# Patient Record
Sex: FEMALE | Race: BLACK OR AFRICAN AMERICAN | URBAN - METROPOLITAN AREA
[De-identification: names, ages, dates, MRNs, and addresses within clinical notes are randomized per-mention and may not be internally consistent; named-entity substitution may affect disease eponyms.]

---

## 2017-01-01 ENCOUNTER — APPOINTMENT (OUTPATIENT)
Dept: GENERAL RADIOLOGY | Facility: CLINIC | Age: 57
DRG: 919 | End: 2017-01-01
Attending: NURSE PRACTITIONER
Payer: COMMERCIAL

## 2017-01-01 ENCOUNTER — INFUSION THERAPY VISIT (OUTPATIENT)
Dept: ONCOLOGY | Facility: CLINIC | Age: 57
End: 2017-01-01
Attending: INTERNAL MEDICINE
Payer: COMMERCIAL

## 2017-01-01 ENCOUNTER — APPOINTMENT (OUTPATIENT)
Dept: LAB | Facility: CLINIC | Age: 57
End: 2017-01-01
Attending: INTERNAL MEDICINE
Payer: COMMERCIAL

## 2017-01-01 ENCOUNTER — TELEPHONE (OUTPATIENT)
Dept: ONCOLOGY | Facility: CLINIC | Age: 57
End: 2017-01-01

## 2017-01-01 ENCOUNTER — ANESTHESIA (OUTPATIENT)
Dept: SURGERY | Facility: CLINIC | Age: 57
DRG: 919 | End: 2017-01-01
Payer: COMMERCIAL

## 2017-01-01 ENCOUNTER — ONCOLOGY VISIT (OUTPATIENT)
Dept: ONCOLOGY | Facility: CLINIC | Age: 57
End: 2017-01-01
Attending: NURSE PRACTITIONER
Payer: COMMERCIAL

## 2017-01-01 ENCOUNTER — OFFICE VISIT (OUTPATIENT)
Dept: ENDOCRINOLOGY | Facility: CLINIC | Age: 57
End: 2017-01-01

## 2017-01-01 ENCOUNTER — HOME INFUSION (PRE-WILLOW HOME INFUSION) (OUTPATIENT)
Dept: PHARMACY | Facility: CLINIC | Age: 57
End: 2017-01-01

## 2017-01-01 ENCOUNTER — APPOINTMENT (OUTPATIENT)
Dept: GENERAL RADIOLOGY | Facility: CLINIC | Age: 57
DRG: 919 | End: 2017-01-01
Attending: INTERNAL MEDICINE
Payer: COMMERCIAL

## 2017-01-01 ENCOUNTER — SURGERY (OUTPATIENT)
Age: 57
End: 2017-01-01

## 2017-01-01 ENCOUNTER — INFUSION THERAPY VISIT (OUTPATIENT)
Dept: ONCOLOGY | Facility: CLINIC | Age: 57
End: 2017-01-01
Attending: PHYSICIAN ASSISTANT
Payer: COMMERCIAL

## 2017-01-01 ENCOUNTER — APPOINTMENT (OUTPATIENT)
Dept: LAB | Facility: CLINIC | Age: 57
End: 2017-01-01
Attending: PHYSICIAN ASSISTANT
Payer: COMMERCIAL

## 2017-01-01 ENCOUNTER — THERAPY VISIT (OUTPATIENT)
Dept: SPEECH THERAPY | Facility: CLINIC | Age: 57
End: 2017-01-01
Attending: PHYSICIAN ASSISTANT

## 2017-01-01 ENCOUNTER — HEALTH MAINTENANCE LETTER (OUTPATIENT)
Age: 57
End: 2017-01-01

## 2017-01-01 ENCOUNTER — ANESTHESIA EVENT (OUTPATIENT)
Dept: SURGERY | Facility: CLINIC | Age: 57
DRG: 919 | End: 2017-01-01
Payer: COMMERCIAL

## 2017-01-01 ENCOUNTER — CARE COORDINATION (OUTPATIENT)
Dept: ONCOLOGY | Facility: CLINIC | Age: 57
End: 2017-01-01

## 2017-01-01 ENCOUNTER — CARE COORDINATION (OUTPATIENT)
Dept: CARE COORDINATION | Facility: CLINIC | Age: 57
End: 2017-01-01

## 2017-01-01 ENCOUNTER — TELEPHONE (OUTPATIENT)
Dept: ENDOCRINOLOGY | Facility: CLINIC | Age: 57
End: 2017-01-01

## 2017-01-01 ENCOUNTER — DOCUMENTATION ONLY (OUTPATIENT)
Dept: ONCOLOGY | Facility: CLINIC | Age: 57
End: 2017-01-01

## 2017-01-01 ENCOUNTER — HOSPITAL ENCOUNTER (INPATIENT)
Facility: CLINIC | Age: 57
LOS: 2 days | Discharge: HOME | DRG: 919 | End: 2017-08-05
Attending: INTERNAL MEDICINE | Admitting: INTERNAL MEDICINE
Payer: COMMERCIAL

## 2017-01-01 ENCOUNTER — INFUSION THERAPY VISIT (OUTPATIENT)
Dept: ONCOLOGY | Facility: CLINIC | Age: 57
DRG: 919 | End: 2017-01-01
Attending: INTERNAL MEDICINE
Payer: COMMERCIAL

## 2017-01-01 ENCOUNTER — OFFICE VISIT (OUTPATIENT)
Dept: EDUCATION SERVICES | Facility: CLINIC | Age: 57
End: 2017-01-01

## 2017-01-01 VITALS
SYSTOLIC BLOOD PRESSURE: 112 MMHG | DIASTOLIC BLOOD PRESSURE: 70 MMHG | OXYGEN SATURATION: 100 % | HEART RATE: 105 BPM | WEIGHT: 170.8 LBS | RESPIRATION RATE: 18 BRPM | BODY MASS INDEX: 23.83 KG/M2 | TEMPERATURE: 98.5 F

## 2017-01-01 VITALS
DIASTOLIC BLOOD PRESSURE: 60 MMHG | SYSTOLIC BLOOD PRESSURE: 97 MMHG | HEART RATE: 98 BPM | WEIGHT: 171.6 LBS | RESPIRATION RATE: 16 BRPM | HEIGHT: 70 IN | OXYGEN SATURATION: 98 % | BODY MASS INDEX: 24.57 KG/M2 | TEMPERATURE: 98.8 F

## 2017-01-01 VITALS
TEMPERATURE: 98.8 F | BODY MASS INDEX: 23.21 KG/M2 | HEART RATE: 86 BPM | SYSTOLIC BLOOD PRESSURE: 108 MMHG | HEIGHT: 71 IN | RESPIRATION RATE: 16 BRPM | OXYGEN SATURATION: 99 % | DIASTOLIC BLOOD PRESSURE: 73 MMHG | WEIGHT: 165.8 LBS

## 2017-01-01 VITALS
HEART RATE: 94 BPM | DIASTOLIC BLOOD PRESSURE: 75 MMHG | OXYGEN SATURATION: 99 % | WEIGHT: 168.4 LBS | SYSTOLIC BLOOD PRESSURE: 118 MMHG | RESPIRATION RATE: 18 BRPM | TEMPERATURE: 98.8 F | BODY MASS INDEX: 23.5 KG/M2

## 2017-01-01 VITALS
BODY MASS INDEX: 22.64 KG/M2 | OXYGEN SATURATION: 98 % | WEIGHT: 161.7 LBS | HEIGHT: 71 IN | DIASTOLIC BLOOD PRESSURE: 80 MMHG | HEART RATE: 68 BPM | SYSTOLIC BLOOD PRESSURE: 132 MMHG | TEMPERATURE: 96.9 F | RESPIRATION RATE: 14 BRPM

## 2017-01-01 VITALS
SYSTOLIC BLOOD PRESSURE: 112 MMHG | BODY MASS INDEX: 25.05 KG/M2 | DIASTOLIC BLOOD PRESSURE: 73 MMHG | OXYGEN SATURATION: 99 % | HEIGHT: 70 IN | HEART RATE: 100 BPM | TEMPERATURE: 97 F | WEIGHT: 175 LBS | RESPIRATION RATE: 16 BRPM

## 2017-01-01 VITALS
DIASTOLIC BLOOD PRESSURE: 65 MMHG | SYSTOLIC BLOOD PRESSURE: 106 MMHG | RESPIRATION RATE: 18 BRPM | TEMPERATURE: 98.8 F | WEIGHT: 171.6 LBS | HEART RATE: 96 BPM | BODY MASS INDEX: 23.94 KG/M2 | OXYGEN SATURATION: 99 %

## 2017-01-01 VITALS
DIASTOLIC BLOOD PRESSURE: 73 MMHG | WEIGHT: 170.2 LBS | TEMPERATURE: 98.3 F | BODY MASS INDEX: 23.75 KG/M2 | HEART RATE: 95 BPM | SYSTOLIC BLOOD PRESSURE: 109 MMHG | OXYGEN SATURATION: 100 % | RESPIRATION RATE: 18 BRPM

## 2017-01-01 VITALS
TEMPERATURE: 99.7 F | DIASTOLIC BLOOD PRESSURE: 73 MMHG | BODY MASS INDEX: 22.3 KG/M2 | OXYGEN SATURATION: 100 % | RESPIRATION RATE: 18 BRPM | HEIGHT: 71 IN | SYSTOLIC BLOOD PRESSURE: 118 MMHG | WEIGHT: 159.3 LBS | HEART RATE: 92 BPM

## 2017-01-01 VITALS
HEART RATE: 109 BPM | WEIGHT: 159.5 LBS | RESPIRATION RATE: 18 BRPM | SYSTOLIC BLOOD PRESSURE: 102 MMHG | TEMPERATURE: 99.2 F | BODY MASS INDEX: 22.26 KG/M2 | DIASTOLIC BLOOD PRESSURE: 68 MMHG | OXYGEN SATURATION: 97 %

## 2017-01-01 VITALS
HEIGHT: 70 IN | BODY MASS INDEX: 24.77 KG/M2 | HEART RATE: 88 BPM | DIASTOLIC BLOOD PRESSURE: 70 MMHG | WEIGHT: 173 LBS | SYSTOLIC BLOOD PRESSURE: 102 MMHG

## 2017-01-01 VITALS
RESPIRATION RATE: 16 BRPM | SYSTOLIC BLOOD PRESSURE: 121 MMHG | DIASTOLIC BLOOD PRESSURE: 74 MMHG | TEMPERATURE: 99 F | OXYGEN SATURATION: 99 % | HEART RATE: 101 BPM | BODY MASS INDEX: 22.44 KG/M2 | WEIGHT: 160.9 LBS

## 2017-01-01 VITALS
HEART RATE: 130 BPM | DIASTOLIC BLOOD PRESSURE: 58 MMHG | RESPIRATION RATE: 18 BRPM | SYSTOLIC BLOOD PRESSURE: 94 MMHG | OXYGEN SATURATION: 96 % | TEMPERATURE: 100.8 F

## 2017-01-01 VITALS
OXYGEN SATURATION: 99 % | SYSTOLIC BLOOD PRESSURE: 116 MMHG | BODY MASS INDEX: 22.15 KG/M2 | TEMPERATURE: 98.2 F | WEIGHT: 158.8 LBS | DIASTOLIC BLOOD PRESSURE: 64 MMHG | RESPIRATION RATE: 18 BRPM | HEART RATE: 81 BPM

## 2017-01-01 VITALS
BODY MASS INDEX: 23.27 KG/M2 | OXYGEN SATURATION: 100 % | WEIGHT: 166.8 LBS | TEMPERATURE: 98.1 F | SYSTOLIC BLOOD PRESSURE: 102 MMHG | DIASTOLIC BLOOD PRESSURE: 64 MMHG | HEART RATE: 93 BPM

## 2017-01-01 VITALS
OXYGEN SATURATION: 100 % | TEMPERATURE: 98.4 F | RESPIRATION RATE: 16 BRPM | HEART RATE: 94 BPM | BODY MASS INDEX: 24.39 KG/M2 | DIASTOLIC BLOOD PRESSURE: 65 MMHG | WEIGHT: 170 LBS | SYSTOLIC BLOOD PRESSURE: 104 MMHG

## 2017-01-01 VITALS
HEART RATE: 88 BPM | DIASTOLIC BLOOD PRESSURE: 66 MMHG | TEMPERATURE: 98.4 F | RESPIRATION RATE: 16 BRPM | BODY MASS INDEX: 23.96 KG/M2 | SYSTOLIC BLOOD PRESSURE: 110 MMHG | WEIGHT: 171.7 LBS | OXYGEN SATURATION: 100 %

## 2017-01-01 VITALS
WEIGHT: 152.2 LBS | BODY MASS INDEX: 21.23 KG/M2 | RESPIRATION RATE: 16 BRPM | OXYGEN SATURATION: 100 % | DIASTOLIC BLOOD PRESSURE: 69 MMHG | TEMPERATURE: 98.1 F | SYSTOLIC BLOOD PRESSURE: 109 MMHG | HEART RATE: 69 BPM

## 2017-01-01 VITALS
SYSTOLIC BLOOD PRESSURE: 104 MMHG | BODY MASS INDEX: 22.29 KG/M2 | OXYGEN SATURATION: 98 % | DIASTOLIC BLOOD PRESSURE: 67 MMHG | RESPIRATION RATE: 15 BRPM | HEART RATE: 104 BPM | WEIGHT: 159.8 LBS | TEMPERATURE: 98.9 F

## 2017-01-01 VITALS
BODY MASS INDEX: 23.99 KG/M2 | WEIGHT: 171.9 LBS | HEART RATE: 94 BPM | BODY MASS INDEX: 25.38 KG/M2 | TEMPERATURE: 98.4 F | DIASTOLIC BLOOD PRESSURE: 63 MMHG | SYSTOLIC BLOOD PRESSURE: 116 MMHG | TEMPERATURE: 98.1 F | RESPIRATION RATE: 16 BRPM | DIASTOLIC BLOOD PRESSURE: 64 MMHG | OXYGEN SATURATION: 100 % | SYSTOLIC BLOOD PRESSURE: 101 MMHG | OXYGEN SATURATION: 100 % | HEART RATE: 94 BPM | WEIGHT: 176.9 LBS

## 2017-01-01 VITALS
DIASTOLIC BLOOD PRESSURE: 63 MMHG | BODY MASS INDEX: 24.85 KG/M2 | RESPIRATION RATE: 18 BRPM | HEIGHT: 70 IN | HEART RATE: 78 BPM | WEIGHT: 173.6 LBS | SYSTOLIC BLOOD PRESSURE: 109 MMHG | OXYGEN SATURATION: 99 % | TEMPERATURE: 98.3 F

## 2017-01-01 VITALS
HEART RATE: 95 BPM | WEIGHT: 171.4 LBS | OXYGEN SATURATION: 100 % | BODY MASS INDEX: 24.59 KG/M2 | TEMPERATURE: 98.6 F | DIASTOLIC BLOOD PRESSURE: 68 MMHG | RESPIRATION RATE: 16 BRPM | SYSTOLIC BLOOD PRESSURE: 100 MMHG

## 2017-01-01 VITALS
TEMPERATURE: 98.5 F | BODY MASS INDEX: 22.09 KG/M2 | WEIGHT: 158.3 LBS | HEART RATE: 107 BPM | SYSTOLIC BLOOD PRESSURE: 103 MMHG | OXYGEN SATURATION: 99 % | RESPIRATION RATE: 16 BRPM | DIASTOLIC BLOOD PRESSURE: 68 MMHG

## 2017-01-01 VITALS
HEART RATE: 87 BPM | DIASTOLIC BLOOD PRESSURE: 70 MMHG | SYSTOLIC BLOOD PRESSURE: 104 MMHG | OXYGEN SATURATION: 100 % | TEMPERATURE: 97.6 F | RESPIRATION RATE: 16 BRPM

## 2017-01-01 VITALS
RESPIRATION RATE: 16 BRPM | OXYGEN SATURATION: 99 % | SYSTOLIC BLOOD PRESSURE: 109 MMHG | HEART RATE: 86 BPM | TEMPERATURE: 98.2 F | DIASTOLIC BLOOD PRESSURE: 72 MMHG

## 2017-01-01 VITALS — BODY MASS INDEX: 22.42 KG/M2 | WEIGHT: 160.72 LBS

## 2017-01-01 VITALS
BODY MASS INDEX: 22.36 KG/M2 | DIASTOLIC BLOOD PRESSURE: 66 MMHG | TEMPERATURE: 98.8 F | OXYGEN SATURATION: 99 % | RESPIRATION RATE: 16 BRPM | HEART RATE: 88 BPM | WEIGHT: 160.3 LBS | SYSTOLIC BLOOD PRESSURE: 99 MMHG

## 2017-01-01 VITALS
OXYGEN SATURATION: 100 % | HEART RATE: 85 BPM | BODY MASS INDEX: 22.53 KG/M2 | TEMPERATURE: 98.2 F | WEIGHT: 161.5 LBS | RESPIRATION RATE: 18 BRPM | DIASTOLIC BLOOD PRESSURE: 79 MMHG | SYSTOLIC BLOOD PRESSURE: 127 MMHG

## 2017-01-01 VITALS
OXYGEN SATURATION: 100 % | RESPIRATION RATE: 18 BRPM | HEART RATE: 83 BPM | SYSTOLIC BLOOD PRESSURE: 109 MMHG | TEMPERATURE: 98.3 F | DIASTOLIC BLOOD PRESSURE: 70 MMHG

## 2017-01-01 VITALS
BODY MASS INDEX: 22.07 KG/M2 | TEMPERATURE: 101.6 F | SYSTOLIC BLOOD PRESSURE: 126 MMHG | RESPIRATION RATE: 18 BRPM | HEART RATE: 137 BPM | DIASTOLIC BLOOD PRESSURE: 80 MMHG | OXYGEN SATURATION: 98 % | WEIGHT: 158.2 LBS

## 2017-01-01 VITALS
HEART RATE: 88 BPM | WEIGHT: 170.7 LBS | BODY MASS INDEX: 23.9 KG/M2 | OXYGEN SATURATION: 100 % | TEMPERATURE: 98.2 F | DIASTOLIC BLOOD PRESSURE: 64 MMHG | HEIGHT: 71 IN | SYSTOLIC BLOOD PRESSURE: 103 MMHG | RESPIRATION RATE: 16 BRPM

## 2017-01-01 VITALS
RESPIRATION RATE: 16 BRPM | SYSTOLIC BLOOD PRESSURE: 114 MMHG | WEIGHT: 161.4 LBS | DIASTOLIC BLOOD PRESSURE: 75 MMHG | HEART RATE: 89 BPM | BODY MASS INDEX: 22.51 KG/M2 | OXYGEN SATURATION: 99 % | TEMPERATURE: 98.9 F

## 2017-01-01 VITALS
SYSTOLIC BLOOD PRESSURE: 114 MMHG | DIASTOLIC BLOOD PRESSURE: 75 MMHG | OXYGEN SATURATION: 99 % | HEART RATE: 89 BPM | RESPIRATION RATE: 16 BRPM

## 2017-01-01 DIAGNOSIS — C25.0 MALIGNANT NEOPLASM OF HEAD OF PANCREAS (H): ICD-10-CM

## 2017-01-01 DIAGNOSIS — D70.1 CHEMOTHERAPY-INDUCED NEUTROPENIA (H): ICD-10-CM

## 2017-01-01 DIAGNOSIS — R63.0 ANOREXIA: ICD-10-CM

## 2017-01-01 DIAGNOSIS — D63.8 ANEMIA, CHRONIC DISEASE: ICD-10-CM

## 2017-01-01 DIAGNOSIS — E08.8 DIABETES MELLITUS DUE TO UNDERLYING CONDITION WITH COMPLICATION, WITHOUT LONG-TERM CURRENT USE OF INSULIN (H): ICD-10-CM

## 2017-01-01 DIAGNOSIS — C80.1 BILIARY OBSTRUCTION DUE TO MALIGNANT NEOPLASM (H): ICD-10-CM

## 2017-01-01 DIAGNOSIS — E87.6 HYPOKALEMIA: Primary | ICD-10-CM

## 2017-01-01 DIAGNOSIS — K83.09 CHOLANGITIS (H): ICD-10-CM

## 2017-01-01 DIAGNOSIS — C25.0 MALIGNANT NEOPLASM OF HEAD OF PANCREAS (H): Primary | ICD-10-CM

## 2017-01-01 DIAGNOSIS — E11.9 DIABETES MELLITUS, TYPE 2 (H): Primary | ICD-10-CM

## 2017-01-01 DIAGNOSIS — T45.1X5A CHEMOTHERAPY-INDUCED NEUTROPENIA (H): Primary | ICD-10-CM

## 2017-01-01 DIAGNOSIS — K64.4 EXTERNAL HEMORRHOIDS: ICD-10-CM

## 2017-01-01 DIAGNOSIS — E11.9 DIABETES (H): Primary | ICD-10-CM

## 2017-01-01 DIAGNOSIS — D70.1 CHEMOTHERAPY-INDUCED NEUTROPENIA (H): Primary | ICD-10-CM

## 2017-01-01 DIAGNOSIS — C25.9 PANCREATIC CANCER (H): ICD-10-CM

## 2017-01-01 DIAGNOSIS — Z29.9 NEED FOR PROPHYLACTIC MEASURE: Primary | ICD-10-CM

## 2017-01-01 DIAGNOSIS — R50.9 FEVER, UNSPECIFIED: ICD-10-CM

## 2017-01-01 DIAGNOSIS — C78.7 SECONDARY MALIGNANT NEOPLASM OF LIVER (H): ICD-10-CM

## 2017-01-01 DIAGNOSIS — C78.7 SECONDARY MALIGNANT NEOPLASM OF LIVER (H): Primary | ICD-10-CM

## 2017-01-01 DIAGNOSIS — K83.1 BILIARY OBSTRUCTION DUE TO MALIGNANT NEOPLASM (H): ICD-10-CM

## 2017-01-01 DIAGNOSIS — Z29.9 NEED FOR PROPHYLACTIC MEASURE: ICD-10-CM

## 2017-01-01 DIAGNOSIS — E13.9 SECONDARY DIABETES MELLITUS (H): Primary | ICD-10-CM

## 2017-01-01 DIAGNOSIS — T45.1X5A CHEMOTHERAPY-INDUCED NEUTROPENIA (H): ICD-10-CM

## 2017-01-01 DIAGNOSIS — E04.1 THYROID NODULE: ICD-10-CM

## 2017-01-01 DIAGNOSIS — R13.10 DYSPHAGIA, UNSPECIFIED TYPE: ICD-10-CM

## 2017-01-01 DIAGNOSIS — J30.2 CHRONIC SEASONAL ALLERGIC RHINITIS, UNSPECIFIED TRIGGER: Primary | ICD-10-CM

## 2017-01-01 DIAGNOSIS — K83.09 CHOLANGITIS (H): Primary | ICD-10-CM

## 2017-01-01 DIAGNOSIS — J30.2 CHRONIC SEASONAL ALLERGIC RHINITIS, UNSPECIFIED TRIGGER: ICD-10-CM

## 2017-01-01 DIAGNOSIS — E87.6 HYPOKALEMIA: ICD-10-CM

## 2017-01-01 LAB
ABO + RH BLD: NORMAL
ABO + RH BLD: NORMAL
ALBUMIN SERPL-MCNC: 1.6 G/DL (ref 3.4–5)
ALBUMIN SERPL-MCNC: 1.6 G/DL (ref 3.4–5)
ALBUMIN SERPL-MCNC: 1.8 G/DL (ref 3.4–5)
ALBUMIN SERPL-MCNC: 2.3 G/DL (ref 3.4–5)
ALBUMIN SERPL-MCNC: 2.4 G/DL (ref 3.4–5)
ALBUMIN SERPL-MCNC: 2.4 G/DL (ref 3.4–5)
ALBUMIN SERPL-MCNC: 2.5 G/DL (ref 3.4–5)
ALBUMIN SERPL-MCNC: 2.5 G/DL (ref 3.4–5)
ALBUMIN SERPL-MCNC: 2.6 G/DL (ref 3.4–5)
ALBUMIN SERPL-MCNC: 2.7 G/DL (ref 3.4–5)
ALBUMIN SERPL-MCNC: 2.8 G/DL (ref 3.4–5)
ALBUMIN SERPL-MCNC: 2.9 G/DL (ref 3.4–5)
ALBUMIN SERPL-MCNC: 2.9 G/DL (ref 3.4–5)
ALBUMIN SERPL-MCNC: 3 G/DL (ref 3.4–5)
ALBUMIN SERPL-MCNC: 3 G/DL (ref 3.4–5)
ALBUMIN SERPL-MCNC: 3.1 G/DL (ref 3.4–5)
ALBUMIN SERPL-MCNC: 3.1 G/DL (ref 3.4–5)
ALBUMIN SERPL-MCNC: 3.2 G/DL (ref 3.4–5)
ALBUMIN SERPL-MCNC: 3.2 G/DL (ref 3.4–5)
ALBUMIN SERPL-MCNC: 3.4 G/DL (ref 3.4–5)
ALBUMIN SERPL-MCNC: 3.6 G/DL (ref 3.4–5)
ALP SERPL-CCNC: 196 U/L (ref 40–150)
ALP SERPL-CCNC: 204 U/L (ref 40–150)
ALP SERPL-CCNC: 210 U/L (ref 40–150)
ALP SERPL-CCNC: 217 U/L (ref 40–150)
ALP SERPL-CCNC: 221 U/L (ref 40–150)
ALP SERPL-CCNC: 225 U/L (ref 40–150)
ALP SERPL-CCNC: 230 U/L (ref 40–150)
ALP SERPL-CCNC: 249 U/L (ref 40–150)
ALP SERPL-CCNC: 255 U/L (ref 40–150)
ALP SERPL-CCNC: 256 U/L (ref 40–150)
ALP SERPL-CCNC: 288 U/L (ref 40–150)
ALP SERPL-CCNC: 298 U/L (ref 40–150)
ALP SERPL-CCNC: 347 U/L (ref 40–150)
ALP SERPL-CCNC: 358 U/L (ref 40–150)
ALP SERPL-CCNC: 499 U/L (ref 40–150)
ALP SERPL-CCNC: 522 U/L (ref 40–150)
ALP SERPL-CCNC: 550 U/L (ref 40–150)
ALP SERPL-CCNC: 559 U/L (ref 40–150)
ALP SERPL-CCNC: 580 U/L (ref 40–150)
ALP SERPL-CCNC: 615 U/L (ref 40–150)
ALP SERPL-CCNC: 673 U/L (ref 40–150)
ALP SERPL-CCNC: 679 U/L (ref 40–150)
ALP SERPL-CCNC: 792 U/L (ref 40–150)
ALP SERPL-CCNC: 834 U/L (ref 40–150)
ALP SERPL-CCNC: 886 U/L (ref 40–150)
ALP SERPL-CCNC: 973 U/L (ref 40–150)
ALT SERPL W P-5'-P-CCNC: 132 U/L (ref 0–50)
ALT SERPL W P-5'-P-CCNC: 19 U/L (ref 0–50)
ALT SERPL W P-5'-P-CCNC: 21 U/L (ref 0–50)
ALT SERPL W P-5'-P-CCNC: 21 U/L (ref 0–50)
ALT SERPL W P-5'-P-CCNC: 214 U/L (ref 0–50)
ALT SERPL W P-5'-P-CCNC: 22 U/L (ref 0–50)
ALT SERPL W P-5'-P-CCNC: 23 U/L (ref 0–50)
ALT SERPL W P-5'-P-CCNC: 23 U/L (ref 0–50)
ALT SERPL W P-5'-P-CCNC: 29 U/L (ref 0–50)
ALT SERPL W P-5'-P-CCNC: 30 U/L (ref 0–50)
ALT SERPL W P-5'-P-CCNC: 38 U/L (ref 0–50)
ALT SERPL W P-5'-P-CCNC: 39 U/L (ref 0–50)
ALT SERPL W P-5'-P-CCNC: 40 U/L (ref 0–50)
ALT SERPL W P-5'-P-CCNC: 41 U/L (ref 0–50)
ALT SERPL W P-5'-P-CCNC: 46 U/L (ref 0–50)
ALT SERPL W P-5'-P-CCNC: 46 U/L (ref 0–50)
ALT SERPL W P-5'-P-CCNC: 50 U/L (ref 0–50)
ALT SERPL W P-5'-P-CCNC: 52 U/L (ref 0–50)
ALT SERPL W P-5'-P-CCNC: 57 U/L (ref 0–50)
ALT SERPL W P-5'-P-CCNC: 67 U/L (ref 0–50)
ALT SERPL W P-5'-P-CCNC: 73 U/L (ref 0–50)
ALT SERPL W P-5'-P-CCNC: 75 U/L (ref 0–50)
ALT SERPL W P-5'-P-CCNC: 81 U/L (ref 0–50)
AMYLASE SERPL-CCNC: 24 U/L (ref 30–110)
AMYLASE SERPL-CCNC: 38 U/L (ref 30–110)
ANION GAP SERPL CALCULATED.3IONS-SCNC: 10 MMOL/L (ref 3–14)
ANION GAP SERPL CALCULATED.3IONS-SCNC: 11 MMOL/L (ref 3–14)
ANION GAP SERPL CALCULATED.3IONS-SCNC: 14 MMOL/L (ref 3–14)
ANION GAP SERPL CALCULATED.3IONS-SCNC: 5 MMOL/L (ref 3–14)
ANION GAP SERPL CALCULATED.3IONS-SCNC: 6 MMOL/L (ref 3–14)
ANION GAP SERPL CALCULATED.3IONS-SCNC: 7 MMOL/L (ref 3–14)
ANION GAP SERPL CALCULATED.3IONS-SCNC: 8 MMOL/L (ref 3–14)
ANION GAP SERPL CALCULATED.3IONS-SCNC: 9 MMOL/L (ref 3–14)
ANISOCYTOSIS BLD QL SMEAR: ABNORMAL
ANISOCYTOSIS BLD QL SMEAR: SLIGHT
AST SERPL W P-5'-P-CCNC: 101 U/L (ref 0–45)
AST SERPL W P-5'-P-CCNC: 104 U/L (ref 0–45)
AST SERPL W P-5'-P-CCNC: 106 U/L (ref 0–45)
AST SERPL W P-5'-P-CCNC: 111 U/L (ref 0–45)
AST SERPL W P-5'-P-CCNC: 15 U/L (ref 0–45)
AST SERPL W P-5'-P-CCNC: 16 U/L (ref 0–45)
AST SERPL W P-5'-P-CCNC: 16 U/L (ref 0–45)
AST SERPL W P-5'-P-CCNC: 20 U/L (ref 0–45)
AST SERPL W P-5'-P-CCNC: 23 U/L (ref 0–45)
AST SERPL W P-5'-P-CCNC: 23 U/L (ref 0–45)
AST SERPL W P-5'-P-CCNC: 24 U/L (ref 0–45)
AST SERPL W P-5'-P-CCNC: 25 U/L (ref 0–45)
AST SERPL W P-5'-P-CCNC: 253 U/L (ref 0–45)
AST SERPL W P-5'-P-CCNC: 27 U/L (ref 0–45)
AST SERPL W P-5'-P-CCNC: 29 U/L (ref 0–45)
AST SERPL W P-5'-P-CCNC: 31 U/L (ref 0–45)
AST SERPL W P-5'-P-CCNC: 37 U/L (ref 0–45)
AST SERPL W P-5'-P-CCNC: 42 U/L (ref 0–45)
AST SERPL W P-5'-P-CCNC: 43 U/L (ref 0–45)
AST SERPL W P-5'-P-CCNC: 43 U/L (ref 0–45)
AST SERPL W P-5'-P-CCNC: 50 U/L (ref 0–45)
AST SERPL W P-5'-P-CCNC: 63 U/L (ref 0–45)
AST SERPL W P-5'-P-CCNC: 63 U/L (ref 0–45)
AST SERPL W P-5'-P-CCNC: 72 U/L (ref 0–45)
AST SERPL W P-5'-P-CCNC: 84 U/L (ref 0–45)
AST SERPL W P-5'-P-CCNC: 86 U/L (ref 0–45)
BACTERIA SPEC CULT: ABNORMAL
BACTERIA SPEC CULT: ABNORMAL
BACTERIA SPEC CULT: NO GROWTH
BACTERIA SPEC CULT: NORMAL
BASOPHILS # BLD AUTO: 0 10E9/L (ref 0–0.2)
BASOPHILS NFR BLD AUTO: 0 %
BASOPHILS NFR BLD AUTO: 0.2 %
BASOPHILS NFR BLD AUTO: 0.3 %
BASOPHILS NFR BLD AUTO: 0.4 %
BASOPHILS NFR BLD AUTO: 0.4 %
BASOPHILS NFR BLD AUTO: 0.5 %
BASOPHILS NFR BLD AUTO: 0.5 %
BASOPHILS NFR BLD AUTO: 0.6 %
BASOPHILS NFR BLD AUTO: 0.9 %
BILIRUB DIRECT SERPL-MCNC: 0.1 MG/DL (ref 0–0.2)
BILIRUB DIRECT SERPL-MCNC: 2.8 MG/DL (ref 0–0.2)
BILIRUB SERPL-MCNC: 0.2 MG/DL (ref 0.2–1.3)
BILIRUB SERPL-MCNC: 0.3 MG/DL (ref 0.2–1.3)
BILIRUB SERPL-MCNC: 0.4 MG/DL (ref 0.2–1.3)
BILIRUB SERPL-MCNC: 0.5 MG/DL (ref 0.2–1.3)
BILIRUB SERPL-MCNC: 0.6 MG/DL (ref 0.2–1.3)
BILIRUB SERPL-MCNC: 0.7 MG/DL (ref 0.2–1.3)
BILIRUB SERPL-MCNC: 0.9 MG/DL (ref 0.2–1.3)
BILIRUB SERPL-MCNC: 1.1 MG/DL (ref 0.2–1.3)
BILIRUB SERPL-MCNC: 1.2 MG/DL (ref 0.2–1.3)
BILIRUB SERPL-MCNC: 2.3 MG/DL (ref 0.2–1.3)
BILIRUB SERPL-MCNC: 2.4 MG/DL (ref 0.2–1.3)
BILIRUB SERPL-MCNC: 3.3 MG/DL (ref 0.2–1.3)
BILIRUB SERPL-MCNC: 4.8 MG/DL (ref 0.2–1.3)
BLD GP AB SCN SERPL QL: NORMAL
BLD PROD TYP BPU: NORMAL
BLD PROD TYP BPU: NORMAL
BLD UNIT ID BPU: 0
BLOOD BANK CMNT PATIENT-IMP: NORMAL
BLOOD PRODUCT CODE: NORMAL
BPU ID: NORMAL
BUN SERPL-MCNC: 10 MG/DL (ref 7–30)
BUN SERPL-MCNC: 11 MG/DL (ref 7–30)
BUN SERPL-MCNC: 12 MG/DL (ref 7–30)
BUN SERPL-MCNC: 12 MG/DL (ref 7–30)
BUN SERPL-MCNC: 13 MG/DL (ref 7–30)
BUN SERPL-MCNC: 13 MG/DL (ref 7–30)
BUN SERPL-MCNC: 16 MG/DL (ref 7–30)
BUN SERPL-MCNC: 16 MG/DL (ref 7–30)
BUN SERPL-MCNC: 4 MG/DL (ref 7–30)
BUN SERPL-MCNC: 5 MG/DL (ref 7–30)
BUN SERPL-MCNC: 6 MG/DL (ref 7–30)
BUN SERPL-MCNC: 6 MG/DL (ref 7–30)
BUN SERPL-MCNC: 7 MG/DL (ref 7–30)
BUN SERPL-MCNC: 7 MG/DL (ref 7–30)
BUN SERPL-MCNC: 8 MG/DL (ref 7–30)
BUN SERPL-MCNC: 9 MG/DL (ref 7–30)
BUN SERPL-MCNC: 9 MG/DL (ref 7–30)
BURR CELLS BLD QL SMEAR: SLIGHT
CALCIUM SERPL-MCNC: 7.7 MG/DL (ref 8.5–10.1)
CALCIUM SERPL-MCNC: 8.1 MG/DL (ref 8.5–10.1)
CALCIUM SERPL-MCNC: 8.2 MG/DL (ref 8.5–10.1)
CALCIUM SERPL-MCNC: 8.4 MG/DL (ref 8.5–10.1)
CALCIUM SERPL-MCNC: 8.5 MG/DL (ref 8.5–10.1)
CALCIUM SERPL-MCNC: 8.5 MG/DL (ref 8.5–10.1)
CALCIUM SERPL-MCNC: 8.6 MG/DL (ref 8.5–10.1)
CALCIUM SERPL-MCNC: 8.7 MG/DL (ref 8.5–10.1)
CALCIUM SERPL-MCNC: 8.7 MG/DL (ref 8.5–10.1)
CALCIUM SERPL-MCNC: 8.8 MG/DL (ref 8.5–10.1)
CALCIUM SERPL-MCNC: 8.9 MG/DL (ref 8.5–10.1)
CALCIUM SERPL-MCNC: 9 MG/DL (ref 8.5–10.1)
CALCIUM SERPL-MCNC: 9.2 MG/DL (ref 8.5–10.1)
CALCIUM SERPL-MCNC: 9.3 MG/DL (ref 8.5–10.1)
CALCIUM SERPL-MCNC: 9.5 MG/DL (ref 8.5–10.1)
CANCER AG19-9 SERPL-ACNC: 1083
CANCER AG19-9 SERPL-ACNC: 1178 U/ML (ref 0–37)
CANCER AG19-9 SERPL-ACNC: 3410 U/ML (ref 0–37)
CANCER AG19-9 SERPL-ACNC: 3757
CANCER AG19-9 SERPL-ACNC: 393
CANCER AG19-9 SERPL-ACNC: 687
CANCER AG19-9 SERPL-ACNC: 715
CANCER AG19-9 SERPL-ACNC: 843
CANCER AG19-9 SERPL-ACNC: 883 U/ML (ref 0–37)
CANCER AG19-9 SERPL-ACNC: 989 U/ML (ref 0–37)
CANCER AG19-9 SERPL-ACNC: 991
CANCER AG19-9 SERPL-ACNC: ABNORMAL
CHLORIDE SERPL-SCNC: 101 MMOL/L (ref 94–109)
CHLORIDE SERPL-SCNC: 102 MMOL/L (ref 94–109)
CHLORIDE SERPL-SCNC: 103 MMOL/L (ref 94–109)
CHLORIDE SERPL-SCNC: 104 MMOL/L (ref 94–109)
CHLORIDE SERPL-SCNC: 105 MMOL/L (ref 94–109)
CHLORIDE SERPL-SCNC: 106 MMOL/L (ref 94–109)
CHLORIDE SERPL-SCNC: 109 MMOL/L (ref 94–109)
CHLORIDE SERPL-SCNC: 109 MMOL/L (ref 94–109)
CHLORIDE SERPL-SCNC: 110 MMOL/L (ref 94–109)
CHLORIDE SERPL-SCNC: 111 MMOL/L (ref 94–109)
CO2 SERPL-SCNC: 21 MMOL/L (ref 20–32)
CO2 SERPL-SCNC: 22 MMOL/L (ref 20–32)
CO2 SERPL-SCNC: 23 MMOL/L (ref 20–32)
CO2 SERPL-SCNC: 23 MMOL/L (ref 20–32)
CO2 SERPL-SCNC: 24 MMOL/L (ref 20–32)
CO2 SERPL-SCNC: 25 MMOL/L (ref 20–32)
CO2 SERPL-SCNC: 25 MMOL/L (ref 20–32)
CO2 SERPL-SCNC: 26 MMOL/L (ref 20–32)
CO2 SERPL-SCNC: 27 MMOL/L (ref 20–32)
CO2 SERPL-SCNC: 28 MMOL/L (ref 20–32)
CO2 SERPL-SCNC: 29 MMOL/L (ref 20–32)
CO2 SERPL-SCNC: 30 MMOL/L (ref 20–32)
CO2 SERPL-SCNC: 30 MMOL/L (ref 20–32)
CO2 SERPL-SCNC: 31 MMOL/L (ref 20–32)
CREAT SERPL-MCNC: 0.49 MG/DL (ref 0.52–1.04)
CREAT SERPL-MCNC: 0.51 MG/DL (ref 0.52–1.04)
CREAT SERPL-MCNC: 0.51 MG/DL (ref 0.52–1.04)
CREAT SERPL-MCNC: 0.52 MG/DL (ref 0.52–1.04)
CREAT SERPL-MCNC: 0.52 MG/DL (ref 0.52–1.04)
CREAT SERPL-MCNC: 0.54 MG/DL (ref 0.52–1.04)
CREAT SERPL-MCNC: 0.55 MG/DL (ref 0.52–1.04)
CREAT SERPL-MCNC: 0.56 MG/DL (ref 0.52–1.04)
CREAT SERPL-MCNC: 0.58 MG/DL (ref 0.52–1.04)
CREAT SERPL-MCNC: 0.58 MG/DL (ref 0.52–1.04)
CREAT SERPL-MCNC: 0.59 MG/DL (ref 0.52–1.04)
CREAT SERPL-MCNC: 0.6 MG/DL (ref 0.52–1.04)
CREAT SERPL-MCNC: 0.6 MG/DL (ref 0.52–1.04)
CREAT SERPL-MCNC: 0.61 MG/DL (ref 0.52–1.04)
CREAT SERPL-MCNC: 0.64 MG/DL (ref 0.52–1.04)
CREAT SERPL-MCNC: 0.64 MG/DL (ref 0.52–1.04)
CREAT SERPL-MCNC: 0.65 MG/DL (ref 0.52–1.04)
CREAT SERPL-MCNC: 0.65 MG/DL (ref 0.52–1.04)
CREAT SERPL-MCNC: 0.71 MG/DL (ref 0.52–1.04)
DACRYOCYTES BLD QL SMEAR: SLIGHT
DIFFERENTIAL METHOD BLD: ABNORMAL
ELLIPTOCYTES BLD QL SMEAR: SLIGHT
EOSINOPHIL # BLD AUTO: 0 10E9/L (ref 0–0.7)
EOSINOPHIL # BLD AUTO: 0.1 10E9/L (ref 0–0.7)
EOSINOPHIL NFR BLD AUTO: 0.1 %
EOSINOPHIL NFR BLD AUTO: 0.2 %
EOSINOPHIL NFR BLD AUTO: 0.3 %
EOSINOPHIL NFR BLD AUTO: 0.4 %
EOSINOPHIL NFR BLD AUTO: 0.4 %
EOSINOPHIL NFR BLD AUTO: 0.5 %
EOSINOPHIL NFR BLD AUTO: 0.5 %
EOSINOPHIL NFR BLD AUTO: 0.6 %
EOSINOPHIL NFR BLD AUTO: 0.7 %
EOSINOPHIL NFR BLD AUTO: 0.9 %
EOSINOPHIL NFR BLD AUTO: 0.9 %
EOSINOPHIL NFR BLD AUTO: 1 %
EOSINOPHIL NFR BLD AUTO: 1.1 %
EOSINOPHIL NFR BLD AUTO: 1.2 %
EOSINOPHIL NFR BLD AUTO: 1.3 %
EOSINOPHIL NFR BLD AUTO: 1.4 %
EOSINOPHIL NFR BLD AUTO: 1.4 %
ERCP: NORMAL
ERYTHROCYTE [DISTWIDTH] IN BLOOD BY AUTOMATED COUNT: 15.1 % (ref 10–15)
ERYTHROCYTE [DISTWIDTH] IN BLOOD BY AUTOMATED COUNT: 16.2 % (ref 10–15)
ERYTHROCYTE [DISTWIDTH] IN BLOOD BY AUTOMATED COUNT: 16.8 % (ref 10–15)
ERYTHROCYTE [DISTWIDTH] IN BLOOD BY AUTOMATED COUNT: 16.9 % (ref 10–15)
ERYTHROCYTE [DISTWIDTH] IN BLOOD BY AUTOMATED COUNT: 17.1 % (ref 10–15)
ERYTHROCYTE [DISTWIDTH] IN BLOOD BY AUTOMATED COUNT: 17.2 % (ref 10–15)
ERYTHROCYTE [DISTWIDTH] IN BLOOD BY AUTOMATED COUNT: 17.2 % (ref 10–15)
ERYTHROCYTE [DISTWIDTH] IN BLOOD BY AUTOMATED COUNT: 17.6 % (ref 10–15)
ERYTHROCYTE [DISTWIDTH] IN BLOOD BY AUTOMATED COUNT: 18.5 % (ref 10–15)
ERYTHROCYTE [DISTWIDTH] IN BLOOD BY AUTOMATED COUNT: 19.1 % (ref 10–15)
ERYTHROCYTE [DISTWIDTH] IN BLOOD BY AUTOMATED COUNT: 19.1 % (ref 10–15)
ERYTHROCYTE [DISTWIDTH] IN BLOOD BY AUTOMATED COUNT: 19.8 % (ref 10–15)
ERYTHROCYTE [DISTWIDTH] IN BLOOD BY AUTOMATED COUNT: 19.9 % (ref 10–15)
ERYTHROCYTE [DISTWIDTH] IN BLOOD BY AUTOMATED COUNT: 20 % (ref 10–15)
ERYTHROCYTE [DISTWIDTH] IN BLOOD BY AUTOMATED COUNT: 20.1 % (ref 10–15)
ERYTHROCYTE [DISTWIDTH] IN BLOOD BY AUTOMATED COUNT: 20.1 % (ref 10–15)
ERYTHROCYTE [DISTWIDTH] IN BLOOD BY AUTOMATED COUNT: 20.4 % (ref 10–15)
ERYTHROCYTE [DISTWIDTH] IN BLOOD BY AUTOMATED COUNT: 21.9 % (ref 10–15)
ERYTHROCYTE [DISTWIDTH] IN BLOOD BY AUTOMATED COUNT: 22.8 % (ref 10–15)
ERYTHROCYTE [DISTWIDTH] IN BLOOD BY AUTOMATED COUNT: 22.9 % (ref 10–15)
ERYTHROCYTE [DISTWIDTH] IN BLOOD BY AUTOMATED COUNT: 24.9 % (ref 10–15)
ERYTHROCYTE [DISTWIDTH] IN BLOOD BY AUTOMATED COUNT: 25.2 % (ref 10–15)
FOLATE SERPL-MCNC: 51.7 NG/ML
GFR SERPL CREATININE-BSD FRML MDRD: 84 ML/MIN/1.7M2
GFR SERPL CREATININE-BSD FRML MDRD: >90 ML/MIN/1.7M2
GFR SERPL CREATININE-BSD FRML MDRD: ABNORMAL ML/MIN/1.7M2
GLUCOSE BLDC GLUCOMTR-MCNC: 167 MG/DL (ref 70–99)
GLUCOSE SERPL-MCNC: 105 MG/DL (ref 70–99)
GLUCOSE SERPL-MCNC: 108 MG/DL (ref 70–99)
GLUCOSE SERPL-MCNC: 122 MG/DL (ref 70–99)
GLUCOSE SERPL-MCNC: 128 MG/DL (ref 70–99)
GLUCOSE SERPL-MCNC: 134 MG/DL (ref 70–99)
GLUCOSE SERPL-MCNC: 138 MG/DL (ref 70–99)
GLUCOSE SERPL-MCNC: 140 MG/DL (ref 70–99)
GLUCOSE SERPL-MCNC: 140 MG/DL (ref 70–99)
GLUCOSE SERPL-MCNC: 142 MG/DL (ref 70–99)
GLUCOSE SERPL-MCNC: 150 MG/DL (ref 70–99)
GLUCOSE SERPL-MCNC: 161 MG/DL (ref 70–99)
GLUCOSE SERPL-MCNC: 169 MG/DL (ref 70–99)
GLUCOSE SERPL-MCNC: 171 MG/DL (ref 70–99)
GLUCOSE SERPL-MCNC: 200 MG/DL (ref 70–99)
GLUCOSE SERPL-MCNC: 223 MG/DL (ref 70–99)
GLUCOSE SERPL-MCNC: 231 MG/DL (ref 70–99)
GLUCOSE SERPL-MCNC: 235 MG/DL (ref 70–99)
GLUCOSE SERPL-MCNC: 249 MG/DL (ref 70–99)
GLUCOSE SERPL-MCNC: 250 MG/DL (ref 70–99)
GLUCOSE SERPL-MCNC: 267 MG/DL (ref 70–99)
GLUCOSE SERPL-MCNC: 274 MG/DL (ref 70–99)
GLUCOSE SERPL-MCNC: 296 MG/DL (ref 70–99)
GLUCOSE SERPL-MCNC: 314 MG/DL (ref 70–99)
GLUCOSE SERPL-MCNC: 326 MG/DL (ref 70–99)
GLUCOSE SERPL-MCNC: 361 MG/DL (ref 70–99)
GLUCOSE SERPL-MCNC: 82 MG/DL (ref 70–99)
GLUCOSE SERPL-MCNC: 87 MG/DL (ref 70–99)
GLUCOSE SERPL-MCNC: 99 MG/DL (ref 70–99)
HBA1C MFR BLD: 9.2 % (ref 4.3–6)
HCT VFR BLD AUTO: 25 % (ref 35–47)
HCT VFR BLD AUTO: 25.8 % (ref 35–47)
HCT VFR BLD AUTO: 27.1 % (ref 35–47)
HCT VFR BLD AUTO: 27.5 % (ref 35–47)
HCT VFR BLD AUTO: 28.9 % (ref 35–47)
HCT VFR BLD AUTO: 29.1 % (ref 35–47)
HCT VFR BLD AUTO: 29.8 % (ref 35–47)
HCT VFR BLD AUTO: 30.6 % (ref 35–47)
HCT VFR BLD AUTO: 31 % (ref 35–47)
HCT VFR BLD AUTO: 31.5 % (ref 35–47)
HCT VFR BLD AUTO: 31.5 % (ref 35–47)
HCT VFR BLD AUTO: 31.6 % (ref 35–47)
HCT VFR BLD AUTO: 31.7 % (ref 35–47)
HCT VFR BLD AUTO: 31.7 % (ref 35–47)
HCT VFR BLD AUTO: 31.9 % (ref 35–47)
HCT VFR BLD AUTO: 32.1 % (ref 35–47)
HCT VFR BLD AUTO: 32.6 % (ref 35–47)
HCT VFR BLD AUTO: 32.7 % (ref 35–47)
HCT VFR BLD AUTO: 33.1 % (ref 35–47)
HCT VFR BLD AUTO: 33.5 % (ref 35–47)
HCT VFR BLD AUTO: 33.6 % (ref 35–47)
HCT VFR BLD AUTO: 33.7 % (ref 35–47)
HCT VFR BLD AUTO: 34 % (ref 35–47)
HCT VFR BLD AUTO: 34.5 % (ref 35–47)
HGB BLD-MCNC: 10 G/DL (ref 11.7–15.7)
HGB BLD-MCNC: 10 G/DL (ref 11.7–15.7)
HGB BLD-MCNC: 10.1 G/DL (ref 11.7–15.7)
HGB BLD-MCNC: 10.2 G/DL (ref 11.7–15.7)
HGB BLD-MCNC: 10.2 G/DL (ref 11.7–15.7)
HGB BLD-MCNC: 7.5 G/DL (ref 11.7–15.7)
HGB BLD-MCNC: 7.7 G/DL (ref 11.7–15.7)
HGB BLD-MCNC: 8.5 G/DL (ref 11.7–15.7)
HGB BLD-MCNC: 8.7 G/DL (ref 11.7–15.7)
HGB BLD-MCNC: 8.8 G/DL (ref 11.7–15.7)
HGB BLD-MCNC: 8.9 G/DL (ref 11.7–15.7)
HGB BLD-MCNC: 9 G/DL (ref 11.7–15.7)
HGB BLD-MCNC: 9.3 G/DL (ref 11.7–15.7)
HGB BLD-MCNC: 9.4 G/DL (ref 11.7–15.7)
HGB BLD-MCNC: 9.4 G/DL (ref 11.7–15.7)
HGB BLD-MCNC: 9.5 G/DL (ref 11.7–15.7)
HGB BLD-MCNC: 9.5 G/DL (ref 11.7–15.7)
HGB BLD-MCNC: 9.6 G/DL (ref 11.7–15.7)
HGB BLD-MCNC: 9.6 G/DL (ref 11.7–15.7)
HGB BLD-MCNC: 9.8 G/DL (ref 11.7–15.7)
HGB BLD-MCNC: 9.8 G/DL (ref 11.7–15.7)
HGB BLD-MCNC: 9.9 G/DL (ref 11.7–15.7)
IMM GRANULOCYTES # BLD: 0 10E9/L (ref 0–0.4)
IMM GRANULOCYTES # BLD: 0.1 10E9/L (ref 0–0.4)
IMM GRANULOCYTES # BLD: 0.2 10E9/L (ref 0–0.4)
IMM GRANULOCYTES # BLD: 0.2 10E9/L (ref 0–0.4)
IMM GRANULOCYTES # BLD: 0.5 10E9/L (ref 0–0.4)
IMM GRANULOCYTES # BLD: 0.5 10E9/L (ref 0–0.4)
IMM GRANULOCYTES NFR BLD: 0.3 %
IMM GRANULOCYTES NFR BLD: 0.4 %
IMM GRANULOCYTES NFR BLD: 0.4 %
IMM GRANULOCYTES NFR BLD: 0.5 %
IMM GRANULOCYTES NFR BLD: 0.5 %
IMM GRANULOCYTES NFR BLD: 0.6 %
IMM GRANULOCYTES NFR BLD: 0.6 %
IMM GRANULOCYTES NFR BLD: 0.7 %
IMM GRANULOCYTES NFR BLD: 0.9 %
IMM GRANULOCYTES NFR BLD: 0.9 %
IMM GRANULOCYTES NFR BLD: 1.4 %
IMM GRANULOCYTES NFR BLD: 1.4 %
IMM GRANULOCYTES NFR BLD: 1.6 %
IMM GRANULOCYTES NFR BLD: 2 %
IMM GRANULOCYTES NFR BLD: 2.3 %
IMM GRANULOCYTES NFR BLD: 2.3 %
IMM GRANULOCYTES NFR BLD: 4.7 %
IMM GRANULOCYTES NFR BLD: 4.7 %
INR PPP: 1.21 (ref 0.86–1.14)
LACTATE BLD-SCNC: 1.9 MMOL/L (ref 0.7–2.1)
LACTATE BLD-SCNC: 4.2 MMOL/L (ref 0.7–2.1)
LIPASE SERPL-CCNC: 76 U/L (ref 73–393)
LYMPHOCYTES # BLD AUTO: 0.2 10E9/L (ref 0.8–5.3)
LYMPHOCYTES # BLD AUTO: 0.4 10E9/L (ref 0.8–5.3)
LYMPHOCYTES # BLD AUTO: 0.5 10E9/L (ref 0.8–5.3)
LYMPHOCYTES # BLD AUTO: 0.6 10E9/L (ref 0.8–5.3)
LYMPHOCYTES # BLD AUTO: 0.7 10E9/L (ref 0.8–5.3)
LYMPHOCYTES # BLD AUTO: 0.8 10E9/L (ref 0.8–5.3)
LYMPHOCYTES # BLD AUTO: 0.8 10E9/L (ref 0.8–5.3)
LYMPHOCYTES # BLD AUTO: 0.9 10E9/L (ref 0.8–5.3)
LYMPHOCYTES # BLD AUTO: 1.1 10E9/L (ref 0.8–5.3)
LYMPHOCYTES # BLD AUTO: 1.1 10E9/L (ref 0.8–5.3)
LYMPHOCYTES NFR BLD AUTO: 1.7 %
LYMPHOCYTES NFR BLD AUTO: 11.8 %
LYMPHOCYTES NFR BLD AUTO: 12.5 %
LYMPHOCYTES NFR BLD AUTO: 13.7 %
LYMPHOCYTES NFR BLD AUTO: 14.5 %
LYMPHOCYTES NFR BLD AUTO: 15.8 %
LYMPHOCYTES NFR BLD AUTO: 15.9 %
LYMPHOCYTES NFR BLD AUTO: 16.7 %
LYMPHOCYTES NFR BLD AUTO: 19.2 %
LYMPHOCYTES NFR BLD AUTO: 2.9 %
LYMPHOCYTES NFR BLD AUTO: 20.6 %
LYMPHOCYTES NFR BLD AUTO: 21.2 %
LYMPHOCYTES NFR BLD AUTO: 22.1 %
LYMPHOCYTES NFR BLD AUTO: 23.1 %
LYMPHOCYTES NFR BLD AUTO: 26.2 %
LYMPHOCYTES NFR BLD AUTO: 27.9 %
LYMPHOCYTES NFR BLD AUTO: 28.4 %
LYMPHOCYTES NFR BLD AUTO: 7 %
LYMPHOCYTES NFR BLD AUTO: 7 %
LYMPHOCYTES NFR BLD AUTO: 8.5 %
LYMPHOCYTES NFR BLD AUTO: 9 %
LYMPHOCYTES NFR BLD AUTO: 9.2 %
MAGNESIUM SERPL-MCNC: 1.5 MG/DL (ref 1.6–2.3)
MCH RBC QN AUTO: 22.4 PG (ref 26.5–33)
MCH RBC QN AUTO: 22.5 PG (ref 26.5–33)
MCH RBC QN AUTO: 22.5 PG (ref 26.5–33)
MCH RBC QN AUTO: 22.9 PG (ref 26.5–33)
MCH RBC QN AUTO: 23.3 PG (ref 26.5–33)
MCH RBC QN AUTO: 23.5 PG (ref 26.5–33)
MCH RBC QN AUTO: 23.8 PG (ref 26.5–33)
MCH RBC QN AUTO: 23.8 PG (ref 26.5–33)
MCH RBC QN AUTO: 24.5 PG (ref 26.5–33)
MCH RBC QN AUTO: 24.9 PG (ref 26.5–33)
MCH RBC QN AUTO: 25 PG (ref 26.5–33)
MCH RBC QN AUTO: 25.6 PG (ref 26.5–33)
MCH RBC QN AUTO: 25.7 PG (ref 26.5–33)
MCH RBC QN AUTO: 25.9 PG (ref 26.5–33)
MCH RBC QN AUTO: 26.7 PG (ref 26.5–33)
MCH RBC QN AUTO: 27.8 PG (ref 26.5–33)
MCH RBC QN AUTO: 27.9 PG (ref 26.5–33)
MCH RBC QN AUTO: 27.9 PG (ref 26.5–33)
MCH RBC QN AUTO: 28.1 PG (ref 26.5–33)
MCH RBC QN AUTO: 28.2 PG (ref 26.5–33)
MCH RBC QN AUTO: 28.3 PG (ref 26.5–33)
MCH RBC QN AUTO: 28.4 PG (ref 26.5–33)
MCH RBC QN AUTO: 28.5 PG (ref 26.5–33)
MCH RBC QN AUTO: 28.6 PG (ref 26.5–33)
MCHC RBC AUTO-ENTMCNC: 29.2 G/DL (ref 31.5–36.5)
MCHC RBC AUTO-ENTMCNC: 29.4 G/DL (ref 31.5–36.5)
MCHC RBC AUTO-ENTMCNC: 29.4 G/DL (ref 31.5–36.5)
MCHC RBC AUTO-ENTMCNC: 29.5 G/DL (ref 31.5–36.5)
MCHC RBC AUTO-ENTMCNC: 29.5 G/DL (ref 31.5–36.5)
MCHC RBC AUTO-ENTMCNC: 29.6 G/DL (ref 31.5–36.5)
MCHC RBC AUTO-ENTMCNC: 29.7 G/DL (ref 31.5–36.5)
MCHC RBC AUTO-ENTMCNC: 29.8 G/DL (ref 31.5–36.5)
MCHC RBC AUTO-ENTMCNC: 29.9 G/DL (ref 31.5–36.5)
MCHC RBC AUTO-ENTMCNC: 29.9 G/DL (ref 31.5–36.5)
MCHC RBC AUTO-ENTMCNC: 30 G/DL (ref 31.5–36.5)
MCHC RBC AUTO-ENTMCNC: 30.2 G/DL (ref 31.5–36.5)
MCHC RBC AUTO-ENTMCNC: 30.3 G/DL (ref 31.5–36.5)
MCHC RBC AUTO-ENTMCNC: 30.4 G/DL (ref 31.5–36.5)
MCHC RBC AUTO-ENTMCNC: 30.6 G/DL (ref 31.5–36.5)
MCHC RBC AUTO-ENTMCNC: 30.6 G/DL (ref 31.5–36.5)
MCHC RBC AUTO-ENTMCNC: 30.9 G/DL (ref 31.5–36.5)
MCHC RBC AUTO-ENTMCNC: 31 G/DL (ref 31.5–36.5)
MCHC RBC AUTO-ENTMCNC: 31.1 G/DL (ref 31.5–36.5)
MCHC RBC AUTO-ENTMCNC: 31.4 G/DL (ref 31.5–36.5)
MCHC RBC AUTO-ENTMCNC: 31.6 G/DL (ref 31.5–36.5)
MCHC RBC AUTO-ENTMCNC: 31.6 G/DL (ref 31.5–36.5)
MCV RBC AUTO: 75 FL (ref 78–100)
MCV RBC AUTO: 75 FL (ref 78–100)
MCV RBC AUTO: 77 FL (ref 78–100)
MCV RBC AUTO: 77 FL (ref 78–100)
MCV RBC AUTO: 78 FL (ref 78–100)
MCV RBC AUTO: 79 FL (ref 78–100)
MCV RBC AUTO: 81 FL (ref 78–100)
MCV RBC AUTO: 81 FL (ref 78–100)
MCV RBC AUTO: 83 FL (ref 78–100)
MCV RBC AUTO: 83 FL (ref 78–100)
MCV RBC AUTO: 84 FL (ref 78–100)
MCV RBC AUTO: 85 FL (ref 78–100)
MCV RBC AUTO: 86 FL (ref 78–100)
MCV RBC AUTO: 88 FL (ref 78–100)
MCV RBC AUTO: 89 FL (ref 78–100)
MCV RBC AUTO: 90 FL (ref 78–100)
MCV RBC AUTO: 91 FL (ref 78–100)
MCV RBC AUTO: 91 FL (ref 78–100)
MCV RBC AUTO: 92 FL (ref 78–100)
MCV RBC AUTO: 93 FL (ref 78–100)
MCV RBC AUTO: 93 FL (ref 78–100)
MCV RBC AUTO: 94 FL (ref 78–100)
MICRO REPORT STATUS: ABNORMAL
MICRO REPORT STATUS: ABNORMAL
MICRO REPORT STATUS: NORMAL
MICROCYTES BLD QL SMEAR: PRESENT
MICROORGANISM SPEC CULT: ABNORMAL
MONOCYTES # BLD AUTO: 0.2 10E9/L (ref 0–1.3)
MONOCYTES # BLD AUTO: 0.2 10E9/L (ref 0–1.3)
MONOCYTES # BLD AUTO: 0.3 10E9/L (ref 0–1.3)
MONOCYTES # BLD AUTO: 0.4 10E9/L (ref 0–1.3)
MONOCYTES # BLD AUTO: 0.6 10E9/L (ref 0–1.3)
MONOCYTES # BLD AUTO: 0.7 10E9/L (ref 0–1.3)
MONOCYTES # BLD AUTO: 0.8 10E9/L (ref 0–1.3)
MONOCYTES # BLD AUTO: 0.8 10E9/L (ref 0–1.3)
MONOCYTES # BLD AUTO: 1 10E9/L (ref 0–1.3)
MONOCYTES NFR BLD AUTO: 10.2 %
MONOCYTES NFR BLD AUTO: 10.9 %
MONOCYTES NFR BLD AUTO: 10.9 %
MONOCYTES NFR BLD AUTO: 11 %
MONOCYTES NFR BLD AUTO: 11.2 %
MONOCYTES NFR BLD AUTO: 11.4 %
MONOCYTES NFR BLD AUTO: 12.2 %
MONOCYTES NFR BLD AUTO: 14.6 %
MONOCYTES NFR BLD AUTO: 19.2 %
MONOCYTES NFR BLD AUTO: 2 %
MONOCYTES NFR BLD AUTO: 6.1 %
MONOCYTES NFR BLD AUTO: 6.3 %
MONOCYTES NFR BLD AUTO: 6.5 %
MONOCYTES NFR BLD AUTO: 6.7 %
MONOCYTES NFR BLD AUTO: 6.9 %
MONOCYTES NFR BLD AUTO: 7.1 %
MONOCYTES NFR BLD AUTO: 7.4 %
MONOCYTES NFR BLD AUTO: 7.4 %
MONOCYTES NFR BLD AUTO: 7.9 %
MONOCYTES NFR BLD AUTO: 8.3 %
MONOCYTES NFR BLD AUTO: 8.4 %
MONOCYTES NFR BLD AUTO: 9.4 %
NEUTROPHILS # BLD AUTO: 1.3 10E9/L (ref 1.6–8.3)
NEUTROPHILS # BLD AUTO: 1.3 10E9/L (ref 1.6–8.3)
NEUTROPHILS # BLD AUTO: 1.5 10E9/L (ref 1.6–8.3)
NEUTROPHILS # BLD AUTO: 1.5 10E9/L (ref 1.6–8.3)
NEUTROPHILS # BLD AUTO: 1.8 10E9/L (ref 1.6–8.3)
NEUTROPHILS # BLD AUTO: 14.4 10E9/L (ref 1.6–8.3)
NEUTROPHILS # BLD AUTO: 2.2 10E9/L (ref 1.6–8.3)
NEUTROPHILS # BLD AUTO: 2.3 10E9/L (ref 1.6–8.3)
NEUTROPHILS # BLD AUTO: 2.4 10E9/L (ref 1.6–8.3)
NEUTROPHILS # BLD AUTO: 2.4 10E9/L (ref 1.6–8.3)
NEUTROPHILS # BLD AUTO: 3.2 10E9/L (ref 1.6–8.3)
NEUTROPHILS # BLD AUTO: 3.2 10E9/L (ref 1.6–8.3)
NEUTROPHILS # BLD AUTO: 4.4 10E9/L (ref 1.6–8.3)
NEUTROPHILS # BLD AUTO: 4.7 10E9/L (ref 1.6–8.3)
NEUTROPHILS # BLD AUTO: 4.7 10E9/L (ref 1.6–8.3)
NEUTROPHILS # BLD AUTO: 5 10E9/L (ref 1.6–8.3)
NEUTROPHILS # BLD AUTO: 5.8 10E9/L (ref 1.6–8.3)
NEUTROPHILS # BLD AUTO: 7.8 10E9/L (ref 1.6–8.3)
NEUTROPHILS # BLD AUTO: 8.2 10E9/L (ref 1.6–8.3)
NEUTROPHILS # BLD AUTO: 8.3 10E9/L (ref 1.6–8.3)
NEUTROPHILS # BLD AUTO: 8.7 10E9/L (ref 1.6–8.3)
NEUTROPHILS # BLD AUTO: 9 10E9/L (ref 1.6–8.3)
NEUTROPHILS NFR BLD AUTO: 56.4 %
NEUTROPHILS NFR BLD AUTO: 56.7 %
NEUTROPHILS NFR BLD AUTO: 60.5 %
NEUTROPHILS NFR BLD AUTO: 64.2 %
NEUTROPHILS NFR BLD AUTO: 64.3 %
NEUTROPHILS NFR BLD AUTO: 64.6 %
NEUTROPHILS NFR BLD AUTO: 65.2 %
NEUTROPHILS NFR BLD AUTO: 65.6 %
NEUTROPHILS NFR BLD AUTO: 69.6 %
NEUTROPHILS NFR BLD AUTO: 70.8 %
NEUTROPHILS NFR BLD AUTO: 71.9 %
NEUTROPHILS NFR BLD AUTO: 74.3 %
NEUTROPHILS NFR BLD AUTO: 76.8 %
NEUTROPHILS NFR BLD AUTO: 78.3 %
NEUTROPHILS NFR BLD AUTO: 79.5 %
NEUTROPHILS NFR BLD AUTO: 80.1 %
NEUTROPHILS NFR BLD AUTO: 80.8 %
NEUTROPHILS NFR BLD AUTO: 81.2 %
NEUTROPHILS NFR BLD AUTO: 82.5 %
NEUTROPHILS NFR BLD AUTO: 83.5 %
NEUTROPHILS NFR BLD AUTO: 89.5 %
NEUTROPHILS NFR BLD AUTO: 93.8 %
NRBC # BLD AUTO: 0 10*3/UL
NRBC BLD AUTO-RTO: 0 /100
NUM BPU REQUESTED: 1
OVALOCYTES BLD QL SMEAR: SLIGHT
PLATELET # BLD AUTO: 110 10E9/L (ref 150–450)
PLATELET # BLD AUTO: 110 10E9/L (ref 150–450)
PLATELET # BLD AUTO: 113 10E9/L (ref 150–450)
PLATELET # BLD AUTO: 118 10E9/L (ref 150–450)
PLATELET # BLD AUTO: 125 10E9/L (ref 150–450)
PLATELET # BLD AUTO: 129 10E9/L (ref 150–450)
PLATELET # BLD AUTO: 130 10E9/L (ref 150–450)
PLATELET # BLD AUTO: 139 10E9/L (ref 150–450)
PLATELET # BLD AUTO: 144 10E9/L (ref 150–450)
PLATELET # BLD AUTO: 145 10E9/L (ref 150–450)
PLATELET # BLD AUTO: 148 10E9/L (ref 150–450)
PLATELET # BLD AUTO: 159 10E9/L (ref 150–450)
PLATELET # BLD AUTO: 159 10E9/L (ref 150–450)
PLATELET # BLD AUTO: 174 10E9/L (ref 150–450)
PLATELET # BLD AUTO: 177 10E9/L (ref 150–450)
PLATELET # BLD AUTO: 200 10E9/L (ref 150–450)
PLATELET # BLD AUTO: 208 10E9/L (ref 150–450)
PLATELET # BLD AUTO: 94 10E9/L (ref 150–450)
PLATELET # BLD AUTO: 94 10E9/L (ref 150–450)
PLATELET # BLD AUTO: 96 10E9/L (ref 150–450)
PLATELET # BLD AUTO: 96 10E9/L (ref 150–450)
PLATELET # BLD AUTO: 98 10E9/L (ref 150–450)
PLATELET # BLD AUTO: 98 10E9/L (ref 150–450)
PLATELET # BLD AUTO: 99 10E9/L (ref 150–450)
PLATELET # BLD EST: ABNORMAL 10*3/UL
PLATELET # BLD EST: ABNORMAL 10*3/UL
POIKILOCYTOSIS BLD QL SMEAR: ABNORMAL
POIKILOCYTOSIS BLD QL SMEAR: SLIGHT
POTASSIUM SERPL-SCNC: 2.7 MMOL/L (ref 3.4–5.3)
POTASSIUM SERPL-SCNC: 2.8 MMOL/L (ref 3.4–5.3)
POTASSIUM SERPL-SCNC: 2.8 MMOL/L (ref 3.4–5.3)
POTASSIUM SERPL-SCNC: 3 MMOL/L (ref 3.4–5.3)
POTASSIUM SERPL-SCNC: 3.1 MMOL/L (ref 3.4–5.3)
POTASSIUM SERPL-SCNC: 3.1 MMOL/L (ref 3.4–5.3)
POTASSIUM SERPL-SCNC: 3.2 MMOL/L (ref 3.4–5.3)
POTASSIUM SERPL-SCNC: 3.3 MMOL/L (ref 3.4–5.3)
POTASSIUM SERPL-SCNC: 3.4 MMOL/L (ref 3.4–5.3)
POTASSIUM SERPL-SCNC: 3.5 MMOL/L (ref 3.4–5.3)
POTASSIUM SERPL-SCNC: 3.5 MMOL/L (ref 3.4–5.3)
POTASSIUM SERPL-SCNC: 3.7 MMOL/L (ref 3.4–5.3)
POTASSIUM SERPL-SCNC: 3.7 MMOL/L (ref 3.4–5.3)
POTASSIUM SERPL-SCNC: 3.8 MMOL/L (ref 3.4–5.3)
POTASSIUM SERPL-SCNC: 3.8 MMOL/L (ref 3.4–5.3)
POTASSIUM SERPL-SCNC: 3.9 MMOL/L (ref 3.4–5.3)
POTASSIUM SERPL-SCNC: 3.9 MMOL/L (ref 3.4–5.3)
POTASSIUM SERPL-SCNC: 4 MMOL/L (ref 3.4–5.3)
POTASSIUM SERPL-SCNC: 4.1 MMOL/L (ref 3.4–5.3)
POTASSIUM SERPL-SCNC: 4.1 MMOL/L (ref 3.4–5.3)
POTASSIUM SERPL-SCNC: 4.2 MMOL/L (ref 3.4–5.3)
PROT SERPL-MCNC: 6.1 G/DL (ref 6.8–8.8)
PROT SERPL-MCNC: 6.2 G/DL (ref 6.8–8.8)
PROT SERPL-MCNC: 6.5 G/DL (ref 6.8–8.8)
PROT SERPL-MCNC: 6.5 G/DL (ref 6.8–8.8)
PROT SERPL-MCNC: 6.7 G/DL (ref 6.8–8.8)
PROT SERPL-MCNC: 6.9 G/DL (ref 6.8–8.8)
PROT SERPL-MCNC: 7 G/DL (ref 6.8–8.8)
PROT SERPL-MCNC: 7.1 G/DL (ref 6.8–8.8)
PROT SERPL-MCNC: 7.1 G/DL (ref 6.8–8.8)
PROT SERPL-MCNC: 7.2 G/DL (ref 6.8–8.8)
PROT SERPL-MCNC: 7.4 G/DL (ref 6.8–8.8)
PROT SERPL-MCNC: 7.4 G/DL (ref 6.8–8.8)
PROT SERPL-MCNC: 7.5 G/DL (ref 6.8–8.8)
PROT SERPL-MCNC: 7.6 G/DL (ref 6.8–8.8)
PROT SERPL-MCNC: 7.9 G/DL (ref 6.8–8.8)
PROT SERPL-MCNC: 7.9 G/DL (ref 6.8–8.8)
PROT SERPL-MCNC: 8 G/DL (ref 6.8–8.8)
PROT SERPL-MCNC: 8.1 G/DL (ref 6.8–8.8)
PROT SERPL-MCNC: 8.4 G/DL (ref 6.8–8.8)
RBC # BLD AUTO: 3 10E12/L (ref 3.8–5.2)
RBC # BLD AUTO: 3.08 10E12/L (ref 3.8–5.2)
RBC # BLD AUTO: 3.13 10E12/L (ref 3.8–5.2)
RBC # BLD AUTO: 3.19 10E12/L (ref 3.8–5.2)
RBC # BLD AUTO: 3.33 10E12/L (ref 3.8–5.2)
RBC # BLD AUTO: 3.41 10E12/L (ref 3.8–5.2)
RBC # BLD AUTO: 3.43 10E12/L (ref 3.8–5.2)
RBC # BLD AUTO: 3.47 10E12/L (ref 3.8–5.2)
RBC # BLD AUTO: 3.51 10E12/L (ref 3.8–5.2)
RBC # BLD AUTO: 3.53 10E12/L (ref 3.8–5.2)
RBC # BLD AUTO: 3.56 10E12/L (ref 3.8–5.2)
RBC # BLD AUTO: 3.59 10E12/L (ref 3.8–5.2)
RBC # BLD AUTO: 3.6 10E12/L (ref 3.8–5.2)
RBC # BLD AUTO: 3.71 10E12/L (ref 3.8–5.2)
RBC # BLD AUTO: 3.76 10E12/L (ref 3.8–5.2)
RBC # BLD AUTO: 3.78 10E12/L (ref 3.8–5.2)
RBC # BLD AUTO: 3.81 10E12/L (ref 3.8–5.2)
RBC # BLD AUTO: 4.04 10E12/L (ref 3.8–5.2)
RBC # BLD AUTO: 4.07 10E12/L (ref 3.8–5.2)
RBC # BLD AUTO: 4.08 10E12/L (ref 3.8–5.2)
RBC # BLD AUTO: 4.2 10E12/L (ref 3.8–5.2)
RBC # BLD AUTO: 4.24 10E12/L (ref 3.8–5.2)
RBC # BLD AUTO: 4.28 10E12/L (ref 3.8–5.2)
RBC # BLD AUTO: 4.36 10E12/L (ref 3.8–5.2)
SODIUM SERPL-SCNC: 135 MMOL/L (ref 133–144)
SODIUM SERPL-SCNC: 136 MMOL/L (ref 133–144)
SODIUM SERPL-SCNC: 137 MMOL/L (ref 133–144)
SODIUM SERPL-SCNC: 138 MMOL/L (ref 133–144)
SODIUM SERPL-SCNC: 139 MMOL/L (ref 133–144)
SODIUM SERPL-SCNC: 140 MMOL/L (ref 133–144)
SODIUM SERPL-SCNC: 141 MMOL/L (ref 133–144)
SODIUM SERPL-SCNC: 142 MMOL/L (ref 133–144)
SODIUM SERPL-SCNC: 142 MMOL/L (ref 133–144)
SODIUM SERPL-SCNC: 143 MMOL/L (ref 133–144)
SODIUM SERPL-SCNC: 143 MMOL/L (ref 133–144)
SODIUM SERPL-SCNC: 144 MMOL/L (ref 133–144)
SPECIMEN EXP DATE BLD: NORMAL
SPECIMEN SOURCE: ABNORMAL
SPECIMEN SOURCE: ABNORMAL
SPECIMEN SOURCE: NORMAL
TOXIC GRANULES BLD QL SMEAR: PRESENT
TRANSFUSION STATUS PATIENT QL: NORMAL
TRANSFUSION STATUS PATIENT QL: NORMAL
TSH SERPL DL<=0.005 MIU/L-ACNC: 2.56 MU/L (ref 0.4–4)
TSH SERPL DL<=0.005 MIU/L-ACNC: 2.76 MU/L (ref 0.4–4)
VIT B12 SERPL-MCNC: >6000 PG/ML (ref 193–986)
WBC # BLD AUTO: 10 10E9/L (ref 4–11)
WBC # BLD AUTO: 10.1 10E9/L (ref 4–11)
WBC # BLD AUTO: 11 10E9/L (ref 4–11)
WBC # BLD AUTO: 11.3 10E9/L (ref 4–11)
WBC # BLD AUTO: 15.4 10E9/L (ref 4–11)
WBC # BLD AUTO: 2 10E9/L (ref 4–11)
WBC # BLD AUTO: 2.2 10E9/L (ref 4–11)
WBC # BLD AUTO: 2.3 10E9/L (ref 4–11)
WBC # BLD AUTO: 2.3 10E9/L (ref 4–11)
WBC # BLD AUTO: 3 10E9/L (ref 4–11)
WBC # BLD AUTO: 3.2 10E9/L (ref 4–11)
WBC # BLD AUTO: 3.4 10E9/L (ref 4–11)
WBC # BLD AUTO: 3.6 10E9/L (ref 4–11)
WBC # BLD AUTO: 3.6 10E9/L (ref 4–11)
WBC # BLD AUTO: 4.3 10E9/L (ref 4–11)
WBC # BLD AUTO: 5 10E9/L (ref 4–11)
WBC # BLD AUTO: 5.7 10E9/L (ref 4–11)
WBC # BLD AUTO: 6.2 10E9/L (ref 4–11)
WBC # BLD AUTO: 6.5 10E9/L (ref 4–11)
WBC # BLD AUTO: 6.6 10E9/L (ref 4–11)
WBC # BLD AUTO: 7 10E9/L (ref 4–11)
WBC # BLD AUTO: 9.9 10E9/L (ref 4–11)

## 2017-01-01 PROCEDURE — 80053 COMPREHEN METABOLIC PANEL: CPT | Performed by: INTERNAL MEDICINE

## 2017-01-01 PROCEDURE — 25000128 H RX IP 250 OP 636: Mod: ZF | Performed by: NURSE PRACTITIONER

## 2017-01-01 PROCEDURE — 99223 1ST HOSP IP/OBS HIGH 75: CPT | Mod: AI | Performed by: INTERNAL MEDICINE

## 2017-01-01 PROCEDURE — 25000128 H RX IP 250 OP 636: Mod: ZF | Performed by: INTERNAL MEDICINE

## 2017-01-01 PROCEDURE — 25000128 H RX IP 250 OP 636: Performed by: NURSE PRACTITIONER

## 2017-01-01 PROCEDURE — 99212 OFFICE O/P EST SF 10 MIN: CPT | Mod: ZF

## 2017-01-01 PROCEDURE — 86301 IMMUNOASSAY TUMOR CA 19-9: CPT | Performed by: INTERNAL MEDICINE

## 2017-01-01 PROCEDURE — 99215 OFFICE O/P EST HI 40 MIN: CPT | Mod: ZP | Performed by: NURSE PRACTITIONER

## 2017-01-01 PROCEDURE — 36415 COLL VENOUS BLD VENIPUNCTURE: CPT | Performed by: INTERNAL MEDICINE

## 2017-01-01 PROCEDURE — 25000128 H RX IP 250 OP 636: Mod: ZF | Performed by: PHYSICIAN ASSISTANT

## 2017-01-01 PROCEDURE — 85025 COMPLETE CBC W/AUTO DIFF WBC: CPT | Performed by: NURSE PRACTITIONER

## 2017-01-01 PROCEDURE — 96368 THER/DIAG CONCURRENT INF: CPT

## 2017-01-01 PROCEDURE — 96416 CHEMO PROLONG INFUSE W/PUMP: CPT

## 2017-01-01 PROCEDURE — 87077 CULTURE AEROBIC IDENTIFY: CPT | Performed by: NURSE PRACTITIONER

## 2017-01-01 PROCEDURE — 96415 CHEMO IV INFUSION ADDL HR: CPT

## 2017-01-01 PROCEDURE — 83605 ASSAY OF LACTIC ACID: CPT | Performed by: INTERNAL MEDICINE

## 2017-01-01 PROCEDURE — 96367 TX/PROPH/DG ADDL SEQ IV INF: CPT

## 2017-01-01 PROCEDURE — 96375 TX/PRO/DX INJ NEW DRUG ADDON: CPT

## 2017-01-01 PROCEDURE — 40000279 XR SURGERY CARM FLUORO GREATER THAN 5 MIN W STILLS: Mod: TC

## 2017-01-01 PROCEDURE — 83605 ASSAY OF LACTIC ACID: CPT | Performed by: NURSE PRACTITIONER

## 2017-01-01 PROCEDURE — 96365 THER/PROPH/DIAG IV INF INIT: CPT

## 2017-01-01 PROCEDURE — 84295 ASSAY OF SERUM SODIUM: CPT | Performed by: INTERNAL MEDICINE

## 2017-01-01 PROCEDURE — 96411 CHEMO IV PUSH ADDL DRUG: CPT

## 2017-01-01 PROCEDURE — 87040 BLOOD CULTURE FOR BACTERIA: CPT | Performed by: NURSE PRACTITIONER

## 2017-01-01 PROCEDURE — 96366 THER/PROPH/DIAG IV INF ADDON: CPT

## 2017-01-01 PROCEDURE — 96413 CHEMO IV INFUSION 1 HR: CPT

## 2017-01-01 PROCEDURE — 86301 IMMUNOASSAY TUMOR CA 19-9: CPT | Performed by: PHYSICIAN ASSISTANT

## 2017-01-01 PROCEDURE — 85025 COMPLETE CBC W/AUTO DIFF WBC: CPT | Performed by: INTERNAL MEDICINE

## 2017-01-01 PROCEDURE — 80048 BASIC METABOLIC PNL TOTAL CA: CPT | Performed by: INTERNAL MEDICINE

## 2017-01-01 PROCEDURE — 82746 ASSAY OF FOLIC ACID SERUM: CPT | Performed by: NURSE PRACTITIONER

## 2017-01-01 PROCEDURE — 83036 HEMOGLOBIN GLYCOSYLATED A1C: CPT | Performed by: NURSE PRACTITIONER

## 2017-01-01 PROCEDURE — 71000014 ZZH RECOVERY PHASE 1 LEVEL 2 FIRST HR: Performed by: INTERNAL MEDICINE

## 2017-01-01 PROCEDURE — 0FC98ZZ EXTIRPATION OF MATTER FROM COMMON BILE DUCT, VIA NATURAL OR ARTIFICIAL OPENING ENDOSCOPIC: ICD-10-PCS | Performed by: INTERNAL MEDICINE

## 2017-01-01 PROCEDURE — 36000059 ZZH SURGERY LEVEL 3 EA 15 ADDTL MIN UMMC: Performed by: INTERNAL MEDICINE

## 2017-01-01 PROCEDURE — 25000132 ZZH RX MED GY IP 250 OP 250 PS 637: Performed by: NURSE PRACTITIONER

## 2017-01-01 PROCEDURE — 80053 COMPREHEN METABOLIC PANEL: CPT | Performed by: NURSE PRACTITIONER

## 2017-01-01 PROCEDURE — 85025 COMPLETE CBC W/AUTO DIFF WBC: CPT | Performed by: PHYSICIAN ASSISTANT

## 2017-01-01 PROCEDURE — 96372 THER/PROPH/DIAG INJ SC/IM: CPT | Mod: 59

## 2017-01-01 PROCEDURE — 96417 CHEMO IV INFUS EACH ADDL SEQ: CPT

## 2017-01-01 PROCEDURE — 80053 COMPREHEN METABOLIC PANEL: CPT | Performed by: PHYSICIAN ASSISTANT

## 2017-01-01 PROCEDURE — 96372 THER/PROPH/DIAG INJ SC/IM: CPT

## 2017-01-01 PROCEDURE — 82310 ASSAY OF CALCIUM: CPT | Performed by: INTERNAL MEDICINE

## 2017-01-01 PROCEDURE — 00000146 ZZHCL STATISTIC GLUCOSE BY METER IP

## 2017-01-01 PROCEDURE — 96376 TX/PRO/DX INJ SAME DRUG ADON: CPT

## 2017-01-01 PROCEDURE — 86901 BLOOD TYPING SEROLOGIC RH(D): CPT | Performed by: INTERNAL MEDICINE

## 2017-01-01 PROCEDURE — 25000125 ZZHC RX 250: Mod: ZF | Performed by: INTERNAL MEDICINE

## 2017-01-01 PROCEDURE — 25000125 ZZHC RX 250: Performed by: INTERNAL MEDICINE

## 2017-01-01 PROCEDURE — 99213 OFFICE O/P EST LOW 20 MIN: CPT | Mod: 25

## 2017-01-01 PROCEDURE — 25000125 ZZHC RX 250: Mod: ZF | Performed by: NURSE PRACTITIONER

## 2017-01-01 PROCEDURE — 40000171 ZZH STATISTIC PRE-PROCEDURE ASSESSMENT III: Performed by: INTERNAL MEDICINE

## 2017-01-01 PROCEDURE — 37000009 ZZH ANESTHESIA TECHNICAL FEE, EACH ADDTL 15 MIN: Performed by: INTERNAL MEDICINE

## 2017-01-01 PROCEDURE — 25000125 ZZHC RX 250: Performed by: NURSE ANESTHETIST, CERTIFIED REGISTERED

## 2017-01-01 PROCEDURE — 80048 BASIC METABOLIC PNL TOTAL CA: CPT | Performed by: NURSE PRACTITIONER

## 2017-01-01 PROCEDURE — 96360 HYDRATION IV INFUSION INIT: CPT

## 2017-01-01 PROCEDURE — 25000128 H RX IP 250 OP 636

## 2017-01-01 PROCEDURE — 84443 ASSAY THYROID STIM HORMONE: CPT | Performed by: PHYSICIAN ASSISTANT

## 2017-01-01 PROCEDURE — 40000275 ZZH STATISTIC RCP TIME EA 10 MIN

## 2017-01-01 PROCEDURE — 25000128 H RX IP 250 OP 636: Performed by: NURSE ANESTHETIST, CERTIFIED REGISTERED

## 2017-01-01 PROCEDURE — 25500064 ZZH RX 255 OP 636: Performed by: INTERNAL MEDICINE

## 2017-01-01 PROCEDURE — 27210794 ZZH OR GENERAL SUPPLY STERILE: Performed by: INTERNAL MEDICINE

## 2017-01-01 PROCEDURE — 85027 COMPLETE CBC AUTOMATED: CPT | Performed by: NURSE PRACTITIONER

## 2017-01-01 PROCEDURE — 90686 IIV4 VACC NO PRSV 0.5 ML IM: CPT | Mod: ZF | Performed by: INTERNAL MEDICINE

## 2017-01-01 PROCEDURE — 84132 ASSAY OF SERUM POTASSIUM: CPT | Performed by: INTERNAL MEDICINE

## 2017-01-01 PROCEDURE — 86923 COMPATIBILITY TEST ELECTRIC: CPT | Performed by: INTERNAL MEDICINE

## 2017-01-01 PROCEDURE — 84520 ASSAY OF UREA NITROGEN: CPT | Performed by: INTERNAL MEDICINE

## 2017-01-01 PROCEDURE — 99215 OFFICE O/P EST HI 40 MIN: CPT | Mod: GC | Performed by: INTERNAL MEDICINE

## 2017-01-01 PROCEDURE — 0F798DZ DILATION OF COMMON BILE DUCT WITH INTRALUMINAL DEVICE, VIA NATURAL OR ARTIFICIAL OPENING ENDOSCOPIC: ICD-10-PCS | Performed by: INTERNAL MEDICINE

## 2017-01-01 PROCEDURE — 82607 VITAMIN B-12: CPT | Performed by: INTERNAL MEDICINE

## 2017-01-01 PROCEDURE — 25000131 ZZH RX MED GY IP 250 OP 636 PS 637: Performed by: NURSE PRACTITIONER

## 2017-01-01 PROCEDURE — 86301 IMMUNOASSAY TUMOR CA 19-9: CPT | Performed by: NURSE PRACTITIONER

## 2017-01-01 PROCEDURE — C9399 UNCLASSIFIED DRUGS OR BIOLOG: HCPCS | Performed by: NURSE ANESTHETIST, CERTIFIED REGISTERED

## 2017-01-01 PROCEDURE — 12000008 ZZH R&B INTERMEDIATE UMMC

## 2017-01-01 PROCEDURE — 25000128 H RX IP 250 OP 636: Performed by: INTERNAL MEDICINE

## 2017-01-01 PROCEDURE — 99212 OFFICE O/P EST SF 10 MIN: CPT | Mod: 25

## 2017-01-01 PROCEDURE — 36591 DRAW BLOOD OFF VENOUS DEVICE: CPT

## 2017-01-01 PROCEDURE — 25000565 ZZH ISOFLURANE, EA 15 MIN: Performed by: INTERNAL MEDICINE

## 2017-01-01 PROCEDURE — 25000125 ZZHC RX 250: Mod: ZF | Performed by: PHYSICIAN ASSISTANT

## 2017-01-01 PROCEDURE — 82247 BILIRUBIN TOTAL: CPT | Performed by: NURSE PRACTITIONER

## 2017-01-01 PROCEDURE — 87040 BLOOD CULTURE FOR BACTERIA: CPT | Performed by: INTERNAL MEDICINE

## 2017-01-01 PROCEDURE — 37000008 ZZH ANESTHESIA TECHNICAL FEE, 1ST 30 MIN: Performed by: INTERNAL MEDICINE

## 2017-01-01 PROCEDURE — 99215 OFFICE O/P EST HI 40 MIN: CPT | Mod: ZP | Performed by: INTERNAL MEDICINE

## 2017-01-01 PROCEDURE — 99214 OFFICE O/P EST MOD 30 MIN: CPT | Mod: 25

## 2017-01-01 PROCEDURE — C1726 CATH, BAL DIL, NON-VASCULAR: HCPCS | Performed by: INTERNAL MEDICINE

## 2017-01-01 PROCEDURE — 25000125 ZZHC RX 250: Performed by: NURSE PRACTITIONER

## 2017-01-01 PROCEDURE — 82565 ASSAY OF CREATININE: CPT | Performed by: INTERNAL MEDICINE

## 2017-01-01 PROCEDURE — 83690 ASSAY OF LIPASE: CPT | Performed by: INTERNAL MEDICINE

## 2017-01-01 PROCEDURE — 83735 ASSAY OF MAGNESIUM: CPT | Performed by: PHYSICIAN ASSISTANT

## 2017-01-01 PROCEDURE — C1874 STENT, COATED/COV W/DEL SYS: HCPCS | Performed by: INTERNAL MEDICINE

## 2017-01-01 PROCEDURE — 99239 HOSP IP/OBS DSCHRG MGMT >30: CPT | Mod: GC | Performed by: INTERNAL MEDICINE

## 2017-01-01 PROCEDURE — 99214 OFFICE O/P EST MOD 30 MIN: CPT | Mod: ZP | Performed by: NURSE PRACTITIONER

## 2017-01-01 PROCEDURE — 86850 RBC ANTIBODY SCREEN: CPT | Performed by: INTERNAL MEDICINE

## 2017-01-01 PROCEDURE — 99207 ZZC CHGS TRANSFERRED TO HOSPITAL: CPT | Mod: ZP | Performed by: NURSE PRACTITIONER

## 2017-01-01 PROCEDURE — 96361 HYDRATE IV INFUSION ADD-ON: CPT

## 2017-01-01 PROCEDURE — 82374 ASSAY BLOOD CARBON DIOXIDE: CPT | Performed by: INTERNAL MEDICINE

## 2017-01-01 PROCEDURE — C1769 GUIDE WIRE: HCPCS | Performed by: INTERNAL MEDICINE

## 2017-01-01 PROCEDURE — 80076 HEPATIC FUNCTION PANEL: CPT | Performed by: NURSE PRACTITIONER

## 2017-01-01 PROCEDURE — 85610 PROTHROMBIN TIME: CPT | Performed by: PHYSICIAN ASSISTANT

## 2017-01-01 PROCEDURE — P9016 RBC LEUKOCYTES REDUCED: HCPCS | Performed by: INTERNAL MEDICINE

## 2017-01-01 PROCEDURE — 82947 ASSAY GLUCOSE BLOOD QUANT: CPT | Performed by: INTERNAL MEDICINE

## 2017-01-01 PROCEDURE — 36415 COLL VENOUS BLD VENIPUNCTURE: CPT | Performed by: NURSE PRACTITIONER

## 2017-01-01 PROCEDURE — 84443 ASSAY THYROID STIM HORMONE: CPT | Performed by: INTERNAL MEDICINE

## 2017-01-01 PROCEDURE — 80076 HEPATIC FUNCTION PANEL: CPT | Performed by: INTERNAL MEDICINE

## 2017-01-01 PROCEDURE — 36592 COLLECT BLOOD FROM PICC: CPT | Performed by: NURSE PRACTITIONER

## 2017-01-01 PROCEDURE — G0008 ADMIN INFLUENZA VIRUS VAC: HCPCS

## 2017-01-01 PROCEDURE — 99233 SBSQ HOSP IP/OBS HIGH 50: CPT | Performed by: INTERNAL MEDICINE

## 2017-01-01 PROCEDURE — 87800 DETECT AGNT MULT DNA DIREC: CPT | Performed by: NURSE PRACTITIONER

## 2017-01-01 PROCEDURE — 82150 ASSAY OF AMYLASE: CPT | Performed by: NURSE PRACTITIONER

## 2017-01-01 PROCEDURE — 36000061 ZZH SURGERY LEVEL 3 W FLUORO 1ST 30 MIN - UMMC: Performed by: INTERNAL MEDICINE

## 2017-01-01 PROCEDURE — 71010 XR CHEST PORT 1 VW: CPT

## 2017-01-01 PROCEDURE — 40000556 ZZH STATISTIC PERIPHERAL IV START W US GUIDANCE

## 2017-01-01 PROCEDURE — 82435 ASSAY OF BLOOD CHLORIDE: CPT | Performed by: INTERNAL MEDICINE

## 2017-01-01 PROCEDURE — 99214 OFFICE O/P EST MOD 30 MIN: CPT | Mod: ZP | Performed by: PHYSICIAN ASSISTANT

## 2017-01-01 PROCEDURE — 86900 BLOOD TYPING SEROLOGIC ABO: CPT | Performed by: INTERNAL MEDICINE

## 2017-01-01 PROCEDURE — 87186 SC STD MICRODIL/AGAR DIL: CPT | Performed by: NURSE PRACTITIONER

## 2017-01-01 PROCEDURE — 99213 OFFICE O/P EST LOW 20 MIN: CPT | Mod: ZF

## 2017-01-01 PROCEDURE — 84132 ASSAY OF SERUM POTASSIUM: CPT | Performed by: NURSE PRACTITIONER

## 2017-01-01 PROCEDURE — 82150 ASSAY OF AMYLASE: CPT | Performed by: INTERNAL MEDICINE

## 2017-01-01 DEVICE — IMPLANTABLE DEVICE: Type: IMPLANTABLE DEVICE | Site: BILE DUCT | Status: FUNCTIONAL

## 2017-01-01 RX ORDER — HEPARIN SODIUM (PORCINE) LOCK FLUSH IV SOLN 100 UNIT/ML 100 UNIT/ML
5 SOLUTION INTRAVENOUS ONCE
Status: DISCONTINUED | OUTPATIENT
Start: 2017-01-01 | End: 2017-01-01 | Stop reason: HOSPADM

## 2017-01-01 RX ORDER — HEPARIN SODIUM (PORCINE) LOCK FLUSH IV SOLN 100 UNIT/ML 100 UNIT/ML
5 SOLUTION INTRAVENOUS EVERY 8 HOURS
Status: DISCONTINUED | OUTPATIENT
Start: 2017-01-01 | End: 2017-01-01 | Stop reason: HOSPADM

## 2017-01-01 RX ORDER — SODIUM CHLORIDE, SODIUM LACTATE, POTASSIUM CHLORIDE, CALCIUM CHLORIDE 600; 310; 30; 20 MG/100ML; MG/100ML; MG/100ML; MG/100ML
INJECTION, SOLUTION INTRAVENOUS CONTINUOUS
Status: DISCONTINUED | OUTPATIENT
Start: 2017-01-01 | End: 2017-01-01 | Stop reason: HOSPADM

## 2017-01-01 RX ORDER — MEPERIDINE HYDROCHLORIDE 25 MG/ML
25 INJECTION INTRAMUSCULAR; INTRAVENOUS; SUBCUTANEOUS EVERY 30 MIN PRN
Status: CANCELLED | OUTPATIENT
Start: 2017-01-01

## 2017-01-01 RX ORDER — EPINEPHRINE 0.3 MG/.3ML
0.3 INJECTION SUBCUTANEOUS EVERY 5 MIN PRN
Status: CANCELLED | OUTPATIENT
Start: 2017-01-01

## 2017-01-01 RX ORDER — SODIUM CHLORIDE 9 MG/ML
1000 INJECTION, SOLUTION INTRAVENOUS CONTINUOUS PRN
Status: CANCELLED
Start: 2017-01-01

## 2017-01-01 RX ORDER — LORAZEPAM 0.5 MG/1
0.5 TABLET ORAL EVERY 4 HOURS PRN
Qty: 30 TABLET | Refills: 0 | Status: SHIPPED | OUTPATIENT
Start: 2017-01-01 | End: 2017-01-01

## 2017-01-01 RX ORDER — LIDOCAINE 40 MG/G
CREAM TOPICAL
Status: DISCONTINUED | OUTPATIENT
Start: 2017-01-01 | End: 2017-01-01 | Stop reason: HOSPADM

## 2017-01-01 RX ORDER — DIPHENHYDRAMINE HYDROCHLORIDE 50 MG/ML
50 INJECTION INTRAMUSCULAR; INTRAVENOUS
Status: CANCELLED
Start: 2017-01-01

## 2017-01-01 RX ORDER — DOCUSATE SODIUM 100 MG/1
100 CAPSULE, LIQUID FILLED ORAL DAILY
Qty: 100 CAPSULE | Refills: 1 | Status: SHIPPED | OUTPATIENT
Start: 2017-01-01 | End: 2018-01-01

## 2017-01-01 RX ORDER — LORAZEPAM 2 MG/ML
INJECTION INTRAMUSCULAR
Status: COMPLETED
Start: 2017-01-01 | End: 2017-01-01

## 2017-01-01 RX ORDER — EPINEPHRINE 1 MG/ML
0.3 INJECTION, SOLUTION, CONCENTRATE INTRAVENOUS EVERY 5 MIN PRN
Status: CANCELLED | OUTPATIENT
Start: 2017-01-01

## 2017-01-01 RX ORDER — POTASSIUM CHLORIDE 750 MG/1
20-40 TABLET, EXTENDED RELEASE ORAL
Status: DISCONTINUED | OUTPATIENT
Start: 2017-01-01 | End: 2017-01-01 | Stop reason: HOSPADM

## 2017-01-01 RX ORDER — MORPHINE SULFATE 15 MG/1
15 TABLET, FILM COATED, EXTENDED RELEASE ORAL EVERY 12 HOURS
Qty: 60 TABLET | Refills: 0 | Status: SHIPPED | OUTPATIENT
Start: 2017-01-01 | End: 2017-01-01

## 2017-01-01 RX ORDER — HEPARIN SODIUM (PORCINE) LOCK FLUSH IV SOLN 100 UNIT/ML 100 UNIT/ML
5 SOLUTION INTRAVENOUS EVERY 8 HOURS
Status: CANCELLED
Start: 2017-01-01

## 2017-01-01 RX ORDER — FLUMAZENIL 0.1 MG/ML
0.2 INJECTION, SOLUTION INTRAVENOUS
Status: ACTIVE | OUTPATIENT
Start: 2017-01-01 | End: 2017-01-01

## 2017-01-01 RX ORDER — LORAZEPAM 2 MG/ML
0.5 INJECTION INTRAMUSCULAR EVERY 4 HOURS PRN
Status: CANCELLED
Start: 2017-01-01

## 2017-01-01 RX ORDER — FLUOROURACIL 50 MG/ML
400 INJECTION, SOLUTION INTRAVENOUS ONCE
Status: COMPLETED | OUTPATIENT
Start: 2017-01-01 | End: 2017-01-01

## 2017-01-01 RX ORDER — NALOXONE HYDROCHLORIDE 0.4 MG/ML
.1-.4 INJECTION, SOLUTION INTRAMUSCULAR; INTRAVENOUS; SUBCUTANEOUS
Status: DISCONTINUED | OUTPATIENT
Start: 2017-01-01 | End: 2017-01-01 | Stop reason: HOSPADM

## 2017-01-01 RX ORDER — OXYCODONE HYDROCHLORIDE 5 MG/1
5 TABLET ORAL EVERY 4 HOURS PRN
Qty: 100 TABLET | Refills: 0 | Status: SHIPPED | OUTPATIENT
Start: 2017-01-01 | End: 2017-01-01

## 2017-01-01 RX ORDER — POLYETHYLENE GLYCOL 3350 17 G/17G
17 POWDER, FOR SOLUTION ORAL DAILY PRN
Status: DISCONTINUED | OUTPATIENT
Start: 2017-01-01 | End: 2017-01-01 | Stop reason: HOSPADM

## 2017-01-01 RX ORDER — EPINEPHRINE 1 MG/ML
0.3 INJECTION INTRAMUSCULAR; INTRAVENOUS; SUBCUTANEOUS EVERY 5 MIN PRN
Status: CANCELLED | OUTPATIENT
Start: 2017-01-01

## 2017-01-01 RX ORDER — METHYLPREDNISOLONE SODIUM SUCCINATE 125 MG/2ML
125 INJECTION, POWDER, LYOPHILIZED, FOR SOLUTION INTRAMUSCULAR; INTRAVENOUS
Status: CANCELLED
Start: 2017-01-01

## 2017-01-01 RX ORDER — ALBUTEROL SULFATE 90 UG/1
1-2 AEROSOL, METERED RESPIRATORY (INHALATION)
Status: CANCELLED
Start: 2017-01-01

## 2017-01-01 RX ORDER — PALONOSETRON 0.05 MG/ML
0.25 INJECTION, SOLUTION INTRAVENOUS ONCE
Status: COMPLETED | OUTPATIENT
Start: 2017-01-01 | End: 2017-01-01

## 2017-01-01 RX ORDER — OXYCODONE HYDROCHLORIDE 5 MG/1
5 TABLET ORAL EVERY 4 HOURS PRN
Status: DISCONTINUED | OUTPATIENT
Start: 2017-01-01 | End: 2017-01-01 | Stop reason: HOSPADM

## 2017-01-01 RX ORDER — HEPARIN SODIUM (PORCINE) LOCK FLUSH IV SOLN 100 UNIT/ML 100 UNIT/ML
500 SOLUTION INTRAVENOUS EVERY 8 HOURS
Status: DISCONTINUED | OUTPATIENT
Start: 2017-01-01 | End: 2017-01-01 | Stop reason: HOSPADM

## 2017-01-01 RX ORDER — PALONOSETRON 0.05 MG/ML
0.25 INJECTION, SOLUTION INTRAVENOUS ONCE
Status: CANCELLED
Start: 2017-01-01 | End: 2017-01-01

## 2017-01-01 RX ORDER — ALBUTEROL SULFATE 0.83 MG/ML
2.5 SOLUTION RESPIRATORY (INHALATION)
Status: CANCELLED | OUTPATIENT
Start: 2017-01-01

## 2017-01-01 RX ORDER — LORAZEPAM 0.5 MG/1
0.5 TABLET ORAL EVERY 4 HOURS PRN
Status: DISCONTINUED | OUTPATIENT
Start: 2017-01-01 | End: 2017-01-01 | Stop reason: HOSPADM

## 2017-01-01 RX ORDER — FLUOROURACIL 50 MG/ML
400 INJECTION, SOLUTION INTRAVENOUS ONCE
Status: CANCELLED | OUTPATIENT
Start: 2017-01-01

## 2017-01-01 RX ORDER — DRONABINOL 5 MG/1
5 CAPSULE ORAL
Qty: 90 CAPSULE | Refills: 0 | COMMUNITY
Start: 2017-01-01 | End: 2018-01-01

## 2017-01-01 RX ORDER — PROCHLORPERAZINE MALEATE 10 MG
TABLET ORAL
Qty: 30 TABLET | Refills: 2 | Status: SHIPPED | OUTPATIENT
Start: 2017-01-01 | End: 2017-01-01

## 2017-01-01 RX ORDER — ONDANSETRON 8 MG/1
8 TABLET, ORALLY DISINTEGRATING ORAL EVERY 8 HOURS PRN
Status: DISCONTINUED | OUTPATIENT
Start: 2017-01-01 | End: 2017-01-01 | Stop reason: HOSPADM

## 2017-01-01 RX ORDER — PANTOPRAZOLE SODIUM 20 MG/1
20 TABLET, DELAYED RELEASE ORAL
Qty: 60 TABLET | Refills: 3 | COMMUNITY
Start: 2017-01-01 | End: 2017-01-01

## 2017-01-01 RX ORDER — OXYCODONE AND ACETAMINOPHEN 5; 325 MG/1; MG/1
TABLET ORAL
COMMUNITY
Start: 2015-11-06 | End: 2017-01-01

## 2017-01-01 RX ORDER — MORPHINE SULFATE 15 MG/1
15 TABLET, FILM COATED, EXTENDED RELEASE ORAL 2 TIMES DAILY
Status: DISCONTINUED | OUTPATIENT
Start: 2017-01-01 | End: 2017-01-01 | Stop reason: HOSPADM

## 2017-01-01 RX ORDER — ONDANSETRON 2 MG/ML
8 INJECTION INTRAMUSCULAR; INTRAVENOUS ONCE
Status: COMPLETED | OUTPATIENT
Start: 2017-01-01 | End: 2017-01-01

## 2017-01-01 RX ORDER — LORAZEPAM 2 MG/ML
0.5 INJECTION INTRAMUSCULAR EVERY 4 HOURS PRN
Status: DISCONTINUED | OUTPATIENT
Start: 2017-01-01 | End: 2017-01-01 | Stop reason: HOSPADM

## 2017-01-01 RX ORDER — HEPARIN SODIUM (PORCINE) LOCK FLUSH IV SOLN 100 UNIT/ML 100 UNIT/ML
500 SOLUTION INTRAVENOUS ONCE
Status: COMPLETED | OUTPATIENT
Start: 2017-01-01 | End: 2017-01-01

## 2017-01-01 RX ORDER — DEXAMETHASONE 4 MG/1
4 TABLET ORAL
Status: DISCONTINUED | OUTPATIENT
Start: 2017-01-01 | End: 2017-01-01 | Stop reason: HOSPADM

## 2017-01-01 RX ORDER — HEPARIN SODIUM (PORCINE) LOCK FLUSH IV SOLN 100 UNIT/ML 100 UNIT/ML
5 SOLUTION INTRAVENOUS ONCE
Status: COMPLETED | OUTPATIENT
Start: 2017-01-01 | End: 2017-01-01

## 2017-01-01 RX ORDER — EPINEPHRINE 0.3 MG/.3ML
INJECTION SUBCUTANEOUS
Status: DISCONTINUED
Start: 2017-01-01 | End: 2017-01-01 | Stop reason: WASHOUT

## 2017-01-01 RX ORDER — LEVOFLOXACIN 5 MG/ML
INJECTION, SOLUTION INTRAVENOUS PRN
Status: DISCONTINUED | OUTPATIENT
Start: 2017-01-01 | End: 2017-01-01

## 2017-01-01 RX ORDER — PALONOSETRON 0.05 MG/ML
0.25 INJECTION, SOLUTION INTRAVENOUS ONCE
Status: CANCELLED
Start: 2017-01-01

## 2017-01-01 RX ORDER — HEPARIN SODIUM,PORCINE 10 UNIT/ML
5-10 VIAL (ML) INTRAVENOUS
Status: DISCONTINUED | OUTPATIENT
Start: 2017-01-01 | End: 2017-01-01 | Stop reason: HOSPADM

## 2017-01-01 RX ORDER — PROPOFOL 10 MG/ML
INJECTION, EMULSION INTRAVENOUS PRN
Status: DISCONTINUED | OUTPATIENT
Start: 2017-01-01 | End: 2017-01-01

## 2017-01-01 RX ORDER — LIDOCAINE HYDROCHLORIDE 20 MG/ML
INJECTION, SOLUTION INFILTRATION; PERINEURAL PRN
Status: DISCONTINUED | OUTPATIENT
Start: 2017-01-01 | End: 2017-01-01

## 2017-01-01 RX ORDER — POTASSIUM CL/LIDO/0.9 % NACL 10MEQ/0.1L
10 INTRAVENOUS SOLUTION, PIGGYBACK (ML) INTRAVENOUS
Status: DISCONTINUED | OUTPATIENT
Start: 2017-01-01 | End: 2017-01-01 | Stop reason: HOSPADM

## 2017-01-01 RX ORDER — ONDANSETRON 8 MG/1
8 TABLET, ORALLY DISINTEGRATING ORAL EVERY 8 HOURS PRN
Qty: 60 TABLET | Refills: 6 | Status: SHIPPED | OUTPATIENT
Start: 2017-01-01 | End: 2017-01-01

## 2017-01-01 RX ORDER — DEXAMETHASONE 4 MG/1
4 TABLET ORAL
Qty: 3 TABLET | Refills: 0 | Status: SHIPPED | OUTPATIENT
Start: 2017-01-01 | End: 2017-01-01

## 2017-01-01 RX ORDER — LORATADINE 10 MG/1
10 TABLET ORAL DAILY
Qty: 30 TABLET | Refills: 6 | Status: SHIPPED | OUTPATIENT
Start: 2017-01-01 | End: 2018-01-01

## 2017-01-01 RX ORDER — MAGNESIUM SULFATE HEPTAHYDRATE 40 MG/ML
4 INJECTION, SOLUTION INTRAVENOUS EVERY 4 HOURS PRN
Status: DISCONTINUED | OUTPATIENT
Start: 2017-01-01 | End: 2017-01-01 | Stop reason: HOSPADM

## 2017-01-01 RX ORDER — POLYETHYLENE GLYCOL 3350 17 G/17G
POWDER, FOR SOLUTION ORAL
Qty: 255 G | Refills: 5 | OUTPATIENT
Start: 2017-01-01

## 2017-01-01 RX ORDER — PROCHLORPERAZINE MALEATE 5 MG
5-10 TABLET ORAL EVERY 6 HOURS PRN
Status: DISCONTINUED | OUTPATIENT
Start: 2017-01-01 | End: 2017-01-01 | Stop reason: HOSPADM

## 2017-01-01 RX ORDER — PANTOPRAZOLE SODIUM 20 MG/1
20 TABLET, DELAYED RELEASE ORAL
Status: DISCONTINUED | OUTPATIENT
Start: 2017-01-01 | End: 2017-01-01 | Stop reason: HOSPADM

## 2017-01-01 RX ORDER — POTASSIUM CHLORIDE 1.5 G/1.58G
20-40 POWDER, FOR SOLUTION ORAL
Status: DISCONTINUED | OUTPATIENT
Start: 2017-01-01 | End: 2017-01-01 | Stop reason: HOSPADM

## 2017-01-01 RX ORDER — HEPARIN SODIUM,PORCINE 10 UNIT/ML
5-10 VIAL (ML) INTRAVENOUS EVERY 24 HOURS
Status: DISCONTINUED | OUTPATIENT
Start: 2017-01-01 | End: 2017-01-01 | Stop reason: HOSPADM

## 2017-01-01 RX ORDER — HEPARIN SODIUM (PORCINE) LOCK FLUSH IV SOLN 100 UNIT/ML 100 UNIT/ML
5 SOLUTION INTRAVENOUS EVERY 8 HOURS PRN
Status: DISCONTINUED | OUTPATIENT
Start: 2017-01-01 | End: 2017-01-01 | Stop reason: HOSPADM

## 2017-01-01 RX ORDER — PANTOPRAZOLE SODIUM 20 MG/1
20 TABLET, DELAYED RELEASE ORAL
Qty: 60 TABLET | Refills: 3 | Status: SHIPPED | OUTPATIENT
Start: 2017-01-01

## 2017-01-01 RX ORDER — PROCHLORPERAZINE MALEATE 10 MG
10 TABLET ORAL EVERY 6 HOURS PRN
Qty: 30 TABLET | Refills: 2 | Status: SHIPPED | OUTPATIENT
Start: 2017-01-01 | End: 2017-01-01

## 2017-01-01 RX ORDER — MEPERIDINE HYDROCHLORIDE 25 MG/ML
12.5 INJECTION INTRAMUSCULAR; INTRAVENOUS; SUBCUTANEOUS
Status: DISCONTINUED | OUTPATIENT
Start: 2017-01-01 | End: 2017-01-01 | Stop reason: HOSPADM

## 2017-01-01 RX ORDER — SODIUM CHLORIDE 9 MG/ML
INJECTION, SOLUTION INTRAVENOUS ONCE
Status: COMPLETED | OUTPATIENT
Start: 2017-01-01 | End: 2017-01-01

## 2017-01-01 RX ORDER — POLYETHYLENE GLYCOL 3350 17 G/17G
1 POWDER, FOR SOLUTION ORAL DAILY
Qty: 119 G | Refills: 11 | Status: SHIPPED | OUTPATIENT
Start: 2017-01-01 | End: 2018-01-01

## 2017-01-01 RX ORDER — LEVOFLOXACIN 500 MG/1
500 TABLET, FILM COATED ORAL DAILY
Qty: 14 TABLET | Refills: 0 | Status: SHIPPED | OUTPATIENT
Start: 2017-01-01 | End: 2017-01-01

## 2017-01-01 RX ORDER — FENTANYL CITRATE 50 UG/ML
25-50 INJECTION, SOLUTION INTRAMUSCULAR; INTRAVENOUS
Status: DISCONTINUED | OUTPATIENT
Start: 2017-01-01 | End: 2017-01-01 | Stop reason: HOSPADM

## 2017-01-01 RX ORDER — DOCUSATE SODIUM 100 MG/1
CAPSULE, LIQUID FILLED ORAL
Qty: 100 CAPSULE | Refills: 1 | Status: SHIPPED | OUTPATIENT
Start: 2017-01-01 | End: 2017-01-01

## 2017-01-01 RX ORDER — LORATADINE 10 MG/1
10 TABLET ORAL DAILY
Status: DISCONTINUED | OUTPATIENT
Start: 2017-01-01 | End: 2017-01-01 | Stop reason: HOSPADM

## 2017-01-01 RX ORDER — PIPERACILLIN SODIUM, TAZOBACTAM SODIUM 3; .375 G/15ML; G/15ML
3.38 INJECTION, POWDER, LYOPHILIZED, FOR SOLUTION INTRAVENOUS EVERY 6 HOURS
Status: DISCONTINUED | OUTPATIENT
Start: 2017-01-01 | End: 2017-01-01 | Stop reason: HOSPADM

## 2017-01-01 RX ORDER — ESMOLOL HYDROCHLORIDE 10 MG/ML
INJECTION INTRAVENOUS PRN
Status: DISCONTINUED | OUTPATIENT
Start: 2017-01-01 | End: 2017-01-01

## 2017-01-01 RX ORDER — LORATADINE 10 MG/1
10 TABLET ORAL DAILY
Qty: 30 TABLET | Refills: 6 | Status: SHIPPED | OUTPATIENT
Start: 2017-01-01 | End: 2017-01-01

## 2017-01-01 RX ORDER — PANTOPRAZOLE SODIUM 20 MG/1
20 TABLET, DELAYED RELEASE ORAL
Qty: 60 TABLET | Refills: 3 | Status: SHIPPED | OUTPATIENT
Start: 2017-01-01 | End: 2017-01-01

## 2017-01-01 RX ORDER — DOCUSATE SODIUM 100 MG/1
100 CAPSULE, LIQUID FILLED ORAL DAILY
Qty: 100 CAPSULE | Refills: 0 | Status: SHIPPED | OUTPATIENT
Start: 2017-01-01 | End: 2017-01-01

## 2017-01-01 RX ORDER — SODIUM CHLORIDE 9 MG/ML
INJECTION, SOLUTION INTRAVENOUS CONTINUOUS PRN
Status: DISCONTINUED | OUTPATIENT
Start: 2017-01-01 | End: 2017-01-01

## 2017-01-01 RX ORDER — INDOMETHACIN 50 MG/1
100 SUPPOSITORY RECTAL
Status: COMPLETED | OUTPATIENT
Start: 2017-01-01 | End: 2017-01-01

## 2017-01-01 RX ORDER — HEPARIN SODIUM (PORCINE) LOCK FLUSH IV SOLN 100 UNIT/ML 100 UNIT/ML
5 SOLUTION INTRAVENOUS
Status: DISCONTINUED | OUTPATIENT
Start: 2017-01-01 | End: 2017-01-01 | Stop reason: HOSPADM

## 2017-01-01 RX ORDER — POTASSIUM CHLORIDE 29.8 MG/ML
20 INJECTION INTRAVENOUS
Status: DISCONTINUED | OUTPATIENT
Start: 2017-01-01 | End: 2017-01-01 | Stop reason: HOSPADM

## 2017-01-01 RX ORDER — POLYETHYLENE GLYCOL 3350 17 G/17G
1 POWDER, FOR SOLUTION ORAL DAILY
Qty: 119 G | Refills: 11 | Status: SHIPPED | OUTPATIENT
Start: 2017-01-01 | End: 2017-01-01

## 2017-01-01 RX ORDER — DRONABINOL 2.5 MG/1
2.5 CAPSULE ORAL
Qty: 60 CAPSULE | Refills: 0 | Status: SHIPPED | OUTPATIENT
Start: 2017-01-01 | End: 2017-01-01

## 2017-01-01 RX ORDER — LORAZEPAM 0.5 MG/1
0.5 TABLET ORAL EVERY 4 HOURS PRN
Qty: 30 TABLET | Refills: 3 | Status: SHIPPED | OUTPATIENT
Start: 2017-01-01 | End: 2017-01-01

## 2017-01-01 RX ORDER — LORAZEPAM 0.5 MG/1
0.5 TABLET ORAL EVERY 4 HOURS PRN
Qty: 30 TABLET | Refills: 3 | Status: SHIPPED | OUTPATIENT
Start: 2017-01-01 | End: 2018-01-01

## 2017-01-01 RX ORDER — POTASSIUM CHLORIDE 1500 MG/1
20 TABLET, EXTENDED RELEASE ORAL DAILY
Qty: 60 TABLET | Refills: 1 | Status: SHIPPED | OUTPATIENT
Start: 2017-01-01 | End: 2017-01-01

## 2017-01-01 RX ORDER — DRONABINOL 2.5 MG/1
5 CAPSULE ORAL
Status: DISCONTINUED | OUTPATIENT
Start: 2017-01-01 | End: 2017-01-01 | Stop reason: HOSPADM

## 2017-01-01 RX ORDER — DRONABINOL 5 MG/1
5 CAPSULE ORAL
Qty: 60 CAPSULE | Refills: 0 | Status: SHIPPED | OUTPATIENT
Start: 2017-01-01 | End: 2017-01-01

## 2017-01-01 RX ORDER — DRONABINOL 5 MG/1
5 CAPSULE ORAL
Qty: 90 CAPSULE | Refills: 0 | Status: SHIPPED | OUTPATIENT
Start: 2017-01-01 | End: 2017-01-01

## 2017-01-01 RX ORDER — IOPAMIDOL 510 MG/ML
INJECTION, SOLUTION INTRAVASCULAR PRN
Status: DISCONTINUED | OUTPATIENT
Start: 2017-01-01 | End: 2017-01-01 | Stop reason: HOSPADM

## 2017-01-01 RX ORDER — MAGNESIUM SULFATE HEPTAHYDRATE 40 MG/ML
2 INJECTION, SOLUTION INTRAVENOUS ONCE
Status: COMPLETED | OUTPATIENT
Start: 2017-01-01 | End: 2017-01-01

## 2017-01-01 RX ORDER — ONDANSETRON 2 MG/ML
INJECTION INTRAMUSCULAR; INTRAVENOUS PRN
Status: DISCONTINUED | OUTPATIENT
Start: 2017-01-01 | End: 2017-01-01

## 2017-01-01 RX ORDER — FENTANYL CITRATE 50 UG/ML
INJECTION, SOLUTION INTRAMUSCULAR; INTRAVENOUS PRN
Status: DISCONTINUED | OUTPATIENT
Start: 2017-01-01 | End: 2017-01-01

## 2017-01-01 RX ORDER — OXYCODONE HYDROCHLORIDE 5 MG/1
5 TABLET ORAL EVERY 4 HOURS PRN
Qty: 100 TABLET | Refills: 0 | Status: SHIPPED | OUTPATIENT
Start: 2017-01-01 | End: 2018-01-01

## 2017-01-01 RX ORDER — NALOXONE HYDROCHLORIDE 0.4 MG/ML
.1-.4 INJECTION, SOLUTION INTRAMUSCULAR; INTRAVENOUS; SUBCUTANEOUS
Status: ACTIVE | OUTPATIENT
Start: 2017-01-01 | End: 2017-01-01

## 2017-01-01 RX ORDER — PROCHLORPERAZINE MALEATE 10 MG
TABLET ORAL
Qty: 30 TABLET | Refills: 2 | Status: SHIPPED | OUTPATIENT
Start: 2017-01-01 | End: 2018-01-01

## 2017-01-01 RX ORDER — SODIUM CHLORIDE 9 MG/ML
INJECTION, SOLUTION INTRAVENOUS CONTINUOUS
Status: DISCONTINUED | OUTPATIENT
Start: 2017-01-01 | End: 2017-01-01 | Stop reason: HOSPADM

## 2017-01-01 RX ORDER — POTASSIUM CHLORIDE 7.45 MG/ML
10 INJECTION INTRAVENOUS
Status: DISCONTINUED | OUTPATIENT
Start: 2017-01-01 | End: 2017-01-01 | Stop reason: HOSPADM

## 2017-01-01 RX ORDER — LANCETS
EACH MISCELLANEOUS
Qty: 102 EACH | Refills: 3 | Status: SHIPPED | OUTPATIENT
Start: 2017-01-01

## 2017-01-01 RX ORDER — MORPHINE SULFATE 15 MG/1
15 TABLET, FILM COATED, EXTENDED RELEASE ORAL EVERY 12 HOURS
Qty: 60 TABLET | Refills: 0 | Status: SHIPPED | OUTPATIENT
Start: 2017-01-01 | End: 2018-01-01

## 2017-01-01 RX ORDER — DOCUSATE SODIUM 100 MG/1
100 CAPSULE, LIQUID FILLED ORAL DAILY
Status: DISCONTINUED | OUTPATIENT
Start: 2017-01-01 | End: 2017-01-01 | Stop reason: HOSPADM

## 2017-01-01 RX ORDER — ONDANSETRON 8 MG/1
8 TABLET, ORALLY DISINTEGRATING ORAL EVERY 8 HOURS PRN
Qty: 60 TABLET | Refills: 6 | Status: SHIPPED | OUTPATIENT
Start: 2017-01-01 | End: 2018-01-01

## 2017-01-01 RX ORDER — ONDANSETRON 2 MG/ML
8 INJECTION INTRAMUSCULAR; INTRAVENOUS EVERY 8 HOURS PRN
Status: DISCONTINUED | OUTPATIENT
Start: 2017-01-01 | End: 2017-01-01 | Stop reason: HOSPADM

## 2017-01-01 RX ORDER — PALONOSETRON 0.05 MG/ML
0.25 INJECTION, SOLUTION INTRAVENOUS ONCE
Status: DISCONTINUED | OUTPATIENT
Start: 2017-01-01 | End: 2017-01-01

## 2017-01-01 RX ORDER — POTASSIUM CHLORIDE 1500 MG/1
40 TABLET, EXTENDED RELEASE ORAL DAILY
Qty: 60 TABLET | Refills: 3 | Status: SHIPPED | OUTPATIENT
Start: 2017-01-01 | End: 2018-01-01

## 2017-01-01 RX ORDER — LACTOSE-REDUCED FOOD
1 LIQUID (ML) ORAL 3 TIMES DAILY
Qty: 90 BOTTLE | Refills: 6 | Status: SHIPPED | OUTPATIENT
Start: 2017-01-01

## 2017-01-01 RX ORDER — ONDANSETRON 4 MG/1
4 TABLET, ORALLY DISINTEGRATING ORAL EVERY 30 MIN PRN
Status: DISCONTINUED | OUTPATIENT
Start: 2017-01-01 | End: 2017-01-01 | Stop reason: HOSPADM

## 2017-01-01 RX ORDER — ONDANSETRON 2 MG/ML
4 INJECTION INTRAMUSCULAR; INTRAVENOUS EVERY 30 MIN PRN
Status: DISCONTINUED | OUTPATIENT
Start: 2017-01-01 | End: 2017-01-01 | Stop reason: HOSPADM

## 2017-01-01 RX ADMIN — IRINOTECAN HYDROCHLORIDE 138 MG: 4.3 INJECTION INTRAVENOUS at 11:30

## 2017-01-01 RX ADMIN — SODIUM CHLORIDE 1000 ML: 9 INJECTION, SOLUTION INTRAVENOUS at 13:48

## 2017-01-01 RX ADMIN — INDOMETHACIN 100 MG: 50 SUPPOSITORY RECTAL at 16:54

## 2017-01-01 RX ADMIN — SODIUM CHLORIDE 1000 ML: 9 INJECTION, SOLUTION INTRAVENOUS at 12:56

## 2017-01-01 RX ADMIN — POTASSIUM CHLORIDE: 149 INJECTION, SOLUTION, CONCENTRATE INTRAVENOUS at 13:52

## 2017-01-01 RX ADMIN — SODIUM CHLORIDE, PRESERVATIVE FREE 5 ML: 5 INJECTION INTRAVENOUS at 17:03

## 2017-01-01 RX ADMIN — SODIUM CHLORIDE, PRESERVATIVE FREE 5 ML: 5 INJECTION INTRAVENOUS at 10:22

## 2017-01-01 RX ADMIN — SODIUM CHLORIDE 150 MG: 9 INJECTION, SOLUTION INTRAVENOUS at 08:20

## 2017-01-01 RX ADMIN — POTASSIUM CHLORIDE 10 MEQ: 14.9 INJECTION, SOLUTION, CONCENTRATE PARENTERAL at 14:46

## 2017-01-01 RX ADMIN — PANTOPRAZOLE SODIUM 20 MG: 20 TABLET, DELAYED RELEASE ORAL at 16:08

## 2017-01-01 RX ADMIN — POTASSIUM CHLORIDE: 149 INJECTION, SOLUTION, CONCENTRATE INTRAVENOUS at 13:30

## 2017-01-01 RX ADMIN — SODIUM CHLORIDE, PRESERVATIVE FREE 5 ML: 5 INJECTION INTRAVENOUS at 15:38

## 2017-01-01 RX ADMIN — ATROPINE SULFATE 0.4 MG: 0.4 INJECTION, SOLUTION INTRAMUSCULAR; INTRAVENOUS; SUBCUTANEOUS at 11:36

## 2017-01-01 RX ADMIN — DOCUSATE SODIUM 100 MG: 100 CAPSULE, LIQUID FILLED ORAL at 08:35

## 2017-01-01 RX ADMIN — SODIUM CHLORIDE, PRESERVATIVE FREE 5 ML: 5 INJECTION INTRAVENOUS at 11:06

## 2017-01-01 RX ADMIN — Medication 100 MG: at 15:26

## 2017-01-01 RX ADMIN — DRONABINOL 5 MG: 2.5 CAPSULE ORAL at 07:50

## 2017-01-01 RX ADMIN — SODIUM CHLORIDE 500 ML: 9 INJECTION, SOLUTION INTRAVENOUS at 10:54

## 2017-01-01 RX ADMIN — SODIUM CHLORIDE 150 MG: 9 INJECTION, SOLUTION INTRAVENOUS at 09:39

## 2017-01-01 RX ADMIN — PALONOSETRON HYDROCHLORIDE 0.25 MG: 0.25 INJECTION INTRAVENOUS at 07:56

## 2017-01-01 RX ADMIN — ONDANSETRON 8 MG: 8 TABLET, ORALLY DISINTEGRATING ORAL at 20:39

## 2017-01-01 RX ADMIN — SODIUM CHLORIDE 1000 ML: 9 INJECTION, SOLUTION INTRAVENOUS at 12:31

## 2017-01-01 RX ADMIN — FLUOROURACIL 790 MG: 50 INJECTION, SOLUTION INTRAVENOUS at 13:26

## 2017-01-01 RX ADMIN — SODIUM CHLORIDE, PRESERVATIVE FREE 5 ML: 5 INJECTION INTRAVENOUS at 08:47

## 2017-01-01 RX ADMIN — SODIUM CHLORIDE, PRESERVATIVE FREE 500 UNITS: 5 INJECTION INTRAVENOUS at 16:04

## 2017-01-01 RX ADMIN — DEXAMETHASONE SODIUM PHOSPHATE 12 MG: 10 INJECTION, SOLUTION INTRAMUSCULAR; INTRAVENOUS at 09:03

## 2017-01-01 RX ADMIN — ROCURONIUM BROMIDE 20 MG: 10 INJECTION INTRAVENOUS at 16:07

## 2017-01-01 RX ADMIN — DEXTROSE MONOHYDRATE 250 ML: 50 INJECTION, SOLUTION INTRAVENOUS at 08:00

## 2017-01-01 RX ADMIN — SODIUM CHLORIDE, PRESERVATIVE FREE 5 ML: 5 INJECTION INTRAVENOUS at 15:22

## 2017-01-01 RX ADMIN — FLUOROURACIL 790 MG: 50 INJECTION, SOLUTION INTRAVENOUS at 12:39

## 2017-01-01 RX ADMIN — POTASSIUM CHLORIDE: 149 INJECTION, SOLUTION, CONCENTRATE INTRAVENOUS at 12:26

## 2017-01-01 RX ADMIN — ESMOLOL HYDROCHLORIDE 10 MG: 10 INJECTION, SOLUTION INTRAVENOUS at 16:23

## 2017-01-01 RX ADMIN — OXYCODONE HYDROCHLORIDE 5 MG: 5 TABLET ORAL at 11:47

## 2017-01-01 RX ADMIN — SODIUM CHLORIDE, PRESERVATIVE FREE 5 ML: 5 INJECTION INTRAVENOUS at 14:40

## 2017-01-01 RX ADMIN — DEXTROSE MONOHYDRATE 250 ML: 50 INJECTION, SOLUTION INTRAVENOUS at 07:56

## 2017-01-01 RX ADMIN — SODIUM CHLORIDE: 900 INJECTION, SOLUTION INTRAVENOUS at 12:41

## 2017-01-01 RX ADMIN — SODIUM CHLORIDE, PRESERVATIVE FREE: 5 INJECTION INTRAVENOUS at 13:36

## 2017-01-01 RX ADMIN — IRINOTECAN HYDROCHLORIDE 340 MG: 20 INJECTION, SOLUTION INTRAVENOUS at 13:49

## 2017-01-01 RX ADMIN — DEXAMETHASONE SODIUM PHOSPHATE: 10 INJECTION, SOLUTION INTRAMUSCULAR; INTRAVENOUS at 11:50

## 2017-01-01 RX ADMIN — IRINOTECAN HYDROCHLORIDE 138 MG: 4.3 INJECTION INTRAVENOUS at 16:11

## 2017-01-01 RX ADMIN — OXALIPLATIN 167 MG: 5 INJECTION, SOLUTION, CONCENTRATE INTRAVENOUS at 11:39

## 2017-01-01 RX ADMIN — IRINOTECAN HYDROCHLORIDE 138 MG: 4.3 INJECTION INTRAVENOUS at 12:25

## 2017-01-01 RX ADMIN — SODIUM CHLORIDE, PRESERVATIVE FREE 5 ML: 5 INJECTION INTRAVENOUS at 09:39

## 2017-01-01 RX ADMIN — PIPERACILLIN AND TAZOBACTAM 3.38 G: 3; .375 INJECTION, POWDER, LYOPHILIZED, FOR SOLUTION INTRAVENOUS; PARENTERAL at 04:59

## 2017-01-01 RX ADMIN — ATROPINE SULFATE 0.4 MG: 0.4 INJECTION INTRAMUSCULAR; INTRAVENOUS; SUBCUTANEOUS at 14:40

## 2017-01-01 RX ADMIN — SODIUM CHLORIDE, PRESERVATIVE FREE 5 ML: 5 INJECTION INTRAVENOUS at 09:34

## 2017-01-01 RX ADMIN — LEUCOVORIN CALCIUM 750 MG: 200 INJECTION, POWDER, LYOPHILIZED, FOR SOLUTION INTRAMUSCULAR; INTRAVENOUS at 13:50

## 2017-01-01 RX ADMIN — OXALIPLATIN 167 MG: 5 INJECTION, SOLUTION, CONCENTRATE INTRAVENOUS at 10:16

## 2017-01-01 RX ADMIN — SODIUM CHLORIDE, PRESERVATIVE FREE: 5 INJECTION INTRAVENOUS at 19:00

## 2017-01-01 RX ADMIN — PEGFILGRASTIM 6 MG: 6 INJECTION SUBCUTANEOUS at 15:05

## 2017-01-01 RX ADMIN — SODIUM CHLORIDE, PRESERVATIVE FREE 5 ML: 5 INJECTION INTRAVENOUS at 07:49

## 2017-01-01 RX ADMIN — DARBEPOETIN ALFA 300 MCG: 300 INJECTION, SOLUTION INTRAVENOUS; SUBCUTANEOUS at 10:53

## 2017-01-01 RX ADMIN — POTASSIUM CHLORIDE 10 MEQ: 14.9 INJECTION, SOLUTION, CONCENTRATE PARENTERAL at 19:17

## 2017-01-01 RX ADMIN — SODIUM CHLORIDE, PRESERVATIVE FREE 5 ML: 5 INJECTION INTRAVENOUS at 10:31

## 2017-01-01 RX ADMIN — IRINOTECAN HYDROCHLORIDE 138 MG: 4.3 INJECTION, POWDER, FOR SOLUTION INTRAVENOUS at 11:25

## 2017-01-01 RX ADMIN — ONDANSETRON 4 MG: 2 INJECTION INTRAMUSCULAR; INTRAVENOUS at 16:36

## 2017-01-01 RX ADMIN — DARBEPOETIN ALFA 300 MCG: 300 INJECTION, SOLUTION INTRAVENOUS; SUBCUTANEOUS at 12:07

## 2017-01-01 RX ADMIN — SODIUM CHLORIDE, PRESERVATIVE FREE 5 ML: 5 INJECTION INTRAVENOUS at 10:10

## 2017-01-01 RX ADMIN — POTASSIUM CHLORIDE: 149 INJECTION, SOLUTION, CONCENTRATE INTRAVENOUS at 13:26

## 2017-01-01 RX ADMIN — OXALIPLATIN 167 MG: 5 INJECTION, SOLUTION INTRAVENOUS at 08:57

## 2017-01-01 RX ADMIN — LEUCOVORIN CALCIUM 750 MG: 500 INJECTION, POWDER, LYOPHILIZED, FOR SOLUTION INTRAMUSCULAR; INTRAVENOUS at 12:56

## 2017-01-01 RX ADMIN — SODIUM CHLORIDE 1000 ML: 9 INJECTION, SOLUTION INTRAVENOUS at 10:59

## 2017-01-01 RX ADMIN — DEXAMETHASONE SODIUM PHOSPHATE 12 MG: 10 INJECTION, SOLUTION INTRAMUSCULAR; INTRAVENOUS at 08:38

## 2017-01-01 RX ADMIN — ONDANSETRON 8 MG: 2 INJECTION INTRAMUSCULAR; INTRAVENOUS at 11:02

## 2017-01-01 RX ADMIN — DEXTROSE MONOHYDRATE 250 ML: 50 INJECTION, SOLUTION INTRAVENOUS at 11:38

## 2017-01-01 RX ADMIN — DEXTROSE MONOHYDRATE 340 MG: 50 INJECTION, SOLUTION INTRAVENOUS at 11:05

## 2017-01-01 RX ADMIN — PROPOFOL 120 MG: 10 INJECTION, EMULSION INTRAVENOUS at 15:26

## 2017-01-01 RX ADMIN — ATROPINE SULFATE 0.4 MG: 0.4 INJECTION, SOLUTION INTRAMUSCULAR; INTRAVENOUS; SUBCUTANEOUS at 11:45

## 2017-01-01 RX ADMIN — SODIUM CHLORIDE 150 MG: 9 INJECTION, SOLUTION INTRAVENOUS at 12:07

## 2017-01-01 RX ADMIN — ATROPINE SULFATE 0.4 MG: 0.4 INJECTION, SOLUTION INTRAMUSCULAR; INTRAVENOUS; SUBCUTANEOUS at 13:42

## 2017-01-01 RX ADMIN — PHENYLEPHRINE HYDROCHLORIDE 200 MCG: 10 INJECTION, SOLUTION INTRAMUSCULAR; INTRAVENOUS; SUBCUTANEOUS at 15:42

## 2017-01-01 RX ADMIN — LEUCOVORIN CALCIUM 750 MG: 50 INJECTION, POWDER, LYOPHILIZED, FOR SOLUTION INTRAMUSCULAR; INTRAVENOUS at 11:28

## 2017-01-01 RX ADMIN — PALONOSETRON HYDROCHLORIDE 0.25 MG: 0.25 INJECTION INTRAVENOUS at 08:03

## 2017-01-01 RX ADMIN — DEXAMETHASONE SODIUM PHOSPHATE 12 MG: 10 INJECTION, SOLUTION INTRAMUSCULAR; INTRAVENOUS at 10:50

## 2017-01-01 RX ADMIN — SODIUM CHLORIDE, PRESERVATIVE FREE: 5 INJECTION INTRAVENOUS at 22:06

## 2017-01-01 RX ADMIN — SODIUM CHLORIDE, PRESERVATIVE FREE: 5 INJECTION INTRAVENOUS at 22:35

## 2017-01-01 RX ADMIN — PANTOPRAZOLE SODIUM 20 MG: 20 TABLET, DELAYED RELEASE ORAL at 15:57

## 2017-01-01 RX ADMIN — MORPHINE SULFATE 15 MG: 15 TABLET, EXTENDED RELEASE ORAL at 20:23

## 2017-01-01 RX ADMIN — PALONOSETRON HYDROCHLORIDE 0.25 MG: 0.25 INJECTION INTRAVENOUS at 09:07

## 2017-01-01 RX ADMIN — SODIUM CHLORIDE 1000 ML: 9 INJECTION, SOLUTION INTRAVENOUS at 13:15

## 2017-01-01 RX ADMIN — PIPERACILLIN AND TAZOBACTAM 3.38 G: 3; .375 INJECTION, POWDER, LYOPHILIZED, FOR SOLUTION INTRAVENOUS; PARENTERAL at 10:32

## 2017-01-01 RX ADMIN — POTASSIUM CHLORIDE: 149 INJECTION, SOLUTION, CONCENTRATE INTRAVENOUS at 10:16

## 2017-01-01 RX ADMIN — SODIUM CHLORIDE: 9 INJECTION, SOLUTION INTRAVENOUS at 16:36

## 2017-01-01 RX ADMIN — LEUCOVORIN CALCIUM 750 MG: 350 INJECTION, POWDER, LYOPHILIZED, FOR SOLUTION INTRAMUSCULAR; INTRAVENOUS at 13:24

## 2017-01-01 RX ADMIN — SIMETHICONE 2 ML: 63.3; 3.7 SOLUTION/ DROPS ORAL at 16:56

## 2017-01-01 RX ADMIN — MAGNESIUM SULFATE IN WATER 2 G: 40 INJECTION, SOLUTION INTRAVENOUS at 14:29

## 2017-01-01 RX ADMIN — PIPERACILLIN AND TAZOBACTAM 3.38 G: 3; .375 INJECTION, POWDER, LYOPHILIZED, FOR SOLUTION INTRAVENOUS; PARENTERAL at 10:47

## 2017-01-01 RX ADMIN — OXYCODONE HYDROCHLORIDE 5 MG: 5 TABLET ORAL at 17:13

## 2017-01-01 RX ADMIN — DEXAMETHASONE SODIUM PHOSPHATE: 10 INJECTION, SOLUTION INTRAMUSCULAR; INTRAVENOUS at 13:49

## 2017-01-01 RX ADMIN — ATROPINE SULFATE 0.4 MG: 0.4 INJECTION, SOLUTION INTRAMUSCULAR; INTRAVENOUS; SUBCUTANEOUS at 13:01

## 2017-01-01 RX ADMIN — ESMOLOL HYDROCHLORIDE 10 MG: 10 INJECTION, SOLUTION INTRAVENOUS at 16:14

## 2017-01-01 RX ADMIN — PEGFILGRASTIM 6 MG: 6 INJECTION SUBCUTANEOUS at 13:10

## 2017-01-01 RX ADMIN — PEGFILGRASTIM 6 MG: 6 INJECTION SUBCUTANEOUS at 13:56

## 2017-01-01 RX ADMIN — PEGFILGRASTIM 6 MG: 6 INJECTION SUBCUTANEOUS at 15:02

## 2017-01-01 RX ADMIN — SODIUM CHLORIDE, PRESERVATIVE FREE 500 UNITS: 5 INJECTION INTRAVENOUS at 12:03

## 2017-01-01 RX ADMIN — SODIUM CHLORIDE 150 MG: 9 INJECTION, SOLUTION INTRAVENOUS at 07:38

## 2017-01-01 RX ADMIN — PANCRELIPASE 72000 UNITS: 36000; 180000; 114000 CAPSULE, DELAYED RELEASE PELLETS ORAL at 10:47

## 2017-01-01 RX ADMIN — DOCUSATE SODIUM 100 MG: 100 CAPSULE, LIQUID FILLED ORAL at 07:50

## 2017-01-01 RX ADMIN — FLUOROURACIL 790 MG: 50 INJECTION, SOLUTION INTRAVENOUS at 12:08

## 2017-01-01 RX ADMIN — DEXAMETHASONE SODIUM PHOSPHATE: 10 INJECTION, SOLUTION INTRAMUSCULAR; INTRAVENOUS at 10:54

## 2017-01-01 RX ADMIN — SODIUM CHLORIDE, PRESERVATIVE FREE 5 ML: 5 INJECTION INTRAVENOUS at 06:45

## 2017-01-01 RX ADMIN — SODIUM CHLORIDE: 9 INJECTION, SOLUTION INTRAVENOUS at 10:31

## 2017-01-01 RX ADMIN — IRINOTECAN HYDROCHLORIDE 138 MG: 4.3 INJECTION INTRAVENOUS at 14:19

## 2017-01-01 RX ADMIN — SODIUM CHLORIDE, PRESERVATIVE FREE 5 ML: 5 INJECTION INTRAVENOUS at 08:48

## 2017-01-01 RX ADMIN — LEUCOVORIN CALCIUM 750 MG: 350 INJECTION, POWDER, LYOPHILIZED, FOR SOLUTION INTRAMUSCULAR; INTRAVENOUS at 12:19

## 2017-01-01 RX ADMIN — OXALIPLATIN 167 MG: 5 INJECTION, SOLUTION, CONCENTRATE INTRAVENOUS at 09:12

## 2017-01-01 RX ADMIN — SODIUM CHLORIDE 250 ML: 9 INJECTION, SOLUTION INTRAVENOUS at 11:47

## 2017-01-01 RX ADMIN — SODIUM CHLORIDE 1000 ML: 9 INJECTION, SOLUTION INTRAVENOUS at 10:01

## 2017-01-01 RX ADMIN — PANTOPRAZOLE SODIUM 20 MG: 20 TABLET, DELAYED RELEASE ORAL at 07:50

## 2017-01-01 RX ADMIN — MORPHINE SULFATE 15 MG: 15 TABLET, EXTENDED RELEASE ORAL at 07:50

## 2017-01-01 RX ADMIN — PHENYLEPHRINE HYDROCHLORIDE 100 MCG: 10 INJECTION, SOLUTION INTRAMUSCULAR; INTRAVENOUS; SUBCUTANEOUS at 15:40

## 2017-01-01 RX ADMIN — SODIUM CHLORIDE, PRESERVATIVE FREE 5 ML: 5 INJECTION INTRAVENOUS at 12:50

## 2017-01-01 RX ADMIN — SODIUM CHLORIDE, PRESERVATIVE FREE 5 ML: 5 INJECTION INTRAVENOUS at 15:29

## 2017-01-01 RX ADMIN — DEXAMETHASONE 4 MG: 4 TABLET ORAL at 08:35

## 2017-01-01 RX ADMIN — DEXAMETHASONE SODIUM PHOSPHATE 12 MG: 10 INJECTION, SOLUTION INTRAMUSCULAR; INTRAVENOUS at 09:55

## 2017-01-01 RX ADMIN — PEGFILGRASTIM 6 MG: 6 INJECTION SUBCUTANEOUS at 13:31

## 2017-01-01 RX ADMIN — MORPHINE SULFATE 15 MG: 15 TABLET, EXTENDED RELEASE ORAL at 20:47

## 2017-01-01 RX ADMIN — ONDANSETRON 8 MG: 2 INJECTION INTRAMUSCULAR; INTRAVENOUS at 14:55

## 2017-01-01 RX ADMIN — DEXAMETHASONE SODIUM PHOSPHATE: 10 INJECTION, SOLUTION INTRAMUSCULAR; INTRAVENOUS at 15:55

## 2017-01-01 RX ADMIN — ATROPINE SULFATE 0.4 MG: 0.4 INJECTION, SOLUTION INTRAMUSCULAR; INTRAVENOUS; SUBCUTANEOUS at 13:56

## 2017-01-01 RX ADMIN — ATROPINE SULFATE 0.4 MG: 0.4 INJECTION, SOLUTION INTRAMUSCULAR; INTRAVENOUS; SUBCUTANEOUS at 15:19

## 2017-01-01 RX ADMIN — PANCRELIPASE 72000 UNITS: 36000; 180000; 114000 CAPSULE, DELAYED RELEASE PELLETS ORAL at 17:37

## 2017-01-01 RX ADMIN — DRONABINOL 5 MG: 2.5 CAPSULE ORAL at 11:48

## 2017-01-01 RX ADMIN — POLYETHYLENE GLYCOL 3350 17 G: 17 POWDER, FOR SOLUTION ORAL at 11:07

## 2017-01-01 RX ADMIN — FLUOROURACIL 790 MG: 50 INJECTION, SOLUTION INTRAVENOUS at 14:57

## 2017-01-01 RX ADMIN — DEXTROSE MONOHYDRATE 250 ML: 50 INJECTION, SOLUTION INTRAVENOUS at 09:03

## 2017-01-01 RX ADMIN — SODIUM CHLORIDE, PRESERVATIVE FREE 5 ML: 5 INJECTION INTRAVENOUS at 12:12

## 2017-01-01 RX ADMIN — SODIUM CHLORIDE: 900 INJECTION, SOLUTION INTRAVENOUS at 12:34

## 2017-01-01 RX ADMIN — PIPERACILLIN AND TAZOBACTAM 3.38 G: 3; .375 INJECTION, POWDER, LYOPHILIZED, FOR SOLUTION INTRAVENOUS; PARENTERAL at 03:26

## 2017-01-01 RX ADMIN — LORATADINE 10 MG: 10 TABLET ORAL at 07:50

## 2017-01-01 RX ADMIN — PANCRELIPASE 72000 UNITS: 36000; 180000; 114000 CAPSULE, DELAYED RELEASE PELLETS ORAL at 13:17

## 2017-01-01 RX ADMIN — DEXTROSE MONOHYDRATE 500 ML: 50 INJECTION, SOLUTION INTRAVENOUS at 10:46

## 2017-01-01 RX ADMIN — SODIUM CHLORIDE, PRESERVATIVE FREE 500 UNITS: 5 INJECTION INTRAVENOUS at 06:58

## 2017-01-01 RX ADMIN — DRONABINOL 5 MG: 2.5 CAPSULE ORAL at 16:11

## 2017-01-01 RX ADMIN — LORAZEPAM 0.5 MG: 2 INJECTION INTRAMUSCULAR; INTRAVENOUS at 13:00

## 2017-01-01 RX ADMIN — PEGFILGRASTIM 6 MG: 6 INJECTION SUBCUTANEOUS at 15:31

## 2017-01-01 RX ADMIN — INFLUENZA A VIRUS A/MICHIGAN/45/2015 X-275 (H1N1) ANTIGEN (FORMALDEHYDE INACTIVATED), INFLUENZA A VIRUS A/HONG KONG/4801/2014 X-263B (H3N2) ANTIGEN (FORMALDEHYDE INACTIVATED), INFLUENZA B VIRUS B/PHUKET/3073/2013 ANTIGEN (FORMALDEHYDE INACTIVATED), AND INFLUENZA B VIRUS B/BRISBANE/60/2008 ANTIGEN (FORMALDEHYDE INACTIVATED) 0.5 ML: 15; 15; 15; 15 INJECTION, SUSPENSION INTRAMUSCULAR at 11:31

## 2017-01-01 RX ADMIN — DRONABINOL 5 MG: 2.5 CAPSULE ORAL at 11:47

## 2017-01-01 RX ADMIN — SODIUM CHLORIDE, PRESERVATIVE FREE 5 ML: 5 INJECTION INTRAVENOUS at 15:44

## 2017-01-01 RX ADMIN — ATROPINE SULFATE 0.4 MG: 0.4 INJECTION, SOLUTION INTRAMUSCULAR; INTRAVENOUS; SUBCUTANEOUS at 11:48

## 2017-01-01 RX ADMIN — DARBEPOETIN ALFA 300 MCG: 300 INJECTION, SOLUTION INTRAVENOUS; SUBCUTANEOUS at 08:17

## 2017-01-01 RX ADMIN — DEXAMETHASONE 4 MG: 4 TABLET ORAL at 07:50

## 2017-01-01 RX ADMIN — SODIUM CHLORIDE 1000 ML: 9 INJECTION, SOLUTION INTRAVENOUS at 14:00

## 2017-01-01 RX ADMIN — DRONABINOL 5 MG: 2.5 CAPSULE ORAL at 08:35

## 2017-01-01 RX ADMIN — ROCURONIUM BROMIDE 30 MG: 10 INJECTION INTRAVENOUS at 15:42

## 2017-01-01 RX ADMIN — OXALIPLATIN 167 MG: 5 INJECTION, SOLUTION INTRAVENOUS at 08:21

## 2017-01-01 RX ADMIN — SODIUM CHLORIDE 150 MG: 9 INJECTION, SOLUTION INTRAVENOUS at 08:47

## 2017-01-01 RX ADMIN — SODIUM CHLORIDE, PRESERVATIVE FREE 5 ML: 5 INJECTION INTRAVENOUS at 15:08

## 2017-01-01 RX ADMIN — SODIUM CHLORIDE 150 MG: 9 INJECTION, SOLUTION INTRAVENOUS at 10:18

## 2017-01-01 RX ADMIN — LORATADINE 10 MG: 10 TABLET ORAL at 08:35

## 2017-01-01 RX ADMIN — POTASSIUM CHLORIDE 10 MEQ: 7.46 INJECTION, SOLUTION INTRAVENOUS at 16:58

## 2017-01-01 RX ADMIN — OXALIPLATIN 167 MG: 5 INJECTION, SOLUTION INTRAVENOUS at 11:18

## 2017-01-01 RX ADMIN — LEVOFLOXACIN 500 MG: 5 INJECTION, SOLUTION INTRAVENOUS at 15:15

## 2017-01-01 RX ADMIN — DEXAMETHASONE SODIUM PHOSPHATE: 10 INJECTION, SOLUTION INTRAMUSCULAR; INTRAVENOUS at 10:59

## 2017-01-01 RX ADMIN — ATROPINE SULFATE 0.4 MG: 0.4 INJECTION, SOLUTION INTRAMUSCULAR; INTRAVENOUS; SUBCUTANEOUS at 14:18

## 2017-01-01 RX ADMIN — SODIUM CHLORIDE: 9 INJECTION, SOLUTION INTRAVENOUS at 15:13

## 2017-01-01 RX ADMIN — SODIUM CHLORIDE 250 ML: 9 INJECTION, SOLUTION INTRAVENOUS at 14:55

## 2017-01-01 RX ADMIN — ALTEPLASE 2 MG: 2.2 INJECTION, POWDER, LYOPHILIZED, FOR SOLUTION INTRAVENOUS at 18:39

## 2017-01-01 RX ADMIN — DEXTROSE MONOHYDRATE 250 ML: 50 INJECTION, SOLUTION INTRAVENOUS at 08:38

## 2017-01-01 RX ADMIN — SODIUM CHLORIDE 1000 ML: 9 INJECTION, SOLUTION INTRAVENOUS at 14:42

## 2017-01-01 RX ADMIN — PANTOPRAZOLE SODIUM 20 MG: 20 TABLET, DELAYED RELEASE ORAL at 08:35

## 2017-01-01 RX ADMIN — SODIUM CHLORIDE 1000 ML: 9 INJECTION, SOLUTION INTRAVENOUS at 11:37

## 2017-01-01 RX ADMIN — POTASSIUM CHLORIDE: 149 INJECTION, SOLUTION, CONCENTRATE INTRAVENOUS at 09:02

## 2017-01-01 RX ADMIN — ATROPINE SULFATE 0.4 MG: 0.4 INJECTION, SOLUTION INTRAMUSCULAR; INTRAVENOUS; SUBCUTANEOUS at 12:20

## 2017-01-01 RX ADMIN — FENTANYL CITRATE 25 MCG: 50 INJECTION, SOLUTION INTRAMUSCULAR; INTRAVENOUS at 16:03

## 2017-01-01 RX ADMIN — PHENYLEPHRINE HYDROCHLORIDE 100 MCG: 10 INJECTION, SOLUTION INTRAMUSCULAR; INTRAVENOUS; SUBCUTANEOUS at 15:47

## 2017-01-01 RX ADMIN — MORPHINE SULFATE 15 MG: 15 TABLET, EXTENDED RELEASE ORAL at 08:35

## 2017-01-01 RX ADMIN — FLUOROURACIL 790 MG: 50 INJECTION, SOLUTION INTRAVENOUS at 15:27

## 2017-01-01 RX ADMIN — ATROPINE SULFATE 0.4 MG: 0.4 INJECTION, SOLUTION INTRAMUSCULAR; INTRAVENOUS; SUBCUTANEOUS at 13:27

## 2017-01-01 RX ADMIN — DRONABINOL 5 MG: 2.5 CAPSULE ORAL at 17:37

## 2017-01-01 RX ADMIN — SODIUM CHLORIDE, PRESERVATIVE FREE 500 UNITS: 5 INJECTION INTRAVENOUS at 14:41

## 2017-01-01 RX ADMIN — SODIUM CHLORIDE 1000 ML: 9 INJECTION, SOLUTION INTRAVENOUS at 12:20

## 2017-01-01 RX ADMIN — PALONOSETRON HYDROCHLORIDE 0.25 MG: 0.25 INJECTION INTRAVENOUS at 12:33

## 2017-01-01 RX ADMIN — SODIUM CHLORIDE 150 MG: 9 INJECTION, SOLUTION INTRAVENOUS at 10:23

## 2017-01-01 RX ADMIN — IRINOTECAN HYDROCHLORIDE 340 MG: 20 INJECTION, SOLUTION INTRAVENOUS at 12:23

## 2017-01-01 RX ADMIN — POTASSIUM CHLORIDE 10 MEQ: 7.46 INJECTION, SOLUTION INTRAVENOUS at 15:55

## 2017-01-01 RX ADMIN — SODIUM CHLORIDE 250 ML: 9 INJECTION, SOLUTION INTRAVENOUS at 10:59

## 2017-01-01 RX ADMIN — PROPOFOL 20 MG: 10 INJECTION, EMULSION INTRAVENOUS at 15:41

## 2017-01-01 RX ADMIN — DEXAMETHASONE SODIUM PHOSPHATE 12 MG: 10 INJECTION, SOLUTION INTRAMUSCULAR; INTRAVENOUS at 08:01

## 2017-01-01 RX ADMIN — IRINOTECAN HYDROCHLORIDE 340 MG: 20 INJECTION, SOLUTION INTRAVENOUS at 13:09

## 2017-01-01 RX ADMIN — SODIUM CHLORIDE 1000 ML: 9 INJECTION, SOLUTION INTRAVENOUS at 14:35

## 2017-01-01 RX ADMIN — SODIUM CHLORIDE 250 ML: 9 INJECTION, SOLUTION INTRAVENOUS at 15:55

## 2017-01-01 RX ADMIN — SODIUM CHLORIDE, PRESERVATIVE FREE 5 ML: 5 INJECTION INTRAVENOUS at 12:18

## 2017-01-01 RX ADMIN — ATROPINE SULFATE 0.4 MG: 0.4 INJECTION, SOLUTION INTRAMUSCULAR; INTRAVENOUS; SUBCUTANEOUS at 10:23

## 2017-01-01 RX ADMIN — DEXAMETHASONE SODIUM PHOSPHATE 12 MG: 10 INJECTION, SOLUTION INTRAMUSCULAR; INTRAVENOUS at 10:52

## 2017-01-01 RX ADMIN — ATROPINE SULFATE 0.4 MG: 0.4 INJECTION, SOLUTION INTRAMUSCULAR; INTRAVENOUS; SUBCUTANEOUS at 11:00

## 2017-01-01 RX ADMIN — FLUOROURACIL 790 MG: 50 INJECTION, SOLUTION INTRAVENOUS at 15:01

## 2017-01-01 RX ADMIN — IOPAMIDOL 40 ML: 510 INJECTION, SOLUTION INTRAVASCULAR at 16:45

## 2017-01-01 RX ADMIN — PIPERACILLIN AND TAZOBACTAM 3.38 G: 3; .375 INJECTION, POWDER, LYOPHILIZED, FOR SOLUTION INTRAVENOUS; PARENTERAL at 22:03

## 2017-01-01 RX ADMIN — OXALIPLATIN 167 MG: 5 INJECTION, SOLUTION, CONCENTRATE INTRAVENOUS at 10:49

## 2017-01-01 RX ADMIN — PANCRELIPASE 72000 UNITS: 36000; 180000; 114000 CAPSULE, DELAYED RELEASE PELLETS ORAL at 09:23

## 2017-01-01 RX ADMIN — SODIUM CHLORIDE, PRESERVATIVE FREE 5 ML: 5 INJECTION INTRAVENOUS at 09:31

## 2017-01-01 RX ADMIN — SODIUM CHLORIDE 150 MG: 9 INJECTION, SOLUTION INTRAVENOUS at 11:04

## 2017-01-01 RX ADMIN — PALONOSETRON HYDROCHLORIDE 0.25 MG: 0.25 INJECTION INTRAVENOUS at 10:54

## 2017-01-01 RX ADMIN — IRINOTECAN HYDROCHLORIDE 340 MG: 20 INJECTION, SOLUTION INTRAVENOUS at 11:28

## 2017-01-01 RX ADMIN — DEXAMETHASONE SODIUM PHOSPHATE 12 MG: 10 INJECTION, SOLUTION INTRAMUSCULAR; INTRAVENOUS at 14:59

## 2017-01-01 RX ADMIN — SODIUM CHLORIDE, PRESERVATIVE FREE 5 ML: 5 INJECTION INTRAVENOUS at 07:39

## 2017-01-01 RX ADMIN — SODIUM CHLORIDE, PRESERVATIVE FREE 5 ML: 5 INJECTION INTRAVENOUS at 13:27

## 2017-01-01 RX ADMIN — LEUCOVORIN CALCIUM 750 MG: 350 INJECTION, POWDER, LYOPHILIZED, FOR SOLUTION INTRAMUSCULAR; INTRAVENOUS at 10:27

## 2017-01-01 RX ADMIN — PIPERACILLIN AND TAZOBACTAM 3.38 G: 3; .375 INJECTION, POWDER, LYOPHILIZED, FOR SOLUTION INTRAVENOUS; PARENTERAL at 15:59

## 2017-01-01 RX ADMIN — DEXAMETHASONE SODIUM PHOSPHATE 12 MG: 10 INJECTION, SOLUTION INTRAMUSCULAR; INTRAVENOUS at 08:00

## 2017-01-01 RX ADMIN — LEUCOVORIN CALCIUM 750 MG: 500 INJECTION, POWDER, LYOPHILIZED, FOR SOLUTION INTRAMUSCULAR; INTRAVENOUS at 11:02

## 2017-01-01 RX ADMIN — VANCOMYCIN HYDROCHLORIDE 1250 MG: 10 INJECTION, POWDER, LYOPHILIZED, FOR SOLUTION INTRAVENOUS at 10:54

## 2017-01-01 RX ADMIN — SODIUM CHLORIDE, PRESERVATIVE FREE 5 ML: 5 INJECTION INTRAVENOUS at 15:26

## 2017-01-01 RX ADMIN — LIDOCAINE HYDROCHLORIDE 100 MG: 20 INJECTION, SOLUTION INFILTRATION; PERINEURAL at 15:26

## 2017-01-01 RX ADMIN — FLUOROURACIL 790 MG: 50 INJECTION, SOLUTION INTRAVENOUS at 13:58

## 2017-01-01 RX ADMIN — POTASSIUM CHLORIDE: 149 INJECTION, SOLUTION, CONCENTRATE INTRAVENOUS at 11:38

## 2017-01-01 RX ADMIN — DEXTROSE MONOHYDRATE 250 ML: 50 INJECTION, SOLUTION INTRAVENOUS at 10:16

## 2017-01-01 RX ADMIN — ATROPINE SULFATE 0.4 MG: 0.4 INJECTION, SOLUTION INTRAMUSCULAR; INTRAVENOUS; SUBCUTANEOUS at 14:16

## 2017-01-01 RX ADMIN — IRINOTECAN HYDROCHLORIDE 129 MG: 4.3 INJECTION, POWDER, FOR SOLUTION INTRAVENOUS at 15:15

## 2017-01-01 RX ADMIN — IRINOTECAN HYDROCHLORIDE 340 MG: 40 INJECTION, SOLUTION INTRAVENOUS at 10:27

## 2017-01-01 RX ADMIN — Medication 5 ML: at 17:21

## 2017-01-01 RX ADMIN — PALONOSETRON HYDROCHLORIDE 0.25 MG: 0.25 INJECTION INTRAVENOUS at 10:49

## 2017-01-01 RX ADMIN — SODIUM CHLORIDE, PRESERVATIVE FREE 5 ML: 5 INJECTION INTRAVENOUS at 10:21

## 2017-01-01 RX ADMIN — PALONOSETRON HYDROCHLORIDE 0.25 MG: 0.25 INJECTION INTRAVENOUS at 08:38

## 2017-01-01 RX ADMIN — ALTEPLASE 2 MG: 2.2 INJECTION, POWDER, LYOPHILIZED, FOR SOLUTION INTRAVENOUS at 16:08

## 2017-01-01 RX ADMIN — PIPERACILLIN AND TAZOBACTAM 3.38 G: 3; .375 INJECTION, POWDER, LYOPHILIZED, FOR SOLUTION INTRAVENOUS; PARENTERAL at 09:34

## 2017-01-01 RX ADMIN — ATROPINE SULFATE 0.4 MG: 0.4 INJECTION, SOLUTION INTRAMUSCULAR; INTRAVENOUS; SUBCUTANEOUS at 11:26

## 2017-01-01 RX ADMIN — SUGAMMADEX 150 MG: 100 INJECTION, SOLUTION INTRAVENOUS at 16:43

## 2017-01-01 RX ADMIN — ATROPINE SULFATE 0.4 MG: 0.4 INJECTION, SOLUTION INTRAMUSCULAR; INTRAVENOUS; SUBCUTANEOUS at 12:17

## 2017-01-01 RX ADMIN — ATROPINE SULFATE 0.4 MG: 0.4 INJECTION, SOLUTION INTRAMUSCULAR; INTRAVENOUS; SUBCUTANEOUS at 12:56

## 2017-01-01 RX ADMIN — SODIUM CHLORIDE, PRESERVATIVE FREE 5 ML: 5 INJECTION INTRAVENOUS at 13:59

## 2017-01-01 RX ADMIN — PHENYLEPHRINE HYDROCHLORIDE 200 MCG: 10 INJECTION, SOLUTION INTRAMUSCULAR; INTRAVENOUS; SUBCUTANEOUS at 16:33

## 2017-01-01 RX ADMIN — PALONOSETRON HYDROCHLORIDE 0.25 MG: 0.25 INJECTION INTRAVENOUS at 09:58

## 2017-01-01 RX ADMIN — DEXTROSE MONOHYDRATE 340 MG: 50 INJECTION, SOLUTION INTRAVENOUS at 13:27

## 2017-01-01 RX ADMIN — LORAZEPAM 0.5 MG: 0.5 TABLET ORAL at 20:46

## 2017-01-01 RX ADMIN — PIPERACILLIN AND TAZOBACTAM 3.38 G: 3; .375 INJECTION, POWDER, LYOPHILIZED, FOR SOLUTION INTRAVENOUS; PARENTERAL at 22:35

## 2017-01-01 RX ADMIN — SODIUM CHLORIDE, PRESERVATIVE FREE 500 UNITS: 5 INJECTION INTRAVENOUS at 06:59

## 2017-01-01 ASSESSMENT — PAIN SCALES - GENERAL
PAINLEVEL: NO PAIN (0)

## 2017-01-01 ASSESSMENT — PAIN DESCRIPTION - DESCRIPTORS
DESCRIPTORS: ACHING
DESCRIPTORS: ACHING

## 2017-01-01 ASSESSMENT — ACTIVITIES OF DAILY LIVING (ADL)
RETIRED_COMMUNICATION: 0-->UNDERSTANDS/COMMUNICATES WITHOUT DIFFICULTY
AMBULATION: 0-->INDEPENDENT
TOILETING: 0-->INDEPENDENT
SWALLOWING: 0-->SWALLOWS FOODS/LIQUIDS WITHOUT DIFFICULTY
TRANSFERRING: 0-->INDEPENDENT
COGNITION: 0 - NO COGNITION ISSUES REPORTED
BATHING: 2-->ASSISTIVE PERSON
RETIRED_EATING: 0-->INDEPENDENT
DRESS: 2-->ASSISTIVE PERSON
FALL_HISTORY_WITHIN_LAST_SIX_MONTHS: NO

## 2017-01-01 ASSESSMENT — PATIENT HEALTH QUESTIONNAIRE - PHQ9: SUM OF ALL RESPONSES TO PHQ QUESTIONS 1-9: 0

## 2017-01-02 ENCOUNTER — INFUSION THERAPY VISIT (OUTPATIENT)
Dept: ONCOLOGY | Facility: CLINIC | Age: 57
End: 2017-01-02
Attending: INTERNAL MEDICINE
Payer: COMMERCIAL

## 2017-01-02 ENCOUNTER — ONCOLOGY VISIT (OUTPATIENT)
Dept: ONCOLOGY | Facility: CLINIC | Age: 57
End: 2017-01-02
Attending: NURSE PRACTITIONER
Payer: COMMERCIAL

## 2017-01-02 ENCOUNTER — APPOINTMENT (OUTPATIENT)
Dept: LAB | Facility: CLINIC | Age: 57
End: 2017-01-02
Attending: INTERNAL MEDICINE
Payer: COMMERCIAL

## 2017-01-02 VITALS
BODY MASS INDEX: 22.46 KG/M2 | DIASTOLIC BLOOD PRESSURE: 58 MMHG | WEIGHT: 161 LBS | TEMPERATURE: 98.1 F | OXYGEN SATURATION: 99 % | HEART RATE: 98 BPM | SYSTOLIC BLOOD PRESSURE: 106 MMHG | RESPIRATION RATE: 14 BRPM

## 2017-01-02 VITALS
SYSTOLIC BLOOD PRESSURE: 134 MMHG | OXYGEN SATURATION: 100 % | RESPIRATION RATE: 16 BRPM | HEART RATE: 87 BPM | DIASTOLIC BLOOD PRESSURE: 73 MMHG

## 2017-01-02 DIAGNOSIS — R63.0 ANOREXIA: ICD-10-CM

## 2017-01-02 DIAGNOSIS — E87.6 HYPOKALEMIA: ICD-10-CM

## 2017-01-02 DIAGNOSIS — C25.0 MALIGNANT NEOPLASM OF HEAD OF PANCREAS (H): Primary | ICD-10-CM

## 2017-01-02 LAB
ALBUMIN SERPL-MCNC: 2.9 G/DL (ref 3.4–5)
ALP SERPL-CCNC: 590 U/L (ref 40–150)
ALT SERPL W P-5'-P-CCNC: 36 U/L (ref 0–50)
ANION GAP SERPL CALCULATED.3IONS-SCNC: 6 MMOL/L (ref 3–14)
AST SERPL W P-5'-P-CCNC: 38 U/L (ref 0–45)
BASOPHILS # BLD AUTO: 0 10E9/L (ref 0–0.2)
BASOPHILS NFR BLD AUTO: 0.2 %
BILIRUB SERPL-MCNC: 0.4 MG/DL (ref 0.2–1.3)
BUN SERPL-MCNC: 8 MG/DL (ref 7–30)
CALCIUM SERPL-MCNC: 9 MG/DL (ref 8.5–10.1)
CHLORIDE SERPL-SCNC: 105 MMOL/L (ref 94–109)
CO2 SERPL-SCNC: 28 MMOL/L (ref 20–32)
CREAT SERPL-MCNC: 0.67 MG/DL (ref 0.52–1.04)
DIFFERENTIAL METHOD BLD: ABNORMAL
EOSINOPHIL # BLD AUTO: 0 10E9/L (ref 0–0.7)
EOSINOPHIL NFR BLD AUTO: 0.7 %
ERYTHROCYTE [DISTWIDTH] IN BLOOD BY AUTOMATED COUNT: 18.6 % (ref 10–15)
GFR SERPL CREATININE-BSD FRML MDRD: ABNORMAL ML/MIN/1.7M2
GLUCOSE SERPL-MCNC: 98 MG/DL (ref 70–99)
HCT VFR BLD AUTO: 27.2 % (ref 35–47)
HGB BLD-MCNC: 8.3 G/DL (ref 11.7–15.7)
IMM GRANULOCYTES # BLD: 0.1 10E9/L (ref 0–0.4)
IMM GRANULOCYTES NFR BLD: 1 %
LYMPHOCYTES # BLD AUTO: 0.9 10E9/L (ref 0.8–5.3)
LYMPHOCYTES NFR BLD AUTO: 14.4 %
MCH RBC QN AUTO: 24.5 PG (ref 26.5–33)
MCHC RBC AUTO-ENTMCNC: 30.5 G/DL (ref 31.5–36.5)
MCV RBC AUTO: 80 FL (ref 78–100)
MONOCYTES # BLD AUTO: 0.5 10E9/L (ref 0–1.3)
MONOCYTES NFR BLD AUTO: 8.8 %
NEUTROPHILS # BLD AUTO: 4.4 10E9/L (ref 1.6–8.3)
NEUTROPHILS NFR BLD AUTO: 74.9 %
NRBC # BLD AUTO: 0 10*3/UL
NRBC BLD AUTO-RTO: 0 /100
PLATELET # BLD AUTO: 167 10E9/L (ref 150–450)
POTASSIUM SERPL-SCNC: 3 MMOL/L (ref 3.4–5.3)
PROT SERPL-MCNC: 8 G/DL (ref 6.8–8.8)
RBC # BLD AUTO: 3.39 10E12/L (ref 3.8–5.2)
SODIUM SERPL-SCNC: 139 MMOL/L (ref 133–144)
WBC # BLD AUTO: 5.9 10E9/L (ref 4–11)

## 2017-01-02 PROCEDURE — 85025 COMPLETE CBC W/AUTO DIFF WBC: CPT | Performed by: INTERNAL MEDICINE

## 2017-01-02 PROCEDURE — 96367 TX/PROPH/DG ADDL SEQ IV INF: CPT

## 2017-01-02 PROCEDURE — 96415 CHEMO IV INFUSION ADDL HR: CPT

## 2017-01-02 PROCEDURE — 86301 IMMUNOASSAY TUMOR CA 19-9: CPT | Performed by: INTERNAL MEDICINE

## 2017-01-02 PROCEDURE — 96361 HYDRATE IV INFUSION ADD-ON: CPT

## 2017-01-02 PROCEDURE — 25000128 H RX IP 250 OP 636: Mod: JW,ZF | Performed by: NURSE PRACTITIONER

## 2017-01-02 PROCEDURE — 96368 THER/DIAG CONCURRENT INF: CPT

## 2017-01-02 PROCEDURE — 96413 CHEMO IV INFUSION 1 HR: CPT

## 2017-01-02 PROCEDURE — 99213 OFFICE O/P EST LOW 20 MIN: CPT | Mod: 25

## 2017-01-02 PROCEDURE — 25000132 ZZH RX MED GY IP 250 OP 250 PS 637: Mod: ZF | Performed by: NURSE PRACTITIONER

## 2017-01-02 PROCEDURE — 96417 CHEMO IV INFUS EACH ADDL SEQ: CPT

## 2017-01-02 PROCEDURE — 25000125 ZZHC RX 250: Mod: ZF | Performed by: NURSE PRACTITIONER

## 2017-01-02 PROCEDURE — 96375 TX/PRO/DX INJ NEW DRUG ADDON: CPT

## 2017-01-02 PROCEDURE — 96416 CHEMO PROLONG INFUSE W/PUMP: CPT

## 2017-01-02 PROCEDURE — 80053 COMPREHEN METABOLIC PANEL: CPT | Performed by: NURSE PRACTITIONER

## 2017-01-02 PROCEDURE — 96411 CHEMO IV PUSH ADDL DRUG: CPT

## 2017-01-02 PROCEDURE — 99215 OFFICE O/P EST HI 40 MIN: CPT | Mod: ZP | Performed by: NURSE PRACTITIONER

## 2017-01-02 PROCEDURE — 96376 TX/PRO/DX INJ SAME DRUG ADON: CPT

## 2017-01-02 RX ORDER — DRONABINOL 5 MG/1
5 CAPSULE ORAL
Qty: 60 CAPSULE | Refills: 0 | Status: SHIPPED | OUTPATIENT
Start: 2017-01-02 | End: 2017-02-16

## 2017-01-02 RX ORDER — PALONOSETRON 0.05 MG/ML
0.25 INJECTION, SOLUTION INTRAVENOUS ONCE
Status: COMPLETED | OUTPATIENT
Start: 2017-01-02 | End: 2017-01-02

## 2017-01-02 RX ORDER — DEXAMETHASONE 4 MG/1
4 TABLET ORAL
Qty: 3 TABLET | Refills: 0 | Status: SHIPPED | OUTPATIENT
Start: 2017-01-02 | End: 2017-01-05

## 2017-01-02 RX ORDER — FLUOROURACIL 50 MG/ML
400 INJECTION, SOLUTION INTRAVENOUS ONCE
Status: COMPLETED | OUTPATIENT
Start: 2017-01-02 | End: 2017-01-02

## 2017-01-02 RX ORDER — SODIUM CHLORIDE 9 MG/ML
1000 INJECTION, SOLUTION INTRAVENOUS CONTINUOUS PRN
Status: DISCONTINUED | OUTPATIENT
Start: 2017-01-02 | End: 2017-01-02 | Stop reason: HOSPADM

## 2017-01-02 RX ORDER — POTASSIUM CHLORIDE 1.5 G/1.58G
20-40 POWDER, FOR SOLUTION ORAL
Status: DISCONTINUED | OUTPATIENT
Start: 2017-01-02 | End: 2017-01-02 | Stop reason: HOSPADM

## 2017-01-02 RX ORDER — HEPARIN SODIUM (PORCINE) LOCK FLUSH IV SOLN 100 UNIT/ML 100 UNIT/ML
500 SOLUTION INTRAVENOUS ONCE
Status: COMPLETED | OUTPATIENT
Start: 2017-01-02 | End: 2017-01-02

## 2017-01-02 RX ADMIN — SODIUM CHLORIDE, PRESERVATIVE FREE 500 UNITS: 5 INJECTION INTRAVENOUS at 08:58

## 2017-01-02 RX ADMIN — SODIUM CHLORIDE 1000 ML: 9 INJECTION, SOLUTION INTRAVENOUS at 14:23

## 2017-01-02 RX ADMIN — ATROPINE SULFATE 0.4 MG: 0.4 INJECTION, SOLUTION INTRAMUSCULAR; INTRAVENOUS; SUBCUTANEOUS at 14:58

## 2017-01-02 RX ADMIN — SODIUM CHLORIDE 150 MG: 9 INJECTION, SOLUTION INTRAVENOUS at 10:35

## 2017-01-02 RX ADMIN — DEXTROSE MONOHYDRATE 250 ML: 50 INJECTION, SOLUTION INTRAVENOUS at 10:23

## 2017-01-02 RX ADMIN — ATROPINE SULFATE 0.4 MG: 0.4 INJECTION, SOLUTION INTRAMUSCULAR; INTRAVENOUS; SUBCUTANEOUS at 14:22

## 2017-01-02 RX ADMIN — PALONOSETRON HYDROCHLORIDE 0.25 MG: 0.25 INJECTION INTRAVENOUS at 10:30

## 2017-01-02 RX ADMIN — FLUOROURACIL 790 MG: 50 INJECTION, SOLUTION INTRAVENOUS at 15:34

## 2017-01-02 RX ADMIN — POTASSIUM CHLORIDE 20 MEQ: 1.5 POWDER, FOR SOLUTION ORAL at 15:48

## 2017-01-02 RX ADMIN — POTASSIUM CHLORIDE 40 MEQ: 1.5 POWDER, FOR SOLUTION ORAL at 11:11

## 2017-01-02 RX ADMIN — DEXAMETHASONE SODIUM PHOSPHATE 12 MG: 10 INJECTION, SOLUTION INTRAMUSCULAR; INTRAVENOUS at 10:23

## 2017-01-02 RX ADMIN — IRINOTECAN HYDROCHLORIDE 340 MG: 20 INJECTION, SOLUTION INTRAVENOUS at 13:13

## 2017-01-02 RX ADMIN — OXALIPLATIN 167 MG: 5 INJECTION, SOLUTION, CONCENTRATE INTRAVENOUS at 11:07

## 2017-01-02 RX ADMIN — LEUCOVORIN CALCIUM 750 MG: 50 INJECTION, POWDER, LYOPHILIZED, FOR SOLUTION INTRAMUSCULAR; INTRAVENOUS at 13:13

## 2017-01-02 NOTE — NURSING NOTE
"Shirin Muller is a 56 year old female who presents for:  Chief Complaint   Patient presents with     Port Draw     Labs Drawn      Oncology Clinic Visit     pancreatic cancer        Initial Vitals:  /58 mmHg  Pulse 98  Temp(Src) 98.1  F (36.7  C) (Oral)  Resp 14  Wt 73.029 kg (161 lb)  SpO2 99% Estimated body mass index is 22.46 kg/(m^2) as calculated from the following:    Height as of 7/18/16: 1.803 m (5' 11\").    Weight as of this encounter: 73.029 kg (161 lb).. There is no height on file to calculate BSA. BP completed using cuff size: NA (Not Taken)  Data Unavailable No LMP recorded. Patient is postmenopausal. Allergies and medications reviewed.     Medications: Medication refills not needed today.  Pharmacy name entered into EPIC: Data Unavailable    Comments:     7 minutes for nursing intake (face to face time)   Alisno Kinney CMA          "

## 2017-01-02 NOTE — MR AVS SNAPSHOT
After Visit Summary   1/2/2017    Shirin Muller    MRN: [id number]           Patient Information     Date Of Birth          1960        Visit Information        Provider Department      1/2/2017 10:30 AM MARISABEL 13 ATC;  ONCOLOGY INFUSION [facility]        Today's Diagnoses     Malignant neoplasm of head of pancreas (H)    -  1       Care Instructions    Contact Numbers  [facility]: [phone]  (Choose Option 3 for triage RN)  After Hours: [phone]    Call triage with chills and/or temperature greater than or equal to 100.5, uncontrolled nausea/vomiting, diarrhea, constipation, dizziness, shortness of breath, chest pain, bleeding, unexplained bruising, or any new/concerning symptoms, questions/concerns.     If after hours, weekends, or holidays, call the main clinic number. Calls will be forwarded to the hospital , please ask for the adult oncology doctor on call.     If you are having any concerning symptoms or wish to speak to a provider before your next infusion visit, please call your care coordinator or triage to notify them so we can adequately serve you.     If you need a refill on a narcotic prescription, please call triage or your care coordinator before your infusion appointment.             January 2017 Sunday Monday Tuesday Wednesday Thursday Friday Saturday   1     2     [facility] MAS[facility] LAB DRAW    8:45 AM   (15 min.)    M[facility] LAB DRAW   [facility] Lab Draw     [facility] RETURN    9:20 AM   (50 min.)   Ester Echavarria APRN CNP   M [facility] ONC INFUSION 360   10:30 AM   (360 min.)   UC ONCOLOGY INFUSION   M [facility] 3     4     5     [facility] ONC INFUSION 240    3:30 PM   (240 min.)    ONCOLOGY INFUSION   [facility] 6     7       8     9     10     11     12     13     14       15     16     [facility] MASON[facility] LAB DRAW   11:30 AM   (15 min.)   UC [facility] LAB DRAW   M  [facility] Masonic Lab Draw     P ONC INFUSION 360   12:00 PM   (360 min.)    ONCOLOGY INFUSION   [facility] 17     18     19     20     21       22     23     24     25     26     27     28       29     30     [facility] DRAW   11:00 AM   (15 min.)    [facility] LAB DRAW   [facility] Lab Draw     P ONC INFUSION 360   11:30 AM   (360 min.)    ONCOLOGY INFUSION   [facility] 31 February 2017 Sunday Monday Tuesday Wednesday Thursday Friday Saturday                  1     2     3     4       5     6     7     8     9     10     MR ABDOMEN WWO   10:45 AM   (45 min.)   UCMR1   [facility] MRI     CT CHEST WO   11:40 AM   (20 min.)   UCCT1   [facility] CT 11       12     13     [facility] LAB DRAW    9:45 AM   (15 min.)    [facility] LAB DRAW   [facility] Lab Draw     [facility] RETURN   10:15 AM   (30 min.)   [facility]ales [facility]   11:30 AM   (360 min.)    ONCOLOGY INFUSION   [facility] 14     15     16     17     18       19     20     21     22     23     24     25       26     27     28                                      Lab Results:  Recent Results (from the past 12 hour(s))   CBC with platelets differential    Collection Time: 01/02/17  8:57 AM   Result Value Ref Range    WBC 5.9 4.0 - 11.0 10e9/L    RBC Count 3.39 (L) 3.8 - 5.2 10e12/L    Hemoglobin 8.3 (L) 11.7 - 15.7 g/dL    Hematocrit 27.2 (L) 35.0 - 47.0 %    MCV 80 78 - 100 fl    MCH 24.5 (L) 26.5 - 33.0 pg    MCHC 30.5 (L) 31.5 - 36.5 g/dL    RDW 18.6 (H) 10.0 - 15.0 %    Platelet Count 167 150 - 450 10e9/L    Diff Method Automated Method     % Neutrophils 74.9 %    % Lymphocytes 14.4 %    % Monocytes 8.8 %    % Eosinophils 0.7 %    % Basophils 0.2 %    % Immature Granulocytes 1.0 %    Nucleated RBCs 0 0 /100    Absolute Neutrophil 4.4 1.6 - 8.3 10e9/L     Absolute Lymphocytes 0.9 0.8 - 5.3 10e9/L    Absolute Monocytes 0.5 0.0 - 1.3 10e9/L    Absolute Eosinophils 0.0 0.0 - 0.7 10e9/L    Absolute Basophils 0.0 0.0 - 0.2 10e9/L    Abs Immature Granulocytes 0.1 0 - 0.4 10e9/L    Absolute Nucleated RBC 0.0    Comprehensive metabolic panel    Collection Time: 01/02/17  8:57 AM   Result Value Ref Range    Sodium 139 133 - 144 mmol/L    Potassium 3.0 (L) 3.4 - 5.3 mmol/L    Chloride 105 94 - 109 mmol/L    Carbon Dioxide 28 20 - 32 mmol/L    Anion Gap 6 3 - 14 mmol/L    Glucose 98 70 - 99 mg/dL    Urea Nitrogen 8 7 - 30 mg/dL    Creatinine 0.67 0.52 - 1.04 mg/dL    GFR Estimate >90  Non  GFR Calc   >60 mL/min/1.7m2    GFR Estimate If Black >90   GFR Calc   >60 mL/min/1.7m2    Calcium 9.0 8.5 - 10.1 mg/dL    Bilirubin Total 0.4 0.2 - 1.3 mg/dL    Albumin 2.9 (L) 3.4 - 5.0 g/dL    Protein Total 8.0 6.8 - 8.8 g/dL    Alkaline Phosphatase 590 (H) 40 - 150 U/L    ALT 36 0 - 50 U/L    AST 38 0 - 45 U/L               Follow-ups after your visit        Your next 10 appointments already scheduled     Jan 05, 2017  3:30 PM   Infusion 240 with UC ONCOLOGY INFUSION, UC 16 ATC   [facility])    [address] [place] [id number] [phone]            Jan 16, 2017 11:30 AM   [facility] Lab Draw with  MASONIC LAB DRAW   [facility] Lab Draw ([facility])    [address] [place] [id number] [phone]            Jan 16, 2017 12:00 PM   Infusion 360 with UC ONCOLOGY INFUSION, UC 24 ATC   [facility])    [address] [id number] [phone]            Jan 30, 2017 11:00 AM   M[facility]ic Lab Draw with  MASONIC LAB DRAW   [facility] Masonic Lab Draw ([facility])    [address]  [address]   [phone]            Jan 30, 2017 11:30 AM   Infusion 360 with  ONCOLOGY INFUSION, UC 13 ATC   [facility] ([facility])    [address]  [address] [facility] [address]   [phone]            Feb 10, 2017 10:45 AM   (Arrive by 10:30 AM)   MR ABDOMEN W/O & W CONTRAST with UCMR1   [facility] ([facility])    [address]  [address] [facility] [address]   [phone]           Take your medicines as usual, unless your doctor tells you not to. Bring a list of your current medicines to your exam (including vitamins, minerals and over-the-counter drugs). Also bring the results of similar scans you may have had.    The day before your exam, drink extra fluids at least six 8-ounce glasses (unless your doctor tells you to restrict your fluids).   Have a blood test (creatinine test) within 30 days of your exam. Go to your clinic or Diagnostic Imaging Department for this test.   Do not eat or drink for 6 hours prior to exam.  The MRI machine uses a strong magnet. Please wear clothes without metal (snaps, zippers). A sweatsuit works well, or we may give you a hospital gown.  Please remove any body piercings and hair extensions before you arrive. You will also remove watches, jewelry, hairpins, wallets, dentures, partial dental plates and hearing aids. You may wear contact lenses, and you may be able to wear your rings. We have a safe place to keep your personal items, but it is safer to leave them at home.   **IMPORTANT** THE INSTRUCTIONS BELOW ARE ONLY FOR THOSE PATIENTS WHO HAVE BEEN TOLD THEY WILL RECEIVE SEDATION OR GENERAL ANESTHESIA DURING THEIR MRI PROCEDURE:  IF YOU WILL RECEIVE SEDATION (take medicine to help you relax during your exam):   You must get the medicine from your doctor before you arrive. Bring the medicine to the exam. Do not take it at home.   Arrive one hour early. Bring  someone who can take you home after the test. Your medicine will make you sleepy. After the exam, you may not drive, take a bus or take a taxi by yourself.   No eating 8 hours before your exam. You may have clear liquids up until 4 hours before your exam. (Clear liquids include water, clear tea, black coffee and fruit juice without pulp.)  IF YOU WILL RECEIVE ANESTHESIA (be asleep for your exam):   Arrive 1 1/2 hours early. Bring someone who can take you home after the test. You may not drive, take a bus or take a taxi by yourself.   No eating 8 hours before your exam. You may have clear liquids up until 4 hours before your exam. (Clear liquids include water, clear tea, black coffee and fruit juice without pulp.)  If you have any questions, please contact your Imaging Department exam site.            Feb 10, 2017 11:40 AM   (Arrive by 11:25 AM)   CT CHEST W/O CONTRAST with UCCT1   [facility] [place] CT ([facility])    [address] [address] [phone]           Please bring any scans or X-rays taken at other hospitals, if similar tests were done. Also bring a list of your medicines, including vitamins, minerals and over-the-counter drugs. It is safest to leave personal items at home.  Be sure to tell your doctor:   If you have any allergies.   If there s any chance you are pregnant.   If you are breastfeeding.   If you have any special needs.  You do not need to do anything special to prepare.  Please wear loose clothing, such as a sweat suit or jogging clothes. Avoid snaps, zippers and other metal. We may ask you to undress and put on a hospital gown.              Who to contact     If you have questions or need follow up information about today's clinic visit or your schedule please contact [facility] directly at [phone].  Normal or non-critical lab and imaging results will be communicated to you by MyChart, letter or  phone within 4 business days after the clinic has received the results. If you do not hear from us within 7 days, please contact the clinic through [employer] or phone. If you have a critical or abnormal lab result, we will notify you by phone as soon as possible.  Submit refill requests through [employer] or call your pharmacy and they will forward the refill request to us. Please allow 3 business days for your refill to be completed.          Additional Information About Your Visit        [employer] Information     [employer] gives you secure access to your electronic health record. If you see a primary care provider, you can also send messages to your care team and make appointments. If you have questions, please call your primary care clinic.  If you do not have a primary care provider, please call [phone] and they will assist you.         Blood Pressure from Last 3 Encounters:   01/02/17 106/58   12/27/16 127/77   12/23/16 104/60    Weight from Last 3 Encounters:   01/02/17 73.029 kg (161 lb)   12/20/16 75.841 kg (167 lb 3.2 oz)   12/12/16 77.701 kg (171 lb 4.8 oz)              We Performed the Following     Cancer antigen 19-9     CBC with platelets differential     Comprehensive metabolic panel     Treatment Conditions          Today's Medication Changes          These changes are accurate as of: 1/2/17 11:48 AM.  If you have any questions, ask your nurse or doctor.               Start taking these medicines.        Dose/Directions    dexamethasone 4 MG tablet   Commonly known as:  DECADRON   Used for:  Malignant neoplasm of head of pancreas (H)        Dose:  4 mg   Take 1 tablet (4 mg) by mouth daily (with breakfast) for 3 days   Quantity:  3 tablet   Refills:  0       dronabinol 5 MG capsule   Commonly known as:  MARINOL   Used for:  Anorexia   Started by:  Ester Echavarria, TEE CNP        Dose:  5 mg   Take 1 capsule (5 mg) by mouth 2 times daily (before meals)   Quantity:  60 capsule   Refills:  0             Where to get your medicines      These medications were sent to [facility] [place], [place] - [address]  [address], [facility] [address]    Hours:  TRANSPLANT PHONE NUMBER [phone] Phone:  [phone]    - dexamethasone 4 MG tablet      Some of these will need a paper prescription and others can be bought over the counter.  Ask your nurse if you have questions.     Bring a paper prescription for each of these medications    - dronabinol 5 MG capsule             Primary Care Provider Office Phone # Fax #    [facility] [phone]       [address] [place] [address] [address] [place] [id number]        Thank you!     Thank you for choosing [facility]  for your care. Our goal is always to provide you with excellent care. Hearing back from our patients is one way we can continue to improve our services. Please take a few minutes to complete the written survey that you may receive in the mail after your visit with us. Thank you!             Your Updated Medication List - Protect others around you: Learn how to safely use, store and throw away your medicines at www.disposemymeds.org.          This list is accurate as of: 1/2/17 11:48 AM.  Always use your most recent med list.                   Brand Name Dispense Instructions for use    amylase-lipase-protease 58906 UNITS Cpep    CREON    180 capsule    Take 2 capsules (72,000 Units) by mouth 3 times daily (with meals)       dexamethasone 4 MG tablet    DECADRON    3 tablet    Take 1 tablet (4 mg) by mouth daily (with breakfast) for 3 days       docusate sodium 100 MG capsule    COLACE     Take 100 mg by mouth       dronabinol 5 MG capsule    MARINOL    60 capsule    Take 1 capsule (5 mg) by mouth 2 times daily (before meals)       lidocaine-prilocaine cream    EMLA    30 g    Apply topically as needed for other (Use 30-60 minutes prior to port access)       LORazepam 0.5  MG tablet    ATIVAN    30 tablet    Take 1 tablet (0.5 mg) by mouth every 4 hours as needed (Anxiety, Nausea/Vomiting or Sleep)       morphine 15 MG 12 hr tablet    MS CONTIN    60 tablet    Take 1 tablet (15 mg) by mouth every 12 hours       ondansetron 8 MG ODT tab    ZOFRAN-ODT    60 tablet    Take 1 tablet (8 mg) by mouth every 8 hours as needed for nausea       oxyCODONE 5 MG IR tablet    ROXICODONE    100 tablet    Take 1 tablet (5 mg) by mouth every 4 hours as needed for moderate to severe pain       polyethylene glycol powder    MIRALAX/GLYCOLAX    119 g    Take 17 g (1 capful) by mouth daily       potassium chloride 20 MEQ Packet    KLOR-CON    60 packet    Take 20 mEq by mouth daily       prochlorperazine 10 MG tablet    COMPAZINE    30 tablet    Take 1 tablet (10 mg) by mouth every 6 hours as needed (Nausea/Vomiting)

## 2017-01-02 NOTE — PATIENT INSTRUCTIONS
Contact Numbers  [facility]: [phone]  (Choose Option 3 for triage RN)  After Hours: [phone]    Call triage with chills and/or temperature greater than or equal to 100.5, uncontrolled nausea/vomiting, diarrhea, constipation, dizziness, shortness of breath, chest pain, bleeding, unexplained bruising, or any new/concerning symptoms, questions/concerns.     If after hours, weekends, or holidays, call the main clinic number. Calls will be forwarded to the hospital , please ask for the adult oncology doctor on call.     If you are having any concerning symptoms or wish to speak to a provider before your next infusion visit, please call your care coordinator or triage to notify them so we can adequately serve you.     If you need a refill on a narcotic prescription, please call triage or your care coordinator before your infusion appointment.             January 2017 Sunday Monday Tuesday Wednesday Thursday Friday Saturday   1     2     [facility] DRAW    8:45 AM   (15 min.)    M[facility]IC LAB DRAW   [facility] Lab Draw     [facility] RETURN    9:20 AM   (50 min.)   Ester Echavarria APRN CNP   formerly [facility]     [facility] ONC INFUSION 360   10:30 AM   (360 min.)    ONCOLOGY INFUSION   [facility] 3     4     5     [facility] ONC INFUSION 240    3:30 PM   (240 min.)    ONCOLOGY INFUSION   [facility] 6     7       8     9     10     11     12     13     14       15     16     [facility] LAB DRAW   11:30 AM   (15 min.)    [facility] LAB DRAW   [facility] Lab Draw     [facility] ONC INFUSION 360   12:00 PM   (360 min.)    ONCOLOGY INFUSION   [facility] 17     18     19     20     21       22     23     24     25     26     27     28       29     30     [facility] LAB DRAW   11:00 AM   (15 min.)    [facility] LAB DRAW   [facility] Lab Draw     [facility] ONC INFUSION 360   11:30 AM   (360 min.)    ONCOLOGY INFUSION   [facility]  [facility] 31 February 2017 Sunday Monday Tuesday Wednesday Thursday Friday Saturday                  1     2     3     4       5     6     7     8     9     10     MR ABDOMEN WWO   10:45 AM   (45 min.)   [address] [place] [address] MRI     CT CHEST WO   11:40 AM   (20 min.)   CT1   [facility] CT 11       12     13     [facility] MASONIC LAB DRAW    9:45 AM   (15 min.)   [facility] LAB DRAW   [facility] Lab Draw     [facility] RETURN   10:15 AM   (30 min.)   Demond Gonsales MD   [facility] ONC INFUSION 360   11:30 AM   (360 min.)    ONCOLOGY INFUSION   [facility] 14     15     16     17     18       19     20     21     22     23     24     25       26     27     28                                      Lab Results:  Recent Results (from the past 12 hour(s))   CBC with platelets differential    Collection Time: 01/02/17  8:57 AM   Result Value Ref Range    WBC 5.9 4.0 - 11.0 10e9/L    RBC Count 3.39 (L) 3.8 - 5.2 10e12/L    Hemoglobin 8.3 (L) 11.7 - 15.7 g/dL    Hematocrit 27.2 (L) 35.0 - 47.0 %    MCV 80 78 - 100 fl    MCH 24.5 (L) 26.5 - 33.0 pg    MCHC 30.5 (L) 31.5 - 36.5 g/dL    RDW 18.6 (H) 10.0 - 15.0 %    Platelet Count 167 150 - 450 10e9/L    Diff Method Automated Method     % Neutrophils 74.9 %    % Lymphocytes 14.4 %    % Monocytes 8.8 %    % Eosinophils 0.7 %    % Basophils 0.2 %    % Immature Granulocytes 1.0 %    Nucleated RBCs 0 0 /100    Absolute Neutrophil 4.4 1.6 - 8.3 10e9/L    Absolute Lymphocytes 0.9 0.8 - 5.3 10e9/L    Absolute Monocytes 0.5 0.0 - 1.3 10e9/L    Absolute Eosinophils 0.0 0.0 - 0.7 10e9/L    Absolute Basophils 0.0 0.0 - 0.2 10e9/L    Abs Immature Granulocytes 0.1 0 - 0.4 10e9/L    Absolute Nucleated RBC 0.0    Comprehensive metabolic panel    Collection Time: 01/02/17  8:57 AM   Result Value Ref Range    Sodium 139 133 - 144 mmol/L    Potassium 3.0 (L) 3.4 - 5.3  mmol/L    Chloride 105 94 - 109 mmol/L    Carbon Dioxide 28 20 - 32 mmol/L    Anion Gap 6 3 - 14 mmol/L    Glucose 98 70 - 99 mg/dL    Urea Nitrogen 8 7 - 30 mg/dL    Creatinine 0.67 0.52 - 1.04 mg/dL    GFR Estimate >90  Non  GFR Calc   >60 mL/min/1.7m2    GFR Estimate If Black >90   GFR Calc   >60 mL/min/1.7m2    Calcium 9.0 8.5 - 10.1 mg/dL    Bilirubin Total 0.4 0.2 - 1.3 mg/dL    Albumin 2.9 (L) 3.4 - 5.0 g/dL    Protein Total 8.0 6.8 - 8.8 g/dL    Alkaline Phosphatase 590 (H) 40 - 150 U/L    ALT 36 0 - 50 U/L    AST 38 0 - 45 U/L

## 2017-01-02 NOTE — PROGRESS NOTES
Ms. Shirin Meyer is a 56-year-old woman with metastatic pancreatic cancer. I am seeing her prior to treatment with FOLFIRINOX.    She was diagnosed in the spring of 2016 after presenting with a 2 to 3-month history of abdominal pain and weight loss.  She underwent evaluation showing a 5 cm mass at the head of the pancreas.  Biopsy was consistent with adenocarcinoma.  She underwent staging imaging including an MRI of the abdomen, which then showed an up to 10 cm poorly defined hypoenhancing mass arising from the pancreatic head which encased celiac artery, superior mesenteric artery and severely attenuated the main portal vein.  She initiated on treatment with gemcitabine and Abraxane on 05/02/2016 and continued on that until December 2016 when she was found to have metastatic disease involving the liver.  She was then initiated on FOLFIRINOX on 12/20/16.    Interval history:  Shirin had difficulty with nausea and lack of interest in food with this cycle. She felt very weak and tired. She was using zofran in the AM, compazine later in the day and ativan at night to control the nausea. Denies vomiting. Bowels are constipated at times. Uses miralax to alleviate constipation. No mouth sores. Had some cold associated neuropathy, but isn't finding that too bothersome. No fevers/chills. No dizziness today, but did have that last week. Came in for IVF with some improvement.  No headaches. No bone aches/pains. Abdominal/back pain is controlled with MS Contin bid. Also is taking oxycodone 5 mg a couple times a day with good relief of the pain.    Current Outpatient Prescriptions   Medication Sig Dispense Refill     morphine (MS CONTIN) 15 MG 12 hr tablet Take 1 tablet (15 mg) by mouth every 12 hours 60 tablet 0     prochlorperazine (COMPAZINE) 10 MG tablet Take 1 tablet (10 mg) by mouth every 6 hours as needed (Nausea/Vomiting) 30 tablet 2     amylase-lipase-protease (CREON) 53984 UNITS CPEP Take 2 capsules (72,000  Units) by mouth 3 times daily (with meals) 180 capsule 1     LORazepam (ATIVAN) 0.5 MG tablet Take 1 tablet (0.5 mg) by mouth every 4 hours as needed (Anxiety, Nausea/Vomiting or Sleep) 30 tablet 2     potassium chloride (KLOR-CON) 20 MEQ packet Take 20 mEq by mouth daily 60 packet 1     polyethylene glycol (MIRALAX/GLYCOLAX) powder Take 17 g (1 capful) by mouth daily 119 g 11     lidocaine-prilocaine (EMLA) cream Apply topically as needed for other (Use 30-60 minutes prior to port access) 30 g 0     docusate sodium (COLACE) 100 MG capsule Take 100 mg by mouth       oxyCODONE (ROXICODONE) 5 MG immediate release tablet Take 1 tablet (5 mg) by mouth every 4 hours as needed for moderate to severe pain 100 tablet 0     ondansetron (ZOFRAN-ODT) 8 MG disintegrating tablet Take 1 tablet (8 mg) by mouth every 8 hours as needed for nausea 60 tablet 4     Exam: alert ,appears slim, in NAD. Blood pressure 106/58, pulse 98, temperature 98.1  F (36.7  C), temperature source Oral, resp. rate 14, weight 73.029 kg (161 lb), SpO2 99 %.  Wt Readings from Last 4 Encounters:   01/02/17 73.029 kg (161 lb)   12/20/16 75.841 kg (167 lb 3.2 oz)   12/12/16 77.701 kg (171 lb 4.8 oz)   12/05/16 77.2 kg (170 lb 3.1 oz)     Oropharynx is moist, no focal lesion. No icterus. Neck supple and without adenopathy. Lungs:CTA. Heart:RRR, no murmur or rub. Abdomen: soft, nontender, BS active. No masses or organomegaly. Extremities: warm, no edema. Speech clear. CN wnl. Gait/station wnl    Results for NATALIIA IBARRA (MRN [id number]) as of 1/2/2017 10:21   Ref. Range 1/2/2017 08:57   Sodium Latest Ref Range: 133-144 mmol/L 139   Potassium Latest Ref Range: 3.4-5.3 mmol/L 3.0 (L)   Chloride Latest Ref Range:  mmol/L 105   Carbon Dioxide Latest Ref Range: 20-32 mmol/L 28   Urea Nitrogen Latest Ref Range: 7-30 mg/dL 8   Creatinine Latest Ref Range: 0.52-1.04 mg/dL 0.67   GFR Estimate Latest Ref Range: >60 mL/min/1.7m2 >90...   GFR  Estimate If Black Latest Ref Range: >60 mL/min/1.7m2 >90...   Calcium Latest Ref Range: 8.5-10.1 mg/dL 9.0   Anion Gap Latest Ref Range: 3-14 mmol/L 6   Albumin Latest Ref Range: 3.4-5.0 g/dL 2.9 (L)   Protein Total Latest Ref Range: 6.8-8.8 g/dL 8.0   Bilirubin Total Latest Ref Range: 0.2-1.3 mg/dL 0.4   Alkaline Phosphatase Latest Ref Range:  U/L 590 (H)   ALT Latest Ref Range: 0-50 U/L 36   AST Latest Ref Range: 0-45 U/L 38   Glucose Latest Ref Range: 70-99 mg/dL 98   WBC Latest Ref Range: 4.0-11.0 10e9/L 5.9   Hemoglobin Latest Ref Range: 11.7-15.7 g/dL 8.3 (L)   Hematocrit Latest Ref Range: 35.0-47.0 % 27.2 (L)   Platelet Count Latest Ref Range: 150-450 10e9/L 167   RBC Count Latest Ref Range: 3.8-5.2 10e12/L 3.39 (L)   MCV Latest Ref Range:  fl 80   MCH Latest Ref Range: 26.5-33.0 pg 24.5 (L)   MCHC Latest Ref Range: 31.5-36.5 g/dL 30.5 (L)   RDW Latest Ref Range: 10.0-15.0 % 18.6 (H)   Diff Method Unknown Automated Method   % Neutrophils Latest Units: % 74.9   % Lymphocytes Latest Units: % 14.4   % Monocytes Latest Units: % 8.8   % Eosinophils Latest Units: % 0.7   % Basophils Latest Units: % 0.2   % Immature Granulocytes Latest Units: % 1.0   Nucleated RBCs Latest Ref Range: 0 /100 0   Absolute Neutrophil Latest Ref Range: 1.6-8.3 10e9/L 4.4   Absolute Lymphocytes Latest Ref Range: 0.8-5.3 10e9/L 0.9   Absolute Monocytes Latest Ref Range: 0.0-1.3 10e9/L 0.5   Absolute Eosinophils Latest Ref Range: 0.0-0.7 10e9/L 0.0   Absolute Basophils Latest Ref Range: 0.0-0.2 10e9/L 0.0   Abs Immature Granulocytes Latest Ref Range: 0-0.4 10e9/L 0.1   Absolute Nucleated RBC Unknown 0.0         Impression/plan:  1. Metastatic pancreatic cancer involving the liver  -Had difficulty with anorexia and nausea with FOLFIRINOX cycle 1, but otherwise tolerated reasonably well. Discussed adjustment of antiemetics and supportive IVF with the next cycle (see below).  -Will proceed with cycle 2 today, I will see her in 2  weeks prior to cycle 3 to reassess symptoms and make adjustments in her dosing as needed.    2. Pain: Secondary to malignancy. Fairly well controlled at present with MS Contin 15 mg bid. Taking oxycodone 5 mg prn with good releif    3. Nausea: Increased nausea. Discussed adjustment of antiemetics to include:  -Add Emend IV to premedications  -Dexamethasone 4 mg po daily x 3 after chemotherapy  -Zofran 8 mg every 8 hours on schedule x 1 week (starting day 3)  -Continue compazine/lorazepam prn  -Add Marinol 5 mg bid    4. FEN: Ongoing weight loss due to nausea and anorexia. Made adjustments to antiemetics, adding marinol to stimulate appetite. Encouraged her to take small frequent meals, push hydration. Given difficulty with cycle 1, will also schedule for IVF later this week.  -Hypokalemia: Had stopped taking potassium supplelemts.Give Kdur 60 meq x 1 today, then resume 20 meq po daily. Recheck lytes with IVF apt later this week    5. Peripheral neuropathy: present, but tolerable at the current level. Will monitor.

## 2017-01-02 NOTE — Clinical Note
1/2/2017       RE: Shirin Muller  [address] [facility] [address]     Dear Colleague,    Thank you for referring your patient, Shirin Muller, to the [facility]. Please see a copy of my visit note below.    Ms. Shirin Muller is a 56-year-old woman with metastatic pancreatic cancer. I am seeing her prior to treatment with FOLFIRINOX.    She was diagnosed in the spring of 2016 after presenting with a 2 to 3-month history of abdominal pain and weight loss.  She underwent evaluation showing a 5 cm mass at the head of the pancreas.  Biopsy was consistent with adenocarcinoma.  She underwent staging imaging including an MRI of the abdomen, which then showed an up to 10 cm poorly defined hypoenhancing mass arising from the pancreatic head which encased celiac artery, superior mesenteric artery and severely attenuated the main portal vein.  She initiated on treatment with gemcitabine and Abraxane on 05/02/2016 and continued on that until December 2016 when she was found to have metastatic disease involving the liver.  She was then initiated on FOLFIRINOX on 12/20/16.    Interval history:  Shirin had difficulty with nausea and lack of interest in food with this cycle. She felt very weak and tired. She was using zofran in the AM, compazine later in the day and ativan at night to control the nausea. Denies vomiting. Bowels are constipated at times. Uses miralax to alleviate constipation. No mouth sores. Had some cold associated neuropathy, but isn't finding that too bothersome. No fevers/chills. No dizziness today, but did have that last week. Came in for IVF with some improvement.  No headaches. No bone aches/pains. Abdominal/back pain is controlled with MS Contin bid. Also is taking oxycodone 5 mg a couple times a day with good relief of the pain.    Current Outpatient Prescriptions   Medication Sig Dispense Refill     morphine (MS CONTIN) 15 MG 12 hr tablet  Take 1 tablet (15 mg) by mouth every 12 hours 60 tablet 0     prochlorperazine (COMPAZINE) 10 MG tablet Take 1 tablet (10 mg) by mouth every 6 hours as needed (Nausea/Vomiting) 30 tablet 2     amylase-lipase-protease (CREON) 62018 UNITS CPEP Take 2 capsules (72,000 Units) by mouth 3 times daily (with meals) 180 capsule 1     LORazepam (ATIVAN) 0.5 MG tablet Take 1 tablet (0.5 mg) by mouth every 4 hours as needed (Anxiety, Nausea/Vomiting or Sleep) 30 tablet 2     potassium chloride (KLOR-CON) 20 MEQ packet Take 20 mEq by mouth daily 60 packet 1     polyethylene glycol (MIRALAX/GLYCOLAX) powder Take 17 g (1 capful) by mouth daily 119 g 11     lidocaine-prilocaine (EMLA) cream Apply topically as needed for other (Use 30-60 minutes prior to port access) 30 g 0     docusate sodium (COLACE) 100 MG capsule Take 100 mg by mouth       oxyCODONE (ROXICODONE) 5 MG immediate release tablet Take 1 tablet (5 mg) by mouth every 4 hours as needed for moderate to severe pain 100 tablet 0     ondansetron (ZOFRAN-ODT) 8 MG disintegrating tablet Take 1 tablet (8 mg) by mouth every 8 hours as needed for nausea 60 tablet 4     Exam: alert ,appears slim, in NAD. Blood pressure 106/58, pulse 98, temperature 98.1  F (36.7  C), temperature source Oral, resp. rate 14, weight 73.029 kg (161 lb), SpO2 99 %.  Wt Readings from Last 4 Encounters:   01/02/17 73.029 kg (161 lb)   12/20/16 75.841 kg (167 lb 3.2 oz)   12/12/16 77.701 kg (171 lb 4.8 oz)   12/05/16 77.2 kg (170 lb 3.1 oz)     Oropharynx is moist, no focal lesion. No icterus. Neck supple and without adenopathy. Lungs:CTA. Heart:RRR, no murmur or rub. Abdomen: soft, nontender, BS active. No masses or organomegaly. Extremities: warm, no edema. Speech clear. CN wnl. Gait/station wnl    Results for NATALIIA IBARRA (MRN [id number]) as of 1/2/2017 10:21   Ref. Range 1/2/2017 08:57   Sodium Latest Ref Range: 133-144 mmol/L 139   Potassium Latest Ref Range: 3.4-5.3 mmol/L 3.0 (L)    Chloride Latest Ref Range:  mmol/L 105   Carbon Dioxide Latest Ref Range: 20-32 mmol/L 28   Urea Nitrogen Latest Ref Range: 7-30 mg/dL 8   Creatinine Latest Ref Range: 0.52-1.04 mg/dL 0.67   GFR Estimate Latest Ref Range: >60 mL/min/1.7m2 >90...   GFR Estimate If Black Latest Ref Range: >60 mL/min/1.7m2 >90...   Calcium Latest Ref Range: 8.5-10.1 mg/dL 9.0   Anion Gap Latest Ref Range: 3-14 mmol/L 6   Albumin Latest Ref Range: 3.4-5.0 g/dL 2.9 (L)   Protein Total Latest Ref Range: 6.8-8.8 g/dL 8.0   Bilirubin Total Latest Ref Range: 0.2-1.3 mg/dL 0.4   Alkaline Phosphatase Latest Ref Range:  U/L 590 (H)   ALT Latest Ref Range: 0-50 U/L 36   AST Latest Ref Range: 0-45 U/L 38   Glucose Latest Ref Range: 70-99 mg/dL 98   WBC Latest Ref Range: 4.0-11.0 10e9/L 5.9   Hemoglobin Latest Ref Range: 11.7-15.7 g/dL 8.3 (L)   Hematocrit Latest Ref Range: 35.0-47.0 % 27.2 (L)   Platelet Count Latest Ref Range: 150-450 10e9/L 167   RBC Count Latest Ref Range: 3.8-5.2 10e12/L 3.39 (L)   MCV Latest Ref Range:  fl 80   MCH Latest Ref Range: 26.5-33.0 pg 24.5 (L)   MCHC Latest Ref Range: 31.5-36.5 g/dL 30.5 (L)   RDW Latest Ref Range: 10.0-15.0 % 18.6 (H)   Diff Method Unknown Automated Method   % Neutrophils Latest Units: % 74.9   % Lymphocytes Latest Units: % 14.4   % Monocytes Latest Units: % 8.8   % Eosinophils Latest Units: % 0.7   % Basophils Latest Units: % 0.2   % Immature Granulocytes Latest Units: % 1.0   Nucleated RBCs Latest Ref Range: 0 /100 0   Absolute Neutrophil Latest Ref Range: 1.6-8.3 10e9/L 4.4   Absolute Lymphocytes Latest Ref Range: 0.8-5.3 10e9/L 0.9   Absolute Monocytes Latest Ref Range: 0.0-1.3 10e9/L 0.5   Absolute Eosinophils Latest Ref Range: 0.0-0.7 10e9/L 0.0   Absolute Basophils Latest Ref Range: 0.0-0.2 10e9/L 0.0   Abs Immature Granulocytes Latest Ref Range: 0-0.4 10e9/L 0.1   Absolute Nucleated RBC Unknown 0.0         Impression/plan:  1. Metastatic pancreatic cancer involving the  liver  -Had difficulty with anorexia and nausea with FOLFIRINOX cycle 1, but otherwise tolerated reasonably well. Discussed adjustment of antiemetics and supportive IVF with the next cycle (see below).  -Will proceed with cycle 2 today, I will see her in 2 weeks prior to cycle 3 to reassess symptoms and make adjustments in her dosing as needed.    2. Pain: Secondary to malignancy. Fairly well controlled at present with MS Contin 15 mg bid. Taking oxycodone 5 mg prn with good releif    3. Nausea: Increased nausea. Discussed adjustment of antiemetics to include:  -Add Emend IV to premedications  -Dexamethasone 4 mg po daily x 3 after chemotherapy  -Zofran 8 mg every 8 hours on schedule x 1 week (starting day 3)  -Continue compazine/lorazepam prn  -Add Marinol 5 mg bid    4. FEN: Ongoing weight loss due to nausea and anorexia. Made adjustments to antiemetics, adding marinol to stimulate appetite. Encouraged her to take small frequent meals, push hydration. Given difficulty with cycle 1, will also schedule for IVF later this week.  -Hypokalemia: Had stopped taking potassium supplelemts.Give Kdur 60 meq x 1 today, then resume 20 meq po daily. Recheck lytes with IVF apt later this week    5. Peripheral neuropathy: present, but tolerable at the current level. Will monitor.        Again, thank you for allowing me to participate in the care of your patient.      Sincerely,    TEE Villanueva CNP

## 2017-01-02 NOTE — PROGRESS NOTES
Infusion Nursing Note:  Shirin Muller presents today for Day 1 Cycle 2 Oxaliplatin, Irinotecan, Leucovorin, Fluorouracil bolus and pump.    Patient seen by provider today: Yes: Ester Echavarria NP     Note: Shirin's potassium is 3.0 today. She states that she has not been taking her oral potassium at home consistently.  TORB @ 1025 Ester Echavarria NP/ Leilani Orr, RN: Replace potassium per protocol orally (60 mEq). Instruct patient to resume home potassium tomorrow. Plan to recheck potassium on Thursday.     Intravenous Access:  Implanted Port.    Treatment Conditions:  HGB      8.3   1/2/2017  WBC      5.9   1/2/2017   ANEU      4.4   1/2/2017  PLT      167   1/2/2017     NA      139   1/2/2017                POTASSIUM      3.0   1/2/2017        No results found for this basename: mag         CR     0.67   1/2/2017                FANNY      9.0   1/2/2017             BILITOTAL      0.4   1/2/2017        ALBUMIN      2.9   1/2/2017                 ALT       36   1/2/2017        AST       38   1/2/2017  Results reviewed, labs MET treatment parameters, ok to proceed with treatment.    Post Infusion Assessment:  Patient called out at 1415. She appeared diaphoretic and was having abdominal cramping.   Irinotecan stopped and NS bolus started. BP elevated to 160's/80's. Atropine given IV.   Ester Echavarria NP notified of reaction.   TORB 01/02 @ 1425 Ester Echavarria NP/Leilani Orr, RN: OK to restart once symptoms have completely resolved and getting a second dose of atropine.   Patient tolerated remainder of infusion without incident.  Blood return noted pre and post infusion and prior to pump connect.  Blood return noted during administration of fluorouracil every 2 cc. Stop time: 1535  Fluorouracil dosi-infuser pump connected at 1545 and set to run at 5.2ml/hr. Patient set up for at-home pump disconnect with [facility] on 01/04 at 1330.    Discharge Plan:   Prescription refills given for oxycodone and  Marinol.  Copy of AVS reviewed with patient and family.  Patient will return 01/05 for next appointment.  Patient discharged in stable condition accompanied by: sister.  Departure Mode: Ambulatory.  Face to Face Time: 10    Leilani Orr RN

## 2017-01-02 NOTE — NURSING NOTE
Chief Complaint   Patient presents with     Port Draw     Labs Drawn      Port accessed with flat needle. Labs drawn. Flushed with heparin and NS.    Lauren Schoen, RN

## 2017-01-03 PROBLEM — Z29.9 NEED FOR PROPHYLACTIC MEASURE: Status: ACTIVE | Noted: 2017-01-03

## 2017-01-03 LAB — CANCER AG19-9 SERPL-ACNC: ABNORMAL

## 2017-01-04 ENCOUNTER — TELEPHONE (OUTPATIENT)
Dept: ONCOLOGY | Facility: CLINIC | Age: 57
End: 2017-01-04

## 2017-01-04 NOTE — TELEPHONE ENCOUNTER
Louie from [facility] called to say pt was not at home when they went to DC her pump.  [facility] said they tried to call pt's home and daughter's cell phone and no one answered.  [facility] will continue to try and follow up with pt.  IB sent to [facility] to follow up with pt.

## 2017-01-05 ENCOUNTER — INFUSION THERAPY VISIT (OUTPATIENT)
Dept: ONCOLOGY | Facility: CLINIC | Age: 57
End: 2017-01-05
Attending: NURSE PRACTITIONER
Payer: COMMERCIAL

## 2017-01-05 VITALS
SYSTOLIC BLOOD PRESSURE: 108 MMHG | RESPIRATION RATE: 20 BRPM | BODY MASS INDEX: 21.89 KG/M2 | WEIGHT: 156.9 LBS | DIASTOLIC BLOOD PRESSURE: 68 MMHG | HEART RATE: 85 BPM | TEMPERATURE: 98.7 F | OXYGEN SATURATION: 100 %

## 2017-01-05 DIAGNOSIS — C25.0 MALIGNANT NEOPLASM OF HEAD OF PANCREAS (H): Primary | ICD-10-CM

## 2017-01-05 LAB
ANION GAP SERPL CALCULATED.3IONS-SCNC: 7 MMOL/L (ref 3–14)
BUN SERPL-MCNC: 12 MG/DL (ref 7–30)
CALCIUM SERPL-MCNC: 9 MG/DL (ref 8.5–10.1)
CHLORIDE SERPL-SCNC: 100 MMOL/L (ref 94–109)
CO2 SERPL-SCNC: 28 MMOL/L (ref 20–32)
CREAT SERPL-MCNC: 0.76 MG/DL (ref 0.52–1.04)
GFR SERPL CREATININE-BSD FRML MDRD: 78 ML/MIN/1.7M2
GLUCOSE SERPL-MCNC: 145 MG/DL (ref 70–99)
MAGNESIUM SERPL-MCNC: 2.2 MG/DL (ref 1.6–2.3)
POTASSIUM SERPL-SCNC: 3.7 MMOL/L (ref 3.4–5.3)
SODIUM SERPL-SCNC: 135 MMOL/L (ref 133–144)

## 2017-01-05 PROCEDURE — 80048 BASIC METABOLIC PNL TOTAL CA: CPT | Performed by: NURSE PRACTITIONER

## 2017-01-05 PROCEDURE — 25000125 ZZHC RX 250: Mod: ZF | Performed by: NURSE PRACTITIONER

## 2017-01-05 PROCEDURE — 25000128 H RX IP 250 OP 636: Mod: ZF | Performed by: NURSE PRACTITIONER

## 2017-01-05 PROCEDURE — 83735 ASSAY OF MAGNESIUM: CPT | Performed by: NURSE PRACTITIONER

## 2017-01-05 PROCEDURE — 99212 OFFICE O/P EST SF 10 MIN: CPT | Mod: 25

## 2017-01-05 PROCEDURE — 96360 HYDRATION IV INFUSION INIT: CPT

## 2017-01-05 PROCEDURE — 96372 THER/PROPH/DIAG INJ SC/IM: CPT | Mod: 59

## 2017-01-05 RX ORDER — HEPARIN SODIUM (PORCINE) LOCK FLUSH IV SOLN 100 UNIT/ML 100 UNIT/ML
5 SOLUTION INTRAVENOUS EVERY 8 HOURS
Status: DISCONTINUED | OUTPATIENT
Start: 2017-01-05 | End: 2017-01-05 | Stop reason: HOSPADM

## 2017-01-05 RX ORDER — HEPARIN SODIUM (PORCINE) LOCK FLUSH IV SOLN 100 UNIT/ML 100 UNIT/ML
500 SOLUTION INTRAVENOUS EVERY 8 HOURS
Status: DISCONTINUED | OUTPATIENT
Start: 2017-01-05 | End: 2017-01-05 | Stop reason: HOSPADM

## 2017-01-05 RX ADMIN — SODIUM CHLORIDE 1000 ML: 9 INJECTION, SOLUTION INTRAVENOUS at 15:26

## 2017-01-05 RX ADMIN — SODIUM CHLORIDE, PRESERVATIVE FREE 500 UNITS: 5 INJECTION INTRAVENOUS at 16:49

## 2017-01-05 RX ADMIN — SODIUM CHLORIDE, PRESERVATIVE FREE 5 ML: 5 INJECTION INTRAVENOUS at 15:15

## 2017-01-05 RX ADMIN — PEGFILGRASTIM 6 MG: 6 INJECTION SUBCUTANEOUS at 16:33

## 2017-01-05 NOTE — PROGRESS NOTES
Infusion Nursing Note:  Shirin Muller presents for IVF, neulasta    Note: pt arrives today feeling 'okay', pt difficult to assess and answers most questions with one word. She's been having nausea and 'a couple times' of emesis the past 2 days, she hasn't been able to eat or drink much. Pt also had diarrhea the past 2 days, she didn't take anything for it because she doesn't like taking medications. Pt was under the impression she couldn't take any of her oral nausea medications until today (rather than just holding the zofran due to getting aloxi with chemo), so she wasn't taking anything for nausea. She started taking zofran and compazine today and has felt better, states she normally takes ativan at night. Offered to page and get order for antiemetics but pt declined.     Treatment Conditions:  NA      135   1/5/2017                POTASSIUM      3.7   1/5/2017        MAG      2.2   1/5/2017         CR     0.76   1/5/2017                FANNY      9.0   1/5/2017             BILITOTAL      0.4   1/2/2017        ALBUMIN      2.9   1/2/2017                 ALT       36   1/2/2017        AST       38   1/2/2017    Intravenous Access:  Implanted Port.    Post Infusion Assessment:  Patient tolerated infusion without incident.  Pt tolerated injection without incident to LLQ.  Blood return noted pre and post infusion.  No evidence of extravasations.  Access discontinued per protocol.    Discharge Plan:   Prescription refills given for compazine.  Discharge instructions reviewed with: Patient and Family.  Patient and/or family verbalized understanding of discharge instructions and all questions answered.  Copy of AVS reviewed with patient and/or family.  Patient will return 1/17 for next appointment. Instructed pt to call triage if she continues to be nauseous and her oral antiemetics aren't helping or if she isn't able to drink/eat adequate amounts, pt verbalized understanding.  Patient discharged in stable  condition accompanied by: self and sister.  Face to Face time: 5 minutes.    Shanika Junior RN

## 2017-01-05 NOTE — MR AVS SNAPSHOT
After Visit Summary   1/5/2017    Shirin Muller    MRN: [id number]           Patient Information     Date Of Birth          1960        Visit Information        Provider Department      1/5/2017 3:30 PM  16 ATC;  ONCOLOGY INFUSION [facility]        Today's Diagnoses     Malignant neoplasm of head of pancreas (H)    -  1       Care Instructions    Contact numbers:  Triage Main Line: [phone]  After hours: [phone]    Call with chills and/or temperature greater than or equal to 100.5 and questions or concerns.    If after hours, weekends, or holidays, call main hospital  at  [phone] and ask for Oncology doctor on call.           January 2017 Sunday Monday Tuesday Wednesday Thursday Friday Saturday   1     2     [facility] DRAW    8:45 AM   (15 min.)    [facility]     [facility] RETURN    9:20 AM   (50 min.)   Ester Echavarria APRN CNP   [facility]     UMP ONC INFUSION 360   10:30 AM   (360 min.)    ONCOLOGY INFUSION   [facility] 3     4     5     [facility] LAB DRAW    3:00 PM   (15 min.)    [facility]   [facility]     [facility] ONC INFUSION 240    3:30 PM   (240 min.)    ONCOLOGY INFUSION   [facility] 6     7       8     9     10     11     12     13     14       15     16     17     [facility] DRAW    9:00 AM   (15 min.)    MASONIC LAB DRAW   [facility] Masonic Lab Draw     UMP ONC INFUSION 360    9:30 AM   (360 min.)    ONCOLOGY INFUSION   [facility]     UMP RETURN   10:30 AM   (50 min.)   Ester Echavarria APRN CNP   [facility] 18     19     20     21       22     23     24     25     26     27     28       29     30     [facility] LAB DRAW   11:00 AM   (15 min.)    MASONIC LAB DRAW   [facility] Masonic Lab Draw     UMP ONC INFUSION 360   11:30 AM   (360 min.)    ONCOLOGY  INFUSION   [facility] 31 February 2017 Sunday Monday Tuesday Wednesday Thursday Friday Saturday                  1     2     3     4       5     6     7     8     9     10     MR ABDOMEN WWO   10:45 AM   (45 min.)   UCMR1   [facility] MRI     CT CHEST WO   11:40 AM   (20 min.)   UCCT1   [facility] CT 11       12     13     [facility] MASON[facility] LAB DRAW    9:45 AM   (15 min.)    MASONIC LAB DRAW   [facility] Lab Draw     UMP RETURN   10:15 AM   (30 min.)   Demond Gonsales MD   [facility] ONC INFUSION 360   11:30 AM   (360 min.)    ONCOLOGY INFUSION   [facility] 14     15     16     17     18       19     20     21     22     23     24     25       26     27     28                                      Lab Results:  Recent Results (from the past 12 hour(s))   Basic metabolic panel    Collection Time: 01/05/17  3:16 PM   Result Value Ref Range    Sodium 135 133 - 144 mmol/L    Potassium 3.7 3.4 - 5.3 mmol/L    Chloride 100 94 - 109 mmol/L    Carbon Dioxide 28 20 - 32 mmol/L    Anion Gap 7 3 - 14 mmol/L    Glucose 145 (H) 70 - 99 mg/dL    Urea Nitrogen 12 7 - 30 mg/dL    Creatinine 0.76 0.52 - 1.04 mg/dL    GFR Estimate 78 >60 mL/min/1.7m2    GFR Estimate If Black >90   GFR Calc   >60 mL/min/1.7m2    Calcium 9.0 8.5 - 10.1 mg/dL   Magnesium    Collection Time: 01/05/17  3:16 PM   Result Value Ref Range    Magnesium 2.2 1.6 - 2.3 mg/dL               Follow-ups after your visit        Your next 10 appointments already scheduled     Jan 17, 2017  9:00 AM   [facility] Lab Draw with  MASONIC LAB DRAW   [facility] Lab Draw ([facility])    [address] [address] [phone]            Jan 17, 2017  9:30 AM   Infusion 360 with UC ONCOLOGY INFUSION, UC 16 ATC   [facility] ([facility]  [facility])    [address] [place] [address]  [address] [facility] [address]   [phone]            Jan 17, 2017 10:30 AM   (Arrive by 10:15 AM)   Return Visit with TEE Olivas CNP   [facility] ([facility])    [address] [place] [address]  [address] [facility] [address]   [phone]            Jan 30, 2017 11:00 AM   Masonic Lab Draw with UC MASONIC LAB DRAW   [facility] Lab Draw ([facility])    [address] [place] [address]   [phone]            Jan 30, 2017 11:30 AM   Infusion 360 with  ONCOLOGY INFUSION,  13 ATC   [facility] ([facility])    [address] [place] [address]   [phone]            Feb 10, 2017 10:45 AM   (Arrive by 10:30 AM)   MR ABDOMEN W/O & W CONTRAST with [address] MRI ([facility])    [address] [place] [address]   [phone]           Take your medicines as usual, unless your doctor tells you not to. Bring a list of your current medicines to your exam (including vitamins, minerals and over-the-counter drugs). Also bring the results of similar scans you may have had.    The day before your exam, drink extra fluids at least six 8-ounce glasses (unless your doctor tells you to restrict your fluids).   Have a blood test (creatinine test) within 30 days of your exam. Go to your clinic or Diagnostic Imaging Department for this test.   Do not eat or drink for 6 hours prior to exam.  The MRI machine uses a strong magnet. Please wear clothes without metal (snaps, zippers). A sweatsuit works well, or we may give you a hospital gown.  Please remove any body piercings and hair extensions before you arrive. You will also remove watches, jewelry, hairpins, wallets, dentures, partial dental plates and hearing aids. You may wear  contact lenses, and you may be able to wear your rings. We have a safe place to keep your personal items, but it is safer to leave them at home.   **IMPORTANT** THE INSTRUCTIONS BELOW ARE ONLY FOR THOSE PATIENTS WHO HAVE BEEN TOLD THEY WILL RECEIVE SEDATION OR GENERAL ANESTHESIA DURING THEIR MRI PROCEDURE:  IF YOU WILL RECEIVE SEDATION (take medicine to help you relax during your exam):   You must get the medicine from your doctor before you arrive. Bring the medicine to the exam. Do not take it at home.   Arrive one hour early. Bring someone who can take you home after the test. Your medicine will make you sleepy. After the exam, you may not drive, take a bus or take a taxi by yourself.   No eating 8 hours before your exam. You may have clear liquids up until 4 hours before your exam. (Clear liquids include water, clear tea, black coffee and fruit juice without pulp.)  IF YOU WILL RECEIVE ANESTHESIA (be asleep for your exam):   Arrive 1 1/2 hours early. Bring someone who can take you home after the test. You may not drive, take a bus or take a taxi by yourself.   No eating 8 hours before your exam. You may have clear liquids up until 4 hours before your exam. (Clear liquids include water, clear tea, black coffee and fruit juice without pulp.)  If you have any questions, please contact your Imaging Department exam site.            Feb 10, 2017 11:40 AM   (Arrive by 11:25 AM)   CT CHEST W/O CONTRAST with UCCT1   [facility] ([facility])    [address] [address] [phone]           Please bring any scans or X-rays taken at other hospitals, if similar tests were done. Also bring a list of your medicines, including vitamins, minerals and over-the-counter drugs. It is safest to leave personal items at home.  Be sure to tell your doctor:   If you have any allergies.   If there s any chance you are pregnant.   If you are breastfeeding.   If  you have any special needs.  You do not need to do anything special to prepare.  Please wear loose clothing, such as a sweat suit or jogging clothes. Avoid snaps, zippers and other metal. We may ask you to undress and put on a hospital gown.            Feb 13, 2017  9:45 AM   [employer] Lab Draw with  MASONIC LAB DRAW   [facility] Masonic Lab Draw ([facility] [place])    [address] [facility]  2nd Floor  [facility] [address] [phone]              Who to contact     If you have questions or need follow up information about today's clinic visit or your schedule please contact [facility] directly at [phone].  Normal or non-critical lab and imaging results will be communicated to you by Nuvosunhart, letter or phone within 4 business days after the clinic has received the results. If you do not hear from us within 7 days, please contact the clinic through [employer] or phone. If you have a critical or abnormal lab result, we will notify you by phone as soon as possible.  Submit refill requests through TextHub or call your pharmacy and they will forward the refill request to us. Please allow 3 business days for your refill to be completed.          Additional Information About Your Visit        TextHub Information     TextHub gives you secure access to your electronic health record. If you see a primary care provider, you can also send messages to your care team and make appointments. If you have questions, please call your primary care clinic.  If you do not have a primary care provider, please call [phone] and they will assist you.        Care EveryWhere ID     This is your Care EveryWhere ID. This could be used by other organizations to access your [facility] medical records  [id number]        Your Vitals Were     Pulse Temperature Respirations Pulse Oximetry          85 98.7  F (37.1  C) (Oral) 20 100%         Blood Pressure from Last 3 Encounters:   01/05/17 108/68    01/02/17 134/73   01/02/17 106/58    Weight from Last 3 Encounters:   01/05/17 71.169 kg (156 lb 14.4 oz)   01/02/17 73.029 kg (161 lb)   12/20/16 75.841 kg (167 lb 3.2 oz)              We Performed the Following     Basic metabolic panel     Magnesium        Primary Care Provider Office Phone # Fax #    [facility] [phone]       [facility] [address] [address]        Thank you!     Thank you for choosing [facility]ER [facility]  for your care. Our goal is always to provide you with excellent care. Hearing back from our patients is one way we can continue to improve our services. Please take a few minutes to complete the written survey that you may receive in the mail after your visit with us. Thank you!             Your Updated Medication List - Protect others around you: Learn how to safely use, store and throw away your medicines at www.disposemymeds.org.          This list is accurate as of: 1/5/17  4:31 PM.  Always use your most recent med list.                   Brand Name Dispense Instructions for use    amylase-lipase-protease 33349 UNITS Cpep    CREON    180 capsule    Take 2 capsules (72,000 Units) by mouth 3 times daily (with meals)       dexamethasone 4 MG tablet    DECADRON    3 tablet    Take 1 tablet (4 mg) by mouth daily (with breakfast) for 3 days       docusate sodium 100 MG capsule    COLACE     Take 100 mg by mouth       dronabinol 5 MG capsule    MARINOL    60 capsule    Take 1 capsule (5 mg) by mouth 2 times daily (before meals)       lidocaine-prilocaine cream    EMLA    30 g    Apply topically as needed for other (Use 30-60 minutes prior to port access)       LORazepam 0.5 MG tablet    ATIVAN    30 tablet    Take 1 tablet (0.5 mg) by mouth every 4 hours as needed (Anxiety, Nausea/Vomiting or Sleep)       morphine 15 MG 12 hr tablet    MS CONTIN    60 tablet    Take 1 tablet (15 mg) by mouth every 12 hours       ondansetron 8 MG ODT tab     ZOFRAN-ODT    60 tablet    Take 1 tablet (8 mg) by mouth every 8 hours as needed for nausea       oxyCODONE 5 MG IR tablet    ROXICODONE    100 tablet    Take 1 tablet (5 mg) by mouth every 4 hours as needed for moderate to severe pain       polyethylene glycol powder    MIRALAX/GLYCOLAX    119 g    Take 17 g (1 capful) by mouth daily       potassium chloride 20 MEQ Packet    KLOR-CON    60 packet    Take 20 mEq by mouth daily       prochlorperazine 10 MG tablet    COMPAZINE    30 tablet    Take 1 tablet (10 mg) by mouth every 6 hours as needed (Nausea/Vomiting)

## 2017-01-05 NOTE — PATIENT INSTRUCTIONS
Contact numbers:  Triage Main Line: [phone]  After hours: [phone]    Call with chills and/or temperature greater than or equal to 100.5 and questions or concerns.    If after hours, weekends, or holidays, call main hospital  at  [phone] and ask for Oncology doctor on call.           January 2017 Sunday Monday Tuesday Wednesday Thursday Friday Saturday   1     2     [facility] DRAW    8:45 AM   (15 min.)    [facility] LAB DRAW   [facility]     [facility] RETURN    9:20 AM   (50 min.)   Ester Echavarria APRN CNP   [facility]     UMP ONC INFUSION 360   10:30 AM   (360 min.)    ONCOLOGY INFUSION   [facility] 3     4     5     [facility] LAB DRAW    3:00 PM   (15 min.)    MASONIC LAB DRAW   [facility] Lab Draw     UMP ONC INFUSION 240    3:30 PM   (240 min.)    ONCOLOGY INFUSION   [facility] 6     7       8     9     10     11     12     13     14       15     16     17     [facility] LAB DRAW    9:00 AM   (15 min.)    [facility] LAB DRAW   [facility]     [facility] ONC INFUSION 360    9:30 AM   (360 min.)    ONCOLOGY INFUSION   [facility]     UMP RETURN   10:30 AM   (50 min.)   Ester Echavarria APRN CNP   [facility] 18     19     20     21       22     23     24     25     26     27     28       29     30     [facility] LAB DRAW   11:00 AM   (15 min.)    MASONIC LAB DRAW   [facility] Lab Draw     UMP ONC INFUSION 360   11:30 AM   (360 min.)    ONCOLOGY INFUSION   [facility] 31 February 2017 Sunday Monday Tuesday Wednesday Thursday Friday Saturday                  1     2     3     4       5     6     7     8     9     10     MR ABDOMEN WWO   10:45 AM   (45 min.)   [address] [place] [address] MRI     CT CHEST WO   11:40 AM   (20 min.)   [address] [place] [address] CT 11       12     13      [facility] MASONIC LAB DRAW    9:45 AM   (15 min.)    MASONIC LAB DRAW   [facility] Lab Draw     [facility] RETURN   10:15 AM   (30 min.)   Demond Gonsales MD   [facility] ONC INFUSION 360   11:30 AM   (360 min.)    ONCOLOGY INFUSION   [facility] 14     15     16     17     18       19     20     21     22     23     24     25       26     27     28                                      Lab Results:  Recent Results (from the past 12 hour(s))   Basic metabolic panel    Collection Time: 01/05/17  3:16 PM   Result Value Ref Range    Sodium 135 133 - 144 mmol/L    Potassium 3.7 3.4 - 5.3 mmol/L    Chloride 100 94 - 109 mmol/L    Carbon Dioxide 28 20 - 32 mmol/L    Anion Gap 7 3 - 14 mmol/L    Glucose 145 (H) 70 - 99 mg/dL    Urea Nitrogen 12 7 - 30 mg/dL    Creatinine 0.76 0.52 - 1.04 mg/dL    GFR Estimate 78 >60 mL/min/1.7m2    GFR Estimate If Black >90   GFR Calc   >60 mL/min/1.7m2    Calcium 9.0 8.5 - 10.1 mg/dL   Magnesium    Collection Time: 01/05/17  3:16 PM   Result Value Ref Range    Magnesium 2.2 1.6 - 2.3 mg/dL

## 2017-01-05 NOTE — NURSING NOTE
Chief Complaint   Patient presents with     Port Draw     Pt with hx of panc ca labs collected via portacath.

## 2017-01-17 ENCOUNTER — APPOINTMENT (OUTPATIENT)
Dept: LAB | Facility: CLINIC | Age: 57
End: 2017-01-17
Attending: INTERNAL MEDICINE
Payer: COMMERCIAL

## 2017-01-17 ENCOUNTER — ONCOLOGY VISIT (OUTPATIENT)
Dept: ONCOLOGY | Facility: CLINIC | Age: 57
End: 2017-01-17
Attending: NURSE PRACTITIONER
Payer: COMMERCIAL

## 2017-01-17 VITALS
BODY MASS INDEX: 22.55 KG/M2 | HEART RATE: 89 BPM | DIASTOLIC BLOOD PRESSURE: 63 MMHG | SYSTOLIC BLOOD PRESSURE: 102 MMHG | TEMPERATURE: 98.8 F | WEIGHT: 161.6 LBS | RESPIRATION RATE: 16 BRPM | OXYGEN SATURATION: 100 %

## 2017-01-17 DIAGNOSIS — C25.0 MALIGNANT NEOPLASM OF HEAD OF PANCREAS (H): ICD-10-CM

## 2017-01-17 DIAGNOSIS — K86.89 PANCREATIC MASS: ICD-10-CM

## 2017-01-17 DIAGNOSIS — C25.0 MALIGNANT NEOPLASM OF HEAD OF PANCREAS (H): Primary | ICD-10-CM

## 2017-01-17 DIAGNOSIS — D63.8 ANEMIA, CHRONIC DISEASE: ICD-10-CM

## 2017-01-17 DIAGNOSIS — Z29.9 NEED FOR PROPHYLACTIC MEASURE: Primary | ICD-10-CM

## 2017-01-17 LAB
ALBUMIN SERPL-MCNC: 2.9 G/DL (ref 3.4–5)
ALP SERPL-CCNC: 300 U/L (ref 40–150)
ALT SERPL W P-5'-P-CCNC: 22 U/L (ref 0–50)
ANION GAP SERPL CALCULATED.3IONS-SCNC: 7 MMOL/L (ref 3–14)
ANISOCYTOSIS BLD QL SMEAR: ABNORMAL
AST SERPL W P-5'-P-CCNC: 28 U/L (ref 0–45)
BASOPHILS # BLD AUTO: 0 10E9/L (ref 0–0.2)
BASOPHILS NFR BLD AUTO: 0 %
BILIRUB SERPL-MCNC: 0.3 MG/DL (ref 0.2–1.3)
BUN SERPL-MCNC: 11 MG/DL (ref 7–30)
BURR CELLS BLD QL SMEAR: SLIGHT
CALCIUM SERPL-MCNC: 8.5 MG/DL (ref 8.5–10.1)
CHLORIDE SERPL-SCNC: 104 MMOL/L (ref 94–109)
CO2 SERPL-SCNC: 29 MMOL/L (ref 20–32)
CREAT SERPL-MCNC: 0.64 MG/DL (ref 0.52–1.04)
DIFFERENTIAL METHOD BLD: ABNORMAL
ELLIPTOCYTES BLD QL SMEAR: SLIGHT
EOSINOPHIL # BLD AUTO: 0 10E9/L (ref 0–0.7)
EOSINOPHIL NFR BLD AUTO: 0 %
ERYTHROCYTE [DISTWIDTH] IN BLOOD BY AUTOMATED COUNT: 20.2 % (ref 10–15)
GFR SERPL CREATININE-BSD FRML MDRD: ABNORMAL ML/MIN/1.7M2
GLUCOSE SERPL-MCNC: 91 MG/DL (ref 70–99)
HCT VFR BLD AUTO: 27.6 % (ref 35–47)
HGB BLD-MCNC: 8.5 G/DL (ref 11.7–15.7)
LYMPHOCYTES # BLD AUTO: 0.6 10E9/L (ref 0.8–5.3)
LYMPHOCYTES NFR BLD AUTO: 7.1 %
MCH RBC QN AUTO: 24.6 PG (ref 26.5–33)
MCHC RBC AUTO-ENTMCNC: 30.8 G/DL (ref 31.5–36.5)
MCV RBC AUTO: 80 FL (ref 78–100)
MONOCYTES # BLD AUTO: 0.3 10E9/L (ref 0–1.3)
MONOCYTES NFR BLD AUTO: 3.5 %
NEUTROPHILS # BLD AUTO: 8.1 10E9/L (ref 1.6–8.3)
NEUTROPHILS NFR BLD AUTO: 89.4 %
OVALOCYTES BLD QL SMEAR: ABNORMAL
PLATELET # BLD AUTO: 123 10E9/L (ref 150–450)
POIKILOCYTOSIS BLD QL SMEAR: ABNORMAL
POLYCHROMASIA BLD QL SMEAR: SLIGHT
POTASSIUM SERPL-SCNC: 3 MMOL/L (ref 3.4–5.3)
PROT SERPL-MCNC: 7.1 G/DL (ref 6.8–8.8)
RBC # BLD AUTO: 3.46 10E12/L (ref 3.8–5.2)
SODIUM SERPL-SCNC: 140 MMOL/L (ref 133–144)
WBC # BLD AUTO: 9.1 10E9/L (ref 4–11)

## 2017-01-17 PROCEDURE — 96413 CHEMO IV INFUSION 1 HR: CPT

## 2017-01-17 PROCEDURE — 25000125 ZZHC RX 250: Mod: ZF | Performed by: NURSE PRACTITIONER

## 2017-01-17 PROCEDURE — 96375 TX/PRO/DX INJ NEW DRUG ADDON: CPT

## 2017-01-17 PROCEDURE — 96415 CHEMO IV INFUSION ADDL HR: CPT

## 2017-01-17 PROCEDURE — 96417 CHEMO IV INFUS EACH ADDL SEQ: CPT

## 2017-01-17 PROCEDURE — 96416 CHEMO PROLONG INFUSE W/PUMP: CPT

## 2017-01-17 PROCEDURE — 86301 IMMUNOASSAY TUMOR CA 19-9: CPT | Performed by: NURSE PRACTITIONER

## 2017-01-17 PROCEDURE — 96411 CHEMO IV PUSH ADDL DRUG: CPT

## 2017-01-17 PROCEDURE — 25000128 H RX IP 250 OP 636: Mod: ZF | Performed by: NURSE PRACTITIONER

## 2017-01-17 PROCEDURE — 85025 COMPLETE CBC W/AUTO DIFF WBC: CPT | Performed by: NURSE PRACTITIONER

## 2017-01-17 PROCEDURE — 99214 OFFICE O/P EST MOD 30 MIN: CPT | Mod: ZP | Performed by: NURSE PRACTITIONER

## 2017-01-17 PROCEDURE — 96367 TX/PROPH/DG ADDL SEQ IV INF: CPT

## 2017-01-17 PROCEDURE — 80053 COMPREHEN METABOLIC PANEL: CPT | Performed by: NURSE PRACTITIONER

## 2017-01-17 PROCEDURE — 96368 THER/DIAG CONCURRENT INF: CPT

## 2017-01-17 RX ORDER — PROCHLORPERAZINE MALEATE 10 MG
10 TABLET ORAL EVERY 6 HOURS PRN
Qty: 30 TABLET | Refills: 2 | Status: SHIPPED | OUTPATIENT
Start: 2017-01-17 | End: 2017-04-17

## 2017-01-17 RX ORDER — DEXAMETHASONE 4 MG/1
4 TABLET ORAL
Qty: 3 TABLET | Refills: 0 | Status: SHIPPED | OUTPATIENT
Start: 2017-01-17 | End: 2017-01-20

## 2017-01-17 RX ORDER — PALONOSETRON 0.05 MG/ML
0.25 INJECTION, SOLUTION INTRAVENOUS ONCE
Status: COMPLETED | OUTPATIENT
Start: 2017-01-17 | End: 2017-01-17

## 2017-01-17 RX ORDER — OXYCODONE HYDROCHLORIDE 5 MG/1
5 TABLET ORAL EVERY 4 HOURS PRN
Qty: 100 TABLET | Refills: 0 | Status: SHIPPED | OUTPATIENT
Start: 2017-01-17 | End: 2017-03-20

## 2017-01-17 RX ORDER — FLUOROURACIL 50 MG/ML
400 INJECTION, SOLUTION INTRAVENOUS ONCE
Status: COMPLETED | OUTPATIENT
Start: 2017-01-17 | End: 2017-01-17

## 2017-01-17 RX ORDER — HEPARIN SODIUM (PORCINE) LOCK FLUSH IV SOLN 100 UNIT/ML 100 UNIT/ML
5 SOLUTION INTRAVENOUS
Status: DISCONTINUED | OUTPATIENT
Start: 2017-01-17 | End: 2017-01-17 | Stop reason: HOSPADM

## 2017-01-17 RX ORDER — MORPHINE SULFATE 15 MG/1
15 TABLET, FILM COATED, EXTENDED RELEASE ORAL EVERY 12 HOURS
Qty: 60 TABLET | Refills: 0 | Status: SHIPPED | OUTPATIENT
Start: 2017-01-17 | End: 2017-02-16

## 2017-01-17 RX ADMIN — FLUOROURACIL 790 MG: 50 INJECTION, SOLUTION INTRAVENOUS at 15:51

## 2017-01-17 RX ADMIN — DEXAMETHASONE SODIUM PHOSPHATE 12 MG: 10 INJECTION, SOLUTION INTRAMUSCULAR; INTRAVENOUS at 11:28

## 2017-01-17 RX ADMIN — DEXTROSE MONOHYDRATE 340 MG: 50 INJECTION, SOLUTION INTRAVENOUS at 13:58

## 2017-01-17 RX ADMIN — SODIUM CHLORIDE 150 MG: 9 INJECTION, SOLUTION INTRAVENOUS at 11:06

## 2017-01-17 RX ADMIN — OXALIPLATIN 167 MG: 5 INJECTION, SOLUTION, CONCENTRATE INTRAVENOUS at 11:46

## 2017-01-17 RX ADMIN — PALONOSETRON HYDROCHLORIDE 0.25 MG: 0.25 INJECTION INTRAVENOUS at 10:57

## 2017-01-17 RX ADMIN — SODIUM CHLORIDE, PRESERVATIVE FREE 5 ML: 5 INJECTION INTRAVENOUS at 09:40

## 2017-01-17 RX ADMIN — ATROPINE SULFATE 0.4 MG: 0.4 INJECTION, SOLUTION INTRAMUSCULAR; INTRAVENOUS; SUBCUTANEOUS at 13:56

## 2017-01-17 RX ADMIN — POTASSIUM CHLORIDE: 149 INJECTION, SOLUTION, CONCENTRATE INTRAVENOUS at 11:45

## 2017-01-17 RX ADMIN — LEUCOVORIN CALCIUM 750 MG: 500 INJECTION, POWDER, LYOPHILIZED, FOR SOLUTION INTRAMUSCULAR; INTRAVENOUS at 13:56

## 2017-01-17 ASSESSMENT — PAIN SCALES - GENERAL: PAINLEVEL: NO PAIN (0)

## 2017-01-17 NOTE — PROGRESS NOTES
Infusion Nursing Note:  Patient presents today for Cycle 3 Day 1 Oxaliplatin / Irinotecan / Leucovorin / Fluorouracil push and pump connect.    Patient seen by provider today: Yes: Ester Echavarria NP.    Note: Patient did not receive Aranesp today. Pharmacy sent in-basket to Dr. Gonsales regarding NILESH apprise process.    Intravenous Access:  Implanted Port.    Treatment Conditions:  HGB      8.5   1/17/2017  WBC      9.1   1/17/2017   ANEU      8.1   1/17/2017  PLT      123   1/17/2017     NA      140   1/17/2017                POTASSIUM      3.0   1/17/2017  TORB 1/17/17 1000 Ester Echavarria / Monster Lord pls replace K with 60mEq IV KCL.        MAG      2.2   1/5/2017         CR     0.64   1/17/2017                FANNY      8.5   1/17/2017             BILITOTAL      0.3   1/17/2017        ALBUMIN      2.9   1/17/2017                 ALT       22   1/17/2017        AST       28   1/17/2017  Results reviewed, labs MET treatment parameters, ok to proceed with treatment.    Post Infusion Assessment:  Patient tolerated infusion without incident.  Blood return noted pre and post infusion.  Blood return noted during administration every few cc during fluorouracil push.  Site patent and intact, free from redness, edema or discomfort.  No evidence of extravasations.    Discharge Plan:   Prior to discharge: Port is secured in place with tegaderm and flushed with 10cc NS with positive blood return noted.  Continuous home infusion pump connected.    All connectors secured in place and clamps taped open.    Patient instructed to call our clinic or [facility] with any questions or concerns at home.  Patient verbalized understanding.    Patient set up for pump disconnect at home with [facility] on Thursday 1/19 at 1350.      Prescription refills given for Dexamethasone, Compazine, Morphone, Oxycodone.  Discharge instructions reviewed with: Patient.  Patient and/or family verbalized understanding of discharge  instructions and all questions answered.  Copy of AVS reviewed with patient and/or family.  Patient will return 1/20 - Friday for next appointment IVF then Neulasta injection.  Patient discharged in stable condition accompanied by: self and friend.  Departure Mode: Ambulatory.  Face to Face time: 3 minutes coordinating care and teaching.    Monster Lord RN.

## 2017-01-17 NOTE — MR AVS SNAPSHOT
After Visit Summary   1/17/2017    Shirin Muller    MRN: [id number]           Patient Information     Date Of Birth          1960        Visit Information        Provider Department      1/17/2017 9:30 AM  16 ATC;  ONCOLOGY INFUSION [facility]        Today's Diagnoses     Need for prophylactic measure    -  1     Malignant neoplasm of head of pancreas (H)         Anemia, chronic disease           Care Instructions    Contact Numbers    [facility] Main Line: [phone]  [facility] Triage:  [phone]    Call triage with chills and/or temperature greater than or equal to 100.5, uncontrolled nausea/vomiting, diarrhea, constipation, dizziness, shortness of breath, chest pain, bleeding, unexplained bruising, or any new/concerning symptoms, questions/concerns.     If you are having any concerning symptoms or wish to speak to a provider before your next infusion visit, please call your care coordinator or triage to notify them so we can adequately serve you.       After Hours: [phone]    If after hours, weekends, or holidays, call main hospital  and ask for Oncology doctor on call.         January 2017 Sunday Monday Tuesday Wednesday Thursday Friday Saturday   1     2     [facility]RAW    8:45 AM   (15 min.)    [facility]   [facility]     [facility] RETURN    9:20 AM   (50 min.)   Ester Echavarria, TEE CNP   M [facility]     [facility] ONC INFUSION 360   10:30 AM   (360 min.)    ONCOLOGY INFUSION   [facility] 3     4     5     [facility] DRAW    3:00 PM   (15 min.)    [facility]     P ONC INFUSION 240    3:30 PM   (240 min.)    ONCOLOGY INFUSION   [facility] 6     7       8     9     10     11     12     13     14       15     16     17     [facility] DRAW    9:00 AM   (15 min.)    [facility] LAB DRAW   [facility] Lab Draw     P  ONC INFUSION 360    9:30 AM   (360 min.)   [facility] ONCOLOGY INFUSION   [facility]     [facility] RETURN   10:30 AM   (50 min.)   Ester Echavarria APRN CNP   [facility] 18     19     20     21     [facility] DRAW   11:30 AM   (15 min.)   [facility] DRAW   [facility] Lab Draw     [facility] ONC INFUSION 120   12:00 PM   (120 min.)    ONCOLOGY INFUSION   [facility]   22     23     24     25     26     27     28       29     30     [facility] DRAW   11:00 AM   (15 min.)    [facility]   [facility]     [facility] RETURN   11:30 AM   (50 min.)   Ester Echavarria APRN CNP   [facility] ONC INFUSION 360   11:30 AM   (360 min.)    ONCOLOGY INFUSION   [facility] 31 February 2017 Sunday Monday Tuesday Wednesday Thursday Friday Saturday                  1     2     3     4       5     6     7     8     9     10     MR ABDOMEN WWO   10:45 AM   (45 min.)   MR1   [facility] MRI     CT CHEST WO   11:40 AM   (20 min.)   CT1   [facility] CT 11       12     13     [facility] DRAW    9:45 AM   (15 min.)    [facility] LAB DRAW   [facility] Lab Draw     [facility] RETURN   10:15 AM   (30 min.)   Demond Gonsales MD   [facility]     [facility] ONC INFUSION 360   11:30 AM   (360 min.)    ONCOLOGY INFUSION   [facility] 14     15     16     17     18       19     20     21     22     23     24     25       26     27     28                                      Lab Results:  Recent Results (from the past 12 hour(s))   CBC with platelets differential    Collection Time: 01/17/17  9:52 AM   Result Value Ref Range    WBC 9.1 4.0 - 11.0 10e9/L    RBC Count 3.46 (L) 3.8 - 5.2 10e12/L    Hemoglobin 8.5 (L) 11.7 - 15.7 g/dL    Hematocrit 27.6 (L) 35.0 - 47.0 %    MCV 80 78 - 100 fl    MCH 24.6 (L) 26.5 - 33.0 pg     MCHC 30.8 (L) 31.5 - 36.5 g/dL    RDW 20.2 (H) 10.0 - 15.0 %    Platelet Count 123 (L) 150 - 450 10e9/L    Diff Method Manual Differential     % Neutrophils 89.4 %    % Lymphocytes 7.1 %    % Monocytes 3.5 %    % Eosinophils 0.0 %    % Basophils 0.0 %    Absolute Neutrophil 8.1 1.6 - 8.3 10e9/L    Absolute Lymphocytes 0.6 (L) 0.8 - 5.3 10e9/L    Absolute Monocytes 0.3 0.0 - 1.3 10e9/L    Absolute Eosinophils 0.0 0.0 - 0.7 10e9/L    Absolute Basophils 0.0 0.0 - 0.2 10e9/L    Anisocytosis Moderate     Poikilocytosis Moderate     Polychromasia Slight     Ovalocytes Moderate     Elliptocytes Slight     Darshana Cells Slight    Comprehensive metabolic panel    Collection Time: 01/17/17  9:52 AM   Result Value Ref Range    Sodium 140 133 - 144 mmol/L    Potassium 3.0 (L) 3.4 - 5.3 mmol/L    Chloride 104 94 - 109 mmol/L    Carbon Dioxide 29 20 - 32 mmol/L    Anion Gap 7 3 - 14 mmol/L    Glucose 91 70 - 99 mg/dL    Urea Nitrogen 11 7 - 30 mg/dL    Creatinine 0.64 0.52 - 1.04 mg/dL    GFR Estimate >90  Non  GFR Calc   >60 mL/min/1.7m2    GFR Estimate If Black >90   GFR Calc   >60 mL/min/1.7m2    Calcium 8.5 8.5 - 10.1 mg/dL    Bilirubin Total 0.3 0.2 - 1.3 mg/dL    Albumin 2.9 (L) 3.4 - 5.0 g/dL    Protein Total 7.1 6.8 - 8.8 g/dL    Alkaline Phosphatase 300 (H) 40 - 150 U/L    ALT 22 0 - 50 U/L    AST 28 0 - 45 U/L               Follow-ups after your visit        Your next 10 appointments already scheduled     Jan 21, 2017 11:30 AM   [facility] Lab Draw with  [facility] LAB DRAW   [facility] Lab Draw ([facility])    [address] [address] [phone]            Jan 21, 2017 12:00 PM   Infusion 120 with UC ONCOLOGY INFUSION, UC 16 [facility] ([facility])    [address] [facility]  2nd Floor  [facility] [address] [phone]            Jan 30, 2017 11:00 AM   [facility] Lab Draw  with [facility] LAB DRAW   [facility] Lab Draw ([facility])    [address]  2nd Floor  [facility] [address]   [phone]            Jan 30, 2017 11:30 AM   Infusion 360 with  ONCOLOGY INFUSION,  13 ATC   [facility] ([facility])    [address]  2nd [facility] [address]   [phone]            Jan 30, 2017 11:30 AM   (Arrive by 11:15 AM)   Return Visit with TEE Olivas CNP   [facility] ([facility])    [address]  2nd [facility] [address]   [phone]            Feb 10, 2017 10:45 AM   (Arrive by 10:30 AM)   MR ABDOMEN W/O & W CONTRAST with [address] MRI ([facility])    [address]  1st Floor  [facility] [address]   [phone]           Take your medicines as usual, unless your doctor tells you not to. Bring a list of your current medicines to your exam (including vitamins, minerals and over-the-counter drugs). Also bring the results of similar scans you may have had.    The day before your exam, drink extra fluids at least six 8-ounce glasses (unless your doctor tells you to restrict your fluids).   Have a blood test (creatinine test) within 30 days of your exam. Go to your clinic or Diagnostic Imaging Department for this test.   Do not eat or drink for 6 hours prior to exam.  The MRI machine uses a strong magnet. Please wear clothes without metal (snaps, zippers). A sweatsuit works well, or we may give you a hospital gown.  Please remove any body piercings and hair extensions before you arrive. You will also remove watches, jewelry, hairpins, wallets, dentures, partial dental plates and hearing aids. You may wear contact lenses, and you may be able to wear your rings. We have a safe place to keep your personal items, but it is safer to leave them at home.    **IMPORTANT** THE INSTRUCTIONS BELOW ARE ONLY FOR THOSE PATIENTS WHO HAVE BEEN TOLD THEY WILL RECEIVE SEDATION OR GENERAL ANESTHESIA DURING THEIR MRI PROCEDURE:  IF YOU WILL RECEIVE SEDATION (take medicine to help you relax during your exam):   You must get the medicine from your doctor before you arrive. Bring the medicine to the exam. Do not take it at home.   Arrive one hour early. Bring someone who can take you home after the test. Your medicine will make you sleepy. After the exam, you may not drive, take a bus or take a taxi by yourself.   No eating 8 hours before your exam. You may have clear liquids up until 4 hours before your exam. (Clear liquids include water, clear tea, black coffee and fruit juice without pulp.)  IF YOU WILL RECEIVE ANESTHESIA (be asleep for your exam):   Arrive 1 1/2 hours early. Bring someone who can take you home after the test. You may not drive, take a bus or take a taxi by yourself.   No eating 8 hours before your exam. You may have clear liquids up until 4 hours before your exam. (Clear liquids include water, clear tea, black coffee and fruit juice without pulp.)  If you have any questions, please contact your Imaging Department exam site.            Feb 10, 2017 11:40 AM   (Arrive by 11:25 AM)   CT CHEST W/O CONTRAST with UCCT1   [facility] CT ([facility])    [address] [address] [phone]           Please bring any scans or X-rays taken at other hospitals, if similar tests were done. Also bring a list of your medicines, including vitamins, minerals and over-the-counter drugs. It is safest to leave personal items at home.  Be sure to tell your doctor:   If you have any allergies.   If there s any chance you are pregnant.   If you are breastfeeding.   If you have any special needs.  You do not need to do anything special to prepare.  Please wear loose clothing, such as a sweat suit or jogging  clothes. Avoid snaps, zippers and other metal. We may ask you to undress and put on a hospital gown.            Feb 13, 2017  9:45 AM   50 [employer] Lab Draw with  [employer] LAB DRAW   [facility] Lab Draw ([facility] [place])    [address] [facility]  2nd Floor  [facility] [address] [phone]              Who to contact     If you have questions or need follow up information about today's clinic visit or your schedule please contact [facility] directly at [phone].  Normal or non-critical lab and imaging results will be communicated to you by CaseTrekhart, letter or phone within 4 business days after the clinic has received the results. If you do not hear from us within 7 days, please contact the clinic through CelePostt or phone. If you have a critical or abnormal lab result, we will notify you by phone as soon as possible.  Submit refill requests through [employer]Data or call your pharmacy and they will forward the refill request to us. Please allow 3 business days for your refill to be completed.          Additional Information About Your Visit        MyChart Information     [employer] gives you secure access to your electronic health record. If you see a primary care provider, you can also send messages to your care team and make appointments. If you have questions, please call your primary care clinic.  If you do not have a primary care provider, please call [phone] and they will assist you.        Care EveryWhere ID     This is your Care EveryWhere ID. This could be used by other organizations to access your [facility] medical records  [id number]         Blood Pressure from Last 3 Encounters:   01/17/17 102/63   01/05/17 108/68   01/02/17 134/73    Weight from Last 3 Encounters:   01/17/17 73.3 kg (161 lb 9.6 oz)   01/05/17 71.169 kg (156 lb 14.4 oz)   01/02/17 73.029 kg (161 lb)              We Performed the Following     Cancer antigen 19-9     CBC with platelets  differential     Comprehensive metabolic panel     MD Instruction for Protocol     MD Instruction for Protocol     Road Map Alert     Treatment Conditions     Treatment Conditions          Today's Medication Changes          These changes are accurate as of: 1/17/17  3:05 PM.  If you have any questions, ask your nurse or doctor.               Start taking these medicines.        Dose/Directions    dexamethasone 4 MG tablet   Commonly known as:  DECADRON   Used for:  Need for prophylactic measure, Malignant neoplasm of head of pancreas (H)        Dose:  4 mg   Take 1 tablet (4 mg) by mouth daily (with breakfast) for 3 days   Quantity:  3 tablet   Refills:  0            Where to get your medicines      These medications were sent to [place], [place] - [address]  [address], [facility] [address]    Hours:  TRANSPLANT PHONE NUMBER [phone] Phone:  [phone]    - dexamethasone 4 MG tablet  - prochlorperazine 10 MG tablet      Some of these will need a paper prescription and others can be bought over the counter.  Ask your nurse if you have questions.     Bring a paper prescription for each of these medications    - morphine 15 MG 12 hr tablet  - oxyCODONE 5 MG IR tablet             Primary Care Provider Office Phone # Fax #    [facility] [phone]       [address] [place], [place] [id number]        Thank you!     Thank you for choosing [facility]  for your care. Our goal is always to provide you with excellent care. Hearing back from our patients is one way we can continue to improve our services. Please take a few minutes to complete the written survey that you may receive in the mail after your visit with us. Thank you!             Your Updated Medication List - Protect others around you: Learn how to safely use, store and throw away your medicines at www.disposemymeds.org.          This  list is accurate as of: 1/17/17  3:05 PM.  Always use your most recent med list.                   Brand Name Dispense Instructions for use    amylase-lipase-protease 59640 UNITS Cpep    CREON    180 capsule    Take 2 capsules (72,000 Units) by mouth 3 times daily (with meals)       dexamethasone 4 MG tablet    DECADRON    3 tablet    Take 1 tablet (4 mg) by mouth daily (with breakfast) for 3 days       docusate sodium 100 MG capsule    COLACE     Take 100 mg by mouth       dronabinol 5 MG capsule    MARINOL    60 capsule    Take 1 capsule (5 mg) by mouth 2 times daily (before meals)       lidocaine-prilocaine cream    EMLA    30 g    Apply topically as needed for other (Use 30-60 minutes prior to port access)       LORazepam 0.5 MG tablet    ATIVAN    30 tablet    Take 1 tablet (0.5 mg) by mouth every 4 hours as needed (Anxiety, Nausea/Vomiting or Sleep)       morphine 15 MG 12 hr tablet    MS CONTIN    60 tablet    Take 1 tablet (15 mg) by mouth every 12 hours       ondansetron 8 MG ODT tab    ZOFRAN-ODT    60 tablet    Take 1 tablet (8 mg) by mouth every 8 hours as needed for nausea       oxyCODONE 5 MG IR tablet    ROXICODONE    100 tablet    Take 1 tablet (5 mg) by mouth every 4 hours as needed for moderate to severe pain       polyethylene glycol powder    MIRALAX/GLYCOLAX    119 g    Take 17 g (1 capful) by mouth daily       potassium chloride 20 MEQ Packet    KLOR-CON    60 packet    Take 20 mEq by mouth daily       prochlorperazine 10 MG tablet    COMPAZINE    30 tablet    Take 1 tablet (10 mg) by mouth every 6 hours as needed (Nausea/Vomiting)

## 2017-01-17 NOTE — PROGRESS NOTES
Ms. Shirin Meyer is a 56-year-old woman with metastatic pancreatic cancer. I am seeing her prior to cycle 3 FOLFIRINOX.    She was diagnosed in the spring of 2016 after presenting with a 2 to 3-month history of abdominal pain and weight loss.  She underwent evaluation showing a 5 cm mass at the head of the pancreas.  Biopsy was consistent with adenocarcinoma.  She underwent staging imaging including an MRI of the abdomen, which then showed an up to 10 cm poorly defined hypoenhancing mass arising from the pancreatic head which encased celiac artery, superior mesenteric artery and severely attenuated the main portal vein.  She initiated on treatment with gemcitabine and Abraxane on 05/02/2016 and continued on that until December 2016 when she was found to have metastatic disease involving the liver.  She was then initiated on FOLFIRINOX on 12/20/16. Her first cycle was complicated by fatigue, nausea/vomiting. Antiemetics were adjusted with cycl 2.    Interval history:  Shirin felt this cycle was better tolerated. She still had nausea, but no vomiting. She had 2 days of diarrhea, then back to normal stools. Energy level is still low, but better than before. She is able to eat a little more. She is drinking fluids Ok. Sometimes feels lightheaded. She gets IVF after chemotherapy and finds that helpful. No mouth sores. Has some mouth tingling and finger numbness that continue after chemotherapy, but lessen. These symptoms aren't too much of an issue when she avoids eating or touching cold things. No shortness of breath, chest pain or cough. No fevers/chills. No bleeding or bruising. Abdominal pain is well controlled with MS contin. Is using oxycodone a couple times a day. Abdomen feels tight at times.    Current Outpatient Prescriptions   Medication Sig Dispense Refill     morphine (MS CONTIN) 15 MG 12 hr tablet Take 1 tablet (15 mg) by mouth every 12 hours 60 tablet 0     prochlorperazine (COMPAZINE) 10 MG  tablet Take 1 tablet (10 mg) by mouth every 6 hours as needed (Nausea/Vomiting) 30 tablet 2     oxyCODONE (ROXICODONE) 5 MG IR tablet Take 1 tablet (5 mg) by mouth every 4 hours as needed for moderate to severe pain 100 tablet 0     dexamethasone (DECADRON) 4 MG tablet Take 1 tablet (4 mg) by mouth daily (with breakfast) for 3 days 3 tablet 0     dronabinol (MARINOL) 5 MG capsule Take 1 capsule (5 mg) by mouth 2 times daily (before meals) 60 capsule 0     amylase-lipase-protease (CREON) 20413 UNITS CPEP Take 2 capsules (72,000 Units) by mouth 3 times daily (with meals) 180 capsule 1     LORazepam (ATIVAN) 0.5 MG tablet Take 1 tablet (0.5 mg) by mouth every 4 hours as needed (Anxiety, Nausea/Vomiting or Sleep) 30 tablet 2     potassium chloride (KLOR-CON) 20 MEQ packet Take 20 mEq by mouth daily 60 packet 1     polyethylene glycol (MIRALAX/GLYCOLAX) powder Take 17 g (1 capful) by mouth daily 119 g 11     lidocaine-prilocaine (EMLA) cream Apply topically as needed for other (Use 30-60 minutes prior to port access) 30 g 0     docusate sodium (COLACE) 100 MG capsule Take 100 mg by mouth       ondansetron (ZOFRAN-ODT) 8 MG disintegrating tablet Take 1 tablet (8 mg) by mouth every 8 hours as needed for nausea 60 tablet 4     Exam:  Alert, appears in NAD. She is quiet and affect less flat than usual.  Blood pressure 102/63, pulse 89, temperature 98.8  F (37.1  C), temperature source Oral, resp. rate 16, weight 73.3 kg (161 lb 9.6 oz), SpO2 100 %.   Wt Readings from Last 4 Encounters:   01/17/17 73.3 kg (161 lb 9.6 oz)   01/05/17 71.169 kg (156 lb 14.4 oz)   01/02/17 73.029 kg (161 lb)   12/20/16 75.841 kg (167 lb 3.2 oz)     Oropharynx is moist, no focal lesion. No icterus. Neck supple and without adenopathy. Lungs: CTA. Heart:RRR, no murmur or rub. Abdomen: firm, slightly distended, BS active. Nontender. No masses palpable. Extremities: warm, no edema. Speech clear. CN wnl. Port in the right chest  intact.    Labs:  Results for NATALIIA IBARRA (MRN [id number]) as of 1/17/2017 10:46   Ref. Range 1/17/2017 09:52   Sodium Latest Ref Range: 133-144 mmol/L 140   Potassium Latest Ref Range: 3.4-5.3 mmol/L 3.0 (L)   Chloride Latest Ref Range:  mmol/L 104   Carbon Dioxide Latest Ref Range: 20-32 mmol/L 29   Urea Nitrogen Latest Ref Range: 7-30 mg/dL 11   Creatinine Latest Ref Range: 0.52-1.04 mg/dL 0.64   GFR Estimate Latest Ref Range: >60 mL/min/1.7m2 >90...   GFR Estimate If Black Latest Ref Range: >60 mL/min/1.7m2 >90...   Calcium Latest Ref Range: 8.5-10.1 mg/dL 8.5   Anion Gap Latest Ref Range: 3-14 mmol/L 7   Albumin Latest Ref Range: 3.4-5.0 g/dL 2.9 (L)   Protein Total Latest Ref Range: 6.8-8.8 g/dL 7.1   Bilirubin Total Latest Ref Range: 0.2-1.3 mg/dL 0.3   Alkaline Phosphatase Latest Ref Range:  U/L 300 (H)   ALT Latest Ref Range: 0-50 U/L 22   AST Latest Ref Range: 0-45 U/L 28   Glucose Latest Ref Range: 70-99 mg/dL 91   WBC Latest Ref Range: 4.0-11.0 10e9/L 9.1   Hemoglobin Latest Ref Range: 11.7-15.7 g/dL 8.5 (L)   Hematocrit Latest Ref Range: 35.0-47.0 % 27.6 (L)   Platelet Count Latest Ref Range: 150-450 10e9/L 123 (L)   RBC Count Latest Ref Range: 3.8-5.2 10e12/L 3.46 (L)   MCV Latest Ref Range:  fl 80   MCH Latest Ref Range: 26.5-33.0 pg 24.6 (L)   MCHC Latest Ref Range: 31.5-36.5 g/dL 30.8 (L)   RDW Latest Ref Range: 10.0-15.0 % 20.2 (H)   Diff Method Unknown Manual Differential   % Neutrophils Latest Units: % 89.4   % Lymphocytes Latest Units: % 7.1   % Monocytes Latest Units: % 3.5   % Eosinophils Latest Units: % 0.0   % Basophils Latest Units: % 0.0   Absolute Neutrophil Latest Ref Range: 1.6-8.3 10e9/L 8.1   Absolute Lymphocytes Latest Ref Range: 0.8-5.3 10e9/L 0.6 (L)   Absolute Monocytes Latest Ref Range: 0.0-1.3 10e9/L 0.3   Absolute Eosinophils Latest Ref Range: 0.0-0.7 10e9/L 0.0   Absolute Basophils Latest Ref Range: 0.0-0.2 10e9/L 0.0   Anisocytosis Unknown  Moderate   Poikilocytosis Unknown Moderate   Polychromasia Unknown Slight   Ovalocytes Unknown Moderate   Elliptocytes Unknown Slight   [place] Cells Unknown Slight     Impression/plan:    1. Metastatic pancreatic cancer  -still has moderate side effects, but is managing them better now with adjustment of antiemetics.  -Will proceed with cycle 3 today at the same dose and schedule.  -continue Neulasta and post-infusion IVF/lyte support on day 4  -Has stable anemia (8.5) will get aranesp today    2. Nausea  -Still present, but better than the prior cycle. Will continue the plan:  - Emend/Aloxi/Dex IV  premedications  -Dexamethasone 4 mg po daily x 3 after chemotherapy  -Zofran 8 mg every 8 hours on schedule x 1 week (starting day 3)  -Continue compazine/lorazepam prn  -Continue Marinol 5 mg bid    3. FEN  -appetite is better since starting marinol. Is gaining a little weight.  -hypokalemia: not consistently taking po K replacement. Will replace with 60 MEQ K IV today, recheck lytes and plan for IV hydration in 4 days with Neulasta. Encouraged her to take high K foods and try to get in oral replacement as much as possible.    4. 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 Stable at this time. Will monitor.

## 2017-01-17 NOTE — Clinical Note
1/17/2017       RE: Shirin Muller  [address] [facility] [address]     Dear Colleague,    Thank you for referring your patient, Shirin Muller, to the [facility]. Please see a copy of my visit note below.    Ms. Shirin Muller is a 56-year-old woman with metastatic pancreatic cancer. I am seeing her prior to cycle 3 FOLFIRINOX.    She was diagnosed in the spring of 2016 after presenting with a 2 to 3-month history of abdominal pain and weight loss.  She underwent evaluation showing a 5 cm mass at the head of the pancreas.  Biopsy was consistent with adenocarcinoma.  She underwent staging imaging including an MRI of the abdomen, which then showed an up to 10 cm poorly defined hypoenhancing mass arising from the pancreatic head which encased celiac artery, superior mesenteric artery and severely attenuated the main portal vein.  She initiated on treatment with gemcitabine and Abraxane on 05/02/2016 and continued on that until December 2016 when she was found to have metastatic disease involving the liver.  She was then initiated on FOLFIRINOX on 12/20/16. Her first cycle was complicated by fatigue, nausea/vomiting. Antiemetics were adjusted with cycl 2.    Interval history:  Shirin felt this cycle was better tolerated. She still had nausea, but no vomiting. She had 2 days of diarrhea, then back to normal stools. Energy level is still low, but better than before. She is able to eat a little more. She is drinking fluids Ok. Sometimes feels lightheaded. She gets IVF after chemotherapy and finds that helpful. No mouth sores. Has some mouth tingling and finger numbness that continue after chemotherapy, but lessen. These symptoms aren't too much of an issue when she avoids eating or touching cold things. No shortness of breath, chest pain or cough. No fevers/chills. No bleeding or bruising. Abdominal pain is well controlled with MS contin. Is using oxycodone a  couple times a day. Abdomen feels tight at times.    Current Outpatient Prescriptions   Medication Sig Dispense Refill     morphine (MS CONTIN) 15 MG 12 hr tablet Take 1 tablet (15 mg) by mouth every 12 hours 60 tablet 0     prochlorperazine (COMPAZINE) 10 MG tablet Take 1 tablet (10 mg) by mouth every 6 hours as needed (Nausea/Vomiting) 30 tablet 2     oxyCODONE (ROXICODONE) 5 MG IR tablet Take 1 tablet (5 mg) by mouth every 4 hours as needed for moderate to severe pain 100 tablet 0     dexamethasone (DECADRON) 4 MG tablet Take 1 tablet (4 mg) by mouth daily (with breakfast) for 3 days 3 tablet 0     dronabinol (MARINOL) 5 MG capsule Take 1 capsule (5 mg) by mouth 2 times daily (before meals) 60 capsule 0     amylase-lipase-protease (CREON) 90252 UNITS CPEP Take 2 capsules (72,000 Units) by mouth 3 times daily (with meals) 180 capsule 1     LORazepam (ATIVAN) 0.5 MG tablet Take 1 tablet (0.5 mg) by mouth every 4 hours as needed (Anxiety, Nausea/Vomiting or Sleep) 30 tablet 2     potassium chloride (KLOR-CON) 20 MEQ packet Take 20 mEq by mouth daily 60 packet 1     polyethylene glycol (MIRALAX/GLYCOLAX) powder Take 17 g (1 capful) by mouth daily 119 g 11     lidocaine-prilocaine (EMLA) cream Apply topically as needed for other (Use 30-60 minutes prior to port access) 30 g 0     docusate sodium (COLACE) 100 MG capsule Take 100 mg by mouth       ondansetron (ZOFRAN-ODT) 8 MG disintegrating tablet Take 1 tablet (8 mg) by mouth every 8 hours as needed for nausea 60 tablet 4     Exam:  Alert, appears in NAD. She is quiet and affect less flat than usual.  Blood pressure 102/63, pulse 89, temperature 98.8  F (37.1  C), temperature source Oral, resp. rate 16, weight 73.3 kg (161 lb 9.6 oz), SpO2 100 %.   Wt Readings from Last 4 Encounters:   01/17/17 73.3 kg (161 lb 9.6 oz)   01/05/17 71.169 kg (156 lb 14.4 oz)   01/02/17 73.029 kg (161 lb)   12/20/16 75.841 kg (167 lb 3.2 oz)     Oropharynx is moist, no focal lesion. No  icterus. Neck supple and without adenopathy. Lungs: CTA. Heart:RRR, no murmur or rub. Abdomen: firm, slightly distended, BS active. Nontender. No masses palpable. Extremities: warm, no edema. Speech clear. CN wnl. Port in the right chest intact.    Labs:  Results for NATALIIA IBARRA (MRN [id number]) as of 1/17/2017 10:46   Ref. Range 1/17/2017 09:52   Sodium Latest Ref Range: 133-144 mmol/L 140   Potassium Latest Ref Range: 3.4-5.3 mmol/L 3.0 (L)   Chloride Latest Ref Range:  mmol/L 104   Carbon Dioxide Latest Ref Range: 20-32 mmol/L 29   Urea Nitrogen Latest Ref Range: 7-30 mg/dL 11   Creatinine Latest Ref Range: 0.52-1.04 mg/dL 0.64   GFR Estimate Latest Ref Range: >60 mL/min/1.7m2 >90...   GFR Estimate If Black Latest Ref Range: >60 mL/min/1.7m2 >90...   Calcium Latest Ref Range: 8.5-10.1 mg/dL 8.5   Anion Gap Latest Ref Range: 3-14 mmol/L 7   Albumin Latest Ref Range: 3.4-5.0 g/dL 2.9 (L)   Protein Total Latest Ref Range: 6.8-8.8 g/dL 7.1   Bilirubin Total Latest Ref Range: 0.2-1.3 mg/dL 0.3   Alkaline Phosphatase Latest Ref Range:  U/L 300 (H)   ALT Latest Ref Range: 0-50 U/L 22   AST Latest Ref Range: 0-45 U/L 28   Glucose Latest Ref Range: 70-99 mg/dL 91   WBC Latest Ref Range: 4.0-11.0 10e9/L 9.1   Hemoglobin Latest Ref Range: 11.7-15.7 g/dL 8.5 (L)   Hematocrit Latest Ref Range: 35.0-47.0 % 27.6 (L)   Platelet Count Latest Ref Range: 150-450 10e9/L 123 (L)   RBC Count Latest Ref Range: 3.8-5.2 10e12/L 3.46 (L)   MCV Latest Ref Range:  fl 80   MCH Latest Ref Range: 26.5-33.0 pg 24.6 (L)   MCHC Latest Ref Range: 31.5-36.5 g/dL 30.8 (L)   RDW Latest Ref Range: 10.0-15.0 % 20.2 (H)   Diff Method Unknown Manual Differential   % Neutrophils Latest Units: % 89.4   % Lymphocytes Latest Units: % 7.1   % Monocytes Latest Units: % 3.5   % Eosinophils Latest Units: % 0.0   % Basophils Latest Units: % 0.0   Absolute Neutrophil Latest Ref Range: 1.6-8.3 10e9/L 8.1   Absolute Lymphocytes  Latest Ref Range: 0.8-5.3 10e9/L 0.6 (L)   Absolute Monocytes Latest Ref Range: 0.0-1.3 10e9/L 0.3   Absolute Eosinophils Latest Ref Range: 0.0-0.7 10e9/L 0.0   Absolute Basophils Latest Ref Range: 0.0-0.2 10e9/L 0.0   Anisocytosis Unknown Moderate   Poikilocytosis Unknown Moderate   Polychromasia Unknown Slight   Ovalocytes Unknown Moderate   Elliptocytes Unknown Slight   [place] Cells Unknown Slight     Impression/plan:    1. Metastatic pancreatic cancer  -still has moderate side effects, but is managing them better now with adjustment of antiemetics.  -Will proceed with cycle 3 today at the same dose and schedule.  -continue Neulasta and post-infusion IVF/lyte support on day 4  -Has stable anemia (8.5) will get aranesp today    2. Nausea  -Still present, but better than the prior cycle. Will continue the plan:  - Emend/Aloxi/Dex IV  premedications  -Dexamethasone 4 mg po daily x 3 after chemotherapy  -Zofran 8 mg every 8 hours on schedule x 1 week (starting day 3)  -Continue compazine/lorazepam prn  -Continue Marinol 5 mg bid    3. FEN  -appetite is better since starting marinol. Is gaining a little weight.  -hypokalemia: not consistently taking po K replacement. Will replace with 60 MEQ K IV today, recheck lytes and plan for IV hydration in 4 days with Neulasta. Encouraged her to take high K foods and try to get in oral replacement as much as possible.    4. 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 Stable at this time. Will monitor.    Again, thank you for allowing me to participate in the care of your patient.      Sincerely,    TEE Villanueva CNP

## 2017-01-18 LAB — CANCER AG19-9 SERPL-ACNC: ABNORMAL

## 2017-01-20 ENCOUNTER — INFUSION THERAPY VISIT (OUTPATIENT)
Dept: ONCOLOGY | Facility: CLINIC | Age: 57
End: 2017-01-20
Attending: INTERNAL MEDICINE
Payer: COMMERCIAL

## 2017-01-20 VITALS
WEIGHT: 161.8 LBS | BODY MASS INDEX: 22.58 KG/M2 | HEART RATE: 71 BPM | TEMPERATURE: 98.6 F | SYSTOLIC BLOOD PRESSURE: 110 MMHG | RESPIRATION RATE: 18 BRPM | DIASTOLIC BLOOD PRESSURE: 64 MMHG | OXYGEN SATURATION: 100 %

## 2017-01-20 DIAGNOSIS — C25.0 MALIGNANT NEOPLASM OF HEAD OF PANCREAS (H): Primary | ICD-10-CM

## 2017-01-20 DIAGNOSIS — Z29.9 NEED FOR PROPHYLACTIC MEASURE: ICD-10-CM

## 2017-01-20 LAB
ANION GAP SERPL CALCULATED.3IONS-SCNC: 8 MMOL/L (ref 3–14)
BUN SERPL-MCNC: 12 MG/DL (ref 7–30)
CALCIUM SERPL-MCNC: 8.2 MG/DL (ref 8.5–10.1)
CHLORIDE SERPL-SCNC: 101 MMOL/L (ref 94–109)
CO2 SERPL-SCNC: 30 MMOL/L (ref 20–32)
CREAT SERPL-MCNC: 0.71 MG/DL (ref 0.52–1.04)
GFR SERPL CREATININE-BSD FRML MDRD: 84 ML/MIN/1.7M2
GLUCOSE SERPL-MCNC: 87 MG/DL (ref 70–99)
POTASSIUM SERPL-SCNC: 3.5 MMOL/L (ref 3.4–5.3)
SODIUM SERPL-SCNC: 139 MMOL/L (ref 133–144)

## 2017-01-20 PROCEDURE — 80048 BASIC METABOLIC PNL TOTAL CA: CPT | Performed by: NURSE PRACTITIONER

## 2017-01-20 PROCEDURE — 96360 HYDRATION IV INFUSION INIT: CPT

## 2017-01-20 PROCEDURE — 25000125 ZZHC RX 250: Mod: ZF | Performed by: INTERNAL MEDICINE

## 2017-01-20 PROCEDURE — 96372 THER/PROPH/DIAG INJ SC/IM: CPT | Mod: 59

## 2017-01-20 PROCEDURE — 25000128 H RX IP 250 OP 636: Mod: ZF | Performed by: NURSE PRACTITIONER

## 2017-01-20 RX ORDER — HEPARIN SODIUM (PORCINE) LOCK FLUSH IV SOLN 100 UNIT/ML 100 UNIT/ML
500 SOLUTION INTRAVENOUS EVERY 8 HOURS
Status: DISCONTINUED | OUTPATIENT
Start: 2017-01-20 | End: 2017-01-20 | Stop reason: HOSPADM

## 2017-01-20 RX ADMIN — SODIUM CHLORIDE, PRESERVATIVE FREE 500 UNITS: 5 INJECTION INTRAVENOUS at 15:06

## 2017-01-20 RX ADMIN — PEGFILGRASTIM 6 MG: 6 INJECTION SUBCUTANEOUS at 14:32

## 2017-01-20 RX ADMIN — SODIUM CHLORIDE 1000 ML: 9 INJECTION, SOLUTION INTRAVENOUS at 13:45

## 2017-01-20 ASSESSMENT — PAIN SCALES - GENERAL: PAINLEVEL: NO PAIN (0)

## 2017-01-20 NOTE — PROGRESS NOTES
Infusion Nursing Note:  Shirin Muller presents for IVF/neulasta    Note: pt feeling well today, no new issues or concerns to report.    Treatment Conditions:  NA      139   1/20/2017                POTASSIUM      3.5   1/20/2017        MAG      2.2   1/5/2017         CR     0.71   1/20/2017                FANNY      8.2   1/20/2017             BILITOTAL      0.3   1/17/2017        ALBUMIN      2.9   1/17/2017                 ALT       22   1/17/2017        AST       28   1/17/2017    Intravenous Access:  Implanted Port.    Post Infusion Assessment:  Patient tolerated infusion without incident.  Patient tolerated neulasta injection without incident to LLQ  Blood return noted pre and post infusion.  No evidence of extravasations.  Access discontinued per protocol.    Discharge Plan:   Pt denied need for prescription refills  Discharge instructions reviewed with: Patient.  Patient and/or family verbalized understanding of discharge instructions and all questions answered.  AVS to patient via [employer].  Patient will return 1/30 r next appointment.   Patient discharged in stable condition accompanied by: self.    Shanika Junior RN

## 2017-01-20 NOTE — PATIENT INSTRUCTIONS
Contact numbers:  Triage Main Line: [phone]  After hours: [phone]    Call with chills and/or temperature greater than or equal to 100.5 and questions or concerns.    If after hours, weekends, or holidays, call main hospital  at  [phone] and ask for Oncology doctor on call.           January 2017 Sunday Monday Tuesday Wednesday Thursday Friday Saturday   1     2     [facility]    8:45 AM   (15 min.)   [facility]   [facility]     [facility] RETURN    9:20 AM   (50 min.)   Ester Echavarria APRN CNP   [facility]     [facility] ONC INFUSION 360   10:30 AM   (360 min.)   [facility]ION   [facility] 3     4     5     [facility]    3:00 PM   (15 min.)   [facility]   [facility]     [facility] ONC INFUSION 240    3:30 PM   (240 min.)   [facility]ION   [facility] 6     7       8     9     10     11     12     13     14       15     16     17     [facility]    9:00 AM   (15 min.)   [facility]   [facility]     [facility] ON[facility] INFUSION 360    9:30 AM   (360 min.)   [facility] INF[facility]ION   [facility]     [facility] RETURN   10:30 AM   (50 min.)   Ester Echavarria APRN CNP   [facility] 18     19     20     [facility] DRAW   12:30 PM   (15 min.)   [facility]   [facility]     [facility] INFUSION 120    1:00 PM   (120 min.)   [facility] INFUSION   [facility] 21       22     23     24     25     26     27     28       29     30     [facility]   11:00 AM   (15 min.)   [facility]   [facility] Draw     [facility] RETURN   11:30 AM   (50 min.)   Ester Echavarria APRN CNP   [facility]     [facility] ONC INFUSION 360   11:30 AM   (360 min.)    ONCOLOGY INFUSION   [facility] 31 February 2017 Sunday Monday  Tuesday Wednesday Thursday Friday Saturday                  1     2     3     4       5     6     7     8     9     10     MR ABDOMEN WWO   10:45 AM   (45 min.)   MR1   [facility] MRI     CT CHEST WO   11:40 AM   (20 min.)   UCCT1   [facility] CT 11       12     13     [facility] DRAW    9:45 AM   (15 min.)    [facility] DRAW   [facility] Lab Draw     [facility] RETURN   10:15 AM   (30 min.)   Demond Gonsales MD   [facility] ONC INFUSION 360   11:30 AM   (360 min.)    ONCOLOGY INFUSION   [facility] 14     15     16     17     18       19     20     21     22     23     24     25       26     27     28                                      Lab Results:  Recent Results (from the past 12 hour(s))   Basic metabolic panel    Collection Time: 01/20/17  1:45 PM   Result Value Ref Range    Sodium 139 133 - 144 mmol/L    Potassium 3.5 3.4 - 5.3 mmol/L    Chloride 101 94 - 109 mmol/L    Carbon Dioxide 30 20 - 32 mmol/L    Anion Gap 8 3 - 14 mmol/L    Glucose 87 70 - 99 mg/dL    Urea Nitrogen 12 7 - 30 mg/dL    Creatinine 0.71 0.52 - 1.04 mg/dL    GFR Estimate 84 >60 mL/min/1.7m2    GFR Estimate If Black >90   GFR Calc   >60 mL/min/1.7m2    Calcium 8.2 (L) 8.5 - 10.1 mg/dL

## 2017-01-20 NOTE — MR AVS SNAPSHOT
After Visit Summary   1/20/2017    Shirin Muller    MRN: [id number]           Patient Information     Date Of Birth          1960        Visit Information        Provider Department      1/20/2017 1:00 PM UC 16 ATC;  ONCOLOGY INFUSION [facility]        Today's Diagnoses     Malignant neoplasm of head of pancreas (H)    -  1     Need for prophylactic measure           Care Instructions    Contact numbers:  Triage Main Line: [phone]  After hours: [phone]    Call with chills and/or temperature greater than or equal to 100.5 and questions or concerns.    If after hours, weekends, or holidays, call main hospital  at  [phone] and ask for Oncology doctor on call.           January 2017 Sunday Monday Tuesday Wednesday Thursday Friday Saturday   1     2     [facility]    8:45 AM   (15 min.)    [facility] DRAW   [facility] Lab Draw     [facility] RETURN    9:20 AM   (50 min.)   Ester Echavarria APRN CNP   [facility]     [facility] ONC INFUSION 360   10:30 AM   (360 min.)    ONCOLOGY INFUSION   [facility] 3     4     5     [facility] D[facility]    3:00 PM   (15 min.)    [facility] DRAW   [facility] Lab Draw     [facility] ONC INFUSION 240    3:30 PM   (240 min.)    ONCOLOGY INFUSION   [facility] 6     7       8     9     10     11     12     13     14       15     16     17     [facility]    9:00 AM   (15 min.)    [facility] DRAW   [facility] Lab Draw     [facility] ONC INFUSION 360    9:30 AM   (360 min.)    ONCOLOGY INFUSION   [facility]     [facility] RETURN   10:30 AM   (50 min.)   Ester Echavarria APRN CNP   [facility] 18     19     20     [facility] D[facility]   12:30 PM   (15 min.)    [facility] DRAW   [facility] Lab Draw     [facility] ONC INFUSION 120    1:00 PM   (120 min.)    ONCOLOGY INFUSION   [facility]  Cancer [facility] 21       22     23     24     25     26     27     28       29     30     [facility] DRAW   11:00 AM   (15 min.)    [facility] LAB DRAW   [facility] Draw     [facility] RETURN   11:30 AM   (50 min.)   Ester Echavarria APRN CNP   [facility] ONC INFUSION 360   11:30 AM   (360 min.)    ONCOLOGY INFUSION   [facility] 31 February 2017 Sunday Monday Tuesday Wednesday Thursday Friday Saturday                  1     2     3     4       5     6     7     8     9     10     MR ABDOMEN WWO   10:45 AM   (45 min.)   MR1   [facility] MRI     CT CHEST WO   11:40 AM   (20 min.)   CT1   [facility] CT 11       12     13     [facility] DRAW    9:45 AM   (15 min.)    [facility] DRAW   [facility] Draw     [facility] RETURN   10:15 AM   (30 min.)   Demond Gonsales MD   [facility] ONC INFUSION 360   11:30 AM   (360 min.)    ONCOLOGY INFUSION   [facility] 14     15     16     17     18       19     20     21     22     23     24     25       26     27     28                                      Lab Results:  Recent Results (from the past 12 hour(s))   Basic metabolic panel    Collection Time: 01/20/17  1:45 PM   Result Value Ref Range    Sodium 139 133 - 144 mmol/L    Potassium 3.5 3.4 - 5.3 mmol/L    Chloride 101 94 - 109 mmol/L    Carbon Dioxide 30 20 - 32 mmol/L    Anion Gap 8 3 - 14 mmol/L    Glucose 87 70 - 99 mg/dL    Urea Nitrogen 12 7 - 30 mg/dL    Creatinine 0.71 0.52 - 1.04 mg/dL    GFR Estimate 84 >60 mL/min/1.7m2    GFR Estimate If Black >90   GFR Calc   >60 mL/min/1.7m2    Calcium 8.2 (L) 8.5 - 10.1 mg/dL               Follow-ups after your visit        Your next 10 appointments already scheduled     Jan 30, 2017 11:00 AM   [facility] Draw with  [facility] DRAW   [facility] Draw ([facility]  [facility])    [address] [place] [address]  2nd [facility] [address]   [phone]            Jan 30, 2017 11:30 AM   Infusion 360 with  ONCOLOGY INFUSION, UC 13 ATC   [facility] ([facility])    [address] [place] [address]   [phone]            Jan 30, 2017 11:30 AM   (Arrive by 11:15 AM)   Return Visit with TEE Olivas CNP   [facility] ([facility])    [address] [place] [address]   [phone]            Feb 10, 2017 10:45 AM   (Arrive by 10:30 AM)   MR ABDOMEN W/O & W CONTRAST with MR1   [facility] MRI ([facility])    [address] [place] [address]  1st [facility] [address]   [phone]           Take your medicines as usual, unless your doctor tells you not to. Bring a list of your current medicines to your exam (including vitamins, minerals and over-the-counter drugs). Also bring the results of similar scans you may have had.    The day before your exam, drink extra fluids at least six 8-ounce glasses (unless your doctor tells you to restrict your fluids).   Have a blood test (creatinine test) within 30 days of your exam. Go to your clinic or Diagnostic Imaging Department for this test.   Do not eat or drink for 6 hours prior to exam.  The MRI machine uses a strong magnet. Please wear clothes without metal (snaps, zippers). A sweatsuit works well, or we may give you a hospital gown.  Please remove any body piercings and hair extensions before you arrive. You will also remove watches, jewelry, hairpins, wallets, dentures, partial dental plates and hearing aids. You may wear contact lenses, and you may be able to wear your rings. We have a safe place to keep your personal items, but it is safer to leave them at home.   **IMPORTANT** THE INSTRUCTIONS BELOW ARE ONLY FOR THOSE PATIENTS  WHO HAVE BEEN TOLD THEY WILL RECEIVE SEDATION OR GENERAL ANESTHESIA DURING THEIR MRI PROCEDURE:  IF YOU WILL RECEIVE SEDATION (take medicine to help you relax during your exam):   You must get the medicine from your doctor before you arrive. Bring the medicine to the exam. Do not take it at home.   Arrive one hour early. Bring someone who can take you home after the test. Your medicine will make you sleepy. After the exam, you may not drive, take a bus or take a taxi by yourself.   No eating 8 hours before your exam. You may have clear liquids up until 4 hours before your exam. (Clear liquids include water, clear tea, black coffee and fruit juice without pulp.)  IF YOU WILL RECEIVE ANESTHESIA (be asleep for your exam):   Arrive 1 1/2 hours early. Bring someone who can take you home after the test. You may not drive, take a bus or take a taxi by yourself.   No eating 8 hours before your exam. You may have clear liquids up until 4 hours before your exam. (Clear liquids include water, clear tea, black coffee and fruit juice without pulp.)  If you have any questions, please contact your Imaging Department exam site.            Feb 10, 2017 11:40 AM   (Arrive by 11:25 AM)   CT CHEST W/O CONTRAST with UCCT1   [facility] CT ([facility])    [address] [address] [phone]           Please bring any scans or X-rays taken at other hospitals, if similar tests were done. Also bring a list of your medicines, including vitamins, minerals and over-the-counter drugs. It is safest to leave personal items at home.  Be sure to tell your doctor:   If you have any allergies.   If there s any chance you are pregnant.   If you are breastfeeding.   If you have any special needs.  You do not need to do anything special to prepare.  Please wear loose clothing, such as a sweat suit or jogging clothes. Avoid snaps, zippers and other metal. We may ask you to undress  and put on a hospital gown.            Feb 13, 2017  9:45 AM   [facility] Lab Draw with  [facility] LAB DRAW   [facility] Lab Draw ([facility])    [address] [id number] [phone]            Feb 13, 2017 10:15 AM   (Arrive by 10:00 AM)   Return Visit with Demond Gonsales MD   [facility] ([facility])    [address] [place] [id number] [phone]            Feb 13, 2017 11:30 AM   Infusion 360 with  ONCOLOGY INFUSION, UC 13 ATC   [facility] ([facility])    [address] [place] [id number] [phone]              Who to contact     If you have questions or need follow up information about today's clinic visit or your schedule please contact [facility] directly at [phone].  Normal or non-critical lab and imaging results will be communicated to you by MyChart, letter or phone within 4 business days after the clinic has received the results. If you do not hear from us within 7 days, please contact the clinic through [employer] or phone. If you have a critical or abnormal lab result, we will notify you by phone as soon as possible.  Submit refill requests through [employer] or call your pharmacy and they will forward the refill request to us. Please allow 3 business days for your refill to be completed.          Additional Information About Your Visit        [employer] Information     [employer] gives you secure access to your electronic health record. If you see a primary care provider, you can also send messages to your care team and make appointments. If you have questions, please call your primary care clinic.  If you do not have a primary care provider, please call [phone] and they will assist you.        Care EveryWhere ID     This is your Care EveryWhere ID. This could be  used by other organizations to access your [place] medical records  [id number]        Your Vitals Were     Pulse Temperature Respirations Pulse Oximetry          71 98.6  F (37  C) (Oral) 18 100%         Blood Pressure from Last 3 Encounters:   01/20/17 110/64   01/17/17 102/63   01/05/17 108/68    Weight from Last 3 Encounters:   01/20/17 73.392 kg (161 lb 12.8 oz)   01/17/17 73.3 kg (161 lb 9.6 oz)   01/05/17 71.169 kg (156 lb 14.4 oz)              We Performed the Following     Basic metabolic panel        Primary Care Provider Office Phone # Fax #    [facility] [phone]       [address] [place] [id number]        Thank you!     Thank you for choosing [facility]  for your care. Our goal is always to provide you with excellent care. Hearing back from our patients is one way we can continue to improve our services. Please take a few minutes to complete the written survey that you may receive in the mail after your visit with us. Thank you!             Your Updated Medication List - Protect others around you: Learn how to safely use, store and throw away your medicines at www.disposemymeds.org.          This list is accurate as of: 1/20/17  2:58 PM.  Always use your most recent med list.                   Brand Name Dispense Instructions for use    amylase-lipase-protease 60271 UNITS Cpep    CREON    180 capsule    Take 2 capsules (72,000 Units) by mouth 3 times daily (with meals)       dexamethasone 4 MG tablet    DECADRON    3 tablet    Take 1 tablet (4 mg) by mouth daily (with breakfast) for 3 days       docusate sodium 100 MG capsule    COLACE     Take 100 mg by mouth       dronabinol 5 MG capsule    MARINOL    60 capsule    Take 1 capsule (5 mg) by mouth 2 times daily (before meals)       lidocaine-prilocaine cream    EMLA    30 g    Apply topically as needed for other (Use 30-60 minutes prior to port access)       LORazepam 0.5 MG tablet     ATIVAN    30 tablet    Take 1 tablet (0.5 mg) by mouth every 4 hours as needed (Anxiety, Nausea/Vomiting or Sleep)       morphine 15 MG 12 hr tablet    MS CONTIN    60 tablet    Take 1 tablet (15 mg) by mouth every 12 hours       ondansetron 8 MG ODT tab    ZOFRAN-ODT    60 tablet    Take 1 tablet (8 mg) by mouth every 8 hours as needed for nausea       oxyCODONE 5 MG IR tablet    ROXICODONE    100 tablet    Take 1 tablet (5 mg) by mouth every 4 hours as needed for moderate to severe pain       polyethylene glycol powder    MIRALAX/GLYCOLAX    119 g    Take 17 g (1 capful) by mouth daily       potassium chloride 20 MEQ Packet    KLOR-CON    60 packet    Take 20 mEq by mouth daily       prochlorperazine 10 MG tablet    COMPAZINE    30 tablet    Take 1 tablet (10 mg) by mouth every 6 hours as needed (Nausea/Vomiting)

## 2017-01-24 ENCOUNTER — APPOINTMENT (OUTPATIENT)
Dept: GENERAL RADIOLOGY | Facility: CLINIC | Age: 57
DRG: 377 | End: 2017-01-24
Attending: FAMILY MEDICINE
Payer: COMMERCIAL

## 2017-01-24 ENCOUNTER — APPOINTMENT (OUTPATIENT)
Dept: CARDIOLOGY | Facility: CLINIC | Age: 57
DRG: 377 | End: 2017-01-24
Payer: COMMERCIAL

## 2017-01-24 ENCOUNTER — TELEPHONE (OUTPATIENT)
Dept: ONCOLOGY | Facility: CLINIC | Age: 57
End: 2017-01-24

## 2017-01-24 ENCOUNTER — TELEPHONE (OUTPATIENT)
Dept: NURSING | Facility: CLINIC | Age: 57
End: 2017-01-24

## 2017-01-24 ENCOUNTER — HOSPITAL ENCOUNTER (INPATIENT)
Facility: CLINIC | Age: 57
LOS: 5 days | Discharge: HOME | DRG: 377 | End: 2017-01-29
Attending: FAMILY MEDICINE | Admitting: INTERNAL MEDICINE
Payer: COMMERCIAL

## 2017-01-24 DIAGNOSIS — R11.2 NAUSEA VOMITING AND DIARRHEA: ICD-10-CM

## 2017-01-24 DIAGNOSIS — B96.81 DUODENAL ULCER DUE TO HELICOBACTER PYLORI: ICD-10-CM

## 2017-01-24 DIAGNOSIS — K26.9 DUODENAL ULCER DUE TO HELICOBACTER PYLORI: ICD-10-CM

## 2017-01-24 DIAGNOSIS — R19.7 NAUSEA VOMITING AND DIARRHEA: ICD-10-CM

## 2017-01-24 DIAGNOSIS — K26.4 GASTROINTESTINAL HEMORRHAGE ASSOCIATED WITH DUODENAL ULCER: Primary | ICD-10-CM

## 2017-01-24 DIAGNOSIS — K64.4 EXTERNAL HEMORRHOIDS: ICD-10-CM

## 2017-01-24 DIAGNOSIS — R55 SYNCOPE AND COLLAPSE: ICD-10-CM

## 2017-01-24 DIAGNOSIS — D64.89 ANEMIA DUE TO OTHER CAUSE: ICD-10-CM

## 2017-01-24 DIAGNOSIS — E86.0 DEHYDRATION: ICD-10-CM

## 2017-01-24 DIAGNOSIS — E83.39 HYPOPHOSPHATEMIA: ICD-10-CM

## 2017-01-24 DIAGNOSIS — C25.0 MALIGNANT NEOPLASM OF HEAD OF PANCREAS (H): ICD-10-CM

## 2017-01-24 PROBLEM — K92.2 GI BLEED: Status: ACTIVE | Noted: 2017-01-24

## 2017-01-24 LAB
ALBUMIN SERPL-MCNC: 2.6 G/DL (ref 3.4–5)
ALBUMIN UR-MCNC: NEGATIVE MG/DL
ALP SERPL-CCNC: 215 U/L (ref 40–150)
ALT SERPL W P-5'-P-CCNC: 20 U/L (ref 0–50)
ANION GAP SERPL CALCULATED.3IONS-SCNC: 8 MMOL/L (ref 3–14)
APPEARANCE UR: CLEAR
APTT PPP: 30 SEC (ref 22–37)
APTT PPP: 53 SEC (ref 22–37)
AST SERPL W P-5'-P-CCNC: 22 U/L (ref 0–45)
BASOPHILS # BLD AUTO: 0 10E9/L (ref 0–0.2)
BASOPHILS NFR BLD AUTO: 0.1 %
BILIRUB SERPL-MCNC: 0.2 MG/DL (ref 0.2–1.3)
BILIRUB UR QL STRIP: NEGATIVE
BLD PROD TYP BPU: NORMAL
BLD UNIT ID BPU: 0
BLOOD PRODUCT CODE: NORMAL
BPU ID: NORMAL
BUN SERPL-MCNC: 12 MG/DL (ref 7–30)
C DIFF TOX B STL QL: NORMAL
CALCIUM SERPL-MCNC: 7.9 MG/DL (ref 8.5–10.1)
CAMPYLOBACTER GROUP BY NAT: NOT DETECTED
CHLORIDE SERPL-SCNC: 105 MMOL/L (ref 94–109)
CO2 SERPL-SCNC: 25 MMOL/L (ref 20–32)
COLOR UR AUTO: NORMAL
CREAT SERPL-MCNC: 0.64 MG/DL (ref 0.52–1.04)
DIFFERENTIAL METHOD BLD: ABNORMAL
ENTERIC PATHOGEN COMMENT: NORMAL
EOSINOPHIL # BLD AUTO: 0 10E9/L (ref 0–0.7)
EOSINOPHIL NFR BLD AUTO: 0.1 %
ERYTHROCYTE [DISTWIDTH] IN BLOOD BY AUTOMATED COUNT: 19.8 % (ref 10–15)
FIBRINOGEN PPP-MCNC: 254 MG/DL (ref 200–420)
GFR SERPL CREATININE-BSD FRML MDRD: ABNORMAL ML/MIN/1.7M2
GLUCOSE SERPL-MCNC: 119 MG/DL (ref 70–99)
GLUCOSE UR STRIP-MCNC: NEGATIVE MG/DL
HCT VFR BLD AUTO: 19.7 % (ref 35–47)
HGB BLD-MCNC: 5.9 G/DL (ref 11.7–15.7)
HGB BLD-MCNC: 6.7 G/DL (ref 11.7–15.7)
HGB UR QL STRIP: NEGATIVE
IMM GRANULOCYTES # BLD: 0 10E9/L (ref 0–0.4)
IMM GRANULOCYTES NFR BLD: 0.4 %
INR PPP: 1.33 (ref 0.86–1.14)
INR PPP: 1.37 (ref 0.86–1.14)
KETONES UR STRIP-MCNC: NEGATIVE MG/DL
LACTATE BLD-SCNC: 1.2 MMOL/L (ref 0.7–2.1)
LEUKOCYTE ESTERASE UR QL STRIP: NEGATIVE
LIPASE SERPL-CCNC: 30 U/L (ref 73–393)
LYMPHOCYTES # BLD AUTO: 0.6 10E9/L (ref 0.8–5.3)
LYMPHOCYTES NFR BLD AUTO: 7.8 %
MCH RBC QN AUTO: 23.8 PG (ref 26.5–33)
MCHC RBC AUTO-ENTMCNC: 29.9 G/DL (ref 31.5–36.5)
MCV RBC AUTO: 79 FL (ref 78–100)
MONOCYTES # BLD AUTO: 0.3 10E9/L (ref 0–1.3)
MONOCYTES NFR BLD AUTO: 3.5 %
NEUTROPHILS # BLD AUTO: 7 10E9/L (ref 1.6–8.3)
NEUTROPHILS NFR BLD AUTO: 88.1 %
NITRATE UR QL: NEGATIVE
NOROVIRUS I AND II BY NAT: NOT DETECTED
NRBC # BLD AUTO: 0 10*3/UL
NRBC BLD AUTO-RTO: 1 /100
PH UR STRIP: 6.5 PH (ref 5–7)
PLATELET # BLD AUTO: 97 10E9/L (ref 150–450)
POTASSIUM SERPL-SCNC: 3.5 MMOL/L (ref 3.4–5.3)
PROT SERPL-MCNC: 6 G/DL (ref 6.8–8.8)
RBC # BLD AUTO: 2.48 10E12/L (ref 3.8–5.2)
ROTAVIRUS A BY NAT: NOT DETECTED
SALMONELLA SPECIES BY NAT: NOT DETECTED
SHIGA TOXIN 1 GENE BY NAT: NOT DETECTED
SHIGA TOXIN 2 GENE BY NAT: NOT DETECTED
SHIGELLA SP+EIEC IPAH STL QL NAA+PROBE: NOT DETECTED
SODIUM SERPL-SCNC: 138 MMOL/L (ref 133–144)
SP GR UR STRIP: 1.01 (ref 1–1.03)
SPECIMEN SOURCE: NORMAL
TRANSFUSION STATUS PATIENT QL: NORMAL
TROPONIN I SERPL-MCNC: NORMAL UG/L (ref 0–0.04)
URN SPEC COLLECT METH UR: NORMAL
UROBILINOGEN UR STRIP-MCNC: NORMAL MG/DL (ref 0–2)
VIBRIO GROUP BY NAT: NOT DETECTED
WBC # BLD AUTO: 8 10E9/L (ref 4–11)
YERSINIA ENTEROCOLITICA BY NAT: NOT DETECTED

## 2017-01-24 PROCEDURE — P9016 RBC LEUKOCYTES REDUCED: HCPCS | Performed by: FAMILY MEDICINE

## 2017-01-24 PROCEDURE — 80053 COMPREHEN METABOLIC PANEL: CPT | Performed by: EMERGENCY MEDICINE

## 2017-01-24 PROCEDURE — 86901 BLOOD TYPING SEROLOGIC RH(D): CPT | Performed by: FAMILY MEDICINE

## 2017-01-24 PROCEDURE — 99285 EMERGENCY DEPT VISIT HI MDM: CPT | Performed by: FAMILY MEDICINE

## 2017-01-24 PROCEDURE — 87506 IADNA-DNA/RNA PROBE TQ 6-11: CPT | Performed by: NURSE PRACTITIONER

## 2017-01-24 PROCEDURE — 84484 ASSAY OF TROPONIN QUANT: CPT | Performed by: EMERGENCY MEDICINE

## 2017-01-24 PROCEDURE — 25000128 H RX IP 250 OP 636: Performed by: FAMILY MEDICINE

## 2017-01-24 PROCEDURE — 93306 TTE W/DOPPLER COMPLETE: CPT

## 2017-01-24 PROCEDURE — 25000128 H RX IP 250 OP 636: Performed by: NURSE PRACTITIONER

## 2017-01-24 PROCEDURE — 20000002 ZZH R&B BMT INTERMEDIATE

## 2017-01-24 PROCEDURE — 87493 C DIFF AMPLIFIED PROBE: CPT | Performed by: NURSE PRACTITIONER

## 2017-01-24 PROCEDURE — 86900 BLOOD TYPING SEROLOGIC ABO: CPT | Performed by: FAMILY MEDICINE

## 2017-01-24 PROCEDURE — 93010 ELECTROCARDIOGRAM REPORT: CPT | Mod: Z6 | Performed by: FAMILY MEDICINE

## 2017-01-24 PROCEDURE — 86923 COMPATIBILITY TEST ELECTRIC: CPT | Performed by: FAMILY MEDICINE

## 2017-01-24 PROCEDURE — 25000125 ZZHC RX 250: Performed by: FAMILY MEDICINE

## 2017-01-24 PROCEDURE — 99223 1ST HOSP IP/OBS HIGH 75: CPT | Mod: AI | Performed by: INTERNAL MEDICINE

## 2017-01-24 PROCEDURE — 96374 THER/PROPH/DIAG INJ IV PUSH: CPT | Performed by: FAMILY MEDICINE

## 2017-01-24 PROCEDURE — 87177 OVA AND PARASITES SMEARS: CPT | Performed by: NURSE PRACTITIONER

## 2017-01-24 PROCEDURE — 87329 GIARDIA AG IA: CPT | Performed by: NURSE PRACTITIONER

## 2017-01-24 PROCEDURE — 85610 PROTHROMBIN TIME: CPT | Performed by: NURSE PRACTITIONER

## 2017-01-24 PROCEDURE — 85730 THROMBOPLASTIN TIME PARTIAL: CPT | Performed by: NURSE PRACTITIONER

## 2017-01-24 PROCEDURE — 81003 URINALYSIS AUTO W/O SCOPE: CPT | Performed by: NURSE PRACTITIONER

## 2017-01-24 PROCEDURE — 93306 TTE W/DOPPLER COMPLETE: CPT | Mod: 26 | Performed by: INTERNAL MEDICINE

## 2017-01-24 PROCEDURE — 25000125 ZZHC RX 250: Performed by: INTERNAL MEDICINE

## 2017-01-24 PROCEDURE — 86850 RBC ANTIBODY SCREEN: CPT | Performed by: FAMILY MEDICINE

## 2017-01-24 PROCEDURE — 85610 PROTHROMBIN TIME: CPT | Performed by: EMERGENCY MEDICINE

## 2017-01-24 PROCEDURE — 87209 SMEAR COMPLEX STAIN: CPT | Performed by: NURSE PRACTITIONER

## 2017-01-24 PROCEDURE — 83690 ASSAY OF LIPASE: CPT | Performed by: EMERGENCY MEDICINE

## 2017-01-24 PROCEDURE — 71010 XR CHEST PORT 1 VW: CPT

## 2017-01-24 PROCEDURE — 85384 FIBRINOGEN ACTIVITY: CPT | Performed by: NURSE PRACTITIONER

## 2017-01-24 PROCEDURE — 93005 ELECTROCARDIOGRAM TRACING: CPT | Performed by: FAMILY MEDICINE

## 2017-01-24 PROCEDURE — 85730 THROMBOPLASTIN TIME PARTIAL: CPT | Performed by: EMERGENCY MEDICINE

## 2017-01-24 PROCEDURE — 85018 HEMOGLOBIN: CPT | Performed by: NURSE PRACTITIONER

## 2017-01-24 PROCEDURE — 25000128 H RX IP 250 OP 636: Performed by: INTERNAL MEDICINE

## 2017-01-24 PROCEDURE — G0378 HOSPITAL OBSERVATION PER HR: HCPCS

## 2017-01-24 PROCEDURE — 25000132 ZZH RX MED GY IP 250 OP 250 PS 637: Performed by: NURSE PRACTITIONER

## 2017-01-24 PROCEDURE — 85025 COMPLETE CBC W/AUTO DIFF WBC: CPT | Performed by: EMERGENCY MEDICINE

## 2017-01-24 PROCEDURE — 83605 ASSAY OF LACTIC ACID: CPT | Performed by: FAMILY MEDICINE

## 2017-01-24 PROCEDURE — 40000141 ZZH STATISTIC PERIPHERAL IV START W/O US GUIDANCE

## 2017-01-24 PROCEDURE — 87338 HPYLORI STOOL AG IA: CPT | Performed by: NURSE PRACTITIONER

## 2017-01-24 PROCEDURE — 96361 HYDRATE IV INFUSION ADD-ON: CPT | Performed by: FAMILY MEDICINE

## 2017-01-24 PROCEDURE — 99285 EMERGENCY DEPT VISIT HI MDM: CPT | Mod: 25 | Performed by: FAMILY MEDICINE

## 2017-01-24 RX ORDER — NALOXONE HYDROCHLORIDE 0.4 MG/ML
.1-.4 INJECTION, SOLUTION INTRAMUSCULAR; INTRAVENOUS; SUBCUTANEOUS
Status: DISCONTINUED | OUTPATIENT
Start: 2017-01-24 | End: 2017-01-29 | Stop reason: HOSPADM

## 2017-01-24 RX ORDER — DRONABINOL 5 MG/1
5 CAPSULE ORAL
Status: DISCONTINUED | OUTPATIENT
Start: 2017-01-24 | End: 2017-01-26

## 2017-01-24 RX ORDER — POLYETHYLENE GLYCOL 3350 17 G/17G
17 POWDER, FOR SOLUTION ORAL DAILY
Status: DISCONTINUED | OUTPATIENT
Start: 2017-01-24 | End: 2017-01-25

## 2017-01-24 RX ORDER — POTASSIUM CHLORIDE 1.5 G/1.58G
20 POWDER, FOR SOLUTION ORAL DAILY
Status: DISCONTINUED | OUTPATIENT
Start: 2017-01-24 | End: 2017-01-25

## 2017-01-24 RX ORDER — LORAZEPAM 0.5 MG/1
0.5 TABLET ORAL EVERY 4 HOURS PRN
Status: DISCONTINUED | OUTPATIENT
Start: 2017-01-24 | End: 2017-01-29 | Stop reason: HOSPADM

## 2017-01-24 RX ORDER — SODIUM CHLORIDE 9 MG/ML
INJECTION, SOLUTION INTRAVENOUS CONTINUOUS
Status: DISCONTINUED | OUTPATIENT
Start: 2017-01-24 | End: 2017-01-24

## 2017-01-24 RX ORDER — ONDANSETRON 2 MG/ML
4 INJECTION INTRAMUSCULAR; INTRAVENOUS ONCE
Status: COMPLETED | OUTPATIENT
Start: 2017-01-24 | End: 2017-01-24

## 2017-01-24 RX ORDER — OXYCODONE HYDROCHLORIDE 5 MG/1
5 TABLET ORAL EVERY 4 HOURS PRN
Status: DISCONTINUED | OUTPATIENT
Start: 2017-01-24 | End: 2017-01-29 | Stop reason: HOSPADM

## 2017-01-24 RX ORDER — DRONABINOL 2.5 MG/1
5 CAPSULE ORAL
Status: DISCONTINUED | OUTPATIENT
Start: 2017-01-24 | End: 2017-01-24

## 2017-01-24 RX ORDER — ONDANSETRON 8 MG/1
8 TABLET, ORALLY DISINTEGRATING ORAL EVERY 8 HOURS PRN
Status: DISCONTINUED | OUTPATIENT
Start: 2017-01-24 | End: 2017-01-27

## 2017-01-24 RX ORDER — PROCHLORPERAZINE MALEATE 5 MG
10 TABLET ORAL EVERY 6 HOURS PRN
Status: DISCONTINUED | OUTPATIENT
Start: 2017-01-24 | End: 2017-01-29 | Stop reason: HOSPADM

## 2017-01-24 RX ORDER — SODIUM CHLORIDE 9 MG/ML
INJECTION, SOLUTION INTRAVENOUS CONTINUOUS
Status: DISCONTINUED | OUTPATIENT
Start: 2017-01-24 | End: 2017-01-28

## 2017-01-24 RX ORDER — MORPHINE SULFATE 15 MG/1
15 TABLET, FILM COATED, EXTENDED RELEASE ORAL EVERY 12 HOURS
Status: DISCONTINUED | OUTPATIENT
Start: 2017-01-24 | End: 2017-01-29 | Stop reason: HOSPADM

## 2017-01-24 RX ADMIN — MORPHINE SULFATE 15 MG: 15 TABLET, EXTENDED RELEASE ORAL at 19:13

## 2017-01-24 RX ADMIN — ONDANSETRON 4 MG: 2 INJECTION INTRAMUSCULAR; INTRAVENOUS at 11:19

## 2017-01-24 RX ADMIN — SODIUM CHLORIDE 1000 ML: 9 INJECTION, SOLUTION INTRAVENOUS at 10:55

## 2017-01-24 RX ADMIN — SODIUM CHLORIDE: 9 INJECTION, SOLUTION INTRAVENOUS at 15:23

## 2017-01-24 RX ADMIN — SODIUM CHLORIDE 8 MG/HR: 9 INJECTION, SOLUTION INTRAVENOUS at 18:09

## 2017-01-24 RX ADMIN — OCTREOTIDE ACETATE 50 MCG/HR: 200 INJECTION, SOLUTION INTRAVENOUS; SUBCUTANEOUS at 17:00

## 2017-01-24 RX ADMIN — OXYCODONE HYDROCHLORIDE 5 MG: 5 TABLET ORAL at 19:08

## 2017-01-24 RX ADMIN — PANTOPRAZOLE SODIUM 40 MG: 40 INJECTION, POWDER, FOR SOLUTION INTRAVENOUS at 17:16

## 2017-01-24 ASSESSMENT — ENCOUNTER SYMPTOMS
ANAL BLEEDING: 0
APPETITE CHANGE: 1
VOMITING: 1
FACIAL SWELLING: 0
TROUBLE SWALLOWING: 0
CHOKING: 0
CHEST TIGHTNESS: 0
COUGH: 0
DYSURIA: 0
NECK PAIN: 0
NECK STIFFNESS: 0
SLEEP DISTURBANCE: 0
PALPITATIONS: 0
SHORTNESS OF BREATH: 0
COLOR CHANGE: 0
DYSPHORIC MOOD: 1
BLOOD IN STOOL: 0
EYE REDNESS: 0
ACTIVITY CHANGE: 1
DIFFICULTY URINATING: 0
VOICE CHANGE: 0
FATIGUE: 1
ABDOMINAL PAIN: 1
DIARRHEA: 1
SEIZURES: 0
FEVER: 0
LIGHT-HEADEDNESS: 1
NAUSEA: 1
ARTHRALGIAS: 0
BACK PAIN: 0
WOUND: 0
BRUISES/BLEEDS EASILY: 0
CONFUSION: 0
HEMATURIA: 0
WEAKNESS: 1
HEADACHES: 0
DECREASED CONCENTRATION: 1

## 2017-01-24 ASSESSMENT — PAIN DESCRIPTION - DESCRIPTORS: DESCRIPTORS: CRAMPING

## 2017-01-24 NOTE — TELEPHONE ENCOUNTER
"Call Type: Triage Call    Presenting Problem: Shirin \" just fell and feels weak\".  [facility]  Triage/falls/disposition is to call 911 Shirin is not wanting to go  to ED is wanting to schedule at clinic.  Triage Note:  Guideline Title: Falls ; Falls  Recommended Disposition: Activate   Original Inclination: Wanted to speak with a nurse  Override Disposition:  Intended Action: Call PCP/HCP  Physician Contacted: No  History of recurring falls and no prior evaluation by provider ?  YES  Signs of dehydration ? NO  New or worsening signs and symptoms that may indicate shock ? NO  Sudden onset of orthostatic symptoms AND taking blood thinners ? NO  Injury resulting in severe pain at rest, immobility, OR inability to bear weight ?  NO  Resulted in head injury ? NO  New numbness/tingling; weakness/paralysis; or loss of coordination following fall  ? NO  New onset falling AND suspected alcohol or drug use (prescription or illegal) or  recently stopped same ? NO  Severe pain with movement that limits normal activities ? NO  Immobile since injury ? NO  New onset of orthostatic symptoms ? NO  New onset falling AND any temperature in an immunocompromised or frail elderly  person OR sudden change in functional status ? NO  New onset or unexplained change in bowel or bladder control (unable to urinate and  full feeling or loss of control of bowel or bladder) ? NO  Symptoms began after a change or starting a new prescription or nonprescription  medicine or alternative medicine / therapy within last 4 weeks ? NO  Neck injury with pain and mobile since injury ? NO  Any abrasions, lacerations after fall ? NO  Any puncture wounds after fall ? NO  Obvious deformity (odd angle or obvious misalignment) OR pain involving two or  more long bones. ? NO  Injury immediately followed new (within last 8 hours) unexplained  weakness/paralysis, change in sensation, or inability to purposely move,  especially when only one side of body is " involved. ? NO  Physician Instructions:  Care Advice: Call  if patient loses consciousness, develops chest  pain, or has mental status changes such as confusion or lethargy.  Request evaluation for appropriate assistive device, such as a walker, to  widen the base of support and be sure they are used as instructed.  Do not give the patient anything to eat or drink.  Call provider if symptoms worsen or new symptoms develop.  Place telephone and frequently used items within easy reach. Consider a  bedside commode for use at night or as needed.  All older persons at risk for falls should have a fall evaluation performed  at least once a year.  When sitting, use a chair with arms to help protect from falling.  Do not drive until evaluated by provider  consider the safety of others as well as yourself.  IMMEDIATE ACTION  CAUTIONS  HEALTH PROMOTION / MAINTENANCE  SYMPTOM / CONDITION MANAGEMENT  Write down provider's name. List or place the following in a bag for  transport with the patient: current prescription and/or nonprescription  medications  alternative treatments, therapies and medications  and street drugs.  Take all prescribed, nonprescription, or alternative medication(s) to your  provider visit so they can be evaluated for increasing risk for falls.  Spinal Immobilization:    - If head, neck, or spinal injury or disease is  known, or suspected, tell the patient not to move.   - Do not move the  person unless there is an immediate threat to their life.  - If moving  becomes absolutely necessary, immobilize head, neck and spine. - Then move  the patient's head, neck and body as a single unit to prevent or not worsen  spinal cord injury.  - If vomiting, immobilize, then roll patient to side  with head, neck and body as a single unit. Do not turn or move head or  neck.  Reducing Risks for Falls:   - Wear low-heeled supportive shoes with  non-slip soles.  Avoid walking in only socks or floppy, backless shoes  or  slippers.   - Wear clean eyeglasses.  Take off reading glasses or  multi-focal lenses before walking.  Have your vision tested at least  annually or whenever vision changes occur.   - Use a cane or walker when  not feeling well or when situation increases fall risk (thick carpeting,  slick or uneven caron, bad weather, etc.)   - Limit the amount of  alcohol you drink.  Balance and reflexes can be affected by even a small  amount of alcohol.  Environmental Safety Measures to Prevent Falls: - Remove tripping hazards:  remove throw rugs, electrical cords, clutter and furniture from walkways. -  Install bright and evenly distributed home lighting.    - Install safety  devices such as grab bars, handrails and raised toilet seat.   - Keep a  phone in rooms you are in most often.  Allow an answering machine to pick  up calls to avoid rushing to answer a call.   - If use of stairs is  unavoidable, vita the edges of the step with brightly colored adhesive tape  and use non-slip treads on stairs.  An adult should stay with the patient, preferably one trained in CPR. If  the person is not trained in CPR, then he or she should provide hands-only  (compression-only) CPR as recommended by the American [employer] Association.

## 2017-01-24 NOTE — ED NOTES
Bed: IN01  Expected date: 17  Expected time:   Means of arrival:   Comments:  MRN: [id number]  1960  Metastatic pancreatic cx  Fall at home, unconscious for 1 minute

## 2017-01-24 NOTE — PROGRESS NOTES
Patient admitted to:  room 5-413  Admitted from: ED  Arrived by: litter  Reason for admission: anemia and syncope episode  Patient accompanied by: son, Leilani  Belongings: in room with patient  Teaching: admission teaching completed per unit routine

## 2017-01-24 NOTE — IP AVS SNAPSHOT
Unit 5C BMT [address] [address]    Phone:  [phone]    Fax:  [phone]                                       After Visit Summary   1/24/2017    Shirin Muller    MRN: [id number]           After Visit Summary Signature Page     I have received my discharge instructions, and my questions have been answered. I have discussed any challenges I see with this plan with the nurse or doctor.    ..........................................................................................................................................  Patient/Patient Representative Signature      ..........................................................................................................................................  Patient Representative Print Name and Relationship to Patient    ..................................................               ................................................  Date                                            Time    ..........................................................................................................................................  Reviewed by Signature/Title    ...................................................              ..............................................  Date                                                            Time

## 2017-01-24 NOTE — ED NOTES
Pt comes into ED with family. Patient reports falling this AM at 730am. Pt states she feels weak, dizzy, and lightheaded. Pt denies head, neck or back pain. She is unsure if she had a loss of consciousness with her fall. No evidence of injury.    Pt is currently being treated for pancreatic cancer. Her last treatment was last Monday 1/16/17.

## 2017-01-24 NOTE — IP AVS SNAPSHOT
MRN:[id number]                      After Visit Summary   1/24/2017    Shirin Muller    MRN: [id number]           Thank you!     Thank you for choosing [facility] for your care. Our goal is always to provide you with excellent care. Hearing back from our patients is one way we can continue to improve our services. Please take a few minutes to complete the written survey that you may receive in the mail after you visit with us. Thank you!        Patient Information     Date Of Birth          1960        About your hospital stay     You were admitted on:  January 24, 2017 You last received care in the:  [facility] [facility]    You were discharged on:  January 29, 2017        Reason for your hospital stay       Fall at home, anemia, GI bleeding from duodenal ulcer.                  Who to Call     For medical emergencies, please call 911.  For non-urgent questions about your medical care, please call your primary care provider or clinic, None  For questions related to your surgery, please call your surgery clinic        Attending Provider     Provider    Phillip Barragan MD Fujioka, Naomi, MD       Primary Care Provider    [facility]       No address on file         When to contact your care team       Call the [facility] 24-hour triage line at [phone] or [phone] for temp >100.4, uncontrolled nausea/vomiting/diarrhea/constipation, unrelieved pain, bleeding not relieved with pressure, dizziness, chest pain, shortness of breath, loss of consciousness, and any new or concerning symptoms.     Call [phone] and ask for the care coordinator that works with your oncologist if you have questions about scans, appointments, hospital follow-up, or other concerns.                  After Care Instructions     Activity       Your activity upon discharge: Activity as tolerated. No driving or strenuous activity while taking narcotics, if having  headaches/dizziness/vision changes, or if feeling generally weak or unwell.            Diet       Follow this diet upon discharge: Regular diet as tolerated. Be sure to drink plenty of non-caffeinated beverages. Supplement your diet with high-calorie snacks or nutritional supplements (e.g. Boost, Ensure, Resource Breeze) if needed to ensure adequate calorie intake. Notify your primary provider if you have a poor appetite, are not eating well, and/or have been losing weight unintentionally.            Monitor and record       Monitor and record your bowel movements for the next few days after discharge from the hospital. Call the triage number or clinic if your stools are dark/tarry, maroon, or bright red.                  Follow-up Appointments     Follow Up and recommended labs and tests       We would like you to follow up in clinic with labs next week. We will call to notify you of the date/time.                  Your next 10 appointments already scheduled     Feb 10, 2017 10:45 AM   (Arrive by 10:30 AM)   MR ABDOMEN W/O & W CONTRAST with [address] MRI ([facility])    [address] [place] [address] [phone]           Take your medicines as usual, unless your doctor tells you not to. Bring a list of your current medicines to your exam (including vitamins, minerals and over-the-counter drugs). Also bring the results of similar scans you may have had.    The day before your exam, drink extra fluids at least six 8-ounce glasses (unless your doctor tells you to restrict your fluids).   Have a blood test (creatinine test) within 30 days of your exam. Go to your clinic or Diagnostic Imaging Department for this test.   Do not eat or drink for 6 hours prior to exam.  The MRI machine uses a strong magnet. Please wear clothes without metal (snaps, zippers). A sweatsuit works well, or we may give you a hospital gown.  Please remove any body  piercings and hair extensions before you arrive. You will also remove watches, jewelry, hairpins, wallets, dentures, partial dental plates and hearing aids. You may wear contact lenses, and you may be able to wear your rings. We have a safe place to keep your personal items, but it is safer to leave them at home.   **IMPORTANT** THE INSTRUCTIONS BELOW ARE ONLY FOR THOSE PATIENTS WHO HAVE BEEN TOLD THEY WILL RECEIVE SEDATION OR GENERAL ANESTHESIA DURING THEIR MRI PROCEDURE:  IF YOU WILL RECEIVE SEDATION (take medicine to help you relax during your exam):   You must get the medicine from your doctor before you arrive. Bring the medicine to the exam. Do not take it at home.   Arrive one hour early. Bring someone who can take you home after the test. Your medicine will make you sleepy. After the exam, you may not drive, take a bus or take a taxi by yourself.   No eating 8 hours before your exam. You may have clear liquids up until 4 hours before your exam. (Clear liquids include water, clear tea, black coffee and fruit juice without pulp.)  IF YOU WILL RECEIVE ANESTHESIA (be asleep for your exam):   Arrive 1 1/2 hours early. Bring someone who can take you home after the test. You may not drive, take a bus or take a taxi by yourself.   No eating 8 hours before your exam. You may have clear liquids up until 4 hours before your exam. (Clear liquids include water, clear tea, black coffee and fruit juice without pulp.)  If you have any questions, please contact your Imaging Department exam site.            Feb 10, 2017 11:40 AM   (Arrive by 11:25 AM)   CT CHEST W/O CONTRAST with [facility]   [facility] CT ([facility])    [address] [address] [phone]           Please bring any scans or X-rays taken at other hospitals, if similar tests were done. Also bring a list of your medicines, including vitamins, minerals and over-the-counter drugs. It is  safest to leave personal items at home.  Be sure to tell your doctor:   If you have any allergies.   If there s any chance you are pregnant.   If you are breastfeeding.   If you have any special needs.  You do not need to do anything special to prepare.  Please wear loose clothing, such as a sweat suit or jogging clothes. Avoid snaps, zippers and other metal. We may ask you to undress and put on a hospital gown.            Feb 13, 2017  9:45 AM   [facility]   [facility] Lab Draw ([facility])    [address] [place] [id number] [phone]            Feb 13, 2017 10:15 AM   (Arrive by 10:00 AM)   Return Visit with Demond Gonsales MD   [facility] ([facility])    [address] [place] [id number] [phone]            Feb 13, 2017 11:30 AM   Infusion 360 with  ONCOLOGY INFUSION,  13 ATC   [facility] ([facility])    [address] [place] [id number] [phone]              Additional Services     MED THERAPY MANAGE REFERRAL       Your provider has referred you to: **[facility] Scheduling (numerous locations) [phone]   [email or web address]  UMP: [facility] [phone]   [email or web address]    Reason for Referral: recent hospitalization    The [facility] department will contact you to schedule an appointment.  You may also schedule the appointment by calling [phone].  For [facility] [place] patients, please call [phone] to confirm/schedule your appointment on the next business day.    This service is designed to help you get the most from your medications.  A specially trained Pharmacist  will work closely with you and your providers to solve any questions, concerns, issues or problems related to your medications.    Please bring all of your prescription and non-prescription medications (such as vitamins, over-the-counter medications, and herbals) or a detailed medication list to your appointment.    If you have a glucose meter or other home monitoring information, please also bring this to your appointment (i.e. blood glucose log, blood pressure log, pain log, etc.).            Physical Therapy Referral       *This therapy referral will be filtered to a centralized scheduling office at [facility] and the patient will receive a call to schedule an appointment at a [place] location most convenient for them. *     [facility] provides Physical Therapy evaluation and treatment and many specialty services across the [place] system.  If requesting a specialty program, please choose from the list below.    If you have not heard from the scheduling office within 2 business days, please call [phone] for all locations, with the exception of [place], please call [phone].  Treatment: Evaluation & Treatment  Special Instructions/Modalities: none  Special Programs: Cancer Rehabilitation (PT and OT Evaluate & Treat)    Please be aware that coverage of these services is subject to the terms and limitations of your health insurance plan.  Call member services at your health plan with any benefit or coverage questions.      **Note to Provider:  If you are referring outside of [facility] for the therapy appointment, please list the name of the location in the  special instructions  above, print the referral and give to the patient to schedule the appointment.            Physical Therapy Referral       *This therapy referral will be filtered to a centralized scheduling office at [facility] and the patient will receive a call to schedule an  appointment at a [place] location most convenient for them. *     [facility] provides Physical Therapy evaluation and treatment and many specialty services across the [place] system.  If requesting a specialty program, please choose from the list below.    If you have not heard from the scheduling office within 2 business days, please call [phone] for all locations, with the exception of [place], please call [phone].  Treatment: Evaluation & Treatment  Special Instructions/Modalities: Cancer Rehab  Special Programs: Cancer Rehabilitation (PT and OT Evaluate & Treat)    Please be aware that coverage of these services is subject to the terms and limitations of your health insurance plan.  Call member services at your health plan with any benefit or coverage questions.      **Note to Provider:  If you are referring outside of [place] for the therapy appointment, please list the name of the location in the  special instructions  above, print the referral and give to the patient to schedule the appointment.                  Future tests that were ordered for you     CBC with platelets and differential       Last Lab Result: HEMOGLOBIN (g/dL)       Date                     Value                 01/29/2017               9.3*             ----------            Comprehensive metabolic panel (BMP + Alb, Alk Phos, ALT, AST, Total. Bili, TP)                 Pending Results     Date and Time Order Name Status Description    1/27/2017 2200 Zinc In process     1/27/2017 2200 Prealbumin In process     1/26/2017 1727 Blood culture Preliminary     1/26/2017 1637 Blood culture Preliminary             Statement of Approval     Ordered          01/29/17 1132  I have reviewed and agree with all the recommendations and orders detailed in this document.   EFFECTIVE NOW     Approved and electronically signed by:  Zena Sharif APRN CNP             Admission Information        Provider Department Dept  "Phone    1/24/2017 Barb Tatum MD [facility] [phone]      Your Vitals Were     Blood Pressure Pulse Temperature    102/60 mmHg 89 98.5  F (36.9  C) (Oral)    Respirations Height Weight    20 1.778 m (5' 10\") 74.753 kg (164 lb 12.8 oz)    BMI (Body Mass Index) Pulse Oximetry       23.65 kg/m2 99%       [facility] Information     [employer] gives you secure access to your electronic health record. If you see a primary care provider, you can also send messages to your care team and make appointments. If you have questions, please call your primary care clinic.  If you do not have a primary care provider, please call [phone] and they will assist you.        Care EveryWhere ID     This is your Care EveryWhere ID. This could be used by other organizations to access your [facility] medical records  [id number]           Review of your medicines      START taking        Dose / Directions    amoxicillin 500 MG capsule   Commonly known as:  AMOXIL   Indication:  H. Pylori   Used for:  Duodenal ulcer due to Helicobacter pylori        Dose:  1000 mg   Take 2 capsules (1,000 mg) by mouth every 12 hours for 10 days   Quantity:  40 capsule   Refills:  0       clarithromycin 500 MG tablet   Commonly known as:  BIAXIN   Indication:  H. pylori   Used for:  Duodenal ulcer due to Helicobacter pylori        Dose:  500 mg   Take 1 tablet (500 mg) by mouth every 12 hours for 10 days   Quantity:  20 tablet   Refills:  0       diltiazem 2% in PLO cream (FV COMPOUNDED) 2% Gel   Used for:  External hemorrhoids        Apply topically daily and as needed to external hemorrhoids   Quantity:  30 g   Refills:  0       pantoprazole 40 MG EC tablet   Commonly known as:  PROTONIX   Used for:  Gastrointestinal hemorrhage associated with duodenal ulcer        Dose:  40 mg   Take 1 tablet (40 mg) by mouth 2 times daily . After taking this twice daily for 8 weeks, you can decrease dose to once daily.   Quantity:  60 tablet   Refills:  1       " ranitidine 150 MG tablet   Commonly known as:  ZANTAC   Used for:  Gastrointestinal hemorrhage associated with duodenal ulcer        Dose:  150 mg   Take 1 tablet (150 mg) by mouth At Bedtime   Quantity:  30 tablet   Refills:  0       sucralfate 1 GM tablet   Commonly known as:  CARAFATE   Used for:  Gastrointestinal hemorrhage associated with duodenal ulcer        Dose:  1 g   Take 1 tablet (1 g) by mouth 3 times daily   Quantity:  90 tablet   Refills:  0         CONTINUE these medicines which may have CHANGED, or have new prescriptions. If we are uncertain of the size of tablets/capsules you have at home, strength may be listed as something that might have changed.        Dose / Directions    docusate sodium 100 MG capsule   Commonly known as:  COLACE   This may have changed:    - when to take this  - reasons to take this   Used for:  External hemorrhoids        Dose:  100 mg   Take 1 capsule (100 mg) by mouth daily   Quantity:  100 capsule   Refills:  0         CONTINUE these medicines which have NOT CHANGED        Dose / Directions    amylase-lipase-protease 15965 UNITS Cpep   Commonly known as:  CREON   Indication:  Pancreatic Insufficiency   Used for:  Malignant neoplasm of head of pancreas (H)        Dose:  2 capsule   Take 2 capsules (72,000 Units) by mouth 3 times daily (with meals)   Quantity:  180 capsule   Refills:  1       dronabinol 5 MG capsule   Commonly known as:  MARINOL   Used for:  Anorexia        Dose:  5 mg   Take 1 capsule (5 mg) by mouth 2 times daily (before meals)   Quantity:  60 capsule   Refills:  0       lidocaine-prilocaine cream   Commonly known as:  EMLA   Used for:  Malignant neoplasm of head of pancreas (H)        Apply topically as needed for other (Use 30-60 minutes prior to port access)   Quantity:  30 g   Refills:  0       loratadine 10 MG tablet   Commonly known as:  CLARITIN        Dose:  10 mg   Take 10 mg by mouth daily   Refills:  0       LORazepam 0.5 MG tablet   Commonly  known as:  ATIVAN   Used for:  Malignant neoplasm of head of pancreas (H)        Dose:  0.5 mg   Take 1 tablet (0.5 mg) by mouth every 4 hours as needed (Anxiety, Nausea/Vomiting or Sleep)   Quantity:  30 tablet   Refills:  2       morphine 15 MG 12 hr tablet   Commonly known as:  MS CONTIN   Used for:  Malignant neoplasm of head of pancreas (H)        Dose:  15 mg   Take 1 tablet (15 mg) by mouth every 12 hours   Quantity:  60 tablet   Refills:  0       ondansetron 8 MG ODT tab   Commonly known as:  ZOFRAN-ODT   Used for:  Malignant neoplasm of head of pancreas (H)        Dose:  8 mg   Take 1 tablet (8 mg) by mouth every 8 hours as needed for nausea   Quantity:  60 tablet   Refills:  4       oxyCODONE 5 MG IR tablet   Commonly known as:  ROXICODONE   Used for:  Malignant neoplasm of head of pancreas (H)        Dose:  5 mg   Take 1 tablet (5 mg) by mouth every 4 hours as needed for moderate to severe pain   Quantity:  100 tablet   Refills:  0       polyethylene glycol powder   Commonly known as:  MIRALAX/GLYCOLAX   Used for:  Malignant neoplasm of head of pancreas (H)        Dose:  1 capful   Take 17 g (1 capful) by mouth daily   Quantity:  119 g   Refills:  11       prochlorperazine 10 MG tablet   Commonly known as:  COMPAZINE   Used for:  Malignant neoplasm of head of pancreas (H)        Dose:  10 mg   Take 1 tablet (10 mg) by mouth every 6 hours as needed (Nausea/Vomiting)   Quantity:  30 tablet   Refills:  2         STOP taking     loperamide 2 MG capsule   Commonly known as:  IMODIUM           potassium chloride 20 MEQ Packet   Commonly known as:  KLOR-CON                Where to get your medicines      These medications were sent to [facility] - [place], [place] - [address] [facility]  [address] [facility] [address]     Phone:  [phone]    - amoxicillin 500 MG capsule  - clarithromycin 500 MG tablet  - diltiazem 2% in PLO cream (FV COMPOUNDED) 2% Gel  - docusate sodium 100 MG  capsule  - pantoprazole 40 MG EC tablet  - ranitidine 150 MG tablet  - sucralfate 1 GM tablet             Protect others around you: Learn how to safely use, store and throw away your medicines at www.disposemymeds.org.             Medication List: This is a list of all your medications and when to take them. Check marks below indicate your daily home schedule. Keep this list as a reference.      Medications           Morning Afternoon Evening Bedtime As Needed    amoxicillin 500 MG capsule   Commonly known as:  AMOXIL   Take 2 capsules (1,000 mg) by mouth every 12 hours for 10 days   Last time this was given:  1,000 mg on 1/29/2017  8:07 AM                                amylase-lipase-protease 92474 UNITS Cpep   Commonly known as:  CREON   Take 2 capsules (72,000 Units) by mouth 3 times daily (with meals)                                clarithromycin 500 MG tablet   Commonly known as:  BIAXIN   Take 1 tablet (500 mg) by mouth every 12 hours for 10 days   Last time this was given:  500 mg on 1/29/2017  8:07 AM                                diltiazem 2% in PLO cream (FV COMPOUNDED) 2% Gel   Apply topically daily and as needed to external hemorrhoids   Last time this was given:  1/29/2017  8:08 AM                                docusate sodium 100 MG capsule   Commonly known as:  COLACE   Take 1 capsule (100 mg) by mouth daily                                dronabinol 5 MG capsule   Commonly known as:  MARINOL   Take 1 capsule (5 mg) by mouth 2 times daily (before meals)   Last time this was given:  5 mg on 1/29/2017  8:07 AM                                lidocaine-prilocaine cream   Commonly known as:  EMLA   Apply topically as needed for other (Use 30-60 minutes prior to port access)                                loratadine 10 MG tablet   Commonly known as:  CLARITIN   Take 10 mg by mouth daily                                LORazepam 0.5 MG tablet   Commonly known as:  ATIVAN   Take 1 tablet (0.5 mg) by mouth  every 4 hours as needed (Anxiety, Nausea/Vomiting or Sleep)   Last time this was given:  0.5 mg on 1/25/2017  7:17 PM                                morphine 15 MG 12 hr tablet   Commonly known as:  MS CONTIN   Take 1 tablet (15 mg) by mouth every 12 hours   Last time this was given:  15 mg on 1/29/2017  8:07 AM                                ondansetron 8 MG ODT tab   Commonly known as:  ZOFRAN-ODT   Take 1 tablet (8 mg) by mouth every 8 hours as needed for nausea   Last time this was given:  8 mg on 1/29/2017  8:19 AM                                oxyCODONE 5 MG IR tablet   Commonly known as:  ROXICODONE   Take 1 tablet (5 mg) by mouth every 4 hours as needed for moderate to severe pain   Last time this was given:  5 mg on 1/25/2017  7:17 PM                                pantoprazole 40 MG EC tablet   Commonly known as:  PROTONIX   Take 1 tablet (40 mg) by mouth 2 times daily . After taking this twice daily for 8 weeks, you can decrease dose to once daily.                                polyethylene glycol powder   Commonly known as:  MIRALAX/GLYCOLAX   Take 17 g (1 capful) by mouth daily                                prochlorperazine 10 MG tablet   Commonly known as:  COMPAZINE   Take 1 tablet (10 mg) by mouth every 6 hours as needed (Nausea/Vomiting)   Last time this was given:  10 mg on 1/26/2017  3:59 PM                                ranitidine 150 MG tablet   Commonly known as:  ZANTAC   Take 1 tablet (150 mg) by mouth At Bedtime   Last time this was given:  150 mg on 1/28/2017  9:31 PM                                sucralfate 1 GM tablet   Commonly known as:  CARAFATE   Take 1 tablet (1 g) by mouth 3 times daily

## 2017-01-24 NOTE — ED PROVIDER NOTES
History     Chief Complaint   Patient presents with     Generalized Weakness     Fall     HPI  Shirin Muller is a 56 year old female with a history of Grave's disease and metastatic pancreatic cancer (currently on chemo) who presents to the Emergency Department after a fall. The patient reports that she receives chemo every 2 weeks. She states that she has been feeling unwell since she last underwent chemo 8 days ago with symptoms of nausea, vomiting, and generalized weakness. She reports that today she fell onto her knees and then fell forward. She thinks she may have briefly lost consciousness for 1 minute. Patient denies any pain. No cough or shortness of breath. Patient stated that she had gone to the bathroom prior to this event as she gets diarrhea from chemo but denies any blood in the stool.  No headache no seizure activity.  No chest pain or palpitations. Patient now feels weak.    I have reviewed the Medications, Allergies, Past Medical and Surgical History, and Social History in the [employer] system.    PAST MEDICAL HISTORY  Past Medical History   Diagnosis Date     Graves disease      Biliary stricture      Pancreatic mass      Lesion of liver      Malignant neoplasm of head of pancreas (H) 2016     PAST SURGICAL HISTORY  Past Surgical History   Procedure Laterality Date      section       Esophagoscopy, gastroscopy, duodenoscopy (egd), combined N/A 2016     Procedure: COMBINED ENDOSCOPIC ULTRASOUND, ESOPHAGOSCOPY, GASTROSCOPY, DUODENOSCOPY (EGD), FINE NEEDLE ASPIRATE/BIOPSY;  Surgeon: Arias Taveras MD;  Location: [facility] OR     Endoscopic retrograde cholangiopancreatogram N/A 2016     Procedure: COMBINED ENDOSCOPIC RETROGRADE CHOLANGIOPANCREATOGRAPHY, PLACE TUBE/STENT;  Surgeon: Arias Taveras MD;  Location: [facility] OR     FAMILY HISTORY  Family History   Problem Relation Age of Onset     DIABETES Mother      SOCIAL HISTORY  Social History   Substance Use Topics      Smoking status: Never Smoker      Smokeless tobacco: Never Used     Alcohol Use: No     MEDICATIONS  Current Facility-Administered Medications   Medication     amylase-lipase-protease (CREON 36) 87681 UNITS per capsule 72,000 Units     LORazepam (ATIVAN) tablet 0.5 mg     morphine (MS CONTIN) 12 hr tablet 15 mg     ondansetron (ZOFRAN-ODT) ODT tab 8 mg     oxyCODONE (ROXICODONE) IR tablet 5 mg     polyethylene glycol (MIRALAX/GLYCOLAX) Packet 17 g     potassium chloride (KLOR-CON) Packet 20 mEq     prochlorperazine (COMPAZINE) tablet 10 mg     naloxone (NARCAN) injection 0.1-0.4 mg     0.9% sodium chloride infusion     [START ON 1/25/2017] influenza quadrivalent (PF) vacc age 3 yrs and older (FLUZONE or Flulaval) injection 0.5 mL     octreotide (sandoSTATIN) 1,250 mcg in NaCl 0.9 % 250 mL     pantoprazole (PROTONIX) 80 mg in NaCl 0.9 % 100 mL infusion     HOLD MEDICATION     dronabinol (MARINOL) capsule 5 mg     ALLERGIES  Allergies   Allergen Reactions     Contrast Dye Nausea and Vomiting     Pt vomiting post IV contrast, Please try Visipaque for next CT scan. 6/24/16 sv        Review of Systems   Constitutional: Positive for activity change, appetite change and fatigue. Negative for fever.   HENT: Negative for congestion, facial swelling, mouth sores, nosebleeds, trouble swallowing and voice change.    Eyes: Negative for redness and visual disturbance.   Respiratory: Negative for cough, choking, chest tightness and shortness of breath.    Cardiovascular: Negative for chest pain, palpitations and leg swelling.   Gastrointestinal: Positive for nausea, vomiting, abdominal pain (patient's as her normal abdominal pain but nothing different she did take her own pain medication and is feeling better) and diarrhea. Negative for blood in stool and anal bleeding.   Endocrine: Negative for polyuria.   Genitourinary: Negative for dysuria, hematuria, vaginal bleeding and difficulty urinating.   Musculoskeletal: Negative  for back pain, arthralgias, gait problem, neck pain and neck stiffness.   Skin: Negative for color change, rash and wound.   Allergic/Immunologic: Positive for immunocompromised state.   Neurological: Positive for syncope, weakness (generalized) and light-headedness. Negative for seizures and headaches.   Hematological: Does not bruise/bleed easily.   Psychiatric/Behavioral: Positive for dysphoric mood and decreased concentration. Negative for confusion and sleep disturbance.   All other systems reviewed and are negative.      Physical Exam   BP: (!) 86/54 mmHg  Heart Rate: 110  Temp: 97.8  F (36.6  C)  Resp: 16  SpO2: 98 %  Physical Exam   Constitutional: She is oriented to person, place, and time. She appears well-developed and well-nourished. She appears distressed.   Patient here with family alert and oriented soft-spoken and appears markedly dry as far as mucous membranes.  No confusion   HENT:   Head: Normocephalic and atraumatic.   DRy mucous migraines    No obvious head trauma seen   Eyes: Conjunctivae and EOM are normal. Pupils are equal, round, and reactive to light. No scleral icterus.   Neck: Normal range of motion. Neck supple. No JVD present.   Cardiovascular: Normal rate and regular rhythm.    Pulmonary/Chest: No stridor. No respiratory distress. She has no wheezes. She has no rales. She exhibits no tenderness.   Abdominal: She exhibits no distension. There is no tenderness. There is no rebound and no guarding.   Genitourinary:   Patient denies any rectal bleeding   Musculoskeletal: She exhibits no edema or tenderness.   Neurological: She is alert and oriented to person, place, and time. She has normal reflexes. No cranial nerve deficit.   Skin: Skin is warm and dry. No rash noted. She is not diaphoretic. No erythema. No pallor.   Psychiatric:   Flat affect noted here but otherwise appropriate.   Nursing note and vitals reviewed.      ED Course     Procedures           In ER patient was quickly  evaluated.  Patient's port was accessed.  IV fluid boluses given patient's blood pressure stabilized.  eKG shows mild sinus tachycardia without ischemic changes.  Portal chest x-ray shows no acute cardio pulmonary abnormalities.  Laboratory testing troponin was negative.  INR 1.33.  Calcium 7.9glucose 119 sodium 138 passenger 0.5 creatinine 0.64lipase 30 lactic acid 1.2 white count 8 hemoglobin 5.9 which is down.  platelets are 97.  Zofran IV for nausea.  Patient feeling somewhat better.  Patient's abdomen is benign otherwise.  Discussed case with oncology will type and cross for 2 units packed cells and transfused.  These were ordered.  Patient be admitted to  oncology under the care of Dr. Tatum  patient does agree with plan is stable here.       EKG Interpretation:      Interpreted by Phillip Barragan  Time reviewed: 940  Symptoms at time of EKG: syncope   Rhythm: sinus tach  Rate: 104  Axis: normal  Ectopy: none  Conduction: normal  ST Segments/ T Waves: No ST-T wave changes  Q Waves: none  Comparison to prior: No old EKG available    Clinical Impression:s inus tachycardia without ischemic changes          Critical Care time:  none               Labs Ordered and Resulted from Time of ED Arrival Up to the Time of Departure from the ED   CBC WITH PLATELETS DIFFERENTIAL - Abnormal; Notable for the following:     RBC Count 2.48 (*)     Hemoglobin 5.9 (*)     Hematocrit 19.7 (*)     MCH 23.8 (*)     MCHC 29.9 (*)     RDW 19.8 (*)     Platelet Count 97 (*)     Nucleated RBCs 1 (*)     Absolute Lymphocytes 0.6 (*)     All other components within normal limits   LIPASE - Abnormal; Notable for the following:     Lipase 30 (*)     All other components within normal limits   COMPREHENSIVE METABOLIC PANEL - Abnormal; Notable for the following:     Glucose 119 (*)     Calcium 7.9 (*)     Albumin 2.6 (*)     Protein Total 6.0 (*)     Alkaline Phosphatase 215 (*)     All other components within normal limits   INR -  Abnormal; Notable for the following:     INR 1.33 (*)     All other components within normal limits   PARTIAL THROMBOPLASTIN TIME - Abnormal; Notable for the following:     PTT 53 (*)     All other components within normal limits   LACTIC ACID WHOLE BLOOD   TROPONIN I   UA MACROSCOPIC WITH REFLEX TO MICRO AND CULTURE   RED BLOOD CELL PREPARE ORDER UNIT   ABO/RH TYPE AND SCREEN   ABO/RH TYPE AND SCREEN     Results for orders placed or performed during the hospital encounter of 01/24/17   XR Chest Port 1 View    Narrative    Exam:  XR CHEST PORT 1 VW, 1/24/2017 10:43 AM    History: chest pain/sob    Comparison:  CT chest from 12/12/2016.    Findings:  Single AP view of the chest is obtained. Right chest  portacatheter tip terminates in the low SVC. Cardiomediastinal  silhouette is within normal limits. Pulmonary vasculature is  unremarkable. There is no pleural effusion or pneumothorax. No focal  airspace opacity. Visualized upper abdomen is unremarkable.      Impression    Impression:  No acute cardiopulmonary abnormality.    I have personally reviewed the examination and initial interpretation  and I agree with the findings.    PHILIP BERMAN MD   CBC with platelets differential   Result Value Ref Range    WBC 8.0 4.0 - 11.0 10e9/L    RBC Count 2.48 (L) 3.8 - 5.2 10e12/L    Hemoglobin 5.9 (LL) 11.7 - 15.7 g/dL    Hematocrit 19.7 (L) 35.0 - 47.0 %    MCV 79 78 - 100 fl    MCH 23.8 (L) 26.5 - 33.0 pg    MCHC 29.9 (L) 31.5 - 36.5 g/dL    RDW 19.8 (H) 10.0 - 15.0 %    Platelet Count 97 (L) 150 - 450 10e9/L    Diff Method Automated Method     % Neutrophils 88.1 %    % Lymphocytes 7.8 %    % Monocytes 3.5 %    % Eosinophils 0.1 %    % Basophils 0.1 %    % Immature Granulocytes 0.4 %    Nucleated RBCs 1 (H) 0 /100    Absolute Neutrophil 7.0 1.6 - 8.3 10e9/L    Absolute Lymphocytes 0.6 (L) 0.8 - 5.3 10e9/L    Absolute Monocytes 0.3 0.0 - 1.3 10e9/L    Absolute Eosinophils 0.0 0.0 - 0.7 10e9/L    Absolute Basophils 0.0 0.0 -  0.2 10e9/L    Abs Immature Granulocytes 0.0 0 - 0.4 10e9/L    Absolute Nucleated RBC 0.0    Lipase   Result Value Ref Range    Lipase 30 (L) 73 - 393 U/L   Lactic acid   Result Value Ref Range    Lactic Acid 1.2 0.7 - 2.1 mmol/L   Comprehensive metabolic panel   Result Value Ref Range    Sodium 138 133 - 144 mmol/L    Potassium 3.5 3.4 - 5.3 mmol/L    Chloride 105 94 - 109 mmol/L    Carbon Dioxide 25 20 - 32 mmol/L    Anion Gap 8 3 - 14 mmol/L    Glucose 119 (H) 70 - 99 mg/dL    Urea Nitrogen 12 7 - 30 mg/dL    Creatinine 0.64 0.52 - 1.04 mg/dL    GFR Estimate >90  Non  GFR Calc   >60 mL/min/1.7m2    GFR Estimate If Black >90   GFR Calc   >60 mL/min/1.7m2    Calcium 7.9 (L) 8.5 - 10.1 mg/dL    Bilirubin Total 0.2 0.2 - 1.3 mg/dL    Albumin 2.6 (L) 3.4 - 5.0 g/dL    Protein Total 6.0 (L) 6.8 - 8.8 g/dL    Alkaline Phosphatase 215 (H) 40 - 150 U/L    ALT 20 0 - 50 U/L    AST 22 0 - 45 U/L   INR   Result Value Ref Range    INR 1.33 (H) 0.86 - 1.14   Partial thromboplastin time   Result Value Ref Range    PTT 53 (H) 22 - 37 sec   Troponin I   Result Value Ref Range    Troponin I ES  0.000 - 0.045 ug/L     <0.015  The 99th percentile for upper reference range is 0.045 ug/L.  Troponin values in   the range of 0.045 - 0.120 ug/L may be associated with risks of adverse   clinical events.     EKG 12-lead, tracing only   Result Value Ref Range    Interpretation ECG Click View Image link to view waveform and result    GI LUMINAL ADULT IP CONSULT: Patient to be seen: Routine within 24 hrs; Call back #: [phone]; concern for GI bleeding with anemia and a maroon stool this afternoon; Consultant may enter orders: Yes    Narrative    Hunter Navarrete APRN CNP     1/24/2017  5:04 PM      GASTROENTEROLOGY CONSULTATION      Date of Admission:  1/24/2017          Chief Complaint:   We were asked by Zena Sharif CNP from oncology to evaluate   this patient with maroon stools and hgb of  5.9.         ASSESSMENT AND RECOMMENDATIONS:   Assessment:  Shirin Muller is a 56 year old female with a   history of metastatic pancreatic cancer undergoing   chemotherapy/FOLFIRINOX regimen, received Cycle 3 on 1/17/17,   metastatic liver lesion, biliary stricture, graves disease and   now presenting with chills, weakness, dizziness, fall at home,   and hgb drop from 8.5 to 5.9. Patient has ongoing diarrhea that   started on 1/18/17, but no abdominal pain or fever.     As patient has a metastatic disease, our main concern is a   variceal bleed, and plan is to investigate with EGD tomorrow. In   the mean time, primary team will monitor patient over night and   resuscitate her.     Recommendations:  -- IV PPI drip and Octreotide drip  -- Trend hgb and transfuse if it is less than 7.0  -- Patient is tachycardic and needs to be resuscitated   -- Keep records of patient's output  -- Clear liquids, no reds today but NPO after MN  -- Stool studies for diarrhea (C. Diff, enteric pathogen panel,   giardia, H. Pylori, ova and parasite)    Gastroenterology follow up recommendations: To be determined    Patient care plan discussed with Dr. Dalton, GI staff physician.   Thank you for involving us in this patient's care. Please do not   hesitate to contact the GI service with any questions or   concerns.     Hunter Navarrete CNP  ------------------------------------------------------------------  -------------------------------------------------   History is obtained from the patient and the medical record.          History of Present Illness:   Shirin Muller is a 56 year old female with a   history of metastatic pancreatic cancer undergoing   chemotherapy/FOLFIRINOX regimen, received Cycle 3 on 1/17/17,   metastatic liver lesion, biliary stricture, graves disease and   now presenting with chills, weakness, dizziness, fall at home,   and hgb drop from 8.5 to 5.9. Patient has ongoing  diarrhea that   started on 17, but no abdominal pain or fever. Patient   describes having 1-3 stools/day that were brown and smelled bad,   which she has been managing with Imodium. Patient reports not   having much of a problem while she was receiving the   chemotherapy. However, yesterday she felt cold and weak. And   today she felt dizzy and she fell without hitting her head. No   nausea or vomiting. No hematemesis or hematochezia, except the   one maroon episode that she had here in the hospital. No NSAID   use.    Shirin was diagnosed with pancreatic cancer in Spring 2016 after   presenting with a 2-3 month history of abdominal pain and weight   loss. She underwent evaluation showing a 5cm mass at the head of   the pancreas. Biopsy was c/w adenocarcinoma. She underwent   staging imaging that included a MRI abdomen which showed an up to   10cm poorly defined hypoenhancing mass arising from the   pancreatic that encased the celiac artery, superior mesenteric   artery, and severely attenuated the main portal vein. She started   treatment with gemcitabine and abraxane on 16 and continued   on that until 2016 when she was found to have metastatic   disease involving the liver. She was then started on FOLFIRINOX   on 16. Her first cycle was complicated by fatigue, nausea   and vomiting. Antiemetics were adjusted with the second cycle,   which she tolerated better.         Past Medical History:   Reviewed and edited as appropriate  Past Medical History   Diagnosis Date     Graves disease      Biliary stricture      Pancreatic mass      Lesion of liver      Malignant neoplasm of head of pancreas (H) 2016            Past Surgical History:   Reviewed and edited as appropriate   Past Surgical History   Procedure Laterality Date      section       Esophagoscopy, gastroscopy, duodenoscopy (egd), combined N/A   2016     Procedure: COMBINED ENDOSCOPIC ULTRASOUND, ESOPHAGOSCOPY,    GASTROSCOPY, DUODENOSCOPY (EGD), FINE NEEDLE ASPIRATE/BIOPSY;    Surgeon: Arias Taveras MD;  Location: [facility] OR     Endoscopic retrograde cholangiopancreatogram N/A 4/13/2016     Procedure: COMBINED ENDOSCOPIC RETROGRADE   CHOLANGIOPANCREATOGRAPHY, PLACE TUBE/STENT;  Surgeon: Arias Taveras MD;  Location: [facility] OR            Previous Endoscopy:   No results found for this or any previous visit.         Social History:   Reviewed and edited as appropriate  Social History     Social History     Marital Status:      Spouse Name: N/A     Number of Children: N/A     Years of Education: N/A     Occupational History     Not on file.     Social History Main Topics     Smoking status: Never Smoker      Smokeless tobacco: Never Used     Alcohol Use: No     Drug Use: No     Sexual Activity: Not on file     Other Topics Concern     Not on file     Social History Narrative            Family History:   Reviewed and edited as appropriate  Family History   Problem Relation Age of Onset     DIABETES Mother            Allergies:   Reviewed and edited as appropriate     Allergies   Allergen Reactions     Contrast Dye Nausea and Vomiting     Pt vomiting post IV contrast, Please try Visipaque for next CT   scan. 6/24/16 sv            Medications:     Prescriptions prior to admission   Medication Sig Dispense Refill Last Dose     morphine (MS CONTIN) 15 MG 12 hr tablet Take 1 tablet (15 mg)   by mouth every 12 hours 60 tablet 0 1/24/2017 at 0700     prochlorperazine (COMPAZINE) 10 MG tablet Take 1 tablet (10 mg)   by mouth every 6 hours as needed (Nausea/Vomiting) 30 tablet 2   Past Week at Unknown time     oxyCODONE (ROXICODONE) 5 MG IR tablet Take 1 tablet (5 mg) by   mouth every 4 hours as needed for moderate to severe pain 100   tablet 0 1/24/2017 at 0700     dronabinol (MARINOL) 5 MG capsule Take 1 capsule (5 mg) by   mouth 2 times daily (before meals) 60 capsule 0 1/23/2017 at   Unknown time      "amylase-lipase-protease (CREON) 71742 UNITS CPEP Take 2   capsules (72,000 Units) by mouth 3 times daily (with meals) 180   capsule 1 Past Month at Unknown time     potassium chloride (KLOR-CON) 20 MEQ packet Take 20 mEq by   mouth daily 60 packet 1 1/23/2017 at Unknown time     LORazepam (ATIVAN) 0.5 MG tablet Take 1 tablet (0.5 mg) by   mouth every 4 hours as needed (Anxiety, Nausea/Vomiting or Sleep)   30 tablet 2 Unknown at Unknown time     polyethylene glycol (MIRALAX/GLYCOLAX) powder Take 17 g (1   capful) by mouth daily 119 g 11 Unknown at Unknown time     lidocaine-prilocaine (EMLA) cream Apply topically as needed for   other (Use 30-60 minutes prior to port access) 30 g 0 Unknown at   Unknown time     docusate sodium (COLACE) 100 MG capsule Take 100 mg by mouth     Unknown at Unknown time     ondansetron (ZOFRAN-ODT) 8 MG disintegrating tablet Take 1   tablet (8 mg) by mouth every 8 hours as needed for nausea 60   tablet 4 Unknown at Unknown time             Review of Systems:   A complete review of systems was performed and is negative except   as noted in the HPI           Physical Exam:   /58 mmHg  Temp(Src) 98.7  F (37.1  C) (Oral)  Resp 16    Ht 1.778 m (5' 10\")  Wt 70.398 kg (155 lb 3.2 oz)  BMI 22.27   kg/m2  SpO2 100%  Wt:   Wt Readings from Last 2 Encounters:   01/24/17 70.398 kg (155 lb 3.2 oz)   01/20/17 73.392 kg (161 lb 12.8 oz)      Constitutional: cooperative, pleasant, not dyspneic/diaphoretic,   no acute distress  Eyes: Sclera anicteric/injected  Ears/nose/mouth/throat: Normal oropharynx without ulcers or   exudate, mucus membranes moist, hearing intact  Neck: supple, thyroid normal size  CV: Tachycardic but regular rhythm. No edema in LE.  Respiratory: Unlabored breathing. Slightly diminished lower   lobes.   Lymph: No axillary, submandibular, supraclavicular or inguinal   lymphadenopathy  Abd: Nondistended, +bs, no hepatosplenomegaly, nontender, no   peritoneal signs. Patient " reports pressure in RUQ during   palpation.   Skin: warm, perfused, no jaundice  Neuro: AAO x 3, No asterixis  Psych: Normal affect  MSK: Normal gait         Data:   Labs and imaging below were independently reviewed and   interpreted    BMP  Recent Labs  Lab 17  1002 17  1345    139   POTASSIUM 3.5 3.5   CHLORIDE 105 101   FANNY 7.9* 8.2*   CO2 25 30   BUN 12 12   CR 0.64 0.71   * 87     CBC  Recent Labs  Lab 17  1002   WBC 8.0   RBC 2.48*   HGB 5.9*   HCT 19.7*   MCV 79   MCH 23.8*   MCHC 29.9*   RDW 19.8*   PLT 97*     INR  Recent Labs  Lab 17  1002   INR 1.33*     LFTs  Recent Labs  Lab 17  1002   ALKPHOS 215*   AST 22   ALT 20   BILITOTAL 0.2   PROTTOTAL 6.0*   ALBUMIN 2.6*      PANC  Recent Labs  Lab 17  1002   LIPASE 30*        Echocardiogram Complete    Narrative    [id number]  ECH19  [id number]  [id number]^RADHA^MAGALIE^           [facility],[place]  Echocardiography Laboratory  [address] [place] [id number]     Name: NATALIIA IBARRA  MRN: [id number]  : 1960  Study Date: 2017 01:33 PM  Age: 56 yrs  Gender: Female  Patient Location: [facility]  Reason For Study: Syncope  History: Syncope  Ordering Physician: MAGALIE GARCIA  Performed By: Shirley Wang RDCS     BSA: 1.9 m2  Height: 71 in  Weight: 161 lb  BP: 98/61 mmHg  _____________________________________________________________________________  __        Procedure  Complete Portable Echo Adult. Echocardiogram with two-dimensional, color and  spectral Doppler performed.  _____________________________________________________________________________  __        Interpretation Summary  Global and regional left ventricular function is normal with an EF of 60-65%.  Global right ventricular function is normal.  Pulmonary artery systolic pressure is normal.  The inferior vena cava is normal.  No pericardial effusion is present.  Previous study not available  for comparison.  _____________________________________________________________________________  __        Left Ventricle  Global and regional left ventricular function is normal with an EF of 60-65%.  Left ventricular size is normal. Left ventricular wall thickness is normal.  Diastolic function not assessed due to tachycardia. No regional wall motion  abnormalities are seen.     Right Ventricle  The right ventricle is normal size. Global right ventricular function is  normal.     Atria  Both atria appear normal. There was no shunt at the atrial septal level as  assessed by color Doppler at rest and with Valsalva maneuver.        Mitral Valve  The mitral valve is normal.     Aortic Valve  Trace aortic insufficiency is present.     Tricuspid Valve  The tricuspid valve is normal. Trace to mild tricuspid insufficiency is  present. Pulmonary artery systolic pressure is normal. The right ventricular  systolic pressure is approximated at 21.7 mmHg plus the right atrial pressure.     Pulmonic Valve  The pulmonic valve is normal.     Vessels  The aorta root is normal. The inferior vena cava is normal. Estimated mean  right atrial pressure is <3 mmHg.     Pericardium  No pericardial effusion is present.        Compared to Previous Study  Previous study not available for comparison.  _____________________________________________________________________________  __     MMode/2D Measurements & Calculations  IVSd: 0.86 cm  LVIDd: 3.2 cm  LVIDs: 2.0 cm  LVPWd: 0.84 cm  FS: 38.7 %  EDV(Teich): 40.5 ml  ESV(Teich): 12.0 ml  LV mass(C)d: 70.7 grams  Ao root diam: 2.7 cm  LA dimension: 3.8 cm  asc Aorta Diam: 2.8 cm  LA/Ao: 1.4  LVOT diam: 2.0 cm  LVOT area: 3.0 cm2  LA Volume (BP): 40.0 ml  LA Volume Index (BP): 20.8 ml/m2           Doppler Measurements & Calculations  MV E max sharmaine: 53.8 cm/sec  MV A max sharmaine: 68.9 cm/sec  MV E/A: 0.78  MV dec time: 0.21 sec  Ao V2 max: 163.5 cm/sec  Ao max PG: 10.7 mmHg  Ao V2 mean: 112.7 cm/sec  Ao  mean P.8 mmHg  Ao V2 VTI: 25.0 cm  CEILA(I,D): 2.4 cm2  CELIA(V,D): 2.4 cm2  LV V1 max P.5 mmHg  LV V1 max: 127.2 cm/sec  LV V1 VTI: 19.8 cm  SV(LVOT): 59.9 ml  PA acc time: 0.06 sec  TR max sharmaine: 232.8 cm/sec  TR max P.7 mmHg  CELIA Index (cm2/m2): 1.2  Lateral E/e': 4.5  Medial E/e': 8.6              _____________________________________________________________________________  __        Report approved by: Yoel Jaffe 2017 02:37 PM      ABO/Rh type and screen   Result Value Ref Range    Units Ordered 2     ABO O     RH(D)  Pos     Antibody Screen Neg     Test Valid Only At       [facility]    Specimen Expires 2017     Crossmatch Red Blood Cells    Blood component   Result Value Ref Range    Unit Number [id number]     Blood Component Type Red Blood Cells Leukocyte Reduced     Division Number [id number]     Status of Unit Released to care unit 2017 1705     Blood Product Code [id number]     Unit Status ISS    Blood component   Result Value Ref Range    Unit Number [id number]     Blood Component Type Red Blood Cells Leukocyte Reduced     Division Number 00     Status of Unit Released to care unit 2017 1449     Blood Product Code [id number]     Unit Status ISS        Assessments & Plan (with Medical Decision Making)  48-year-old female with a chronic cancer currently undergoing chemo has persistent nausea vomiting appears dehydrated patient also now has marked anemia.  Patient went to the bathroom without signs of bleeding but after diarrheal stool and gotten up and then had a collapse consistent with for vasovagalorthostatic hypotension.  Patient has no active signs of bleeding otherwise received IV fluids in the ER type and cross for 2 units of packed cells will be admitted to oncology.most likely examination of dehydration along with his vasovagal plus underlying anemia.         I have reviewed the nursing notes.    I have reviewed the  findings, diagnosis, plan and need for follow up with the patient.    Current Discharge Medication List          Final diagnoses:   Syncope and collapse   Dehydration   Nausea vomiting and diarrhea   Anemia due to other cause   Malignant neoplasm of head of pancreas (H)     I, Wilmer Arnold, am serving as a trained medical scribe to document services personally performed by Phillip Barragan MD, based on the provider's statements to me.      IPhillip MD, was physically present and have reviewed and verified the accuracy of this note documented by Wilmer Arnold.    1/24/2017   [facility] EMERGENCY DEPARTMENT      This note was created at least in part by the use of dragon voice dictation system. Inadvertent typographical errors may still exist.  Phillip Barragan MD.        Phillip Barragan MD  01/24/17 [id number]

## 2017-01-24 NOTE — CONSULTS
GASTROENTEROLOGY CONSULTATION      Date of Admission:  1/24/2017          Chief Complaint:   We were asked by Zena Sharif CNP from oncology to evaluate this patient with maroon stools and hgb of 5.9.         ASSESSMENT AND RECOMMENDATIONS:   Assessment:  Shirin Muller is a 56 year old female with a history of metastatic pancreatic cancer undergoing chemotherapy/FOLFIRINOX regimen, received Cycle 3 on 1/17/17, metastatic liver lesion, biliary stricture, graves disease and now presenting with chills, weakness, dizziness, fall at home, and hgb drop from 8.5 to 5.9. Patient has ongoing diarrhea that started on 1/18/17, but no abdominal pain or fever.     As patient has a metastatic disease, our main concern is a variceal bleed, and plan is to investigate with EGD tomorrow. In the mean time, primary team will monitor patient over night and resuscitate her.     Recommendations:  -- IV PPI drip and Octreotide drip  -- Trend hgb and transfuse if it is less than 7.0  -- Patient is tachycardic and needs to be resuscitated   -- Keep records of patient's output  -- Clear liquids, no reds today but NPO after MN  -- Stool studies for diarrhea (C. Diff, enteric pathogen panel, giardia, H. Pylori, ova and parasite)    Gastroenterology follow up recommendations: To be determined    Patient care plan discussed with Dr. Dalton, GI staff physician. Thank you for involving us in this patient's care. Please do not hesitate to contact the GI service with any questions or concerns.     Hunter Navarrete CNP  -------------------------------------------------------------------------------------------------------------------   History is obtained from the patient and the medical record.          History of Present Illness:   Shirin Muller is a 56 year old female with a history of metastatic pancreatic cancer undergoing chemotherapy/FOLFIRINOX regimen, received Cycle 3 on 1/17/17, metastatic liver  lesion, biliary stricture, graves disease and now presenting with chills, weakness, dizziness, fall at home, and hgb drop from 8.5 to 5.9. Patient has ongoing diarrhea that started on 17, but no abdominal pain or fever. Patient describes having 1-3 stools/day that were brown and smelled bad, which she has been managing with Imodium. Patient reports not having much of a problem while she was receiving the chemotherapy. However, yesterday she felt cold and weak. And today she felt dizzy and she fell without hitting her head. No nausea or vomiting. No hematemesis or hematochezia, except the one maroon episode that she had here in the hospital. No NSAID use.    Shirin was diagnosed with pancreatic cancer in Spring 2016 after presenting with a 2-3 month history of abdominal pain and weight loss. She underwent evaluation showing a 5cm mass at the head of the pancreas. Biopsy was c/w adenocarcinoma. She underwent staging imaging that included a MRI abdomen which showed an up to 10cm poorly defined hypoenhancing mass arising from the pancreatic that encased the celiac artery, superior mesenteric artery, and severely attenuated the main portal vein. She started treatment with gemcitabine and abraxane on 16 and continued on that until 2016 when she was found to have metastatic disease involving the liver. She was then started on FOLFIRINOX on 16. Her first cycle was complicated by fatigue, nausea and vomiting. Antiemetics were adjusted with the second cycle, which she tolerated better.         Past Medical History:   Reviewed and edited as appropriate  Past Medical History   Diagnosis Date     Graves disease      Biliary stricture      Pancreatic mass      Lesion of liver      Malignant neoplasm of head of pancreas (H) 2016            Past Surgical History:   Reviewed and edited as appropriate   Past Surgical History   Procedure Laterality Date      section       Esophagoscopy,  gastroscopy, duodenoscopy (egd), combined N/A 4/13/2016     Procedure: COMBINED ENDOSCOPIC ULTRASOUND, ESOPHAGOSCOPY, GASTROSCOPY, DUODENOSCOPY (EGD), FINE NEEDLE ASPIRATE/BIOPSY;  Surgeon: Arias Taveras MD;  Location: [facility] OR     Endoscopic retrograde cholangiopancreatogram N/A 4/13/2016     Procedure: COMBINED ENDOSCOPIC RETROGRADE CHOLANGIOPANCREATOGRAPHY, PLACE TUBE/STENT;  Surgeon: Arias Taveras MD;  Location: [facility] OR            Previous Endoscopy:   No results found for this or any previous visit.         Social History:   Reviewed and edited as appropriate  Social History     Social History     Marital Status:      Spouse Name: N/A     Number of Children: N/A     Years of Education: N/A     Occupational History     Not on file.     Social History Main Topics     Smoking status: Never Smoker      Smokeless tobacco: Never Used     Alcohol Use: No     Drug Use: No     Sexual Activity: Not on file     Other Topics Concern     Not on file     Social History Narrative            Family History:   Reviewed and edited as appropriate  Family History   Problem Relation Age of Onset     DIABETES Mother            Allergies:   Reviewed and edited as appropriate     Allergies   Allergen Reactions     Contrast Dye Nausea and Vomiting     Pt vomiting post IV contrast, Please try Visipaque for next CT scan. 6/24/16 sv            Medications:     Prescriptions prior to admission   Medication Sig Dispense Refill Last Dose     morphine (MS CONTIN) 15 MG 12 hr tablet Take 1 tablet (15 mg) by mouth every 12 hours 60 tablet 0 1/24/2017 at 0700     prochlorperazine (COMPAZINE) 10 MG tablet Take 1 tablet (10 mg) by mouth every 6 hours as needed (Nausea/Vomiting) 30 tablet 2 Past Week at Unknown time     oxyCODONE (ROXICODONE) 5 MG IR tablet Take 1 tablet (5 mg) by mouth every 4 hours as needed for moderate to severe pain 100 tablet 0 1/24/2017 at 0700     dronabinol (MARINOL) 5 MG capsule Take 1 capsule (5 mg) by  "mouth 2 times daily (before meals) 60 capsule 0 1/23/2017 at Unknown time     amylase-lipase-protease (CREON) 68847 UNITS CPEP Take 2 capsules (72,000 Units) by mouth 3 times daily (with meals) 180 capsule 1 Past Month at Unknown time     potassium chloride (KLOR-CON) 20 MEQ packet Take 20 mEq by mouth daily 60 packet 1 1/23/2017 at Unknown time     LORazepam (ATIVAN) 0.5 MG tablet Take 1 tablet (0.5 mg) by mouth every 4 hours as needed (Anxiety, Nausea/Vomiting or Sleep) 30 tablet 2 Unknown at Unknown time     polyethylene glycol (MIRALAX/GLYCOLAX) powder Take 17 g (1 capful) by mouth daily 119 g 11 Unknown at Unknown time     lidocaine-prilocaine (EMLA) cream Apply topically as needed for other (Use 30-60 minutes prior to port access) 30 g 0 Unknown at Unknown time     docusate sodium (COLACE) 100 MG capsule Take 100 mg by mouth   Unknown at Unknown time     ondansetron (ZOFRAN-ODT) 8 MG disintegrating tablet Take 1 tablet (8 mg) by mouth every 8 hours as needed for nausea 60 tablet 4 Unknown at Unknown time             Review of Systems:   A complete review of systems was performed and is negative except as noted in the HPI           Physical Exam:   /58 mmHg  Temp(Src) 98.7  F (37.1  C) (Oral)  Resp 16  Ht 1.778 m (5' 10\")  Wt 70.398 kg (155 lb 3.2 oz)  BMI 22.27 kg/m2  SpO2 100%  Wt:   Wt Readings from Last 2 Encounters:   01/24/17 70.398 kg (155 lb 3.2 oz)   01/20/17 73.392 kg (161 lb 12.8 oz)      Constitutional: cooperative, pleasant, not dyspneic/diaphoretic, no acute distress  Eyes: Sclera anicteric/injected  Ears/nose/mouth/throat: Normal oropharynx without ulcers or exudate, mucus membranes moist, hearing intact  Neck: supple, thyroid normal size  CV: Tachycardic but regular rhythm. No edema in LE.  Respiratory: Unlabored breathing. Slightly diminished lower lobes.   Lymph: No axillary, submandibular, supraclavicular or inguinal lymphadenopathy  Abd: Nondistended, +bs, no hepatosplenomegaly, " nontender, no peritoneal signs. Patient reports pressure in RUQ during palpation.   Skin: warm, perfused, no jaundice  Neuro: AAO x 3, No asterixis  Psych: Normal affect  MSK: Normal gait         Data:   Labs and imaging below were independently reviewed and interpreted    BMP  Recent Labs  Lab 01/24/17  1002 01/20/17  1345    139   POTASSIUM 3.5 3.5   CHLORIDE 105 101   FANNY 7.9* 8.2*   CO2 25 30   BUN 12 12   CR 0.64 0.71   * 87     CBC  Recent Labs  Lab 01/24/17  1002   WBC 8.0   RBC 2.48*   HGB 5.9*   HCT 19.7*   MCV 79   MCH 23.8*   MCHC 29.9*   RDW 19.8*   PLT 97*     INR  Recent Labs  Lab 01/24/17  1002   INR 1.33*     LFTs  Recent Labs  Lab 01/24/17  1002   ALKPHOS 215*   AST 22   ALT 20   BILITOTAL 0.2   PROTTOTAL 6.0*   ALBUMIN 2.6*      PANC  Recent Labs  Lab 01/24/17  1002   LIPASE 30*

## 2017-01-24 NOTE — H&P
"[facility], [place]    History and Physical  Hematology / Oncology     Date of Admission:  1/24/2017  Date of Service (when I saw the patient): 01/24/2017    Assessment and Plan  Shirin Muller is a 56 year old woman with metastatic pancreatic cancer currently on treatment with FOLFIRINOX s/p Cycle 3 on 1/17/17. She is admitted after a fall at home this morning with weakness, dizziness, and lightheadedness felt d/t anemia and dehydration. On arrival to unit 5C, she had a BM that RN noted was dark red/maroon, so now concerned that GI bleed could be cause of acute anemia.      #Acute on chronic anemia. Chronic anemia 2/2 cancer and chemotherapy. Last hgb was 8.5 on 1/17. Pt had a fall this AM and was found to have hgb 5.9 on labs in ED. She is unaware of any active bleeding, including hemoptysis, hematemesis, hematuria, hematochezia or melena. She reports that stool yesterday was \"dark brown.\" However, she had BM on arrival to unit 5C this afternoon and RN noted that it was dark red/maroon, no bright red blood. Thus suspect acute anemia could be d/t GI bleed.  -IVF, PPI bid, NPO.  -Transfuse 2U RBCs and then recheck hgb. Monitor hgb q8h.   -Check coags.  -GI consulted and will assess.     #Syncope, fall. Likely 2/2 anemia and dehydration in setting of recent chemo for pancreatic cancer and now possible GI bleed.  -Cardiac w/u: troponin nml, EKG sinus tachycardia, echo nml structure, function, and EF.   -CXR unremarkable.   -GI w/u as above.     #Metastatic pancreatic cancer. Liver mets. Currently on treatment with FOLFIRINOX, s/p Cycle 3 on Tues 1/17.   -Continue home antiemetics prn, marinol 5mg bid, ms contin 15mg bid, and oxycodone prn.      FEN:   -NS at 100cc/hr  -PRN lyte replacement  -NPO    Prophy/Misc:   -VTE: mechanical only    Zena Shraif DNP, APRN, CNP  Hematology/Oncology  Pager: [phone]    Code Status  Full Code    Primary Care Physician  Primary " "oncologist: Dr. Gonsales    Chief Complaint  Weakness, fall at home      History of Present Illness  History obtained from chart review and discussed with patient.    Shirin Muller is a 56 year old woman with metastatic pancreatic cancer currently on treatment with FOLFIRINOX s/p Cycle 3 on 1/17/17. She is admitted after a fall at home this morning with weakness, dizziness, and lightheadedness felt d/t anemia.     Shirin was diagnosed with pancreatic cancer in Spring 2016 after presenting with a 2-3 month history of abdominal pain and weight loss. She underwent evaluation showing a 5cm mass at the head of the pancreas. Biopsy was c/w adenocarcinoma. She underwent staging imaging that included a MRI abdomen which showed an up to 10cm poorly defined hypoenhancing mass arising from the pancreatic that encased the celiac artery, superior mesenteric artery, and severely attenuated the main portal vein. She started treatment with gemcitabine and abraxane on 5/2/16 and continued on that until December 2016 when she was found to have metastatic disease involving the liver. She was then started on FOLFIRINOX on 12/20/16. Her first cycle was complicated by fatigue, nausea and vomiting. Antiemetics were adjusted with the second cycle, which she tolerated better.     Shirin received Cycle 3 FOLFIRINOX on 1/17. She reports she tolerated this cycle better than the previous ones. She still had nausea and minimal vomiting, though less than before and was doing ok other than fatigue. She denies hematemesis. She also had loose stools ~1-2x per day. She denies any dark/tarry or maroon stools or bright red blood in stool or on toilet tissue. She started feeling worse (weaker and more fatigued) in the past few days. She got up this AM around 7 or 7:30 to take her medications and go to the bathroom. She started feeling \"funny\" and was heading to room to get back in bed. She felt lightheaded and \"passed out\" and fell to " "her knees then fell forward. She says legs did not give out on her and she didn't have dizziness or vertigo. She is unsure whether or not she lost consciousness. She did not hit her head. Her daughter heard her fall and brought her to the ED. Labs were drawn in ED and she was found to have hgb 5.9. On arrival to unit 5C, she had a BM and her RN noted that the stool was \"bloody\" - dark red/maroon. She denies HA, dizziness, SOB, chest pain/pressure, abd pain, leg swelling. She has occasional acid reflux sx. Denies belching, bloating. She has been unaware of any active bleeding.     Past Medical History   I have reviewed this patient's medical history and updated it with pertinent information if needed.   Past Medical History   Diagnosis Date     Graves disease      Biliary stricture      Pancreatic mass      Lesion of liver      Malignant neoplasm of head of pancreas (H) 2016       Past Surgical History  I have reviewed this patient's surgical history and updated it with pertinent information if needed.  Past Surgical History   Procedure Laterality Date      section       Esophagoscopy, gastroscopy, duodenoscopy (egd), combined N/A 2016     Procedure: COMBINED ENDOSCOPIC ULTRASOUND, ESOPHAGOSCOPY, GASTROSCOPY, DUODENOSCOPY (EGD), FINE NEEDLE ASPIRATE/BIOPSY;  Surgeon: Arias Taveras MD;  Location: [facility] OR     Endoscopic retrograde cholangiopancreatogram N/A 2016     Procedure: COMBINED ENDOSCOPIC RETROGRADE CHOLANGIOPANCREATOGRAPHY, PLACE TUBE/STENT;  Surgeon: Arias Taveras MD;  Location: [facility] OR       Prior to Admission Medications  Prior to Admission Medications   Prescriptions Last Dose Informant Patient Reported? Taking?   LORazepam (ATIVAN) 0.5 MG tablet Unknown at Unknown time  No No   Sig: Take 1 tablet (0.5 mg) by mouth every 4 hours as needed (Anxiety, Nausea/Vomiting or Sleep)   amylase-lipase-protease (CREON) 02372 UNITS CPEP Past Month at Unknown time  No Yes   Sig: Take 2 " capsules (72,000 Units) by mouth 3 times daily (with meals)   docusate sodium (COLACE) 100 MG capsule Unknown at Unknown time  Yes No   Sig: Take 100 mg by mouth   dronabinol (MARINOL) 5 MG capsule 1/23/2017 at Unknown time  No Yes   Sig: Take 1 capsule (5 mg) by mouth 2 times daily (before meals)   lidocaine-prilocaine (EMLA) cream Unknown at Unknown time  No No   Sig: Apply topically as needed for other (Use 30-60 minutes prior to port access)   morphine (MS CONTIN) 15 MG 12 hr tablet 1/24/2017 at 0700  No Yes   Sig: Take 1 tablet (15 mg) by mouth every 12 hours   ondansetron (ZOFRAN-ODT) 8 MG disintegrating tablet Unknown at Unknown time  No No   Sig: Take 1 tablet (8 mg) by mouth every 8 hours as needed for nausea   oxyCODONE (ROXICODONE) 5 MG IR tablet 1/24/2017 at 0700  No Yes   Sig: Take 1 tablet (5 mg) by mouth every 4 hours as needed for moderate to severe pain   polyethylene glycol (MIRALAX/GLYCOLAX) powder Unknown at Unknown time  No No   Sig: Take 17 g (1 capful) by mouth daily   potassium chloride (KLOR-CON) 20 MEQ packet 1/23/2017 at Unknown time  No Yes   Sig: Take 20 mEq by mouth daily   prochlorperazine (COMPAZINE) 10 MG tablet Past Week at Unknown time  No Yes   Sig: Take 1 tablet (10 mg) by mouth every 6 hours as needed (Nausea/Vomiting)      Facility-Administered Medications: None       (Not in a hospital admission)  Allergies  Allergies   Allergen Reactions     Contrast Dye Nausea and Vomiting     Pt vomiting post IV contrast, Please try Visipaque for next CT scan. 6/24/16 sv       Social History  I have reviewed this patient's social history and updated it with pertinent information if needed. Shirin Muller  reports that she has never smoked. She has never used smokeless tobacco. She reports that she does not drink alcohol or use illicit drugs.    Family History  I have reviewed this patient's family history and updated it with pertinent information if needed.   Family History    Problem Relation Age of Onset     DIABETES Mother        Review of Systems  Negative other than as stated above in HPI.    Physical Exam  Temp: 97.8  F (36.6  C) Temp src: Oral BP: 98/61 mmHg   Heart Rate: 90 Resp: 16 SpO2: 100 % O2 Device: None (Room air)    Vital Signs with Ranges  Temp:  [97.8  F (36.6  C)] 97.8  F (36.6  C)  Heart Rate:  [] 90  Resp:  [16-25] 16  BP: (86-98)/(54-68) 98/61 mmHg  SpO2:  [90 %-100 %] 100 %  0 lbs 0 oz    BP 98/61 mmHg  Temp(Src) 97.8  F (36.6  C) (Oral)  Resp 16  SpO2 100%  There were no vitals filed for this visit.    Constitutional: Pleasant woman seen sitting at EOB, tired, in no acute distress. Alert and interactive.   HEENT: NCAT. PERRL, anicteric sclera. Dry, cracking lips. Slightly dry MM.  Hematologic / Lymphatic: +Dark red/maroon stool per RN report (I did not see personally).   Respiratory: Non-labored breathing on RA, good air exchange, lungs clear to auscultation bilaterally. No cough or wheeze noted.   Cardiovascular: Regular mild tachycardia. No murmur or rub.   GI: Normoactive bowel sounds. Abdomen soft, non-distended, and non-tender.   Skin: Warm and dry. No concerning lesions or rash on exposed surfaces.  Musculoskeletal: Extremities grossly normal, non-tender, no edema.   Neurologic: A&O, speech normal, gait normal and steady with standby assist, no focal deficits.   Neuropsychiatric: Mentation and affect appear normal/appropriate.  Vascular Access: Chest PAC is CDI.     Data  CBC  Recent Labs  Lab 01/24/17  1002   WBC 8.0   RBC 2.48*   HGB 5.9*   HCT 19.7*   MCV 79   MCH 23.8*   MCHC 29.9*   RDW 19.8*   PLT 97*     CMP  Recent Labs  Lab 01/24/17  1002 01/20/17  1345    139   POTASSIUM 3.5 3.5   CHLORIDE 105 101   CO2 25 30   ANIONGAP 8 8   * 87   BUN 12 12   CR 0.64 0.71   GFRESTIMATED >90Non  GFR Calc 84   GFRESTBLACK >90African American GFR Calc >90African American GFR Calc   FANNY 7.9* 8.2*   PROTTOTAL 6.0*  --    ALBUMIN  2.6*  --    BILITOTAL 0.2  --    ALKPHOS 215*  --    AST 22  --    ALT 20  --      INR  Recent Labs  Lab 01/24/17  1002   INR 1.33*       Results for orders placed or performed during the hospital encounter of 01/24/17   XR Chest Port 1 View    Narrative    Exam:  XR CHEST PORT 1 VW, 1/24/2017 10:43 AM    History: chest pain/sob    Comparison:  CT chest from 12/12/2016.    Findings:  Single AP view of the chest is obtained. Right chest  portacatheter tip terminates in the low SVC. Cardiomediastinal  silhouette is within normal limits. Pulmonary vasculature is  unremarkable. There is no pleural effusion or pneumothorax. No focal  airspace opacity. Visualized upper abdomen is unremarkable.      Impression    Impression:  No acute cardiopulmonary abnormality.    I have personally reviewed the examination and initial interpretation  and I agree with the findings.    PHILIP BERMAN MD

## 2017-01-25 LAB
ABO + RH BLD: NORMAL
ABO + RH BLD: NORMAL
ALBUMIN SERPL-MCNC: 2.5 G/DL (ref 3.4–5)
ALP SERPL-CCNC: 206 U/L (ref 40–150)
ALT SERPL W P-5'-P-CCNC: 21 U/L (ref 0–50)
ANION GAP SERPL CALCULATED.3IONS-SCNC: 7 MMOL/L (ref 3–14)
AST SERPL W P-5'-P-CCNC: 25 U/L (ref 0–45)
BASOPHILS # BLD AUTO: 0 10E9/L (ref 0–0.2)
BASOPHILS NFR BLD AUTO: 0.3 %
BILIRUB SERPL-MCNC: 0.5 MG/DL (ref 0.2–1.3)
BLD GP AB SCN SERPL QL: NORMAL
BLD PROD TYP BPU: NORMAL
BLD PROD TYP BPU: NORMAL
BLD UNIT ID BPU: 0
BLOOD BANK CMNT PATIENT-IMP: NORMAL
BLOOD PRODUCT CODE: NORMAL
BPU ID: NORMAL
BUN SERPL-MCNC: 8 MG/DL (ref 7–30)
CALCIUM SERPL-MCNC: 7.4 MG/DL (ref 8.5–10.1)
CHLORIDE SERPL-SCNC: 110 MMOL/L (ref 94–109)
CO2 SERPL-SCNC: 24 MMOL/L (ref 20–32)
CREAT SERPL-MCNC: 0.77 MG/DL (ref 0.52–1.04)
DIFFERENTIAL METHOD BLD: ABNORMAL
EOSINOPHIL # BLD AUTO: 0 10E9/L (ref 0–0.7)
EOSINOPHIL NFR BLD AUTO: 0.2 %
ERYTHROCYTE [DISTWIDTH] IN BLOOD BY AUTOMATED COUNT: 17.1 % (ref 10–15)
G LAMBLIA AG STL QL IA: NORMAL
GFR SERPL CREATININE-BSD FRML MDRD: 76 ML/MIN/1.7M2
GLUCOSE SERPL-MCNC: 82 MG/DL (ref 70–99)
H PYLORI AG STL QL IA: ABNORMAL
HCT VFR BLD AUTO: 28.8 % (ref 35–47)
HGB BLD-MCNC: 9 G/DL (ref 11.7–15.7)
HGB BLD-MCNC: 9.2 G/DL (ref 11.7–15.7)
HGB BLD-MCNC: 9.3 G/DL (ref 11.7–15.7)
IMM GRANULOCYTES # BLD: 0 10E9/L (ref 0–0.4)
IMM GRANULOCYTES NFR BLD: 0.5 %
INTERPRETATION ECG - MUSE: NORMAL
LYMPHOCYTES # BLD AUTO: 0.8 10E9/L (ref 0.8–5.3)
LYMPHOCYTES NFR BLD AUTO: 12.2 %
MAGNESIUM SERPL-MCNC: 1.8 MG/DL (ref 1.6–2.3)
MCH RBC QN AUTO: 25.9 PG (ref 26.5–33)
MCHC RBC AUTO-ENTMCNC: 32.3 G/DL (ref 31.5–36.5)
MCV RBC AUTO: 80 FL (ref 78–100)
MICRO REPORT STATUS: ABNORMAL
MICRO REPORT STATUS: NORMAL
MICRO REPORT STATUS: NORMAL
MONOCYTES # BLD AUTO: 0.5 10E9/L (ref 0–1.3)
MONOCYTES NFR BLD AUTO: 8.2 %
NEUTROPHILS # BLD AUTO: 5.2 10E9/L (ref 1.6–8.3)
NEUTROPHILS NFR BLD AUTO: 78.6 %
NRBC # BLD AUTO: 0.1 10*3/UL
NRBC BLD AUTO-RTO: 1 /100
NUM BPU REQUESTED: 4
O+P STL MICRO: NORMAL
PHOSPHATE SERPL-MCNC: 2.2 MG/DL (ref 2.5–4.5)
PLATELET # BLD AUTO: 61 10E9/L (ref 150–450)
POTASSIUM SERPL-SCNC: 3.6 MMOL/L (ref 3.4–5.3)
PROT SERPL-MCNC: 5.8 G/DL (ref 6.8–8.8)
RBC # BLD AUTO: 3.59 10E12/L (ref 3.8–5.2)
SODIUM SERPL-SCNC: 141 MMOL/L (ref 133–144)
SPECIMEN EXP DATE BLD: NORMAL
SPECIMEN SOURCE: ABNORMAL
SPECIMEN SOURCE: NORMAL
SPECIMEN SOURCE: NORMAL
TRANSFUSION STATUS PATIENT QL: NORMAL
TRANSFUSION STATUS PATIENT QL: NORMAL
UPPER GI ENDOSCOPY: NORMAL
WBC # BLD AUTO: 6.6 10E9/L (ref 4–11)

## 2017-01-25 PROCEDURE — 0W3P8ZZ CONTROL BLEEDING IN GASTROINTESTINAL TRACT, VIA NATURAL OR ARTIFICIAL OPENING ENDOSCOPIC: ICD-10-PCS | Performed by: INTERNAL MEDICINE

## 2017-01-25 PROCEDURE — 99153 MOD SED SAME PHYS/QHP EA: CPT | Performed by: INTERNAL MEDICINE

## 2017-01-25 PROCEDURE — P9016 RBC LEUKOCYTES REDUCED: HCPCS | Performed by: FAMILY MEDICINE

## 2017-01-25 PROCEDURE — 25000128 H RX IP 250 OP 636: Performed by: INTERNAL MEDICINE

## 2017-01-25 PROCEDURE — 85025 COMPLETE CBC W/AUTO DIFF WBC: CPT | Performed by: NURSE PRACTITIONER

## 2017-01-25 PROCEDURE — 83735 ASSAY OF MAGNESIUM: CPT | Performed by: NURSE PRACTITIONER

## 2017-01-25 PROCEDURE — 25000125 ZZHC RX 250: Performed by: INTERNAL MEDICINE

## 2017-01-25 PROCEDURE — 20000002 ZZH R&B BMT INTERMEDIATE

## 2017-01-25 PROCEDURE — 85018 HEMOGLOBIN: CPT | Performed by: NURSE PRACTITIONER

## 2017-01-25 PROCEDURE — 25000132 ZZH RX MED GY IP 250 OP 250 PS 637: Performed by: NURSE PRACTITIONER

## 2017-01-25 PROCEDURE — 85018 HEMOGLOBIN: CPT | Performed by: INTERNAL MEDICINE

## 2017-01-25 PROCEDURE — 25000125 ZZHC RX 250: Performed by: NURSE PRACTITIONER

## 2017-01-25 PROCEDURE — G0500 MOD SEDAT ENDO SERVICE >5YRS: HCPCS | Performed by: INTERNAL MEDICINE

## 2017-01-25 PROCEDURE — 43255 EGD CONTROL BLEEDING ANY: CPT | Performed by: INTERNAL MEDICINE

## 2017-01-25 PROCEDURE — 99233 SBSQ HOSP IP/OBS HIGH 50: CPT | Performed by: INTERNAL MEDICINE

## 2017-01-25 PROCEDURE — 25000132 ZZH RX MED GY IP 250 OP 250 PS 637: Performed by: INTERNAL MEDICINE

## 2017-01-25 PROCEDURE — 25000128 H RX IP 250 OP 636: Performed by: NURSE PRACTITIONER

## 2017-01-25 PROCEDURE — 80053 COMPREHEN METABOLIC PANEL: CPT | Performed by: NURSE PRACTITIONER

## 2017-01-25 PROCEDURE — 84100 ASSAY OF PHOSPHORUS: CPT | Performed by: NURSE PRACTITIONER

## 2017-01-25 RX ORDER — POTASSIUM CHLORIDE 750 MG/1
20-40 TABLET, EXTENDED RELEASE ORAL
Status: DISCONTINUED | OUTPATIENT
Start: 2017-01-25 | End: 2017-01-29 | Stop reason: HOSPADM

## 2017-01-25 RX ORDER — LOPERAMIDE HCL 2 MG
2 CAPSULE ORAL DAILY PRN
Status: ON HOLD | COMMUNITY
End: 2017-01-29

## 2017-01-25 RX ORDER — AMOXICILLIN 500 MG/1
1000 CAPSULE ORAL EVERY 12 HOURS SCHEDULED
Status: DISCONTINUED | OUTPATIENT
Start: 2017-01-25 | End: 2017-01-29 | Stop reason: HOSPADM

## 2017-01-25 RX ORDER — SIMETHICONE
LIQUID (ML) MISCELLANEOUS PRN
Status: DISCONTINUED | OUTPATIENT
Start: 2017-01-25 | End: 2017-01-29 | Stop reason: HOSPADM

## 2017-01-25 RX ORDER — MAGNESIUM SULFATE HEPTAHYDRATE 40 MG/ML
4 INJECTION, SOLUTION INTRAVENOUS EVERY 4 HOURS PRN
Status: DISCONTINUED | OUTPATIENT
Start: 2017-01-25 | End: 2017-01-29 | Stop reason: HOSPADM

## 2017-01-25 RX ORDER — CLARITHROMYCIN 500 MG
500 TABLET ORAL EVERY 12 HOURS SCHEDULED
Status: DISCONTINUED | OUTPATIENT
Start: 2017-01-25 | End: 2017-01-29 | Stop reason: HOSPADM

## 2017-01-25 RX ORDER — FENTANYL CITRATE 50 UG/ML
INJECTION, SOLUTION INTRAMUSCULAR; INTRAVENOUS PRN
Status: DISCONTINUED | OUTPATIENT
Start: 2017-01-25 | End: 2017-01-25

## 2017-01-25 RX ORDER — LORATADINE 10 MG/1
10 TABLET ORAL DAILY
COMMUNITY
End: 2017-05-05

## 2017-01-25 RX ORDER — POTASSIUM CHLORIDE 7.45 MG/ML
10 INJECTION INTRAVENOUS
Status: DISCONTINUED | OUTPATIENT
Start: 2017-01-25 | End: 2017-01-29 | Stop reason: HOSPADM

## 2017-01-25 RX ORDER — POTASSIUM CHLORIDE 29.8 MG/ML
20 INJECTION INTRAVENOUS
Status: DISCONTINUED | OUTPATIENT
Start: 2017-01-25 | End: 2017-01-29 | Stop reason: HOSPADM

## 2017-01-25 RX ORDER — POTASSIUM CHLORIDE 1.5 G/1.58G
20-40 POWDER, FOR SOLUTION ORAL
Status: DISCONTINUED | OUTPATIENT
Start: 2017-01-25 | End: 2017-01-29 | Stop reason: HOSPADM

## 2017-01-25 RX ADMIN — MORPHINE SULFATE 15 MG: 15 TABLET, EXTENDED RELEASE ORAL at 19:09

## 2017-01-25 RX ADMIN — SODIUM PHOSPHATE, MONOBASIC, MONOHYDRATE AND SODIUM PHOSPHATE, DIBASIC, ANHYDROUS 15 MMOL: 276; 142 INJECTION, SOLUTION INTRAVENOUS at 12:32

## 2017-01-25 RX ADMIN — SODIUM CHLORIDE: 9 INJECTION, SOLUTION INTRAVENOUS at 10:07

## 2017-01-25 RX ADMIN — LORAZEPAM 0.5 MG: 0.5 TABLET ORAL at 19:17

## 2017-01-25 RX ADMIN — PROCHLORPERAZINE MALEATE 5 MG: 5 TABLET, FILM COATED ORAL at 15:06

## 2017-01-25 RX ADMIN — OXYCODONE HYDROCHLORIDE 5 MG: 5 TABLET ORAL at 19:17

## 2017-01-25 RX ADMIN — AMOXICILLIN 1000 MG: 500 CAPSULE ORAL at 19:09

## 2017-01-25 RX ADMIN — SODIUM CHLORIDE 8 MG/HR: 9 INJECTION, SOLUTION INTRAVENOUS at 03:53

## 2017-01-25 RX ADMIN — SODIUM CHLORIDE 8 MG/HR: 9 INJECTION, SOLUTION INTRAVENOUS at 15:32

## 2017-01-25 RX ADMIN — OXYCODONE HYDROCHLORIDE 5 MG: 5 TABLET ORAL at 15:06

## 2017-01-25 RX ADMIN — CLARITHROMYCIN 500 MG: 500 TABLET, FILM COATED ORAL at 19:09

## 2017-01-25 RX ADMIN — ONDANSETRON 8 MG: 8 TABLET, ORALLY DISINTEGRATING ORAL at 12:39

## 2017-01-25 RX ADMIN — MORPHINE SULFATE 15 MG: 15 TABLET, EXTENDED RELEASE ORAL at 08:10

## 2017-01-25 RX ADMIN — PANTOPRAZOLE SODIUM 40 MG: 40 INJECTION, POWDER, FOR SOLUTION INTRAVENOUS at 19:09

## 2017-01-25 ASSESSMENT — PAIN DESCRIPTION - DESCRIPTORS: DESCRIPTORS: CRAMPING

## 2017-01-25 NOTE — PLAN OF CARE
Problem: Goal Outcome Summary  Goal: Goal Outcome Summary  Outcome: No Change  Afebrile, vss. A&O x4. Soft spoken, not very talkative, fatigued. No c/o pain, N/V. No stool out this shift. 2200 Hgb check was 6.7, transfused 2 units RBCs per protocol. Second unit ended at 0400, recheck was 9.2. Octreotide and protonix continue in patient's PIV's. NPO since midnight for EGD today. Continue to monitor.

## 2017-01-25 NOTE — OR NURSING
EGD with clips x2 and ERBE Gold Probe (3effect, 30 watt) to control bleed. VSS on 2L NC throughout procedure. Handoff report called to floor RN.

## 2017-01-25 NOTE — PLAN OF CARE
Problem: Goal Outcome Summary  Goal: Goal Outcome Summary  Outcome: Therapy, progress toward functional goals is gradual  VSS. Denies pain. Pt had EGD today (source of bleeding clipped per report), will be starting antibiotics. Still on protonix drip. Phos replacement infusing. C/o's nausea resolved with ODT zofran. No BM yet this shift. Up with sba. Family at bedside, very supportive. On clear liqs now.   P: continue to monitor for bleeding, pain, VS and gen status and continue with POC.    Addendum: 3:11 PM  Showered independently, compazine tab given for nausea, 1tab oxycodone given for c/o's lower abd pain.

## 2017-01-25 NOTE — PLAN OF CARE
"Problem: Goal Outcome Summary  Goal: Goal Outcome Summary  Outcome: No Change  /61 mmHg  Temp(Src) 99.7  F (37.6  C) (Oral)  Resp 16  Ht 1.778 m (5' 10\")  Wt 70.398 kg (155 lb 3.2 oz)  BMI 22.27 kg/m2  SpO2 100%  VSS, afebrile, AOx4.  Transfusing second unit of RBCs, using Port for blood and MIVF, Protonix in PIV in hand, Octreotide in PIV in forearm.  Pt has a multitude of stool cultures to collect upon next BM.  Continue POC.        "

## 2017-01-25 NOTE — PROGRESS NOTES
"[facility], [place]    Hematology / Oncology Progress Note    Date of Admission: 1/24/2017  Hospital Day #: 1   Date of Service (when I saw the patient): 01/25/2017     Assessment and Plan  Shirin \"Jose Antonio\" Morena Muller is a 56 year old woman with metastatic pancreatic cancer currently on treatment with FOLFIRINOX s/p Cycle 3 on 1/17/17. She was admitted after a fall at home on 1/24 AM with weakness, dizziness, and lightheadedness felt d/t anemia and dehydration. On arrival to unit 5C, she had a BM that RN noted was dark red/maroon, so now concerned that GI bleed could be cause of acute anemia.       #Acute on chronic anemia. Chronic anemia 2/2 cancer and chemotherapy. Last hgb was 8.5 on 1/17. Pt had a fall on morning prior to admission and was found to have hgb 5.9 on labs in ED. She was previously unaware of any active bleeding, including hemoptysis, hematemesis, hematuria, hematochezia or melena. However, she had BM on arrival to unit 5C and RN noted that it was dark red/maroon, no bright red blood. Thus suspect acute anemia could be d/t GI bleed. She had 1 more maroon BM last night. Still no vomiting or other signs of active bleeding. Hgb up to 9.3 this AM after transfusions.   -Appreciate GI consult. NPO for EGD today.   -IVF, PPI drip, octreotide drip.   -Monitor hgb q6h until more stable then decrease frequency of checks.  -Transfuse 1U RBCs for hgb <8.      #Syncope, fall. Likely 2/2 anemia and dehydration in setting of recent chemo for pancreatic cancer and now possible GI bleed.  -Cardiac w/u: troponin nml, EKG sinus tachycardia, echo nml structure, function, and EF.    -CXR unremarkable.    -GI w/u as above.     #Diarrhea. Pt reports ~1-2 loose stools per day since last chemo. Most likely 2/2 irinotecan and possibly GI bleed.   -C.diff, enteric FILOMENA negative. O&P and giardia pending.     #Metastatic pancreatic cancer. Liver mets. Currently on treatment with FOLFIRINOX, " s/p Cycle 3 on Tues 1/17.    -Continue home antiemetics prn, marinol 5mg bid (HOLD while NPO), ms contin 15mg bid, and oxycodone prn.      FEN:    -NS at 100cc/hr  -PRN lyte replacement  -NPO    Prophy/Misc:    -VTE: mechanical only    Code status: FULL CODE  Disposition: Discharge home once hgb stable and pending EGD results.     Zena Sharif DNP, APRN, CNP  Hematology/Oncology  Pager: [phone]    Interval History  Jose Antonio is doing ok this AM. She reports 1 maroon BM last night, no further BMs since then. Mild nausea, no vomiting. No other signs of active bleeding. Denies fever, SOB, chest pain/pressure, abd pain. Still fatigued but somewhat improved with transfusions. No new concerns.     Physical Exam  Temp: 98.4  F (36.9  C) Temp src: Oral BP: 126/79 mmHg Pulse: 77 Heart Rate: 75 Resp: 14 SpO2: 97 % O2 Device: None (Room air)    Filed Vitals:    01/24/17 1436 01/25/17 0842   Weight: 70.398 kg (155 lb 3.2 oz) 70.58 kg (155 lb 9.6 oz)     Vital Signs with Ranges  Temp:  [98.2  F (36.8  C)-99.7  F (37.6  C)] 98.4  F (36.9  C)  Pulse:  [77] 77  Heart Rate:  [] 75  Resp:  [14-25] 14  BP: ()/(55-88) 126/79 mmHg  SpO2:  [90 %-100 %] 97 %  I/O last 3 completed shifts:  In: 1860 [I.V.:960]  Out: 800 [Stool:800]    Constitutional: Pleasant woman seen resting in bed in no acute distress. Alert and interactive, soft-spoken.    HEENT: NCAT. PERRL, anicteric sclera. Lips less dry, MMM.    Respiratory: Non-labored breathing on RA, good air exchange, lungs clear to auscultation bilaterally. No cough or wheeze noted.    Cardiovascular: RRR. No murmur or rub.    GI: Normoactive bowel sounds. Abdomen soft, mildly distended, non-tender.   Skin: Warm and dry. No concerning lesions or rash on exposed surfaces.  Musculoskeletal: Extremities grossly normal, non-tender, no edema.    Neurologic: A&O, speech normal, no focal deficits.    Neuropsychiatric: Mentation and affect appear normal/appropriate.  Vascular Access:  Chest PAC is CDI.     Medications  Current Facility-Administered Medications   Medication     potassium chloride SA (K-DUR/KLOR-CON M) CR tablet 20-40 mEq     potassium chloride (KLOR-CON) Packet 20-40 mEq     potassium chloride 10 mEq in 100 mL intermittent infusion     potassium chloride 10 mEq in 100 mL intermittent infusion with 10 mg lidocaine     potassium chloride 20 mEq in 50 mL intermittent infusion     magnesium sulfate 4 g in 100 mL sterile water (premade)     sodium phosphate 15 mmol in D5W intermittent infusion     sodium phosphate 20 mmol in D5W intermittent infusion     sodium phosphate 25 mmol in D5W intermittent infusion     amylase-lipase-protease (CREON 36) 64764 UNITS per capsule 72,000 Units     LORazepam (ATIVAN) tablet 0.5 mg     morphine (MS CONTIN) 12 hr tablet 15 mg     ondansetron (ZOFRAN-ODT) ODT tab 8 mg     oxyCODONE (ROXICODONE) IR tablet 5 mg     prochlorperazine (COMPAZINE) tablet 10 mg     naloxone (NARCAN) injection 0.1-0.4 mg     0.9% sodium chloride infusion     influenza quadrivalent (PF) vacc age 3 yrs and older (FLUZONE or Flulaval) injection 0.5 mL     octreotide (sandoSTATIN) 1,250 mcg in NaCl 0.9 % 250 mL     pantoprazole (PROTONIX) 80 mg in NaCl 0.9 % 100 mL infusion     HOLD MEDICATION     dronabinol (MARINOL) capsule 5 mg       Data  CBC  Recent Labs  Lab 01/25/17  0758 01/25/17  0513 01/24/17  2134 01/24/17  1002   WBC 6.6  --   --  8.0   RBC 3.59*  --   --  2.48*   HGB 9.3* 9.2* 6.7* 5.9*   HCT 28.8*  --   --  19.7*   MCV 80  --   --  79   MCH 25.9*  --   --  23.8*   MCHC 32.3  --   --  29.9*   RDW 17.1*  --   --  19.8*   PLT 61*  --   --  97*     CMP  Recent Labs  Lab 01/25/17  0758 01/24/17  1002 01/20/17  1345    138 139   POTASSIUM 3.6 3.5 3.5   CHLORIDE 110* 105 101   CO2 24 25 30   ANIONGAP 7 8 8   GLC 82 119* 87   BUN 8 12 12   CR 0.77 0.64 0.71   GFRESTIMATED 76 >90Non  GFR Calc 84   GFRESTBLACK >90African American GFR Calc >90African  American GFR Calc >90African American GFR Calc   FANNY 7.4* 7.9* 8.2*   MAG 1.8  --   --    PHOS 2.2*  --   --    PROTTOTAL 5.8* 6.0*  --    ALBUMIN 2.5* 2.6*  --    BILITOTAL 0.5 0.2  --    ALKPHOS 206* 215*  --    AST 25 22  --    ALT 21 20  --      INR  Recent Labs  Lab 01/24/17  1829 01/24/17  1002   INR 1.37* 1.33*       Results for orders placed or performed during the hospital encounter of 01/24/17   XR Chest Port 1 View    Narrative    Exam:  XR CHEST PORT 1 VW, 1/24/2017 10:43 AM    History: chest pain/sob    Comparison:  CT chest from 12/12/2016.    Findings:  Single AP view of the chest is obtained. Right chest  portacatheter tip terminates in the low SVC. Cardiomediastinal  silhouette is within normal limits. Pulmonary vasculature is  unremarkable. There is no pleural effusion or pneumothorax. No focal  airspace opacity. Visualized upper abdomen is unremarkable.      Impression    Impression:  No acute cardiopulmonary abnormality.    I have personally reviewed the examination and initial interpretation  and I agree with the findings.    PHILIP BERMAN MD

## 2017-01-25 NOTE — PROGRESS NOTES
CLINICAL NUTRITION SERVICES - ASSESSMENT NOTE     Nutrition Prescription    RECOMMENDATIONS FOR MDs/PROVIDERS TO ORDER:  1. Advanced diet as medically appropriate to a regular diet.   2. If suspect poor po intake, rec calorie counts once diet > clear liquid and order oral nutrition supplements to encourage oral intake.   3. If pt to remain NPO and/or clear liquids >2-3 days, recommend considering nutrition support.     Malnutrition Status:    Unable to assess as pt gone for EGD.     Recommendations already ordered by Registered Dietitian (RD):  None at this time.     Future/Additional Recommendations:  -If nutrition support initiated and enteral nutrition indicated, would recommend begin Peptamen 1.5 @ 15 mL/hr and adv by 10 mL q8h to goal 55 mL/hr (1320 mL/day) to provide 1980 kcals (28 kcal/kg), 90 g PRO (1.3 g/kg), 1016 ml free H2O, 248 g CHO and no Fiber daily per dosing wt of 70kg.                  -30 mL q4h free water flushes for patency                 -Certavite to ensure adequate micronutrients  -If nutrition support initatied and parenteral nutrition indicated (although would prefer EN if able d/t liver status), would recommend goal regimen of 45 mL/hr (1080 mL) of 260 g dextrose (beginning @ 150 g), 105 g AA, and 250 mL 20% lipids daily to provide 1804 kcals (26 kcal/kg), 1.5 g AA, 27% kcal from fat, and GIR of 2.6.      REASON FOR ASSESSMENT  Shirin Muller is a/an 56 year old female assessed by the dietitian for Nutrition Yariel < 3    NUTRITION HISTORY  Unable to obtain nutrition hx as pt gone for test/procedure.   Pt with hx of metastatic pancreatic cancer undergoing chemo, metastatic liver lesion, biliary stricture, and Grave's disease now presenting with chills, weakness, and dizzines, and a fall at home. Per ED note, pt experiencing nausea, vomiting, and diarrhea x 8 days likely due to side effects of chemo (last received 1/16).       CURRENT NUTRITION ORDERS  Diet: NPO (for EGD  "today)    Intake/Tolerance: Unable to discuss as pt gone for test/procedure. Pt has been NPO since midnight and on clear liquids yesterday, likely nutritional intake poor since admit yesterday. Per chart review, EGD today to investigate main concern of a variceal bleed given metastatic disease, dark brown/maroon stools upon admit, and potential for cause of acute anemia. Given hx of potential wt loss (see below) and nausea/vomiting/diarrhea x 8 days, suspect inadequate po intake.       LABS  Labs reviewed  BUN 8, trending lower since yesterday and Phos 2.2 (L, replacement protocol in place) - suspect trending low potentially due to inadequate oral/protein intake.     MEDICATIONS  Medications reviewed    ANTHROPOMETRICS  Height: 177.8 cm (5' 10\")  Most Recent Weight: 70.398 kg (155 lb 3.2 oz)    IBW: 68.2 kg (150#)  BMI: Normal BMI  Weight History: Data below suggests pt lost 4% (6#) x 1 week. Note, difficult to assess true wt loss vs. fluid as pt admitted with sx of dehydration per notes.   Wt Readings from Last 10 Encounters:   01/24/17 70.398 kg (155 lb 3.2 oz)   01/20/17 73.392 kg (161 lb 12.8 oz)   01/17/17 73.3 kg (161 lb 9.6 oz)   01/05/17 71.169 kg (156 lb 14.4 oz)   01/02/17 73.029 kg (161 lb)   12/20/16 75.841 kg (167 lb 3.2 oz)   12/12/16 77.701 kg (171 lb 4.8 oz)   12/05/16 77.2 kg (170 lb 3.1 oz)   11/28/16 78.109 kg (172 lb 3.2 oz)   11/21/16 78.2 kg (172 lb 6.4 oz)     Dosing Weight: 70 kg - current/admit wt on 1/24    ASSESSED NUTRITION NEEDS  Estimated Energy Needs: 9306-8365 kcals/day (25 - 30 kcals/kg)  Justification: Maintenance  Estimated Protein Needs:  grams protein/day (1.2 - 1.5 grams of pro/kg)  Justification: Increased needs with chemo, Repletion   Estimated Fluid Needs:  (1 mL/kcal)   Justification: Maintenance    PHYSICAL FINDINGS  See malnutrition section below.  No abnormal nutrition-related physical findings observed.     MALNUTRITION  % Intake: Unable to assess  % Weight Loss: > " 2% in 1 week (severe)  Subcutaneous Fat Loss: Unable to assess  Muscle Loss: Unable to assess  Fluid Accumulation/Edema: None noted  Malnutrition Diagnosis: Unable to determine due to not being able to meet pt as pt gone for EGD.     NUTRITION DIAGNOSIS  Predicted inadequate nutrient intake (protein-energy) related to currently NPO with unclear plan for nutrition with potential for poor nutritional status (4% wt loss PTA in 1 week, unclear nutritional intakes PTA)    INTERVENTIONS  Implementation  Nutrition Education: Unable to complete due to pt gone for test/procedure.    No interventions at this time.     Goals  1. Diet adv v nutrition support within 2-3 days.  2. If diet advanced to > clear liquid, patient to consume % of nutritionally adequate meal trays TID, or the equivalent with supplements/snacks.  3. If nutrition support initiated, recommend total avg nutritional intake to meet a minimum of 25 kcal/kg and 1.2 g PRO/kg daily (per dosing wt 70 kg).    Monitoring/Evaluation  Progress toward goals will be monitored and evaluated per protocol.    Charisma Allen, Dietetic Intern     I have read and agree with the above.    Alison Orellana RD, LD  5C/BMT Dietitian  Pager: [phone]

## 2017-01-25 NOTE — PROGRESS NOTES
GASTROENTEROLOGY PROGRESS NOTE    ASSESSMENT:  Shirin Muller is a 56 year old female with a history of metastatic pancreatic cancer undergoing chemotherapy/FOLFIRINOX regimen, received Cycle 3 on 1/17/17, metastatic liver lesion, biliary stricture, graves disease and now presenting with chills, weakness, dizziness, fall at home, and hgb drop from 8.5 to 5.9. Today hgb is stable at 9.2. Patient has ongoing diarrhea that started on 1/18/17, but no abdominal pain or fever. Enteric pathogens, C. Diff, and giardia are negative.     Patient had an EGD on 1/25/16, which showed LA Grade A reflux esophagitis, erythematous mucosa in the stomach, one duodenal ulcer oozing blood (injected/thermal therapy/clipped), and infiltrating mass in duodenum. Duodenal ulcer was the likely source of her bleed with the combination of chronic anemia in the setting of chemotherapy and chronic disease.     RECOMMENDATIONS:  -- Treat H. Pylori with triple therapy (40mg Protonix BID on day 3; Amoxicillin 1 gram BID and Clarithromycin 500mg BID x14 days)   -- Continue with PPI drip x72 hours   -- Please discontinue Octreotide drip  -- Review ova and parasite  -- Clear liquids today but NPO after MN (in case patient has recurrent bleed)  -- Daily CBC, please transfuse hgb less than 7 and if possible keep platelets greater than 50  -- Monitor output closely for bleeding    Gastroenterology follow up recommendations: 8 weeks of 40mg oral daily Protonix, if patient is doing well dose can be decreased to once daily at that time. No repeat EGD needed at less patient is symptomatic.     Patient care plan discussed with Dr. Dalton, GI staff physician. Thank you for involving us in this patient's care. Please do not hesitate to contact the GI service with any questions or concerns.     Hunter Navarrete  Gastroenterology Nurse Practitioner  _______________________________________________________________  S: Patient did not have  "bloody stool over night and weakness/dizziness has slightly improved this morning.    O:  Blood pressure 102/87, pulse 77, temperature 97.4  F (36.3  C), temperature source Oral, resp. rate 16, height 1.778 m (5' 10\"), weight 70.58 kg (155 lb 9.6 oz), SpO2 100 %.    Constitutional: cooperative, pleasant, not dyspneic/diaphoretic, no acute distress  Eyes: Sclera anicteric/injected  Ears/nose/mouth/throat: Normal oropharynx without ulcers or exudate, mucus membranes moist, hearing intact  Neck: supple, thyroid normal size  CV: Tachycardic but regular rhythm. No edema in LE.  Respiratory: Unlabored breathing. Slightly diminished lower lobes.    Lymph: No axillary, submandibular, supraclavicular or inguinal lymphadenopathy  Abd: Nondistended, +bs, no hepatosplenomegaly, nontender, no peritoneal signs. Patient reports pressure in RUQ during palpation.    Skin: warm, perfused, no jaundice  Neuro: AAO x 3, No asterixis  Psych: Normal affect  MSK: Normal gait      LABS:  BMP  Recent Labs  Lab 01/25/17  0758 01/24/17  1002 01/20/17  1345    138 139   POTASSIUM 3.6 3.5 3.5   CHLORIDE 110* 105 101   FANNY 7.4* 7.9* 8.2*   CO2 24 25 30   BUN 8 12 12   CR 0.77 0.64 0.71   GLC 82 119* 87     CBC  Recent Labs  Lab 01/25/17  0758  01/24/17  1002   WBC 6.6  --  8.0   RBC 3.59*  --  2.48*   HGB 9.3*  < > 5.9*   HCT 28.8*  --  19.7*   MCV 80  --  79   MCH 25.9*  --  23.8*   MCHC 32.3  --  29.9*   RDW 17.1*  --  19.8*   PLT 61*  --  97*   < > = values in this interval not displayed.  INR  Recent Labs  Lab 01/24/17  1829 01/24/17  1002   INR 1.37* 1.33*     LFTs  Recent Labs  Lab 01/25/17  0758 01/24/17  1002   ALKPHOS 206* 215*   AST 25 22   ALT 21 20   BILITOTAL 0.5 0.2   PROTTOTAL 5.8* 6.0*   ALBUMIN 2.5* 2.6*      PANC  Recent Labs  Lab 01/24/17  1002   LIPASE 30*       HPI:  Shirin Chrismaggie Renzo is a 56 year old female with a history of metastatic pancreatic cancer undergoing chemotherapy/FOLFIRINOX regimen, received " Cycle 3 on 1/17/17, metastatic liver lesion, biliary stricture, graves disease and now presenting with chills, weakness, dizziness, fall at home, and hgb drop from 8.5 to 5.9. Patient has ongoing diarrhea that started on 1/18/17, but no abdominal pain or fever. Patient describes having 1-3 stools/day that were brown and smelled bad, which she has been managing with Imodium. Patient reports not having much of a problem while she was receiving the chemotherapy. However, yesterday she felt cold and weak. And today she felt dizzy and she fell without hitting her head. No nausea or vomiting. No hematemesis or hematochezia, except the one maroon episode that she had here in the hospital. No NSAID use.    Shirin was diagnosed with pancreatic cancer in Spring 2016 after presenting with a 2-3 month history of abdominal pain and weight loss. She underwent evaluation showing a 5cm mass at the head of the pancreas. Biopsy was c/w adenocarcinoma. She underwent staging imaging that included a MRI abdomen which showed an up to 10cm poorly defined hypoenhancing mass arising from the pancreatic that encased the celiac artery, superior mesenteric artery, and severely attenuated the main portal vein. She started treatment with gemcitabine and abraxane on 5/2/16 and continued on that until December 2016 when she was found to have metastatic disease involving the liver. She was then started on FOLFIRINOX on 12/20/16. Her first cycle was complicated by fatigue, nausea and vomiting. Antiemetics were adjusted with the second cycle, which she tolerated better.      ROS: A comprehensive Review of Systems was asked and answered in the negative unless specifically commented upon in the HPI    PMHx:  Past Medical History   Diagnosis Date     Graves disease      Biliary stricture      Pancreatic mass      Lesion of liver      Malignant neoplasm of head of pancreas (H) 4/12/2016       PSurgHx:   Past Surgical History   Procedure Laterality Date       section       Esophagoscopy, gastroscopy, duodenoscopy (egd), combined N/A 2016     Procedure: COMBINED ENDOSCOPIC ULTRASOUND, ESOPHAGOSCOPY, GASTROSCOPY, DUODENOSCOPY (EGD), FINE NEEDLE ASPIRATE/BIOPSY;  Surgeon: Arias Taveras MD;  Location: [facility] OR     Endoscopic retrograde cholangiopancreatogram N/A 2016     Procedure: COMBINED ENDOSCOPIC RETROGRADE CHOLANGIOPANCREATOGRAPHY, PLACE TUBE/STENT;  Surgeon: Arias Taveras MD;  Location: [facility] OR       MEDS:    No current facility-administered medications on file prior to encounter.  Current Outpatient Prescriptions on File Prior to Encounter:  morphine (MS CONTIN) 15 MG 12 hr tablet Take 1 tablet (15 mg) by mouth every 12 hours   prochlorperazine (COMPAZINE) 10 MG tablet Take 1 tablet (10 mg) by mouth every 6 hours as needed (Nausea/Vomiting)   oxyCODONE (ROXICODONE) 5 MG IR tablet Take 1 tablet (5 mg) by mouth every 4 hours as needed for moderate to severe pain   dronabinol (MARINOL) 5 MG capsule Take 1 capsule (5 mg) by mouth 2 times daily (before meals)   amylase-lipase-protease (CREON) 47752 UNITS CPEP Take 2 capsules (72,000 Units) by mouth 3 times daily (with meals)   potassium chloride (KLOR-CON) 20 MEQ packet Take 20 mEq by mouth daily   LORazepam (ATIVAN) 0.5 MG tablet Take 1 tablet (0.5 mg) by mouth every 4 hours as needed (Anxiety, Nausea/Vomiting or Sleep)   polyethylene glycol (MIRALAX/GLYCOLAX) powder Take 17 g (1 capful) by mouth daily   lidocaine-prilocaine (EMLA) cream Apply topically as needed for other (Use 30-60 minutes prior to port access)   docusate sodium (COLACE) 100 MG capsule Take 100 mg by mouth   ondansetron (ZOFRAN-ODT) 8 MG disintegrating tablet Take 1 tablet (8 mg) by mouth every 8 hours as needed for nausea       ALLERGIES:    Allergies   Allergen Reactions     Contrast Dye Nausea and Vomiting     Pt vomiting post IV contrast, Please try Visipaque for next CT scan. 16 sv       FHx:  Family History    Problem Relation Age of Onset     DIABETES Mother        SOCIAL Hx:  Social History     Social History     Marital Status:      Spouse Name: N/A     Number of Children: N/A     Years of Education: N/A     Occupational History     Not on file.     Social History Main Topics     Smoking status: Never Smoker      Smokeless tobacco: Never Used     Alcohol Use: No     Drug Use: No     Sexual Activity: Not on file     Other Topics Concern     Not on file     Social History Narrative

## 2017-01-26 ENCOUNTER — APPOINTMENT (OUTPATIENT)
Dept: GENERAL RADIOLOGY | Facility: CLINIC | Age: 57
DRG: 377 | End: 2017-01-26
Attending: INTERNAL MEDICINE
Payer: COMMERCIAL

## 2017-01-26 LAB
ANION GAP SERPL CALCULATED.3IONS-SCNC: 9 MMOL/L (ref 3–14)
BASOPHILS # BLD AUTO: 0 10E9/L (ref 0–0.2)
BASOPHILS NFR BLD AUTO: 0.4 %
BUN SERPL-MCNC: 7 MG/DL (ref 7–30)
CALCIUM SERPL-MCNC: 7.3 MG/DL (ref 8.5–10.1)
CHLORIDE SERPL-SCNC: 107 MMOL/L (ref 94–109)
CO2 SERPL-SCNC: 24 MMOL/L (ref 20–32)
CREAT SERPL-MCNC: 0.66 MG/DL (ref 0.52–1.04)
DIFFERENTIAL METHOD BLD: ABNORMAL
EOSINOPHIL # BLD AUTO: 0 10E9/L (ref 0–0.7)
EOSINOPHIL NFR BLD AUTO: 0.2 %
ERYTHROCYTE [DISTWIDTH] IN BLOOD BY AUTOMATED COUNT: 17.7 % (ref 10–15)
GFR SERPL CREATININE-BSD FRML MDRD: ABNORMAL ML/MIN/1.7M2
GLUCOSE SERPL-MCNC: 118 MG/DL (ref 70–99)
HCT VFR BLD AUTO: 29.4 % (ref 35–47)
HGB BLD-MCNC: 9.1 G/DL (ref 11.7–15.7)
HGB BLD-MCNC: 9.4 G/DL (ref 11.7–15.7)
IMM GRANULOCYTES # BLD: 0 10E9/L (ref 0–0.4)
IMM GRANULOCYTES NFR BLD: 0.6 %
LYMPHOCYTES # BLD AUTO: 0.4 10E9/L (ref 0.8–5.3)
LYMPHOCYTES NFR BLD AUTO: 7.1 %
MAGNESIUM SERPL-MCNC: 1.7 MG/DL (ref 1.6–2.3)
MCH RBC QN AUTO: 25.8 PG (ref 26.5–33)
MCHC RBC AUTO-ENTMCNC: 32 G/DL (ref 31.5–36.5)
MCV RBC AUTO: 81 FL (ref 78–100)
MONOCYTES # BLD AUTO: 0.2 10E9/L (ref 0–1.3)
MONOCYTES NFR BLD AUTO: 3.5 %
NEUTROPHILS # BLD AUTO: 4.5 10E9/L (ref 1.6–8.3)
NEUTROPHILS NFR BLD AUTO: 88.2 %
NRBC # BLD AUTO: 0 10*3/UL
NRBC BLD AUTO-RTO: 1 /100
PHOSPHATE SERPL-MCNC: 2.3 MG/DL (ref 2.5–4.5)
PLATELET # BLD AUTO: 69 10E9/L (ref 150–450)
POTASSIUM SERPL-SCNC: 3.2 MMOL/L (ref 3.4–5.3)
POTASSIUM SERPL-SCNC: 3.5 MMOL/L (ref 3.4–5.3)
RBC # BLD AUTO: 3.64 10E12/L (ref 3.8–5.2)
SODIUM SERPL-SCNC: 140 MMOL/L (ref 133–144)
WBC # BLD AUTO: 5.1 10E9/L (ref 4–11)

## 2017-01-26 PROCEDURE — 71020 XR CHEST 2 VW: CPT

## 2017-01-26 PROCEDURE — 20000002 ZZH R&B BMT INTERMEDIATE

## 2017-01-26 PROCEDURE — 99233 SBSQ HOSP IP/OBS HIGH 50: CPT | Performed by: INTERNAL MEDICINE

## 2017-01-26 PROCEDURE — 84100 ASSAY OF PHOSPHORUS: CPT | Performed by: NURSE PRACTITIONER

## 2017-01-26 PROCEDURE — 36415 COLL VENOUS BLD VENIPUNCTURE: CPT | Performed by: INTERNAL MEDICINE

## 2017-01-26 PROCEDURE — 85025 COMPLETE CBC W/AUTO DIFF WBC: CPT | Performed by: INTERNAL MEDICINE

## 2017-01-26 PROCEDURE — 80048 BASIC METABOLIC PNL TOTAL CA: CPT | Performed by: NURSE PRACTITIONER

## 2017-01-26 PROCEDURE — 25000125 ZZHC RX 250: Performed by: INTERNAL MEDICINE

## 2017-01-26 PROCEDURE — 25000128 H RX IP 250 OP 636: Performed by: NURSE PRACTITIONER

## 2017-01-26 PROCEDURE — 25000125 ZZHC RX 250: Performed by: NURSE PRACTITIONER

## 2017-01-26 PROCEDURE — 25000132 ZZH RX MED GY IP 250 OP 250 PS 637: Performed by: NURSE PRACTITIONER

## 2017-01-26 PROCEDURE — 85018 HEMOGLOBIN: CPT | Performed by: INTERNAL MEDICINE

## 2017-01-26 PROCEDURE — 87040 BLOOD CULTURE FOR BACTERIA: CPT | Performed by: INTERNAL MEDICINE

## 2017-01-26 PROCEDURE — 84132 ASSAY OF SERUM POTASSIUM: CPT | Performed by: INTERNAL MEDICINE

## 2017-01-26 PROCEDURE — 83735 ASSAY OF MAGNESIUM: CPT | Performed by: NURSE PRACTITIONER

## 2017-01-26 RX ORDER — DRONABINOL 2.5 MG/1
5 CAPSULE ORAL
Status: DISCONTINUED | OUTPATIENT
Start: 2017-01-26 | End: 2017-01-26

## 2017-01-26 RX ORDER — ACETAMINOPHEN 325 MG/1
650 TABLET ORAL EVERY 4 HOURS PRN
Status: DISCONTINUED | OUTPATIENT
Start: 2017-01-26 | End: 2017-01-29 | Stop reason: HOSPADM

## 2017-01-26 RX ORDER — DRONABINOL 5 MG/1
5 CAPSULE ORAL
Status: DISCONTINUED | OUTPATIENT
Start: 2017-01-26 | End: 2017-01-27

## 2017-01-26 RX ADMIN — POTASSIUM CHLORIDE 20 MEQ: 29.8 INJECTION, SOLUTION INTRAVENOUS at 05:22

## 2017-01-26 RX ADMIN — CLARITHROMYCIN 500 MG: 500 TABLET, FILM COATED ORAL at 08:17

## 2017-01-26 RX ADMIN — PROCHLORPERAZINE MALEATE 10 MG: 5 TABLET, FILM COATED ORAL at 15:59

## 2017-01-26 RX ADMIN — AMOXICILLIN 1000 MG: 500 CAPSULE ORAL at 20:05

## 2017-01-26 RX ADMIN — SODIUM CHLORIDE 8 MG/HR: 9 INJECTION, SOLUTION INTRAVENOUS at 10:18

## 2017-01-26 RX ADMIN — CLARITHROMYCIN 500 MG: 500 TABLET, FILM COATED ORAL at 20:05

## 2017-01-26 RX ADMIN — PANTOPRAZOLE SODIUM 40 MG: 40 INJECTION, POWDER, FOR SOLUTION INTRAVENOUS at 06:32

## 2017-01-26 RX ADMIN — ONDANSETRON 8 MG: 8 TABLET, ORALLY DISINTEGRATING ORAL at 12:52

## 2017-01-26 RX ADMIN — AMOXICILLIN 1000 MG: 500 CAPSULE ORAL at 08:17

## 2017-01-26 RX ADMIN — SODIUM CHLORIDE 8 MG/HR: 9 INJECTION, SOLUTION INTRAVENOUS at 18:36

## 2017-01-26 RX ADMIN — MORPHINE SULFATE 15 MG: 15 TABLET, EXTENDED RELEASE ORAL at 20:05

## 2017-01-26 RX ADMIN — SODIUM PHOSPHATE, MONOBASIC, MONOHYDRATE AND SODIUM PHOSPHATE, DIBASIC, ANHYDROUS 15 MMOL: 276; 142 INJECTION, SOLUTION INTRAVENOUS at 06:33

## 2017-01-26 RX ADMIN — MORPHINE SULFATE 15 MG: 15 TABLET, EXTENDED RELEASE ORAL at 08:17

## 2017-01-26 RX ADMIN — DRONABINOL 5 MG: 2.5 CAPSULE ORAL at 15:59

## 2017-01-26 RX ADMIN — POTASSIUM CHLORIDE 20 MEQ: 29.8 INJECTION, SOLUTION INTRAVENOUS at 04:19

## 2017-01-26 RX ADMIN — SODIUM CHLORIDE: 9 INJECTION, SOLUTION INTRAVENOUS at 16:03

## 2017-01-26 NOTE — PLAN OF CARE
"Problem: Goal Outcome Summary  Goal: Goal Outcome Summary  Outcome: No Change  /90 mmHg  Pulse 80  Temp(Src) 98.8  F (37.1  C) (Oral)  Resp 16  Ht 1.778 m (5' 10\")  Wt 70.58 kg (155 lb 9.6 oz)  BMI 22.33 kg/m2  SpO2 100%    VSS. A&Ox4. Denies pain and nausea. EGD with clipping of ulcer yesterday. NPO in case of re-bleeding. R Port infusing NS @100, PIVx2 SL'd. Up SBA. Continue POC.    K 3.2- Replaced  Phos 2.3- awaiting bag from pharm    0500  - Pt had bloody stool. MD notified. HGB 9.4 (stable)  "

## 2017-01-26 NOTE — PLAN OF CARE
Problem: Goal Outcome Summary  Goal: Goal Outcome Summary  Outcome: No Change  Tmax 100 today, other vitals stable.  Continues on protonix gtt.  Ambulated once in the halls.  Zofran given once for nausea with relief.  Denies pain at this time.    Problem: Chemotherapy Effects (Adult)  Goal: Signs and Symptoms of Listed Potential Problems Will be Absent or Manageable (Chemotherapy Effects)  Signs and symptoms of listed potential problems will be absent or manageable by discharge/transition of care (reference Chemotherapy Effects (Adult) CPG).   Outcome: No Change    01/26/17 1354   Chemotherapy Effects   Problems Assessed (Chemotherapy Effects) all   Problems Present (Chemotherapy Effects) altered nutrition;anemia;diarrhea;fatigue;nausea and vomiting;thrombocytopenia

## 2017-01-26 NOTE — PROGRESS NOTES
0800-11:30  Protonix drip restarted 10ml/hr  Clear liquid diet added  Low grade temp - Max temp 100  Continue to monitor.

## 2017-01-26 NOTE — PROGRESS NOTES
"[facility], [place]    Hematology / Oncology Progress Note    Date of Admission: 1/24/2017  Hospital Day #: 2   Date of Service (when I saw the patient): 01/26/2017     Assessment and Plan  Shirin \"Jose Antonio\" Morena Muller is a 56 year old woman with metastatic pancreatic cancer currently on treatment with FOLFIRINOX s/p Cycle 3 on 1/17/17. She was admitted after a fall at home on 1/24 AM with weakness, dizziness, and lightheadedness felt d/t anemia and dehydration. On arrival to unit 5C, she had a BM that RN noted was dark red/maroon, so now concerned that GI bleed could be cause of acute anemia.       Plan today:  -continue PPI drip for 24 more hours then can switch to bid  -monitor stools  -advance to clear liquid diet  -replace potassium  -PT consult    #Acute on chronic anemia. Chronic anemia 2/2 cancer and chemotherapy. Last hgb was 8.5 on 1/17. Pt had a fall on morning prior to admission and was found to have hgb 5.9 on labs in ED. She was previously unaware of any active bleeding, including hemoptysis, hematemesis, hematuria, hematochezia or melena. However, she had BM on arrival to unit 5C and RN noted that it was dark red/maroon, no bright red blood. Thus suspected acute anemia could be d/t GI bleed.     GI consulted and did EGD on 1/25; was found to have reflux esophagitis, erythematous mucosa in stomach, one bleeding duodenal ulcer (injected and clipped), and infiltrating mass in duodenum.   -Continue IVF, PPI drip (x24 more hours per GI, then can switch to bid). D/c'ed octreotide drip.   -Hgb has been stable >9 after transfusions. Monitor daily CBC, transfuse for hgb <8.     #Syncope, fall. Likely 2/2 anemia and dehydration in setting of recent chemo for pancreatic cancer and GI bleed.  -Cardiac w/u: troponin nml, EKG sinus tachycardia, echo nml structure, function, and EF.    -CXR unremarkable.    -GI w/u and management as above.     #Diarrhea. Pt reports ~1-2 loose " stools per day since last chemo. Most likely 2/2 irinotecan and possibly GI bleed. Now improving.   -C.diff, enteric FILOMENA, O&P, giardia all negative.     #Metastatic pancreatic cancer. Liver mets. Currently on treatment with FOLFIRINOX, s/p Cycle 3 on Tues 1/17.    -Continue home antiemetics prn, marinol 5mg bid, ms contin 15mg bid, and oxycodone prn.      FEN:    -NS at 100cc/hr  -PRN lyte replacement  -Clear liquid diet (no reds or purples)    Prophy/Misc:    -VTE: mechanical only    Code status: FULL CODE  Disposition: Anticipate d/c home tomorrow if remains stable and once has completed PPI drip for 72h.     Zena Sharif DNP, APRN, CNP  Hematology/Oncology  Pager: [phone]    Interval History  Jose Antonio is doing ok this AM. She continues to feel tired but overall feels that fatigue, dizziness, and LHness have improved or resolved. She had another maroon stool this AM, looks like older blood. No nausea or vomiting. No other signs of active bleeding. Denies fever, SOB, chest pain/pressure, abd pain. Minimal OOB activity other than getting to/from bathroom so will try walking more today.     Physical Exam  Temp: 99.5  F (37.5  C) Temp src: Oral BP: 120/72 mmHg Pulse: 100 Heart Rate: 80 Resp: 16 SpO2: 100 % O2 Device: None (Room air)    Filed Vitals:    01/24/17 1436 01/25/17 0842 01/26/17 0756   Weight: 70.398 kg (155 lb 3.2 oz) 70.58 kg (155 lb 9.6 oz) 71.3 kg (157 lb 3 oz)     Vital Signs with Ranges  Temp:  [97.4  F (36.3  C)-100  F (37.8  C)] 99.5  F (37.5  C)  Pulse:  [] 100  Heart Rate:  [0-88] 80  Resp:  [10-18] 16  BP: (102-157)/() 120/72 mmHg  SpO2:  [69 %-100 %] 100 %  I/O last 3 completed shifts:  In: 3563.33 [P.O.:1200; I.V.:2363.33]  Out: 1200 [Urine:1200]    Constitutional: Pleasant woman seen resting in bed in no acute distress. Alert, minimally interactive, soft-spoken.    HEENT: NCAT. PERRL, anicteric sclera. MMM.  Respiratory: Non-labored breathing on RA, good air exchange, lungs clear  to auscultation bilaterally. No cough or wheeze noted.    Cardiovascular: RRR. No murmur or rub.    GI: Normoactive bowel sounds. Abdomen soft, mildly distended, non-tender.   Skin: Warm and dry. No concerning lesions or rash on exposed surfaces.  Musculoskeletal: Extremities grossly normal, non-tender, no edema.    Neurologic: A&O, speech normal, no focal deficits.    Neuropsychiatric: Mentation and affect appear normal/appropriate, flat.  Vascular Access: Chest PAC is CDI.     Medications  Current Facility-Administered Medications   Medication     pantoprazole (PROTONIX) 80 mg in NaCl 0.9 % 100 mL infusion     RESUME A HELD MEDICATION     potassium chloride SA (K-DUR/KLOR-CON M) CR tablet 20-40 mEq     potassium chloride (KLOR-CON) Packet 20-40 mEq     potassium chloride 10 mEq in 100 mL intermittent infusion     potassium chloride 10 mEq in 100 mL intermittent infusion with 10 mg lidocaine     potassium chloride 20 mEq in 50 mL intermittent infusion     magnesium sulfate 4 g in 100 mL sterile water (premade)     sodium phosphate 15 mmol in D5W intermittent infusion     sodium phosphate 20 mmol in D5W intermittent infusion     sodium phosphate 25 mmol in D5W intermittent infusion     simethicone (MYLICON) suspension     amoxicillin (AMOXIL) capsule 1,000 mg     clarithromycin (BIAXIN) tablet 500 mg     amylase-lipase-protease (CREON 36) 57818 UNITS per capsule 72,000 Units     LORazepam (ATIVAN) tablet 0.5 mg     morphine (MS CONTIN) 12 hr tablet 15 mg     ondansetron (ZOFRAN-ODT) ODT tab 8 mg     oxyCODONE (ROXICODONE) IR tablet 5 mg     prochlorperazine (COMPAZINE) tablet 10 mg     naloxone (NARCAN) injection 0.1-0.4 mg     0.9% sodium chloride infusion     influenza quadrivalent (PF) vacc age 3 yrs and older (FLUZONE or Flulaval) injection 0.5 mL     dronabinol (MARINOL) capsule 5 mg       Data  CBC  Recent Labs  Lab 01/26/17  0414 01/25/17  2300 01/25/17  0758 01/25/17  0513  01/24/17  1002   WBC 5.1  --  6.6   --   --  8.0   RBC 3.64*  --  3.59*  --   --  2.48*   HGB 9.4* 9.0* 9.3* 9.2*  < > 5.9*   HCT 29.4*  --  28.8*  --   --  19.7*   MCV 81  --  80  --   --  79   MCH 25.8*  --  25.9*  --   --  23.8*   MCHC 32.0  --  32.3  --   --  29.9*   RDW 17.7*  --  17.1*  --   --  19.8*   PLT 69*  --  61*  --   --  97*   < > = values in this interval not displayed.  CMP    Recent Labs  Lab 01/26/17  0825 01/26/17  0314 01/25/17  0758 01/24/17  1002 01/20/17  1345   NA  --  140 141 138 139   POTASSIUM 3.5 3.2* 3.6 3.5 3.5   CHLORIDE  --  107 110* 105 101   CO2  --  24 24 25 30   ANIONGAP  --  9 7 8 8   GLC  --  118* 82 119* 87   BUN  --  7 8 12 12   CR  --  0.66 0.77 0.64 0.71   GFRESTIMATED  --  >90Non  GFR Calc 76 >90Non  GFR Calc 84   GFRESTBLACK  --  >90African American GFR Calc >90African American GFR Calc >90African American GFR Calc >90African American GFR Calc   FANNY  --  7.3* 7.4* 7.9* 8.2*   MAG  --  1.7 1.8  --   --    PHOS  --  2.3* 2.2*  --   --    PROTTOTAL  --   --  5.8* 6.0*  --    ALBUMIN  --   --  2.5* 2.6*  --    BILITOTAL  --   --  0.5 0.2  --    ALKPHOS  --   --  206* 215*  --    AST  --   --  25 22  --    ALT  --   --  21 20  --      INR    Recent Labs  Lab 01/24/17  1829 01/24/17  1002   INR 1.37* 1.33*       Results for orders placed or performed during the hospital encounter of 01/24/17   XR Chest Port 1 View    Narrative    Exam:  XR CHEST PORT 1 VW, 1/24/2017 10:43 AM    History: chest pain/sob    Comparison:  CT chest from 12/12/2016.    Findings:  Single AP view of the chest is obtained. Right chest  portacatheter tip terminates in the low SVC. Cardiomediastinal  silhouette is within normal limits. Pulmonary vasculature is  unremarkable. There is no pleural effusion or pneumothorax. No focal  airspace opacity. Visualized upper abdomen is unremarkable.      Impression    Impression:  No acute cardiopulmonary abnormality.    I have personally reviewed the examination and  initial interpretation  and I agree with the findings.    PHILIP BERMAN MD

## 2017-01-26 NOTE — PHARMACY-ADMISSION MEDICATION HISTORY
Admission medication history interview status for the 1/24/2017 admission is complete. See Epic admission navigator for allergy information, pharmacy, prior to admission medications and immunization status.     Medication history interview sources:  patient    Changes made to PTA medication list (reason)  Added: imodium 2 mg, loratadine 10 mg  Deleted: none  Changed: Colace 100 mg (added frequency: once daily)    Additional medication history information (including reliability of information, actions taken by pharmacist):    Patient reported her last flu vaccine of 2015 but LAKESHA noted of 4/14/2016    She fills her prescriptions at the [facility] pharmacy    She reported that she only used Creon 1 capsule three times a day because she doesn't need that much (2 capsules TID)        Prior to Admission medications    Medication Sig Last Dose Taking? Auth Provider   loperamide (IMODIUM) 2 MG capsule Take 2 mg by mouth daily as needed for diarrhea 1/23/2017 at am Yes Unknown, Entered By History   loratadine (CLARITIN) 10 MG tablet Take 10 mg by mouth daily 1/22/2017 at am Yes Unknown, Entered By History   morphine (MS CONTIN) 15 MG 12 hr tablet Take 1 tablet (15 mg) by mouth every 12 hours 1/25/2017 at pm Yes Ester Echavarria APRN CNP   prochlorperazine (COMPAZINE) 10 MG tablet Take 1 tablet (10 mg) by mouth every 6 hours as needed (Nausea/Vomiting) Past Week at pm Yes Ester Echavarria APRN CNP   oxyCODONE (ROXICODONE) 5 MG IR tablet Take 1 tablet (5 mg) by mouth every 4 hours as needed for moderate to severe pain 1/25/2017 at pm Yes Ester Echavarria APRN CNP   amylase-lipase-protease (CREON) 50307 UNITS CPEP Take 2 capsules (72,000 Units) by mouth 3 times daily (with meals) Past Month at Unknown time Yes Demond Gonsales MD   LORazepam (ATIVAN) 0.5 MG tablet Take 1 tablet (0.5 mg) by mouth every 4 hours as needed (Anxiety, Nausea/Vomiting or Sleep) 1/25/2017 at pm Yes Demond Gonsales MD    potassium chloride (KLOR-CON) 20 MEQ packet Take 20 mEq by mouth daily 1/23/2017 at am Yes Demond Gonsales MD   ondansetron (ZOFRAN-ODT) 8 MG disintegrating tablet Take 1 tablet (8 mg) by mouth every 8 hours as needed for nausea 1/25/2017 at pm Yes Ester Echavarria APRN CNP   dronabinol (MARINOL) 5 MG capsule Take 1 capsule (5 mg) by mouth 2 times daily (before meals) 1/23/2017 at Unknown time  Ester Echavarria APRN CNP   polyethylene glycol (MIRALAX/GLYCOLAX) powder Take 17 g (1 capful) by mouth daily More than a month at Unknown time  Demond Gonsales MD   lidocaine-prilocaine (EMLA) cream Apply topically as needed for other (Use 30-60 minutes prior to port access) 1/11/2017 at Unknown time  Ester Echavarria APRN CNP   docusate sodium (COLACE) 100 MG capsule Take 100 mg by mouth daily as needed  More than a month at Unknown time  Reported, Patient         Medication history completed by: Sarah Beth Paz, PharmD student

## 2017-01-26 NOTE — PLAN OF CARE
Problem: Goal Outcome Summary  Goal: Goal Outcome Summary  Outcome: No Change  VSS.  AF.  Oxy IR x 1 for pain.  Ativan 0.5 mg x 1 at HS.  Tolerating a clear liquid diet.  Will be NPO after MN in case of bleeding.  Protonix gtt stopped.  Up with SBA to bathroom. Urine Icteric.

## 2017-01-27 ENCOUNTER — APPOINTMENT (OUTPATIENT)
Dept: PHYSICAL THERAPY | Facility: CLINIC | Age: 57
DRG: 377 | End: 2017-01-27
Attending: NURSE PRACTITIONER
Payer: COMMERCIAL

## 2017-01-27 LAB
ALBUMIN UR-MCNC: NEGATIVE MG/DL
ANION GAP SERPL CALCULATED.3IONS-SCNC: 8 MMOL/L (ref 3–14)
APPEARANCE UR: CLEAR
APTT PPP: 33 SEC (ref 22–37)
BACTERIA #/AREA URNS HPF: ABNORMAL /HPF
BASOPHILS # BLD AUTO: 0 10E9/L (ref 0–0.2)
BASOPHILS NFR BLD AUTO: 0.3 %
BILIRUB UR QL STRIP: NEGATIVE
BUN SERPL-MCNC: 4 MG/DL (ref 7–30)
CALCIUM SERPL-MCNC: 7.3 MG/DL (ref 8.5–10.1)
CHLORIDE SERPL-SCNC: 108 MMOL/L (ref 94–109)
CO2 SERPL-SCNC: 26 MMOL/L (ref 20–32)
COLOR UR AUTO: ABNORMAL
CREAT SERPL-MCNC: 0.78 MG/DL (ref 0.52–1.04)
DIFFERENTIAL METHOD BLD: ABNORMAL
EOSINOPHIL # BLD AUTO: 0 10E9/L (ref 0–0.7)
EOSINOPHIL NFR BLD AUTO: 0.3 %
ERYTHROCYTE [DISTWIDTH] IN BLOOD BY AUTOMATED COUNT: 18.6 % (ref 10–15)
FIBRINOGEN PPP-MCNC: 274 MG/DL (ref 200–420)
GFR SERPL CREATININE-BSD FRML MDRD: 76 ML/MIN/1.7M2
GLUCOSE SERPL-MCNC: 85 MG/DL (ref 70–99)
GLUCOSE UR STRIP-MCNC: NEGATIVE MG/DL
HCT VFR BLD AUTO: 26.4 % (ref 35–47)
HGB BLD-MCNC: 8.6 G/DL (ref 11.7–15.7)
HGB BLD-MCNC: 8.9 G/DL (ref 11.7–15.7)
HGB BLD-MCNC: 9.7 G/DL (ref 11.7–15.7)
HGB UR QL STRIP: NEGATIVE
IMM GRANULOCYTES # BLD: 0.1 10E9/L (ref 0–0.4)
IMM GRANULOCYTES NFR BLD: 4.2 %
INR PPP: 1.38 (ref 0.86–1.14)
KETONES UR STRIP-MCNC: NEGATIVE MG/DL
LEUKOCYTE ESTERASE UR QL STRIP: NEGATIVE
LYMPHOCYTES # BLD AUTO: 0.7 10E9/L (ref 0.8–5.3)
LYMPHOCYTES NFR BLD AUTO: 21.6 %
MAGNESIUM SERPL-MCNC: 1.7 MG/DL (ref 1.6–2.3)
MCH RBC QN AUTO: 26.5 PG (ref 26.5–33)
MCHC RBC AUTO-ENTMCNC: 32.6 G/DL (ref 31.5–36.5)
MCV RBC AUTO: 82 FL (ref 78–100)
MONOCYTES # BLD AUTO: 0.5 10E9/L (ref 0–1.3)
MONOCYTES NFR BLD AUTO: 15 %
MUCOUS THREADS #/AREA URNS LPF: PRESENT /LPF
NEUTROPHILS # BLD AUTO: 1.8 10E9/L (ref 1.6–8.3)
NEUTROPHILS NFR BLD AUTO: 58.6 %
NITRATE UR QL: NEGATIVE
NRBC # BLD AUTO: 0 10*3/UL
NRBC BLD AUTO-RTO: 0 /100
PH UR STRIP: 5.5 PH (ref 5–7)
PHOSPHATE SERPL-MCNC: 2.5 MG/DL (ref 2.5–4.5)
PLATELET # BLD AUTO: 58 10E9/L (ref 150–450)
POTASSIUM SERPL-SCNC: 2.7 MMOL/L (ref 3.4–5.3)
POTASSIUM SERPL-SCNC: 3.1 MMOL/L (ref 3.4–5.3)
POTASSIUM SERPL-SCNC: 3.4 MMOL/L (ref 3.4–5.3)
RBC # BLD AUTO: 3.24 10E12/L (ref 3.8–5.2)
RBC #/AREA URNS AUTO: <1 /HPF (ref 0–2)
SODIUM SERPL-SCNC: 142 MMOL/L (ref 133–144)
SP GR UR STRIP: 1.01 (ref 1–1.03)
SQUAMOUS #/AREA URNS AUTO: 1 /HPF (ref 0–1)
URN SPEC COLLECT METH UR: ABNORMAL
UROBILINOGEN UR STRIP-MCNC: NORMAL MG/DL (ref 0–2)
WBC # BLD AUTO: 3.1 10E9/L (ref 4–11)
WBC #/AREA URNS AUTO: 2 /HPF (ref 0–2)

## 2017-01-27 PROCEDURE — 83735 ASSAY OF MAGNESIUM: CPT | Performed by: NURSE PRACTITIONER

## 2017-01-27 PROCEDURE — 25000132 ZZH RX MED GY IP 250 OP 250 PS 637: Performed by: INTERNAL MEDICINE

## 2017-01-27 PROCEDURE — 20000002 ZZH R&B BMT INTERMEDIATE

## 2017-01-27 PROCEDURE — 97110 THERAPEUTIC EXERCISES: CPT | Mod: GP

## 2017-01-27 PROCEDURE — 86923 COMPATIBILITY TEST ELECTRIC: CPT | Performed by: INTERNAL MEDICINE

## 2017-01-27 PROCEDURE — 40000193 ZZH STATISTIC PT WARD VISIT

## 2017-01-27 PROCEDURE — 84100 ASSAY OF PHOSPHORUS: CPT | Performed by: NURSE PRACTITIONER

## 2017-01-27 PROCEDURE — 85025 COMPLETE CBC W/AUTO DIFF WBC: CPT | Performed by: NURSE PRACTITIONER

## 2017-01-27 PROCEDURE — 97162 PT EVAL MOD COMPLEX 30 MIN: CPT | Mod: GP

## 2017-01-27 PROCEDURE — 86900 BLOOD TYPING SEROLOGIC ABO: CPT | Performed by: INTERNAL MEDICINE

## 2017-01-27 PROCEDURE — 99233 SBSQ HOSP IP/OBS HIGH 50: CPT | Performed by: INTERNAL MEDICINE

## 2017-01-27 PROCEDURE — 85730 THROMBOPLASTIN TIME PARTIAL: CPT | Performed by: INTERNAL MEDICINE

## 2017-01-27 PROCEDURE — 25000125 ZZHC RX 250: Performed by: NURSE PRACTITIONER

## 2017-01-27 PROCEDURE — 85610 PROTHROMBIN TIME: CPT | Performed by: INTERNAL MEDICINE

## 2017-01-27 PROCEDURE — 25000132 ZZH RX MED GY IP 250 OP 250 PS 637: Performed by: NURSE PRACTITIONER

## 2017-01-27 PROCEDURE — 81001 URINALYSIS AUTO W/SCOPE: CPT | Performed by: INTERNAL MEDICINE

## 2017-01-27 PROCEDURE — 86850 RBC ANTIBODY SCREEN: CPT | Performed by: INTERNAL MEDICINE

## 2017-01-27 PROCEDURE — 80048 BASIC METABOLIC PNL TOTAL CA: CPT | Performed by: NURSE PRACTITIONER

## 2017-01-27 PROCEDURE — 84132 ASSAY OF SERUM POTASSIUM: CPT | Performed by: NURSE PRACTITIONER

## 2017-01-27 PROCEDURE — 85384 FIBRINOGEN ACTIVITY: CPT | Performed by: INTERNAL MEDICINE

## 2017-01-27 PROCEDURE — 97530 THERAPEUTIC ACTIVITIES: CPT | Mod: GP

## 2017-01-27 PROCEDURE — 97116 GAIT TRAINING THERAPY: CPT | Mod: GP

## 2017-01-27 PROCEDURE — 25000128 H RX IP 250 OP 636: Performed by: NURSE PRACTITIONER

## 2017-01-27 PROCEDURE — 86901 BLOOD TYPING SEROLOGIC RH(D): CPT | Performed by: INTERNAL MEDICINE

## 2017-01-27 PROCEDURE — 25000125 ZZHC RX 250: Performed by: INTERNAL MEDICINE

## 2017-01-27 PROCEDURE — 85018 HEMOGLOBIN: CPT | Performed by: NURSE PRACTITIONER

## 2017-01-27 RX ORDER — ONDANSETRON 2 MG/ML
8 INJECTION INTRAMUSCULAR; INTRAVENOUS EVERY 8 HOURS
Status: DISCONTINUED | OUTPATIENT
Start: 2017-01-27 | End: 2017-01-27

## 2017-01-27 RX ORDER — ONDANSETRON 8 MG/1
8 TABLET, ORALLY DISINTEGRATING ORAL EVERY 8 HOURS PRN
Status: DISCONTINUED | OUTPATIENT
Start: 2017-01-27 | End: 2017-01-29 | Stop reason: HOSPADM

## 2017-01-27 RX ORDER — SUCRALFATE ORAL 1 G/10ML
1 SUSPENSION ORAL
Status: DISCONTINUED | OUTPATIENT
Start: 2017-01-27 | End: 2017-01-29 | Stop reason: HOSPADM

## 2017-01-27 RX ORDER — ONDANSETRON 8 MG/1
8 TABLET, ORALLY DISINTEGRATING ORAL EVERY 8 HOURS
Status: DISCONTINUED | OUTPATIENT
Start: 2017-01-27 | End: 2017-01-27

## 2017-01-27 RX ORDER — HYDROCORTISONE ACETATE 25 MG/1
25 SUPPOSITORY RECTAL 2 TIMES DAILY
Status: DISCONTINUED | OUTPATIENT
Start: 2017-01-27 | End: 2017-01-27

## 2017-01-27 RX ORDER — POTASSIUM CHLORIDE 750 MG/1
40 TABLET, EXTENDED RELEASE ORAL 2 TIMES DAILY
Status: DISCONTINUED | OUTPATIENT
Start: 2017-01-27 | End: 2017-01-29 | Stop reason: HOSPADM

## 2017-01-27 RX ORDER — POLYETHYLENE GLYCOL 3350 17 G/17G
17 POWDER, FOR SOLUTION ORAL 2 TIMES DAILY
Status: DISCONTINUED | OUTPATIENT
Start: 2017-01-27 | End: 2017-01-29 | Stop reason: HOSPADM

## 2017-01-27 RX ORDER — DRONABINOL 2.5 MG/1
5 CAPSULE ORAL
Status: DISCONTINUED | OUTPATIENT
Start: 2017-01-27 | End: 2017-01-29 | Stop reason: HOSPADM

## 2017-01-27 RX ADMIN — DRONABINOL 5 MG: 2.5 CAPSULE ORAL at 16:01

## 2017-01-27 RX ADMIN — SUCRALFATE 1 G: 1 SUSPENSION ORAL at 16:01

## 2017-01-27 RX ADMIN — PANTOPRAZOLE SODIUM 40 MG: 40 INJECTION, POWDER, FOR SOLUTION INTRAVENOUS at 16:03

## 2017-01-27 RX ADMIN — ONDANSETRON 8 MG: 2 INJECTION INTRAMUSCULAR; INTRAVENOUS at 08:38

## 2017-01-27 RX ADMIN — I.V. FAT EMULSION 250 ML: 20 EMULSION INTRAVENOUS at 20:09

## 2017-01-27 RX ADMIN — POTASSIUM CHLORIDE 20 MEQ: 29.8 INJECTION, SOLUTION INTRAVENOUS at 05:59

## 2017-01-27 RX ADMIN — AMINOCAPROIC ACID 1.5 G: 0.25 SOLUTION ORAL at 11:37

## 2017-01-27 RX ADMIN — ONDANSETRON 8 MG: 8 TABLET, ORALLY DISINTEGRATING ORAL at 16:01

## 2017-01-27 RX ADMIN — SUCRALFATE 1 G: 1 SUSPENSION ORAL at 21:59

## 2017-01-27 RX ADMIN — RANITIDINE HYDROCHLORIDE 150 MG: 150 TABLET, FILM COATED ORAL at 21:59

## 2017-01-27 RX ADMIN — SODIUM CHLORIDE: 9 INJECTION, SOLUTION INTRAVENOUS at 18:15

## 2017-01-27 RX ADMIN — POTASSIUM CHLORIDE 20 MEQ: 29.8 INJECTION, SOLUTION INTRAVENOUS at 07:01

## 2017-01-27 RX ADMIN — SODIUM CHLORIDE 8 MG/HR: 9 INJECTION, SOLUTION INTRAVENOUS at 05:20

## 2017-01-27 RX ADMIN — AMINOCAPROIC ACID 1.5 G: 0.25 SOLUTION ORAL at 20:09

## 2017-01-27 RX ADMIN — POTASSIUM CHLORIDE: 2 INJECTION, SOLUTION, CONCENTRATE INTRAVENOUS at 20:06

## 2017-01-27 RX ADMIN — MORPHINE SULFATE 15 MG: 15 TABLET, EXTENDED RELEASE ORAL at 08:02

## 2017-01-27 RX ADMIN — CLARITHROMYCIN 500 MG: 500 TABLET, FILM COATED ORAL at 20:10

## 2017-01-27 RX ADMIN — AMOXICILLIN 1000 MG: 500 CAPSULE ORAL at 20:09

## 2017-01-27 RX ADMIN — CLARITHROMYCIN 500 MG: 500 TABLET, FILM COATED ORAL at 08:02

## 2017-01-27 RX ADMIN — POLYETHYLENE GLYCOL 3350 17 G: 17 POWDER, FOR SOLUTION ORAL at 20:11

## 2017-01-27 RX ADMIN — SUCRALFATE 1 G: 1 SUSPENSION ORAL at 11:37

## 2017-01-27 RX ADMIN — MORPHINE SULFATE 15 MG: 15 TABLET, EXTENDED RELEASE ORAL at 20:09

## 2017-01-27 RX ADMIN — AMOXICILLIN 1000 MG: 500 CAPSULE ORAL at 08:02

## 2017-01-27 RX ADMIN — POTASSIUM CHLORIDE 20 MEQ: 29.8 INJECTION, SOLUTION INTRAVENOUS at 04:45

## 2017-01-27 RX ADMIN — POTASSIUM CHLORIDE 20 MEQ: 29.8 INJECTION, SOLUTION INTRAVENOUS at 13:30

## 2017-01-27 RX ADMIN — Medication: at 10:21

## 2017-01-27 RX ADMIN — SODIUM CHLORIDE: 9 INJECTION, SOLUTION INTRAVENOUS at 01:31

## 2017-01-27 RX ADMIN — POLYETHYLENE GLYCOL 3350, SODIUM SULFATE ANHYDROUS, SODIUM BICARBONATE, SODIUM CHLORIDE, POTASSIUM CHLORIDE 4000 ML: 236; 22.74; 6.74; 5.86; 2.97 POWDER, FOR SOLUTION ORAL at 09:55

## 2017-01-27 RX ADMIN — POTASSIUM CHLORIDE 20 MEQ: 29.8 INJECTION, SOLUTION INTRAVENOUS at 14:41

## 2017-01-27 RX ADMIN — AMINOCAPROIC ACID 1.5 G: 0.25 SOLUTION ORAL at 16:03

## 2017-01-27 NOTE — PROGRESS NOTES
" 01/27/17 1100   Quick Adds   Type of Visit Initial PT Evaluation       Present no   Living Environment   Lives With child(renny), adult   Living Arrangements house   Home Accessibility no concerns   Number of Stairs to Enter Home 2   Number of Stairs Within Home 6   Stair Railings at Home none   Transportation Available family or friend will provide   Living Environment Comment Pt reported that she has to negotiate 1/2 flight of stairs when inside home    Self-Care   Usual Activity Tolerance fair   Current Activity Tolerance fair   Regular Exercise no   Equipment Currently Used at Home none   Activity/Exercise/Self-Care Comment Pt reported she began feeling inc. fatigue ~ 2 weeks ago. Pt reported fatigue ambulating w/i home. Prior pt was able to get groceries and go into community. Recently pt has stayed home due to inc. weakness and fatigue   Functional Level Prior   Ambulation 0-->independent   Transferring 0-->independent   Toileting 0-->independent   Fall history within last six months yes   Number of times patient has fallen within last six months 1   Which of the above functional risks had a recent onset or change? fall history   Prior Functional Level Comment Pt reports she receives assist with showering at home due to fatigue and weakness. Pt reported she has walk in shower. Does not have shower chair however would like to obtain one before DC from hospital. Pt does not have grab bars in br or use any equiptment. Pt reported fall that occurred when standing up in living room to ambulate into bedroom to lay down. Pt became dizzy and weak. No injuries sustained.    General Information   Onset of Illness/Injury or Date of Surgery - Date 01/24/17   Referring Physician Zena Sharif, APRN CNP   Patient/Family Goals Statement To improve strenght and walking.    Pertinent History of Current Problem (include personal factors and/or comorbidities that impact the POC) Shirin \"Jose Antonio\" Kikelomo " Renzo is a 56 year old woman with metastatic pancreatic cancer currently on treatment with FOLFIRINOX s/p Cycle 3 on 1/17/17. She was admitted after a fall at home on 1/24 AM with weakness, dizziness, and lightheadedness felt d/t anemia and dehydration. On arrival to unit 5C, she had a BM that RN noted was dark red/maroon, so now concerned that GI bleed could be cause of acute anemia.      Precautions/Limitations immunosuppressed;fall precautions   General Observations Activity: Amb w/ assist    Cognitive Status Examination   Orientation orientation to person, place and time   Level of Consciousness alert   Follows Commands and Answers Questions 100% of the time   Personal Safety and Judgment intact   Pain Assessment   Patient Currently in Pain No   Posture    Posture Comments Rigid posture during ambulation    Range of Motion (ROM)   ROM Comment WFL   Strength   Strength Comments Not formely assessed however pt w/ decreased tolerance to OOB activity and demosntrates balance/endurance impairments during ambulation. Pt w/ decreased functional mobilty from baseline levels    Bed Mobility   Bed Mobility Comments Modified independent   Transfer Skills   Transfer Comments SBA STS w/o use of AD   Gait   Gait Comments SBA-CGA w/o AD, dec. nahomi, step length, heel toe pattern w/ rigid posture    Balance   Balance Comments Good static and dynamic sitting balance. Fair dynamic standing balance. Minor lateral path sway during ambulation noted.    Sensory Examination   Sensory Perception Comments Intact   General Therapy Interventions   Planned Therapy Interventions balance training;bed mobility training;gait training;neuromuscular re-education;strengthening;transfer training;home program guidelines   Clinical Impression   Criteria for Skilled Therapeutic Intervention yes, treatment indicated   PT Diagnosis Decreased functional mobility    Influenced by the following impairments balance, strength, endurance,  "ummunosuppressed   Functional limitations due to impairments impaired gait, tolerance to OOB, functional mobilitiy   Clinical Presentation Evolving/Changing   Clinical Presentation Rationale Significant PMH, current medical condition changing, immunosuppressed.    Clinical Decision Making (Complexity) Moderate complexity   Therapy Frequency` 5 times/week   Predicted Duration of Therapy Intervention (days/wks) 2 weeks   Anticipated Equipment Needs at Discharge shower chair   Anticipated Discharge Disposition Home with Assist;Home with Outpatient Therapy   Risk & Benefits of therapy have been explained Yes   Patient, Family & other staff in agreement with plan of care Yes   [facility] AM-PAC  \"6 Clicks\" V.2 Basic Mobility Inpatient Short Form   1. Turning from your back to your side while in a flat bed without using bedrails? 3 - A Little   2. Moving from lying on your back to sitting on the side of a flat bed without using bedrails? 3 - A Little   3. Moving to and from a bed to a chair (including a wheelchair)? 3 - A Little   4. Standing up from a chair using your arms (e.g., wheelchair, or bedside chair)? 3 - A Little   5. To walk in hospital room? 3 - A Little   6. Climbing 3-5 steps with a railing? 3 - A Little   Basic Mobility Raw Score (Score out of 24.Lower scores equate to lower levels of function) 18   Total Evaluation Time   Total Evaluation Time (Minutes) 10     "

## 2017-01-27 NOTE — PROGRESS NOTES
Clinical Nutrition Services- Brief Note/Pharmacy/Nutrition to start & manage TPN     Team ordered to start TPN. For full nutrition assessment please refer to RD note from 1/25.    Labs:  Mg++/phos-WNL, K+ 2.7-replacing    Medications:  KCl  K+/phos lyte replacement  IVF @ 75mL/hr.    RECOMMENDATIONS  -Monitor electrolytes and replace as needed  -Consider decreasing IVF with start of TPN.    INTERVENTIONS  -TPN regimen as follows (per 70kg dosing weight): 1080mL, 150g dextrose, 105g amino acids + 250mL 20% lipids daily, pending labs (Mg++/K+ WNL and phos >1.9) advance by 50g dextrose/day to goal of 250g. This regimen provides: 1770kcals, 105g protein(1.5g/kg), 250g carbohydrate (2.5mg/kg/min) and 28% fat daily.    Sharon Mckeon RD, LD  Unit pager: [phone]

## 2017-01-27 NOTE — PLAN OF CARE
Problem: Goal Outcome Summary  Goal: Goal Outcome Summary  Outcome: No Change  Patient Vitals for the past 8 hrs:    Temp Temp src BP Resp   01/26/17 1951 99.8  F (37.7  C) Oral 123/68 mmHg 16   01/26/17 1549 100.7  F (38.2  C) Oral 128/87 mmHg 16         Patient had 1000 ml bloody/marroon stool @ 1600, MD notified and hgb recheck was 9.1 which is stable.  Tmax 100.7orally, MD notified and ordered chest xray, blood cultures and UA/UC.  Urine still needs to be collected.  On protonix drip @ 10 ml/hr.  New bag was just ordered.  NS @ 100 ml/hr.  On Clear diet but no appetite.  C/O nausea;given Compazine with relief.  Denies pain. On scheduled MS Contin.    Continue with POC.

## 2017-01-27 NOTE — PROGRESS NOTES
GASTROENTEROLOGY PROGRESS NOTE    ASSESSMENT:  Shirin Muller is a 56 year old female with a history of metastatic pancreatic cancer undergoing chemotherapy/FOLFIRINOX regimen, received Cycle 3 on 1/17/17, metastatic liver lesion, biliary stricture, graves disease and now presenting with chills, weakness, dizziness, fall at home, and hgb drop from 8.5 to 5.9. Today hgb is stable at 9.2. Patient has ongoing diarrhea that started on 1/18/17, but no abdominal pain or fever. Enteric pathogens, C. Diff, giardia, ova and parasite are negative.     Patient had an EGD on 1/25/16, which showed LA Grade A reflux esophagitis, erythematous mucosa in the stomach, one duodenal ulcer oozing blood (injected/thermal therapy/clipped), and infiltrating mass in duodenum. Duodenal ulcer was the likely source of her bleed with the combination of chronic anemia in the setting of chemotherapy and chronic disease.  Patient's maroon stools are likely the remnant of her previous bleed, hgb trended u to 9.7 today. Plan is to continue monitoring patient for now.     RECOMMENDATIONS:  -- Treat H. Pylori with triple therapy (40mg Protonix BID on day 3; Amoxicillin 1 gram BID and Clarithromycin 500mg BID x14 days)   -- Add 150mg of Ranitidine at HS   -- Advance diet as tolerated  -- Daily CBC, please transfuse hgb less than 7 and if possible keep platelets greater than 50  -- Monitor output closely for bleeding    Gastroenterology follow up recommendations: 8 weeks of 40mg oral daily Protonix, if patient is doing well dose can be decreased to once daily at that time. No repeat EGD needed at less patient is symptomatic.     Patient care plan discussed with Dr. Worthy, GI staff physician. Thank you for involving us in this patient's care. Please do not hesitate to contact the GI service with any questions or concerns.     Hunter Navarrete  Gastroenterology Nurse  "Practitioner  _______________________________________________________________  S: Patient continuous to have dark-maroon stool.    O:  Blood pressure 103/61, pulse 90, temperature 98.6  F (37  C), temperature source Oral, resp. rate 16, height 1.778 m (5' 10\"), weight 71.3 kg (157 lb 3 oz), SpO2 99 %.    Constitutional: cooperative, pleasant, not dyspneic/diaphoretic, no acute distress  Eyes: Sclera anicteric/injected  Ears/nose/mouth/throat: Normal oropharynx without ulcers or exudate, mucus membranes moist, hearing intact  Neck: supple, thyroid normal size  CV: Tachycardic but regular rhythm. No edema in LE.  Respiratory: Unlabored breathing. Slightly diminished lower lobes.    Lymph: No axillary, submandibular, supraclavicular or inguinal lymphadenopathy  Abd: Nondistended, +bs, no hepatosplenomegaly, nontender, no peritoneal signs. Patient reports pressure in RUQ during palpation.    Skin: warm, perfused, no jaundice  Neuro: AAO x 3, No asterixis  Psych: Normal affect  MSK: Normal gait      LABS:  BMP    Recent Labs  Lab 01/27/17  1151 01/27/17  0339 01/26/17  0825 01/26/17  0314 01/25/17  0758 01/24/17  1002   NA  --  142  --  140 141 138   POTASSIUM 3.1* 2.7* 3.5 3.2* 3.6 3.5   CHLORIDE  --  108  --  107 110* 105   FANNY  --  7.3*  --  7.3* 7.4* 7.9*   CO2  --  26  --  24 24 25   BUN  --  4*  --  7 8 12   CR  --  0.78  --  0.66 0.77 0.64   GLC  --  85  --  118* 82 119*     CBC  Recent Labs  Lab 01/27/17  1151 01/27/17  0339  01/26/17  0414  01/25/17  0758  01/24/17  1002   WBC  --  3.1*  --  5.1  --  6.6  --  8.0   RBC  --  3.24*  --  3.64*  --  3.59*  --  2.48*   HGB 9.7* 8.6*  < > 9.4*  < > 9.3*  < > 5.9*   HCT  --  26.4*  --  29.4*  --  28.8*  --  19.7*   MCV  --  82  --  81  --  80  --  79   MCH  --  26.5  --  25.8*  --  25.9*  --  23.8*   MCHC  --  32.6  --  32.0  --  32.3  --  29.9*   RDW  --  18.6*  --  17.7*  --  17.1*  --  19.8*   PLT  --  58*  --  69*  --  61*  --  97*   < > = values in this interval " not displayed.  INR    Recent Labs  Lab 01/27/17  0800 01/24/17  1829 01/24/17  1002   INR 1.38* 1.37* 1.33*     LFTs    Recent Labs  Lab 01/25/17  0758 01/24/17  1002   ALKPHOS 206* 215*   AST 25 22   ALT 21 20   BILITOTAL 0.5 0.2   PROTTOTAL 5.8* 6.0*   ALBUMIN 2.5* 2.6*      PANC    Recent Labs  Lab 01/24/17  1002   LIPASE 30*       HPI:  Shirin Muller is a 56 year old female with a history of metastatic pancreatic cancer undergoing chemotherapy/FOLFIRINOX regimen, received Cycle 3 on 1/17/17, metastatic liver lesion, biliary stricture, graves disease and now presenting with chills, weakness, dizziness, fall at home, and hgb drop from 8.5 to 5.9. Patient has ongoing diarrhea that started on 1/18/17, but no abdominal pain or fever. Patient describes having 1-3 stools/day that were brown and smelled bad, which she has been managing with Imodium. Patient reports not having much of a problem while she was receiving the chemotherapy. However, yesterday she felt cold and weak. And today she felt dizzy and she fell without hitting her head. No nausea or vomiting. No hematemesis or hematochezia, except the one maroon episode that she had here in the hospital. No NSAID use.    Shirin was diagnosed with pancreatic cancer in Spring 2016 after presenting with a 2-3 month history of abdominal pain and weight loss. She underwent evaluation showing a 5cm mass at the head of the pancreas. Biopsy was c/w adenocarcinoma. She underwent staging imaging that included a MRI abdomen which showed an up to 10cm poorly defined hypoenhancing mass arising from the pancreatic that encased the celiac artery, superior mesenteric artery, and severely attenuated the main portal vein. She started treatment with gemcitabine and abraxane on 5/2/16 and continued on that until December 2016 when she was found to have metastatic disease involving the liver. She was then started on FOLFIRINOX on 12/20/16. Her first cycle was  complicated by fatigue, nausea and vomiting. Antiemetics were adjusted with the second cycle, which she tolerated better.      ROS: A comprehensive Review of Systems was asked and answered in the negative unless specifically commented upon in the HPI    PMHx:  Past Medical History   Diagnosis Date     Graves disease      Biliary stricture      Pancreatic mass      Lesion of liver      Malignant neoplasm of head of pancreas (H) 2016       PSurgHx:   Past Surgical History   Procedure Laterality Date      section       Esophagoscopy, gastroscopy, duodenoscopy (egd), combined N/A 2016     Procedure: COMBINED ENDOSCOPIC ULTRASOUND, ESOPHAGOSCOPY, GASTROSCOPY, DUODENOSCOPY (EGD), FINE NEEDLE ASPIRATE/BIOPSY;  Surgeon: Arias Taveras MD;  Location: [facility] OR     Endoscopic retrograde cholangiopancreatogram N/A 2016     Procedure: COMBINED ENDOSCOPIC RETROGRADE CHOLANGIOPANCREATOGRAPHY, PLACE TUBE/STENT;  Surgeon: Arias Taveras MD;  Location: [facility] OR       MEDS:    No current facility-administered medications on file prior to encounter.  Current Outpatient Prescriptions on File Prior to Encounter:  morphine (MS CONTIN) 15 MG 12 hr tablet Take 1 tablet (15 mg) by mouth every 12 hours   prochlorperazine (COMPAZINE) 10 MG tablet Take 1 tablet (10 mg) by mouth every 6 hours as needed (Nausea/Vomiting)   oxyCODONE (ROXICODONE) 5 MG IR tablet Take 1 tablet (5 mg) by mouth every 4 hours as needed for moderate to severe pain   amylase-lipase-protease (CREON) 82517 UNITS CPEP Take 2 capsules (72,000 Units) by mouth 3 times daily (with meals)   LORazepam (ATIVAN) 0.5 MG tablet Take 1 tablet (0.5 mg) by mouth every 4 hours as needed (Anxiety, Nausea/Vomiting or Sleep)   potassium chloride (KLOR-CON) 20 MEQ packet Take 20 mEq by mouth daily   ondansetron (ZOFRAN-ODT) 8 MG disintegrating tablet Take 1 tablet (8 mg) by mouth every 8 hours as needed for nausea   dronabinol (MARINOL) 5 MG capsule Take 1  capsule (5 mg) by mouth 2 times daily (before meals)   polyethylene glycol (MIRALAX/GLYCOLAX) powder Take 17 g (1 capful) by mouth daily   lidocaine-prilocaine (EMLA) cream Apply topically as needed for other (Use 30-60 minutes prior to port access)   docusate sodium (COLACE) 100 MG capsule Take 100 mg by mouth daily as needed        ALLERGIES:    Allergies   Allergen Reactions     Contrast Dye Nausea and Vomiting     Pt vomiting post IV contrast, Please try Visipaque for next CT scan. 6/24/16 sv       FHx:  Family History   Problem Relation Age of Onset     DIABETES Mother        SOCIAL Hx:  Social History     Social History     Marital Status:      Spouse Name: N/A     Number of Children: N/A     Years of Education: N/A     Occupational History     Not on file.     Social History Main Topics     Smoking status: Never Smoker      Smokeless tobacco: Never Used     Alcohol Use: No     Drug Use: No     Sexual Activity: Not on file     Other Topics Concern     Not on file     Social History Narrative

## 2017-01-27 NOTE — PLAN OF CARE
Problem: Goal Outcome Summary  Goal: Goal Outcome Summary  Outcome: Improving  AVSS. No complaints of pain or nausea this shift. No bloody or loose stools this shift. Hgb recheck was 9.7. K recheck 3.1. Second run of K infusing at this time, will need recheck at 1745. Protonix gtt stopped. Up with SBA. Continue to monitor, following plan of care,     Problem: Chemotherapy Effects (Adult)  Goal: Signs and Symptoms of Listed Potential Problems Will be Absent or Manageable (Chemotherapy Effects)  Signs and symptoms of listed potential problems will be absent or manageable by discharge/transition of care (reference Chemotherapy Effects (Adult) CPG).   Outcome: No Change    01/27/17 1434   Chemotherapy Effects   Problems Assessed (Chemotherapy Effects) all   Problems Present (Chemotherapy Effects) altered nutrition;anemia;diarrhea;fatigue;nausea and vomiting;thrombocytopenia

## 2017-01-27 NOTE — PLAN OF CARE
"Problem: Goal Outcome Summary  Goal: Goal Outcome Summary  PT/5C: Initial evaluation complete, treatment initiated. Pt mod I w/ all bed mobility this day. Pt SBA for STS w/o use of AD, minor instability noted upon standing however improvement w/ consecutive STS this day. Pt ambulated in hallway 100' + 300', CGA-SBA, no use of AD. Decreased nahomi, step length, heel toe pattern, rigid posture noted. VC to improve, limited improvement noted. Pt w/ minor lateral path deviaiton however no overt LOB. Pt performed 10 step up exercises 6\" step w/ RUE support, good ft placement noted. Pt instructed in and performed standing BLE strengthening exercise. VSS throughout. Pt left seated up in chair all needs w/i alee, RN present    Recommend DC to home w/ assist from family and OP cancer rehab to improve BLE strength, balance, and gait             "

## 2017-01-27 NOTE — PROGRESS NOTES
Problem: Goal Outcome Summary  Goal: Goal Outcome Summary  Outcome: No Change  Afebrile, vitals stable, on room air.  Continues on protonix gtt. Up to bathroom x 1 with SBA to independent, voided good volume, UA/UC sent to lab.  LBM 1/26 .  Denies pain, denies nausea.  AM Potassium 2.7 replacement currently being given, recheck potassium level at 10am.      Problem: Chemotherapy Effects (Adult)  Goal: Signs and Symptoms of Listed Potential Problems Will be Absent or Manageable (Chemotherapy Effects)  Signs and symptoms of listed potential problems will be absent or manageable by discharge/transition of care (reference Chemotherapy Effects (Adult) CPG).    Outcome: No Change     01/27/17 0504   Chemotherapy Effects    Problems Assessed (Chemotherapy Effects)  all    Problems Present (Chemotherapy Effects)  altered nutrition;anemia;diarrhea;fatigue;nausea and vomiting;thrombocytopenia

## 2017-01-27 NOTE — PROGRESS NOTES
"[facility], [place]    Hematology / Oncology Progress Note    Date of Admission: 1/24/2017  Hospital Day #: 3   Date of Service (when I saw the patient): 01/27/2017     Assessment and Plan  Shirin \"Jose Antonio\" Morena Muller is a 56 year old woman with metastatic pancreatic cancer currently on treatment with FOLFIRINOX s/p Cycle 3 on 1/17/17. She was admitted after a fall at home on 1/24 AM with weakness, dizziness, and lightheadedness felt d/t anemia and dehydration. She was found to have upper GI bleeding.        Plan today:  -switch to PPI bid  -add zantac qHS per GI, carafate, amicar soln  -advance to full liquid diet  -start TPN as had been NPO/minimal intake for multiple days  -replace lytes  -recheck hgb at 1800    #Acute on chronic anemia. Chronic anemia 2/2 cancer and chemotherapy. Last hgb was 8.5 on 1/17. Pt had a fall on morning prior to admission and was found to have hgb 5.9 on labs in ED. She was previously unaware of any active bleeding, including hemoptysis, hematemesis, hematuria, hematochezia or melena. However, she had BM on arrival to unit 5C and RN noted that it was dark red/maroon, no bright red blood. Thus suspected acute anemia could be d/t GI bleed. GI consulted and did EGD on 1/25; pt was found to have reflux esophagitis, erythematous mucosa in stomach, one bleeding duodenal ulcer (injected and clipped), and infiltrating mass in duodenum.   -Continue IVF, decrease rate.  -D/c PPI drip, switch to PPI IV bid.   -Add carafate qid, zantac qHS, amicar soln.   -Monitor daily CBC, transfuse for hgb <8. Check hgb at 1800.   -Advance to full liquid diet.     #H.Pylori +. Treating with triple therapy - PPI and 2 abx.     #Syncope, fall. Likely 2/2 anemia and dehydration in setting of recent chemo for pancreatic cancer and GI bleed.  -Cardiac w/u: troponin nml, EKG sinus tachycardia, echo nml structure, function, and EF.    -CXR unremarkable.    -GI w/u and management " as above.     #Diarrhea. Resolved. Pt reported ~1-2 loose stools per day since last chemo. Most likely 2/2 irinotecan and possibly GI bleed. Now improving.   -C.diff, enteric FILOMENA, O&P, giardia all negative.     #Metastatic pancreatic cancer. Liver mets. Currently on treatment with FOLFIRINOX, s/p Cycle 3 on Tues 1/17.    -Continue home antiemetics prn, marinol 5mg bid, ms contin 15mg bid, and oxycodone prn.      #Fever. Temp 100.7 x1. Afebrile since then with no new infectious sx. Normal WBC count. CXR grossly unremarkable, radiologist read as possible viral infection. On abx for H.pylori.     FEN:    -NS at 75cc/hr  -PRN lyte replacement  -Full liquid diet    Prophy/Misc:    -VTE: mechanical only    Code status: FULL CODE  Disposition: Anticipate d/c home tomorrow if remains stable with stable hgb and no further GI bleeding.     Zena Sharif DNP, APRN, CNP  Hematology/Oncology  Pager: [phone]    Interval History  Jose Antonio is doing fine this AM. She continues to have occasional nausea but says not too bad. No vomiting, reflux, belching, bloating, heartburn. Generally tired. No LHness or dizziness. No BM since ~1600 yesterday, which was maroon. Afebrile this AM. No chest pain, SOB, swelling.      Physical Exam  Temp: 98.6  F (37  C) Temp src: Oral BP: 103/61 mmHg Pulse: 90   Resp: 16 SpO2: 99 % O2 Device: None (Room air)    Filed Vitals:    01/24/17 1436 01/25/17 0842 01/26/17 0756   Weight: 70.398 kg (155 lb 3.2 oz) 70.58 kg (155 lb 9.6 oz) 71.3 kg (157 lb 3 oz)     Vital Signs with Ranges  Temp:  [98.6  F (37  C)-100.7  F (38.2  C)] 98.6  F (37  C)  Pulse:  [] 90  Resp:  [16] 16  BP: (103-128)/(60-87) 103/61 mmHg  SpO2:  [96 %-100 %] 99 %  I/O last 3 completed shifts:  In: 2540 [P.O.:665; I.V.:1875]  Out: 1950 [Urine:950; Stool:1000]    Constitutional: Pleasant woman seen resting in bed in no acute distress. Alert, minimally interactive, soft-spoken.    HEENT: NCAT. PERRL, anicteric sclera.  MMM.  Respiratory: Non-labored breathing on RA, good air exchange, lungs clear to auscultation bilaterally.   Cardiovascular: RRR. No murmur or rub.    GI: Normoactive bowel sounds. Abdomen soft, mildly distended, non-tender.   Skin: Warm and dry. No concerning lesions or rash on exposed surfaces.  Musculoskeletal: Extremities grossly normal, non-tender, no edema.    Neurologic: A&O, speech normal, no focal deficits.    Neuropsychiatric: Mentation and affect appear normal/appropriate, flat.  Vascular Access: Chest PAC is CDI.     Medications  Current Facility-Administered Medications   Medication     aminocaproic acid (AMICAR) syrup 1.5 g     potassium chloride SA (K-DUR/KLOR-CON M) CR tablet 40 mEq     ondansetron (ZOFRAN) injection 8 mg     polyethylene glycol (MIRALAX/GLYCOLAX) Packet 17 g     pantoprazole (PROTONIX) 40 mg IV push injection     sucralfate (CARAFATE) suspension 1 g     ranitidine (ZANTAC) tablet 150 mg     lipids (INTRALIPID) 20 % infusion 250 mL     dextrose 10 % 1,000 mL infusion     parenteral nutrition - ADULT compounded formula     HOLD MEDICATION     dronabinol (MARINOL) capsule 5 mg     acetaminophen (TYLENOL) tablet 650 mg     potassium chloride SA (K-DUR/KLOR-CON M) CR tablet 20-40 mEq     potassium chloride (KLOR-CON) Packet 20-40 mEq     potassium chloride 10 mEq in 100 mL intermittent infusion     potassium chloride 10 mEq in 100 mL intermittent infusion with 10 mg lidocaine     potassium chloride 20 mEq in 50 mL intermittent infusion     magnesium sulfate 4 g in 100 mL sterile water (premade)     sodium phosphate 15 mmol in D5W intermittent infusion     sodium phosphate 20 mmol in D5W intermittent infusion     sodium phosphate 25 mmol in D5W intermittent infusion     simethicone (MYLICON) suspension     amoxicillin (AMOXIL) capsule 1,000 mg     clarithromycin (BIAXIN) tablet 500 mg     amylase-lipase-protease (CREON 36) 60371 UNITS per capsule 72,000 Units     LORazepam (ATIVAN)  tablet 0.5 mg     morphine (MS CONTIN) 12 hr tablet 15 mg     oxyCODONE (ROXICODONE) IR tablet 5 mg     prochlorperazine (COMPAZINE) tablet 10 mg     naloxone (NARCAN) injection 0.1-0.4 mg     0.9% sodium chloride infusion     influenza quadrivalent (PF) vacc age 3 yrs and older (FLUZONE or Flulaval) injection 0.5 mL       Data  CBC  Recent Labs  Lab 01/27/17  1151 01/27/17  0339 01/26/17 2000 01/26/17  0414 01/25/17  0758  01/24/17  1002   WBC  --  3.1*  --  5.1  --  6.6  --  8.0   RBC  --  3.24*  --  3.64*  --  3.59*  --  2.48*   HGB 9.7* 8.6* 9.1* 9.4*  < > 9.3*  < > 5.9*   HCT  --  26.4*  --  29.4*  --  28.8*  --  19.7*   MCV  --  82  --  81  --  80  --  79   MCH  --  26.5  --  25.8*  --  25.9*  --  23.8*   MCHC  --  32.6  --  32.0  --  32.3  --  29.9*   RDW  --  18.6*  --  17.7*  --  17.1*  --  19.8*   PLT  --  58*  --  69*  --  61*  --  97*   < > = values in this interval not displayed.  CMP    Recent Labs  Lab 01/27/17  1151 01/27/17  0339 01/26/17  0825 01/26/17 0314 01/25/17  0758 01/24/17  1002   NA  --  142  --  140 141 138   POTASSIUM 3.1* 2.7* 3.5 3.2* 3.6 3.5   CHLORIDE  --  108  --  107 110* 105   CO2  --  26  --  24 24 25   ANIONGAP  --  8  --  9 7 8   GLC  --  85  --  118* 82 119*   BUN  --  4*  --  7 8 12   CR  --  0.78  --  0.66 0.77 0.64   GFRESTIMATED  --  76  --  >90Non  GFR Calc 76 >90Non  GFR Calc   GFRESTBLACK  --  >90African American GFR Calc  --  >90African American GFR Calc >90African American GFR Calc >90African American GFR Calc   FANNY  --  7.3*  --  7.3* 7.4* 7.9*   MAG  --  1.7  --  1.7 1.8  --    PHOS  --  2.5  --  2.3* 2.2*  --    PROTTOTAL  --   --   --   --  5.8* 6.0*   ALBUMIN  --   --   --   --  2.5* 2.6*   BILITOTAL  --   --   --   --  0.5 0.2   ALKPHOS  --   --   --   --  206* 215*   AST  --   --   --   --  25 22   ALT  --   --   --   --  21 20     INR    Recent Labs  Lab 01/27/17  0800 01/24/17  1829 01/24/17  1002   INR 1.38* 1.37* 1.33*        Results for orders placed or performed during the hospital encounter of 01/24/17   XR Chest Port 1 View    Narrative    Exam:  XR CHEST PORT 1 VW, 1/24/2017 10:43 AM    History: chest pain/sob    Comparison:  CT chest from 12/12/2016.    Findings:  Single AP view of the chest is obtained. Right chest  portacatheter tip terminates in the low SVC. Cardiomediastinal  silhouette is within normal limits. Pulmonary vasculature is  unremarkable. There is no pleural effusion or pneumothorax. No focal  airspace opacity. Visualized upper abdomen is unremarkable.      Impression    Impression:  No acute cardiopulmonary abnormality.    I have personally reviewed the examination and initial interpretation  and I agree with the findings.    PHILIP BERMAN MD   XR Chest 2 Views    Narrative    Exam:  XR CHEST 2 VW, 1/26/2017 5:37 PM    History: fever    Comparison:  Chest radiograph 1/24/2017    Findings:  PA and lateral view of the chest were obtained. Right-sided  Port-A-Cath with tip projecting over the low SVC. The  cardiomediastinal silhouette is unremarkable. Trachea is midline.  Pulmonary vasculature is indistinct. New small bilateral pleural  effusions. No appreciable pneumothorax. Perihilar prominence with  peribronchial cuffing. No acute consolidation.    Biliary stent with pneumobilia. No acute osseous abnormality.       Impression    Impression:    1. New small bilateral pleural effusions.   2. Findings suggestive of possible viral infectious process or edema.     I have personally reviewed the examination and initial interpretation  and I agree with the findings.    JANE CARROLL MD

## 2017-01-28 ENCOUNTER — APPOINTMENT (OUTPATIENT)
Dept: PHYSICAL THERAPY | Facility: CLINIC | Age: 57
DRG: 377 | End: 2017-01-28
Payer: COMMERCIAL

## 2017-01-28 LAB
ABO + RH BLD: NORMAL
ABO + RH BLD: NORMAL
ALBUMIN SERPL-MCNC: 2.1 G/DL (ref 3.4–5)
ALP SERPL-CCNC: 165 U/L (ref 40–150)
ALT SERPL W P-5'-P-CCNC: 13 U/L (ref 0–50)
ANION GAP SERPL CALCULATED.3IONS-SCNC: 11 MMOL/L (ref 3–14)
AST SERPL W P-5'-P-CCNC: 15 U/L (ref 0–45)
BASOPHILS # BLD AUTO: 0 10E9/L (ref 0–0.2)
BASOPHILS NFR BLD AUTO: 0.4 %
BILIRUB SERPL-MCNC: 0.2 MG/DL (ref 0.2–1.3)
BLD GP AB SCN SERPL QL: NORMAL
BLD PROD TYP BPU: NORMAL
BLD PROD TYP BPU: NORMAL
BLD UNIT ID BPU: 0
BLOOD BANK CMNT PATIENT-IMP: NORMAL
BLOOD PRODUCT CODE: NORMAL
BPU ID: NORMAL
BUN SERPL-MCNC: 4 MG/DL (ref 7–30)
CALCIUM SERPL-MCNC: 7.2 MG/DL (ref 8.5–10.1)
CHLORIDE SERPL-SCNC: 109 MMOL/L (ref 94–109)
CO2 SERPL-SCNC: 25 MMOL/L (ref 20–32)
CREAT SERPL-MCNC: 0.61 MG/DL (ref 0.52–1.04)
DIFFERENTIAL METHOD BLD: ABNORMAL
EOSINOPHIL # BLD AUTO: 0 10E9/L (ref 0–0.7)
EOSINOPHIL NFR BLD AUTO: 0.4 %
ERYTHROCYTE [DISTWIDTH] IN BLOOD BY AUTOMATED COUNT: 19.2 % (ref 10–15)
GFR SERPL CREATININE-BSD FRML MDRD: ABNORMAL ML/MIN/1.7M2
GLUCOSE BLDC GLUCOMTR-MCNC: 128 MG/DL (ref 70–99)
GLUCOSE SERPL-MCNC: 124 MG/DL (ref 70–99)
HCT VFR BLD AUTO: 25.6 % (ref 35–47)
HGB BLD-MCNC: 8 G/DL (ref 11.7–15.7)
IMM GRANULOCYTES # BLD: 0 10E9/L (ref 0–0.4)
IMM GRANULOCYTES NFR BLD: 1.1 %
INR PPP: 1.35 (ref 0.86–1.14)
LYMPHOCYTES # BLD AUTO: 0.8 10E9/L (ref 0.8–5.3)
LYMPHOCYTES NFR BLD AUTO: 27.1 %
MAGNESIUM SERPL-MCNC: 1.9 MG/DL (ref 1.6–2.3)
MCH RBC QN AUTO: 26 PG (ref 26.5–33)
MCHC RBC AUTO-ENTMCNC: 31.3 G/DL (ref 31.5–36.5)
MCV RBC AUTO: 83 FL (ref 78–100)
MONOCYTES # BLD AUTO: 0.4 10E9/L (ref 0–1.3)
MONOCYTES NFR BLD AUTO: 15.7 %
NEUTROPHILS # BLD AUTO: 1.6 10E9/L (ref 1.6–8.3)
NEUTROPHILS NFR BLD AUTO: 55.3 %
NRBC # BLD AUTO: 0 10*3/UL
NRBC BLD AUTO-RTO: 0 /100
NUM BPU REQUESTED: 1
PHOSPHATE SERPL-MCNC: 1.8 MG/DL (ref 2.5–4.5)
PHOSPHATE SERPL-MCNC: 2.1 MG/DL (ref 2.5–4.5)
PLATELET # BLD AUTO: 69 10E9/L (ref 150–450)
POTASSIUM SERPL-SCNC: 3.2 MMOL/L (ref 3.4–5.3)
POTASSIUM SERPL-SCNC: 4 MMOL/L (ref 3.4–5.3)
PROT SERPL-MCNC: 5.1 G/DL (ref 6.8–8.8)
RBC # BLD AUTO: 3.08 10E12/L (ref 3.8–5.2)
SODIUM SERPL-SCNC: 144 MMOL/L (ref 133–144)
SPECIMEN EXP DATE BLD: NORMAL
TRANSFUSION STATUS PATIENT QL: NORMAL
TRANSFUSION STATUS PATIENT QL: NORMAL
TRIGL SERPL-MCNC: 112 MG/DL
WBC # BLD AUTO: 2.8 10E9/L (ref 4–11)

## 2017-01-28 PROCEDURE — 83735 ASSAY OF MAGNESIUM: CPT | Performed by: INTERNAL MEDICINE

## 2017-01-28 PROCEDURE — 84630 ASSAY OF ZINC: CPT | Performed by: INTERNAL MEDICINE

## 2017-01-28 PROCEDURE — 40000556 ZZH STATISTIC PERIPHERAL IV START W US GUIDANCE

## 2017-01-28 PROCEDURE — 84100 ASSAY OF PHOSPHORUS: CPT | Performed by: INTERNAL MEDICINE

## 2017-01-28 PROCEDURE — 40000193 ZZH STATISTIC PT WARD VISIT

## 2017-01-28 PROCEDURE — 97116 GAIT TRAINING THERAPY: CPT | Mod: GP

## 2017-01-28 PROCEDURE — 97530 THERAPEUTIC ACTIVITIES: CPT | Mod: GP

## 2017-01-28 PROCEDURE — 99232 SBSQ HOSP IP/OBS MODERATE 35: CPT | Performed by: INTERNAL MEDICINE

## 2017-01-28 PROCEDURE — 84132 ASSAY OF SERUM POTASSIUM: CPT | Performed by: INTERNAL MEDICINE

## 2017-01-28 PROCEDURE — 85025 COMPLETE CBC W/AUTO DIFF WBC: CPT | Performed by: INTERNAL MEDICINE

## 2017-01-28 PROCEDURE — 25000125 ZZHC RX 250: Performed by: INTERNAL MEDICINE

## 2017-01-28 PROCEDURE — 25000132 ZZH RX MED GY IP 250 OP 250 PS 637: Performed by: NURSE PRACTITIONER

## 2017-01-28 PROCEDURE — 20000002 ZZH R&B BMT INTERMEDIATE

## 2017-01-28 PROCEDURE — 25000132 ZZH RX MED GY IP 250 OP 250 PS 637: Performed by: INTERNAL MEDICINE

## 2017-01-28 PROCEDURE — 00000146 ZZHCL STATISTIC GLUCOSE BY METER IP

## 2017-01-28 PROCEDURE — 27210995 ZZH RX 272

## 2017-01-28 PROCEDURE — 84134 ASSAY OF PREALBUMIN: CPT | Performed by: INTERNAL MEDICINE

## 2017-01-28 PROCEDURE — 85610 PROTHROMBIN TIME: CPT | Performed by: INTERNAL MEDICINE

## 2017-01-28 PROCEDURE — 80053 COMPREHEN METABOLIC PANEL: CPT | Performed by: INTERNAL MEDICINE

## 2017-01-28 PROCEDURE — P9016 RBC LEUKOCYTES REDUCED: HCPCS | Performed by: INTERNAL MEDICINE

## 2017-01-28 PROCEDURE — 25000125 ZZHC RX 250: Performed by: NURSE PRACTITIONER

## 2017-01-28 PROCEDURE — 84478 ASSAY OF TRIGLYCERIDES: CPT | Performed by: INTERNAL MEDICINE

## 2017-01-28 RX ADMIN — AMOXICILLIN 1000 MG: 500 CAPSULE ORAL at 19:34

## 2017-01-28 RX ADMIN — CALCIUM GLUCONATE: 94 INJECTION, SOLUTION INTRAVENOUS at 21:32

## 2017-01-28 RX ADMIN — I.V. FAT EMULSION 250 ML: 20 EMULSION INTRAVENOUS at 19:35

## 2017-01-28 RX ADMIN — AMOXICILLIN 1000 MG: 500 CAPSULE ORAL at 08:46

## 2017-01-28 RX ADMIN — Medication: at 11:51

## 2017-01-28 RX ADMIN — PANTOPRAZOLE SODIUM 40 MG: 40 INJECTION, POWDER, FOR SOLUTION INTRAVENOUS at 08:47

## 2017-01-28 RX ADMIN — RANITIDINE HYDROCHLORIDE 150 MG: 150 TABLET, FILM COATED ORAL at 21:31

## 2017-01-28 RX ADMIN — AMINOCAPROIC ACID 1.5 G: 0.25 SOLUTION ORAL at 11:51

## 2017-01-28 RX ADMIN — MORPHINE SULFATE 15 MG: 15 TABLET, EXTENDED RELEASE ORAL at 08:47

## 2017-01-28 RX ADMIN — AMINOCAPROIC ACID 1.5 G: 0.25 SOLUTION ORAL at 15:55

## 2017-01-28 RX ADMIN — POTASSIUM CHLORIDE 40 MEQ: 750 TABLET, EXTENDED RELEASE ORAL at 19:34

## 2017-01-28 RX ADMIN — POTASSIUM CHLORIDE 40 MEQ: 750 TABLET, EXTENDED RELEASE ORAL at 08:47

## 2017-01-28 RX ADMIN — SODIUM PHOSPHATE, MONOBASIC, MONOHYDRATE AND SODIUM PHOSPHATE, DIBASIC, ANHYDROUS 20 MMOL: 276; 142 INJECTION, SOLUTION INTRAVENOUS at 08:48

## 2017-01-28 RX ADMIN — AMINOCAPROIC ACID 1.5 G: 0.25 SOLUTION ORAL at 19:34

## 2017-01-28 RX ADMIN — CLARITHROMYCIN 500 MG: 500 TABLET, FILM COATED ORAL at 19:35

## 2017-01-28 RX ADMIN — MORPHINE SULFATE 15 MG: 15 TABLET, EXTENDED RELEASE ORAL at 19:34

## 2017-01-28 RX ADMIN — Medication: at 19:34

## 2017-01-28 RX ADMIN — DRONABINOL 5 MG: 2.5 CAPSULE ORAL at 15:55

## 2017-01-28 RX ADMIN — POTASSIUM CHLORIDE 20 MEQ: 29.8 INJECTION, SOLUTION INTRAVENOUS at 05:16

## 2017-01-28 RX ADMIN — AMINOCAPROIC ACID 1.5 G: 0.25 SOLUTION ORAL at 08:46

## 2017-01-28 RX ADMIN — POLYETHYLENE GLYCOL 3350 17 G: 17 POWDER, FOR SOLUTION ORAL at 08:46

## 2017-01-28 RX ADMIN — PANTOPRAZOLE SODIUM 40 MG: 40 INJECTION, POWDER, FOR SOLUTION INTRAVENOUS at 15:55

## 2017-01-28 RX ADMIN — DRONABINOL 5 MG: 2.5 CAPSULE ORAL at 08:46

## 2017-01-28 RX ADMIN — SUCRALFATE 1 G: 1 SUSPENSION ORAL at 08:47

## 2017-01-28 RX ADMIN — POTASSIUM CHLORIDE 20 MEQ: 29.8 INJECTION, SOLUTION INTRAVENOUS at 07:43

## 2017-01-28 RX ADMIN — SUCRALFATE 1 G: 1 SUSPENSION ORAL at 15:55

## 2017-01-28 RX ADMIN — CLARITHROMYCIN 500 MG: 500 TABLET, FILM COATED ORAL at 08:46

## 2017-01-28 RX ADMIN — SUCRALFATE 1 G: 1 SUSPENSION ORAL at 11:51

## 2017-01-28 RX ADMIN — POLYETHYLENE GLYCOL 3350 17 G: 17 POWDER, FOR SOLUTION ORAL at 19:34

## 2017-01-28 RX ADMIN — SUCRALFATE 1 G: 1 SUSPENSION ORAL at 21:31

## 2017-01-28 NOTE — PLAN OF CARE
Problem: Goal Outcome Summary  Goal: Goal Outcome Summary  Outcome: No Change  Pt afeb, VSS. Offers no complaints this shift. Slept throughout night. Abdomen distended, no pain reported. TPN/lipids started last evening. Receiving first 20mEq KCl of 40mEq total, will need recheck. Receiving 1 unit pRBC for hgb of 8.0. Will need Na Phos and recheck. PIV inserted for additional replacements.

## 2017-01-28 NOTE — PROGRESS NOTES
Diastolic 99. No pain. No nausea. Abdomin is distended and complained of bloating. Ate well. Hemorrhoid discomfort and using dilt cream. Showered. Replaced K+ and phos. K+ level is 4.0 and will need a phosphorus recheck at 16:30. Stand by assist.

## 2017-01-28 NOTE — PROGRESS NOTES
"[facility], [place]    Hematology / Oncology Progress Note    Date of Admission: 1/24/2017  Hospital Day #: 4   Date of Service (when I saw the patient): 01/28/2017     Assessment and Plan  Shirin \"Jose Antonio\" Morena Muller is a 56 year old woman with metastatic pancreatic cancer currently on treatment with FOLFIRINOX s/p Cycle 3 on 1/17/17. She was admitted after a fall at home on 1/24 AM with weakness, dizziness, and lightheadedness felt d/t anemia and dehydration. She was found to have upper GI bleeding.        Plan today:  -schedule dilt cream for bleeding hemorrhoids  -tranfuse RBC x1U this AM and ontinue to monitor stools  -advance to regular diet  -continue TPN, replace lytes  -d/c IVF    #Acute on chronic anemia. Chronic anemia 2/2 cancer and chemotherapy. PTA last hgb was 8.5 on 1/17. Pt had a fall on morning of admission and was found to have hgb 5.9 on labs in ED. She was previously unaware of any active bleeding, including hemoptysis, hematemesis, hematuria, hematochezia or melena. However, she had BM on arrival to unit 5C and RN noted that it was dark red/maroon, no bright red blood. Thus suspected acute anemia could be d/t GI bleed. GI consulted and did EGD on 1/25; pt was found to have reflux esophagitis, erythematous mucosa in stomach, one bleeding duodenal ulcer (injected and clipped), and infiltrating mass in duodenum. Pt had normal brown stool yesterday with BRB on toilet tissue presumed 2/2 hemorrhoids. No BM yet today.   -D/c IVF.   -Continue PPI IV bid, miralax, zantac qHS, amicar soln, carafate qid.   -Monitor daily CBC, transfuse for hgb <7.  -Advance to regular diet, choose soft/ow residue foods.     #Hemorrhoids. Pt notes BRB on toilet tissue with BM yesterday. She has h/o hemorrhoids and large external hemorrhoids on exam.  -Dilt cream qid. Monitor stools.     #H.Pylori +. Treating with triple therapy - PPI and 2 abx. Abx duration is 14 days.     #Syncope, " "fall. Likely 2/2 anemia and dehydration in setting of recent chemo for pancreatic cancer and GI bleed.  -Cardiac w/u: troponin nml, EKG sinus tachycardia, echo nml structure, function, and EF.    -CXR unremarkable.    -GI w/u and management as above.     #Diarrhea. Resolved. Pt reported ~1-2 loose stools per day since last chemo. Most likely 2/2 irinotecan and possibly GI bleed. Now resolved.   -C.diff, enteric FILOMENA, O&P, giardia all negative.     #Metastatic pancreatic cancer. Liver mets. Currently on treatment with FOLFIRINOX, s/p Cycle 3 on Tues 1/17.    -Continue home antiemetics prn, marinol 5mg bid, ms contin 15mg bid, and oxycodone prn.      #Fever. Temp 100.7 x1 on 1/26 PM. Afebrile since then with no new infectious sx. Normal WBC count. CXR grossly unremarkable, radiologist read as possible viral infection(?). On abx for H.pylori. Monitor.      FEN:    -PRN lyte replacement  -RDAT     Prophy/Misc:    -VTE: mechanical only    Code status: FULL CODE  Disposition: Anticipate d/c home tomorrow if remains stable with stable hgb and no further GI bleeding.     Zena Sharif DNP, APRN, CNP  Hematology/Oncology  Pager: [phone]    Interval History  Jose Antonio is doing fine this AM. She reports mild abd bloating but actually doesn't think it's any worse than when she came in. Denies nausea, vomiting, and abd pain. Denies reflux, belching, bloating, heartburn. Generally fatigued. No LH or dizziness. Last BM was yesterday evening and reported by pt and NST to be normal \"brown\" color. However, pt notes a little bit of bright red blood on tissue. No BM since then. Afebrile, no chills. No chest pain, SOB, swelling.      Physical Exam  Temp: 98.3  F (36.8  C) Temp src: Oral BP: 113/69 mmHg Pulse: 89 Heart Rate: 74 Resp: 16 SpO2: 100 % O2 Device: None (Room air)    Filed Vitals:    01/25/17 0842 01/26/17 0756 01/28/17 0834   Weight: 70.58 kg (155 lb 9.6 oz) 71.3 kg (157 lb 3 oz) 73.846 kg (162 lb 12.8 oz)     Vital Signs " with Ranges  Temp:  [98.3  F (36.8  C)-99.1  F (37.3  C)] 98.3  F (36.8  C)  Pulse:  [] 89  Heart Rate:  [74-84] 74  Resp:  [16] 16  BP: ()/(60-76) 113/69 mmHg  SpO2:  [97 %-100 %] 100 %  I/O last 3 completed shifts:  In: 4068.8 [P.O.:1520; I.V.:1675]  Out: 1800 [Urine:1500; Stool:300]    Constitutional: Pleasant woman seen resting in bed in no acute distress. Alert, interactive, soft-spoken.    HEENT: NCAT. PERRL, anicteric sclera. MMM.  Respiratory: Non-labored breathing on RA, good air exchange, lungs clear to auscultation bilaterally.   Cardiovascular: RRR. No murmur or rub.    GI: Normoactive bowel sounds. Abdomen soft, moderately distended, non-tender.   Skin: Warm and dry. No concerning lesions or rash on exposed surfaces.  Musculoskeletal: Extremities grossly normal, non-tender, no edema.    Neurologic: A&O, speech normal, no focal deficits.    Neuropsychiatric: Mentation and affect appear normal/appropriate, flat.  Vascular Access: Chest PAC is CDI.     Medications  Current Facility-Administered Medications   Medication     diltiazem 2% in PLO cream (FV COMPOUNDED)     aminocaproic acid (AMICAR) syrup 1.5 g     potassium chloride SA (K-DUR/KLOR-CON M) CR tablet 40 mEq     polyethylene glycol (MIRALAX/GLYCOLAX) Packet 17 g     pantoprazole (PROTONIX) 40 mg IV push injection     sucralfate (CARAFATE) suspension 1 g     ranitidine (ZANTAC) tablet 150 mg     lipids (INTRALIPID) 20 % infusion 250 mL     dextrose 10 % 1,000 mL infusion     parenteral nutrition - ADULT compounded formula     ondansetron (ZOFRAN-ODT) ODT tab 8 mg     dronabinol (MARINOL) capsule 5 mg     acetaminophen (TYLENOL) tablet 650 mg     potassium chloride SA (K-DUR/KLOR-CON M) CR tablet 20-40 mEq     potassium chloride (KLOR-CON) Packet 20-40 mEq     potassium chloride 10 mEq in 100 mL intermittent infusion     potassium chloride 10 mEq in 100 mL intermittent infusion with 10 mg lidocaine     potassium chloride 20 mEq in 50 mL  intermittent infusion     magnesium sulfate 4 g in 100 mL sterile water (premade)     sodium phosphate 15 mmol in D5W intermittent infusion     sodium phosphate 20 mmol in D5W intermittent infusion     sodium phosphate 25 mmol in D5W intermittent infusion     simethicone (MYLICON) suspension     amoxicillin (AMOXIL) capsule 1,000 mg     clarithromycin (BIAXIN) tablet 500 mg     amylase-lipase-protease (CREON 36) 62011 UNITS per capsule 72,000 Units     LORazepam (ATIVAN) tablet 0.5 mg     morphine (MS CONTIN) 12 hr tablet 15 mg     oxyCODONE (ROXICODONE) IR tablet 5 mg     prochlorperazine (COMPAZINE) tablet 10 mg     naloxone (NARCAN) injection 0.1-0.4 mg     influenza quadrivalent (PF) vacc age 3 yrs and older (FLUZONE or Flulaval) injection 0.5 mL       Data  CBC  Recent Labs  Lab 01/28/17 0324 01/27/17  1808 01/27/17  1151 01/27/17  0339  01/26/17  0414  01/25/17  0758   WBC 2.8*  --   --  3.1*  --  5.1  --  6.6   RBC 3.08*  --   --  3.24*  --  3.64*  --  3.59*   HGB 8.0* 8.9* 9.7* 8.6*  < > 9.4*  < > 9.3*   HCT 25.6*  --   --  26.4*  --  29.4*  --  28.8*   MCV 83  --   --  82  --  81  --  80   MCH 26.0*  --   --  26.5  --  25.8*  --  25.9*   MCHC 31.3*  --   --  32.6  --  32.0  --  32.3   RDW 19.2*  --   --  18.6*  --  17.7*  --  17.1*   PLT 69*  --   --  58*  --  69*  --  61*   < > = values in this interval not displayed.  CMP    Recent Labs  Lab 01/28/17  0324 01/27/17  1808 01/27/17  1151 01/27/17  0339  01/26/17  0314 01/25/17  0758 01/24/17  1002     --   --  142  --  140 141 138   POTASSIUM 3.2* 3.4 3.1* 2.7*  < > 3.2* 3.6 3.5   CHLORIDE 109  --   --  108  --  107 110* 105   CO2 25  --   --  26  --  24 24 25   ANIONGAP 11  --   --  8  --  9 7 8   *  --   --  85  --  118* 82 119*   BUN 4*  --   --  4*  --  7 8 12   CR 0.61  --   --  0.78  --  0.66 0.77 0.64   GFRESTIMATED >90Non  GFR Calc  --   --  76  --  >90Non  GFR Calc 76 >90Non  GFR Calc    GFRESTBLACK >90African American GFR Calc  --   --  >90African American GFR Calc  --  >90African American GFR Calc >90African American GFR Calc >90African American GFR Calc   FANNY 7.2*  --   --  7.3*  --  7.3* 7.4* 7.9*   MAG 1.9  --   --  1.7  --  1.7 1.8  --    PHOS 1.8*  --   --  2.5  --  2.3* 2.2*  --    PROTTOTAL 5.1*  --   --   --   --   --  5.8* 6.0*   ALBUMIN 2.1*  --   --   --   --   --  2.5* 2.6*   BILITOTAL 0.2  --   --   --   --   --  0.5 0.2   ALKPHOS 165*  --   --   --   --   --  206* 215*   AST 15  --   --   --   --   --  25 22   ALT 13  --   --   --   --   --  21 20   < > = values in this interval not displayed.  INR    Recent Labs  Lab 01/28/17  0324 01/27/17  0800 01/24/17  1829 01/24/17  1002   INR 1.35* 1.38* 1.37* 1.33*       Results for orders placed or performed during the hospital encounter of 01/24/17   XR Chest Port 1 View    Narrative    Exam:  XR CHEST PORT 1 VW, 1/24/2017 10:43 AM    History: chest pain/sob    Comparison:  CT chest from 12/12/2016.    Findings:  Single AP view of the chest is obtained. Right chest  portacatheter tip terminates in the low SVC. Cardiomediastinal  silhouette is within normal limits. Pulmonary vasculature is  unremarkable. There is no pleural effusion or pneumothorax. No focal  airspace opacity. Visualized upper abdomen is unremarkable.      Impression    Impression:  No acute cardiopulmonary abnormality.    I have personally reviewed the examination and initial interpretation  and I agree with the findings.    PHILIP BERMAN MD   XR Chest 2 Views    Narrative    Exam:  XR CHEST 2 VW, 1/26/2017 5:37 PM    History: fever    Comparison:  Chest radiograph 1/24/2017    Findings:  PA and lateral view of the chest were obtained. Right-sided  Port-A-Cath with tip projecting over the low SVC. The  cardiomediastinal silhouette is unremarkable. Trachea is midline.  Pulmonary vasculature is indistinct. New small bilateral pleural  effusions. No appreciable  pneumothorax. Perihilar prominence with  peribronchial cuffing. No acute consolidation.    Biliary stent with pneumobilia. No acute osseous abnormality.       Impression    Impression:    1. New small bilateral pleural effusions.   2. Findings suggestive of possible viral infectious process or edema.     I have personally reviewed the examination and initial interpretation  and I agree with the findings.    JANE CARROLL MD

## 2017-01-28 NOTE — PLAN OF CARE
Problem: Goal Outcome Summary  Goal: Goal Outcome Summary  Outcome: No Change  Pt afeb, VSS. Denies pain. Had one stool this shift at 1500. Pt reported it was maroon, but NST charted brown and verbalized to RN she did not think it was maroon. Will need to continue to monitor. Hgb at 1800 was 8.9. Started TPN/lipids, education provided. Will start q6hr BG checks. Daughter her to visit much of shift.

## 2017-01-28 NOTE — PLAN OF CARE
Problem: Goal Outcome Summary  Goal: Goal Outcome Summary  PT 5C: patient amb indep with 1 UE support ~400 feet, demonstrating safety with ambulation, fatigue at end of session. Encourage ambulation in reyes 4x/day with nursing, as able.     Recommend dc home with assist, as needed, and OP cancer rehab.

## 2017-01-29 ENCOUNTER — APPOINTMENT (OUTPATIENT)
Dept: PHYSICAL THERAPY | Facility: CLINIC | Age: 57
DRG: 377 | End: 2017-01-29
Payer: COMMERCIAL

## 2017-01-29 VITALS
WEIGHT: 164.8 LBS | RESPIRATION RATE: 20 BRPM | DIASTOLIC BLOOD PRESSURE: 60 MMHG | OXYGEN SATURATION: 99 % | SYSTOLIC BLOOD PRESSURE: 102 MMHG | BODY MASS INDEX: 23.59 KG/M2 | TEMPERATURE: 98.5 F | HEIGHT: 70 IN | HEART RATE: 89 BPM

## 2017-01-29 LAB
ANION GAP SERPL CALCULATED.3IONS-SCNC: 9 MMOL/L (ref 3–14)
ANISOCYTOSIS BLD QL SMEAR: SLIGHT
BASOPHILS # BLD AUTO: 0.1 10E9/L (ref 0–0.2)
BASOPHILS NFR BLD AUTO: 1.8 %
BUN SERPL-MCNC: 4 MG/DL (ref 7–30)
BURR CELLS BLD QL SMEAR: SLIGHT
CALCIUM SERPL-MCNC: 7.4 MG/DL (ref 8.5–10.1)
CHLORIDE SERPL-SCNC: 108 MMOL/L (ref 94–109)
CO2 SERPL-SCNC: 26 MMOL/L (ref 20–32)
CREAT SERPL-MCNC: 0.61 MG/DL (ref 0.52–1.04)
DIFFERENTIAL METHOD BLD: ABNORMAL
EOSINOPHIL # BLD AUTO: 0 10E9/L (ref 0–0.7)
EOSINOPHIL NFR BLD AUTO: 0 %
ERYTHROCYTE [DISTWIDTH] IN BLOOD BY AUTOMATED COUNT: 18.8 % (ref 10–15)
GFR SERPL CREATININE-BSD FRML MDRD: ABNORMAL ML/MIN/1.7M2
GLUCOSE SERPL-MCNC: 180 MG/DL (ref 70–99)
HCT VFR BLD AUTO: 29.6 % (ref 35–47)
HGB BLD-MCNC: 9.3 G/DL (ref 11.7–15.7)
LYMPHOCYTES # BLD AUTO: 0.8 10E9/L (ref 0.8–5.3)
LYMPHOCYTES NFR BLD AUTO: 16.2 %
MAGNESIUM SERPL-MCNC: 2 MG/DL (ref 1.6–2.3)
MCH RBC QN AUTO: 26.4 PG (ref 26.5–33)
MCHC RBC AUTO-ENTMCNC: 31.4 G/DL (ref 31.5–36.5)
MCV RBC AUTO: 84 FL (ref 78–100)
MONOCYTES # BLD AUTO: 0.5 10E9/L (ref 0–1.3)
MONOCYTES NFR BLD AUTO: 9.9 %
MYELOCYTES # BLD: 0.1 10E9/L
MYELOCYTES NFR BLD MANUAL: 1.8 %
NEUTROPHILS # BLD AUTO: 3.4 10E9/L (ref 1.6–8.3)
NEUTROPHILS NFR BLD AUTO: 70.3 %
NRBC # BLD AUTO: 0 10*3/UL
NRBC BLD AUTO-RTO: 1 /100
OVALOCYTES BLD QL SMEAR: SLIGHT
PHOSPHATE SERPL-MCNC: 1.8 MG/DL (ref 2.5–4.5)
PHOSPHATE SERPL-MCNC: 2.4 MG/DL (ref 2.5–4.5)
PLATELET # BLD AUTO: 86 10E9/L (ref 150–450)
PLATELET # BLD EST: ABNORMAL 10*3/UL
POIKILOCYTOSIS BLD QL SMEAR: ABNORMAL
POTASSIUM SERPL-SCNC: 4.1 MMOL/L (ref 3.4–5.3)
RBC # BLD AUTO: 3.52 10E12/L (ref 3.8–5.2)
RBC INCLUSIONS BLD: SLIGHT
SODIUM SERPL-SCNC: 143 MMOL/L (ref 133–144)
WBC # BLD AUTO: 4.8 10E9/L (ref 4–11)

## 2017-01-29 PROCEDURE — 84100 ASSAY OF PHOSPHORUS: CPT | Performed by: INTERNAL MEDICINE

## 2017-01-29 PROCEDURE — 40000193 ZZH STATISTIC PT WARD VISIT

## 2017-01-29 PROCEDURE — 84100 ASSAY OF PHOSPHORUS: CPT | Performed by: NURSE PRACTITIONER

## 2017-01-29 PROCEDURE — 25000132 ZZH RX MED GY IP 250 OP 250 PS 637: Performed by: NURSE PRACTITIONER

## 2017-01-29 PROCEDURE — 83735 ASSAY OF MAGNESIUM: CPT | Performed by: NURSE PRACTITIONER

## 2017-01-29 PROCEDURE — 25000125 ZZHC RX 250: Performed by: INTERNAL MEDICINE

## 2017-01-29 PROCEDURE — 80048 BASIC METABOLIC PNL TOTAL CA: CPT | Performed by: NURSE PRACTITIONER

## 2017-01-29 PROCEDURE — 85025 COMPLETE CBC W/AUTO DIFF WBC: CPT | Performed by: NURSE PRACTITIONER

## 2017-01-29 PROCEDURE — 97530 THERAPEUTIC ACTIVITIES: CPT | Mod: GP

## 2017-01-29 PROCEDURE — 25000128 H RX IP 250 OP 636: Performed by: INTERNAL MEDICINE

## 2017-01-29 PROCEDURE — 99238 HOSP IP/OBS DSCHRG MGMT 30/<: CPT | Performed by: INTERNAL MEDICINE

## 2017-01-29 PROCEDURE — 25000125 ZZHC RX 250: Performed by: NURSE PRACTITIONER

## 2017-01-29 PROCEDURE — 90686 IIV4 VACC NO PRSV 0.5 ML IM: CPT | Performed by: INTERNAL MEDICINE

## 2017-01-29 PROCEDURE — 25000132 ZZH RX MED GY IP 250 OP 250 PS 637: Performed by: INTERNAL MEDICINE

## 2017-01-29 RX ORDER — POTASSIUM CHLORIDE 1.5 G/1.58G
20 POWDER, FOR SOLUTION ORAL DAILY
Qty: 7 PACKET | Refills: 0 | Status: SHIPPED | OUTPATIENT
Start: 2017-01-29 | End: 2017-02-05

## 2017-01-29 RX ORDER — DOCUSATE SODIUM 100 MG/1
100 CAPSULE, LIQUID FILLED ORAL DAILY
Qty: 100 CAPSULE | Refills: 0 | Status: SHIPPED | OUTPATIENT
Start: 2017-01-29 | End: 2017-01-01

## 2017-01-29 RX ORDER — SUCRALFATE 1 G/1
1 TABLET ORAL 3 TIMES DAILY
Qty: 90 TABLET | Refills: 0 | Status: ON HOLD | OUTPATIENT
Start: 2017-01-29 | End: 2017-04-19

## 2017-01-29 RX ORDER — PANTOPRAZOLE SODIUM 40 MG/1
40 TABLET, DELAYED RELEASE ORAL 2 TIMES DAILY
Qty: 60 TABLET | Refills: 1 | Status: SHIPPED | OUTPATIENT
Start: 2017-01-29 | End: 2017-04-11

## 2017-01-29 RX ORDER — AMOXICILLIN 500 MG/1
1000 CAPSULE ORAL EVERY 12 HOURS
Qty: 40 CAPSULE | Refills: 0 | Status: SHIPPED | OUTPATIENT
Start: 2017-01-29 | End: 2017-02-08

## 2017-01-29 RX ORDER — CLARITHROMYCIN 500 MG
500 TABLET ORAL EVERY 12 HOURS
Qty: 20 TABLET | Refills: 0 | Status: SHIPPED | OUTPATIENT
Start: 2017-01-29 | End: 2017-02-08

## 2017-01-29 RX ADMIN — SUCRALFATE 1 G: 1 SUSPENSION ORAL at 11:54

## 2017-01-29 RX ADMIN — POLYETHYLENE GLYCOL 3350 17 G: 17 POWDER, FOR SOLUTION ORAL at 08:07

## 2017-01-29 RX ADMIN — SODIUM PHOSPHATE, MONOBASIC, MONOHYDRATE AND SODIUM PHOSPHATE, DIBASIC, ANHYDROUS 20 MMOL: 276; 142 INJECTION, SOLUTION INTRAVENOUS at 05:42

## 2017-01-29 RX ADMIN — DRONABINOL 5 MG: 2.5 CAPSULE ORAL at 08:07

## 2017-01-29 RX ADMIN — Medication: at 13:56

## 2017-01-29 RX ADMIN — AMINOCAPROIC ACID 1.5 G: 0.25 SOLUTION ORAL at 08:07

## 2017-01-29 RX ADMIN — INFLUENZA A VIRUS A/CALIFORNIA/7/2009 X-179A (H1N1) ANTIGEN (FORMALDEHYDE INACTIVATED), INFLUENZA A VIRUS A/HONG KONG/4801/2014 X-263B (H3N2) ANTIGEN (FORMALDEHYDE INACTIVATED), INFLUENZA B VIRUS B/PHUKET/3073/2013 ANTIGEN (FORMALDEHYDE INACTIVATED), AND INFLUENZA B VIRUS B/BRISBANE/60/2008 ANTIGEN (FORMALDEHYDE INACTIVATED) 0.5 ML: 15; 15; 15; 15 INJECTION, SUSPENSION INTRAMUSCULAR at 12:06

## 2017-01-29 RX ADMIN — Medication: at 08:08

## 2017-01-29 RX ADMIN — ONDANSETRON 8 MG: 8 TABLET, ORALLY DISINTEGRATING ORAL at 08:19

## 2017-01-29 RX ADMIN — CLARITHROMYCIN 500 MG: 500 TABLET, FILM COATED ORAL at 08:07

## 2017-01-29 RX ADMIN — MORPHINE SULFATE 15 MG: 15 TABLET, EXTENDED RELEASE ORAL at 08:07

## 2017-01-29 RX ADMIN — SUCRALFATE 1 G: 1 SUSPENSION ORAL at 08:07

## 2017-01-29 RX ADMIN — AMOXICILLIN 1000 MG: 500 CAPSULE ORAL at 08:07

## 2017-01-29 RX ADMIN — PANTOPRAZOLE SODIUM 40 MG: 40 INJECTION, POWDER, FOR SOLUTION INTRAVENOUS at 08:07

## 2017-01-29 RX ADMIN — POTASSIUM & SODIUM PHOSPHATES POWDER PACK 280-160-250 MG 1 PACKET: 280-160-250 PACK at 13:46

## 2017-01-29 RX ADMIN — POTASSIUM CHLORIDE 40 MEQ: 750 TABLET, EXTENDED RELEASE ORAL at 08:08

## 2017-01-29 NOTE — PLAN OF CARE
Problem: Goal Outcome Summary  Goal: Goal Outcome Summary  PT 5C: patient amb ~800 feet independently without AD. Demos indep with 9 stairs 1 HR. Provided education and handout for OP cancer rehab in order to continue progressing endurance, strength, balance. Recommend discharge home with OP cancer rehab.       Physical Therapy Discharge Summary    Reason for therapy discharge:    Discharged to home with outpatient therapy.    Progress towards therapy goal(s). See goals on Care Plan in [facility] electronic health record for goal details.  Goals met    Therapy recommendation(s):    Continued therapy is recommended.  Rationale/Recommendations:  OP cancer rehab to progress functional mobility .

## 2017-01-29 NOTE — PLAN OF CARE
Problem: Goal Outcome Summary  Goal: Goal Outcome Summary  Outcome: No Change  Pt afebrile, vital signs stable, denies pain and nausea through shift. No stool through shift, pt able to sleep soundly between cares. Sodium phos currently infusing over 4 hours, will need recheck at 1200. No further complaints, patient currently sleeping soundly. Will continue to monitor and continue to follow POC.

## 2017-01-29 NOTE — PLAN OF CARE
Problem: Individualization  Goal: Patient Preferences  Outcome: No Change  Pt lethargic, only getting out of bed to go to the bathroom.  Did work with PT.  Denied nausea, denied pain.  Ate fair.  Took pills and meds well. Last BM had no obvious blood in it - which was encouraging to pt.  Continues to have a distended firm abdomen.  BPs are soft;  Checked orthostatics - slightly orthostatic (BP stayed the same from lying to standing).  May need to encourage fluids.

## 2017-01-29 NOTE — DISCHARGE SUMMARY
"[facility], [place]    Discharge Summary  Hematology / Oncology    Date of Admission:  1/24/2017  Date of Discharge:  1/29/2017  Discharging Provider: Zena Sharif DNP, APRN, CNP  Primary Heme/Oncologist: Dr. Gonsales  Date of Service (when I saw the patient): 01/29/2017    Discharge Diagnoses  Metastatic pancreatic cancer   Acute anemia 2/2 bleeding duodenal ulcer and duodenal tumor infiltration   Hemorrhoids  H.Pylori  Nausea  Electrolyte abnormalities    History of Present Illness  Shirin \"Jose Antonio\" Morena Muller is a 56 year old woman with metastatic pancreatic cancer currently on treatment with FOLFIRINOX s/p Cycle 3 on 1/17/17. She was admitted after a fall at home on 1/24 AM with weakness, dizziness, and lightheadedness felt d/t anemia and dehydration. She was found to have upper GI bleeding.        Hospital Course  Shirin Muller was admitted on 1/24/2017.  The following problems were addressed during her hospitalization:    #Acute on chronic anemia.  #Upper GI bleed.   Chronic anemia 2/2 cancer and chemotherapy. PTA last hgb was 8.5 on 1/17. Pt had a fall on morning of admission and was found to have hgb 5.9 on labs in ED. She was previously unaware of any active bleeding, including hemoptysis, hematemesis, hematuria, hematochezia or melena. However, she had BM on arrival to unit 5C and RN noted that it was dark red/maroon, no bright red blood. Thus suspected acute anemia could be d/t GI bleed. GI consulted and did EGD on 1/25; pt was found to have reflux esophagitis, erythematous mucosa in stomach, one bleeding duodenal ulcer (injected and clipped), and infiltrating mass in duodenum. Bleeding seems to have resolved for now with current interventions; last few BMs have been normal/brown.   -Continue PPI bid x8 weeks, then can decrease to once daily.   -Continue zantac qHS.   -Will discontinue amicar soln on discharge.  -Continue carafate qid.  -Repeat labs " in clinic later this week. Pt instructed to call or come to ED if lightheaded, dizzy, or any recurrent bleeding.     #Hemorrhoids. Pt notes BRB on toilet tissue with BM day before yesterday. She has h/o hemorrhoids and large external hemorrhoids on exam. Continue diltiazem cream prn.     #H.Pylori +. Treating with triple therapy - PPI and 2 abx (amoxicillin, clarithromycin). Abx duration is 14 days.     #Syncope, fall. Likely 2/2 anemia and dehydration in setting of recent chemo for pancreatic cancer and GI bleed.  -Cardiac w/u: troponin nml, EKG sinus tachycardia, echo nml structure, function, and EF.    -CXR unremarkable.    -GI w/u and management as above.     #Diarrhea. Resolved. Pt reported ~1-2 loose stools per day since last chemo. Most likely 2/2 irinotecan and possibly GI bleed. Now resolved.    -C.diff, enteric FILOMENA, O&P, giardia all negative.     #Metastatic pancreatic cancer. Liver mets. Also found to have tumor infiltration into duodenum. Currently on treatment with FOLFIRINOX, s/p Cycle 3 on Tues 1/17.    -Continue home antiemetics prn, marinol 5mg bid, ms contin 15mg bid, and oxycodone prn.    -F/u in clinic later this week.     #Fever. Temp 100.7 x1 on 1/26 PM. Afebrile since then with no new infectious sx. Normal WBC count. CXR grossly unremarkable, radiologist read as possible viral infection(?). Cx negative. On abx for H.pylori. No fevers since then.     #Electrolyte abnormalities. Hypophos, hypokalemia. Likely mild refeeding 2/2 TPN and increased oral intake after several days of minimal oral intake then NPO. Clinically stable. Replace lytes today prior to discharge. D/c home with 7 days of oral phos and potassium replacement. Recheck lytes in clinic.     Zena Sharif DNP, APRN, CNP  Hematology/Oncology  Pager: [phone]    Significant Results and Procedures  Results for orders placed or performed during the hospital encounter of 01/24/17   XR Chest Port 1 View    Narrative    Exam:  XR CHEST  PORT 1 VW, 1/24/2017 10:43 AM    History: chest pain/sob    Comparison:  CT chest from 12/12/2016.    Findings:  Single AP view of the chest is obtained. Right chest  portacatheter tip terminates in the low SVC. Cardiomediastinal  silhouette is within normal limits. Pulmonary vasculature is  unremarkable. There is no pleural effusion or pneumothorax. No focal  airspace opacity. Visualized upper abdomen is unremarkable.      Impression    Impression:  No acute cardiopulmonary abnormality.    I have personally reviewed the examination and initial interpretation  and I agree with the findings.    PHILIP BERMAN MD   XR Chest 2 Views    Narrative    Exam:  XR CHEST 2 VW, 1/26/2017 5:37 PM    History: fever    Comparison:  Chest radiograph 1/24/2017    Findings:  PA and lateral view of the chest were obtained. Right-sided  Port-A-Cath with tip projecting over the low SVC. The  cardiomediastinal silhouette is unremarkable. Trachea is midline.  Pulmonary vasculature is indistinct. New small bilateral pleural  effusions. No appreciable pneumothorax. Perihilar prominence with  peribronchial cuffing. No acute consolidation.    Biliary stent with pneumobilia. No acute osseous abnormality.       Impression    Impression:    1. New small bilateral pleural effusions.   2. Findings suggestive of possible viral infectious process or edema.     I have personally reviewed the examination and initial interpretation  and I agree with the findings.    JANE CARROLL MD       Code Status  Full Code    Physical Exam  Temp: 98.8  F (37.1  C) Temp src: Oral BP: 112/68 mmHg   Heart Rate: 93 Resp: 16 SpO2: 100 % O2 Device: None (Room air)    Filed Vitals:    01/26/17 0756 01/28/17 0834 01/29/17 0803   Weight: 71.3 kg (157 lb 3 oz) 73.846 kg (162 lb 12.8 oz) 74.753 kg (164 lb 12.8 oz)     Vital Signs with Ranges  Temp:  [98.5  F (36.9  C)-98.8  F (37.1  C)] 98.8  F (37.1  C)  Heart Rate:  [86-94] 93  Resp:  [16] 16  BP: ()/(55-68)  "112/68 mmHg  SpO2:  [97 %-100 %] 100 %  I/O last 3 completed shifts:  In: 2942.4 [P.O.:720; I.V.:712]  Out: 650 [Urine:250; Stool:400]    Constitutional: Pleasant woman seen resting in bed in no acute distress. Alert, interactive, soft-spoken. Reports feeling good today, ready to go home.   HEENT: NCAT. PERRL, anicteric sclera. MMM.  Respiratory: Non-labored breathing on RA, good air exchange, lungs clear to auscultation bilaterally.    Cardiovascular: RRR. No murmur or rub.    GI: Normoactive bowel sounds. Abdomen soft, moderately distended (\"improved\" after BM this AM), non-tender.    Skin: Warm and dry. No concerning lesions or rash on exposed surfaces.  Musculoskeletal: Extremities grossly normal, non-tender, no edema.    Neurologic: A&O, speech normal, no focal deficits.    Neuropsychiatric: Mentation and affect appear normal/appropriate.   Vascular Access: Chest PAC is CDI.     Discharge Disposition  Discharged to home  Condition at discharge: Stable    Follow-up Labs/Appointments: Request f/u appt with labs later this week (Wed-Fri sometime).    Consultations This Hospital Stay  GI LUMINAL ADULT IP CONSULT  NUTRITION SERVICES ADULT IP CONSULT  VASCULAR ACCESS CARE ADULT IP CONSULT  MEDICATION HISTORY IP PHARMACY CONSULT  PHYSICAL THERAPY ADULT IP CONSULT  PHARMACY/NUTRITION TO START AND MANAGE TPN  PHARMACY IP CONSULT  VASCULAR ACCESS CARE ADULT IP CONSULT    Discharge Orders    CBC with platelets and differential   Last Lab Result: HEMOGLOBIN (g/dL)      Date                     Value                01/29/2017               9.3*             ----------     Comprehensive metabolic panel (BMP + Alb, Alk Phos, ALT, AST, Total. Bili, TP)     MED THERAPY MANAGE REFERRAL     Physical Therapy Referral     Physical Therapy Referral     Reason for your hospital stay   Fall at home, anemia, GI bleeding from duodenal ulcer.     Follow Up and recommended labs and tests   We would like you to follow up in clinic with labs " next week. We will call to notify you of the date/time.     Activity   Your activity upon discharge: Activity as tolerated. No driving or strenuous activity while taking narcotics, if having headaches/dizziness/vision changes, or if feeling generally weak or unwell.     When to contact your care team   Call the [facility] 24-hour triage line at [phone] or [phone] for temp >100.4, uncontrolled nausea/vomiting/diarrhea/constipation, unrelieved pain, bleeding not relieved with pressure, dizziness, chest pain, shortness of breath, loss of consciousness, and any new or concerning symptoms.     Call [phone] and ask for the care coordinator that works with your oncologist if you have questions about scans, appointments, hospital follow-up, or other concerns.     Monitor and record   Monitor and record your bowel movements for the next few days after discharge from the hospital. Call the triage number or clinic if your stools are dark/tarry, maroon, or bright red.     Full Code     Diet   Follow this diet upon discharge: Regular diet as tolerated. Be sure to drink plenty of non-caffeinated beverages. Supplement your diet with high-calorie snacks or nutritional supplements (e.g. Boost, Ensure, Resource Breeze) if needed to ensure adequate calorie intake. Notify your primary provider if you have a poor appetite, are not eating well, and/or have been losing weight unintentionally.       Discharge Medications  Current Discharge Medication List      START taking these medications    Details   diltiazem 2% in PLO cream, FV COMPOUNDED, 2% GEL Apply topically daily and as needed to external hemorrhoids  Qty: 30 g, Refills: 0    Associated Diagnoses: External hemorrhoids      clarithromycin (BIAXIN) 500 MG tablet Take 1 tablet (500 mg) by mouth every 12 hours for 10 days  Qty: 20 tablet, Refills: 0    Associated Diagnoses: Duodenal ulcer due to Helicobacter pylori      amoxicillin (AMOXIL) 500 MG capsule  Take 2 capsules (1,000 mg) by mouth every 12 hours for 10 days  Qty: 40 capsule, Refills: 0    Associated Diagnoses: Duodenal ulcer due to Helicobacter pylori      ranitidine (ZANTAC) 150 MG tablet Take 1 tablet (150 mg) by mouth At Bedtime  Qty: 30 tablet, Refills: 0    Associated Diagnoses: Gastrointestinal hemorrhage associated with duodenal ulcer      sucralfate (CARAFATE) 1 GM tablet Take 1 tablet (1 g) by mouth 3 times daily  Qty: 90 tablet, Refills: 0    Associated Diagnoses: Gastrointestinal hemorrhage associated with duodenal ulcer      pantoprazole (PROTONIX) 40 MG EC tablet Take 1 tablet (40 mg) by mouth 2 times daily . After taking this twice daily for 8 weeks, you can decrease dose to once daily.  Qty: 60 tablet, Refills: 1    Associated Diagnoses: Gastrointestinal hemorrhage associated with duodenal ulcer      potassium & sodium phosphates (NEUTRA-PHOS) 280-160-250 MG Packet Take 1 packet by mouth 4 times daily for 7 days  Qty: 28 packet, Refills: 0    Associated Diagnoses: Hypophosphatemia         CONTINUE these medications which have CHANGED    Details   docusate sodium (COLACE) 100 MG capsule Take 1 capsule (100 mg) by mouth daily  Qty: 100 capsule, Refills: 0    Associated Diagnoses: External hemorrhoids      potassium chloride (KLOR-CON) 20 MEQ Packet Take 20 mEq by mouth daily for 7 days  Qty: 7 packet, Refills: 0    Associated Diagnoses: Malignant neoplasm of head of pancreas (H)         CONTINUE these medications which have NOT CHANGED    Details   loratadine (CLARITIN) 10 MG tablet Take 10 mg by mouth daily      morphine (MS CONTIN) 15 MG 12 hr tablet Take 1 tablet (15 mg) by mouth every 12 hours  Qty: 60 tablet, Refills: 0    Associated Diagnoses: Malignant neoplasm of head of pancreas (H)      prochlorperazine (COMPAZINE) 10 MG tablet Take 1 tablet (10 mg) by mouth every 6 hours as needed (Nausea/Vomiting)  Qty: 30 tablet, Refills: 2    Associated Diagnoses: Malignant neoplasm of head of  pancreas (H)      oxyCODONE (ROXICODONE) 5 MG IR tablet Take 1 tablet (5 mg) by mouth every 4 hours as needed for moderate to severe pain  Qty: 100 tablet, Refills: 0    Associated Diagnoses: Malignant neoplasm of head of pancreas (H)      amylase-lipase-protease (CREON) 03981 UNITS CPEP Take 2 capsules (72,000 Units) by mouth 3 times daily (with meals)  Qty: 180 capsule, Refills: 1    Associated Diagnoses: Malignant neoplasm of head of pancreas (H)      LORazepam (ATIVAN) 0.5 MG tablet Take 1 tablet (0.5 mg) by mouth every 4 hours as needed (Anxiety, Nausea/Vomiting or Sleep)  Qty: 30 tablet, Refills: 2    Associated Diagnoses: Malignant neoplasm of head of pancreas (H)      ondansetron (ZOFRAN-ODT) 8 MG disintegrating tablet Take 1 tablet (8 mg) by mouth every 8 hours as needed for nausea  Qty: 60 tablet, Refills: 4    Associated Diagnoses: Malignant neoplasm of head of pancreas (H)      dronabinol (MARINOL) 5 MG capsule Take 1 capsule (5 mg) by mouth 2 times daily (before meals)  Qty: 60 capsule, Refills: 0    Associated Diagnoses: Anorexia      polyethylene glycol (MIRALAX/GLYCOLAX) powder Take 17 g (1 capful) by mouth daily  Qty: 119 g, Refills: 11    Associated Diagnoses: Malignant neoplasm of head of pancreas (H)      lidocaine-prilocaine (EMLA) cream Apply topically as needed for other (Use 30-60 minutes prior to port access)  Qty: 30 g, Refills: 0    Associated Diagnoses: Malignant neoplasm of head of pancreas (H)         STOP taking these medications       loperamide (IMODIUM) 2 MG capsule Comments:   Reason for Stopping:             Allergies  Allergies   Allergen Reactions     Contrast Dye Nausea and Vomiting     Pt vomiting post IV contrast, Please try Visipaque for next CT scan. 6/24/16 sv     Data  CBC  Recent Labs  Lab 01/29/17  0256 01/28/17  0324 01/27/17  1808 01/27/17  1151 01/27/17  0339  01/26/17  0414   WBC 4.8 2.8*  --   --  3.1*  --  5.1   RBC 3.52* 3.08*  --   --  3.24*  --  3.64*   HGB  9.3* 8.0* 8.9* 9.7* 8.6*  < > 9.4*   HCT 29.6* 25.6*  --   --  26.4*  --  29.4*   MCV 84 83  --   --  82  --  81   MCH 26.4* 26.0*  --   --  26.5  --  25.8*   MCHC 31.4* 31.3*  --   --  32.6  --  32.0   RDW 18.8* 19.2*  --   --  18.6*  --  17.7*   PLT 86* 69*  --   --  58*  --  69*   < > = values in this interval not displayed.  CMP  Recent Labs  Lab 01/29/17  0256 01/28/17  1559 01/28/17  1150 01/28/17  0324 01/27/17  1808  01/27/17  0339  01/26/17  0314 01/25/17  0758  01/24/17  1002     --   --  144  --   --  142  --  140 141  --  138   POTASSIUM 4.1  --  4.0 3.2* 3.4  < > 2.7*  < > 3.2* 3.6  --  3.5   CHLORIDE 108  --   --  109  --   --  108  --  107 110*  --  105   CO2 26  --   --  25  --   --  26  --  24 24  --  25   ANIONGAP 9  --   --  11  --   --  8  --  9 7  --  8   *  --   --  124*  --   --  85  --  118* 82  --  119*   BUN 4*  --   --  4*  --   --  4*  --  7 8  --  12   CR 0.61  --   --  0.61  --   --  0.78  --  0.66 0.77  --  0.64   GFRESTIMATED >90Non  GFR Calc  --   --  >90Non  GFR Calc  --   --  76  --  >90Non  GFR Calc 76  --  >90Non  GFR Calc   GFRESTBLACK >90African American GFR Calc  --   --  >90African American GFR Calc  --   --  >90African American GFR Calc  --  >90African American GFR Calc >90African American GFR Calc  --  >90African American GFR Calc   FANNY 7.4*  --   --  7.2*  --   --  7.3*  --  7.3* 7.4*  --  7.9*   MAG 2.0  --   --  1.9  --   --  1.7  --  1.7 1.8  < >  --    PHOS 1.8* 2.1*  --  1.8*  --   --  2.5  --  2.3* 2.2*  < >  --    PROTTOTAL  --   --   --  5.1*  --   --   --   --   --  5.8*  --  6.0*   ALBUMIN  --   --   --  2.1*  --   --   --   --   --  2.5*  --  2.6*   BILITOTAL  --   --   --  0.2  --   --   --   --   --  0.5  --  0.2   ALKPHOS  --   --   --  165*  --   --   --   --   --  206*  --  215*   AST  --   --   --  15  --   --   --   --   --  25  --  22   ALT  --   --   --  13  --   --   --   --    --  21  --  20   < > = values in this interval not displayed.  INR  Recent Labs  Lab 01/28/17  0324 01/27/17  0800 01/24/17  1829 01/24/17  1002   INR 1.35* 1.38* 1.37* 1.33*

## 2017-01-29 NOTE — PROGRESS NOTES
Temp max 98.8, diastolic's 60's. zofran for nausea. Ate well. Stand by assist to the bathroom. 250 ml of stool. Using dilt cream for hemorrhoids. Phos recheck 2.4 and neutra phos packet. Discharge teaching was done. Heparin locked portacath. Flu shot given.

## 2017-01-30 LAB
PREALB SERPL IA-MCNC: 9 MG/DL (ref 15–45)
ZINC SERPL-MCNC: 76 UG/ML

## 2017-01-31 ENCOUNTER — TELEPHONE (OUTPATIENT)
Dept: PHARMACY | Facility: OTHER | Age: 57
End: 2017-01-31

## 2017-01-31 NOTE — TELEPHONE ENCOUNTER
MTM referral from: Transitions of Care (recent hospital discharge or ED visit)    MTM referral outreach attempt #1 on January 31, 2017 at 11:07 AM      Outcome: Patient scheduled for MTM appointment on 2/3/2017.    Beronica Saldana, MTM Coordinator Intern

## 2017-02-01 LAB
BACTERIA SPEC CULT: NO GROWTH
BACTERIA SPEC CULT: NO GROWTH
MICRO REPORT STATUS: NORMAL
MICRO REPORT STATUS: NORMAL
SPECIMEN SOURCE: NORMAL
SPECIMEN SOURCE: NORMAL

## 2017-02-03 ENCOUNTER — ALLIED HEALTH/NURSE VISIT (OUTPATIENT)
Dept: PHARMACY | Facility: CLINIC | Age: 57
End: 2017-02-03
Payer: COMMERCIAL

## 2017-02-03 DIAGNOSIS — E83.39 HYPOPHOSPHATEMIA: ICD-10-CM

## 2017-02-03 DIAGNOSIS — B96.81 DUODENAL ULCER DUE TO HELICOBACTER PYLORI: ICD-10-CM

## 2017-02-03 DIAGNOSIS — R11.2 CHEMOTHERAPY INDUCED NAUSEA AND VOMITING: ICD-10-CM

## 2017-02-03 DIAGNOSIS — K26.9 DUODENAL ULCER DUE TO HELICOBACTER PYLORI: ICD-10-CM

## 2017-02-03 DIAGNOSIS — T45.1X5A CHEMOTHERAPY INDUCED NAUSEA AND VOMITING: ICD-10-CM

## 2017-02-03 DIAGNOSIS — C25.0 MALIGNANT NEOPLASM OF HEAD OF PANCREAS (H): Primary | ICD-10-CM

## 2017-02-03 DIAGNOSIS — J30.1 ALLERGIC RHINITIS DUE TO POLLEN, UNSPECIFIED RHINITIS SEASONALITY: ICD-10-CM

## 2017-02-03 DIAGNOSIS — R63.0 ANOREXIA: ICD-10-CM

## 2017-02-03 DIAGNOSIS — E87.6 HYPOKALEMIA: ICD-10-CM

## 2017-02-03 DIAGNOSIS — K64.4 EXTERNAL HEMORRHOIDS: ICD-10-CM

## 2017-02-03 DIAGNOSIS — R19.8 ALTERED BOWEL FUNCTION: ICD-10-CM

## 2017-02-03 DIAGNOSIS — G89.3 CANCER ASSOCIATED PAIN: ICD-10-CM

## 2017-02-03 PROCEDURE — 99605 MTMS BY PHARM NP 15 MIN: CPT | Performed by: PHARMACIST

## 2017-02-03 PROCEDURE — 99607 MTMS BY PHARM ADDL 15 MIN: CPT | Performed by: PHARMACIST

## 2017-02-03 NOTE — PROGRESS NOTES
SUBJECTIVE/OBJECTIVE:                                                    Shirin Muller is a 56 year old female called for a transitions of care visit.  She was discharged from [facility] on 1/29/17 for GI bleed, severe anemia, duodenal ulcer.     Chief Complaint: medication review.    The primary oncologist for this patient is Dr Demond Gonsales.  The PCP for this patient is not noted.    Cancer diagnosis: Pancreatic cancer    Allergies/ADRs: Reviewed in Epic  Tobacco: No tobacco use   Alcohol: not currently using  Caffeine: no caffeine  Activity: some walking and PT exercises  PMH: Reviewed in Epic    Medication Adherence: no issues reported    Pancreatic cancer:  Current medications include: chemotherapy with FOLFIRINOX (fluorouracil, leucovorin, irinotecan and oxaliplatin.)  Last chemo given 1/7/17. Patient reports the following chemotherapy side effects: diarrhea, anorexia, weakness and some neuropathy symptoms.  She also was prescribed Creon for pancreatic insufficiency.  She stopped this when she developed diarrhea.    Hemorrhoids:  Current medications include: diltiazem cream/gel ~TID. Patient reports this is helping.  She says she's not having an rectal bleeding.    Bowel regiman:  Current medications include: docusate 100mg daily and PRN Miralax (not needing right now.)  Patient states this is working for her.    H.Pylori/duodenal ulcer:  Current medications include: clarithromycin 500mg BID + amoxicillin 1000mg BID x 10 days after discharge, ranitidine 150mg daily, sucralfate 1gm TID and pantoprazole 40mg BID.  Patient states her stomach/abdomen is feeling better and she denies diarrhea/rash or other side effects.      Hypophosphatemia:  Current medications include: none.  Patient was prescribed Neutraphos 1pkt QID x 7 days at discharge 1/29/17.  Patient states she is not taking this.  Prescription was not picked up from the pharmacy at the time of discharge.      Hypokalemia:  Current medications  "include: potassium chloride 20mEq daily x 7 days after discharge.  Patient states she is taking this, but it wasn't picked up from the pharmacy at the time of discharge, although the patient may have had a supply from a previous prescription.  She denies stomach discomfort as she says she takes it with food.    Allergic rhinitis: Current medications include loratadine 10mg once daily. Pt feels that current therapy is effective.     Cancer related pain:  Current medications include: MS Contin 15mg Q12h plus PRN oxycodone 5mg (~2/day.)  Patient seems satisfied with this level of pain control.  She denies constipation or CNS side effects.    Chemotherapy nausea:  Current medications include: PRN prochlorperazine (~2/day,) PRN ondansetron (~2/day,) and PRN lorazepam (~1/day, also for sleep.)  Patient states these are controlling her nausea well, and denies side effects.    Loss of appetite:  Current medications include: dronabinol 5mg BID.  Patient says that this is helping her appetite and she denies side effects.      Current labs include:  BP Readings from Last 3 Encounters:   01/29/17 102/60   01/20/17 110/64   01/17/17 102/63       Latest Ht and Wt:  Wt Readings from Last 2 Encounters:   01/29/17 164 lb 12.8 oz (74.753 kg)   01/20/17 161 lb 12.8 oz (73.392 kg)     Ht Readings from Last 2 Encounters:   01/24/17 5' 10\" (1.778 m)   07/18/16 5' 11\" (1.803 m)        Latest vitals:  There were no vitals taken for this visit. (phone visit)    Current labs include:  BUN        4   1/29/2017  CR     0.61   1/29/2017  POTASSIUM      4.1   1/29/2017  ALT       13   1/28/2017  AST       15   1/28/2017  BILITOTAL      0.2   1/28/2017  ALBUMIN      2.1   1/28/2017  WBC      4.8   1/29/2017  HGB      9.3   1/29/2017  PLT       86   1/29/2017  ABSOLUTE NEUTROPHIL   Date Value Ref Range Status   01/29/2017 3.4 1.6 - 8.3 10e9/L Final       Most Recent Immunizations   Administered Date(s) Administered     Influenza Vaccine IM 3yrs+ 4 " Atul IIV4 01/29/2017     ASSESSMENT:                                                       Current medications were reviewed today.      Medication Adherence: no issues identified    Pancreatic cancer: Needs Improvement. I explained that the diarrhea probably was not caused by the Creon.  Patient would benefit from restarting the Creon.  Patient agreed.    Hemorrhoids: Improved. No change needed.    Bowel regimen: Stable. Current treatment effective.    H.Pylori/duodenal ulcer: Improving.  Patient will finish antibiotic regimen.        Hypophosphatemia: Needs Improvement. Patient would benefit from starting the Neutrophos.  Patient agreed.    Hypokalemia: Stable. No change needed.    Allergic rhinitis: Stable.  No change needed.    Cancer related pain: Stable. Current treatment seems effective.    Chemotherapy nausea: Stable. Current treatment seems effective.    Loss of appetite: Improved. No changes needed.      PLAN:                                                      Post Discharge Medication Reconciliation Status: discharge medications reconciled, continue medications without change.    1.  Restart Creon  2. Start Neutraphos    I spent 30 minutes with this patient today.  A copy of the visit note was provided to the patient's primary care provider.    Will follow up in 1-2 months.    The patient was sent via [employer] a summary of these recommendations as an after visit summary.    Nevin Archibald, PharmD, BCOP, BCPS  Clinical Pharmacy Specialist/  Oncology Medication Therapy Management Pharmacist  [facility]  Pager [phone]  Phone [phone]

## 2017-02-03 NOTE — PATIENT INSTRUCTIONS
Recommendations from today's MTM visit:                                                    MTM (medication therapy management) is a service provided by a clinical pharmacist designed to help you get the most of out of your medicines.   Today we reviewed what your medicines are for, how to know if they are working, that your medicines are safe and how to make your medicine regimen as easy as possible.     1. Restart the Creon; it will help your bowel function    2. Start the Neutraphos.  This will help bring up your phosphorus level    Next MTM visit: 1-2 months    To schedule another MTM appointment, please call the clinic directly or you may call the MTM scheduling line at [phone] or toll-free at [phone].     My Clinical Pharmacist's contact information:                                                      It was a pleasure seeing you today!  Please feel free to contact me with any questions or concerns you have.      Nevin Archibald, PharmD, BCOP, BCPS  Clinical Pharmacy Specialist/  Oncology Medication Therapy Management Pharmacist  [facility]  Pager [phone]  Phone [phone]          You may receive a survey about the MTM services you received.  I would appreciate your feedback to help me serve you better in the future. Please fill it out and return it when you can. Your comments will be anonymous.

## 2017-02-06 ENCOUNTER — ONCOLOGY VISIT (OUTPATIENT)
Dept: ONCOLOGY | Facility: CLINIC | Age: 57
End: 2017-02-06
Attending: NURSE PRACTITIONER
Payer: COMMERCIAL

## 2017-02-06 ENCOUNTER — APPOINTMENT (OUTPATIENT)
Dept: LAB | Facility: CLINIC | Age: 57
End: 2017-02-06
Attending: INTERNAL MEDICINE
Payer: COMMERCIAL

## 2017-02-06 VITALS
WEIGHT: 164.9 LBS | BODY MASS INDEX: 23.66 KG/M2 | TEMPERATURE: 98.6 F | RESPIRATION RATE: 18 BRPM | HEART RATE: 95 BPM | SYSTOLIC BLOOD PRESSURE: 114 MMHG | DIASTOLIC BLOOD PRESSURE: 78 MMHG | OXYGEN SATURATION: 99 %

## 2017-02-06 DIAGNOSIS — C25.0 MALIGNANT NEOPLASM OF HEAD OF PANCREAS (H): ICD-10-CM

## 2017-02-06 LAB
ALBUMIN SERPL-MCNC: 2.6 G/DL (ref 3.4–5)
ALP SERPL-CCNC: 224 U/L (ref 40–150)
ALT SERPL W P-5'-P-CCNC: 22 U/L (ref 0–50)
ANION GAP SERPL CALCULATED.3IONS-SCNC: 8 MMOL/L (ref 3–14)
AST SERPL W P-5'-P-CCNC: 27 U/L (ref 0–45)
BASOPHILS # BLD AUTO: 0.1 10E9/L (ref 0–0.2)
BASOPHILS NFR BLD AUTO: 0.4 %
BILIRUB SERPL-MCNC: 0.4 MG/DL (ref 0.2–1.3)
BUN SERPL-MCNC: 12 MG/DL (ref 7–30)
CALCIUM SERPL-MCNC: 8.2 MG/DL (ref 8.5–10.1)
CHLORIDE SERPL-SCNC: 105 MMOL/L (ref 94–109)
CO2 SERPL-SCNC: 28 MMOL/L (ref 20–32)
CREAT SERPL-MCNC: 0.79 MG/DL (ref 0.52–1.04)
DIFFERENTIAL METHOD BLD: ABNORMAL
EOSINOPHIL # BLD AUTO: 0 10E9/L (ref 0–0.7)
EOSINOPHIL NFR BLD AUTO: 0.1 %
ERYTHROCYTE [DISTWIDTH] IN BLOOD BY AUTOMATED COUNT: 19.5 % (ref 10–15)
GFR SERPL CREATININE-BSD FRML MDRD: 75 ML/MIN/1.7M2
GLUCOSE SERPL-MCNC: 81 MG/DL (ref 70–99)
HCT VFR BLD AUTO: 32.5 % (ref 35–47)
HGB BLD-MCNC: 10.2 G/DL (ref 11.7–15.7)
IMM GRANULOCYTES # BLD: 0.1 10E9/L (ref 0–0.4)
IMM GRANULOCYTES NFR BLD: 0.9 %
LYMPHOCYTES # BLD AUTO: 1.2 10E9/L (ref 0.8–5.3)
LYMPHOCYTES NFR BLD AUTO: 7.4 %
MAGNESIUM SERPL-MCNC: 2 MG/DL (ref 1.6–2.3)
MCH RBC QN AUTO: 26.6 PG (ref 26.5–33)
MCHC RBC AUTO-ENTMCNC: 31.4 G/DL (ref 31.5–36.5)
MCV RBC AUTO: 85 FL (ref 78–100)
MONOCYTES # BLD AUTO: 0.8 10E9/L (ref 0–1.3)
MONOCYTES NFR BLD AUTO: 4.9 %
NEUTROPHILS # BLD AUTO: 14.1 10E9/L (ref 1.6–8.3)
NEUTROPHILS NFR BLD AUTO: 86.3 %
NRBC # BLD AUTO: 0 10*3/UL
NRBC BLD AUTO-RTO: 0 /100
PHOSPHATE SERPL-MCNC: 3.3 MG/DL (ref 2.5–4.5)
PLATELET # BLD AUTO: 183 10E9/L (ref 150–450)
POTASSIUM SERPL-SCNC: 3.5 MMOL/L (ref 3.4–5.3)
PROT SERPL-MCNC: 6.8 G/DL (ref 6.8–8.8)
RBC # BLD AUTO: 3.84 10E12/L (ref 3.8–5.2)
SODIUM SERPL-SCNC: 141 MMOL/L (ref 133–144)
WBC # BLD AUTO: 16.3 10E9/L (ref 4–11)

## 2017-02-06 PROCEDURE — 99214 OFFICE O/P EST MOD 30 MIN: CPT | Mod: ZP | Performed by: NURSE PRACTITIONER

## 2017-02-06 PROCEDURE — 25000128 H RX IP 250 OP 636: Mod: ZF | Performed by: NURSE PRACTITIONER

## 2017-02-06 PROCEDURE — 83735 ASSAY OF MAGNESIUM: CPT | Performed by: NURSE PRACTITIONER

## 2017-02-06 PROCEDURE — 36591 DRAW BLOOD OFF VENOUS DEVICE: CPT

## 2017-02-06 PROCEDURE — 84100 ASSAY OF PHOSPHORUS: CPT | Performed by: NURSE PRACTITIONER

## 2017-02-06 PROCEDURE — 85025 COMPLETE CBC W/AUTO DIFF WBC: CPT | Performed by: NURSE PRACTITIONER

## 2017-02-06 PROCEDURE — 80053 COMPREHEN METABOLIC PANEL: CPT | Performed by: NURSE PRACTITIONER

## 2017-02-06 PROCEDURE — 99212 OFFICE O/P EST SF 10 MIN: CPT

## 2017-02-06 RX ORDER — HEPARIN SODIUM (PORCINE) LOCK FLUSH IV SOLN 100 UNIT/ML 100 UNIT/ML
5 SOLUTION INTRAVENOUS DAILY PRN
Status: DISCONTINUED | OUTPATIENT
Start: 2017-02-06 | End: 2017-02-06 | Stop reason: HOSPADM

## 2017-02-06 RX ORDER — POTASSIUM CHLORIDE 1500 MG/1
20 TABLET, EXTENDED RELEASE ORAL DAILY
Qty: 60 TABLET | Refills: 1 | Status: ON HOLD | OUTPATIENT
Start: 2017-02-06 | End: 2017-01-01

## 2017-02-06 RX ADMIN — SODIUM CHLORIDE, PRESERVATIVE FREE 5 ML: 5 INJECTION INTRAVENOUS at 09:32

## 2017-02-06 ASSESSMENT — PAIN SCALES - GENERAL: PAINLEVEL: NO PAIN (0)

## 2017-02-06 NOTE — PROGRESS NOTES
Ms. Shirin Meyer is a 56-year-old woman with metastatic pancreatic cancer.  I am seeing her in hospital follow-up.    Oncology HPI:  She was diagnosed in the spring of 2016 after presenting with a 2 to 3-month history of abdominal pain and weight loss.  She underwent evaluation showing a 5 cm mass at the head of the pancreas.  Biopsy was consistent with adenocarcinoma.  She underwent staging imaging including an MRI of the abdomen, which then showed an up to 10 cm poorly defined hypoenhancing mass arising from the pancreatic head which encased celiac artery, superior mesenteric artery and severely attenuated the main portal vein.  She initiated on treatment with gemcitabine and Abraxane on 05/02/2016 and continued on that until December 2016 when she was found to have metastatic disease involving the liver.  She was then initiated on FOLFIRINOX on 12/20/16. Her first cycle was complicated by fatigue, nausea/vomiting. Antiemetics were adjusted with cycle 2. After cycle 3, she was admitted with weakness and dizziness. She was found to have a GI bleed secondary to a bleeding duodenal ulcer and infiltrating mass in the duodenum. The ulcer was injected and clipped. She was found to be H. Pylori positive and was initiated on  therapy with PPI, carafate, amoxicillin and clarithromycin.    Interval history:  Shirin is feeling better. Her dizziness and fatigue are improved. No heartburn or upper abdominal pain. No melena. Has some bright red blood per rectum after a BM. Has hemorrhoids and is using a topical cream and wipes. No mouth sores. No hand/foot sores. Has moderate cold neuropathy. Bowels are regular. Bladder function wnl. No cough or shortness of breath. No headaches/vision changes. Lower abdominal pain has been stable. She does not feel the need for additional pain medications.    Current Outpatient Prescriptions   Medication Sig Dispense Refill     potassium chloride SA (POTASSIUM CHLORIDE) 20 MEQ CR  tablet Take 1 tablet (20 mEq) by mouth daily 60 tablet 1     diltiazem 2% in PLO cream, FV COMPOUNDED, 2% GEL Apply topically daily and as needed to external hemorrhoids 30 g 0     docusate sodium (COLACE) 100 MG capsule Take 1 capsule (100 mg) by mouth daily 100 capsule 0     clarithromycin (BIAXIN) 500 MG tablet Take 1 tablet (500 mg) by mouth every 12 hours for 10 days 20 tablet 0     amoxicillin (AMOXIL) 500 MG capsule Take 2 capsules (1,000 mg) by mouth every 12 hours for 10 days 40 capsule 0     ranitidine (ZANTAC) 150 MG tablet Take 1 tablet (150 mg) by mouth At Bedtime 30 tablet 0     sucralfate (CARAFATE) 1 GM tablet Take 1 tablet (1 g) by mouth 3 times daily 90 tablet 0     pantoprazole (PROTONIX) 40 MG EC tablet Take 1 tablet (40 mg) by mouth 2 times daily . After taking this twice daily for 8 weeks, you can decrease dose to once daily. 60 tablet 1     loratadine (CLARITIN) 10 MG tablet Take 10 mg by mouth daily       morphine (MS CONTIN) 15 MG 12 hr tablet Take 1 tablet (15 mg) by mouth every 12 hours 60 tablet 0     prochlorperazine (COMPAZINE) 10 MG tablet Take 1 tablet (10 mg) by mouth every 6 hours as needed (Nausea/Vomiting) 30 tablet 2     oxyCODONE (ROXICODONE) 5 MG IR tablet Take 1 tablet (5 mg) by mouth every 4 hours as needed for moderate to severe pain 100 tablet 0     dronabinol (MARINOL) 5 MG capsule Take 1 capsule (5 mg) by mouth 2 times daily (before meals) 60 capsule 0     amylase-lipase-protease (CREON) 74662 UNITS CPEP Take 2 capsules (72,000 Units) by mouth 3 times daily (with meals) 180 capsule 1     LORazepam (ATIVAN) 0.5 MG tablet Take 1 tablet (0.5 mg) by mouth every 4 hours as needed (Anxiety, Nausea/Vomiting or Sleep) 30 tablet 2     polyethylene glycol (MIRALAX/GLYCOLAX) powder Take 17 g (1 capful) by mouth daily 119 g 11     lidocaine-prilocaine (EMLA) cream Apply topically as needed for other (Use 30-60 minutes prior to port access) 30 g 0     ondansetron (ZOFRAN-ODT) 8 MG  disintegrating tablet Take 1 tablet (8 mg) by mouth every 8 hours as needed for nausea 60 tablet 4     Exam:  Alert, appears slim, but in NAD. Blood pressure 114/78, pulse 95, temperature 98.6  F (37  C), temperature source Oral, resp. rate 18, weight 74.8 kg (164 lb 14.5 oz), SpO2 99 %.  Wt Readings from Last 10 Encounters:   02/06/17 74.8 kg (164 lb 14.5 oz)   01/29/17 74.753 kg (164 lb 12.8 oz)   01/20/17 73.392 kg (161 lb 12.8 oz)   01/17/17 73.3 kg (161 lb 9.6 oz)   01/05/17 71.169 kg (156 lb 14.4 oz)   01/02/17 73.029 kg (161 lb)   12/20/16 75.841 kg (167 lb 3.2 oz)   12/12/16 77.701 kg (171 lb 4.8 oz)   12/05/16 77.2 kg (170 lb 3.1 oz)   11/28/16 78.109 kg (172 lb 3.2 oz)     Oropharynx is moist, no focal lesion. No icterus. Neck supple and without adenopathy. Lungs:CTA. Heart:RRR, no murmur or rub. Abdomen: soft, nontender, BS active. No masses or organomegaly. Extremities: warm, no edema. Speech clear. CN wnl. Port site wnl. Has numerous large external hemorrhoids.    Results for NATALIIA IBARRA (MRN [id number]) as of 2/6/2017 10:16   Ref. Range 2/6/2017 09:40   Sodium Latest Ref Range: 133-144 mmol/L 141   Potassium Latest Ref Range: 3.4-5.3 mmol/L 3.5   Chloride Latest Ref Range:  mmol/L 105   Carbon Dioxide Latest Ref Range: 20-32 mmol/L 28   Urea Nitrogen Latest Ref Range: 7-30 mg/dL 12   Creatinine Latest Ref Range: 0.52-1.04 mg/dL 0.79   GFR Estimate Latest Ref Range: >60 mL/min/1.7m2 75   GFR Estimate If Black Latest Ref Range: >60 mL/min/1.7m2 >90...   Calcium Latest Ref Range: 8.5-10.1 mg/dL 8.2 (L)   Anion Gap Latest Ref Range: 3-14 mmol/L 8   Magnesium Latest Ref Range: 1.6-2.3 mg/dL 2.0   Phosphorus Latest Ref Range: 2.5-4.5 mg/dL 3.3   Albumin Latest Ref Range: 3.4-5.0 g/dL 2.6 (L)   Protein Total Latest Ref Range: 6.8-8.8 g/dL 6.8   Bilirubin Total Latest Ref Range: 0.2-1.3 mg/dL 0.4   Alkaline Phosphatase Latest Ref Range:  U/L 224 (H)   ALT Latest Ref Range: 0-50  U/L 22   AST Latest Ref Range: 0-45 U/L 27   Glucose Latest Ref Range: 70-99 mg/dL 81   WBC Latest Ref Range: 4.0-11.0 10e9/L 16.3 (H)   Hemoglobin Latest Ref Range: 11.7-15.7 g/dL 10.2 (L)   Hematocrit Latest Ref Range: 35.0-47.0 % 32.5 (L)   Platelet Count Latest Ref Range: 150-450 10e9/L 183   RBC Count Latest Ref Range: 3.8-5.2 10e12/L 3.84   MCV Latest Ref Range:  fl 85   MCH Latest Ref Range: 26.5-33.0 pg 26.6   MCHC Latest Ref Range: 31.5-36.5 g/dL 31.4 (L)   RDW Latest Ref Range: 10.0-15.0 % 19.5 (H)   Diff Method Unknown Automated Method   % Neutrophils Latest Units: % 86.3   % Lymphocytes Latest Units: % 7.4   % Monocytes Latest Units: % 4.9   % Eosinophils Latest Units: % 0.1   % Basophils Latest Units: % 0.4   % Immature Granulocytes Latest Units: % 0.9   Nucleated RBCs Latest Ref Range: 0 /100 0   Absolute Neutrophil Latest Ref Range: 1.6-8.3 10e9/L 14.1 (H)   Absolute Lymphocytes Latest Ref Range: 0.8-5.3 10e9/L 1.2   Absolute Monocytes Latest Ref Range: 0.0-1.3 10e9/L 0.8   Absolute Eosinophils Latest Ref Range: 0.0-0.7 10e9/L 0.0   Absolute Basophils Latest Ref Range: 0.0-0.2 10e9/L 0.1   Abs Immature Granulocytes Latest Ref Range: 0-0.4 10e9/L 0.1   Absolute Nucleated RBC Unknown 0.0     Impression/plan:  1. Metastatic pancreatic cancer, s/p 3 cycles of FOLFIRINOX after progression on Gemcitabine/Abraxane  -Ca 19-9 has been decreasing since initiation of FOLFIRINOX. Has had moderate difficulty with toxicities.  -will be following with Dr. Gonsales next week with restaging MRI abd and CT Chest to evaluate response and assess readiness for additional chemotherapy.     2. Duodenal ulcer, reflux esophagitis, H. Pylori positive  -continue Protonix 40 mg bid x 8 weekjs, then decrease to once daily  -Carafate 1 gm tid, ranitidine 150 mg at HS  -Clarithromycin 500 mg q 12 hours, amoxicillin 1 gm q 12 hours through 2/8    2. Abdominal pain: secondary to malignancy. Stable  -MS Contin 15 mg q 12 hours,  oxycodone 5 mg every 4 hours as needed for moderate/severe pain    3. Anorexia: initiated on marinol 5 mg bid on 1/2/17. Appetite is improving    4. Electrolytes: hypophophatemia, hypomagnesemia resolved. Tolerating Kdur 20 meq once daily.    5. Hemorrhoids: discussed management with wipes, irrigation with warm soapy water after BMs, continue diltiazem cream.

## 2017-02-06 NOTE — NURSING NOTE
Chief Complaint   Patient presents with     Port Draw     port accessed by RN, labs collected and sent-line de-accessed immediately. pt tolerated well. Vitals taken and pt checked in for next appt.     Camila Nguyen

## 2017-02-06 NOTE — Clinical Note
2/6/2017       RE: Shirin Muller  [address] [address]     Dear Colleague,    Thank you for referring your patient, Shirin Muller, to the [facility]. Please see a copy of my visit note below.    Ms. Shirin Muller is a 56-year-old woman with metastatic pancreatic cancer.  I am seeing her in hospital follow-up.    Oncology HPI:  She was diagnosed in the spring of 2016 after presenting with a 2 to 3-month history of abdominal pain and weight loss.  She underwent evaluation showing a 5 cm mass at the head of the pancreas.  Biopsy was consistent with adenocarcinoma.  She underwent staging imaging including an MRI of the abdomen, which then showed an up to 10 cm poorly defined hypoenhancing mass arising from the pancreatic head which encased celiac artery, superior mesenteric artery and severely attenuated the main portal vein.  She initiated on treatment with gemcitabine and Abraxane on 05/02/2016 and continued on that until December 2016 when she was found to have metastatic disease involving the liver.  She was then initiated on FOLFIRINOX on 12/20/16. Her first cycle was complicated by fatigue, nausea/vomiting. Antiemetics were adjusted with cycle 2. After cycle 3, she was admitted with weakness and dizziness. She was found to have a GI bleed secondary to a bleeding duodenal ulcer and infiltrating mass in the duodenum. The ulcer was injected and clipped. She was found to be H. Pylori positive and was initiated on  therapy with PPI, carafate, amoxicillin and clarithromycin.    Interval history:  Shirin is feeling better. Her dizziness and fatigue are improved. No heartburn or upper abdominal pain. No melena. Has some bright red blood per rectum after a BM. Has hemorrhoids and is using a topical cream and wipes. No mouth sores. No hand/foot sores. Has moderate cold neuropathy. Bowels are regular. Bladder function wnl. No cough or shortness of  breath. No headaches/vision changes. Lower abdominal pain has been stable. She does not feel the need for additional pain medications.    Current Outpatient Prescriptions   Medication Sig Dispense Refill     potassium chloride SA (POTASSIUM CHLORIDE) 20 MEQ CR tablet Take 1 tablet (20 mEq) by mouth daily 60 tablet 1     diltiazem 2% in PLO cream, FV COMPOUNDED, 2% GEL Apply topically daily and as needed to external hemorrhoids 30 g 0     docusate sodium (COLACE) 100 MG capsule Take 1 capsule (100 mg) by mouth daily 100 capsule 0     clarithromycin (BIAXIN) 500 MG tablet Take 1 tablet (500 mg) by mouth every 12 hours for 10 days 20 tablet 0     amoxicillin (AMOXIL) 500 MG capsule Take 2 capsules (1,000 mg) by mouth every 12 hours for 10 days 40 capsule 0     ranitidine (ZANTAC) 150 MG tablet Take 1 tablet (150 mg) by mouth At Bedtime 30 tablet 0     sucralfate (CARAFATE) 1 GM tablet Take 1 tablet (1 g) by mouth 3 times daily 90 tablet 0     pantoprazole (PROTONIX) 40 MG EC tablet Take 1 tablet (40 mg) by mouth 2 times daily . After taking this twice daily for 8 weeks, you can decrease dose to once daily. 60 tablet 1     loratadine (CLARITIN) 10 MG tablet Take 10 mg by mouth daily       morphine (MS CONTIN) 15 MG 12 hr tablet Take 1 tablet (15 mg) by mouth every 12 hours 60 tablet 0     prochlorperazine (COMPAZINE) 10 MG tablet Take 1 tablet (10 mg) by mouth every 6 hours as needed (Nausea/Vomiting) 30 tablet 2     oxyCODONE (ROXICODONE) 5 MG IR tablet Take 1 tablet (5 mg) by mouth every 4 hours as needed for moderate to severe pain 100 tablet 0     dronabinol (MARINOL) 5 MG capsule Take 1 capsule (5 mg) by mouth 2 times daily (before meals) 60 capsule 0     amylase-lipase-protease (CREON) 21014 UNITS CPEP Take 2 capsules (72,000 Units) by mouth 3 times daily (with meals) 180 capsule 1     LORazepam (ATIVAN) 0.5 MG tablet Take 1 tablet (0.5 mg) by mouth every 4 hours as needed (Anxiety, Nausea/Vomiting or Sleep) 30  tablet 2     polyethylene glycol (MIRALAX/GLYCOLAX) powder Take 17 g (1 capful) by mouth daily 119 g 11     lidocaine-prilocaine (EMLA) cream Apply topically as needed for other (Use 30-60 minutes prior to port access) 30 g 0     ondansetron (ZOFRAN-ODT) 8 MG disintegrating tablet Take 1 tablet (8 mg) by mouth every 8 hours as needed for nausea 60 tablet 4     Exam:  Alert, appears slim, but in NAD. Blood pressure 114/78, pulse 95, temperature 98.6  F (37  C), temperature source Oral, resp. rate 18, weight 74.8 kg (164 lb 14.5 oz), SpO2 99 %.  Wt Readings from Last 10 Encounters:   02/06/17 74.8 kg (164 lb 14.5 oz)   01/29/17 74.753 kg (164 lb 12.8 oz)   01/20/17 73.392 kg (161 lb 12.8 oz)   01/17/17 73.3 kg (161 lb 9.6 oz)   01/05/17 71.169 kg (156 lb 14.4 oz)   01/02/17 73.029 kg (161 lb)   12/20/16 75.841 kg (167 lb 3.2 oz)   12/12/16 77.701 kg (171 lb 4.8 oz)   12/05/16 77.2 kg (170 lb 3.1 oz)   11/28/16 78.109 kg (172 lb 3.2 oz)     Oropharynx is moist, no focal lesion. No icterus. Neck supple and without adenopathy. Lungs:CTA. Heart:RRR, no murmur or rub. Abdomen: soft, nontender, BS active. No masses or organomegaly. Extremities: warm, no edema. Speech clear. CN wnl. Port site wnl. Has numerous large external hemorrhoids.    Results for NATALIIA IBARRA (MRN [id number]) as of 2/6/2017 10:16   Ref. Range 2/6/2017 09:40   Sodium Latest Ref Range: 133-144 mmol/L 141   Potassium Latest Ref Range: 3.4-5.3 mmol/L 3.5   Chloride Latest Ref Range:  mmol/L 105   Carbon Dioxide Latest Ref Range: 20-32 mmol/L 28   Urea Nitrogen Latest Ref Range: 7-30 mg/dL 12   Creatinine Latest Ref Range: 0.52-1.04 mg/dL 0.79   GFR Estimate Latest Ref Range: >60 mL/min/1.7m2 75   GFR Estimate If Black Latest Ref Range: >60 mL/min/1.7m2 >90...   Calcium Latest Ref Range: 8.5-10.1 mg/dL 8.2 (L)   Anion Gap Latest Ref Range: 3-14 mmol/L 8   Magnesium Latest Ref Range: 1.6-2.3 mg/dL 2.0   Phosphorus Latest Ref Range:  2.5-4.5 mg/dL 3.3   Albumin Latest Ref Range: 3.4-5.0 g/dL 2.6 (L)   Protein Total Latest Ref Range: 6.8-8.8 g/dL 6.8   Bilirubin Total Latest Ref Range: 0.2-1.3 mg/dL 0.4   Alkaline Phosphatase Latest Ref Range:  U/L 224 (H)   ALT Latest Ref Range: 0-50 U/L 22   AST Latest Ref Range: 0-45 U/L 27   Glucose Latest Ref Range: 70-99 mg/dL 81   WBC Latest Ref Range: 4.0-11.0 10e9/L 16.3 (H)   Hemoglobin Latest Ref Range: 11.7-15.7 g/dL 10.2 (L)   Hematocrit Latest Ref Range: 35.0-47.0 % 32.5 (L)   Platelet Count Latest Ref Range: 150-450 10e9/L 183   RBC Count Latest Ref Range: 3.8-5.2 10e12/L 3.84   MCV Latest Ref Range:  fl 85   MCH Latest Ref Range: 26.5-33.0 pg 26.6   MCHC Latest Ref Range: 31.5-36.5 g/dL 31.4 (L)   RDW Latest Ref Range: 10.0-15.0 % 19.5 (H)   Diff Method Unknown Automated Method   % Neutrophils Latest Units: % 86.3   % Lymphocytes Latest Units: % 7.4   % Monocytes Latest Units: % 4.9   % Eosinophils Latest Units: % 0.1   % Basophils Latest Units: % 0.4   % Immature Granulocytes Latest Units: % 0.9   Nucleated RBCs Latest Ref Range: 0 /100 0   Absolute Neutrophil Latest Ref Range: 1.6-8.3 10e9/L 14.1 (H)   Absolute Lymphocytes Latest Ref Range: 0.8-5.3 10e9/L 1.2   Absolute Monocytes Latest Ref Range: 0.0-1.3 10e9/L 0.8   Absolute Eosinophils Latest Ref Range: 0.0-0.7 10e9/L 0.0   Absolute Basophils Latest Ref Range: 0.0-0.2 10e9/L 0.1   Abs Immature Granulocytes Latest Ref Range: 0-0.4 10e9/L 0.1   Absolute Nucleated RBC Unknown 0.0     Impression/plan:  1. Metastatic pancreatic cancer, s/p 3 cycles of FOLFIRINOX after progression on Gemcitabine/Abraxane  -Ca 19-9 has been decreasing since initiation of FOLFIRINOX. Has had moderate difficulty with toxicities.  -will be following with Dr. Gonsales next week with restaging MRI abd and CT Chest to evaluate response and assess readiness for additional chemotherapy.     2. Duodenal ulcer, reflux esophagitis, H. Pylori positive  -continue  Protonix 40 mg bid x 8 weekjs, then decrease to once daily  -Carafate 1 gm tid, ranitidine 150 mg at HS  -Clarithromycin 500 mg q 12 hours, amoxicillin 1 gm q 12 hours through 2/8    2. Abdominal pain: secondary to malignancy. Stable  -MS Contin 15 mg q 12 hours, oxycodone 5 mg every 4 hours as needed for moderate/severe pain    3. Anorexia: initiated on marinol 5 mg bid on 1/2/17. Appetite is improving    4. Electrolytes: hypophophatemia, hypomagnesemia resolved. Tolerating Kdur 20 meq once daily.    5. Hemorrhoids: discussed management with wipes, irrigation with warm soapy water after BMs, continue diltiazem cream.    Again, thank you for allowing me to participate in the care of your patient.      Sincerely,    TEE Villanueva CNP

## 2017-02-06 NOTE — NURSING NOTE
"Shirin Muller is a 56 year old female who presents for:  Chief Complaint   Patient presents with     Port Draw     port accessed by RN, labs collected and sent-line de-accessed immediately. pt tolerated well. Vitals taken and pt checked in for next appt.     Oncology Clinic Visit     Fu on Ca        Initial Vitals:  /78 mmHg  Pulse 95  Temp(Src) 98.6  F (37  C) (Oral)  Resp 18  Wt 74.8 kg (164 lb 14.5 oz)  SpO2 99% Estimated body mass index is 23.66 kg/(m^2) as calculated from the following:    Height as of 1/24/17: 1.778 m (5' 10\").    Weight as of this encounter: 74.8 kg (164 lb 14.5 oz).. There is no height on file to calculate BSA. BP completed using cuff size: NA (Not Taken)  No Pain (0) No LMP recorded. Patient is postmenopausal. Allergies and medications reviewed.     Medications: Medication refills not needed today.  Pharmacy name entered into EPIC: Data Unavailable    Comments: Vitals taken in lab.    6 minutes for nursing intake (face to face time)   Belinda Powell CMA          "

## 2017-02-13 ENCOUNTER — ONCOLOGY VISIT (OUTPATIENT)
Dept: ONCOLOGY | Facility: CLINIC | Age: 57
End: 2017-02-13
Attending: INTERNAL MEDICINE
Payer: COMMERCIAL

## 2017-02-13 VITALS
BODY MASS INDEX: 24.23 KG/M2 | OXYGEN SATURATION: 99 % | TEMPERATURE: 98.5 F | WEIGHT: 168.9 LBS | SYSTOLIC BLOOD PRESSURE: 113 MMHG | RESPIRATION RATE: 20 BRPM | HEART RATE: 80 BPM | DIASTOLIC BLOOD PRESSURE: 71 MMHG

## 2017-02-13 DIAGNOSIS — Z29.9 NEED FOR PROPHYLACTIC MEASURE: Primary | ICD-10-CM

## 2017-02-13 DIAGNOSIS — C25.0 MALIGNANT NEOPLASM OF HEAD OF PANCREAS (H): Primary | ICD-10-CM

## 2017-02-13 DIAGNOSIS — Z29.9 NEED FOR PROPHYLACTIC MEASURE: ICD-10-CM

## 2017-02-13 DIAGNOSIS — K26.4 GASTROINTESTINAL HEMORRHAGE ASSOCIATED WITH DUODENAL ULCER: ICD-10-CM

## 2017-02-13 DIAGNOSIS — C25.0 MALIGNANT NEOPLASM OF HEAD OF PANCREAS (H): ICD-10-CM

## 2017-02-13 LAB
ALBUMIN SERPL-MCNC: 2.6 G/DL (ref 3.4–5)
ALP SERPL-CCNC: 215 U/L (ref 40–150)
ALT SERPL W P-5'-P-CCNC: 23 U/L (ref 0–50)
ANION GAP SERPL CALCULATED.3IONS-SCNC: 8 MMOL/L (ref 3–14)
AST SERPL W P-5'-P-CCNC: 30 U/L (ref 0–45)
BASOPHILS # BLD AUTO: 0.1 10E9/L (ref 0–0.2)
BASOPHILS NFR BLD AUTO: 0.6 %
BILIRUB SERPL-MCNC: 0.4 MG/DL (ref 0.2–1.3)
BUN SERPL-MCNC: 9 MG/DL (ref 7–30)
CALCIUM SERPL-MCNC: 8.1 MG/DL (ref 8.5–10.1)
CHLORIDE SERPL-SCNC: 107 MMOL/L (ref 94–109)
CO2 SERPL-SCNC: 30 MMOL/L (ref 20–32)
CREAT SERPL-MCNC: 0.72 MG/DL (ref 0.52–1.04)
DIFFERENTIAL METHOD BLD: ABNORMAL
EOSINOPHIL # BLD AUTO: 0 10E9/L (ref 0–0.7)
EOSINOPHIL NFR BLD AUTO: 0.1 %
ERYTHROCYTE [DISTWIDTH] IN BLOOD BY AUTOMATED COUNT: 19.7 % (ref 10–15)
GFR SERPL CREATININE-BSD FRML MDRD: 84 ML/MIN/1.7M2
GLUCOSE SERPL-MCNC: 87 MG/DL (ref 70–99)
HCT VFR BLD AUTO: 33.8 % (ref 35–47)
HGB BLD-MCNC: 10.5 G/DL (ref 11.7–15.7)
IMM GRANULOCYTES # BLD: 0.1 10E9/L (ref 0–0.4)
IMM GRANULOCYTES NFR BLD: 0.8 %
LYMPHOCYTES # BLD AUTO: 1.2 10E9/L (ref 0.8–5.3)
LYMPHOCYTES NFR BLD AUTO: 8.5 %
MCH RBC QN AUTO: 26.4 PG (ref 26.5–33)
MCHC RBC AUTO-ENTMCNC: 31.1 G/DL (ref 31.5–36.5)
MCV RBC AUTO: 85 FL (ref 78–100)
MONOCYTES # BLD AUTO: 1.1 10E9/L (ref 0–1.3)
MONOCYTES NFR BLD AUTO: 7.9 %
NEUTROPHILS # BLD AUTO: 12 10E9/L (ref 1.6–8.3)
NEUTROPHILS NFR BLD AUTO: 82.1 %
NRBC # BLD AUTO: 0 10*3/UL
NRBC BLD AUTO-RTO: 0 /100
PLATELET # BLD AUTO: 206 10E9/L (ref 150–450)
POTASSIUM SERPL-SCNC: 3.9 MMOL/L (ref 3.4–5.3)
PROT SERPL-MCNC: 7.1 G/DL (ref 6.8–8.8)
RBC # BLD AUTO: 3.98 10E12/L (ref 3.8–5.2)
SODIUM SERPL-SCNC: 144 MMOL/L (ref 133–144)
WBC # BLD AUTO: 14.5 10E9/L (ref 4–11)

## 2017-02-13 PROCEDURE — 25000128 H RX IP 250 OP 636: Mod: JW,ZF | Performed by: INTERNAL MEDICINE

## 2017-02-13 PROCEDURE — 85025 COMPLETE CBC W/AUTO DIFF WBC: CPT | Performed by: INTERNAL MEDICINE

## 2017-02-13 PROCEDURE — 96416 CHEMO PROLONG INFUSE W/PUMP: CPT

## 2017-02-13 PROCEDURE — 25000125 ZZHC RX 250: Mod: ZF | Performed by: INTERNAL MEDICINE

## 2017-02-13 PROCEDURE — 80053 COMPREHEN METABOLIC PANEL: CPT | Performed by: INTERNAL MEDICINE

## 2017-02-13 PROCEDURE — 25000128 H RX IP 250 OP 636: Mod: ZF | Performed by: INTERNAL MEDICINE

## 2017-02-13 PROCEDURE — 99214 OFFICE O/P EST MOD 30 MIN: CPT | Mod: ZP | Performed by: INTERNAL MEDICINE

## 2017-02-13 PROCEDURE — 96367 TX/PROPH/DG ADDL SEQ IV INF: CPT

## 2017-02-13 PROCEDURE — 96375 TX/PRO/DX INJ NEW DRUG ADDON: CPT

## 2017-02-13 PROCEDURE — 96417 CHEMO IV INFUS EACH ADDL SEQ: CPT

## 2017-02-13 PROCEDURE — 86301 IMMUNOASSAY TUMOR CA 19-9: CPT | Performed by: INTERNAL MEDICINE

## 2017-02-13 PROCEDURE — 96376 TX/PRO/DX INJ SAME DRUG ADON: CPT

## 2017-02-13 PROCEDURE — 96415 CHEMO IV INFUSION ADDL HR: CPT

## 2017-02-13 PROCEDURE — 96413 CHEMO IV INFUSION 1 HR: CPT

## 2017-02-13 PROCEDURE — 96368 THER/DIAG CONCURRENT INF: CPT

## 2017-02-13 PROCEDURE — 96411 CHEMO IV PUSH ADDL DRUG: CPT

## 2017-02-13 RX ORDER — LORAZEPAM 0.5 MG/1
0.5 TABLET ORAL EVERY 4 HOURS PRN
Qty: 30 TABLET | Refills: 2 | Status: SHIPPED | OUTPATIENT
Start: 2017-02-13 | End: 2017-04-03

## 2017-02-13 RX ORDER — FLUOROURACIL 50 MG/ML
400 INJECTION, SOLUTION INTRAVENOUS ONCE
Status: CANCELLED | OUTPATIENT
Start: 2017-02-13

## 2017-02-13 RX ORDER — ALBUTEROL SULFATE 0.83 MG/ML
2.5 SOLUTION RESPIRATORY (INHALATION)
Status: CANCELLED | OUTPATIENT
Start: 2017-02-13

## 2017-02-13 RX ORDER — PALONOSETRON 0.05 MG/ML
0.25 INJECTION, SOLUTION INTRAVENOUS ONCE
Status: COMPLETED | OUTPATIENT
Start: 2017-02-13 | End: 2017-02-13

## 2017-02-13 RX ORDER — POLYETHYLENE GLYCOL 3350 17 G/17G
1 POWDER, FOR SOLUTION ORAL DAILY
Qty: 119 G | Refills: 11 | Status: SHIPPED | OUTPATIENT
Start: 2017-02-13 | End: 2017-04-17

## 2017-02-13 RX ORDER — SODIUM CHLORIDE 9 MG/ML
1000 INJECTION, SOLUTION INTRAVENOUS CONTINUOUS PRN
Status: CANCELLED
Start: 2017-02-13

## 2017-02-13 RX ORDER — ALBUTEROL SULFATE 90 UG/1
1-2 AEROSOL, METERED RESPIRATORY (INHALATION)
Status: CANCELLED
Start: 2017-02-13

## 2017-02-13 RX ORDER — EPINEPHRINE 0.3 MG/.3ML
0.3 INJECTION SUBCUTANEOUS EVERY 5 MIN PRN
Status: CANCELLED | OUTPATIENT
Start: 2017-02-13

## 2017-02-13 RX ORDER — METHYLPREDNISOLONE SODIUM SUCCINATE 125 MG/2ML
125 INJECTION, POWDER, LYOPHILIZED, FOR SOLUTION INTRAMUSCULAR; INTRAVENOUS
Status: CANCELLED
Start: 2017-02-13

## 2017-02-13 RX ORDER — LORAZEPAM 2 MG/ML
0.5 INJECTION INTRAMUSCULAR EVERY 4 HOURS PRN
Status: CANCELLED
Start: 2017-02-13

## 2017-02-13 RX ORDER — FLUOROURACIL 50 MG/ML
400 INJECTION, SOLUTION INTRAVENOUS ONCE
Status: COMPLETED | OUTPATIENT
Start: 2017-02-13 | End: 2017-02-13

## 2017-02-13 RX ORDER — LORAZEPAM 0.5 MG/1
0.5 TABLET ORAL EVERY 4 HOURS PRN
Qty: 30 TABLET | Refills: 2 | Status: SHIPPED | OUTPATIENT
Start: 2017-02-13 | End: 2017-02-13

## 2017-02-13 RX ORDER — MEPERIDINE HYDROCHLORIDE 25 MG/ML
25 INJECTION INTRAMUSCULAR; INTRAVENOUS; SUBCUTANEOUS EVERY 30 MIN PRN
Status: CANCELLED | OUTPATIENT
Start: 2017-02-13

## 2017-02-13 RX ORDER — PALONOSETRON 0.05 MG/ML
0.25 INJECTION, SOLUTION INTRAVENOUS ONCE
Status: CANCELLED
Start: 2017-02-13

## 2017-02-13 RX ORDER — DEXAMETHASONE 4 MG/1
4 TABLET ORAL
Qty: 3 TABLET | Refills: 0 | Status: SHIPPED | OUTPATIENT
Start: 2017-02-13 | End: 2017-02-16

## 2017-02-13 RX ORDER — DIPHENHYDRAMINE HYDROCHLORIDE 50 MG/ML
50 INJECTION INTRAMUSCULAR; INTRAVENOUS
Status: CANCELLED
Start: 2017-02-13

## 2017-02-13 RX ORDER — HEPARIN SODIUM (PORCINE) LOCK FLUSH IV SOLN 100 UNIT/ML 100 UNIT/ML
5 SOLUTION INTRAVENOUS EVERY 8 HOURS
Status: DISCONTINUED | OUTPATIENT
Start: 2017-02-13 | End: 2017-02-22 | Stop reason: HOSPADM

## 2017-02-13 RX ORDER — LORAZEPAM 2 MG/ML
0.5 INJECTION INTRAMUSCULAR EVERY 4 HOURS PRN
Status: DISCONTINUED | OUTPATIENT
Start: 2017-02-13 | End: 2017-02-21 | Stop reason: HOSPADM

## 2017-02-13 RX ADMIN — DEXAMETHASONE SODIUM PHOSPHATE 12 MG: 10 INJECTION, SOLUTION INTRAMUSCULAR; INTRAVENOUS at 12:00

## 2017-02-13 RX ADMIN — ATROPINE SULFATE 0.4 MG: 0.4 INJECTION, SOLUTION INTRAMUSCULAR; INTRAVENOUS; SUBCUTANEOUS at 16:23

## 2017-02-13 RX ADMIN — LORAZEPAM 0.5 MG: 2 INJECTION INTRAMUSCULAR; INTRAVENOUS at 16:38

## 2017-02-13 RX ADMIN — SODIUM CHLORIDE 150 MG: 9 INJECTION, SOLUTION INTRAVENOUS at 12:19

## 2017-02-13 RX ADMIN — PALONOSETRON HYDROCHLORIDE 0.25 MG: 0.25 INJECTION INTRAVENOUS at 12:19

## 2017-02-13 RX ADMIN — DEXTROSE MONOHYDRATE 250 ML: 50 INJECTION, SOLUTION INTRAVENOUS at 12:00

## 2017-02-13 RX ADMIN — FAMOTIDINE 20 MG: 20 INJECTION, SOLUTION INTRAVENOUS at 16:31

## 2017-02-13 RX ADMIN — LEUCOVORIN CALCIUM 750 MG: 50 INJECTION, POWDER, LYOPHILIZED, FOR SOLUTION INTRAMUSCULAR; INTRAVENOUS at 14:48

## 2017-02-13 RX ADMIN — ATROPINE SULFATE 0.4 MG: 0.4 INJECTION, SOLUTION INTRAMUSCULAR; INTRAVENOUS; SUBCUTANEOUS at 14:48

## 2017-02-13 RX ADMIN — FLUOROURACIL 790 MG: 50 INJECTION, SOLUTION INTRAVENOUS at 16:43

## 2017-02-13 RX ADMIN — SODIUM CHLORIDE, PRESERVATIVE FREE 5 ML: 5 INJECTION INTRAVENOUS at 10:22

## 2017-02-13 RX ADMIN — DEXTROSE MONOHYDRATE 340 MG: 50 INJECTION, SOLUTION INTRAVENOUS at 14:49

## 2017-02-13 RX ADMIN — OXALIPLATIN 167 MG: 5 INJECTION, SOLUTION, CONCENTRATE INTRAVENOUS at 12:44

## 2017-02-13 ASSESSMENT — PAIN SCALES - GENERAL: PAINLEVEL: NO PAIN (0)

## 2017-02-13 NOTE — MR AVS SNAPSHOT
After Visit Summary   2/13/2017    Shirin Muller    MRN: [id number]           Patient Information     Date Of Birth          1960        Visit Information        Provider Department      2/13/2017 10:15 AM Demond Gonsales MD [facility]        Today's Diagnoses     Malignant neoplasm of head of pancreas (H)    -  1    Gastrointestinal hemorrhage associated with duodenal ulcer        Need for prophylactic measure           Follow-ups after your visit        Follow-up notes from your care team     Return in about 2 months (around 4/13/2017) for MD visit with CT and labs.      Your next 10 appointments already scheduled     Feb 28, 2017  9:00 AM CST   Masonic Lab Draw with [facility] MASONIC LAB DRAW   [facility])    [address] [address]   [phone]            Feb 28, 2017  9:30 AM CST   Infusion 360 with UC ONCOLOGY INFUSION, UC 16 ATC   [facility] ([facility])    [address] [address]   [phone]            Mar 13, 2017  8:00 AM CDT   Masonic Lab Draw with UC MASONIC LAB DRAW   [facility])    [address] [address]   [phone]            Mar 13, 2017  8:30 AM CDT   Infusion 360 with UC ONCOLOGY INFUSION, UC 19 ATC   [facility] ([facility])    [address] [address]   [phone]            Mar 27, 2017  8:00 AM CDT   Masonic Lab Draw with UC MASONIC LAB DRAW   [facility] Masonic Lab Draw ([facility])    [address] [address]   [phone]            Mar 27, 2017  8:30 AM CDT   Infusion 360 with UC ONCOLOGY INFUSION, UC 20 ATC   [facility]  ([facility])    [address] [facility]  [address] [facility] [id number] [phone]            Apr 10, 2017  8:00 AM T   Masonic Lab Draw with  MASONIC LAB DRAW   [facility] ([facility])    [address] [id number] [phone]              Who to contact     If you have questions or need follow up information about today's clinic visit or your schedule please contact [facility] directly at [phone].  Normal or non-critical lab and imaging results will be communicated to you by Droplethart, letter or phone within 4 business days after the clinic has received the results. If you do not hear from us within 7 days, please contact the clinic through KIDOZt or phone. If you have a critical or abnormal lab result, we will notify you by phone as soon as possible.  Submit refill requests through Alaris or call your pharmacy and they will forward the refill request to us. Please allow 3 business days for your refill to be completed.          Additional Information About Your Visit        Droplethart Information     Alaris gives you secure access to your electronic health record. If you see a primary care provider, you can also send messages to your care team and make appointments. If you have questions, please call your primary care clinic.  If you do not have a primary care provider, please call [phone] and they will assist you.        Care EveryWhere ID     This is your Care EveryWhere ID. This could be used by other organizations to access your [place] medical records  [id number]        Your Vitals Were     Pulse Temperature Respirations Pulse Oximetry BMI (Body Mass Index)       80 98.5  F (36.9  C) (Oral) 20 99% 24.23 kg/m2        Blood Pressure from Last 3 Encounters:   02/16/17 100/64   02/13/17 113/71   02/06/17 114/78    Weight from Last 3 Encounters:   02/13/17 76.6 kg (168 lb  14.4 oz)   02/06/17 74.8 kg (164 lb 14.5 oz)   01/29/17 74.8 kg (164 lb 12.8 oz)                 Today's Medication Changes          These changes are accurate as of: 2/13/17 11:59 PM.  If you have any questions, ask your nurse or doctor.               Start taking these medicines.        Dose/Directions    dexamethasone 4 MG tablet   Commonly known as:  DECADRON   Used for:  Need for prophylactic measure, Malignant neoplasm of head of pancreas (H)        Dose:  4 mg   Take 1 tablet (4 mg) by mouth daily (with breakfast) for 3 days   Quantity:  3 tablet   Refills:  0       LORazepam 0.5 MG tablet   Commonly known as:  ATIVAN   Used for:  Malignant neoplasm of head of pancreas (H)   Started by:  Demond Gonsales MD        Dose:  0.5 mg   Take 1 tablet (0.5 mg) by mouth every 4 hours as needed (Anxiety, Nausea/Vomiting or Sleep)   Quantity:  30 tablet   Refills:  2         These medicines have changed or have updated prescriptions.        Dose/Directions    * polyethylene glycol powder   Commonly known as:  MIRALAX/GLYCOLAX   This may have changed:  Another medication with the same name was added. Make sure you understand how and when to take each.   Used for:  Malignant neoplasm of head of pancreas (H)   Changed by:  Demond Gonsales MD        Dose:  1 capful   Take 17 g (1 capful) by mouth daily   Quantity:  119 g   Refills:  11       * polyethylene glycol powder   Commonly known as:  MIRALAX/GLYCOLAX   This may have changed:  You were already taking a medication with the same name, and this prescription was added. Make sure you understand how and when to take each.   Used for:  Malignant neoplasm of head of pancreas (H)   Changed by:  Demond Gonsales MD        Dose:  1 capful   Take 17 g (1 capful) by mouth daily   Quantity:  119 g   Refills:  11       * Notice:  This list has 2 medication(s) that are the same as other medications prescribed for you. Read the directions carefully, and ask your  doctor or other care provider to review them with you.         Where to get your medicines      These medications were sent to [place], [place] - [address]  [address], [facility] [address]    Hours:  TRANSPLANT PHONE NUMBER [phone] Phone:  [phone]     dexamethasone 4 MG tablet    polyethylene glycol powder         Some of these will need a paper prescription and others can be bought over the counter.  Ask your nurse if you have questions.     Bring a paper prescription for each of these medications     LORazepam 0.5 MG tablet                Primary Care Provider    [facility]       No address on file        Thank you!     Thank you for choosing [facility]  for your care. Our goal is always to provide you with excellent care. Hearing back from our patients is one way we can continue to improve our services. Please take a few minutes to complete the written survey that you may receive in the mail after your visit with us. Thank you!             Your Updated Medication List - Protect others around you: Learn how to safely use, store and throw away your medicines at www.disposemymeds.org.          This list is accurate as of: 2/13/17 11:59 PM.  Always use your most recent med list.                   Brand Name Dispense Instructions for use    amylase-lipase-protease 06410 UNITS Cpep    CREON    180 capsule    Take 2 capsules (72,000 Units) by mouth 3 times daily (with meals)       dexamethasone 4 MG tablet    DECADRON    3 tablet    Take 1 tablet (4 mg) by mouth daily (with breakfast) for 3 days       diltiazem 2% in PLO cream (FV COMPOUNDED) 2% Gel     30 g    Apply topically daily and as needed to external hemorrhoids       docusate sodium 100 MG capsule    COLACE    100 capsule    Take 1 capsule (100 mg) by mouth daily       lidocaine-prilocaine cream    EMLA    30 g    Apply topically as needed for  other (Use 30-60 minutes prior to port access)       loratadine 10 MG tablet    CLARITIN     Take 10 mg by mouth daily       LORazepam 0.5 MG tablet    ATIVAN    30 tablet    Take 1 tablet (0.5 mg) by mouth every 4 hours as needed (Anxiety, Nausea/Vomiting or Sleep)       ondansetron 8 MG ODT tab    ZOFRAN-ODT    60 tablet    Take 1 tablet (8 mg) by mouth every 8 hours as needed for nausea       oxyCODONE 5 MG IR tablet    ROXICODONE    100 tablet    Take 1 tablet (5 mg) by mouth every 4 hours as needed for moderate to severe pain       pantoprazole 40 MG EC tablet    PROTONIX    60 tablet    Take 1 tablet (40 mg) by mouth 2 times daily . After taking this twice daily for 8 weeks, you can decrease dose to once daily.       * polyethylene glycol powder    MIRALAX/GLYCOLAX    119 g    Take 17 g (1 capful) by mouth daily       * polyethylene glycol powder    MIRALAX/GLYCOLAX    119 g    Take 17 g (1 capful) by mouth daily       potassium chloride SA 20 MEQ CR tablet    potassium chloride    60 tablet    Take 1 tablet (20 mEq) by mouth daily       prochlorperazine 10 MG tablet    COMPAZINE    30 tablet    Take 1 tablet (10 mg) by mouth every 6 hours as needed (Nausea/Vomiting)       ranitidine 150 MG tablet    ZANTAC    30 tablet    Take 1 tablet (150 mg) by mouth At Bedtime       sucralfate 1 GM tablet    CARAFATE    90 tablet    Take 1 tablet (1 g) by mouth 3 times daily       * Notice:  This list has 2 medication(s) that are the same as other medications prescribed for you. Read the directions carefully, and ask your doctor or other care provider to review them with you.

## 2017-02-13 NOTE — NURSING NOTE
"Shirin Muller is a 56 year old female who presents for:  Chief Complaint   Patient presents with     Port Draw     Pt with hx of panc ca labs collected via portacath.      Oncology Clinic Visit     return for Pancreatic Ca , Labs         Initial Vitals:  /71 (BP Location: Right arm, Patient Position: Per self, Cuff Size: Adult Regular)  Pulse 80  Temp 98.5  F (36.9  C) (Oral)  Resp 20  Wt 76.6 kg (168 lb 14.4 oz)  SpO2 99%  BMI 24.23 kg/m2 Estimated body mass index is 24.23 kg/(m^2) as calculated from the following:    Height as of 1/24/17: 1.778 m (5' 10\").    Weight as of this encounter: 76.6 kg (168 lb 14.4 oz).. Body surface area is 1.95 meters squared. BP completed using cuff size: NA (Not Taken)  No Pain (0) No LMP recorded. Patient is postmenopausal. Allergies and medications reviewed.     Medications: MEDICATION REFILLS NEEDED TODAY.  Pharmacy name entered into EPIC: Data Unavailable    Comments: Ativan , miralax    7 minutes for nursing intake (face to face time)   Stephany Dukes MA        "

## 2017-02-13 NOTE — PROGRESS NOTES
Infusion Nursing Note:  Shirin Muller presents for D1C4 Oxaliplatin, Irinotecan, Leucovorin, Fluorouracil push and pump hook up  Met with Dr. Gonsales before infusion.    Note: towards the end of Irinotecan infusion, pt noted to be very diaphoretic, denies any abdominal cramping or pain, offered another dose of atropine which pt denied. After Irinotecan was done, pt still sweating a lot and felt nauseous- atropine, ativan and pepcid given with relief. Educated pt that we can give atropine skilled nursing through Irinotecan infusion to help control the side effects, she would like to try this next time.    Treatment Conditions:  Lab Results   Component Value Date    HGB 10.5 02/13/2017     Lab Results   Component Value Date    WBC 14.5 02/13/2017      Lab Results   Component Value Date    ANEU 12.0 02/13/2017     Lab Results   Component Value Date     02/13/2017      Lab Results   Component Value Date     02/13/2017                   Lab Results   Component Value Date    POTASSIUM 3.9 02/13/2017           Lab Results   Component Value Date    MAG 2.0 02/06/2017            Lab Results   Component Value Date    CR 0.72 02/13/2017                   Lab Results   Component Value Date    FANNY 8.1 02/13/2017                Lab Results   Component Value Date    BILITOTAL 0.4 02/13/2017           Lab Results   Component Value Date    ALBUMIN 2.6 02/13/2017                    Lab Results   Component Value Date    ALT 23 02/13/2017           Lab Results   Component Value Date    AST 30 02/13/2017     Results reviewed, labs MET treatment parameters, ok to proceed with treatment.    Intravenous Access:  Implanted Port.    Post Infusion Assessment:  Patient tolerated infusion without incident.  Blood return noted pre and post infusion.  No evidence of extravasations.    + BR checked every 2ccs while administering Fluorouracil in a free flowing D5 line.  Capillary element taped to pt's skin with the clear side  facing out and air filter hole not blocked per protocol.  Cseries pump running @ 5.2ml/hour for 46 hours.   Pump attached per protocol.  Yohana @ [facility] aware to disconnect pt on Wednesday, 2/15 @2:30m. Pt scheduled for neulasta at 3pm on Thursday, 2/16.    Discharge Plan:   Prescription refills given for ativan, potassium, dexamethasone and miralax.  Discharge instructions reviewed with: Patient and Family.  Patient and/or family verbalized understanding of discharge instructions and all questions answered.  Copy of AVS reviewed with patient and/or family.  Patient will return Thursday, 2/16 for next appointment.  Patient discharged in stable condition accompanied by: self and sister.    Shanika Junior RN

## 2017-02-13 NOTE — LETTER
2/13/2017      RE: Shirin Muller  [address] [facility] [address]       HISTORY OF PRESENT ILLNESS:  Ms. Muller is here today in followup of metastatic pancreatic cancer.  She is currently on active treatment with FOLFIRINOX and is here for a planned response assessment.  She missed her last cycle due to hospitalization for an acute GI bleed related to a duodenal ulcer.  She has been back home now for a couple of weeks.  She has had no further bleeding.  She is actually feeling pretty good with reasonably good appetite.  Her energy is only fair, but stable.  She is not having much trouble with pain these days with good control on her current    MS Contin twice a day and occasional breakthrough therapy with some oxycodone.  The remainder of her 10-point review of systems is otherwise unremarkable.       PHYSICAL EXAMINATION:   GENERAL:  On exam today, Ms. Muller appears well.   VITAL SIGNS:  Her vital signs are noted in the chart and are unremarkable.  Her weight is actually starting to go back up some now.    HEENT:  She has no icterus.  She has no visible lesions in the oropharynx.   NECK:  She has no palpable adenopathy in the neck or supraclavicular spaces.   LUNGS:  Her lungs are clear to auscultation without dullness to percussion.   HEART:  Her heart rate and rhythm are regular without audible murmur or gallop.   ABDOMEN:  Her abdomen is soft with minimal epigastric tenderness to deep palpation.   EXTREMITIES:  She has no peripheral edema.  There is no tenderness in her calves or thighs.    NEUROLOGIC:  Her speech is fluent.  Her cranial nerves are grossly intact.       RESULTS:  I reviewed with her and her family her imaging studies and lab work.  Her electrolytes, renal function and liver enzymes are all unremarkable except for very mildly elevated alkaline phosphatase.  Her albumin is 2.6.  Her white count is elevated with neutrophilia consistent with her recent growth  factor.  Her hemoglobin is 10.5, higher than it has been in a while, after she received multiple units of blood after her GI bleed.  CT of her chest shows no evidence of progression in the lungs.  MR scan of her abdomen shows her liver lesions to be improved.  Her CA 19-9 level is still pending from today, but as of a few weeks ago it had fallen significantly down to 25,000.       ASSESSMENT:  Metastatic pancreatic cancer.  The patient is responding well to the FOLFIRINOX and is not having too much in the way of toxicity, so we will plan on continuing on with that with the same dose and schedule.  We had planned on starting her on Aranesp because of her chronic anemia.  Her hemoglobin after all the transfusions with her acute bleed is now high enough that we do not need to start that yet, but we probably will over the next few weeks as her hemoglobin starts to drift back down.  We will plan on seeing her back for another response assessment in about 2 months.         Demond Gonsales MD

## 2017-02-13 NOTE — PATIENT INSTRUCTIONS
February 2017 Sunday Monday Tuesday Wednesday Thursday Friday Saturday                  1     2     3     TELEMEDICINE    1:00 PM   (60 min.)   Nevin Archibald RPH   [facility] Medication Therapy Management 4       5     6     [facility] LAB DRAW    9:00 AM   (15 min.)    [facility]     [facility] RETURN    9:15 AM   (50 min.)   Ester Echavarria, TEE CNP   [facility] ED 7     8     9     10     MR ABDOMEN WWO   10:30 AM   (45 min.)   MR1   [facility] MRI     CT CHEST WO   11:25 AM   (20 min.)   CT1   [facility] CT 11       12     13     [facility] MASONIC LAB DRAW    9:45 AM   (15 min.)    MASONIC LAB DRAW   [facility] Lab Draw     [facility] RETURN   10:00 AM   (30 min.)   Demond Gonsales MD   [facility] ED     [facility] ONC INFUSION 360   11:30 AM   (360 min.)    ONCOLOGY INFUSION   [facility] ED 14     15     16     17     18       19     20     21     22     23     24     25       26     27     28     [facility] MAS[facility]IC LAB DRAW    9:00 AM   (15 min.)    MASONIC LAB DRAW   [facility] Masonic Lab Draw     [facility] ONC INFUSION 360    9:30 AM   (360 min.)   [facility] ONCOLOGY INFUSION   [facility] ED                                March 2017 Sunday Monday Tuesday Wednesday Thursday Friday Saturday                  1     2     3     4       5     6     7     8     9     10     11       12     13     [facility] LAB DRAW    8:00 AM   (15 min.)    MASONIC LAB DRAW   [facility] Masonic Lab Draw     [facility] ONC INFUSION 360    8:30 AM   (360 min.)   UC ONCOLOGY INFUSION   [facility] ED 14     15     16     17     18       19     20     21     22     23     24     25       26     27     [facility] MAS[facility]IC LAB DRAW    8:00 AM   (15 min.)    MASONIC LAB DRAW   [facility] Masonic Lab Draw     [facility] ONC INFUSION 360    8:30 AM   (360 min.)    ONCOLOGY INFUSION   [facility] ED 28      29     30     31                       Lab Results:  Recent Results (from the past 12 hour(s))   CBC with platelets differential    Collection Time: 02/13/17 10:24 AM   Result Value Ref Range    WBC 14.5 (H) 4.0 - 11.0 10e9/L    RBC Count 3.98 3.8 - 5.2 10e12/L    Hemoglobin 10.5 (L) 11.7 - 15.7 g/dL    Hematocrit 33.8 (L) 35.0 - 47.0 %    MCV 85 78 - 100 fl    MCH 26.4 (L) 26.5 - 33.0 pg    MCHC 31.1 (L) 31.5 - 36.5 g/dL    RDW 19.7 (H) 10.0 - 15.0 %    Platelet Count 206 150 - 450 10e9/L    Diff Method Automated Method     % Neutrophils 82.1 %    % Lymphocytes 8.5 %    % Monocytes 7.9 %    % Eosinophils 0.1 %    % Basophils 0.6 %    % Immature Granulocytes 0.8 %    Nucleated RBCs 0 0 /100    Absolute Neutrophil 12.0 (H) 1.6 - 8.3 10e9/L    Absolute Lymphocytes 1.2 0.8 - 5.3 10e9/L    Absolute Monocytes 1.1 0.0 - 1.3 10e9/L    Absolute Eosinophils 0.0 0.0 - 0.7 10e9/L    Absolute Basophils 0.1 0.0 - 0.2 10e9/L    Abs Immature Granulocytes 0.1 0 - 0.4 10e9/L    Absolute Nucleated RBC 0.0    Comprehensive metabolic panel    Collection Time: 02/13/17 10:24 AM   Result Value Ref Range    Sodium 144 133 - 144 mmol/L    Potassium 3.9 3.4 - 5.3 mmol/L    Chloride 107 94 - 109 mmol/L    Carbon Dioxide 30 20 - 32 mmol/L    Anion Gap 8 3 - 14 mmol/L    Glucose 87 70 - 99 mg/dL    Urea Nitrogen 9 7 - 30 mg/dL    Creatinine 0.72 0.52 - 1.04 mg/dL    GFR Estimate 84 >60 mL/min/1.7m2    GFR Estimate If Black >90   GFR Calc   >60 mL/min/1.7m2    Calcium 8.1 (L) 8.5 - 10.1 mg/dL    Bilirubin Total 0.4 0.2 - 1.3 mg/dL    Albumin 2.6 (L) 3.4 - 5.0 g/dL    Protein Total 7.1 6.8 - 8.8 g/dL    Alkaline Phosphatase 215 (H) 40 - 150 U/L    ALT 23 0 - 50 U/L    AST 30 0 - 45 U/L

## 2017-02-13 NOTE — LETTER
2/13/2017       RE: Shirin Muller  [address] [facility] [address]     Dear Colleague,    Thank you for referring your patient, Shirin Muller, to the [facility]. Please see a copy of my visit note below.    HISTORY OF PRESENT ILLNESS:  Ms. Muller is here today in followup of metastatic pancreatic cancer.  She is currently on active treatment with FOLFIRINOX and is here for a planned response assessment.  She missed her last cycle due to hospitalization for an acute GI bleed related to a duodenal ulcer.  She has been back home now for a couple of weeks.  She has had no further bleeding.  She is actually feeling pretty good with reasonably good appetite.  Her energy is only fair, but stable.  She is not having much trouble with pain these days with good control on her current    MS Contin twice a day and occasional breakthrough therapy with some oxycodone.  The remainder of her 10-point review of systems is otherwise unremarkable.       PHYSICAL EXAMINATION:   GENERAL:  On exam today, Ms. Muller appears well.   VITAL SIGNS:  Her vital signs are noted in the chart and are unremarkable.  Her weight is actually starting to go back up some now.    HEENT:  She has no icterus.  She has no visible lesions in the oropharynx.   NECK:  She has no palpable adenopathy in the neck or supraclavicular spaces.   LUNGS:  Her lungs are clear to auscultation without dullness to percussion.   HEART:  Her heart rate and rhythm are regular without audible murmur or gallop.   ABDOMEN:  Her abdomen is soft with minimal epigastric tenderness to deep palpation.   EXTREMITIES:  She has no peripheral edema.  There is no tenderness in her calves or thighs.    NEUROLOGIC:  Her speech is fluent.  Her cranial nerves are grossly intact.       RESULTS:  I reviewed with her and her family her imaging studies and lab work.  Her electrolytes, renal function and liver enzymes are all  unremarkable except for very mildly elevated alkaline phosphatase.  Her albumin is 2.6.  Her white count is elevated with neutrophilia consistent with her recent growth factor.  Her hemoglobin is 10.5, higher than it has been in a while, after she received multiple units of blood after her GI bleed.  CT of her chest shows no evidence of progression in the lungs.  MR scan of her abdomen shows her liver lesions to be improved.  Her CA 19-9 level is still pending from today, but as of a few weeks ago it had fallen significantly down to 25,000.       ASSESSMENT:  Metastatic pancreatic cancer.  The patient is responding well to the FOLFIRINOX and is not having too much in the way of toxicity, so we will plan on continuing on with that with the same dose and schedule.  We had planned on starting her on Aranesp because of her chronic anemia.  Her hemoglobin after all the transfusions with her acute bleed is now high enough that we do not need to start that yet, but we probably will over the next few weeks as her hemoglobin starts to drift back down.  We will plan on seeing her back for another response assessment in about 2 months.         Again, thank you for allowing me to participate in the care of your patient.      Sincerely,    Demond Gonsales MD

## 2017-02-13 NOTE — MR AVS SNAPSHOT
After Visit Summary   2/13/2017    Shirin Muller    MRN: [id number]           Patient Information     Date Of Birth          1960        Visit Information        Provider Department      2/13/2017 11:30 AM UC 13 ATC; UC ONCOLOGY INFUSION [facility]        Today's Diagnoses     Need for prophylactic measure    -  1    Malignant neoplasm of head of pancreas (H)          Care Instructions          February 2017 Sunday Monday Tuesday Wednesday Thursday Friday Saturday                  1     2     3     TELEMEDICINE    1:00 PM   (60 min.)   Nevin Archibald [facility] Medication Therapy Management 4       5     6     [facility] LAB DRAW    9:00 AM   (15 min.)    [facility] LAB DRAW   [facility]     [facility] RETURN    9:15 AM   (50 min.)   Ester Echavarria APRN CNP   [facility] 7     8     9     10     MR ABDOMEN WWO   10:30 AM   (45 min.)   [address] [place] [address] MRI     CT CHEST WO   11:25 AM   (20 min.)   CT1   [facility] CT 11       12     13     [facility] DRAW    9:45 AM   (15 min.)    [facility] LAB DRAW   [facility] Draw     [facility] RETURN   10:00 AM   (30 min.)   Demond Gonsales MD   [facility]     UMP ONC INFUSION 360   11:30 AM   (360 min.)   UC ONCOLOGY INFUSION   [facility] 14     15     16     17     18       19     20     21     22     23     24     25       26     27     28     [facility] LAB DRAW    9:00 AM   (15 min.)   [facility] LAB DRAW   [facility] Draw     UMP ONC INFUSION 360    9:30 AM   (360 min.)   UC ONCOLOGY INFUSION   [facility]                                March 2017 Sunday Monday Tuesday Wednesday Thursday Friday Saturday                  1     2     3     4       5     6     7     8     9     10     11       12     13     [facility] LAB DRAW    8:00 AM   (15 min.)    [facility] LAB  DRAW   [facility] Lab Draw     [facility] ONC INFUSION 360    8:30 AM   (360 min.)    ONCOLOGY INFUSION   [facility] 14     15     16     17     18       19     20     21     22     23     24     25       26     27     [facility] LAB DRAW    8:00 AM   (15 min.)   [facility] LAB DRAW   [facility] Lab Draw     [facility] ONC INFUSION 360    8:30 AM   (360 min.)    ONCOLOGY INFUSION   [facility] 28     29     30     31                       Lab Results:  Recent Results (from the past 12 hour(s))   CBC with platelets differential    Collection Time: 02/13/17 10:24 AM   Result Value Ref Range    WBC 14.5 (H) 4.0 - 11.0 10e9/L    RBC Count 3.98 3.8 - 5.2 10e12/L    Hemoglobin 10.5 (L) 11.7 - 15.7 g/dL    Hematocrit 33.8 (L) 35.0 - 47.0 %    MCV 85 78 - 100 fl    MCH 26.4 (L) 26.5 - 33.0 pg    MCHC 31.1 (L) 31.5 - 36.5 g/dL    RDW 19.7 (H) 10.0 - 15.0 %    Platelet Count 206 150 - 450 10e9/L    Diff Method Automated Method     % Neutrophils 82.1 %    % Lymphocytes 8.5 %    % Monocytes 7.9 %    % Eosinophils 0.1 %    % Basophils 0.6 %    % Immature Granulocytes 0.8 %    Nucleated RBCs 0 0 /100    Absolute Neutrophil 12.0 (H) 1.6 - 8.3 10e9/L    Absolute Lymphocytes 1.2 0.8 - 5.3 10e9/L    Absolute Monocytes 1.1 0.0 - 1.3 10e9/L    Absolute Eosinophils 0.0 0.0 - 0.7 10e9/L    Absolute Basophils 0.1 0.0 - 0.2 10e9/L    Abs Immature Granulocytes 0.1 0 - 0.4 10e9/L    Absolute Nucleated RBC 0.0    Comprehensive metabolic panel    Collection Time: 02/13/17 10:24 AM   Result Value Ref Range    Sodium 144 133 - 144 mmol/L    Potassium 3.9 3.4 - 5.3 mmol/L    Chloride 107 94 - 109 mmol/L    Carbon Dioxide 30 20 - 32 mmol/L    Anion Gap 8 3 - 14 mmol/L    Glucose 87 70 - 99 mg/dL    Urea Nitrogen 9 7 - 30 mg/dL    Creatinine 0.72 0.52 - 1.04 mg/dL    GFR Estimate 84 >60 mL/min/1.7m2    GFR Estimate If Black >90   GFR Calc   >60 mL/min/1.7m2    Calcium 8.1 (L) 8.5 - 10.1  mg/dL    Bilirubin Total 0.4 0.2 - 1.3 mg/dL    Albumin 2.6 (L) 3.4 - 5.0 g/dL    Protein Total 7.1 6.8 - 8.8 g/dL    Alkaline Phosphatase 215 (H) 40 - 150 U/L    ALT 23 0 - 50 U/L    AST 30 0 - 45 U/L             Follow-ups after your visit        Your next 10 appointments already scheduled     Feb 28, 2017  9:00 AM CST   [facility] Lab Draw with [facility] LAB DRAW   [facility] Lab Draw ([facility])    [address] [place] [id number]   [phone]            Feb 28, 2017  9:30 AM CST   Infusion 360 with UC ONCOLOGY INFUSION, UC 16 ATC   [facility] ([facility])    [address] [place] [id number]   [phone]            Mar 13, 2017  8:00 AM CDT   [facility] Lab Draw with UC [facility] LAB DRAW   [facility] Draw ([facility])    [address] [place] [id number]   [phone]            Mar 13, 2017  8:30 AM CDT   Infusion 360 with UC ONCOLOGY INFUSION, UC 19 ATC   [facility] ([facility])    [address] [place] [id number]   [phone]            Mar 27, 2017  8:00 AM CDT   M[facility] Lab Draw with UC [facility]ON[facility] LAB DRAW   [facility] Lab Draw ([facility])    [address] [place] [address]   [phone]            Mar 27, 2017  8:30 AM CDT   Infusion 360 with UC ONCOLOGY INFUSION, UC 20 ATC   [facility] ([facility])    [address] [place] [address]   [phone]            Apr 10, 2017  8:00 AM CDT   [facility] Lab Draw with UC [facility]ON[facility] LAB DRAW   [facility] Lab Draw ([facility])    [address] [place] [address]   [phone]              Future tests that were ordered for you  today     Open Future Orders        Priority Expected Expires Ordered    MR Abdomen w/o & w Contrast Routine 4/17/2017 5/13/2017 2/13/2017    CT Chest w/o Contrast Routine 4/17/2017 5/13/2017 2/13/2017    Comprehensive metabolic panel Routine 4/17/2017 5/13/2017 2/13/2017    CBC with platelets differential Routine 4/17/2017 5/13/2017 2/13/2017    Cancer antigen 19-9 Routine 4/17/2017 5/13/2017 2/13/2017            Who to contact     If you have questions or need follow up information about today's clinic visit or your schedule please contact [facility] directly at [phone].  Normal or non-critical lab and imaging results will be communicated to you by [employer], letter or phone within 4 business days after the clinic has received the results. If you do not hear from us within 7 days, please contact the clinic through [employer] or phone. If you have a critical or abnormal lab result, we will notify you by phone as soon as possible.  Submit refill requests through [employer] or call your pharmacy and they will forward the refill request to us. Please allow 3 business days for your refill to be completed.          Additional Information About Your Visit        [employer] Information     [employer] gives you secure access to your electronic health record. If you see a primary care provider, you can also send messages to your care team and make appointments. If you have questions, please call your primary care clinic.  If you do not have a primary care provider, please call [phone] and they will assist you.        Care EveryWhere ID     This is your Care EveryWhere ID. This could be used by other organizations to access your [facility] medical records  [id number]         Blood Pressure from Last 3 Encounters:   02/13/17 113/71   02/06/17 114/78   01/29/17 102/60    Weight from Last 3 Encounters:   02/13/17 76.6 kg (168 lb 14.4 oz)   02/06/17 74.8 kg (164 lb 14.5 oz)   01/29/17 74.8 kg (164 lb 12.8 oz)               We Performed the Following     Cancer antigen 19-9     CBC with platelets differential     Comprehensive metabolic panel     Treatment Conditions          Today's Medication Changes          These changes are accurate as of: 2/13/17  1:40 PM.  If you have any questions, ask your nurse or doctor.               Start taking these medicines.        Dose/Directions    dexamethasone 4 MG tablet   Commonly known as:  DECADRON   Used for:  Need for prophylactic measure, Malignant neoplasm of head of pancreas (H)        Dose:  4 mg   Take 1 tablet (4 mg) by mouth daily (with breakfast) for 3 days   Quantity:  3 tablet   Refills:  0       LORazepam 0.5 MG tablet   Commonly known as:  ATIVAN   Used for:  Malignant neoplasm of head of pancreas (H)   Started by:  Demond Gonsales MD        Dose:  0.5 mg   Take 1 tablet (0.5 mg) by mouth every 4 hours as needed (Anxiety, Nausea/Vomiting or Sleep)   Quantity:  30 tablet   Refills:  2         These medicines have changed or have updated prescriptions.        Dose/Directions    * polyethylene glycol powder   Commonly known as:  MIRALAX/GLYCOLAX   This may have changed:  Another medication with the same name was added. Make sure you understand how and when to take each.   Used for:  Malignant neoplasm of head of pancreas (H)   Changed by:  Demond Gonsales MD        Dose:  1 capful   Take 17 g (1 capful) by mouth daily   Quantity:  119 g   Refills:  11       * polyethylene glycol powder   Commonly known as:  MIRALAX/GLYCOLAX   This may have changed:  You were already taking a medication with the same name, and this prescription was added. Make sure you understand how and when to take each.   Used for:  Malignant neoplasm of head of pancreas (H)   Changed by:  Demond Gonsales MD        Dose:  1 capful   Take 17 g (1 capful) by mouth daily   Quantity:  119 g   Refills:  11       * Notice:  This list has 2 medication(s) that are the same as other  medications prescribed for you. Read the directions carefully, and ask your doctor or other care provider to review them with you.         Where to get your medicines      These medications were sent to [place], [place] - [address]  [address], [place] [address]    Hours:  TRANSPLANT PHONE NUMBER [phone] Phone:  [phone]     dexamethasone 4 MG tablet    polyethylene glycol powder         Some of these will need a paper prescription and others can be bought over the counter.  Ask your nurse if you have questions.     Bring a paper prescription for each of these medications     LORazepam 0.5 MG tablet                Primary Care Provider    [facility]       No address on file        Thank you!     Thank you for choosing [facility]  for your care. Our goal is always to provide you with excellent care. Hearing back from our patients is one way we can continue to improve our services. Please take a few minutes to complete the written survey that you may receive in the mail after your visit with us. Thank you!             Your Updated Medication List - Protect others around you: Learn how to safely use, store and throw away your medicines at www.disposemymeds.org.          This list is accurate as of: 2/13/17  1:40 PM.  Always use your most recent med list.                   Brand Name Dispense Instructions for use    amylase-lipase-protease 85861 UNITS Cpep    CREON    180 capsule    Take 2 capsules (72,000 Units) by mouth 3 times daily (with meals)       dexamethasone 4 MG tablet    DECADRON    3 tablet    Take 1 tablet (4 mg) by mouth daily (with breakfast) for 3 days       diltiazem 2% in PLO cream (FV COMPOUNDED) 2% Gel     30 g    Apply topically daily and as needed to external hemorrhoids       docusate sodium 100 MG capsule    COLACE    100 capsule    Take 1 capsule (100 mg) by mouth daily        dronabinol 5 MG capsule    MARINOL    60 capsule    Take 1 capsule (5 mg) by mouth 2 times daily (before meals)       lidocaine-prilocaine cream    EMLA    30 g    Apply topically as needed for other (Use 30-60 minutes prior to port access)       loratadine 10 MG tablet    CLARITIN     Take 10 mg by mouth daily       LORazepam 0.5 MG tablet    ATIVAN    30 tablet    Take 1 tablet (0.5 mg) by mouth every 4 hours as needed (Anxiety, Nausea/Vomiting or Sleep)       morphine 15 MG 12 hr tablet    MS CONTIN    60 tablet    Take 1 tablet (15 mg) by mouth every 12 hours       ondansetron 8 MG ODT tab    ZOFRAN-ODT    60 tablet    Take 1 tablet (8 mg) by mouth every 8 hours as needed for nausea       oxyCODONE 5 MG IR tablet    ROXICODONE    100 tablet    Take 1 tablet (5 mg) by mouth every 4 hours as needed for moderate to severe pain       pantoprazole 40 MG EC tablet    PROTONIX    60 tablet    Take 1 tablet (40 mg) by mouth 2 times daily . After taking this twice daily for 8 weeks, you can decrease dose to once daily.       * polyethylene glycol powder    MIRALAX/GLYCOLAX    119 g    Take 17 g (1 capful) by mouth daily       * polyethylene glycol powder    MIRALAX/GLYCOLAX    119 g    Take 17 g (1 capful) by mouth daily       potassium chloride SA 20 MEQ CR tablet    potassium chloride    60 tablet    Take 1 tablet (20 mEq) by mouth daily       prochlorperazine 10 MG tablet    COMPAZINE    30 tablet    Take 1 tablet (10 mg) by mouth every 6 hours as needed (Nausea/Vomiting)       ranitidine 150 MG tablet    ZANTAC    30 tablet    Take 1 tablet (150 mg) by mouth At Bedtime       sucralfate 1 GM tablet    CARAFATE    90 tablet    Take 1 tablet (1 g) by mouth 3 times daily       * Notice:  This list has 2 medication(s) that are the same as other medications prescribed for you. Read the directions carefully, and ask your doctor or other care provider to review them with you.

## 2017-02-13 NOTE — PROGRESS NOTES
HISTORY OF PRESENT ILLNESS:  Ms. Meyer is here today in followup of metastatic pancreatic cancer.  She is currently on active treatment with FOLFIRINOX and is here for a planned response assessment.  She missed her last cycle due to hospitalization for an acute GI bleed related to a duodenal ulcer.  She has been back home now for a couple of weeks.  She has had no further bleeding.  She is actually feeling pretty good with reasonably good appetite.  Her energy is only fair, but stable.  She is not having much trouble with pain these days with good control on her current    MS Contin twice a day and occasional breakthrough therapy with some oxycodone.  The remainder of her 10-point review of systems is otherwise unremarkable.       PHYSICAL EXAMINATION:   GENERAL:  On exam today, Ms. Meyer appears well.   VITAL SIGNS:  Her vital signs are noted in the chart and are unremarkable.  Her weight is actually starting to go back up some now.    HEENT:  She has no icterus.  She has no visible lesions in the oropharynx.   NECK:  She has no palpable adenopathy in the neck or supraclavicular spaces.   LUNGS:  Her lungs are clear to auscultation without dullness to percussion.   HEART:  Her heart rate and rhythm are regular without audible murmur or gallop.   ABDOMEN:  Her abdomen is soft with minimal epigastric tenderness to deep palpation.   EXTREMITIES:  She has no peripheral edema.  There is no tenderness in her calves or thighs.    NEUROLOGIC:  Her speech is fluent.  Her cranial nerves are grossly intact.       RESULTS:  I reviewed with her and her family her imaging studies and lab work.  Her electrolytes, renal function and liver enzymes are all unremarkable except for very mildly elevated alkaline phosphatase.  Her albumin is 2.6.  Her white count is elevated with neutrophilia consistent with her recent growth factor.  Her hemoglobin is 10.5, higher than it has been in a while, after she received multiple  units of blood after her GI bleed.  CT of her chest shows no evidence of progression in the lungs.  MR scan of her abdomen shows her liver lesions to be improved.  Her CA 19-9 level is still pending from today, but as of a few weeks ago it had fallen significantly down to 25,000.       ASSESSMENT:  Metastatic pancreatic cancer.  The patient is responding well to the FOLFIRINOX and is not having too much in the way of toxicity, so we will plan on continuing on with that with the same dose and schedule.  We had planned on starting her on Aranesp because of her chronic anemia.  Her hemoglobin after all the transfusions with her acute bleed is now high enough that we do not need to start that yet, but we probably will over the next few weeks as her hemoglobin starts to drift back down.  We will plan on seeing her back for another response assessment in about 2 months.

## 2017-02-16 ENCOUNTER — INFUSION THERAPY VISIT (OUTPATIENT)
Dept: ONCOLOGY | Facility: CLINIC | Age: 57
End: 2017-02-16
Attending: INTERNAL MEDICINE
Payer: COMMERCIAL

## 2017-02-16 VITALS
HEART RATE: 81 BPM | SYSTOLIC BLOOD PRESSURE: 100 MMHG | DIASTOLIC BLOOD PRESSURE: 64 MMHG | TEMPERATURE: 99.4 F | OXYGEN SATURATION: 98 %

## 2017-02-16 DIAGNOSIS — R63.0 ANOREXIA: ICD-10-CM

## 2017-02-16 DIAGNOSIS — C25.0 MALIGNANT NEOPLASM OF HEAD OF PANCREAS (H): ICD-10-CM

## 2017-02-16 DIAGNOSIS — Z29.9 NEED FOR PROPHYLACTIC MEASURE: Primary | ICD-10-CM

## 2017-02-16 LAB — CANCER AG19-9 SERPL-ACNC: 8940

## 2017-02-16 PROCEDURE — 25000128 H RX IP 250 OP 636: Mod: ZF | Performed by: NURSE PRACTITIONER

## 2017-02-16 PROCEDURE — 96372 THER/PROPH/DIAG INJ SC/IM: CPT | Mod: 59

## 2017-02-16 PROCEDURE — 96360 HYDRATION IV INFUSION INIT: CPT

## 2017-02-16 PROCEDURE — 25000128 H RX IP 250 OP 636: Mod: ZF | Performed by: INTERNAL MEDICINE

## 2017-02-16 RX ORDER — MORPHINE SULFATE 15 MG/1
15 TABLET, FILM COATED, EXTENDED RELEASE ORAL EVERY 12 HOURS
Qty: 60 TABLET | Refills: 0 | Status: SHIPPED | OUTPATIENT
Start: 2017-02-16 | End: 2017-03-20

## 2017-02-16 RX ORDER — DRONABINOL 5 MG/1
5 CAPSULE ORAL
Qty: 60 CAPSULE | Refills: 0 | Status: SHIPPED | OUTPATIENT
Start: 2017-02-16 | End: 2017-03-20

## 2017-02-16 RX ADMIN — SODIUM CHLORIDE 1000 ML: 9 INJECTION, SOLUTION INTRAVENOUS at 15:20

## 2017-02-16 RX ADMIN — PEGFILGRASTIM 6 MG: 6 INJECTION SUBCUTANEOUS at 16:24

## 2017-02-16 NOTE — PATIENT INSTRUCTIONS
February 2017 Sunday Monday Tuesday Wednesday Thursday Friday Saturday                  1     2     3     TELEMEDICINE    1:00 PM   (60 min.)   Nevin Archibald RPH   [facility] Medication Therapy Management 4       5     6     [facility] DRAW    9:00 AM   (15 min.)    [facility]     [facility] RETURN    9:15 AM   (50 min.)   Ester Echavarria, TEE CNP   [facility] 7     8     9     10     MR ABDOMEN WWO   10:30 AM   (45 min.)   MR1   [facility] MRI     CT CHEST WO   11:25 AM   (20 min.)   CT1   [facility] CT 11       12     13     [facility] LAB DRAW    9:45 AM   (15 min.)    [facility]ONIC LAB DRAW   [facility] Lab Draw     UMP RETURN   10:00 AM   (30 min.)   Demond Gonsales MD   [facility]     UMP ONC INFUSION 360   11:30 AM   (360 min.)   UC ONCOLOGY INFUSION   [facility] 14     15     16     UMP ONC INFUSION 120    3:00 PM   (120 min.)    ONCOLOGY INFUSION   [facility] 17     18       19     20     21     22     23     24     25       26     27     28     [facility] LAB DRAW    9:00 AM   (15 min.)    MASONIC LAB DRAW   [facility] Masonic Lab Draw     UMP ONC INFUSION 360    9:30 AM   (360 min.)   UC ONCOLOGY INFUSION   [facility]                                March 2017 Sunday Monday Tuesday Wednesday Thursday Friday Saturday                  1     2     3     4       5     6     7     8     9     10     11       12     13     [facility] LAB DRAW    8:00 AM   (15 min.)    MASONIC LAB DRAW   [facility] Masonic Lab Draw     UMP ONC INFUSION 360    8:30 AM   (360 min.)    ONCOLOGY INFUSION   [facility] 14     15     16     17     18       19     20     21     22     23     24     25       26     27     [facility] LAB DRAW    8:00 AM   (15 min.)    MASONIC LAB DRAW   [facility] Masonic Lab Draw     UMP ONC  INFUSION 360    8:30 AM   (360 min.)    ONCOLOGY [facility] 28     29     30     31                       Lab Results:  No results found for this or any previous visit (from the past 12 hour(s)).

## 2017-02-16 NOTE — MR AVS SNAPSHOT
After Visit Summary   2/16/2017    Shirin Muller    MRN: [id number]           Patient Information     Date Of Birth          1960        Visit Information        Provider Department      2/16/2017 3:00 PM UC 26 ATC; UC ONCOLOGY INFUSION [facility]        Today's Diagnoses     Need for prophylactic measure    -  1    Malignant neoplasm of head of pancreas (H)        Anorexia          Care Instructions          February 2017 Sunday Monday Tuesday Wednesday Thursday Friday Saturday                  1     2     3     TELEMEDICINE    1:00 PM   (60 min.)   [facility]       5     6     [facility] LAB DRAW    9:00 AM   (15 min.)    MASONIC LAB DRAW   [facility] Lab Draw     UMP RETURN    9:15 AM   (50 min.)   Ester Echavarria APRN CNP   [facility] 7     8     9     10     MR ABDOMEN WWO   10:30 AM   (45 min.)   [address] [place] [address] MRI     CT CHEST WO   11:25 AM   (20 min.)   CT1   [facility] CT 11       12     13     [facility] LAB DRAW    9:45 AM   (15 min.)    [facility] LAB DRAW   [facility]     [facility] RETURN   10:00 AM   (30 min.)   Demond Gonsales MD   [facility]     UMP ONC INFUSION 360   11:30 AM   (360 min.)   UC ONCOLOGY INFUSION   [facility] 14     15     16     UMP ONC INFUSION 120    3:00 PM   (120 min.)   UC ONCOLOGY INFUSION   [facility] 17     18       19     20     21     22     23     24     25       26     27     28     [facility] LAB DRAW    9:00 AM   (15 min.)    [facility]     [facility] ONC INFUSION 360    9:30 AM   (360 min.)   UC ONCOLOGY INFUSION   [facility]                                March 2017 Sunday Monday Tuesday Wednesday Thursday Friday Saturday                  1     2     3     4       5      6     7     8     9     10     11       12     13     [facility] MASON[facility] LAB DRAW    8:00 AM   (15 min.)   [facility] LAB DRAW   [facility] Masonic Lab Draw     UMP ONC INFUSION 360    8:30 AM   (360 min.)   UC ONCOLOGY INFUSION   [facility] 14     15     16     17     18       19     20     21     22     23     24     25       26     27     UMP MASONIC LAB DRAW    8:00 AM   (15 min.)   [facility] LAB DRAW   [facility] Mason[facility] Lab Draw     UMP ONC INFUSION 360    8:30 AM   (360 min.)   UC ONCOLOGY INFUSION   [facility] 28     29     30     31                       Lab Results:  No results found for this or any previous visit (from the past 12 hour(s)).        Follow-ups after your visit        Your next 10 appointments already scheduled     Feb 28, 2017  9:00 AM CST   Masonic Lab Draw with UC MASONIC LAB DRAW   [facility] Lab Draw ([facility])    [address] [place] [address]   [phone]            Feb 28, 2017  9:30 AM CST   Infusion 360 with UC ONCOLOGY INFUSION, UC 16 ATC   [facility] ([facility])    [address] [place] [address]   [phone]            Mar 13, 2017  8:00 AM CDT   Masonic Lab Draw with [facility] LAB DRAW   [facility]    [address] [place] [address]   [phone]            Mar 13, 2017  8:30 AM CDT   Infusion 360 with UC ONCOLOGY INFUSION, UC 19 ATC   [facility] ([facility])    [address] [place] [address]   [phone]            Mar 27, 2017  8:00 AM CDT   Masonic Lab Draw with UC MASONIC LAB DRAW   [facility] Mason[facility] Lab Draw ([facility])    [address] [place] [address]   [phone]            Mar 27, 2017  8:30 AM  CDT   Infusion 360 with  ONCOLOGY INFUSION, UC 20 ATC   [facility] ([facility])    [address] [facility]  2nd [facility] [address] [phone]            Apr 10, 2017  8:00 AM T   Masonic Lab Draw with  MASONIC LAB DRAW   [facility] Lab Draw ([facility])    [address] [address] [phone]              Who to contact     If you have questions or need follow up information about today's clinic visit or your schedule please contact [facility] directly at [phone].  Normal or non-critical lab and imaging results will be communicated to you by View[employer] Technologyhart, letter or phone within 4 business days after the clinic has received the results. If you do not hear from us within 7 days, please contact the clinic through Avimotot or phone. If you have a critical or abnormal lab result, we will notify you by phone as soon as possible.  Submit refill requests through [employer] or call your pharmacy and they will forward the refill request to us. Please allow 3 business days for your refill to be completed.          Additional Information About Your Visit        [employer] Medical Information     [employer] gives you secure access to your electronic health record. If you see a primary care provider, you can also send messages to your care team and make appointments. If you have questions, please call your primary care clinic.  If you do not have a primary care provider, please call [phone] and they will assist you.        Care EveryWhere ID     This is your Care EveryWhere ID. This could be used by other organizations to access your [place] medical records  [id number]        Your Vitals Were     Pulse Temperature Pulse Oximetry             81 99.4  F (37.4  C) (Oral) 98%          Blood Pressure from Last 3 Encounters:   02/16/17 100/64   02/13/17 113/71   02/06/17 114/78    Weight from  Last 3 Encounters:   02/13/17 76.6 kg (168 lb 14.4 oz)   02/06/17 74.8 kg (164 lb 14.5 oz)   01/29/17 74.8 kg (164 lb 12.8 oz)              We Performed the Following     Treatment Conditions     Treatment Conditions          Where to get your medicines      Some of these will need a paper prescription and others can be bought over the counter.  Ask your nurse if you have questions.     Bring a paper prescription for each of these medications     dronabinol 5 MG capsule    morphine 15 MG 12 hr tablet          Primary Care Provider    [facility]       No address on file        Thank you!     Thank you for choosing [facility]  for your care. Our goal is always to provide you with excellent care. Hearing back from our patients is one way we can continue to improve our services. Please take a few minutes to complete the written survey that you may receive in the mail after your visit with us. Thank you!             Your Updated Medication List - Protect others around you: Learn how to safely use, store and throw away your medicines at www.disposemymeds.org.          This list is accurate as of: 2/16/17  4:21 PM.  Always use your most recent med list.                   Brand Name Dispense Instructions for use    amylase-lipase-protease 34293 UNITS Cpep    CREON    180 capsule    Take 2 capsules (72,000 Units) by mouth 3 times daily (with meals)       dexamethasone 4 MG tablet    DECADRON    3 tablet    Take 1 tablet (4 mg) by mouth daily (with breakfast) for 3 days       diltiazem 2% in PLO cream (FV COMPOUNDED) 2% Gel     30 g    Apply topically daily and as needed to external hemorrhoids       docusate sodium 100 MG capsule    COLACE    100 capsule    Take 1 capsule (100 mg) by mouth daily       dronabinol 5 MG capsule    MARINOL    60 capsule    Take 1 capsule (5 mg) by mouth 2 times daily (before meals)       lidocaine-prilocaine cream    EMLA    30 g    Apply topically as needed  for other (Use 30-60 minutes prior to port access)       loratadine 10 MG tablet    CLARITIN     Take 10 mg by mouth daily       LORazepam 0.5 MG tablet    ATIVAN    30 tablet    Take 1 tablet (0.5 mg) by mouth every 4 hours as needed (Anxiety, Nausea/Vomiting or Sleep)       morphine 15 MG 12 hr tablet    MS CONTIN    60 tablet    Take 1 tablet (15 mg) by mouth every 12 hours       ondansetron 8 MG ODT tab    ZOFRAN-ODT    60 tablet    Take 1 tablet (8 mg) by mouth every 8 hours as needed for nausea       oxyCODONE 5 MG IR tablet    ROXICODONE    100 tablet    Take 1 tablet (5 mg) by mouth every 4 hours as needed for moderate to severe pain       pantoprazole 40 MG EC tablet    PROTONIX    60 tablet    Take 1 tablet (40 mg) by mouth 2 times daily . After taking this twice daily for 8 weeks, you can decrease dose to once daily.       * polyethylene glycol powder    MIRALAX/GLYCOLAX    119 g    Take 17 g (1 capful) by mouth daily       * polyethylene glycol powder    MIRALAX/GLYCOLAX    119 g    Take 17 g (1 capful) by mouth daily       potassium chloride SA 20 MEQ CR tablet    potassium chloride    60 tablet    Take 1 tablet (20 mEq) by mouth daily       prochlorperazine 10 MG tablet    COMPAZINE    30 tablet    Take 1 tablet (10 mg) by mouth every 6 hours as needed (Nausea/Vomiting)       ranitidine 150 MG tablet    ZANTAC    30 tablet    Take 1 tablet (150 mg) by mouth At Bedtime       sucralfate 1 GM tablet    CARAFATE    90 tablet    Take 1 tablet (1 g) by mouth 3 times daily       * Notice:  This list has 2 medication(s) that are the same as other medications prescribed for you. Read the directions carefully, and ask your doctor or other care provider to review them with you.

## 2017-02-16 NOTE — PROGRESS NOTES
Infusion Nursing Note:  Shirin Muller presents today for Neulasta and IVF    Patient seen by provider today: No   present during visit today: Not Applicable.    Note: N/A.    Intravenous Access:  Implanted Port.    Treatment Conditions:  Not Applicable.      Post Infusion Assessment:  Patient tolerated infusion without incident.  Patient tolerated injection without incident.    Discharge Plan:   Prescription refills given for MS Contin and Marinol.  Discharge instructions reviewed with: Patient and Family.  Patient and/or family verbalized understanding of discharge instructions and all questions answered.  Copy of AVS reviewed with patient and/or family.  Patient will return 2/28 for next appointment.  AVS to patient via Tacit [employer]T.  Patient will return 2/28 for next appointment.   Patient discharged in stable condition accompanied by: self and sister.  Departure Mode: Ambulatory.    Camila Newman RN

## 2017-02-26 RX ORDER — ALBUTEROL SULFATE 90 UG/1
1-2 AEROSOL, METERED RESPIRATORY (INHALATION)
Status: CANCELLED
Start: 2017-02-28

## 2017-02-26 RX ORDER — ALBUTEROL SULFATE 0.83 MG/ML
2.5 SOLUTION RESPIRATORY (INHALATION)
Status: CANCELLED | OUTPATIENT
Start: 2017-02-28

## 2017-02-26 RX ORDER — SODIUM CHLORIDE 9 MG/ML
1000 INJECTION, SOLUTION INTRAVENOUS CONTINUOUS PRN
Status: CANCELLED
Start: 2017-02-28

## 2017-02-26 RX ORDER — FLUOROURACIL 50 MG/ML
400 INJECTION, SOLUTION INTRAVENOUS ONCE
Status: CANCELLED | OUTPATIENT
Start: 2017-02-28

## 2017-02-26 RX ORDER — EPINEPHRINE 0.3 MG/.3ML
0.3 INJECTION SUBCUTANEOUS EVERY 5 MIN PRN
Status: CANCELLED | OUTPATIENT
Start: 2017-02-28

## 2017-02-26 RX ORDER — LORAZEPAM 2 MG/ML
0.5 INJECTION INTRAMUSCULAR EVERY 4 HOURS PRN
Status: CANCELLED
Start: 2017-02-28

## 2017-02-26 RX ORDER — METHYLPREDNISOLONE SODIUM SUCCINATE 125 MG/2ML
125 INJECTION, POWDER, LYOPHILIZED, FOR SOLUTION INTRAMUSCULAR; INTRAVENOUS
Status: CANCELLED
Start: 2017-02-28

## 2017-02-26 RX ORDER — PALONOSETRON 0.05 MG/ML
0.25 INJECTION, SOLUTION INTRAVENOUS ONCE
Status: CANCELLED
Start: 2017-02-28

## 2017-02-26 RX ORDER — MEPERIDINE HYDROCHLORIDE 25 MG/ML
25 INJECTION INTRAMUSCULAR; INTRAVENOUS; SUBCUTANEOUS EVERY 30 MIN PRN
Status: CANCELLED | OUTPATIENT
Start: 2017-02-28

## 2017-02-26 RX ORDER — DIPHENHYDRAMINE HYDROCHLORIDE 50 MG/ML
50 INJECTION INTRAMUSCULAR; INTRAVENOUS
Status: CANCELLED
Start: 2017-02-28

## 2017-02-28 ENCOUNTER — INFUSION THERAPY VISIT (OUTPATIENT)
Dept: ONCOLOGY | Facility: CLINIC | Age: 57
End: 2017-02-28
Attending: INTERNAL MEDICINE
Payer: COMMERCIAL

## 2017-02-28 ENCOUNTER — APPOINTMENT (OUTPATIENT)
Dept: LAB | Facility: CLINIC | Age: 57
End: 2017-02-28
Attending: INTERNAL MEDICINE
Payer: COMMERCIAL

## 2017-02-28 VITALS
WEIGHT: 154.9 LBS | TEMPERATURE: 98.4 F | BODY MASS INDEX: 22.23 KG/M2 | OXYGEN SATURATION: 99 % | HEART RATE: 112 BPM | DIASTOLIC BLOOD PRESSURE: 68 MMHG | RESPIRATION RATE: 16 BRPM | SYSTOLIC BLOOD PRESSURE: 103 MMHG

## 2017-02-28 DIAGNOSIS — E87.6 HYPOKALEMIA: ICD-10-CM

## 2017-02-28 DIAGNOSIS — K26.9 DUODENAL ULCER DUE TO HELICOBACTER PYLORI: ICD-10-CM

## 2017-02-28 DIAGNOSIS — C25.0 MALIGNANT NEOPLASM OF HEAD OF PANCREAS (H): ICD-10-CM

## 2017-02-28 DIAGNOSIS — Z29.9 NEED FOR PROPHYLACTIC MEASURE: Primary | ICD-10-CM

## 2017-02-28 DIAGNOSIS — B96.81 DUODENAL ULCER DUE TO HELICOBACTER PYLORI: ICD-10-CM

## 2017-02-28 DIAGNOSIS — C25.0 MALIGNANT NEOPLASM OF HEAD OF PANCREAS (H): Primary | ICD-10-CM

## 2017-02-28 DIAGNOSIS — E86.0 DEHYDRATION: ICD-10-CM

## 2017-02-28 LAB
ALBUMIN SERPL-MCNC: 2.6 G/DL (ref 3.4–5)
ALP SERPL-CCNC: 217 U/L (ref 40–150)
ALT SERPL W P-5'-P-CCNC: 32 U/L (ref 0–50)
ANION GAP SERPL CALCULATED.3IONS-SCNC: 10 MMOL/L (ref 3–14)
AST SERPL W P-5'-P-CCNC: 37 U/L (ref 0–45)
BASOPHILS # BLD AUTO: 0.1 10E9/L (ref 0–0.2)
BASOPHILS NFR BLD AUTO: 0.3 %
BILIRUB SERPL-MCNC: 0.4 MG/DL (ref 0.2–1.3)
BUN SERPL-MCNC: 9 MG/DL (ref 7–30)
CALCIUM SERPL-MCNC: 8.5 MG/DL (ref 8.5–10.1)
CHLORIDE SERPL-SCNC: 106 MMOL/L (ref 94–109)
CO2 SERPL-SCNC: 25 MMOL/L (ref 20–32)
CREAT SERPL-MCNC: 0.7 MG/DL (ref 0.52–1.04)
DIFFERENTIAL METHOD BLD: ABNORMAL
EOSINOPHIL # BLD AUTO: 0 10E9/L (ref 0–0.7)
EOSINOPHIL NFR BLD AUTO: 0.2 %
ERYTHROCYTE [DISTWIDTH] IN BLOOD BY AUTOMATED COUNT: 19 % (ref 10–15)
GFR SERPL CREATININE-BSD FRML MDRD: 86 ML/MIN/1.7M2
GLUCOSE SERPL-MCNC: 89 MG/DL (ref 70–99)
HCT VFR BLD AUTO: 33.3 % (ref 35–47)
HGB BLD-MCNC: 10.2 G/DL (ref 11.7–15.7)
IMM GRANULOCYTES # BLD: 0.3 10E9/L (ref 0–0.4)
IMM GRANULOCYTES NFR BLD: 1.9 %
LYMPHOCYTES # BLD AUTO: 1.2 10E9/L (ref 0.8–5.3)
LYMPHOCYTES NFR BLD AUTO: 7.4 %
MCH RBC QN AUTO: 26.1 PG (ref 26.5–33)
MCHC RBC AUTO-ENTMCNC: 30.6 G/DL (ref 31.5–36.5)
MCV RBC AUTO: 85 FL (ref 78–100)
MONOCYTES # BLD AUTO: 0.8 10E9/L (ref 0–1.3)
MONOCYTES NFR BLD AUTO: 4.8 %
NEUTROPHILS # BLD AUTO: 13.6 10E9/L (ref 1.6–8.3)
NEUTROPHILS NFR BLD AUTO: 85.4 %
NRBC # BLD AUTO: 0 10*3/UL
NRBC BLD AUTO-RTO: 0 /100
PLATELET # BLD AUTO: 124 10E9/L (ref 150–450)
POTASSIUM SERPL-SCNC: 3.1 MMOL/L (ref 3.4–5.3)
PROT SERPL-MCNC: 7.1 G/DL (ref 6.8–8.8)
RBC # BLD AUTO: 3.91 10E12/L (ref 3.8–5.2)
SODIUM SERPL-SCNC: 142 MMOL/L (ref 133–144)
WBC # BLD AUTO: 16 10E9/L (ref 4–11)

## 2017-02-28 PROCEDURE — 96365 THER/PROPH/DIAG IV INF INIT: CPT

## 2017-02-28 PROCEDURE — 25000128 H RX IP 250 OP 636: Mod: ZF | Performed by: INTERNAL MEDICINE

## 2017-02-28 PROCEDURE — 96366 THER/PROPH/DIAG IV INF ADDON: CPT

## 2017-02-28 PROCEDURE — 99214 OFFICE O/P EST MOD 30 MIN: CPT | Mod: ZP | Performed by: NURSE PRACTITIONER

## 2017-02-28 PROCEDURE — 86301 IMMUNOASSAY TUMOR CA 19-9: CPT | Performed by: INTERNAL MEDICINE

## 2017-02-28 PROCEDURE — 25000128 H RX IP 250 OP 636: Mod: ZF | Performed by: NURSE PRACTITIONER

## 2017-02-28 PROCEDURE — 25000125 ZZHC RX 250: Mod: ZF | Performed by: NURSE PRACTITIONER

## 2017-02-28 PROCEDURE — 85025 COMPLETE CBC W/AUTO DIFF WBC: CPT | Performed by: INTERNAL MEDICINE

## 2017-02-28 PROCEDURE — 80053 COMPREHEN METABOLIC PANEL: CPT | Performed by: INTERNAL MEDICINE

## 2017-02-28 RX ORDER — LACTOSE-REDUCED FOOD
1 LIQUID (ML) ORAL 3 TIMES DAILY
Qty: 90 BOTTLE | Refills: 6 | Status: SHIPPED | OUTPATIENT
Start: 2017-02-28 | End: 2017-01-01

## 2017-02-28 RX ORDER — HEPARIN SODIUM (PORCINE) LOCK FLUSH IV SOLN 100 UNIT/ML 100 UNIT/ML
5 SOLUTION INTRAVENOUS ONCE
Status: COMPLETED | OUTPATIENT
Start: 2017-02-28 | End: 2017-02-28

## 2017-02-28 RX ORDER — DEXAMETHASONE 4 MG/1
4 TABLET ORAL
Qty: 3 TABLET | Refills: 0 | Status: SHIPPED | OUTPATIENT
Start: 2017-02-28 | End: 2017-03-03

## 2017-02-28 RX ORDER — DEXAMETHASONE 4 MG/1
4 TABLET ORAL
Qty: 3 TABLET | Refills: 0 | Status: CANCELLED | OUTPATIENT
Start: 2017-02-28 | End: 2017-03-03

## 2017-02-28 RX ORDER — PALONOSETRON 0.05 MG/ML
0.25 INJECTION, SOLUTION INTRAVENOUS ONCE
Status: DISCONTINUED | OUTPATIENT
Start: 2017-02-28 | End: 2017-02-28 | Stop reason: HOSPADM

## 2017-02-28 RX ADMIN — POTASSIUM CHLORIDE: 149 INJECTION, SOLUTION, CONCENTRATE INTRAVENOUS at 10:42

## 2017-02-28 RX ADMIN — SODIUM CHLORIDE, PRESERVATIVE FREE 5 ML: 5 INJECTION INTRAVENOUS at 12:48

## 2017-02-28 RX ADMIN — SODIUM CHLORIDE, PRESERVATIVE FREE 5 ML: 5 INJECTION INTRAVENOUS at 09:12

## 2017-02-28 ASSESSMENT — PAIN SCALES - GENERAL: PAINLEVEL: NO PAIN (0)

## 2017-02-28 NOTE — MR AVS SNAPSHOT
After Visit Summary   2/28/2017    Shirin Muller    MRN: [id number]           Patient Information     Date Of Birth          1960        Visit Information        Provider Department      2/28/2017 11:00 AM Ester Echavarria APRN CNP [facility]        Today's Diagnoses     Malignant neoplasm of head of pancreas (H)    -  1    Dehydration        Hypokalemia        Duodenal ulcer due to Helicobacter pylori           Follow-ups after your visit        Your next 10 appointments already scheduled     Mar 07, 2017  7:30 AM CST   Masonic Lab Draw with UC [facility] LAB DRAW   [facility])    [address] [address]   [phone]            Mar 07, 2017  8:00 AM CST   (Arrive by 7:45 AM)   Return Visit with TEE Olivas CNP   [facility] ([facility])    [address] [place] [address]   [phone]            Mar 07, 2017  8:30 AM CST   Infusion 360 with UC ONCOLOGY INFUSION, UC 20 ATC   [facility] ([facility])    [address] [address]   [phone]            Mar 20, 2017  8:00 AM CDT   Masonic Lab Draw with UC MASONIC LAB DRAW   [facility] Masonic Lab Draw ([facility])    [address] [address]   [phone]            Mar 20, 2017  8:30 AM CDT   Infusion 360 with UC ONCOLOGY INFUSION, UC 20 ATC   [facility] ([facility])    [address] [place] [address]   [phone]            Apr 03, 2017  8:00 AM CDT   Masonic Lab Draw with UC MASONIC LAB DRAW   [facility])    [address] [address]   [phone]             Apr 03, 2017  8:30 AM CDT   Infusion 360 with UC ONCOLOGY INFUSION, UC 19 ATC   [facility] ([facility])    [address] [facility]  2nd Floor  [facility] [address] [phone]              Who to contact     If you have questions or need follow up information about today's clinic visit or your schedule please contact [facility] directly at [phone].  Normal or non-critical lab and imaging results will be communicated to you by InStitchuhart, letter or phone within 4 business days after the clinic has received the results. If you do not hear from us within 7 days, please contact the clinic through CompareAwayt or phone. If you have a critical or abnormal lab result, we will notify you by phone as soon as possible.  Submit refill requests through Snagsta or call your pharmacy and they will forward the refill request to us. Please allow 3 business days for your refill to be completed.          Additional Information About Your Visit        Snagsta Information     [employer] gives you secure access to your electronic health record. If you see a primary care provider, you can also send messages to your care team and make appointments. If you have questions, please call your primary care clinic.  If you do not have a primary care provider, please call [phone] and they will assist you.        Care EveryWhere ID     This is your Care EveryWhere ID. This could be used by other organizations to access your [place] medical records  [id number]         Blood Pressure from Last 3 Encounters:   02/28/17 103/68   02/16/17 100/64   02/13/17 113/71    Weight from Last 3 Encounters:   02/28/17 70.3 kg (154 lb 14.4 oz)   02/13/17 76.6 kg (168 lb 14.4 oz)   02/06/17 74.8 kg (164 lb 14.5 oz)              Today, you had the following     No orders found for display         Today's Medication Changes          These changes are accurate as of: 2/28/17  3:54 PM.  If you  have any questions, ask your nurse or doctor.               Start taking these medicines.        Dose/Directions    ENSURE CLEAR Liqd   Used for:  Malignant neoplasm of head of pancreas (H)        Dose:  1 Bottle   Take 1 Bottle by mouth 3 times daily   Quantity:  90 Bottle   Refills:  6         These medicines have changed or have updated prescriptions.        Dose/Directions    * dexamethasone 4 MG tablet   Commonly known as:  DECADRON   This may have changed:  Another medication with the same name was added. Make sure you understand how and when to take each.   Used for:  Need for prophylactic measure, Malignant neoplasm of head of pancreas (H)        Dose:  4 mg   Take 1 tablet (4 mg) by mouth daily (with breakfast) for 3 days   Quantity:  3 tablet   Refills:  0       * dexamethasone 4 MG tablet   Commonly known as:  DECADRON   This may have changed:  You were already taking a medication with the same name, and this prescription was added. Make sure you understand how and when to take each.   Used for:  Need for prophylactic measure, Malignant neoplasm of head of pancreas (H)        Dose:  4 mg   Take 1 tablet (4 mg) by mouth daily (with breakfast) for 3 days   Quantity:  3 tablet   Refills:  0       * Notice:  This list has 2 medication(s) that are the same as other medications prescribed for you. Read the directions carefully, and ask your doctor or other care provider to review them with you.         Where to get your medicines      These medications were sent to [address] [address]    Hours:  TRANSPLANT PHONE NUMBER [phone] Phone:  [phone]     dexamethasone 4 MG tablet         Some of these will need a paper prescription and others can be bought over the counter.  Ask your nurse if you have questions.     Bring a paper prescription for each of these medications     ENSURE CLEAR Liqd                 Primary Care Provider    [facility]       No address on file        Thank you!     Thank you for choosing [facility]  for your care. Our goal is always to provide you with excellent care. Hearing back from our patients is one way we can continue to improve our services. Please take a few minutes to complete the written survey that you may receive in the mail after your visit with us. Thank you!             Your Updated Medication List - Protect others around you: Learn how to safely use, store and throw away your medicines at www.disposemymeds.org.          This list is accurate as of: 2/28/17  3:54 PM.  Always use your most recent med list.                   Brand Name Dispense Instructions for use    amylase-lipase-protease 14442 UNITS Cpep    CREON    180 capsule    Take 2 capsules (72,000 Units) by mouth 3 times daily (with meals)       * dexamethasone 4 MG tablet    DECADRON    3 tablet    Take 1 tablet (4 mg) by mouth daily (with breakfast) for 3 days       * dexamethasone 4 MG tablet    DECADRON    3 tablet    Take 1 tablet (4 mg) by mouth daily (with breakfast) for 3 days       diltiazem 2% in PLO cream (FV COMPOUNDED) 2% Gel     30 g    Apply topically daily and as needed to external hemorrhoids       docusate sodium 100 MG capsule    COLACE    100 capsule    Take 1 capsule (100 mg) by mouth daily       dronabinol 5 MG capsule    MARINOL    60 capsule    Take 1 capsule (5 mg) by mouth 2 times daily (before meals)       ENSURE CLEAR Liqd     90 Bottle    Take 1 Bottle by mouth 3 times daily       lidocaine-prilocaine cream    EMLA    30 g    Apply topically as needed for other (Use 30-60 minutes prior to port access)       loratadine 10 MG tablet    CLARITIN     Take 10 mg by mouth daily       LORazepam 0.5 MG tablet    ATIVAN    30 tablet    Take 1 tablet (0.5 mg) by mouth every 4 hours as needed (Anxiety, Nausea/Vomiting or Sleep)       morphine 15 MG 12 hr  tablet    MS CONTIN    60 tablet    Take 1 tablet (15 mg) by mouth every 12 hours       ondansetron 8 MG ODT tab    ZOFRAN-ODT    60 tablet    Take 1 tablet (8 mg) by mouth every 8 hours as needed for nausea       oxyCODONE 5 MG IR tablet    ROXICODONE    100 tablet    Take 1 tablet (5 mg) by mouth every 4 hours as needed for moderate to severe pain       pantoprazole 40 MG EC tablet    PROTONIX    60 tablet    Take 1 tablet (40 mg) by mouth 2 times daily . After taking this twice daily for 8 weeks, you can decrease dose to once daily.       * polyethylene glycol powder    MIRALAX/GLYCOLAX    119 g    Take 17 g (1 capful) by mouth daily       * polyethylene glycol powder    MIRALAX/GLYCOLAX    119 g    Take 17 g (1 capful) by mouth daily       potassium chloride SA 20 MEQ CR tablet    potassium chloride    60 tablet    Take 1 tablet (20 mEq) by mouth daily       prochlorperazine 10 MG tablet    COMPAZINE    30 tablet    Take 1 tablet (10 mg) by mouth every 6 hours as needed (Nausea/Vomiting)       ranitidine 150 MG tablet    ZANTAC    30 tablet    Take 1 tablet (150 mg) by mouth At Bedtime       sucralfate 1 GM tablet    CARAFATE    90 tablet    Take 1 tablet (1 g) by mouth 3 times daily       * Notice:  This list has 4 medication(s) that are the same as other medications prescribed for you. Read the directions carefully, and ask your doctor or other care provider to review them with you.

## 2017-02-28 NOTE — LETTER
2/28/2017       RE: Shirin Muller  [address] [facility] [address]     Dear Colleague,    Thank you for referring your patient, Shirin Muller, to the [facility]. Please see a copy of my visit note below.    Ms. Shirin Muller is a 56-year-old woman with metastatic pancreatic cancer.  I am seeing her on an add-on basis. She presented to [facility] for FOLFIRINOX but was noted to feel weak after having diarrhea.    Oncology HPI:  She was diagnosed in the spring of 2016 after presenting with a 2 to 3-month history of abdominal pain and weight loss.  She underwent evaluation showing a 5 cm mass at the head of the pancreas.  Biopsy was consistent with adenocarcinoma.  She underwent staging imaging including an MRI of the abdomen, which then showed an up to 10 cm poorly defined hypoenhancing mass arising from the pancreatic head which encased celiac artery, superior mesenteric artery and severely attenuated the main portal vein.  She initiated on treatment with gemcitabine and Abraxane on 05/02/2016 and continued on that until December 2016 when she was found to have metastatic disease involving the liver.  She was then initiated on FOLFIRINOX on 12/20/16. Her first cycle was complicated by fatigue, nausea/vomiting. Antiemetics were adjusted with cycle 2. After cycle 3, she was admitted with weakness and dizziness. She was found to have a GI bleed secondary to a bleeding duodenal ulcer and infiltrating mass in the duodenum. The ulcer was injected and clipped. She was found to be H. Pylori positive and was initiated on  therapy with PPI, carafate, amoxicillin and clarithromycin.    Interval history:   Shirin is feeling weak and dizzy today. She was feeling OK until a few days ago when she began having problems with constipation. She used laxatives and increased dietary fiber and started having multiple loose/watery stools overnight. She had some  indigestion/burping as well. No nausea/vomiting or abdominal pain. No fevers/chills. Felt well prior to that episode. Generally, has been eating OK, so was surprised by the weight loss noted today. Is drinking ensure about 3 x day. Urinating well. Neuropathy is extending up the legs to the knees now. No change to gait.      Current Outpatient Prescriptions:      dexamethasone (DECADRON) 4 MG tablet, Take 1 tablet (4 mg) by mouth daily (with breakfast) for 3 days, Disp: 3 tablet, Rfl: 0     Nutritional Supplements (ENSURE CLEAR) LIQD, Take 1 Bottle by mouth 3 times daily, Disp: 90 Bottle, Rfl: 6     morphine (MS CONTIN) 15 MG 12 hr tablet, Take 1 tablet (15 mg) by mouth every 12 hours, Disp: 60 tablet, Rfl: 0     dronabinol (MARINOL) 5 MG capsule, Take 1 capsule (5 mg) by mouth 2 times daily (before meals), Disp: 60 capsule, Rfl: 0     polyethylene glycol (MIRALAX/GLYCOLAX) powder, Take 17 g (1 capful) by mouth daily, Disp: 119 g, Rfl: 11     LORazepam (ATIVAN) 0.5 MG tablet, Take 1 tablet (0.5 mg) by mouth every 4 hours as needed (Anxiety, Nausea/Vomiting or Sleep), Disp: 30 tablet, Rfl: 2     potassium chloride SA (POTASSIUM CHLORIDE) 20 MEQ CR tablet, Take 1 tablet (20 mEq) by mouth daily, Disp: 60 tablet, Rfl: 1     diltiazem 2% in PLO cream, FV COMPOUNDED, 2% GEL, Apply topically daily and as needed to external hemorrhoids, Disp: 30 g, Rfl: 0     docusate sodium (COLACE) 100 MG capsule, Take 1 capsule (100 mg) by mouth daily, Disp: 100 capsule, Rfl: 0     ranitidine (ZANTAC) 150 MG tablet, Take 1 tablet (150 mg) by mouth At Bedtime, Disp: 30 tablet, Rfl: 0     sucralfate (CARAFATE) 1 GM tablet, Take 1 tablet (1 g) by mouth 3 times daily, Disp: 90 tablet, Rfl: 0     pantoprazole (PROTONIX) 40 MG EC tablet, Take 1 tablet (40 mg) by mouth 2 times daily . After taking this twice daily for 8 weeks, you can decrease dose to once daily., Disp: 60 tablet, Rfl: 1     loratadine (CLARITIN) 10 MG tablet, Take 10 mg by  mouth daily, Disp: , Rfl:      prochlorperazine (COMPAZINE) 10 MG tablet, Take 1 tablet (10 mg) by mouth every 6 hours as needed (Nausea/Vomiting), Disp: 30 tablet, Rfl: 2     oxyCODONE (ROXICODONE) 5 MG IR tablet, Take 1 tablet (5 mg) by mouth every 4 hours as needed for moderate to severe pain, Disp: 100 tablet, Rfl: 0     amylase-lipase-protease (CREON) 79973 UNITS CPEP, Take 2 capsules (72,000 Units) by mouth 3 times daily (with meals), Disp: 180 capsule, Rfl: 1     polyethylene glycol (MIRALAX/GLYCOLAX) powder, Take 17 g (1 capful) by mouth daily, Disp: 119 g, Rfl: 11     lidocaine-prilocaine (EMLA) cream, Apply topically as needed for other (Use 30-60 minutes prior to port access) (Patient not taking: Reported on 2/28/2017), Disp: 30 g, Rfl: 0     ondansetron (ZOFRAN-ODT) 8 MG disintegrating tablet, Take 1 tablet (8 mg) by mouth every 8 hours as needed for nausea, Disp: 60 tablet, Rfl: 4  No current facility-administered medications for this visit.     Facility-Administered Medications Ordered in Other Visits:      dextrose 5% BOLUS, 250 mL, Intravenous, Once, Demond Gonsales MD, 250 mL at 02/28/17 1143     fosaprepitant (EMEND) 150 mg in NaCl 0.9 % intermittent infusion, 150 mg, Intravenous, Once, Demond Gonsales MD, 150 mg at 02/28/17 1143     palonosetron (ALOXI) injection 0.25 mg, 0.25 mg, Intravenous, Once, Demond Gonsales MD, 0.25 mg at 02/28/17 1143     dexamethasone (DECADRON) 12 mg in NaCl 0.9 % intermittent infusion, 12 mg, Intravenous, Once, Demond Gonsales MD, 12 mg at 02/28/17 1144    Exam: alert, appears fatigued.   Vital Signs 2/28/2017 2/28/2017   Systolic 96 103   Diastolic 71 68   Pulse 107 112   Temperature 98.4    Respirations 16    Weight (LB) 154 lb 14.4 oz    Height     BMI     Pain     O2 99      Wt Readings from Last 10 Encounters:   02/28/17 70.3 kg (154 lb 14.4 oz)   02/13/17 76.6 kg (168 lb 14.4 oz)   02/06/17 74.8 kg (164 lb 14.5 oz)   01/29/17 74.8  kg (164 lb 12.8 oz)   01/20/17 73.4 kg (161 lb 12.8 oz)   01/17/17 73.3 kg (161 lb 9.6 oz)   01/05/17 71.2 kg (156 lb 14.4 oz)   01/02/17 73 kg (161 lb)   12/20/16 75.8 kg (167 lb 3.2 oz)   12/12/16 77.7 kg (171 lb 4.8 oz)     Oropharynx is moist, no focal lesion. No icterus. Neck supple and without adenopathy. Lungs:CTA. Heart:RRR, no murmur or rub. Abdomen: soft, nontender, BS active. Extremities: warm, no edema.     Labs:  Results for NATALIIA IBARRA (MRN [id number]) as of 2/28/2017 13:58   Ref. Range 2/28/2017 09:21   Sodium Latest Ref Range: 133 - 144 mmol/L 142   Potassium Latest Ref Range: 3.4 - 5.3 mmol/L 3.1 (L)   Chloride Latest Ref Range: 94 - 109 mmol/L 106   Carbon Dioxide Latest Ref Range: 20 - 32 mmol/L 25   Urea Nitrogen Latest Ref Range: 7 - 30 mg/dL 9   Creatinine Latest Ref Range: 0.52 - 1.04 mg/dL 0.70   GFR Estimate Latest Ref Range: >60 mL/min/1.7m2 86   GFR Estimate If Black Latest Ref Range: >60 mL/min/1.7m2 >90...   Calcium Latest Ref Range: 8.5 - 10.1 mg/dL 8.5   Anion Gap Latest Ref Range: 3 - 14 mmol/L 10   Albumin Latest Ref Range: 3.4 - 5.0 g/dL 2.6 (L)   Protein Total Latest Ref Range: 6.8 - 8.8 g/dL 7.1   Bilirubin Total Latest Ref Range: 0.2 - 1.3 mg/dL 0.4   Alkaline Phosphatase Latest Ref Range: 40 - 150 U/L 217 (H)   ALT Latest Ref Range: 0 - 50 U/L 32   AST Latest Ref Range: 0 - 45 U/L 37   Glucose Latest Ref Range: 70 - 99 mg/dL 89   WBC Latest Ref Range: 4.0 - 11.0 10e9/L 16.0 (H)   Hemoglobin Latest Ref Range: 11.7 - 15.7 g/dL 10.2 (L)   Hematocrit Latest Ref Range: 35.0 - 47.0 % 33.3 (L)   Platelet Count Latest Ref Range: 150 - 450 10e9/L 124 (L)   RBC Count Latest Ref Range: 3.8 - 5.2 10e12/L 3.91   MCV Latest Ref Range: 78 - 100 fl 85   MCH Latest Ref Range: 26.5 - 33.0 pg 26.1 (L)   MCHC Latest Ref Range: 31.5 - 36.5 g/dL 30.6 (L)   RDW Latest Ref Range: 10.0 - 15.0 % 19.0 (H)   Diff Method Unknown Automated Method   % Neutrophils Latest Units: % 85.4   %  Lymphocytes Latest Units: % 7.4   % Monocytes Latest Units: % 4.8   % Eosinophils Latest Units: % 0.2   % Basophils Latest Units: % 0.3   % Immature Granulocytes Latest Units: % 1.9   Nucleated RBCs Latest Ref Range: 0 /100 0   Absolute Neutrophil Latest Ref Range: 1.6 - 8.3 10e9/L 13.6 (H)   Absolute Lymphocytes Latest Ref Range: 0.8 - 5.3 10e9/L 1.2   Absolute Monocytes Latest Ref Range: 0.0 - 1.3 10e9/L 0.8   Absolute Eosinophils Latest Ref Range: 0.0 - 0.7 10e9/L 0.0   Absolute Basophils Latest Ref Range: 0.0 - 0.2 10e9/L 0.1   Abs Immature Granulocytes Latest Ref Range: 0 - 0.4 10e9/L 0.3   Absolute Nucleated RBC Unknown 0.0       Impression/plan:  1. Metastatic pancreatic cancer-due for FOLFIRINOX today. Overall, feels she is tolerating treatment OK, but feels the weakness relates to the diarrhea in the last 24 hours.   -Has been responding to treatment. Will plan to hold treatment today and resume in 1 week.  I will plan to see her with the following cycle to assess her toxicities. We need to be careful about progressive neuropathy as she has some neuropathy extending up the legs.    2. GI: diarrhea/constipation: Diarrhea--likely related to laxative use-- resolved now. Will monitor for recurrent constipation and add stool softeners back  -Duodenal ulcer/h. Pylori + completed abx therapy,  -now on carafate tid, ranitidine 150 mg at HS, protonix bid through the end of March, then decrease to daily dosing.    3. Hypokalemia: secondary to diarrhea. Replaced IV today. Continue oral KDUR 20 meq daily. Also given 1 liter NS today.    4. Abdominal pain: secondary to malignancy. Stable  -MS Contin 15 mg q 12 hours, oxycodone 5 mg every 4 hours as needed for moderate/severe pain    5. Anorexia: improved on marinol 5 mg bid. Also taking ensure clear tid. Refill given today.         Again, thank you for allowing me to participate in the care of your patient.      Sincerely,    Ester Echavarria, TEE CNP

## 2017-02-28 NOTE — PROGRESS NOTES
Infusion Nursing Note:  Shirin Muller presents today for Cycle 5, day 1 Oxaliplatin, irinotecan, leucovorin, fluorouracil bolus and fluorouracil pump connection--DEFERRED.  IVF and K replacement given.  Patient seen by provider today: Yes: ASTRID Jerome    Note: Patient presented to clinic with minimal complaints.  Reports diarrhea last night after taking Miralax for constipation.  Denies BRBPR or tarry stool.  Denies abd pain/cramping, n/v, fevers.  Weight loss of 6.3 kg (13.9 lbs) in two weeks.  Denies change in appetite/intake, reports drinking about three ensures a day; two in morning and one in evening.  Tachycardic today 100-110s, mild hypotension; denies lightheadedness/dizziness, reports feeling fast heart rate intermittently; no CP or SOB.     TORB 2/28/2017 10:30 ASTRID Jerome/RAMON Taylor RN:  -Replace K per protocol IV with 1L IVF  -Will come to bedside for assessment    10:53  -Defer treatment for one week    Intravenous Access:  Implanted Port.    Treatment Conditions:  Lab Results   Component Value Date    HGB 10.2 02/28/2017     Lab Results   Component Value Date    WBC 16.0 02/28/2017      Lab Results   Component Value Date    ANEU 13.6 02/28/2017     Lab Results   Component Value Date     02/28/2017      Lab Results   Component Value Date     02/28/2017                   Lab Results   Component Value Date    POTASSIUM 3.1 02/28/2017           Lab Results   Component Value Date    MAG 2.0 02/06/2017            Lab Results   Component Value Date    CR 0.70 02/28/2017                   Lab Results   Component Value Date    FANNY 8.5 02/28/2017                Lab Results   Component Value Date    BILITOTAL 0.4 02/28/2017           Lab Results   Component Value Date    ALBUMIN 2.6 02/28/2017                    Lab Results   Component Value Date    ALT 32 02/28/2017           Lab Results   Component Value Date    AST 37 02/28/2017     Results reviewed, labs MET  treatment parameters, defer treatment per TORB.    Post Infusion Assessment:  Patient tolerated infusion without incident.  Blood return noted pre and post infusion.  Site patent and intact, free from redness, edema or discomfort.  No evidence of extravasations.  Access discontinued per protocol.    Discharge Plan:   Prescription refills given for Ativan and script given for Ensure.  Discharge instructions reviewed with: Patient.  Patient and/or family verbalized understanding of discharge instructions and all questions answered.  Copy of AVS reviewed with patient and/or family.  Patient will return 3/7/2017 for next appointment.  Departure Mode: Ambulatory.    Asmita Taylor RN

## 2017-02-28 NOTE — LETTER
2/28/2017      RE: Shirin Muller  [address] [facility] [address]       Ms. Shirin Muller is a 56-year-old woman with metastatic pancreatic cancer.  I am seeing her on an add-on basis. She presented to [facility] for FOLFIRINOX but was noted to feel weak after having diarrhea.    Oncology HPI:  She was diagnosed in the spring of 2016 after presenting with a 2 to 3-month history of abdominal pain and weight loss.  She underwent evaluation showing a 5 cm mass at the head of the pancreas.  Biopsy was consistent with adenocarcinoma.  She underwent staging imaging including an MRI of the abdomen, which then showed an up to 10 cm poorly defined hypoenhancing mass arising from the pancreatic head which encased celiac artery, superior mesenteric artery and severely attenuated the main portal vein.  She initiated on treatment with gemcitabine and Abraxane on 05/02/2016 and continued on that until December 2016 when she was found to have metastatic disease involving the liver.  She was then initiated on FOLFIRINOX on 12/20/16. Her first cycle was complicated by fatigue, nausea/vomiting. Antiemetics were adjusted with cycle 2. After cycle 3, she was admitted with weakness and dizziness. She was found to have a GI bleed secondary to a bleeding duodenal ulcer and infiltrating mass in the duodenum. The ulcer was injected and clipped. She was found to be H. Pylori positive and was initiated on  therapy with PPI, carafate, amoxicillin and clarithromycin.    Interval history:   Shirin is feeling weak and dizzy today. She was feeling OK until a few days ago when she began having problems with constipation. She used laxatives and increased dietary fiber and started having multiple loose/watery stools overnight. She had some indigestion/burping as well. No nausea/vomiting or abdominal pain. No fevers/chills. Felt well prior to that episode. Generally, has been eating OK, so was surprised by the weight loss  noted today. Is drinking ensure about 3 x day. Urinating well. Neuropathy is extending up the legs to the knees now. No change to gait.      Current Outpatient Prescriptions:      dexamethasone (DECADRON) 4 MG tablet, Take 1 tablet (4 mg) by mouth daily (with breakfast) for 3 days, Disp: 3 tablet, Rfl: 0     Nutritional Supplements (ENSURE CLEAR) LIQD, Take 1 Bottle by mouth 3 times daily, Disp: 90 Bottle, Rfl: 6     morphine (MS CONTIN) 15 MG 12 hr tablet, Take 1 tablet (15 mg) by mouth every 12 hours, Disp: 60 tablet, Rfl: 0     dronabinol (MARINOL) 5 MG capsule, Take 1 capsule (5 mg) by mouth 2 times daily (before meals), Disp: 60 capsule, Rfl: 0     polyethylene glycol (MIRALAX/GLYCOLAX) powder, Take 17 g (1 capful) by mouth daily, Disp: 119 g, Rfl: 11     LORazepam (ATIVAN) 0.5 MG tablet, Take 1 tablet (0.5 mg) by mouth every 4 hours as needed (Anxiety, Nausea/Vomiting or Sleep), Disp: 30 tablet, Rfl: 2     potassium chloride SA (POTASSIUM CHLORIDE) 20 MEQ CR tablet, Take 1 tablet (20 mEq) by mouth daily, Disp: 60 tablet, Rfl: 1     diltiazem 2% in PLO cream, FV COMPOUNDED, 2% GEL, Apply topically daily and as needed to external hemorrhoids, Disp: 30 g, Rfl: 0     docusate sodium (COLACE) 100 MG capsule, Take 1 capsule (100 mg) by mouth daily, Disp: 100 capsule, Rfl: 0     ranitidine (ZANTAC) 150 MG tablet, Take 1 tablet (150 mg) by mouth At Bedtime, Disp: 30 tablet, Rfl: 0     sucralfate (CARAFATE) 1 GM tablet, Take 1 tablet (1 g) by mouth 3 times daily, Disp: 90 tablet, Rfl: 0     pantoprazole (PROTONIX) 40 MG EC tablet, Take 1 tablet (40 mg) by mouth 2 times daily . After taking this twice daily for 8 weeks, you can decrease dose to once daily., Disp: 60 tablet, Rfl: 1     loratadine (CLARITIN) 10 MG tablet, Take 10 mg by mouth daily, Disp: , Rfl:      prochlorperazine (COMPAZINE) 10 MG tablet, Take 1 tablet (10 mg) by mouth every 6 hours as needed (Nausea/Vomiting), Disp: 30 tablet, Rfl: 2     oxyCODONE  (ROXICODONE) 5 MG IR tablet, Take 1 tablet (5 mg) by mouth every 4 hours as needed for moderate to severe pain, Disp: 100 tablet, Rfl: 0     amylase-lipase-protease (CREON) 94426 UNITS CPEP, Take 2 capsules (72,000 Units) by mouth 3 times daily (with meals), Disp: 180 capsule, Rfl: 1     polyethylene glycol (MIRALAX/GLYCOLAX) powder, Take 17 g (1 capful) by mouth daily, Disp: 119 g, Rfl: 11     lidocaine-prilocaine (EMLA) cream, Apply topically as needed for other (Use 30-60 minutes prior to port access) (Patient not taking: Reported on 2/28/2017), Disp: 30 g, Rfl: 0     ondansetron (ZOFRAN-ODT) 8 MG disintegrating tablet, Take 1 tablet (8 mg) by mouth every 8 hours as needed for nausea, Disp: 60 tablet, Rfl: 4  No current facility-administered medications for this visit.     Facility-Administered Medications Ordered in Other Visits:      dextrose 5% BOLUS, 250 mL, Intravenous, Once, Demond Gonsales MD, 250 mL at 02/28/17 1143     fosaprepitant (EMEND) 150 mg in NaCl 0.9 % intermittent infusion, 150 mg, Intravenous, Once, Demond Gonsales MD, 150 mg at 02/28/17 1143     palonosetron (ALOXI) injection 0.25 mg, 0.25 mg, Intravenous, Once, Demond Gonsales MD, 0.25 mg at 02/28/17 1143     dexamethasone (DECADRON) 12 mg in NaCl 0.9 % intermittent infusion, 12 mg, Intravenous, Once, Demond Gonsales MD, 12 mg at 02/28/17 1144    Exam: alert, appears fatigued.   Vital Signs 2/28/2017 2/28/2017   Systolic 96 103   Diastolic 71 68   Pulse 107 112   Temperature 98.4    Respirations 16    Weight (LB) 154 lb 14.4 oz    Height     BMI     Pain     O2 99      Wt Readings from Last 10 Encounters:   02/28/17 70.3 kg (154 lb 14.4 oz)   02/13/17 76.6 kg (168 lb 14.4 oz)   02/06/17 74.8 kg (164 lb 14.5 oz)   01/29/17 74.8 kg (164 lb 12.8 oz)   01/20/17 73.4 kg (161 lb 12.8 oz)   01/17/17 73.3 kg (161 lb 9.6 oz)   01/05/17 71.2 kg (156 lb 14.4 oz)   01/02/17 73 kg (161 lb)   12/20/16 75.8 kg (167 lb 3.2  oz)   12/12/16 77.7 kg (171 lb 4.8 oz)     Oropharynx is moist, no focal lesion. No icterus. Neck supple and without adenopathy. Lungs:CTA. Heart:RRR, no murmur or rub. Abdomen: soft, nontender, BS active. Extremities: warm, no edema.     Labs:  Results for NATALIIA IBARRA (MRN [id number]) as of 2/28/2017 13:58   Ref. Range 2/28/2017 09:21   Sodium Latest Ref Range: 133 - 144 mmol/L 142   Potassium Latest Ref Range: 3.4 - 5.3 mmol/L 3.1 (L)   Chloride Latest Ref Range: 94 - 109 mmol/L 106   Carbon Dioxide Latest Ref Range: 20 - 32 mmol/L 25   Urea Nitrogen Latest Ref Range: 7 - 30 mg/dL 9   Creatinine Latest Ref Range: 0.52 - 1.04 mg/dL 0.70   GFR Estimate Latest Ref Range: >60 mL/min/1.7m2 86   GFR Estimate If Black Latest Ref Range: >60 mL/min/1.7m2 >90...   Calcium Latest Ref Range: 8.5 - 10.1 mg/dL 8.5   Anion Gap Latest Ref Range: 3 - 14 mmol/L 10   Albumin Latest Ref Range: 3.4 - 5.0 g/dL 2.6 (L)   Protein Total Latest Ref Range: 6.8 - 8.8 g/dL 7.1   Bilirubin Total Latest Ref Range: 0.2 - 1.3 mg/dL 0.4   Alkaline Phosphatase Latest Ref Range: 40 - 150 U/L 217 (H)   ALT Latest Ref Range: 0 - 50 U/L 32   AST Latest Ref Range: 0 - 45 U/L 37   Glucose Latest Ref Range: 70 - 99 mg/dL 89   WBC Latest Ref Range: 4.0 - 11.0 10e9/L 16.0 (H)   Hemoglobin Latest Ref Range: 11.7 - 15.7 g/dL 10.2 (L)   Hematocrit Latest Ref Range: 35.0 - 47.0 % 33.3 (L)   Platelet Count Latest Ref Range: 150 - 450 10e9/L 124 (L)   RBC Count Latest Ref Range: 3.8 - 5.2 10e12/L 3.91   MCV Latest Ref Range: 78 - 100 fl 85   MCH Latest Ref Range: 26.5 - 33.0 pg 26.1 (L)   MCHC Latest Ref Range: 31.5 - 36.5 g/dL 30.6 (L)   RDW Latest Ref Range: 10.0 - 15.0 % 19.0 (H)   Diff Method Unknown Automated Method   % Neutrophils Latest Units: % 85.4   % Lymphocytes Latest Units: % 7.4   % Monocytes Latest Units: % 4.8   % Eosinophils Latest Units: % 0.2   % Basophils Latest Units: % 0.3   % Immature Granulocytes Latest Units: % 1.9    Nucleated RBCs Latest Ref Range: 0 /100 0   Absolute Neutrophil Latest Ref Range: 1.6 - 8.3 10e9/L 13.6 (H)   Absolute Lymphocytes Latest Ref Range: 0.8 - 5.3 10e9/L 1.2   Absolute Monocytes Latest Ref Range: 0.0 - 1.3 10e9/L 0.8   Absolute Eosinophils Latest Ref Range: 0.0 - 0.7 10e9/L 0.0   Absolute Basophils Latest Ref Range: 0.0 - 0.2 10e9/L 0.1   Abs Immature Granulocytes Latest Ref Range: 0 - 0.4 10e9/L 0.3   Absolute Nucleated RBC Unknown 0.0       Impression/plan:  1. Metastatic pancreatic cancer-due for FOLFIRINOX today. Overall, feels she is tolerating treatment OK, but feels the weakness relates to the diarrhea in the last 24 hours.   -Has been responding to treatment. Will plan to hold treatment today and resume in 1 week.  I will plan to see her with the following cycle to assess her toxicities. We need to be careful about progressive neuropathy as she has some neuropathy extending up the legs.    2. GI: diarrhea/constipation: Diarrhea--likely related to laxative use-- resolved now. Will monitor for recurrent constipation and add stool softeners back  -Duodenal ulcer/h. Pylori + completed abx therapy,  -now on carafate tid, ranitidine 150 mg at HS, protonix bid through the end of March, then decrease to daily dosing.    3. Hypokalemia: secondary to diarrhea. Replaced IV today. Continue oral KDUR 20 meq daily. Also given 1 liter NS today.    4. Abdominal pain: secondary to malignancy. Stable  -MS Contin 15 mg q 12 hours, oxycodone 5 mg every 4 hours as needed for moderate/severe pain    5. Anorexia: improved on marinol 5 mg bid. Also taking ensure clear tid. Refill given today.         TEE Villnaueva CNP

## 2017-02-28 NOTE — PROGRESS NOTES
Ms. Shirin Meyer is a 56-year-old woman with metastatic pancreatic cancer.  I am seeing her on an add-on basis. She presented to [facility] for FOLFIRINOX but was noted to feel weak after having diarrhea.    Oncology HPI:  She was diagnosed in the spring of 2016 after presenting with a 2 to 3-month history of abdominal pain and weight loss.  She underwent evaluation showing a 5 cm mass at the head of the pancreas.  Biopsy was consistent with adenocarcinoma.  She underwent staging imaging including an MRI of the abdomen, which then showed an up to 10 cm poorly defined hypoenhancing mass arising from the pancreatic head which encased celiac artery, superior mesenteric artery and severely attenuated the main portal vein.  She initiated on treatment with gemcitabine and Abraxane on 05/02/2016 and continued on that until December 2016 when she was found to have metastatic disease involving the liver.  She was then initiated on FOLFIRINOX on 12/20/16. Her first cycle was complicated by fatigue, nausea/vomiting. Antiemetics were adjusted with cycle 2. After cycle 3, she was admitted with weakness and dizziness. She was found to have a GI bleed secondary to a bleeding duodenal ulcer and infiltrating mass in the duodenum. The ulcer was injected and clipped. She was found to be H. Pylori positive and was initiated on  therapy with PPI, carafate, amoxicillin and clarithromycin.    Interval history:   Shirin is feeling weak and dizzy today. She was feeling OK until a few days ago when she began having problems with constipation. She used laxatives and increased dietary fiber and started having multiple loose/watery stools overnight. She had some indigestion/burping as well. No nausea/vomiting or abdominal pain. No fevers/chills. Felt well prior to that episode. Generally, has been eating OK, so was surprised by the weight loss noted today. Is drinking ensure about 3 x day. Urinating well. Neuropathy is extending up the  legs to the knees now. No change to gait.      Current Outpatient Prescriptions:      dexamethasone (DECADRON) 4 MG tablet, Take 1 tablet (4 mg) by mouth daily (with breakfast) for 3 days, Disp: 3 tablet, Rfl: 0     Nutritional Supplements (ENSURE CLEAR) LIQD, Take 1 Bottle by mouth 3 times daily, Disp: 90 Bottle, Rfl: 6     morphine (MS CONTIN) 15 MG 12 hr tablet, Take 1 tablet (15 mg) by mouth every 12 hours, Disp: 60 tablet, Rfl: 0     dronabinol (MARINOL) 5 MG capsule, Take 1 capsule (5 mg) by mouth 2 times daily (before meals), Disp: 60 capsule, Rfl: 0     polyethylene glycol (MIRALAX/GLYCOLAX) powder, Take 17 g (1 capful) by mouth daily, Disp: 119 g, Rfl: 11     LORazepam (ATIVAN) 0.5 MG tablet, Take 1 tablet (0.5 mg) by mouth every 4 hours as needed (Anxiety, Nausea/Vomiting or Sleep), Disp: 30 tablet, Rfl: 2     potassium chloride SA (POTASSIUM CHLORIDE) 20 MEQ CR tablet, Take 1 tablet (20 mEq) by mouth daily, Disp: 60 tablet, Rfl: 1     diltiazem 2% in PLO cream, FV COMPOUNDED, 2% GEL, Apply topically daily and as needed to external hemorrhoids, Disp: 30 g, Rfl: 0     docusate sodium (COLACE) 100 MG capsule, Take 1 capsule (100 mg) by mouth daily, Disp: 100 capsule, Rfl: 0     ranitidine (ZANTAC) 150 MG tablet, Take 1 tablet (150 mg) by mouth At Bedtime, Disp: 30 tablet, Rfl: 0     sucralfate (CARAFATE) 1 GM tablet, Take 1 tablet (1 g) by mouth 3 times daily, Disp: 90 tablet, Rfl: 0     pantoprazole (PROTONIX) 40 MG EC tablet, Take 1 tablet (40 mg) by mouth 2 times daily . After taking this twice daily for 8 weeks, you can decrease dose to once daily., Disp: 60 tablet, Rfl: 1     loratadine (CLARITIN) 10 MG tablet, Take 10 mg by mouth daily, Disp: , Rfl:      prochlorperazine (COMPAZINE) 10 MG tablet, Take 1 tablet (10 mg) by mouth every 6 hours as needed (Nausea/Vomiting), Disp: 30 tablet, Rfl: 2     oxyCODONE (ROXICODONE) 5 MG IR tablet, Take 1 tablet (5 mg) by mouth every 4 hours as needed for moderate  to severe pain, Disp: 100 tablet, Rfl: 0     amylase-lipase-protease (CREON) 34748 UNITS CPEP, Take 2 capsules (72,000 Units) by mouth 3 times daily (with meals), Disp: 180 capsule, Rfl: 1     polyethylene glycol (MIRALAX/GLYCOLAX) powder, Take 17 g (1 capful) by mouth daily, Disp: 119 g, Rfl: 11     lidocaine-prilocaine (EMLA) cream, Apply topically as needed for other (Use 30-60 minutes prior to port access) (Patient not taking: Reported on 2/28/2017), Disp: 30 g, Rfl: 0     ondansetron (ZOFRAN-ODT) 8 MG disintegrating tablet, Take 1 tablet (8 mg) by mouth every 8 hours as needed for nausea, Disp: 60 tablet, Rfl: 4  No current facility-administered medications for this visit.     Facility-Administered Medications Ordered in Other Visits:      dextrose 5% BOLUS, 250 mL, Intravenous, Once, Demond Gonsales MD, 250 mL at 02/28/17 1143     fosaprepitant (EMEND) 150 mg in NaCl 0.9 % intermittent infusion, 150 mg, Intravenous, Once, Demond Gonsales MD, 150 mg at 02/28/17 1143     palonosetron (ALOXI) injection 0.25 mg, 0.25 mg, Intravenous, Once, Demond Gonsales MD, 0.25 mg at 02/28/17 1143     dexamethasone (DECADRON) 12 mg in NaCl 0.9 % intermittent infusion, 12 mg, Intravenous, Once, Demond Gonsales MD, 12 mg at 02/28/17 1144    Exam: alert, appears fatigued.   Vital Signs 2/28/2017 2/28/2017   Systolic 96 103   Diastolic 71 68   Pulse 107 112   Temperature 98.4    Respirations 16    Weight (LB) 154 lb 14.4 oz    Height     BMI     Pain     O2 99      Wt Readings from Last 10 Encounters:   02/28/17 70.3 kg (154 lb 14.4 oz)   02/13/17 76.6 kg (168 lb 14.4 oz)   02/06/17 74.8 kg (164 lb 14.5 oz)   01/29/17 74.8 kg (164 lb 12.8 oz)   01/20/17 73.4 kg (161 lb 12.8 oz)   01/17/17 73.3 kg (161 lb 9.6 oz)   01/05/17 71.2 kg (156 lb 14.4 oz)   01/02/17 73 kg (161 lb)   12/20/16 75.8 kg (167 lb 3.2 oz)   12/12/16 77.7 kg (171 lb 4.8 oz)     Oropharynx is moist, no focal lesion. No icterus. Neck  supple and without adenopathy. Lungs:CTA. Heart:RRR, no murmur or rub. Abdomen: soft, nontender, BS active. Extremities: warm, no edema.     Labs:  Results for NATALIIA IBARRA (MRN [id number]) as of 2/28/2017 13:58   Ref. Range 2/28/2017 09:21   Sodium Latest Ref Range: 133 - 144 mmol/L 142   Potassium Latest Ref Range: 3.4 - 5.3 mmol/L 3.1 (L)   Chloride Latest Ref Range: 94 - 109 mmol/L 106   Carbon Dioxide Latest Ref Range: 20 - 32 mmol/L 25   Urea Nitrogen Latest Ref Range: 7 - 30 mg/dL 9   Creatinine Latest Ref Range: 0.52 - 1.04 mg/dL 0.70   GFR Estimate Latest Ref Range: >60 mL/min/1.7m2 86   GFR Estimate If Black Latest Ref Range: >60 mL/min/1.7m2 >90...   Calcium Latest Ref Range: 8.5 - 10.1 mg/dL 8.5   Anion Gap Latest Ref Range: 3 - 14 mmol/L 10   Albumin Latest Ref Range: 3.4 - 5.0 g/dL 2.6 (L)   Protein Total Latest Ref Range: 6.8 - 8.8 g/dL 7.1   Bilirubin Total Latest Ref Range: 0.2 - 1.3 mg/dL 0.4   Alkaline Phosphatase Latest Ref Range: 40 - 150 U/L 217 (H)   ALT Latest Ref Range: 0 - 50 U/L 32   AST Latest Ref Range: 0 - 45 U/L 37   Glucose Latest Ref Range: 70 - 99 mg/dL 89   WBC Latest Ref Range: 4.0 - 11.0 10e9/L 16.0 (H)   Hemoglobin Latest Ref Range: 11.7 - 15.7 g/dL 10.2 (L)   Hematocrit Latest Ref Range: 35.0 - 47.0 % 33.3 (L)   Platelet Count Latest Ref Range: 150 - 450 10e9/L 124 (L)   RBC Count Latest Ref Range: 3.8 - 5.2 10e12/L 3.91   MCV Latest Ref Range: 78 - 100 fl 85   MCH Latest Ref Range: 26.5 - 33.0 pg 26.1 (L)   MCHC Latest Ref Range: 31.5 - 36.5 g/dL 30.6 (L)   RDW Latest Ref Range: 10.0 - 15.0 % 19.0 (H)   Diff Method Unknown Automated Method   % Neutrophils Latest Units: % 85.4   % Lymphocytes Latest Units: % 7.4   % Monocytes Latest Units: % 4.8   % Eosinophils Latest Units: % 0.2   % Basophils Latest Units: % 0.3   % Immature Granulocytes Latest Units: % 1.9   Nucleated RBCs Latest Ref Range: 0 /100 0   Absolute Neutrophil Latest Ref Range: 1.6 - 8.3 10e9/L  13.6 (H)   Absolute Lymphocytes Latest Ref Range: 0.8 - 5.3 10e9/L 1.2   Absolute Monocytes Latest Ref Range: 0.0 - 1.3 10e9/L 0.8   Absolute Eosinophils Latest Ref Range: 0.0 - 0.7 10e9/L 0.0   Absolute Basophils Latest Ref Range: 0.0 - 0.2 10e9/L 0.1   Abs Immature Granulocytes Latest Ref Range: 0 - 0.4 10e9/L 0.3   Absolute Nucleated RBC Unknown 0.0       Impression/plan:  1. Metastatic pancreatic cancer-due for FOLFIRINOX today. Overall, feels she is tolerating treatment OK, but feels the weakness relates to the diarrhea in the last 24 hours.   -Has been responding to treatment. Will plan to hold treatment today and resume in 1 week.  I will plan to see her with the following cycle to assess her toxicities. We need to be careful about progressive neuropathy as she has some neuropathy extending up the legs.    2. GI: diarrhea/constipation: Diarrhea--likely related to laxative use-- resolved now. Will monitor for recurrent constipation and add stool softeners back  -Duodenal ulcer/h. Pylori + completed abx therapy,  -now on carafate tid, ranitidine 150 mg at HS, protonix bid through the end of March, then decrease to daily dosing.    3. Hypokalemia: secondary to diarrhea. Replaced IV today. Continue oral KDUR 20 meq daily. Also given 1 liter NS today.    4. Abdominal pain: secondary to malignancy. Stable  -MS Contin 15 mg q 12 hours, oxycodone 5 mg every 4 hours as needed for moderate/severe pain    5. Anorexia: improved on marinol 5 mg bid. Also taking ensure clear tid. Refill given today.

## 2017-02-28 NOTE — PATIENT INSTRUCTIONS
Contact Numbers    [facility] Main Line: [phone]  [facility] Triage:  [phone]    Call triage with chills and/or temperature greater than or equal to 100.5, uncontrolled nausea/vomiting, diarrhea, constipation, dizziness, shortness of breath, chest pain, bleeding, unexplained bruising, or any new/concerning symptoms, questions/concerns.     If you are having any concerning symptoms or wish to speak to a provider before your next infusion visit, please call your care coordinator or triage to notify them so we can adequately serve you.       After Hours: [phone]    If after hours, weekends, or holidays, call main hospital  and ask for Oncology doctor on call.         February 2017 Sunday Monday Tuesday Wednesday Thursday Friday Saturday                  1     2     3     TELEMEDICINE    1:00 PM   (60 min.)   Nevin Archibald [facility] Medication Therapy Management 4       5     6     [facility] DRAW    9:00 AM   (15 min.)    [facility]   [facility] Lab Draw     [facility] RETURN    9:15 AM   (50 min.)   Ester Echavarria, APRN CNP   [facility] 7     8     9     10     MR ABDOMEN WWO   10:30 AM   (45 min.)   [address] [place] [address] MRI     CT CHEST WO   11:25 AM   (20 min.)   [address] [place] [address] CT 11       12     13     [facility] DRAW    9:45 AM   (15 min.)   [facility]   [facility] Lab Draw     [facility] RETURN   10:00 AM   (30 min.)   Demond Gonsales MD   [facility]     [facility] ONC INFUSION 360   11:30 AM   (360 min.)   [facility] ONCOL[facility]Y INFUSION   [facility] 14     15     16     [facility] ONC INFUSION 120    3:00 PM   (120 min.)   [facility] INFUSION   [facility] 17     18       19     20     21     22     23     24     25       26     27     28     [facility] DRAW    9:00 AM   (15 min.)   [facility] DRAW   [facility] Lab Draw     [facility] ONC INFUSION 360    9:30 AM   (360  min.)   [facility] ONCOL[facility] INFUSION   [facility]     [facility] RETURN   10:45 AM   (50 min.)   Ester Echavarria APRN CNP   [facility]                                March 2017 Sunday Monday Tuesday Wednesday Thursday Friday Saturday                  1     2     3     4       5     6     7     [facility] DRAW    7:30 AM   (15 min.)    [facility] DRAW   [facility] Lab Draw     [facility] RETURN    7:45 AM   (50 min.)   Ester Echavarria APRN CNP   [facility]     [facility] ONC INFUSION 360    8:30 AM   (360 min.)   [facility] ONCOLOGY INFUSION   [facility] 8     9     10     11       12     13     14     15     16     17     18       19     20     [facility] DRAW    8:00 AM   (15 min.)    [facility] DRAW   [facility] Lab Draw     P ONC INFUSION 360    8:30 AM   (360 min.)    ONCOLOGY INFUSION   [facility] 21     22     23     24     25       26     27     28     29     30     31                       Lab Results:  Recent Results (from the past 12 hour(s))   Comprehensive metabolic panel    Collection Time: 02/28/17  9:21 AM   Result Value Ref Range    Sodium 142 133 - 144 mmol/L    Potassium 3.1 (L) 3.4 - 5.3 mmol/L    Chloride 106 94 - 109 mmol/L    Carbon Dioxide 25 20 - 32 mmol/L    Anion Gap 10 3 - 14 mmol/L    Glucose 89 70 - 99 mg/dL    Urea Nitrogen 9 7 - 30 mg/dL    Creatinine 0.70 0.52 - 1.04 mg/dL    GFR Estimate 86 >60 mL/min/1.7m2    GFR Estimate If Black >90   GFR Calc   >60 mL/min/1.7m2    Calcium 8.5 8.5 - 10.1 mg/dL    Bilirubin Total 0.4 0.2 - 1.3 mg/dL    Albumin 2.6 (L) 3.4 - 5.0 g/dL    Protein Total 7.1 6.8 - 8.8 g/dL    Alkaline Phosphatase 217 (H) 40 - 150 U/L    ALT 32 0 - 50 U/L    AST 37 0 - 45 U/L   CBC with platelets differential    Collection Time: 02/28/17  9:21 AM   Result Value Ref Range    WBC 16.0 (H) 4.0 - 11.0 10e9/L    RBC Count 3.91 3.8 - 5.2 10e12/L    Hemoglobin 10.2  (L) 11.7 - 15.7 g/dL    Hematocrit 33.3 (L) 35.0 - 47.0 %    MCV 85 78 - 100 fl    MCH 26.1 (L) 26.5 - 33.0 pg    MCHC 30.6 (L) 31.5 - 36.5 g/dL    RDW 19.0 (H) 10.0 - 15.0 %    Platelet Count 124 (L) 150 - 450 10e9/L    Diff Method Automated Method     % Neutrophils 85.4 %    % Lymphocytes 7.4 %    % Monocytes 4.8 %    % Eosinophils 0.2 %    % Basophils 0.3 %    % Immature Granulocytes 1.9 %    Nucleated RBCs 0 0 /100    Absolute Neutrophil 13.6 (H) 1.6 - 8.3 10e9/L    Absolute Lymphocytes 1.2 0.8 - 5.3 10e9/L    Absolute Monocytes 0.8 0.0 - 1.3 10e9/L    Absolute Eosinophils 0.0 0.0 - 0.7 10e9/L    Absolute Basophils 0.1 0.0 - 0.2 10e9/L    Abs Immature Granulocytes 0.3 0 - 0.4 10e9/L    Absolute Nucleated RBC 0.0

## 2017-02-28 NOTE — NURSING NOTE
Chief Complaint   Patient presents with     Port Draw     labs drawn from port by RN     Port accessed, labs drawn, flushed with NS & heparin.  Patient checked in for infusion visit.  Nevin Allen RN

## 2017-03-02 LAB — CANCER AG19-9 SERPL-ACNC: 7251

## 2017-03-07 ENCOUNTER — ONCOLOGY VISIT (OUTPATIENT)
Dept: ONCOLOGY | Facility: CLINIC | Age: 57
End: 2017-03-07
Attending: INTERNAL MEDICINE
Payer: COMMERCIAL

## 2017-03-07 VITALS
DIASTOLIC BLOOD PRESSURE: 74 MMHG | HEART RATE: 104 BPM | TEMPERATURE: 98.8 F | BODY MASS INDEX: 23.52 KG/M2 | RESPIRATION RATE: 14 BRPM | SYSTOLIC BLOOD PRESSURE: 111 MMHG | WEIGHT: 163.9 LBS | OXYGEN SATURATION: 100 %

## 2017-03-07 DIAGNOSIS — C25.0 MALIGNANT NEOPLASM OF HEAD OF PANCREAS (H): ICD-10-CM

## 2017-03-07 DIAGNOSIS — B96.81 DUODENAL ULCER DUE TO HELICOBACTER PYLORI: ICD-10-CM

## 2017-03-07 DIAGNOSIS — C78.7 SECONDARY MALIGNANT NEOPLASM OF LIVER (H): ICD-10-CM

## 2017-03-07 DIAGNOSIS — R18.8 OTHER ASCITES: ICD-10-CM

## 2017-03-07 DIAGNOSIS — K26.9 DUODENAL ULCER DUE TO HELICOBACTER PYLORI: ICD-10-CM

## 2017-03-07 DIAGNOSIS — E87.6 HYPOKALEMIA: Primary | ICD-10-CM

## 2017-03-07 DIAGNOSIS — Z29.9 NEED FOR PROPHYLACTIC MEASURE: Primary | ICD-10-CM

## 2017-03-07 LAB
ALBUMIN SERPL-MCNC: 2.4 G/DL (ref 3.4–5)
ALP SERPL-CCNC: 920 U/L (ref 40–150)
ALT SERPL W P-5'-P-CCNC: 123 U/L (ref 0–50)
ANION GAP SERPL CALCULATED.3IONS-SCNC: 7 MMOL/L (ref 3–14)
AST SERPL W P-5'-P-CCNC: 294 U/L (ref 0–45)
BASOPHILS # BLD AUTO: 0.1 10E9/L (ref 0–0.2)
BASOPHILS NFR BLD AUTO: 0.3 %
BILIRUB SERPL-MCNC: 0.7 MG/DL (ref 0.2–1.3)
BUN SERPL-MCNC: 9 MG/DL (ref 7–30)
CALCIUM SERPL-MCNC: 8.2 MG/DL (ref 8.5–10.1)
CHLORIDE SERPL-SCNC: 108 MMOL/L (ref 94–109)
CO2 SERPL-SCNC: 28 MMOL/L (ref 20–32)
CREAT SERPL-MCNC: 0.63 MG/DL (ref 0.52–1.04)
DIFFERENTIAL METHOD BLD: ABNORMAL
EOSINOPHIL # BLD AUTO: 0 10E9/L (ref 0–0.7)
EOSINOPHIL NFR BLD AUTO: 0 %
ERYTHROCYTE [DISTWIDTH] IN BLOOD BY AUTOMATED COUNT: 19.3 % (ref 10–15)
GFR SERPL CREATININE-BSD FRML MDRD: ABNORMAL ML/MIN/1.7M2
GLUCOSE SERPL-MCNC: 83 MG/DL (ref 70–99)
HCT VFR BLD AUTO: 32 % (ref 35–47)
HGB BLD-MCNC: 9.9 G/DL (ref 11.7–15.7)
IMM GRANULOCYTES # BLD: 0.2 10E9/L (ref 0–0.4)
IMM GRANULOCYTES NFR BLD: 1.6 %
LYMPHOCYTES # BLD AUTO: 1.4 10E9/L (ref 0.8–5.3)
LYMPHOCYTES NFR BLD AUTO: 9.4 %
MCH RBC QN AUTO: 26.2 PG (ref 26.5–33)
MCHC RBC AUTO-ENTMCNC: 30.9 G/DL (ref 31.5–36.5)
MCV RBC AUTO: 85 FL (ref 78–100)
MONOCYTES # BLD AUTO: 1 10E9/L (ref 0–1.3)
MONOCYTES NFR BLD AUTO: 7.2 %
NEUTROPHILS # BLD AUTO: 11.8 10E9/L (ref 1.6–8.3)
NEUTROPHILS NFR BLD AUTO: 81.5 %
NRBC # BLD AUTO: 0 10*3/UL
NRBC BLD AUTO-RTO: 0 /100
PLATELET # BLD AUTO: 189 10E9/L (ref 150–450)
POTASSIUM SERPL-SCNC: 3.3 MMOL/L (ref 3.4–5.3)
PROT SERPL-MCNC: 6.8 G/DL (ref 6.8–8.8)
RBC # BLD AUTO: 3.78 10E12/L (ref 3.8–5.2)
SODIUM SERPL-SCNC: 143 MMOL/L (ref 133–144)
WBC # BLD AUTO: 14.5 10E9/L (ref 4–11)

## 2017-03-07 PROCEDURE — 86301 IMMUNOASSAY TUMOR CA 19-9: CPT | Performed by: NURSE PRACTITIONER

## 2017-03-07 PROCEDURE — 96416 CHEMO PROLONG INFUSE W/PUMP: CPT

## 2017-03-07 PROCEDURE — 96368 THER/DIAG CONCURRENT INF: CPT

## 2017-03-07 PROCEDURE — 96376 TX/PRO/DX INJ SAME DRUG ADON: CPT

## 2017-03-07 PROCEDURE — 99214 OFFICE O/P EST MOD 30 MIN: CPT | Mod: ZP | Performed by: NURSE PRACTITIONER

## 2017-03-07 PROCEDURE — 25000125 ZZHC RX 250: Mod: ZF | Performed by: NURSE PRACTITIONER

## 2017-03-07 PROCEDURE — 96417 CHEMO IV INFUS EACH ADDL SEQ: CPT

## 2017-03-07 PROCEDURE — 96413 CHEMO IV INFUSION 1 HR: CPT

## 2017-03-07 PROCEDURE — 96375 TX/PRO/DX INJ NEW DRUG ADDON: CPT

## 2017-03-07 PROCEDURE — 96367 TX/PROPH/DG ADDL SEQ IV INF: CPT

## 2017-03-07 PROCEDURE — 85025 COMPLETE CBC W/AUTO DIFF WBC: CPT | Performed by: NURSE PRACTITIONER

## 2017-03-07 PROCEDURE — 96415 CHEMO IV INFUSION ADDL HR: CPT

## 2017-03-07 PROCEDURE — 25000128 H RX IP 250 OP 636: Mod: ZF | Performed by: NURSE PRACTITIONER

## 2017-03-07 PROCEDURE — 80053 COMPREHEN METABOLIC PANEL: CPT | Performed by: NURSE PRACTITIONER

## 2017-03-07 PROCEDURE — 99212 OFFICE O/P EST SF 10 MIN: CPT | Mod: ZF

## 2017-03-07 PROCEDURE — 96411 CHEMO IV PUSH ADDL DRUG: CPT

## 2017-03-07 RX ORDER — PALONOSETRON 0.05 MG/ML
0.25 INJECTION, SOLUTION INTRAVENOUS ONCE
Status: COMPLETED | OUTPATIENT
Start: 2017-03-07 | End: 2017-03-07

## 2017-03-07 RX ORDER — METHYLPREDNISOLONE SODIUM SUCCINATE 125 MG/2ML
125 INJECTION, POWDER, LYOPHILIZED, FOR SOLUTION INTRAMUSCULAR; INTRAVENOUS
Status: CANCELLED
Start: 2017-03-07

## 2017-03-07 RX ORDER — SODIUM CHLORIDE 9 MG/ML
1000 INJECTION, SOLUTION INTRAVENOUS CONTINUOUS PRN
Status: CANCELLED
Start: 2017-03-07

## 2017-03-07 RX ORDER — MEPERIDINE HYDROCHLORIDE 25 MG/ML
25 INJECTION INTRAMUSCULAR; INTRAVENOUS; SUBCUTANEOUS EVERY 30 MIN PRN
Status: CANCELLED | OUTPATIENT
Start: 2017-03-07

## 2017-03-07 RX ORDER — FLUOROURACIL 50 MG/ML
400 INJECTION, SOLUTION INTRAVENOUS ONCE
Status: CANCELLED | OUTPATIENT
Start: 2017-03-07

## 2017-03-07 RX ORDER — LORAZEPAM 2 MG/ML
0.5 INJECTION INTRAMUSCULAR EVERY 4 HOURS PRN
Status: CANCELLED
Start: 2017-03-07

## 2017-03-07 RX ORDER — EPINEPHRINE 0.3 MG/.3ML
0.3 INJECTION SUBCUTANEOUS EVERY 5 MIN PRN
Status: CANCELLED | OUTPATIENT
Start: 2017-03-07

## 2017-03-07 RX ORDER — FLUOROURACIL 50 MG/ML
400 INJECTION, SOLUTION INTRAVENOUS ONCE
Status: COMPLETED | OUTPATIENT
Start: 2017-03-07 | End: 2017-03-07

## 2017-03-07 RX ORDER — DIPHENHYDRAMINE HYDROCHLORIDE 50 MG/ML
50 INJECTION INTRAMUSCULAR; INTRAVENOUS
Status: CANCELLED
Start: 2017-03-07

## 2017-03-07 RX ORDER — ALBUTEROL SULFATE 90 UG/1
1-2 AEROSOL, METERED RESPIRATORY (INHALATION)
Status: CANCELLED
Start: 2017-03-07

## 2017-03-07 RX ORDER — HEPARIN SODIUM (PORCINE) LOCK FLUSH IV SOLN 100 UNIT/ML 100 UNIT/ML
500 SOLUTION INTRAVENOUS ONCE
Status: COMPLETED | OUTPATIENT
Start: 2017-03-07 | End: 2017-03-07

## 2017-03-07 RX ORDER — ALBUTEROL SULFATE 0.83 MG/ML
2.5 SOLUTION RESPIRATORY (INHALATION)
Status: CANCELLED | OUTPATIENT
Start: 2017-03-07

## 2017-03-07 RX ORDER — PALONOSETRON 0.05 MG/ML
0.25 INJECTION, SOLUTION INTRAVENOUS ONCE
Status: CANCELLED | OUTPATIENT
Start: 2017-03-07

## 2017-03-07 RX ADMIN — DEXAMETHASONE SODIUM PHOSPHATE 12 MG: 10 INJECTION, SOLUTION INTRAMUSCULAR; INTRAVENOUS at 09:06

## 2017-03-07 RX ADMIN — LEUCOVORIN CALCIUM 750 MG: 350 INJECTION, POWDER, LYOPHILIZED, FOR SOLUTION INTRAMUSCULAR; INTRAVENOUS at 11:57

## 2017-03-07 RX ADMIN — POTASSIUM CHLORIDE: 149 INJECTION, SOLUTION, CONCENTRATE INTRAVENOUS at 09:55

## 2017-03-07 RX ADMIN — OXALIPLATIN 167 MG: 5 INJECTION, SOLUTION, CONCENTRATE INTRAVENOUS at 09:55

## 2017-03-07 RX ADMIN — ATROPINE SULFATE 0.4 MG: 0.4 INJECTION, SOLUTION INTRAMUSCULAR; INTRAVENOUS; SUBCUTANEOUS at 13:03

## 2017-03-07 RX ADMIN — DEXTROSE MONOHYDRATE 340 MG: 50 INJECTION, SOLUTION INTRAVENOUS at 11:57

## 2017-03-07 RX ADMIN — SODIUM CHLORIDE 150 MG: 9 INJECTION, SOLUTION INTRAVENOUS at 09:20

## 2017-03-07 RX ADMIN — ATROPINE SULFATE 0.4 MG: 0.4 INJECTION, SOLUTION INTRAMUSCULAR; INTRAVENOUS; SUBCUTANEOUS at 11:57

## 2017-03-07 RX ADMIN — PALONOSETRON HYDROCHLORIDE 0.25 MG: 0.25 INJECTION INTRAVENOUS at 09:06

## 2017-03-07 RX ADMIN — SODIUM CHLORIDE, PRESERVATIVE FREE 500 UNITS: 5 INJECTION INTRAVENOUS at 07:58

## 2017-03-07 RX ADMIN — FLUOROURACIL 790 MG: 50 INJECTION, SOLUTION INTRAVENOUS at 13:47

## 2017-03-07 NOTE — MR AVS SNAPSHOT
After Visit Summary   3/7/2017    Shirin Muller    MRN: [id number]           Patient Information     Date Of Birth          1960        Visit Information        Provider Department      3/7/2017 8:30 AM UC 20 ATC;  ONCOLOGY INFUSION [facility]ly [facility]        Today's Diagnoses     Need for prophylactic measure    -  1    Malignant neoplasm of head of pancreas (H)          Care Instructions    Contact Numbers    [facility] Main Line: [phone]  [facility] Triage:  [phone]    Call triage with chills and/or temperature greater than or equal to 100.5, uncontrolled nausea/vomiting, diarrhea, constipation, dizziness, shortness of breath, chest pain, bleeding, unexplained bruising, or any new/concerning symptoms, questions/concerns.     If you are having any concerning symptoms or wish to speak to a provider before your next infusion visit, please call your care coordinator or triage to notify them so we can adequately serve you.       After Hours: [phone]    If after hours, weekends, or holidays, call main hospital  and ask for Oncology doctor on call.         March 2017 Sunday Monday Tuesday Wednesday Thursday Friday Saturday                  1     2     3     4       5     6     7     [facility] D[facility]    7:30 AM   (15 min.)    [facility] DRAW   [facility] Lab Draw     [facility] RETURN    7:45 AM   (50 min.)   Ester Echavarria APRN CNP   [facility]     [facility] ONC INFUSION 360    8:30 AM   (360 min.)    ONCOLOGY INFUSION   [facility] 8     9     10     11       12     13     14     15     16     17     18       19     20     [facility] DRAW    8:00 AM   (15 min.)    [facility] DRAW   [facility] Lab Draw     [facility] ONC INFUSION 360    8:30 AM   (360 min.)    ONCOLOGY INFUSION   [facility]     [facility] RETURN    1:35 PM   (50 min.)   Ester Echavarria APRN CNP   [facility]  21     22     23     24     25       26     27     28     29     30     31 April 2017 Sunday Monday Tuesday Wednesday Thursday Friday Saturday                                 1       2     3     [facility] DRAW    8:00 AM   (15 min.)    [facility] DRAW   [facility] Draw     [facility] ONC INFUSION 360    8:30 AM   (360 min.)    ONCOLOGY INFUSION   [facility] 4     5     6     7     8       9     10  Happy Birthday!     11     12     13     14     [facility] DRAW    8:45 AM   (15 min.)    [facility] DRAW   [facility] Draw     MR ABDOMEN WWO    9:00 AM   (45 min.)   UCMR1   [facility] MRI     CT CHEST WO   10:05 AM   (20 min.)   UCCT2   [facility] CT 15       16     17     [facility] DRAW    8:00 AM   (15 min.)    [facility] LAB DRAW   [facility] Draw     [facility] ONC INFUSION 360    8:30 AM   (360 min.)    ONCOLOGY INFUSION   [facility]     [facility] RETURN    4:45 PM   (30 min.)   Demond Gonsales MD   [facility] 18     19     20     21     22       23     24     25     26     27     28     29       30                                                Lab Results:  Recent Results (from the past 12 hour(s))   Comprehensive metabolic panel    Collection Time: 03/07/17  8:07 AM   Result Value Ref Range    Sodium 143 133 - 144 mmol/L    Potassium 3.3 (L) 3.4 - 5.3 mmol/L    Chloride 108 94 - 109 mmol/L    Carbon Dioxide 28 20 - 32 mmol/L    Anion Gap 7 3 - 14 mmol/L    Glucose 83 70 - 99 mg/dL    Urea Nitrogen 9 7 - 30 mg/dL    Creatinine 0.63 0.52 - 1.04 mg/dL    GFR Estimate >90  Non  GFR Calc   >60 mL/min/1.7m2    GFR Estimate If Black >90   GFR Calc   >60 mL/min/1.7m2    Calcium 8.2 (L) 8.5 - 10.1 mg/dL    Bilirubin Total 0.7 0.2 - 1.3 mg/dL    Albumin 2.4 (L) 3.4 - 5.0 g/dL    Protein Total 6.8 6.8 - 8.8 g/dL    Alkaline Phosphatase 920  (H) 40 - 150 U/L     (H) 0 - 50 U/L     (H) 0 - 45 U/L   CBC with platelets differential    Collection Time: 03/07/17  8:07 AM   Result Value Ref Range    WBC 14.5 (H) 4.0 - 11.0 10e9/L    RBC Count 3.78 (L) 3.8 - 5.2 10e12/L    Hemoglobin 9.9 (L) 11.7 - 15.7 g/dL    Hematocrit 32.0 (L) 35.0 - 47.0 %    MCV 85 78 - 100 fl    MCH 26.2 (L) 26.5 - 33.0 pg    MCHC 30.9 (L) 31.5 - 36.5 g/dL    RDW 19.3 (H) 10.0 - 15.0 %    Platelet Count 189 150 - 450 10e9/L    Diff Method Automated Method     % Neutrophils 81.5 %    % Lymphocytes 9.4 %    % Monocytes 7.2 %    % Eosinophils 0.0 %    % Basophils 0.3 %    % Immature Granulocytes 1.6 %    Nucleated RBCs 0 0 /100    Absolute Neutrophil 11.8 (H) 1.6 - 8.3 10e9/L    Absolute Lymphocytes 1.4 0.8 - 5.3 10e9/L    Absolute Monocytes 1.0 0.0 - 1.3 10e9/L    Absolute Eosinophils 0.0 0.0 - 0.7 10e9/L    Absolute Basophils 0.1 0.0 - 0.2 10e9/L    Abs Immature Granulocytes 0.2 0 - 0.4 10e9/L    Absolute Nucleated RBC 0.0              Follow-ups after your visit        Your next 10 appointments already scheduled     Mar 20, 2017  8:00 AM T   [facility]   [facility] Lab Draw ([facility])    [address]  [address] [employer] [address] [phone]            Mar 20, 2017  8:30 AM CDT   Infusion 360 with  ONCOLOGY INFUSION, UC 20 ATC   [facility] ([facility])    [address]  [address] [employer] [id number] [phone]            Mar 20, 2017  1:50 PM CDT   (Arrive by 1:35 PM)   Return Visit with TEE Olivas CNP   [facility] ([facility])    [address] [place] [address]  2nd Floor  [facility] [address]   [phone]            Apr 03, 2017  8:00 AM CDT   [facility] Lab Draw with  [facility] DRAW   [facility] Lab Draw ([facility])    [address] [place] [address]  [address]  [address]nd [facility] [address]   [phone]            Apr 03, 2017  8:30 AM CDT   Infusion 360 with  ONCOLOGY INFUSION, UC 19 ATC   [facility] ([facility])    [address] [place] [address]   [phone]            Apr 14, 2017  8:45 AM CDT   [facility] Lab Draw with  [facility] LAB DRAW   [facility] Lab Draw ([facility])    [address] [place] [address]   [phone]            Apr 14, 2017  9:15 AM CDT   (Arrive by 9:00 AM)   MR ABDOMEN W/O & W CONTRAST with MR1   [facility] MRI ([facility])    [address] [place] [address]   [phone]           Take your medicines as usual, unless your doctor tells you not to. Bring a list of your current medicines to your exam (including vitamins, minerals and over-the-counter drugs). Also bring the results of similar scans you may have had.    The day before your exam, drink extra fluids at least six 8-ounce glasses (unless your doctor tells you to restrict your fluids).   Have a blood test (creatinine test) within 30 days of your exam. Go to your clinic or Diagnostic Imaging Department for this test.   Do not eat or drink for 6 hours prior to exam.  The MRI machine uses a strong magnet. Please wear clothes without metal (snaps, zippers). A sweatsuit works well, or we may give you a hospital gown.  Please remove any body piercings and hair extensions before you arrive. You will also remove watches, jewelry, hairpins, wallets, dentures, partial dental plates and hearing aids. You may wear contact lenses, and you may be able to wear your rings. We have a safe place to keep your personal items, but it is safer to leave them at home.   **IMPORTANT** THE INSTRUCTIONS BELOW ARE ONLY FOR THOSE PATIENTS WHO HAVE BEEN TOLD THEY WILL RECEIVE SEDATION OR GENERAL ANESTHESIA  DURING THEIR MRI PROCEDURE:  IF YOU WILL RECEIVE SEDATION (take medicine to help you relax during your exam):   You must get the medicine from your doctor before you arrive. Bring the medicine to the exam. Do not take it at home.   Arrive one hour early. Bring someone who can take you home after the test. Your medicine will make you sleepy. After the exam, you may not drive, take a bus or take a taxi by yourself.   No eating 8 hours before your exam. You may have clear liquids up until 4 hours before your exam. (Clear liquids include water, clear tea, black coffee and fruit juice without pulp.)  IF YOU WILL RECEIVE ANESTHESIA (be asleep for your exam):   Arrive 1 1/2 hours early. Bring someone who can take you home after the test. You may not drive, take a bus or take a taxi by yourself.   No eating 8 hours before your exam. You may have clear liquids up until 4 hours before your exam. (Clear liquids include water, clear tea, black coffee and fruit juice without pulp.)  If you have any questions, please contact your Imaging Department exam site.            Apr 14, 2017 10:20 AM CDT   (Arrive by 10:05 AM)   CT CHEST W/O CONTRAST with UCCT2   [facility] [place] [facility] ([facility])    [address] [address] [phone]           Please bring any scans or X-rays taken at other hospitals, if similar tests were done. Also bring a list of your medicines, including vitamins, minerals and over-the-counter drugs. It is safest to leave personal items at home.  Be sure to tell your doctor:   If you have any allergies.   If there s any chance you are pregnant.   If you are breastfeeding.   If you have any special needs.  You do not need to do anything special to prepare.  Please wear loose clothing, such as a sweat suit or jogging clothes. Avoid snaps, zippers and other metal. We may ask you to undress and put on a hospital gown.              Who to contact     If  you have questions or need follow up information about today's clinic visit or your schedule please contact [facility] directly at [phone].  Normal or non-critical lab and imaging results will be communicated to you by MyChart, letter or phone within 4 business days after the clinic has received the results. If you do not hear from us within 7 days, please contact the clinic through [employer] or phone. If you have a critical or abnormal lab result, we will notify you by phone as soon as possible.  Submit refill requests through [employer] or call your pharmacy and they will forward the refill request to us. Please allow 3 business days for your refill to be completed.          Additional Information About Your Visit        [employer] Information     [employer] gives you secure access to your electronic health record. If you see a primary care provider, you can also send messages to your care team and make appointments. If you have questions, please call your primary care clinic.  If you do not have a primary care provider, please call [phone] and they will assist you.        Care EveryWhere ID     This is your Care EveryWhere ID. This could be used by other organizations to access your [facility] medical records  [id number]         Blood Pressure from Last 3 Encounters:   03/07/17 111/74   02/28/17 103/68   02/16/17 100/64    Weight from Last 3 Encounters:   03/07/17 74.3 kg (163 lb 14.4 oz)   02/28/17 70.3 kg (154 lb 14.4 oz)   02/13/17 76.6 kg (168 lb 14.4 oz)              We Performed the Following     Cancer antigen 19-9     CBC with platelets differential     Comprehensive metabolic panel     MD Instruction for Protocol     Road Map Alert     Treatment Conditions        Primary Care Provider    [facility]       No address on file        Thank you!     Thank you for choosing [facility]  for your care. Our goal is always to provide you with excellent care.  Hearing back from our patients is one way we can continue to improve our services. Please take a few minutes to complete the written survey that you may receive in the mail after your visit with us. Thank you!             Your Updated Medication List - Protect others around you: Learn how to safely use, store and throw away your medicines at www.disposemymeds.org.          This list is accurate as of: 3/7/17  1:16 PM.  Always use your most recent med list.                   Brand Name Dispense Instructions for use    amylase-lipase-protease 24350 UNITS Cpep    CREON    180 capsule    Take 2 capsules (72,000 Units) by mouth 3 times daily (with meals)       dexamethasone 4 MG tablet    DECADRON    3 tablet    Take 1 tablet (4 mg) by mouth daily (with breakfast) for 3 days       diltiazem 2% in PLO cream (FV COMPOUNDED) 2% Gel     30 g    Apply topically daily and as needed to external hemorrhoids       docusate sodium 100 MG capsule    COLACE    100 capsule    Take 1 capsule (100 mg) by mouth daily       dronabinol 5 MG capsule    MARINOL    60 capsule    Take 1 capsule (5 mg) by mouth 2 times daily (before meals)       ENSURE CLEAR Liqd     90 Bottle    Take 1 Bottle by mouth 3 times daily       lidocaine-prilocaine cream    EMLA    30 g    Apply topically as needed for other (Use 30-60 minutes prior to port access)       loratadine 10 MG tablet    CLARITIN     Take 10 mg by mouth daily       LORazepam 0.5 MG tablet    ATIVAN    30 tablet    Take 1 tablet (0.5 mg) by mouth every 4 hours as needed (Anxiety, Nausea/Vomiting or Sleep)       morphine 15 MG 12 hr tablet    MS CONTIN    60 tablet    Take 1 tablet (15 mg) by mouth every 12 hours       ondansetron 8 MG ODT tab    ZOFRAN-ODT    60 tablet    Take 1 tablet (8 mg) by mouth every 8 hours as needed for nausea       oxyCODONE 5 MG IR tablet    ROXICODONE    100 tablet    Take 1 tablet (5 mg) by mouth every 4 hours as needed for moderate to severe pain        pantoprazole 40 MG EC tablet    PROTONIX    60 tablet    Take 1 tablet (40 mg) by mouth 2 times daily . After taking this twice daily for 8 weeks, you can decrease dose to once daily.       * polyethylene glycol powder    MIRALAX/GLYCOLAX    119 g    Take 17 g (1 capful) by mouth daily       * polyethylene glycol powder    MIRALAX/GLYCOLAX    119 g    Take 17 g (1 capful) by mouth daily       potassium chloride SA 20 MEQ CR tablet    potassium chloride    60 tablet    Take 1 tablet (20 mEq) by mouth daily       prochlorperazine 10 MG tablet    COMPAZINE    30 tablet    Take 1 tablet (10 mg) by mouth every 6 hours as needed (Nausea/Vomiting)       sucralfate 1 GM tablet    CARAFATE    90 tablet    Take 1 tablet (1 g) by mouth 3 times daily       * Notice:  This list has 2 medication(s) that are the same as other medications prescribed for you. Read the directions carefully, and ask your doctor or other care provider to review them with you.

## 2017-03-07 NOTE — MR AVS SNAPSHOT
After Visit Summary   3/7/2017    Shirin Muller    MRN: [id number]           Patient Information     Date Of Birth          1960        Visit Information        Provider Department      3/7/2017 8:00 AM Ester Echavarria APRN CNP [facility]        Today's Diagnoses     Hypokalemia    -  1    Malignant neoplasm of head of pancreas (H)        Duodenal ulcer due to Helicobacter pylori        Secondary malignant neoplasm of liver (H)        Other ascites           Follow-ups after your visit        Your next 10 appointments already scheduled     Mar 20, 2017  8:00 AM CDT   [facility] Draw with UC [facility]IC LAB DRAW   [facility] ([facility])    [address] [facility]  2nd [facility] [address]   [phone]            Mar 20, 2017  8:30 AM CDT   Infusion 360 with UC ONCOLOGY INFUSION, UC 20 ATC   [facility] ([facility])    [address] [place] [address]  2nd [facility] [address]   [phone]            Mar 20, 2017  1:50 PM CDT   (Arrive by 1:35 PM)   Return Visit with TEE Olivas CNP   [facility] ([facility])    [address] [facility]  2nd [facility] [address]   [phone]            Apr 03, 2017  8:00 AM CDT   [facility] Lab Draw with UC [facility]ONIC LAB DRAW   [facility] ([facility])    [address] [facility]  2nd [facility] [address]   [phone]            Apr 03, 2017  8:30 AM CDT   Infusion 360 with UC ONCOLOGY INFUSION, UC 19 ATC   [facility] ([facility])    [address] [facility]  2nd Floor  [facility] [address]   [phone]            Apr 14, 2017  8:45 AM CDT   [facility] Lab Draw with UC MASONIC LAB DRAW   [facility] ([facility])    [address] [place] [address]  [facility] [id number] [phone]            Apr 14, 2017  9:15 AM CDT   (Arrive by 9:00 AM)   MR ABDOMEN W/O & W CONTRAST with [address] [facility] ([facility])    [address] [facility]  1st [facility] [address] [phone]           Take your medicines as usual, unless your doctor tells you not to. Bring a list of your current medicines to your exam (including vitamins, minerals and over-the-counter drugs). Also bring the results of similar scans you may have had.    The day before your exam, drink extra fluids at least six 8-ounce glasses (unless your doctor tells you to restrict your fluids).   Have a blood test (creatinine test) within 30 days of your exam. Go to your clinic or Diagnostic Imaging Department for this test.   Do not eat or drink for 6 hours prior to exam.  The MRI machine uses a strong magnet. Please wear clothes without metal (snaps, zippers). A sweatsuit works well, or we may give you a hospital gown.  Please remove any body piercings and hair extensions before you arrive. You will also remove watches, jewelry, hairpins, wallets, dentures, partial dental plates and hearing aids. You may wear contact lenses, and you may be able to wear your rings. We have a safe place to keep your personal items, but it is safer to leave them at home.   **IMPORTANT** THE INSTRUCTIONS BELOW ARE ONLY FOR THOSE PATIENTS WHO HAVE BEEN TOLD THEY WILL RECEIVE SEDATION OR GENERAL ANESTHESIA DURING THEIR MRI PROCEDURE:  IF YOU WILL RECEIVE SEDATION (take medicine to help you relax during your exam):   You must get the medicine from your doctor before you arrive. Bring the medicine to the exam. Do not take it at home.   Arrive one hour early. Bring someone who can take you home after the test. Your medicine will make you sleepy. After the exam, you may not drive, take a bus or take a taxi by yourself.   No eating 8 hours before your exam. You may have  clear liquids up until 4 hours before your exam. (Clear liquids include water, clear tea, black coffee and fruit juice without pulp.)  IF YOU WILL RECEIVE ANESTHESIA (be asleep for your exam):   Arrive 1 1/2 hours early. Bring someone who can take you home after the test. You may not drive, take a bus or take a taxi by yourself.   No eating 8 hours before your exam. You may have clear liquids up until 4 hours before your exam. (Clear liquids include water, clear tea, black coffee and fruit juice without pulp.)  If you have any questions, please contact your Imaging Department exam site.            Apr 14, 2017 10:20 AM CDT   (Arrive by 10:05 AM)   CT CHEST W/O CONTRAST with UCCT2   [facility] [place] [facility] ([facility])    [address] [address] [phone]           Please bring any scans or X-rays taken at other hospitals, if similar tests were done. Also bring a list of your medicines, including vitamins, minerals and over-the-counter drugs. It is safest to leave personal items at home.  Be sure to tell your doctor:   If you have any allergies.   If there s any chance you are pregnant.   If you are breastfeeding.   If you have any special needs.  You do not need to do anything special to prepare.  Please wear loose clothing, such as a sweat suit or jogging clothes. Avoid snaps, zippers and other metal. We may ask you to undress and put on a hospital gown.              Who to contact     If you have questions or need follow up information about today's clinic visit or your schedule please contact [facility] directly at [phone].  Normal or non-critical lab and imaging results will be communicated to you by MyChart, letter or phone within 4 business days after the clinic has received the results. If you do not hear from us within 7 days, please contact the clinic through MyChart or phone. If you have a critical or abnormal lab  result, we will notify you by phone as soon as possible.  Submit refill requests through [employer] or call your pharmacy and they will forward the refill request to us. Please allow 3 business days for your refill to be completed.          Additional Information About Your Visit        [employer] Information     [employer] gives you secure access to your electronic health record. If you see a primary care provider, you can also send messages to your care team and make appointments. If you have questions, please call your primary care clinic.  If you do not have a primary care provider, please call [phone] and they will assist you.        Care EveryWhere ID     This is your Care EveryWhere ID. This could be used by other organizations to access your [place] medical records  [id number]        Your Vitals Were     Pulse Temperature Respirations Pulse Oximetry BMI (Body Mass Index)       104 98.8  F (37.1  C) (Oral) 14 100% 23.52 kg/m2        Blood Pressure from Last 3 Encounters:   03/07/17 111/74   02/28/17 103/68   02/16/17 100/64    Weight from Last 3 Encounters:   03/07/17 74.3 kg (163 lb 14.4 oz)   02/28/17 70.3 kg (154 lb 14.4 oz)   02/13/17 76.6 kg (168 lb 14.4 oz)              Today, you had the following     No orders found for display       Primary Care Provider    [facility]       No address on file        Thank you!     Thank you for choosing [facility]  for your care. Our goal is always to provide you with excellent care. Hearing back from our patients is one way we can continue to improve our services. Please take a few minutes to complete the written survey that you may receive in the mail after your visit with us. Thank you!             Your Updated Medication List - Protect others around you: Learn how to safely use, store and throw away your medicines at www.disposemymeds.org.          This list is accurate as of: 3/7/17  9:30 AM.  Always use your most recent med  list.                   Brand Name Dispense Instructions for use    amylase-lipase-protease 82250 UNITS Cpep    CREON    180 capsule    Take 2 capsules (72,000 Units) by mouth 3 times daily (with meals)       dexamethasone 4 MG tablet    DECADRON    3 tablet    Take 1 tablet (4 mg) by mouth daily (with breakfast) for 3 days       diltiazem 2% in PLO cream (FV COMPOUNDED) 2% Gel     30 g    Apply topically daily and as needed to external hemorrhoids       docusate sodium 100 MG capsule    COLACE    100 capsule    Take 1 capsule (100 mg) by mouth daily       dronabinol 5 MG capsule    MARINOL    60 capsule    Take 1 capsule (5 mg) by mouth 2 times daily (before meals)       ENSURE CLEAR Liqd     90 Bottle    Take 1 Bottle by mouth 3 times daily       lidocaine-prilocaine cream    EMLA    30 g    Apply topically as needed for other (Use 30-60 minutes prior to port access)       loratadine 10 MG tablet    CLARITIN     Take 10 mg by mouth daily       LORazepam 0.5 MG tablet    ATIVAN    30 tablet    Take 1 tablet (0.5 mg) by mouth every 4 hours as needed (Anxiety, Nausea/Vomiting or Sleep)       morphine 15 MG 12 hr tablet    MS CONTIN    60 tablet    Take 1 tablet (15 mg) by mouth every 12 hours       ondansetron 8 MG ODT tab    ZOFRAN-ODT    60 tablet    Take 1 tablet (8 mg) by mouth every 8 hours as needed for nausea       oxyCODONE 5 MG IR tablet    ROXICODONE    100 tablet    Take 1 tablet (5 mg) by mouth every 4 hours as needed for moderate to severe pain       pantoprazole 40 MG EC tablet    PROTONIX    60 tablet    Take 1 tablet (40 mg) by mouth 2 times daily . After taking this twice daily for 8 weeks, you can decrease dose to once daily.       * polyethylene glycol powder    MIRALAX/GLYCOLAX    119 g    Take 17 g (1 capful) by mouth daily       * polyethylene glycol powder    MIRALAX/GLYCOLAX    119 g    Take 17 g (1 capful) by mouth daily       potassium chloride SA 20 MEQ CR tablet    potassium chloride    60  tablet    Take 1 tablet (20 mEq) by mouth daily       prochlorperazine 10 MG tablet    COMPAZINE    30 tablet    Take 1 tablet (10 mg) by mouth every 6 hours as needed (Nausea/Vomiting)       sucralfate 1 GM tablet    CARAFATE    90 tablet    Take 1 tablet (1 g) by mouth 3 times daily       * Notice:  This list has 2 medication(s) that are the same as other medications prescribed for you. Read the directions carefully, and ask your doctor or other care provider to review them with you.

## 2017-03-07 NOTE — PROGRESS NOTES
Ms. Shirin Meyer is a 56-year-old woman with metastatic pancreatic cancer.  I am seeing her in follow-up of last week's add on visit for weakness. She was deferred from FOLFIRINOX and returns today for consideration of treatment.      Oncology HPI:  She was diagnosed in the spring of 2016 after presenting with a 2 to 3-month history of abdominal pain and weight loss.  She underwent evaluation showing a 5 cm mass at the head of the pancreas.  Biopsy was consistent with adenocarcinoma.  She underwent staging imaging including an MRI of the abdomen, which then showed an up to 10 cm poorly defined hypoenhancing mass arising from the pancreatic head which encased celiac artery, superior mesenteric artery and severely attenuated the main portal vein.  She initiated on treatment with gemcitabine and Abraxane on 05/02/2016 and continued on that until December 2016 when she was found to have metastatic disease involving the liver.  She was then initiated on FOLFIRINOX on 12/20/16. Her first cycle was complicated by fatigue, nausea/vomiting. Antiemetics were adjusted with cycle 2. After cycle 3, she was admitted with weakness and dizziness. She was found to have a GI bleed secondary to a bleeding duodenal ulcer and infiltrating mass in the duodenum. The ulcer was injected and clipped. She was found to be H. Pylori positive and was initiated on  therapy with PPI, carafate, amoxicillin and clarithromycin.    Interval history:  Shirin is feeling better than last week. Appetite improved and she is drinking fluids well. No further diarrhea. She has more of a tendency towards constipation, so resumed the colace. Neuropathy in the feet is the same. No change to the right anterio-lateral thigh numbness. No fevers/chills. No cough. Feels at times, she has to catch her breath. No chest pain. Has noted increased abdominal bloating. Urination normal. No leg edema. No skin rash or bruising.    Current Outpatient Prescriptions:       Nutritional Supplements (ENSURE CLEAR) LIQD, Take 1 Bottle by mouth 3 times daily, Disp: 90 Bottle, Rfl: 6     morphine (MS CONTIN) 15 MG 12 hr tablet, Take 1 tablet (15 mg) by mouth every 12 hours, Disp: 60 tablet, Rfl: 0     dronabinol (MARINOL) 5 MG capsule, Take 1 capsule (5 mg) by mouth 2 times daily (before meals), Disp: 60 capsule, Rfl: 0     polyethylene glycol (MIRALAX/GLYCOLAX) powder, Take 17 g (1 capful) by mouth daily, Disp: 119 g, Rfl: 11     LORazepam (ATIVAN) 0.5 MG tablet, Take 1 tablet (0.5 mg) by mouth every 4 hours as needed (Anxiety, Nausea/Vomiting or Sleep), Disp: 30 tablet, Rfl: 2     potassium chloride SA (POTASSIUM CHLORIDE) 20 MEQ CR tablet, Take 1 tablet (20 mEq) by mouth daily, Disp: 60 tablet, Rfl: 1     diltiazem 2% in PLO cream, FV COMPOUNDED, 2% GEL, Apply topically daily and as needed to external hemorrhoids, Disp: 30 g, Rfl: 0     docusate sodium (COLACE) 100 MG capsule, Take 1 capsule (100 mg) by mouth daily, Disp: 100 capsule, Rfl: 0     sucralfate (CARAFATE) 1 GM tablet, Take 1 tablet (1 g) by mouth 3 times daily, Disp: 90 tablet, Rfl: 0     pantoprazole (PROTONIX) 40 MG EC tablet, Take 1 tablet (40 mg) by mouth 2 times daily . After taking this twice daily for 8 weeks, you can decrease dose to once daily., Disp: 60 tablet, Rfl: 1     loratadine (CLARITIN) 10 MG tablet, Take 10 mg by mouth daily, Disp: , Rfl:      prochlorperazine (COMPAZINE) 10 MG tablet, Take 1 tablet (10 mg) by mouth every 6 hours as needed (Nausea/Vomiting), Disp: 30 tablet, Rfl: 2     oxyCODONE (ROXICODONE) 5 MG IR tablet, Take 1 tablet (5 mg) by mouth every 4 hours as needed for moderate to severe pain, Disp: 100 tablet, Rfl: 0     amylase-lipase-protease (CREON) 14945 UNITS CPEP, Take 2 capsules (72,000 Units) by mouth 3 times daily (with meals), Disp: 180 capsule, Rfl: 1     polyethylene glycol (MIRALAX/GLYCOLAX) powder, Take 17 g (1 capful) by mouth daily, Disp: 119 g, Rfl: 11      lidocaine-prilocaine (EMLA) cream, Apply topically as needed for other (Use 30-60 minutes prior to port access), Disp: 30 g, Rfl: 0     ondansetron (ZOFRAN-ODT) 8 MG disintegrating tablet, Take 1 tablet (8 mg) by mouth every 8 hours as needed for nausea, Disp: 60 tablet, Rfl: 4     dexamethasone (DECADRON) 4 MG tablet, Take 1 tablet (4 mg) by mouth daily (with breakfast) for 3 days, Disp: 3 tablet, Rfl: 0  No current facility-administered medications for this visit.     Exam:  Alert, appears in NAD. Blood pressure 111/74, pulse 104, temperature 98.8  F (37.1  C), temperature source Oral, resp. rate 14, weight 74.3 kg (163 lb 14.4 oz), SpO2 100 %.    Oropharynx is moist, no focal lesion. No icterus. Neck supple and without adenopathy. Lungs:CTA. Heart:RRR, no murmur or rub. Abdomen: soft, distended,nontender, BS active. Moderate ascites noted. No masses or organomegaly. Extremities: warm, no edema. Speech clear. CN wnl.    Labs:  Results for NATALIIA IBARRA (MRN [id number]) as of 3/7/2017 09:24   Ref. Range 3/7/2017 08:07   Sodium Latest Ref Range: 133 - 144 mmol/L 143   Potassium Latest Ref Range: 3.4 - 5.3 mmol/L 3.3 (L)   Chloride Latest Ref Range: 94 - 109 mmol/L 108   Carbon Dioxide Latest Ref Range: 20 - 32 mmol/L 28   Urea Nitrogen Latest Ref Range: 7 - 30 mg/dL 9   Creatinine Latest Ref Range: 0.52 - 1.04 mg/dL 0.63   GFR Estimate Latest Ref Range: >60 mL/min/1.7m2 >90...   GFR Estimate If Black Latest Ref Range: >60 mL/min/1.7m2 >90...   Calcium Latest Ref Range: 8.5 - 10.1 mg/dL 8.2 (L)   Anion Gap Latest Ref Range: 3 - 14 mmol/L 7   Albumin Latest Ref Range: 3.4 - 5.0 g/dL 2.4 (L)   Protein Total Latest Ref Range: 6.8 - 8.8 g/dL 6.8   Bilirubin Total Latest Ref Range: 0.2 - 1.3 mg/dL 0.7   Alkaline Phosphatase Latest Ref Range: 40 - 150 U/L 920 (H)   ALT Latest Ref Range: 0 - 50 U/L 123 (H)   AST Latest Ref Range: 0 - 45 U/L 294 (H)   Glucose Latest Ref Range: 70 - 99 mg/dL 83   WBC Latest  Ref Range: 4.0 - 11.0 10e9/L 14.5 (H)   Hemoglobin Latest Ref Range: 11.7 - 15.7 g/dL 9.9 (L)   Hematocrit Latest Ref Range: 35.0 - 47.0 % 32.0 (L)   Platelet Count Latest Ref Range: 150 - 450 10e9/L 189   RBC Count Latest Ref Range: 3.8 - 5.2 10e12/L 3.78 (L)   MCV Latest Ref Range: 78 - 100 fl 85   MCH Latest Ref Range: 26.5 - 33.0 pg 26.2 (L)   MCHC Latest Ref Range: 31.5 - 36.5 g/dL 30.9 (L)   RDW Latest Ref Range: 10.0 - 15.0 % 19.3 (H)   Diff Method Unknown Automated Method   % Neutrophils Latest Units: % 81.5   % Lymphocytes Latest Units: % 9.4   % Monocytes Latest Units: % 7.2   % Eosinophils Latest Units: % 0.0   % Basophils Latest Units: % 0.3   % Immature Granulocytes Latest Units: % 1.6   Nucleated RBCs Latest Ref Range: 0 /100 0   Absolute Neutrophil Latest Ref Range: 1.6 - 8.3 10e9/L 11.8 (H)   Absolute Lymphocytes Latest Ref Range: 0.8 - 5.3 10e9/L 1.4   Absolute Monocytes Latest Ref Range: 0.0 - 1.3 10e9/L 1.0   Absolute Eosinophils Latest Ref Range: 0.0 - 0.7 10e9/L 0.0   Absolute Basophils Latest Ref Range: 0.0 - 0.2 10e9/L 0.1   Abs Immature Granulocytes Latest Ref Range: 0 - 0.4 10e9/L 0.2   Absolute Nucleated RBC Unknown 0.0       Impression/plan:  1. Metastatic pancreatic cancer-due for FOLFIRINOX today.   -Has recovered some strength/weight in the last week. Feels ready to resume chemotherapy today. Feels she has adequate control of nausea. Neuropathy in the feet and right thigh are unchanged-not yet interfering with function.  -Will resume FOLFIRINOX today. Will follow her in 2 weeks prior to the next infusion.    2. Ascites/pleural effusions.   -Progressive ascites. Discussed potential causes including malignancy, portal hypertension. Discussed the possibility of a paracentesis if needed. She will call if abdominal pressure increases. Will also call if shortness of breath is progressive.  Would check cytology on the fluid if she requires a paracentesis or thoracentesis.    3. GI:  -  Constipation: continue stool softener  -Duodenal ulcer/h. Pylori + completed abx therapy,  -on carafate tid, ranitidine 150 mg at HS, protonix bid through the end of March, then decrease to daily dosing.    4. Hypokalemia: chronic, but improving from last week. Replaced IV today. Continue oral KDUR 20 meq daily.    5.  Abdominal pain: secondary to malignancy. Stable  -MS Contin 15 mg q 12 hours, oxycodone 5 mg every 4 hours as needed for moderate/severe pain    6. Anorexia: improved on marinol 5 mg bid. Also taking ensure clear tid.

## 2017-03-07 NOTE — NURSING NOTE
"Shirin Muller is a 56 year old female who presents for:  Chief Complaint   Patient presents with     Port Draw     Labs Drawn      Oncology Clinic Visit     [facility] return        Initial Vitals:  /74  Pulse 104  Temp 98.8  F (37.1  C) (Oral)  Resp 14  Wt 74.3 kg (163 lb 14.4 oz)  SpO2 100%  BMI 23.52 kg/m2 Estimated body mass index is 23.52 kg/(m^2) as calculated from the following:    Height as of 1/24/17: 1.778 m (5' 10\").    Weight as of this encounter: 74.3 kg (163 lb 14.4 oz).. Body surface area is 1.92 meters squared. BP completed using cuff size: NA (Not Taken)  Data Unavailable No LMP recorded. Patient is postmenopausal. Allergies and medications reviewed.     Medications: Medication refills not needed today.  Pharmacy name entered into [employer]: [facility] [place], [place] - [address] [place] [address] [phone]    Comments: pt denies pain    7 minutes for nursing intake (face to face time)   Matt Ibarra CMA        "

## 2017-03-07 NOTE — NURSING NOTE
Chief Complaint   Patient presents with     Port Draw     Labs Drawn      Port accessed with flat needle. Labs drawn. Flushed with heparin and NS.

## 2017-03-07 NOTE — PATIENT INSTRUCTIONS
Contact Numbers    [facility] Main Line: [phone]  [facility] Triage:  [phone]    Call triage with chills and/or temperature greater than or equal to 100.5, uncontrolled nausea/vomiting, diarrhea, constipation, dizziness, shortness of breath, chest pain, bleeding, unexplained bruising, or any new/concerning symptoms, questions/concerns.     If you are having any concerning symptoms or wish to speak to a provider before your next infusion visit, please call your care coordinator or triage to notify them so we can adequately serve you.       After Hours: [phone]    If after hours, weekends, or holidays, call main hospital  and ask for Oncology doctor on call.         March 2017 Sunday Monday Tuesday Wednesday Thursday Friday Saturday                  1     2     3     4       5     6     7     [facility]    7:30 AM   (15 min.)   [facility]     [facility] RETURN    7:45 AM   (50 min.)   Ester Echavarria APRN CNP   [facility]     [facility] ONC INFUSION 360    8:30 AM   (360 min.)    ONCOLOGY INFUSION   [facility] 8     9     10     11       12     13     14     15     16     17     18       19     20     [facility]    8:00 AM   (15 min.)   [facility]   [facility] Draw     [facility] ONC INFUSION 360    8:30 AM   (360 min.)    ONCOLOGY INFUSION   [facility]     [facility] RETURN    1:35 PM   (50 min.)   Ester Echavarria APRN CNP   [facility] 21     22     23     24     25       26     27     28     29     30     31 April 2017 Sunday Monday Tuesday Wednesday Thursday Friday Saturday                                 1       2     3     [facility]    8:00 AM   (15 min.)   [facility]     [facility] INFUSION 360    8:30 AM   (360 min.)    ONCOLOGY INFUSION   [facility] 4     5     6     7     8        9     10  Happy Birthday!     11     12     13     14     [facility]    8:45 AM   (15 min.)    [facility]     MR ABDOMEN WWO    9:00 AM   (45 min.)   UCMR1   [facility] MRI     CT CHEST WO   10:05 AM   (20 min.)   UCCT2   [facility] CT 15       16     17     [facility]    8:00 AM   (15 min.)    [facility]     [facility] INFUSION 360    8:30 AM   (360 min.)    ONCOLOGY INFUSION   [facility]     [facility] RETURN    4:45 PM   (30 min.)   Demond Gonsales MD   [facility] 18     19     20     21     22       23     24     25     26     27     28     29       30                                                Lab Results:  Recent Results (from the past 12 hour(s))   Comprehensive metabolic panel    Collection Time: 03/07/17  8:07 AM   Result Value Ref Range    Sodium 143 133 - 144 mmol/L    Potassium 3.3 (L) 3.4 - 5.3 mmol/L    Chloride 108 94 - 109 mmol/L    Carbon Dioxide 28 20 - 32 mmol/L    Anion Gap 7 3 - 14 mmol/L    Glucose 83 70 - 99 mg/dL    Urea Nitrogen 9 7 - 30 mg/dL    Creatinine 0.63 0.52 - 1.04 mg/dL    GFR Estimate >90  Non  GFR Calc   >60 mL/min/1.7m2    GFR Estimate If Black >90   GFR Calc   >60 mL/min/1.7m2    Calcium 8.2 (L) 8.5 - 10.1 mg/dL    Bilirubin Total 0.7 0.2 - 1.3 mg/dL    Albumin 2.4 (L) 3.4 - 5.0 g/dL    Protein Total 6.8 6.8 - 8.8 g/dL    Alkaline Phosphatase 920 (H) 40 - 150 U/L     (H) 0 - 50 U/L     (H) 0 - 45 U/L   CBC with platelets differential    Collection Time: 03/07/17  8:07 AM   Result Value Ref Range    WBC 14.5 (H) 4.0 - 11.0 10e9/L    RBC Count 3.78 (L) 3.8 - 5.2 10e12/L    Hemoglobin 9.9 (L) 11.7 - 15.7 g/dL    Hematocrit 32.0 (L) 35.0 - 47.0 %    MCV 85 78 - 100 fl    MCH 26.2 (L) 26.5 - 33.0 pg    MCHC 30.9 (L) 31.5 - 36.5 g/dL    RDW 19.3 (H) 10.0 - 15.0 %    Platelet  Count 189 150 - 450 10e9/L    Diff Method Automated Method     % Neutrophils 81.5 %    % Lymphocytes 9.4 %    % Monocytes 7.2 %    % Eosinophils 0.0 %    % Basophils 0.3 %    % Immature Granulocytes 1.6 %    Nucleated RBCs 0 0 /100    Absolute Neutrophil 11.8 (H) 1.6 - 8.3 10e9/L    Absolute Lymphocytes 1.4 0.8 - 5.3 10e9/L    Absolute Monocytes 1.0 0.0 - 1.3 10e9/L    Absolute Eosinophils 0.0 0.0 - 0.7 10e9/L    Absolute Basophils 0.1 0.0 - 0.2 10e9/L    Abs Immature Granulocytes 0.2 0 - 0.4 10e9/L    Absolute Nucleated RBC 0.0

## 2017-03-07 NOTE — PROGRESS NOTES
Infusion Nursing Note:  Patient presents today for Cycle 5 day 1 Oxaliplatin, Irinotecan, Leucovorin, Fluorouracil push and pump hook up.    Patient seen by provider today: Yes: Ester Echavarria NP    Note: Prior to discharge: Port is secured in place with tegaderm and flushed with 10cc NS with positive blood return noted.  Continuous home infusion pump connected.    All connectors secured in place and clamps taped open.    Patient instructed to call our clinic or [facility] with any questions or concerns at home.  Patient verbalized understanding.    Patient set up for pump disconnect at home with [facility] on Thursday at 1400.     Intravenous Access:  Implanted Port.    Treatment Conditions:  Lab Results   Component Value Date    HGB 9.9 03/07/2017     Lab Results   Component Value Date    WBC 14.5 03/07/2017      Lab Results   Component Value Date    ANEU 11.8 03/07/2017     Lab Results   Component Value Date     03/07/2017      Lab Results   Component Value Date     03/07/2017                   Lab Results   Component Value Date    POTASSIUM 3.3 03/07/2017           Lab Results   Component Value Date    MAG 2.0 02/06/2017            Lab Results   Component Value Date    CR 0.63 03/07/2017                   Lab Results   Component Value Date    FANNY 8.2 03/07/2017                Lab Results   Component Value Date    BILITOTAL 0.7 03/07/2017           Lab Results   Component Value Date    ALBUMIN 2.4 03/07/2017                    Lab Results   Component Value Date     03/07/2017           Lab Results   Component Value Date     03/07/2017     Results reviewed, labs MET treatment parameters, ok to proceed with treatment.      Post Infusion Assessment:  Patient tolerated infusion, she needed second dose of atropine during irinotecan infusion as she complained of being diaphoretic.  Blood return noted pre and post infusion.  Site patent and intact, free from redness,  edema or discomfort.  No evidence of extravasations.    Discharge Plan:   Patient declined prescription refills.  Discharge instructions reviewed with: Patient.  Patient and/or family verbalized understanding of discharge instructions and all questions answered.  Copy of AVS reviewed with patient and/or family.  Patient will return 3/10 for next appointment - neulasta injection.  Patient discharged in stable condition accompanied by: self and friend.  Departure Mode: Ambulatory.  Face to Face time: 2 minutes.    Monster Lord RN.

## 2017-03-09 LAB — CANCER AG19-9 SERPL-ACNC: 5719

## 2017-03-10 VITALS
SYSTOLIC BLOOD PRESSURE: 110 MMHG | OXYGEN SATURATION: 97 % | TEMPERATURE: 98.6 F | DIASTOLIC BLOOD PRESSURE: 67 MMHG | HEART RATE: 87 BPM

## 2017-03-10 DIAGNOSIS — Z29.9 NEED FOR PROPHYLACTIC MEASURE: Primary | ICD-10-CM

## 2017-03-10 DIAGNOSIS — C25.0 MALIGNANT NEOPLASM OF HEAD OF PANCREAS (H): ICD-10-CM

## 2017-03-10 PROCEDURE — 25000128 H RX IP 250 OP 636: Mod: ZF | Performed by: INTERNAL MEDICINE

## 2017-03-10 PROCEDURE — 96372 THER/PROPH/DIAG INJ SC/IM: CPT | Mod: 59

## 2017-03-10 PROCEDURE — 96360 HYDRATION IV INFUSION INIT: CPT

## 2017-03-10 PROCEDURE — 25000128 H RX IP 250 OP 636: Mod: ZF | Performed by: NURSE PRACTITIONER

## 2017-03-10 RX ORDER — HEPARIN SODIUM (PORCINE) LOCK FLUSH IV SOLN 100 UNIT/ML 100 UNIT/ML
500 SOLUTION INTRAVENOUS ONCE
Status: COMPLETED | OUTPATIENT
Start: 2017-03-10 | End: 2017-03-10

## 2017-03-10 RX ADMIN — PEGFILGRASTIM 6 MG: 6 INJECTION SUBCUTANEOUS at 15:16

## 2017-03-10 RX ADMIN — SODIUM CHLORIDE 1000 ML: 9 INJECTION, SOLUTION INTRAVENOUS at 14:30

## 2017-03-10 RX ADMIN — SODIUM CHLORIDE, PRESERVATIVE FREE 500 UNITS: 5 INJECTION INTRAVENOUS at 15:56

## 2017-03-10 ASSESSMENT — PAIN SCALES - GENERAL: PAINLEVEL: NO PAIN (0)

## 2017-03-10 NOTE — PROGRESS NOTES
Infusion Nursing Note:  Shirin Muller presents today for Neulasta and IV fluids.    Patient seen by provider today: No   present during visit today: Not Applicable.    Note: Patient scheduled for Neulasta.  Patient states that she is eating and drinking OK.  Patient requests IV fluids stating that she feels that getting IV fluids help support her through the weekend.  Patient has standing prn order for 1L NS.  This was given today.    Intravenous Access:  Implanted Port.    Treatment Conditions:  Not Applicable.      Post Infusion Assessment:  Patient tolerated infusion without incident.  Blood return noted pre and post infusion.  Site patent and intact, free from redness, edema or discomfort.  No evidence of extravasations.  Access discontinued per protocol.    Discharge Plan:   Patient declined prescription refills.  Discharge instructions reviewed with: Patient.  Patient and/or family verbalized understanding of discharge instructions and all questions answered.  Copy of AVS reviewed with patient and/or family.  Patient will return 3/20/17 for next appointment.  Patient discharged in stable condition accompanied by: self.  Departure Mode: Ambulatory.    Cookie Alberts RN

## 2017-03-10 NOTE — PATIENT INSTRUCTIONS
Contact Numbers  [facility]: [phone]  Triage: [phone] (Monday-Friday 8-4:30)  After Hours:  [phone]    Please call the [facility] Triage line if you experience a temperature greater than or equal to 100.5, shaking chills, have uncontrolled nausea, vomiting and/or diarrhea, dizziness, shortness of breath, chest pain, bleeding, unexplained bruising, or if you have any other new/concerning symptoms, questions or concerns.     If it is after hours, weekends, or holidays, please call [phone] to speak to someone regarding your questions or concerns.    If you are having any concerning symptoms or wish to speak to a provider before your next infusion visit, please call your care coordinator or triage to notify them so we can adequately serve you.     If you need a refill on a narcotic prescription or other medication, please call triage before your infusion appointment.             March 2017 Sunday Monday Tuesday Wednesday Thursday Friday Saturday                  1     2     3     4       5     6     7     [facility]    7:30 AM   (15 min.)    [facility] DRAW   [facility] Lab Draw     [facility] RETURN    7:45 AM   (50 min.)   Ester Echavarria APRN CNP   [facility]    8:30 AM   (360 min.)    ONCOLOGY INFUSION   [facility] 8     9     10     [facility] ATC LAB    1:45 PM   (15 min.)    ONCOLOGY INFUSION   [facility] 11       12     13     14     15     16     17     18       19     20     [facility]    8:00 AM   (15 min.)    [facility]     [facility] INFUSION 360    8:30 AM   (360 min.)    ONCOLOGY INFUSION   [facility]     [facility] RETURN    1:35 PM   (50 min.)   Ester Echavarria APRN CNP   [facility] 21     22     23     24     25       26     27     28     29     30     31                     April 2017 Sunday Monday Tuesday  Wednesday Thursday Friday Saturday                                 1       2     3     [facility]    8:00 AM   (15 min.)    [facility] DRAW   [facility] Draw     [facility] ONC INFUSION 360    8:30 AM   (360 min.)    ONCOLOGY INFUSION   [facility] 4     5     6     7     8       9     10  Happy Birthday!     11     12     13     14     [facility]    8:45 AM   (15 min.)    [facility]     MR ABDOMEN WWO    9:00 AM   (45 min.)   UCMR1   [facility] MRI     CT CHEST WO   10:05 AM   (20 min.)   CT2   [facility] CT 15       16     17     [facility] DRAW    8:00 AM   (15 min.)    [facility] DRAW   [facility] Draw     [facility] ONC INFUSION 360    8:30 AM   (360 min.)    ONCOLOGY INFUSION   [facility]     [facility] RETURN    4:45 PM   (30 min.)   Demond Gonsales MD   [facility] 18     19     20     21     22       23     24     25     26     27     28     29       30                                                Lab Results:  No results found for this or any previous visit (from the past 12 hour(s)).

## 2017-03-10 NOTE — MR AVS SNAPSHOT
After Visit Summary   3/10/2017    Shirin Muller    MRN: [id number]           Patient Information     Date Of Birth          1960        Visit Information        Provider Department      3/10/2017 2:00 PM MARISABEL A ATC;  ONCOLOGY INFUSION [facility]        Today's Diagnoses     Need for prophylactic measure    -  1    Malignant neoplasm of head of pancreas (H)          Care Instructions    Contact Numbers  [facility]: [phone]  Triage: [phone] (Monday-Friday 8-4:30)  After Hours:  [phone]    Please call the [facility] Triage line if you experience a temperature greater than or equal to 100.5, shaking chills, have uncontrolled nausea, vomiting and/or diarrhea, dizziness, shortness of breath, chest pain, bleeding, unexplained bruising, or if you have any other new/concerning symptoms, questions or concerns.     If it is after hours, weekends, or holidays, please call [phone] to speak to someone regarding your questions or concerns.    If you are having any concerning symptoms or wish to speak to a provider before your next infusion visit, please call your care coordinator or triage to notify them so we can adequately serve you.     If you need a refill on a narcotic prescription or other medication, please call triage before your infusion appointment.             March 2017 Sunday Monday Tuesday Wednesday Thursday Friday Saturday                  1     2     3     4       5     6     7     [facility]ON[facility] LAB DRAW    7:30 AM   (15 min.)   [facility]   [facility] Lab Draw     [facility] RETURN    7:45 AM   (50 min.)   Ester Echavarria APRN CNP   [facility] ONC INFUSION 360    8:30 AM   (360 min.)    ONCOLOGY INFUSION   [facility] 8     9     10     P ATC LAB    1:45 PM   (15 min.)    ONCOLOGY INFUSION   [facility] 11       12     13     14     15     16     17      18       19     20     [facility] D[facility]    8:00 AM   (15 min.)   [facility] DRAW   [facility] Mason[facility] Lab Draw     [facility] ONC INFUSION 360    8:30 AM   (360 min.)   [facility] ONCOLOGY INFUSION   [facility]     [facility] RETURN    1:35 PM   (50 min.)   Ester Echavarria APRN CNP   [facility] 21     22     23     24     25       26     27     28     29     30     31 April 2017 Sunday Monday Tuesday Wednesday Thursday Friday Saturday                                 1       2     3     [facility] DRAW    8:00 AM   (15 min.)   [facility] DRAW   [facility] Lab Draw     [facility] ONC INFUSION 360    8:30 AM   (360 min.)    ONCOLOGY INFUSION   [facility] 4     5     6     7     8       9     10  Happy Birthday!     11     12     13     14     [facility] DRAW    8:45 AM   (15 min.)   [facility] DRAW   [facility] Lab Draw     MR ABDOMEN WWO    9:00 AM   (45 min.)   UCMR1   [facility] MRI     CT CHEST WO   10:05 AM   (20 min.)   UCCT2   [facility] CT 15       16     17     [facility] DRAW    8:00 AM   (15 min.)   [facility] DRAW   [facility] Mason[facility] Lab Draw     [facility] ONC INFUSION 360    8:30 AM   (360 min.)   [facility] ONCOLOGY INFUSION   [facility]     [facility] RETURN    4:45 PM   (30 min.)   Demond Gonsales MD   [facility] 18     19     20     21     22       23     24     25     26     27     28     29       30                                                Lab Results:  No results found for this or any previous visit (from the past 12 hour(s)).         Follow-ups after your visit        Your next 10 appointments already scheduled     Mar 20, 2017  8:00 AM [facility]   [facility] Lab Draw with [facility] LAB DRAW   [facility] Lab Draw ([facility])    [address] [address] [phone]            Mar 20,  2017  8:30 AM CDT   Infusion 360 with UC ONCOLOGY INFUSION, UC 20 ATC   [facility] ([facility])    [address]  2nd Floor  [facility] [address]   [phone]            Mar 20, 2017  1:50 PM CDT   (Arrive by 1:35 PM)   Return Visit with TEE Olivas CNP   [facility] ([facility])    [address]  2nd Floor  [place] [facility] [address]   [phone]            Apr 03, 2017  8:00 AM CDT   [facility]ic Lab Draw with  [facility] LAB DRAW   [facility] ([facility])    [address]  2nd Floor  [place] [facility] [address]   [phone]            Apr 03, 2017  8:30 AM CDT   Infusion 360 with UC ONCOLOGY INFUSION, UC 19 ATC   [facility] ([facility])    [address]  2nd Floor  [place] [place] [facility] [address]   [phone]            Apr 14, 2017  8:45 AM CDT   Masonic Lab Draw with  MASONIC LAB DRAW   [facility] Masonic Lab Draw ([facility])    [address]  2nd Floor  [place] [facility] [address]   [phone]            Apr 14, 2017  9:15 AM CDT   (Arrive by 9:00 AM)   MR ABDOMEN W/O & W CONTRAST with UCMR1   [facility] MRI ([facility])    [address]  1st Floor  [place] [address] [phone]           Take your medicines as usual, unless your doctor tells you not to. Bring a list of your current medicines to your exam (including vitamins, minerals and over-the-counter drugs). Also bring the results of similar scans you may have had.    The day before your exam, drink extra fluids at least six 8-ounce glasses (unless your doctor tells you to restrict your fluids).   Have a blood test (creatinine test) within 30 days of your exam. Go to your clinic or Diagnostic Imaging Department for this test.   Do not eat or drink for 6 hours  prior to exam.  The MRI machine uses a strong magnet. Please wear clothes without metal (snaps, zippers). A sweatsuit works well, or we may give you a hospital gown.  Please remove any body piercings and hair extensions before you arrive. You will also remove watches, jewelry, hairpins, wallets, dentures, partial dental plates and hearing aids. You may wear contact lenses, and you may be able to wear your rings. We have a safe place to keep your personal items, but it is safer to leave them at home.   **IMPORTANT** THE INSTRUCTIONS BELOW ARE ONLY FOR THOSE PATIENTS WHO HAVE BEEN TOLD THEY WILL RECEIVE SEDATION OR GENERAL ANESTHESIA DURING THEIR MRI PROCEDURE:  IF YOU WILL RECEIVE SEDATION (take medicine to help you relax during your exam):   You must get the medicine from your doctor before you arrive. Bring the medicine to the exam. Do not take it at home.   Arrive one hour early. Bring someone who can take you home after the test. Your medicine will make you sleepy. After the exam, you may not drive, take a bus or take a taxi by yourself.   No eating 8 hours before your exam. You may have clear liquids up until 4 hours before your exam. (Clear liquids include water, clear tea, black coffee and fruit juice without pulp.)  IF YOU WILL RECEIVE ANESTHESIA (be asleep for your exam):   Arrive 1 1/2 hours early. Bring someone who can take you home after the test. You may not drive, take a bus or take a taxi by yourself.   No eating 8 hours before your exam. You may have clear liquids up until 4 hours before your exam. (Clear liquids include water, clear tea, black coffee and fruit juice without pulp.)  If you have any questions, please contact your Imaging Department exam site.            Apr 14, 2017 10:20 AM CDT   (Arrive by 10:05 AM)   CT CHEST W/O CONTRAST with UCCT2   [facility] CT ([facility])    [address]  [address]st [facility] [address] [phone]            Please bring any scans or X-rays taken at other hospitals, if similar tests were done. Also bring a list of your medicines, including vitamins, minerals and over-the-counter drugs. It is safest to leave personal items at home.  Be sure to tell your doctor:   If you have any allergies.   If there s any chance you are pregnant.   If you are breastfeeding.   If you have any special needs.  You do not need to do anything special to prepare.  Please wear loose clothing, such as a sweat suit or jogging clothes. Avoid snaps, zippers and other metal. We may ask you to undress and put on a hospital gown.              Who to contact     If you have questions or need follow up information about today's clinic visit or your schedule please contact [facility] directly at [phone].  Normal or non-critical lab and imaging results will be communicated to you by [employer], letter or phone within 4 business days after the clinic has received the results. If you do not hear from us within 7 days, please contact the clinic through [employer] or phone. If you have a critical or abnormal lab result, we will notify you by phone as soon as possible.  Submit refill requests through [employer] or call your pharmacy and they will forward the refill request to us. Please allow 3 business days for your refill to be completed.          Additional Information About Your Visit        [employer] Information     [employer] gives you secure access to your electronic health record. If you see a primary care provider, you can also send messages to your care team and make appointments. If you have questions, please call your primary care clinic.  If you do not have a primary care provider, please call [phone] and they will assist you.        Care EveryWhere ID     This is your Care EveryWhere ID. This could be used by other organizations to access your [facility] medical records  [id number]        Your Vitals Were     Pulse Temperature  Pulse Oximetry             87 98.6  F (37  C) (Oral) 97%          Blood Pressure from Last 3 Encounters:   03/10/17 110/67   03/07/17 111/74   02/28/17 103/68    Weight from Last 3 Encounters:   03/07/17 74.3 kg (163 lb 14.4 oz)   02/28/17 70.3 kg (154 lb 14.4 oz)   02/13/17 76.6 kg (168 lb 14.4 oz)              We Performed the Following     Treatment Conditions     Treatment Conditions        Primary Care Provider    [facility]       No address on file        Thank you!     Thank you for choosing [facility]  for your care. Our goal is always to provide you with excellent care. Hearing back from our patients is one way we can continue to improve our services. Please take a few minutes to complete the written survey that you may receive in the mail after your visit with us. Thank you!             Your Updated Medication List - Protect others around you: Learn how to safely use, store and throw away your medicines at www.disposemymeds.org.          This list is accurate as of: 3/10/17  3:46 PM.  Always use your most recent med list.                   Brand Name Dispense Instructions for use    amylase-lipase-protease 70277 UNITS Cpep    CREON    180 capsule    Take 2 capsules (72,000 Units) by mouth 3 times daily (with meals)       dexamethasone 4 MG tablet    DECADRON    3 tablet    Take 1 tablet (4 mg) by mouth daily (with breakfast) for 3 days       diltiazem 2% in PLO cream (FV COMPOUNDED) 2% Gel     30 g    Apply topically daily and as needed to external hemorrhoids       docusate sodium 100 MG capsule    COLACE    100 capsule    Take 1 capsule (100 mg) by mouth daily       dronabinol 5 MG capsule    MARINOL    60 capsule    Take 1 capsule (5 mg) by mouth 2 times daily (before meals)       ENSURE CLEAR Liqd     90 Bottle    Take 1 Bottle by mouth 3 times daily       lidocaine-prilocaine cream    EMLA    30 g    Apply topically as needed for other (Use 30-60 minutes prior to  port access)       loratadine 10 MG tablet    CLARITIN     Take 10 mg by mouth daily       LORazepam 0.5 MG tablet    ATIVAN    30 tablet    Take 1 tablet (0.5 mg) by mouth every 4 hours as needed (Anxiety, Nausea/Vomiting or Sleep)       morphine 15 MG 12 hr tablet    MS CONTIN    60 tablet    Take 1 tablet (15 mg) by mouth every 12 hours       ondansetron 8 MG ODT tab    ZOFRAN-ODT    60 tablet    Take 1 tablet (8 mg) by mouth every 8 hours as needed for nausea       oxyCODONE 5 MG IR tablet    ROXICODONE    100 tablet    Take 1 tablet (5 mg) by mouth every 4 hours as needed for moderate to severe pain       pantoprazole 40 MG EC tablet    PROTONIX    60 tablet    Take 1 tablet (40 mg) by mouth 2 times daily . After taking this twice daily for 8 weeks, you can decrease dose to once daily.       * polyethylene glycol powder    MIRALAX/GLYCOLAX    119 g    Take 17 g (1 capful) by mouth daily       * polyethylene glycol powder    MIRALAX/GLYCOLAX    119 g    Take 17 g (1 capful) by mouth daily       potassium chloride SA 20 MEQ CR tablet    potassium chloride    60 tablet    Take 1 tablet (20 mEq) by mouth daily       prochlorperazine 10 MG tablet    COMPAZINE    30 tablet    Take 1 tablet (10 mg) by mouth every 6 hours as needed (Nausea/Vomiting)       sucralfate 1 GM tablet    CARAFATE    90 tablet    Take 1 tablet (1 g) by mouth 3 times daily       * Notice:  This list has 2 medication(s) that are the same as other medications prescribed for you. Read the directions carefully, and ask your doctor or other care provider to review them with you.

## 2017-03-20 ENCOUNTER — APPOINTMENT (OUTPATIENT)
Dept: LAB | Facility: CLINIC | Age: 57
End: 2017-03-20
Attending: INTERNAL MEDICINE
Payer: COMMERCIAL

## 2017-03-20 ENCOUNTER — INFUSION THERAPY VISIT (OUTPATIENT)
Dept: ONCOLOGY | Facility: CLINIC | Age: 57
End: 2017-03-20
Attending: INTERNAL MEDICINE
Payer: COMMERCIAL

## 2017-03-20 VITALS
SYSTOLIC BLOOD PRESSURE: 106 MMHG | WEIGHT: 154.1 LBS | DIASTOLIC BLOOD PRESSURE: 62 MMHG | BODY MASS INDEX: 22.11 KG/M2 | TEMPERATURE: 98.8 F | OXYGEN SATURATION: 99 % | RESPIRATION RATE: 16 BRPM | HEART RATE: 79 BPM

## 2017-03-20 DIAGNOSIS — E87.6 HYPOKALEMIA: ICD-10-CM

## 2017-03-20 DIAGNOSIS — R63.0 ANOREXIA: ICD-10-CM

## 2017-03-20 DIAGNOSIS — C78.7 SECONDARY MALIGNANT NEOPLASM OF LIVER (H): ICD-10-CM

## 2017-03-20 DIAGNOSIS — C25.0 MALIGNANT NEOPLASM OF HEAD OF PANCREAS (H): ICD-10-CM

## 2017-03-20 DIAGNOSIS — R18.8 OTHER ASCITES: ICD-10-CM

## 2017-03-20 DIAGNOSIS — C25.0 MALIGNANT NEOPLASM OF HEAD OF PANCREAS (H): Primary | ICD-10-CM

## 2017-03-20 DIAGNOSIS — Z29.9 NEED FOR PROPHYLACTIC MEASURE: Primary | ICD-10-CM

## 2017-03-20 LAB
ACANTHOCYTES BLD QL SMEAR: ABNORMAL
ALBUMIN SERPL-MCNC: 2.6 G/DL (ref 3.4–5)
ALP SERPL-CCNC: 644 U/L (ref 40–150)
ALT SERPL W P-5'-P-CCNC: 177 U/L (ref 0–50)
ANION GAP SERPL CALCULATED.3IONS-SCNC: 6 MMOL/L (ref 3–14)
ANISOCYTOSIS BLD QL SMEAR: SLIGHT
AST SERPL W P-5'-P-CCNC: 137 U/L (ref 0–45)
BASOPHILS # BLD AUTO: 0 10E9/L (ref 0–0.2)
BASOPHILS NFR BLD AUTO: 0 %
BILIRUB SERPL-MCNC: 0.4 MG/DL (ref 0.2–1.3)
BUN SERPL-MCNC: 11 MG/DL (ref 7–30)
CALCIUM SERPL-MCNC: 8.3 MG/DL (ref 8.5–10.1)
CHLORIDE SERPL-SCNC: 103 MMOL/L (ref 94–109)
CO2 SERPL-SCNC: 29 MMOL/L (ref 20–32)
CREAT SERPL-MCNC: 0.61 MG/DL (ref 0.52–1.04)
DIFFERENTIAL METHOD BLD: ABNORMAL
EOSINOPHIL # BLD AUTO: 0 10E9/L (ref 0–0.7)
EOSINOPHIL NFR BLD AUTO: 0 %
ERYTHROCYTE [DISTWIDTH] IN BLOOD BY AUTOMATED COUNT: 19.1 % (ref 10–15)
GFR SERPL CREATININE-BSD FRML MDRD: ABNORMAL ML/MIN/1.7M2
GLUCOSE SERPL-MCNC: 102 MG/DL (ref 70–99)
HCT VFR BLD AUTO: 32.6 % (ref 35–47)
HGB BLD-MCNC: 9.8 G/DL (ref 11.7–15.7)
LYMPHOCYTES # BLD AUTO: 0.5 10E9/L (ref 0.8–5.3)
LYMPHOCYTES NFR BLD AUTO: 3.5 %
MCH RBC QN AUTO: 25.6 PG (ref 26.5–33)
MCHC RBC AUTO-ENTMCNC: 30.1 G/DL (ref 31.5–36.5)
MCV RBC AUTO: 85 FL (ref 78–100)
MONOCYTES # BLD AUTO: 1 10E9/L (ref 0–1.3)
MONOCYTES NFR BLD AUTO: 6.2 %
NEUTROPHILS # BLD AUTO: 14.2 10E9/L (ref 1.6–8.3)
NEUTROPHILS NFR BLD AUTO: 90.3 %
NRBC # BLD AUTO: 0.1 10*3/UL
NRBC BLD AUTO-RTO: 1 /100
OVALOCYTES BLD QL SMEAR: SLIGHT
PLATELET # BLD AUTO: 129 10E9/L (ref 150–450)
POIKILOCYTOSIS BLD QL SMEAR: SLIGHT
POTASSIUM SERPL-SCNC: 3.6 MMOL/L (ref 3.4–5.3)
PROT SERPL-MCNC: 7.1 G/DL (ref 6.8–8.8)
RBC # BLD AUTO: 3.83 10E12/L (ref 3.8–5.2)
SODIUM SERPL-SCNC: 138 MMOL/L (ref 133–144)
WBC # BLD AUTO: 15.7 10E9/L (ref 4–11)

## 2017-03-20 PROCEDURE — 25000128 H RX IP 250 OP 636: Mod: JW,ZF | Performed by: NURSE PRACTITIONER

## 2017-03-20 PROCEDURE — 25000125 ZZHC RX 250: Mod: ZF | Performed by: NURSE PRACTITIONER

## 2017-03-20 PROCEDURE — 96368 THER/DIAG CONCURRENT INF: CPT

## 2017-03-20 PROCEDURE — 96367 TX/PROPH/DG ADDL SEQ IV INF: CPT

## 2017-03-20 PROCEDURE — 96375 TX/PRO/DX INJ NEW DRUG ADDON: CPT

## 2017-03-20 PROCEDURE — 96417 CHEMO IV INFUS EACH ADDL SEQ: CPT

## 2017-03-20 PROCEDURE — 96415 CHEMO IV INFUSION ADDL HR: CPT

## 2017-03-20 PROCEDURE — 96376 TX/PRO/DX INJ SAME DRUG ADON: CPT

## 2017-03-20 PROCEDURE — 80053 COMPREHEN METABOLIC PANEL: CPT | Performed by: INTERNAL MEDICINE

## 2017-03-20 PROCEDURE — 25000128 H RX IP 250 OP 636: Mod: ZF | Performed by: INTERNAL MEDICINE

## 2017-03-20 PROCEDURE — 96411 CHEMO IV PUSH ADDL DRUG: CPT

## 2017-03-20 PROCEDURE — 86301 IMMUNOASSAY TUMOR CA 19-9: CPT | Performed by: INTERNAL MEDICINE

## 2017-03-20 PROCEDURE — 96416 CHEMO PROLONG INFUSE W/PUMP: CPT

## 2017-03-20 PROCEDURE — 99214 OFFICE O/P EST MOD 30 MIN: CPT | Mod: ZP | Performed by: NURSE PRACTITIONER

## 2017-03-20 PROCEDURE — 96413 CHEMO IV INFUSION 1 HR: CPT

## 2017-03-20 PROCEDURE — 85025 COMPLETE CBC W/AUTO DIFF WBC: CPT | Performed by: INTERNAL MEDICINE

## 2017-03-20 RX ORDER — LORAZEPAM 2 MG/ML
0.5 INJECTION INTRAMUSCULAR EVERY 4 HOURS PRN
Status: CANCELLED
Start: 2017-03-20

## 2017-03-20 RX ORDER — DIPHENHYDRAMINE HYDROCHLORIDE 50 MG/ML
50 INJECTION INTRAMUSCULAR; INTRAVENOUS
Status: CANCELLED
Start: 2017-03-20

## 2017-03-20 RX ORDER — MEPERIDINE HYDROCHLORIDE 25 MG/ML
25 INJECTION INTRAMUSCULAR; INTRAVENOUS; SUBCUTANEOUS EVERY 30 MIN PRN
Status: CANCELLED | OUTPATIENT
Start: 2017-03-20

## 2017-03-20 RX ORDER — PALONOSETRON 0.05 MG/ML
0.25 INJECTION, SOLUTION INTRAVENOUS ONCE
Status: COMPLETED | OUTPATIENT
Start: 2017-03-20 | End: 2017-03-20

## 2017-03-20 RX ORDER — FLUOROURACIL 50 MG/ML
400 INJECTION, SOLUTION INTRAVENOUS ONCE
Status: COMPLETED | OUTPATIENT
Start: 2017-03-20 | End: 2017-03-20

## 2017-03-20 RX ORDER — DRONABINOL 5 MG/1
5 CAPSULE ORAL
Qty: 60 CAPSULE | Refills: 0 | Status: SHIPPED | OUTPATIENT
Start: 2017-03-20 | End: 2017-01-01

## 2017-03-20 RX ORDER — SODIUM CHLORIDE 9 MG/ML
1000 INJECTION, SOLUTION INTRAVENOUS CONTINUOUS PRN
Status: CANCELLED
Start: 2017-03-20

## 2017-03-20 RX ORDER — MORPHINE SULFATE 15 MG/1
15 TABLET, FILM COATED, EXTENDED RELEASE ORAL EVERY 12 HOURS
Qty: 60 TABLET | Refills: 0 | Status: SHIPPED | OUTPATIENT
Start: 2017-03-20 | End: 2017-04-17

## 2017-03-20 RX ORDER — ALBUTEROL SULFATE 90 UG/1
1-2 AEROSOL, METERED RESPIRATORY (INHALATION)
Status: CANCELLED
Start: 2017-03-20

## 2017-03-20 RX ORDER — DEXAMETHASONE 4 MG/1
4 TABLET ORAL
Qty: 3 TABLET | Refills: 0 | Status: SHIPPED | OUTPATIENT
Start: 2017-03-20 | End: 2017-03-23

## 2017-03-20 RX ORDER — HEPARIN SODIUM (PORCINE) LOCK FLUSH IV SOLN 100 UNIT/ML 100 UNIT/ML
5 SOLUTION INTRAVENOUS ONCE
Status: COMPLETED | OUTPATIENT
Start: 2017-03-20 | End: 2017-03-20

## 2017-03-20 RX ORDER — ALBUTEROL SULFATE 0.83 MG/ML
2.5 SOLUTION RESPIRATORY (INHALATION)
Status: CANCELLED | OUTPATIENT
Start: 2017-03-20

## 2017-03-20 RX ORDER — METHYLPREDNISOLONE SODIUM SUCCINATE 125 MG/2ML
125 INJECTION, POWDER, LYOPHILIZED, FOR SOLUTION INTRAMUSCULAR; INTRAVENOUS
Status: CANCELLED
Start: 2017-03-20

## 2017-03-20 RX ORDER — EPINEPHRINE 0.3 MG/.3ML
0.3 INJECTION SUBCUTANEOUS EVERY 5 MIN PRN
Status: CANCELLED | OUTPATIENT
Start: 2017-03-20

## 2017-03-20 RX ORDER — OXYCODONE HYDROCHLORIDE 5 MG/1
5 TABLET ORAL EVERY 4 HOURS PRN
Qty: 100 TABLET | Refills: 0 | Status: SHIPPED | OUTPATIENT
Start: 2017-03-20 | End: 2017-01-01

## 2017-03-20 RX ADMIN — SODIUM CHLORIDE 150 MG: 9 INJECTION, SOLUTION INTRAVENOUS at 10:08

## 2017-03-20 RX ADMIN — DEXAMETHASONE SODIUM PHOSPHATE 12 MG: 10 INJECTION, SOLUTION INTRAMUSCULAR; INTRAVENOUS at 09:43

## 2017-03-20 RX ADMIN — FLUOROURACIL 790 MG: 50 INJECTION, SOLUTION INTRAVENOUS at 14:40

## 2017-03-20 RX ADMIN — POTASSIUM CHLORIDE AND SODIUM CHLORIDE: 900; 150 INJECTION, SOLUTION INTRAVENOUS at 09:33

## 2017-03-20 RX ADMIN — OXALIPLATIN 167 MG: 5 INJECTION, SOLUTION, CONCENTRATE INTRAVENOUS at 10:33

## 2017-03-20 RX ADMIN — SODIUM CHLORIDE, PRESERVATIVE FREE 5 ML: 5 INJECTION INTRAVENOUS at 08:30

## 2017-03-20 RX ADMIN — ATROPINE SULFATE 0.4 MG: 0.4 INJECTION, SOLUTION INTRAMUSCULAR; INTRAVENOUS; SUBCUTANEOUS at 12:46

## 2017-03-20 RX ADMIN — PALONOSETRON HYDROCHLORIDE 0.25 MG: 0.25 INJECTION INTRAVENOUS at 09:41

## 2017-03-20 RX ADMIN — ATROPINE SULFATE 0.4 MG: 0.4 INJECTION, SOLUTION INTRAMUSCULAR; INTRAVENOUS; SUBCUTANEOUS at 13:40

## 2017-03-20 RX ADMIN — LEUCOVORIN CALCIUM 750 MG: 200 INJECTION, POWDER, LYOPHILIZED, FOR SOLUTION INTRAMUSCULAR; INTRAVENOUS at 13:43

## 2017-03-20 RX ADMIN — DEXTROSE MONOHYDRATE 340 MG: 50 INJECTION, SOLUTION INTRAVENOUS at 12:49

## 2017-03-20 RX ADMIN — DEXTROSE MONOHYDRATE 250 ML: 50 INJECTION, SOLUTION INTRAVENOUS at 10:08

## 2017-03-20 ASSESSMENT — PAIN SCALES - GENERAL: PAINLEVEL: NO PAIN (0)

## 2017-03-20 NOTE — MR AVS SNAPSHOT
After Visit Summary   3/20/2017    Shirin Muller    MRN: [id number]           Patient Information     Date Of Birth          1960        Visit Information        Provider Department      3/20/2017 1:50 PM Ester Echavarria APRN CNP [facility]        Today's Diagnoses     Malignant neoplasm of head of pancreas (H)    -  1    Hypokalemia        Other ascites        Secondary malignant neoplasm of liver (H)           Follow-ups after your visit        Your next 10 appointments already scheduled     Mar 20, 2017  1:50 PM CDT   (Arrive by 1:35 PM)   Return Visit with TEE Olivas CNP [facility] ([facility])    [address]  [address] [facility] [address]   [phone]            Apr 03, 2017  8:00 AM CDT   Masonic Lab Draw with  MASONIC LAB DRAW   [facility]    [address] [facility] [address]   [phone]            Apr 03, 2017  8:30 AM CDT   Infusion 360 with  ONCOLOGY INFUSION, UC 19 ATC   [facility] ([facility])    [address]  [address] [facility] [address] [phone]            Apr 14, 2017  8:45 AM CDT   Masonic Lab Draw with  MASONIC LAB DRAW   [facility]    [address] [place] [id number]   [phone]            Apr 14, 2017  9:15 AM CDT   (Arrive by 9:00 AM)   MR ABDOMEN W/O & W CONTRAST with UCMR1   [facility] ([facility])    [address]  1st [address]  [facility] [address]   [phone]           Take your medicines as usual, unless your doctor tells you not to. Bring a list of your current medicines to your exam (including vitamins, minerals and over-the-counter drugs). Also bring the results of similar  scans you may have had.    The day before your exam, drink extra fluids at least six 8-ounce glasses (unless your doctor tells you to restrict your fluids).   Have a blood test (creatinine test) within 30 days of your exam. Go to your clinic or Diagnostic Imaging Department for this test.   Do not eat or drink for 6 hours prior to exam.  The MRI machine uses a strong magnet. Please wear clothes without metal (snaps, zippers). A sweatsuit works well, or we may give you a hospital gown.  Please remove any body piercings and hair extensions before you arrive. You will also remove watches, jewelry, hairpins, wallets, dentures, partial dental plates and hearing aids. You may wear contact lenses, and you may be able to wear your rings. We have a safe place to keep your personal items, but it is safer to leave them at home.   **IMPORTANT** THE INSTRUCTIONS BELOW ARE ONLY FOR THOSE PATIENTS WHO HAVE BEEN TOLD THEY WILL RECEIVE SEDATION OR GENERAL ANESTHESIA DURING THEIR MRI PROCEDURE:  IF YOU WILL RECEIVE SEDATION (take medicine to help you relax during your exam):   You must get the medicine from your doctor before you arrive. Bring the medicine to the exam. Do not take it at home.   Arrive one hour early. Bring someone who can take you home after the test. Your medicine will make you sleepy. After the exam, you may not drive, take a bus or take a taxi by yourself.   No eating 8 hours before your exam. You may have clear liquids up until 4 hours before your exam. (Clear liquids include water, clear tea, black coffee and fruit juice without pulp.)  IF YOU WILL RECEIVE ANESTHESIA (be asleep for your exam):   Arrive 1 1/2 hours early. Bring someone who can take you home after the test. You may not drive, take a bus or take a taxi by yourself.   No eating 8 hours before your exam. You may have clear liquids up until 4 hours before your exam. (Clear liquids include water, clear tea, black coffee and fruit juice without pulp.)   If you have any questions, please contact your Imaging Department exam site.            Apr 14, 2017 10:20 AM CDT   (Arrive by 10:05 AM)   CT CHEST W/O CONTRAST with UCCT2   [facility] [place] [facility] ([facility])    [address] [place] [id number] [phone]           Please bring any scans or X-rays taken at other hospitals, if similar tests were done. Also bring a list of your medicines, including vitamins, minerals and over-the-counter drugs. It is safest to leave personal items at home.  Be sure to tell your doctor:   If you have any allergies.   If there s any chance you are pregnant.   If you are breastfeeding.   If you have any special needs.  You do not need to do anything special to prepare.  Please wear loose clothing, such as a sweat suit or jogging clothes. Avoid snaps, zippers and other metal. We may ask you to undress and put on a hospital gown.            Apr 17, 2017  8:00 AM CDT   Masonic Lab Draw with UC MASONIC LAB DRAW   [facility] Draw ([facility])    [address] [place] [id number] [phone]            Apr 17, 2017  8:30 AM CDT   Infusion 360 with  ONCOLOGY INFUSION, UC 19 ATC   [facility] ([facility])    [address] [place] [id number] [phone]              Who to contact     If you have questions or need follow up information about today's clinic visit or your schedule please contact [facility] directly at [phone].  Normal or non-critical lab and imaging results will be communicated to you by MyChart, letter or phone within 4 business days after the clinic has received the results. If you do not hear from us within 7 days, please contact the clinic through MyChart or phone. If you have a critical or abnormal lab result, we will notify you by phone as soon as  possible.  Submit refill requests through [employer] or call your pharmacy and they will forward the refill request to us. Please allow 3 business days for your refill to be completed.          Additional Information About Your Visit        [employer] Information     [employer] gives you secure access to your electronic health record. If you see a primary care provider, you can also send messages to your care team and make appointments. If you have questions, please call your primary care clinic.  If you do not have a primary care provider, please call [phone] and they will assist you.        Care EveryWhere ID     This is your Care EveryWhere ID. This could be used by other organizations to access your [place] medical records  [id number]         Blood Pressure from Last 3 Encounters:   03/20/17 98/61   03/10/17 110/67   03/07/17 111/74    Weight from Last 3 Encounters:   03/20/17 69.9 kg (154 lb 1.6 oz)   03/07/17 74.3 kg (163 lb 14.4 oz)   02/28/17 70.3 kg (154 lb 14.4 oz)              Today, you had the following     No orders found for display         Today's Medication Changes          These changes are accurate as of: 3/20/17 10:53 AM.  If you have any questions, ask your nurse or doctor.               These medicines have changed or have updated prescriptions.        Dose/Directions    * dexamethasone 4 MG tablet   Commonly known as:  DECADRON   This may have changed:  Another medication with the same name was added. Make sure you understand how and when to take each.   Used for:  Need for prophylactic measure, Malignant neoplasm of head of pancreas (H)        Dose:  4 mg   Take 1 tablet (4 mg) by mouth daily (with breakfast) for 3 days   Quantity:  3 tablet   Refills:  0       * dexamethasone 4 MG tablet   Commonly known as:  DECADRON   This may have changed:  You were already taking a medication with the same name, and this prescription was added. Make sure you understand how and when to take each.   Used  for:  Need for prophylactic measure, Malignant neoplasm of head of pancreas (H)        Dose:  4 mg   Take 1 tablet (4 mg) by mouth daily (with breakfast) for 3 days   Quantity:  3 tablet   Refills:  0       * Notice:  This list has 2 medication(s) that are the same as other medications prescribed for you. Read the directions carefully, and ask your doctor or other care provider to review them with you.         Where to get your medicines      These medications were sent to [place], [place] - [phone]  [address] [address]    Hours:  TRANSPLANT PHONE NUMBER [phone] Phone:  [phone]     dexamethasone 4 MG tablet         Some of these will need a paper prescription and others can be bought over the counter.  Ask your nurse if you have questions.     Bring a paper prescription for each of these medications     dronabinol 5 MG capsule    morphine 15 MG 12 hr tablet    oxyCODONE 5 MG IR tablet                Primary Care Provider    [facility]       No address on file        Thank you!     Thank you for choosing [facility]  for your care. Our goal is always to provide you with excellent care. Hearing back from our patients is one way we can continue to improve our services. Please take a few minutes to complete the written survey that you may receive in the mail after your visit with us. Thank you!             Your Updated Medication List - Protect others around you: Learn how to safely use, store and throw away your medicines at www.disposemymeds.org.          This list is accurate as of: 3/20/17 10:53 AM.  Always use your most recent med list.                   Brand Name Dispense Instructions for use    amylase-lipase-protease 29235 UNITS Cpep    CREON    180 capsule    Take 2 capsules (72,000 Units) by mouth 3 times daily (with meals)       * dexamethasone 4 MG tablet    DECADRON    3 tablet     Take 1 tablet (4 mg) by mouth daily (with breakfast) for 3 days       * dexamethasone 4 MG tablet    DECADRON    3 tablet    Take 1 tablet (4 mg) by mouth daily (with breakfast) for 3 days       diltiazem 2% in PLO cream (FV COMPOUNDED) 2% Gel     30 g    Apply topically daily and as needed to external hemorrhoids       docusate sodium 100 MG capsule    COLACE    100 capsule    Take 1 capsule (100 mg) by mouth daily       dronabinol 5 MG capsule    MARINOL    60 capsule    Take 1 capsule (5 mg) by mouth 2 times daily (before meals)       ENSURE CLEAR Liqd     90 Bottle    Take 1 Bottle by mouth 3 times daily       lidocaine-prilocaine cream    EMLA    30 g    Apply topically as needed for other (Use 30-60 minutes prior to port access)       loratadine 10 MG tablet    CLARITIN     Take 10 mg by mouth daily       LORazepam 0.5 MG tablet    ATIVAN    30 tablet    Take 1 tablet (0.5 mg) by mouth every 4 hours as needed (Anxiety, Nausea/Vomiting or Sleep)       morphine 15 MG 12 hr tablet    MS CONTIN    60 tablet    Take 1 tablet (15 mg) by mouth every 12 hours       ondansetron 8 MG ODT tab    ZOFRAN-ODT    60 tablet    Take 1 tablet (8 mg) by mouth every 8 hours as needed for nausea       oxyCODONE 5 MG IR tablet    ROXICODONE    100 tablet    Take 1 tablet (5 mg) by mouth every 4 hours as needed for moderate to severe pain       pantoprazole 40 MG EC tablet    PROTONIX    60 tablet    Take 1 tablet (40 mg) by mouth 2 times daily . After taking this twice daily for 8 weeks, you can decrease dose to once daily.       * polyethylene glycol powder    MIRALAX/GLYCOLAX    119 g    Take 17 g (1 capful) by mouth daily       * polyethylene glycol powder    MIRALAX/GLYCOLAX    119 g    Take 17 g (1 capful) by mouth daily       potassium chloride SA 20 MEQ CR tablet    potassium chloride    60 tablet    Take 1 tablet (20 mEq) by mouth daily       prochlorperazine 10 MG tablet    COMPAZINE    30 tablet    Take 1 tablet (10  mg) by mouth every 6 hours as needed (Nausea/Vomiting)       sucralfate 1 GM tablet    CARAFATE    90 tablet    Take 1 tablet (1 g) by mouth 3 times daily       * Notice:  This list has 4 medication(s) that are the same as other medications prescribed for you. Read the directions carefully, and ask your doctor or other care provider to review them with you.

## 2017-03-20 NOTE — NURSING NOTE
Chief Complaint   Patient presents with     Blood Draw     Labs drawn by RN from power port. VS taken.      YSABEL SHAH RN

## 2017-03-20 NOTE — MR AVS SNAPSHOT
After Visit Summary   3/20/2017    Shirin Muller    MRN: [id number]           Patient Information     Date Of Birth          1960        Visit Information        Provider Department      3/20/2017 8:30 AM UC 20 ATC;  ONCOLOGY INFUSION Formerly [facility]        Today's Diagnoses     Need for prophylactic measure    -  1    Malignant neoplasm of head of pancreas (H)        Anorexia          Care Instructions    Contact Numbers    [facility] Main Line: [phone]  [facility] Triage:  [phone]    Call triage with chills and/or temperature greater than or equal to 100.5, uncontrolled nausea/vomiting, diarrhea, constipation, dizziness, shortness of breath, chest pain, bleeding, unexplained bruising, or any new/concerning symptoms, questions/concerns.     If you are having any concerning symptoms or wish to speak to a provider before your next infusion visit, please call your care coordinator or triage to notify them so we can adequately serve you.       After Hours: [phone]    If after hours, weekends, or holidays, call main hospital  and ask for Oncology doctor on call.         March 2017 Sunday Monday Tuesday Wednesday Thursday Friday Saturday                  1     2     3     4       5     6     7     [facility] D[facility]    7:30 AM   (15 min.)    [facility] DRAW   [facility] Lab Draw     [facility] RETURN    7:45 AM   (50 min.)   Ester Echavarria, TEE CNP   [facility]    8:30 AM   (360 min.)    ONCOLOGY INFUSION   [facility] 8     9     10     [facility] AT[facility] LAB    1:45 PM   (15 min.)    ONCOLOGY INFUSION   [facility] 11       12     13     14     15     16     17     18       19     20     [facility] DRAW    8:00 AM   (15 min.)    [facility] DRAW   [facility] Lab Draw     [facility] ONC INFUSION 360    8:30 AM   (360 min.)    ONCOLOGY INFUSION   [facility]  Clinic     [facility] RETURN    1:35 PM   (50 min.)   Ester Echavarria, TEE CNP   [facility] 21     22     23     24     25       26     27     28     29     30     31 April 2017 Sunday Monday Tuesday Wednesday Thursday Friday Saturday                                 1       2     3     [facility] D[facility]    8:00 AM   (15 min.)    [facility] DRAW   [facility] Draw     [facility] ONC INFUSION 360    8:30 AM   (360 min.)   [facility] ONCOLOGY INFUSION   [facility] 4     5     6     7     8       9     10  Happy Birthday!     11     12     13     14     [facility] DRAW    8:45 AM   (15 min.)    [facility] DRAW   [facility] Draw     MR ABDOMEN WWO    9:00 AM   (45 min.)   UCMR1   [facility] MRI     CT CHEST WO   10:05 AM   (20 min.)   UCCT2   [facility] CT 15       16     17     [facility] DRAW    8:00 AM   (15 min.)    [facility] DRAW   [facility] Draw     [facility] ONC INFUSION 360    8:30 AM   (360 min.)   UC ONCOLOGY INFUSION   [facility]     [facility] RETURN    4:45 PM   (30 min.)   Demond Gonsales MD   [facility] 18     19     20     21     22       23     24     25     26     27     28     29       30                                                Lab Results:  Recent Results (from the past 12 hour(s))   Comprehensive metabolic panel    Collection Time: 03/20/17  8:37 AM   Result Value Ref Range    Sodium 138 133 - 144 mmol/L    Potassium 3.6 3.4 - 5.3 mmol/L    Chloride 103 94 - 109 mmol/L    Carbon Dioxide 29 20 - 32 mmol/L    Anion Gap 6 3 - 14 mmol/L    Glucose 102 (H) 70 - 99 mg/dL    Urea Nitrogen 11 7 - 30 mg/dL    Creatinine 0.61 0.52 - 1.04 mg/dL    GFR Estimate >90  Non  GFR Calc   >60 mL/min/1.7m2    GFR Estimate If Black >90   GFR Calc   >60 mL/min/1.7m2    Calcium 8.3 (L) 8.5 - 10.1 mg/dL    Bilirubin Total 0.4 0.2 -  1.3 mg/dL    Albumin 2.6 (L) 3.4 - 5.0 g/dL    Protein Total 7.1 6.8 - 8.8 g/dL    Alkaline Phosphatase 644 (H) 40 - 150 U/L     (H) 0 - 50 U/L     (H) 0 - 45 U/L   CBC with platelets differential    Collection Time: 03/20/17  8:37 AM   Result Value Ref Range    WBC 15.7 (H) 4.0 - 11.0 10e9/L    RBC Count 3.83 3.8 - 5.2 10e12/L    Hemoglobin 9.8 (L) 11.7 - 15.7 g/dL    Hematocrit 32.6 (L) 35.0 - 47.0 %    MCV 85 78 - 100 fl    MCH 25.6 (L) 26.5 - 33.0 pg    MCHC 30.1 (L) 31.5 - 36.5 g/dL    RDW 19.1 (H) 10.0 - 15.0 %    Platelet Count 129 (L) 150 - 450 10e9/L    Diff Method Manual Differential     % Neutrophils 90.3 %    % Lymphocytes 3.5 %    % Monocytes 6.2 %    % Eosinophils 0.0 %    % Basophils 0.0 %    Nucleated RBCs 1 (H) 0 /100    Absolute Neutrophil 14.2 (H) 1.6 - 8.3 10e9/L    Absolute Lymphocytes 0.5 (L) 0.8 - 5.3 10e9/L    Absolute Monocytes 1.0 0.0 - 1.3 10e9/L    Absolute Eosinophils 0.0 0.0 - 0.7 10e9/L    Absolute Basophils 0.0 0.0 - 0.2 10e9/L    Absolute Nucleated RBC 0.1     Anisocytosis Slight     Poikilocytosis Slight     Acanthocytes Moderate     Ovalocytes Slight              Follow-ups after your visit        Your next 10 appointments already scheduled     Mar 20, 2017  1:50 PM CDT   (Arrive by 1:35 PM)   Return Visit with TEE Olivas CNP   [facility] ([facility])    [address] [place] [id number] [phone]            Apr 03, 2017  8:00 AM CDT   [facility] Draw with  [employer] LAB DRAW   [facility] Lab Draw ([facility])    [address] [place] [id number]   [phone]            Apr 03, 2017  8:30 AM CDT   Infusion 360 with UC ONCOLOGY INFUSION, UC 19 ATC   [facility] ([facility])    [address] [address]   [phone]            Apr 14, 2017  8:45 AM  CDT   [facility] Lab Draw with  [facility] DRAW   [facility] Draw ([facility])    [address]  2nd Floor  [facility] [id number] [phone]            Apr 14, 2017  9:15 AM CDT   (Arrive by 9:00 AM)   MR ABDOMEN W/O & W CONTRAST with MR1   [facility] MRI ([facility])    [address]  1st Floor  [facility] [id number] [phone]           Take your medicines as usual, unless your doctor tells you not to. Bring a list of your current medicines to your exam (including vitamins, minerals and over-the-counter drugs). Also bring the results of similar scans you may have had.    The day before your exam, drink extra fluids at least six 8-ounce glasses (unless your doctor tells you to restrict your fluids).   Have a blood test (creatinine test) within 30 days of your exam. Go to your clinic or Diagnostic Imaging Department for this test.   Do not eat or drink for 6 hours prior to exam.  The MRI machine uses a strong magnet. Please wear clothes without metal (snaps, zippers). A sweatsuit works well, or we may give you a hospital gown.  Please remove any body piercings and hair extensions before you arrive. You will also remove watches, jewelry, hairpins, wallets, dentures, partial dental plates and hearing aids. You may wear contact lenses, and you may be able to wear your rings. We have a safe place to keep your personal items, but it is safer to leave them at home.   **IMPORTANT** THE INSTRUCTIONS BELOW ARE ONLY FOR THOSE PATIENTS WHO HAVE BEEN TOLD THEY WILL RECEIVE SEDATION OR GENERAL ANESTHESIA DURING THEIR MRI PROCEDURE:  IF YOU WILL RECEIVE SEDATION (take medicine to help you relax during your exam):   You must get the medicine from your doctor before you arrive. Bring the medicine to the exam. Do not take it at home.   Arrive one hour early. Bring someone who can take you home after the test. Your medicine will  make you sleepy. After the exam, you may not drive, take a bus or take a taxi by yourself.   No eating 8 hours before your exam. You may have clear liquids up until 4 hours before your exam. (Clear liquids include water, clear tea, black coffee and fruit juice without pulp.)  IF YOU WILL RECEIVE ANESTHESIA (be asleep for your exam):   Arrive 1 1/2 hours early. Bring someone who can take you home after the test. You may not drive, take a bus or take a taxi by yourself.   No eating 8 hours before your exam. You may have clear liquids up until 4 hours before your exam. (Clear liquids include water, clear tea, black coffee and fruit juice without pulp.)  If you have any questions, please contact your Imaging Department exam site.            Apr 14, 2017 10:20 AM CDT   (Arrive by 10:05 AM)   CT CHEST W/O CONTRAST with UCCT2   [facility] CT ([facility])    [address]  [address] [facility] [address] [phone]           Please bring any scans or X-rays taken at other hospitals, if similar tests were done. Also bring a list of your medicines, including vitamins, minerals and over-the-counter drugs. It is safest to leave personal items at home.  Be sure to tell your doctor:   If you have any allergies.   If there s any chance you are pregnant.   If you are breastfeeding.   If you have any special needs.  You do not need to do anything special to prepare.  Please wear loose clothing, such as a sweat suit or jogging clothes. Avoid snaps, zippers and other metal. We may ask you to undress and put on a hospital gown.            Apr 17, 2017  8:00 AM CDT   [facility] Lab Draw with  [facility] LAB DRAW   [facility] Lab Draw ([facility])    [address]  [address] [facility] [address] [phone]            Apr 17, 2017  8:30 AM CDT   Infusion 360 with  ONCOLOGY INFUSION, UC 19 ATC   [facility] ([facility])    [address]  2nd Floor  [facility] [address] [phone]              Who to contact     If you have questions or need follow up information about today's clinic visit or your schedule please contact [facility] directly at [phone].  Normal or non-critical lab and imaging results will be communicated to you by MyChart, letter or phone within 4 business days after the clinic has received the results. If you do not hear from us within 7 days, please contact the clinic through [employer] or phone. If you have a critical or abnormal lab result, we will notify you by phone as soon as possible.  Submit refill requests through [employer] or call your pharmacy and they will forward the refill request to us. Please allow 3 business days for your refill to be completed.          Additional Information About Your Visit        [employer] Information     [employer] gives you secure access to your electronic health record. If you see a primary care provider, you can also send messages to your care team and make appointments. If you have questions, please call your primary care clinic.  If you do not have a primary care provider, please call [phone] and they will assist you.        Care EveryWhere ID     This is your Care EveryWhere ID. This could be used by other organizations to access your [place] medical records  [id number]        Your Vitals Were     Pulse Temperature Respirations Pulse Oximetry BMI (Body Mass Index)       98 98.8  F (37.1  C) 16 99% 22.11 kg/m2        Blood Pressure from Last 3 Encounters:   03/20/17 98/61   03/10/17 110/67   03/07/17 111/74    Weight from Last 3 Encounters:   03/20/17 69.9 kg (154 lb 1.6 oz)   03/07/17 74.3 kg (163 lb 14.4 oz)   02/28/17 70.3 kg (154 lb 14.4 oz)              We Performed the Following     Cancer antigen 19-9     CBC with platelets differential     Comprehensive metabolic panel     MD Instruction for  Protocol     Road Map Alert     Treatment Conditions          Today's Medication Changes          These changes are accurate as of: 3/20/17 10:48 AM.  If you have any questions, ask your nurse or doctor.               These medicines have changed or have updated prescriptions.        Dose/Directions    * dexamethasone 4 MG tablet   Commonly known as:  DECADRON   This may have changed:  Another medication with the same name was added. Make sure you understand how and when to take each.   Used for:  Need for prophylactic measure, Malignant neoplasm of head of pancreas (H)        Dose:  4 mg   Take 1 tablet (4 mg) by mouth daily (with breakfast) for 3 days   Quantity:  3 tablet   Refills:  0       * dexamethasone 4 MG tablet   Commonly known as:  DECADRON   This may have changed:  You were already taking a medication with the same name, and this prescription was added. Make sure you understand how and when to take each.   Used for:  Need for prophylactic measure, Malignant neoplasm of head of pancreas (H)        Dose:  4 mg   Take 1 tablet (4 mg) by mouth daily (with breakfast) for 3 days   Quantity:  3 tablet   Refills:  0       * Notice:  This list has 2 medication(s) that are the same as other medications prescribed for you. Read the directions carefully, and ask your doctor or other care provider to review them with you.         Where to get your medicines      These medications were sent to [address] [address]    Hours:  TRANSPLANT PHONE NUMBER [phone] Phone:  [phone]     dexamethasone 4 MG tablet         Some of these will need a paper prescription and others can be bought over the counter.  Ask your nurse if you have questions.     Bring a paper prescription for each of these medications     dronabinol 5 MG capsule    morphine 15 MG 12 hr tablet    oxyCODONE 5 MG IR tablet                 Primary Care Provider    [facility]       No address on file        Thank you!     Thank you for choosing [facility]  for your care. Our goal is always to provide you with excellent care. Hearing back from our patients is one way we can continue to improve our services. Please take a few minutes to complete the written survey that you may receive in the mail after your visit with us. Thank you!             Your Updated Medication List - Protect others around you: Learn how to safely use, store and throw away your medicines at www.disposemymeds.org.          This list is accurate as of: 3/20/17 10:48 AM.  Always use your most recent med list.                   Brand Name Dispense Instructions for use    amylase-lipase-protease 11798 UNITS Cpep    CREON    180 capsule    Take 2 capsules (72,000 Units) by mouth 3 times daily (with meals)       * dexamethasone 4 MG tablet    DECADRON    3 tablet    Take 1 tablet (4 mg) by mouth daily (with breakfast) for 3 days       * dexamethasone 4 MG tablet    DECADRON    3 tablet    Take 1 tablet (4 mg) by mouth daily (with breakfast) for 3 days       diltiazem 2% in PLO cream (FV COMPOUNDED) 2% Gel     30 g    Apply topically daily and as needed to external hemorrhoids       docusate sodium 100 MG capsule    COLACE    100 capsule    Take 1 capsule (100 mg) by mouth daily       dronabinol 5 MG capsule    MARINOL    60 capsule    Take 1 capsule (5 mg) by mouth 2 times daily (before meals)       ENSURE CLEAR Liqd     90 Bottle    Take 1 Bottle by mouth 3 times daily       lidocaine-prilocaine cream    EMLA    30 g    Apply topically as needed for other (Use 30-60 minutes prior to port access)       loratadine 10 MG tablet    CLARITIN     Take 10 mg by mouth daily       LORazepam 0.5 MG tablet    ATIVAN    30 tablet    Take 1 tablet (0.5 mg) by mouth every 4 hours as needed (Anxiety, Nausea/Vomiting or Sleep)       morphine 15 MG 12 hr tablet    MS  CONTIN    60 tablet    Take 1 tablet (15 mg) by mouth every 12 hours       ondansetron 8 MG ODT tab    ZOFRAN-ODT    60 tablet    Take 1 tablet (8 mg) by mouth every 8 hours as needed for nausea       oxyCODONE 5 MG IR tablet    ROXICODONE    100 tablet    Take 1 tablet (5 mg) by mouth every 4 hours as needed for moderate to severe pain       pantoprazole 40 MG EC tablet    PROTONIX    60 tablet    Take 1 tablet (40 mg) by mouth 2 times daily . After taking this twice daily for 8 weeks, you can decrease dose to once daily.       * polyethylene glycol powder    MIRALAX/GLYCOLAX    119 g    Take 17 g (1 capful) by mouth daily       * polyethylene glycol powder    MIRALAX/GLYCOLAX    119 g    Take 17 g (1 capful) by mouth daily       potassium chloride SA 20 MEQ CR tablet    potassium chloride    60 tablet    Take 1 tablet (20 mEq) by mouth daily       prochlorperazine 10 MG tablet    COMPAZINE    30 tablet    Take 1 tablet (10 mg) by mouth every 6 hours as needed (Nausea/Vomiting)       sucralfate 1 GM tablet    CARAFATE    90 tablet    Take 1 tablet (1 g) by mouth 3 times daily       * Notice:  This list has 4 medication(s) that are the same as other medications prescribed for you. Read the directions carefully, and ask your doctor or other care provider to review them with you.

## 2017-03-20 NOTE — PROGRESS NOTES
Ms. Muller is seen in follow-up of metastatic pancreatic cancer.    Oncology HPI:  She was diagnosed in the spring of 2016 after presenting with a 2 to 3-month history of abdominal pain and weight loss.  She underwent evaluation showing a 5 cm mass at the head of the pancreas.  Biopsy was consistent with adenocarcinoma.  She underwent staging imaging including an MRI of the abdomen, which then showed an up to 10 cm poorly defined hypoenhancing mass arising from the pancreatic head which encased celiac artery, superior mesenteric artery and severely attenuated the main portal vein.  She initiated on treatment with gemcitabine and Abraxane on 05/02/2016 and continued on that until December 2016 when she was found to have metastatic disease involving the liver.  She was then initiated on FOLFIRINOX on 12/20/16. Her first cycle was complicated by fatigue, nausea/vomiting. Antiemetics were adjusted with cycle 2. After cycle 3, she was admitted with weakness and dizziness. She was found to have a GI bleed secondary to a bleeding duodenal ulcer and infiltrating mass in the duodenum. The ulcer was injected and clipped. She was found to be H. Pylori positive and was initiated on  therapy with PPI, carafate, amoxicillin and clarithromycin.    Interval history:  Shirin felt this last cycle went OK. Still has fatigue, but thinks it is less. Abdominal tension/pressure is less. No nausea. Pain is well controlled with MS Contin. Has fatigue with exercise. No significant changes to shortness of breath. Minimal cough. No chest pain. Bowels are regular. Urination is normal. No change to peripheral neuropathy. No mouth sores. No headaches or vision changes. Continues to lose weight. Has some hunger with the use of marinol, but then doesn't find foods that taste good. Was unable to get Ensure due to the cost.    Current Outpatient Prescriptions   Medication Sig Dispense Refill     dexamethasone (DECADRON) 4 MG tablet Take 1  tablet (4 mg) by mouth daily (with breakfast) for 3 days 3 tablet 0     Nutritional Supplements (ENSURE CLEAR) LIQD Take 1 Bottle by mouth 3 times daily 90 Bottle 6     morphine (MS CONTIN) 15 MG 12 hr tablet Take 1 tablet (15 mg) by mouth every 12 hours 60 tablet 0     dronabinol (MARINOL) 5 MG capsule Take 1 capsule (5 mg) by mouth 2 times daily (before meals) 60 capsule 0     polyethylene glycol (MIRALAX/GLYCOLAX) powder Take 17 g (1 capful) by mouth daily 119 g 11     LORazepam (ATIVAN) 0.5 MG tablet Take 1 tablet (0.5 mg) by mouth every 4 hours as needed (Anxiety, Nausea/Vomiting or Sleep) 30 tablet 2     potassium chloride SA (POTASSIUM CHLORIDE) 20 MEQ CR tablet Take 1 tablet (20 mEq) by mouth daily 60 tablet 1     diltiazem 2% in PLO cream, FV COMPOUNDED, 2% GEL Apply topically daily and as needed to external hemorrhoids 30 g 0     docusate sodium (COLACE) 100 MG capsule Take 1 capsule (100 mg) by mouth daily 100 capsule 0     sucralfate (CARAFATE) 1 GM tablet Take 1 tablet (1 g) by mouth 3 times daily 90 tablet 0     pantoprazole (PROTONIX) 40 MG EC tablet Take 1 tablet (40 mg) by mouth 2 times daily . After taking this twice daily for 8 weeks, you can decrease dose to once daily. 60 tablet 1     loratadine (CLARITIN) 10 MG tablet Take 10 mg by mouth daily       prochlorperazine (COMPAZINE) 10 MG tablet Take 1 tablet (10 mg) by mouth every 6 hours as needed (Nausea/Vomiting) 30 tablet 2     oxyCODONE (ROXICODONE) 5 MG IR tablet Take 1 tablet (5 mg) by mouth every 4 hours as needed for moderate to severe pain 100 tablet 0     amylase-lipase-protease (CREON) 10064 UNITS CPEP Take 2 capsules (72,000 Units) by mouth 3 times daily (with meals) 180 capsule 1     polyethylene glycol (MIRALAX/GLYCOLAX) powder Take 17 g (1 capful) by mouth daily 119 g 11     lidocaine-prilocaine (EMLA) cream Apply topically as needed for other (Use 30-60 minutes prior to port access) 30 g 0     ondansetron (ZOFRAN-ODT) 8 MG  disintegrating tablet Take 1 tablet (8 mg) by mouth every 8 hours as needed for nausea 60 tablet 4     Exam: Alert, appears in NAD. There were no vitals taken for this visit.    Vital Signs 3/20/2017   Systolic 98   Diastolic 61   Pulse 98   Temperature 98.8   Respirations 16   Weight (LB) 154 lb 1.6 oz   Height    BMI    Pain    O2 99   Oropharynx is moist, no focal lesion. No icterus. Neck supple and wthout adenopathy. Lungs:CTA. Heart:RRR, no murmur or rub. Abdomen: soft, distended with moderate ascites, nontender, BS active. No masses or organomegaly. Extremities: warm, no edema.    Labs:  Results for NATALIIA IBARRA (MRN [id number]) as of 3/20/2017 09:17   Ref. Range 3/20/2017 08:37   Sodium Latest Ref Range: 133 - 144 mmol/L 138   Potassium Latest Ref Range: 3.4 - 5.3 mmol/L 3.6   Chloride Latest Ref Range: 94 - 109 mmol/L 103   Carbon Dioxide Latest Ref Range: 20 - 32 mmol/L 29   Urea Nitrogen Latest Ref Range: 7 - 30 mg/dL 11   Creatinine Latest Ref Range: 0.52 - 1.04 mg/dL 0.61   GFR Estimate Latest Ref Range: >60 mL/min/1.7m2 >90...   GFR Estimate If Black Latest Ref Range: >60 mL/min/1.7m2 >90...   Calcium Latest Ref Range: 8.5 - 10.1 mg/dL 8.3 (L)   Anion Gap Latest Ref Range: 3 - 14 mmol/L 6   Albumin Latest Ref Range: 3.4 - 5.0 g/dL 2.6 (L)   Protein Total Latest Ref Range: 6.8 - 8.8 g/dL 7.1   Bilirubin Total Latest Ref Range: 0.2 - 1.3 mg/dL 0.4   Alkaline Phosphatase Latest Ref Range: 40 - 150 U/L 644 (H)   ALT Latest Ref Range: 0 - 50 U/L 177 (H)   AST Latest Ref Range: 0 - 45 U/L 137 (H)   Glucose Latest Ref Range: 70 - 99 mg/dL 102 (H)   WBC Latest Ref Range: 4.0 - 11.0 10e9/L 15.7 (H)   Hemoglobin Latest Ref Range: 11.7 - 15.7 g/dL 9.8 (L)   Hematocrit Latest Ref Range: 35.0 - 47.0 % 32.6 (L)   Platelet Count Latest Ref Range: 150 - 450 10e9/L 129 (L)   RBC Count Latest Ref Range: 3.8 - 5.2 10e12/L 3.83   MCV Latest Ref Range: 78 - 100 fl 85   MCH Latest Ref Range: 26.5 - 33.0 pg  25.6 (L)   MCHC Latest Ref Range: 31.5 - 36.5 g/dL 30.1 (L)   RDW Latest Ref Range: 10.0 - 15.0 % 19.1 (H)   Diff Method Unknown Pending       Impression/plan:  1. Metastatic pancreatic cancer- on FOLFIRINOX , due for cycle 6  -Overall, tolerating the FOLFIRINOX fairly well. Has noted a continued decline in the CA 19-9.  -Pain managed well. Refills of MS contin and oxycodone given today.  -Managing nausea well with compazine and ativan prn  -Will continue treatment today. Continue every 2 week infusions. Next restaging in 4 weeks.    2. Ascites/pleural effusions.   -Stable on exam and clinical symptoms. Will continue to monitor.   -She will call if abdominal pressure or SOB increases.   -Would check cytology on the fluid if she requires a paracentesis or thoracentesis.    3. GI:  - Constipation: stable. Continue stool softener  -Duodenal ulcer/h. Pylori + completed abx therapy,  -on carafate tid, ranitidine 150 mg at HS, protonix bid through the end of March, then decrease to daily dosing.    4. FEN: stable.   -Was given 1 L NS with 20 MEQ KCL while we waited for labs to return today. Continue oral KDUR 20 meq daily.    5.  Abdominal pain: secondary to malignancy. Stable  -MS Contin 15 mg q 12 hours, oxycodone 5 mg every 4 hours as needed for moderate/severe pain    6. Anorexia: improved on marinol 5 mg bid. However, still losing weight. Not getting in the nutritional shakes because of cost. Will review options for support with  and the ACS navigator.

## 2017-03-20 NOTE — PROGRESS NOTES
Infusion Nursing Note:  Shirin Mullre presents today for Cycle 6, day 1 Oxaliplatin, irinotecan, leucovorin, fluorouracil bolus and fluorouracil pump connection.    Patient seen by provider today: Yes: ASTRID Jerome    Note: Patient had no questions prior to infusion.  TORB 3/20/17 0905 ASTRID Franco/RAMON Taylor RN: Administer 1L NS bolus with 20 mEq KCl.    Intravenous Access:  Implanted Port.    Treatment Conditions:  Lab Results   Component Value Date    HGB 9.8 03/20/2017     Lab Results   Component Value Date    WBC 15.7 03/20/2017      Lab Results   Component Value Date    ANEU 14.2 03/20/2017     Lab Results   Component Value Date     03/20/2017      Lab Results   Component Value Date     03/20/2017                   Lab Results   Component Value Date    POTASSIUM 3.6 03/20/2017           Lab Results   Component Value Date    MAG 2.0 02/06/2017            Lab Results   Component Value Date    CR 0.61 03/20/2017                   Lab Results   Component Value Date    FANNY 8.3 03/20/2017                Lab Results   Component Value Date    BILITOTAL 0.4 03/20/2017           Lab Results   Component Value Date    ALBUMIN 2.6 03/20/2017                    Lab Results   Component Value Date     03/20/2017           Lab Results   Component Value Date     03/20/2017     Results reviewed, labs MET treatment parameters, ok to proceed with treatment.    Post Infusion Assessment:  Patient tolerated infusion without incident.  Blood return noted pre and post infusion.  Site patent and intact, free from redness, edema or discomfort.  No evidence of extravasations.    Fluorouracil pump connected per protocol.  Connections taped, temperature sensor taped to skin.  Pump to infuse at 5 mL for 46 hours.  Disconnect time of 1260 on 3/22/2017 called to [facility], left message.  Pharmacy contacted as well, as patient forgot disconnection supplies bag.     Discharge Plan:    Prescription refills given for Morphine, marinol, oxycodone, ativan, compazine and KCl.  Discharge instructions reviewed with: Patient.  Patient and/or family verbalized understanding of discharge instructions and all questions answered.  Copy of AVS reviewed with patient and/or family.  Patient will return 3/23/2017 for next appointment.  Departure Mode: Ambulatory.    Asmita Taylor RN

## 2017-03-20 NOTE — PROGRESS NOTES
"SPIRITUAL HEALTH SERVICES  SPIRITUAL ASSESSMENT Progress Note  [facility]     PRIMARY FOCUS:     Goals of care    Symptom/pain management    Emotional/spiritual/Gnosticism distress    Support for coping    ILLNESS CIRCUMSTANCES:   Reviewed documentation. Reflective conversation shared with Malorie and her sisterJeni which integrated elements of illness and family narratives.     Context of Serious Illness/Symptom(s) - Malorie has pancreatic cancer. She was exhausted emotionally and physically and barely had the energy to speak, open her eyes or lift her hand - some of the medication makes her very sleepy and also her illness is making her very tired.     Resources for Support - She was with her sister, and mentioned she has family in [place] and in [facility].    DISTRESS:     Emotional/Spiritual/Existential Distress - Exhaustion, fear.    Caodaism Distress - \"God is good.\" She asked for a prayer.    Social/Cultural/Economic Distress - some of her family are here, some are in [facility] [place], where she is from. 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     SPIRITUAL/Pentecostalism COPING:     Episcopalian/Agatha - Religious    Spiritual Practice(s) - prayer - we held hands with her sister while I prayed to God to heal his daughter, thanked Him for this day, asked him to bring peace, comfort and remove her anxiety and to bless her family with energy, cameron and health. She said, \"Amen.\" - and kept holding my hand, which, since she had such low energy, seemed significant, so we sat in our little [place] and were silent for a minute or two.    Emotional/Relational/Existential Connections - family    GOALS OF CARE:    Goals of Care - \"I will be doing this every two weeks for the rest of my life.\"     Meaning/Sense-Making - She asked for a prayer - we held hands with her " sister. She thanked me several times for coming.    PLAN: Either I or another member of the chaplaincy team will follow-up with Malorie as she receives ongoing care at the [facility].    Kamini Weaver   Intern  Pager [phone]

## 2017-03-20 NOTE — PATIENT INSTRUCTIONS
Contact Numbers    [facility] [place]: [phone]  [facility] Triage:  [phone]    Call triage with chills and/or temperature greater than or equal to 100.5, uncontrolled nausea/vomiting, diarrhea, constipation, dizziness, shortness of breath, chest pain, bleeding, unexplained bruising, or any new/concerning symptoms, questions/concerns.     If you are having any concerning symptoms or wish to speak to a provider before your next infusion visit, please call your care coordinator or triage to notify them so we can adequately serve you.       After Hours: [phone]    If after hours, weekends, or holidays, call main hospital  and ask for Oncology doctor on call.         March 2017 Sunday Monday Tuesday Wednesday Thursday Friday Saturday                  1     2     3     4       5     6     7     [facility]    7:30 AM   (15 min.)    [facility]     [facility] RETURN    7:45 AM   (50 min.)   Ester Echavarria APRN CNP   [facility]     [facility] ONC INFUSION 360    8:30 AM   (360 min.)   [facility]ION   [facility] 8     9     10     [facility] ATC LAB    1:45 PM   (15 min.)    ONCOLOGY INFUSION   [facility] 11       12     13     14     15     16     17     18       19     20     [facility] DRAW    8:00 AM   (15 min.)   [facility]   [facility] Draw     [facility] ONC INFUSION 360    8:30 AM   (360 min.)   [facility] ONCOL[facility] INFUSION   [facility]     [facility] RETURN    1:35 PM   (50 min.)   Ester Echavarria APRN CNP   [facility] 21     22     23     24     25       26     27     28     29     30     31 April 2017 Sunday Monday Tuesday Wednesday Thursday Friday Saturday                                 1       2     3     [facility] D[facility]    8:00 AM   (15 min.)   [facility]   [facility] Draw     [facility] ONC INFUSION 360    8:30 AM   (360  min.)    ONCOLOGY INFUSION   [facility] 4     5     6     7     8       9     10  Happy Birthday!     11     12     13     14     [facility] DRAW    8:45 AM   (15 min.)    [facility] DRAW   [facility] Lab Draw     MR ABDOMEN WWO    9:00 AM   (45 min.)   UCMR1   [facility] MRI     CT CHEST WO   10:05 AM   (20 min.)   UCCT2   [facility] CT 15       16     17     [facility] DRAW    8:00 AM   (15 min.)    [facility] DRAW   [facility] Lab Draw     P ONC INFUSION 360    8:30 AM   (360 min.)    ONCOLOGY INFUSION   [facility]     [facility] RETURN    4:45 PM   (30 min.)   Demond Gonsales MD   [facility] 18     19     20     21     22       23     24     25     26     27     28     29       30                                                Lab Results:  Recent Results (from the past 12 hour(s))   Comprehensive metabolic panel    Collection Time: 03/20/17  8:37 AM   Result Value Ref Range    Sodium 138 133 - 144 mmol/L    Potassium 3.6 3.4 - 5.3 mmol/L    Chloride 103 94 - 109 mmol/L    Carbon Dioxide 29 20 - 32 mmol/L    Anion Gap 6 3 - 14 mmol/L    Glucose 102 (H) 70 - 99 mg/dL    Urea Nitrogen 11 7 - 30 mg/dL    Creatinine 0.61 0.52 - 1.04 mg/dL    GFR Estimate >90  Non  GFR Calc   >60 mL/min/1.7m2    GFR Estimate If Black >90   GFR Calc   >60 mL/min/1.7m2    Calcium 8.3 (L) 8.5 - 10.1 mg/dL    Bilirubin Total 0.4 0.2 - 1.3 mg/dL    Albumin 2.6 (L) 3.4 - 5.0 g/dL    Protein Total 7.1 6.8 - 8.8 g/dL    Alkaline Phosphatase 644 (H) 40 - 150 U/L     (H) 0 - 50 U/L     (H) 0 - 45 U/L   CBC with platelets differential    Collection Time: 03/20/17  8:37 AM   Result Value Ref Range    WBC 15.7 (H) 4.0 - 11.0 10e9/L    RBC Count 3.83 3.8 - 5.2 10e12/L    Hemoglobin 9.8 (L) 11.7 - 15.7 g/dL    Hematocrit 32.6 (L) 35.0 - 47.0 %    MCV 85 78 - 100 fl    MCH 25.6 (L)  26.5 - 33.0 pg    MCHC 30.1 (L) 31.5 - 36.5 g/dL    RDW 19.1 (H) 10.0 - 15.0 %    Platelet Count 129 (L) 150 - 450 10e9/L    Diff Method Manual Differential     % Neutrophils 90.3 %    % Lymphocytes 3.5 %    % Monocytes 6.2 %    % Eosinophils 0.0 %    % Basophils 0.0 %    Nucleated RBCs 1 (H) 0 /100    Absolute Neutrophil 14.2 (H) 1.6 - 8.3 10e9/L    Absolute Lymphocytes 0.5 (L) 0.8 - 5.3 10e9/L    Absolute Monocytes 1.0 0.0 - 1.3 10e9/L    Absolute Eosinophils 0.0 0.0 - 0.7 10e9/L    Absolute Basophils 0.0 0.0 - 0.2 10e9/L    Absolute Nucleated RBC 0.1     Anisocytosis Slight     Poikilocytosis Slight     Acanthocytes Moderate     Ovalocytes Slight

## 2017-03-20 NOTE — Clinical Note
3/20/2017       RE: Shirin Muller  [address] [facility] [address]     Dear Colleague,    Thank you for referring your patient, Shirin Muller, to the [facility]. Please see a copy of my visit note below.    Ms. Muller is seen in follow-up of metastatic pancreatic cancer.    Oncology HPI:  She was diagnosed in the spring of 2016 after presenting with a 2 to 3-month history of abdominal pain and weight loss.  She underwent evaluation showing a 5 cm mass at the head of the pancreas.  Biopsy was consistent with adenocarcinoma.  She underwent staging imaging including an MRI of the abdomen, which then showed an up to 10 cm poorly defined hypoenhancing mass arising from the pancreatic head which encased celiac artery, superior mesenteric artery and severely attenuated the main portal vein.  She initiated on treatment with gemcitabine and Abraxane on 05/02/2016 and continued on that until December 2016 when she was found to have metastatic disease involving the liver.  She was then initiated on FOLFIRINOX on 12/20/16. Her first cycle was complicated by fatigue, nausea/vomiting. Antiemetics were adjusted with cycle 2. After cycle 3, she was admitted with weakness and dizziness. She was found to have a GI bleed secondary to a bleeding duodenal ulcer and infiltrating mass in the duodenum. The ulcer was injected and clipped. She was found to be H. Pylori positive and was initiated on  therapy with PPI, carafate, amoxicillin and clarithromycin.    Interval history:  Shirin felt this last cycle went OK. Still has fatigue, but thinks it is less. Abdominal tension/pressure is less. No nausea. Pain is well controlled with MS Contin. Has fatigue with exercise. No significant changes to shortness of breath. Minimal cough. No chest pain. Bowels are regular. Urination is normal. No change to peripheral neuropathy. No mouth sores. No headaches or vision changes. Continues  to lose weight. Has some hunger with the use of marinol, but then doesn't find foods that taste good. Was unable to get Ensure due to the cost.    Current Outpatient Prescriptions   Medication Sig Dispense Refill     dexamethasone (DECADRON) 4 MG tablet Take 1 tablet (4 mg) by mouth daily (with breakfast) for 3 days 3 tablet 0     Nutritional Supplements (ENSURE CLEAR) LIQD Take 1 Bottle by mouth 3 times daily 90 Bottle 6     morphine (MS CONTIN) 15 MG 12 hr tablet Take 1 tablet (15 mg) by mouth every 12 hours 60 tablet 0     dronabinol (MARINOL) 5 MG capsule Take 1 capsule (5 mg) by mouth 2 times daily (before meals) 60 capsule 0     polyethylene glycol (MIRALAX/GLYCOLAX) powder Take 17 g (1 capful) by mouth daily 119 g 11     LORazepam (ATIVAN) 0.5 MG tablet Take 1 tablet (0.5 mg) by mouth every 4 hours as needed (Anxiety, Nausea/Vomiting or Sleep) 30 tablet 2     potassium chloride SA (POTASSIUM CHLORIDE) 20 MEQ CR tablet Take 1 tablet (20 mEq) by mouth daily 60 tablet 1     diltiazem 2% in PLO cream, FV COMPOUNDED, 2% GEL Apply topically daily and as needed to external hemorrhoids 30 g 0     docusate sodium (COLACE) 100 MG capsule Take 1 capsule (100 mg) by mouth daily 100 capsule 0     sucralfate (CARAFATE) 1 GM tablet Take 1 tablet (1 g) by mouth 3 times daily 90 tablet 0     pantoprazole (PROTONIX) 40 MG EC tablet Take 1 tablet (40 mg) by mouth 2 times daily . After taking this twice daily for 8 weeks, you can decrease dose to once daily. 60 tablet 1     loratadine (CLARITIN) 10 MG tablet Take 10 mg by mouth daily       prochlorperazine (COMPAZINE) 10 MG tablet Take 1 tablet (10 mg) by mouth every 6 hours as needed (Nausea/Vomiting) 30 tablet 2     oxyCODONE (ROXICODONE) 5 MG IR tablet Take 1 tablet (5 mg) by mouth every 4 hours as needed for moderate to severe pain 100 tablet 0     amylase-lipase-protease (CREON) 42815 UNITS CPEP Take 2 capsules (72,000 Units) by mouth 3 times daily (with meals) 180 capsule  1     polyethylene glycol (MIRALAX/GLYCOLAX) powder Take 17 g (1 capful) by mouth daily 119 g 11     lidocaine-prilocaine (EMLA) cream Apply topically as needed for other (Use 30-60 minutes prior to port access) 30 g 0     ondansetron (ZOFRAN-ODT) 8 MG disintegrating tablet Take 1 tablet (8 mg) by mouth every 8 hours as needed for nausea 60 tablet 4     Exam: Alert, appears in NAD. There were no vitals taken for this visit.    Vital Signs 3/20/2017   Systolic 98   Diastolic 61   Pulse 98   Temperature 98.8   Respirations 16   Weight (LB) 154 lb 1.6 oz   Height    BMI    Pain    O2 99   Oropharynx is moist, no focal lesion. No icterus. Neck supple and wthout adenopathy. Lungs:CTA. Heart:RRR, no murmur or rub. Abdomen: soft, distended with moderate ascites, nontender, BS active. No masses or organomegaly. Extremities: warm, no edema.    Labs:  Results for NATALIIA IBARRA (MRN [id number]) as of 3/20/2017 09:17   Ref. Range 3/20/2017 08:37   Sodium Latest Ref Range: 133 - 144 mmol/L 138   Potassium Latest Ref Range: 3.4 - 5.3 mmol/L 3.6   Chloride Latest Ref Range: 94 - 109 mmol/L 103   Carbon Dioxide Latest Ref Range: 20 - 32 mmol/L 29   Urea Nitrogen Latest Ref Range: 7 - 30 mg/dL 11   Creatinine Latest Ref Range: 0.52 - 1.04 mg/dL 0.61   GFR Estimate Latest Ref Range: >60 mL/min/1.7m2 >90...   GFR Estimate If Black Latest Ref Range: >60 mL/min/1.7m2 >90...   Calcium Latest Ref Range: 8.5 - 10.1 mg/dL 8.3 (L)   Anion Gap Latest Ref Range: 3 - 14 mmol/L 6   Albumin Latest Ref Range: 3.4 - 5.0 g/dL 2.6 (L)   Protein Total Latest Ref Range: 6.8 - 8.8 g/dL 7.1   Bilirubin Total Latest Ref Range: 0.2 - 1.3 mg/dL 0.4   Alkaline Phosphatase Latest Ref Range: 40 - 150 U/L 644 (H)   ALT Latest Ref Range: 0 - 50 U/L 177 (H)   AST Latest Ref Range: 0 - 45 U/L 137 (H)   Glucose Latest Ref Range: 70 - 99 mg/dL 102 (H)   WBC Latest Ref Range: 4.0 - 11.0 10e9/L 15.7 (H)   Hemoglobin Latest Ref Range: 11.7 - 15.7 g/dL  9.8 (L)   Hematocrit Latest Ref Range: 35.0 - 47.0 % 32.6 (L)   Platelet Count Latest Ref Range: 150 - 450 10e9/L 129 (L)   RBC Count Latest Ref Range: 3.8 - 5.2 10e12/L 3.83   MCV Latest Ref Range: 78 - 100 fl 85   MCH Latest Ref Range: 26.5 - 33.0 pg 25.6 (L)   MCHC Latest Ref Range: 31.5 - 36.5 g/dL 30.1 (L)   RDW Latest Ref Range: 10.0 - 15.0 % 19.1 (H)   Diff Method Unknown Pending       Impression/plan:  1. Metastatic pancreatic cancer- on FOLFIRINOX , due for cycle 6  -Overall, tolerating the FOLFIRINOX fairly well. Has noted a continued decline in the CA 19-9.  -Pain managed well. Refills of MS contin and oxycodone given today.  -Managing nausea well with compazine and ativan prn  -Will continue treatment today. Continue every 2 week infusions. Next restaging in 4 weeks.    2. Ascites/pleural effusions.   -Stable on exam and clinical symptoms. Will continue to monitor.   -She will call if abdominal pressure or SOB increases.   -Would check cytology on the fluid if she requires a paracentesis or thoracentesis.    3. GI:  - Constipation: stable. Continue stool softener  -Duodenal ulcer/h. Pylori + completed abx therapy,  -on carafate tid, ranitidine 150 mg at HS, protonix bid through the end of March, then decrease to daily dosing.    4. FEN: stable.   -Was given 1 L NS with 20 MEQ KCL while we waited for labs to return today. Continue oral KDUR 20 meq daily.    5.  Abdominal pain: secondary to malignancy. Stable  -MS Contin 15 mg q 12 hours, oxycodone 5 mg every 4 hours as needed for moderate/severe pain    6. Anorexia: improved on marinol 5 mg bid. However, still losing weight. Not getting in the nutritional shakes because of cost. Will review options for support with  and the ACS navigator.     Again, thank you for allowing me to participate in the care of your patient.      Sincerely,    TEE Villanueva CNP

## 2017-03-21 ENCOUNTER — TELEPHONE (OUTPATIENT)
Dept: ONCOLOGY | Facility: CLINIC | Age: 57
End: 2017-03-21

## 2017-03-21 NOTE — TELEPHONE ENCOUNTER
----- Message from Bobby Ibarra RN sent at 3/21/2017  4:17 PM CDT -----  I just called Jose Antonio's cell #, but no answer so left a detailed message clarifying the plan: Per Ester, patient should continue to eat normal, solid, regular foods/meals in addition to drinking supplemental shakes (Ensure) with a goal of three cans per day. We do NOT want her to avoid solid, regular food in lieu of supplemental drinks.     Left my name & [facility] Triage # for call back as needed.      ----- Message -----     From: Sully Foster     Sent: 3/21/2017   4:05 PM       To: TEE Olivas CNP, Brian Arias, ELICEO    Thanks for the update Ester. I did find an application for discounted/free Ensure so I will see her on Thursday to complete the application process. Also [employer] could be an option if she is open to eating food - even soft foods can be delivered through [employer]. I will bring that with me on Thursday too to discuss.     Thanks!   Sully    ----- Message -----     From: Ester Echavarria APRN CNP     Sent: 3/21/2017   3:55 PM       To: Brian Arias, ELICEO, Sully Foster    I wonder why she thinks that? She is allowed regular food. I would plan for 3 cans of nutritional shakes per day. I will ask Brian to follow-up with her regarding the diet and that she can eat regular food.    Ester  ----- Message -----     From: Sully Foster     Sent: 3/21/2017   3:23 PM       To: TEE Olivas CNP, #    Ester,  I just spoke with Jose Antonio and she said she cannot eat any traditional food and can only consume liquid diet so I am looking into some options that ACS might have to help her. I tried to understand how many nutritional shakes she would ideally want per day but she was very unsure of how much she would actually consume - do you have a goal for her?     I will update you both if I find anything from my end.     Sully   ----- Message -----     From: Ester Echavarria APRN CNP     Sent: 3/20/2017  10:38 AM       To: Radha MCKEON  [facility], Sully Foster    Are there any services that Shirin might qualify for? She is losing weight and needing Ensure, but can't afford that.  Insurance won't cover nutritional shakes for her.    I'm not sure if she has received any of the supportive services we offer.    Thanks!    Ester

## 2017-03-22 LAB — CANCER AG19-9 SERPL-ACNC: 5008

## 2017-03-23 VITALS
TEMPERATURE: 97.2 F | HEART RATE: 68 BPM | BODY MASS INDEX: 21.95 KG/M2 | DIASTOLIC BLOOD PRESSURE: 68 MMHG | RESPIRATION RATE: 16 BRPM | WEIGHT: 153 LBS | SYSTOLIC BLOOD PRESSURE: 112 MMHG | OXYGEN SATURATION: 100 %

## 2017-03-23 DIAGNOSIS — Z29.9 NEED FOR PROPHYLACTIC MEASURE: Primary | ICD-10-CM

## 2017-03-23 DIAGNOSIS — C25.0 MALIGNANT NEOPLASM OF HEAD OF PANCREAS (H): ICD-10-CM

## 2017-03-23 PROCEDURE — 25000128 H RX IP 250 OP 636: Mod: ZF | Performed by: INTERNAL MEDICINE

## 2017-03-23 PROCEDURE — 25000128 H RX IP 250 OP 636: Mod: ZF | Performed by: NURSE PRACTITIONER

## 2017-03-23 PROCEDURE — 99212 OFFICE O/P EST SF 10 MIN: CPT | Mod: 25

## 2017-03-23 PROCEDURE — 96372 THER/PROPH/DIAG INJ SC/IM: CPT | Mod: ZF

## 2017-03-23 PROCEDURE — 96360 HYDRATION IV INFUSION INIT: CPT

## 2017-03-23 RX ORDER — HEPARIN SODIUM (PORCINE) LOCK FLUSH IV SOLN 100 UNIT/ML 100 UNIT/ML
500 SOLUTION INTRAVENOUS EVERY 8 HOURS
Status: DISCONTINUED | OUTPATIENT
Start: 2017-03-23 | End: 2017-03-23 | Stop reason: HOSPADM

## 2017-03-23 RX ADMIN — HEPARIN SODIUM (PORCINE) LOCK FLUSH IV SOLN 100 UNIT/ML 500 UNITS: 100 SOLUTION at 15:36

## 2017-03-23 RX ADMIN — SODIUM CHLORIDE 1000 ML: 9 INJECTION, SOLUTION INTRAVENOUS at 14:15

## 2017-03-23 RX ADMIN — PEGFILGRASTIM 6 MG: 6 INJECTION SUBCUTANEOUS at 13:56

## 2017-03-23 NOTE — PATIENT INSTRUCTIONS
Contact Numbers    [facility] Main Line: [phone]  [facility] Triage:  [phone]    Call triage with chills and/or temperature greater than or equal to 100.5, uncontrolled nausea/vomiting, diarrhea, constipation, dizziness, shortness of breath, chest pain, bleeding, unexplained bruising, or any new/concerning symptoms, questions/concerns.     If you are having any concerning symptoms or wish to speak to a provider before your next infusion visit, please call your care coordinator or triage to notify them so we can adequately serve you.       After Hours: [phone]    If after hours, weekends, or holidays, call main hospital  and ask for Oncology doctor on call.           March 2017 Sunday Monday Tuesday Wednesday Thursday Friday Saturday                  1     2     3     4       5     6     7     [facility] DRAW    7:30 AM   (15 min.)    [facility] DRAW   [facility] Lab Draw     [facility] RETURN    7:45 AM   (50 min.)   Ester Echavarria APRN CNP   [facility]     [facility] ONC INFUSION 360    8:30 AM   (360 min.)   [facility] ONCOLOGY INFUSION   [facility] 8     9     10     [facility] ATC LAB    1:45 PM   (15 min.)    ONCOLOGY INFUSION   [facility] 11       12     13     14     15     16     17     18       19     20     [facility] DRAW    8:00 AM   (15 min.)    [facility]     [facility] INFUSION 360    8:30 AM   (360 min.)    ONCOLOGY INFUSION   [facility]     [facility] RETURN    1:35 PM   (50 min.)   Ester Echavarria APRN CNP   [facility] 21     22     23     [facility] ATC LAB    1:15 PM   (15 min.)    ONCOLOGY INFUSION   [facility] 24     25       26     27     28     29     30     31                     April 2017 Sunday Monday Tuesday Wednesday Thursday Friday Saturday                                 1       2     3     [facility] DRAW    8:00 AM   (15 min.)     [facility]   [facility]     [facility] ONC INFUSION 360    8:30 AM   (360 min.)    ONCOLOGY INFUSION   [facility] 4     5     6     7     8       9     10  Happy Birthday!     11     12     13     14     [facility] LAB DRAW    8:45 AM   (15 min.)    [facility] DRAW   [facility]     MR ABDOMEN WWO    9:00 AM   (45 min.)   MR1   [facility] MRI     CT CHEST WO   10:05 AM   (20 min.)   CT2   [facility] CT 15       16     17     [facility] LAB DRAW    8:00 AM   (15 min.)    [facility] LAB DRAW   [facility] Lab Draw     [facility] ONC INFUSION 360    8:30 AM   (360 min.)    ONCOLOGY INFUSION   [facility]     [facility] RETURN    4:45 PM   (30 min.)   Demond Gonsales MD   [facility] 18     19     20     21     22       23     24     25     26     27     28     29       30                                             No results found for this or any previous visit (from the past 24 hour(s)).

## 2017-03-23 NOTE — PROGRESS NOTES
Infusion Nursing Note:    Patient presents today for neulasta, requesting IV fluids.  Arrived with family.    Note: Pt arrived for her neulasta injection today requesting her prn IV fluids. Pt denies vomiting, diarrhea, nausea, or fevers. States she is just feeling weak today and feels like she could use the extra IVF. She states she has had some nausea that has been relieved by her oral antiemetics. States she is keeping her food and liquids down at home.     Intravenous Access:  Implanted Port.    Post Infusion Assessment:  Patient tolerated infusion without incident.  Access discontinued per protocol.    Discharge Plan:   [facility]  Patient will return 4/3 for next appointment.   Patient discharged in stable condition accompanied by: family.  Departure Mode: Ambulatory.  Face to Face time: 5 minutes.

## 2017-03-23 NOTE — PROGRESS NOTES
Patient presents to [facility] for Neulasta.  Order written by Ester Echavarria CNP was completed today. Name and  verified with patient. See MAR for medication details. Medication was given in the abdomen. Patient tolerated injection well and was discharged to home.    Kamini Ríos LPN

## 2017-03-24 ENCOUNTER — TELEPHONE (OUTPATIENT)
Dept: ONCOLOGY | Facility: CLINIC | Age: 57
End: 2017-03-24

## 2017-03-24 NOTE — TELEPHONE ENCOUNTER
This worker, covering for GOLDY Hernandez, was asked by [facility] navigator to assist in completing application for [employer] meal delivery program.  ACS navigator had talked with patient over the phone about the program and patient had agreed to having referral sent at this time. GOLDY completed application and faxed to [employer].    Soo Yeon Han, LGSW  [phone]

## 2017-04-02 RX ORDER — ALBUTEROL SULFATE 0.83 MG/ML
2.5 SOLUTION RESPIRATORY (INHALATION)
Status: CANCELLED | OUTPATIENT
Start: 2017-04-03

## 2017-04-02 RX ORDER — DIPHENHYDRAMINE HYDROCHLORIDE 50 MG/ML
50 INJECTION INTRAMUSCULAR; INTRAVENOUS
Status: CANCELLED
Start: 2017-04-03

## 2017-04-02 RX ORDER — LORAZEPAM 2 MG/ML
0.5 INJECTION INTRAMUSCULAR EVERY 4 HOURS PRN
Status: CANCELLED
Start: 2017-04-03

## 2017-04-02 RX ORDER — SODIUM CHLORIDE 9 MG/ML
1000 INJECTION, SOLUTION INTRAVENOUS CONTINUOUS PRN
Status: CANCELLED
Start: 2017-04-03

## 2017-04-02 RX ORDER — METHYLPREDNISOLONE SODIUM SUCCINATE 125 MG/2ML
125 INJECTION, POWDER, LYOPHILIZED, FOR SOLUTION INTRAMUSCULAR; INTRAVENOUS
Status: CANCELLED
Start: 2017-04-03

## 2017-04-02 RX ORDER — PALONOSETRON 0.05 MG/ML
0.25 INJECTION, SOLUTION INTRAVENOUS ONCE
Status: CANCELLED
Start: 2017-04-03

## 2017-04-02 RX ORDER — EPINEPHRINE 0.3 MG/.3ML
0.3 INJECTION SUBCUTANEOUS EVERY 5 MIN PRN
Status: CANCELLED | OUTPATIENT
Start: 2017-04-03

## 2017-04-02 RX ORDER — FLUOROURACIL 50 MG/ML
400 INJECTION, SOLUTION INTRAVENOUS ONCE
Status: CANCELLED | OUTPATIENT
Start: 2017-04-03

## 2017-04-02 RX ORDER — ALBUTEROL SULFATE 90 UG/1
1-2 AEROSOL, METERED RESPIRATORY (INHALATION)
Status: CANCELLED
Start: 2017-04-03

## 2017-04-02 RX ORDER — MEPERIDINE HYDROCHLORIDE 25 MG/ML
25 INJECTION INTRAMUSCULAR; INTRAVENOUS; SUBCUTANEOUS EVERY 30 MIN PRN
Status: CANCELLED | OUTPATIENT
Start: 2017-04-03

## 2017-04-03 ENCOUNTER — INFUSION THERAPY VISIT (OUTPATIENT)
Dept: ONCOLOGY | Facility: CLINIC | Age: 57
End: 2017-04-03
Attending: INTERNAL MEDICINE
Payer: COMMERCIAL

## 2017-04-03 ENCOUNTER — APPOINTMENT (OUTPATIENT)
Dept: LAB | Facility: CLINIC | Age: 57
End: 2017-04-03
Attending: INTERNAL MEDICINE
Payer: COMMERCIAL

## 2017-04-03 VITALS
BODY MASS INDEX: 20.88 KG/M2 | WEIGHT: 145.5 LBS | SYSTOLIC BLOOD PRESSURE: 108 MMHG | RESPIRATION RATE: 16 BRPM | TEMPERATURE: 98.3 F | OXYGEN SATURATION: 100 % | DIASTOLIC BLOOD PRESSURE: 73 MMHG | HEART RATE: 91 BPM

## 2017-04-03 DIAGNOSIS — Z29.9 NEED FOR PROPHYLACTIC MEASURE: Primary | ICD-10-CM

## 2017-04-03 DIAGNOSIS — C25.0 MALIGNANT NEOPLASM OF HEAD OF PANCREAS (H): ICD-10-CM

## 2017-04-03 LAB
ACANTHOCYTES BLD QL SMEAR: SLIGHT
ALBUMIN SERPL-MCNC: 2.9 G/DL (ref 3.4–5)
ALP SERPL-CCNC: 336 U/L (ref 40–150)
ALT SERPL W P-5'-P-CCNC: 46 U/L (ref 0–50)
ANION GAP SERPL CALCULATED.3IONS-SCNC: 8 MMOL/L (ref 3–14)
ANISOCYTOSIS BLD QL SMEAR: ABNORMAL
AST SERPL W P-5'-P-CCNC: 46 U/L (ref 0–45)
BASOPHILS # BLD AUTO: 0 10E9/L (ref 0–0.2)
BASOPHILS NFR BLD AUTO: 0 %
BILIRUB SERPL-MCNC: 0.3 MG/DL (ref 0.2–1.3)
BUN SERPL-MCNC: 7 MG/DL (ref 7–30)
CALCIUM SERPL-MCNC: 8.6 MG/DL (ref 8.5–10.1)
CHLORIDE SERPL-SCNC: 106 MMOL/L (ref 94–109)
CO2 SERPL-SCNC: 27 MMOL/L (ref 20–32)
CREAT SERPL-MCNC: 0.66 MG/DL (ref 0.52–1.04)
DACRYOCYTES BLD QL SMEAR: SLIGHT
DIFFERENTIAL METHOD BLD: ABNORMAL
EOSINOPHIL # BLD AUTO: 0 10E9/L (ref 0–0.7)
EOSINOPHIL NFR BLD AUTO: 0 %
ERYTHROCYTE [DISTWIDTH] IN BLOOD BY AUTOMATED COUNT: 18.2 % (ref 10–15)
GFR SERPL CREATININE-BSD FRML MDRD: ABNORMAL ML/MIN/1.7M2
GLUCOSE SERPL-MCNC: 88 MG/DL (ref 70–99)
HCT VFR BLD AUTO: 30.7 % (ref 35–47)
HGB BLD-MCNC: 9.7 G/DL (ref 11.7–15.7)
LYMPHOCYTES # BLD AUTO: 1.2 10E9/L (ref 0.8–5.3)
LYMPHOCYTES NFR BLD AUTO: 12.2 %
MAGNESIUM SERPL-MCNC: 2 MG/DL (ref 1.6–2.3)
MCH RBC QN AUTO: 25.6 PG (ref 26.5–33)
MCHC RBC AUTO-ENTMCNC: 31.6 G/DL (ref 31.5–36.5)
MCV RBC AUTO: 81 FL (ref 78–100)
METAMYELOCYTES # BLD: 0.1 10E9/L
METAMYELOCYTES NFR BLD MANUAL: 0.9 %
MONOCYTES # BLD AUTO: 0.5 10E9/L (ref 0–1.3)
MONOCYTES NFR BLD AUTO: 5.2 %
NEUTROPHILS # BLD AUTO: 8 10E9/L (ref 1.6–8.3)
NEUTROPHILS NFR BLD AUTO: 81.7 %
NRBC # BLD AUTO: 0.2 10*3/UL
NRBC BLD AUTO-RTO: 2 /100
OVALOCYTES BLD QL SMEAR: SLIGHT
PLATELET # BLD AUTO: 252 10E9/L (ref 150–450)
POIKILOCYTOSIS BLD QL SMEAR: SLIGHT
POTASSIUM SERPL-SCNC: 2.9 MMOL/L (ref 3.4–5.3)
PROT SERPL-MCNC: 7.6 G/DL (ref 6.8–8.8)
RBC # BLD AUTO: 3.79 10E12/L (ref 3.8–5.2)
SODIUM SERPL-SCNC: 142 MMOL/L (ref 133–144)
WBC # BLD AUTO: 9.8 10E9/L (ref 4–11)

## 2017-04-03 PROCEDURE — 25000125 ZZHC RX 250: Mod: ZF | Performed by: NURSE PRACTITIONER

## 2017-04-03 PROCEDURE — 83735 ASSAY OF MAGNESIUM: CPT | Performed by: INTERNAL MEDICINE

## 2017-04-03 PROCEDURE — 25000128 H RX IP 250 OP 636: Mod: ZF | Performed by: NURSE PRACTITIONER

## 2017-04-03 PROCEDURE — 80053 COMPREHEN METABOLIC PANEL: CPT | Performed by: INTERNAL MEDICINE

## 2017-04-03 PROCEDURE — 96366 THER/PROPH/DIAG IV INF ADDON: CPT

## 2017-04-03 PROCEDURE — 99212 OFFICE O/P EST SF 10 MIN: CPT | Mod: 25

## 2017-04-03 PROCEDURE — 85025 COMPLETE CBC W/AUTO DIFF WBC: CPT | Performed by: INTERNAL MEDICINE

## 2017-04-03 PROCEDURE — 25000128 H RX IP 250 OP 636: Mod: ZF | Performed by: INTERNAL MEDICINE

## 2017-04-03 PROCEDURE — 96375 TX/PRO/DX INJ NEW DRUG ADDON: CPT

## 2017-04-03 PROCEDURE — 86301 IMMUNOASSAY TUMOR CA 19-9: CPT | Performed by: INTERNAL MEDICINE

## 2017-04-03 PROCEDURE — 96365 THER/PROPH/DIAG IV INF INIT: CPT

## 2017-04-03 RX ORDER — HEPARIN SODIUM (PORCINE) LOCK FLUSH IV SOLN 100 UNIT/ML 100 UNIT/ML
5 SOLUTION INTRAVENOUS ONCE
Status: COMPLETED | OUTPATIENT
Start: 2017-04-03 | End: 2017-04-03

## 2017-04-03 RX ORDER — LORAZEPAM 0.5 MG/1
0.5 TABLET ORAL EVERY 4 HOURS PRN
Qty: 30 TABLET | Refills: 0 | Status: SHIPPED | OUTPATIENT
Start: 2017-04-03 | End: 2017-01-01

## 2017-04-03 RX ORDER — HEPARIN SODIUM (PORCINE) LOCK FLUSH IV SOLN 100 UNIT/ML 100 UNIT/ML
5 SOLUTION INTRAVENOUS
Status: COMPLETED | OUTPATIENT
Start: 2017-04-03 | End: 2017-04-03

## 2017-04-03 RX ADMIN — POTASSIUM CHLORIDE: 149 INJECTION, SOLUTION, CONCENTRATE INTRAVENOUS at 10:02

## 2017-04-03 RX ADMIN — SODIUM CHLORIDE, PRESERVATIVE FREE 5 ML: 5 INJECTION INTRAVENOUS at 13:21

## 2017-04-03 RX ADMIN — SODIUM CHLORIDE, PRESERVATIVE FREE 5 ML: 5 INJECTION INTRAVENOUS at 08:29

## 2017-04-03 RX ADMIN — DEXAMETHASONE SODIUM PHOSPHATE: 10 INJECTION, SOLUTION INTRAMUSCULAR; INTRAVENOUS at 10:43

## 2017-04-03 ASSESSMENT — PAIN SCALES - GENERAL: PAINLEVEL: NO PAIN (0)

## 2017-04-03 NOTE — PROGRESS NOTES
"Infusion Nursing Note:  Shirin Muller presents today for Cycle 7, day 1 FOLFIRINOX; DEFERRED.   Patient received IVF and K replacement.  Patient seen by provider today: No    Note: Patient presented to clinic with multiple complaints: States has \"lost a lot of weight\".  Chart review; patient has lost about 8 lbs in past two weeks.  Patient states that she had lost even more, but regained about 5 lbs over past weekend.  States started having n/v/d two days after last chemo.  Diarrhea resolved after two days, but n/v continued and is still present.  Reports emesis following eating about 1-2 daily.  +BM.  Patient is attempting to eat solid foods and supplements with ensure as is able.  Reports cough with blood tinged sputum and runny nose last week, is getting better.  Patient reports feeling better than last week, but still intermittently lightheaded and feels weak.  Denies fevers at anytime since last infusion.      TOR 4/3/2017 0930 ASTRID Jreome/RAMON Taylor RN:  -Defer FOLFIRINOX one week.  Return for labs with provider appt prior.    -Add magnesium to labs and replace per protocol as needed  -Replace K per protocol (2.9)    TOR 4/3/2017 1315 ASTRID Franco/RAMON Taylor RN: Refill Ativan    Strongly encouraged patient and patient's sister to call triage line with any uncontrolled symptoms at home.  Verbalized understanding.      Intravenous Access:  Implanted Port.    Treatment Conditions:  Lab Results   Component Value Date    HGB 9.7 04/03/2017     Lab Results   Component Value Date    WBC 9.8 04/03/2017      Lab Results   Component Value Date    ANEU 8.0 04/03/2017     Lab Results   Component Value Date     04/03/2017      Lab Results   Component Value Date     04/03/2017                   Lab Results   Component Value Date    POTASSIUM 2.9 04/03/2017           Lab Results   Component Value Date    MAG 2.0 04/03/2017            Lab Results   Component Value Date    CR 0.66 " 04/03/2017                   Lab Results   Component Value Date    FANNY 8.6 04/03/2017                Lab Results   Component Value Date    BILITOTAL 0.3 04/03/2017           Lab Results   Component Value Date    ALBUMIN 2.9 04/03/2017                    Lab Results   Component Value Date    ALT 46 04/03/2017           Lab Results   Component Value Date    AST 46 04/03/2017     Results reviewed, labs MET treatment parameters, do not proceed with treatment; see TORB.    Post Infusion Assessment:  Patient tolerated infusion without incident.  Blood return noted pre and post infusion.  Site patent and intact, free from redness, edema or discomfort.  No evidence of extravasations.  Access discontinued per protocol.    Discharge Plan:   Prescription refill (hard script) given for Ativan.  Discharge instructions reviewed with: Patient.  Patient and/or family verbalized understanding of discharge instructions and all questions answered.  Copy of AVS reviewed with patient and/or family.  Patient will return 4/11/2017 for next appointment.  Departure Mode: Ambulatory.  Face to Face time: 5 minutes    Asmita Taylor RN

## 2017-04-03 NOTE — NURSING NOTE
Chief Complaint   Patient presents with     Port Draw     pt with history of pancreatic cancer.  port accessed and labs drawn by rn.  vitals taken.     Pt with history of pancreatic cancer. Port accessed, labs drawn, port flushed with saline and heparin, vitals checked, pt checked in for next appt  Madison Gale RN

## 2017-04-03 NOTE — MR AVS SNAPSHOT
After Visit Summary   4/3/2017    Shirin Muller    MRN: [id number]           Patient Information     Date Of Birth          1960        Visit Information        Provider Department      4/3/2017 8:30 AM UC 19 ATC;  ONCOLOGY INFUSION [facility]        Today's Diagnoses     Need for prophylactic measure    -  1    Malignant neoplasm of head of pancreas (H)          Care Instructions    Contact Numbers    [facility] Main Line: [phone]  [facility] Triage:  [phone]    Call triage with chills and/or temperature greater than or equal to 100.5, uncontrolled nausea/vomiting, diarrhea, constipation, dizziness, shortness of breath, chest pain, bleeding, unexplained bruising, or any new/concerning symptoms, questions/concerns.     If you are having any concerning symptoms or wish to speak to a provider before your next infusion visit, please call your care coordinator or triage to notify them so we can adequately serve you.       After Hours: [phone]    If after hours, weekends, or holidays, call main hospital  and ask for Oncology doctor on call.         April 2017 Sunday Monday Tuesday Wednesday Thursday Friday Saturday                                 1       2     3     [facility]    8:00 AM   (15 min.)    [facility] LAB DRAW   [facility] Lab Draw     [facility] ONC INFUSION 360    8:30 AM   (360 min.)    ONCOLOGY INFUSION   [facility] 4     5     6     7     8       9     10  Happy Birthday!     11     [facility]    7:30 AM   (15 min.)    [facility] LAB DRAW   [facility] Lab Draw     [facility] RETURN    7:45 AM   (50 min.)   Ester Echavarria APRN CNP   [facility]    8:30 AM   (360 min.)    ONCOLOGY INFUSION   [facility] 12     13     14     [facility] D[facility]    8:45 AM   (15 min.)    [facility] LAB DRAW   [facility] Lab Draw     MR [place] WWO     9:00 AM   (45 min.)   UCMR1   [facility] MRI     CT CHEST WO   10:05 AM   (20 min.)   UCCT2   [facility] CT 15       16     17     [facility] DRAW    8:00 AM   (15 min.)    M[facility]ONIC LAB DRAW   [facility] Lab Draw     [facility] ONC INFUSION 360    8:30 AM   (360 min.)    ONCOLOGY INFUSION   [facility]     [facility] RETURN    4:45 PM   (30 min.)   Demond Gonsales MD   [facility] 18     19     20     21     22       23     24     25     26     27     28     29       30                                              May 2017   [place] Monday Tuesday Wednesday Thursday Friday Saturday        1     2     3     4     5     6       7     8     9     10     11     12     13       14     15     16     17     18     19     20       21     22     23     24     25     26     27       28     29     30     31                                 Lab Results:  Recent Results (from the past 12 hour(s))   Comprehensive metabolic panel    Collection Time: 04/03/17  8:29 AM   Result Value Ref Range    Sodium 142 133 - 144 mmol/L    Potassium 2.9 (L) 3.4 - 5.3 mmol/L    Chloride 106 94 - 109 mmol/L    Carbon Dioxide 27 20 - 32 mmol/L    Anion Gap 8 3 - 14 mmol/L    Glucose 88 70 - 99 mg/dL    Urea Nitrogen 7 7 - 30 mg/dL    Creatinine 0.66 0.52 - 1.04 mg/dL    GFR Estimate >90  Non  GFR Calc   >60 mL/min/1.7m2    GFR Estimate If Black >90   GFR Calc   >60 mL/min/1.7m2    Calcium 8.6 8.5 - 10.1 mg/dL    Bilirubin Total 0.3 0.2 - 1.3 mg/dL    Albumin 2.9 (L) 3.4 - 5.0 g/dL    Protein Total 7.6 6.8 - 8.8 g/dL    Alkaline Phosphatase 336 (H) 40 - 150 U/L    ALT 46 0 - 50 U/L    AST 46 (H) 0 - 45 U/L   CBC with platelets differential    Collection Time: 04/03/17  8:29 AM   Result Value Ref Range    WBC 9.8 4.0 - 11.0 10e9/L    RBC Count 3.79 (L) 3.8 - 5.2 10e12/L    Hemoglobin 9.7 (L) 11.7 - 15.7 g/dL    Hematocrit 30.7 (L) 35.0 - 47.0 %     MCV 81 78 - 100 fl    MCH 25.6 (L) 26.5 - 33.0 pg    MCHC 31.6 31.5 - 36.5 g/dL    RDW 18.2 (H) 10.0 - 15.0 %    Platelet Count 252 150 - 450 10e9/L    Diff Method Manual Differential     % Neutrophils 81.7 %    % Lymphocytes 12.2 %    % Monocytes 5.2 %    % Eosinophils 0.0 %    % Basophils 0.0 %    % Metamyelocytes 0.9 %    Nucleated RBCs 2 (H) 0 /100    Absolute Neutrophil 8.0 1.6 - 8.3 10e9/L    Absolute Lymphocytes 1.2 0.8 - 5.3 10e9/L    Absolute Monocytes 0.5 0.0 - 1.3 10e9/L    Absolute Eosinophils 0.0 0.0 - 0.7 10e9/L    Absolute Basophils 0.0 0.0 - 0.2 10e9/L    Absolute Metamyelocytes 0.1 (H) 0 10e9/L    Absolute Nucleated RBC 0.2     Anisocytosis Moderate     Poikilocytosis Slight     Teardrop Cells Slight     Acanthocytes Slight     Ovalocytes Slight    Magnesium    Collection Time: 04/03/17  8:29 AM   Result Value Ref Range    Magnesium 2.0 1.6 - 2.3 mg/dL             Follow-ups after your visit        Your next 10 appointments already scheduled     Apr 11, 2017  7:30 AM CDT   [facility] Lab Draw with  [facility]RAW   [facility])    [address] [place] [id number] [phone]            Apr 11, 2017  8:00 AM CDT   (Arrive by 7:45 AM)   Return Visit with TEE Olivas CNP   [facility])    [address] [place] [id number] [phone]            Apr 11, 2017  8:30 AM CDT   Infusion 360 with MARISABEL ONCOLOGY INFUSION, UC 19 ATC   [facility])    [address] [place] [id number]   [phone]            Apr 14, 2017  8:45 AM CDT   Masonic Lab Draw with  MASONIC LAB DRAW   [facility] Masonic Lab Draw ([facility])    [address] [place] [address] [place] [address]   [phone]            Apr 14, 2017  9:15 AM CDT   (Arrive  by 9:00 AM)   MR ABDOMEN W/O & W CONTRAST with UCMR1   [facility] MRI ([facility])    [address]  1st Floor  [facility] [address] [phone]           Take your medicines as usual, unless your doctor tells you not to. Bring a list of your current medicines to your exam (including vitamins, minerals and over-the-counter drugs). Also bring the results of similar scans you may have had.    The day before your exam, drink extra fluids at least six 8-ounce glasses (unless your doctor tells you to restrict your fluids).   Have a blood test (creatinine test) within 30 days of your exam. Go to your clinic or Diagnostic Imaging Department for this test.   Do not eat or drink for 6 hours prior to exam.  The MRI machine uses a strong magnet. Please wear clothes without metal (snaps, zippers). A sweatsuit works well, or we may give you a hospital gown.  Please remove any body piercings and hair extensions before you arrive. You will also remove watches, jewelry, hairpins, wallets, dentures, partial dental plates and hearing aids. You may wear contact lenses, and you may be able to wear your rings. We have a safe place to keep your personal items, but it is safer to leave them at home.   **IMPORTANT** THE INSTRUCTIONS BELOW ARE ONLY FOR THOSE PATIENTS WHO HAVE BEEN TOLD THEY WILL RECEIVE SEDATION OR GENERAL ANESTHESIA DURING THEIR MRI PROCEDURE:  IF YOU WILL RECEIVE SEDATION (take medicine to help you relax during your exam):   You must get the medicine from your doctor before you arrive. Bring the medicine to the exam. Do not take it at home.   Arrive one hour early. Bring someone who can take you home after the test. Your medicine will make you sleepy. After the exam, you may not drive, take a bus or take a taxi by yourself.   No eating 8 hours before your exam. You may have clear liquids up until 4 hours before your exam. (Clear liquids include water, clear tea, black  coffee and fruit juice without pulp.)  IF YOU WILL RECEIVE ANESTHESIA (be asleep for your exam):   Arrive 1 1/2 hours early. Bring someone who can take you home after the test. You may not drive, take a bus or take a taxi by yourself.   No eating 8 hours before your exam. You may have clear liquids up until 4 hours before your exam. (Clear liquids include water, clear tea, black coffee and fruit juice without pulp.)  If you have any questions, please contact your Imaging Department exam site.            Apr 14, 2017 10:20 AM CDT   (Arrive by 10:05 AM)   CT CHEST W/O CONTRAST with UCCT2   [facility] CT ([facility])    [address] [address] [phone]           Please bring any scans or X-rays taken at other hospitals, if similar tests were done. Also bring a list of your medicines, including vitamins, minerals and over-the-counter drugs. It is safest to leave personal items at home.  Be sure to tell your doctor:   If you have any allergies.   If there s any chance you are pregnant.   If you are breastfeeding.   If you have any special needs.  You do not need to do anything special to prepare.  Please wear loose clothing, such as a sweat suit or jogging clothes. Avoid snaps, zippers and other metal. We may ask you to undress and put on a hospital gown.            Apr 17, 2017  8:00 AM CDT   Masonic Lab Draw with  MASONIC LAB DRAW   [facility] Lab Draw ([facility])    [address] [address] [phone]            Apr 17, 2017  8:30 AM CDT   Infusion 360 with  ONCOLOGY INFUSION, UC 19 ATC   [facility] ([facility])    [address] [address] [phone]              Who to contact     If you have questions or need follow up information about today's clinic visit or your schedule please  contact [employer] [facility] directly at [phone].  Normal or non-critical lab and imaging results will be communicated to you by MyChart, letter or phone within 4 business days after the clinic has received the results. If you do not hear from us within 7 days, please contact the clinic through [employer] or phone. If you have a critical or abnormal lab result, we will notify you by phone as soon as possible.  Submit refill requests through [employer] or call your pharmacy and they will forward the refill request to us. Please allow 3 business days for your refill to be completed.          Additional Information About Your Visit        [employer] Information     [employer] gives you secure access to your electronic health record. If you see a primary care provider, you can also send messages to your care team and make appointments. If you have questions, please call your primary care clinic.  If you do not have a primary care provider, please call [phone] and they will assist you.        Care EveryWhere ID     This is your Care EveryWhere ID. This could be used by other organizations to access your [place] medical records  [id number]        Your Vitals Were     Pulse Temperature Respirations Pulse Oximetry BMI (Body Mass Index)       91 98.3  F (36.8  C) (Oral) 16 100% 20.88 kg/m2        Blood Pressure from Last 3 Encounters:   04/03/17 108/73   03/23/17 112/68   03/20/17 106/62    Weight from Last 3 Encounters:   04/03/17 66 kg (145 lb 8 oz)   03/23/17 69.4 kg (153 lb)   03/20/17 69.9 kg (154 lb 1.6 oz)              We Performed the Following     Cancer antigen 19-9     CBC with platelets differential     Comprehensive metabolic panel     Magnesium     Magnesium        Primary Care Provider    [facility]       No address on file        Thank you!     Thank you for choosing [employer] [facility]  for your care. Our goal is always to provide you with excellent care. Hearing back  from our patients is one way we can continue to improve our services. Please take a few minutes to complete the written survey that you may receive in the mail after your visit with us. Thank you!             Your Updated Medication List - Protect others around you: Learn how to safely use, store and throw away your medicines at www.disposemymeds.org.          This list is accurate as of: 4/3/17 11:33 AM.  Always use your most recent med list.                   Brand Name Dispense Instructions for use    amylase-lipase-protease 38639 UNITS Cpep    CREON    180 capsule    Take 2 capsules (72,000 Units) by mouth 3 times daily (with meals)       dexamethasone 4 MG tablet    DECADRON    3 tablet    Take 1 tablet (4 mg) by mouth daily (with breakfast) for 3 days       diltiazem 2% in PLO cream (FV COMPOUNDED) 2% Gel     30 g    Apply topically daily and as needed to external hemorrhoids       docusate sodium 100 MG capsule    COLACE    100 capsule    Take 1 capsule (100 mg) by mouth daily       dronabinol 5 MG capsule    MARINOL    60 capsule    Take 1 capsule (5 mg) by mouth 2 times daily (before meals)       ENSURE CLEAR Liqd     90 Bottle    Take 1 Bottle by mouth 3 times daily       lidocaine-prilocaine cream    EMLA    30 g    Apply topically as needed for other (Use 30-60 minutes prior to port access)       loratadine 10 MG tablet    CLARITIN     Take 10 mg by mouth daily       LORazepam 0.5 MG tablet    ATIVAN    30 tablet    Take 1 tablet (0.5 mg) by mouth every 4 hours as needed (Anxiety, Nausea/Vomiting or Sleep)       morphine 15 MG 12 hr tablet    MS CONTIN    60 tablet    Take 1 tablet (15 mg) by mouth every 12 hours       ondansetron 8 MG ODT tab    ZOFRAN-ODT    60 tablet    Take 1 tablet (8 mg) by mouth every 8 hours as needed for nausea       oxyCODONE 5 MG IR tablet    ROXICODONE    100 tablet    Take 1 tablet (5 mg) by mouth every 4 hours as needed for moderate to severe pain       pantoprazole 40 MG  EC tablet    PROTONIX    60 tablet    Take 1 tablet (40 mg) by mouth 2 times daily . After taking this twice daily for 8 weeks, you can decrease dose to once daily.       * polyethylene glycol powder    MIRALAX/GLYCOLAX    119 g    Take 17 g (1 capful) by mouth daily       * polyethylene glycol powder    MIRALAX/GLYCOLAX    119 g    Take 17 g (1 capful) by mouth daily       potassium chloride SA 20 MEQ CR tablet    potassium chloride    60 tablet    Take 1 tablet (20 mEq) by mouth daily       prochlorperazine 10 MG tablet    COMPAZINE    30 tablet    Take 1 tablet (10 mg) by mouth every 6 hours as needed (Nausea/Vomiting)       sucralfate 1 GM tablet    CARAFATE    90 tablet    Take 1 tablet (1 g) by mouth 3 times daily       * Notice:  This list has 2 medication(s) that are the same as other medications prescribed for you. Read the directions carefully, and ask your doctor or other care provider to review them with you.

## 2017-04-03 NOTE — PATIENT INSTRUCTIONS
Contact Numbers    [facility] Main Line: [phone]  [facility] Triage:  [phone]    Call triage with chills and/or temperature greater than or equal to 100.5, uncontrolled nausea/vomiting, diarrhea, constipation, dizziness, shortness of breath, chest pain, bleeding, unexplained bruising, or any new/concerning symptoms, questions/concerns.     If you are having any concerning symptoms or wish to speak to a provider before your next infusion visit, please call your care coordinator or triage to notify them so we can adequately serve you.       After Hours: [phone]    If after hours, weekends, or holidays, call main hospital  and ask for Oncology doctor on call.         April 2017 Sunday Monday Tuesday Wednesday Thursday Friday Saturday                                 1       2     3     [facility] DRAW    8:00 AM   (15 min.)    MASONIC LAB DRAW   [facility] Lab Draw     P ONC INFUSION 360    8:30 AM   (360 min.)    ONCOLOGY INFUSION   [facility] 4     5     6     7     8       9     10  Happy Birthday!     11     [facility] DRAW    7:30 AM   (15 min.)    [facility]IC LAB DRAW   [facility] Lab Draw     [facility] RETURN    7:45 AM   (50 min.)   Ester Echavarria, TEE CNP   [facility]     [facility] ONC INFUSION 360    8:30 AM   (360 min.)    ONCOLOGY INFUSION   [facility] 12     13     14     [facility] DRAW    8:45 AM   (15 min.)    [facility]ONIC LAB DRAW   [facility] Lab Draw     MR ABDOMEN WWO    9:00 AM   (45 min.)   UCMR1   [facility] MRI     CT CHEST WO   10:05 AM   (20 min.)   UCCT2   [facility] CT 15       16     17     [facility] DRAW    8:00 AM   (15 min.)    M[facility]ONIC LAB DRAW   [facility] Lab Draw     [facility] ONC INFUSION 360    8:30 AM   (360 min.)    ONCOLOGY INFUSION   [facility]     [facility] RETURN    4:45 PM   (30 min.)   Demond Gonsales MD   [facility]  Cancer Clinic 18     19 20 21     22       23     24     25     26     27     28     29       30                                              May 2017   [place] Monday Tuesday Wednesday Thursday Friday Saturday        1     2     3     4     5     6       7     8     9     10     11     12     13       14     15     16     17     18     19     20       21     22     23     24     25     26     27       28 29     30     31                                 Lab Results:  Recent Results (from the past 12 hour(s))   Comprehensive metabolic panel    Collection Time: 04/03/17  8:29 AM   Result Value Ref Range    Sodium 142 133 - 144 mmol/L    Potassium 2.9 (L) 3.4 - 5.3 mmol/L    Chloride 106 94 - 109 mmol/L    Carbon Dioxide 27 20 - 32 mmol/L    Anion Gap 8 3 - 14 mmol/L    Glucose 88 70 - 99 mg/dL    Urea Nitrogen 7 7 - 30 mg/dL    Creatinine 0.66 0.52 - 1.04 mg/dL    GFR Estimate >90  Non  GFR Calc   >60 mL/min/1.7m2    GFR Estimate If Black >90   GFR Calc   >60 mL/min/1.7m2    Calcium 8.6 8.5 - 10.1 mg/dL    Bilirubin Total 0.3 0.2 - 1.3 mg/dL    Albumin 2.9 (L) 3.4 - 5.0 g/dL    Protein Total 7.6 6.8 - 8.8 g/dL    Alkaline Phosphatase 336 (H) 40 - 150 U/L    ALT 46 0 - 50 U/L    AST 46 (H) 0 - 45 U/L   CBC with platelets differential    Collection Time: 04/03/17  8:29 AM   Result Value Ref Range    WBC 9.8 4.0 - 11.0 10e9/L    RBC Count 3.79 (L) 3.8 - 5.2 10e12/L    Hemoglobin 9.7 (L) 11.7 - 15.7 g/dL    Hematocrit 30.7 (L) 35.0 - 47.0 %    MCV 81 78 - 100 fl    MCH 25.6 (L) 26.5 - 33.0 pg    MCHC 31.6 31.5 - 36.5 g/dL    RDW 18.2 (H) 10.0 - 15.0 %    Platelet Count 252 150 - 450 10e9/L    Diff Method Manual Differential     % Neutrophils 81.7 %    % Lymphocytes 12.2 %    % Monocytes 5.2 %    % Eosinophils 0.0 %    % Basophils 0.0 %    % Metamyelocytes 0.9 %    Nucleated RBCs 2 (H) 0 /100    Absolute Neutrophil 8.0 1.6 - 8.3 10e9/L    Absolute Lymphocytes 1.2 0.8 - 5.3 10e9/L     Absolute Monocytes 0.5 0.0 - 1.3 10e9/L    Absolute Eosinophils 0.0 0.0 - 0.7 10e9/L    Absolute Basophils 0.0 0.0 - 0.2 10e9/L    Absolute Metamyelocytes 0.1 (H) 0 10e9/L    Absolute Nucleated RBC 0.2     Anisocytosis Moderate     Poikilocytosis Slight     Teardrop Cells Slight     Acanthocytes Slight     Ovalocytes Slight    Magnesium    Collection Time: 04/03/17  8:29 AM   Result Value Ref Range    Magnesium 2.0 1.6 - 2.3 mg/dL

## 2017-04-05 LAB — CANCER AG19-9 SERPL-ACNC: 3695

## 2017-04-11 ENCOUNTER — INFUSION THERAPY VISIT (OUTPATIENT)
Dept: ONCOLOGY | Facility: CLINIC | Age: 57
End: 2017-04-11
Attending: INTERNAL MEDICINE
Payer: COMMERCIAL

## 2017-04-11 ENCOUNTER — APPOINTMENT (OUTPATIENT)
Dept: LAB | Facility: CLINIC | Age: 57
End: 2017-04-11
Attending: INTERNAL MEDICINE
Payer: COMMERCIAL

## 2017-04-11 VITALS
WEIGHT: 153.2 LBS | RESPIRATION RATE: 18 BRPM | BODY MASS INDEX: 21.93 KG/M2 | HEART RATE: 88 BPM | DIASTOLIC BLOOD PRESSURE: 70 MMHG | TEMPERATURE: 98.1 F | HEIGHT: 70 IN | SYSTOLIC BLOOD PRESSURE: 105 MMHG | OXYGEN SATURATION: 100 %

## 2017-04-11 DIAGNOSIS — Z29.9 NEED FOR PROPHYLACTIC MEASURE: Primary | ICD-10-CM

## 2017-04-11 DIAGNOSIS — C25.0 MALIGNANT NEOPLASM OF HEAD OF PANCREAS (H): ICD-10-CM

## 2017-04-11 LAB
ALBUMIN SERPL-MCNC: 2.3 G/DL (ref 3.4–5)
ALP SERPL-CCNC: 788 U/L (ref 40–150)
ALT SERPL W P-5'-P-CCNC: 148 U/L (ref 0–50)
ANION GAP SERPL CALCULATED.3IONS-SCNC: 5 MMOL/L (ref 3–14)
AST SERPL W P-5'-P-CCNC: 161 U/L (ref 0–45)
BASOPHILS # BLD AUTO: 0.1 10E9/L (ref 0–0.2)
BASOPHILS NFR BLD AUTO: 0.4 %
BILIRUB SERPL-MCNC: 1.2 MG/DL (ref 0.2–1.3)
BUN SERPL-MCNC: 4 MG/DL (ref 7–30)
CALCIUM SERPL-MCNC: 8.4 MG/DL (ref 8.5–10.1)
CHLORIDE SERPL-SCNC: 106 MMOL/L (ref 94–109)
CO2 SERPL-SCNC: 31 MMOL/L (ref 20–32)
CREAT SERPL-MCNC: 0.56 MG/DL (ref 0.52–1.04)
DIFFERENTIAL METHOD BLD: ABNORMAL
EOSINOPHIL # BLD AUTO: 0 10E9/L (ref 0–0.7)
EOSINOPHIL NFR BLD AUTO: 0.3 %
ERYTHROCYTE [DISTWIDTH] IN BLOOD BY AUTOMATED COUNT: 20.4 % (ref 10–15)
GFR SERPL CREATININE-BSD FRML MDRD: ABNORMAL ML/MIN/1.7M2
GLUCOSE SERPL-MCNC: 95 MG/DL (ref 70–99)
HCT VFR BLD AUTO: 27 % (ref 35–47)
HGB BLD-MCNC: 8.2 G/DL (ref 11.7–15.7)
IMM GRANULOCYTES # BLD: 0.1 10E9/L (ref 0–0.4)
IMM GRANULOCYTES NFR BLD: 0.8 %
LYMPHOCYTES # BLD AUTO: 0.9 10E9/L (ref 0.8–5.3)
LYMPHOCYTES NFR BLD AUTO: 7.4 %
MCH RBC QN AUTO: 26.4 PG (ref 26.5–33)
MCHC RBC AUTO-ENTMCNC: 30.4 G/DL (ref 31.5–36.5)
MCV RBC AUTO: 87 FL (ref 78–100)
MONOCYTES # BLD AUTO: 1.1 10E9/L (ref 0–1.3)
MONOCYTES NFR BLD AUTO: 8.4 %
NEUTROPHILS # BLD AUTO: 10.6 10E9/L (ref 1.6–8.3)
NEUTROPHILS NFR BLD AUTO: 82.7 %
NRBC # BLD AUTO: 0 10*3/UL
NRBC BLD AUTO-RTO: 0 /100
PLATELET # BLD AUTO: 149 10E9/L (ref 150–450)
POTASSIUM SERPL-SCNC: 3.3 MMOL/L (ref 3.4–5.3)
PROT SERPL-MCNC: 7 G/DL (ref 6.8–8.8)
RBC # BLD AUTO: 3.11 10E12/L (ref 3.8–5.2)
SODIUM SERPL-SCNC: 142 MMOL/L (ref 133–144)
WBC # BLD AUTO: 12.8 10E9/L (ref 4–11)

## 2017-04-11 PROCEDURE — 96365 THER/PROPH/DIAG IV INF INIT: CPT

## 2017-04-11 PROCEDURE — 80053 COMPREHEN METABOLIC PANEL: CPT | Performed by: INTERNAL MEDICINE

## 2017-04-11 PROCEDURE — 25000128 H RX IP 250 OP 636: Mod: ZF | Performed by: NURSE PRACTITIONER

## 2017-04-11 PROCEDURE — 25000128 H RX IP 250 OP 636: Mod: ZF | Performed by: INTERNAL MEDICINE

## 2017-04-11 PROCEDURE — 96367 TX/PROPH/DG ADDL SEQ IV INF: CPT

## 2017-04-11 PROCEDURE — 85025 COMPLETE CBC W/AUTO DIFF WBC: CPT | Performed by: INTERNAL MEDICINE

## 2017-04-11 PROCEDURE — 96366 THER/PROPH/DIAG IV INF ADDON: CPT

## 2017-04-11 PROCEDURE — 99214 OFFICE O/P EST MOD 30 MIN: CPT | Mod: ZP | Performed by: NURSE PRACTITIONER

## 2017-04-11 PROCEDURE — 86301 IMMUNOASSAY TUMOR CA 19-9: CPT | Performed by: INTERNAL MEDICINE

## 2017-04-11 PROCEDURE — 25000125 ZZHC RX 250: Mod: ZF | Performed by: NURSE PRACTITIONER

## 2017-04-11 PROCEDURE — 99212 OFFICE O/P EST SF 10 MIN: CPT | Mod: ZF

## 2017-04-11 RX ORDER — HEPARIN SODIUM (PORCINE) LOCK FLUSH IV SOLN 100 UNIT/ML 100 UNIT/ML
500 SOLUTION INTRAVENOUS ONCE
Status: COMPLETED | OUTPATIENT
Start: 2017-04-11 | End: 2017-04-11

## 2017-04-11 RX ORDER — HEPARIN SODIUM (PORCINE) LOCK FLUSH IV SOLN 100 UNIT/ML 100 UNIT/ML
5 SOLUTION INTRAVENOUS DAILY PRN
Status: DISCONTINUED | OUTPATIENT
Start: 2017-04-11 | End: 2017-04-20 | Stop reason: HOSPADM

## 2017-04-11 RX ADMIN — DEXAMETHASONE SODIUM PHOSPHATE: 10 INJECTION, SOLUTION INTRAMUSCULAR; INTRAVENOUS at 09:34

## 2017-04-11 RX ADMIN — POTASSIUM CHLORIDE: 149 INJECTION, SOLUTION, CONCENTRATE INTRAVENOUS at 09:56

## 2017-04-11 RX ADMIN — SODIUM CHLORIDE, PRESERVATIVE FREE 500 UNITS: 5 INJECTION INTRAVENOUS at 12:04

## 2017-04-11 RX ADMIN — SODIUM CHLORIDE, PRESERVATIVE FREE 5 ML: 5 INJECTION INTRAVENOUS at 08:11

## 2017-04-11 ASSESSMENT — PAIN SCALES - GENERAL: PAINLEVEL: NO PAIN (0)

## 2017-04-11 NOTE — MR AVS SNAPSHOT
After Visit Summary   4/11/2017    Shirin Muller    MRN: [id number]           Patient Information     Date Of Birth          1960        Visit Information        Provider Department      4/11/2017 8:00 AM Ester Echavarria APRN CNP [facility]        Today's Diagnoses     Malignant neoplasm of head of pancreas (H)           Follow-ups after your visit        Your next 10 appointments already scheduled     Apr 21, 2017  7:00 AM CDT   [facility] Lab Draw with [facility]IC LAB DRAW   [facility] ([facility])    [address] [place] [id number]   [phone]            Apr 21, 2017  7:30 AM CDT   Infusion 360 with UC ONCOLOGY INFUSION, UC 13 ATC   [facility] ([facility])    [address] [place] [id number]   [phone]            May 02, 2017  7:30 AM CDT   [facility] Lab Draw with [facility]ON[facility] LAB DRAW   [facility]    [address] [place] [id number]   [phone]            May 02, 2017  8:00 AM CDT   Infusion 360 with UC ONCOLOGY INFUSION, UC 14 ATC   [facility] ([facility])    [address] [place] [id number]   [phone]            May 16, 2017  7:30 AM CDT   [facility] Lab Draw with UC MASONIC LAB DRAW   [facility]    [address] [place] [address]   [phone]            May 16, 2017  8:00 AM CDT   (Arrive by 7:45 AM)   Return Visit with TEE Olivas CNP   [facility] ([facility])    [address] [place] [address]   [phone]            May 16, 2017  9:00 AM CDT   Infusion 360 with UC ONCOLOGY INFUSION, UC 23 ATC   [facility]  "[facility] ([facility])    [address] [facility] [address] [facility] [address]  2nd Floor  [facility] [address] [phone]              Who to contact     If you have questions or need follow up information about today's clinic visit or your schedule please contact [facility] directly at [phone].  Normal or non-critical lab and imaging results will be communicated to you by MyChart, letter or phone within 4 business days after the clinic has received the results. If you do not hear from us within 7 days, please contact the clinic through [employer] or phone. If you have a critical or abnormal lab result, we will notify you by phone as soon as possible.  Submit refill requests through [employer] or call your pharmacy and they will forward the refill request to us. Please allow 3 business days for your refill to be completed.          Additional Information About Your Visit        [employer] Information     [employer] gives you secure access to your electronic health record. If you see a primary care provider, you can also send messages to your care team and make appointments. If you have questions, please call your primary care clinic.  If you do not have a primary care provider, please call [phone] and they will assist you.        Care EveryWhere ID     This is your Care EveryWhere ID. This could be used by other organizations to access your [facility] medical records  [id number]        Your Vitals Were     Pulse Temperature Respirations Height Pulse Oximetry BMI (Body Mass Index)    88 98.1  F (36.7  C) (Oral) 18 1.778 m (5' 10\") 100% 21.98 kg/m2       Blood Pressure from Last 3 Encounters:   04/19/17 124/81   04/18/17 126/77   04/17/17 107/71    Weight from Last 3 Encounters:   04/19/17 68.9 kg (151 lb 14.4 oz)   04/17/17 69.9 kg (154 lb)   04/17/17 69.9 kg (154 lb)              Today, you had the following     No orders found for display         Today's Medication Changes        "   These changes are accurate as of: 4/11/17 11:59 PM.  If you have any questions, ask your nurse or doctor.               Stop taking these medicines if you haven't already. Please contact your care team if you have questions.     pantoprazole 40 MG EC tablet   Commonly known as:  PROTONIX   Stopped by:  Ester Echavarria APRN CNP                    Primary Care Provider    [facility]       No address on file        Thank you!     Thank you for choosing [facility]  for your care. Our goal is always to provide you with excellent care. Hearing back from our patients is one way we can continue to improve our services. Please take a few minutes to complete the written survey that you may receive in the mail after your visit with us. Thank you!             Your Updated Medication List - Protect others around you: Learn how to safely use, store and throw away your medicines at www.disposemymeds.org.          This list is accurate as of: 4/11/17 11:59 PM.  Always use your most recent med list.                   Brand Name Dispense Instructions for use    amylase-lipase-protease 57651 UNITS Cpep    CREON    180 capsule    Take 2 capsules (72,000 Units) by mouth 3 times daily (with meals)       dexamethasone 4 MG tablet    DECADRON    3 tablet    Take 1 tablet (4 mg) by mouth daily (with breakfast) for 3 days       diltiazem 2% in PLO cream (FV COMPOUNDED) 2% Gel     30 g    Apply topically daily and as needed to external hemorrhoids       docusate sodium 100 MG capsule    COLACE    100 capsule    Take 1 capsule (100 mg) by mouth daily       dronabinol 5 MG capsule    MARINOL    60 capsule    Take 1 capsule (5 mg) by mouth 2 times daily (before meals)       ENSURE CLEAR Liqd     90 Bottle    Take 1 Bottle by mouth 3 times daily       lidocaine-prilocaine cream    EMLA    30 g    Apply topically as needed for other (Use 30-60 minutes prior to port access)       loratadine 10 MG tablet     CLARITIN     Take 10 mg by mouth daily       LORazepam 0.5 MG tablet    ATIVAN    30 tablet    Take 1 tablet (0.5 mg) by mouth every 4 hours as needed (Anxiety, Nausea/Vomiting or Sleep)       oxyCODONE 5 MG IR tablet    ROXICODONE    100 tablet    Take 1 tablet (5 mg) by mouth every 4 hours as needed for moderate to severe pain       * polyethylene glycol powder    MIRALAX/GLYCOLAX    119 g    Take 17 g (1 capful) by mouth daily       * polyethylene glycol powder    MIRALAX/GLYCOLAX    119 g    Take 17 g (1 capful) by mouth daily       potassium chloride SA 20 MEQ CR tablet    potassium chloride    60 tablet    Take 1 tablet (20 mEq) by mouth daily       * Notice:  This list has 2 medication(s) that are the same as other medications prescribed for you. Read the directions carefully, and ask your doctor or other care provider to review them with you.

## 2017-04-11 NOTE — PROGRESS NOTES
Infusion Nursing Note:  Shirin Muller presents today for Potassium replacement and IV antiemetics  *CHEMO HELD TODAY*.    Patient seen by provider today: Yes: Ester Echavarria NP    Treatment Conditions:  Lab Results   Component Value Date    HGB 8.2 04/11/2017     Lab Results   Component Value Date    WBC 12.8 04/11/2017      Lab Results   Component Value Date    ANEU 10.6 04/11/2017     Lab Results   Component Value Date     04/11/2017      Lab Results   Component Value Date     04/11/2017                   Lab Results   Component Value Date    POTASSIUM 3.3 04/11/2017           Lab Results   Component Value Date    MAG 2.0 04/03/2017            Lab Results   Component Value Date    CR 0.56 04/11/2017                   Lab Results   Component Value Date    FANNY 8.4 04/11/2017                Lab Results   Component Value Date    BILITOTAL 1.2 04/11/2017           Lab Results   Component Value Date    ALBUMIN 2.3 04/11/2017                    Lab Results   Component Value Date     04/11/2017           Lab Results   Component Value Date     04/11/2017       Intravenous Access:  Implanted Port.    Note:   4/11/17: YUE Echavarria NP / Maye Greene RN  Defer chemotherapy today until 4/18/17.  Pt will see Dr. Gonsales 4/17/17.  Replace potassium of 3.3 per protocol  (Pt received KCL 40 meq IV today)  Give Zofran/Decadron as ordered for nausea.       Post Infusion Assessment:  Patient tolerated infusion without incident.  Blood return noted pre and post infusion.  Access discontinued per protocol.    Discharge Plan:   Patient declined prescription refills.  Discharge instructions reviewed with: Patient.  Patient and/or family verbalized understanding of discharge instructions and all questions answered.  Copy of AVS reviewed with patient and/or family.  Patient will return 4/17/17 for follow-up appt with Dr. Gonsales.  Chemo scheduled for 4/18/17.  Patient discharged in stable  condition accompanied by: self.  Departure Mode: Ambulatory.  Face to Face time: 0.    Maye Greene RN

## 2017-04-11 NOTE — PROGRESS NOTES
Ms. Muller is seen in follow-up of metastatic pancreatic cancer.    Oncology HPI:  She was diagnosed in the spring of 2016 after presenting with a 2 to 3-month history of abdominal pain and weight loss.  She underwent evaluation showing a 5 cm mass at the head of the pancreas.  Biopsy was consistent with adenocarcinoma.  She underwent staging imaging including an MRI of the abdomen, which then showed an up to 10 cm poorly defined hypoenhancing mass arising from the pancreatic head which encased celiac artery, superior mesenteric artery and severely attenuated the main portal vein.  She initiated on treatment with gemcitabine and Abraxane on 05/02/2016 and continued on that until December 2016 when she was found to have metastatic disease involving the liver.  She was then initiated on FOLFIRINOX on 12/20/16. Her first cycle was complicated by fatigue, nausea/vomiting. Antiemetics were adjusted with cycle 2. After cycle 3, she was admitted with weakness and dizziness. She was found to have a GI bleed secondary to a bleeding duodenal ulcer and infiltrating mass in the duodenum. The ulcer was injected and clipped. She was found to be H. Pylori positive and was initiated on  therapy with PPI, carafate, amoxicillin and clarithromycin.    Interval history:  Shirin is here today with her sister. She is feeling better this week. She was finally able to get some Ensure this week and has been supplementing between meals with it. Yesterday she was able to eat some chicken, mashed potatoes and broccoli along with 5 ensure clears. She reports that she is drinking fluids okay.  Nausea is under better control using compazine and ativan. She continues to have some abdominal pain. Characterized by dull constant pain, across abdomen and around to her back. She is using MS contin and oxycodone and she feels that it is under better control with that. Denies constipation and had a soft bowel movement yesterday. Continues to have  some numbness in her toes and fingertips. Neuropathy is sensory, not motor. No fevers or chills.     Review of Systems: Patient denies any of the following except if noted above: fever, chills, vision or hearing changes, chest pain, cough, dyspnea, abdominal pain, nausea, vomiting, diarrhea, constipation, urinary concerns, headaches, numbness, tingling or issues with mood.     Current Outpatient Prescriptions   Medication Sig Dispense Refill     LORazepam (ATIVAN) 0.5 MG tablet Take 1 tablet (0.5 mg) by mouth every 4 hours as needed (Anxiety, Nausea/Vomiting or Sleep) 30 tablet 0     morphine (MS CONTIN) 15 MG 12 hr tablet Take 1 tablet (15 mg) by mouth every 12 hours 60 tablet 0     dronabinol (MARINOL) 5 MG capsule Take 1 capsule (5 mg) by mouth 2 times daily (before meals) 60 capsule 0     oxyCODONE (ROXICODONE) 5 MG IR tablet Take 1 tablet (5 mg) by mouth every 4 hours as needed for moderate to severe pain 100 tablet 0     dexamethasone (DECADRON) 4 MG tablet Take 1 tablet (4 mg) by mouth daily (with breakfast) for 3 days 3 tablet 0     Nutritional Supplements (ENSURE CLEAR) LIQD Take 1 Bottle by mouth 3 times daily 90 Bottle 6     polyethylene glycol (MIRALAX/GLYCOLAX) powder Take 17 g (1 capful) by mouth daily 119 g 11     potassium chloride SA (POTASSIUM CHLORIDE) 20 MEQ CR tablet Take 1 tablet (20 mEq) by mouth daily 60 tablet 1     diltiazem 2% in PLO cream, FV COMPOUNDED, 2% GEL Apply topically daily and as needed to external hemorrhoids 30 g 0     docusate sodium (COLACE) 100 MG capsule Take 1 capsule (100 mg) by mouth daily 100 capsule 0     sucralfate (CARAFATE) 1 GM tablet Take 1 tablet (1 g) by mouth 3 times daily 90 tablet 0     pantoprazole (PROTONIX) 40 MG EC tablet Take 1 tablet (40 mg) by mouth 2 times daily . After taking this twice daily for 8 weeks, you can decrease dose to once daily. (Patient not taking: Reported on 4/3/2017) 60 tablet 1     loratadine (CLARITIN) 10 MG tablet Take 10 mg by  "mouth daily       prochlorperazine (COMPAZINE) 10 MG tablet Take 1 tablet (10 mg) by mouth every 6 hours as needed (Nausea/Vomiting) 30 tablet 2     amylase-lipase-protease (CREON) 96479 UNITS CPEP Take 2 capsules (72,000 Units) by mouth 3 times daily (with meals) 180 capsule 1     polyethylene glycol (MIRALAX/GLYCOLAX) powder Take 17 g (1 capful) by mouth daily 119 g 11     lidocaine-prilocaine (EMLA) cream Apply topically as needed for other (Use 30-60 minutes prior to port access) (Patient not taking: Reported on 4/3/2017) 30 g 0     ondansetron (ZOFRAN-ODT) 8 MG disintegrating tablet Take 1 tablet (8 mg) by mouth every 8 hours as needed for nausea 60 tablet 4     Exam:  /70  Pulse 88  Temp 98.1  F (36.7  C) (Oral)  Resp 18  Ht 1.778 m (5' 10\")  Wt 69.5 kg (153 lb 3.2 oz)  SpO2 100%  BMI 21.98 kg/m2   Wt Readings from Last 4 Encounters:   04/11/17 69.5 kg (153 lb 3.2 oz)   04/03/17 66 kg (145 lb 8 oz)   03/23/17 69.4 kg (153 lb)   03/20/17 69.9 kg (154 lb 1.6 oz)      General: alert healthy appearing, well groomed, fit and in good spirits  Skin: color pink, skin warm and moist, nails without clubbing or cyanosis, no suspicious nevi, rash, petechiae or ecchymoses  HEENT: normocephalic, atraumatic, PERRL, nasal mucosa pink, no sinus tenderness, oral mucosa pink, dentition good, pharynx without exudate.   Neck: neck supple, no cervical or supraclavicular adenopathy  Respiratory: thorax is symmetric with good expansion, lungs clear to ausculation bilaterally, no wheezes, crackles or rhonchi  Cardiovascular: RRR, no murmurs or extra sounds  Gastrointestinal: active bowel sounds, soft, non-tender; some acities, no palpable masses or hepatosplenomegally  Peripheral Vascular: extremities are warm, without edema.   Neurologic: CN II-XII grossly intact, alert, relaxed and cooperative    Labs:     Ref. Range 4/11/2017 08:17   Sodium Latest Ref Range: 133 - 144 mmol/L 142   Potassium Latest Ref Range: 3.4 - 5.3 " mmol/L 3.3 (L)   Chloride Latest Ref Range: 94 - 109 mmol/L 106   Carbon Dioxide Latest Ref Range: 20 - 32 mmol/L 31   Urea Nitrogen Latest Ref Range: 7 - 30 mg/dL 4 (L)   Creatinine Latest Ref Range: 0.52 - 1.04 mg/dL 0.56   GFR Estimate Latest Ref Range: >60 mL/min/1.7m2 >90...   GFR Estimate If Black Latest Ref Range: >60 mL/min/1.7m2 >90...   Calcium Latest Ref Range: 8.5 - 10.1 mg/dL 8.4 (L)   Anion Gap Latest Ref Range: 3 - 14 mmol/L 5   Albumin Latest Ref Range: 3.4 - 5.0 g/dL 2.3 (L)   Protein Total Latest Ref Range: 6.8 - 8.8 g/dL 7.0   Bilirubin Total Latest Ref Range: 0.2 - 1.3 mg/dL 1.2   Alkaline Phosphatase Latest Ref Range: 40 - 150 U/L 788 (H)   ALT Latest Ref Range: 0 - 50 U/L 148 (H)   AST Latest Ref Range: 0 - 45 U/L 161 (H)   Glucose Latest Ref Range: 70 - 99 mg/dL 95   WBC Latest Ref Range: 4.0 - 11.0 10e9/L 12.8 (H)   Hemoglobin Latest Ref Range: 11.7 - 15.7 g/dL 8.2 (L)   Hematocrit Latest Ref Range: 35.0 - 47.0 % 27.0 (L)   Platelet Count Latest Ref Range: 150 - 450 10e9/L 149 (L)   RBC Count Latest Ref Range: 3.8 - 5.2 10e12/L 3.11 (L)   MCV Latest Ref Range: 78 - 100 fl 87   MCH Latest Ref Range: 26.5 - 33.0 pg 26.4 (L)   MCHC Latest Ref Range: 31.5 - 36.5 g/dL 30.4 (L)   RDW Latest Ref Range: 10.0 - 15.0 % 20.4 (H)   Diff Method Unknown Automated Method   % Neutrophils Latest Units: % 82.7   % Lymphocytes Latest Units: % 7.4   % Monocytes Latest Units: % 8.4   % Eosinophils Latest Units: % 0.3   % Basophils Latest Units: % 0.4   % Immature Granulocytes Latest Units: % 0.8   Nucleated RBCs Latest Ref Range: 0 /100 0   Absolute Neutrophil Latest Ref Range: 1.6 - 8.3 10e9/L 10.6 (H)   Absolute Lymphocytes Latest Ref Range: 0.8 - 5.3 10e9/L 0.9   Absolute Monocytes Latest Ref Range: 0.0 - 1.3 10e9/L 1.1   Absolute Eosinophils Latest Ref Range: 0.0 - 0.7 10e9/L 0.0   Absolute Basophils Latest Ref Range: 0.0 - 0.2 10e9/L 0.1   Abs Immature Granulocytes Latest Ref Range: 0 - 0.4 10e9/L 0.1    Absolute Nucleated RBC Unknown 0.0     Impression/plan:  1. Metastatic pancreatic cancer- on FOLFIRINOX. Has been tolerating chemotherapy with expected side effects. CA 19-9 has been declining. Recently was held due to weight loss from anorexia. Weight is back up today.      - we will hold chemo today to give her an additional week to recover from recent weight loss.  -  Has scans on Friday and appointment with Dr. Gonsales on Monday 4/17  -  Scheduled chemo and labs for Tuesday 4/18.    2. Ascites/pleural effusions.   -Stable on exam and clinical symptoms. Will continue to monitor.   -She will call if abdominal pressure or SOB increases.   -Would check cytology on the fluid if she requires a paracentesis or thoracentesis.    3. GI:  - Constipation: stable. Continue stool softener  - Duodenal ulcer/h. Pylori + completed abx therapy  - remains on carafate tid     4. FEN: stable. Drinking > 64 fluid oz a day and no diarrhea/constipation. Eating better and supplementing with Ensure between meals. K+ is low today so we will supplement her with IV K+. Continue to oral supplementation with 20 meq daily.     5.  Abdominal pain: secondary to malignancy. Stable  -MS Contin 15 mg q 12 hours, oxycodone 5 mg every 4 hours as needed for moderate/severe pain    6. Anorexia: improved on marinol 5 mg bid. Weight back up today by 8 lbs. She has received some supplements and she has been able to add those in between meals to supplement her nutrition. Still waiting on [facility] [employer] for possible meal delivery.       Venus OLIVEIRA NP-C OCN     The patient was seen in conjunction with Venus Flores CNP who served as a scribe for today's visit. I have reviewed the note and agree with the above findings and plan. TW

## 2017-04-11 NOTE — NURSING NOTE
"Shirin Muller is a 57 year old female who presents for:  Chief Complaint   Patient presents with     Oncology Clinic Visit     Secondary malignant neoplasm of liver      Port Draw     powerport used to access port by RN, labs collected and sent, line flushed with NS and heparin-vitals taken and pt checked in for next appt. Pt tolerated well.        Initial Vitals:  /70  Pulse 88  Temp 98.1  F (36.7  C) (Oral)  Resp 18  Ht 1.778 m (5' 10\")  SpO2 100% Estimated body mass index is 20.88 kg/(m^2) as calculated from the following:    Height as of 1/24/17: 1.778 m (5' 10\").    Weight as of 4/3/17: 66 kg (145 lb 8 oz).. There is no height or weight on file to calculate BSA. BP completed using cuff size: NA (Not Taken)  No Pain (0) No LMP recorded. Patient is postmenopausal. Allergies and medications reviewed.     Medications: Medication refills not needed today.  Pharmacy name entered into [employer] [place]: [facility] - [place], [place] - [address] [place] [address] [phone]    Comments:     7 minutes for nursing intake (face to face time)   Nallely Mercado MA        "

## 2017-04-11 NOTE — PATIENT INSTRUCTIONS
Contact Numbers    [facility] Main Line: [phone]  [facility] Triage:  [phone]    Call triage with chills and/or temperature greater than or equal to 100.5, uncontrolled nausea/vomiting, diarrhea, constipation, dizziness, shortness of breath, chest pain, bleeding, unexplained bruising, or any new/concerning symptoms, questions/concerns.     If you are having any concerning symptoms or wish to speak to a provider before your next infusion visit, please call your care coordinator or triage to notify them so we can adequately serve you.       After Hours: [phone]    If after hours, weekends, or holidays, call main hospital  and ask for Oncology doctor on call.           April 2017 Sunday Monday Tuesday Wednesday Thursday Friday Saturday                                 1       2     3     [facility] D[facility]    8:00 AM   (15 min.)    [facility]RAW   [facility] Draw     [facility] ONC INFUSION 360    8:30 AM   (360 min.)    ONCOLOGY INFUSION   [facility] 4     5     6     7     8       9     10  Happy Birthday!     11     [facility] DRAW    7:30 AM   (15 min.)    [facility] DRAW   [facility] Lab Draw     [facility] RETURN    7:45 AM   (50 min.)   Ester Echavarria, TEE CNP   [facility]     [facility] ONC INFUSION 360    8:30 AM   (360 min.)    ONCOLOGY INFUSION   [facility] 12     13     14     [facility] D[facility]    8:45 AM   (15 min.)    [facility] DRAW   [facility] Draw     MR ABDOMEN WWO    9:00 AM   (45 min.)   [id number]   [facility] MRI     CT CHEST WO   10:05 AM   (20 min.)   UCCT2   [facility] CT 15       16     17     [facility] DRAW    4:15 PM   (15 min.)    [facility] DRAW   [facility] Draw     [facility] RETURN    4:45 PM   (30 min.)   Demond Gonsales MD   [facility] 18     [facility] ONC INFUSION 360    7:00 AM   (360 min.)    ONCOLOGY INFUSION   [facility]  [facility] 19 20 21 22       23     24     25     26     27     28     29       30                                              May 2017   [place] Monday Tuesday Wednesday Thursday Friday Saturday        1     2     3     4     5     6       7     8     9     10     11     12     13       14     15     16     17     18     19     20       21     22     23     24     25     26     27       28     29     30     31                                Recent Results (from the past 24 hour(s))   Comprehensive metabolic panel    Collection Time: 04/11/17  8:17 AM   Result Value Ref Range    Sodium 142 133 - 144 mmol/L    Potassium 3.3 (L) 3.4 - 5.3 mmol/L    Chloride 106 94 - 109 mmol/L    Carbon Dioxide 31 20 - 32 mmol/L    Anion Gap 5 3 - 14 mmol/L    Glucose 95 70 - 99 mg/dL    Urea Nitrogen 4 (L) 7 - 30 mg/dL    Creatinine 0.56 0.52 - 1.04 mg/dL    GFR Estimate >90  Non  GFR Calc   >60 mL/min/1.7m2    GFR Estimate If Black >90   GFR Calc   >60 mL/min/1.7m2    Calcium 8.4 (L) 8.5 - 10.1 mg/dL    Bilirubin Total 1.2 0.2 - 1.3 mg/dL    Albumin 2.3 (L) 3.4 - 5.0 g/dL    Protein Total 7.0 6.8 - 8.8 g/dL    Alkaline Phosphatase 788 (H) 40 - 150 U/L     (H) 0 - 50 U/L     (H) 0 - 45 U/L   CBC with platelets differential    Collection Time: 04/11/17  8:17 AM   Result Value Ref Range    WBC 12.8 (H) 4.0 - 11.0 10e9/L    RBC Count 3.11 (L) 3.8 - 5.2 10e12/L    Hemoglobin 8.2 (L) 11.7 - 15.7 g/dL    Hematocrit 27.0 (L) 35.0 - 47.0 %    MCV 87 78 - 100 fl    MCH 26.4 (L) 26.5 - 33.0 pg    MCHC 30.4 (L) 31.5 - 36.5 g/dL    RDW 20.4 (H) 10.0 - 15.0 %    Platelet Count 149 (L) 150 - 450 10e9/L    Diff Method Automated Method     % Neutrophils 82.7 %    % Lymphocytes 7.4 %    % Monocytes 8.4 %    % Eosinophils 0.3 %    % Basophils 0.4 %    % Immature Granulocytes 0.8 %    Nucleated RBCs 0 0 /100    Absolute Neutrophil 10.6 (H) 1.6 - 8.3 10e9/L    Absolute Lymphocytes 0.9  0.8 - 5.3 10e9/L    Absolute Monocytes 1.1 0.0 - 1.3 10e9/L    Absolute Eosinophils 0.0 0.0 - 0.7 10e9/L    Absolute Basophils 0.1 0.0 - 0.2 10e9/L    Abs Immature Granulocytes 0.1 0 - 0.4 10e9/L    Absolute Nucleated RBC 0.0

## 2017-04-11 NOTE — LETTER
4/11/2017       RE: Shirin Muller  [address] [address]     Dear Colleague,    Thank you for referring your patient, Shirin Muller, to the [facility]. Please see a copy of my visit note below.    Ms. Muller is seen in follow-up of metastatic pancreatic cancer.    Oncology HPI:  She was diagnosed in the spring of 2016 after presenting with a 2 to 3-month history of abdominal pain and weight loss.  She underwent evaluation showing a 5 cm mass at the head of the pancreas.  Biopsy was consistent with adenocarcinoma.  She underwent staging imaging including an MRI of the abdomen, which then showed an up to 10 cm poorly defined hypoenhancing mass arising from the pancreatic head which encased celiac artery, superior mesenteric artery and severely attenuated the main portal vein.  She initiated on treatment with gemcitabine and Abraxane on 05/02/2016 and continued on that until December 2016 when she was found to have metastatic disease involving the liver.  She was then initiated on FOLFIRINOX on 12/20/16. Her first cycle was complicated by fatigue, nausea/vomiting. Antiemetics were adjusted with cycle 2. After cycle 3, she was admitted with weakness and dizziness. She was found to have a GI bleed secondary to a bleeding duodenal ulcer and infiltrating mass in the duodenum. The ulcer was injected and clipped. She was found to be H. Pylori positive and was initiated on  therapy with PPI, carafate, amoxicillin and clarithromycin.    Interval history:  Shirin is here today with her sister. She is feeling better this week. She was finally able to get some Ensure this week and has been supplementing between meals with it. Yesterday she was able to eat some chicken, mashed potatoes and broccoli along with 5 ensure clears. She reports that she is drinking fluids okay.  Nausea is under better control using compazine and ativan. She continues to have some  abdominal pain. Characterized by dull constant pain, across abdomen and around to her back. She is using MS contin and oxycodone and she feels that it is under better control with that. Denies constipation and had a soft bowel movement yesterday. Continues to have some numbness in her toes and fingertips. Neuropathy is sensory, not motor. No fevers or chills.     Review of Systems: Patient denies any of the following except if noted above: fever, chills, vision or hearing changes, chest pain, cough, dyspnea, abdominal pain, nausea, vomiting, diarrhea, constipation, urinary concerns, headaches, numbness, tingling or issues with mood.     Current Outpatient Prescriptions   Medication Sig Dispense Refill     LORazepam (ATIVAN) 0.5 MG tablet Take 1 tablet (0.5 mg) by mouth every 4 hours as needed (Anxiety, Nausea/Vomiting or Sleep) 30 tablet 0     morphine (MS CONTIN) 15 MG 12 hr tablet Take 1 tablet (15 mg) by mouth every 12 hours 60 tablet 0     dronabinol (MARINOL) 5 MG capsule Take 1 capsule (5 mg) by mouth 2 times daily (before meals) 60 capsule 0     oxyCODONE (ROXICODONE) 5 MG IR tablet Take 1 tablet (5 mg) by mouth every 4 hours as needed for moderate to severe pain 100 tablet 0     dexamethasone (DECADRON) 4 MG tablet Take 1 tablet (4 mg) by mouth daily (with breakfast) for 3 days 3 tablet 0     Nutritional Supplements (ENSURE CLEAR) LIQD Take 1 Bottle by mouth 3 times daily 90 Bottle 6     polyethylene glycol (MIRALAX/GLYCOLAX) powder Take 17 g (1 capful) by mouth daily 119 g 11     potassium chloride SA (POTASSIUM CHLORIDE) 20 MEQ CR tablet Take 1 tablet (20 mEq) by mouth daily 60 tablet 1     diltiazem 2% in PLO cream, FV COMPOUNDED, 2% GEL Apply topically daily and as needed to external hemorrhoids 30 g 0     docusate sodium (COLACE) 100 MG capsule Take 1 capsule (100 mg) by mouth daily 100 capsule 0     sucralfate (CARAFATE) 1 GM tablet Take 1 tablet (1 g) by mouth 3 times daily 90 tablet 0      "pantoprazole (PROTONIX) 40 MG EC tablet Take 1 tablet (40 mg) by mouth 2 times daily . After taking this twice daily for 8 weeks, you can decrease dose to once daily. (Patient not taking: Reported on 4/3/2017) 60 tablet 1     loratadine (CLARITIN) 10 MG tablet Take 10 mg by mouth daily       prochlorperazine (COMPAZINE) 10 MG tablet Take 1 tablet (10 mg) by mouth every 6 hours as needed (Nausea/Vomiting) 30 tablet 2     amylase-lipase-protease (CREON) 99714 UNITS CPEP Take 2 capsules (72,000 Units) by mouth 3 times daily (with meals) 180 capsule 1     polyethylene glycol (MIRALAX/GLYCOLAX) powder Take 17 g (1 capful) by mouth daily 119 g 11     lidocaine-prilocaine (EMLA) cream Apply topically as needed for other (Use 30-60 minutes prior to port access) (Patient not taking: Reported on 4/3/2017) 30 g 0     ondansetron (ZOFRAN-ODT) 8 MG disintegrating tablet Take 1 tablet (8 mg) by mouth every 8 hours as needed for nausea 60 tablet 4     Exam:  /70  Pulse 88  Temp 98.1  F (36.7  C) (Oral)  Resp 18  Ht 1.778 m (5' 10\")  Wt 69.5 kg (153 lb 3.2 oz)  SpO2 100%  BMI 21.98 kg/m2   Wt Readings from Last 4 Encounters:   04/11/17 69.5 kg (153 lb 3.2 oz)   04/03/17 66 kg (145 lb 8 oz)   03/23/17 69.4 kg (153 lb)   03/20/17 69.9 kg (154 lb 1.6 oz)      General: alert healthy appearing, well groomed, fit and in good spirits  Skin: color pink, skin warm and moist, nails without clubbing or cyanosis, no suspicious nevi, rash, petechiae or ecchymoses  HEENT: normocephalic, atraumatic, PERRL, nasal mucosa pink, no sinus tenderness, oral mucosa pink, dentition good, pharynx without exudate.   Neck: neck supple, no cervical or supraclavicular adenopathy  Respiratory: thorax is symmetric with good expansion, lungs clear to ausculation bilaterally, no wheezes, crackles or rhonchi  Cardiovascular: RRR, no murmurs or extra sounds  Gastrointestinal: active bowel sounds, soft, non-tender; some acities, no palpable masses or " hepatosplenomegally  Peripheral Vascular: extremities are warm, without edema.   Neurologic: CN II-XII grossly intact, alert, relaxed and cooperative    Labs:     Ref. Range 4/11/2017 08:17   Sodium Latest Ref Range: 133 - 144 mmol/L 142   Potassium Latest Ref Range: 3.4 - 5.3 mmol/L 3.3 (L)   Chloride Latest Ref Range: 94 - 109 mmol/L 106   Carbon Dioxide Latest Ref Range: 20 - 32 mmol/L 31   Urea Nitrogen Latest Ref Range: 7 - 30 mg/dL 4 (L)   Creatinine Latest Ref Range: 0.52 - 1.04 mg/dL 0.56   GFR Estimate Latest Ref Range: >60 mL/min/1.7m2 >90...   GFR Estimate If Black Latest Ref Range: >60 mL/min/1.7m2 >90...   Calcium Latest Ref Range: 8.5 - 10.1 mg/dL 8.4 (L)   Anion Gap Latest Ref Range: 3 - 14 mmol/L 5   Albumin Latest Ref Range: 3.4 - 5.0 g/dL 2.3 (L)   Protein Total Latest Ref Range: 6.8 - 8.8 g/dL 7.0   Bilirubin Total Latest Ref Range: 0.2 - 1.3 mg/dL 1.2   Alkaline Phosphatase Latest Ref Range: 40 - 150 U/L 788 (H)   ALT Latest Ref Range: 0 - 50 U/L 148 (H)   AST Latest Ref Range: 0 - 45 U/L 161 (H)   Glucose Latest Ref Range: 70 - 99 mg/dL 95   WBC Latest Ref Range: 4.0 - 11.0 10e9/L 12.8 (H)   Hemoglobin Latest Ref Range: 11.7 - 15.7 g/dL 8.2 (L)   Hematocrit Latest Ref Range: 35.0 - 47.0 % 27.0 (L)   Platelet Count Latest Ref Range: 150 - 450 10e9/L 149 (L)   RBC Count Latest Ref Range: 3.8 - 5.2 10e12/L 3.11 (L)   MCV Latest Ref Range: 78 - 100 fl 87   MCH Latest Ref Range: 26.5 - 33.0 pg 26.4 (L)   MCHC Latest Ref Range: 31.5 - 36.5 g/dL 30.4 (L)   RDW Latest Ref Range: 10.0 - 15.0 % 20.4 (H)   Diff Method Unknown Automated Method   % Neutrophils Latest Units: % 82.7   % Lymphocytes Latest Units: % 7.4   % Monocytes Latest Units: % 8.4   % Eosinophils Latest Units: % 0.3   % Basophils Latest Units: % 0.4   % Immature Granulocytes Latest Units: % 0.8   Nucleated RBCs Latest Ref Range: 0 /100 0   Absolute Neutrophil Latest Ref Range: 1.6 - 8.3 10e9/L 10.6 (H)   Absolute Lymphocytes Latest Ref  Range: 0.8 - 5.3 10e9/L 0.9   Absolute Monocytes Latest Ref Range: 0.0 - 1.3 10e9/L 1.1   Absolute Eosinophils Latest Ref Range: 0.0 - 0.7 10e9/L 0.0   Absolute Basophils Latest Ref Range: 0.0 - 0.2 10e9/L 0.1   Abs Immature Granulocytes Latest Ref Range: 0 - 0.4 10e9/L 0.1   Absolute Nucleated RBC Unknown 0.0     Impression/plan:  1. Metastatic pancreatic cancer- on FOLFIRINOX. Has been tolerating chemotherapy with expected side effects. CA 19-9 has been declining. Recently was held due to weight loss from anorexia. Weight is back up today.      - we will hold chemo today to give her an additional week to recover from recent weight loss.  -  Has scans on Friday and appointment with Dr. Gonsales on Monday 4/17  -  Scheduled chemo and labs for Tuesday 4/18.    2. Ascites/pleural effusions.   -Stable on exam and clinical symptoms. Will continue to monitor.   -She will call if abdominal pressure or SOB increases.   -Would check cytology on the fluid if she requires a paracentesis or thoracentesis.    3. GI:  - Constipation: stable. Continue stool softener  - Duodenal ulcer/h. Pylori + completed abx therapy  - remains on carafate tid     4. FEN: stable. Drinking > 64 fluid oz a day and no diarrhea/constipation. Eating better and supplementing with Ensure between meals. K+ is low today so we will supplement her with IV K+. Continue to oral supplementation with 20 meq daily.     5.  Abdominal pain: secondary to malignancy. Stable  -MS Contin 15 mg q 12 hours, oxycodone 5 mg every 4 hours as needed for moderate/severe pain    6. Anorexia: improved on marinol 5 mg bid. Weight back up today by 8 lbs. She has received some supplements and she has been able to add those in between meals to supplement her nutrition. Still waiting on [facility] [employer] for possible meal delivery.       Venus OLIVEIRA NP-MIKE DIAMOND     The patient was seen in conjunction with Venus Flores CNP who served as a scribe for today's visit. I have reviewed the  note and agree with the above findings and plan. TW      Again, thank you for allowing me to participate in the care of your patient.      Sincerely,    TEE Villanueva CNP

## 2017-04-11 NOTE — MR AVS SNAPSHOT
After Visit Summary   4/11/2017    Shirin Muller    MRN: [id number]           Patient Information     Date Of Birth          1960        Visit Information        Provider Department      4/11/2017 8:30 AM UC 19 ATC;  ONCOLOGY INFUSION [facility]ly [facility]        Today's Diagnoses     Need for prophylactic measure    -  1    Malignant neoplasm of head of pancreas (H)          Care Instructions    Contact Numbers    [facility] Main Line: [phone]  [facility] Triage:  [phone]    Call triage with chills and/or temperature greater than or equal to 100.5, uncontrolled nausea/vomiting, diarrhea, constipation, dizziness, shortness of breath, chest pain, bleeding, unexplained bruising, or any new/concerning symptoms, questions/concerns.     If you are having any concerning symptoms or wish to speak to a provider before your next infusion visit, please call your care coordinator or triage to notify them so we can adequately serve you.       After Hours: [phone]    If after hours, weekends, or holidays, call main hospital  and ask for Oncology doctor on call.           April 2017 Sunday Monday Tuesday Wednesday Thursday Friday Saturday                                 1       2     3     [facility] DRAW    8:00 AM   (15 min.)    [facility] LAB DRAW   [facility] Draw     [facility] ONC INFUSION 360    8:30 AM   (360 min.)    ONCOLOGY INFUSION   [facility] 4     5     6     7     8       9     10  Happy Birthday!     11     [facility] DRAW    7:30 AM   (15 min.)    [facility] DRAW   [facility] Draw     [facility] RETURN    7:45 AM   (50 min.)   Ester Echavarria APRN CNP   M [facility]     [facility] ONC INFUSION 360    8:30 AM   (360 min.)    ONCOLOGY INFUSION   [facility] 12     13     14     [facility] DRAW    8:45 AM   (15 min.)    [facility] DRAW   [facility] Draw      ABDOMEN  WWO    9:00 AM   (45 min.)   UCMR1   [facility] MRI     CT CHEST WO   10:05 AM   (20 min.)   UCCT2   [facility] CT 15       16     17     [facility] LAB DRAW    4:15 PM   (15 min.)    [facility] LAB DRAW   [facility] Lab Draw     [facility] RETURN    4:45 PM   (30 min.)   Demond Gonsales MD   [facility] 18     [facility] ONC INFUSION 360    7:00 AM   (360 min.)    ONCOLOGY INFUSION   [facility] 19     20     21     22       23     24     25     26     27     28     29       30                                              May 2017   [place] Monday Tuesday Wednesday Thursday Friday Saturday        1     2     3     4     5     6       7     8     9     10     11     12     13       14     15     16     17     18     19     20       21     22     23     24     25     26     27       28     29     30     31                                Recent Results (from the past 24 hour(s))   Comprehensive metabolic panel    Collection Time: 04/11/17  8:17 AM   Result Value Ref Range    Sodium 142 133 - 144 mmol/L    Potassium 3.3 (L) 3.4 - 5.3 mmol/L    Chloride 106 94 - 109 mmol/L    Carbon Dioxide 31 20 - 32 mmol/L    Anion Gap 5 3 - 14 mmol/L    Glucose 95 70 - 99 mg/dL    Urea Nitrogen 4 (L) 7 - 30 mg/dL    Creatinine 0.56 0.52 - 1.04 mg/dL    GFR Estimate >90  Non  GFR Calc   >60 mL/min/1.7m2    GFR Estimate If Black >90   GFR Calc   >60 mL/min/1.7m2    Calcium 8.4 (L) 8.5 - 10.1 mg/dL    Bilirubin Total 1.2 0.2 - 1.3 mg/dL    Albumin 2.3 (L) 3.4 - 5.0 g/dL    Protein Total 7.0 6.8 - 8.8 g/dL    Alkaline Phosphatase 788 (H) 40 - 150 U/L     (H) 0 - 50 U/L     (H) 0 - 45 U/L   CBC with platelets differential    Collection Time: 04/11/17  8:17 AM   Result Value Ref Range    WBC 12.8 (H) 4.0 - 11.0 10e9/L    RBC Count 3.11 (L) 3.8 - 5.2 10e12/L    Hemoglobin 8.2 (L) 11.7 - 15.7 g/dL    Hematocrit 27.0 (L) 35.0 - 47.0  %    MCV 87 78 - 100 fl    MCH 26.4 (L) 26.5 - 33.0 pg    MCHC 30.4 (L) 31.5 - 36.5 g/dL    RDW 20.4 (H) 10.0 - 15.0 %    Platelet Count 149 (L) 150 - 450 10e9/L    Diff Method Automated Method     % Neutrophils 82.7 %    % Lymphocytes 7.4 %    % Monocytes 8.4 %    % Eosinophils 0.3 %    % Basophils 0.4 %    % Immature Granulocytes 0.8 %    Nucleated RBCs 0 0 /100    Absolute Neutrophil 10.6 (H) 1.6 - 8.3 10e9/L    Absolute Lymphocytes 0.9 0.8 - 5.3 10e9/L    Absolute Monocytes 1.1 0.0 - 1.3 10e9/L    Absolute Eosinophils 0.0 0.0 - 0.7 10e9/L    Absolute Basophils 0.1 0.0 - 0.2 10e9/L    Abs Immature Granulocytes 0.1 0 - 0.4 10e9/L    Absolute Nucleated RBC 0.0              Follow-ups after your visit        Your next 10 appointments already scheduled     Apr 14, 2017  8:45 AM CDT   [facility] Lab Draw with  [facility] DRAW   [facility] Lab Draw ([facility])    [address] [id number] [phone]            Apr 14, 2017  9:15 AM CDT   (Arrive by 9:00 AM)   MR ABDOMEN W/O & W CONTRAST with [address] MRI ([facility])    [address] [place] [id number] [phone]           Take your medicines as usual, unless your doctor tells you not to. Bring a list of your current medicines to your exam (including vitamins, minerals and over-the-counter drugs). Also bring the results of similar scans you may have had.    The day before your exam, drink extra fluids at least six 8-ounce glasses (unless your doctor tells you to restrict your fluids).   Have a blood test (creatinine test) within 30 days of your exam. Go to your clinic or Diagnostic Imaging Department for this test.   Do not eat or drink for 6 hours prior to exam.  The MRI machine uses a strong magnet. Please wear clothes without metal (snaps, zippers). A sweatsuit works well, or we may give you a hospital gown.  Please  remove any body piercings and hair extensions before you arrive. You will also remove watches, jewelry, hairpins, wallets, dentures, partial dental plates and hearing aids. You may wear contact lenses, and you may be able to wear your rings. We have a safe place to keep your personal items, but it is safer to leave them at home.   **IMPORTANT** THE INSTRUCTIONS BELOW ARE ONLY FOR THOSE PATIENTS WHO HAVE BEEN TOLD THEY WILL RECEIVE SEDATION OR GENERAL ANESTHESIA DURING THEIR MRI PROCEDURE:  IF YOU WILL RECEIVE SEDATION (take medicine to help you relax during your exam):   You must get the medicine from your doctor before you arrive. Bring the medicine to the exam. Do not take it at home.   Arrive one hour early. Bring someone who can take you home after the test. Your medicine will make you sleepy. After the exam, you may not drive, take a bus or take a taxi by yourself.   No eating 8 hours before your exam. You may have clear liquids up until 4 hours before your exam. (Clear liquids include water, clear tea, black coffee and fruit juice without pulp.)  IF YOU WILL RECEIVE ANESTHESIA (be asleep for your exam):   Arrive 1 1/2 hours early. Bring someone who can take you home after the test. You may not drive, take a bus or take a taxi by yourself.   No eating 8 hours before your exam. You may have clear liquids up until 4 hours before your exam. (Clear liquids include water, clear tea, black coffee and fruit juice without pulp.)  If you have any questions, please contact your Imaging Department exam site.            Apr 14, 2017 10:20 AM CDT   (Arrive by 10:05 AM)   CT CHEST W/O CONTRAST with UCCT2   [facility] CT ([facility])    [address] [facility]  1st Floor  [facility] [address] [phone]           Please bring any scans or X-rays taken at other hospitals, if similar tests were done. Also bring a list of your medicines, including vitamins, minerals and  over-the-counter drugs. It is safest to leave personal items at home.  Be sure to tell your doctor:   If you have any allergies.   If there s any chance you are pregnant.   If you are breastfeeding.   If you have any special needs.  You do not need to do anything special to prepare.  Please wear loose clothing, such as a sweat suit or jogging clothes. Avoid snaps, zippers and other metal. We may ask you to undress and put on a hospital gown.            Apr 17, 2017  4:15 PM CDT   [facility] Lab Draw with [facility] LAB DRAW   [facility] Lab Draw ([facility])    [address] [place] [id number] [phone]            Apr 17, 2017  5:00 PM CDT   (Arrive by 4:45 PM)   Return Visit with Demond Gonsales MD   [facility] ([facility])    [address] [place] [id number] [phone]            Apr 18, 2017  7:00 AM CDT   Infusion 360 with  ONCOLOGY INFUSION,  11 ATC   [facility] ([facility])    [address] [place] [id number] [phone]              Who to contact     If you have questions or need follow up information about today's clinic visit or your schedule please contact [facility]ER [facility] directly at [phone].  Normal or non-critical lab and imaging results will be communicated to you by MyChart, letter or phone within 4 business days after the clinic has received the results. If you do not hear from us within 7 days, please contact the clinic through MyChart or phone. If you have a critical or abnormal lab result, we will notify you by phone as soon as possible.  Submit refill requests through [employer] or call your pharmacy and they will forward the refill request to us. Please allow 3 business days for your refill to be completed.          Additional Information About Your Visit         [employer] Information     [employer] gives you secure access to your electronic health record. If you see a primary care provider, you can also send messages to your care team and make appointments. If you have questions, please call your primary care clinic.  If you do not have a primary care provider, please call [phone] and they will assist you.        Care EveryWhere ID     This is your Care EveryWhere ID. This could be used by other organizations to access your [facility] medical records  [id number]         Blood Pressure from Last 3 Encounters:   04/11/17 105/70   04/03/17 108/73   03/23/17 112/68    Weight from Last 3 Encounters:   04/11/17 69.5 kg (153 lb 3.2 oz)   04/03/17 66 kg (145 lb 8 oz)   03/23/17 69.4 kg (153 lb)              We Performed the Following     Cancer antigen 19-9     CBC with platelets differential     Comprehensive metabolic panel          Today's Medication Changes          These changes are accurate as of: 4/11/17 12:01 PM.  If you have any questions, ask your nurse or doctor.               Stop taking these medicines if you haven't already. Please contact your care team if you have questions.     pantoprazole 40 MG EC tablet   Commonly known as:  PROTONIX   Stopped by:  Ester Echavarria, TEE CNP                    Primary Care Provider    [facility]       No address on file        Thank you!     Thank you for choosing [facility]INIC  for your care. Our goal is always to provide you with excellent care. Hearing back from our patients is one way we can continue to improve our services. Please take a few minutes to complete the written survey that you may receive in the mail after your visit with us. Thank you!             Your Updated Medication List - Protect others around you: Learn how to safely use, store and throw away your medicines at www.disposemymeds.org.          This list is accurate as of: 4/11/17 12:01 PM.  Always use your most recent med  list.                   Brand Name Dispense Instructions for use    amylase-lipase-protease 32462 UNITS Cpep    CREON    180 capsule    Take 2 capsules (72,000 Units) by mouth 3 times daily (with meals)       dexamethasone 4 MG tablet    DECADRON    3 tablet    Take 1 tablet (4 mg) by mouth daily (with breakfast) for 3 days       diltiazem 2% in PLO cream (FV COMPOUNDED) 2% Gel     30 g    Apply topically daily and as needed to external hemorrhoids       docusate sodium 100 MG capsule    COLACE    100 capsule    Take 1 capsule (100 mg) by mouth daily       dronabinol 5 MG capsule    MARINOL    60 capsule    Take 1 capsule (5 mg) by mouth 2 times daily (before meals)       ENSURE CLEAR Liqd     90 Bottle    Take 1 Bottle by mouth 3 times daily       lidocaine-prilocaine cream    EMLA    30 g    Apply topically as needed for other (Use 30-60 minutes prior to port access)       loratadine 10 MG tablet    CLARITIN     Take 10 mg by mouth daily       LORazepam 0.5 MG tablet    ATIVAN    30 tablet    Take 1 tablet (0.5 mg) by mouth every 4 hours as needed (Anxiety, Nausea/Vomiting or Sleep)       morphine 15 MG 12 hr tablet    MS CONTIN    60 tablet    Take 1 tablet (15 mg) by mouth every 12 hours       ondansetron 8 MG ODT tab    ZOFRAN-ODT    60 tablet    Take 1 tablet (8 mg) by mouth every 8 hours as needed for nausea       oxyCODONE 5 MG IR tablet    ROXICODONE    100 tablet    Take 1 tablet (5 mg) by mouth every 4 hours as needed for moderate to severe pain       * polyethylene glycol powder    MIRALAX/GLYCOLAX    119 g    Take 17 g (1 capful) by mouth daily       * polyethylene glycol powder    MIRALAX/GLYCOLAX    119 g    Take 17 g (1 capful) by mouth daily       potassium chloride SA 20 MEQ CR tablet    potassium chloride    60 tablet    Take 1 tablet (20 mEq) by mouth daily       prochlorperazine 10 MG tablet    COMPAZINE    30 tablet    Take 1 tablet (10 mg) by mouth every 6 hours as needed (Nausea/Vomiting)        sucralfate 1 GM tablet    CARAFATE    90 tablet    Take 1 tablet (1 g) by mouth 3 times daily       * Notice:  This list has 2 medication(s) that are the same as other medications prescribed for you. Read the directions carefully, and ask your doctor or other care provider to review them with you.

## 2017-04-11 NOTE — NURSING NOTE
Powerport used to access port by RN, labs collected and sent, line flushed with NS and heparin-vitals taken and pt checked in for next appt. Pt tolerated well.  Camila Nguyen

## 2017-04-13 LAB — CANCER AG19-9 SERPL-ACNC: 3221

## 2017-04-14 NOTE — NURSING NOTE
Chief Complaint   Patient presents with     Port Flush     Pt's port flushed with saline and heparin and deaccessed.

## 2017-04-17 ENCOUNTER — ONCOLOGY VISIT (OUTPATIENT)
Dept: ONCOLOGY | Facility: CLINIC | Age: 57
End: 2017-04-17
Attending: INTERNAL MEDICINE
Payer: COMMERCIAL

## 2017-04-17 VITALS
RESPIRATION RATE: 16 BRPM | OXYGEN SATURATION: 100 % | BODY MASS INDEX: 22.05 KG/M2 | SYSTOLIC BLOOD PRESSURE: 107 MMHG | WEIGHT: 154 LBS | TEMPERATURE: 98.8 F | HEART RATE: 96 BPM | DIASTOLIC BLOOD PRESSURE: 71 MMHG | HEIGHT: 70 IN

## 2017-04-17 VITALS
WEIGHT: 154 LBS | BODY MASS INDEX: 22.1 KG/M2 | HEART RATE: 96 BPM | OXYGEN SATURATION: 100 % | SYSTOLIC BLOOD PRESSURE: 107 MMHG | TEMPERATURE: 98.8 F | DIASTOLIC BLOOD PRESSURE: 71 MMHG

## 2017-04-17 DIAGNOSIS — D63.8 ANEMIA, CHRONIC DISEASE: ICD-10-CM

## 2017-04-17 DIAGNOSIS — C78.7 SECONDARY MALIGNANT NEOPLASM OF LIVER (H): ICD-10-CM

## 2017-04-17 DIAGNOSIS — C80.1 BILIARY OBSTRUCTION DUE TO MALIGNANT NEOPLASM (H): ICD-10-CM

## 2017-04-17 DIAGNOSIS — Z29.9 NEED FOR PROPHYLACTIC MEASURE: ICD-10-CM

## 2017-04-17 DIAGNOSIS — C25.0 MALIGNANT NEOPLASM OF HEAD OF PANCREAS (H): Primary | ICD-10-CM

## 2017-04-17 DIAGNOSIS — K83.1 BILIARY OBSTRUCTION DUE TO MALIGNANT NEOPLASM (H): ICD-10-CM

## 2017-04-17 DIAGNOSIS — C25.0 MALIGNANT NEOPLASM OF HEAD OF PANCREAS (H): ICD-10-CM

## 2017-04-17 DIAGNOSIS — K26.4 GASTROINTESTINAL HEMORRHAGE ASSOCIATED WITH DUODENAL ULCER: ICD-10-CM

## 2017-04-17 LAB
ALBUMIN SERPL-MCNC: 2.3 G/DL (ref 3.4–5)
ALP SERPL-CCNC: 710 U/L (ref 40–150)
ALT SERPL W P-5'-P-CCNC: 87 U/L (ref 0–50)
ANION GAP SERPL CALCULATED.3IONS-SCNC: 6 MMOL/L (ref 3–14)
ANISOCYTOSIS BLD QL SMEAR: ABNORMAL
AST SERPL W P-5'-P-CCNC: 135 U/L (ref 0–45)
BASOPHILS # BLD AUTO: 0.2 10E9/L (ref 0–0.2)
BASOPHILS NFR BLD AUTO: 1.8 %
BILIRUB SERPL-MCNC: 3.1 MG/DL (ref 0.2–1.3)
BUN SERPL-MCNC: 7 MG/DL (ref 7–30)
CALCIUM SERPL-MCNC: 8.4 MG/DL (ref 8.5–10.1)
CHLORIDE SERPL-SCNC: 103 MMOL/L (ref 94–109)
CO2 SERPL-SCNC: 30 MMOL/L (ref 20–32)
CREAT SERPL-MCNC: 0.52 MG/DL (ref 0.52–1.04)
DIFFERENTIAL METHOD BLD: ABNORMAL
EOSINOPHIL # BLD AUTO: 0 10E9/L (ref 0–0.7)
EOSINOPHIL NFR BLD AUTO: 0 %
ERYTHROCYTE [DISTWIDTH] IN BLOOD BY AUTOMATED COUNT: 20.2 % (ref 10–15)
GFR SERPL CREATININE-BSD FRML MDRD: ABNORMAL ML/MIN/1.7M2
GLUCOSE SERPL-MCNC: 90 MG/DL (ref 70–99)
HCT VFR BLD AUTO: 27.3 % (ref 35–47)
HGB BLD-MCNC: 8.4 G/DL (ref 11.7–15.7)
LYMPHOCYTES # BLD AUTO: 1.2 10E9/L (ref 0.8–5.3)
LYMPHOCYTES NFR BLD AUTO: 14.2 %
MCH RBC QN AUTO: 26.5 PG (ref 26.5–33)
MCHC RBC AUTO-ENTMCNC: 30.8 G/DL (ref 31.5–36.5)
MCV RBC AUTO: 86 FL (ref 78–100)
MONOCYTES # BLD AUTO: 0.2 10E9/L (ref 0–1.3)
MONOCYTES NFR BLD AUTO: 2.7 %
NEUTROPHILS # BLD AUTO: 6.9 10E9/L (ref 1.6–8.3)
NEUTROPHILS NFR BLD AUTO: 81.3 %
PLATELET # BLD AUTO: 209 10E9/L (ref 150–450)
POIKILOCYTOSIS BLD QL SMEAR: ABNORMAL
POTASSIUM SERPL-SCNC: 3.5 MMOL/L (ref 3.4–5.3)
PROT SERPL-MCNC: 7.6 G/DL (ref 6.8–8.8)
RBC # BLD AUTO: 3.17 10E12/L (ref 3.8–5.2)
RBC INCLUSIONS BLD: ABNORMAL
SODIUM SERPL-SCNC: 140 MMOL/L (ref 133–144)
WBC # BLD AUTO: 8.5 10E9/L (ref 4–11)

## 2017-04-17 PROCEDURE — 99212 OFFICE O/P EST SF 10 MIN: CPT | Mod: ZF

## 2017-04-17 PROCEDURE — 85025 COMPLETE CBC W/AUTO DIFF WBC: CPT | Performed by: INTERNAL MEDICINE

## 2017-04-17 PROCEDURE — 80053 COMPREHEN METABOLIC PANEL: CPT | Performed by: INTERNAL MEDICINE

## 2017-04-17 PROCEDURE — 25000128 H RX IP 250 OP 636: Performed by: INTERNAL MEDICINE

## 2017-04-17 PROCEDURE — 86301 IMMUNOASSAY TUMOR CA 19-9: CPT | Performed by: INTERNAL MEDICINE

## 2017-04-17 PROCEDURE — 36591 DRAW BLOOD OFF VENOUS DEVICE: CPT

## 2017-04-17 PROCEDURE — 99215 OFFICE O/P EST HI 40 MIN: CPT | Mod: ZP | Performed by: INTERNAL MEDICINE

## 2017-04-17 RX ORDER — MEPERIDINE HYDROCHLORIDE 25 MG/ML
25 INJECTION INTRAMUSCULAR; INTRAVENOUS; SUBCUTANEOUS EVERY 30 MIN PRN
Status: CANCELLED | OUTPATIENT
Start: 2017-04-21

## 2017-04-17 RX ORDER — ONDANSETRON 8 MG/1
8 TABLET, ORALLY DISINTEGRATING ORAL EVERY 8 HOURS PRN
Qty: 60 TABLET | Refills: 4 | Status: SHIPPED | OUTPATIENT
Start: 2017-04-17 | End: 2017-01-01

## 2017-04-17 RX ORDER — PROCHLORPERAZINE MALEATE 10 MG
10 TABLET ORAL EVERY 6 HOURS PRN
Qty: 30 TABLET | Refills: 2 | Status: SHIPPED | OUTPATIENT
Start: 2017-04-17 | End: 2017-01-01

## 2017-04-17 RX ORDER — FLUOROURACIL 50 MG/ML
400 INJECTION, SOLUTION INTRAVENOUS ONCE
Status: CANCELLED | OUTPATIENT
Start: 2017-04-21

## 2017-04-17 RX ORDER — SODIUM CHLORIDE 9 MG/ML
1000 INJECTION, SOLUTION INTRAVENOUS CONTINUOUS PRN
Status: CANCELLED
Start: 2017-04-21

## 2017-04-17 RX ORDER — LORAZEPAM 2 MG/ML
0.5 INJECTION INTRAMUSCULAR EVERY 4 HOURS PRN
Status: CANCELLED
Start: 2017-04-21

## 2017-04-17 RX ORDER — PALONOSETRON 0.05 MG/ML
0.25 INJECTION, SOLUTION INTRAVENOUS ONCE
Status: CANCELLED
Start: 2017-04-21

## 2017-04-17 RX ORDER — METHYLPREDNISOLONE SODIUM SUCCINATE 125 MG/2ML
125 INJECTION, POWDER, LYOPHILIZED, FOR SOLUTION INTRAMUSCULAR; INTRAVENOUS
Status: CANCELLED
Start: 2017-04-21

## 2017-04-17 RX ORDER — ALBUTEROL SULFATE 90 UG/1
1-2 AEROSOL, METERED RESPIRATORY (INHALATION)
Status: CANCELLED
Start: 2017-04-21

## 2017-04-17 RX ORDER — ALBUTEROL SULFATE 0.83 MG/ML
2.5 SOLUTION RESPIRATORY (INHALATION)
Status: CANCELLED | OUTPATIENT
Start: 2017-04-21

## 2017-04-17 RX ORDER — EPINEPHRINE 0.3 MG/.3ML
0.3 INJECTION SUBCUTANEOUS EVERY 5 MIN PRN
Status: CANCELLED | OUTPATIENT
Start: 2017-04-21

## 2017-04-17 RX ORDER — MORPHINE SULFATE 15 MG/1
15 TABLET, FILM COATED, EXTENDED RELEASE ORAL EVERY 12 HOURS
Qty: 60 TABLET | Refills: 0 | Status: SHIPPED | OUTPATIENT
Start: 2017-04-17 | End: 2017-01-01

## 2017-04-17 RX ORDER — HEPARIN SODIUM (PORCINE) LOCK FLUSH IV SOLN 100 UNIT/ML 100 UNIT/ML
5 SOLUTION INTRAVENOUS EVERY 8 HOURS
Status: DISCONTINUED | OUTPATIENT
Start: 2017-04-17 | End: 2017-04-25 | Stop reason: HOSPADM

## 2017-04-17 RX ORDER — DIPHENHYDRAMINE HYDROCHLORIDE 50 MG/ML
50 INJECTION INTRAMUSCULAR; INTRAVENOUS
Status: CANCELLED
Start: 2017-04-21

## 2017-04-17 RX ADMIN — SODIUM CHLORIDE, PRESERVATIVE FREE 5 ML: 5 INJECTION INTRAVENOUS at 17:01

## 2017-04-17 ASSESSMENT — PAIN SCALES - GENERAL: PAINLEVEL: NO PAIN (0)

## 2017-04-17 NOTE — Clinical Note
4/17/2017       RE: Shirin Muller  [address] [facility] [address] [facility] [address]     Dear Colleague,    Thank you for referring your patient, Shirin Muller, to the [facility]. Please see a copy of my visit note below.     Dictation on: 04/17/2017  6:08 PM by: GENE GUERRERO [[id number]]         HISTORY OF PRESENT ILLNESS:  Ms. Muller is here today in followup of metastatic pancreatic cancer.  She is currently on active treatment with FOLFIRINOX, previously having responded to and then progressed on gem and Abraxane.  She is here for a planned response assessment.  Chemo has been going moderately well for her.  She has had some trouble with cytopenias and a lot of trouble eating.  That seems to have gotten much better over the last few weeks.  She is using protein shakes and being careful to make sure she has something to drink with her meals.  At present, she is not having any significant pain.  Her bowels are working well.  She notes her urine has gotten a little bit darker.  She has had no recurrence of her bleeding.  She has had no headache or neurologic symptoms.  She has had no respiratory symptoms.  The remainder of her complete review of systems otherwise is unremarkable.       PHYSICAL EXAMINATION:   GENERAL:  Ms. Muller appears to be feeling relatively well.   VITAL SIGNS:  Her vitals are noted in the chart and are unremarkable.  Her weight today of almost 70 kilos is up about 3 or 4 kilos from a couple weeks ago.    HEENT:  She has no icterus.  She has no visible lesions in the oropharynx.   NECK:  She has no palpable adenopathy in the neck or supraclavicular spaces.     CHEST:  Her port site is unremarkable.   LUNGS:  Her lungs are clear to auscultation without dullness to percussion.   HEART:  Her heart rate and rhythm are regular without murmur or gallop.   ABDOMEN:  Her abdomen is soft with minimal diffuse tenderness without palpable mass or  organomegaly.    EXTREMITIES:  She has no peripheral edema.  There is no tenderness in her calves or thighs.   NEUROLOGIC:  Her speech is fluent.  Her cranial nerves are grossly intact.       RESULTS:  Her lab work shows her CA 19-9 to continue to fall, now down to about 3,000 from a peak of 40,000.  Her bilirubin is still normal, although it is slowly rising up to 1.2, and her alkaline phosphatase is a little bit higher than it has been at about 780.  On her scan, there is artifact from her duodenal stent, but all of her sites of disease appear stable to better.  There is nothing new.  Radiology comments on perhaps a little more dilatation of her biliary tree.       ASSESSMENT:  Metastatic pancreas cancer.  The patient is responding well to the FOLFIRINOX and is tolerating it modestly well.  We will continue on with the same dose and schedule with Neulasta support.  Her hemoglobin is drifting down again now that she is further out from her transfusions and we will add in darbepoetin to her treatment regimen.  I suspect she is starting to get some sludge in her stent.  It has been about a year since her biliary stents were last changed.  We will review with Gastroenterology if they would like to go ahead and prophylactically change those now.  We will see her back in a month for symptom management and in a little over 2 months for another response assessment.      Again, thank you for allowing me to participate in the care of your patient.      Sincerely,    Demond Gonsales MD

## 2017-04-17 NOTE — NURSING NOTE
Chief Complaint   Patient presents with     Port Draw     Patient is here today for labs to be drawn via her Port by RN. Labs collected,vitals charted, and patient checked into next visit.

## 2017-04-17 NOTE — PROGRESS NOTES
HISTORY OF PRESENT ILLNESS:  Ms. Meyer is here today in followup of metastatic pancreatic cancer.  She is currently on active treatment with FOLFIRINOX, previously having responded to and then progressed on gem and Abraxane.  She is here for a planned response assessment.  Chemo has been going moderately well for her.  She has had some trouble with cytopenias and a lot of trouble eating.  That seems to have gotten much better over the last few weeks.  She is using protein shakes and being careful to make sure she has something to drink with her meals.  At present, she is not having any significant pain.  Her bowels are working well.  She notes her urine has gotten a little bit darker.  She has had no recurrence of her bleeding.  She has had no headache or neurologic symptoms.  She has had no respiratory symptoms.  The remainder of her complete review of systems otherwise is unremarkable.       PHYSICAL EXAMINATION:   GENERAL:  Ms. Meyer appears to be feeling relatively well.   VITAL SIGNS:  Her vitals are noted in the chart and are unremarkable.  Her weight today of almost 70 kilos is up about 3 or 4 kilos from a couple weeks ago.    HEENT:  She has no icterus.  She has no visible lesions in the oropharynx.   NECK:  She has no palpable adenopathy in the neck or supraclavicular spaces.     CHEST:  Her port site is unremarkable.   LUNGS:  Her lungs are clear to auscultation without dullness to percussion.   HEART:  Her heart rate and rhythm are regular without murmur or gallop.   ABDOMEN:  Her abdomen is soft with minimal diffuse tenderness without palpable mass or organomegaly.    EXTREMITIES:  She has no peripheral edema.  There is no tenderness in her calves or thighs.   NEUROLOGIC:  Her speech is fluent.  Her cranial nerves are grossly intact.       RESULTS:  Her lab work shows her CA 19-9 to continue to fall, now down to about 3,000 from a peak of 40,000.  Her bilirubin from a few days ago is still  normal, although it is slowly rising up to 1.2, and her alkaline phosphatase is a little bit higher than it has been at about 780.  On her scan, there is artifact from her duodenal stent, but all of her sites of disease appear stable to better.  There is nothing new.  Radiology comments on perhaps a little more dilatation of her biliary tree.       ASSESSMENT:  Metastatic pancreas cancer.  The patient is responding well to the FOLFIRINOX and is tolerating it modestly well.  We will continue on with the same dose and schedule with Neulasta support.  Her hemoglobin is drifting down again now that she is further out from her transfusions and we will add in darbepoetin to her treatment regimen.  I suspect she is starting to get some sludge in her stent.  It has been about a year since her biliary stents were last changed.  We will review with Gastroenterology if they would like to go ahead and prophylactically change those now.  We will see her back in a month for symptom management and in a little over 2 months for another response assessment.     Addendum: repeat liver enzymes show her bilirubin now rising and we will arrange for urgent revision of her stents before proceeding with chemotherapy.

## 2017-04-17 NOTE — LETTER
4/17/2017      RE: Shirin Muller  [address] [facility] [address]       HISTORY OF PRESENT ILLNESS:  Ms. Muller is here today in followup of metastatic pancreatic cancer.  She is currently on active treatment with FOLFIRINOX, previously having responded to and then progressed on gem and Abraxane.  She is here for a planned response assessment.  Chemo has been going moderately well for her.  She has had some trouble with cytopenias and a lot of trouble eating.  That seems to have gotten much better over the last few weeks.  She is using protein shakes and being careful to make sure she has something to drink with her meals.  At present, she is not having any significant pain.  Her bowels are working well.  She notes her urine has gotten a little bit darker.  She has had no recurrence of her bleeding.  She has had no headache or neurologic symptoms.  She has had no respiratory symptoms.  The remainder of her complete review of systems otherwise is unremarkable.       PHYSICAL EXAMINATION:   GENERAL:  Ms. Muller appears to be feeling relatively well.   VITAL SIGNS:  Her vitals are noted in the chart and are unremarkable.  Her weight today of almost 70 kilos is up about 3 or 4 kilos from a couple weeks ago.    HEENT:  She has no icterus.  She has no visible lesions in the oropharynx.   NECK:  She has no palpable adenopathy in the neck or supraclavicular spaces.     CHEST:  Her port site is unremarkable.   LUNGS:  Her lungs are clear to auscultation without dullness to percussion.   HEART:  Her heart rate and rhythm are regular without murmur or gallop.   ABDOMEN:  Her abdomen is soft with minimal diffuse tenderness without palpable mass or organomegaly.    EXTREMITIES:  She has no peripheral edema.  There is no tenderness in her calves or thighs.   NEUROLOGIC:  Her speech is fluent.  Her cranial nerves are grossly intact.       RESULTS:  Her lab work shows her CA 19-9 to continue to  fall, now down to about 3,000 from a peak of 40,000.  Her bilirubin from a few days ago is still normal, although it is slowly rising up to 1.2, and her alkaline phosphatase is a little bit higher than it has been at about 780.  On her scan, there is artifact from her duodenal stent, but all of her sites of disease appear stable to better.  There is nothing new.  Radiology comments on perhaps a little more dilatation of her biliary tree.       ASSESSMENT:  Metastatic pancreas cancer.  The patient is responding well to the FOLFIRINOX and is tolerating it modestly well.  We will continue on with the same dose and schedule with Neulasta support.  Her hemoglobin is drifting down again now that she is further out from her transfusions and we will add in darbepoetin to her treatment regimen.  I suspect she is starting to get some sludge in her stent.  It has been about a year since her biliary stents were last changed.  We will review with Gastroenterology if they would like to go ahead and prophylactically change those now.  We will see her back in a month for symptom management and in a little over 2 months for another response assessment.     Addendum: repeat liver enzymes show her bilirubin now rising and we will arrange for urgent revision of her stents before proceeding with chemotherapy.          Demond Gonsales MD

## 2017-04-17 NOTE — MR AVS SNAPSHOT
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      Your next 10 appointments already scheduled     Apr 24, 2017 11:00 AM CDT   (Arrive by 10:45 AM)   INJECTION with  Oncology Injection Nurse   [facility] ([facility])    [address] [place] [id number]   [phone]            May 02, 2017  7:30 AM CDT   [facility] Draw with [facility] DRAW   [facility]    [address] [place] [id number]   [phone]            May 02, 2017  8:00 AM CDT   Infusion 360 with UC ONCOLOGY INFUSION, UC 14 ATC   [facility] ([facility])    [address] [place] [id number]   [phone]            May 02, 2017  8:00 AM CDT   (Arrive by 7:45 AM)   Return Visit with TEE Olivas CNP   [facility] ([facility])    [address] [place] [id number]   [phone]            May 16, 2017  7:30 AM CDT   Masonic Lab Draw with UC [facility]ONIC LAB DRAW   [facility] Draw ([facility])    [address] [place] [address]  [address] [address]   [phone]            May 16, 2017  8:00 AM CDT   (Arrive by 7:45 AM)   Return Visit with TEE Olivas CNP   [facility] ([facility])    [address] [place] [phone]   [phone]            May 16, 2017  9:00 AM CDT   Infusion 360 with  ONCOLOGY INFUSION, UC 23 ATC   [facility] ([facility])    [address] [place] [id number] [phone]            May 30, 2017  6:30 AM CDT   M[facility]ic Lab Draw with  MASONIC LAB DRAW   [facility] Draw ([facility])    [address] [place] [id number] [phone]              Who to contact     If you have questions or need follow up information about today's clinic visit or your schedule please contact [facility] directly at [phone].  Normal or non-critical lab and imaging results will be communicated to you by Stand Offerhart, letter or phone within 4 business days after the clinic has received the results. If you do not hear from us within 7 days, please contact the clinic through Pix4Dt or phone. If you have a critical or abnormal lab result, we will notify you by phone as soon as possible.  Submit refill requests through iBuildApp or call your pharmacy and they will forward the refill request to us. Please allow 3 business days for your refill to be completed.          Additional Information About Your Visit        iBuildApp Information     i[employer]App gives you secure access to your electronic health record. If you see a primary care provider, you can also send messages to your care team and make appointments. If you have questions, please call your primary care clinic.  If you do not have a primary care provider, please call [phone] and they will assist you.        Care EveryWhere ID     This is  "your Care EveryWhere ID. This could be used by other organizations to access your [facility] medical records  [id number]        Your Vitals Were     Pulse Temperature Respirations Height Pulse Oximetry BMI (Body Mass Index)    96 98.8  F (37.1  C) (Oral) 16 1.778 m (5' 10\") 100% 22.1 kg/m2       Blood Pressure from Last 3 Encounters:   04/21/17 108/67   04/19/17 124/81   04/18/17 126/77    Weight from Last 3 Encounters:   04/21/17 71.4 kg (157 lb 6.4 oz)   04/19/17 68.9 kg (151 lb 14.4 oz)   04/17/17 69.9 kg (154 lb)                 Today's Medication Changes          These changes are accurate as of: 4/17/17 11:59 PM.  If you have any questions, ask your nurse or doctor.               These medicines have changed or have updated prescriptions.        Dose/Directions    polyethylene glycol powder   Commonly known as:  MIRALAX/GLYCOLAX   This may have changed:    - when to take this  - reasons to take this  - Another medication with the same name was removed. Continue taking this medication, and follow the directions you see here.   Used for:  Malignant neoplasm of head of pancreas (H)   Changed by:  Demond Gonsales MD        Dose:  1 capful   Take 17 g (1 capful) by mouth daily   Quantity:  119 g   Refills:  11            Where to get your medicines      These medications were sent to [facility] [place], [place] - [address] [facility] [address] [address]    Hours:  TRANSPLANT PHONE NUMBER [phone] Phone:  [phone]     ondansetron 8 MG ODT tab    prochlorperazine 10 MG tablet         Some of these will need a paper prescription and others can be bought over the counter.  Ask your nurse if you have questions.     Bring a paper prescription for each of these medications     morphine 15 MG 12 hr tablet                Primary Care Provider    [facility]       No address on file        Thank you!     Thank you for choosing [facility]  for your care. Our goal is always to provide you with excellent care. Hearing back from our patients is one way we can continue to improve our services. Please take a few minutes to complete the written survey that you may receive in the mail after your visit with us. Thank you!             Your Updated Medication List - Protect others around you: Learn how to safely use, store and throw away your medicines at www.disposemymeds.org.          This list is accurate as of: 4/17/17 11:59 PM.  Always use your most recent med list.                   Brand Name Dispense Instructions for use    amylase-lipase-protease 55203 UNITS Cpep    CREON    180 capsule    Take 2 capsules (72,000 Units) by mouth 3 times daily (with meals)       dexamethasone 4 MG tablet    DECADRON    3 tablet    Take 1 tablet (4 mg) by mouth daily (with breakfast) for 3 days       diltiazem 2% in PLO cream (FV COMPOUNDED) 2% Gel     30 g    Apply topically daily and as needed to external hemorrhoids       docusate sodium 100 MG capsule    COLACE    100 capsule    Take 1 capsule (100 mg) by mouth daily       dronabinol 5 MG capsule    MARINOL    60 capsule    Take 1 capsule (5 mg) by mouth 2 times daily (before meals)       ENSURE CLEAR Liqd     90 Bottle    Take 1 Bottle by mouth 3 times daily       lidocaine-prilocaine cream    EMLA    30 g    Apply topically as needed for other (Use 30-60 minutes prior to port access)       loratadine 10 MG tablet    CLARITIN     Take 10 mg by mouth daily       LORazepam 0.5 MG tablet    ATIVAN    30 tablet    Take 1 tablet (0.5 mg) by mouth every 4 hours as needed (Anxiety, Nausea/Vomiting or Sleep)       morphine 15 MG 12 hr tablet    MS CONTIN    60 tablet    Take 1 tablet (15 mg) by mouth every 12 hours       ondansetron 8 MG ODT tab    ZOFRAN-ODT    60 tablet    Take 1 tablet (8 mg) by mouth every 8 hours as needed for nausea       oxyCODONE 5 MG IR tablet    ROXICODONE    100  tablet    Take 1 tablet (5 mg) by mouth every 4 hours as needed for moderate to severe pain       polyethylene glycol powder    MIRALAX/GLYCOLAX    119 g    Take 17 g (1 capful) by mouth daily       potassium chloride SA 20 MEQ CR tablet    potassium chloride    60 tablet    Take 1 tablet (20 mEq) by mouth daily       prochlorperazine 10 MG tablet    COMPAZINE    30 tablet    Take 1 tablet (10 mg) by mouth every 6 hours as needed (Nausea/Vomiting)

## 2017-04-17 NOTE — NURSING NOTE
"Shirin Muller is a 57 year old female who presents for:  Chief Complaint   Patient presents with     Port Draw     Patient is here today for labs to be drawn via her Port by RN. Labs collected,vitals charted, and patient checked into next visit.     Oncology Clinic Visit     Pancreatic Ca F/U        Initial Vitals:  /71  Pulse 96  Temp 98.8  F (37.1  C) (Oral)  Resp 16  Ht 1.778 m (5' 10\")  Wt 69.9 kg (154 lb)  SpO2 100%  BMI 22.1 kg/m2 Estimated body mass index is 22.1 kg/(m^2) as calculated from the following:    Height as of this encounter: 1.778 m (5' 10\").    Weight as of this encounter: 69.9 kg (154 lb).. Body surface area is 1.86 meters squared. BP completed using cuff size: NA (Not Taken)  No Pain (0) No LMP recorded. Patient is postmenopausal. Allergies and medications reviewed.     Medications: MEDICATION REFILLS NEEDED TODAY.  Pharmacy name entered into [facility]: [facility] [place], [place] - [address] [place] [address] [phone]    Comments: Morphine and Compazine. Vitals completed in lab.    7 minutes for nursing intake (face to face time)   Mone Estrada LPN        "

## 2017-04-18 ENCOUNTER — TELEPHONE (OUTPATIENT)
Dept: GASTROENTEROLOGY | Facility: CLINIC | Age: 57
End: 2017-04-18

## 2017-04-18 ENCOUNTER — INFUSION THERAPY VISIT (OUTPATIENT)
Dept: ONCOLOGY | Facility: CLINIC | Age: 57
End: 2017-04-18
Attending: INTERNAL MEDICINE
Payer: COMMERCIAL

## 2017-04-18 VITALS
DIASTOLIC BLOOD PRESSURE: 77 MMHG | OXYGEN SATURATION: 98 % | HEART RATE: 82 BPM | SYSTOLIC BLOOD PRESSURE: 126 MMHG | TEMPERATURE: 100.2 F | RESPIRATION RATE: 16 BRPM

## 2017-04-18 DIAGNOSIS — C25.0 MALIGNANT NEOPLASM OF HEAD OF PANCREAS (H): ICD-10-CM

## 2017-04-18 DIAGNOSIS — Z29.9 NEED FOR PROPHYLACTIC MEASURE: Primary | ICD-10-CM

## 2017-04-18 PROCEDURE — 99213 OFFICE O/P EST LOW 20 MIN: CPT

## 2017-04-18 PROCEDURE — 25000128 H RX IP 250 OP 636: Mod: ZF | Performed by: INTERNAL MEDICINE

## 2017-04-18 RX ORDER — HEPARIN SODIUM (PORCINE) LOCK FLUSH IV SOLN 100 UNIT/ML 100 UNIT/ML
500 SOLUTION INTRAVENOUS ONCE
Status: COMPLETED | OUTPATIENT
Start: 2017-04-18 | End: 2017-04-18

## 2017-04-18 RX ADMIN — SODIUM CHLORIDE, PRESERVATIVE FREE 500 UNITS: 5 INJECTION INTRAVENOUS at 08:22

## 2017-04-18 ASSESSMENT — PAIN SCALES - GENERAL: PAINLEVEL: NO PAIN (0)

## 2017-04-18 NOTE — PATIENT INSTRUCTIONS
Call if your temp reaches 100.4 or above.  Brian will call you with a day/time for your stent revision.  She will also follow up with you on your chemotherapy schedule.        [facility] Main Line: [phone]    Call triage nurse with chills and/or temperature greater than or equal to 100.4, uncontrolled nausea/vomiting, diarrhea, constipation, dizziness, shortness of breath, chest pain, bleeding, unexplained bruising, or any new/concerning symptoms, questions/concerns.   If you are having any concerning symptoms or wish to speak to a provider before your next infusion visit, please call your care coordinator or triage to notify them so we can adequately serve you.   Triage Nurse Line: [phone]    If after hours, weekends, or holidays, call main hospital  and ask for Oncology doctor on call @ [phone]             April 2017 Sunday Monday Tuesday Wednesday Thursday Friday Saturday                                 1       2     3     P MASON[facility] LAB DRAW    8:00 AM   (15 min.)    MASONIC LAB DRAW   [facility] Lab Draw     [facility] ONC INFUSION 360    8:30 AM   (360 min.)    ONCOLOGY INFUSION   [facility] 4     5     6     7     8       9     10  Happy Birthday!     11     [facility] LAB DRAW    7:30 AM   (15 min.)    MASONIC LAB DRAW   [facility] Lab Draw     [facility] RETURN    7:45 AM   (50 min.)   Ester Echavarria, TEE CNP   [facility] ONC INFUSION 360    8:30 AM   (360 min.)    ONCOLOGY INFUSION   [facility] 12     13     14     [facility] LAB DRAW    8:45 AM   (15 min.)    MASONIC LAB DRAW   [facility] Masonic Lab Draw     MR ABDOMEN WWO    9:00 AM   (45 min.)   UCMR1   [facility] MRI     CT CHEST WO   10:05 AM   (20 min.)   UCCT2   [facility] CT     [facility] LAB DRAW   10:45 AM   (15 min.)    MASONIC LAB DRAW   [facility] Lab Draw 15       16     17     [facility]  LAB DRAW    4:15 PM   (15 min.)   [facility] LAB DRAW   [facility] Masonic Lab Draw     [facility] RETURN    4:45 PM   (30 min.)   Demond Gonsales MD   [facility] 18     [facility] ONC INFUSION 360    7:00 AM   (360 min.)    ONCOLOGY INFUSION   [facility] 19     20     21     22       23     24     25     26     27     28     29       30                                              May 2017   [place] Monday Tuesday Wednesday Thursday Friday Saturday        1     2     [facility] LAB DRAW    7:30 AM   (15 min.)    [facility]     [facility] ONC INFUSION 360    8:00 AM   (360 min.)    ONCOLOGY INFUSION   [facility] 3     4     5     6       7     8     9     10     11     12     13       14     15     16     [facility] LAB DRAW    7:30 AM   (15 min.)    MASONIC LAB DRAW   [facility] Lab Draw     [facility] RETURN    7:45 AM   (50 min.)   Ester Echavarria APRN CNP   [facility]     [facility] ONC INFUSION 360    9:00 AM   (360 min.)    ONCOLOGY INFUSION   [facility] 17     18     19     20       21     22     23     24     25     26     27       28     29     30     [facility] LAB DRAW    6:30 AM   (15 min.)    MASONIC LAB DRAW   [facility] Masonic Lab Draw     [facility] ONC INFUSION 360    7:00 AM   (360 min.)    ONCOLOGY INFUSION   [facility] 31                                 Lab Results:  No results found for this or any previous visit (from the past 12 hour(s)).

## 2017-04-18 NOTE — MR AVS SNAPSHOT
After Visit Summary   4/18/2017    Shirin Muller    MRN: [id number]           Patient Information     Date Of Birth          1960        Visit Information        Provider Department      4/18/2017 7:00 AM  11 ATC;  ONCOLOGY INFUSION [facility]        Today's Diagnoses     Need for prophylactic measure    -  1    Malignant neoplasm of head of pancreas (H)          Care Instructions    Call if your temp reaches 100.4 or above.  Brian will call you with a day/time for your stent revision.  She will also follow up with you on your chemotherapy schedule.        [facility] Main Line: [phone]    Call triage nurse with chills and/or temperature greater than or equal to 100.4, uncontrolled nausea/vomiting, diarrhea, constipation, dizziness, shortness of breath, chest pain, bleeding, unexplained bruising, or any new/concerning symptoms, questions/concerns.   If you are having any concerning symptoms or wish to speak to a provider before your next infusion visit, please call your care coordinator or triage to notify them so we can adequately serve you.   Triage Nurse Line: [phone]    If after hours, weekends, or holidays, call main hospital  and ask for Oncology doctor on call @ [phone]             April 2017 Sunday Monday Tuesday Wednesday Thursday Friday Saturday                                 1       2     3     [facility] LAB DRAW    8:00 AM   (15 min.)    M[facility]ONIC LAB DRAW   [facility] Lab Draw     UMP ONC INFUSION 360    8:30 AM   (360 min.)    ONCOLOGY INFUSION   [facility] 4     5     6     7     8       9     10  Happy Birthday!     11     P [facility] LAB DRAW    7:30 AM   (15 min.)    [facility] DRAW   [facility]     [facility] RETURN    7:45 AM   (50 min.)   Ester Echavarria, TEE CNP   [facility]     [facility] ONC INFUSION 360    8:30 AM   (360 min.)    ONCOLOGY  INFUSION   [facility] 12     13     14     [facility] LAB DRAW    8:45 AM   (15 min.)    MASONIC LAB DRAW   [facility] Masonic Lab Draw     MR ABDOMEN WWO    9:00 AM   (45 min.)   [id number]   [facility] MRI     CT CHEST WO   10:05 AM   (20 min.)   UCCT2   [facility] CT     UMP [facility] LAB DRAW   10:45 AM   (15 min.)    MASONIC LAB DRAW   [facility] Masonic Lab Draw 15       16     17     [facility] LAB DRAW    4:15 PM   (15 min.)   UC [facility] LAB DRAW   [facility] Masonic Lab Draw     UMP RETURN    4:45 PM   (30 min.)   Demond Gonsales MD   [facility] 18     UMP ONC INFUSION 360    7:00 AM   (360 min.)    ONCOLOGY INFUSION   [facility] 19     20     21     22       23     24     25     26     27     28     29       30                                              May 2017   Kristofer Monday Tuesday Wednesday Thursday Friday Saturday        1     2     [facility] DRAW    7:30 AM   (15 min.)    MASONIC LAB DRAW   [facility] Masonic Lab Draw     UMP ONC INFUSION 360    8:00 AM   (360 min.)    ONCOLOGY INFUSION   [facility] 3     4     5     6       7     8     9     10     11     12     13       14     15     16     [facility] LAB DRAW    7:30 AM   (15 min.)    MASONIC LAB DRAW   [facility] Masonic Lab Draw     UMP RETURN    7:45 AM   (50 min.)   Ester Echavarria APRN CNP   [facility]     UMP ONC INFUSION 360    9:00 AM   (360 min.)    ONCOLOGY INFUSION   [facility] 17     18     19     20       21     22     23     24     25     26     27       28     29     30     [facility] LAB DRAW    6:30 AM   (15 min.)    MASONIC LAB DRAW   [facility] Masonic Lab Draw     UMP ONC INFUSION 360    7:00 AM   (360 min.)    ONCOLOGY INFUSION   [facility] 31                                 Lab Results:  No results found for this or any previous visit (from  the past 12 hour(s)).        Follow-ups after your visit        Your next 10 appointments already scheduled     May 02, 2017  7:30 AM CDT   [facility] Draw with [facility] DRAW   [facility] [facility]    [address] [place] [address]   [phone]            May 02, 2017  8:00 AM CDT   Infusion 360 with UC ONCOLOGY INFUSION, UC 14 ATC   [facility] ([place] [facility])    [address] [place] [address]   [phone]            May 16, 2017  7:30 AM CDT   [facility] Lab Draw with UC [facility]ON[facility] LAB DRAW   [facility] Lab Draw ([place] [facility])    [address] [place] [address]   [phone]            May 16, 2017  8:00 AM CDT   (Arrive by 7:45 AM)   Return Visit with TEE Olivas CNP   [facility] ([place] [facility])    [address] [place] [address]   [phone]            May 16, 2017  9:00 AM CDT   Infusion 360 with UC ONCOLOGY INFUSION, UC 23 ATC   [facility] ([place] [facility])    [address] [place] [address]   [phone]            May 30, 2017  6:30 AM CDT   M[facility] Lab Draw with UC [facility]ON[facility] LAB DRAW   [facility] Lab Draw ([place] [facility])    [address] [place] [address]   [phone]            May 30, 2017  7:00 AM CDT   Infusion 360 with UC ONCOLOGY INFUSION, UC 10 ATC   [facility] ([place] [facility])    [address] [place] [address]   [phone]              Future tests that were ordered for you today     Open Future Orders        Priority Expected Expires Ordered    MR Abdomen w/o & w Contrast Routine 6/26/2017 7/17/2017 4/17/2017    CT Chest w/o  Contrast Routine 6/26/2017 7/17/2017 4/17/2017    Comprehensive metabolic panel Routine 6/26/2017 7/17/2017 4/17/2017    CBC with platelets differential Routine 6/26/2017 7/17/2017 4/17/2017    Cancer antigen 19-9 Routine 6/26/2017 7/17/2017 4/17/2017            Who to contact     If you have questions or need follow up information about today's clinic visit or your schedule please contact [facility] directly at [phone].  Normal or non-critical lab and imaging results will be communicated to you by [employer]art, letter or phone within 4 business days after the clinic has received the results. If you do not hear from us within 7 days, please contact the clinic through [employer] or phone. If you have a critical or abnormal lab result, we will notify you by phone as soon as possible.  Submit refill requests through [employer] or call your pharmacy and they will forward the refill request to us. Please allow 3 business days for your refill to be completed.          Additional Information About Your Visit        [employer] Information     [employer] gives you secure access to your electronic health record. If you see a primary care provider, you can also send messages to your care team and make appointments. If you have questions, please call your primary care clinic.  If you do not have a primary care provider, please call [phone] and they will assist you.        Care EveryWhere ID     This is your Care EveryWhere ID. This could be used by other organizations to access your [facility] medical records  [id number]        Your Vitals Were     Pulse Temperature Respirations Pulse Oximetry          82 100.2  F (37.9  C) (Tympanic) 16 98%         Blood Pressure from Last 3 Encounters:   04/18/17 126/77   04/17/17 107/71   04/17/17 107/71    Weight from Last 3 Encounters:   04/17/17 69.9 kg (154 lb)   04/17/17 69.9 kg (154 lb)   04/11/17 69.5 kg (153 lb 3.2 oz)              We Performed the Following     Cancer  antigen 19-9     CBC with platelets differential     Comprehensive metabolic panel          Today's Medication Changes          These changes are accurate as of: 4/18/17  8:28 AM.  If you have any questions, ask your nurse or doctor.               These medicines have changed or have updated prescriptions.        Dose/Directions    polyethylene glycol powder   Commonly known as:  MIRALAX/GLYCOLAX   This may have changed:    - when to take this  - reasons to take this   Used for:  Malignant neoplasm of head of pancreas (H)        Dose:  1 capful   Take 17 g (1 capful) by mouth daily   Quantity:  119 g   Refills:  11                Primary Care Provider    [facility]       No address on file        Thank you!     Thank you for choosing [facility]  for your care. Our goal is always to provide you with excellent care. Hearing back from our patients is one way we can continue to improve our services. Please take a few minutes to complete the written survey that you may receive in the mail after your visit with us. Thank you!             Your Updated Medication List - Protect others around you: Learn how to safely use, store and throw away your medicines at www.disposemymeds.org.          This list is accurate as of: 4/18/17  8:28 AM.  Always use your most recent med list.                   Brand Name Dispense Instructions for use    amylase-lipase-protease 48277 UNITS Cpep    CREON    180 capsule    Take 2 capsules (72,000 Units) by mouth 3 times daily (with meals)       dexamethasone 4 MG tablet    DECADRON    3 tablet    Take 1 tablet (4 mg) by mouth daily (with breakfast) for 3 days       diltiazem 2% in PLO cream (FV COMPOUNDED) 2% Gel     30 g    Apply topically daily and as needed to external hemorrhoids       docusate sodium 100 MG capsule    COLACE    100 capsule    Take 1 capsule (100 mg) by mouth daily       dronabinol 5 MG capsule    MARINOL    60 capsule    Take 1 capsule  (5 mg) by mouth 2 times daily (before meals)       ENSURE CLEAR Liqd     90 Bottle    Take 1 Bottle by mouth 3 times daily       lidocaine-prilocaine cream    EMLA    30 g    Apply topically as needed for other (Use 30-60 minutes prior to port access)       loratadine 10 MG tablet    CLARITIN     Take 10 mg by mouth daily       LORazepam 0.5 MG tablet    ATIVAN    30 tablet    Take 1 tablet (0.5 mg) by mouth every 4 hours as needed (Anxiety, Nausea/Vomiting or Sleep)       morphine 15 MG 12 hr tablet    MS CONTIN    60 tablet    Take 1 tablet (15 mg) by mouth every 12 hours       ondansetron 8 MG ODT tab    ZOFRAN-ODT    60 tablet    Take 1 tablet (8 mg) by mouth every 8 hours as needed for nausea       oxyCODONE 5 MG IR tablet    ROXICODONE    100 tablet    Take 1 tablet (5 mg) by mouth every 4 hours as needed for moderate to severe pain       polyethylene glycol powder    MIRALAX/GLYCOLAX    119 g    Take 17 g (1 capful) by mouth daily       potassium chloride SA 20 MEQ CR tablet    potassium chloride    60 tablet    Take 1 tablet (20 mEq) by mouth daily       prochlorperazine 10 MG tablet    COMPAZINE    30 tablet    Take 1 tablet (10 mg) by mouth every 6 hours as needed (Nausea/Vomiting)       sucralfate 1 GM tablet    CARAFATE    90 tablet    Take 1 tablet (1 g) by mouth 3 times daily

## 2017-04-18 NOTE — TELEPHONE ENCOUNTER
Shirin is informed that she is scheduled tomorrow (4/19) at 10:55 AM with an arrival time of 8:55 AM.  I sent her an E-mail with confirmation of the appointment.  E-mail was verified, patient consent given.     SR 04/18/2017  [id number]

## 2017-04-18 NOTE — PROGRESS NOTES
Infusion Nursing Note:  Shirin Muller presents today for Cycle 7 Day 1 Oxaliplatin, Irinotecan, Leucovorin, Fluorouracil bolus/pump  HELD.    Patient seen by provider today: No   present during visit today: Not Applicable.    Note: Temp 100.2 this AM.             Yesterday's bili 3.1             Has no complaints other than having dark urine.    TORB Alyce Tyler RN / Dr. Gonsales  No chemo today  To see GI for stent revision  (Dr. Gonsales has sent [employer] a message regarding this).  To call with increased temp as may need to be admitted (discussed this with patient)    Intravenous Access:  Implanted Port.    Treatment Conditions:  Lab Results   Component Value Date    HGB 8.4 04/17/2017     Lab Results   Component Value Date    WBC 8.5 04/17/2017      Lab Results   Component Value Date    ANEU 6.9 04/17/2017     Lab Results   Component Value Date     04/17/2017      Lab Results   Component Value Date     04/17/2017                   Lab Results   Component Value Date    POTASSIUM 3.5 04/17/2017           Lab Results   Component Value Date    MAG 2.0 04/03/2017            Lab Results   Component Value Date    CR 0.52 04/17/2017                   Lab Results   Component Value Date    FANNY 8.4 04/17/2017                Lab Results   Component Value Date    BILITOTAL 3.1 04/17/2017           Lab Results   Component Value Date    ALBUMIN 2.3 04/17/2017                    Lab Results   Component Value Date    ALT 87 04/17/2017           Lab Results   Component Value Date     04/17/2017     Results reviewed, labs did NOT meet treatment parameters: See TORB regarding bili.      Post Infusion Assessment:  Access discontinued per protocol.    Discharge Plan:   Copy of AVS reviewed with patient and/or family.  Patient will return (to be determined) for next appointment.  Patient discharged in stable condition accompanied by: friend.  Departure Mode: Ambulatory.  Face to  Face time: 10 minutes.    Alyce Tyler RN

## 2017-04-19 ENCOUNTER — ANESTHESIA (OUTPATIENT)
Dept: SURGERY | Facility: CLINIC | Age: 57
End: 2017-04-19
Payer: COMMERCIAL

## 2017-04-19 ENCOUNTER — SURGERY (OUTPATIENT)
Age: 57
End: 2017-04-19

## 2017-04-19 ENCOUNTER — APPOINTMENT (OUTPATIENT)
Dept: GENERAL RADIOLOGY | Facility: CLINIC | Age: 57
End: 2017-04-19
Attending: INTERNAL MEDICINE
Payer: COMMERCIAL

## 2017-04-19 ENCOUNTER — HOSPITAL ENCOUNTER (OUTPATIENT)
Facility: CLINIC | Age: 57
Discharge: HOME | End: 2017-04-19
Attending: INTERNAL MEDICINE | Admitting: INTERNAL MEDICINE
Payer: COMMERCIAL

## 2017-04-19 ENCOUNTER — ANESTHESIA EVENT (OUTPATIENT)
Dept: SURGERY | Facility: CLINIC | Age: 57
End: 2017-04-19
Payer: COMMERCIAL

## 2017-04-19 VITALS
RESPIRATION RATE: 14 BRPM | HEIGHT: 71 IN | TEMPERATURE: 97.6 F | DIASTOLIC BLOOD PRESSURE: 81 MMHG | SYSTOLIC BLOOD PRESSURE: 124 MMHG | OXYGEN SATURATION: 100 % | BODY MASS INDEX: 21.27 KG/M2 | WEIGHT: 151.9 LBS

## 2017-04-19 DIAGNOSIS — K83.1 BILE DUCT OBSTRUCTION (H): Primary | ICD-10-CM

## 2017-04-19 LAB
ALBUMIN SERPL-MCNC: 2.1 G/DL (ref 3.4–5)
ALP SERPL-CCNC: 675 U/L (ref 40–150)
ALT SERPL W P-5'-P-CCNC: 77 U/L (ref 0–50)
AMYLASE SERPL-CCNC: 21 U/L (ref 30–110)
ANION GAP SERPL CALCULATED.3IONS-SCNC: 6 MMOL/L (ref 3–14)
AST SERPL W P-5'-P-CCNC: 127 U/L (ref 0–45)
BILIRUB SERPL-MCNC: 3.6 MG/DL (ref 0.2–1.3)
BUN SERPL-MCNC: 7 MG/DL (ref 7–30)
CALCIUM SERPL-MCNC: 8.6 MG/DL (ref 8.5–10.1)
CHLORIDE SERPL-SCNC: 104 MMOL/L (ref 94–109)
CO2 SERPL-SCNC: 26 MMOL/L (ref 20–32)
CREAT SERPL-MCNC: 0.51 MG/DL (ref 0.52–1.04)
ERCP: NORMAL
ERYTHROCYTE [DISTWIDTH] IN BLOOD BY AUTOMATED COUNT: 20 % (ref 10–15)
GFR SERPL CREATININE-BSD FRML MDRD: ABNORMAL ML/MIN/1.7M2
GLUCOSE SERPL-MCNC: 78 MG/DL (ref 70–99)
HCT VFR BLD AUTO: 26.2 % (ref 35–47)
HGB BLD-MCNC: 8 G/DL (ref 11.7–15.7)
INR PPP: 1.05 (ref 0.86–1.14)
LIPASE SERPL-CCNC: 33 U/L (ref 73–393)
MCH RBC QN AUTO: 25.9 PG (ref 26.5–33)
MCHC RBC AUTO-ENTMCNC: 30.5 G/DL (ref 31.5–36.5)
MCV RBC AUTO: 85 FL (ref 78–100)
PLATELET # BLD AUTO: 190 10E9/L (ref 150–450)
POTASSIUM SERPL-SCNC: 3.6 MMOL/L (ref 3.4–5.3)
PROT SERPL-MCNC: 7.2 G/DL (ref 6.8–8.8)
RBC # BLD AUTO: 3.09 10E12/L (ref 3.8–5.2)
SODIUM SERPL-SCNC: 136 MMOL/L (ref 133–144)
WBC # BLD AUTO: 8.6 10E9/L (ref 4–11)

## 2017-04-19 PROCEDURE — 25000128 H RX IP 250 OP 636: Performed by: ANESTHESIOLOGY

## 2017-04-19 PROCEDURE — C1769 GUIDE WIRE: HCPCS | Performed by: INTERNAL MEDICINE

## 2017-04-19 PROCEDURE — 25000125 ZZHC RX 250: Performed by: INTERNAL MEDICINE

## 2017-04-19 PROCEDURE — 40000279 XR SURGERY CARM FLUORO GREATER THAN 5 MIN W STILLS: Mod: TC

## 2017-04-19 PROCEDURE — 40000170 ZZH STATISTIC PRE-PROCEDURE ASSESSMENT II: Performed by: INTERNAL MEDICINE

## 2017-04-19 PROCEDURE — 25000132 ZZH RX MED GY IP 250 OP 250 PS 637: Performed by: INTERNAL MEDICINE

## 2017-04-19 PROCEDURE — 85610 PROTHROMBIN TIME: CPT | Performed by: INTERNAL MEDICINE

## 2017-04-19 PROCEDURE — 37000009 ZZH ANESTHESIA TECHNICAL FEE, EACH ADDTL 15 MIN: Performed by: INTERNAL MEDICINE

## 2017-04-19 PROCEDURE — C1876 STENT, NON-COA/NON-COV W/DEL: HCPCS | Performed by: INTERNAL MEDICINE

## 2017-04-19 PROCEDURE — 36000061 ZZH SURGERY LEVEL 3 W FLUORO 1ST 30 MIN - UMMC: Performed by: INTERNAL MEDICINE

## 2017-04-19 PROCEDURE — 25500064 ZZH RX 255 OP 636: Performed by: INTERNAL MEDICINE

## 2017-04-19 PROCEDURE — 71000027 ZZH RECOVERY PHASE 2 EACH 15 MINS: Performed by: INTERNAL MEDICINE

## 2017-04-19 PROCEDURE — 80053 COMPREHEN METABOLIC PANEL: CPT | Performed by: INTERNAL MEDICINE

## 2017-04-19 PROCEDURE — 71000014 ZZH RECOVERY PHASE 1 LEVEL 2 FIRST HR: Performed by: INTERNAL MEDICINE

## 2017-04-19 PROCEDURE — P9041 ALBUMIN (HUMAN),5%, 50ML: HCPCS | Performed by: NURSE ANESTHETIST, CERTIFIED REGISTERED

## 2017-04-19 PROCEDURE — 37000008 ZZH ANESTHESIA TECHNICAL FEE, 1ST 30 MIN: Performed by: INTERNAL MEDICINE

## 2017-04-19 PROCEDURE — 25000125 ZZHC RX 250: Performed by: NURSE ANESTHETIST, CERTIFIED REGISTERED

## 2017-04-19 PROCEDURE — 25000565 ZZH ISOFLURANE, EA 15 MIN: Performed by: INTERNAL MEDICINE

## 2017-04-19 PROCEDURE — 36000059 ZZH SURGERY LEVEL 3 EA 15 ADDTL MIN UMMC: Performed by: INTERNAL MEDICINE

## 2017-04-19 PROCEDURE — 25000128 H RX IP 250 OP 636: Performed by: NURSE ANESTHETIST, CERTIFIED REGISTERED

## 2017-04-19 PROCEDURE — 82150 ASSAY OF AMYLASE: CPT | Performed by: INTERNAL MEDICINE

## 2017-04-19 PROCEDURE — 85027 COMPLETE CBC AUTOMATED: CPT | Performed by: INTERNAL MEDICINE

## 2017-04-19 PROCEDURE — 27210794 ZZH OR GENERAL SUPPLY STERILE: Performed by: INTERNAL MEDICINE

## 2017-04-19 PROCEDURE — 27210995 ZZH RX 272: Performed by: INTERNAL MEDICINE

## 2017-04-19 PROCEDURE — 83690 ASSAY OF LIPASE: CPT | Performed by: INTERNAL MEDICINE

## 2017-04-19 PROCEDURE — 25800025 ZZH RX 258: Performed by: NURSE ANESTHETIST, CERTIFIED REGISTERED

## 2017-04-19 DEVICE — STENT BILIARY EVOLUTION 10MMX6CM G23128: Type: IMPLANTABLE DEVICE | Site: BILE DUCT | Status: FUNCTIONAL

## 2017-04-19 RX ORDER — SODIUM CHLORIDE, SODIUM LACTATE, POTASSIUM CHLORIDE, CALCIUM CHLORIDE 600; 310; 30; 20 MG/100ML; MG/100ML; MG/100ML; MG/100ML
INJECTION, SOLUTION INTRAVENOUS CONTINUOUS
Status: DISCONTINUED | OUTPATIENT
Start: 2017-04-19 | End: 2017-04-19 | Stop reason: HOSPADM

## 2017-04-19 RX ORDER — CIPROFLOXACIN 500 MG/1
500 TABLET, FILM COATED ORAL 2 TIMES DAILY
Qty: 10 TABLET | Refills: 0 | Status: SHIPPED | OUTPATIENT
Start: 2017-04-19 | End: 2017-04-24

## 2017-04-19 RX ORDER — FENTANYL CITRATE 50 UG/ML
25-50 INJECTION, SOLUTION INTRAMUSCULAR; INTRAVENOUS
Status: DISCONTINUED | OUTPATIENT
Start: 2017-04-19 | End: 2017-04-19 | Stop reason: HOSPADM

## 2017-04-19 RX ORDER — HEPARIN SODIUM (PORCINE) LOCK FLUSH IV SOLN 100 UNIT/ML 100 UNIT/ML
5 SOLUTION INTRAVENOUS
Status: DISCONTINUED | OUTPATIENT
Start: 2017-04-19 | End: 2017-04-19 | Stop reason: HOSPADM

## 2017-04-19 RX ORDER — SODIUM CHLORIDE, SODIUM LACTATE, POTASSIUM CHLORIDE, CALCIUM CHLORIDE 600; 310; 30; 20 MG/100ML; MG/100ML; MG/100ML; MG/100ML
INJECTION, SOLUTION INTRAVENOUS CONTINUOUS PRN
Status: DISCONTINUED | OUTPATIENT
Start: 2017-04-19 | End: 2017-04-19

## 2017-04-19 RX ORDER — GLYCOPYRROLATE 0.2 MG/ML
INJECTION, SOLUTION INTRAMUSCULAR; INTRAVENOUS PRN
Status: DISCONTINUED | OUTPATIENT
Start: 2017-04-19 | End: 2017-04-19

## 2017-04-19 RX ORDER — NALOXONE HYDROCHLORIDE 0.4 MG/ML
.1-.4 INJECTION, SOLUTION INTRAMUSCULAR; INTRAVENOUS; SUBCUTANEOUS
Status: DISCONTINUED | OUTPATIENT
Start: 2017-04-19 | End: 2017-04-19 | Stop reason: HOSPADM

## 2017-04-19 RX ORDER — INDOMETHACIN 50 MG/1
SUPPOSITORY RECTAL PRN
Status: DISCONTINUED | OUTPATIENT
Start: 2017-04-19 | End: 2017-04-19 | Stop reason: HOSPADM

## 2017-04-19 RX ORDER — MEPERIDINE HYDROCHLORIDE 25 MG/ML
12.5 INJECTION INTRAMUSCULAR; INTRAVENOUS; SUBCUTANEOUS
Status: DISCONTINUED | OUTPATIENT
Start: 2017-04-19 | End: 2017-04-19 | Stop reason: HOSPADM

## 2017-04-19 RX ORDER — FENTANYL CITRATE 50 UG/ML
25-50 INJECTION, SOLUTION INTRAMUSCULAR; INTRAVENOUS EVERY 5 MIN PRN
Status: DISCONTINUED | OUTPATIENT
Start: 2017-04-19 | End: 2017-04-19 | Stop reason: HOSPADM

## 2017-04-19 RX ORDER — LIDOCAINE HYDROCHLORIDE 20 MG/ML
INJECTION, SOLUTION INFILTRATION; PERINEURAL PRN
Status: DISCONTINUED | OUTPATIENT
Start: 2017-04-19 | End: 2017-04-19

## 2017-04-19 RX ORDER — ALBUMIN, HUMAN INJ 5% 5 %
SOLUTION INTRAVENOUS CONTINUOUS PRN
Status: DISCONTINUED | OUTPATIENT
Start: 2017-04-19 | End: 2017-04-19

## 2017-04-19 RX ORDER — NEOSTIGMINE METHYLSULFATE 1 MG/ML
VIAL (ML) INJECTION PRN
Status: DISCONTINUED | OUTPATIENT
Start: 2017-04-19 | End: 2017-04-19

## 2017-04-19 RX ORDER — FLUMAZENIL 0.1 MG/ML
0.2 INJECTION, SOLUTION INTRAVENOUS
Status: DISCONTINUED | OUTPATIENT
Start: 2017-04-19 | End: 2017-04-19 | Stop reason: HOSPADM

## 2017-04-19 RX ORDER — LIDOCAINE 40 MG/G
CREAM TOPICAL
Status: DISCONTINUED | OUTPATIENT
Start: 2017-04-19 | End: 2017-04-19 | Stop reason: HOSPADM

## 2017-04-19 RX ORDER — ONDANSETRON 4 MG/1
4 TABLET, ORALLY DISINTEGRATING ORAL EVERY 30 MIN PRN
Status: DISCONTINUED | OUTPATIENT
Start: 2017-04-19 | End: 2017-04-19 | Stop reason: HOSPADM

## 2017-04-19 RX ORDER — FENTANYL CITRATE 50 UG/ML
INJECTION, SOLUTION INTRAMUSCULAR; INTRAVENOUS PRN
Status: DISCONTINUED | OUTPATIENT
Start: 2017-04-19 | End: 2017-04-19

## 2017-04-19 RX ORDER — ONDANSETRON 2 MG/ML
4 INJECTION INTRAMUSCULAR; INTRAVENOUS EVERY 30 MIN PRN
Status: DISCONTINUED | OUTPATIENT
Start: 2017-04-19 | End: 2017-04-19 | Stop reason: HOSPADM

## 2017-04-19 RX ORDER — DEXAMETHASONE SODIUM PHOSPHATE 4 MG/ML
INJECTION, SOLUTION INTRA-ARTICULAR; INTRALESIONAL; INTRAMUSCULAR; INTRAVENOUS; SOFT TISSUE PRN
Status: DISCONTINUED | OUTPATIENT
Start: 2017-04-19 | End: 2017-04-19

## 2017-04-19 RX ORDER — INDOMETHACIN 50 MG/1
100 SUPPOSITORY RECTAL
Status: DISCONTINUED | OUTPATIENT
Start: 2017-04-19 | End: 2017-04-19 | Stop reason: HOSPADM

## 2017-04-19 RX ORDER — ONDANSETRON 2 MG/ML
INJECTION INTRAMUSCULAR; INTRAVENOUS PRN
Status: DISCONTINUED | OUTPATIENT
Start: 2017-04-19 | End: 2017-04-19

## 2017-04-19 RX ORDER — HYDROMORPHONE HYDROCHLORIDE 1 MG/ML
.3-.5 INJECTION, SOLUTION INTRAMUSCULAR; INTRAVENOUS; SUBCUTANEOUS EVERY 10 MIN PRN
Status: DISCONTINUED | OUTPATIENT
Start: 2017-04-19 | End: 2017-04-19 | Stop reason: HOSPADM

## 2017-04-19 RX ORDER — CIPROFLOXACIN 2 MG/ML
INJECTION, SOLUTION INTRAVENOUS PRN
Status: DISCONTINUED | OUTPATIENT
Start: 2017-04-19 | End: 2017-04-19

## 2017-04-19 RX ORDER — IOPAMIDOL 510 MG/ML
INJECTION, SOLUTION INTRAVASCULAR PRN
Status: DISCONTINUED | OUTPATIENT
Start: 2017-04-19 | End: 2017-04-19 | Stop reason: HOSPADM

## 2017-04-19 RX ORDER — PROPOFOL 10 MG/ML
INJECTION, EMULSION INTRAVENOUS PRN
Status: DISCONTINUED | OUTPATIENT
Start: 2017-04-19 | End: 2017-04-19

## 2017-04-19 RX ADMIN — Medication 5 MG: at 12:15

## 2017-04-19 RX ADMIN — LIDOCAINE HYDROCHLORIDE 100 MG: 20 INJECTION, SOLUTION INFILTRATION; PERINEURAL at 11:07

## 2017-04-19 RX ADMIN — HEPARIN SODIUM (PORCINE) LOCK FLUSH IV SOLN 100 UNIT/ML 5 ML: 100 SOLUTION at 14:56

## 2017-04-19 RX ADMIN — ALBUMIN (HUMAN): 12.5 SOLUTION INTRAVENOUS at 11:56

## 2017-04-19 RX ADMIN — SODIUM CHLORIDE, POTASSIUM CHLORIDE, SODIUM LACTATE AND CALCIUM CHLORIDE: 600; 310; 30; 20 INJECTION, SOLUTION INTRAVENOUS at 10:53

## 2017-04-19 RX ADMIN — PROPOFOL 70 MG: 10 INJECTION, EMULSION INTRAVENOUS at 11:07

## 2017-04-19 RX ADMIN — ONDANSETRON 4 MG: 2 INJECTION INTRAMUSCULAR; INTRAVENOUS at 11:02

## 2017-04-19 RX ADMIN — GLYCOPYRROLATE 1 MG: 0.2 INJECTION, SOLUTION INTRAMUSCULAR; INTRAVENOUS at 12:15

## 2017-04-19 RX ADMIN — IOPAMIDOL 20 ML: 510 INJECTION, SOLUTION INTRAVASCULAR at 11:40

## 2017-04-19 RX ADMIN — PHENYLEPHRINE HYDROCHLORIDE 100 MCG: 10 INJECTION, SOLUTION INTRAMUSCULAR; INTRAVENOUS; SUBCUTANEOUS at 11:52

## 2017-04-19 RX ADMIN — PROPOFOL 40 MG: 10 INJECTION, EMULSION INTRAVENOUS at 11:08

## 2017-04-19 RX ADMIN — MIDAZOLAM HYDROCHLORIDE 2 MG: 1 INJECTION, SOLUTION INTRAMUSCULAR; INTRAVENOUS at 10:53

## 2017-04-19 RX ADMIN — WATER 100 ML: 100 IRRIGANT IRRIGATION at 11:41

## 2017-04-19 RX ADMIN — FENTANYL CITRATE 50 MCG: 50 INJECTION, SOLUTION INTRAMUSCULAR; INTRAVENOUS at 12:18

## 2017-04-19 RX ADMIN — PHENYLEPHRINE HYDROCHLORIDE 100 MCG: 10 INJECTION, SOLUTION INTRAMUSCULAR; INTRAVENOUS; SUBCUTANEOUS at 11:28

## 2017-04-19 RX ADMIN — FENTANYL CITRATE 100 MCG: 50 INJECTION, SOLUTION INTRAMUSCULAR; INTRAVENOUS at 11:02

## 2017-04-19 RX ADMIN — IOPAMIDOL 30 ML: 510 INJECTION, SOLUTION INTRAVASCULAR at 12:00

## 2017-04-19 RX ADMIN — CIPROFLOXACIN 400 MG: 2 INJECTION INTRAVENOUS at 11:00

## 2017-04-19 RX ADMIN — ROCURONIUM BROMIDE 30 MG: 10 INJECTION INTRAVENOUS at 11:08

## 2017-04-19 RX ADMIN — SIMETHICONE 2 ML: 63.3; 3.7 SOLUTION/ DROPS ORAL at 11:40

## 2017-04-19 RX ADMIN — PHENYLEPHRINE HYDROCHLORIDE 100 MCG: 10 INJECTION, SOLUTION INTRAMUSCULAR; INTRAVENOUS; SUBCUTANEOUS at 11:40

## 2017-04-19 RX ADMIN — DEXAMETHASONE SODIUM PHOSPHATE 4 MG: 4 INJECTION, SOLUTION INTRA-ARTICULAR; INTRALESIONAL; INTRAMUSCULAR; INTRAVENOUS; SOFT TISSUE at 11:02

## 2017-04-19 RX ADMIN — ROCURONIUM BROMIDE 20 MG: 10 INJECTION INTRAVENOUS at 11:53

## 2017-04-19 RX ADMIN — INDOMETHACIN 100 MG: 50 SUPPOSITORY RECTAL at 12:15

## 2017-04-19 NOTE — ANESTHESIA CARE TRANSFER NOTE
Patient: Shirin Berg OdunbartMapatricia    Procedure(s):  Endoscopic Retrograde Cholangiopancreatogram With biliary Stent placement, ballon sweep of bile duct for stone removal - Wound Class: II-Clean Contaminated    Diagnosis: Elevated Bilirubin   Diagnosis Additional Information: No value filed.    Anesthesia Type:   General, ETT     Note:  Airway :Face Mask  Patient transferred to:PACU  Comments: Awake with good patent airway.  VSS      Vitals: (Last set prior to Anesthesia Care Transfer)    CRNA VITALS  4/19/2017 1153 - 4/19/2017 1232      4/19/2017             NIBP: 126/80    Pulse: 113    NIBP Mean: 95    Ht Rate: 104    SpO2: 100 %    Resp Rate (observed): 20                Electronically Signed By: TEE Dsouza CRNA  April 19, 2017  12:32 PM

## 2017-04-19 NOTE — IP AVS SNAPSHOT
Same Day Surgery [address] [address]    Phone:  [phone]                                       After Visit Summary   4/19/2017    Shirin Muller    MRN: [id number]           After Visit Summary Signature Page     I have received my discharge instructions, and my questions have been answered. I have discussed any challenges I see with this plan with the nurse or doctor.    ..........................................................................................................................................  Patient/Patient Representative Signature      ..........................................................................................................................................  Patient Representative Print Name and Relationship to Patient    ..................................................               ................................................  Date                                            Time    ..........................................................................................................................................  Reviewed by Signature/Title    ...................................................              ..............................................  Date                                                            Time

## 2017-04-19 NOTE — DISCHARGE INSTRUCTIONS
[facility]  Same-Day Surgery   Adult Discharge Orders & Instructions     For 24 hours after surgery    1. Get plenty of rest.  A responsible adult must stay with you for at least 24 hours after you leave the hospital.   2. Do not drive or use heavy equipment.  If you have weakness or tingling, don't drive or use heavy equipment until this feeling goes away.  3. Do not drink alcohol.  4. Avoid strenuous or risky activities.  Ask for help when climbing stairs.   5. You may feel lightheaded.  IF so, sit for a few minutes before standing.  Have someone help you get up.   6. If you have nausea (feel sick to your stomach): Drink only clear liquids such as apple juice, ginger ale, broth or 7-Up.  Rest may also help.  Be sure to drink enough fluids.  Move to a regular diet as you feel able.  7. You may have a slight fever. Call the doctor if your fever is over 100 F (37.7 C) (taken under the tongue) or lasts longer than 24 hours.  8. You may have a dry mouth, a sore throat, muscle aches or trouble sleeping.  These should go away after 24 hours.  9. Do not make important or legal decisions.   Call your doctor for any of the followin.  Signs of infection (fever, growing tenderness at the surgery site, a large amount of drainage or bleeding, severe pain, foul-smelling drainage, redness, swelling).    2. It has been over 8 to 10 hours since surgery and you are still not able to urinate (pass water).    To contact a doctor, call ______Dr Braden's office at [phone] or [phone] (clinic) or [phone] (office)___________ or:        [phone] and ask for the resident on call for   _______GI_____________ (answered 24 hours a day)      Emergency Department:    [facility]: [phone]       (TTY for hearing impaired: [phone])

## 2017-04-19 NOTE — BRIEF OP NOTE
[facility], [place]    Brief Operative Note    Pre-operative diagnosis: Elevated Bilirubin   Post-operative diagnosis * No post-op diagnosis entered *  Procedure: Procedure(s):  Endoscopic Retrograde Cholangiopancreatogram With biliary Stent placement, ballon sweep of bile duct for stone removal - Wound Class: II-Clean Contaminated  Surgeon: Surgeon(s) and Role:     * Tristin Braden MD - Primary     * Desiree Enriquez MD  Anesthesia: General   Estimated blood loss: Minimal  Drains: None  Specimens: * No specimens in log *  Findings:   Duodenum appeared narrowed and friable however 180 scope could be advanced.  Previously placed metallic stent appeared to be filled with sludge cannulated: duct swept: sludge and debris removed. Narrowing noted with the CBD with dilated intrahepatic ducts, 10 mm x 6 cm uncovered metal stent was placed with the stent transpapillary.   EGD: Duodenal bulb appears friable, currently patient has no obstructive symptoms. Scope could be advanced through it.     Complications: None.  Implants: None.

## 2017-04-19 NOTE — PROGRESS NOTES
Dr Braden at bedside in PHASE II to speak with patient. Per MD Patient is ok to Discharge at this time.

## 2017-04-19 NOTE — PROGRESS NOTES
SPIRITUAL HEALTH SERVICES  [facility] ([place] 3C   PRE-SURGERY VISIT    Had pre-surgery visit with pt.  Provided spiritual support, prayer.   David Clancy   Resident   Pager [phone]

## 2017-04-19 NOTE — ANESTHESIA PREPROCEDURE EVALUATION
Anesthesia Evaluation     . Pt has had prior anesthetic. Type: General           ROS/MED HX    ENT/Pulmonary:  - neg pulmonary ROS     Neurologic:  - neg neurologic ROS     Cardiovascular:     (+) ----. : . . fainting (syncope). :. .       METS/Exercise Tolerance:     Hematologic:         Musculoskeletal:         GI/Hepatic:     (+) liver disease,       Renal/Genitourinary:         Endo:     (+) thyroid problem .      Psychiatric:         Infectious Disease:         Malignancy:         Other:    (+) No chance of pregnancy no H/O Chronic Pain,                   Physical Exam  Normal systems: cardiovascular, pulmonary and dental    Airway   Mallampati: II  TM distance: >3 FB  Neck ROM: full    Dental     Cardiovascular   Rhythm and rate: regular and normal      Pulmonary                     Anesthesia Plan      History & Physical Review  History and physical reviewed and following examination; no interval change.    ASA Status:  2 .    NPO Status:  > 8 hours    Plan for General and ETT with Intravenous induction. Maintenance will be Balanced.    PONV prophylaxis:  Ondansetron (or other 5HT-3) and Dexamethasone or Solumedrol  Additional equipment: 2nd IV and Videolaryngoscope      Postoperative Care  Postoperative pain management:  IV analgesics.      Consents  Anesthetic plan, risks, benefits and alternatives discussed with:  Patient.  Use of blood products discussed: No .   .                          .

## 2017-04-19 NOTE — IP AVS SNAPSHOT
MRN:[id number]                      After Visit Summary   4/19/2017    Shirin Muller    MRN: [id number]           Thank you!     Thank you for choosing [facility] for your care. Our goal is always to provide you with excellent care. Hearing back from our patients is one way we can continue to improve our services. Please take a few minutes to complete the written survey that you may receive in the mail after you visit with us. Thank you!        Patient Information     Date Of Birth          1960        About your hospital stay     You were admitted on:  April 19, 2017 You last received care in the:  Same Day Surgery [facility]    You were discharged on:  April 19, 2017       Who to Call     For medical emergencies, please call 911.  For non-urgent questions about your medical care, please call your primary care provider or clinic, None  For questions related to your surgery, please call your surgery clinic        Attending Provider     Provider Specialty    Tristin Braden MD Gastroenterology       Primary Care Provider    [facility]       No address on file        After Care Instructions     Discharge Instructions       Resume pre procedure diet            Discharge Instructions       Restart home medications.                  Your next 10 appointments already scheduled     Apr 21, 2017  7:00 AM CDT   [facility] Lab Draw with  [facility] LAB DRAW   [facility] ([facility])    [address] [place] [id number]   [phone]            Apr 21, 2017  7:30 AM CDT   Infusion 360 with UC ONCOLOGY INFUSION, UC 13 ATC   [facility] ([facility])    [address] [place] [address]   [phone]            May 02, 2017  7:30 AM CDT   [facility] Lab Draw with UC MASONIC LAB DRAW   [facility] Lab Draw ([facility])    [address] [place] [id number]   [phone]            May 02, 2017  8:00 AM CDT   Infusion 360 with UC ONCOLOGY INFUSION, UC 14 ATC   [facility] ([facility])    [address] [place] [id number]   [phone]            May 16, 2017  7:30 AM CDT   [facility]RAW   [facility] Lab Draw ([facility])    [address] [place] [id number]   [phone]            May 16, 2017  8:00 AM CDT   (Arrive by 7:45 AM)   Return Visit with TEE Olivas CNP   [facility] ([facility])    [address] [place] [id number]   [phone]            May 16, 2017  9:00 AM CDT   Infusion 360 with UC ONCOLOGY INFUSION, UC 23 ATC   [facility] ([facility])    [address] [place] [address]   [phone]              Further instructions from your care team       [facility]  Same-Day Surgery   Adult Discharge Orders & Instructions     For 24 hours after surgery    1. Get plenty of rest.  A responsible adult must stay with you for at least 24 hours after you leave the hospital.   2. Do not drive or use heavy equipment.  If you have weakness or tingling, don't drive or use heavy equipment until this feeling goes away.  3. Do not drink alcohol.  4. Avoid strenuous or risky activities.  Ask for help when climbing stairs.   5. You may feel lightheaded.  IF so, sit for a few minutes before standing.  Have someone help you get up.   6. If you have nausea (feel sick to your stomach): Drink only clear liquids such as apple juice, ginger ale, broth or 7-Up.  Rest may also help.  Be sure to drink enough fluids.  Move to a regular diet as you feel able.  7. You may have a  "slight fever. Call the doctor if your fever is over 100 F (37.7 C) (taken under the tongue) or lasts longer than 24 hours.  8. You may have a dry mouth, a sore throat, muscle aches or trouble sleeping.  These should go away after 24 hours.  9. Do not make important or legal decisions.   Call your doctor for any of the followin.  Signs of infection (fever, growing tenderness at the surgery site, a large amount of drainage or bleeding, severe pain, foul-smelling drainage, redness, swelling).    2. It has been over 8 to 10 hours since surgery and you are still not able to urinate (pass water).    To contact a doctor, call ______Dr Braden's office at [phone] or [phone] (clinic) or [phone] (office)___________ or:        [phone] and ask for the resident on call for   _______GI_____________ (answered 24 hours a day)      Emergency Department:    [facility]: [phone]       (TTY for hearing impaired: [phone])            Pending Results     Date and Time Order Name Status Description    2017 1655 CANCER ANTIGEN 19-9 In process             Admission Information     Date & Time Provider Department Dept. Phone    2017 Tristin Braden MD Same Day Surgery [facility] [phone]      Your Vitals Were     Blood Pressure Temperature Respirations Height Weight Pulse Oximetry    116/74 97.8  F (36.6  C) (Oral) 14 1.803 m (5' 11\") 68.9 kg (151 lb 14.4 oz) 97%    BMI (Body Mass Index)                   21.19 kg/m2           [facility] [employer] Information     [employer] gives you secure access to your electronic health record. If you see a primary care provider, you can also send messages to your care team and make appointments. If you have questions, please call your primary care clinic.  If you do not have a primary care provider, please call [phone] and they will assist you.        Care EveryWhere ID     This is your Care EveryWhere ID. This could be used by other organizations " to access your [facility] medical records  [id number]           Review of your medicines      START taking        Dose / Directions    ciprofloxacin 500 MG tablet   Commonly known as:  CIPRO   Used for:  Bile duct obstruction        Dose:  500 mg   Take 1 tablet (500 mg) by mouth 2 times daily for 5 days   Quantity:  10 tablet   Refills:  0         CONTINUE these medicines which may have CHANGED, or have new prescriptions. If we are uncertain of the size of tablets/capsules you have at home, strength may be listed as something that might have changed.        Dose / Directions    polyethylene glycol powder   Commonly known as:  MIRALAX/GLYCOLAX   This may have changed:    - when to take this  - reasons to take this   Used for:  Malignant neoplasm of head of pancreas (H)        Dose:  1 capful   Take 17 g (1 capful) by mouth daily   Quantity:  119 g   Refills:  11         CONTINUE these medicines which have NOT CHANGED        Dose / Directions    amylase-lipase-protease 49727 UNITS Cpep   Commonly known as:  CREON   Indication:  Pancreatic Insufficiency   Used for:  Malignant neoplasm of head of pancreas (H)        Dose:  2 capsule   Take 2 capsules (72,000 Units) by mouth 3 times daily (with meals)   Quantity:  180 capsule   Refills:  1       dexamethasone 4 MG tablet   Commonly known as:  DECADRON   Used for:  Need for prophylactic measure, Malignant neoplasm of head of pancreas (H)        Dose:  4 mg   Take 1 tablet (4 mg) by mouth daily (with breakfast) for 3 days   Quantity:  3 tablet   Refills:  0       diltiazem 2% in PLO cream (FV COMPOUNDED) 2% Gel   Used for:  External hemorrhoids        Apply topically daily and as needed to external hemorrhoids   Quantity:  30 g   Refills:  0       docusate sodium 100 MG capsule   Commonly known as:  COLACE   Used for:  External hemorrhoids        Dose:  100 mg   Take 1 capsule (100 mg) by mouth daily   Quantity:  100 capsule   Refills:  0       dronabinol 5 MG capsule    Commonly known as:  MARINOL   Used for:  Anorexia        Dose:  5 mg   Take 1 capsule (5 mg) by mouth 2 times daily (before meals)   Quantity:  60 capsule   Refills:  0       ENSURE CLEAR Liqd   Used for:  Malignant neoplasm of head of pancreas (H)        Dose:  1 Bottle   Take 1 Bottle by mouth 3 times daily   Quantity:  90 Bottle   Refills:  6       lidocaine-prilocaine cream   Commonly known as:  EMLA   Used for:  Malignant neoplasm of head of pancreas (H)        Apply topically as needed for other (Use 30-60 minutes prior to port access)   Quantity:  30 g   Refills:  0       loratadine 10 MG tablet   Commonly known as:  CLARITIN        Dose:  10 mg   Take 10 mg by mouth daily   Refills:  0       LORazepam 0.5 MG tablet   Commonly known as:  ATIVAN   Used for:  Malignant neoplasm of head of pancreas (H)        Dose:  0.5 mg   Take 1 tablet (0.5 mg) by mouth every 4 hours as needed (Anxiety, Nausea/Vomiting or Sleep)   Quantity:  30 tablet   Refills:  0       morphine 15 MG 12 hr tablet   Commonly known as:  MS CONTIN   Used for:  Malignant neoplasm of head of pancreas (H)        Dose:  15 mg   Take 1 tablet (15 mg) by mouth every 12 hours   Quantity:  60 tablet   Refills:  0       ondansetron 8 MG ODT tab   Commonly known as:  ZOFRAN-ODT   Used for:  Malignant neoplasm of head of pancreas (H)        Dose:  8 mg   Take 1 tablet (8 mg) by mouth every 8 hours as needed for nausea   Quantity:  60 tablet   Refills:  4       oxyCODONE 5 MG IR tablet   Commonly known as:  ROXICODONE   Used for:  Malignant neoplasm of head of pancreas (H)        Dose:  5 mg   Take 1 tablet (5 mg) by mouth every 4 hours as needed for moderate to severe pain   Quantity:  100 tablet   Refills:  0       potassium chloride SA 20 MEQ CR tablet   Commonly known as:  potassium chloride   Used for:  Malignant neoplasm of head of pancreas (H)        Dose:  20 mEq   Take 1 tablet (20 mEq) by mouth daily   Quantity:  60 tablet   Refills:  1        prochlorperazine 10 MG tablet   Commonly known as:  COMPAZINE   Used for:  Malignant neoplasm of head of pancreas (H)        Dose:  10 mg   Take 1 tablet (10 mg) by mouth every 6 hours as needed (Nausea/Vomiting)   Quantity:  30 tablet   Refills:  2            Where to get your medicines      These medications were sent to [facility] [place] [facility] [place], [place] - [address]  [address] [facility] [address]     Phone:  [phone]     ciprofloxacin 500 MG tablet                Protect others around you: Learn how to safely use, store and throw away your medicines at www.disposemymeds.org.             Medication List: This is a list of all your medications and when to take them. Check marks below indicate your daily home schedule. Keep this list as a reference.      Medications           Morning Afternoon Evening Bedtime As Needed    amylase-lipase-protease 45412 UNITS Cpep   Commonly known as:  CREON   Take 2 capsules (72,000 Units) by mouth 3 times daily (with meals)                                ciprofloxacin 500 MG tablet   Commonly known as:  CIPRO   Take 1 tablet (500 mg) by mouth 2 times daily for 5 days                                dexamethasone 4 MG tablet   Commonly known as:  DECADRON   Take 1 tablet (4 mg) by mouth daily (with breakfast) for 3 days                                diltiazem 2% in PLO cream (FV COMPOUNDED) 2% Gel   Apply topically daily and as needed to external hemorrhoids                                docusate sodium 100 MG capsule   Commonly known as:  COLACE   Take 1 capsule (100 mg) by mouth daily                                dronabinol 5 MG capsule   Commonly known as:  MARINOL   Take 1 capsule (5 mg) by mouth 2 times daily (before meals)                                ENSURE CLEAR Liqd   Take 1 Bottle by mouth 3 times daily                                lidocaine-prilocaine cream   Commonly known as:  EMLA   Apply topically as needed for other (Use  30-60 minutes prior to port access)                                loratadine 10 MG tablet   Commonly known as:  CLARITIN   Take 10 mg by mouth daily                                LORazepam 0.5 MG tablet   Commonly known as:  ATIVAN   Take 1 tablet (0.5 mg) by mouth every 4 hours as needed (Anxiety, Nausea/Vomiting or Sleep)                                morphine 15 MG 12 hr tablet   Commonly known as:  MS CONTIN   Take 1 tablet (15 mg) by mouth every 12 hours                                ondansetron 8 MG ODT tab   Commonly known as:  ZOFRAN-ODT   Take 1 tablet (8 mg) by mouth every 8 hours as needed for nausea                                oxyCODONE 5 MG IR tablet   Commonly known as:  ROXICODONE   Take 1 tablet (5 mg) by mouth every 4 hours as needed for moderate to severe pain                                polyethylene glycol powder   Commonly known as:  MIRALAX/GLYCOLAX   Take 17 g (1 capful) by mouth daily                                potassium chloride SA 20 MEQ CR tablet   Commonly known as:  potassium chloride   Take 1 tablet (20 mEq) by mouth daily                                prochlorperazine 10 MG tablet   Commonly known as:  COMPAZINE   Take 1 tablet (10 mg) by mouth every 6 hours as needed (Nausea/Vomiting)

## 2017-04-19 NOTE — ANESTHESIA POSTPROCEDURE EVALUATION
Patient: Shirin Muller    Procedure(s):  Endoscopic Retrograde Cholangiopancreatogram With biliary Stent placement, ballon sweep of bile duct for stone removal - Wound Class: II-Clean Contaminated    Diagnosis:Elevated Bilirubin   Diagnosis Additional Information: No value filed.    Anesthesia Type:  General, ETT    Note:  Anesthesia Post Evaluation    Patient location during evaluation: PACU  Patient participation: Able to fully participate in evaluation  Level of consciousness: awake and alert  Pain management: adequate  Airway patency: patent  Cardiovascular status: acceptable  Respiratory status: acceptable and spontaneous ventilation  Hydration status: acceptable  PONV: none     Anesthetic complications: None          Last vitals:  Vitals:    04/19/17 0943 04/19/17 1231   BP: 109/73 122/83   Resp: 15 14   Temp: 36.8  C (98.3  F) 36.4  C (97.6  F)   SpO2: 95% 100%         Electronically Signed By: Jeanna Pham MD  April 19, 2017  12:46 PM

## 2017-04-20 LAB — CANCER AG19-9 SERPL-ACNC: ABNORMAL

## 2017-04-21 ENCOUNTER — ONCOLOGY VISIT (OUTPATIENT)
Dept: ONCOLOGY | Facility: CLINIC | Age: 57
End: 2017-04-21
Attending: PHYSICIAN ASSISTANT
Payer: COMMERCIAL

## 2017-04-21 ENCOUNTER — APPOINTMENT (OUTPATIENT)
Dept: LAB | Facility: CLINIC | Age: 57
End: 2017-04-21
Attending: INTERNAL MEDICINE
Payer: COMMERCIAL

## 2017-04-21 ENCOUNTER — CARE COORDINATION (OUTPATIENT)
Dept: GASTROENTEROLOGY | Facility: CLINIC | Age: 57
End: 2017-04-21

## 2017-04-21 ENCOUNTER — INFUSION THERAPY VISIT (OUTPATIENT)
Dept: ONCOLOGY | Facility: CLINIC | Age: 57
End: 2017-04-21
Attending: INTERNAL MEDICINE
Payer: COMMERCIAL

## 2017-04-21 VITALS
TEMPERATURE: 98.6 F | SYSTOLIC BLOOD PRESSURE: 108 MMHG | WEIGHT: 157.4 LBS | RESPIRATION RATE: 16 BRPM | DIASTOLIC BLOOD PRESSURE: 67 MMHG | OXYGEN SATURATION: 99 % | BODY MASS INDEX: 21.95 KG/M2 | HEART RATE: 98 BPM

## 2017-04-21 DIAGNOSIS — R21 RASH AND NONSPECIFIC SKIN ERUPTION: Primary | ICD-10-CM

## 2017-04-21 DIAGNOSIS — C25.0 MALIGNANT NEOPLASM OF HEAD OF PANCREAS (H): Primary | ICD-10-CM

## 2017-04-21 DIAGNOSIS — C78.7 SECONDARY MALIGNANT NEOPLASM OF LIVER (H): ICD-10-CM

## 2017-04-21 DIAGNOSIS — D63.8 ANEMIA, CHRONIC DISEASE: ICD-10-CM

## 2017-04-21 DIAGNOSIS — Z29.9 NEED FOR PROPHYLACTIC MEASURE: ICD-10-CM

## 2017-04-21 LAB
ALBUMIN SERPL-MCNC: 2.3 G/DL (ref 3.4–5)
ALP SERPL-CCNC: 563 U/L (ref 40–150)
ALT SERPL W P-5'-P-CCNC: 58 U/L (ref 0–50)
ANION GAP SERPL CALCULATED.3IONS-SCNC: 8 MMOL/L (ref 3–14)
AST SERPL W P-5'-P-CCNC: 60 U/L (ref 0–45)
BASOPHILS # BLD AUTO: 0 10E9/L (ref 0–0.2)
BASOPHILS NFR BLD AUTO: 0.5 %
BILIRUB SERPL-MCNC: 1.1 MG/DL (ref 0.2–1.3)
BUN SERPL-MCNC: 8 MG/DL (ref 7–30)
CALCIUM SERPL-MCNC: 8.6 MG/DL (ref 8.5–10.1)
CHLORIDE SERPL-SCNC: 105 MMOL/L (ref 94–109)
CO2 SERPL-SCNC: 27 MMOL/L (ref 20–32)
CREAT SERPL-MCNC: 0.64 MG/DL (ref 0.52–1.04)
DIFFERENTIAL METHOD BLD: ABNORMAL
EOSINOPHIL # BLD AUTO: 0 10E9/L (ref 0–0.7)
EOSINOPHIL NFR BLD AUTO: 0.5 %
ERYTHROCYTE [DISTWIDTH] IN BLOOD BY AUTOMATED COUNT: 20.2 % (ref 10–15)
FERRITIN SERPL-MCNC: 442 NG/ML (ref 8–252)
GFR SERPL CREATININE-BSD FRML MDRD: ABNORMAL ML/MIN/1.7M2
GLUCOSE SERPL-MCNC: 96 MG/DL (ref 70–99)
HCT VFR BLD AUTO: 28.1 % (ref 35–47)
HGB BLD-MCNC: 8.7 G/DL (ref 11.7–15.7)
IMM GRANULOCYTES # BLD: 0.1 10E9/L (ref 0–0.4)
IMM GRANULOCYTES NFR BLD: 1.1 %
IRON SATN MFR SERPL: 14 % (ref 15–46)
IRON SERPL-MCNC: 38 UG/DL (ref 35–180)
LYMPHOCYTES # BLD AUTO: 1.1 10E9/L (ref 0.8–5.3)
LYMPHOCYTES NFR BLD AUTO: 13.7 %
MCH RBC QN AUTO: 26.7 PG (ref 26.5–33)
MCHC RBC AUTO-ENTMCNC: 31 G/DL (ref 31.5–36.5)
MCV RBC AUTO: 86 FL (ref 78–100)
MONOCYTES # BLD AUTO: 0.7 10E9/L (ref 0–1.3)
MONOCYTES NFR BLD AUTO: 8 %
NEUTROPHILS # BLD AUTO: 6.3 10E9/L (ref 1.6–8.3)
NEUTROPHILS NFR BLD AUTO: 76.2 %
NRBC # BLD AUTO: 0 10*3/UL
NRBC BLD AUTO-RTO: 0 /100
PLATELET # BLD AUTO: 218 10E9/L (ref 150–450)
POTASSIUM SERPL-SCNC: 3.6 MMOL/L (ref 3.4–5.3)
PROT SERPL-MCNC: 7.5 G/DL (ref 6.8–8.8)
RBC # BLD AUTO: 3.26 10E12/L (ref 3.8–5.2)
SODIUM SERPL-SCNC: 141 MMOL/L (ref 133–144)
TIBC SERPL-MCNC: 276 UG/DL (ref 240–430)
WBC # BLD AUTO: 8.2 10E9/L (ref 4–11)

## 2017-04-21 PROCEDURE — 96415 CHEMO IV INFUSION ADDL HR: CPT

## 2017-04-21 PROCEDURE — 99212 OFFICE O/P EST SF 10 MIN: CPT | Mod: ZP | Performed by: PHYSICIAN ASSISTANT

## 2017-04-21 PROCEDURE — 96416 CHEMO PROLONG INFUSE W/PUMP: CPT

## 2017-04-21 PROCEDURE — 96368 THER/DIAG CONCURRENT INF: CPT

## 2017-04-21 PROCEDURE — 96417 CHEMO IV INFUS EACH ADDL SEQ: CPT

## 2017-04-21 PROCEDURE — 83540 ASSAY OF IRON: CPT | Performed by: INTERNAL MEDICINE

## 2017-04-21 PROCEDURE — 96367 TX/PROPH/DG ADDL SEQ IV INF: CPT

## 2017-04-21 PROCEDURE — 82728 ASSAY OF FERRITIN: CPT | Performed by: INTERNAL MEDICINE

## 2017-04-21 PROCEDURE — 83550 IRON BINDING TEST: CPT | Performed by: INTERNAL MEDICINE

## 2017-04-21 PROCEDURE — 85025 COMPLETE CBC W/AUTO DIFF WBC: CPT | Performed by: INTERNAL MEDICINE

## 2017-04-21 PROCEDURE — 96413 CHEMO IV INFUSION 1 HR: CPT

## 2017-04-21 PROCEDURE — 86301 IMMUNOASSAY TUMOR CA 19-9: CPT | Performed by: INTERNAL MEDICINE

## 2017-04-21 PROCEDURE — 96375 TX/PRO/DX INJ NEW DRUG ADDON: CPT

## 2017-04-21 PROCEDURE — 96411 CHEMO IV PUSH ADDL DRUG: CPT

## 2017-04-21 PROCEDURE — 96376 TX/PRO/DX INJ SAME DRUG ADON: CPT

## 2017-04-21 PROCEDURE — 25000125 ZZHC RX 250: Mod: ZF | Performed by: INTERNAL MEDICINE

## 2017-04-21 PROCEDURE — 25000128 H RX IP 250 OP 636: Mod: ZF | Performed by: INTERNAL MEDICINE

## 2017-04-21 PROCEDURE — 80053 COMPREHEN METABOLIC PANEL: CPT | Performed by: INTERNAL MEDICINE

## 2017-04-21 RX ORDER — DEXAMETHASONE 4 MG/1
4 TABLET ORAL
Qty: 3 TABLET | Refills: 0 | Status: SHIPPED | OUTPATIENT
Start: 2017-04-21 | End: 2017-04-24

## 2017-04-21 RX ORDER — PALONOSETRON 0.05 MG/ML
0.25 INJECTION, SOLUTION INTRAVENOUS ONCE
Status: COMPLETED | OUTPATIENT
Start: 2017-04-21 | End: 2017-04-21

## 2017-04-21 RX ORDER — FLUOROURACIL 50 MG/ML
400 INJECTION, SOLUTION INTRAVENOUS ONCE
Status: COMPLETED | OUTPATIENT
Start: 2017-04-21 | End: 2017-04-21

## 2017-04-21 RX ORDER — HEPARIN SODIUM (PORCINE) LOCK FLUSH IV SOLN 100 UNIT/ML 100 UNIT/ML
5 SOLUTION INTRAVENOUS EVERY 8 HOURS
Status: DISCONTINUED | OUTPATIENT
Start: 2017-04-21 | End: 2017-04-21 | Stop reason: HOSPADM

## 2017-04-21 RX ADMIN — SODIUM CHLORIDE, PRESERVATIVE FREE 5 ML: 5 INJECTION INTRAVENOUS at 07:17

## 2017-04-21 RX ADMIN — LEUCOVORIN CALCIUM 750 MG: 200 INJECTION, POWDER, LYOPHILIZED, FOR SOLUTION INTRAMUSCULAR; INTRAVENOUS at 11:16

## 2017-04-21 RX ADMIN — ATROPINE SULFATE 0.4 MG: 0.4 INJECTION, SOLUTION INTRAMUSCULAR; INTRAVENOUS; SUBCUTANEOUS at 12:26

## 2017-04-21 RX ADMIN — ATROPINE SULFATE 0.4 MG: 0.4 INJECTION, SOLUTION INTRAMUSCULAR; INTRAVENOUS; SUBCUTANEOUS at 11:13

## 2017-04-21 RX ADMIN — FLUOROURACIL 790 MG: 50 INJECTION, SOLUTION INTRAVENOUS at 12:57

## 2017-04-21 RX ADMIN — OXALIPLATIN 167 MG: 5 INJECTION, SOLUTION, CONCENTRATE INTRAVENOUS at 09:11

## 2017-04-21 RX ADMIN — SODIUM CHLORIDE 150 MG: 9 INJECTION, SOLUTION INTRAVENOUS at 08:35

## 2017-04-21 RX ADMIN — DEXTROSE MONOHYDRATE 250 ML: 50 INJECTION, SOLUTION INTRAVENOUS at 08:14

## 2017-04-21 RX ADMIN — DEXTROSE MONOHYDRATE 340 MG: 50 INJECTION, SOLUTION INTRAVENOUS at 11:16

## 2017-04-21 RX ADMIN — PALONOSETRON HYDROCHLORIDE 0.25 MG: 0.25 INJECTION INTRAVENOUS at 08:34

## 2017-04-21 RX ADMIN — DEXAMETHASONE SODIUM PHOSPHATE 12 MG: 10 INJECTION, SOLUTION INTRAMUSCULAR; INTRAVENOUS at 08:14

## 2017-04-21 ASSESSMENT — PAIN SCALES - GENERAL: PAINLEVEL: NO PAIN (0)

## 2017-04-21 NOTE — PROGRESS NOTES
Post ERCP (4/19/2017) with Dr. Braden: Follow-up    Post procedure recommendations: Follow up with Dr Gonsales as scheduled - Contact Dr Braden with concerns of progressive outlet obstruction which will mandate a duodenal stent.    Patient states she is doing very well. Denies Nausea, vomiting fevers and pain. States she plans to follow-up with Dr Gonsales and will contact us if she has any issues.    Orders placed: None at this time.     Contact information verified for future questions/concerns.    Agnes LYNN RN Coordinator  Dr. Banuelos, Dr. Braden & Dr. Salas  Pancreas~Biliary  [phone]

## 2017-04-21 NOTE — NURSING NOTE
Chief Complaint   Patient presents with     Port Draw     accessed with power needle for labs and infusion, heparin locked, vitals checked

## 2017-04-21 NOTE — LETTER
4/21/2017      RE: Shirin Berg [address]  [address] [facility] [address]       Heme/onc visit note  April 21, 2017    Reason for visit: Asked to see by infusion RN for rash on left buttock    Cancer history: Patient is a 57 year old female with metastatic pancreatic cancer and is here today for cycle 7 day 7 FOLFIRINOX.     Interval history: She noticed a rash 2 days ago, left buttock. Asymptomatic, she was putting on lotion. It hasn't changed, no fevers or chills. No new topicals. No prodrome.     Past medical history:  Patient Active Problem List   Diagnosis     Malignant neoplasm of head of pancreas (H)     Toxic diffuse goiter     Anemia, chronic disease     Need for prophylactic measure     Syncope     GI bleed     Secondary malignant neoplasm of liver (H)       Medications:    Current Outpatient Prescriptions on File Prior to Visit:  dexamethasone (DECADRON) 4 MG tablet Take 1 tablet (4 mg) by mouth daily (with breakfast) for 3 days   ciprofloxacin (CIPRO) 500 MG tablet Take 1 tablet (500 mg) by mouth 2 times daily for 5 days   morphine (MS CONTIN) 15 MG 12 hr tablet Take 1 tablet (15 mg) by mouth every 12 hours   prochlorperazine (COMPAZINE) 10 MG tablet Take 1 tablet (10 mg) by mouth every 6 hours as needed (Nausea/Vomiting)   ondansetron (ZOFRAN-ODT) 8 MG ODT tab Take 1 tablet (8 mg) by mouth every 8 hours as needed for nausea   LORazepam (ATIVAN) 0.5 MG tablet Take 1 tablet (0.5 mg) by mouth every 4 hours as needed (Anxiety, Nausea/Vomiting or Sleep)   dronabinol (MARINOL) 5 MG capsule Take 1 capsule (5 mg) by mouth 2 times daily (before meals)   oxyCODONE (ROXICODONE) 5 MG IR tablet Take 1 tablet (5 mg) by mouth every 4 hours as needed for moderate to severe pain   Nutritional Supplements (ENSURE CLEAR) LIQD Take 1 Bottle by mouth 3 times daily   potassium chloride SA (POTASSIUM CHLORIDE) 20 MEQ CR tablet Take 1 tablet (20 mEq) by mouth daily   diltiazem 2% in PLO cream, FV COMPOUNDED, 2%  GEL Apply topically daily and as needed to external hemorrhoids   docusate sodium (COLACE) 100 MG capsule Take 1 capsule (100 mg) by mouth daily   loratadine (CLARITIN) 10 MG tablet Take 10 mg by mouth daily   amylase-lipase-protease (CREON) 55759 UNITS CPEP Take 2 capsules (72,000 Units) by mouth 3 times daily (with meals)   polyethylene glycol (MIRALAX/GLYCOLAX) powder Take 17 g (1 capful) by mouth daily (Patient taking differently: Take 1 capful by mouth daily as needed )   lidocaine-prilocaine (EMLA) cream Apply topically as needed for other (Use 30-60 minutes prior to port access)     Current Facility-Administered Medications on File Prior to Visit:  heparin 100 UNIT/ML injection 5 mL   [COMPLETED] fosaprepitant (EMEND) 150 mg in NaCl 0.9 % intermittent infusion   [COMPLETED] palonosetron (ALOXI) injection 0.25 mg   [COMPLETED] dexamethasone (DECADRON) 12 mg in NaCl 0.9 % intermittent infusion   [COMPLETED] dextrose 5% BOLUS   atropine injection 0.4 mg   [COMPLETED] oxaliplatin (ELOXATIN) 167 mg in D5W 584 mL CHEMOTHERAPY   irinotecan (CAMPTOSAR) 340 mg in D5W 567 mL CHEMOTHERAPY   leucovorin 750 mg in D5W 100 mL infusion   Fluorouracil (ADRUCIL) injection CHEMOTHERAPY 790 mg   fluorouracil (ADRUCIL) 4,730 mg in NaCl 0.9 % 241 mL Home Infusion Chemotherapy   darbepoetin chelo-polysorbate (ARANESP) injection 300 mcg   heparin 100 UNIT/ML injection 5 mL   [DISCONTINUED] heparin 100 UNIT/ML injection 5 mL   [DISCONTINUED] sodium chloride (PF) 0.9% PF flush 20 mL       Allergy:     Allergies   Allergen Reactions     Contrast Dye Nausea and Vomiting     Pt vomiting post IV contrast, Please try Visipaque for next CT scan. 6/24/16 sv       Physical exam  There were no vitals taken for this visit.  Wt Readings from Last 4 Encounters:   04/21/17 71.4 kg (157 lb 6.4 oz)   04/19/17 68.9 kg (151 lb 14.4 oz)   04/17/17 69.9 kg (154 lb)   04/17/17 69.9 kg (154 lb)     General: No acute distress  Skin: left buttock there is a  group of ~10 papules, pearly. No erythema, vesicles or signs concerning for zoster. Some look umbilicated like molloscum    Labs:  Results for NATALIIA IBARRA (MRN [id number]) as of 4/21/2017 13:04   Ref. Range 4/21/2017 07:27   Sodium Latest Ref Range: 133 - 144 mmol/L 141   Potassium Latest Ref Range: 3.4 - 5.3 mmol/L 3.6   Chloride Latest Ref Range: 94 - 109 mmol/L 105   Carbon Dioxide Latest Ref Range: 20 - 32 mmol/L 27   Urea Nitrogen Latest Ref Range: 7 - 30 mg/dL 8   Creatinine Latest Ref Range: 0.52 - 1.04 mg/dL 0.64   GFR Estimate Latest Ref Range: >60 mL/min/1.7m2 >90...   GFR Estimate If Black Latest Ref Range: >60 mL/min/1.7m2 >90...   Calcium Latest Ref Range: 8.5 - 10.1 mg/dL 8.6   Anion Gap Latest Ref Range: 3 - 14 mmol/L 8   Albumin Latest Ref Range: 3.4 - 5.0 g/dL 2.3 (L)   Protein Total Latest Ref Range: 6.8 - 8.8 g/dL 7.5   Bilirubin Total Latest Ref Range: 0.2 - 1.3 mg/dL 1.1   Alkaline Phosphatase Latest Ref Range: 40 - 150 U/L 563 (H)   ALT Latest Ref Range: 0 - 50 U/L 58 (H)   AST Latest Ref Range: 0 - 45 U/L 60 (H)   Glucose Latest Ref Range: 70 - 99 mg/dL 96   WBC Latest Ref Range: 4.0 - 11.0 10e9/L 8.2   Hemoglobin Latest Ref Range: 11.7 - 15.7 g/dL 8.7 (L)   Hematocrit Latest Ref Range: 35.0 - 47.0 % 28.1 (L)   Platelet Count Latest Ref Range: 150 - 450 10e9/L 218   RBC Count Latest Ref Range: 3.8 - 5.2 10e12/L 3.26 (L)   MCV Latest Ref Range: 78 - 100 fl 86   MCH Latest Ref Range: 26.5 - 33.0 pg 26.7   MCHC Latest Ref Range: 31.5 - 36.5 g/dL 31.0 (L)   RDW Latest Ref Range: 10.0 - 15.0 % 20.2 (H)   Diff Method Unknown Automated Method   % Neutrophils Latest Units: % 76.2   % Lymphocytes Latest Units: % 13.7   % Monocytes Latest Units: % 8.0   % Eosinophils Latest Units: % 0.5   % Basophils Latest Units: % 0.5   % Immature Granulocytes Latest Units: % 1.1   Nucleated RBCs Latest Ref Range: 0 /100 0   Absolute Neutrophil Latest Ref Range: 1.6 - 8.3 10e9/L 6.3   Absolute  Lymphocytes Latest Ref Range: 0.8 - 5.3 10e9/L 1.1   Absolute Monocytes Latest Ref Range: 0.0 - 1.3 10e9/L 0.7   Absolute Eosinophils Latest Ref Range: 0.0 - 0.7 10e9/L 0.0   Absolute Basophils Latest Ref Range: 0.0 - 0.2 10e9/L 0.0   Abs Immature Granulocytes Latest Ref Range: 0 - 0.4 10e9/L 0.1   Absolute Nucleated RBC Unknown 0.0     Assessment and plan:  Nonspecific rash on left buttock. My concern for zoster is very low. She will monitor, if worsens or develops pain she will be seen. It could be molluscum contagiosum although the presentation is usually more insiduous. Monitor. Asymptomatic, no meds needed.     Cathie Amaral PA-C

## 2017-04-21 NOTE — PROGRESS NOTES
Infusion Nursing Note:  Shirin Muller presents today for cycle 7 day 1 oxaliplatin, irinotecan, leucovorin, fluorouracil push and pump hook up..    Patient seen by provider today: No   present during visit today: Not Applicable.    Note: Pt feeling well today, does have a new rash on her buttock.  Patient first noticed the rash two days ago when she was applying some lotion.  Denies any pain, burning, itching, or fevers.  Rash is localized to one area the size of the quarter and has about 5 raised bumps, area free from redness, warmth, or swelling.  Relayed symptoms to Dr. Gonsales who stated okay for chemotherapy today but would like a PA to come assess and rule out shingles.  Pt added to Cathie Amaral PA-C's schedule this afternoon.  Per Cathie Amaral PA-C - rash is not shingles.  Pt to keep an eye on and call with any changes, signs of infection, fever.    Darbepoetin scheduled for today, however per pharmacy additional labs need to be completed and then financial needs to approve before giving.  Labs drawn but results still pending, financial approval not completed before patient was done today.  Pt scheduled to return on 4/24. Pt scheduled to get aranesp at next visit.     Atropine given before and prison through irinotecan    Intravenous Access:  Implanted Port.    Treatment Conditions:  Lab Results   Component Value Date    HGB 8.7 04/21/2017     Lab Results   Component Value Date    WBC 8.2 04/21/2017      Lab Results   Component Value Date    ANEU 6.3 04/21/2017     Lab Results   Component Value Date     04/21/2017      Lab Results   Component Value Date     04/21/2017                   Lab Results   Component Value Date    POTASSIUM 3.6 04/21/2017           Lab Results   Component Value Date    MAG 2.0 04/03/2017            Lab Results   Component Value Date    CR 0.64 04/21/2017                   Lab Results   Component Value Date    FANNY 8.6 04/21/2017                Lab  Results   Component Value Date    BILITOTAL 1.1 04/21/2017           Lab Results   Component Value Date    ALBUMIN 2.3 04/21/2017                    Lab Results   Component Value Date    ALT 58 04/21/2017           Lab Results   Component Value Date    AST 60 04/21/2017     Results reviewed, labs MET treatment parameters, ok to proceed with treatment.      Post Infusion Assessment:  Patient tolerated infusion without incident.  Blood return noted pre and post infusion.  Blood return noted during fluorouracil administration every 2 cc.  Site patent and intact, free from redness, edema or discomfort.  No evidence of extravasations.  Fluorouracil pump connected, all connections secure, pump will run continuously over the next 46 hours.  Pump disconnect scheduled on Sunday at 11am and neulasta injection scheduled for Monday 4/24 at 11am.    Discharge Plan:   Patient declined prescription refills.  Discharge instructions reviewed with: Patient.  Patient and/or family verbalized understanding of discharge instructions and all questions answered.  Copy of AVS reviewed with patient and/or family.  Patient will return 4/24/17 for next appointment.  Patient discharged in stable condition accompanied by: sister.  Departure Mode: Ambulatory.    Ana Mustafa RN

## 2017-04-21 NOTE — MR AVS SNAPSHOT
After Visit Summary   4/21/2017    Shirin Muller    MRN: [id number]           Patient Information     Date Of Birth          1960        Visit Information        Provider Department      4/21/2017 7:30 AM MARISABEL 13 ATC;  ONCOLOGY INFUSION [facility]        Today's Diagnoses     Malignant neoplasm of head of pancreas (H)    -  1    Secondary malignant neoplasm of liver (H)        Need for prophylactic measure        Anemia, chronic disease          Care Instructions    Contact Numbers    [facility] [place] [phone]  [facility] [place]:  [phone]    Call triage with chills and/or temperature greater than or equal to 100.5, uncontrolled nausea/vomiting, diarrhea, constipation, dizziness, shortness of breath, chest pain, bleeding, unexplained bruising, or any new/concerning symptoms, questions/concerns.     If you are having any concerning symptoms or wish to speak to a provider before your next infusion visit, please call your care coordinator or triage to notify them so we can adequately serve you.       After Hours: [phone]    If after hours, weekends, or holidays, call main hospital  and ask for Oncology doctor on call.         April 2017 Sunday Monday Tuesday Wednesday Thursday Friday Saturday                                 1       2     3     P [facility] DRAW    8:00 AM   (15 min.)    [facility] DRAW   [facility] Lab Draw     P ONC INFUSION 360    8:30 AM   (360 min.)    ONCOLOGY INFUSION   [facility] 4     5     6     7     8       9     10  Happy Birthday!     11     [facility]    7:30 AM   (15 min.)    [facility] DRAW   [facility] Lab Draw     [facility] RETURN    7:45 AM   (50 min.)   Ester Echavarria, APRN CNP   [facility]     [facility] ONC INFUSION 360    8:30 AM   (360 min.)    ONCOLOGY INFUSION   [facility] 12     13     14     [facility]    8:45  AM   (15 min.)    [facility] DRAW   [facility] Lab Draw     MR ABDOMEN WWO    9:00 AM   (45 min.)   [id number]   [facility] MRI     CT CHEST WO   10:05 AM   (20 min.)   UCCT2   [facility] CT     [facility] LAB DRAW   10:45 AM   (15 min.)   [facility] DRAW   [facility] Draw 15       16     17     [facility]    4:15 PM   (15 min.)    [facility] DRAW   [facility] Lab Draw     [facility] RETURN    4:45 PM   (30 min.)   Demond Gonsales MD   [facility] 18     [facility] ONC INFUSION 360    7:00 AM   (360 min.)   UC ONCOLOGY INFUSION   [facility] 19     Admission    9:20 AM   Tristin Braden MD   [facility]   (Discharge: 4/19/2017)     ENDOSCOPIC RETROGRADE CHOLANGIOPANCREATOGRAPHY, EXCHANGE TUBE/STENT   10:55 AM   Tristin Braden MD   [facility] OR     XR SURG SAUL <5 MIN FL W STILL   11:05 AM   (30 min.)   [id number]   [facility] [place],  Radiology 20     21     [facility] DRAW    7:00 AM   (15 min.)    [facility] DRAW   [facility] Lab Draw     [facility] ONC INFUSION 360    7:30 AM   (360 min.)   UC ONCOLOGY INFUSION   [facility]     [facility] RETURN   11:45 AM   (60 min.)   Cathie Amaral PA-C   [facility] 22       23     24     25     26     27     28     29       30                                              May 2017   Kristofer Monday Tuesday Wednesday Thursday Friday Saturday        1     2     P [facility] DRAW    7:30 AM   (15 min.)   [facility] DRAW   [facility] Draw     [facility] RETURN    7:45 AM   (50 min.)   Ester Echavarria APRN CNP   [facility]     [facility] ONC INFUSION 360    8:00 AM   (360 min.)   UC ONCOLOGY INFUSION   [facility] 3     4     5     6       7     8     9     10     11     12     13       14     15     16     [facility] LAB DRAW    7:30 AM   (15 min.)   [facility] DRAW   [facility]  [facility] Lab Draw     P RETURN    7:45 AM   (50 min.)   Ester Echavarria S, APRN CNP   [facility] ONC INFUSION 360    9:00 AM   (360 min.)    ONCOLOGY INFUSION   [facility] 17     18     19     20       21     22     23     24     25     26     27       28     29     30     [facility] LAB DRAW    6:30 AM   (15 min.)   [facility] LAB DRAW   [facility] Lab Draw     [facility] ONC INFUSION 360    7:00 AM   (360 min.)    ONCOLOGY INFUSION   [facility] 31                                 Lab Results:  Recent Results (from the past 12 hour(s))   CBC with platelets differential    Collection Time: 04/21/17  7:27 AM   Result Value Ref Range    WBC 8.2 4.0 - 11.0 10e9/L    RBC Count 3.26 (L) 3.8 - 5.2 10e12/L    Hemoglobin 8.7 (L) 11.7 - 15.7 g/dL    Hematocrit 28.1 (L) 35.0 - 47.0 %    MCV 86 78 - 100 fl    MCH 26.7 26.5 - 33.0 pg    MCHC 31.0 (L) 31.5 - 36.5 g/dL    RDW 20.2 (H) 10.0 - 15.0 %    Platelet Count 218 150 - 450 10e9/L    Diff Method Automated Method     % Neutrophils 76.2 %    % Lymphocytes 13.7 %    % Monocytes 8.0 %    % Eosinophils 0.5 %    % Basophils 0.5 %    % Immature Granulocytes 1.1 %    Nucleated RBCs 0 0 /100    Absolute Neutrophil 6.3 1.6 - 8.3 10e9/L    Absolute Lymphocytes 1.1 0.8 - 5.3 10e9/L    Absolute Monocytes 0.7 0.0 - 1.3 10e9/L    Absolute Eosinophils 0.0 0.0 - 0.7 10e9/L    Absolute Basophils 0.0 0.0 - 0.2 10e9/L    Abs Immature Granulocytes 0.1 0 - 0.4 10e9/L    Absolute Nucleated RBC 0.0    Comprehensive metabolic panel    Collection Time: 04/21/17  7:27 AM   Result Value Ref Range    Sodium 141 133 - 144 mmol/L    Potassium 3.6 3.4 - 5.3 mmol/L    Chloride 105 94 - 109 mmol/L    Carbon Dioxide 27 20 - 32 mmol/L    Anion Gap 8 3 - 14 mmol/L    Glucose 96 70 - 99 mg/dL    Urea Nitrogen 8 7 - 30 mg/dL    Creatinine 0.64 0.52 - 1.04 mg/dL    GFR Estimate >90  Non  GFR Calc   >60 mL/min/1.7m2    GFR  Estimate If Black >90   GFR Calc   >60 mL/min/1.7m2    Calcium 8.6 8.5 - 10.1 mg/dL    Bilirubin Total 1.1 0.2 - 1.3 mg/dL    Albumin 2.3 (L) 3.4 - 5.0 g/dL    Protein Total 7.5 6.8 - 8.8 g/dL    Alkaline Phosphatase 563 (H) 40 - 150 U/L    ALT 58 (H) 0 - 50 U/L    AST 60 (H) 0 - 45 U/L             Follow-ups after your visit        Your next 10 appointments already scheduled     May 02, 2017  7:30 AM CDT   [facility] with [facility] DRAW   [facility] Draw ([facility])    [address] [id number] [phone]            May 02, 2017  8:00 AM CDT   Infusion 360 with UC ONCOLOGY INFUSION, UC 14 ATC   [facility] ([facility])    [address] [place] [id number] [phone]            May 02, 2017  8:00 AM CDT   (Arrive by 7:45 AM)   Return Visit with TEE Olivas CNP   [facility] ([facility])    [address] [place] [id number] [phone]            May 16, 2017  7:30 AM CDT   [facility] Draw with UC [facility] LAB DRAW   [facility] ([facility])    [address] [id number] [phone]            May 16, 2017  8:00 AM CDT   (Arrive by 7:45 AM)   Return Visit with TEE Olivas CNP   [facility] ([facility])    [address] [place] [id number] [phone]            May 16, 2017  9:00 AM CDT   Infusion 360 with UC ONCOLOGY INFUSION, UC 23 ATC   [facility] ([facility])    [address] [place] [id number] [phone]            May 30, 2017  6:30 AM CDT   [facility] Lab Draw with UC [facility] LAB DRAW   [facility] Draw ([facility])    [address]  [address] [facility] [address] [phone]            May 30, 2017  7:00 AM CDT   Infusion 360 with UC ONCOLOGY INFUSION   [facility] ([facility])    [address] [place] [address]  2nd [facility] [address] [phone]              Who to contact     If you have questions or need follow up information about today's clinic visit or your schedule please contact [facility] directly at [phone].  Normal or non-critical lab and imaging results will be communicated to you by NeXplorehart, letter or phone within 4 business days after the clinic has received the results. If you do not hear from us within 7 days, please contact the clinic through Plyfet or phone. If you have a critical or abnormal lab result, we will notify you by phone as soon as possible.  Submit refill requests through Yodio or call your pharmacy and they will forward the refill request to us. Please allow 3 business days for your refill to be completed.          Additional Information About Your Visit        NeXplorehart Information     Yodio gives you secure access to your electronic health record. If you see a primary care provider, you can also send messages to your care team and make appointments. If you have questions, please call your primary care clinic.  If you do not have a primary care provider, please call [phone] and they will assist you.        Care EveryWhere ID     This is your Care EveryWhere ID. This could be used by other organizations to access your [facility] medical records  [id number]        Your Vitals Were     Pulse Temperature Respirations Pulse Oximetry BMI (Body Mass Index)       98 98.6  F (37  C) 16 99% 21.95 kg/m2        Blood Pressure from Last 3 Encounters:   04/21/17 108/67   04/19/17 124/81   04/18/17 126/77    Weight from Last 3 Encounters:   04/21/17 71.4 kg (157 lb 6.4 oz)   04/19/17 68.9 kg (151  lb 14.4 oz)   04/17/17 69.9 kg (154 lb)              We Performed the Following     Cancer antigen 19-9     CBC with platelets differential     Comprehensive metabolic panel     Ferritin     Iron and iron binding capacity     Road Map Alert     Treatment Conditions          Today's Medication Changes          These changes are accurate as of: 4/21/17  1:08 PM.  If you have any questions, ask your nurse or doctor.               These medicines have changed or have updated prescriptions.        Dose/Directions    * dexamethasone 4 MG tablet   Commonly known as:  DECADRON   This may have changed:  Another medication with the same name was added. Make sure you understand how and when to take each.   Used for:  Need for prophylactic measure, Malignant neoplasm of head of pancreas (H)        Dose:  4 mg   Take 1 tablet (4 mg) by mouth daily (with breakfast) for 3 days   Quantity:  3 tablet   Refills:  0       * dexamethasone 4 MG tablet   Commonly known as:  DECADRON   This may have changed:  You were already taking a medication with the same name, and this prescription was added. Make sure you understand how and when to take each.   Used for:  Need for prophylactic measure, Malignant neoplasm of head of pancreas (H)        Dose:  4 mg   Take 1 tablet (4 mg) by mouth daily (with breakfast) for 3 days   Quantity:  3 tablet   Refills:  0       polyethylene glycol powder   Commonly known as:  MIRALAX/GLYCOLAX   This may have changed:    - when to take this  - reasons to take this   Used for:  Malignant neoplasm of head of pancreas (H)        Dose:  1 capful   Take 17 g (1 capful) by mouth daily   Quantity:  119 g   Refills:  11       * Notice:  This list has 2 medication(s) that are the same as other medications prescribed for you. Read the directions carefully, and ask your doctor or other care provider to review them with you.         Where to get your medicines      These medications were sent to [facility]  [place], [place] - [address]  [address] [facility] [address], [facility] [address]    Hours:  TRANSPLANT PHONE NUMBER [phone] Phone:  [phone]     dexamethasone 4 MG tablet                Primary Care Provider    [facility]       No address on file        Thank you!     Thank you for choosing [facility]  for your care. Our goal is always to provide you with excellent care. Hearing back from our patients is one way we can continue to improve our services. Please take a few minutes to complete the written survey that you may receive in the mail after your visit with us. Thank you!             Your Updated Medication List - Protect others around you: Learn how to safely use, store and throw away your medicines at www.disposemymeds.org.          This list is accurate as of: 4/21/17  1:08 PM.  Always use your most recent med list.                   Brand Name Dispense Instructions for use    amylase-lipase-protease 22391 UNITS Cpep    CREON    180 capsule    Take 2 capsules (72,000 Units) by mouth 3 times daily (with meals)       ciprofloxacin 500 MG tablet    CIPRO    10 tablet    Take 1 tablet (500 mg) by mouth 2 times daily for 5 days       * dexamethasone 4 MG tablet    DECADRON    3 tablet    Take 1 tablet (4 mg) by mouth daily (with breakfast) for 3 days       * dexamethasone 4 MG tablet    DECADRON    3 tablet    Take 1 tablet (4 mg) by mouth daily (with breakfast) for 3 days       diltiazem 2% in PLO cream (FV COMPOUNDED) 2% Gel     30 g    Apply topically daily and as needed to external hemorrhoids       docusate sodium 100 MG capsule    COLACE    100 capsule    Take 1 capsule (100 mg) by mouth daily       dronabinol 5 MG capsule    MARINOL    60 capsule    Take 1 capsule (5 mg) by mouth 2 times daily (before meals)       ENSURE CLEAR Liqd     90 Bottle    Take 1 Bottle by mouth 3 times daily       lidocaine-prilocaine cream    EMLA     30 g    Apply topically as needed for other (Use 30-60 minutes prior to port access)       loratadine 10 MG tablet    CLARITIN     Take 10 mg by mouth daily       LORazepam 0.5 MG tablet    ATIVAN    30 tablet    Take 1 tablet (0.5 mg) by mouth every 4 hours as needed (Anxiety, Nausea/Vomiting or Sleep)       morphine 15 MG 12 hr tablet    MS CONTIN    60 tablet    Take 1 tablet (15 mg) by mouth every 12 hours       ondansetron 8 MG ODT tab    ZOFRAN-ODT    60 tablet    Take 1 tablet (8 mg) by mouth every 8 hours as needed for nausea       oxyCODONE 5 MG IR tablet    ROXICODONE    100 tablet    Take 1 tablet (5 mg) by mouth every 4 hours as needed for moderate to severe pain       polyethylene glycol powder    MIRALAX/GLYCOLAX    119 g    Take 17 g (1 capful) by mouth daily       potassium chloride SA 20 MEQ CR tablet    potassium chloride    60 tablet    Take 1 tablet (20 mEq) by mouth daily       prochlorperazine 10 MG tablet    COMPAZINE    30 tablet    Take 1 tablet (10 mg) by mouth every 6 hours as needed (Nausea/Vomiting)       * Notice:  This list has 2 medication(s) that are the same as other medications prescribed for you. Read the directions carefully, and ask your doctor or other care provider to review them with you.

## 2017-04-21 NOTE — LETTER
4/21/2017      RE: Shirin Berg [address]  [address] [facility] [place] [address]       Heme/onc visit note  April 21, 2017    Reason for visit: Asked to see by infusion RN for rash on left buttock    Cancer history: Patient is a 57 year old female with metastatic pancreatic cancer and is here today for cycle 7 day 7 FOLFIRINOX.     Interval history: She noticed a rash 2 days ago, left buttock. Asymptomatic, she was putting on lotion. It hasn't changed, no fevers or chills. No new topicals. No prodrome.     Past medical history:  Patient Active Problem List   Diagnosis     Malignant neoplasm of head of pancreas (H)     Toxic diffuse goiter     Anemia, chronic disease     Need for prophylactic measure     Syncope     GI bleed     Secondary malignant neoplasm of liver (H)       Medications:    Current Outpatient Prescriptions on File Prior to Visit:  dexamethasone (DECADRON) 4 MG tablet Take 1 tablet (4 mg) by mouth daily (with breakfast) for 3 days   ciprofloxacin (CIPRO) 500 MG tablet Take 1 tablet (500 mg) by mouth 2 times daily for 5 days   morphine (MS CONTIN) 15 MG 12 hr tablet Take 1 tablet (15 mg) by mouth every 12 hours   prochlorperazine (COMPAZINE) 10 MG tablet Take 1 tablet (10 mg) by mouth every 6 hours as needed (Nausea/Vomiting)   ondansetron (ZOFRAN-ODT) 8 MG ODT tab Take 1 tablet (8 mg) by mouth every 8 hours as needed for nausea   LORazepam (ATIVAN) 0.5 MG tablet Take 1 tablet (0.5 mg) by mouth every 4 hours as needed (Anxiety, Nausea/Vomiting or Sleep)   dronabinol (MARINOL) 5 MG capsule Take 1 capsule (5 mg) by mouth 2 times daily (before meals)   oxyCODONE (ROXICODONE) 5 MG IR tablet Take 1 tablet (5 mg) by mouth every 4 hours as needed for moderate to severe pain   Nutritional Supplements (ENSURE CLEAR) LIQD Take 1 Bottle by mouth 3 times daily   potassium chloride SA (POTASSIUM CHLORIDE) 20 MEQ CR tablet Take 1 tablet (20 mEq) by mouth daily   diltiazem 2% in PLO cream, FV COMPOUNDED, 2%  GEL Apply topically daily and as needed to external hemorrhoids   docusate sodium (COLACE) 100 MG capsule Take 1 capsule (100 mg) by mouth daily   loratadine (CLARITIN) 10 MG tablet Take 10 mg by mouth daily   amylase-lipase-protease (CREON) 05841 UNITS CPEP Take 2 capsules (72,000 Units) by mouth 3 times daily (with meals)   polyethylene glycol (MIRALAX/GLYCOLAX) powder Take 17 g (1 capful) by mouth daily (Patient taking differently: Take 1 capful by mouth daily as needed )   lidocaine-prilocaine (EMLA) cream Apply topically as needed for other (Use 30-60 minutes prior to port access)     Current Facility-Administered Medications on File Prior to Visit:  heparin 100 UNIT/ML injection 5 mL   [COMPLETED] fosaprepitant (EMEND) 150 mg in NaCl 0.9 % intermittent infusion   [COMPLETED] palonosetron (ALOXI) injection 0.25 mg   [COMPLETED] dexamethasone (DECADRON) 12 mg in NaCl 0.9 % intermittent infusion   [COMPLETED] dextrose 5% BOLUS   atropine injection 0.4 mg   [COMPLETED] oxaliplatin (ELOXATIN) 167 mg in D5W 584 mL CHEMOTHERAPY   irinotecan (CAMPTOSAR) 340 mg in D5W 567 mL CHEMOTHERAPY   leucovorin 750 mg in D5W 100 mL infusion   Fluorouracil (ADRUCIL) injection CHEMOTHERAPY 790 mg   fluorouracil (ADRUCIL) 4,730 mg in NaCl 0.9 % 241 mL Home Infusion Chemotherapy   darbepoetin chelo-polysorbate (ARANESP) injection 300 mcg   heparin 100 UNIT/ML injection 5 mL   [DISCONTINUED] heparin 100 UNIT/ML injection 5 mL   [DISCONTINUED] sodium chloride (PF) 0.9% PF flush 20 mL       Allergy:     Allergies   Allergen Reactions     Contrast Dye Nausea and Vomiting     Pt vomiting post IV contrast, Please try Visipaque for next CT scan. 6/24/16 sv       Physical exam  There were no vitals taken for this visit.  Wt Readings from Last 4 Encounters:   04/21/17 71.4 kg (157 lb 6.4 oz)   04/19/17 68.9 kg (151 lb 14.4 oz)   04/17/17 69.9 kg (154 lb)   04/17/17 69.9 kg (154 lb)     General: No acute distress  Skin: left buttock there is a  group of ~10 papules, pearly. No erythema, vesicles or signs concerning for zoster. Some look umbilicated like molloscum    Labs:  Results for NATALIIA IBARRA (MRN [id number]) as of 4/21/2017 13:04   Ref. Range 4/21/2017 07:27   Sodium Latest Ref Range: 133 - 144 mmol/L 141   Potassium Latest Ref Range: 3.4 - 5.3 mmol/L 3.6   Chloride Latest Ref Range: 94 - 109 mmol/L 105   Carbon Dioxide Latest Ref Range: 20 - 32 mmol/L 27   Urea Nitrogen Latest Ref Range: 7 - 30 mg/dL 8   Creatinine Latest Ref Range: 0.52 - 1.04 mg/dL 0.64   GFR Estimate Latest Ref Range: >60 mL/min/1.7m2 >90...   GFR Estimate If Black Latest Ref Range: >60 mL/min/1.7m2 >90...   Calcium Latest Ref Range: 8.5 - 10.1 mg/dL 8.6   Anion Gap Latest Ref Range: 3 - 14 mmol/L 8   Albumin Latest Ref Range: 3.4 - 5.0 g/dL 2.3 (L)   Protein Total Latest Ref Range: 6.8 - 8.8 g/dL 7.5   Bilirubin Total Latest Ref Range: 0.2 - 1.3 mg/dL 1.1   Alkaline Phosphatase Latest Ref Range: 40 - 150 U/L 563 (H)   ALT Latest Ref Range: 0 - 50 U/L 58 (H)   AST Latest Ref Range: 0 - 45 U/L 60 (H)   Glucose Latest Ref Range: 70 - 99 mg/dL 96   WBC Latest Ref Range: 4.0 - 11.0 10e9/L 8.2   Hemoglobin Latest Ref Range: 11.7 - 15.7 g/dL 8.7 (L)   Hematocrit Latest Ref Range: 35.0 - 47.0 % 28.1 (L)   Platelet Count Latest Ref Range: 150 - 450 10e9/L 218   RBC Count Latest Ref Range: 3.8 - 5.2 10e12/L 3.26 (L)   MCV Latest Ref Range: 78 - 100 fl 86   MCH Latest Ref Range: 26.5 - 33.0 pg 26.7   MCHC Latest Ref Range: 31.5 - 36.5 g/dL 31.0 (L)   RDW Latest Ref Range: 10.0 - 15.0 % 20.2 (H)   Diff Method Unknown Automated Method   % Neutrophils Latest Units: % 76.2   % Lymphocytes Latest Units: % 13.7   % Monocytes Latest Units: % 8.0   % Eosinophils Latest Units: % 0.5   % Basophils Latest Units: % 0.5   % Immature Granulocytes Latest Units: % 1.1   Nucleated RBCs Latest Ref Range: 0 /100 0   Absolute Neutrophil Latest Ref Range: 1.6 - 8.3 10e9/L 6.3   Absolute  Lymphocytes Latest Ref Range: 0.8 - 5.3 10e9/L 1.1   Absolute Monocytes Latest Ref Range: 0.0 - 1.3 10e9/L 0.7   Absolute Eosinophils Latest Ref Range: 0.0 - 0.7 10e9/L 0.0   Absolute Basophils Latest Ref Range: 0.0 - 0.2 10e9/L 0.0   Abs Immature Granulocytes Latest Ref Range: 0 - 0.4 10e9/L 0.1   Absolute Nucleated RBC Unknown 0.0     Assessment and plan:  Nonspecific rash on left buttock. My concern for zoster is very low. She will monitor, if worsens or develops pain she will be seen. It could be molluscum contagiosum although the presentation is usually more insiduous. Monitor. Asymptomatic, no meds needed.     Cathie Amaral PA-C

## 2017-04-21 NOTE — PROGRESS NOTES
Heme/onc visit note  April 21, 2017    Reason for visit: Asked to see by infusion RN for rash on left buttock    Cancer history: Patient is a 57 year old female with metastatic pancreatic cancer and is here today for cycle 7 day 7 FOLFIRINOX.     Interval history: She noticed a rash 2 days ago, left buttock. Asymptomatic, she was putting on lotion. It hasn't changed, no fevers or chills. No new topicals. No prodrome.     Past medical history:  Patient Active Problem List   Diagnosis     Malignant neoplasm of head of pancreas (H)     Toxic diffuse goiter     Anemia, chronic disease     Need for prophylactic measure     Syncope     GI bleed     Secondary malignant neoplasm of liver (H)       Medications:    Current Outpatient Prescriptions on File Prior to Visit:  dexamethasone (DECADRON) 4 MG tablet Take 1 tablet (4 mg) by mouth daily (with breakfast) for 3 days   ciprofloxacin (CIPRO) 500 MG tablet Take 1 tablet (500 mg) by mouth 2 times daily for 5 days   morphine (MS CONTIN) 15 MG 12 hr tablet Take 1 tablet (15 mg) by mouth every 12 hours   prochlorperazine (COMPAZINE) 10 MG tablet Take 1 tablet (10 mg) by mouth every 6 hours as needed (Nausea/Vomiting)   ondansetron (ZOFRAN-ODT) 8 MG ODT tab Take 1 tablet (8 mg) by mouth every 8 hours as needed for nausea   LORazepam (ATIVAN) 0.5 MG tablet Take 1 tablet (0.5 mg) by mouth every 4 hours as needed (Anxiety, Nausea/Vomiting or Sleep)   dronabinol (MARINOL) 5 MG capsule Take 1 capsule (5 mg) by mouth 2 times daily (before meals)   oxyCODONE (ROXICODONE) 5 MG IR tablet Take 1 tablet (5 mg) by mouth every 4 hours as needed for moderate to severe pain   Nutritional Supplements (ENSURE CLEAR) LIQD Take 1 Bottle by mouth 3 times daily   potassium chloride SA (POTASSIUM CHLORIDE) 20 MEQ CR tablet Take 1 tablet (20 mEq) by mouth daily   diltiazem 2% in PLO cream, FV COMPOUNDED, 2% GEL Apply topically daily and as needed to external hemorrhoids   docusate sodium (COLACE) 100  MG capsule Take 1 capsule (100 mg) by mouth daily   loratadine (CLARITIN) 10 MG tablet Take 10 mg by mouth daily   amylase-lipase-protease (CREON) 66478 UNITS CPEP Take 2 capsules (72,000 Units) by mouth 3 times daily (with meals)   polyethylene glycol (MIRALAX/GLYCOLAX) powder Take 17 g (1 capful) by mouth daily (Patient taking differently: Take 1 capful by mouth daily as needed )   lidocaine-prilocaine (EMLA) cream Apply topically as needed for other (Use 30-60 minutes prior to port access)     Current Facility-Administered Medications on File Prior to Visit:  heparin 100 UNIT/ML injection 5 mL   [COMPLETED] fosaprepitant (EMEND) 150 mg in NaCl 0.9 % intermittent infusion   [COMPLETED] palonosetron (ALOXI) injection 0.25 mg   [COMPLETED] dexamethasone (DECADRON) 12 mg in NaCl 0.9 % intermittent infusion   [COMPLETED] dextrose 5% BOLUS   atropine injection 0.4 mg   [COMPLETED] oxaliplatin (ELOXATIN) 167 mg in D5W 584 mL CHEMOTHERAPY   irinotecan (CAMPTOSAR) 340 mg in D5W 567 mL CHEMOTHERAPY   leucovorin 750 mg in D5W 100 mL infusion   Fluorouracil (ADRUCIL) injection CHEMOTHERAPY 790 mg   fluorouracil (ADRUCIL) 4,730 mg in NaCl 0.9 % 241 mL Home Infusion Chemotherapy   darbepoetin chelo-polysorbate (ARANESP) injection 300 mcg   heparin 100 UNIT/ML injection 5 mL   [DISCONTINUED] heparin 100 UNIT/ML injection 5 mL   [DISCONTINUED] sodium chloride (PF) 0.9% PF flush 20 mL       Allergy:     Allergies   Allergen Reactions     Contrast Dye Nausea and Vomiting     Pt vomiting post IV contrast, Please try Visipaque for next CT scan. 6/24/16 sv       Physical exam  There were no vitals taken for this visit.  Wt Readings from Last 4 Encounters:   04/21/17 71.4 kg (157 lb 6.4 oz)   04/19/17 68.9 kg (151 lb 14.4 oz)   04/17/17 69.9 kg (154 lb)   04/17/17 69.9 kg (154 lb)     General: No acute distress  Skin: left buttock there is a group of ~10 papules, pearly. No erythema, vesicles or signs concerning for zoster. Some look  umbilicated like molloscum    Labs:  Results for NATALIIA IBARRA (MRN [id number]) as of 4/21/2017 13:04   Ref. Range 4/21/2017 07:27   Sodium Latest Ref Range: 133 - 144 mmol/L 141   Potassium Latest Ref Range: 3.4 - 5.3 mmol/L 3.6   Chloride Latest Ref Range: 94 - 109 mmol/L 105   Carbon Dioxide Latest Ref Range: 20 - 32 mmol/L 27   Urea Nitrogen Latest Ref Range: 7 - 30 mg/dL 8   Creatinine Latest Ref Range: 0.52 - 1.04 mg/dL 0.64   GFR Estimate Latest Ref Range: >60 mL/min/1.7m2 >90...   GFR Estimate If Black Latest Ref Range: >60 mL/min/1.7m2 >90...   Calcium Latest Ref Range: 8.5 - 10.1 mg/dL 8.6   Anion Gap Latest Ref Range: 3 - 14 mmol/L 8   Albumin Latest Ref Range: 3.4 - 5.0 g/dL 2.3 (L)   Protein Total Latest Ref Range: 6.8 - 8.8 g/dL 7.5   Bilirubin Total Latest Ref Range: 0.2 - 1.3 mg/dL 1.1   Alkaline Phosphatase Latest Ref Range: 40 - 150 U/L 563 (H)   ALT Latest Ref Range: 0 - 50 U/L 58 (H)   AST Latest Ref Range: 0 - 45 U/L 60 (H)   Glucose Latest Ref Range: 70 - 99 mg/dL 96   WBC Latest Ref Range: 4.0 - 11.0 10e9/L 8.2   Hemoglobin Latest Ref Range: 11.7 - 15.7 g/dL 8.7 (L)   Hematocrit Latest Ref Range: 35.0 - 47.0 % 28.1 (L)   Platelet Count Latest Ref Range: 150 - 450 10e9/L 218   RBC Count Latest Ref Range: 3.8 - 5.2 10e12/L 3.26 (L)   MCV Latest Ref Range: 78 - 100 fl 86   MCH Latest Ref Range: 26.5 - 33.0 pg 26.7   MCHC Latest Ref Range: 31.5 - 36.5 g/dL 31.0 (L)   RDW Latest Ref Range: 10.0 - 15.0 % 20.2 (H)   Diff Method Unknown Automated Method   % Neutrophils Latest Units: % 76.2   % Lymphocytes Latest Units: % 13.7   % Monocytes Latest Units: % 8.0   % Eosinophils Latest Units: % 0.5   % Basophils Latest Units: % 0.5   % Immature Granulocytes Latest Units: % 1.1   Nucleated RBCs Latest Ref Range: 0 /100 0   Absolute Neutrophil Latest Ref Range: 1.6 - 8.3 10e9/L 6.3   Absolute Lymphocytes Latest Ref Range: 0.8 - 5.3 10e9/L 1.1   Absolute Monocytes Latest Ref Range: 0.0 - 1.3  10e9/L 0.7   Absolute Eosinophils Latest Ref Range: 0.0 - 0.7 10e9/L 0.0   Absolute Basophils Latest Ref Range: 0.0 - 0.2 10e9/L 0.0   Abs Immature Granulocytes Latest Ref Range: 0 - 0.4 10e9/L 0.1   Absolute Nucleated RBC Unknown 0.0     Assessment and plan:  Nonspecific rash on left buttock. My concern for zoster is very low. She will monitor, if worsens or develops pain she will be seen. It could be molluscum contagiosum although the presentation is usually more insiduous. Monitor. Asymptomatic, no meds needed.     Cathie Amaral PA-C

## 2017-04-21 NOTE — PATIENT INSTRUCTIONS
Contact Numbers    [facility] Main Line: [phone]  [facility] Triage:  [phone]    Call triage with chills and/or temperature greater than or equal to 100.5, uncontrolled nausea/vomiting, diarrhea, constipation, dizziness, shortness of breath, chest pain, bleeding, unexplained bruising, or any new/concerning symptoms, questions/concerns.     If you are having any concerning symptoms or wish to speak to a provider before your next infusion visit, please call your care coordinator or triage to notify them so we can adequately serve you.       After Hours: [phone]    If after hours, weekends, or holidays, call main hospital  and ask for Oncology doctor on call.         April 2017 Sunday Monday Tuesday Wednesday Thursday Friday Saturday                                 1       2     3     [facility]    8:00 AM   (15 min.)   [facility]   [facility] Draw     P ONC INFUSION 360    8:30 AM   (360 min.)    ONCOLOGY INFUSION   [facility] 4     5     6     7     8       9     10  Happy Birthday!     11     [facility]    7:30 AM   (15 min.)   [facility]   [facility] Lab Draw     [facility] RETURN    7:45 AM   (50 min.)   Ester Echavarria, APRN CNP   [facility]     [facility] ONC INFUSION 360    8:30 AM   (360 min.)    ONCOLOGY INFUSION   [facility] 12     13     14     [facility]    8:45 AM   (15 min.)    [facility]   [facility] Draw     MR ABDOMEN WWO    9:00 AM   (45 min.)   [email or web address]   [facility] MRI     CT CHEST WO   10:05 AM   (20 min.)   [email or web address]   [facility] CT     [facility] DRAW   10:45 AM   (15 min.)   [facility]   [facility] Draw 15       16     17     [facility]    4:15 PM   (15 min.)    [facility] DRAW   [facility] Draw     [facility] RETURN    4:45 PM   (30 min.)   Demond Gonsales MD   [facility]  Clinic 18     P ONC INFUSION 360    7:00 AM   (360 min.)   [facility] ONCOLOGY INFUSION   [facility] 19     Admission    9:20 AM   Tristin Braden MD   [facility]   (Discharge: 4/19/2017)     ENDOSCOPIC RETROGRADE CHOLANGIOPANCREATOGRAPHY, EXCHANGE TUBE/STENT   10:55 AM   Tristin Braden MD   [facility] OR     XR SURG SAUL <5 MIN FL W STILL   11:05 AM   (30 min.)   [email or web address]   [facility], [place],  Radiology 20     21     [facility]    7:00 AM   (15 min.)   [facility]   [facility] Draw     P ONC INFUSION 360    7:30 AM   (360 min.)   [facility] ONCOLOGY INFUSION   [facility]     [facility] RETURN   11:45 AM   (60 min.)   Cathie Amaral PA-C   [facility] 22       23     24     25     26     27     28     29       30                                              May 2017   [place] Monday Tuesday Wednesday Thursday Friday Saturday        1     2     [facility]    7:30 AM   (15 min.)   [facility]   [facility] Draw     [facility] RETURN    7:45 AM   (50 min.)   [facility] CNP   [facility]    8:00 AM   (360 min.)   [facility] ONCOLOGY INFUSION   [facility] 3     4     5     6       7     8     9     10     11     12     13       14     15     16     [facility]    7:30 AM   (15 min.)   [facility]   [facility] Draw     [facility] RETURN    7:45 AM   (50 min.)   Ester Echavarria APRN CNP   [facility]     [facility] ONC INFUSION 360    9:00 AM   (360 min.)   [facility] ONCOLOGY INFUSION   [facility] 17     18     19     20       21     22     23     24     25     26     27       28     29     30     [facility]    6:30 AM   (15 min.)   [facility]   [facility] Draw     P ONC INFUSION 360    7:00 AM   (360 min.)   [facility] ONCOLOGY INFUSION   [facility] 31                                  Lab Results:  Recent Results (from the past 12 hour(s))   CBC with platelets differential    Collection Time: 04/21/17  7:27 AM   Result Value Ref Range    WBC 8.2 4.0 - 11.0 10e9/L    RBC Count 3.26 (L) 3.8 - 5.2 10e12/L    Hemoglobin 8.7 (L) 11.7 - 15.7 g/dL    Hematocrit 28.1 (L) 35.0 - 47.0 %    MCV 86 78 - 100 fl    MCH 26.7 26.5 - 33.0 pg    MCHC 31.0 (L) 31.5 - 36.5 g/dL    RDW 20.2 (H) 10.0 - 15.0 %    Platelet Count 218 150 - 450 10e9/L    Diff Method Automated Method     % Neutrophils 76.2 %    % Lymphocytes 13.7 %    % Monocytes 8.0 %    % Eosinophils 0.5 %    % Basophils 0.5 %    % Immature Granulocytes 1.1 %    Nucleated RBCs 0 0 /100    Absolute Neutrophil 6.3 1.6 - 8.3 10e9/L    Absolute Lymphocytes 1.1 0.8 - 5.3 10e9/L    Absolute Monocytes 0.7 0.0 - 1.3 10e9/L    Absolute Eosinophils 0.0 0.0 - 0.7 10e9/L    Absolute Basophils 0.0 0.0 - 0.2 10e9/L    Abs Immature Granulocytes 0.1 0 - 0.4 10e9/L    Absolute Nucleated RBC 0.0    Comprehensive metabolic panel    Collection Time: 04/21/17  7:27 AM   Result Value Ref Range    Sodium 141 133 - 144 mmol/L    Potassium 3.6 3.4 - 5.3 mmol/L    Chloride 105 94 - 109 mmol/L    Carbon Dioxide 27 20 - 32 mmol/L    Anion Gap 8 3 - 14 mmol/L    Glucose 96 70 - 99 mg/dL    Urea Nitrogen 8 7 - 30 mg/dL    Creatinine 0.64 0.52 - 1.04 mg/dL    GFR Estimate >90  Non  GFR Calc   >60 mL/min/1.7m2    GFR Estimate If Black >90   GFR Calc   >60 mL/min/1.7m2    Calcium 8.6 8.5 - 10.1 mg/dL    Bilirubin Total 1.1 0.2 - 1.3 mg/dL    Albumin 2.3 (L) 3.4 - 5.0 g/dL    Protein Total 7.5 6.8 - 8.8 g/dL    Alkaline Phosphatase 563 (H) 40 - 150 U/L    ALT 58 (H) 0 - 50 U/L    AST 60 (H) 0 - 45 U/L

## 2017-04-22 PROBLEM — K83.1 BILIARY OBSTRUCTION DUE TO MALIGNANT NEOPLASM (H): Status: ACTIVE | Noted: 2017-04-22

## 2017-04-22 PROBLEM — C80.1 BILIARY OBSTRUCTION DUE TO MALIGNANT NEOPLASM (H): Status: ACTIVE | Noted: 2017-04-22

## 2017-04-22 LAB — CANCER AG19-9 SERPL-ACNC: ABNORMAL

## 2017-04-24 ENCOUNTER — ALLIED HEALTH/NURSE VISIT (OUTPATIENT)
Dept: ONCOLOGY | Facility: CLINIC | Age: 57
End: 2017-04-24
Attending: INTERNAL MEDICINE
Payer: COMMERCIAL

## 2017-04-24 VITALS
SYSTOLIC BLOOD PRESSURE: 95 MMHG | OXYGEN SATURATION: 100 % | TEMPERATURE: 98.1 F | HEART RATE: 91 BPM | RESPIRATION RATE: 16 BRPM | DIASTOLIC BLOOD PRESSURE: 58 MMHG

## 2017-04-24 DIAGNOSIS — C25.0 MALIGNANT NEOPLASM OF HEAD OF PANCREAS (H): ICD-10-CM

## 2017-04-24 DIAGNOSIS — Z29.9 NEED FOR PROPHYLACTIC MEASURE: Primary | ICD-10-CM

## 2017-04-24 PROCEDURE — 99211 OFF/OP EST MAY X REQ PHY/QHP: CPT | Mod: 25

## 2017-04-24 PROCEDURE — 96360 HYDRATION IV INFUSION INIT: CPT

## 2017-04-24 PROCEDURE — 25000128 H RX IP 250 OP 636: Mod: ZF | Performed by: NURSE PRACTITIONER

## 2017-04-24 PROCEDURE — 96361 HYDRATE IV INFUSION ADD-ON: CPT

## 2017-04-24 PROCEDURE — 25000128 H RX IP 250 OP 636: Mod: ZF | Performed by: INTERNAL MEDICINE

## 2017-04-24 PROCEDURE — 96372 THER/PROPH/DIAG INJ SC/IM: CPT | Mod: 59

## 2017-04-24 RX ORDER — HEPARIN SODIUM (PORCINE) LOCK FLUSH IV SOLN 100 UNIT/ML 100 UNIT/ML
5 SOLUTION INTRAVENOUS ONCE
Status: COMPLETED | OUTPATIENT
Start: 2017-04-24 | End: 2017-04-24

## 2017-04-24 RX ADMIN — SODIUM CHLORIDE 1000 ML: 9 INJECTION, SOLUTION INTRAVENOUS at 11:29

## 2017-04-24 RX ADMIN — PEGFILGRASTIM 6 MG: 6 INJECTION SUBCUTANEOUS at 12:41

## 2017-04-24 RX ADMIN — SODIUM CHLORIDE, PRESERVATIVE FREE 5 ML: 5 INJECTION INTRAVENOUS at 13:15

## 2017-04-24 NOTE — PROGRESS NOTES
Infusion Nursing Note:  Shirin Muller presents today for IV fluids, neulasta (aranesp not yet covered by insurance, pharmacy notified Dr Gonsales)  Patient seen by provider today: No   present during visit today: Not Applicable.    Note: Patient states that she is feeling very weak today. She had a lot of nausea since chemo on Friday, it is improving today.  She was not able to eat much this weekend, only drink fluids.  She requested IV fluids with her appointment today, given per standing therapy plan orders. She denies any fevers or chills    Patient's blood pressure was a little low on arrival (95/58 with ). Following IV fluids patient denied feeling dizzy even with standing. Sitting BP rechecked x3 and was 85/55 each time.  Standing BP up to 94/58.  Patient HR improved to the 90s and she was asymptomatic.  Dr Gonsales paged x2 with no call back after 30 minutes.  RN then rechecked BP again and it was 95/58 sitting with HR 91. Patient was asymptomatic and discharged to home with her sister.  Dr Gonsales called back after patient was discharged and was in agreement with that plan     4/24/2017 1330 Demond Gonsales MD/Ashli Espinoza RN  -ok to set patient up for IVF every cycle after pump disconnect when patient comes for neulasta    Intravenous Access:  Implanted Port.    Treatment Conditions:  Not Applicable.      Post Infusion Assessment:  Patient tolerated infusion without incident.  Patient tolerated injection without incident. Neulasta injection given SQ into left upper quadrant of abdomen  Blood return noted pre and post infusion.  Site patent and intact, free from redness, edema or discomfort.  No evidence of extravasations.  Access discontinued per protocol.    Discharge Plan:   Patient declined prescription refills.  Discharge instructions reviewed with: Patient.  Patient and/or family verbalized understanding of discharge instructions and all questions answered.  Copy of AVS  reviewed with patient and/or family.  Patient will return 5/2 for next appointment.  Patient discharged in stable condition accompanied by: sister.  Departure Mode: Wheelchair   Face to Face time: 3 minutes    Ashli Espinoza RN

## 2017-04-24 NOTE — MR AVS SNAPSHOT
After Visit Summary   4/24/2017    Shirin Muller    MRN: [id number]           Patient Information     Date Of Birth          1960        Visit Information        Provider Department      4/24/2017 11:00 AM Nurse, [facility]        Today's Diagnoses     Need for prophylactic measure    -  1    Malignant neoplasm of head of pancreas (H)          Care Instructions    Contact Numbers    [facility] Main Line: [phone]  [facility] Triage:  [phone]    Call triage with chills and/or temperature greater than or equal to 100.5, uncontrolled nausea/vomiting, diarrhea, constipation, dizziness, shortness of breath, chest pain, bleeding, unexplained bruising, or any new/concerning symptoms, questions/concerns.     If you are having any concerning symptoms or wish to speak to a provider before your next infusion visit, please call your care coordinator or triage to notify them so we can adequately serve you.       After Hours: [phone]    If after hours, weekends, or holidays, call main hospital  and ask for Oncology doctor on call.           April 2017 Sunday Monday Tuesday Wednesday Thursday Friday Saturday                                 1       2     3     [facility] DRAW    8:00 AM   (15 min.)    [facility]ONIC LAB DRAW   [facility] Lab Draw     P ONC INFUSION 360    8:30 AM   (360 min.)    ONCOLOGY INFUSION   [facility] 4     5     6     7     8       9     10  Happy Birthday!     11     [facility] DRAW    7:30 AM   (15 min.)    [facility] LAB DRAW   [facility] Draw     UMP RETURN    7:45 AM   (50 min.)   Ester Echavarria APRN CNP   M [facility]     UMP ONC INFUSION 360    8:30 AM   (360 min.)    ONCOLOGY INFUSION   [facility] 12     13     14     [facility] DRAW    8:45 AM   (15 min.)   [facility] DRAW   [facility] Draw     MR ABDOMEN  WWO    9:00 AM   (45 min.)   UCMR1   [facility] MRI     CT CHEST WO   10:05 AM   (20 min.)   UCCT2   [facility] CT     [facility] DRAW   10:45 AM   (15 min.)   [facility] DRAW   [facility] Mason[facility] Lab Draw 15       16     17     [facility] DRAW    4:15 PM   (15 min.)   [facility] DRAW   [facility] Lab Draw     UMP RETURN    4:45 PM   (30 min.)   Demond Gonsales MD   [facility] 18     UMP ONC INFUSION 360    7:00 AM   (360 min.)   UC ONCOLOGY INFUSION   [facility] 19     Admission    9:20 AM   Tristni Braden MD   Same Day Surgery [facility]   (Discharge: 4/19/2017)     ENDOSCOPIC RETROGRADE CHOLANGIOPANCREATOGRAPHY, EXCHANGE TUBE/STENT   10:55 AM   Tristin Braden MD   [facility] OR     XR SURG SAUL <5 MIN FL W STILL   11:05 AM   (30 min.)   UUXREPS1   [facility] [place],  Radiology 20     21     [facility] DRAW    7:00 AM   (15 min.)   [facility]   [facility]     P ONC INFUSION 360    7:30 AM   (360 min.)   UC ONCOLOGY INFUSION   [facility]     UMP RETURN   11:45 AM   (60 min.)   Cathie Amaral PA-C   [facility] 22       23     24     UMP INJECTION   10:45 AM   (15 min.)   Nurse,  Oncology Injection   [facility] 25     26     27     28     29       30                                              May 2017   Kristofer Monday Tuesday Wednesday Thursday Friday Saturday        1     2     [facility] DRAW    7:30 AM   (15 min.)   [facility]     [facility] RETURN    7:45 AM   (50 min.)   Ester Echavarria APRN CNP   [facility]     UMP ONC INFUSION 360    8:00 AM   (360 min.)   UC ONCOLOGY INFUSION   [facility] 3     4     5     6       7     8     9     10     11     12     13       14     15     16     [facility] DRAW    7:30 AM   (15 min.)   [facility]  LAB DRAW   [facility] Masonic Lab Draw     [facility] RETURN    7:45 AM   (50 min.)   Ester Echavarria APRN CNP   [facility]     UMP ONC INFUSION 360    9:00 AM   (360 min.)   UC ONCOLOGY INFUSION   [facility] 17     18     19     20       21     22     23     24     25     26     27       28     29     30     [facility] DRAW    6:30 AM   (15 min.)   [facility]   [facility]     [facility] ONC INFUSION 360    7:00 AM   (360 min.)   UC ONCOLOGY INFUSION   [facility] 31                              No results found for this or any previous visit (from the past 24 hour(s)).            Follow-ups after your visit        Your next 10 appointments already scheduled     May 02, 2017  7:30 AM CDT   [facility] Lab Draw with [facility] DRAW   [facility] ([facility])    [address] [place] [address]   [phone]            May 02, 2017  8:00 AM CDT   Infusion 360 with UC ONCOLOGY INFUSION, UC 14 ATC   [facility] ([facility])    [address] [place] [address]   [phone]            May 02, 2017  8:00 AM CDT   (Arrive by 7:45 AM)   Return Visit with TEE Olivas CNP   [facility] ([facility])    [address] [place] [address]   [phone]            May 16, 2017  7:30 AM CDT   Masonic Lab Draw with UC [facility]ONIC LAB DRAW   [facility] Masonic Lab Draw ([facility])    [address] [place] [address]   [phone]            May 16, 2017  8:00 AM CDT   (Arrive by 7:45 AM)   Return Visit with TEE Olivas CNP   [facility] ([facility])    [address] [place] [address]   [phone]            May 16, 2017   9:00 AM CDT   Infusion 360 with UC ONCOLOGY INFUSION, UC 23 ATC   [facility] ([facility])    [address] [facility]  [address] [facility] [id number] [phone]            May 30, 2017  6:30 AM CDT   M[facility]ic Lab Draw with UC [facility] LAB DRAW   [facility] Lab Draw ([facility])    [address] [id number] [phone]            May 30, 2017  7:00 AM CDT   Infusion 360 with UC ONCOLOGY INFUSION   [facility] ([facility])    [address] [id number] [phone]              Who to contact     If you have questions or need follow up information about today's clinic visit or your schedule please contact [facility] directly at [phone].  Normal or non-critical lab and imaging results will be communicated to you by [employer], letter or phone within 4 business days after the clinic has received the results. If you do not hear from us within 7 days, please contact the clinic through [employer] or phone. If you have a critical or abnormal lab result, we will notify you by phone as soon as possible.  Submit refill requests through [employer] or call your pharmacy and they will forward the refill request to us. Please allow 3 business days for your refill to be completed.          Additional Information About Your Visit        [employer] Information     [employer] gives you secure access to your electronic health record. If you see a primary care provider, you can also send messages to your care team and make appointments. If you have questions, please call your primary care clinic.  If you do not have a primary care provider, please call [phone] and they will assist you.        Care EveryWhere ID     This is your Care EveryWhere ID. This could be used by other organizations to access your [facility]  records  [id number]        Your Vitals Were     Pulse Temperature Respirations Pulse Oximetry          108 97.7  F (36.5  C) (Oral) 16 100%         Blood Pressure from Last 3 Encounters:   04/24/17 95/58   04/21/17 108/67   04/19/17 124/81    Weight from Last 3 Encounters:   04/21/17 71.4 kg (157 lb 6.4 oz)   04/19/17 68.9 kg (151 lb 14.4 oz)   04/17/17 69.9 kg (154 lb)              Today, you had the following     No orders found for display         Today's Medication Changes          These changes are accurate as of: 4/24/17 12:17 PM.  If you have any questions, ask your nurse or doctor.               These medicines have changed or have updated prescriptions.        Dose/Directions    polyethylene glycol powder   Commonly known as:  MIRALAX/GLYCOLAX   This may have changed:    - when to take this  - reasons to take this   Used for:  Malignant neoplasm of head of pancreas (H)        Dose:  1 capful   Take 17 g (1 capful) by mouth daily   Quantity:  119 g   Refills:  11                Primary Care Provider    [facility]       No address on file        Thank you!     Thank you for choosing [facility]  for your care. Our goal is always to provide you with excellent care. Hearing back from our patients is one way we can continue to improve our services. Please take a few minutes to complete the written survey that you may receive in the mail after your visit with us. Thank you!             Your Updated Medication List - Protect others around you: Learn how to safely use, store and throw away your medicines at www.disposemymeds.org.          This list is accurate as of: 4/24/17 12:17 PM.  Always use your most recent med list.                   Brand Name Dispense Instructions for use    amylase-lipase-protease 05280 UNITS Cpep    CREON    180 capsule    Take 2 capsules (72,000 Units) by mouth 3 times daily (with meals)       ciprofloxacin 500 MG tablet    CIPRO    10 tablet     Take 1 tablet (500 mg) by mouth 2 times daily for 5 days       dexamethasone 4 MG tablet    DECADRON    3 tablet    Take 1 tablet (4 mg) by mouth daily (with breakfast) for 3 days       diltiazem 2% in PLO cream (FV COMPOUNDED) 2% Gel     30 g    Apply topically daily and as needed to external hemorrhoids       docusate sodium 100 MG capsule    COLACE    100 capsule    Take 1 capsule (100 mg) by mouth daily       dronabinol 5 MG capsule    MARINOL    60 capsule    Take 1 capsule (5 mg) by mouth 2 times daily (before meals)       ENSURE CLEAR Liqd     90 Bottle    Take 1 Bottle by mouth 3 times daily       lidocaine-prilocaine cream    EMLA    30 g    Apply topically as needed for other (Use 30-60 minutes prior to port access)       loratadine 10 MG tablet    CLARITIN     Take 10 mg by mouth daily       LORazepam 0.5 MG tablet    ATIVAN    30 tablet    Take 1 tablet (0.5 mg) by mouth every 4 hours as needed (Anxiety, Nausea/Vomiting or Sleep)       morphine 15 MG 12 hr tablet    MS CONTIN    60 tablet    Take 1 tablet (15 mg) by mouth every 12 hours       ondansetron 8 MG ODT tab    ZOFRAN-ODT    60 tablet    Take 1 tablet (8 mg) by mouth every 8 hours as needed for nausea       oxyCODONE 5 MG IR tablet    ROXICODONE    100 tablet    Take 1 tablet (5 mg) by mouth every 4 hours as needed for moderate to severe pain       polyethylene glycol powder    MIRALAX/GLYCOLAX    119 g    Take 17 g (1 capful) by mouth daily       potassium chloride SA 20 MEQ CR tablet    potassium chloride    60 tablet    Take 1 tablet (20 mEq) by mouth daily       prochlorperazine 10 MG tablet    COMPAZINE    30 tablet    Take 1 tablet (10 mg) by mouth every 6 hours as needed (Nausea/Vomiting)

## 2017-04-24 NOTE — PATIENT INSTRUCTIONS
Contact Numbers    [facility] Main Line: [phone]  [facility] Triage:  [phone]    Call triage with chills and/or temperature greater than or equal to 100.5, uncontrolled nausea/vomiting, diarrhea, constipation, dizziness, shortness of breath, chest pain, bleeding, unexplained bruising, or any new/concerning symptoms, questions/concerns.     If you are having any concerning symptoms or wish to speak to a provider before your next infusion visit, please call your care coordinator or triage to notify them so we can adequately serve you.       After Hours: [phone]    If after hours, weekends, or holidays, call main hospital  and ask for Oncology doctor on call.           April 2017 Sunday Monday Tuesday Wednesday Thursday Friday Saturday                                 1       2     3     [facility] DRAW    8:00 AM   (15 min.)   [facility]   [facility]     P ONC INFUSION 360    8:30 AM   (360 min.)    ONCOLOGY INFUSION   [facility] 4     5     6     7     8       9     10  Happy Birthday!     11     [facility] DRAW    7:30 AM   (15 min.)    MASONIC LAB DRAW   [facility] Lab Draw     UMP RETURN    7:45 AM   (50 min.)   Ester Echavarria, APRN CNP   [facility]     UMP ONC INFUSION 360    8:30 AM   (360 min.)    ONCOLOGY INFUSION   [facility] 12     13     14     [facility] LAB DRAW    8:45 AM   (15 min.)    MASONIC LAB DRAW   [facility] Masonic Lab Draw     MR ABDOMEN WWO    9:00 AM   (45 min.)   UCMR1   [facility] MRI     CT CHEST WO   10:05 AM   (20 min.)   UCCT2   [facility] CT     [facility] DRAW   10:45 AM   (15 min.)   [facility] LAB DRAW   [facility] Mason[facility] Lab Draw 15       16     17     [facility] DRAW    4:15 PM   (15 min.)    MASONIC LAB DRAW   [facility] Masonic Lab Draw     UMP RETURN    4:45 PM   (30 min.)   Demond Gonsales MD   [facility]  Cancer Clinic 18     UMP ONC INFUSION 360    7:00 AM   (360 min.)   UC ONCOLOGY INFUSION   [facility] 19     Admission    9:20 AM   Tristin Braden MD   Same Day Surgery [facility]   (Discharge: 4/19/2017)     ENDOSCOPIC RETROGRADE CHOLANGIOPANCREATOGRAPHY, EXCHANGE TUBE/STENT   10:55 AM   Tristin Braden MD   [facility] OR     XR SURG SAUL <5 MIN FL W STILL   11:05 AM   (30 min.)   UUXREPS1   [facility] [place],  Radiology 20     21     [facility] DRAW    7:00 AM   (15 min.)   [facility] LAB DRAW   [facility] Lab Draw     P ONC INFUSION 360    7:30 AM   (360 min.)   UC ONCOLOGY INFUSION   [facility]     UMP RETURN   11:45 AM   (60 min.)   Cathie Amaral PA-C   [facility] 22       23     24     UMP INJECTION   10:45 AM   (15 min.)   Nurse, [facility]cology Injection   [facility] 25     26     27     28     29       30                                              May 2017   Kristofer Monday Tuesday Wednesday Thursday Friday Saturday        1     2     [facility] DRAW    7:30 AM   (15 min.)   [facility] LAB DRAW   [facility] Mason[facility] Lab Draw     UMP RETURN    7:45 AM   (50 min.)   E[facility]arria APRN CNP   [facility]    8:00 AM   (360 min.)   UC ONCOLOGY INFUSION   [facility] 3     4     5     6       7     8     9     10     11     12     13       14     15     16     [facility] DRAW    7:30 AM   (15 min.)   [facility] LAB DRAW   [facility] Mason[facility] Lab Draw     UMP RETURN    7:45 AM   (50 min.)   Ester Echavarria APRN CNP   [facility]    9:00 AM   (360 min.)   UC ONCOLOGY INFUSION   [facility] 17     18     19     20       21     22     23     24     25     26     27       28     29     30     [facility] DRAW    6:30 AM   (15 min.)   [facility]ON[facility] LAB DRAW   [facility] Masonic Lab Draw      [facility] ONC INFUSION 360    7:00 AM   (360 min.)    ONCOLOGY INFUSION   [facility] 31                              No results found for this or any previous visit (from the past 24 hour(s)).

## 2017-04-25 ENCOUNTER — TELEPHONE (OUTPATIENT)
Dept: ONCOLOGY | Facility: CLINIC | Age: 57
End: 2017-04-25

## 2017-04-25 DIAGNOSIS — C25.0 MALIGNANT NEOPLASM OF HEAD OF PANCREAS (H): Primary | ICD-10-CM

## 2017-04-25 RX ORDER — PANTOPRAZOLE SODIUM 20 MG/1
20 TABLET, DELAYED RELEASE ORAL DAILY
Qty: 90 TABLET | Refills: 0 | Status: SHIPPED | OUTPATIENT
Start: 2017-04-25 | End: 2017-01-01

## 2017-04-25 NOTE — TELEPHONE ENCOUNTER
Pt calls to report she has had a continuous hiccups since her biliary stenting on 4/19.  Says she has them day and night and they wake her up sometimes. Spoke to Ester who would like pt to take compazine every 6 hours for 24 hours as well as start Protonix once daily. Pt will call back tomorrow with update. She does have the compazine at home. Protonix sent to [facility] pharmacy. Pt understood instructions today.

## 2017-04-28 ENCOUNTER — DOCUMENTATION ONLY (OUTPATIENT)
Dept: ONCOLOGY | Facility: CLINIC | Age: 57
End: 2017-04-28

## 2017-04-28 NOTE — PROGRESS NOTES
Form Request Documentation    Date Received in Clinic:  4/24/2017  Name/Type of Form:  Department of Education Total and Permanent Disability Application  Questions that need to be addressed:   Other: None  Date Completed: 4/28/2017  Copy Mailed to patient: Yes on 4/28/2017  Disposition of Form: Fax to  Dept of Education at [phone]

## 2017-05-02 ENCOUNTER — TELEPHONE (OUTPATIENT)
Dept: ONCOLOGY | Facility: CLINIC | Age: 57
End: 2017-05-02

## 2017-05-02 ENCOUNTER — CARE COORDINATION (OUTPATIENT)
Dept: ONCOLOGY | Facility: CLINIC | Age: 57
End: 2017-05-02

## 2017-05-02 NOTE — TELEPHONE ENCOUNTER
Prior authorization initiated    NATALIIA ROBINS (Key: [id number]) - [id number]  Pantoprazole Sodium 20MG dr tablets  Status: PA Request  Created: April 25th, 2017 [phone]  Sent: April 26th, 2017 via [email or web address]    Form: [employer]ree Electronic PA Form (NCPDP)   Original Claim Info76 QTY AJTS [place],PA REQ CALL [phone] MAXIMUM 365 DAYS QUANTITY OF 90.000    5/2/17   No notification received from insurance regarding approval or denial of prior auth request.  Spoke to pharmacist at [facility],  Stated medication was never  Picked up, but was ready and had a $1 co-pay

## 2017-05-02 NOTE — PROGRESS NOTES
Called Shirin to see how she is doing after infusion last week.  She stated that she did have a day of nausea and vomiting, but started to feel better on Saturday.  She has not been nauseated or vomited since.  She is adequately taking fluids.  Let her know that Wednesday is too early for infusion and it will be moved to Friday 5/5/17 along with labs and PA.  Shirin agreed with and verbalized understanding of the plan of care.

## 2017-05-05 ENCOUNTER — ONCOLOGY VISIT (OUTPATIENT)
Dept: ONCOLOGY | Facility: CLINIC | Age: 57
End: 2017-05-05
Attending: NURSE PRACTITIONER
Payer: COMMERCIAL

## 2017-05-05 ENCOUNTER — APPOINTMENT (OUTPATIENT)
Dept: LAB | Facility: CLINIC | Age: 57
End: 2017-05-05
Attending: NURSE PRACTITIONER
Payer: COMMERCIAL

## 2017-05-05 VITALS
TEMPERATURE: 97.5 F | OXYGEN SATURATION: 100 % | BODY MASS INDEX: 21.73 KG/M2 | HEART RATE: 74 BPM | WEIGHT: 155.8 LBS | DIASTOLIC BLOOD PRESSURE: 56 MMHG | RESPIRATION RATE: 18 BRPM | SYSTOLIC BLOOD PRESSURE: 90 MMHG

## 2017-05-05 DIAGNOSIS — C25.0 MALIGNANT NEOPLASM OF HEAD OF PANCREAS (H): ICD-10-CM

## 2017-05-05 DIAGNOSIS — Z29.9 NEED FOR PROPHYLACTIC MEASURE: Primary | ICD-10-CM

## 2017-05-05 DIAGNOSIS — Z29.9 NEED FOR PROPHYLACTIC MEASURE: ICD-10-CM

## 2017-05-05 DIAGNOSIS — D63.8 ANEMIA, CHRONIC DISEASE: ICD-10-CM

## 2017-05-05 DIAGNOSIS — J30.2 SEASONAL ALLERGIC RHINITIS, UNSPECIFIED ALLERGIC RHINITIS TRIGGER: ICD-10-CM

## 2017-05-05 DIAGNOSIS — C78.7 SECONDARY MALIGNANT NEOPLASM OF LIVER (H): ICD-10-CM

## 2017-05-05 DIAGNOSIS — C25.0 MALIGNANT NEOPLASM OF HEAD OF PANCREAS (H): Primary | ICD-10-CM

## 2017-05-05 LAB
ALBUMIN SERPL-MCNC: 2.6 G/DL (ref 3.4–5)
ALP SERPL-CCNC: 336 U/L (ref 40–150)
ALT SERPL W P-5'-P-CCNC: 34 U/L (ref 0–50)
ANION GAP SERPL CALCULATED.3IONS-SCNC: 6 MMOL/L (ref 3–14)
AST SERPL W P-5'-P-CCNC: 37 U/L (ref 0–45)
BASOPHILS # BLD AUTO: 0 10E9/L (ref 0–0.2)
BASOPHILS NFR BLD AUTO: 0.3 %
BILIRUB SERPL-MCNC: 0.6 MG/DL (ref 0.2–1.3)
BUN SERPL-MCNC: 13 MG/DL (ref 7–30)
CALCIUM SERPL-MCNC: 8.5 MG/DL (ref 8.5–10.1)
CHLORIDE SERPL-SCNC: 108 MMOL/L (ref 94–109)
CO2 SERPL-SCNC: 28 MMOL/L (ref 20–32)
CREAT SERPL-MCNC: 0.51 MG/DL (ref 0.52–1.04)
DIFFERENTIAL METHOD BLD: ABNORMAL
EOSINOPHIL # BLD AUTO: 0 10E9/L (ref 0–0.7)
EOSINOPHIL NFR BLD AUTO: 0.4 %
ERYTHROCYTE [DISTWIDTH] IN BLOOD BY AUTOMATED COUNT: 18.3 % (ref 10–15)
GFR SERPL CREATININE-BSD FRML MDRD: ABNORMAL ML/MIN/1.7M2
GLUCOSE SERPL-MCNC: 81 MG/DL (ref 70–99)
HCT VFR BLD AUTO: 27.9 % (ref 35–47)
HGB BLD-MCNC: 8.2 G/DL (ref 11.7–15.7)
IMM GRANULOCYTES # BLD: 0.1 10E9/L (ref 0–0.4)
IMM GRANULOCYTES NFR BLD: 1.2 %
LYMPHOCYTES # BLD AUTO: 1.1 10E9/L (ref 0.8–5.3)
LYMPHOCYTES NFR BLD AUTO: 14.3 %
MCH RBC QN AUTO: 25.8 PG (ref 26.5–33)
MCHC RBC AUTO-ENTMCNC: 29.4 G/DL (ref 31.5–36.5)
MCV RBC AUTO: 88 FL (ref 78–100)
MONOCYTES # BLD AUTO: 0.5 10E9/L (ref 0–1.3)
MONOCYTES NFR BLD AUTO: 6.3 %
NEUTROPHILS # BLD AUTO: 5.8 10E9/L (ref 1.6–8.3)
NEUTROPHILS NFR BLD AUTO: 77.5 %
NRBC # BLD AUTO: 0 10*3/UL
NRBC BLD AUTO-RTO: 0 /100
PLATELET # BLD AUTO: 107 10E9/L (ref 150–450)
POTASSIUM SERPL-SCNC: 3.7 MMOL/L (ref 3.4–5.3)
PROT SERPL-MCNC: 6.8 G/DL (ref 6.8–8.8)
RBC # BLD AUTO: 3.18 10E12/L (ref 3.8–5.2)
SODIUM SERPL-SCNC: 143 MMOL/L (ref 133–144)
WBC # BLD AUTO: 7.5 10E9/L (ref 4–11)

## 2017-05-05 PROCEDURE — 96417 CHEMO IV INFUS EACH ADDL SEQ: CPT

## 2017-05-05 PROCEDURE — 25000128 H RX IP 250 OP 636: Mod: ZF | Performed by: NURSE PRACTITIONER

## 2017-05-05 PROCEDURE — 96368 THER/DIAG CONCURRENT INF: CPT

## 2017-05-05 PROCEDURE — 99214 OFFICE O/P EST MOD 30 MIN: CPT | Mod: ZP | Performed by: NURSE PRACTITIONER

## 2017-05-05 PROCEDURE — 80053 COMPREHEN METABOLIC PANEL: CPT | Performed by: NURSE PRACTITIONER

## 2017-05-05 PROCEDURE — 96367 TX/PROPH/DG ADDL SEQ IV INF: CPT

## 2017-05-05 PROCEDURE — 96376 TX/PRO/DX INJ SAME DRUG ADON: CPT

## 2017-05-05 PROCEDURE — 96413 CHEMO IV INFUSION 1 HR: CPT

## 2017-05-05 PROCEDURE — 96416 CHEMO PROLONG INFUSE W/PUMP: CPT

## 2017-05-05 PROCEDURE — 96375 TX/PRO/DX INJ NEW DRUG ADDON: CPT

## 2017-05-05 PROCEDURE — 86301 IMMUNOASSAY TUMOR CA 19-9: CPT | Performed by: NURSE PRACTITIONER

## 2017-05-05 PROCEDURE — 96415 CHEMO IV INFUSION ADDL HR: CPT

## 2017-05-05 PROCEDURE — 96411 CHEMO IV PUSH ADDL DRUG: CPT

## 2017-05-05 PROCEDURE — 85025 COMPLETE CBC W/AUTO DIFF WBC: CPT | Performed by: NURSE PRACTITIONER

## 2017-05-05 PROCEDURE — 25000125 ZZHC RX 250: Mod: ZF | Performed by: NURSE PRACTITIONER

## 2017-05-05 PROCEDURE — 63400005 ZZH RX 634: Mod: ZF

## 2017-05-05 RX ORDER — METHYLPREDNISOLONE SODIUM SUCCINATE 125 MG/2ML
125 INJECTION, POWDER, LYOPHILIZED, FOR SOLUTION INTRAMUSCULAR; INTRAVENOUS
Status: CANCELLED
Start: 2017-05-05

## 2017-05-05 RX ORDER — ALBUTEROL SULFATE 90 UG/1
1-2 AEROSOL, METERED RESPIRATORY (INHALATION)
Status: CANCELLED
Start: 2017-05-05

## 2017-05-05 RX ORDER — LORATADINE 10 MG/1
10 TABLET ORAL DAILY
Qty: 30 TABLET | Refills: 3 | Status: SHIPPED | OUTPATIENT
Start: 2017-05-05 | End: 2017-01-01

## 2017-05-05 RX ORDER — SODIUM CHLORIDE 9 MG/ML
1000 INJECTION, SOLUTION INTRAVENOUS CONTINUOUS PRN
Status: CANCELLED
Start: 2017-05-05

## 2017-05-05 RX ORDER — DEXAMETHASONE 4 MG/1
4 TABLET ORAL
Qty: 3 TABLET | Refills: 0 | Status: SHIPPED | OUTPATIENT
Start: 2017-05-05 | End: 2017-01-01

## 2017-05-05 RX ORDER — FLUOROURACIL 50 MG/ML
400 INJECTION, SOLUTION INTRAVENOUS ONCE
Status: COMPLETED | OUTPATIENT
Start: 2017-05-05 | End: 2017-05-05

## 2017-05-05 RX ORDER — PALONOSETRON 0.05 MG/ML
0.25 INJECTION, SOLUTION INTRAVENOUS ONCE
Status: COMPLETED | OUTPATIENT
Start: 2017-05-05 | End: 2017-05-05

## 2017-05-05 RX ORDER — EPINEPHRINE 0.3 MG/.3ML
0.3 INJECTION SUBCUTANEOUS EVERY 5 MIN PRN
Status: CANCELLED | OUTPATIENT
Start: 2017-05-05

## 2017-05-05 RX ORDER — LORATADINE 10 MG/1
10 TABLET ORAL DAILY
Qty: 30 TABLET | Refills: 3 | Status: SHIPPED | OUTPATIENT
Start: 2017-05-05 | End: 2017-05-05

## 2017-05-05 RX ORDER — FLUOROURACIL 50 MG/ML
400 INJECTION, SOLUTION INTRAVENOUS ONCE
Status: CANCELLED | OUTPATIENT
Start: 2017-05-05

## 2017-05-05 RX ORDER — ALBUTEROL SULFATE 0.83 MG/ML
2.5 SOLUTION RESPIRATORY (INHALATION)
Status: CANCELLED | OUTPATIENT
Start: 2017-05-05

## 2017-05-05 RX ORDER — HEPARIN SODIUM (PORCINE) LOCK FLUSH IV SOLN 100 UNIT/ML 100 UNIT/ML
5 SOLUTION INTRAVENOUS EVERY 8 HOURS
Status: DISCONTINUED | OUTPATIENT
Start: 2017-05-05 | End: 2017-05-11 | Stop reason: HOSPADM

## 2017-05-05 RX ORDER — DIPHENHYDRAMINE HYDROCHLORIDE 50 MG/ML
50 INJECTION INTRAMUSCULAR; INTRAVENOUS
Status: CANCELLED
Start: 2017-05-05

## 2017-05-05 RX ORDER — EPINEPHRINE 0.3 MG/.3ML
INJECTION SUBCUTANEOUS
Status: DISCONTINUED
Start: 2017-05-05 | End: 2017-05-05 | Stop reason: WASHOUT

## 2017-05-05 RX ORDER — DEXAMETHASONE 4 MG/1
4 TABLET ORAL
Qty: 3 TABLET | Refills: 0 | Status: SHIPPED | OUTPATIENT
Start: 2017-05-05 | End: 2017-05-05

## 2017-05-05 RX ORDER — PALONOSETRON 0.05 MG/ML
0.25 INJECTION, SOLUTION INTRAVENOUS ONCE
Status: CANCELLED
Start: 2017-05-05

## 2017-05-05 RX ORDER — LORAZEPAM 2 MG/ML
0.5 INJECTION INTRAMUSCULAR EVERY 4 HOURS PRN
Status: CANCELLED
Start: 2017-05-05

## 2017-05-05 RX ORDER — MEPERIDINE HYDROCHLORIDE 25 MG/ML
25 INJECTION INTRAMUSCULAR; INTRAVENOUS; SUBCUTANEOUS EVERY 30 MIN PRN
Status: CANCELLED | OUTPATIENT
Start: 2017-05-05

## 2017-05-05 RX ADMIN — SODIUM CHLORIDE 150 MG: 9 INJECTION, SOLUTION INTRAVENOUS at 12:32

## 2017-05-05 RX ADMIN — DEXTROSE MONOHYDRATE 250 ML: 50 INJECTION, SOLUTION INTRAVENOUS at 12:10

## 2017-05-05 RX ADMIN — DEXTROSE MONOHYDRATE 340 MG: 50 INJECTION, SOLUTION INTRAVENOUS at 15:02

## 2017-05-05 RX ADMIN — PALONOSETRON HYDROCHLORIDE 0.25 MG: 0.25 INJECTION INTRAVENOUS at 12:10

## 2017-05-05 RX ADMIN — SODIUM CHLORIDE, PRESERVATIVE FREE 5 ML: 5 INJECTION INTRAVENOUS at 09:51

## 2017-05-05 RX ADMIN — FLUOROURACIL 790 MG: 50 INJECTION, SOLUTION INTRAVENOUS at 16:40

## 2017-05-05 RX ADMIN — ATROPINE SULFATE 0.4 MG: 0.4 INJECTION, SOLUTION INTRAMUSCULAR; INTRAVENOUS; SUBCUTANEOUS at 15:02

## 2017-05-05 RX ADMIN — LEUCOVORIN CALCIUM 750 MG: 200 INJECTION, POWDER, LYOPHILIZED, FOR SOLUTION INTRAMUSCULAR; INTRAVENOUS at 15:02

## 2017-05-05 RX ADMIN — DEXAMETHASONE SODIUM PHOSPHATE 12 MG: 10 INJECTION, SOLUTION INTRAMUSCULAR; INTRAVENOUS at 12:10

## 2017-05-05 RX ADMIN — ATROPINE SULFATE 0.4 MG: 0.4 INJECTION, SOLUTION INTRAMUSCULAR; INTRAVENOUS; SUBCUTANEOUS at 15:54

## 2017-05-05 RX ADMIN — OXALIPLATIN 167 MG: 5 INJECTION, SOLUTION, CONCENTRATE INTRAVENOUS at 12:57

## 2017-05-05 ASSESSMENT — PAIN SCALES - GENERAL: PAINLEVEL: NO PAIN (0)

## 2017-05-05 NOTE — PATIENT INSTRUCTIONS
Contact Numbers  [facility] Nurse Triage: [phone]  After Hours Nurse Line:  [phone]      Please call the [facility] Triage line if you experience a temperature greater than or equal to 100.5, shaking chills, have uncontrolled nausea, vomiting and/or diarrhea, dizziness, shortness of breath, chest pain, bleeding, unexplained bruising, or if you have any other new/concerning symptoms, questions or concerns.     If it is after hours, weekends, or holidays, please call either the after hours nurse line listed.    If you are having any concerning symptoms or wish to speak to a provider before your next infusion visit, please call your care coordinator or triage to notify them so we can adequately serve you.     If you need a refill on a narcotic prescription or other medication, please call triage before your infusion appointment.         May 2017   Kristofer Monday Tuesday Wednesday Thursday Friday Saturday        1     2     3     4     5     [facility] LAB DRAW    9:30 AM   (15 min.)    [facility]     [facility] ONC INFUSION 360   10:00 AM   (360 min.)    ONCOLOGY INFUSION   [facility]     UMP RETURN   10:05 AM   (50 min.)   Ester Echavarria APRN CNP   [facility] 6       7     8     UMP ONC INFUSION 120    3:00 PM   (120 min.)    ONCOLOGY INFUSION   [facility] 9     10     11     12     13       14     15     16     [facility] LAB DRAW    7:30 AM   (15 min.)    MASONIC LAB DRAW   [facility] Lab Draw     UMP RETURN    7:45 AM   (50 min.)   Estre Echavarria APRN CNP   [facility]     UMP ONC INFUSION 360    9:00 AM   (360 min.)    ONCOLOGY INFUSION   [facility] 17     18     19     20       21     22     23     24     25     26     27       28     29     30     [facility] LAB DRAW    6:30 AM   (15 min.)    MASONIC LAB DRAW   [facility] Lab Draw     UMP  ONC INFUSION 360    7:00 AM   (360 min.)    ONCOLOGY INFUSION   [facility] 31 June 2017 Sunday Monday Tuesday Wednesday Thursday Friday Saturday                       1     2     3       4     5     6     7     8     9     10       11     12     13     [facility] LAB DRAW    6:30 AM   (15 min.)    MASONIC LAB DRAW   [facility] Masonic Lab Draw     UMP ONC INFUSION 360    7:00 AM   (360 min.)    ONCOLOGY INFUSION   [facility] 14     15     16     17       18     19     20     21     22     23     [facility] LAB DRAW    9:30 AM   (15 min.)    MASONIC LAB DRAW   [facility] Masonic Lab Draw     CT CHEST WO    9:45 AM   (20 min.)   UCCT1   [facility] CT 24       25     26     [facility] LAB DRAW    7:15 AM   (15 min.)    MASONIC LAB DRAW   [facility] Masonic Lab Draw     UMP RETURN    7:30 AM   (30 min.)   Demond Gonsales MD   [facility]     UMP ONC INFUSION 360    8:30 AM   (360 min.)    ONCOLOGY INFUSION   [facility] 27     28     29     30                       Lab Results:  Recent Results (from the past 12 hour(s))   Comprehensive metabolic panel    Collection Time: 05/05/17  9:58 AM   Result Value Ref Range    Sodium 143 133 - 144 mmol/L    Potassium 3.7 3.4 - 5.3 mmol/L    Chloride 108 94 - 109 mmol/L    Carbon Dioxide 28 20 - 32 mmol/L    Anion Gap 6 3 - 14 mmol/L    Glucose 81 70 - 99 mg/dL    Urea Nitrogen 13 7 - 30 mg/dL    Creatinine 0.51 (L) 0.52 - 1.04 mg/dL    GFR Estimate >90  Non  GFR Calc   >60 mL/min/1.7m2    GFR Estimate If Black >90   GFR Calc   >60 mL/min/1.7m2    Calcium 8.5 8.5 - 10.1 mg/dL    Bilirubin Total 0.6 0.2 - 1.3 mg/dL    Albumin 2.6 (L) 3.4 - 5.0 g/dL    Protein Total 6.8 6.8 - 8.8 g/dL    Alkaline Phosphatase 336 (H) 40 - 150 U/L    ALT 34 0 - 50 U/L    AST 37 0 - 45 U/L   CBC with platelets differential     Collection Time: 05/05/17  9:58 AM   Result Value Ref Range    WBC 7.5 4.0 - 11.0 10e9/L    RBC Count 3.18 (L) 3.8 - 5.2 10e12/L    Hemoglobin 8.2 (L) 11.7 - 15.7 g/dL    Hematocrit 27.9 (L) 35.0 - 47.0 %    MCV 88 78 - 100 fl    MCH 25.8 (L) 26.5 - 33.0 pg    MCHC 29.4 (L) 31.5 - 36.5 g/dL    RDW 18.3 (H) 10.0 - 15.0 %    Platelet Count 107 (L) 150 - 450 10e9/L    Diff Method Automated Method     % Neutrophils 77.5 %    % Lymphocytes 14.3 %    % Monocytes 6.3 %    % Eosinophils 0.4 %    % Basophils 0.3 %    % Immature Granulocytes 1.2 %    Nucleated RBCs 0 0 /100    Absolute Neutrophil 5.8 1.6 - 8.3 10e9/L    Absolute Lymphocytes 1.1 0.8 - 5.3 10e9/L    Absolute Monocytes 0.5 0.0 - 1.3 10e9/L    Absolute Eosinophils 0.0 0.0 - 0.7 10e9/L    Absolute Basophils 0.0 0.0 - 0.2 10e9/L    Abs Immature Granulocytes 0.1 0 - 0.4 10e9/L    Absolute Nucleated RBC 0.0

## 2017-05-05 NOTE — MR AVS SNAPSHOT
After Visit Summary   5/5/2017    Shirin Muller    MRN: [id number]           Patient Information     Date Of Birth          1960        Visit Information        Provider Department      5/5/2017 10:20 AM Ester Echavarria APRN CNP [facility]        Today's Diagnoses     Malignant neoplasm of head of pancreas (H)    -  1    Secondary malignant neoplasm of liver (H)        Anemia, chronic disease        Seasonal allergic rhinitis, unspecified allergic rhinitis trigger        Need for prophylactic measure           Follow-ups after your visit        Your next 10 appointments already scheduled     May 19, 2017  7:15 AM CDT   M[facility] Lab Draw with [facility] MASONIC LAB DRAW   [facility] Draw ([facility])    [address]  2nd Floor  [facility] [address]   [phone]            May 19, 2017  7:50 AM CDT   (Arrive by 7:35 AM)   Return Visit with ASTRID Myles   [facility] ([facility])    [address]  [address]nd Floor  [facility] [address]   [phone]            May 19, 2017  8:30 AM CDT   Infusion 360 with UC ONCOLOGY INFUSION, UC 19 ATC   [facility] ([facility])    [address]  [address] Floor  [facility] [address]   [phone]            Jun 02, 2017  6:30 AM CDT   M[facility]ic Lab Draw with UC MASONIC LAB DRAW   [facility] Draw ([facility])    [address]  [address] [facility] [address]   [phone]            Jun 02, 2017  7:00 AM CDT   Infusion 360 with UC ONCOLOGY INFUSION, UC 10 ATC   [facility] ([facility])    [address]  [address]  [facility] [address]   [phone]            Jun 16, 2017  6:30 AM CDT   Masonic Lab Draw with UC MASONIC LAB DRAW   [facility] Lab Draw ([facility])    [address]  [address]nd [facility] [address] [phone]            Jun 16, 2017  7:00 AM CDT   Infusion 360 with UC ONCOLOGY INFUSION, UC 10 ATC   [facility] ([facility])    [address] [place] [address]  [address]nd [facility] [address] [phone]              Who to contact     If you have questions or need follow up information about today's clinic visit or your schedule please contact [facility] directly at [phone].  Normal or non-critical lab and imaging results will be communicated to you by Perdoohart, letter or phone within 4 business days after the clinic has received the results. If you do not hear from us within 7 days, please contact the clinic through Yogiyot or phone. If you have a critical or abnormal lab result, we will notify you by phone as soon as possible.  Submit refill requests through mmCHANNEL or call your pharmacy and they will forward the refill request to us. Please allow 3 business days for your refill to be completed.          Additional Information About Your Visit        MyChart Information     mmCHANNEL gives you secure access to your electronic health record. If you see a primary care provider, you can also send messages to your care team and make appointments. If you have questions, please call your primary care clinic.  If you do not have a primary care provider, please call [phone] and they will assist you.        Care EveryWhere ID     This is your Care EveryWhere ID. This could be used by other organizations to access your [facility] medical records  [id number]        Your Vitals Were     Pulse Temperature Respirations Pulse Oximetry BMI (Body Mass Index)       74 97.5  F (36.4  C) (Oral) 18 100% 21.73 kg/m2        Blood Pressure from Last 3 Encounters:   05/08/17 97/60   05/05/17 90/56   04/24/17 95/58    Weight from Last 3 Encounters:   05/08/17 71.4 kg (157 lb 6.4 oz)    05/05/17 70.7 kg (155 lb 12.8 oz)   04/21/17 71.4 kg (157 lb 6.4 oz)              Today, you had the following     No orders found for display         Today's Medication Changes          These changes are accurate as of: 5/5/17 11:59 PM.  If you have any questions, ask your nurse or doctor.               Start taking these medicines.        Dose/Directions    loratadine 10 MG tablet   Commonly known as:  CLARITIN   Used for:  Seasonal allergic rhinitis, unspecified allergic rhinitis trigger        Dose:  10 mg   Take 1 tablet (10 mg) by mouth daily Reported on 5/5/2017   Quantity:  30 tablet   Refills:  3         These medicines have changed or have updated prescriptions.        Dose/Directions    * dexamethasone 4 MG tablet   Commonly known as:  DECADRON   This may have changed:  Another medication with the same name was added. Make sure you understand how and when to take each.   Used for:  Need for prophylactic measure, Malignant neoplasm of head of pancreas (H)        Dose:  4 mg   Take 1 tablet (4 mg) by mouth daily (with breakfast) for 3 days   Quantity:  3 tablet   Refills:  0       * dexamethasone 4 MG tablet   Commonly known as:  DECADRON   This may have changed:  You were already taking a medication with the same name, and this prescription was added. Make sure you understand how and when to take each.   Used for:  Need for prophylactic measure, Malignant neoplasm of head of pancreas (H)        Dose:  4 mg   Take 1 tablet (4 mg) by mouth daily (with breakfast)   Quantity:  3 tablet   Refills:  0       polyethylene glycol powder   Commonly known as:  MIRALAX/GLYCOLAX   This may have changed:    - when to take this  - reasons to take this   Used for:  Malignant neoplasm of head of pancreas (H)        Dose:  1 capful   Take 17 g (1 capful) by mouth daily   Quantity:  119 g   Refills:  11       * Notice:  This list has 2 medication(s) that are the same as other medications prescribed for you. Read the  directions carefully, and ask your doctor or other care provider to review them with you.         Where to get your medicines      These medications were sent to [facility] - [place], [place] - [address]  [address], [facility] [address]    Hours:  TRANSPLANT PHONE NUMBER [phone] Phone:  [phone]     dexamethasone 4 MG tablet    loratadine 10 MG tablet                Primary Care Provider    [facility]       No address on file        Thank you!     Thank you for choosing [facility]  for your care. Our goal is always to provide you with excellent care. Hearing back from our patients is one way we can continue to improve our services. Please take a few minutes to complete the written survey that you may receive in the mail after your visit with us. Thank you!             Your Updated Medication List - Protect others around you: Learn how to safely use, store and throw away your medicines at www.disposemymeds.org.          This list is accurate as of: 5/5/17 11:59 PM.  Always use your most recent med list.                   Brand Name Dispense Instructions for use    amylase-lipase-protease 48748 UNITS Cpep    CREON    180 capsule    Take 2 capsules (72,000 Units) by mouth 3 times daily (with meals)       * dexamethasone 4 MG tablet    DECADRON    3 tablet    Take 1 tablet (4 mg) by mouth daily (with breakfast) for 3 days       * dexamethasone 4 MG tablet    DECADRON    3 tablet    Take 1 tablet (4 mg) by mouth daily (with breakfast)       diltiazem 2% in PLO cream (FV COMPOUNDED) 2% Gel     30 g    Apply topically daily and as needed to external hemorrhoids       docusate sodium 100 MG capsule    COLACE    100 capsule    Take 1 capsule (100 mg) by mouth daily       dronabinol 5 MG capsule    MARINOL    60 capsule    Take 1 capsule (5 mg) by mouth 2 times daily (before meals)       ENSURE CLEAR Liqd     90 Bottle     Take 1 Bottle by mouth 3 times daily       lidocaine-prilocaine cream    EMLA    30 g    Apply topically as needed for other (Use 30-60 minutes prior to port access)       loratadine 10 MG tablet    CLARITIN    30 tablet    Take 1 tablet (10 mg) by mouth daily Reported on 5/5/2017       LORazepam 0.5 MG tablet    ATIVAN    30 tablet    Take 1 tablet (0.5 mg) by mouth every 4 hours as needed (Anxiety, Nausea/Vomiting or Sleep)       morphine 15 MG 12 hr tablet    MS CONTIN    60 tablet    Take 1 tablet (15 mg) by mouth every 12 hours       ondansetron 8 MG ODT tab    ZOFRAN-ODT    60 tablet    Take 1 tablet (8 mg) by mouth every 8 hours as needed for nausea       oxyCODONE 5 MG IR tablet    ROXICODONE    100 tablet    Take 1 tablet (5 mg) by mouth every 4 hours as needed for moderate to severe pain       pantoprazole 20 MG EC tablet    PROTONIX    90 tablet    Take 1 tablet (20 mg) by mouth daily       polyethylene glycol powder    MIRALAX/GLYCOLAX    119 g    Take 17 g (1 capful) by mouth daily       potassium chloride SA 20 MEQ CR tablet    potassium chloride    60 tablet    Take 1 tablet (20 mEq) by mouth daily       prochlorperazine 10 MG tablet    COMPAZINE    30 tablet    Take 1 tablet (10 mg) by mouth every 6 hours as needed (Nausea/Vomiting)       * Notice:  This list has 2 medication(s) that are the same as other medications prescribed for you. Read the directions carefully, and ask your doctor or other care provider to review them with you.

## 2017-05-05 NOTE — Clinical Note
5/5/2017       RE: Shirin Muller  [address] [facility] [address] [address]     Dear Colleague,    Thank you for referring your patient, Shirin Muller, to the [facility]. Please see a copy of my visit note below.    Heme/onc visit note  May 5, 2017    Reason for visit: Due for chemotherapy FOLFIRINOX C8D1.    Cancer history:   Shirin is a 57 year old female diagnosed with metastatic pancreatic CA. She was diagnosed in the spring of 2016 after presenting with a 2 to 3-month history of abdominal pain and weight loss. She underwent evaluation showing a 5 cm mass at the head of the pancreas. Biopsy was consistent with adenocarcinoma. She underwent staging imaging including an MRI of the abdomen, which then showed an up to 10 cm poorly defined hypoenhancing mass arising from the pancreatic head which encased celiac artery, superior mesenteric artery and severely attenuated the main portal vein. She initiated on treatment with gemcitabine and Abraxane on 05/02/2016 and continued on that until December 2016 when she was found to have metastatic disease involving the liver. She was then initiated on FOLFIRINOX on 12/20/16. Her first cycle was complicated by fatigue, nausea/vomiting. Antiemetics were adjusted with cycle 2. After cycle 3, she was admitted with weakness and dizziness. She was found to have a GI bleed secondary to a bleeding duodenal ulcer and infiltrating mass in the duodenum. The ulcer was injected and clipped. She was found to be H. Pylori positive and was initiated on therapy with PPI, carafate, amoxicillin and clarithromycin. On 4/19/17 repeat biliary stent placement was done by Dr. Braden.     Interval history:   Shirin is here with her sister for C8 FOLFIRINOX. Shirin is feeling well today. She has had some nausea earlier this week that she used compazine with relief. She is eating okay and is taking in 5 strawberry Ensure Clear per day. Her fluid  intake has been well - 64 oz/day. Her numbness is stable and it involves the toes, bottoms of her feet and fingers. Her neuropathy does not involve function and not painful. Her abdominal pain involves the lower quadrant and moves around on to the back on her left side. She uses MS Contin 15 mg every 12 hours and oxycodone 5 mg every 4 hours. She reports good pain control. She is having some mathew blood on her tissue when she whips and around the stool in the toilet bowel. She has large external hemorrhoids. She uses diltiazem 2% cream to the hemorrhoids.      Review of Systems: Patient denies any of the following except if noted above: fever, chills, vision or hearing changes, chest pain, cough, dyspnea, abdominal pain, nausea, vomiting, diarrhea, constipation, urinary concerns, headaches, numbness, tingling or issues with mood.     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       Medications:    Current Outpatient Prescriptions on File Prior to Visit:  pantoprazole (PROTONIX) 20 MG EC tablet Take 1 tablet (20 mg) by mouth daily   dexamethasone (DECADRON) 4 MG tablet Take 1 tablet (4 mg) by mouth daily (with breakfast) for 3 days   morphine (MS CONTIN) 15 MG 12 hr tablet Take 1 tablet (15 mg) by mouth every 12 hours   prochlorperazine (COMPAZINE) 10 MG tablet Take 1 tablet (10 mg) by mouth every 6 hours as needed (Nausea/Vomiting)   ondansetron (ZOFRAN-ODT) 8 MG ODT tab Take 1 tablet (8 mg) by mouth every 8 hours as needed for nausea   LORazepam (ATIVAN) 0.5 MG tablet Take 1 tablet (0.5 mg) by mouth every 4 hours as needed (Anxiety, Nausea/Vomiting or Sleep)   dronabinol (MARINOL) 5 MG capsule Take 1 capsule (5 mg) by mouth 2 times daily (before  meals)   oxyCODONE (ROXICODONE) 5 MG IR tablet Take 1 tablet (5 mg) by mouth every 4 hours as needed for moderate to severe pain   Nutritional Supplements (ENSURE CLEAR) LIQD Take 1 Bottle by mouth 3 times daily   potassium chloride SA (POTASSIUM CHLORIDE) 20 MEQ CR tablet Take 1 tablet (20 mEq) by mouth daily   diltiazem 2% in PLO cream, FV COMPOUNDED, 2% GEL Apply topically daily and as needed to external hemorrhoids   docusate sodium (COLACE) 100 MG capsule Take 1 capsule (100 mg) by mouth daily   loratadine (CLARITIN) 10 MG tablet Take 10 mg by mouth daily   amylase-lipase-protease (CREON) 10228 UNITS CPEP Take 2 capsules (72,000 Units) by mouth 3 times daily (with meals)   polyethylene glycol (MIRALAX/GLYCOLAX) powder Take 17 g (1 capful) by mouth daily (Patient taking differently: Take 1 capful by mouth daily as needed )   lidocaine-prilocaine (EMLA) cream Apply topically as needed for other (Use 30-60 minutes prior to port access)     No current facility-administered medications on file prior to visit.     Allergy:     Allergies   Allergen Reactions     Contrast Dye Nausea and Vomiting     Pt vomiting post IV contrast, Please try Visipaque for next CT scan. 6/24/16 sv       Physical exam  BP 90/56 (BP Location: Right arm)  Pulse 74  Temp 97.5  F (36.4  C) (Oral)  Resp 18  Wt 70.7 kg (155 lb 12.8 oz)  SpO2 100%  BMI 21.73 kg/m2  Wt Readings from Last 4 Encounters:   05/05/17 70.7 kg (155 lb 12.8 oz)   04/21/17 71.4 kg (157 lb 6.4 oz)   04/19/17 68.9 kg (151 lb 14.4 oz)   04/17/17 69.9 kg (154 lb)   General: alert, cooperative, no apparent disstress  Skin: skin warm and moist, nails without clubbing or cyanosis, no rash, petechiae or ecchymoses  HEENT: normocephalic, atraumatic, PERRL, oral mucosa pink, dentition good, pharynx without exudate.   Neck: neck supple, no cervical or supraclavicular adenopathy  Respiratory: thorax is symmetric with good expansion, lungs clear to ausculation bilaterally, no  wheezes, crackles or rhonchi  Cardiovascular: RRR, no murmurs or extra sounds  Gastrointestinal: active bowel sounds, soft, non-tender; no palpable masses  Peripheral Vascular: extremities are warm, without edema.   Neurologic: CN II-XII grossly intact    Labs:     Ref. Range 5/5/2017 09:58   Sodium Latest Ref Range: 133 - 144 mmol/L 143   Potassium Latest Ref Range: 3.4 - 5.3 mmol/L 3.7   Chloride Latest Ref Range: 94 - 109 mmol/L 108   Carbon Dioxide Latest Ref Range: 20 - 32 mmol/L 28   Urea Nitrogen Latest Ref Range: 7 - 30 mg/dL 13   Creatinine Latest Ref Range: 0.52 - 1.04 mg/dL 0.51 (L)   GFR Estimate Latest Ref Range: >60 mL/min/1.7m2 >90...   GFR Estimate If Black Latest Ref Range: >60 mL/min/1.7m2 >90...   Calcium Latest Ref Range: 8.5 - 10.1 mg/dL 8.5   Anion Gap Latest Ref Range: 3 - 14 mmol/L 6   Albumin Latest Ref Range: 3.4 - 5.0 g/dL 2.6 (L)   Protein Total Latest Ref Range: 6.8 - 8.8 g/dL 6.8   Bilirubin Total Latest Ref Range: 0.2 - 1.3 mg/dL 0.6   Alkaline Phosphatase Latest Ref Range: 40 - 150 U/L 336 (H)   ALT Latest Ref Range: 0 - 50 U/L 34   AST Latest Ref Range: 0 - 45 U/L 37   Glucose Latest Ref Range: 70 - 99 mg/dL 81   WBC Latest Ref Range: 4.0 - 11.0 10e9/L 7.5   Hemoglobin Latest Ref Range: 11.7 - 15.7 g/dL 8.2 (L)   Hematocrit Latest Ref Range: 35.0 - 47.0 % 27.9 (L)   Platelet Count Latest Ref Range: 150 - 450 10e9/L 107 (L)   RBC Count Latest Ref Range: 3.8 - 5.2 10e12/L 3.18 (L)   MCV Latest Ref Range: 78 - 100 fl 88   MCH Latest Ref Range: 26.5 - 33.0 pg 25.8 (L)   MCHC Latest Ref Range: 31.5 - 36.5 g/dL 29.4 (L)   RDW Latest Ref Range: 10.0 - 15.0 % 18.3 (H)   Diff Method Unknown Automated Method   % Neutrophils Latest Units: % 77.5   % Lymphocytes Latest Units: % 14.3   % Monocytes Latest Units: % 6.3   % Eosinophils Latest Units: % 0.4   % Basophils Latest Units: % 0.3   % Immature Granulocytes Latest Units: % 1.2   Nucleated RBCs Latest Ref Range: 0 /100 0   Absolute Neutrophil  Latest Ref Range: 1.6 - 8.3 10e9/L 5.8   Absolute Lymphocytes Latest Ref Range: 0.8 - 5.3 10e9/L 1.1   Absolute Monocytes Latest Ref Range: 0.0 - 1.3 10e9/L 0.5   Absolute Eosinophils Latest Ref Range: 0.0 - 0.7 10e9/L 0.0   Absolute Basophils Latest Ref Range: 0.0 - 0.2 10e9/L 0.0   Abs Immature Granulocytes Latest Ref Range: 0 - 0.4 10e9/L 0.1   Absolute Nucleated RBC Unknown 0.0       Assessment and plan:  1. Metastatic pancreatic cancer- on FOLFIRINOX. Has been tolerating chemotherapy with expected side effects. CA 19-9 has been declining. Recently was held due to weight loss from anorexia. Holding her weight. Counts meet parameters to proceed with chemotherapy today.   - Monday, 5/8 Neulasta, pump disconnet and IVF  - Next cycle 5/16 labs, infusion and Ester Echavarria DNP    2. Ascites/pleural effusions. Most recent CT of chest pleural effusions were small, decreased from prior CT. Most recent MRI of the abdomen noted ascites to be smaller also.   -She will call if abdominal pressure or SOB increases.   -Would check cytology on the fluid if she requires a paracentesis or thoracentesis.     3. GI:  - Constipation: stable. Continue stool softener  - Duodenal ulcer/h. Pylori + completed abx therapy  - remains on carafate tid      4. FEN: stable. Drinking 64 fluid oz a day and no diarrhea/constipation. Has had nausea 4 days ago and used compazine with relief.      5. Abdominal pain: secondary to malignancy. Stable  -MS Contin 15 mg q 12 hours, oxycodone 5 mg every 4 hours as needed for moderate/severe pain. Uses about 2 oxycodone a day.     6. Anorexia: improved on marinol 5 mg bid. Weight stable. Supplementing her diet with ensure clear 5/day.     Venus OLIVEIRA NP-C [facility]        Again, thank you for allowing me to participate in the care of your patient.      Sincerely,    TEE Villanueva CNP

## 2017-05-05 NOTE — PROGRESS NOTES
Heme/onc visit note  May 5, 2017    Reason for visit: Due for chemotherapy FOLFIRINOX C8D1.    Cancer history:   Shirin is a 57 year old female diagnosed with metastatic pancreatic CA. She was diagnosed in the spring of 2016 after presenting with a 2 to 3-month history of abdominal pain and weight loss. She underwent evaluation showing a 5 cm mass at the head of the pancreas. Biopsy was consistent with adenocarcinoma. She underwent staging imaging including an MRI of the abdomen, which then showed an up to 10 cm poorly defined hypoenhancing mass arising from the pancreatic head which encased celiac artery, superior mesenteric artery and severely attenuated the main portal vein. She initiated on treatment with gemcitabine and Abraxane on 05/02/2016 and continued on that until December 2016 when she was found to have metastatic disease involving the liver. She was then initiated on FOLFIRINOX on 12/20/16. Her first cycle was complicated by fatigue, nausea/vomiting. Antiemetics were adjusted with cycle 2. After cycle 3, she was admitted with weakness and dizziness. She was found to have a GI bleed secondary to a bleeding duodenal ulcer and infiltrating mass in the duodenum. The ulcer was injected and clipped. She was found to be H. Pylori positive and was initiated on therapy with PPI, carafate, amoxicillin and clarithromycin. On 4/19/17 repeat biliary stent placement was done by Dr. Braden.     Interval history:   Shirin is here with her sister for C8 FOLFIRINOX. Shirin is feeling well today. She has had some nausea earlier this week that she used compazine with relief. She is eating okay and is taking in 5 strawberry Ensure Clear per day. Her fluid intake has been well - 64 oz/day. Her numbness is stable and it involves the toes, bottoms of her feet and fingers. Her neuropathy does not involve function and not painful. Her abdominal pain involves the lower quadrant and moves around on to the back on her left  side. She uses MS Contin 15 mg every 12 hours and oxycodone 5 mg every 4 hours. She reports good pain control. She is having some mathew blood on her tissue when she whips and around the stool in the toilet bowel. She has large external hemorrhoids. She uses diltiazem 2% cream to the hemorrhoids.      Review of Systems: Patient denies any of the following except if noted above: fever, chills, vision or hearing changes, chest pain, cough, dyspnea, abdominal pain, nausea, vomiting, diarrhea, constipation, urinary concerns, headaches, numbness, tingling or issues with mood.     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       Medications:    Current Outpatient Prescriptions on File Prior to Visit:  pantoprazole (PROTONIX) 20 MG EC tablet Take 1 tablet (20 mg) by mouth daily   dexamethasone (DECADRON) 4 MG tablet Take 1 tablet (4 mg) by mouth daily (with breakfast) for 3 days   morphine (MS CONTIN) 15 MG 12 hr tablet Take 1 tablet (15 mg) by mouth every 12 hours   prochlorperazine (COMPAZINE) 10 MG tablet Take 1 tablet (10 mg) by mouth every 6 hours as needed (Nausea/Vomiting)   ondansetron (ZOFRAN-ODT) 8 MG ODT tab Take 1 tablet (8 mg) by mouth every 8 hours as needed for nausea   LORazepam (ATIVAN) 0.5 MG tablet Take 1 tablet (0.5 mg) by mouth every 4 hours as needed (Anxiety, Nausea/Vomiting or Sleep)   dronabinol (MARINOL) 5 MG capsule Take 1 capsule (5 mg) by mouth 2 times daily (before meals)   oxyCODONE (ROXICODONE) 5 MG IR tablet Take 1 tablet (5 mg) by mouth every 4 hours as needed for moderate to severe pain   Nutritional Supplements (ENSURE CLEAR) LIQD Take 1 Bottle by mouth 3 times daily   potassium chloride SA (POTASSIUM CHLORIDE) 20 MEQ  CR tablet Take 1 tablet (20 mEq) by mouth daily   diltiazem 2% in PLO cream, FV COMPOUNDED, 2% GEL Apply topically daily and as needed to external hemorrhoids   docusate sodium (COLACE) 100 MG capsule Take 1 capsule (100 mg) by mouth daily   loratadine (CLARITIN) 10 MG tablet Take 10 mg by mouth daily   amylase-lipase-protease (CREON) 18166 UNITS CPEP Take 2 capsules (72,000 Units) by mouth 3 times daily (with meals)   polyethylene glycol (MIRALAX/GLYCOLAX) powder Take 17 g (1 capful) by mouth daily (Patient taking differently: Take 1 capful by mouth daily as needed )   lidocaine-prilocaine (EMLA) cream Apply topically as needed for other (Use 30-60 minutes prior to port access)     No current facility-administered medications on file prior to visit.     Allergy:     Allergies   Allergen Reactions     Contrast Dye Nausea and Vomiting     Pt vomiting post IV contrast, Please try Visipaque for next CT scan. 6/24/16 sv       Physical exam  BP 90/56 (BP Location: Right arm)  Pulse 74  Temp 97.5  F (36.4  C) (Oral)  Resp 18  Wt 70.7 kg (155 lb 12.8 oz)  SpO2 100%  BMI 21.73 kg/m2  Wt Readings from Last 4 Encounters:   05/05/17 70.7 kg (155 lb 12.8 oz)   04/21/17 71.4 kg (157 lb 6.4 oz)   04/19/17 68.9 kg (151 lb 14.4 oz)   04/17/17 69.9 kg (154 lb)   General: alert, cooperative, no apparent disstress  Skin: skin warm and moist, nails without clubbing or cyanosis, no rash, petechiae or ecchymoses  HEENT: normocephalic, atraumatic, PERRL, oral mucosa pink, dentition good, pharynx without exudate.   Neck: neck supple, no cervical or supraclavicular adenopathy  Respiratory: thorax is symmetric with good expansion, lungs clear to ausculation bilaterally, no wheezes, crackles or rhonchi  Cardiovascular: RRR, no murmurs or extra sounds  Gastrointestinal: active bowel sounds, soft, non-tender; no palpable masses  Peripheral Vascular: extremities are warm, without edema.   Neurologic: CN II-XII grossly intact    Labs:      Ref. Range 5/5/2017 09:58   Sodium Latest Ref Range: 133 - 144 mmol/L 143   Potassium Latest Ref Range: 3.4 - 5.3 mmol/L 3.7   Chloride Latest Ref Range: 94 - 109 mmol/L 108   Carbon Dioxide Latest Ref Range: 20 - 32 mmol/L 28   Urea Nitrogen Latest Ref Range: 7 - 30 mg/dL 13   Creatinine Latest Ref Range: 0.52 - 1.04 mg/dL 0.51 (L)   GFR Estimate Latest Ref Range: >60 mL/min/1.7m2 >90...   GFR Estimate If Black Latest Ref Range: >60 mL/min/1.7m2 >90...   Calcium Latest Ref Range: 8.5 - 10.1 mg/dL 8.5   Anion Gap Latest Ref Range: 3 - 14 mmol/L 6   Albumin Latest Ref Range: 3.4 - 5.0 g/dL 2.6 (L)   Protein Total Latest Ref Range: 6.8 - 8.8 g/dL 6.8   Bilirubin Total Latest Ref Range: 0.2 - 1.3 mg/dL 0.6   Alkaline Phosphatase Latest Ref Range: 40 - 150 U/L 336 (H)   ALT Latest Ref Range: 0 - 50 U/L 34   AST Latest Ref Range: 0 - 45 U/L 37   Glucose Latest Ref Range: 70 - 99 mg/dL 81   WBC Latest Ref Range: 4.0 - 11.0 10e9/L 7.5   Hemoglobin Latest Ref Range: 11.7 - 15.7 g/dL 8.2 (L)   Hematocrit Latest Ref Range: 35.0 - 47.0 % 27.9 (L)   Platelet Count Latest Ref Range: 150 - 450 10e9/L 107 (L)   RBC Count Latest Ref Range: 3.8 - 5.2 10e12/L 3.18 (L)   MCV Latest Ref Range: 78 - 100 fl 88   MCH Latest Ref Range: 26.5 - 33.0 pg 25.8 (L)   MCHC Latest Ref Range: 31.5 - 36.5 g/dL 29.4 (L)   RDW Latest Ref Range: 10.0 - 15.0 % 18.3 (H)   Diff Method Unknown Automated Method   % Neutrophils Latest Units: % 77.5   % Lymphocytes Latest Units: % 14.3   % Monocytes Latest Units: % 6.3   % Eosinophils Latest Units: % 0.4   % Basophils Latest Units: % 0.3   % Immature Granulocytes Latest Units: % 1.2   Nucleated RBCs Latest Ref Range: 0 /100 0   Absolute Neutrophil Latest Ref Range: 1.6 - 8.3 10e9/L 5.8   Absolute Lymphocytes Latest Ref Range: 0.8 - 5.3 10e9/L 1.1   Absolute Monocytes Latest Ref Range: 0.0 - 1.3 10e9/L 0.5   Absolute Eosinophils Latest Ref Range: 0.0 - 0.7 10e9/L 0.0   Absolute Basophils Latest Ref Range: 0.0  - 0.2 10e9/L 0.0   Abs Immature Granulocytes Latest Ref Range: 0 - 0.4 10e9/L 0.1   Absolute Nucleated RBC Unknown 0.0       Assessment and plan:  1. Metastatic pancreatic cancer- on FOLFIRINOX. Has been tolerating chemotherapy with expected side effects. CA 19-9 has been declining. Recently was held due to weight loss from anorexia. Holding her weight. Symptoms are well enough controlled to continue with treatment and labs are stable today.  - Monday, 5/8 Neulasta, pump disconnet and IVF  - Next cycle 5/16 labs, infusion and Ester Echavarria DNP    2. Ascites/pleural effusions. Most recent CT of chest pleural effusions were small, decreased from prior CT. Most recent MRI of the abdomen noted ascites to be smaller also.   -She will call if abdominal pressure or SOB increases.   -Would check cytology on the fluid if she requires a paracentesis or thoracentesis.     3. GI:  - Constipation/hemorrhoid bleeding: stable. Continue stool softener, tucks and soft wipes, sitz bathes. Call if bleeding worsens  - Duodenal ulcer/h. Pylori + completed abx therapy  - remains on carafate tid   -s/p biliary stenting on 4/19/17: LFTs improved.      4. FEN: stable. Drinking 64 fluid oz a day and no diarrhea/constipation. Has had nausea 4 days ago and used compazine with relief.      5. Abdominal pain: secondary to malignancy. Stable  -MS Contin 15 mg q 12 hours, oxycodone 5 mg every 4 hours as needed for moderate/severe pain. Uses about 2 oxycodone a day.     6. Anorexia: improved on marinol 5 mg bid. Weight stable. Supplementing her diet with ensure clear 5/day.     7. Peripheral neuropathy: has moderate neuropathy, but minimal impairment of function. Will monitor closely.    Venus OLIVEIRA NP-C [facility]    The patient was seen in conjunction with Venus Flores CNP who served as a scribe for today's visit. I have reviewed the note and agree with the above findings and plan.  -Addendum: CA 19-9 is decreased to 2101, the lowest we've seen  for her. TW

## 2017-05-05 NOTE — PROGRESS NOTES
Infusion Nursing Note:  Shirin Muller presents today for D1 C8 Oxaliplatin, Irinotecan, Leucovorin, Fluorouracil push and pump connect.    Patient seen by provider today: Yes: Seen by Venus Flores NP and Ester Echavarria NP in infusion    Intravenous Access:  Implanted Port.    Treatment Conditions:  Lab Results   Component Value Date    HGB 8.2 05/05/2017     Lab Results   Component Value Date    WBC 7.5 05/05/2017      Lab Results   Component Value Date    ANEU 5.8 05/05/2017     Lab Results   Component Value Date     05/05/2017      Lab Results   Component Value Date     05/05/2017                   Lab Results   Component Value Date    POTASSIUM 3.7 05/05/2017           Lab Results   Component Value Date    MAG 2.0 04/03/2017            Lab Results   Component Value Date    CR 0.51 05/05/2017                   Lab Results   Component Value Date    FANNY 8.5 05/05/2017                Lab Results   Component Value Date    BILITOTAL 0.6 05/05/2017           Lab Results   Component Value Date    ALBUMIN 2.6 05/05/2017                    Lab Results   Component Value Date    ALT 34 05/05/2017           Lab Results   Component Value Date    AST 37 05/05/2017     Results reviewed, labs MET treatment parameters, ok to proceed with treatment.    Note: Fluorouracil continuous infusion pump connected at 1650.  Positive blood return from port at time of pump hook up.  Fluorouracil  to infuse over 46 hours at 5.2 cc/hour. Connections taped and double checked by Cookie Alberts RN Pump will be disconnected on Sunday 5/7/17 by [facility]. Patient aware of pump disconnect date and time (Sunday 5/7/17 at 1445).    Patient education completed on Aranesp. Written literature reviewed and given to patient. All patient questions answered to her satisfaction.    Atropine given pre and mid Irinotecan infusion.    Post Infusion Assessment:  Patient tolerated infusion without incident.  Blood return noted during  Fluorouracil administration every 2 cc.  Site patent and intact, free from redness, edema or discomfort.  No evidence of extravasations.    Discharge Plan:   Prescription refills given for compazine, claritin, miralax, and decadron.  Discharge instructions reviewed with: Patient and Friend.  Patient and/or family verbalized understanding of discharge instructions and all questions answered.  Copy of AVS reviewed with patient and/or family.  Patient will return 5/7/17 for next appointment for pump disconnect, IVF, and neulasta. Patient verbalized understanding of appointment date and time.  Patient discharged in stable condition accompanied by: self and friend.  Departure Mode: Ambulatory.    Ramila Loaiza RN

## 2017-05-05 NOTE — MR AVS SNAPSHOT
After Visit Summary   5/5/2017    Shirin Muller    MRN: [id number]           Patient Information     Date Of Birth          1960        Visit Information        Provider Department      5/5/2017 10:00 AM  11 ATC;  ONCOLOGY INFUSION [facility]        Today's Diagnoses     Need for prophylactic measure    -  1    Malignant neoplasm of head of pancreas (H)        Anemia, chronic disease        Seasonal allergic rhinitis, unspecified allergic rhinitis trigger          Care Instructions    Contact Numbers  [facility] Nurse Triage: [phone]  After Hours Nurse Line:  [phone]      Please call the [facility] Triage line if you experience a temperature greater than or equal to 100.5, shaking chills, have uncontrolled nausea, vomiting and/or diarrhea, dizziness, shortness of breath, chest pain, bleeding, unexplained bruising, or if you have any other new/concerning symptoms, questions or concerns.     If it is after hours, weekends, or holidays, please call either the after hours nurse line listed.    If you are having any concerning symptoms or wish to speak to a provider before your next infusion visit, please call your care coordinator or triage to notify them so we can adequately serve you.     If you need a refill on a narcotic prescription or other medication, please call triage before your infusion appointment.         May 2017   Kristofer Monday Tuesday Wednesday Thursday Friday Saturday        1     2     3     4     5     [facility]ON[facility] LAB DRAW    9:30 AM   (15 min.)   [facility] DRAW   [facility] Draw     UMP ONC INFUSION 360   10:00 AM   (360 min.)    ONCOLOGY INFUSION   [facility]     UMP RETURN   10:05 AM   (50 min.)   Ester Echavarria APRN CNP   [facility] 6       7     8     UMP ONC INFUSION 120    3:00 PM   (120 min.)    ONCOLOGY INFUSION   [facility] 9     10      11     12     13       14     15     16     [facility] DRAW    7:30 AM   (15 min.)    [facility]ONIC LAB DRAW   [facility] Lab Draw     UMP RETURN    7:45 AM   (50 min.)   Ester Echavarria APRN CNP   [facility]     UMP ONC INFUSION 360    9:00 AM   (360 min.)    ONCOLOGY INFUSION   [facility] 17     18     19     20       21     22     23     24     25     26     27       28     29     30     [facility] DRAW    6:30 AM   (15 min.)    [facility] LAB DRAW   [facility] Draw     UMP ONC INFUSION 360    7:00 AM   (360 min.)    ONCOLOGY INFUSION   [facility] 31 June 2017 Sunday Monday Tuesday Wednesday Thursday Friday Saturday                       1     2     3       4     5     6     7     8     9     10       11     12     13     [facility] DRAW    6:30 AM   (15 min.)    [facility]ONIC LAB DRAW   [facility] Draw     UMP ONC INFUSION 360    7:00 AM   (360 min.)    ONCOLOGY INFUSION   [facility] 14     15     16     17       18     19     20     21     22     23     [facility] DRAW    9:30 AM   (15 min.)    [facility] LAB DRAW   [facility] Mason[facility] Lab Draw     CT CHEST WO    9:45 AM   (20 min.)   UCCT1   [facility] CT 24       25     26     [facility] DRAW    7:15 AM   (15 min.)    [facility] LAB DRAW   [facility] Draw     UMP RETURN    7:30 AM   (30 min.)   Demond Gonsales MD   [facility]     UMP ONC INFUSION 360    8:30 AM   (360 min.)    ONCOLOGY INFUSION   [facility] 27     28     29     30                       Lab Results:  Recent Results (from the past 12 hour(s))   Comprehensive metabolic panel    Collection Time: 05/05/17  9:58 AM   Result Value Ref Range    Sodium 143 133 - 144 mmol/L    Potassium 3.7 3.4 - 5.3 mmol/L    Chloride 108 94 - 109 mmol/L    Carbon Dioxide 28 20 - 32 mmol/L     Anion Gap 6 3 - 14 mmol/L    Glucose 81 70 - 99 mg/dL    Urea Nitrogen 13 7 - 30 mg/dL    Creatinine 0.51 (L) 0.52 - 1.04 mg/dL    GFR Estimate >90  Non  GFR Calc   >60 mL/min/1.7m2    GFR Estimate If Black >90   GFR Calc   >60 mL/min/1.7m2    Calcium 8.5 8.5 - 10.1 mg/dL    Bilirubin Total 0.6 0.2 - 1.3 mg/dL    Albumin 2.6 (L) 3.4 - 5.0 g/dL    Protein Total 6.8 6.8 - 8.8 g/dL    Alkaline Phosphatase 336 (H) 40 - 150 U/L    ALT 34 0 - 50 U/L    AST 37 0 - 45 U/L   CBC with platelets differential    Collection Time: 05/05/17  9:58 AM   Result Value Ref Range    WBC 7.5 4.0 - 11.0 10e9/L    RBC Count 3.18 (L) 3.8 - 5.2 10e12/L    Hemoglobin 8.2 (L) 11.7 - 15.7 g/dL    Hematocrit 27.9 (L) 35.0 - 47.0 %    MCV 88 78 - 100 fl    MCH 25.8 (L) 26.5 - 33.0 pg    MCHC 29.4 (L) 31.5 - 36.5 g/dL    RDW 18.3 (H) 10.0 - 15.0 %    Platelet Count 107 (L) 150 - 450 10e9/L    Diff Method Automated Method     % Neutrophils 77.5 %    % Lymphocytes 14.3 %    % Monocytes 6.3 %    % Eosinophils 0.4 %    % Basophils 0.3 %    % Immature Granulocytes 1.2 %    Nucleated RBCs 0 0 /100    Absolute Neutrophil 5.8 1.6 - 8.3 10e9/L    Absolute Lymphocytes 1.1 0.8 - 5.3 10e9/L    Absolute Monocytes 0.5 0.0 - 1.3 10e9/L    Absolute Eosinophils 0.0 0.0 - 0.7 10e9/L    Absolute Basophils 0.0 0.0 - 0.2 10e9/L    Abs Immature Granulocytes 0.1 0 - 0.4 10e9/L    Absolute Nucleated RBC 0.0              Follow-ups after your visit        Your next 10 appointments already scheduled     May 08, 2017  3:00 PM CDT   Infusion 120 with UC ONCOLOGY INFUSION, UC 23 ATC   [facility] ([facility])    [address] [place] [address]  2nd [facility] [address] [phone]            May 16, 2017  7:30 AM CDT   [facility] Lab Draw with  [facility] LAB DRAW   [facility])    [address] [place] [id number]    [phone]            May 16, 2017  8:00 AM CDT   (Arrive by 7:45 AM)   Return Visit with TEE Olivas CNP   [facility]    [address] [place] [id number] [phone]            May 16, 2017  9:00 AM CDT   Infusion 360 with UC ONCOLOGY INFUSION, UC 23 ATC   [facility]    [address] [place] [id number] [phone]            May 30, 2017  6:30 AM CDT   [facility] Lab Draw with UC [facility] LAB DRAW   [facility]    [address] [place] [id number] [phone]            May 30, 2017  7:00 AM CDT   Infusion 360 with UC ONCOLOGY INFUSION, UC 10 ATC   [facility] ([facility]    [address] [place] [id number] [phone]            Jun 13, 2017  6:30 AM CDT   Masonic Lab Draw with UC MASONIC LAB DRAW   [facility] Lab Draw ([facility]    [address] [place] [id number] [phone]              Who to contact     If you have questions or need follow up information about today's clinic visit or your schedule please contact [facility] directly at [phone].  Normal or non-critical lab and imaging results will be communicated to you by MyChart, letter or phone within 4 business days after the clinic has received the results. If you do not hear from us within 7 days, please contact the clinic through [employer] or phone. If you have a critical or abnormal lab result, we will notify you by phone as soon as possible.  Submit refill requests through [employer] or call your pharmacy and they will forward the refill request to us. Please allow 3 business days for your refill to be completed.          Additional  Information About Your Visit        [employer] Information     [employer] gives you secure access to your electronic health record. If you see a primary care provider, you can also send messages to your care team and make appointments. If you have questions, please call your primary care clinic.  If you do not have a primary care provider, please call [phone] and they will assist you.        Care EveryWhere ID     This is your Care EveryWhere ID. This could be used by other organizations to access your [place] medical records  [id number]         Blood Pressure from Last 3 Encounters:   05/05/17 90/56   04/24/17 95/58   04/21/17 108/67    Weight from Last 3 Encounters:   05/05/17 70.7 kg (155 lb 12.8 oz)   04/21/17 71.4 kg (157 lb 6.4 oz)   04/19/17 68.9 kg (151 lb 14.4 oz)              We Performed the Following     Cancer antigen 19-9     CBC with platelets differential     Comprehensive metabolic panel          Today's Medication Changes          These changes are accurate as of: 5/5/17  4:10 PM.  If you have any questions, ask your nurse or doctor.               Start taking these medicines.        Dose/Directions    loratadine 10 MG tablet   Commonly known as:  CLARITIN   Used for:  Seasonal allergic rhinitis, unspecified allergic rhinitis trigger        Dose:  10 mg   Take 1 tablet (10 mg) by mouth daily Reported on 5/5/2017   Quantity:  30 tablet   Refills:  3         These medicines have changed or have updated prescriptions.        Dose/Directions    * dexamethasone 4 MG tablet   Commonly known as:  DECADRON   This may have changed:  Another medication with the same name was added. Make sure you understand how and when to take each.   Used for:  Need for prophylactic measure, Malignant neoplasm of head of pancreas (H)        Dose:  4 mg   Take 1 tablet (4 mg) by mouth daily (with breakfast) for 3 days   Quantity:  3 tablet   Refills:  0       * dexamethasone 4 MG tablet   Commonly known as:  DECADRON    This may have changed:  You were already taking a medication with the same name, and this prescription was added. Make sure you understand how and when to take each.   Used for:  Need for prophylactic measure, Malignant neoplasm of head of pancreas (H)        Dose:  4 mg   Take 1 tablet (4 mg) by mouth daily (with breakfast)   Quantity:  3 tablet   Refills:  0       polyethylene glycol powder   Commonly known as:  MIRALAX/GLYCOLAX   This may have changed:    - when to take this  - reasons to take this   Used for:  Malignant neoplasm of head of pancreas (H)        Dose:  1 capful   Take 17 g (1 capful) by mouth daily   Quantity:  119 g   Refills:  11       * Notice:  This list has 2 medication(s) that are the same as other medications prescribed for you. Read the directions carefully, and ask your doctor or other care provider to review them with you.         Where to get your medicines      These medications were sent to [facility] [place], [place] - [address] [place] [address] [address] [address]    Hours:  TRANSPLANT PHONE NUMBER [phone] Phone:  [phone]     dexamethasone 4 MG tablet    loratadine 10 MG tablet                Primary Care Provider    [facility]       No address on file        Thank you!     Thank you for choosing [facility]  for your care. Our goal is always to provide you with excellent care. Hearing back from our patients is one way we can continue to improve our services. Please take a few minutes to complete the written survey that you may receive in the mail after your visit with us. Thank you!             Your Updated Medication List - Protect others around you: Learn how to safely use, store and throw away your medicines at www.disposemymeds.org.          This list is accurate as of: 5/5/17  4:10 PM.  Always use your most recent med list.                   Brand Name Dispense Instructions  for use    amylase-lipase-protease 31972 UNITS Cpep    CREON    180 capsule    Take 2 capsules (72,000 Units) by mouth 3 times daily (with meals)       * dexamethasone 4 MG tablet    DECADRON    3 tablet    Take 1 tablet (4 mg) by mouth daily (with breakfast) for 3 days       * dexamethasone 4 MG tablet    DECADRON    3 tablet    Take 1 tablet (4 mg) by mouth daily (with breakfast)       diltiazem 2% in PLO cream (FV COMPOUNDED) 2% Gel     30 g    Apply topically daily and as needed to external hemorrhoids       docusate sodium 100 MG capsule    COLACE    100 capsule    Take 1 capsule (100 mg) by mouth daily       dronabinol 5 MG capsule    MARINOL    60 capsule    Take 1 capsule (5 mg) by mouth 2 times daily (before meals)       ENSURE CLEAR Liqd     90 Bottle    Take 1 Bottle by mouth 3 times daily       lidocaine-prilocaine cream    EMLA    30 g    Apply topically as needed for other (Use 30-60 minutes prior to port access)       loratadine 10 MG tablet    CLARITIN    30 tablet    Take 1 tablet (10 mg) by mouth daily Reported on 5/5/2017       LORazepam 0.5 MG tablet    ATIVAN    30 tablet    Take 1 tablet (0.5 mg) by mouth every 4 hours as needed (Anxiety, Nausea/Vomiting or Sleep)       morphine 15 MG 12 hr tablet    MS CONTIN    60 tablet    Take 1 tablet (15 mg) by mouth every 12 hours       ondansetron 8 MG ODT tab    ZOFRAN-ODT    60 tablet    Take 1 tablet (8 mg) by mouth every 8 hours as needed for nausea       oxyCODONE 5 MG IR tablet    ROXICODONE    100 tablet    Take 1 tablet (5 mg) by mouth every 4 hours as needed for moderate to severe pain       pantoprazole 20 MG EC tablet    PROTONIX    90 tablet    Take 1 tablet (20 mg) by mouth daily       polyethylene glycol powder    MIRALAX/GLYCOLAX    119 g    Take 17 g (1 capful) by mouth daily       potassium chloride SA 20 MEQ CR tablet    potassium chloride    60 tablet    Take 1 tablet (20 mEq) by mouth daily       prochlorperazine 10 MG tablet     COMPAZINE    30 tablet    Take 1 tablet (10 mg) by mouth every 6 hours as needed (Nausea/Vomiting)       * Notice:  This list has 2 medication(s) that are the same as other medications prescribed for you. Read the directions carefully, and ask your doctor or other care provider to review them with you.

## 2017-05-08 ENCOUNTER — INFUSION THERAPY VISIT (OUTPATIENT)
Dept: ONCOLOGY | Facility: CLINIC | Age: 57
End: 2017-05-08
Attending: INTERNAL MEDICINE
Payer: COMMERCIAL

## 2017-05-08 VITALS
SYSTOLIC BLOOD PRESSURE: 97 MMHG | HEART RATE: 88 BPM | WEIGHT: 157.4 LBS | OXYGEN SATURATION: 100 % | RESPIRATION RATE: 18 BRPM | TEMPERATURE: 98.4 F | DIASTOLIC BLOOD PRESSURE: 60 MMHG | BODY MASS INDEX: 21.95 KG/M2

## 2017-05-08 DIAGNOSIS — Z29.9 NEED FOR PROPHYLACTIC MEASURE: Primary | ICD-10-CM

## 2017-05-08 DIAGNOSIS — C25.0 MALIGNANT NEOPLASM OF HEAD OF PANCREAS (H): ICD-10-CM

## 2017-05-08 PROCEDURE — 25000128 H RX IP 250 OP 636: Mod: ZF | Performed by: NURSE PRACTITIONER

## 2017-05-08 PROCEDURE — 96372 THER/PROPH/DIAG INJ SC/IM: CPT

## 2017-05-08 PROCEDURE — 25000128 H RX IP 250 OP 636: Mod: ZF | Performed by: INTERNAL MEDICINE

## 2017-05-08 RX ORDER — HEPARIN SODIUM (PORCINE) LOCK FLUSH IV SOLN 100 UNIT/ML 100 UNIT/ML
5 SOLUTION INTRAVENOUS EVERY 8 HOURS
Status: DISCONTINUED | OUTPATIENT
Start: 2017-05-08 | End: 2017-05-08 | Stop reason: HOSPADM

## 2017-05-08 RX ORDER — HEPARIN SODIUM (PORCINE) LOCK FLUSH IV SOLN 100 UNIT/ML 100 UNIT/ML
5 SOLUTION INTRAVENOUS EVERY 8 HOURS
Status: CANCELLED
Start: 2017-05-08

## 2017-05-08 RX ORDER — HEPARIN SODIUM (PORCINE) LOCK FLUSH IV SOLN 100 UNIT/ML 100 UNIT/ML
500 SOLUTION INTRAVENOUS ONCE
Status: COMPLETED | OUTPATIENT
Start: 2017-05-08 | End: 2017-05-08

## 2017-05-08 RX ADMIN — SODIUM CHLORIDE, PRESERVATIVE FREE 5 ML: 5 INJECTION INTRAVENOUS at 16:46

## 2017-05-08 RX ADMIN — SODIUM CHLORIDE, PRESERVATIVE FREE 500 UNITS: 5 INJECTION INTRAVENOUS at 16:44

## 2017-05-08 RX ADMIN — PEGFILGRASTIM 6 MG: 6 INJECTION SUBCUTANEOUS at 15:32

## 2017-05-08 RX ADMIN — SODIUM CHLORIDE 1000 ML: 9 INJECTION, SOLUTION INTRAVENOUS at 15:26

## 2017-05-08 ASSESSMENT — PAIN SCALES - GENERAL: PAINLEVEL: NO PAIN (0)

## 2017-05-08 NOTE — PATIENT INSTRUCTIONS
Contact Numbers    [facility] Main Line: [phone]  [facility] Triage:  [phone]    Call triage with chills and/or temperature greater than or equal to 100.5, uncontrolled nausea/vomiting, diarrhea, constipation, dizziness, shortness of breath, chest pain, bleeding, unexplained bruising, or any new/concerning symptoms, questions/concerns.     If you are having any concerning symptoms or wish to speak to a provider before your next infusion visit, please call your care coordinator or triage to notify them so we can adequately serve you.       After Hours: [phone]    If after hours, weekends, or holidays, call main hospital  and ask for Oncology doctor on call.           May 2017   Kristofer Monday Tuesday Wednesday Thursday Friday Saturday        1     2     3     4     5     [facility] DRAW    9:30 AM   (15 min.)    [facility]     [facility] ONC INFUSION 360   10:00 AM   (360 min.)   UC ONCOLOGY INFUSION   [facility]     UMP RETURN   10:05 AM   (50 min.)   Ester Echavarria APRN CNP   [facility] 6       7     8     UMP ONC INFUSION 120    3:00 PM   (120 min.)    ONCOLOGY INFUSION   [facility] 9     10     11     12     13       14     15     16     17     18     19     [facility] DRAW    7:15 AM   (15 min.)    MASONIC LAB DRAW   [facility] Masonic Lab Draw     UMP RETURN    7:35 AM   (50 min.)   Debora Morrow PA   [facility]     UMP ONC INFUSION 360    8:30 AM   (360 min.)    ONCOLOGY INFUSION   [facility] 20       21     22     23     24     25     26     27       28     29     30     [facility] DRAW    6:30 AM   (15 min.)   [facility]     [facility] ONC INFUSION 360    7:00 AM   (360 min.)    ONCOLOGY INFUSION   [facility] 31 June 2017 Sunday Monday Tuesday Wednesday Thursday  Friday Saturday                       1     2     3       4     5     6     7     8     9     10       11     12     13     [facility] MASONIC LAB DRAW    6:30 AM   (15 min.)    MASONIC LAB DRAW   [facility] Masonic Lab Draw     [facility] ONC INFUSION 360    7:00 AM   (360 min.)    ONCOLOGY INFUSION   [facility] 14     15     16     17       18     19     20     21     22     23     [facility] MASONIC LAB DRAW    9:30 AM   (15 min.)    MASONIC LAB DRAW   [facility] Lab Draw     CT CHEST WO    9:45 AM   (20 min.)   UCCT1   [facility] CT 24       25     26     [facility] MASONIC LAB DRAW    7:15 AM   (15 min.)    MASONIC LAB DRAW   [facility] Lab Draw     UMP RETURN    7:30 AM   (30 min.)   Demond Gonsales MD   [facility] ONC INFUSION 360    8:30 AM   (360 min.)    ONCOLOGY INFUSION   [facility] 27     28     29     30                    No results found for this or any previous visit (from the past 24 hour(s)).

## 2017-05-08 NOTE — PROGRESS NOTES
Infusion Nursing Note:  Pt presents today for IVFs/Neulasta.     present during visit today: Not Applicable.    No labs or treatment parameters today.    Note: Pt denies any N/V, fever, or chills. Continues to have the hiccups intermittently. Takes Protonix and Compazine with relief. No new issues/concerns noted.  Proceed with treatment.    Intravenous Access:  Implanted Port.    Post Infusion Assessment:  Patient tolerated infusion without incident.  Patient tolerated injection without incident.  Blood return noted pre and post infusion.  Port flushed and dc'd intact at end of infusion.    Discharge Plan:   Prescription refills given for potassium.  Discharge instructions reviewed with: Patient.  Patient and/or family verbalized understanding of discharge instructions and all questions answered.  Copy of AVS reviewed with patient and/or family.  Pt will return 5/16/2017 for next provider and infusion appts.

## 2017-05-09 LAB — CANCER AG19-9 SERPL-ACNC: 2101

## 2017-05-19 NOTE — PROGRESS NOTES
Heme/onc visit note  May 19, 2017    Reason for visit: Due for chemotherapy FOLFIRINOX cycle 9.     Cancer history:   Shirin is a 57 year old female diagnosed with metastatic pancreatic CA. She was diagnosed in the spring of 2016 after presenting with a 2 to 3-month history of abdominal pain and weight loss. She underwent evaluation showing a 5 cm mass at the head of the pancreas. Biopsy was consistent with adenocarcinoma. She underwent staging imaging including an MRI of the abdomen, which then showed an up to 10 cm poorly defined hypoenhancing mass arising from the pancreatic head which encased celiac artery, superior mesenteric artery and severely attenuated the main portal vein. She initiated on treatment with gemcitabine and Abraxane on 05/02/2016 and continued on that until December 2016 when she was found to have metastatic disease involving the liver. She was then initiated on FOLFIRINOX on 12/20/16. Her first cycle was complicated by fatigue, nausea/vomiting. Antiemetics were adjusted with cycle 2. After cycle 3, she was admitted with weakness and dizziness. She was found to have a GI bleed secondary to a bleeding duodenal ulcer and infiltrating mass in the duodenum. The ulcer was injected and clipped. She was found to be H. Pylori positive and was initiated on therapy with PPI, carafate, amoxicillin and clarithromycin. On 4/19/17 repeat biliary stent placement was done by Dr. Braden.     Interval history:   Shirin is here with her sister for her appointment today. Her last cycle of chemotherapy went well for her. She had some intermittent nausea the first few days following chemotherapy that was well controlled with her Compazine and Zofran. She reports that she has been eating better and drinking at least 64 oz of fluid - 5 clear Ensure per day. Denies any bleeding. The hemorroid had not been bothering her and no bleeding. Her stools were loose for one day following chemotherapy otherwise soft, formed.  Shirin felt she had more energy this cycle and has been able to do more. She did have some heartburn in the evening after the chemotherapy. She did not take anything for the heartburn. She feels her abdominal pain is well controlled on the long acting morphine and she uses 2 oxycodone a day. No change in her neuropathy involving her fingers and her toes.     Review of Systems: Comprehensive review of systems was negative except for pertinent negatives mentioned in the interval history.     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       Medications:    Current Outpatient Prescriptions on File Prior to Visit:  dexamethasone (DECADRON) 4 MG tablet Take 1 tablet (4 mg) by mouth daily (with breakfast)   loratadine (CLARITIN) 10 MG tablet Take 1 tablet (10 mg) by mouth daily Reported on 5/5/2017   pantoprazole (PROTONIX) 20 MG EC tablet Take 1 tablet (20 mg) by mouth daily   morphine (MS CONTIN) 15 MG 12 hr tablet Take 1 tablet (15 mg) by mouth every 12 hours   prochlorperazine (COMPAZINE) 10 MG tablet Take 1 tablet (10 mg) by mouth every 6 hours as needed (Nausea/Vomiting)   ondansetron (ZOFRAN-ODT) 8 MG ODT tab Take 1 tablet (8 mg) by mouth every 8 hours as needed for nausea   LORazepam (ATIVAN) 0.5 MG tablet Take 1 tablet (0.5 mg) by mouth every 4 hours as needed (Anxiety, Nausea/Vomiting or Sleep)   dronabinol (MARINOL) 5 MG capsule Take 1 capsule (5 mg) by mouth 2 times daily (before meals)   oxyCODONE (ROXICODONE) 5 MG IR tablet Take 1 tablet (5 mg) by mouth every 4 hours as needed for moderate to severe pain   Nutritional Supplements (ENSURE CLEAR) LIQD Take 1 Bottle by mouth 3 times daily   potassium chloride SA (POTASSIUM CHLORIDE) 20 MEQ  CR tablet Take 1 tablet (20 mEq) by mouth daily   diltiazem 2% in PLO cream, FV COMPOUNDED, 2% GEL Apply topically daily and as needed to external hemorrhoids   docusate sodium (COLACE) 100 MG capsule Take 1 capsule (100 mg) by mouth daily   amylase-lipase-protease (CREON) 56329 UNITS CPEP Take 2 capsules (72,000 Units) by mouth 3 times daily (with meals)   polyethylene glycol (MIRALAX/GLYCOLAX) powder Take 17 g (1 capful) by mouth daily (Patient taking differently: Take 1 capful by mouth daily as needed )   lidocaine-prilocaine (EMLA) cream Apply topically as needed for other (Use 30-60 minutes prior to port access)     No current facility-administered medications on file prior to visit.     Allergy:     Allergies   Allergen Reactions     Contrast Dye Nausea and Vomiting     Pt vomiting post IV contrast, Please try Visipaque for next CT scan. 6/24/16 sv       Physical exam  /70  Pulse 83  Temp 98.3  F (36.8  C) (Oral)  Resp 18  SpO2 100%  Wt Readings from Last 4 Encounters:   05/19/17 72.9 kg (160 lb 11.5 oz)   05/08/17 71.4 kg (157 lb 6.4 oz)   05/05/17 70.7 kg (155 lb 12.8 oz)   04/21/17 71.4 kg (157 lb 6.4 oz)   General: alert, cooperative, no apparent disstress  Skin: skin warm and moist, nails without clubbing or cyanosis, no rash, petechiae or ecchymoses  HEENT: normocephalic, atraumatic, PERRL, oral mucosa pink, dentition good, pharynx without exudate.   Neck: neck supple, no cervical or supraclavicular adenopathy  Respiratory: thorax is symmetric with good expansion, lungs clear to ausculation bilaterally, no wheezes, crackles or rhonchi  Cardiovascular: RRR, no murmurs or extra sounds; port accessed - c/d/i  Gastrointestinal: active bowel sounds, soft, non-tender; no palpable masses - examined in chair in infusion - limited abdominal exam  Peripheral Vascular: extremities are warm, without edema.   Neurologic: CN II-XII grossly intact    Labs:     Ref. Range 5/19/2017 09:40   Sodium Latest Ref  Range: 133 - 144 mmol/L 141   Potassium Latest Ref Range: 3.4 - 5.3 mmol/L 3.8   Chloride Latest Ref Range: 94 - 109 mmol/L 105   Carbon Dioxide Latest Ref Range: 20 - 32 mmol/L 27   Urea Nitrogen Latest Ref Range: 7 - 30 mg/dL 11   Creatinine Latest Ref Range: 0.52 - 1.04 mg/dL 0.54   GFR Estimate Latest Ref Range: >60 mL/min/1.7m2 >90...   GFR Estimate If Black Latest Ref Range: >60 mL/min/1.7m2 >90...   Calcium Latest Ref Range: 8.5 - 10.1 mg/dL 8.8   Anion Gap Latest Ref Range: 3 - 14 mmol/L 9   Albumin Latest Ref Range: 3.4 - 5.0 g/dL 2.9 (L)   Protein Total Latest Ref Range: 6.8 - 8.8 g/dL 7.1   Bilirubin Total Latest Ref Range: 0.2 - 1.3 mg/dL 0.4   Alkaline Phosphatase Latest Ref Range: 40 - 150 U/L 249 (H)   ALT Latest Ref Range: 0 - 50 U/L 22   AST Latest Ref Range: 0 - 45 U/L 31   Glucose Latest Ref Range: 70 - 99 mg/dL 82   WBC Latest Ref Range: 4.0 - 11.0 10e9/L 11.0   Hemoglobin Latest Ref Range: 11.7 - 15.7 g/dL 9.0 (L)   Hematocrit Latest Ref Range: 35.0 - 47.0 % 29.8 (L)   Platelet Count Latest Ref Range: 150 - 450 10e9/L 144 (L)   RBC Count Latest Ref Range: 3.8 - 5.2 10e12/L 3.51 (L)   MCV Latest Ref Range: 78 - 100 fl 85   MCH Latest Ref Range: 26.5 - 33.0 pg 25.6 (L)   MCHC Latest Ref Range: 31.5 - 36.5 g/dL 30.2 (L)   RDW Latest Ref Range: 10.0 - 15.0 % 19.1 (H)   Diff Method Unknown Automated Method   % Neutrophils Latest Units: % 79.5   % Lymphocytes Latest Units: % 8.5   % Monocytes Latest Units: % 6.9   % Eosinophils Latest Units: % 0.1   % Basophils Latest Units: % 0.3   % Immature Granulocytes Latest Units: % 4.7   Nucleated RBCs Latest Ref Range: 0 /100 0   Absolute Neutrophil Latest Ref Range: 1.6 - 8.3 10e9/L 8.7 (H)   Absolute Lymphocytes Latest Ref Range: 0.8 - 5.3 10e9/L 0.9   Absolute Monocytes Latest Ref Range: 0.0 - 1.3 10e9/L 0.8   Absolute Eosinophils Latest Ref Range: 0.0 - 0.7 10e9/L 0.0   Absolute Basophils Latest Ref Range: 0.0 - 0.2 10e9/L 0.0   Abs Immature Granulocytes  Latest Ref Range: 0 - 0.4 10e9/L 0.5 (H)   Absolute Nucleated RBC Unknown 0.0        4/11/2017 08:17 4/17/2017 17:16 4/21/2017 07:27 5/5/2017 09:58 5/19/2017 09:40   Cancer Antigen 19-9 3221 (H) 08964 (H) 26673 (H) 2101 (H) 991 (H)       Assessment and plan:  1. Metastatic pancreatic cancer- on FOLFIRINOX. Has been tolerating chemotherapy with expected side effects. CA 19-9 has been declining. Recently was held due to weight loss from anorexia. Gained some weight. Symptoms are well enough controlled to continue with treatment and labs are stable today.  - Monday, 5/22/17  Neulasta, pump disconnet and IVF  - Next cycle 6/2/17 labs, EHSAN, and infusion    2. Ascites/pleural effusions. Most recent CT of chest pleural effusions were small, decreased from prior CT. Most recent MRI of the abdomen noted ascites to be smaller also.   -She will call if abdominal pressure or SOB increases.   -Would check cytology on the fluid if she requires a paracentesis or thoracentesis.     3. GI: GERD--increase PPI to BID, carafate TID  - Constipation/Hemorrhoid - Continue stool softener, tucks and soft wipes, sitz bathes  - Duodenal ulcer/h. Pylori + completed abx therapy  -s/p biliary stenting on 4/19/17; LFTs continuing to trend down     4. FEN: stable. Weight is up 3 lbs today.  K+ normal today, continue K+ supplement.      5. Abdominal pain: secondary to malignancy. Stable  -MS Contin 15 mg q 12 hours, oxycodone 5 mg every 4 hours as needed for moderate/severe pain. - provided her with refills     6. Anorexia: improved  - continue marinol 5 mg bid.      7. Peripheral neuropathy: has moderate neuropathy, but minimal impairment of function. Will monitor closely.    8. Anemia. Started on Aranesp in January by Dr. Gonsales. S/p 2 injections.  Anemia likely from ACD and myelosuppression from chemotherapy as recent iron studies were WNL. Hgb improved this week. Will re check B12, folate and thyroid since it's been around a year since last  check.     Venus KIRBY OCN    The patient was seen in conjunction with Venus Flores NP who served as a scribe for today's visit. I have reviewed and edited the above note, and agree with the above findings and plan.    Debora Morrow PA-C    [facility] [address] [address]  [phone]

## 2017-05-19 NOTE — MR AVS SNAPSHOT
After Visit Summary   5/19/2017    Shirin Muller    MRN: [id number]           Patient Information     Date Of Birth          1960        Visit Information        Provider Department      5/19/2017 7:50 AM Debora Morrow PA [facility]        Today's Diagnoses     Malignant neoplasm of head of pancreas (H)    -  1    Need for prophylactic measure        Anemia, chronic disease           Follow-ups after your visit        Your next 10 appointments already scheduled     May 22, 2017 12:00 PM CDT   Infusion 120 with UC ONCOLOGY INFUSION, UC 25 ATC   [facility] ([facility])    [address] [place] [id number]   [phone]            Jun 02, 2017  6:30 AM CDT   Masonic Lab Draw with UC MASONIC LAB DRAW   [facility] ([facility])    [address] [place] [id number]   [phone]            Jun 02, 2017  7:00 AM CDT   Infusion 360 with UC ONCOLOGY INFUSION, UC 10 ATC   [facility] ([facility])    [address] [place] [id number]   [phone]            Jun 16, 2017  6:30 AM CDT   Masonic Lab Draw with UC MASONIC LAB DRAW   [facility] Draw ([facility])    [address] [place] [phone]   [phone]            Jun 16, 2017  7:00 AM CDT   Infusion 360 with UC ONCOLOGY INFUSION, UC 10 ATC   [facility] ([facility])    [address] [place] [id number]   [phone]            Jun 23, 2017  9:30 AM CDT   Masonic Lab Draw with UC MASONIC LAB DRAW   [facility] ([facility])    [address] [place] [phone]   [phone]            Jun 23, 2017  1:20 PM CDT    (Arrive by 1:05 PM)   CT CHEST W/O CONTRAST with UCCT2   [facility] ([facility])    [address]  1st Floor  [facility] [address] [phone]           Please bring any scans or X-rays taken at other hospitals, if similar tests were done. Also bring a list of your medicines, including vitamins, minerals and over-the-counter drugs. It is safest to leave personal items at home.  Be sure to tell your doctor:   If you have any allergies.   If there s any chance you are pregnant.   If you are breastfeeding.   If you have any special needs.  You do not need to do anything special to prepare.  Please wear loose clothing, such as a sweat suit or jogging clothes. Avoid snaps, zippers and other metal. We may ask you to undress and put on a hospital gown.              Future tests that were ordered for you today     Open Future Orders        Priority Expected Expires Ordered    Folate Routine 6/2/2017 11/19/2017 5/19/2017    Vitamin B12 Routine 6/2/2017 11/19/2017 5/19/2017    TSH with free T4 reflex Routine 6/2/2017 11/19/2017 5/19/2017            Who to contact     If you have questions or need follow up information about today's clinic visit or your schedule please contact [facility] directly at [phone].  Normal or non-critical lab and imaging results will be communicated to you by [employer], letter or phone within 4 business days after the clinic has received the results. If you do not hear from us within 7 days, please contact the clinic through [employer] or phone. If you have a critical or abnormal lab result, we will notify you by phone as soon as possible.  Submit refill requests through [employer] or call your pharmacy and they will forward the refill request to us. Please allow 3 business days for your refill to be completed.          Additional Information About Your Visit        [employer] Information     [employer] gives you secure access to  your electronic health record. If you see a primary care provider, you can also send messages to your care team and make appointments. If you have questions, please call your primary care clinic.  If you do not have a primary care provider, please call [phone] and they will assist you.        Care EveryWhere ID     This is your Care EveryWhere ID. This could be used by other organizations to access your [place] medical records  [id number]        Your Vitals Were     Pulse Temperature Respirations Pulse Oximetry          83 98.3  F (36.8  C) (Oral) 18 100%         Blood Pressure from Last 3 Encounters:   05/19/17 109/70   05/08/17 97/60   05/05/17 90/56    Weight from Last 3 Encounters:   05/19/17 72.9 kg (160 lb 11.5 oz)   05/08/17 71.4 kg (157 lb 6.4 oz)   05/05/17 70.7 kg (155 lb 12.8 oz)                 Today's Medication Changes          These changes are accurate as of: 5/19/17 11:59 PM.  If you have any questions, ask your nurse or doctor.               These medicines have changed or have updated prescriptions.        Dose/Directions    * dexamethasone 4 MG tablet   Commonly known as:  DECADRON   This may have changed:  Another medication with the same name was added. Make sure you understand how and when to take each.   Used for:  Need for prophylactic measure, Malignant neoplasm of head of pancreas (H)        Dose:  4 mg   Take 1 tablet (4 mg) by mouth daily (with breakfast)   Quantity:  3 tablet   Refills:  0       * dexamethasone 4 MG tablet   Commonly known as:  DECADRON   This may have changed:  You were already taking a medication with the same name, and this prescription was added. Make sure you understand how and when to take each.   Used for:  Need for prophylactic measure, Malignant neoplasm of head of pancreas (H)        Dose:  4 mg   Take 1 tablet (4 mg) by mouth daily (with breakfast) for 3 days   Quantity:  3 tablet   Refills:  0       pantoprazole 20 MG EC tablet   Commonly known as:   PROTONIX   This may have changed:  when to take this   Used for:  Malignant neoplasm of head of pancreas (H)   Changed by:  Debora Morrow PA        Dose:  20 mg   Take 1 tablet (20 mg) by mouth 2 times daily   Quantity:  60 tablet   Refills:  3       polyethylene glycol powder   Commonly known as:  MIRALAX/GLYCOLAX   This may have changed:    - when to take this  - reasons to take this   Used for:  Malignant neoplasm of head of pancreas (H)        Dose:  1 capful   Take 17 g (1 capful) by mouth daily   Quantity:  119 g   Refills:  11       * Notice:  This list has 2 medication(s) that are the same as other medications prescribed for you. Read the directions carefully, and ask your doctor or other care provider to review them with you.         Where to get your medicines      These medications were sent to [id number] - [place], [place] - [address], [place] [address]     Phone:  [phone]     dexamethasone 4 MG tablet         Some of these will need a paper prescription and others can be bought over the counter.  Ask your nurse if you have questions.     Bring a paper prescription for each of these medications     morphine 15 MG 12 hr tablet    oxyCODONE 5 MG IR tablet                Primary Care Provider    [facility]       No address on file        Thank you!     Thank you for choosing [facility]  for your care. Our goal is always to provide you with excellent care. Hearing back from our patients is one way we can continue to improve our services. Please take a few minutes to complete the written survey that you may receive in the mail after your visit with us. Thank you!             Your Updated Medication List - Protect others around you: Learn how to safely use, store and throw away your medicines at www.disposemymeds.org.          This list is accurate as of: 5/19/17 11:59 PM.  Always use your most recent med list.                    Brand Name Dispense Instructions for use    amylase-lipase-protease 22968 UNITS Cpep    CREON    180 capsule    Take 2 capsules (72,000 Units) by mouth 3 times daily (with meals)       * dexamethasone 4 MG tablet    DECADRON    3 tablet    Take 1 tablet (4 mg) by mouth daily (with breakfast)       * dexamethasone 4 MG tablet    DECADRON    3 tablet    Take 1 tablet (4 mg) by mouth daily (with breakfast) for 3 days       diltiazem 2% in PLO cream (FV COMPOUNDED) 2% Gel     30 g    Apply topically daily and as needed to external hemorrhoids       docusate sodium 100 MG capsule    COLACE    100 capsule    Take 1 capsule (100 mg) by mouth daily       dronabinol 5 MG capsule    MARINOL    60 capsule    Take 1 capsule (5 mg) by mouth 2 times daily (before meals)       ENSURE CLEAR Liqd     90 Bottle    Take 1 Bottle by mouth 3 times daily       lidocaine-prilocaine cream    EMLA    30 g    Apply topically as needed for other (Use 30-60 minutes prior to port access)       loratadine 10 MG tablet    CLARITIN    30 tablet    Take 1 tablet (10 mg) by mouth daily Reported on 5/5/2017       LORazepam 0.5 MG tablet    ATIVAN    30 tablet    Take 1 tablet (0.5 mg) by mouth every 4 hours as needed (Anxiety, Nausea/Vomiting or Sleep)       morphine 15 MG 12 hr tablet    MS CONTIN    60 tablet    Take 1 tablet (15 mg) by mouth every 12 hours       ondansetron 8 MG ODT tab    ZOFRAN-ODT    60 tablet    Take 1 tablet (8 mg) by mouth every 8 hours as needed for nausea       oxyCODONE 5 MG IR tablet    ROXICODONE    100 tablet    Take 1 tablet (5 mg) by mouth every 4 hours as needed for moderate to severe pain       pantoprazole 20 MG EC tablet    PROTONIX    60 tablet    Take 1 tablet (20 mg) by mouth 2 times daily       polyethylene glycol powder    MIRALAX/GLYCOLAX    119 g    Take 17 g (1 capful) by mouth daily       potassium chloride SA 20 MEQ CR tablet    potassium chloride    60 tablet    Take 1 tablet (20 mEq) by  mouth daily       prochlorperazine 10 MG tablet    COMPAZINE    30 tablet    Take 1 tablet (10 mg) by mouth every 6 hours as needed (Nausea/Vomiting)       * Notice:  This list has 2 medication(s) that are the same as other medications prescribed for you. Read the directions carefully, and ask your doctor or other care provider to review them with you.

## 2017-05-19 NOTE — PROGRESS NOTES
Infusion Nursing Note:  Shirin VegaMapatricia presents today for Oxaliplatin, Irinotecan, Leucovorin, Fluorouracil bolus, Fluorouracil pump connect, and Aranesp   Patient seen by provider today: Yes: Debora RESENDIZ and Venus RESENDIZ   present during visit today: Not Applicable.    Note: N/A.    Intravenous Access:  Implanted Port.    Treatment Conditions:  Lab Results   Component Value Date    HGB 9.0 05/19/2017     Lab Results   Component Value Date    WBC 11.0 05/19/2017      Lab Results   Component Value Date    ANEU 8.7 05/19/2017     Lab Results   Component Value Date     05/19/2017      Lab Results   Component Value Date     05/19/2017                   Lab Results   Component Value Date    POTASSIUM 3.8 05/19/2017           Lab Results   Component Value Date    MAG 2.0 04/03/2017            Lab Results   Component Value Date    CR 0.54 05/19/2017                   Lab Results   Component Value Date    FANNY 8.8 05/19/2017                Lab Results   Component Value Date    BILITOTAL 0.4 05/19/2017           Lab Results   Component Value Date    ALBUMIN 2.9 05/19/2017                    Lab Results   Component Value Date    ALT 22 05/19/2017           Lab Results   Component Value Date    AST 31 05/19/2017     Results reviewed, labs MET treatment parameters, ok to proceed with treatment.      Post Infusion Assessment:  Patient tolerated infusion without incident.  Patient tolerated injection without incident.  Blood return noted pre and post infusion.  Site patent and intact, free from redness, edema or discomfort.  No evidence of extravasations.    Fluorouracil continuous infusion pump connected at 1500. Positive blood return from port at time of pump hook up. Fluorouracil  to infuse over 46 hours at 5.2 cc/hour. Connections taped and double checked by second RN. Pump will be disconnected on Sunday 5/21/17 by [facility]. Patient aware of pump disconnect date and time (Sunday  5/21/17 at 1330).  Will return to clinic 5/22 at 1200 for IVF and Neulasta.    Discharge Plan:   Prescription refills given for Oxycodone, MS Contin, Prilosec, Compazine, Zofran, and Miralax.  Discharge instructions reviewed with: Patient and Family.  Patient and/or family verbalized understanding of discharge instructions and all questions answered.  Copy of AVS reviewed with patient and/or family.  Patient will return 5/22 for next appointment.  Patient discharged in stable condition accompanied by: self and sister.  Departure Mode: Ambulatory.    Camila Newman RN

## 2017-05-19 NOTE — MR AVS SNAPSHOT
After Visit Summary   5/19/2017    Shirin Muller    MRN: [id number]           Patient Information     Date Of Birth          1960        Visit Information        Provider Department      5/19/2017 8:30 AM  19 ATC;  ONCOLOGY INFUSION [facility]        Today's Diagnoses     Need for prophylactic measure    -  1    Malignant neoplasm of head of pancreas (H)          Care Instructions    Home Health to disconnect pump Sunday 5/21 at 1:30    Contact Numbers    [facility] Main Line: [phone]  [facility] Triage:  [phone]    Call triage with chills and/or temperature greater than or equal to 100.5, uncontrolled nausea/vomiting, diarrhea, constipation, dizziness, shortness of breath, chest pain, bleeding, unexplained bruising, or any new/concerning symptoms, questions/concerns.     If you are having any concerning symptoms or wish to speak to a provider before your next infusion visit, please call your care coordinator or triage to notify them so we can adequately serve you.       After Hours: [phone]    If after hours, weekends, or holidays, call main hospital  and ask for Oncology doctor on call.         May 2017   Kristofer Monday Tuesday Wednesday Thursday Friday Saturday        1     2     3     4     5     [facility]    9:30 AM   (15 min.)    [facility] DRAW   [facility] Lab Draw     UMP ONC INFUSION 360   10:00 AM   (360 min.)    ONCOLOGY INFUSION   [facility]     UMP RETURN   10:05 AM   (50 min.)   Ester Echavarria, APRN CNP   [facility] 6       7     8     UMP ONC INFUSION 120    3:00 PM   (120 min.)    ONCOLOGY INFUSION   [facility] 9     10     11     12     13       14     15     16     17     18     19     [facility] DRAW    7:15 AM   (15 min.)    [facility] DRAW   [facility] Draw     UMP RETURN    7:35 AM   (50 min.)   Debora Morrow PA M  [facility]     UMP ONC INFUSION 360    8:30 AM   (360 min.)   UC ONCOLOGY INFUSION   [facility] 20       21     22     UMP ONC INFUSION 120   12:00 PM   (120 min.)    ONCOLOGY INFUSION   [facility] 23     24     25     26     27       28     29     30     31 June 2017 Sunday Monday Tuesday Wednesday Thursday Friday Saturday                       1     2     [facility] DRAW    6:30 AM   (15 min.)    [facility] DRAW   [facility] Draw     UMP ONC INFUSION 360    7:00 AM   (360 min.)    ONCOLOGY INFUSION   [facility] 3       4     5     6     7     8     9     10       11     12     13     14     15     16     [facility] DRAW    6:30 AM   (15 min.)    [facility] LAB DRAW   [facility] Draw     UMP ONC INFUSION 360    7:00 AM   (360 min.)    ONCOLOGY INFUSION   [facility] 17       18     19     20     21     22     23     [facility] DRAW    9:30 AM   (15 min.)    [facility] DRAW   [facility] Draw     CT CHEST WO    9:45 AM   (20 min.)   UCCT1   [facility] CT 24       25     26     [facility] DRAW    7:15 AM   (15 min.)    [facility]   [facility]     UMP RETURN    7:30 AM   (30 min.)   Demond Gonsales MD   [facility] 27     28     29     30     UMP ONC INFUSION 360    7:00 AM   (360 min.)    ONCOLOGY INFUSION   [facility]                   Lab Results:  Recent Results (from the past 12 hour(s))   Comprehensive metabolic panel    Collection Time: 05/19/17  9:40 AM   Result Value Ref Range    Sodium 141 133 - 144 mmol/L    Potassium 3.8 3.4 - 5.3 mmol/L    Chloride 105 94 - 109 mmol/L    Carbon Dioxide 27 20 - 32 mmol/L    Anion Gap 9 3 - 14 mmol/L    Glucose 82 70 - 99 mg/dL    Urea Nitrogen 11 7 - 30 mg/dL    Creatinine 0.54 0.52 - 1.04 mg/dL    GFR  Estimate >90  Non  GFR Calc   >60 mL/min/1.7m2    GFR Estimate If Black >90   GFR Calc   >60 mL/min/1.7m2    Calcium 8.8 8.5 - 10.1 mg/dL    Bilirubin Total 0.4 0.2 - 1.3 mg/dL    Albumin 2.9 (L) 3.4 - 5.0 g/dL    Protein Total 7.1 6.8 - 8.8 g/dL    Alkaline Phosphatase 249 (H) 40 - 150 U/L    ALT 22 0 - 50 U/L    AST 31 0 - 45 U/L   CBC with platelets differential    Collection Time: 05/19/17  9:40 AM   Result Value Ref Range    WBC 11.0 4.0 - 11.0 10e9/L    RBC Count 3.51 (L) 3.8 - 5.2 10e12/L    Hemoglobin 9.0 (L) 11.7 - 15.7 g/dL    Hematocrit 29.8 (L) 35.0 - 47.0 %    MCV 85 78 - 100 fl    MCH 25.6 (L) 26.5 - 33.0 pg    MCHC 30.2 (L) 31.5 - 36.5 g/dL    RDW 19.1 (H) 10.0 - 15.0 %    Platelet Count 144 (L) 150 - 450 10e9/L    Diff Method Automated Method     % Neutrophils 79.5 %    % Lymphocytes 8.5 %    % Monocytes 6.9 %    % Eosinophils 0.1 %    % Basophils 0.3 %    % Immature Granulocytes 4.7 %    Nucleated RBCs 0 0 /100    Absolute Neutrophil 8.7 (H) 1.6 - 8.3 10e9/L    Absolute Lymphocytes 0.9 0.8 - 5.3 10e9/L    Absolute Monocytes 0.8 0.0 - 1.3 10e9/L    Absolute Eosinophils 0.0 0.0 - 0.7 10e9/L    Absolute Basophils 0.0 0.0 - 0.2 10e9/L    Abs Immature Granulocytes 0.5 (H) 0 - 0.4 10e9/L    Absolute Nucleated RBC 0.0            Follow-ups after your visit        Your next 10 appointments already scheduled     May 22, 2017 12:00 PM CDT   Infusion 120 with UC ONCOLOGY INFUSION, UC 25 ATC   [facility] ([facility])    [address] [facility] [address] [facility]  [address]nd [facility] [address] [phone]            Jun 02, 2017  6:30 AM CDT   [facility] with  [facility] DRAW   [facility] Lab Draw ([facility])    [address] [address]   [phone]            Jun 02, 2017  7:00 AM CDT   Infusion 360 with UC ONCOLOGY INFUSION,  10 ATC   [facility] (  [facility])    [address] [place] [address]  2nd [facility] [address]   [phone]            Jun 16, 2017  6:30 AM CDT   [facility] Lab Draw with  [facility] LAB DRAW   [facility] Draw ([facility])    [address] [place] [address]   [phone]            Jun 16, 2017  7:00 AM CDT   Infusion 360 with UC ONCOLOGY INFUSION, UC 10 ATC   [facility] ([facility])    [address] [place] [address]   [phone]            Jun 23, 2017  9:30 AM CDT   [facility] Draw with  [facility] LAB DRAW   [facility]    [address] [place] [address]   [phone]            Jun 23, 2017 10:00 AM CDT   (Arrive by 9:45 AM)   CT CHEST W/O CONTRAST with UCCT1   [facility] CT ([facility])    [address] [place] [address]  1st [facility] [address] [phone]           Please bring any scans or X-rays taken at other hospitals, if similar tests were done. Also bring a list of your medicines, including vitamins, minerals and over-the-counter drugs. It is safest to leave personal items at home.  Be sure to tell your doctor:   If you have any allergies.   If there s any chance you are pregnant.   If you are breastfeeding.   If you have any special needs.  You do not need to do anything special to prepare.  Please wear loose clothing, such as a sweat suit or jogging clothes. Avoid snaps, zippers and other metal. We may ask you to undress and put on a hospital gown.              Who to contact     If you have questions or need follow up information about today's clinic visit or your schedule please contact [facility] directly at [phone].  Normal or non-critical lab and imaging results will be communicated to you by MyChart, letter or  phone within 4 business days after the clinic has received the results. If you do not hear from us within 7 days, please contact the clinic through [employer] or phone. If you have a critical or abnormal lab result, we will notify you by phone as soon as possible.  Submit refill requests through [employer] or call your pharmacy and they will forward the refill request to us. Please allow 3 business days for your refill to be completed.          Additional Information About Your Visit        [employer] Information     [employer] gives you secure access to your electronic health record. If you see a primary care provider, you can also send messages to your care team and make appointments. If you have questions, please call your primary care clinic.  If you do not have a primary care provider, please call [phone] and they will assist you.        Care EveryWhere ID     This is your Care EveryWhere ID. This could be used by other organizations to access your [place] medical records  [id number]        Your Vitals Were     BMI (Body Mass Index)                   22.42 kg/m2            Blood Pressure from Last 3 Encounters:   05/19/17 109/70   05/08/17 97/60   05/05/17 90/56    Weight from Last 3 Encounters:   05/19/17 72.9 kg (160 lb 11.5 oz)   05/08/17 71.4 kg (157 lb 6.4 oz)   05/05/17 70.7 kg (155 lb 12.8 oz)              We Performed the Following     Cancer antigen 19-9     CBC with platelets differential     Comprehensive metabolic panel     Treatment Conditions          Today's Medication Changes          These changes are accurate as of: 5/19/17  2:44 PM.  If you have any questions, ask your nurse or doctor.               These medicines have changed or have updated prescriptions.        Dose/Directions    * dexamethasone 4 MG tablet   Commonly known as:  DECADRON   This may have changed:  Another medication with the same name was added. Make sure you understand how and when to take each.   Used for:  Need for  prophylactic measure, Malignant neoplasm of head of pancreas (H)        Dose:  4 mg   Take 1 tablet (4 mg) by mouth daily (with breakfast)   Quantity:  3 tablet   Refills:  0       * dexamethasone 4 MG tablet   Commonly known as:  DECADRON   This may have changed:  You were already taking a medication with the same name, and this prescription was added. Make sure you understand how and when to take each.   Used for:  Need for prophylactic measure, Malignant neoplasm of head of pancreas (H)        Dose:  4 mg   Take 1 tablet (4 mg) by mouth daily (with breakfast) for 3 days   Quantity:  3 tablet   Refills:  0       pantoprazole 20 MG EC tablet   Commonly known as:  PROTONIX   This may have changed:  when to take this   Used for:  Malignant neoplasm of head of pancreas (H)   Changed by:  Debora Morrow PA        Dose:  20 mg   Take 1 tablet (20 mg) by mouth 2 times daily   Quantity:  60 tablet   Refills:  3       polyethylene glycol powder   Commonly known as:  MIRALAX/GLYCOLAX   This may have changed:    - when to take this  - reasons to take this   Used for:  Malignant neoplasm of head of pancreas (H)        Dose:  1 capful   Take 17 g (1 capful) by mouth daily   Quantity:  119 g   Refills:  11       * Notice:  This list has 2 medication(s) that are the same as other medications prescribed for you. Read the directions carefully, and ask your doctor or other care provider to review them with you.         Where to get your medicines      These medications were sent to [id number] - [place], [place] - [address], [place] [address]     Phone:  [phone]     dexamethasone 4 MG tablet         Some of these will need a paper prescription and others can be bought over the counter.  Ask your nurse if you have questions.     Bring a paper prescription for each of these medications     morphine 15 MG 12 hr tablet    oxyCODONE 5 MG IR tablet                Primary Care  Provider    [facility]       No address on file        Thank you!     Thank you for choosing [facility]  for your care. Our goal is always to provide you with excellent care. Hearing back from our patients is one way we can continue to improve our services. Please take a few minutes to complete the written survey that you may receive in the mail after your visit with us. Thank you!             Your Updated Medication List - Protect others around you: Learn how to safely use, store and throw away your medicines at www.disposemymeds.org.          This list is accurate as of: 5/19/17  2:44 PM.  Always use your most recent med list.                   Brand Name Dispense Instructions for use    amylase-lipase-protease 59582 UNITS Cpep    CREON    180 capsule    Take 2 capsules (72,000 Units) by mouth 3 times daily (with meals)       * dexamethasone 4 MG tablet    DECADRON    3 tablet    Take 1 tablet (4 mg) by mouth daily (with breakfast)       * dexamethasone 4 MG tablet    DECADRON    3 tablet    Take 1 tablet (4 mg) by mouth daily (with breakfast) for 3 days       diltiazem 2% in PLO cream (FV COMPOUNDED) 2% Gel     30 g    Apply topically daily and as needed to external hemorrhoids       docusate sodium 100 MG capsule    COLACE    100 capsule    Take 1 capsule (100 mg) by mouth daily       dronabinol 5 MG capsule    MARINOL    60 capsule    Take 1 capsule (5 mg) by mouth 2 times daily (before meals)       ENSURE CLEAR Liqd     90 Bottle    Take 1 Bottle by mouth 3 times daily       lidocaine-prilocaine cream    EMLA    30 g    Apply topically as needed for other (Use 30-60 minutes prior to port access)       loratadine 10 MG tablet    CLARITIN    30 tablet    Take 1 tablet (10 mg) by mouth daily Reported on 5/5/2017       LORazepam 0.5 MG tablet    ATIVAN    30 tablet    Take 1 tablet (0.5 mg) by mouth every 4 hours as needed (Anxiety, Nausea/Vomiting or Sleep)       morphine 15  MG 12 hr tablet    MS CONTIN    60 tablet    Take 1 tablet (15 mg) by mouth every 12 hours       ondansetron 8 MG ODT tab    ZOFRAN-ODT    60 tablet    Take 1 tablet (8 mg) by mouth every 8 hours as needed for nausea       oxyCODONE 5 MG IR tablet    ROXICODONE    100 tablet    Take 1 tablet (5 mg) by mouth every 4 hours as needed for moderate to severe pain       pantoprazole 20 MG EC tablet    PROTONIX    60 tablet    Take 1 tablet (20 mg) by mouth 2 times daily       polyethylene glycol powder    MIRALAX/GLYCOLAX    119 g    Take 17 g (1 capful) by mouth daily       potassium chloride SA 20 MEQ CR tablet    potassium chloride    60 tablet    Take 1 tablet (20 mEq) by mouth daily       prochlorperazine 10 MG tablet    COMPAZINE    30 tablet    Take 1 tablet (10 mg) by mouth every 6 hours as needed (Nausea/Vomiting)       * Notice:  This list has 2 medication(s) that are the same as other medications prescribed for you. Read the directions carefully, and ask your doctor or other care provider to review them with you.

## 2017-05-19 NOTE — PATIENT INSTRUCTIONS
Home Health to disconnect pump Sunday 5/21 at 1:30    Contact Numbers    [facility] Main Line: [phone]  [facility] Triage:  [phone]    Call triage with chills and/or temperature greater than or equal to 100.5, uncontrolled nausea/vomiting, diarrhea, constipation, dizziness, shortness of breath, chest pain, bleeding, unexplained bruising, or any new/concerning symptoms, questions/concerns.     If you are having any concerning symptoms or wish to speak to a provider before your next infusion visit, please call your care coordinator or triage to notify them so we can adequately serve you.       After Hours: [phone]    If after hours, weekends, or holidays, call main hospital  and ask for Oncology doctor on call.         May 2017   Kristofer Monday Tuesday Wednesday Thursday Friday Saturday        1     2     3     4     5     [facility] DRAW    9:30 AM   (15 min.)    [facility] DRAW   [facility] Draw     UMP ONC INFUSION 360   10:00 AM   (360 min.)   UC ONCOLOGY INFUSION   [facility]     UMP RETURN   10:05 AM   (50 min.)   Ester Echavarria APRN CNP   [facility] 6       7     8     UMP ONC INFUSION 120    3:00 PM   (120 min.)   UC ONCOLOGY INFUSION   [facility] 9     10     11     12     13       14     15     16     17     18     19     [facility] DRAW    7:15 AM   (15 min.)   [facility] DRAW   [facility]     UMP RETURN    7:35 AM   (50 min.)   Debora Morrow PA   [facility]     UMP ONC INFUSION 360    8:30 AM   (360 min.)   UC ONCOLOGY INFUSION   [facility] 20       21     22     UMP ONC INFUSION 120   12:00 PM   (120 min.)   UC ONCOLOGY INFUSION   [facility] 23     24     25     26     27       28     29     30     31 June 2017 Sunday Monday Tuesday Wednesday Thursday Friday Saturday                       1      2     [facility] DRAW    6:30 AM   (15 min.)    [facility] LAB DRAW   [facility] Lab Draw     UMP ONC INFUSION 360    7:00 AM   (360 min.)    ONCOLOGY INFUSION   [facility] 3       4     5     6     7     8     9     10       11     12     13     14     15     16     [facility] DRAW    6:30 AM   (15 min.)    [facility] LAB DRAW   [facility] Draw     UMP ONC INFUSION 360    7:00 AM   (360 min.)    ONCOLOGY INFUSION   [facility] 17       18     19     20     21     22     23     [facility] DRAW    9:30 AM   (15 min.)    [facility] LAB DRAW   [facility] Draw     CT CHEST WO    9:45 AM   (20 min.)   UCCT1   [facility] CT 24       25     26     [facility] DRAW    7:15 AM   (15 min.)    [facility] LAB DRAW   [facility] Lab Draw     UMP RETURN    7:30 AM   (30 min.)   Demond Gonsales MD   [facility] 27     28     29     30     UMP ONC INFUSION 360    7:00 AM   (360 min.)    ONCOLOGY INFUSION   [facility]                   Lab Results:  Recent Results (from the past 12 hour(s))   Comprehensive metabolic panel    Collection Time: 05/19/17  9:40 AM   Result Value Ref Range    Sodium 141 133 - 144 mmol/L    Potassium 3.8 3.4 - 5.3 mmol/L    Chloride 105 94 - 109 mmol/L    Carbon Dioxide 27 20 - 32 mmol/L    Anion Gap 9 3 - 14 mmol/L    Glucose 82 70 - 99 mg/dL    Urea Nitrogen 11 7 - 30 mg/dL    Creatinine 0.54 0.52 - 1.04 mg/dL    GFR Estimate >90  Non  GFR Calc   >60 mL/min/1.7m2    GFR Estimate If Black >90   GFR Calc   >60 mL/min/1.7m2    Calcium 8.8 8.5 - 10.1 mg/dL    Bilirubin Total 0.4 0.2 - 1.3 mg/dL    Albumin 2.9 (L) 3.4 - 5.0 g/dL    Protein Total 7.1 6.8 - 8.8 g/dL    Alkaline Phosphatase 249 (H) 40 - 150 U/L    ALT 22 0 - 50 U/L    AST 31 0 - 45 U/L   CBC with platelets differential    Collection Time: 05/19/17  9:40 AM   Result  Value Ref Range    WBC 11.0 4.0 - 11.0 10e9/L    RBC Count 3.51 (L) 3.8 - 5.2 10e12/L    Hemoglobin 9.0 (L) 11.7 - 15.7 g/dL    Hematocrit 29.8 (L) 35.0 - 47.0 %    MCV 85 78 - 100 fl    MCH 25.6 (L) 26.5 - 33.0 pg    MCHC 30.2 (L) 31.5 - 36.5 g/dL    RDW 19.1 (H) 10.0 - 15.0 %    Platelet Count 144 (L) 150 - 450 10e9/L    Diff Method Automated Method     % Neutrophils 79.5 %    % Lymphocytes 8.5 %    % Monocytes 6.9 %    % Eosinophils 0.1 %    % Basophils 0.3 %    % Immature Granulocytes 4.7 %    Nucleated RBCs 0 0 /100    Absolute Neutrophil 8.7 (H) 1.6 - 8.3 10e9/L    Absolute Lymphocytes 0.9 0.8 - 5.3 10e9/L    Absolute Monocytes 0.8 0.0 - 1.3 10e9/L    Absolute Eosinophils 0.0 0.0 - 0.7 10e9/L    Absolute Basophils 0.0 0.0 - 0.2 10e9/L    Abs Immature Granulocytes 0.5 (H) 0 - 0.4 10e9/L    Absolute Nucleated RBC 0.0

## 2017-05-19 NOTE — LETTER
5/19/2017      RE: Shirin Berg [address]un[address]Mafe  [address] [facility] [address]       Heme/onc visit note  May 19, 2017    Reason for visit: Due for chemotherapy FOLFIRINOX cycle 9.     Cancer history:   Shirin is a 57 year old female diagnosed with metastatic pancreatic CA. She was diagnosed in the spring of 2016 after presenting with a 2 to 3-month history of abdominal pain and weight loss. She underwent evaluation showing a 5 cm mass at the head of the pancreas. Biopsy was consistent with adenocarcinoma. She underwent staging imaging including an MRI of the abdomen, which then showed an up to 10 cm poorly defined hypoenhancing mass arising from the pancreatic head which encased celiac artery, superior mesenteric artery and severely attenuated the main portal vein. She initiated on treatment with gemcitabine and Abraxane on 05/02/2016 and continued on that until December 2016 when she was found to have metastatic disease involving the liver. She was then initiated on FOLFIRINOX on 12/20/16. Her first cycle was complicated by fatigue, nausea/vomiting. Antiemetics were adjusted with cycle 2. After cycle 3, she was admitted with weakness and dizziness. She was found to have a GI bleed secondary to a bleeding duodenal ulcer and infiltrating mass in the duodenum. The ulcer was injected and clipped. She was found to be H. Pylori positive and was initiated on therapy with PPI, carafate, amoxicillin and clarithromycin. On 4/19/17 repeat biliary stent placement was done by Dr. Braden.     Interval history:   Shirin is here with her sister for her appointment today. Her last cycle of chemotherapy went well for her. She had some intermittent nausea the first few days following chemotherapy that was well controlled with her Compazine and Zofran. She reports that she has been eating better and drinking at least 64 oz of fluid - 5 clear Ensure per day. Denies any bleeding. The hemorroid had not been bothering her and  no bleeding. Her stools were loose for one day following chemotherapy otherwise soft, formed. Shirin felt she had more energy this cycle and has been able to do more. She did have some heartburn in the evening after the chemotherapy. She did not take anything for the heartburn. She feels her abdominal pain is well controlled on the long acting morphine and she uses 2 oxycodone a day. No change in her neuropathy involving her fingers and her toes.     Review of Systems: Comprehensive review of systems was negative except for pertinent negatives mentioned in the interval history.     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       Medications:    Current Outpatient Prescriptions on File Prior to Visit:  dexamethasone (DECADRON) 4 MG tablet Take 1 tablet (4 mg) by mouth daily (with breakfast)   loratadine (CLARITIN) 10 MG tablet Take 1 tablet (10 mg) by mouth daily Reported on 5/5/2017   pantoprazole (PROTONIX) 20 MG EC tablet Take 1 tablet (20 mg) by mouth daily   morphine (MS CONTIN) 15 MG 12 hr tablet Take 1 tablet (15 mg) by mouth every 12 hours   prochlorperazine (COMPAZINE) 10 MG tablet Take 1 tablet (10 mg) by mouth every 6 hours as needed (Nausea/Vomiting)   ondansetron (ZOFRAN-ODT) 8 MG ODT tab Take 1 tablet (8 mg) by mouth every 8 hours as needed for nausea   LORazepam (ATIVAN) 0.5 MG tablet Take 1 tablet (0.5 mg) by mouth every 4 hours as needed (Anxiety, Nausea/Vomiting or Sleep)   dronabinol (MARINOL) 5 MG capsule Take 1 capsule (5 mg) by mouth 2 times daily (before meals)   oxyCODONE (ROXICODONE) 5 MG IR tablet Take 1 tablet (5 mg) by mouth every 4 hours as needed for moderate to severe pain   Nutritional Supplements (ENSURE CLEAR)  LIQD Take 1 Bottle by mouth 3 times daily   potassium chloride SA (POTASSIUM CHLORIDE) 20 MEQ CR tablet Take 1 tablet (20 mEq) by mouth daily   diltiazem 2% in PLO cream, FV COMPOUNDED, 2% GEL Apply topically daily and as needed to external hemorrhoids   docusate sodium (COLACE) 100 MG capsule Take 1 capsule (100 mg) by mouth daily   amylase-lipase-protease (CREON) 42987 UNITS CPEP Take 2 capsules (72,000 Units) by mouth 3 times daily (with meals)   polyethylene glycol (MIRALAX/GLYCOLAX) powder Take 17 g (1 capful) by mouth daily (Patient taking differently: Take 1 capful by mouth daily as needed )   lidocaine-prilocaine (EMLA) cream Apply topically as needed for other (Use 30-60 minutes prior to port access)     No current facility-administered medications on file prior to visit.     Allergy:     Allergies   Allergen Reactions     Contrast Dye Nausea and Vomiting     Pt vomiting post IV contrast, Please try Visipaque for next CT scan. 6/24/16 sv       Physical exam  /70  Pulse 83  Temp 98.3  F (36.8  C) (Oral)  Resp 18  SpO2 100%  Wt Readings from Last 4 Encounters:   05/19/17 72.9 kg (160 lb 11.5 oz)   05/08/17 71.4 kg (157 lb 6.4 oz)   05/05/17 70.7 kg (155 lb 12.8 oz)   04/21/17 71.4 kg (157 lb 6.4 oz)   General: alert, cooperative, no apparent disstress  Skin: skin warm and moist, nails without clubbing or cyanosis, no rash, petechiae or ecchymoses  HEENT: normocephalic, atraumatic, PERRL, oral mucosa pink, dentition good, pharynx without exudate.   Neck: neck supple, no cervical or supraclavicular adenopathy  Respiratory: thorax is symmetric with good expansion, lungs clear to ausculation bilaterally, no wheezes, crackles or rhonchi  Cardiovascular: RRR, no murmurs or extra sounds; port accessed - c/d/i  Gastrointestinal: active bowel sounds, soft, non-tender; no palpable masses - examined in chair in infusion - limited abdominal exam  Peripheral Vascular: extremities are warm, without edema.    Neurologic: CN II-XII grossly intact    Labs:     Ref. Range 5/19/2017 09:40   Sodium Latest Ref Range: 133 - 144 mmol/L 141   Potassium Latest Ref Range: 3.4 - 5.3 mmol/L 3.8   Chloride Latest Ref Range: 94 - 109 mmol/L 105   Carbon Dioxide Latest Ref Range: 20 - 32 mmol/L 27   Urea Nitrogen Latest Ref Range: 7 - 30 mg/dL 11   Creatinine Latest Ref Range: 0.52 - 1.04 mg/dL 0.54   GFR Estimate Latest Ref Range: >60 mL/min/1.7m2 >90...   GFR Estimate If Black Latest Ref Range: >60 mL/min/1.7m2 >90...   Calcium Latest Ref Range: 8.5 - 10.1 mg/dL 8.8   Anion Gap Latest Ref Range: 3 - 14 mmol/L 9   Albumin Latest Ref Range: 3.4 - 5.0 g/dL 2.9 (L)   Protein Total Latest Ref Range: 6.8 - 8.8 g/dL 7.1   Bilirubin Total Latest Ref Range: 0.2 - 1.3 mg/dL 0.4   Alkaline Phosphatase Latest Ref Range: 40 - 150 U/L 249 (H)   ALT Latest Ref Range: 0 - 50 U/L 22   AST Latest Ref Range: 0 - 45 U/L 31   Glucose Latest Ref Range: 70 - 99 mg/dL 82   WBC Latest Ref Range: 4.0 - 11.0 10e9/L 11.0   Hemoglobin Latest Ref Range: 11.7 - 15.7 g/dL 9.0 (L)   Hematocrit Latest Ref Range: 35.0 - 47.0 % 29.8 (L)   Platelet Count Latest Ref Range: 150 - 450 10e9/L 144 (L)   RBC Count Latest Ref Range: 3.8 - 5.2 10e12/L 3.51 (L)   MCV Latest Ref Range: 78 - 100 fl 85   MCH Latest Ref Range: 26.5 - 33.0 pg 25.6 (L)   MCHC Latest Ref Range: 31.5 - 36.5 g/dL 30.2 (L)   RDW Latest Ref Range: 10.0 - 15.0 % 19.1 (H)   Diff Method Unknown Automated Method   % Neutrophils Latest Units: % 79.5   % Lymphocytes Latest Units: % 8.5   % Monocytes Latest Units: % 6.9   % Eosinophils Latest Units: % 0.1   % Basophils Latest Units: % 0.3   % Immature Granulocytes Latest Units: % 4.7   Nucleated RBCs Latest Ref Range: 0 /100 0   Absolute Neutrophil Latest Ref Range: 1.6 - 8.3 10e9/L 8.7 (H)   Absolute Lymphocytes Latest Ref Range: 0.8 - 5.3 10e9/L 0.9   Absolute Monocytes Latest Ref Range: 0.0 - 1.3 10e9/L 0.8   Absolute Eosinophils Latest Ref Range: 0.0 - 0.7  10e9/L 0.0   Absolute Basophils Latest Ref Range: 0.0 - 0.2 10e9/L 0.0   Abs Immature Granulocytes Latest Ref Range: 0 - 0.4 10e9/L 0.5 (H)   Absolute Nucleated RBC Unknown 0.0        4/11/2017 08:17 4/17/2017 17:16 4/21/2017 07:27 5/5/2017 09:58 5/19/2017 09:40   Cancer Antigen 19-9 3221 (H) 32259 (H) 23537 (H) 2101 (H) 991 (H)       Assessment and plan:  1. Metastatic pancreatic cancer- on FOLFIRINOX. Has been tolerating chemotherapy with expected side effects. CA 19-9 has been declining. Recently was held due to weight loss from anorexia. Gained some weight. Symptoms are well enough controlled to continue with treatment and labs are stable today.  - Monday, 5/22/17  Neulasta, pump disconnet and IVF  - Next cycle 6/2/17 labs, EHSAN, and infusion    2. Ascites/pleural effusions. Most recent CT of chest pleural effusions were small, decreased from prior CT. Most recent MRI of the abdomen noted ascites to be smaller also.   -She will call if abdominal pressure or SOB increases.   -Would check cytology on the fluid if she requires a paracentesis or thoracentesis.     3. GI: GERD--increase PPI to BID, carafate TID  - Constipation/Hemorrhoid - Continue stool softener, tucks and soft wipes, sitz bathes  - Duodenal ulcer/h. Pylori + completed abx therapy  -s/p biliary stenting on 4/19/17; LFTs continuing to trend down     4. FEN: stable. Weight is up 3 lbs today.  K+ normal today, continue K+ supplement.      5. Abdominal pain: secondary to malignancy. Stable  -MS Contin 15 mg q 12 hours, oxycodone 5 mg every 4 hours as needed for moderate/severe pain. - provided her with refills     6. Anorexia: improved  - continue marinol 5 mg bid.      7. Peripheral neuropathy: has moderate neuropathy, but minimal impairment of function. Will monitor closely.    8. Anemia. Started on Aranesp in January by Dr. Gonsales. S/p 2 injections.  Anemia likely from ACD and myelosuppression from chemotherapy as recent iron studies were WNL. Hgb  improved this week. Will re check B12, folate and thyroid since it's been around a year since last check.     Venus OLIVEIRA NP-C OCN    The patient was seen in conjunction with Venus Flores NP who served as a scribe for today's visit. I have reviewed and edited the above note, and agree with the above findings and plan.    Debora Morrow PA-C    [facility] [address] [address]  [phone]

## 2017-05-22 NOTE — PROGRESS NOTES
Infusion Nursing Note:  Shirin Muller presents today for 1 L NS IV-Neulasta.    Patient seen by provider today: No    Treatment Conditions:  Not Applicable.    Intravenous Access:  Implanted Port.  Access dc'd at time of discharge.      Note:   Proceed with treatment.    Copy of AVS given to patient via Mychart. + Blood return from PORT pre and post infusion.  Tolerated infusion without incident. No Prescriptions filled today.   D/C in care of friend.  Pt will return 6/2 for infusion appt.       Alena Maya RN    Administrations This Visit     0.9% sodium chloride BOLUS     Admin Date Action Dose Route Administered By             05/22/2017 New Bag 1000 mL Intravenous Alena Maya RN                    heparin 100 UNIT/ML injection 5 mL     Admin Date Action Dose Route Administered By             05/22/2017 Given 5 mL Intracatheter Ashli Espinoza RN                    pegfilgrastim (NEULASTA) injection 6 mg     Admin Date Action Dose Route Administered By             05/22/2017 Given 6 mg Subcutaneous Alena Maya RN                    sodium chloride (PF) 0.9% PF flush 10 mL     Admin Date Action Dose Route Administered By             05/22/2017 Given 10 mL Intracatheter Ashli Espinoza RN

## 2017-05-22 NOTE — PATIENT INSTRUCTIONS
Contact Numbers  [facility]: [phone]    After Hours:  [phone]  Triage: [phone]    Please call the [facility] Triage line if you experience a temperature greater than or equal to 100.5, shaking chills, have uncontrolled nausea, vomiting and/or diarrhea, dizziness, shortness of breath, chest pain, bleeding, unexplained bruising, or if you have any other new/concerning symptoms, questions or concerns.     If it is after hours, weekends, or holidays, please call the main hospital  at  [phone] and ask to speak to the Oncology doctor on call.     If you are having any concerning symptoms or wish to speak to a provider before your next infusion visit, please call your care coordinator or triage to notify them so we can adequately serve you.     If you need a refill on a narcotic prescription or other medication, please call triage before your infusion appointment.         May 2017   Kristofer Monday Tuesday Wednesday Thursday Friday Saturday        1     2     3     4     5     [facility] DRAW    9:30 AM   (15 min.)    [facility] DRAW   [facility] Lab Draw     [facility] ONC INFUSION 360   10:00 AM   (360 min.)    ONCOLOGY INFUSION   [facility]     [facility] RETURN   10:05 AM   (50 min.)   Ester Echavarria APRN CNP   [facility] 6       7     8     [facility] ONC INFUSION 120    3:00 PM   (120 min.)    ONCOLOGY INFUSION   [facility] 9     10     11     12     13       14     15     16     17     18     19     [facility] DRAW    7:15 AM   (15 min.)    [facility] DRAW   [facility] Lab Draw     [facility] RETURN    7:35 AM   (50 min.)   Debora Morrow PA   [facility]     [facility] ONC INFUSION 360    8:30 AM   (360 min.)    ONCOLOGY INFUSION   [facility] 20       21     22     [facility] ONC INFUSION 120   12:00 PM   (120 min.)    ONCOLOGY INFUSION   [facility] 23      24     25     26     27       28     29     30     31 June 2017 Sunday Monday Tuesday Wednesday Thursday Friday Saturday                       1     2     [facility] DRAW    6:30 AM   (15 min.)    [facility] DRAW   [facility] Draw     [facility] ONC INFUSION 360    7:00 AM   (360 min.)    ONCOLOGY INFUSION   [facility] 3       4     5     6     7     8     9     10       11     12     13     14     15     16     [facility] DRAW    6:30 AM   (15 min.)    [facility] DRAW   [facility] Draw     [facility] ONC INFUSION 360    7:00 AM   (360 min.)    ONCOLOGY INFUSION   [facility] 17       18     19     20     21     22     23     [facility] DRAW    9:30 AM   (15 min.)    [facility] DRAW   [facility] Lab Draw     CT CHEST WO    1:05 PM   (20 min.)   CT2   [facility] CT     MR ABDOMEN WWO    1:30 PM   (45 min.)   MR1   [facility] MRI 24       25     26     [facility] DRAW    7:15 AM   (15 min.)    [facility] DRAW   [facility] Lab Draw     [facility] RETURN    7:30 AM   (30 min.)   Demond Gonsales MD   [facility] 27     28     29     30     [facility] ONC INFUSION 360    7:00 AM   (360 min.)    ONCOLOGY INFUSION   [facility]                No results found for this or any previous visit (from the past 24 hour(s)).

## 2017-05-22 NOTE — MR AVS SNAPSHOT
After Visit Summary   5/22/2017    Shirin Muller    MRN: [id number]           Patient Information     Date Of Birth          1960        Visit Information        Provider Department      5/22/2017 12:00 PM MARISABEL 25 ATC;  ONCOLOGY INFUSION Formerly [facility]        Today's Diagnoses     Need for prophylactic measure    -  1    Malignant neoplasm of head of pancreas (H)          Care Instructions    Contact Numbers  [facility]: [phone]    After Hours:  [phone]  Triage: [phone]    Please call the [facility] Triage line if you experience a temperature greater than or equal to 100.5, shaking chills, have uncontrolled nausea, vomiting and/or diarrhea, dizziness, shortness of breath, chest pain, bleeding, unexplained bruising, or if you have any other new/concerning symptoms, questions or concerns.     If it is after hours, weekends, or holidays, please call the main hospital  at  [phone] and ask to speak to the Oncology doctor on call.     If you are having any concerning symptoms or wish to speak to a provider before your next infusion visit, please call your care coordinator or triage to notify them so we can adequately serve you.     If you need a refill on a narcotic prescription or other medication, please call triage before your infusion appointment.         May 2017   Kristofer Monday Tuesday Wednesday Thursday Friday Saturday        1     2     3     4     5     [facility] LAB DRAW    9:30 AM   (15 min.)   [facility] DRAW   [facility] Lab Draw     [facility] ONC INFUSION 360   10:00 AM   (360 min.)    ONCOLOGY INFUSION   [facility]     [facility] RETURN   10:05 AM   (50 min.)   Ester Echavarria APRN CNP   [facility] 6       7     8     [facility] ONC INFUSION 120    3:00 PM   (120 min.)    ONCOLOGY INFUSION   [facility] 9     10     11     12     13       14     15     16     17      18     19     [facility] DRAW    7:15 AM   (15 min.)    [facility] LAB DRAW   [facility]     [facility] RETURN    7:35 AM   (50 min.)   Debora Morrow PA   [facility]     UMP ONC INFUSION 360    8:30 AM   (360 min.)    ONCOLOGY INFUSION   [facility] 20       21     22     UMP ONC INFUSION 120   12:00 PM   (120 min.)    ONCOLOGY INFUSION   [facility] 23     24     25     26     27       28     29     30     31 June 2017 Sunday Monday Tuesday Wednesday Thursday Friday Saturday                       1     2     [facility] DRAW    6:30 AM   (15 min.)    [facility] LAB DRAW   [facility] Draw     [facility] ONC INFUSION 360    7:00 AM   (360 min.)    ONCOLOGY INFUSION   [facility] 3       4     5     6     7     8     9     10       11     12     13     14     15     16     [facility] LAB DRAW    6:30 AM   (15 min.)    [facility] LAB DRAW   [facility]     [facility] ONC INFUSION 360    7:00 AM   (360 min.)    ONCOLOGY INFUSION   [facility] 17       18     19     20     21     22     23     [facility] LAB DRAW    9:30 AM   (15 min.)    [facility] LAB DRAW   [facility] Lab Draw     CT CHEST WO    1:05 PM   (20 min.)   UCCT2   [facility] CT     MR ABDOMEN WWO    1:30 PM   (45 min.)   MR1   [facility] MRI 24       25     26     [facility] LAB DRAW    7:15 AM   (15 min.)    [facility] LAB DRAW   [facility]     [facility] RETURN    7:30 AM   (30 min.)   Demond Gonsales MD   [facility] 27     28     29     30     [facility] ONC INFUSION 360    7:00 AM   (360 min.)    ONCOLOGY INFUSION   [facility]                No results found for this or any previous visit (from the past 24 hour(s)).            Follow-ups after your visit        Your next 10 appointments already  scheduled     Jun 02, 2017  6:30 AM CDT   [facility] Lab Draw with UC [facility] LAB DRAW   [facility] Lab Draw ([facility])    [address]  2nd Floor  [place] [address]   [phone]            Jun 02, 2017  7:00 AM CDT   Infusion 360 with UC ONCOLOGY INFUSION, UC 10 ATC   [facility] ([facility])    [address]  2nd Floor  [place] [address]   [phone]            Jun 16, 2017  6:30 AM CDT   [facility] Lab Draw with UC [facility] LAB DRAW   [facility] Lab Draw ([facility])    [address]  [address] [facility] [address]   [phone]            Jun 16, 2017  7:00 AM CDT   Infusion 360 with UC ONCOLOGY INFUSION, UC 10 ATC   [facility] ([facility])    [address] [place] [address]   [phone]            Jun 23, 2017  9:30 AM CDT   [facility] Lab Draw with UC [facility] LAB DRAW   [facility] Lab Draw ([facility])    [address] [place] [address]   [phone]            Jun 23, 2017  1:20 PM CDT   (Arrive by 1:05 PM)   CT CHEST W/O CONTRAST with UCCT2   [facility] ([facility])    [address] [place] [address]  [address] [facility] [address]   [phone]           Please bring any scans or X-rays taken at other hospitals, if similar tests were done. Also bring a list of your medicines, including vitamins, minerals and over-the-counter drugs. It is safest to leave personal items at home.  Be sure to tell your doctor:   If you have any allergies.   If there s any chance you are pregnant.   If you are breastfeeding.   If you have any special needs.  You do not need to do anything special to prepare.  Please wear loose clothing, such as a sweat suit or jogging clothes. Avoid snaps, zippers and other metal. We  may ask you to undress and put on a hospital gown.            Jun 23, 2017  1:45 PM CDT   (Arrive by 1:30 PM)   MR ABDOMEN W/O & W CONTRAST with [address] [facility] MRI ([facility])    [address] [id number] [phone]           Take your medicines as usual, unless your doctor tells you not to. Bring a list of your current medicines to your exam (including vitamins, minerals and over-the-counter drugs). Also bring the results of similar scans you may have had.    The day before your exam, drink extra fluids at least six 8-ounce glasses (unless your doctor tells you to restrict your fluids).   Have a blood test (creatinine test) within 30 days of your exam. Go to your clinic or Diagnostic Imaging Department for this test.   Do not eat or drink for 6 hours prior to exam.  The MRI machine uses a strong magnet. Please wear clothes without metal (snaps, zippers). A sweatsuit works well, or we may give you a hospital gown.  Please remove any body piercings and hair extensions before you arrive. You will also remove watches, jewelry, hairpins, wallets, dentures, partial dental plates and hearing aids. You may wear contact lenses, and you may be able to wear your rings. We have a safe place to keep your personal items, but it is safer to leave them at home.   **IMPORTANT** THE INSTRUCTIONS BELOW ARE ONLY FOR THOSE PATIENTS WHO HAVE BEEN TOLD THEY WILL RECEIVE SEDATION OR GENERAL ANESTHESIA DURING THEIR MRI PROCEDURE:  IF YOU WILL RECEIVE SEDATION (take medicine to help you relax during your exam):   You must get the medicine from your doctor before you arrive. Bring the medicine to the exam. Do not take it at home.   Arrive one hour early. Bring someone who can take you home after the test. Your medicine will make you sleepy. After the exam, you may not drive, take a bus or take a taxi by yourself.   No eating 8 hours before your exam. You may have  clear liquids up until 4 hours before your exam. (Clear liquids include water, clear tea, black coffee and fruit juice without pulp.)  IF YOU WILL RECEIVE ANESTHESIA (be asleep for your exam):   Arrive 1 1/2 hours early. Bring someone who can take you home after the test. You may not drive, take a bus or take a taxi by yourself.   No eating 8 hours before your exam. You may have clear liquids up until 4 hours before your exam. (Clear liquids include water, clear tea, black coffee and fruit juice without pulp.)  If you have any questions, please contact your Imaging Department exam site.            Jun 26, 2017  7:15 AM CDT   [facility] Lab Draw with  [employer] LAB DRAW   [facility] Lab Draw ([facility])    [address] [address] [phone]              Who to contact     If you have questions or need follow up information about today's clinic visit or your schedule please contact [facility] directly at [phone].  Normal or non-critical lab and imaging results will be communicated to you by [employer], letter or phone within 4 business days after the clinic has received the results. If you do not hear from us within 7 days, please contact the clinic through [employer] or phone. If you have a critical or abnormal lab result, we will notify you by phone as soon as possible.  Submit refill requests through [employer] or call your pharmacy and they will forward the refill request to us. Please allow 3 business days for your refill to be completed.          Additional Information About Your Visit        [employer] Information     [employer] gives you secure access to your electronic health record. If you see a primary care provider, you can also send messages to your care team and make appointments. If you have questions, please call your primary care clinic.  If you do not have a primary care provider, please call [phone] and they will  assist you.        Care EveryWhere ID     This is your Care EveryWhere ID. This could be used by other organizations to access your [place] medical records  [id number]        Your Vitals Were     Pulse Temperature Respirations Pulse Oximetry BMI (Body Mass Index)       81 98.2  F (36.8  C) (Oral) 18 99% 22.15 kg/m2        Blood Pressure from Last 3 Encounters:   05/22/17 116/64   05/19/17 109/70   05/08/17 97/60    Weight from Last 3 Encounters:   05/22/17 72 kg (158 lb 12.8 oz)   05/19/17 72.9 kg (160 lb 11.5 oz)   05/08/17 71.4 kg (157 lb 6.4 oz)              We Performed the Following     Treatment Conditions          Today's Medication Changes          These changes are accurate as of: 5/22/17  2:05 PM.  If you have any questions, ask your nurse or doctor.               These medicines have changed or have updated prescriptions.        Dose/Directions    polyethylene glycol powder   Commonly known as:  MIRALAX/GLYCOLAX   This may have changed:    - when to take this  - reasons to take this   Used for:  Malignant neoplasm of head of pancreas (H)        Dose:  1 capful   Take 17 g (1 capful) by mouth daily   Quantity:  119 g   Refills:  11                Primary Care Provider    [facility]       No address on file        Thank you!     Thank you for choosing [facility]  for your care. Our goal is always to provide you with excellent care. Hearing back from our patients is one way we can continue to improve our services. Please take a few minutes to complete the written survey that you may receive in the mail after your visit with us. Thank you!             Your Updated Medication List - Protect others around you: Learn how to safely use, store and throw away your medicines at www.disposemymeds.org.          This list is accurate as of: 5/22/17  2:05 PM.  Always use your most recent med list.                   Brand Name Dispense Instructions for use     amylase-lipase-protease 39969 UNITS Cpep    CREON    180 capsule    Take 2 capsules (72,000 Units) by mouth 3 times daily (with meals)       * dexamethasone 4 MG tablet    DECADRON    3 tablet    Take 1 tablet (4 mg) by mouth daily (with breakfast)       * dexamethasone 4 MG tablet    DECADRON    3 tablet    Take 1 tablet (4 mg) by mouth daily (with breakfast) for 3 days       diltiazem 2% in PLO cream (FV COMPOUNDED) 2% Gel     30 g    Apply topically daily and as needed to external hemorrhoids       docusate sodium 100 MG capsule    COLACE    100 capsule    Take 1 capsule (100 mg) by mouth daily       dronabinol 5 MG capsule    MARINOL    60 capsule    Take 1 capsule (5 mg) by mouth 2 times daily (before meals)       ENSURE CLEAR Liqd     90 Bottle    Take 1 Bottle by mouth 3 times daily       lidocaine-prilocaine cream    EMLA    30 g    Apply topically as needed for other (Use 30-60 minutes prior to port access)       loratadine 10 MG tablet    CLARITIN    30 tablet    Take 1 tablet (10 mg) by mouth daily Reported on 5/5/2017       LORazepam 0.5 MG tablet    ATIVAN    30 tablet    Take 1 tablet (0.5 mg) by mouth every 4 hours as needed (Anxiety, Nausea/Vomiting or Sleep)       morphine 15 MG 12 hr tablet    MS CONTIN    60 tablet    Take 1 tablet (15 mg) by mouth every 12 hours       ondansetron 8 MG ODT tab    ZOFRAN-ODT    60 tablet    Take 1 tablet (8 mg) by mouth every 8 hours as needed for nausea       oxyCODONE 5 MG IR tablet    ROXICODONE    100 tablet    Take 1 tablet (5 mg) by mouth every 4 hours as needed for moderate to severe pain       pantoprazole 20 MG EC tablet    PROTONIX    60 tablet    Take 1 tablet (20 mg) by mouth 2 times daily       polyethylene glycol powder    MIRALAX/GLYCOLAX    119 g    Take 17 g (1 capful) by mouth daily       potassium chloride SA 20 MEQ CR tablet    potassium chloride    60 tablet    Take 1 tablet (20 mEq) by mouth daily       prochlorperazine 10 MG tablet     COMPAZINE    30 tablet    Take 1 tablet (10 mg) by mouth every 6 hours as needed (Nausea/Vomiting)       * Notice:  This list has 2 medication(s) that are the same as other medications prescribed for you. Read the directions carefully, and ask your doctor or other care provider to review them with you.

## 2017-05-22 NOTE — TELEPHONE ENCOUNTER
Prior Authorization Approval    Authorization Effective Date: 4/22/2017  Authorization Expiration Date: 11/18/2017  Medication: ondansetron odt 8mg- approval  Approved Dose/Quantity: 8mg ODT  Reference #: [id number]   Insurance Company: [employer] - Phone [phone] Fax [phone]  Expected CoPay: $0     CoPay Card Available: Yes   Foundation Assistance Needed:    Which Pharmacy is filling the prescription (Not needed for infusion/clinic administered):    Pharmacy Notified:    Patient Notified:

## 2017-06-02 NOTE — PATIENT INSTRUCTIONS
Contact Numbers  [facility]: [phone]  Triage: [phone] (Monday-Friday 8-4:30)  After Hours:  [phone]    Please call the [facility] Triage line if you experience a temperature greater than or equal to 100.5, shaking chills, have uncontrolled nausea, vomiting and/or diarrhea, dizziness, shortness of breath, chest pain, bleeding, unexplained bruising, or if you have any other new/concerning symptoms, questions or concerns.     If it is after hours, weekends, or holidays, please call [phone] to speak to someone regarding your questions or concerns.    If you are having any concerning symptoms or wish to speak to a provider before your next infusion visit, please call your care coordinator or triage to notify them so we can adequately serve you.     If you need a refill on a narcotic prescription or other medication, please call triage before your infusion appointment.             June 2017 Sunday Monday Tuesday Wednesday Thursday Friday Saturday                       1     2     P [facility] LAB DRAW    6:30 AM   (15 min.)    [facility]   [facility]     [facility] ONC INFUSION 360    7:00 AM   (360 min.)    ONCOLOGY INFUSION   formerly [facility] 3       4     5     [facility] LAB DRAW    1:30 PM   (15 min.)    [facility] LAB DRAW   [facility] Lab Draw     UMP ONC INFUSION 120    2:00 PM   (120 min.)    ONCOLOGY INFUSION   formerly [facility] 6     7     8     9     10       11     12     13     14     15     16     [facility] DRAW    6:30 AM   (15 min.)    [facility]   [facility]     [facility] ONC INFUSION 360    7:00 AM   (360 min.)    ONCOLOGY INFUSION   formerly [facility] 17       18     19     20     21     22     23     [facility] LAB DRAW    9:30 AM   (15 min.)    [facility]RAW   [facility]     CT CHEST WO    1:05 PM   (20 min.)   CT2   [facility] CT     MR ABDOMEN WWO     1:30 PM   (45 min.)   UCMR1   [facility] MRI 24       25     26     [facility] LAB DRAW    7:15 AM   (15 min.)    MASONIC LAB DRAW   [facility] Lab Draw     UMP RETURN    7:30 AM   (30 min.)   Demond Gonsales MD   [facility] 27     28     29     30     [facility] ONC INFUSION 360    7:00 AM   (360 min.)   UC ONCOLOGY INFUSION   [facility]                 July 2017 Sunday Monday Tuesday Wednesday Thursday Friday Saturday                                 1       2     3     4     5     6     7     8       9     10     11     12     13     14     15       16     17     18     19     20     21     22       23     24     25     26     27     28     29       30     31                                           Lab Results:  Recent Results (from the past 12 hour(s))   Comprehensive metabolic panel    Collection Time: 06/02/17  7:05 AM   Result Value Ref Range    Sodium 140 133 - 144 mmol/L    Potassium 3.3 (L) 3.4 - 5.3 mmol/L    Chloride 105 94 - 109 mmol/L    Carbon Dioxide 27 20 - 32 mmol/L    Anion Gap 8 3 - 14 mmol/L    Glucose 134 (H) 70 - 99 mg/dL    Urea Nitrogen 11 7 - 30 mg/dL    Creatinine 0.58 0.52 - 1.04 mg/dL    GFR Estimate >90  Non  GFR Calc   >60 mL/min/1.7m2    GFR Estimate If Black >90   GFR Calc   >60 mL/min/1.7m2    Calcium 8.1 (L) 8.5 - 10.1 mg/dL    Bilirubin Total 0.6 0.2 - 1.3 mg/dL    Albumin 2.6 (L) 3.4 - 5.0 g/dL    Protein Total 6.7 (L) 6.8 - 8.8 g/dL    Alkaline Phosphatase 559 (H) 40 - 150 U/L     (H) 0 - 50 U/L     (H) 0 - 45 U/L   CBC with platelets differential    Collection Time: 06/02/17  7:05 AM   Result Value Ref Range    WBC 9.9 4.0 - 11.0 10e9/L    RBC Count 3.76 (L) 3.8 - 5.2 10e12/L    Hemoglobin 9.4 (L) 11.7 - 15.7 g/dL    Hematocrit 31.9 (L) 35.0 - 47.0 %    MCV 85 78 - 100 fl    MCH 25.0 (L) 26.5 - 33.0 pg    MCHC 29.5 (L) 31.5 - 36.5 g/dL    RDW 19.8 (H) 10.0 - 15.0 %     Platelet Count 125 (L) 150 - 450 10e9/L    Diff Method Automated Method     % Neutrophils 83.5 %    % Lymphocytes 7.0 %    % Monocytes 6.5 %    % Eosinophils 0.4 %    % Basophils 0.3 %    % Immature Granulocytes 2.3 %    Nucleated RBCs 0 0 /100    Absolute Neutrophil 8.3 1.6 - 8.3 10e9/L    Absolute Lymphocytes 0.7 (L) 0.8 - 5.3 10e9/L    Absolute Monocytes 0.6 0.0 - 1.3 10e9/L    Absolute Eosinophils 0.0 0.0 - 0.7 10e9/L    Absolute Basophils 0.0 0.0 - 0.2 10e9/L    Abs Immature Granulocytes 0.2 0 - 0.4 10e9/L    Absolute Nucleated RBC 0.0    Vitamin B12    Collection Time: 06/02/17  7:05 AM   Result Value Ref Range    Vitamin B12 >6000 (H) 193 - 986 pg/mL   TSH with free T4 reflex    Collection Time: 06/02/17  7:05 AM   Result Value Ref Range    TSH 2.76 0.40 - 4.00 mU/L   Folate    Collection Time: 06/02/17  7:06 AM   Result Value Ref Range    Folate 51.7 >5.4 ng/mL

## 2017-06-02 NOTE — PROGRESS NOTES
Infusion Nursing Note:  Shirin Muller presents today for Aranesp and Day 1 Cycle 10 Oxaliplatin, Irinotecan, Leucovorin, Fluorouracil bolus and pump connect.    Patient seen by provider today: No   present during visit today: Not Applicable.    Note: Patient has a cold with a cough and runny nose for the last 3 days.  Temp 99.5 today.  Patient states she feels that she is at her baseline with the exception of her cold.  Liver enzymes elevated today.  Potassium is 3.3.  ASTRID Pereira/Cookie Alberts, RN at 0800 on 6/2/17  OK to give chemotherapy with temp of 99.5  Recheck CMP on Monday  Patient to take 40mEq of oral potassium today from home supply instead of 20mEq.    Patient needs list of every chemotherapy appointment she has ever had for insurance.  Brian Arias, RNCC will leave this list with Jennifer Ramirez, RNCC for patient to  on Monday.    Intravenous Access:  Implanted Port.    Treatment Conditions:  Lab Results   Component Value Date    HGB 9.4 06/02/2017     Lab Results   Component Value Date    WBC 9.9 06/02/2017      Lab Results   Component Value Date    ANEU 8.3 06/02/2017     Lab Results   Component Value Date     06/02/2017      Lab Results   Component Value Date     06/02/2017                   Lab Results   Component Value Date    POTASSIUM 3.3 06/02/2017           Lab Results   Component Value Date    MAG 2.0 04/03/2017            Lab Results   Component Value Date    CR 0.58 06/02/2017                   Lab Results   Component Value Date    FANNY 8.1 06/02/2017                Lab Results   Component Value Date    BILITOTAL 0.6 06/02/2017           Lab Results   Component Value Date    ALBUMIN 2.6 06/02/2017                    Lab Results   Component Value Date     06/02/2017           Lab Results   Component Value Date     06/02/2017     Results reviewed, labs MET treatment parameters, ok to proceed with treatment.      Post  Infusion Assessment:  Patient tolerated infusion without incident.  Patient tolerated Aranesp injection (1 syringe) without incident.   Blood return noted pre and post infusion.  Site patent and intact, free from redness, edema or discomfort.  No evidence of extravasations.  Fluorouracil C-Series pump connected per protocol.  Connections taped. Capillary element Pump to infuse at 5.2mL/hr for 46 hours.  Disconnect time of 1030 on 6/4/17 called to [facility].  Spoke with ELICEO De Los Santos.     Discharge Plan:   Prescription refills given for compazine and decadron.  Discharge instructions reviewed with: Family.  Patient and/or family verbalized understanding of discharge instructions and all questions answered.  Copy of AVS reviewed with patient and/or family.  Patient will return 6/5/17 for next appointment.  Patient discharged in stable condition accompanied by: sister.  Departure Mode: Ambulatory.  Face to Face time: 5 minutes.    Cookie Alberts RN

## 2017-06-02 NOTE — MR AVS SNAPSHOT
After Visit Summary   6/2/2017    Shirin Muller    MRN: [id number]           Patient Information     Date Of Birth          1960        Visit Information        Provider Department      6/2/2017 7:00 AM  10 ATC;  ONCOLOGY INFUSION [facility]        Today's Diagnoses     Need for prophylactic measure    -  1    Malignant neoplasm of head of pancreas (H)        Anemia, chronic disease          Care Instructions    Contact Numbers  [facility]: [phone]  Triage: [phone] (Monday-Friday 8-4:30)  After Hours:  [phone]    Please call the [facility] Triage line if you experience a temperature greater than or equal to 100.5, shaking chills, have uncontrolled nausea, vomiting and/or diarrhea, dizziness, shortness of breath, chest pain, bleeding, unexplained bruising, or if you have any other new/concerning symptoms, questions or concerns.     If it is after hours, weekends, or holidays, please call [phone] to speak to someone regarding your questions or concerns.    If you are having any concerning symptoms or wish to speak to a provider before your next infusion visit, please call your care coordinator or triage to notify them so we can adequately serve you.     If you need a refill on a narcotic prescription or other medication, please call triage before your infusion appointment.             June 2017 Sunday Monday Tuesday Wednesday Thursday Friday Saturday                       1     2     [facility] MASONIC LAB DRAW    6:30 AM   (15 min.)   [facility] LAB DRAW   [facility] Lab Draw     UMP ONC INFUSION 360    7:00 AM   (360 min.)    ONCOLOGY INFUSION   [facility] 3       4     5     [facility] LAB DRAW    1:30 PM   (15 min.)   [facility] LAB DRAW   [facility] Lab Draw     UMP ONC INFUSION 120    2:00 PM   (120 min.)    ONCOLOGY INFUSION   [facility] 6     7     8     9     10       11      12     13     14     15     16     [facility] LAB DRAW    6:30 AM   (15 min.)    MASONIC LAB DRAW   [facility] Masonic Lab Draw     UMP ONC INFUSION 360    7:00 AM   (360 min.)    ONCOLOGY INFUSION   [facility] 17       18     19     20     21     22     23     [facility] LAB DRAW    9:30 AM   (15 min.)    [facility]ONIC LAB DRAW   [facility] Lab Draw     CT CHEST WO    1:05 PM   (20 min.)   UCCT2   [facility] CT     MR ABDOMEN WWO    1:30 PM   (45 min.)   UCMR1   [facility] MRI 24       25     26     [facility] LAB DRAW    7:15 AM   (15 min.)    MASONIC LAB DRAW   [facility] Lab Draw     UMP RETURN    7:30 AM   (30 min.)   Demond Gonsales MD   [facility] 27     28     29     30     [facility] ONC INFUSION 360    7:00 AM   (360 min.)    ONCOLOGY INFUSION   [facility]                 July 2017 Sunday Monday Tuesday Wednesday Thursday Friday Saturday                                 1       2     3     4     5     6     7     8       9     10     11     12     13     14     15       16     17     18     19     20     21     22       23     24     25     26     27     28     29       30     31                                           Lab Results:  Recent Results (from the past 12 hour(s))   Comprehensive metabolic panel    Collection Time: 06/02/17  7:05 AM   Result Value Ref Range    Sodium 140 133 - 144 mmol/L    Potassium 3.3 (L) 3.4 - 5.3 mmol/L    Chloride 105 94 - 109 mmol/L    Carbon Dioxide 27 20 - 32 mmol/L    Anion Gap 8 3 - 14 mmol/L    Glucose 134 (H) 70 - 99 mg/dL    Urea Nitrogen 11 7 - 30 mg/dL    Creatinine 0.58 0.52 - 1.04 mg/dL    GFR Estimate >90  Non  GFR Calc   >60 mL/min/1.7m2    GFR Estimate If Black >90   GFR Calc   >60 mL/min/1.7m2    Calcium 8.1 (L) 8.5 - 10.1 mg/dL    Bilirubin Total 0.6 0.2 - 1.3 mg/dL    Albumin 2.6 (L) 3.4 - 5.0 g/dL    Protein Total 6.7  (L) 6.8 - 8.8 g/dL    Alkaline Phosphatase 559 (H) 40 - 150 U/L     (H) 0 - 50 U/L     (H) 0 - 45 U/L   CBC with platelets differential    Collection Time: 06/02/17  7:05 AM   Result Value Ref Range    WBC 9.9 4.0 - 11.0 10e9/L    RBC Count 3.76 (L) 3.8 - 5.2 10e12/L    Hemoglobin 9.4 (L) 11.7 - 15.7 g/dL    Hematocrit 31.9 (L) 35.0 - 47.0 %    MCV 85 78 - 100 fl    MCH 25.0 (L) 26.5 - 33.0 pg    MCHC 29.5 (L) 31.5 - 36.5 g/dL    RDW 19.8 (H) 10.0 - 15.0 %    Platelet Count 125 (L) 150 - 450 10e9/L    Diff Method Automated Method     % Neutrophils 83.5 %    % Lymphocytes 7.0 %    % Monocytes 6.5 %    % Eosinophils 0.4 %    % Basophils 0.3 %    % Immature Granulocytes 2.3 %    Nucleated RBCs 0 0 /100    Absolute Neutrophil 8.3 1.6 - 8.3 10e9/L    Absolute Lymphocytes 0.7 (L) 0.8 - 5.3 10e9/L    Absolute Monocytes 0.6 0.0 - 1.3 10e9/L    Absolute Eosinophils 0.0 0.0 - 0.7 10e9/L    Absolute Basophils 0.0 0.0 - 0.2 10e9/L    Abs Immature Granulocytes 0.2 0 - 0.4 10e9/L    Absolute Nucleated RBC 0.0    Vitamin B12    Collection Time: 06/02/17  7:05 AM   Result Value Ref Range    Vitamin B12 >6000 (H) 193 - 986 pg/mL   TSH with free T4 reflex    Collection Time: 06/02/17  7:05 AM   Result Value Ref Range    TSH 2.76 0.40 - 4.00 mU/L   Folate    Collection Time: 06/02/17  7:06 AM   Result Value Ref Range    Folate 51.7 >5.4 ng/mL              Follow-ups after your visit        Your next 10 appointments already scheduled     Jun 05, 2017  1:30 PM CDT   [facility] Lab Draw with  MASONIC LAB DRAW   [facility] Masonic Lab Draw ([facility])    [address] [place] [address] [facility] [address]   [phone]            Jun 05, 2017  2:00 PM CDT   Infusion 120 with UC ONCOLOGY INFUSION, UC 13 ATC   [facility] ([facility])    [address] [place] [address]   [phone]            Jun 16, 2017  6:30 AM CDT    Masonic Lab Draw with  MASONIC LAB DRAW   [facility] Masonic Lab Draw ([facility])    [address] [address]   [phone]            Jun 16, 2017  7:00 AM CDT   Infusion 360 with  ONCOLOGY INFUSION, UC 10 ATC   [facility] ([facility])    [address] [place] [address]   [phone]            Jun 23, 2017  9:30 AM CDT   [facility] Lab Draw with  MASONIC LAB DRAW   [facility])    [address] [place] [address]   [phone]            Jun 23, 2017  1:20 PM CDT   (Arrive by 1:05 PM)   CT CHEST W/O CONTRAST with UCCT2   [facility] CT ([facility])    [address] [place] [id number] [phone]           Please bring any scans or X-rays taken at other hospitals, if similar tests were done. Also bring a list of your medicines, including vitamins, minerals and over-the-counter drugs. It is safest to leave personal items at home.  Be sure to tell your doctor:   If you have any allergies.   If there s any chance you are pregnant.   If you are breastfeeding.   If you have any special needs.  You do not need to do anything special to prepare.  Please wear loose clothing, such as a sweat suit or jogging clothes. Avoid snaps, zippers and other metal. We may ask you to undress and put on a hospital gown.            Jun 23, 2017  1:45 PM CDT   (Arrive by 1:30 PM)   MR ABDOMEN W/O & W CONTRAST with MR1   [facility] MRI ([facility])    [address] [address] [phone]           Take your medicines as usual, unless your doctor tells you not to. Bring a list of your current medicines to your exam (including vitamins, minerals and over-the-counter drugs). Also bring the  results of similar scans you may have had.    The day before your exam, drink extra fluids at least six 8-ounce glasses (unless your doctor tells you to restrict your fluids).   Have a blood test (creatinine test) within 30 days of your exam. Go to your clinic or Diagnostic Imaging Department for this test.   Do not eat or drink for 6 hours prior to exam.  The MRI machine uses a strong magnet. Please wear clothes without metal (snaps, zippers). A sweatsuit works well, or we may give you a hospital gown.  Please remove any body piercings and hair extensions before you arrive. You will also remove watches, jewelry, hairpins, wallets, dentures, partial dental plates and hearing aids. You may wear contact lenses, and you may be able to wear your rings. We have a safe place to keep your personal items, but it is safer to leave them at home.   **IMPORTANT** THE INSTRUCTIONS BELOW ARE ONLY FOR THOSE PATIENTS WHO HAVE BEEN TOLD THEY WILL RECEIVE SEDATION OR GENERAL ANESTHESIA DURING THEIR MRI PROCEDURE:  IF YOU WILL RECEIVE SEDATION (take medicine to help you relax during your exam):   You must get the medicine from your doctor before you arrive. Bring the medicine to the exam. Do not take it at home.   Arrive one hour early. Bring someone who can take you home after the test. Your medicine will make you sleepy. After the exam, you may not drive, take a bus or take a taxi by yourself.   No eating 8 hours before your exam. You may have clear liquids up until 4 hours before your exam. (Clear liquids include water, clear tea, black coffee and fruit juice without pulp.)  IF YOU WILL RECEIVE ANESTHESIA (be asleep for your exam):   Arrive 1 1/2 hours early. Bring someone who can take you home after the test. You may not drive, take a bus or take a taxi by yourself.   No eating 8 hours before your exam. You may have clear liquids up until 4 hours before your exam. (Clear liquids include water, clear tea, black coffee and fruit  juice without pulp.)  If you have any questions, please contact your Imaging Department exam site.            Jun 26, 2017  7:15 AM CDT   [place] Lab Draw with  MASONIC LAB DRAW   [facility] Lab Draw ([facility])    [address]  [address]  [facility] [id number] [phone]              Future tests that were ordered for you today     Open Future Orders        Priority Expected Expires Ordered    Comprehensive metabolic panel Routine 6/5/2017 6/2/2018 6/2/2017            Who to contact     If you have questions or need follow up information about today's clinic visit or your schedule please contact [facility] directly at [phone].  Normal or non-critical lab and imaging results will be communicated to you by [employer]hart, letter or phone within 4 business days after the clinic has received the results. If you do not hear from us within 7 days, please contact the clinic through [email or web address] or phone. If you have a critical or abnormal lab result, we will notify you by phone as soon as possible.  Submit refill requests through [employer] or call your pharmacy and they will forward the refill request to us. Please allow 3 business days for your refill to be completed.          Additional Information About Your Visit        [employer] Information     [employer] gives you secure access to your electronic health record. If you see a primary care provider, you can also send messages to your care team and make appointments. If you have questions, please call your primary care clinic.  If you do not have a primary care provider, please call [phone] and they will assist you.        Care EveryWhere ID     This is your Care EveryWhere ID. This could be used by other organizations to access your [place] medical records  [id number]        Your Vitals Were     Pulse Temperature Respirations Pulse Oximetry BMI (Body Mass Index)       104 98.9  F (37.2  C) (Oral) 15 98%  22.29 kg/m2        Blood Pressure from Last 3 Encounters:   06/02/17 104/67   05/22/17 116/64   05/19/17 109/70    Weight from Last 3 Encounters:   06/02/17 72.5 kg (159 lb 12.8 oz)   05/22/17 72 kg (158 lb 12.8 oz)   05/19/17 72.9 kg (160 lb 11.5 oz)              We Performed the Following     Cancer antigen 19-9     CBC with platelets differential     Comprehensive metabolic panel     Folate     TSH with free T4 reflex     Vitamin B12          Today's Medication Changes          These changes are accurate as of: 6/2/17 12:22 PM.  If you have any questions, ask your nurse or doctor.               These medicines have changed or have updated prescriptions.        Dose/Directions    * dexamethasone 4 MG tablet   Commonly known as:  DECADRON   This may have changed:  Another medication with the same name was added. Make sure you understand how and when to take each.   Used for:  Need for prophylactic measure, Malignant neoplasm of head of pancreas (H)        Dose:  4 mg   Take 1 tablet (4 mg) by mouth daily (with breakfast)   Quantity:  3 tablet   Refills:  0       * dexamethasone 4 MG tablet   Commonly known as:  DECADRON   This may have changed:  You were already taking a medication with the same name, and this prescription was added. Make sure you understand how and when to take each.   Used for:  Need for prophylactic measure, Malignant neoplasm of head of pancreas (H)        Dose:  4 mg   Take 1 tablet (4 mg) by mouth daily (with breakfast) for 3 days   Quantity:  3 tablet   Refills:  0       polyethylene glycol powder   Commonly known as:  MIRALAX/GLYCOLAX   This may have changed:    - when to take this  - reasons to take this   Used for:  Malignant neoplasm of head of pancreas (H)        Dose:  1 capful   Take 17 g (1 capful) by mouth daily   Quantity:  119 g   Refills:  11       * Notice:  This list has 2 medication(s) that are the same as other medications prescribed for you. Read the directions carefully,  and ask your doctor or other care provider to review them with you.         Where to get your medicines      These medications were sent to [place], [place] - [address]  [address], [facility] [address]    Hours:  TRANSPLANT PHONE NUMBER [phone] Phone:  [phone]     dexamethasone 4 MG tablet                Primary Care Provider    [facility]       No address on file        Thank you!     Thank you for choosing [facility]  for your care. Our goal is always to provide you with excellent care. Hearing back from our patients is one way we can continue to improve our services. Please take a few minutes to complete the written survey that you may receive in the mail after your visit with us. Thank you!             Your Updated Medication List - Protect others around you: Learn how to safely use, store and throw away your medicines at www.disposemymeds.org.          This list is accurate as of: 6/2/17 12:22 PM.  Always use your most recent med list.                   Brand Name Dispense Instructions for use    amylase-lipase-protease 01250 UNITS Cpep    CREON    180 capsule    Take 2 capsules (72,000 Units) by mouth 3 times daily (with meals)       * dexamethasone 4 MG tablet    DECADRON    3 tablet    Take 1 tablet (4 mg) by mouth daily (with breakfast)       * dexamethasone 4 MG tablet    DECADRON    3 tablet    Take 1 tablet (4 mg) by mouth daily (with breakfast) for 3 days       diltiazem 2% in PLO cream (FV COMPOUNDED) 2% Gel     30 g    Apply topically daily and as needed to external hemorrhoids       docusate sodium 100 MG capsule    COLACE    100 capsule    Take 1 capsule (100 mg) by mouth daily       dronabinol 5 MG capsule    MARINOL    60 capsule    Take 1 capsule (5 mg) by mouth 2 times daily (before meals)       ENSURE CLEAR Liqd     90 Bottle    Take 1 Bottle by mouth 3 times daily        lidocaine-prilocaine cream    EMLA    30 g    Apply topically as needed for other (Use 30-60 minutes prior to port access)       loratadine 10 MG tablet    CLARITIN    30 tablet    Take 1 tablet (10 mg) by mouth daily Reported on 5/5/2017       LORazepam 0.5 MG tablet    ATIVAN    30 tablet    Take 1 tablet (0.5 mg) by mouth every 4 hours as needed (Anxiety, Nausea/Vomiting or Sleep)       morphine 15 MG 12 hr tablet    MS CONTIN    60 tablet    Take 1 tablet (15 mg) by mouth every 12 hours       ondansetron 8 MG ODT tab    ZOFRAN-ODT    60 tablet    Take 1 tablet (8 mg) by mouth every 8 hours as needed for nausea       oxyCODONE 5 MG IR tablet    ROXICODONE    100 tablet    Take 1 tablet (5 mg) by mouth every 4 hours as needed for moderate to severe pain       pantoprazole 20 MG EC tablet    PROTONIX    60 tablet    Take 1 tablet (20 mg) by mouth 2 times daily       polyethylene glycol powder    MIRALAX/GLYCOLAX    119 g    Take 17 g (1 capful) by mouth daily       potassium chloride SA 20 MEQ CR tablet    potassium chloride    60 tablet    Take 1 tablet (20 mEq) by mouth daily       prochlorperazine 10 MG tablet    COMPAZINE    30 tablet    Take 1 tablet (10 mg) by mouth every 6 hours as needed (Nausea/Vomiting)       * Notice:  This list has 2 medication(s) that are the same as other medications prescribed for you. Read the directions carefully, and ask your doctor or other care provider to review them with you.

## 2017-06-05 NOTE — PATIENT INSTRUCTIONS
Contact Numbers  [facility] Nurse Triage: [phone]  After Hours Nurse Line:  [phone]    Please call the [facility] Nurse Triage line or after hours number if you experience a temperature greater than or equal to 100.5, shaking chills, have uncontrolled nausea, vomiting and/or diarrhea, dizziness, shortness of breath, chest pain, bleeding, unexplained bruising, or if you have any other new/concerning symptoms, questions or concerns.     If you are having any concerning symptoms or wish to speak to a provider before your next infusion visit, please call your care coordinator or triage to notify them so we can adequately serve you.     If you need a refill on a narcotic prescription or other medication, please call triage before your infusion appointment.           June 2017 Sunday Monday Tuesday Wednesday Thursday Friday Saturday                       1     2     [facility] DRAW    6:30 AM   (15 min.)    MASONIC LAB DRAW   [facility] Masonic Lab Draw     UMP ONC INFUSION 360    7:00 AM   (360 min.)    ONCOLOGY INFUSION   [facility] 3       4     5     [facility] LAB DRAW    1:30 PM   (15 min.)    [facility]     [facility] ONC INFUSION 120    2:00 PM   (120 min.)    ONCOLOGY INFUSION   [facility] 6     7     8     9     10       11     12     13     14     15     16     [facility] LAB DRAW    6:30 AM   (15 min.)    [facility]   [facility]     [facility] ONC INFUSION 360    7:00 AM   (360 min.)    ONCOLOGY INFUSION   [facility] 17       18     19     20     21     22     23     [facility] LAB DRAW    9:30 AM   (15 min.)    [facility]     CT CHEST WO    1:05 PM   (20 min.)   CT2   [facility] CT     MR ABDOMEN WWO    1:30 PM   (45 min.)   MR1   [facility] MRI 24       25     26     [facility] LAB DRAW    7:15 AM   (15  min.)   [facility] LAB DRAW   [facility] Lab Draw     UMP RETURN    7:30 AM   (30 min.)   Demond Gonsales MD   [facility] 27     28     29     30     [facility] ONC INFUSION 360    7:00 AM   (360 min.)    ONCOLOGY INFUSION   [facility]                 July 2017 Sunday Monday Tuesday Wednesday Thursday Friday Saturday                                 1       2     3     4     5     6     7     8       9     10     11     12     13     14     15       16     17     18     19     20     21     22       23     24     25     26     27     28     29       30     31                                        No results found for this or any previous visit (from the past 24 hour(s)).

## 2017-06-05 NOTE — PROGRESS NOTES
Infusion Nursing Note:  Shirin Muller presents today for IV fluids and Neulasta.    Patient seen by provider today: No    Intravenous Access:  Implanted Port.    Treatment Conditions:  Lab Results   Component Value Date     06/05/2017                   Lab Results   Component Value Date    POTASSIUM 4.1 06/05/2017           Lab Results   Component Value Date    MAG 2.0 04/03/2017            Lab Results   Component Value Date    CR 0.52 06/05/2017                   Lab Results   Component Value Date    FANNY 8.6 06/05/2017                Lab Results   Component Value Date    BILITOTAL 0.6 06/05/2017           Lab Results   Component Value Date    ALBUMIN 2.6 06/05/2017                    Lab Results   Component Value Date     06/05/2017           Lab Results   Component Value Date     06/05/2017     Patient did not require any potassium replacement today.    Post Infusion Assessment:  Patient tolerated infusion without incident.  Patient tolerated injection without incident.  Neulasta administered into the left side of patient's abdomen.  Blood return noted pre and post infusion.  Access discontinued per protocol.    Discharge Plan:   Patient declined prescription refills.  Discharge instructions reviewed with: Patient and Family.  Patient and/or family verbalized understanding of discharge instructions and all questions answered.  AVS to patient via Path.To.  Patient will return 6/16/17 for next appointment.   Patient discharged in stable condition accompanied by: sister.  Departure Mode: Ambulatory.    RODRICK SANCHEZ RN

## 2017-06-05 NOTE — MR AVS SNAPSHOT
After Visit Summary   6/5/2017    Shirin Muller    MRN: [id number]           Patient Information     Date Of Birth          1960        Visit Information        Provider Department      6/5/2017 2:00 PM MARISABEL 13 ATC;  ONCOLOGY INFUSION [facility]        Today's Diagnoses     Malignant neoplasm of head of pancreas (H)    -  1    Need for prophylactic measure          Care Instructions    Contact Numbers  [facility] ER Nurse Triage: [phone]  After Hours Nurse Line:  [phone]    Please call the [facility] Nurse Triage line or after hours number if you experience a temperature greater than or equal to 100.5, shaking chills, have uncontrolled nausea, vomiting and/or diarrhea, dizziness, shortness of breath, chest pain, bleeding, unexplained bruising, or if you have any other new/concerning symptoms, questions or concerns.     If you are having any concerning symptoms or wish to speak to a provider before your next infusion visit, please call your care coordinator or triage to notify them so we can adequately serve you.     If you need a refill on a narcotic prescription or other medication, please call triage before your infusion appointment.           June 2017 Sunday Monday Tuesday Wednesday Thursday Friday Saturday                       1     2     [facility]    6:30 AM   (15 min.)    [facility]     [facility] ONC INFUSION 360    7:00 AM   (360 min.)    ONCOLOGY INFUSION   [facility] 3       4     5     [facility] DRAW    1:30 PM   (15 min.)    [facility] LAB DRAW   [facility] Lab Draw     UMP ONC INFUSION 120    2:00 PM   (120 min.)    ONCOLOGY INFUSION   [facility] 6     7     8     9     10       11     12     13     14     15     16     [facility] DRAW    6:30 AM   (15 min.)    [facility] LAB DRAW   [facility] Lab Draw     UMP ONC INFUSION  360    7:00 AM   (360 min.)   UC ONCOLOGY INFUSION   [facility] 17       18     19     20     21     22     23     [facility] DRAW    9:30 AM   (15 min.)   [facility] DRAW   [facility] Lab Draw     CT CHEST WO    1:05 PM   (20 min.)   UCCT2   [facility] CT     MR ABDOMEN WWO    1:30 PM   (45 min.)   UCMR1   [facility] MRI 24       25     26     [facility] DRAW    7:15 AM   (15 min.)    [facility] LAB DRAW   [facility] Lab Draw     UMP RETURN    7:30 AM   (30 min.)   Demond Gonsales MD   [facility] 27     28     29     30     UMP ONC INFUSION 360    7:00 AM   (360 min.)   UC ONCOLOGY INFUSION   [facility]                 July 2017 Sunday Monday Tuesday Wednesday Thursday Friday Saturday                                 1       2     3     4     5     6     7     8       9     10     11     12     13     14     15       16     17     18     19     20     21     22       23     24     25     26     27     28     29       30     31                                        No results found for this or any previous visit (from the past 24 hour(s)).              Follow-ups after your visit        Your next 10 appointments already scheduled     Jun 16, 2017  6:30 AM CDT   [facility] Lab Draw with [facility] DRAW   [facility] Lab Draw ([facility])    [address] [address]   [phone]            Jun 16, 2017  7:00 AM CDT   Infusion 360 with UC ONCOLOGY INFUSION, UC 10 ATC   [facility] ([facility])    [address] [address]   [phone]            Jun 23, 2017  9:30 AM CDT   [facility] Lab Draw with [facility] DRAW   [facility] Lab Draw ([facility])    [address] [address]   [phone]             Jun 23, 2017  1:20 PM CDT   (Arrive by 1:05 PM)   CT CHEST W/O CONTRAST with UCCT2   [facility] CT ([facility])    [address] [place] [id number] [phone]           Please bring any scans or X-rays taken at other hospitals, if similar tests were done. Also bring a list of your medicines, including vitamins, minerals and over-the-counter drugs. It is safest to leave personal items at home.  Be sure to tell your doctor:   If you have any allergies.   If there s any chance you are pregnant.   If you are breastfeeding.   If you have any special needs.  You do not need to do anything special to prepare.  Please wear loose clothing, such as a sweat suit or jogging clothes. Avoid snaps, zippers and other metal. We may ask you to undress and put on a hospital gown.            Jun 23, 2017  1:45 PM CDT   (Arrive by 1:30 PM)   MR ABDOMEN W/O & W CONTRAST with MR1   [facility] MRI ([facility])    [address] [place] [id number] [phone]           Take your medicines as usual, unless your doctor tells you not to. Bring a list of your current medicines to your exam (including vitamins, minerals and over-the-counter drugs). Also bring the results of similar scans you may have had.    The day before your exam, drink extra fluids at least six 8-ounce glasses (unless your doctor tells you to restrict your fluids).   Have a blood test (creatinine test) within 30 days of your exam. Go to your clinic or Diagnostic Imaging Department for this test.   Do not eat or drink for 6 hours prior to exam.  The MRI machine uses a strong magnet. Please wear clothes without metal (snaps, zippers). A sweatsuit works well, or we may give you a hospital gown.  Please remove any body piercings and hair extensions before you arrive. You will also remove watches, jewelry, hairpins, wallets, dentures,  partial dental plates and hearing aids. You may wear contact lenses, and you may be able to wear your rings. We have a safe place to keep your personal items, but it is safer to leave them at home.   **IMPORTANT** THE INSTRUCTIONS BELOW ARE ONLY FOR THOSE PATIENTS WHO HAVE BEEN TOLD THEY WILL RECEIVE SEDATION OR GENERAL ANESTHESIA DURING THEIR MRI PROCEDURE:  IF YOU WILL RECEIVE SEDATION (take medicine to help you relax during your exam):   You must get the medicine from your doctor before you arrive. Bring the medicine to the exam. Do not take it at home.   Arrive one hour early. Bring someone who can take you home after the test. Your medicine will make you sleepy. After the exam, you may not drive, take a bus or take a taxi by yourself.   No eating 8 hours before your exam. You may have clear liquids up until 4 hours before your exam. (Clear liquids include water, clear tea, black coffee and fruit juice without pulp.)  IF YOU WILL RECEIVE ANESTHESIA (be asleep for your exam):   Arrive 1 1/2 hours early. Bring someone who can take you home after the test. You may not drive, take a bus or take a taxi by yourself.   No eating 8 hours before your exam. You may have clear liquids up until 4 hours before your exam. (Clear liquids include water, clear tea, black coffee and fruit juice without pulp.)  If you have any questions, please contact your Imaging Department exam site.            Jun 26, 2017  7:15 AM CDT   [facility] Lab Draw with  [facility] LAB DRAW   [facility] Lab Draw ([facility])    [address] [place] [id number]   [phone]            Jun 26, 2017  7:45 AM CDT   (Arrive by 7:30 AM)   Return Visit with Demond Gonsales MD   [facility] ([facility])    [address] [facility]  [address] [facility] [address]   [phone]            Jun 30, 2017  7:00 AM CDT   Infusion 360 with  ONCOLOGY  INFUSION, UC 10 ATC   [facility] ([facility])    [address] [address]  2nd Floor  [facility] [place] [address] [phone]              Who to contact     If you have questions or need follow up information about today's clinic visit or your schedule please contact [facility] directly at [phone].  Normal or non-critical lab and imaging results will be communicated to you by MyChart, letter or phone within 4 business days after the clinic has received the results. If you do not hear from us within 7 days, please contact the clinic through [employer] or phone. If you have a critical or abnormal lab result, we will notify you by phone as soon as possible.  Submit refill requests through [employer] or call your pharmacy and they will forward the refill request to us. Please allow 3 business days for your refill to be completed.          Additional Information About Your Visit        [employer] Information     [employer] gives you secure access to your electronic health record. If you see a primary care provider, you can also send messages to your care team and make appointments. If you have questions, please call your primary care clinic.  If you do not have a primary care provider, please call [phone] and they will assist you.        Care EveryWhere ID     This is your Care EveryWhere ID. This could be used by other organizations to access your [facility] medical records  [id number]        Your Vitals Were     Pulse Temperature Respirations Pulse Oximetry BMI (Body Mass Index)       69 98.1  F (36.7  C) 16 100% 21.23 kg/m2        Blood Pressure from Last 3 Encounters:   06/05/17 109/69   06/02/17 104/67   05/22/17 116/64    Weight from Last 3 Encounters:   06/05/17 69 kg (152 lb 3.2 oz)   06/02/17 72.5 kg (159 lb 12.8 oz)   05/22/17 72 kg (158 lb 12.8 oz)              We Performed the Following     Comprehensive metabolic panel          Today's Medication  Changes          These changes are accurate as of: 6/5/17  4:15 PM.  If you have any questions, ask your nurse or doctor.               These medicines have changed or have updated prescriptions.        Dose/Directions    polyethylene glycol powder   Commonly known as:  MIRALAX/GLYCOLAX   This may have changed:    - when to take this  - reasons to take this   Used for:  Malignant neoplasm of head of pancreas (H)        Dose:  1 capful   Take 17 g (1 capful) by mouth daily   Quantity:  119 g   Refills:  11                Primary Care Provider    [facility]       No address on file        Thank you!     Thank you for choosing [facility]  for your care. Our goal is always to provide you with excellent care. Hearing back from our patients is one way we can continue to improve our services. Please take a few minutes to complete the written survey that you may receive in the mail after your visit with us. Thank you!             Your Updated Medication List - Protect others around you: Learn how to safely use, store and throw away your medicines at www.disposemymeds.org.          This list is accurate as of: 6/5/17  4:15 PM.  Always use your most recent med list.                   Brand Name Dispense Instructions for use    amylase-lipase-protease 63899 UNITS Cpep    CREON    180 capsule    Take 2 capsules (72,000 Units) by mouth 3 times daily (with meals)       * dexamethasone 4 MG tablet    DECADRON    3 tablet    Take 1 tablet (4 mg) by mouth daily (with breakfast)       * dexamethasone 4 MG tablet    DECADRON    3 tablet    Take 1 tablet (4 mg) by mouth daily (with breakfast) for 3 days       diltiazem 2% in PLO cream (FV COMPOUNDED) 2% Gel     30 g    Apply topically daily and as needed to external hemorrhoids       docusate sodium 100 MG capsule    COLACE    100 capsule    Take 1 capsule (100 mg) by mouth daily       dronabinol 5 MG capsule    MARINOL    60 capsule    Take 1  capsule (5 mg) by mouth 2 times daily (before meals)       ENSURE CLEAR Liqd     90 Bottle    Take 1 Bottle by mouth 3 times daily       lidocaine-prilocaine cream    EMLA    30 g    Apply topically as needed for other (Use 30-60 minutes prior to port access)       loratadine 10 MG tablet    CLARITIN    30 tablet    Take 1 tablet (10 mg) by mouth daily Reported on 5/5/2017       LORazepam 0.5 MG tablet    ATIVAN    30 tablet    Take 1 tablet (0.5 mg) by mouth every 4 hours as needed (Anxiety, Nausea/Vomiting or Sleep)       morphine 15 MG 12 hr tablet    MS CONTIN    60 tablet    Take 1 tablet (15 mg) by mouth every 12 hours       ondansetron 8 MG ODT tab    ZOFRAN-ODT    60 tablet    Take 1 tablet (8 mg) by mouth every 8 hours as needed for nausea       oxyCODONE 5 MG IR tablet    ROXICODONE    100 tablet    Take 1 tablet (5 mg) by mouth every 4 hours as needed for moderate to severe pain       pantoprazole 20 MG EC tablet    PROTONIX    60 tablet    Take 1 tablet (20 mg) by mouth 2 times daily       polyethylene glycol powder    MIRALAX/GLYCOLAX    119 g    Take 17 g (1 capful) by mouth daily       potassium chloride SA 20 MEQ CR tablet    potassium chloride    60 tablet    Take 1 tablet (20 mEq) by mouth daily       prochlorperazine 10 MG tablet    COMPAZINE    30 tablet    Take 1 tablet (10 mg) by mouth every 6 hours as needed (Nausea/Vomiting)       * Notice:  This list has 2 medication(s) that are the same as other medications prescribed for you. Read the directions carefully, and ask your doctor or other care provider to review them with you.

## 2017-06-15 NOTE — PROGRESS NOTES
This is a snapshot of the patient's [facility] medical record. For complete information or questions call [phone] or In [facility],  Home Infusion ([id number]).  [facility] Number:  [id number]

## 2017-06-16 NOTE — PROGRESS NOTES
Infusion Nursing Note:  Pt presents today for C11 D1 Oxaliplatin/Leucovorin/Irinotecan/Fluorouracil bolus and home infusion & Aranesp.     present during visit today: Not Applicable.    Lab Results   Component Value Date    HGB 9.9 06/16/2017     Lab Results   Component Value Date    WBC 10.0 06/16/2017      Lab Results   Component Value Date    ANEU 7.8 06/16/2017     Lab Results   Component Value Date     06/16/2017      Lab Results   Component Value Date     06/16/2017                   Lab Results   Component Value Date    POTASSIUM 3.1 06/16/2017           Lab Results   Component Value Date    MAG 2.0 04/03/2017            Lab Results   Component Value Date    CR 0.56 06/16/2017                   Lab Results   Component Value Date    FANNY 8.4 06/16/2017                Lab Results   Component Value Date    BILITOTAL 0.3 06/16/2017           Lab Results   Component Value Date    ALBUMIN 2.6 06/16/2017                    Lab Results   Component Value Date    ALT 46 06/16/2017           Lab Results   Component Value Date    AST 42 06/16/2017     Results reviewed, labs MET treatment parameters, ok to proceed with treatment.    Note: Pt denies any N/V, fever or chills. States she has numbness/tingling in her hands and feet that is unchanged. Does have cold sensitivity following infusion but with resolves before her next cycle. No new issues or concerns noted.  Proceed with treatment. K 3.1 today. Pt takes K at home. Pt instructed to take 40 mEq today per protocol. Lab appt added to Monday's infusion to recheck K.    Intravenous Access:  Implanted Port.    (+) Blood return from port noted at beginning of infusions and before hooking up continuous infusion. Tolerating infusions without incident.  Fluorouracil home infusion hooked up to port at 1215  at 5.2 ml/hr x46 hours. [facility] aware to disconnect pt's pump Sunday, 6/18, at 1030. Pump connection double checked with Kang RN that clamps are taped  open. Capillary element taped to chest. Air filter hole noted to not be blocked. Pt aware to keep the infusor out of light d/t light sensitivity and avoiding extreme cold/hot temps to ensure pump's rate does not change. Pt aware of disconnect date/time.     Tolerated Aranesp injection in LUQ without issues.    Discharge Plan:   Prescription refills given for multiple medications.  Discharge instructions reviewed with: Patient.  Patient and/or family verbalized understanding of discharge instructions and all questions answered.  Copy of AVS reviewed with patient and/or family. IVFs/Neulasta scheduled for Monday, 6/19, at 1230. Pt will return 6/26/2017 for provider and infusion appts following MRI on 6/16.    Face-To-Face Time: 4 minutes

## 2017-06-16 NOTE — PATIENT INSTRUCTIONS
Contact Numbers  [facility] Main Line/After Hours: [phone]  [facility] Triage line: [phone]      Please call the triage or after hours line if you experience a temperature greater than or equal to 100.5, shaking chills, have uncontrolled nausea, vomiting and/or diarrhea, dizziness, shortness of breath, chest pain, bleeding, unexplained bruising, or if you have any other new/concerning symptoms, questions or concerns.      If you are having any concerning symptoms or wish to speak to a provider before your next infusion visit, please call to notify us so we can adequately serve you.     If you need a refill on a narcotic prescription or other medication, please call before your infusion appointment.     [facility] will be at your house Sunday, 6/18, at 10:30 am to disconnect your pump.                June 2017 Sunday Monday Tuesday Wednesday Thursday Friday Saturday                       1     2     [facility]    6:30 AM   (15 min.)    [facility]   [facility]     [facility] ONC INFUSION 360    7:00 AM   (360 min.)    ONCOLOGY INFUSION   [facility] 3       4     5     [facility] DRAW    1:30 PM   (15 min.)    [facility] LAB DRAW   [facility] Lab Draw     UMP ONC INFUSION 120    2:00 PM   (120 min.)    ONCOLOGY INFUSION   [facility] 6     7     8     9     10       11     12     13     14     15     16     [facility] DRAW    6:30 AM   (15 min.)    [facility]RAW   [facility]     [facility] ONC INFUSION 360    7:00 AM   (360 min.)    ONCOLOGY INFUSION   [facility] 17       18     19     UMP ONC INFUSION 120   12:30 PM   (120 min.)    ONCOLOGY INFUSION   [facility] 20     21     22     23     [facility] DRAW    9:30 AM   (15 min.)    [facility]   [facility]     CT CHEST WO    1:05 PM   (20 min.)   CT2   [facility] CT     MR  ABDOMEN WWO    1:30 PM   (45 min.)   UCMR1   [facility] MRI 24       25     26     P [facility] LAB DRAW    7:15 AM   (15 min.)    [facility] LAB DRAW   [facility] Lab Draw     UMP RETURN    7:30 AM   (30 min.)   Demond Gonsales MD   [facility] 27     28     29     30     UMP ONC INFUSION 360    7:00 AM   (360 min.)    ONCOLOGY INFUSION   [facility]                 July 2017 Sunday Monday Tuesday Wednesday Thursday Friday Saturday                                 1       2     3     4     5     6     7     8       9     10     11     12     13     14     15       16     17     18     19     20     21     22       23     24     25     26     27     28     29       30     31                                           Lab Results:  Recent Results (from the past 12 hour(s))   Comprehensive metabolic panel    Collection Time: 06/16/17  6:57 AM   Result Value Ref Range    Sodium 143 133 - 144 mmol/L    Potassium 3.1 (L) 3.4 - 5.3 mmol/L    Chloride 109 94 - 109 mmol/L    Carbon Dioxide 28 20 - 32 mmol/L    Anion Gap 7 3 - 14 mmol/L    Glucose 87 70 - 99 mg/dL    Urea Nitrogen 16 7 - 30 mg/dL    Creatinine 0.56 0.52 - 1.04 mg/dL    GFR Estimate >90  Non  GFR Calc   >60 mL/min/1.7m2    GFR Estimate If Black >90   GFR Calc   >60 mL/min/1.7m2    Calcium 8.4 (L) 8.5 - 10.1 mg/dL    Bilirubin Total 0.3 0.2 - 1.3 mg/dL    Albumin 2.6 (L) 3.4 - 5.0 g/dL    Protein Total 6.5 (L) 6.8 - 8.8 g/dL    Alkaline Phosphatase 358 (H) 40 - 150 U/L    ALT 46 0 - 50 U/L    AST 42 0 - 45 U/L   CBC with platelets differential    Collection Time: 06/16/17  6:57 AM   Result Value Ref Range    WBC 10.0 4.0 - 11.0 10e9/L    RBC Count 4.04 3.8 - 5.2 10e12/L    Hemoglobin 9.9 (L) 11.7 - 15.7 g/dL    Hematocrit 33.7 (L) 35.0 - 47.0 %    MCV 83 78 - 100 fl    MCH 24.5 (L) 26.5 - 33.0 pg    MCHC 29.4 (L) 31.5 - 36.5 g/dL    RDW 19.1 (H) 10.0 - 15.0 %     Platelet Count 208 150 - 450 10e9/L    Diff Method Automated Method     % Neutrophils 78.3 %    % Lymphocytes 9.2 %    % Monocytes 7.4 %    % Eosinophils 0.1 %    % Basophils 0.3 %    % Immature Granulocytes 4.7 %    Nucleated RBCs 0 0 /100    Absolute Neutrophil 7.8 1.6 - 8.3 10e9/L    Absolute Lymphocytes 0.9 0.8 - 5.3 10e9/L    Absolute Monocytes 0.7 0.0 - 1.3 10e9/L    Absolute Eosinophils 0.0 0.0 - 0.7 10e9/L    Absolute Basophils 0.0 0.0 - 0.2 10e9/L    Abs Immature Granulocytes 0.5 (H) 0 - 0.4 10e9/L    Absolute Nucleated RBC 0.0

## 2017-06-16 NOTE — NURSING NOTE
Chief Complaint   Patient presents with     Port Draw     port accessed with 20 gauge 3/4 inch power needle. line flushed with NS and heparin. vitals taken      Ana Soto RN

## 2017-06-16 NOTE — MR AVS SNAPSHOT
After Visit Summary   6/16/2017    Shirin Muller    MRN: [id number]           Patient Information     Date Of Birth          1960        Visit Information        Provider Department      6/16/2017 7:00 AM  10 ATC;  ONCOLOGY INFUSION [facility]        Today's Diagnoses     Need for prophylactic measure    -  1    Malignant neoplasm of head of pancreas (H)        Anemia, chronic disease        Anorexia          Care Instructions    Contact Numbers  [facility] Main Line/After Hours: [phone]  [facility] Triage line: [phone]      Please call the triage or after hours line if you experience a temperature greater than or equal to 100.5, shaking chills, have uncontrolled nausea, vomiting and/or diarrhea, dizziness, shortness of breath, chest pain, bleeding, unexplained bruising, or if you have any other new/concerning symptoms, questions or concerns.      If you are having any concerning symptoms or wish to speak to a provider before your next infusion visit, please call to notify us so we can adequately serve you.     If you need a refill on a narcotic prescription or other medication, please call before your infusion appointment.     [facility] will be at your house Sunday, 6/18, at 10:30 am to disconnect your pump.                June 2017 Sunday Monday Tuesday Wednesday Thursday Friday Saturday                       1     2     [facility]    6:30 AM   (15 min.)    [facility]     [facility] ONC INFUSION 360    7:00 AM   (360 min.)    ONCOLOGY INFUSION   [facility] 3       4     5     [facility]    1:30 PM   (15 min.)    [facility] DRAW   [facility] Draw     [facility] ONC INFUSION 120    2:00 PM   (120 min.)    ONCOLOGY INFUSION   [facility] 6     7     8     9     10       11     12     13     14     15     16     [facility]    6:30 AM   (15  min.)    [facility] LAB DRAW   [facility] Draw     [facility] ONC INFUSION 360    7:00 AM   (360 min.)    ONCOLOGY INFUSION   [facility] 17       18     19     [facility] ONC INFUSION 120   12:30 PM   (120 min.)    ONCOLOGY INFUSION   [facility] 20     21     22     23     [facility] DRAW    9:30 AM   (15 min.)    [facility] LAB DRAW   [facility] Lab Draw     CT CHEST WO    1:05 PM   (20 min.)   UCCT2   [facility] CT     MR ABDOMEN WWO    1:30 PM   (45 min.)   UCMR1   [facility] MRI 24       25     26     [facility] DRAW    7:15 AM   (15 min.)    [facility] LAB DRAW   [facility] Lab Draw     [facility] RETURN    7:30 AM   (30 min.)   Demond Gonsales MD   [facility] 27     28     29     30     [facility] ONC INFUSION 360    7:00 AM   (360 min.)    ONCOLOGY INFUSION   [facility]                 July 2017 Sunday Monday Tuesday Wednesday Thursday Friday Saturday                                 1       2     3     4     5     6     7     8       9     10     11     12     13     14     15       16     17     18     19     20     21     22       23     24     25     26     27     28     29       30     31                                           Lab Results:  Recent Results (from the past 12 hour(s))   Comprehensive metabolic panel    Collection Time: 06/16/17  6:57 AM   Result Value Ref Range    Sodium 143 133 - 144 mmol/L    Potassium 3.1 (L) 3.4 - 5.3 mmol/L    Chloride 109 94 - 109 mmol/L    Carbon Dioxide 28 20 - 32 mmol/L    Anion Gap 7 3 - 14 mmol/L    Glucose 87 70 - 99 mg/dL    Urea Nitrogen 16 7 - 30 mg/dL    Creatinine 0.56 0.52 - 1.04 mg/dL    GFR Estimate >90  Non  GFR Calc   >60 mL/min/1.7m2    GFR Estimate If Black >90   GFR Calc   >60 mL/min/1.7m2    Calcium 8.4 (L) 8.5 - 10.1 mg/dL    Bilirubin Total 0.3 0.2 - 1.3 mg/dL    Albumin 2.6 (L) 3.4 - 5.0  g/dL    Protein Total 6.5 (L) 6.8 - 8.8 g/dL    Alkaline Phosphatase 358 (H) 40 - 150 U/L    ALT 46 0 - 50 U/L    AST 42 0 - 45 U/L   CBC with platelets differential    Collection Time: 06/16/17  6:57 AM   Result Value Ref Range    WBC 10.0 4.0 - 11.0 10e9/L    RBC Count 4.04 3.8 - 5.2 10e12/L    Hemoglobin 9.9 (L) 11.7 - 15.7 g/dL    Hematocrit 33.7 (L) 35.0 - 47.0 %    MCV 83 78 - 100 fl    MCH 24.5 (L) 26.5 - 33.0 pg    MCHC 29.4 (L) 31.5 - 36.5 g/dL    RDW 19.1 (H) 10.0 - 15.0 %    Platelet Count 208 150 - 450 10e9/L    Diff Method Automated Method     % Neutrophils 78.3 %    % Lymphocytes 9.2 %    % Monocytes 7.4 %    % Eosinophils 0.1 %    % Basophils 0.3 %    % Immature Granulocytes 4.7 %    Nucleated RBCs 0 0 /100    Absolute Neutrophil 7.8 1.6 - 8.3 10e9/L    Absolute Lymphocytes 0.9 0.8 - 5.3 10e9/L    Absolute Monocytes 0.7 0.0 - 1.3 10e9/L    Absolute Eosinophils 0.0 0.0 - 0.7 10e9/L    Absolute Basophils 0.0 0.0 - 0.2 10e9/L    Abs Immature Granulocytes 0.5 (H) 0 - 0.4 10e9/L    Absolute Nucleated RBC 0.0                Follow-ups after your visit        Your next 10 appointments already scheduled     Jun 19, 2017 12:30 PM CDT   Infusion 120 with  ONCOLOGY INFUSION,  17 ATC   [facility] ([facility])    [address] [place] [address] [phone]            Jun 23, 2017  9:30 AM CDT   [facility] Draw with [facility]   [facility])    [address] [place] [id number] [phone]            Jun 23, 2017  1:20 PM CDT   (Arrive by 1:05 PM)   CT CHEST W/O CONTRAST with UCCT2   [facility] CT ([facility])    [address]  1st Floor  [facility] [address] [phone]           Please bring any scans or X-rays taken at other hospitals, if similar tests were done. Also bring a list of your  medicines, including vitamins, minerals and over-the-counter drugs. It is safest to leave personal items at home.  Be sure to tell your doctor:   If you have any allergies.   If there s any chance you are pregnant.   If you are breastfeeding.   If you have any special needs.  You do not need to do anything special to prepare.  Please wear loose clothing, such as a sweat suit or jogging clothes. Avoid snaps, zippers and other metal. We may ask you to undress and put on a hospital gown.            Jun 23, 2017  1:45 PM CDT   (Arrive by 1:30 PM)   MR ABDOMEN W/O & W CONTRAST with [address] MRI ([facility])    [address]  1st Floor  [facility] [address] [phone]           Take your medicines as usual, unless your doctor tells you not to. Bring a list of your current medicines to your exam (including vitamins, minerals and over-the-counter drugs). Also bring the results of similar scans you may have had.    The day before your exam, drink extra fluids at least six 8-ounce glasses (unless your doctor tells you to restrict your fluids).   Have a blood test (creatinine test) within 30 days of your exam. Go to your clinic or Diagnostic Imaging Department for this test.   Do not eat or drink for 6 hours prior to exam.  The MRI machine uses a strong magnet. Please wear clothes without metal (snaps, zippers). A sweatsuit works well, or we may give you a hospital gown.  Please remove any body piercings and hair extensions before you arrive. You will also remove watches, jewelry, hairpins, wallets, dentures, partial dental plates and hearing aids. You may wear contact lenses, and you may be able to wear your rings. We have a safe place to keep your personal items, but it is safer to leave them at home.   **IMPORTANT** THE INSTRUCTIONS BELOW ARE ONLY FOR THOSE PATIENTS WHO HAVE BEEN TOLD THEY WILL RECEIVE SEDATION OR GENERAL ANESTHESIA DURING THEIR MRI PROCEDURE:   IF YOU WILL RECEIVE SEDATION (take medicine to help you relax during your exam):   You must get the medicine from your doctor before you arrive. Bring the medicine to the exam. Do not take it at home.   Arrive one hour early. Bring someone who can take you home after the test. Your medicine will make you sleepy. After the exam, you may not drive, take a bus or take a taxi by yourself.   No eating 8 hours before your exam. You may have clear liquids up until 4 hours before your exam. (Clear liquids include water, clear tea, black coffee and fruit juice without pulp.)  IF YOU WILL RECEIVE ANESTHESIA (be asleep for your exam):   Arrive 1 1/2 hours early. Bring someone who can take you home after the test. You may not drive, take a bus or take a taxi by yourself.   No eating 8 hours before your exam. You may have clear liquids up until 4 hours before your exam. (Clear liquids include water, clear tea, black coffee and fruit juice without pulp.)  If you have any questions, please contact your Imaging Department exam site.            Jun 26, 2017  7:15 AM CDT   Masonic Lab Draw with  [facility]ONIC LAB DRAW   [facility] Lab Draw ([facility])    [address] [place] [id number] [phone]            Jun 26, 2017  7:45 AM CDT   (Arrive by 7:30 AM)   Return Visit with Demond Gonsales MD   [facility] ([facility])    [address] [place] [address]   [phone]            Jun 30, 2017  7:00 AM CDT   Infusion 360 with  ONCOLOGY INFUSION, UC 10 ATC   [facility] ([facility])    [address] [place] [address]   [phone]              Who to contact     If you have questions or need follow up information about today's clinic visit or your schedule please contact [facility] directly at  [phone].  Normal or non-critical lab and imaging results will be communicated to you by Linkihart, letter or phone within 4 business days after the clinic has received the results. If you do not hear from us within 7 days, please contact the clinic through [place] or phone. If you have a critical or abnormal lab result, we will notify you by phone as soon as possible.  Submit refill requests through [employer] or call your pharmacy and they will forward the refill request to us. Please allow 3 business days for your refill to be completed.          Additional Information About Your Visit        Linkihart Information     [employer] gives you secure access to your electronic health record. If you see a primary care provider, you can also send messages to your care team and make appointments. If you have questions, please call your primary care clinic.  If you do not have a primary care provider, please call [phone] and they will assist you.        Care EveryWhere ID     This is your Care EveryWhere ID. This could be used by other organizations to access your [facility] medical records  [id number]        Your Vitals Were     Pulse Temperature Respirations Pulse Oximetry BMI (Body Mass Index)       101 99  F (37.2  C) 16 99% 22.44 kg/m2        Blood Pressure from Last 3 Encounters:   06/16/17 121/74   06/05/17 109/69   06/02/17 104/67    Weight from Last 3 Encounters:   06/16/17 73 kg (160 lb 14.4 oz)   06/05/17 69 kg (152 lb 3.2 oz)   06/02/17 72.5 kg (159 lb 12.8 oz)              We Performed the Following     Cancer antigen 19-9     CBC with platelets differential     Comprehensive metabolic panel          Today's Medication Changes          These changes are accurate as of: 6/16/17 10:51 AM.  If you have any questions, ask your nurse or doctor.               These medicines have changed or have updated prescriptions.        Dose/Directions    * dexamethasone 4 MG tablet   Commonly known as:  DECADRON   This may have  changed:  Another medication with the same name was added. Make sure you understand how and when to take each.   Used for:  Need for prophylactic measure, Malignant neoplasm of head of pancreas (H)        Dose:  4 mg   Take 1 tablet (4 mg) by mouth daily (with breakfast)   Quantity:  3 tablet   Refills:  0       * dexamethasone 4 MG tablet   Commonly known as:  DECADRON   This may have changed:  You were already taking a medication with the same name, and this prescription was added. Make sure you understand how and when to take each.   Used for:  Need for prophylactic measure, Malignant neoplasm of head of pancreas (H)        Dose:  4 mg   Take 1 tablet (4 mg) by mouth daily (with breakfast) for 3 days   Quantity:  3 tablet   Refills:  0       polyethylene glycol powder   Commonly known as:  MIRALAX/GLYCOLAX   This may have changed:    - when to take this  - reasons to take this   Used for:  Malignant neoplasm of head of pancreas (H)        Dose:  1 capful   Take 17 g (1 capful) by mouth daily   Quantity:  119 g   Refills:  11       * Notice:  This list has 2 medication(s) that are the same as other medications prescribed for you. Read the directions carefully, and ask your doctor or other care provider to review them with you.         Where to get your medicines      These medications were sent to [address] [address]    Hours:  TRANSPLANT PHONE NUMBER [phone] Phone:  [phone]     dexamethasone 4 MG tablet    pantoprazole 20 MG EC tablet    prochlorperazine 10 MG tablet         Some of these will need a paper prescription and others can be bought over the counter.  Ask your nurse if you have questions.     Bring a paper prescription for each of these medications     dronabinol 5 MG capsule    morphine 15 MG 12 hr tablet                Primary Care Provider    [facility]       No  address on file        Thank you!     Thank you for choosing [facility]  for your care. Our goal is always to provide you with excellent care. Hearing back from our patients is one way we can continue to improve our services. Please take a few minutes to complete the written survey that you may receive in the mail after your visit with us. Thank you!             Your Updated Medication List - Protect others around you: Learn how to safely use, store and throw away your medicines at www.disposemymeds.org.          This list is accurate as of: 6/16/17 10:51 AM.  Always use your most recent med list.                   Brand Name Dispense Instructions for use    amylase-lipase-protease 08232 UNITS Cpep    CREON    180 capsule    Take 2 capsules (72,000 Units) by mouth 3 times daily (with meals)       * dexamethasone 4 MG tablet    DECADRON    3 tablet    Take 1 tablet (4 mg) by mouth daily (with breakfast)       * dexamethasone 4 MG tablet    DECADRON    3 tablet    Take 1 tablet (4 mg) by mouth daily (with breakfast) for 3 days       diltiazem 2% in PLO cream (FV COMPOUNDED) 2% Gel     30 g    Apply topically daily and as needed to external hemorrhoids       docusate sodium 100 MG capsule    COLACE    100 capsule    Take 1 capsule (100 mg) by mouth daily       dronabinol 5 MG capsule    MARINOL    60 capsule    Take 1 capsule (5 mg) by mouth 2 times daily (before meals)       ENSURE CLEAR Liqd     90 Bottle    Take 1 Bottle by mouth 3 times daily       lidocaine-prilocaine cream    EMLA    30 g    Apply topically as needed for other (Use 30-60 minutes prior to port access)       loratadine 10 MG tablet    CLARITIN    30 tablet    Take 1 tablet (10 mg) by mouth daily Reported on 5/5/2017       LORazepam 0.5 MG tablet    ATIVAN    30 tablet    Take 1 tablet (0.5 mg) by mouth every 4 hours as needed (Anxiety, Nausea/Vomiting or Sleep)       morphine 15 MG 12 hr tablet    MS CONTIN    60 tablet    Take  1 tablet (15 mg) by mouth every 12 hours       ondansetron 8 MG ODT tab    ZOFRAN-ODT    60 tablet    Take 1 tablet (8 mg) by mouth every 8 hours as needed for nausea       oxyCODONE 5 MG IR tablet    ROXICODONE    100 tablet    Take 1 tablet (5 mg) by mouth every 4 hours as needed for moderate to severe pain       pantoprazole 20 MG EC tablet    PROTONIX    60 tablet    Take 1 tablet (20 mg) by mouth 2 times daily       polyethylene glycol powder    MIRALAX/GLYCOLAX    119 g    Take 17 g (1 capful) by mouth daily       potassium chloride SA 20 MEQ CR tablet    potassium chloride    60 tablet    Take 1 tablet (20 mEq) by mouth daily       prochlorperazine 10 MG tablet    COMPAZINE    30 tablet    Take 1 tablet (10 mg) by mouth every 6 hours as needed (Nausea/Vomiting)       * Notice:  This list has 2 medication(s) that are the same as other medications prescribed for you. Read the directions carefully, and ask your doctor or other care provider to review them with you.

## 2017-06-19 NOTE — MR AVS SNAPSHOT
After Visit Summary   6/19/2017    Shirin Muller    MRN: [id number]           Patient Information     Date Of Birth          1960        Visit Information        Provider Department      6/19/2017 12:30 PM  17 ATC;  ONCOLOGY INFUSION [facility]        Today's Diagnoses     Secondary malignant neoplasm of liver (H)    -  1    Need for prophylactic measure        Malignant neoplasm of head of pancreas (H)          Care Instructions    Contact Numbers  [facility]: [phone]  Triage: [phone] (Monday-Friday 8-4:30)  After Hours:  [phone]    Please call the [facility] Triage line if you experience a temperature greater than or equal to 100.5, shaking chills, have uncontrolled nausea, vomiting and/or diarrhea, dizziness, shortness of breath, chest pain, bleeding, unexplained bruising, or if you have any other new/concerning symptoms, questions or concerns.     If it is after hours, weekends, or holidays, please call [phone] to speak to someone regarding your questions or concerns.    If you are having any concerning symptoms or wish to speak to a provider before your next infusion visit, please call your care coordinator or triage to notify them so we can adequately serve you.     If you need a refill on a narcotic prescription or other medication, please call triage before your infusion appointment.             June 2017 Sunday Monday Tuesday Wednesday Thursday Friday Saturday                       1     2     [facility] LAB DRAW    6:30 AM   (15 min.)   [facility] DRAW   [facility] Lab Draw     UM ONC INFUSION 360    7:00 AM   (360 min.)    ONCOLOGY INFUSION   [facility] 3       4     5     [facility] DRAW    1:30 PM   (15 min.)   [facility] DRAW   [facility] Lab Draw     P ONC INFUSION 120    2:00 PM   (120 min.)    ONCOLOGY INFUSION   [facility] 6     7     8     9      10       11     12     13     14     15     16     [facility] DRAW    6:30 AM   (15 min.)    [facility] LAB DRAW   [facility] Lab Draw     UMP ONC INFUSION 360    7:00 AM   (360 min.)    ONCOLOGY INFUSION   [facility] 17       18     19     [facility] LAB DRAW   12:00 PM   (15 min.)    [facility] LAB DRAW   [facility] Lab Draw     [facility] ONC INFUSION 120   12:30 PM   (120 min.)    ONCOLOGY INFUSION   [facility] 20     21     22     23     [facility] LAB DRAW    9:30 AM   (15 min.)    [facility] LAB DRAW   [facility] Lab Draw     CT CHEST WO    1:05 PM   (20 min.)   UCCT2   [facility] CT     MR ABDOMEN WWO    1:30 PM   (45 min.)   MR1   [facility] MRI 24       25     26     [facility] LAB DRAW    7:15 AM   (15 min.)    [facility] LAB DRAW   [facility] Lab Draw     UMP RETURN    7:30 AM   (30 min.)   Demond Gonsales MD   [facility] 27     28     29     30     [facility] ONC INFUSION 360    7:00 AM   (360 min.)    ONCOLOGY INFUSION   [facility]                 July 2017 Sunday Monday Tuesday Wednesday Thursday Friday Saturday                                 1       2     3     4     5     6     7     8       9     10     11     12     13     14     15       16     17     18     19     20     21     22       23     24     25     26     27     28     29       30     31                                           Lab Results:  Recent Results (from the past 12 hour(s))   Basic metabolic panel    Collection Time: 06/19/17 12:06 PM   Result Value Ref Range    Sodium 138 133 - 144 mmol/L    Potassium 4.2 3.4 - 5.3 mmol/L    Chloride 101 94 - 109 mmol/L    Carbon Dioxide 30 20 - 32 mmol/L    Anion Gap 7 3 - 14 mmol/L    Glucose 169 (H) 70 - 99 mg/dL    Urea Nitrogen 13 7 - 30 mg/dL    Creatinine 0.49 (L) 0.52 - 1.04 mg/dL    GFR Estimate >90  Non  GFR Calc   >60  mL/min/1.7m2    GFR Estimate If Black >90   GFR Calc   >60 mL/min/1.7m2    Calcium 8.4 (L) 8.5 - 10.1 mg/dL              Follow-ups after your visit        Your next 10 appointments already scheduled     Jun 23, 2017  9:30 AM CDT   [facility] Lab Draw with  [facility] LAB DRAW   [facility] Draw ([place])    [address] [place] [address] [id number] [phone]            Jun 23, 2017  1:20 PM CDT   (Arrive by 1:05 PM)   CT CHEST W/O CONTRAST with CT2   [facility] CT ([place])    [address] [place] [address] [phone]           Please bring any scans or X-rays taken at other hospitals, if similar tests were done. Also bring a list of your medicines, including vitamins, minerals and over-the-counter drugs. It is safest to leave personal items at home.  Be sure to tell your doctor:   If you have any allergies.   If there s any chance you are pregnant.   If you are breastfeeding.   If you have any special needs.  You do not need to do anything special to prepare.  Please wear loose clothing, such as a sweat suit or jogging clothes. Avoid snaps, zippers and other metal. We may ask you to undress and put on a hospital gown.            Jun 23, 2017  1:45 PM CDT   (Arrive by 1:30 PM)   MR ABDOMEN W/O & W CONTRAST with MR1   [facility] MRI ([place])    [address] [place] [address] [phone]           Take your medicines as usual, unless your doctor tells you not to. Bring a list of your current medicines to your exam (including vitamins, minerals and over-the-counter drugs). Also bring the results of similar scans you may have had.    The day before your exam, drink extra fluids at least six 8-ounce glasses (unless your doctor tells you to restrict your fluids).   Have a blood test (creatinine test)  within 30 days of your exam. Go to your clinic or Diagnostic Imaging Department for this test.   Do not eat or drink for 6 hours prior to exam.  The MRI machine uses a strong magnet. Please wear clothes without metal (snaps, zippers). A sweatsuit works well, or we may give you a hospital gown.  Please remove any body piercings and hair extensions before you arrive. You will also remove watches, jewelry, hairpins, wallets, dentures, partial dental plates and hearing aids. You may wear contact lenses, and you may be able to wear your rings. We have a safe place to keep your personal items, but it is safer to leave them at home.   **IMPORTANT** THE INSTRUCTIONS BELOW ARE ONLY FOR THOSE PATIENTS WHO HAVE BEEN TOLD THEY WILL RECEIVE SEDATION OR GENERAL ANESTHESIA DURING THEIR MRI PROCEDURE:  IF YOU WILL RECEIVE SEDATION (take medicine to help you relax during your exam):   You must get the medicine from your doctor before you arrive. Bring the medicine to the exam. Do not take it at home.   Arrive one hour early. Bring someone who can take you home after the test. Your medicine will make you sleepy. After the exam, you may not drive, take a bus or take a taxi by yourself.   No eating 8 hours before your exam. You may have clear liquids up until 4 hours before your exam. (Clear liquids include water, clear tea, black coffee and fruit juice without pulp.)  IF YOU WILL RECEIVE ANESTHESIA (be asleep for your exam):   Arrive 1 1/2 hours early. Bring someone who can take you home after the test. You may not drive, take a bus or take a taxi by yourself.   No eating 8 hours before your exam. You may have clear liquids up until 4 hours before your exam. (Clear liquids include water, clear tea, black coffee and fruit juice without pulp.)  If you have any questions, please contact your Imaging Department exam site.            Jun 26, 2017  7:15 AM KOLE   [email or web address] Lab Draw with  [facility] LAB DRAW   [email or web address] Lab Draw ([facility]  [facility])    [address] [place] [phone]            Jun 26, 2017  7:45 AM CDT   (Arrive by 7:30 AM)   Return Visit with Demond Gonsales MD   [facility] ([facility])    [address] [place] [phone]            Jun 30, 2017  7:00 AM CDT   Infusion 360 with UC ONCOLOGY INFUSION, UC 10 ATC   [facility] ([facility])    [address] [place] [phone]              Who to contact     If you have questions or need follow up information about today's clinic visit or your schedule please contact [facility] directly at [phone].  Normal or non-critical lab and imaging results will be communicated to you by MyChart, letter or phone within 4 business days after the clinic has received the results. If you do not hear from us within 7 days, please contact the clinic through "[employer],"hart or phone. If you have a critical or abnormal lab result, we will notify you by phone as soon as possible.  Submit refill requests through [employer] or call your pharmacy and they will forward the refill request to us. Please allow 3 business days for your refill to be completed.          Additional Information About Your Visit        MyChart Information     [employer] gives you secure access to your electronic health record. If you see a primary care provider, you can also send messages to your care team and make appointments. If you have questions, please call your primary care clinic.  If you do not have a primary care provider, please call [phone] and they will assist you.        Care EveryWhere ID     This is your Care EveryWhere ID. This could be used by other organizations to access your [facility] medical records  [id number]        Your Vitals Were     Pulse Temperature  Respirations Pulse Oximetry BMI (Body Mass Index)       88 98.8  F (37.1  C) 16 99% 22.36 kg/m2        Blood Pressure from Last 3 Encounters:   06/19/17 99/66   06/16/17 121/74   06/05/17 109/69    Weight from Last 3 Encounters:   06/19/17 72.7 kg (160 lb 4.8 oz)   06/16/17 73 kg (160 lb 14.4 oz)   06/05/17 69 kg (152 lb 3.2 oz)              We Performed the Following     Basic metabolic panel          Today's Medication Changes          These changes are accurate as of: 6/19/17  4:06 PM.  If you have any questions, ask your nurse or doctor.               These medicines have changed or have updated prescriptions.        Dose/Directions    polyethylene glycol powder   Commonly known as:  MIRALAX/GLYCOLAX   This may have changed:    - when to take this  - reasons to take this   Used for:  Malignant neoplasm of head of pancreas (H)        Dose:  1 capful   Take 17 g (1 capful) by mouth daily   Quantity:  119 g   Refills:  11                Primary Care Provider    [facility]       No address on file        Thank you!     Thank you for choosing [facility]  for your care. Our goal is always to provide you with excellent care. Hearing back from our patients is one way we can continue to improve our services. Please take a few minutes to complete the written survey that you may receive in the mail after your visit with us. Thank you!             Your Updated Medication List - Protect others around you: Learn how to safely use, store and throw away your medicines at www.disposemymeds.org.          This list is accurate as of: 6/19/17  4:06 PM.  Always use your most recent med list.                   Brand Name Dispense Instructions for use    amylase-lipase-protease 76651 UNITS Cpep    CREON    180 capsule    Take 2 capsules (72,000 Units) by mouth 3 times daily (with meals)       * dexamethasone 4 MG tablet    DECADRON    3 tablet    Take 1 tablet (4 mg) by mouth daily (with breakfast)        * dexamethasone 4 MG tablet    DECADRON    3 tablet    Take 1 tablet (4 mg) by mouth daily (with breakfast) for 3 days       diltiazem 2% in PLO cream (FV COMPOUNDED) 2% Gel     30 g    Apply topically daily and as needed to external hemorrhoids       docusate sodium 100 MG capsule    COLACE    100 capsule    Take 1 capsule (100 mg) by mouth daily       dronabinol 5 MG capsule    MARINOL    60 capsule    Take 1 capsule (5 mg) by mouth 2 times daily (before meals)       ENSURE CLEAR Liqd     90 Bottle    Take 1 Bottle by mouth 3 times daily       lidocaine-prilocaine cream    EMLA    30 g    Apply topically as needed for other (Use 30-60 minutes prior to port access)       loratadine 10 MG tablet    CLARITIN    30 tablet    Take 1 tablet (10 mg) by mouth daily Reported on 5/5/2017       LORazepam 0.5 MG tablet    ATIVAN    30 tablet    Take 1 tablet (0.5 mg) by mouth every 4 hours as needed (Anxiety, Nausea/Vomiting or Sleep)       morphine 15 MG 12 hr tablet    MS CONTIN    60 tablet    Take 1 tablet (15 mg) by mouth every 12 hours       ondansetron 8 MG ODT tab    ZOFRAN-ODT    60 tablet    Take 1 tablet (8 mg) by mouth every 8 hours as needed for nausea       oxyCODONE 5 MG IR tablet    ROXICODONE    100 tablet    Take 1 tablet (5 mg) by mouth every 4 hours as needed for moderate to severe pain       pantoprazole 20 MG EC tablet    PROTONIX    60 tablet    Take 1 tablet (20 mg) by mouth 2 times daily       polyethylene glycol powder    MIRALAX/GLYCOLAX    119 g    Take 17 g (1 capful) by mouth daily       potassium chloride SA 20 MEQ CR tablet    potassium chloride    60 tablet    Take 1 tablet (20 mEq) by mouth daily       prochlorperazine 10 MG tablet    COMPAZINE    30 tablet    Take 1 tablet (10 mg) by mouth every 6 hours as needed (Nausea/Vomiting)       * Notice:  This list has 2 medication(s) that are the same as other medications prescribed for you. Read the directions carefully, and ask your doctor  or other care provider to review them with you.

## 2017-06-19 NOTE — NURSING NOTE
Chief Complaint   Patient presents with     Port Draw     port already accessed, labs collected and vitals taken      Ana Soto RN

## 2017-06-19 NOTE — PATIENT INSTRUCTIONS
Contact Numbers  [facility]: [phone]  Triage: [phone] (Monday-Friday 8-4:30)  After Hours:  [phone]    Please call the [facility] Triage line if you experience a temperature greater than or equal to 100.5, shaking chills, have uncontrolled nausea, vomiting and/or diarrhea, dizziness, shortness of breath, chest pain, bleeding, unexplained bruising, or if you have any other new/concerning symptoms, questions or concerns.     If it is after hours, weekends, or holidays, please call [phone] to speak to someone regarding your questions or concerns.    If you are having any concerning symptoms or wish to speak to a provider before your next infusion visit, please call your care coordinator or triage to notify them so we can adequately serve you.     If you need a refill on a narcotic prescription or other medication, please call triage before your infusion appointment.             June 2017 Sunday Monday Tuesday Wednesday Thursday Friday Saturday                       1     2     [facility]    6:30 AM   (15 min.)    [facility]     [facility] ONC INFUSION 360    7:00 AM   (360 min.)    ONCOLOGY INFUSION   [facility] 3       4     5     [facility] DRAW    1:30 PM   (15 min.)    [facility] LAB DRAW   [facility] Lab Draw     UMP ONC INFUSION 120    2:00 PM   (120 min.)    ONCOLOGY INFUSION   [facility] 6     7     8     9     10       11     12     13     14     15     16     [facility] LAB DRAW    6:30 AM   (15 min.)    [facility] LAB DRAW   [facility] Lab Draw     UMP ONC INFUSION 360    7:00 AM   (360 min.)    ONCOLOGY INFUSION   [facility] 17       18     19     [facility] LAB DRAW   12:00 PM   (15 min.)    [facility]RAW   [facility]     [facility] ONC INFUSION 120   12:30 PM   (120 min.)    ONCOLOGY INFUSION   [facility] 20     21     22      23     [facility] DRAW    9:30 AM   (15 min.)    [facility] LAB DRAW   [facility] Lab Draw     CT CHEST WO    1:05 PM   (20 min.)   UCCT2   [facility] CT     MR ABDOMEN WWO    1:30 PM   (45 min.)   UCMR1   [facility] MRI 24       25     26     [facility] LAB DRAW    7:15 AM   (15 min.)    [facility] LAB DRAW   [facility] Lab Draw     [facility] RETURN    7:30 AM   (30 min.)   Demond Gonsales MD   [facility] 27     28     29     30     UMP ONC INFUSION 360    7:00 AM   (360 min.)    ONCOLOGY INFUSION   [facility]                 July 2017 Sunday Monday Tuesday Wednesday Thursday Friday Saturday                                 1       2     3     4     5     6     7     8       9     10     11     12     13     14     15       16     17     18     19     20     21     22       23     24     25     26     27     28     29       30     31                                           Lab Results:  Recent Results (from the past 12 hour(s))   Basic metabolic panel    Collection Time: 06/19/17 12:06 PM   Result Value Ref Range    Sodium 138 133 - 144 mmol/L    Potassium 4.2 3.4 - 5.3 mmol/L    Chloride 101 94 - 109 mmol/L    Carbon Dioxide 30 20 - 32 mmol/L    Anion Gap 7 3 - 14 mmol/L    Glucose 169 (H) 70 - 99 mg/dL    Urea Nitrogen 13 7 - 30 mg/dL    Creatinine 0.49 (L) 0.52 - 1.04 mg/dL    GFR Estimate >90  Non  GFR Calc   >60 mL/min/1.7m2    GFR Estimate If Black >90   GFR Calc   >60 mL/min/1.7m2    Calcium 8.4 (L) 8.5 - 10.1 mg/dL

## 2017-06-19 NOTE — PROGRESS NOTES
Infusion Nursing Note:  Shirin Muller presents today for Neulasta and IV fluids.    Patient seen by provider today: No   present during visit today: Not Applicable.    Note: Patient states she feels that she is at her baseline today and has no new concerns.    Intravenous Access:  Implanted Port.    Treatment Conditions:  Lab Results   Component Value Date     06/19/2017                   Lab Results   Component Value Date    POTASSIUM 4.2 06/19/2017           Lab Results     Lab Results   Component Value Date    CR 0.49 06/19/2017                   Lab Results   Component Value Date    FANNY 8.4 06/19/2017                    Post Infusion Assessment:  Patient tolerated infusion without incident.  Patient tolerated injection without incident into right abdominal tissue.  Blood return noted pre and post infusion.  Site patent and intact, free from redness, edema or discomfort.  No evidence of extravasations.  Access discontinued per protocol.    Discharge Plan:   Patient declined prescription refills.  Discharge instructions reviewed with: Patient.  Patient and/or family verbalized understanding of discharge instructions and all questions answered.  AVS to patient via [employer].  Patient will return 6/30/17 for next appointment.   Patient discharged in stable condition accompanied by: sister.  Departure Mode: Ambulatory.    Cookie Alberts RN

## 2017-06-22 NOTE — PROGRESS NOTES
This is a snapshot of the patient's [facility] medical record. For complete information or questions call [phone] or In [facility]  Home Infusion ([id number]).  [facility] Number:  [id number]

## 2017-06-30 NOTE — PROGRESS NOTES
Infusion Nursing Note:  Shirin Muller presents today for cycle 12 day 1 oxaliplatin, irinotecan, leucovorin, fluorouracil bolus and pump connection. (aranesp held, doesn't meet parameters)      Patient seen by provider today: No   present during visit today: Not Applicable.    Note: Patient overall is feeling well today, no new concerns/complaints.  Potassium 3.2 today. TORB: Dr. Gonsales/Ana Mustafa, RN: Replace potassium IV per protocol.    Pt given 40 meq IV potassium today. Pt reminded to continue to take her oral potassium at home.    Patient given atropine prior and senior living through irinotecan    Intravenous Access:  Implanted Port.    Treatment Conditions:  Lab Results   Component Value Date    HGB 10.2 06/30/2017     Lab Results   Component Value Date    WBC 10.1 06/30/2017      Lab Results   Component Value Date    ANEU 8.2 06/30/2017     Lab Results   Component Value Date     06/30/2017      Lab Results   Component Value Date     06/30/2017                   Lab Results   Component Value Date    POTASSIUM 3.2 06/30/2017           Lab Results   Component Value Date    MAG 2.0 04/03/2017            Lab Results   Component Value Date    CR 0.55 06/30/2017                   Lab Results   Component Value Date    FANNY 8.6 06/30/2017                Lab Results   Component Value Date    BILITOTAL 0.3 06/30/2017           Lab Results   Component Value Date    ALBUMIN 2.5 06/30/2017                    Lab Results   Component Value Date    ALT 73 06/30/2017           Lab Results   Component Value Date    AST 84 06/30/2017     Results reviewed, labs MET treatment parameters, ok to proceed with treatment.      Post Infusion Assessment:  Patient tolerated infusion without incident.  Blood return noted pre and post infusion.  Blood return noted during fluorouracil administration every 2 cc.  Site patent and intact, free from redness, edema or discomfort.  No evidence of  extravasations.  Fluorouracil pump connected with connections secured and clamps open. Pt will get pump disconnected at home with [facility] at 1200 on 7/2/17.  Pt will return on Monday 7/3/17 for neulasta and IVF. Pt aware of schedule.    Discharge Plan:   Prescription refills given for zofran, compazine, dex.  Discharge instructions reviewed with: Patient.  Patient and/or family verbalized understanding of discharge instructions and all questions answered.  Copy of AVS reviewed with patient and/or family.  Patient will return 7/3/17 for next appointment.  Patient discharged in stable condition accompanied by: sister.  Departure Mode: Ambulatory.    Ana Mustafa RN

## 2017-06-30 NOTE — PATIENT INSTRUCTIONS
Contact Numbers    [facility] Main Line: [phone]  [facility] Triage:  [phone]    Call triage with chills and/or temperature greater than or equal to 100.5, uncontrolled nausea/vomiting, diarrhea, constipation, dizziness, shortness of breath, chest pain, bleeding, unexplained bruising, or any new/concerning symptoms, questions/concerns.     If you are having any concerning symptoms or wish to speak to a provider before your next infusion visit, please call your care coordinator or triage to notify them so we can adequately serve you.       After Hours: [phone]    If after hours, weekends, or holidays, call main hospital  and ask for Oncology doctor on call.         June 2017 Sunday Monday Tuesday Wednesday Thursday Friday Saturday                       1     2     [facility]    6:30 AM   (15 min.)    [facility] LAB DRAW   [facility] Draw     [facility] ONC INFUSION 360    7:00 AM   (360 min.)    ONCOLOGY INFUSION   [facility] 3       4     5     [facility] DRAW    1:30 PM   (15 min.)    [facility] LAB DRAW   [facility] Draw     UMP ONC INFUSION 120    2:00 PM   (120 min.)    ONCOLOGY INFUSION   [facility] 6     7     8     9     10       11     12     13     14     15     16     [facility] D[facility]    6:30 AM   (15 min.)    [facility] LAB DRAW   [facility] Draw     UMP ONC INFUSION 360    7:00 AM   (360 min.)    ONCOLOGY INFUSION   [facility] 17       18     19     [facility] DRAW   12:00 PM   (15 min.)    [facility] LAB DRAW   [facility] Draw     [facility] ONC INFUSION 120   12:30 PM   (120 min.)    ONCOLOGY INFUSION   [facility] 20     21     22     23     [facility]    9:30 AM   (15 min.)   [facility]   [facility] Draw 24       25     26     [facility] DRAW    7:15 AM   (15 min.)    [facility] LAB DRAW   [facility] Draw     UMP  RETURN    7:30 AM   (30 min.)   Demond Gonsales MD   [facility] 27     28     29     30     [facility] DRAW    6:30 AM   (15 min.)    [facility] LAB DRAW   [facility] Lab Draw     UMP ONC INFUSION 360    7:00 AM   (360 min.)    ONCOLOGY INFUSION   [facility]                 July 2017 Sunday Monday Tuesday Wednesday Thursday Friday Saturday                                 1       2     3     [facility] ONC INFUSION 120   12:00 PM   (120 min.)    ONCOLOGY INFUSION   [facility] 4     5     6     7     MR ABDOMEN WWO    7:30 AM   (45 min.)   [id number]   [facility] MRI     CT CHEST WO    8:45 AM   (20 min.)   UCCT1   [facility] CT 8       9     10     P [facility] DRAW    5:30 PM   (15 min.)    [facility] LAB DRAW   [facility] Lab Draw     [facility] RETURN    5:45 PM   (30 min.)   Demond Gonsales MD   [facility] 11     12     13     14     15       16     17     18     19     20     21     22       23     24     25     26     27     28     29       30     31                                           Lab Results:  Recent Results (from the past 12 hour(s))   CBC with platelets differential    Collection Time: 06/30/17  7:57 AM   Result Value Ref Range    WBC 10.1 4.0 - 11.0 10e9/L    RBC Count 4.28 3.8 - 5.2 10e12/L    Hemoglobin 10.2 (L) 11.7 - 15.7 g/dL    Hematocrit 34.5 (L) 35.0 - 47.0 %    MCV 81 78 - 100 fl    MCH 23.8 (L) 26.5 - 33.0 pg    MCHC 29.6 (L) 31.5 - 36.5 g/dL    RDW 20.1 (H) 10.0 - 15.0 %    Platelet Count 148 (L) 150 - 450 10e9/L    Diff Method Automated Method     % Neutrophils 81.2 %    % Lymphocytes 9.0 %    % Monocytes 7.1 %    % Eosinophils 0.1 %    % Basophils 0.3 %    % Immature Granulocytes 2.3 %    Nucleated RBCs 0 0 /100    Absolute Neutrophil 8.2 1.6 - 8.3 10e9/L    Absolute Lymphocytes 0.9 0.8 - 5.3 10e9/L    Absolute Monocytes 0.7 0.0 - 1.3 10e9/L    Absolute  Eosinophils 0.0 0.0 - 0.7 10e9/L    Absolute Basophils 0.0 0.0 - 0.2 10e9/L    Abs Immature Granulocytes 0.2 0 - 0.4 10e9/L    Absolute Nucleated RBC 0.0    Comprehensive metabolic panel    Collection Time: 06/30/17  7:57 AM   Result Value Ref Range    Sodium 142 133 - 144 mmol/L    Potassium 3.2 (L) 3.4 - 5.3 mmol/L    Chloride 106 94 - 109 mmol/L    Carbon Dioxide 27 20 - 32 mmol/L    Anion Gap 8 3 - 14 mmol/L    Glucose 105 (H) 70 - 99 mg/dL    Urea Nitrogen 11 7 - 30 mg/dL    Creatinine 0.55 0.52 - 1.04 mg/dL    GFR Estimate >90  Non  GFR Calc   >60 mL/min/1.7m2    GFR Estimate If Black >90   GFR Calc   >60 mL/min/1.7m2    Calcium 8.6 8.5 - 10.1 mg/dL    Bilirubin Total 0.3 0.2 - 1.3 mg/dL    Albumin 2.5 (L) 3.4 - 5.0 g/dL    Protein Total 6.9 6.8 - 8.8 g/dL    Alkaline Phosphatase 615 (H) 40 - 150 U/L    ALT 73 (H) 0 - 50 U/L    AST 84 (H) 0 - 45 U/L

## 2017-06-30 NOTE — MR AVS SNAPSHOT
After Visit Summary   6/30/2017    Shirin Muller    MRN: [id number]           Patient Information     Date Of Birth          1960        Visit Information        Provider Department      6/30/2017 7:00 AM UC 10 ATC;  ONCOLOGY INFUSION [facility]        Today's Diagnoses     Need for prophylactic measure    -  1    Malignant neoplasm of head of pancreas (H)          Care Instructions    Contact Numbers    [facility] Main Line: [phone]  [facility] Triage:  [phone]    Call triage with chills and/or temperature greater than or equal to 100.5, uncontrolled nausea/vomiting, diarrhea, constipation, dizziness, shortness of breath, chest pain, bleeding, unexplained bruising, or any new/concerning symptoms, questions/concerns.     If you are having any concerning symptoms or wish to speak to a provider before your next infusion visit, please call your care coordinator or triage to notify them so we can adequately serve you.       After Hours: [phone]    If after hours, weekends, or holidays, call main hospital  and ask for Oncology doctor on call.         June 2017 Sunday Monday Tuesday Wednesday Thursday Friday Saturday                       1     2     [facility]    6:30 AM   (15 min.)    [facility] LAB DRAW   [facility] Lab Draw     UMP ONC INFUSION 360    7:00 AM   (360 min.)    ONCOLOGY INFUSION   [facility] 3       4     5     [facility] DRAW    1:30 PM   (15 min.)    [facility] LAB DRAW   [facility] Lab Draw     UMP ONC INFUSION 120    2:00 PM   (120 min.)    ONCOLOGY INFUSION   [facility] 6     7     8     9     10       11     12     13     14     15     16     [facility] DRAW    6:30 AM   (15 min.)    [facility] LAB DRAW   [facility] Lab Draw     [facility] ONC INFUSION 360    7:00 AM   (360 min.)    ONCOLOGY INFUSION   [facility] 17       18     19     [facility]  [facility] DRAW   12:00 PM   (15 min.)    [facility]ONIC LAB DRAW   [facility] Lab Draw     UMP ONC INFUSION 120   12:30 PM   (120 min.)    ONCOLOGY INFUSION   [facility] 20     21     22     23     [facility] DRAW    9:30 AM   (15 min.)    [facility] LAB DRAW   [facility] Lab Draw 24       25     26     [facility] DRAW    7:15 AM   (15 min.)    [facility] LAB DRAW   [facility] Lab Draw     UMP RETURN    7:30 AM   (30 min.)   Demond Gonsales MD   [facility] 27     28     29     30     [facility] DRAW    6:30 AM   (15 min.)    [facility]RAW   [facility]     [facility] ONC INFUSION 360    7:00 AM   (360 min.)    ONCOLOGY INFUSION   [facility]                 July 2017 Sunday Monday Tuesday Wednesday Thursday Friday Saturday                                 1       2     3     UMP ONC INFUSION 120   12:00 PM   (120 min.)    ONCOLOGY INFUSION   [facility] 4     5     6     7     MR ABDOMEN WWO    7:30 AM   (45 min.)   [id number]   [facility] MRI     CT CHEST WO    8:45 AM   (20 min.)   UCCT1   [facility] CT 8       9     10     [facility] DRAW    5:30 PM   (15 min.)    [facility]   [facility]     [facility] RETURN    5:45 PM   (30 min.)   Demond Gonsales MD   [facility] 11     12     13     14     15       16     17     18     19     20     21     22       23     24     25     26     27     28     29       30     31                                           Lab Results:  Recent Results (from the past 12 hour(s))   CBC with platelets differential    Collection Time: 06/30/17  7:57 AM   Result Value Ref Range    WBC 10.1 4.0 - 11.0 10e9/L    RBC Count 4.28 3.8 - 5.2 10e12/L    Hemoglobin 10.2 (L) 11.7 - 15.7 g/dL    Hematocrit 34.5 (L) 35.0 - 47.0 %    MCV 81 78 - 100 fl    MCH 23.8 (L) 26.5 - 33.0 pg    MCHC 29.6 (L)  31.5 - 36.5 g/dL    RDW 20.1 (H) 10.0 - 15.0 %    Platelet Count 148 (L) 150 - 450 10e9/L    Diff Method Automated Method     % Neutrophils 81.2 %    % Lymphocytes 9.0 %    % Monocytes 7.1 %    % Eosinophils 0.1 %    % Basophils 0.3 %    % Immature Granulocytes 2.3 %    Nucleated RBCs 0 0 /100    Absolute Neutrophil 8.2 1.6 - 8.3 10e9/L    Absolute Lymphocytes 0.9 0.8 - 5.3 10e9/L    Absolute Monocytes 0.7 0.0 - 1.3 10e9/L    Absolute Eosinophils 0.0 0.0 - 0.7 10e9/L    Absolute Basophils 0.0 0.0 - 0.2 10e9/L    Abs Immature Granulocytes 0.2 0 - 0.4 10e9/L    Absolute Nucleated RBC 0.0    Comprehensive metabolic panel    Collection Time: 06/30/17  7:57 AM   Result Value Ref Range    Sodium 142 133 - 144 mmol/L    Potassium 3.2 (L) 3.4 - 5.3 mmol/L    Chloride 106 94 - 109 mmol/L    Carbon Dioxide 27 20 - 32 mmol/L    Anion Gap 8 3 - 14 mmol/L    Glucose 105 (H) 70 - 99 mg/dL    Urea Nitrogen 11 7 - 30 mg/dL    Creatinine 0.55 0.52 - 1.04 mg/dL    GFR Estimate >90  Non  GFR Calc   >60 mL/min/1.7m2    GFR Estimate If Black >90   GFR Calc   >60 mL/min/1.7m2    Calcium 8.6 8.5 - 10.1 mg/dL    Bilirubin Total 0.3 0.2 - 1.3 mg/dL    Albumin 2.5 (L) 3.4 - 5.0 g/dL    Protein Total 6.9 6.8 - 8.8 g/dL    Alkaline Phosphatase 615 (H) 40 - 150 U/L    ALT 73 (H) 0 - 50 U/L    AST 84 (H) 0 - 45 U/L               Follow-ups after your visit        Your next 10 appointments already scheduled     Jul 03, 2017 12:00 PM CDT   Infusion 120 with UC ONCOLOGY INFUSION, UC 19 ATC   [facility] ([facility] [place])    [address]  [address] [facility] [address] [phone]            Jul 07, 2017  7:45 AM CDT   (Arrive by 7:30 AM)   MR ABDOMEN W/O & W CONTRAST with [id number]   [facility] MRI ([facility])    [address] [address] [phone]           Take your medicines as usual,  unless your doctor tells you not to. Bring a list of your current medicines to your exam (including vitamins, minerals and over-the-counter drugs). Also bring the results of similar scans you may have had.    The day before your exam, drink extra fluids at least six 8-ounce glasses (unless your doctor tells you to restrict your fluids).   Have a blood test (creatinine test) within 30 days of your exam. Go to your clinic or Diagnostic Imaging Department for this test.   Do not eat or drink for 6 hours prior to exam.  The MRI machine uses a strong magnet. Please wear clothes without metal (snaps, zippers). A sweatsuit works well, or we may give you a hospital gown.  Please remove any body piercings and hair extensions before you arrive. You will also remove watches, jewelry, hairpins, wallets, dentures, partial dental plates and hearing aids. You may wear contact lenses, and you may be able to wear your rings. We have a safe place to keep your personal items, but it is safer to leave them at home.   **IMPORTANT** THE INSTRUCTIONS BELOW ARE ONLY FOR THOSE PATIENTS WHO HAVE BEEN TOLD THEY WILL RECEIVE SEDATION OR GENERAL ANESTHESIA DURING THEIR MRI PROCEDURE:  IF YOU WILL RECEIVE SEDATION (take medicine to help you relax during your exam):   You must get the medicine from your doctor before you arrive. Bring the medicine to the exam. Do not take it at home.   Arrive one hour early. Bring someone who can take you home after the test. Your medicine will make you sleepy. After the exam, you may not drive, take a bus or take a taxi by yourself.   No eating 8 hours before your exam. You may have clear liquids up until 4 hours before your exam. (Clear liquids include water, clear tea, black coffee and fruit juice without pulp.)  IF YOU WILL RECEIVE ANESTHESIA (be asleep for your exam):   Arrive 1 1/2 hours early. Bring someone who can take you home after the test. You may not drive, take a bus or take a taxi by yourself.   No  eating 8 hours before your exam. You may have clear liquids up until 4 hours before your exam. (Clear liquids include water, clear tea, black coffee and fruit juice without pulp.)  If you have any questions, please contact your Imaging Department exam site.            Jul 07, 2017  9:00 AM CDT   (Arrive by 8:45 AM)   CT CHEST W/O CONTRAST with UCCT1   [facility] CT ([facility])    [address] [place] [address] [phone]           Please bring any scans or X-rays taken at other hospitals, if similar tests were done. Also bring a list of your medicines, including vitamins, minerals and over-the-counter drugs. It is safest to leave personal items at home.  Be sure to tell your doctor:   If you have any allergies.   If there s any chance you are pregnant.   If you are breastfeeding.   If you have any special needs.  You do not need to do anything special to prepare.  Please wear loose clothing, such as a sweat suit or jogging clothes. Avoid snaps, zippers and other metal. We may ask you to undress and put on a hospital gown.            Jul 10, 2017  5:30 PM CDT   [facility] Lab Draw with  [facility] LAB DRAW   [facility] Lab Draw ([facility])    [address] [place] [address] [phone]            Jul 10, 2017  6:00 PM CDT   (Arrive by 5:45 PM)   Return Visit with Demond Gonsales MD   [facility] ([facility])    [address] [place] [address] [phone]              Who to contact     If you have questions or need follow up information about today's clinic visit or your schedule please contact [facility] directly at [phone].  Normal or non-critical lab and imaging results will be communicated to you by MyChart, letter or phone within 4 business days after the clinic has received the  results. If you do not hear from us within 7 days, please contact the clinic through [employer] or phone. If you have a critical or abnormal lab result, we will notify you by phone as soon as possible.  Submit refill requests through [employer] or call your pharmacy and they will forward the refill request to us. Please allow 3 business days for your refill to be completed.          Additional Information About Your Visit        Iggli Information     [employer]li gives you secure access to your electronic health record. If you see a primary care provider, you can also send messages to your care team and make appointments. If you have questions, please call your primary care clinic.  If you do not have a primary care provider, please call [phone] and they will assist you.        Care EveryWhere ID     This is your Care EveryWhere ID. This could be used by other organizations to access your [place] medical records  [id number]        Your Vitals Were     Pulse Temperature Respirations Pulse Oximetry BMI (Body Mass Index)       89 98.9  F (37.2  C) 16 99% 22.51 kg/m2        Blood Pressure from Last 3 Encounters:   06/30/17 114/75   06/19/17 99/66   06/16/17 121/74    Weight from Last 3 Encounters:   06/30/17 73.2 kg (161 lb 6.4 oz)   06/19/17 72.7 kg (160 lb 4.8 oz)   06/16/17 73 kg (160 lb 14.4 oz)              We Performed the Following     Cancer antigen 19-9     CBC with platelets differential     Comprehensive metabolic panel          Today's Medication Changes          These changes are accurate as of: 6/30/17 10:39 AM.  If you have any questions, ask your nurse or doctor.               These medicines have changed or have updated prescriptions.        Dose/Directions    * dexamethasone 4 MG tablet   Commonly known as:  DECADRON   This may have changed:  Another medication with the same name was added. Make sure you understand how and when to take each.   Used for:  Need for prophylactic measure, Malignant neoplasm  of head of pancreas (H)        Dose:  4 mg   Take 1 tablet (4 mg) by mouth daily (with breakfast)   Quantity:  3 tablet   Refills:  0       * dexamethasone 4 MG tablet   Commonly known as:  DECADRON   This may have changed:  You were already taking a medication with the same name, and this prescription was added. Make sure you understand how and when to take each.   Used for:  Need for prophylactic measure, Malignant neoplasm of head of pancreas (H)        Dose:  4 mg   Take 1 tablet (4 mg) by mouth daily (with breakfast) for 3 days   Quantity:  3 tablet   Refills:  0       polyethylene glycol powder   Commonly known as:  MIRALAX/GLYCOLAX   This may have changed:    - when to take this  - reasons to take this   Used for:  Malignant neoplasm of head of pancreas (H)        Dose:  1 capful   Take 17 g (1 capful) by mouth daily   Quantity:  119 g   Refills:  11       * Notice:  This list has 2 medication(s) that are the same as other medications prescribed for you. Read the directions carefully, and ask your doctor or other care provider to review them with you.         Where to get your medicines      These medications were sent to [place], [place] - [address] [place] [address] [address] [place] [address] [address] [address]    Hours:  TRANSPLANT PHONE NUMBER [phone] Phone:  [phone]     dexamethasone 4 MG tablet                Primary Care Provider    [facility]       No address on file        Equal Access to Services     JAI CORDERO : Maxx dawno Sojim, waaxda luqadaha, qaybta kaalmada adevalerio, luis e lora. So [facility] [phone].    ATENCIÓN: Si habla español, tiene a bernal disposición servicios gratuitos de asistencia lingüística. Llame al [phone].    We comply with applicable federal civil rights laws and [place] laws. We do not discriminate on the basis of race, color, national origin, age, disability  sex, sexual orientation or gender identity.            Thank you!     Thank you for choosing [facility]  for your care. Our goal is always to provide you with excellent care. Hearing back from our patients is one way we can continue to improve our services. Please take a few minutes to complete the written survey that you may receive in the mail after your visit with us. Thank you!             Your Updated Medication List - Protect others around you: Learn how to safely use, store and throw away your medicines at www.disposemymeds.org.          This list is accurate as of: 6/30/17 10:39 AM.  Always use your most recent med list.                   Brand Name Dispense Instructions for use Diagnosis    amylase-lipase-protease 49213 UNITS Cpep    CREON    180 capsule    Take 2 capsules (72,000 Units) by mouth 3 times daily (with meals)    Malignant neoplasm of head of pancreas (H)       * dexamethasone 4 MG tablet    DECADRON    3 tablet    Take 1 tablet (4 mg) by mouth daily (with breakfast)    Need for prophylactic measure, Malignant neoplasm of head of pancreas (H)       * dexamethasone 4 MG tablet    DECADRON    3 tablet    Take 1 tablet (4 mg) by mouth daily (with breakfast) for 3 days    Need for prophylactic measure, Malignant neoplasm of head of pancreas (H)       diltiazem 2% in PLO cream (FV COMPOUNDED) 2% Gel     30 g    Apply topically daily and as needed to external hemorrhoids    External hemorrhoids       docusate sodium 100 MG capsule    COLACE    100 capsule    Take 1 capsule (100 mg) by mouth daily    External hemorrhoids       dronabinol 5 MG capsule    MARINOL    60 capsule    Take 1 capsule (5 mg) by mouth 2 times daily (before meals)    Anorexia       ENSURE CLEAR Liqd     90 Bottle    Take 1 Bottle by mouth 3 times daily    Malignant neoplasm of head of pancreas (H)       lidocaine-prilocaine cream    EMLA    30 g    Apply topically as needed for other (Use 30-60 minutes prior  to port access)    Malignant neoplasm of head of pancreas (H)       loratadine 10 MG tablet    CLARITIN    30 tablet    Take 1 tablet (10 mg) by mouth daily Reported on 5/5/2017    Seasonal allergic rhinitis, unspecified allergic rhinitis trigger       LORazepam 0.5 MG tablet    ATIVAN    30 tablet    Take 1 tablet (0.5 mg) by mouth every 4 hours as needed (Anxiety, Nausea/Vomiting or Sleep)    Malignant neoplasm of head of pancreas (H)       morphine 15 MG 12 hr tablet    MS CONTIN    60 tablet    Take 1 tablet (15 mg) by mouth every 12 hours    Malignant neoplasm of head of pancreas (H)       ondansetron 8 MG ODT tab    ZOFRAN-ODT    60 tablet    Take 1 tablet (8 mg) by mouth every 8 hours as needed for nausea    Malignant neoplasm of head of pancreas (H)       oxyCODONE 5 MG IR tablet    ROXICODONE    100 tablet    Take 1 tablet (5 mg) by mouth every 4 hours as needed for moderate to severe pain    Malignant neoplasm of head of pancreas (H)       pantoprazole 20 MG EC tablet    PROTONIX    60 tablet    Take 1 tablet (20 mg) by mouth 2 times daily    Malignant neoplasm of head of pancreas (H)       polyethylene glycol powder    MIRALAX/GLYCOLAX    119 g    Take 17 g (1 capful) by mouth daily    Malignant neoplasm of head of pancreas (H)       potassium chloride SA 20 MEQ CR tablet    potassium chloride    60 tablet    Take 1 tablet (20 mEq) by mouth daily    Malignant neoplasm of head of pancreas (H)       prochlorperazine 10 MG tablet    COMPAZINE    30 tablet    Take 1 tablet (10 mg) by mouth every 6 hours as needed (Nausea/Vomiting)    Malignant neoplasm of head of pancreas (H)       * Notice:  This list has 2 medication(s) that are the same as other medications prescribed for you. Read the directions carefully, and ask your doctor or other care provider to review them with you.

## 2017-07-03 NOTE — MR AVS SNAPSHOT
After Visit Summary   7/3/2017    Shirin Muller    MRN: [id number]           Patient Information     Date Of Birth          1960        Visit Information        Provider Department      7/3/2017 12:00 PM UC 19 ATC;  ONCOLOGY INFUSION [facility]        Today's Diagnoses     Need for prophylactic measure    -  1    Malignant neoplasm of head of pancreas (H)          Care Instructions    Contact Numbers    [facility] Main Line: [phone]  [facility] Triage:  [phone]    Call triage with chills and/or temperature greater than or equal to 100.5, uncontrolled nausea/vomiting, diarrhea, constipation, dizziness, shortness of breath, chest pain, bleeding, unexplained bruising, or any new/concerning symptoms, questions/concerns.     If you are having any concerning symptoms or wish to speak to a provider before your next infusion visit, please call your care coordinator or triage to notify them so we can adequately serve you.       After Hours: [phone]    If after hours, weekends, or holidays, call main hospital  and ask for Oncology doctor on call.         July 2017 Sunday Monday Tuesday Wednesday Thursday Friday Saturday                                 1       2     3     [facility] ONC INFUSION 120   12:00 PM   (120 min.)    ONCOLOGY INFUSION   [facility] 4     5     6     7     MR ABDOMEN WWO    7:30 AM   (45 min.)   [id number]   [facility] MRI     CT CHEST WO    8:45 AM   (20 min.)   UCCT1   [facility] CT 8       9     10     [facility] LAB DRAW    5:30 PM   (15 min.)   [facility] LAB DRAW   [facility] Lab Draw     [facility] RETURN    5:45 PM   (30 min.)   Demond Gonsales MD   [facility] 11     12     13     14     15       16     17     18     19     20     21     22       23     24     25     26     27     28     29       30     31                                         August 2017    Sunday Monday Tuesday Wednesday Thursday Friday Saturday             1     2     3     4     5       6     7     8     9     10     11     12       13     14     15     16     17     18     19       20     21     22     23     24     25     26       27     28     29     30     31                            Lab Results:  No results found for this or any previous visit (from the past 12 hour(s)).            Follow-ups after your visit        Your next 10 appointments already scheduled     Jul 07, 2017  7:45 AM CDT   (Arrive by 7:30 AM)   MR ABDOMEN W/O & W CONTRAST with [id number]   [facility] MRI ([facility])    [address] [address] [phone]           Take your medicines as usual, unless your doctor tells you not to. Bring a list of your current medicines to your exam (including vitamins, minerals and over-the-counter drugs). Also bring the results of similar scans you may have had.    The day before your exam, drink extra fluids at least six 8-ounce glasses (unless your doctor tells you to restrict your fluids).   Have a blood test (creatinine test) within 30 days of your exam. Go to your clinic or Diagnostic Imaging Department for this test.   Do not eat or drink for 6 hours prior to exam.  The MRI machine uses a strong magnet. Please wear clothes without metal (snaps, zippers). A sweatsuit works well, or we may give you a hospital gown.  Please remove any body piercings and hair extensions before you arrive. You will also remove watches, jewelry, hairpins, wallets, dentures, partial dental plates and hearing aids. You may wear contact lenses, and you may be able to wear your rings. We have a safe place to keep your personal items, but it is safer to leave them at home.   **IMPORTANT** THE INSTRUCTIONS BELOW ARE ONLY FOR THOSE PATIENTS WHO HAVE BEEN TOLD THEY WILL RECEIVE SEDATION OR GENERAL ANESTHESIA DURING THEIR MRI PROCEDURE:  IF  YOU WILL RECEIVE SEDATION (take medicine to help you relax during your exam):   You must get the medicine from your doctor before you arrive. Bring the medicine to the exam. Do not take it at home.   Arrive one hour early. Bring someone who can take you home after the test. Your medicine will make you sleepy. After the exam, you may not drive, take a bus or take a taxi by yourself.   No eating 8 hours before your exam. You may have clear liquids up until 4 hours before your exam. (Clear liquids include water, clear tea, black coffee and fruit juice without pulp.)  IF YOU WILL RECEIVE ANESTHESIA (be asleep for your exam):   Arrive 1 1/2 hours early. Bring someone who can take you home after the test. You may not drive, take a bus or take a taxi by yourself.   No eating 8 hours before your exam. You may have clear liquids up until 4 hours before your exam. (Clear liquids include water, clear tea, black coffee and fruit juice without pulp.)  If you have any questions, please contact your Imaging Department exam site.            Jul 07, 2017  9:00 AM CDT   (Arrive by 8:45 AM)   CT CHEST W/O CONTRAST with UCCT1   [facility] [place] ([facility])    [address] [address] [phone]           Please bring any scans or X-rays taken at other hospitals, if similar tests were done. Also bring a list of your medicines, including vitamins, minerals and over-the-counter drugs. It is safest to leave personal items at home.  Be sure to tell your doctor:   If you have any allergies.   If there s any chance you are pregnant.   If you are breastfeeding.   If you have any special needs.  You do not need to do anything special to prepare.  Please wear loose clothing, such as a sweat suit or jogging clothes. Avoid snaps, zippers and other metal. We may ask you to undress and put on a hospital gown.            Jul 10, 2017  5:30 PM CDT   [facility] Lab Draw with   [facility] LAB DRAW   [facility] Lab Draw ([facility])    [address] [facility]  [address] [facility] [address] [phone]            Jul 10, 2017  6:00 PM CDT   (Arrive by 5:45 PM)   Return Visit with Demond Gonsales MD   [facility] ([facility])    [address] [address] [phone]              Who to contact     If you have questions or need follow up information about today's clinic visit or your schedule please contact [facility] directly at [phone].  Normal or non-critical lab and imaging results will be communicated to you by MyChart, letter or phone within 4 business days after the clinic has received the results. If you do not hear from us within 7 days, please contact the clinic through [facility] or phone. If you have a critical or abnormal lab result, we will notify you by phone as soon as possible.  Submit refill requests through [employer] or call your pharmacy and they will forward the refill request to us. Please allow 3 business days for your refill to be completed.          Additional Information About Your Visit        [facility] Information     [employer] gives you secure access to your electronic health record. If you see a primary care provider, you can also send messages to your care team and make appointments. If you have questions, please call your primary care clinic.  If you do not have a primary care provider, please call [phone] and they will assist you.        Care EveryWhere ID     This is your Care EveryWhere ID. This could be used by other organizations to access your [place] medical records  [id number]        Your Vitals Were     Pulse Temperature Respirations Pulse Oximetry          86 98.2  F (36.8  C) (Oral) 16 99%         Blood Pressure from Last 3 Encounters:   07/03/17 109/72   06/30/17 114/75   06/19/17 99/66    Weight from Last  3 Encounters:   06/30/17 73.2 kg (161 lb 6.4 oz)   06/19/17 72.7 kg (160 lb 4.8 oz)   06/16/17 73 kg (160 lb 14.4 oz)              Today, you had the following     No orders found for display         Today's Medication Changes          These changes are accurate as of: 7/3/17  2:15 PM.  If you have any questions, ask your nurse or doctor.               These medicines have changed or have updated prescriptions.        Dose/Directions    polyethylene glycol powder   Commonly known as:  MIRALAX/GLYCOLAX   This may have changed:    - when to take this  - reasons to take this   Used for:  Malignant neoplasm of head of pancreas (H)        Dose:  1 capful   Take 17 g (1 capful) by mouth daily   Quantity:  119 g   Refills:  11                Primary Care Provider    [facility]       No address on file        Equal Access to Services     JAI CORDERO : Maxx Spence, johnson flower, john frazier, luis e lora. So [place] -[phone].    ATENCIÓN: Si habla español, tiene a bernal disposición servicios gratuitos de asistencia lingüística. Llame al [phone].    We comply with applicable federal civil rights laws and [place] laws. We do not discriminate on the basis of race, color, national origin, age, disability sex, sexual orientation or gender identity.            Thank you!     Thank you for choosing [facility]  for your care. Our goal is always to provide you with excellent care. Hearing back from our patients is one way we can continue to improve our services. Please take a few minutes to complete the written survey that you may receive in the mail after your visit with us. Thank you!             Your Updated Medication List - Protect others around you: Learn how to safely use, store and throw away your medicines at www.disposemymeds.org.          This list is accurate as of: 7/3/17  2:15 PM.  Always use your most recent med  list.                   Brand Name Dispense Instructions for use Diagnosis    amylase-lipase-protease 09533 UNITS Cpep    CREON    180 capsule    Take 2 capsules (72,000 Units) by mouth 3 times daily (with meals)    Malignant neoplasm of head of pancreas (H)       * dexamethasone 4 MG tablet    DECADRON    3 tablet    Take 1 tablet (4 mg) by mouth daily (with breakfast)    Need for prophylactic measure, Malignant neoplasm of head of pancreas (H)       * dexamethasone 4 MG tablet    DECADRON    3 tablet    Take 1 tablet (4 mg) by mouth daily (with breakfast) for 3 days    Need for prophylactic measure, Malignant neoplasm of head of pancreas (H)       diltiazem 2% in PLO cream (FV COMPOUNDED) 2% Gel     30 g    Apply topically daily and as needed to external hemorrhoids    External hemorrhoids       docusate sodium 100 MG capsule    COLACE    100 capsule    Take 1 capsule (100 mg) by mouth daily    External hemorrhoids       dronabinol 5 MG capsule    MARINOL    60 capsule    Take 1 capsule (5 mg) by mouth 2 times daily (before meals)    Anorexia       ENSURE CLEAR Liqd     90 Bottle    Take 1 Bottle by mouth 3 times daily    Malignant neoplasm of head of pancreas (H)       lidocaine-prilocaine cream    EMLA    30 g    Apply topically as needed for other (Use 30-60 minutes prior to port access)    Malignant neoplasm of head of pancreas (H)       loratadine 10 MG tablet    CLARITIN    30 tablet    Take 1 tablet (10 mg) by mouth daily Reported on 5/5/2017    Seasonal allergic rhinitis, unspecified allergic rhinitis trigger       LORazepam 0.5 MG tablet    ATIVAN    30 tablet    Take 1 tablet (0.5 mg) by mouth every 4 hours as needed (Anxiety, Nausea/Vomiting or Sleep)    Malignant neoplasm of head of pancreas (H)       morphine 15 MG 12 hr tablet    MS CONTIN    60 tablet    Take 1 tablet (15 mg) by mouth every 12 hours    Malignant neoplasm of head of pancreas (H)       ondansetron 8 MG ODT tab    ZOFRAN-ODT    60  tablet    Take 1 tablet (8 mg) by mouth every 8 hours as needed for nausea    Malignant neoplasm of head of pancreas (H)       oxyCODONE 5 MG IR tablet    ROXICODONE    100 tablet    Take 1 tablet (5 mg) by mouth every 4 hours as needed for moderate to severe pain    Malignant neoplasm of head of pancreas (H)       pantoprazole 20 MG EC tablet    PROTONIX    60 tablet    Take 1 tablet (20 mg) by mouth 2 times daily    Malignant neoplasm of head of pancreas (H)       polyethylene glycol powder    MIRALAX/GLYCOLAX    119 g    Take 17 g (1 capful) by mouth daily    Malignant neoplasm of head of pancreas (H)       potassium chloride SA 20 MEQ CR tablet    potassium chloride    60 tablet    Take 1 tablet (20 mEq) by mouth daily    Malignant neoplasm of head of pancreas (H)       prochlorperazine 10 MG tablet    COMPAZINE    30 tablet    Take 1 tablet (10 mg) by mouth every 6 hours as needed (Nausea/Vomiting)    Malignant neoplasm of head of pancreas (H)       * Notice:  This list has 2 medication(s) that are the same as other medications prescribed for you. Read the directions carefully, and ask your doctor or other care provider to review them with you.

## 2017-07-03 NOTE — PROGRESS NOTES
Infusion Nursing Note:  Shirin Muller presents today for IVF/Neulasta.    Patient seen by provider today: No   present during visit today: Not Applicable.    Note: Patient states she is tired today, but has had no n/v/pain. States the fluids do help her. Denies any other problems and states no other questions/concerns today. Port has access in place. Site without redness, tenderness, edema and with brisk blood return present.     Intravenous Access:  Implanted Port.    Treatment Conditions:  Not Applicable.      Post Infusion Assessment:  Patient tolerated infusion without incident.  Blood return noted pre and post infusion.  Site patent and intact, free from redness, edema or discomfort.  Access discontinued per protocol.    Discharge Plan:   Patient declined prescription refills.  Discharge instructions reviewed with: Patient.  Patient and/or family verbalized understanding of discharge instructions and all questions answered.  Copy of AVS reviewed with patient and/or family.  Patient will return 7/10/17 to see MD for next appointment.  Patient discharged in stable condition accompanied by: self.  Departure Mode: Ambulatory.    Yas Santoro RN

## 2017-07-03 NOTE — PATIENT INSTRUCTIONS
Contact Numbers    [facility] Main Line: [phone]  [facility] Triage:  [phone]    Call triage with chills and/or temperature greater than or equal to 100.5, uncontrolled nausea/vomiting, diarrhea, constipation, dizziness, shortness of breath, chest pain, bleeding, unexplained bruising, or any new/concerning symptoms, questions/concerns.     If you are having any concerning symptoms or wish to speak to a provider before your next infusion visit, please call your care coordinator or triage to notify them so we can adequately serve you.       After Hours: [phone]    If after hours, weekends, or holidays, call main hospital  and ask for Oncology doctor on call.         July 2017 Sunday Monday Tuesday Wednesday Thursday Friday Saturday                                 1       2     3     [facility] ONC INFUSION 120   12:00 PM   (120 min.)   [facility] ONCOLOGY INFUSION   [facility] 4     5     6     7     MR ABDOMEN WWO    7:30 AM   (45 min.)   [id number]   [facility] MRI     CT CHEST WO    8:45 AM   (20 min.)   UCCT1   [facility] CT 8       9     10     [facility] LAB DRAW    5:30 PM   (15 min.)    [facility] LAB DRAW   [facility] Lab Draw     [facility] RETURN    5:45 PM   (30 min.)   Demond Gonsales MD   [facility] 11     12     13     14     15       16     17     18     19     20     21     22       23     24     25     26     27     28     29       30     31                                         August 2017 Sunday Monday Tuesday Wednesday Thursday Friday Saturday             1     2     3     4     5       6     7     8     9     10     11     12       13     14     15     16     17     18     19       20     21     22     23     24     25     26       27     28     29     30     31                            Lab Results:  No results found for this or any previous visit (from the past 12 hour(s)).

## 2017-07-10 NOTE — MR AVS SNAPSHOT
After Visit Summary   7/10/2017    Shirin Muller    MRN: [id number]           Patient Information     Date Of Birth          1960        Visit Information        Provider Department      7/10/2017 6:00 PM Demond Gonsales MD [facility]        Today's Diagnoses     Malignant neoplasm of head of pancreas (H)    -  1    Secondary malignant neoplasm of liver (H)        Anorexia           Follow-ups after your visit        Follow-up notes from your care team     Return in about 2 months (around 9/18/2017) for MD visit with CT and labs.      Who to contact     If you have questions or need follow up information about today's clinic visit or your schedule please contact [facility] directly at [phone].  Normal or non-critical lab and imaging results will be communicated to you by MyChart, letter or phone within 4 business days after the clinic has received the results. If you do not hear from us within 7 days, please contact the clinic through [employer] or phone. If you have a critical or abnormal lab result, we will notify you by phone as soon as possible.  Submit refill requests through [employer] or call your pharmacy and they will forward the refill request to us. Please allow 3 business days for your refill to be completed.          Additional Information About Your Visit        MyChart Information     [employer] gives you secure access to your electronic health record. If you see a primary care provider, you can also send messages to your care team and make appointments. If you have questions, please call your primary care clinic.  If you do not have a primary care provider, please call [phone] and they will assist you.        Care EveryWhere ID     This is your Care EveryWhere ID. This could be used by other organizations to access your [facility] medical records  [id number]        Your Vitals Were     Pulse Temperature Respirations  "Height Pulse Oximetry BMI (Body Mass Index)    92 99.7  F (37.6  C) (Oral) 18 1.803 m (5' 10.98\") 100% 22.23 kg/m2       Blood Pressure from Last 3 Encounters:   07/10/17 118/73   07/03/17 109/72   06/30/17 114/75    Weight from Last 3 Encounters:   07/10/17 72.3 kg (159 lb 4.8 oz)   06/30/17 73.2 kg (161 lb 6.4 oz)   06/19/17 72.7 kg (160 lb 4.8 oz)              We Performed the Following     Cancer antigen 19-9     CBC with platelets differential     Comprehensive metabolic panel          Today's Medication Changes          These changes are accurate as of: 7/10/17  7:59 PM.  If you have any questions, ask your nurse or doctor.               These medicines have changed or have updated prescriptions.        Dose/Directions    dronabinol 5 MG capsule   Commonly known as:  MARINOL   This may have changed:  when to take this   Used for:  Anorexia   Changed by:  Demond Gonsales MD        Dose:  5 mg   Take 1 capsule (5 mg) by mouth 3 times daily (before meals)   Quantity:  90 capsule   Refills:  0       ENSURE CLEAR Liqd   This may have changed:  how much to take   Used for:  Malignant neoplasm of head of pancreas (H)        Dose:  1 Bottle   Take 1 Bottle by mouth 3 times daily   Quantity:  90 Bottle   Refills:  6       polyethylene glycol powder   Commonly known as:  MIRALAX/GLYCOLAX   This may have changed:    - when to take this  - reasons to take this   Used for:  Malignant neoplasm of head of pancreas (H)        Dose:  1 capful   Take 17 g (1 capful) by mouth daily   Quantity:  119 g   Refills:  11            Where to get your medicines      Some of these will need a paper prescription and others can be bought over the counter.  Ask your nurse if you have questions.     Bring a paper prescription for each of these medications     dronabinol 5 MG capsule    LORazepam 0.5 MG tablet                Primary Care Provider    [facility]       No address on file        Equal Access to Services  "    JAI CORDERO : Hadii aad ku meaghan Spence, waaxda luqadaha, qaybta kaalmada karin, luis e shantelin hayaamarcelo collazo gerardoazeem asher . So [facility] [phone].    ATENCIÓN: Si habla español, tiene a brenal disposición servicios gratuitos de asistencia lingüística. Llame al [phone].    We comply with applicable federal civil rights laws and [place] laws. We do not discriminate on the basis of race, color, national origin, age, disability sex, sexual orientation or gender identity.            Thank you!     Thank you for choosing [facility]  for your care. Our goal is always to provide you with excellent care. Hearing back from our patients is one way we can continue to improve our services. Please take a few minutes to complete the written survey that you may receive in the mail after your visit with us. Thank you!             Your Updated Medication List - Protect others around you: Learn how to safely use, store and throw away your medicines at www.disposemymeds.org.          This list is accurate as of: 7/10/17  7:59 PM.  Always use your most recent med list.                   Brand Name Dispense Instructions for use Diagnosis    amylase-lipase-protease 29012 UNITS Cpep    CREON    180 capsule    Take 2 capsules (72,000 Units) by mouth 3 times daily (with meals)    Malignant neoplasm of head of pancreas (H)       dexamethasone 4 MG tablet    DECADRON    3 tablet    Take 1 tablet (4 mg) by mouth daily (with breakfast)    Need for prophylactic measure, Malignant neoplasm of head of pancreas (H)       diltiazem 2% in PLO cream (FV COMPOUNDED) 2% Gel     30 g    Apply topically daily and as needed to external hemorrhoids    External hemorrhoids       docusate sodium 100 MG capsule    COLACE    100 capsule    Take 1 capsule (100 mg) by mouth daily    External hemorrhoids       dronabinol 5 MG capsule    MARINOL    90 capsule    Take 1 capsule (5 mg) by mouth 3 times daily (before meals)    Anorexia        ENSURE CLEAR Liqd     90 Bottle    Take 1 Bottle by mouth 3 times daily    Malignant neoplasm of head of pancreas (H)       lidocaine-prilocaine cream    EMLA    30 g    Apply topically as needed for other (Use 30-60 minutes prior to port access)    Malignant neoplasm of head of pancreas (H)       loratadine 10 MG tablet    CLARITIN    30 tablet    Take 1 tablet (10 mg) by mouth daily Reported on 5/5/2017    Seasonal allergic rhinitis, unspecified allergic rhinitis trigger       LORazepam 0.5 MG tablet    ATIVAN    30 tablet    Take 1 tablet (0.5 mg) by mouth every 4 hours as needed (Anxiety, Nausea/Vomiting or Sleep)    Malignant neoplasm of head of pancreas (H)       morphine 15 MG 12 hr tablet    MS CONTIN    60 tablet    Take 1 tablet (15 mg) by mouth every 12 hours    Malignant neoplasm of head of pancreas (H)       ondansetron 8 MG ODT tab    ZOFRAN-ODT    60 tablet    Take 1 tablet (8 mg) by mouth every 8 hours as needed for nausea    Malignant neoplasm of head of pancreas (H)       oxyCODONE 5 MG IR tablet    ROXICODONE    100 tablet    Take 1 tablet (5 mg) by mouth every 4 hours as needed for moderate to severe pain    Malignant neoplasm of head of pancreas (H)       pantoprazole 20 MG EC tablet    PROTONIX    60 tablet    Take 1 tablet (20 mg) by mouth 2 times daily    Malignant neoplasm of head of pancreas (H)       polyethylene glycol powder    MIRALAX/GLYCOLAX    119 g    Take 17 g (1 capful) by mouth daily    Malignant neoplasm of head of pancreas (H)       potassium chloride SA 20 MEQ CR tablet    potassium chloride    60 tablet    Take 1 tablet (20 mEq) by mouth daily    Malignant neoplasm of head of pancreas (H)       prochlorperazine 10 MG tablet    COMPAZINE    30 tablet    Take 1 tablet (10 mg) by mouth every 6 hours as needed (Nausea/Vomiting)    Malignant neoplasm of head of pancreas (H)

## 2017-07-10 NOTE — NURSING NOTE
Vital signs obtained. Port accessed and labs drawn by this RN.  Per patient request, port was de-accessed prior to MD appt.

## 2017-07-10 NOTE — LETTER
7/10/2017      RE: Shirin Muller  [address] [facility] [address]       Mrs. Muller returns today in follow-up of metastatic pancreatic cancer.    She had extensive metastatic disease at presentation and originally responded to and then progressed on gemcitabine and Abraxane. She has now been on folfirinox for several months and is here for a planned response assessment. She had a brief period of interruption about 2 months ago and treatment when she developed obstruction of her biliary stents. Once those were replaced however things have gone relatively smoothly. She has required the addition of growth factor support for cytopenias associated with treatment. She notes she has ongoing problems with her appetite and has to force herself to eat. This is primarily manifested by food aversion. She does not describe early satiety she does not have frequent vomiting. She has not been taking her pancreatic enzyme replacement and complains of significant gas and particularly a lot of foul smelling diarrhea. The remainder of her 10 point review of systems is otherwise unremarkable.    On exam Mrs. Muller actually appears quite well. Her vital signs as noted in the chart are unremarkable.  She has no scleral icterus and no visible lesions in the oropharynx.  She has no palpable adenopathy in the neck or supraclavicular spaces.  Her lungs are clear to auscultation without dullness to percussion.  Her heart rate and rhythm are regular without audible murmur or gallop.  Abdomen is soft with mild right upper quadrant tenderness to deep palpation but no discretely palpable masses.  She has no peripheral edema and no tenderness in her calves or thighs.   Allowing for the language barrier her speech is fluent, her cranial nerves are grossly intact she has an unremarkable gait and station.    I reviewed with the patient her lab work and CT scan. She has nearly normal electrolytes with a mildly  depressed potassium of 3.2. Her albumin is depressed at 2.7 but this is a little bit of improvement from prior. She still has a markedly elevated alkaline phosphatase. But her bilirubin is normal. Her blood counts are nearly normal except for anemia with a hemoglobin of 9.6. Her abdominal MR scan and noncontrast chest CT showed no evidence of disease progression with some improvement in her peripancreatic tumor.    Assessment/plan: Her metastatic pancreatic cancer appears to be responding to the folfirinox which she is tolerating reasonably well. Her biggest ongoing problem is with her poor appetite although she is managing to maintain her weight now. I asked her to try increasing her Marinol to 3 times per day and she'll continue on with her morning dose of Decadron. We will continue on with chemotherapy and growth factor support at the same dose and schedule and reevaluate her tumor status again in another 2 months.    Demond Gonsales MD

## 2017-07-10 NOTE — NURSING NOTE
"Oncology Rooming Note    July 10, 2017 5:46 PM   Shirin Muller is a 57 year old female who presents for:    Chief Complaint   Patient presents with     Oncology Clinic Visit     patient is here to see md for her pancreatic ca      Blood Draw     port accessed and labs drawn, vitals taken     Initial Vitals: /73  Pulse 92  Temp 99.7  F (37.6  C) (Oral)  Resp 18  Ht 1.803 m (5' 10.98\")  Wt 72.3 kg (159 lb 4.8 oz)  SpO2 100%  BMI 22.23 kg/m2 Estimated body mass index is 22.23 kg/(m^2) as calculated from the following:    Height as of this encounter: 1.803 m (5' 10.98\").    Weight as of this encounter: 72.3 kg (159 lb 4.8 oz). Body surface area is 1.9 meters squared.  No Pain (0) Comment: Data Unavailable   No LMP recorded. Patient is postmenopausal.  Allergies reviewed: Yes  Medications reviewed: Yes    Medications: Ativan Refill Needed.  Pharmacy name entered into [facility]:    [facility] [place], [place] - [address] [place] [address] [phone]  [id number] - [place], [place] - [address] [place] [address]    Clinical concerns: None Dr Gonsales was notified.    7 minutes for nursing intake (face to face time)     Mone Estrada LPN              "

## 2017-07-11 NOTE — PROGRESS NOTES
Mrs. Meyer returns today in follow-up of metastatic pancreatic cancer.    She had extensive metastatic disease at presentation and originally responded to and then progressed on gemcitabine and Abraxane. She has now been on folfirinox for several months and is here for a planned response assessment. She had a brief period of interruption about 2 months ago and treatment when she developed obstruction of her biliary stents. Once those were replaced however things have gone relatively smoothly. She has required the addition of growth factor support for cytopenias associated with treatment. She notes she has ongoing problems with her appetite and has to force herself to eat. This is primarily manifested by food aversion. She does not describe early satiety she does not have frequent vomiting. She has not been taking her pancreatic enzyme replacement and complains of significant gas and particularly a lot of foul smelling diarrhea. The remainder of her 10 point review of systems is otherwise unremarkable.    On exam Mrs. Meyer actually appears quite well. Her vital signs as noted in the chart are unremarkable.  She has no scleral icterus and no visible lesions in the oropharynx.  She has no palpable adenopathy in the neck or supraclavicular spaces.  Her lungs are clear to auscultation without dullness to percussion.  Her heart rate and rhythm are regular without audible murmur or gallop.  Abdomen is soft with mild right upper quadrant tenderness to deep palpation but no discretely palpable masses.  She has no peripheral edema and no tenderness in her calves or thighs.   Allowing for the language barrier her speech is fluent, her cranial nerves are grossly intact she has an unremarkable gait and station.    I reviewed with the patient her lab work and CT scan. She has nearly normal electrolytes with a mildly depressed potassium of 3.2. Her albumin is depressed at 2.7 but this is a little bit of improvement  from prior. She still has a markedly elevated alkaline phosphatase. But her bilirubin is normal. Her blood counts are nearly normal except for anemia with a hemoglobin of 9.6. Her abdominal MR scan and noncontrast chest CT showed no evidence of disease progression with some improvement in her peripancreatic tumor.    Assessment/plan: Her metastatic pancreatic cancer appears to be responding to the folfirinox which she is tolerating reasonably well. Her biggest ongoing problem is with her poor appetite although she is managing to maintain her weight now. I asked her to try increasing her Marinol to 3 times per day and she'll continue on with her morning dose of Decadron. We will continue on with chemotherapy and growth factor support at the same dose and schedule and reevaluate her tumor status again in another 2 months.

## 2017-07-14 NOTE — PROGRESS NOTES
"Infusion Nursing Note:  Shirin Muller presents today for Cycle 13 Day 1 Oxaliplatin/Irinotecan/Leucovorin/Fluorouracil bolus and 46 hour pump connect and IVF.    Patient seen by provider today: No   present during visit today: Not Applicable.    Note: Pt reports feeling \"tired\" today. Abdominal distension noted per writer, but patient denies abdominal discomfort today. Reports that she felt \"cold\" over night, but denies fevers or chills. Temp 99.2 today. Pt does report having vaginal spotting since Tuesday of this week. Requests to have IVF today with chemotherapy. Pt also reports that she needs a refill on her MS Contin today. Dr. Gonsales notified.     TORB per Dr. Gonsales/Ariadne Fairchild RN 7/14/2017 @ 1030:  -Okay to refill MS Contin today and have another provider sign Rx as Dr. Gonsales is unavailable today  -0.9NS 1000ml IV bolus today  -Okay to treat with vaginal spotting and low grade temp.      Intravenous Access:  Implanted Port.    Treatment Conditions:  Lab Results   Component Value Date    HGB 9.9 07/14/2017     Lab Results   Component Value Date    WBC 7.0 07/14/2017      Lab Results   Component Value Date    ANEU 5.8 07/14/2017     Lab Results   Component Value Date     07/14/2017      Lab Results   Component Value Date     07/14/2017                   Lab Results   Component Value Date    POTASSIUM 3.4 07/14/2017           Lab Results   Component Value Date    MAG 2.0 04/03/2017            Lab Results   Component Value Date    CR 0.61 07/14/2017                   Lab Results   Component Value Date    FANNY 8.8 07/14/2017                Lab Results   Component Value Date    BILITOTAL 1.1 07/14/2017           Lab Results   Component Value Date    ALBUMIN 2.5 07/14/2017                    Lab Results   Component Value Date    ALT 81 07/14/2017           Lab Results   Component Value Date     07/14/2017     Results reviewed, labs MET treatment parameters, ok to " proceed with treatment.  Pt does NOT meet parameters for Aranesp today with HCT 33%.      Post Infusion Assessment:  Patient tolerated infusion without incident.  Blood return noted pre and post infusion.  Site patent and intact, free from redness, edema or discomfort.  No evidence of extravasations.  + BR checked every 2ccs while administering Fluorouracil. Tubing filter taped to pt's skin with the  per protocol. CSeries pump running @ 5.2ml/hour for 46 hours. Pump attached per protocol. Debbi @ [facility] aware to disconnect pt on 7/16/17@ 1330. Connections checked with Alyce Tyler RN.      Discharge Plan:   Prescription refills given for MS Contin, Compazine, Zofran, Loratadine.  Discharge instructions reviewed with: Patient and Family.  Patient and/or family verbalized understanding of discharge instructions and all questions answered.  Copy of AVS reviewed with patient and/or family.  Patient will return Sunday 7/17/17 for IVF and Neulasta, then 7/28/17 for Chemotherapy.  Patient discharged in stable condition accompanied by: self and friend.  Departure Mode: Ambulatory.  Face to Face time: 5 min.    Ariadne Fairchild RN

## 2017-07-14 NOTE — PATIENT INSTRUCTIONS
Contact Numbers    [facility] Main Line: [phone]  [facility] Triage:  [phone]    Call triage with chills and/or temperature greater than or equal to 100.5, uncontrolled nausea/vomiting, diarrhea, constipation, dizziness, shortness of breath, chest pain, bleeding, unexplained bruising, or any new/concerning symptoms, questions/concerns.     If you are having any concerning symptoms or wish to speak to a provider before your next infusion visit, please call your care coordinator or triage to notify them so we can adequately serve you.       After Hours: [phone]    If after hours, weekends, or holidays, call main hospital  and ask for Oncology doctor on call.           July 2017 Sunday Monday Tuesday Wednesday Thursday Friday Saturday                                 1       2     3     [facility] ONC INFUSION 120   12:00 PM   (120 min.)    ONCOLOGY INFUSION   [facility] 4     5     6     7     MR ABDOMEN WWO    7:30 AM   (45 min.)   [id number]   [facility] MRI     CT CHEST WO    8:45 AM   (20 min.)   [id number]   [facility] CT 8       9     10     [facility] DRAW    5:30 PM   (15 min.)    [facility] DRAW   [facility] Draw     [facility] RETURN    5:45 PM   (30 min.)   Demond Gonsales MD   [facility] 11     12     13     14     [facility] DRAW    8:45 AM   (15 min.)    [facility] DRAW   [facility] Draw     [facility] ONC INFUSION 360    9:30 AM   (360 min.)   [facility] ONCOLOGY INFUSION   [facility] 15       16     17     [facility] ONC INFUSION 120    2:30 PM   (120 min.)   [facility] ONCOLOGY INFUSION   [facility] 18     19     20     21     22       23     24     25     26     27     28     29       30     31 August 2017 Sunday Monday Tuesday Wednesday Thursday Friday Saturday             1     2     3     4     5       6     7     8     9     10     [facility] RETURN     9:15 AM   (50 min.)   Ester Echavarria S, APRN CNP   [facility] 11     12       13     14     15     16     17     18     19       20     21     22     23     24     25     26       27     28     29     30     31                            Lab Results:  Recent Results (from the past 12 hour(s))   Comprehensive metabolic panel    Collection Time: 07/14/17  8:54 AM   Result Value Ref Range    Sodium 138 133 - 144 mmol/L    Potassium 3.4 3.4 - 5.3 mmol/L    Chloride 102 94 - 109 mmol/L    Carbon Dioxide 28 20 - 32 mmol/L    Anion Gap 8 3 - 14 mmol/L    Glucose 140 (H) 70 - 99 mg/dL    Urea Nitrogen 10 7 - 30 mg/dL    Creatinine 0.61 0.52 - 1.04 mg/dL    GFR Estimate >90  Non  GFR Calc   >60 mL/min/1.7m2    GFR Estimate If Black >90   GFR Calc   >60 mL/min/1.7m2    Calcium 8.8 8.5 - 10.1 mg/dL    Bilirubin Total 1.1 0.2 - 1.3 mg/dL    Albumin 2.5 (L) 3.4 - 5.0 g/dL    Protein Total 7.2 6.8 - 8.8 g/dL    Alkaline Phosphatase 834 (H) 40 - 150 U/L    ALT 81 (H) 0 - 50 U/L     (H) 0 - 45 U/L   CBC with platelets differential    Collection Time: 07/14/17  8:54 AM   Result Value Ref Range    WBC 7.0 4.0 - 11.0 10e9/L    RBC Count 4.24 3.8 - 5.2 10e12/L    Hemoglobin 9.9 (L) 11.7 - 15.7 g/dL    Hematocrit 33.1 (L) 35.0 - 47.0 %    MCV 78 78 - 100 fl    MCH 23.3 (L) 26.5 - 33.0 pg    MCHC 29.9 (L) 31.5 - 36.5 g/dL    RDW 19.9 (H) 10.0 - 15.0 %    Platelet Count 130 (L) 150 - 450 10e9/L    Diff Method Automated Method     % Neutrophils 82.5 %    % Lymphocytes 7.0 %    % Monocytes 8.4 %    % Eosinophils 0.4 %    % Basophils 0.3 %    % Immature Granulocytes 1.4 %    Nucleated RBCs 0 0 /100    Absolute Neutrophil 5.8 1.6 - 8.3 10e9/L    Absolute Lymphocytes 0.5 (L) 0.8 - 5.3 10e9/L    Absolute Monocytes 0.6 0.0 - 1.3 10e9/L    Absolute Eosinophils 0.0 0.0 - 0.7 10e9/L    Absolute Basophils 0.0 0.0 - 0.2 10e9/L    Abs Immature Granulocytes 0.1 0 - 0.4 10e9/L    Absolute  Nucleated RBC 0.0

## 2017-07-14 NOTE — MR AVS SNAPSHOT
After Visit Summary   7/14/2017    Shirin Muller    MRN: [id number]           Patient Information     Date Of Birth          1960        Visit Information        Provider Department      7/14/2017 9:30 AM UC 13 ATC;  ONCOLOGY INFUSION [facility]        Today's Diagnoses     Need for prophylactic measure    -  1    Malignant neoplasm of head of pancreas (H)          Care Instructions    Contact Numbers    [facility] [place]: [phone]  [facility] Triage:  [phone]    Call triage with chills and/or temperature greater than or equal to 100.5, uncontrolled nausea/vomiting, diarrhea, constipation, dizziness, shortness of breath, chest pain, bleeding, unexplained bruising, or any new/concerning symptoms, questions/concerns.     If you are having any concerning symptoms or wish to speak to a provider before your next infusion visit, please call your care coordinator or triage to notify them so we can adequately serve you.       After Hours: [phone]    If after hours, weekends, or holidays, call main hospital  and ask for Oncology doctor on call.           July 2017 Sunday Monday Tuesday Wednesday Thursday Friday Saturday                                 1       2     3     [facility] ONC INFUSION 120   12:00 PM   (120 min.)    ONCOLOGY INFUSION   [facility] 4     5     6     7     MR ABDOMEN WWO    7:30 AM   (45 min.)   [id number]   [facility] MRI     CT CHEST WO    8:45 AM   (20 min.)   UCCT1   [facility] CT 8       9     10     [facility] DRAW    5:30 PM   (15 min.)   [facility] LAB DRAW   [facility] Lab Draw     UMP RETURN    5:45 PM   (30 min.)   Demond Gonsales MD   [facility] 11     12     13     14     [facility] DRAW    8:45 AM   (15 min.)   [facility] D[facility]   [facility] Lab Draw     [facility] ONC INFUSION 360    9:30 AM   (360 min.)    ONCOLOGY INFUSION   [facility]  [facility] 15       16     17     [facility] ONC INFUSION 120    2:30 PM   (120 min.)   UC ONCOLOGY INFUSION   [facility] 18     19     20     21     22       23     24     25     26     27     28     29       30     31                                         August 2017 Sunday Monday Tuesday Wednesday Thursday Friday Saturday             1     2     3     4     5       6     7     8     9     10     [facility] RETURN    9:15 AM   (50 min.)   Ester Echavarria, TEE CNP   [facility] 11     12       13     14     15     16     17     18     19       20     21     22     23     24     25     26       27     28     29     30     31                            Lab Results:  Recent Results (from the past 12 hour(s))   Comprehensive metabolic panel    Collection Time: 07/14/17  8:54 AM   Result Value Ref Range    Sodium 138 133 - 144 mmol/L    Potassium 3.4 3.4 - 5.3 mmol/L    Chloride 102 94 - 109 mmol/L    Carbon Dioxide 28 20 - 32 mmol/L    Anion Gap 8 3 - 14 mmol/L    Glucose 140 (H) 70 - 99 mg/dL    Urea Nitrogen 10 7 - 30 mg/dL    Creatinine 0.61 0.52 - 1.04 mg/dL    GFR Estimate >90  Non  GFR Calc   >60 mL/min/1.7m2    GFR Estimate If Black >90   GFR Calc   >60 mL/min/1.7m2    Calcium 8.8 8.5 - 10.1 mg/dL    Bilirubin Total 1.1 0.2 - 1.3 mg/dL    Albumin 2.5 (L) 3.4 - 5.0 g/dL    Protein Total 7.2 6.8 - 8.8 g/dL    Alkaline Phosphatase 834 (H) 40 - 150 U/L    ALT 81 (H) 0 - 50 U/L     (H) 0 - 45 U/L   CBC with platelets differential    Collection Time: 07/14/17  8:54 AM   Result Value Ref Range    WBC 7.0 4.0 - 11.0 10e9/L    RBC Count 4.24 3.8 - 5.2 10e12/L    Hemoglobin 9.9 (L) 11.7 - 15.7 g/dL    Hematocrit 33.1 (L) 35.0 - 47.0 %    MCV 78 78 - 100 fl    MCH 23.3 (L) 26.5 - 33.0 pg    MCHC 29.9 (L) 31.5 - 36.5 g/dL    RDW 19.9 (H) 10.0 - 15.0 %    Platelet Count 130 (L) 150 - 450 10e9/L    Diff Method Automated Method     % Neutrophils  82.5 %    % Lymphocytes 7.0 %    % Monocytes 8.4 %    % Eosinophils 0.4 %    % Basophils 0.3 %    % Immature Granulocytes 1.4 %    Nucleated RBCs 0 0 /100    Absolute Neutrophil 5.8 1.6 - 8.3 10e9/L    Absolute Lymphocytes 0.5 (L) 0.8 - 5.3 10e9/L    Absolute Monocytes 0.6 0.0 - 1.3 10e9/L    Absolute Eosinophils 0.0 0.0 - 0.7 10e9/L    Absolute Basophils 0.0 0.0 - 0.2 10e9/L    Abs Immature Granulocytes 0.1 0 - 0.4 10e9/L    Absolute Nucleated RBC 0.0                Follow-ups after your visit        Your next 10 appointments already scheduled     Jul 17, 2017  2:30 PM CDT   Infusion 120 with UC ONCOLOGY INFUSION, UC 10 ATC   [facility] ([facility])    [address] [place] [address] [id number] [phone]            Aug 10, 2017  9:30 AM CDT   (Arrive by 9:15 AM)   Return Visit with TEE Olivas CNP   [facility] ([facility])    [address] [place] [id number] [phone]            Sep 18, 2017  9:15 AM CDT   [facility] Draw with  [facility] DRAW   [facility] Lab Draw ([facility])    [address] [place] [id number] [phone]            Sep 18, 2017  9:45 AM CDT   (Arrive by 9:30 AM)   Return Visit with Demond Gonsales MD   [facility] ([facility])    [address] [place] [address]  2nd [facility] [id number] [phone]              Who to contact     If you have questions or need follow up information about today's clinic visit or your schedule please contact [facility] directly at [phone].  Normal or non-critical lab and imaging results will be communicated to you by MyChart, letter or phone within 4 business days after the clinic has received the results. If you do not hear from us within 7 days, please contact the clinic  through [employer] or phone. If you have a critical or abnormal lab result, we will notify you by phone as soon as possible.  Submit refill requests through [employer] or call your pharmacy and they will forward the refill request to us. Please allow 3 business days for your refill to be completed.          Additional Information About Your Visit        [employer] Information     [employer] gives you secure access to your electronic health record. If you see a primary care provider, you can also send messages to your care team and make appointments. If you have questions, please call your primary care clinic.  If you do not have a primary care provider, please call [phone] and they will assist you.        Care EveryWhere ID     This is your Care EveryWhere ID. This could be used by other organizations to access your [facility] medical records  [id number]        Your Vitals Were     Pulse Temperature Respirations Pulse Oximetry BMI (Body Mass Index)       109 99.2  F (37.3  C) (Oral) 18 97% 22.26 kg/m2        Blood Pressure from Last 3 Encounters:   07/14/17 102/68   07/10/17 118/73   07/03/17 109/72    Weight from Last 3 Encounters:   07/14/17 72.3 kg (159 lb 8 oz)   07/10/17 72.3 kg (159 lb 4.8 oz)   06/30/17 73.2 kg (161 lb 6.4 oz)              We Performed the Following     Cancer antigen 19-9     CBC with platelets differential     Comprehensive metabolic panel          Today's Medication Changes          These changes are accurate as of: 7/14/17  3:13 PM.  If you have any questions, ask your nurse or doctor.               These medicines have changed or have updated prescriptions.        Dose/Directions    * dexamethasone 4 MG tablet   Commonly known as:  DECADRON   This may have changed:  Another medication with the same name was added. Make sure you understand how and when to take each.   Used for:  Need for prophylactic measure, Malignant neoplasm of head of pancreas (H)        Dose:  4 mg   Take 1 tablet (4 mg) by  mouth daily (with breakfast)   Quantity:  3 tablet   Refills:  0       * dexamethasone 4 MG tablet   Commonly known as:  DECADRON   This may have changed:  You were already taking a medication with the same name, and this prescription was added. Make sure you understand how and when to take each.   Used for:  Need for prophylactic measure, Malignant neoplasm of head of pancreas (H)        Dose:  4 mg   Take 1 tablet (4 mg) by mouth daily (with breakfast) for 3 days   Quantity:  3 tablet   Refills:  0       ENSURE CLEAR Liqd   This may have changed:  how much to take   Used for:  Malignant neoplasm of head of pancreas (H)        Dose:  1 Bottle   Take 1 Bottle by mouth 3 times daily   Quantity:  90 Bottle   Refills:  6       polyethylene glycol powder   Commonly known as:  MIRALAX/GLYCOLAX   This may have changed:    - when to take this  - reasons to take this   Used for:  Malignant neoplasm of head of pancreas (H)        Dose:  1 capful   Take 17 g (1 capful) by mouth daily   Quantity:  119 g   Refills:  11       * Notice:  This list has 2 medication(s) that are the same as other medications prescribed for you. Read the directions carefully, and ask your doctor or other care provider to review them with you.         Where to get your medicines      These medications were sent to [address] [address]    Hours:  TRANSPLANT PHONE NUMBER [phone] Phone:  [phone]     dexamethasone 4 MG tablet         Some of these will need a paper prescription and others can be bought over the counter.  Ask your nurse if you have questions.     Bring a paper prescription for each of these medications     morphine 15 MG 12 hr tablet                Primary Care Provider    [facility]       No address on file        Equal Access to Services     JAI CORDERO AH: johnson Prater  ching franciscagretel moultonjanneth jonathanvalerio, luis e lora. So [facility] [phone].    ATENCIÓN: Si dot jenkins, tiene a bernal disposición servicios gratuitos de asistencia lingüística. Vanessa al [phone].    We comply with applicable federal civil rights laws and [place] laws. We do not discriminate on the basis of race, color, national origin, age, disability sex, sexual orientation or gender identity.            Thank you!     Thank you for choosing [facility]  for your care. Our goal is always to provide you with excellent care. Hearing back from our patients is one way we can continue to improve our services. Please take a few minutes to complete the written survey that you may receive in the mail after your visit with us. Thank you!             Your Updated Medication List - Protect others around you: Learn how to safely use, store and throw away your medicines at www.disposemymeds.org.          This list is accurate as of: 7/14/17  3:13 PM.  Always use your most recent med list.                   Brand Name Dispense Instructions for use Diagnosis    amylase-lipase-protease 74893 UNITS Cpep    CREON    180 capsule    Take 2 capsules (72,000 Units) by mouth 3 times daily (with meals)    Malignant neoplasm of head of pancreas (H)       * dexamethasone 4 MG tablet    DECADRON    3 tablet    Take 1 tablet (4 mg) by mouth daily (with breakfast)    Need for prophylactic measure, Malignant neoplasm of head of pancreas (H)       * dexamethasone 4 MG tablet    DECADRON    3 tablet    Take 1 tablet (4 mg) by mouth daily (with breakfast) for 3 days    Need for prophylactic measure, Malignant neoplasm of head of pancreas (H)       diltiazem 2% in PLO cream (FV COMPOUNDED) 2% Gel     30 g    Apply topically daily and as needed to external hemorrhoids    External hemorrhoids       docusate sodium 100 MG capsule    COLACE    100 capsule    Take 1 capsule (100 mg) by mouth daily     External hemorrhoids       dronabinol 5 MG capsule    MARINOL    90 capsule    Take 1 capsule (5 mg) by mouth 3 times daily (before meals)    Anorexia       ENSURE CLEAR Liqd     90 Bottle    Take 1 Bottle by mouth 3 times daily    Malignant neoplasm of head of pancreas (H)       lidocaine-prilocaine cream    EMLA    30 g    Apply topically as needed for other (Use 30-60 minutes prior to port access)    Malignant neoplasm of head of pancreas (H)       loratadine 10 MG tablet    CLARITIN    30 tablet    Take 1 tablet (10 mg) by mouth daily Reported on 5/5/2017    Seasonal allergic rhinitis, unspecified allergic rhinitis trigger       LORazepam 0.5 MG tablet    ATIVAN    30 tablet    Take 1 tablet (0.5 mg) by mouth every 4 hours as needed (Anxiety, Nausea/Vomiting or Sleep)    Malignant neoplasm of head of pancreas (H)       morphine 15 MG 12 hr tablet    MS CONTIN    60 tablet    Take 1 tablet (15 mg) by mouth every 12 hours    Malignant neoplasm of head of pancreas (H)       ondansetron 8 MG ODT tab    ZOFRAN-ODT    60 tablet    Take 1 tablet (8 mg) by mouth every 8 hours as needed for nausea    Malignant neoplasm of head of pancreas (H)       oxyCODONE 5 MG IR tablet    ROXICODONE    100 tablet    Take 1 tablet (5 mg) by mouth every 4 hours as needed for moderate to severe pain    Malignant neoplasm of head of pancreas (H)       pantoprazole 20 MG EC tablet    PROTONIX    60 tablet    Take 1 tablet (20 mg) by mouth 2 times daily    Malignant neoplasm of head of pancreas (H)       polyethylene glycol powder    MIRALAX/GLYCOLAX    119 g    Take 17 g (1 capful) by mouth daily    Malignant neoplasm of head of pancreas (H)       potassium chloride SA 20 MEQ CR tablet    potassium chloride    60 tablet    Take 1 tablet (20 mEq) by mouth daily    Malignant neoplasm of head of pancreas (H)       prochlorperazine 10 MG tablet    COMPAZINE    30 tablet    Take 1 tablet (10 mg) by mouth every 6 hours as needed  (Nausea/Vomiting)    Malignant neoplasm of head of pancreas (H)       * Notice:  This list has 2 medication(s) that are the same as other medications prescribed for you. Read the directions carefully, and ask your doctor or other care provider to review them with you.

## 2017-07-17 NOTE — MR AVS SNAPSHOT
After Visit Summary   7/17/2017    Shirin Muller    MRN: [id number]           Patient Information     Date Of Birth          1960        Visit Information        Provider Department      7/17/2017 2:30 PM  10 ATC;  ONCOLOGY INFUSION [facility]        Today's Diagnoses     Need for prophylactic measure    -  1    Malignant neoplasm of head of pancreas (H)          Care Instructions    Contact Numbers  [facility]: [phone]    After Hours:  [phone]  Triage: [phone]    Please call the [facility] Triage line if you experience a temperature greater than or equal to 100.5, shaking chills, have uncontrolled nausea, vomiting and/or diarrhea, dizziness, shortness of breath, chest pain, bleeding, unexplained bruising, or if you have any other new/concerning symptoms, questions or concerns.     If it is after hours, weekends, or holidays, please call the main hospital  at  [phone] and ask to speak to the Oncology doctor on call.     If you are having any concerning symptoms or wish to speak to a provider before your next infusion visit, please call your care coordinator or triage to notify them so we can adequately serve you.     If you need a refill on a narcotic prescription or other medication, please call triage before your infusion appointment.         July 2017 Sunday Monday Tuesday Wednesday Thursday Friday Saturday                                 1       2     3     [facility] ONC INFUSION 120   12:00 PM   (120 min.)    ONCOLOGY INFUSION   [facility] 4     5     6     7     MR ABDOMEN WWO    7:30 AM   (45 min.)   [id number]   [facility] MRI     CT CHEST WO    8:45 AM   (20 min.)   UCCT1   [facility] CT 8       9     10     [facility]30 PM   (15 min.)   [facility]   [facility] Lab Draw     UMP RETURN    5:45 PM   (30 min.)   Demond Gonsales MD     [facility] 11     12     13     14     [facility] LAB DRAW    8:45 AM   (15 min.)   [facility] DRAW   [facility] Lab Draw     UMP ONC INFUSION 360    9:30 AM   (360 min.)   UC ONCOLOGY INFUSION   [facility] 15       16     17     [facility] ONC INFUSION 120    2:30 PM   (120 min.)   UC ONCOLOGY INFUSION   [facility] 18     19     20     21     22       23     24     25     26     27     28     29       30     31 August 2017 Sunday Monday Tuesday Wednesday Thursday Friday Saturday             1     2     3     4     5       6     7     8     9     10     UMP RETURN    9:15 AM   (50 min.)   Ester Echavarria APRN CNP   [facility] 11     12       13     14     15     16     17     18     19       20     21     22     23     24     25     26       27     28     29     30     31                                    Follow-ups after your visit        Your next 10 appointments already scheduled     Aug 10, 2017  9:30 AM CDT   (Arrive by 9:15 AM)   Return Visit with TEE Olivas CNP   [facility]    [address] [place] [id number] [phone]            Sep 18, 2017  9:15 AM CDT   [facility] Lab Draw with  [facility] LAB DRAW   [facility]    [address] [place] [id number] [phone]            Sep 18, 2017  9:45 AM CDT   (Arrive by 9:30 AM)   Return Visit with Demond Gonsales MD   [facility]    [address] [place] [id number] [phone]              Who to contact     If you have questions or need follow up information about today's clinic visit or your schedule please contact [facility] directly at  [phone].  Normal or non-critical lab and imaging results will be communicated to you by [employer], letter or phone within 4 business days after the clinic has received the results. If you do not hear from us within 7 days, please contact the clinic through [employer] or phone. If you have a critical or abnormal lab result, we will notify you by phone as soon as possible.  Submit refill requests through [employer] or call your pharmacy and they will forward the refill request to us. Please allow 3 business days for your refill to be completed.          Additional Information About Your Visit        [employer] Information     [employer] gives you secure access to your electronic health record. If you see a primary care provider, you can also send messages to your care team and make appointments. If you have questions, please call your primary care clinic.  If you do not have a primary care provider, please call [phone] and they will assist you.        Care EveryWhere ID     This is your Care EveryWhere ID. This could be used by other organizations to access your [place] medical records  [id number]        Your Vitals Were     Pulse Temperature Respirations Pulse Oximetry          87 97.6  F (36.4  C) (Oral) 16 100%         Blood Pressure from Last 3 Encounters:   07/17/17 104/70   07/14/17 102/68   07/10/17 118/73    Weight from Last 3 Encounters:   07/14/17 72.3 kg (159 lb 8 oz)   07/10/17 72.3 kg (159 lb 4.8 oz)   06/30/17 73.2 kg (161 lb 6.4 oz)              Today, you had the following     No orders found for display         Today's Medication Changes          These changes are accurate as of: 7/17/17  2:59 PM.  If you have any questions, ask your nurse or doctor.               These medicines have changed or have updated prescriptions.        Dose/Directions    ENSURE CLEAR Liqd   This may have changed:  how much to take   Used for:  Malignant neoplasm of head of pancreas (H)        Dose:  1 Bottle   Take 1 Bottle  by mouth 3 times daily   Quantity:  90 Bottle   Refills:  6       polyethylene glycol powder   Commonly known as:  MIRALAX/GLYCOLAX   This may have changed:    - when to take this  - reasons to take this   Used for:  Malignant neoplasm of head of pancreas (H)        Dose:  1 capful   Take 17 g (1 capful) by mouth daily   Quantity:  119 g   Refills:  11                Primary Care Provider    [facility]       No address on file        Equal Access to Services     JAI CORDERO : Hadmarika dawno Soijm, waaxda luqadaha, qaybta kaalmada adekaryada, luis e travis hananemarcelo lamarjanazeem asher . So [facility] [phone].    ATENCIÓN: Si habla español, tiene a bernal disposición servicios gratuitos de asistencia lingüística. [facility] [phone].    We comply with applicable federal civil rights laws and [place] laws. We do not discriminate on the basis of race, color, national origin, age, disability sex, sexual orientation or gender identity.            Thank you!     Thank you for choosing [facility]  for your care. Our goal is always to provide you with excellent care. Hearing back from our patients is one way we can continue to improve our services. Please take a few minutes to complete the written survey that you may receive in the mail after your visit with us. Thank you!             Your Updated Medication List - Protect others around you: Learn how to safely use, store and throw away your medicines at www.disposemymeds.org.          This list is accurate as of: 7/17/17  2:59 PM.  Always use your most recent med list.                   Brand Name Dispense Instructions for use Diagnosis    amylase-lipase-protease 35139 UNITS Cpep    CREON    180 capsule    Take 2 capsules (72,000 Units) by mouth 3 times daily (with meals)    Malignant neoplasm of head of pancreas (H)       * dexamethasone 4 MG tablet    DECADRON    3 tablet    Take 1 tablet (4 mg) by mouth daily (with breakfast)     Need for prophylactic measure, Malignant neoplasm of head of pancreas (H)       * dexamethasone 4 MG tablet    DECADRON    3 tablet    Take 1 tablet (4 mg) by mouth daily (with breakfast) for 3 days    Need for prophylactic measure, Malignant neoplasm of head of pancreas (H)       diltiazem 2% in PLO cream (FV COMPOUNDED) 2% Gel     30 g    Apply topically daily and as needed to external hemorrhoids    External hemorrhoids       docusate sodium 100 MG capsule    COLACE    100 capsule    Take 1 capsule (100 mg) by mouth daily    External hemorrhoids       dronabinol 5 MG capsule    MARINOL    90 capsule    Take 1 capsule (5 mg) by mouth 3 times daily (before meals)    Anorexia       ENSURE CLEAR Liqd     90 Bottle    Take 1 Bottle by mouth 3 times daily    Malignant neoplasm of head of pancreas (H)       lidocaine-prilocaine cream    EMLA    30 g    Apply topically as needed for other (Use 30-60 minutes prior to port access)    Malignant neoplasm of head of pancreas (H)       loratadine 10 MG tablet    CLARITIN    30 tablet    Take 1 tablet (10 mg) by mouth daily Reported on 5/5/2017    Seasonal allergic rhinitis, unspecified allergic rhinitis trigger       LORazepam 0.5 MG tablet    ATIVAN    30 tablet    Take 1 tablet (0.5 mg) by mouth every 4 hours as needed (Anxiety, Nausea/Vomiting or Sleep)    Malignant neoplasm of head of pancreas (H)       morphine 15 MG 12 hr tablet    MS CONTIN    60 tablet    Take 1 tablet (15 mg) by mouth every 12 hours    Malignant neoplasm of head of pancreas (H)       ondansetron 8 MG ODT tab    ZOFRAN-ODT    60 tablet    Take 1 tablet (8 mg) by mouth every 8 hours as needed for nausea    Malignant neoplasm of head of pancreas (H)       oxyCODONE 5 MG IR tablet    ROXICODONE    100 tablet    Take 1 tablet (5 mg) by mouth every 4 hours as needed for moderate to severe pain    Malignant neoplasm of head of pancreas (H)       pantoprazole 20 MG EC tablet    PROTONIX    60 tablet    Take 1  tablet (20 mg) by mouth 2 times daily    Malignant neoplasm of head of pancreas (H)       polyethylene glycol powder    MIRALAX/GLYCOLAX    119 g    Take 17 g (1 capful) by mouth daily    Malignant neoplasm of head of pancreas (H)       potassium chloride SA 20 MEQ CR tablet    potassium chloride    60 tablet    Take 1 tablet (20 mEq) by mouth daily    Malignant neoplasm of head of pancreas (H)       prochlorperazine 10 MG tablet    COMPAZINE    30 tablet    Take 1 tablet (10 mg) by mouth every 6 hours as needed (Nausea/Vomiting)    Malignant neoplasm of head of pancreas (H)       * Notice:  This list has 2 medication(s) that are the same as other medications prescribed for you. Read the directions carefully, and ask your doctor or other care provider to review them with you.

## 2017-07-17 NOTE — PATIENT INSTRUCTIONS
Contact Numbers  [facility]: [phone]    After Hours:  [phone]  Triage: [phone]    Please call the [facility] Triage line if you experience a temperature greater than or equal to 100.5, shaking chills, have uncontrolled nausea, vomiting and/or diarrhea, dizziness, shortness of breath, chest pain, bleeding, unexplained bruising, or if you have any other new/concerning symptoms, questions or concerns.     If it is after hours, weekends, or holidays, please call the main hospital  at  [phone] and ask to speak to the Oncology doctor on call.     If you are having any concerning symptoms or wish to speak to a provider before your next infusion visit, please call your care coordinator or triage to notify them so we can adequately serve you.     If you need a refill on a narcotic prescription or other medication, please call triage before your infusion appointment.         July 2017 Sunday Monday Tuesday Wednesday Thursday Friday Saturday                                 1       2     3     [facility] ONC INFUSION 120   12:00 PM   (120 min.)    ONCOLOGY INFUSION   [facility] 4     5     6     7     MR ABDOMEN WWO    7:30 AM   (45 min.)   [id number]   [facility] MRI     CT CHEST WO    8:45 AM   (20 min.)   UCCT1   [facility] CT 8       9     10     [facility] LAB DRAW    5:30 PM   (15 min.)   [facility] DRAW   [facility] Lab Draw     UMP RETURN    5:45 PM   (30 min.)   Demond Gonsales MD   [facility] 11     12     13     14     [facility] LAB DRAW    8:45 AM   (15 min.)   [facility] DRAW   [facility] Lab Draw     UMP ONC INFUSION 360    9:30 AM   (360 min.)    ONCOLOGY INFUSION   [facility] 15       16     17     UMP ONC INFUSION 120    2:30 PM   (120 min.)    ONCOLOGY INFUSION   [facility] 18     19     20     21     22       23     24     25     26      27     28     29       30     31 August 2017 Sunday Monday Tuesday Wednesday Thursday Friday Saturday             1     2     3     4     5       6     7     8     9     10     UMP RETURN    9:15 AM   (50 min.)   Ester Echavarria, APRN CNP   [facility] 11     12       13     14     15     16     17     18     19       20     21     22     23     24     25     26       27     28     29     30     31

## 2017-07-17 NOTE — PROGRESS NOTES
Infusion Nursing Note:  Shirin Muller presents today for IVFs and Neulasta.    Patient seen by provider today: No   present during visit today: Not Applicable.    Note: Patient requested only 500cc be given in IVFs today rather than 1L.    Intravenous Access:  Implanted Port.    Treatment Conditions:  No labs drawn today.      Post Infusion Assessment:  Patient tolerated infusion without incident.  Patient tolerated injection without incident.  Blood return noted pre and post infusion.  Site patent and intact, free from redness, edema or discomfort.  No evidence of extravasations.  Access discontinued per protocol.    Discharge Plan:   Patient declined prescription refills.  Patient and/or family verbalized understanding of discharge instructions and all questions answered.  Copy of AVS reviewed with patient and/or family.  Patient will return 8/10/17 for provider visit.  Patient discharged in stable condition accompanied by: friend.  Departure Mode: Ambulatory.    Mckayla Cruz RN

## 2017-07-25 PROBLEM — D70.1 CHEMOTHERAPY-INDUCED NEUTROPENIA (H): Status: ACTIVE | Noted: 2017-01-03

## 2017-07-25 PROBLEM — T45.1X5A CHEMOTHERAPY-INDUCED NEUTROPENIA (H): Status: ACTIVE | Noted: 2017-01-03

## 2017-07-25 NOTE — PROGRESS NOTES
Shirin called and wanted to be scheduled for her next infusion of FOLFIRINOX which is due on 7/28/17.  Let her know I would send a request to scheduling and will call her if her preferred times are not available.  She agreed with and verbalized understanding of the plan of care.

## 2017-07-28 NOTE — PROGRESS NOTES
This is a recent snapshot of the patient's [facility] medical record.  For current drug dose and complete information and questions, call [phone] or In Basket pool, fv home infusion ([id number])  CSN Number:  [id number]

## 2017-07-31 NOTE — NURSING NOTE
Chief Complaint   Patient presents with     Port Draw     accessed with power needle for labs and infsuion, heparin locked, vitals checked

## 2017-07-31 NOTE — MR AVS SNAPSHOT
After Visit Summary   7/31/2017    Shirin Muller    MRN: [id number]           Patient Information     Date Of Birth          1960        Visit Information        Provider Department      7/31/2017 7:30 AM UC 13 ATC;  ONCOLOGY INFUSION [facility]        Today's Diagnoses     Chemotherapy-induced neutropenia (H)    -  1    Malignant neoplasm of head of pancreas (H)          Care Instructions    Contact Numbers    [facility] Main Line: [phone]  [facility] Triage:  [phone]    Call triage with chills and/or temperature greater than or equal to 100.5, uncontrolled nausea/vomiting, diarrhea, constipation, dizziness, shortness of breath, chest pain, bleeding, unexplained bruising, or any new/concerning symptoms, questions/concerns.     If you are having any concerning symptoms or wish to speak to a provider before your next infusion visit, please call your care coordinator or triage to notify them so we can adequately serve you.       After Hours: [phone]    If after hours, weekends, or holidays, call main hospital  and ask for Oncology doctor on call.         July 2017 Sunday Monday Tuesday Wednesday Thursday Friday Saturday                                 1       2     3     UMP ONC INFUSION 120   12:00 PM   (120 min.)    ONCOLOGY INFUSION   [facility] 4     5     6     7     MR ABDOMEN WWO    7:30 AM   (45 min.)   [id number]   [facility] MRI     CT CHEST WO    8:45 AM   (20 min.)   UCCT1   [facility] CT 8       9     10     [facility] DRAW    5:30 PM   (15 min.)   [facility] DRAW   [facility] Draw     UMP RETURN    5:45 PM   (30 min.)   Demond Gonsales MD   [facility] 11     12     13     14     [facility] DRAW    8:45 AM   (15 min.)    [facility] DRAW   [facility] Draw     UMP ONC INFUSION 360    9:30 AM   (360 min.)    ONCOLOGY INFUSION     [facility] 15       16     17     UMP ONC INFUSION 120    2:30 PM   (120 min.)    ONCOLOGY INFUSION   [facility] 18     19     20     21     22       23     24     25     26     27     28     29       30     31     [facility] DRAW    7:00 AM   (15 min.)   [facility] DRAW   [facility] Draw     UMP ONC INFUSION 360    7:30 AM   (360 min.)    ONCOLOGY INFUSION   [facility]                                     August 2017 Sunday Monday Tuesday Wednesday Thursday Friday Saturday             1     2     3     [facility] DRAW    8:45 AM   (15 min.)    [facility] DRAW   [facility] Draw     UMP RETURN    9:15 AM   (50 min.)   Ester Echavarria APRN CNP   [facility]     UMP ONC INFUSION 360   10:30 AM   (360 min.)    ONCOLOGY INFUSION   [facility] 4     5       6     7     8     9     10     11     [facility] DRAW    9:15 AM   (15 min.)    [facility] DRAW   [facility] Draw     UMP ONC INFUSION 360   10:00 AM   (360 min.)    ONCOLOGY INFUSION   [facility]     UMP RETURN   10:05 AM   (50 min.)   Ester Echavarria APRN CNP   [facility] 12       13     14     15     16     17     18     19       20     21     22     23     24     25     [facility] DRAW    9:30 AM   (15 min.)    [facility] DRAW   [facility] Draw     [facility] ONC INFUSION 360   10:00 AM   (360 min.)    ONCOLOGY INFUSION   [facility] 26       27     28     29     30     31                            Lab Results:  Recent Results (from the past 12 hour(s))   Comprehensive metabolic panel    Collection Time: 07/31/17  7:46 AM   Result Value Ref Range    Sodium 140 133 - 144 mmol/L    Potassium 3.3 (L) 3.4 - 5.3 mmol/L    Chloride 104 94 - 109 mmol/L    Carbon Dioxide 27 20 - 32 mmol/L    Anion Gap 9 3 - 14 mmol/L    Glucose 122 (H) 70 -  99 mg/dL    Urea Nitrogen 8 7 - 30 mg/dL    Creatinine 0.56 0.52 - 1.04 mg/dL    GFR Estimate >90  Non  GFR Calc   >60 mL/min/1.7m2    GFR Estimate If Black >90   GFR Calc   >60 mL/min/1.7m2    Calcium 9.0 8.5 - 10.1 mg/dL    Bilirubin Total 2.3 (H) 0.2 - 1.3 mg/dL    Albumin 2.4 (L) 3.4 - 5.0 g/dL    Protein Total 7.6 6.8 - 8.8 g/dL    Alkaline Phosphatase 973 (H) 40 - 150 U/L    ALT 57 (H) 0 - 50 U/L    AST 86 (H) 0 - 45 U/L   CBC with platelets differential    Collection Time: 07/31/17  7:46 AM   Result Value Ref Range    WBC 10.0 4.0 - 11.0 10e9/L    RBC Count 4.07 3.8 - 5.2 10e12/L    Hemoglobin 9.3 (L) 11.7 - 15.7 g/dL    Hematocrit 31.5 (L) 35.0 - 47.0 %    MCV 77 (L) 78 - 100 fl    MCH 22.9 (L) 26.5 - 33.0 pg    MCHC 29.5 (L) 31.5 - 36.5 g/dL    RDW 21.9 (H) 10.0 - 15.0 %    Platelet Count 200 150 - 450 10e9/L    Diff Method Manual Differential     % Neutrophils 89.5 %    % Lymphocytes 1.7 %    % Monocytes 7.9 %    % Eosinophils 0.9 %    % Basophils 0.0 %    Absolute Neutrophil 9.0 (H) 1.6 - 8.3 10e9/L    Absolute Lymphocytes 0.2 (L) 0.8 - 5.3 10e9/L    Absolute Monocytes 0.8 0.0 - 1.3 10e9/L    Absolute Eosinophils 0.1 0.0 - 0.7 10e9/L    Absolute Basophils 0.0 0.0 - 0.2 10e9/L    Anisocytosis Moderate     Poikilocytosis Slight     Teardrop Cells Slight     Ovalocytes Slight     Elliptocytes Slight     Microcytes Present     Toxic Granulation Present                Follow-ups after your visit        Your next 10 appointments already scheduled     Aug 03, 2017  8:45 AM [facility]   [facility] Lab Draw with  [facility] LAB DRAW   [facility] Lab Draw ([facility] [place])    [address] [place] [address]  2nd [facility] [address]   [phone]            Aug 03, 2017  9:30 AM CDT   (Arrive by 9:15 AM)   Return Visit with TEE Olivas CNP   [facility] ([facility] [place])    [address] [place] [address]  [place] [address]   [phone]            Aug 03, 2017 10:30 AM CDT   Infusion 360 with UC ONCOLOGY INFUSION, UC 17 ATC   [facility] ([facility])    [address] [place] [address]   [phone]            Aug 11, 2017  9:15 AM CDT   [facility] Draw with [facility] DRAW   [facility] Draw ([facility])    [address] [place] [address]   [phone]            Aug 11, 2017 10:00 AM CDT   Infusion 360 with UC ONCOLOGY INFUSION, UC 28 ATC   [facility] ([facility])    [address] [place] [address]   [phone]            Aug 11, 2017 10:20 AM CDT   (Arrive by 10:05 AM)   Return Visit with TEE Olivas CNP   [facility] ([facility])    [address] [place] [address]   [phone]            Aug 25, 2017  9:30 AM CDT   [facility] Lab Draw with [facility] DRAW   [facility] Draw ([facility])    [address] [place] [address]   [phone]            Aug 25, 2017 10:00 AM CDT   Infusion 360 with UC ONCOLOGY INFUSION   [facility] ([facility])    [address] [place] [address]   [phone]              Who to contact     If you have questions or need follow up information about today's clinic visit or your schedule please contact [facility] directly at [phone].  Normal or non-critical lab and imaging results will be communicated to you by MyChart, letter or phone within 4 business days after the clinic has received the results. If you do not hear from us within 7 days, please contact the clinic through MyChart or phone. If you have a critical or abnormal lab result, we will  notify you by phone as soon as possible.  Submit refill requests through MYR or call your pharmacy and they will forward the refill request to us. Please allow 3 business days for your refill to be completed.          Additional Information About Your Visit        Case [facility] Information     [facility] gives you secure access to your electronic health record. If you see a primary care provider, you can also send messages to your care team and make appointments. If you have questions, please call your primary care clinic.  If you do not have a primary care provider, please call [phone] and they will assist you.        Care EveryWhere ID     This is your Care EveryWhere ID. This could be used by other organizations to access your [facility] medical records  [id number]        Your Vitals Were     Pulse Temperature Respirations Pulse Oximetry BMI (Body Mass Index)       107 98.8  F (37.1  C) (Oral) 16 99% 22.09 kg/m2        Blood Pressure from Last 3 Encounters:   07/31/17 103/68   07/17/17 104/70   07/14/17 102/68    Weight from Last 3 Encounters:   07/31/17 71.8 kg (158 lb 4.8 oz)   07/14/17 72.3 kg (159 lb 8 oz)   07/10/17 72.3 kg (159 lb 4.8 oz)              We Performed the Following     Cancer antigen 19-9     CBC with platelets differential     Comprehensive metabolic panel          Today's Medication Changes          These changes are accurate as of: 7/31/17  9:00 AM.  If you have any questions, ask your nurse or doctor.               These medicines have changed or have updated prescriptions.        Dose/Directions    ENSURE CLEAR Liqd   This may have changed:  how much to take   Used for:  Malignant neoplasm of head of pancreas (H)        Dose:  1 Bottle   Take 1 Bottle by mouth 3 times daily   Quantity:  90 Bottle   Refills:  6       polyethylene glycol powder   Commonly known as:  MIRALAX/GLYCOLAX   This may have changed:    - when to take this  - reasons to take this   Used for:  Malignant neoplasm of  head of pancreas (H)        Dose:  1 capful   Take 17 g (1 capful) by mouth daily   Quantity:  119 g   Refills:  11            Where to get your medicines      These medications were sent to [facility] [place], [place] - [address]  [address], [facility] [address]    Hours:  TRANSPLANT PHONE NUMBER [phone] Phone:  [phone]     prochlorperazine 10 MG tablet         Some of these will need a paper prescription and others can be bought over the counter.  Ask your nurse if you have questions.     Bring a paper prescription for each of these medications     LORazepam 0.5 MG tablet                Primary Care Provider    [facility]       No address on file        Equal Access to Services     JAI CORDERO : Maxx Spence, johnson flower, qagretel kaalmaolamide frazier, luis e lora. So [facility] [phone].    ATENCIÓN: Si habla español, tiene a bernal disposición servicios gratuitos de asistencia lingüística. Llame al [phone].    We comply with applicable federal civil rights laws and [place] laws. We do not discriminate on the basis of race, color, national origin, age, disability sex, sexual orientation or gender identity.            Thank you!     Thank you for choosing [facility]  for your care. Our goal is always to provide you with excellent care. Hearing back from our patients is one way we can continue to improve our services. Please take a few minutes to complete the written survey that you may receive in the mail after your visit with us. Thank you!             Your Updated Medication List - Protect others around you: Learn how to safely use, store and throw away your medicines at www.disposemymeds.org.          This list is accurate as of: 7/31/17  9:00 AM.  Always use your most recent med list.                   Brand Name Dispense Instructions for use Diagnosis     amylase-lipase-protease 43757 UNITS Cpep    CREON    180 capsule    Take 2 capsules (72,000 Units) by mouth 3 times daily (with meals)    Malignant neoplasm of head of pancreas (H)       dexamethasone 4 MG tablet    DECADRON    3 tablet    Take 1 tablet (4 mg) by mouth daily (with breakfast)    Need for prophylactic measure, Malignant neoplasm of head of pancreas (H)       diltiazem 2% in PLO cream (FV COMPOUNDED) 2% Gel     30 g    Apply topically daily and as needed to external hemorrhoids    External hemorrhoids       docusate sodium 100 MG capsule    COLACE    100 capsule    Take 1 capsule (100 mg) by mouth daily    External hemorrhoids       dronabinol 5 MG capsule    MARINOL    90 capsule    Take 1 capsule (5 mg) by mouth 3 times daily (before meals)    Anorexia       ENSURE CLEAR Liqd     90 Bottle    Take 1 Bottle by mouth 3 times daily    Malignant neoplasm of head of pancreas (H)       lidocaine-prilocaine cream    EMLA    30 g    Apply topically as needed for other (Use 30-60 minutes prior to port access)    Malignant neoplasm of head of pancreas (H)       loratadine 10 MG tablet    CLARITIN    30 tablet    Take 1 tablet (10 mg) by mouth daily Reported on 5/5/2017    Seasonal allergic rhinitis, unspecified allergic rhinitis trigger       LORazepam 0.5 MG tablet    ATIVAN    30 tablet    Take 1 tablet (0.5 mg) by mouth every 4 hours as needed (Anxiety, Nausea/Vomiting or Sleep)    Malignant neoplasm of head of pancreas (H)       morphine 15 MG 12 hr tablet    MS CONTIN    60 tablet    Take 1 tablet (15 mg) by mouth every 12 hours    Malignant neoplasm of head of pancreas (H)       ondansetron 8 MG ODT tab    ZOFRAN-ODT    60 tablet    Take 1 tablet (8 mg) by mouth every 8 hours as needed for nausea    Malignant neoplasm of head of pancreas (H)       oxyCODONE 5 MG IR tablet    ROXICODONE    100 tablet    Take 1 tablet (5 mg) by mouth every 4 hours as needed for moderate to severe pain    Malignant neoplasm  of head of pancreas (H)       pantoprazole 20 MG EC tablet    PROTONIX    60 tablet    Take 1 tablet (20 mg) by mouth 2 times daily    Malignant neoplasm of head of pancreas (H)       polyethylene glycol powder    MIRALAX/GLYCOLAX    119 g    Take 17 g (1 capful) by mouth daily    Malignant neoplasm of head of pancreas (H)       potassium chloride SA 20 MEQ CR tablet    potassium chloride    60 tablet    Take 1 tablet (20 mEq) by mouth daily    Malignant neoplasm of head of pancreas (H)       prochlorperazine 10 MG tablet    COMPAZINE    30 tablet    Take 1 tablet (10 mg) by mouth every 6 hours as needed (Nausea/Vomiting)    Malignant neoplasm of head of pancreas (H)

## 2017-07-31 NOTE — PROGRESS NOTES
Infusion Nursing Note:  Shirin Muller presents today for IV potassium (chemo held).    Patient seen by provider today: No   present during visit today: Not Applicable.    Note: Patient complains of mouth sores, one in each corner of her mouth.  Pt states the one on the right is now getting better and the one on the left is just appearing.  Started about a week ago and has just been keeping them clean.  Pt would also like to switch her infusion appointments back to Fridays.  Potassium 3.3 today, Bili 2.3, alk phos 973.  TORB; Ester Echavarria, TING/Ana Mustafa, RN: Hold treatment today, pt to return Thursday for labs, see Ester, and possible infusion.  If patient gets infusion on Thursday then ok to switch next infusions back to Fridays. Pt to apply Vaseline to mouth sores.    Pt did not meet parameters for aranesp with HCT 31.5%    Pt put on call light and stated she was cold.  Pt had shaking chills. Temp 98.5.  Pt stated she normally dresses warmer when she comes and she is always cold.  Sister states this is normal for the patient. Pt given warm blankets.    Intravenous Access:  Implanted Port.    Treatment Conditions:  Lab Results   Component Value Date    HGB 9.3 07/31/2017     Lab Results   Component Value Date    WBC 10.0 07/31/2017      Lab Results   Component Value Date    ANEU 9.0 07/31/2017     Lab Results   Component Value Date     07/31/2017      Lab Results   Component Value Date     07/31/2017                   Lab Results   Component Value Date    POTASSIUM 3.3 07/31/2017           Lab Results   Component Value Date    MAG 2.0 04/03/2017            Lab Results   Component Value Date    CR 0.56 07/31/2017                   Lab Results   Component Value Date    FANNY 9.0 07/31/2017                Lab Results   Component Value Date    BILITOTAL 2.3 07/31/2017           Lab Results   Component Value Date    ALBUMIN 2.4 07/31/2017                    Lab Results    Component Value Date    ALT 57 07/31/2017           Lab Results   Component Value Date    AST 86 07/31/2017     Results reviewed, labs did NOT meet treatment parameters: Chemo held due to bilirubin of 2.3 and mouth sores.          Post Infusion Assessment:  Patient tolerated infusion without incident.  Blood return noted pre and post infusion.  Site patent and intact, free from redness, edema or discomfort.  No evidence of extravasations.  Access discontinued per protocol.    Discharge Plan:   Prescription refills given for miralax, compazine, zofran, lorazepam, and claritin.  Discharge instructions reviewed with: Patient.  Patient and/or family verbalized understanding of discharge instructions and all questions answered.  Copy of AVS reviewed with patient and/or family.  Patient will return 8/3 for next appointment.  Patient discharged in stable condition accompanied by: sister.  Departure Mode: Ambulatory.    Ana Mustafa RN

## 2017-07-31 NOTE — PATIENT INSTRUCTIONS
Contact Numbers    [facility] Main Line: [phone]  [facility] Triage:  [phone]    Call triage with chills and/or temperature greater than or equal to 100.5, uncontrolled nausea/vomiting, diarrhea, constipation, dizziness, shortness of breath, chest pain, bleeding, unexplained bruising, or any new/concerning symptoms, questions/concerns.     If you are having any concerning symptoms or wish to speak to a provider before your next infusion visit, please call your care coordinator or triage to notify them so we can adequately serve you.       After Hours: [phone]    If after hours, weekends, or holidays, call main hospital  and ask for Oncology doctor on call.         July 2017 Sunday Monday Tuesday Wednesday Thursday Friday Saturday                                 1       2     3     UMP ONC INFUSION 120   12:00 PM   (120 min.)    ONCOLOGY INFUSION   [facility] 4     5     6     7     MR ABDOMEN WWO    7:30 AM   (45 min.)   [id number]   [facility] MRI     CT CHEST WO    8:45 AM   (20 min.)   UCCT1   [facility] CT 8       9     10     [facility] DRAW    5:30 PM   (15 min.)    [facility] LAB DRAW   [facility] Lab Draw     UMP RETURN    5:45 PM   (30 min.)   Demond Gonsales MD   [facility] 11     12     13     14     [facility] DRAW    8:45 AM   (15 min.)    [facility] LAB DRAW   [facility] Lab Draw     UMP ONC INFUSION 360    9:30 AM   (360 min.)   UC ONCOLOGY INFUSION   [facility] 15       16     17     UMP ONC INFUSION 120    2:30 PM   (120 min.)    ONCOLOGY INFUSION   [facility] 18     19     20     21     22       23     24     25     26     27     28     29       30     31     [facility] LAB DRAW    7:00 AM   (15 min.)    [facility] LAB DRAW   [facility] Draw     UMP ONC INFUSION 360    7:30 AM   (360 min.)    ONCOLOGY INFUSION   [facility]                                      August 2017 Sunday Monday Tuesday Wednesday Thursday Friday Saturday             1     2     3     [facility] LAB DRAW    8:45 AM   (15 min.)   [facility] LAB DRAW   [facility] Lab Draw     UMP RETURN    9:15 AM   (50 min.)   Ester Echavarria APRN CNP   [facility]     UMP ONC INFUSION 360   10:30 AM   (360 min.)   UC ONCOLOGY INFUSION   [facility] 4     5       6     7     8     9     10     11     [facility] LAB DRAW    9:15 AM   (15 min.)   [facility] LAB DRAW   [facility] Draw     UMP ONC INFUSION 360   10:00 AM   (360 min.)   UC ONCOLOGY INFUSION   [facility]     UMP RETURN   10:05 AM   (50 min.)   Ester Echavarria APRN CNP   [facility] 12       13     14     15     16     17     18     19       20     21     22     23     24     25     [facility] LAB DRAW    9:30 AM   (15 min.)    [facility] LAB DRAW   [facility] Draw     UMP ONC INFUSION 360   10:00 AM   (360 min.)    ONCOLOGY INFUSION   [facility] 26       27     28     29     30     31                            Lab Results:  Recent Results (from the past 12 hour(s))   Comprehensive metabolic panel    Collection Time: 07/31/17  7:46 AM   Result Value Ref Range    Sodium 140 133 - 144 mmol/L    Potassium 3.3 (L) 3.4 - 5.3 mmol/L    Chloride 104 94 - 109 mmol/L    Carbon Dioxide 27 20 - 32 mmol/L    Anion Gap 9 3 - 14 mmol/L    Glucose 122 (H) 70 - 99 mg/dL    Urea Nitrogen 8 7 - 30 mg/dL    Creatinine 0.56 0.52 - 1.04 mg/dL    GFR Estimate >90  Non  GFR Calc   >60 mL/min/1.7m2    GFR Estimate If Black >90   GFR Calc   >60 mL/min/1.7m2    Calcium 9.0 8.5 - 10.1 mg/dL    Bilirubin Total 2.3 (H) 0.2 - 1.3 mg/dL    Albumin 2.4 (L) 3.4 - 5.0 g/dL    Protein Total 7.6 6.8 - 8.8 g/dL    Alkaline Phosphatase 973 (H) 40 - 150 U/L    ALT 57 (H) 0 - 50 U/L    AST 86 (H) 0 -  45 U/L   CBC with platelets differential    Collection Time: 07/31/17  7:46 AM   Result Value Ref Range    WBC 10.0 4.0 - 11.0 10e9/L    RBC Count 4.07 3.8 - 5.2 10e12/L    Hemoglobin 9.3 (L) 11.7 - 15.7 g/dL    Hematocrit 31.5 (L) 35.0 - 47.0 %    MCV 77 (L) 78 - 100 fl    MCH 22.9 (L) 26.5 - 33.0 pg    MCHC 29.5 (L) 31.5 - 36.5 g/dL    RDW 21.9 (H) 10.0 - 15.0 %    Platelet Count 200 150 - 450 10e9/L    Diff Method Manual Differential     % Neutrophils 89.5 %    % Lymphocytes 1.7 %    % Monocytes 7.9 %    % Eosinophils 0.9 %    % Basophils 0.0 %    Absolute Neutrophil 9.0 (H) 1.6 - 8.3 10e9/L    Absolute Lymphocytes 0.2 (L) 0.8 - 5.3 10e9/L    Absolute Monocytes 0.8 0.0 - 1.3 10e9/L    Absolute Eosinophils 0.1 0.0 - 0.7 10e9/L    Absolute Basophils 0.0 0.0 - 0.2 10e9/L    Anisocytosis Moderate     Poikilocytosis Slight     Teardrop Cells Slight     Ovalocytes Slight     Elliptocytes Slight     Microcytes Present     Toxic Granulation Present

## 2017-08-02 NOTE — PROGRESS NOTES
Jose Antonio called and stated that her urine is brown and has been since last week.  She is not seeing any blood, not having any fevers, or abdominal pain.  Stating she is keeping up on her fluids and drinking quite a lot.  She will be seeing Ester Echavarria tomorrow.

## 2017-08-02 NOTE — PROGRESS NOTES
This is a recent snapshot of the patient's [facility] medical record.  For current drug dose and complete information and questions, call [phone] or In Basket pool, [facility] home infusion ([id number])  CSN Number:  [id number]

## 2017-08-03 PROBLEM — K83.09 CHOLANGITIS (H): Status: ACTIVE | Noted: 2017-01-01

## 2017-08-03 NOTE — IP AVS SNAPSHOT
[address] [address]    Phone:  [phone]                                       After Visit Summary   8/3/2017    Shirin Muller    MRN: [id number]           After Visit Summary Signature Page     I have received my discharge instructions, and my questions have been answered. I have discussed any challenges I see with this plan with the nurse or doctor.    ..........................................................................................................................................  Patient/Patient Representative Signature      ..........................................................................................................................................  Patient Representative Print Name and Relationship to Patient    ..................................................               ................................................  Date                                            Time    ..........................................................................................................................................  Reviewed by Signature/Title    ...................................................              ..............................................  Date                                                            Time

## 2017-08-03 NOTE — CONSULTS
"  GASTROENTEROLOGY CONSULTATION      Date of Admission:  8/3/2017  Reason for Admission: Possible cholangitis  Date of Consult  8/3/2017   Requesting Physician:  Barb Tatum MD           ASSESSMENT AND RECOMMENDATIONS:   Assessment:  57 year old female with a history of metastatic pancreatic cancer who was seen today in oncology clinic for chemotherapy but was found to be tachycardic and febrile - admitted for suspected cholangitis with elevated liver tests (Tbili 4.8, WBC 15K). Likely occluded stent with stones/sludge or tumor ingrowth. Recent ERCP in April of this year for biliary stent occlusion and placement of [id number] stent within existing stent. The patient has been started on IV Zosyn. Blood cultures have been obtained.      Recommendations:  Plan for ERCP today  Continue NPO status, pain medication  Cont abx (Zosyn)  Trend LFT  Discussed with primary team    Gastroenterology follow up recommendations: TBD    Thank you for involving us in this patient's care. Please do not hesitate to contact the GI service with any questions or concerns.     Pt seen and care plan discussed with Dr. Zamudio, GI staff physician.    Cookie Crockett PA-C  Advanced Endoscopy/Pancreaticobiliary [facility]  [facility]  Pager *[phone]  -------------------------------------------------------------------------------------------------------------------       Reason for Consultation:   \"concern for cholangitis; consider ERCP\"           History of Present Illness:   Patient seen and examined at 1310. History is obtained from the patient and her sister at bedside. The majority of the history is obtained from the patient's sister because the patient is feeling so ill.    Shirin Muller is a 57 year old female with a PMH significant for metastatic pancreatic cancer (mets to liver), currently on Folfirinox since 12/20/2016. She was supposed to have chemotherapy today however presented with " fever to 100F and tachy to 130s. She complains of abdominal pain that has been worsening over the past week - not able to manage at home with her home narcotic pain regimen. She has been nauseated but this is controlled with her home antiemetics. She has been having sweats and fevers, dark urine.     On admission her liver tests are elevated more than her baseline. Her TBili is 4.8 (2.3 3 days ago), alk phos 886, alt 67, ast 106. WBC increased from 3 days ago as well 10--> 15.4. Temp in clinic this AM was 100F, was 102.7 at 1130. She is persistently tachycardic in the 120-130s. Her blood pressures are stable. She is satting well on RA.      Previous Procedures:  ERCP  - Dr. Braden  - Duodenal bulb stenosis, likely malignant in nature, however patent enough to allow easy passage of the duodensocope; given lack of outlet-like complaints,   duodenal stent placement is deferred   - Near complete malignant occlusion of the well positioned biliary metal stent with resulting upstream dilation and evidence of cholangitis, choledocholithiasis and sludge   - Successful deployment of a [id number] David UCSEM stent   within the existing stent with subsequent biliary irrigation             Past Medical History:   Reviewed and edited as appropriate  Past Medical History:   Diagnosis Date     Biliary stricture      Graves disease      Lesion of liver      Malignant neoplasm of head of pancreas (H) 2016     Pancreatic disease      Pancreatic mass             Past Surgical History:   Reviewed and edited as appropriate   Past Surgical History:   Procedure Laterality Date      SECTION       ENDOSCOPIC RETROGRADE CHOLANGIOPANCREATOGRAM N/A 2016    Procedure: COMBINED ENDOSCOPIC RETROGRADE CHOLANGIOPANCREATOGRAPHY, PLACE TUBE/STENT;  Surgeon: Arias Taveras MD;  Location: [facility] OR     ENDOSCOPIC RETROGRADE CHOLANGIOPANCREATOGRAM N/A 2017    Procedure: COMBINED ENDOSCOPIC RETROGRADE CHOLANGIOPANCREATOGRAPHY,  PLACE TUBE/STENT;  Endoscopic Retrograde Cholangiopancreatogram With biliary Stent placement, ballon sweep of bile duct for stone removal;  Surgeon: Tristin Braden MD;  Location: [facility] OR     ESOPHAGOSCOPY, GASTROSCOPY, DUODENOSCOPY (EGD), COMBINED N/A 4/13/2016    Procedure: COMBINED ENDOSCOPIC ULTRASOUND, ESOPHAGOSCOPY, GASTROSCOPY, DUODENOSCOPY (EGD), FINE NEEDLE ASPIRATE/BIOPSY;  Surgeon: Arias Taveras MD;  Location: [facility] OR     VASCULAR SURGERY      right chest              Social History:     Social History     Social History     Marital status:      Spouse name: N/A     Number of children: N/A     Years of education: N/A     Occupational History     Not on file.     Social History Main Topics     Smoking status: Never Smoker     Smokeless tobacco: Never Used     Alcohol use No     Drug use: No     Sexual activity: Not on file     Other Topics Concern     Not on file     Social History Narrative              Family History:   Reviewed and edited as appropriate  Family History   Problem Relation Age of Onset     DIABETES Mother              Allergies:   Reviewed and edited as appropriate     Allergies   Allergen Reactions     Contrast Dye Nausea and Vomiting     Pt vomiting post IV contrast, Please try Visipaque for next CT scan. 6/24/16 sv            Medications:     Current Facility-Administered Medications   Medication     [START ON 8/4/2017] amylase-lipase-protease (CREON 36) 13903 UNITS per capsule 72,000 Units     [START ON 8/4/2017] dexamethasone (DECADRON) tablet 4 mg     docusate sodium (COLACE) capsule 100 mg     [START ON 8/4/2017] dronabinol (MARINOL) capsule 5 mg     loratadine (CLARITIN) tablet 10 mg     LORazepam (ATIVAN) tablet 0.5 mg     morphine (MS CONTIN) 12 hr tablet 15 mg     oxyCODONE (ROXICODONE) IR tablet 5 mg     pantoprazole (PROTONIX) EC tablet 20 mg     polyethylene glycol (MIRALAX/GLYCOLAX) powder 17 g     0.9% sodium chloride infusion     prochlorperazine  (COMPAZINE) tablet 5-10 mg    Or     prochlorperazine (COMPAZINE) injection 5-10 mg     ondansetron (ZOFRAN) injection 8 mg    Or     ondansetron (ZOFRAN-ODT) ODT tab 8 mg     piperacillin-tazobactam (ZOSYN) 3.375 g vial to attach to  mL bag     potassium chloride SA (K-DUR/KLOR-CON M) CR tablet 20-40 mEq     potassium chloride (KLOR-CON) Packet 20-40 mEq     potassium chloride 10 mEq in 100 mL intermittent infusion     potassium chloride 10 mEq in 100 mL intermittent infusion with 10 mg lidocaine     potassium chloride 20 mEq in 50 mL intermittent infusion     magnesium sulfate 4 g in 100 mL sterile water (premade)     sodium phosphate 15 mmol in D5W intermittent infusion     sodium phosphate 20 mmol in D5W intermittent infusion     sodium phosphate 25 mmol in D5W intermittent infusion     naloxone (NARCAN) injection 0.1-0.4 mg     0.9% sodium chloride BOLUS     Facility-Administered Medications Ordered in Other Encounters   Medication     heparin 100 UNIT/ML injection 5 mL             Review of Systems:   Not obtained due to patient distress              Physical Exam:   Temp: 100.9  F (38.3  C) Temp src: Oral BP: 111/63 Pulse: 129   Resp: 18        Wt:   Wt Readings from Last 2 Encounters:   08/03/17 72.8 kg (160 lb 6.4 oz)   08/03/17 71.8 kg (158 lb 3.2 oz)        General: WDWN female in moderate distress.  HEENT: Head is AT/NC. Sclera +icteric. No conjunctival injection.  Oropharynx is clear, moist and w/o exudate or lesions.  Neck: No masses or thyromegaly.  Lungs: Clear to auscultation bilaterally.  No wheezes, rhonchi or crackles.    Heart: Regular rate and rhythm.  No murmurs, gallops or rubs.  Normal S1 and S2.  Abdomen: Soft, tender diffusely, ND  BS +.  No hepatosplenomegaly.   Heme/Lymph: No cervical or supraclavicular adenopathy.   Skin: No jaundice, rash  Neurologic: Grossly non-focal.  CN 2-12 grossly intact.           Data:   Labs and imaging below were independently reviewed and  interpreted    LAB WORK:    BMP  Recent Labs  Lab 08/03/17  1004 07/31/17  0746    140   POTASSIUM 3.3* 3.3*   CHLORIDE 102 104   FANNY 9.3 9.0   CO2 21 27   BUN 5* 8   CR 0.51* 0.56   * 122*     CBC  Recent Labs  Lab 08/03/17  1004 07/31/17  0746   WBC 15.4* 10.0   RBC 4.36 4.07   HGB 9.8* 9.3*   HCT 33.6* 31.5*   MCV 77* 77*   MCH 22.5* 22.9*   MCHC 29.2* 29.5*   RDW 22.9* 21.9*    200     LFTs  Recent Labs  Lab 08/03/17  1004 07/31/17  0746   ALKPHOS 886* 973*   * 86*   ALT 67* 57*   BILITOTAL 4.8* 2.3*   PROTTOTAL 8.1 7.6   ALBUMIN 2.4* 2.4*     IMAGING:  MRI ABDOMEN     CLINICAL HISTORY:  Pancreatic malignancy  Metastatic pancreatic cancer- on FOLFIRINOX     TECHNIQUE:  Images were acquired with and without intravenous contrast  through the abdomen. The following MR images were acquired: TrueFISP,  multiplanar T2 weighted, axial T1 in/out of phase, axial fat-saturated  T1, diffusion-weighted. Multiplanar T1-weighted images with fat  saturation were before contrast administration and at multiple time  points following the administration of intravenous contrast. Contrast  dose: 7.5ml gadavist     FINDINGS:     Comparison study: MRI 4/14/2017, 2/10/2017, 12/12/2016, CT 6/24/2016     Pancreas: Posttreatment changes of known pancreatic head carcinoma.  There is decreased T1 signal of the majority of the pancreas, which  appears atrophic. Biliary stent in place. The mass and posttreatment  changes are difficult to distinguish although compared to 12/12/2016,  it appears to be decreased in size. Comparison with earlier studies is  incomplete, due to susceptibility artifact on those studies limiting  evaluation. There is enhancement of the normal distal pancreatic  tissue, with the main pancreatic duct measuring up to 2.5 mm. There is  tortuosity in the duct in the tail with small side branches.      The celiac artery is patent. The splenic artery and a common hepatic  artery are patent. The  gastroduodenal artery is not well identified,  and may be involved by the pancreatic mass. The SMA has branches are  patent. The main portal vein is patent, although there is narrowing  and involvement by the pancreatic mass. The SMV is patent, although  there is abutment by the mass, but no invasion is likely. The  visualized vasculature is otherwise patent without abdominal aortic  aneurysm. Collateral vessels in the periportal region, likely due to  the narrowing of the portal vein. There is involvement of the second  portion of the duodenum, which is less prominent than the comparison  examination.      Liver: Pneumobilia, which a low signal on multiple sequences, seen in  the right or left lower liver. Status post common bile duct stent,  which show susceptibility the common bile duct. Stent appears  unchanged. Moderate intrahepatic biliary dilatation. Common bile duct  is not well evaluated, due to susceptibility artifact from stent,  however the proximal portion appears dilated. Overall, these findings  are unchanged. Compared to 2/10/2017, there has been resolution of  previously seen lesions within the liver. In the early arterial phase,  there are scattered areas of focal enhancement, which is isointense to  liver on delayed imaging. This does not show abnormal signal on other  sequences. The largest area measures 13 mm, series 21, image 9,  segment 4A. This may represent posttreatment changes or residual  disease. No new lesions. Adjacent to the gallbladder fossa, there is a  small area of peripheral enhancement with central increased T2 signal  and diffusion restriction which is nonenhancing. This is been present  on multiple prior exams as well, and has slightly decreased in size  overall, currently measuring up to 1.4 cm.     Gallbladder: Decompressed. Gallbladder wall enhancement, without  thickening or stone or pericholecystic fluid.     Spleen: Unremarkable     Kidneys: Tiny benign-appearing cysts.  No suspicious focal mass or  hydronephrosis.     Adrenal glands: Unremarkable     Bowel: Colonic stool. Bowel is not distended     Lymph nodes: Gastrohepatic enlarged lymph nodes, for example measuring  up to 14 mm in short axis, series 21, image 36. Unchanged.     Lung bases: Relatively clear     Bones and soft tissues: No suspicious focal lesions unchanged focus of  enhancement at the inferior endplate of the L2 vertebral body, series  28, image 47.     Abdominal wall: Unremarkable     Ascites: None         IMPRESSION:  1. Posttreatment changes of known pancreatic head mass extending to  the viry hepatis with involvement of the second portion of the  duodenum. There is also involvement of the main portal vein, which is  narrowed, although patent. There is likely involvement of the  gastroduodenal artery. Overall, mass size has mildly decreased  compared to 12/12/2016.  2. Decrease in previously seen masses within the liver compared to  12/12/2016, although there are some areas of focal early arterial  enhancement which are new since 4/14/2017. There is no abnormal signal  on any other sequences and this may represent vascular shunts.  However, new liver lesions cannot be excluded, recommend attention on  follow-up.  3. Unchanged metastatic gastrohepatic lymph nodes.  4. Moderate intrahepatic and extrahepatic biliary dilatation, with  common bile duct stent. The degree of ductal dilation has not  substantially changed since 4/14/2017.  5. Collateral vessels at the viry hepatis, likely due to narrowing of  the main portal vein from the pancreatic mass.           =======================================================================

## 2017-08-03 NOTE — PROGRESS NOTES
Infusion Nursing Note:  Shirin Muller presents today for chemo but treatment was held    Patient seen by provider today: Yes: Ester Echavarria DNP   present during visit today: Not Applicable.    Note: Malorie was scheduled for treatment today but came in with increased weakness, fever, and tachycardia.  WBC, bili, and LFTs elevated and does have abd distension.   Pt lethargic and slow to answer questions.  Sister here with Malorie.  IVAD accessed and IVF bolus started.  Also received a dose of Zosyn IV.  BC were drawn before IVAB administered.  Plan to admitted to .  Report called to  receiving nurse Sharon.    [facility] here to transport via stretcher at 1210, sister went with in ambulance.     Intravenous Access:  Implanted Port.    Treatment Conditions:  Lab Results   Component Value Date    HGB 9.8 08/03/2017     Lab Results   Component Value Date    WBC 15.4 08/03/2017      Lab Results   Component Value Date    ANEU 14.4 08/03/2017     Lab Results   Component Value Date     08/03/2017      Lab Results   Component Value Date     08/03/2017                   Lab Results   Component Value Date    POTASSIUM 3.3 08/03/2017           Lab Results   Component Value Date    MAG 2.0 04/03/2017            Lab Results   Component Value Date    CR 0.51 08/03/2017                   Lab Results   Component Value Date    FANNY 9.3 08/03/2017                Lab Results   Component Value Date    BILITOTAL 4.8 08/03/2017           Lab Results   Component Value Date    ALBUMIN 2.4 08/03/2017                    Lab Results   Component Value Date    ALT 67 08/03/2017           Lab Results   Component Value Date     08/03/2017             Post Infusion Assessment:  Patient tolerated infusion without incident.  Site patent and intact, free from redness, edema or discomfort.  No evidence of extravasations.    Discharge Plan:   Patient discharged to hospital via medical transport in  stable condition accompanied by: self and sister.  Departure Mode: Phillip Newman RN

## 2017-08-03 NOTE — LETTER
8/3/2017       RE: Shirin Muller  [address] [facility] [address]     Dear Colleague,    Thank you for referring your patient, Shirin Muller, to the [facility]. Please see a copy of my visit note below.    Shirin Muller is a 57 year old woman with metastatic pancreatic cancer, currently on treatment with Folfirinox after progression on Gemcitabine/Abraxane. She was in for chemotherapy earlier this week, but infusion was held due to rising LFTs. She is here to follow-up on that.    Oncology HPI:    She was diagnosed with pancreatic cancer in the spring of 2016 after presenting with a 2 to 3-month history of abdominal pain and weight loss. She initiated on treatment with gemcitabine and Abraxane on 05/02/2016 and continued on that until December 2016 when she was found to have metastatic disease involving the liver. She was then initiated on FOLFIRINOX on 12/20/16. Her first cycle was complicated by fatigue, nausea/vomiting. Antiemetics were adjusted with cycle 2. In January 2017, she was found to have a GI bleed secondary to a bleeding duodenal ulcer and infiltrating mass in the duodenum. The ulcer was injected and clipped. She was found to be H. Pylori positive and was initiated on therapy with PPI, carafate, amoxicillin and clarithromycin. She had biliary obstruction in April 2017 and underwent ERCP and  biliary stent placement  Dr. Braden.     Interval history:  Shirin is feeling poorly. Feels much weaker in the past few days. Having temps to 100 at home. Eating and drinking poorly. Notes upper abdominal pain that started a week ago. Is relieved when she takes morphine. Urine is dark. Had a BM yesterday that was pale in color. Had some straining with the BM and was short of breath after this. No short of breath now. No cough or congestion. No chest pain or palpitations. No edema, calf pain or new rash.    Past Medical History:   Diagnosis Date      Biliary stricture, s/p stenting in April 2017      Graves disease      Lesion of liver      Malignant neoplasm of head of pancreas (H) 4/12/2016     Pancreatic disease      Pancreatic mass    hx of duodencal ulcer?h. Pylori + completed abx therapy      Current Outpatient Prescriptions   Medication Sig Dispense Refill     LORazepam (ATIVAN) 0.5 MG tablet Take 1 tablet (0.5 mg) by mouth every 4 hours as needed (Anxiety, Nausea/Vomiting or Sleep) 30 tablet 0     prochlorperazine (COMPAZINE) 10 MG tablet Take 1 tablet (10 mg) by mouth every 6 hours as needed (Nausea/Vomiting) 30 tablet 2     oxyCODONE (ROXICODONE) 5 MG IR tablet Take 1 tablet (5 mg) by mouth every 4 hours as needed for moderate to severe pain 100 tablet 0     morphine (MS CONTIN) 15 MG 12 hr tablet Take 1 tablet (15 mg) by mouth every 12 hours 60 tablet 0     dronabinol (MARINOL) 5 MG capsule Take 1 capsule (5 mg) by mouth 3 times daily (before meals) 90 capsule 0     pantoprazole (PROTONIX) 20 MG EC tablet Take 1 tablet (20 mg) by mouth 2 times daily 60 tablet 3     dexamethasone (DECADRON) 4 MG tablet Take 1 tablet (4 mg) by mouth daily (with breakfast) 3 tablet 0     loratadine (CLARITIN) 10 MG tablet Take 1 tablet (10 mg) by mouth daily Reported on 5/5/2017 30 tablet 3     ondansetron (ZOFRAN-ODT) 8 MG ODT tab Take 1 tablet (8 mg) by mouth every 8 hours as needed for nausea 60 tablet 4     Nutritional Supplements (ENSURE CLEAR) LIQD Take 1 Bottle by mouth 3 times daily (Patient taking differently: Take 5 Bottles by mouth 3 times daily ) 90 Bottle 6     potassium chloride SA (POTASSIUM CHLORIDE) 20 MEQ CR tablet Take 1 tablet (20 mEq) by mouth daily 60 tablet 1     diltiazem 2% in PLO cream, FV COMPOUNDED, 2% GEL Apply topically daily and as needed to external hemorrhoids 30 g 0     docusate sodium (COLACE) 100 MG capsule Take 1 capsule (100 mg) by mouth daily 100 capsule 0     amylase-lipase-protease (CREON) 61802 UNITS CPEP Take 2 capsules (72,000  Units) by mouth 3 times daily (with meals) 180 capsule 1     polyethylene glycol (MIRALAX/GLYCOLAX) powder Take 17 g (1 capful) by mouth daily (Patient taking differently: Take 1 capful by mouth daily as needed ) 119 g 11     lidocaine-prilocaine (EMLA) cream Apply topically as needed for other (Use 30-60 minutes prior to port access) 30 g 0     Exam: appears ill, withdrawn. Blood pressure 126/80, pulse 137, temperature 101.6  F (38.7  C), resp. rate 18, weight 71.8 kg (158 lb 3.2 oz), SpO2 98 %.    Oropharynx is dry. Moderate icterus noted. Neck supple and without adenopathy. Lungs: poor inspiratory effort, no crackles. Heart: tachycardic, regular, no murmur. Abdomen: soft, mildly tender in the mid-upper abd. Extremities: warm, no edema. Is oriented, but slow to respond.    Labs:Results for NATALIIA IBARRA (MRN [id number]) as of 8/3/2017 10:52   Ref. Range 8/3/2017 10:04   Sodium Latest Ref Range: 133 - 144 mmol/L 138   Potassium Latest Ref Range: 3.4 - 5.3 mmol/L 3.3 (L)   Chloride Latest Ref Range: 94 - 109 mmol/L 102   Carbon Dioxide Latest Ref Range: 20 - 32 mmol/L 21   Urea Nitrogen Latest Ref Range: 7 - 30 mg/dL 5 (L)   Creatinine Latest Ref Range: 0.52 - 1.04 mg/dL 0.51 (L)   GFR Estimate Latest Ref Range: >60 mL/min/1.7m2 >90...   GFR Estimate If Black Latest Ref Range: >60 mL/min/1.7m2 >90...   Calcium Latest Ref Range: 8.5 - 10.1 mg/dL 9.3   Anion Gap Latest Ref Range: 3 - 14 mmol/L 14   Albumin Latest Ref Range: 3.4 - 5.0 g/dL 2.4 (L)   Protein Total Latest Ref Range: 6.8 - 8.8 g/dL 8.1   Bilirubin Total Latest Ref Range: 0.2 - 1.3 mg/dL 4.8 (H)   Alkaline Phosphatase Latest Ref Range: 40 - 150 U/L 886 (H)   ALT Latest Ref Range: 0 - 50 U/L 67 (H)   AST Latest Ref Range: 0 - 45 U/L 106 (H)   Glucose Latest Ref Range: 70 - 99 mg/dL 108 (H)   WBC Latest Ref Range: 4.0 - 11.0 10e9/L 15.4 (H)   Hemoglobin Latest Ref Range: 11.7 - 15.7 g/dL 9.8 (L)   Hematocrit Latest Ref Range: 35.0 - 47.0 %  33.6 (L)   Platelet Count Latest Ref Range: 150 - 450 10e9/L 177   RBC Count Latest Ref Range: 3.8 - 5.2 10e12/L 4.36   MCV Latest Ref Range: 78 - 100 fl 77 (L)   MCH Latest Ref Range: 26.5 - 33.0 pg 22.5 (L)   MCHC Latest Ref Range: 31.5 - 36.5 g/dL 29.2 (L)   RDW Latest Ref Range: 10.0 - 15.0 % 22.9 (H)   Diff Method Unknown Automated Method   % Neutrophils Latest Units: % 93.8   % Lymphocytes Latest Units: % 2.9   % Monocytes Latest Units: % 2.0   % Eosinophils Latest Units: % 0.1   % Basophils Latest Units: % 0.3   % Immature Granulocytes Latest Units: % 0.9   Nucleated RBCs Latest Ref Range: 0 /100 0   Absolute Neutrophil Latest Ref Range: 1.6 - 8.3 10e9/L 14.4 (H)   Absolute Lymphocytes Latest Ref Range: 0.8 - 5.3 10e9/L 0.4 (L)   Absolute Monocytes Latest Ref Range: 0.0 - 1.3 10e9/L 0.3   Absolute Eosinophils Latest Ref Range: 0.0 - 0.7 10e9/L 0.0   Absolute Basophils Latest Ref Range: 0.0 - 0.2 10e9/L 0.0   Abs Immature Granulocytes Latest Ref Range: 0 - 0.4 10e9/L 0.1   Absolute Nucleated RBC Unknown 0.0   BLOOD CULTURE Unknown Rpt       Impression/plan:    1. Cholangitis/sepsis  -hx of biliary obstuction, s/p ERCP with stenting in April 2017  -febrile, tachy, but BP stable. Given 1 liter NS bolus and zosyn 3.375 mg in infusion today  -will admit for management including GI consult for stenting. Would discuss with GI whether additional imaging is needed given MRI abdomen a few weeks ago    2. Metastatic pancreatic cancer  -had stable disease on FOLFIRINOX. Hold chemo until cholangitis resolves    3. Cancer pain:  -had been stable on MS Contin 15 mg every 12 hours. Increased abdominal pain now is likely related to cholangitis    4. FEN:   Hypokalemia: chronic, likely r/t poor nutrition  -on oral K 20 meq daily, will need IV replacement today    Anorexia: taking dexamethasone 4 mg daily, marinol 5 mg tid    5. Hx of duodenal ulcer, h. Pylori +, s/p ABX therapy  -continue protonix 20 mg bid      Again, thank  you for allowing me to participate in the care of your patient.      Sincerely,    TEE Villanueva CNP

## 2017-08-03 NOTE — MR AVS SNAPSHOT
After Visit Summary   8/3/2017    Shirin Muller    MRN: [id number]           Patient Information     Date Of Birth          1960        Visit Information        Provider Department      8/3/2017 9:30 AM Ester Echavarria APRN CNP [facility]        Today's Diagnoses     Malignant neoplasm of head of pancreas (H)    -  1    Secondary malignant neoplasm of liver (H)        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)        Fever, unspecified           Follow-ups after your visit        Your next 10 appointments already scheduled     Aug 11, 2017  9:15 AM CDT   Masonic Lab Draw with UC MASONIC LAB DRAW   [facility] Masonic Lab Draw ([facility])    [address]  [address] [facility] [address]   [phone]            Aug 11, 2017 10:00 AM CDT   Infusion 360 with UC ONCOLOGY INFUSION, UC 28 ATC   [facility] ([facility])    [address]  2nd Floor  [facility] [place] [facility] [place] [address]   [phone]            Aug 11, 2017 10:20 AM CDT   (Arrive by 10:05 AM)   Return Visit with TEE Olivas CNP   [facility] ([facility])    [address]  [address] [facility] [address]   [phone]            Aug 25, 2017  9:30 AM CDT   Masonic Lab Draw with UC MASONIC LAB DRAW   [facility] Masonic Lab Draw ([facility])    [address]  [address] [facility] [address]   [phone]            Aug 25, 2017 10:00 AM CDT   Infusion 360 with UC ONCOLOGY INFUSION, UC 12 ATC   [facility] ([facility])    [address]  2nd Floor  [place] [place] [address]   [phone]            Sep 08, 2017  9:30 AM CDT   Masonic Lab Draw with UC MASONIC LAB DRAW   [facility] Masonic Lab Draw ([facility])    [address] [place] [address]  Floor  [facility] [address] [phone]            Sep 08, 2017 10:00 AM CDT   Infusion 360 with UC ONCOLOGY INFUSION, UC 12 ATC   [facility] ([facility])    [address]  2nd Floor  [facility] [id number] [phone]              Who to contact     If you have questions or need follow up information about today's clinic visit or your schedule please contact [facility] directly at [phone].  Normal or non-critical lab and imaging results will be communicated to you by Evodentalhart, letter or phone within 4 business days after the clinic has received the results. If you do not hear from us within 7 days, please contact the clinic through ironSource or phone. If you have a critical or abnormal lab result, we will notify you by phone as soon as possible.  Submit refill requests through ironSource or call your pharmacy and they will forward the refill request to us. Please allow 3 business days for your refill to be completed.          Additional Information About Your Visit        ironSource Information     ironSource gives you secure access to your electronic health record. If you see a primary care provider, you can also send messages to your care team and make appointments. If you have questions, please call your primary care clinic.  If you do not have a primary care provider, please call [phone] and they will assist you.        Care EveryWhere ID     This is your Care EveryWhere ID. This could be used by other organizations to access your [facility] medical records  [id number]        Your Vitals Were     Pulse Temperature Respirations Pulse Oximetry BMI (Body Mass Index)       137 101.6  F (38.7  C) 18 98% 22.07 kg/m2        Blood Pressure from Last 3 Encounters:   08/03/17 126/80   08/03/17 105/70   07/31/17 103/68    Weight from Last 3 Encounters:   08/03/17 71.8 kg (158 lb 3.2 oz)   07/31/17 71.8 kg (158 lb 4.8 oz)   07/14/17 72.3 kg  (159 lb 8 oz)              We Performed the Following     Blood culture     Blood culture          Today's Medication Changes          These changes are accurate as of: 8/3/17 11:41 AM.  If you have any questions, ask your nurse or doctor.               These medicines have changed or have updated prescriptions.        Dose/Directions    ENSURE CLEAR Liqd   This may have changed:  how much to take   Used for:  Malignant neoplasm of head of pancreas (H)        Dose:  1 Bottle   Take 1 Bottle by mouth 3 times daily   Quantity:  90 Bottle   Refills:  6       polyethylene glycol powder   Commonly known as:  MIRALAX/GLYCOLAX   This may have changed:    - when to take this  - reasons to take this   Used for:  Malignant neoplasm of head of pancreas (H)        Dose:  1 capful   Take 17 g (1 capful) by mouth daily   Quantity:  119 g   Refills:  11                Primary Care Provider    [facility]       No address on file        Equal Access to Services     JAI CORDERO : Maxx Spence, wathanh luqadaha, qaybrahel kaalmaolamide frazier, luis e asher . So [facility] [phone].    ATENCIÓN: Si habla español, tiene a bernal disposición servicios gratuitos de asistencia lingüística. Llame al [phone].    We comply with applicable federal civil rights laws and [place] laws. We do not discriminate on the basis of race, color, national origin, age, disability sex, sexual orientation or gender identity.            Thank you!     Thank you for choosing [facility]  for your care. Our goal is always to provide you with excellent care. Hearing back from our patients is one way we can continue to improve our services. Please take a few minutes to complete the written survey that you may receive in the mail after your visit with us. Thank you!             Your Updated Medication List - Protect others around you: Learn how to safely use, store and throw away your  medicines at www.disposemymeds.org.          This list is accurate as of: 8/3/17 11:41 AM.  Always use your most recent med list.                   Brand Name Dispense Instructions for use Diagnosis    amylase-lipase-protease 51932 UNITS Cpep    CREON    180 capsule    Take 2 capsules (72,000 Units) by mouth 3 times daily (with meals)    Malignant neoplasm of head of pancreas (H)       dexamethasone 4 MG tablet    DECADRON    3 tablet    Take 1 tablet (4 mg) by mouth daily (with breakfast)    Need for prophylactic measure, Malignant neoplasm of head of pancreas (H)       diltiazem 2% in PLO cream (FV COMPOUNDED) 2% Gel     30 g    Apply topically daily and as needed to external hemorrhoids    External hemorrhoids       docusate sodium 100 MG capsule    COLACE    100 capsule    Take 1 capsule (100 mg) by mouth daily    External hemorrhoids       dronabinol 5 MG capsule    MARINOL    90 capsule    Take 1 capsule (5 mg) by mouth 3 times daily (before meals)    Anorexia       ENSURE CLEAR Liqd     90 Bottle    Take 1 Bottle by mouth 3 times daily    Malignant neoplasm of head of pancreas (H)       lidocaine-prilocaine cream    EMLA    30 g    Apply topically as needed for other (Use 30-60 minutes prior to port access)    Malignant neoplasm of head of pancreas (H)       loratadine 10 MG tablet    CLARITIN    30 tablet    Take 1 tablet (10 mg) by mouth daily Reported on 5/5/2017    Seasonal allergic rhinitis, unspecified allergic rhinitis trigger       LORazepam 0.5 MG tablet    ATIVAN    30 tablet    Take 1 tablet (0.5 mg) by mouth every 4 hours as needed (Anxiety, Nausea/Vomiting or Sleep)    Malignant neoplasm of head of pancreas (H)       morphine 15 MG 12 hr tablet    MS CONTIN    60 tablet    Take 1 tablet (15 mg) by mouth every 12 hours    Malignant neoplasm of head of pancreas (H)       ondansetron 8 MG ODT tab    ZOFRAN-ODT    60 tablet    Take 1 tablet (8 mg) by mouth every 8 hours as needed for nausea     Malignant neoplasm of head of pancreas (H)       oxyCODONE 5 MG IR tablet    ROXICODONE    100 tablet    Take 1 tablet (5 mg) by mouth every 4 hours as needed for moderate to severe pain    Malignant neoplasm of head of pancreas (H)       pantoprazole 20 MG EC tablet    PROTONIX    60 tablet    Take 1 tablet (20 mg) by mouth 2 times daily    Malignant neoplasm of head of pancreas (H)       polyethylene glycol powder    MIRALAX/GLYCOLAX    119 g    Take 17 g (1 capful) by mouth daily    Malignant neoplasm of head of pancreas (H)       potassium chloride SA 20 MEQ CR tablet    potassium chloride    60 tablet    Take 1 tablet (20 mEq) by mouth daily    Malignant neoplasm of head of pancreas (H)       prochlorperazine 10 MG tablet    COMPAZINE    30 tablet    Take 1 tablet (10 mg) by mouth every 6 hours as needed (Nausea/Vomiting)    Malignant neoplasm of head of pancreas (H)

## 2017-08-03 NOTE — ANESTHESIA CARE TRANSFER NOTE
Patient: Shirin Muller    Procedure(s):  ENDOSCOPIC RETROGRADE CHOLANGIOPANCREATOGRAM, pus removal , stent placement to bile duct x1   - Wound Class: II-Clean Contaminated    Diagnosis: Pancreatic Cancer  Diagnosis Additional Information: No value filed.    Anesthesia Type:   General, ETT, RSI     Note:  Airway :Face Mask  Patient transferred to:PACU  Comments: Anesthesia Care Transfer Note    Patient: Shirin Muller    Transferred to: PACU    Patient vital signs: stable    Airway: none    Monitors placed. VS baseline. PIV, port patent. 8L 02 FM. Patient awake, comfortable. Report given and care transferred to RN.     Ashli Barry CRNA   8/3/2017        Vitals: (Last set prior to Anesthesia Care Transfer)    CRNA VITALS  8/3/2017 1624 - 8/3/2017 1700      8/3/2017             SpO2: 99 %    Resp Rate (observed): 20    EKG: Sinus tachycardia                Electronically Signed By: TEE Hernandez CRNA  August 3, 2017  5:00 PM

## 2017-08-03 NOTE — PROVIDER NOTIFICATION
08/03/17 1400   Call Information   Date of Call 08/03/17   Time of Call 1340   Name of person requesting the team Mone Garay   Title of person requesting team RN   RRT Arrival time 1340   Time RRT ended 1410   Reason for call   Type of RRT Adult   Primary reason for call Sepsis suspected   Sepsis Suspected Elevated Lactate level;Heart Rate > 100;WBC <4 or >12;BMT/Oncology patient with WBC<0.5 or >12 or ANC <500   Was patient transferred from the ED, ICU, or PACU within last 24 hours prior to RRT call? No   SBAR   Situation Patient admitted from clinic for hypotension and tachycardia, one week history of weakness, lactic acid 4.2   Background pancreatic cancer   Notable History/Conditions Cancer   Assessment alert and oriented, Tmax 100.9, tachycardic, blood pressure 96/57   Interventions Fluid bolus   Patient Outcome   Patient Outcome Stabilized on unit   RRT Team   Attending/Primary/Covering Physician Oneyda Sharif   Date Attending Physician notified 08/03/17   Time Attending Physician notified 1340   Lead ELICEO Mehta

## 2017-08-03 NOTE — PROVIDER NOTIFICATION
08/03/17 1600   Post RRT Intervention Assessment   Post RRT Assessment Brought to Surgery   Date Follow Up Done 08/03/17   Time Follow Up Done 1650   Comments patient still in OR

## 2017-08-03 NOTE — OR NURSING
DR Mcintosh asked to place preop orders and complete history and physical in on pt - text paged @ [phone].

## 2017-08-03 NOTE — NURSING NOTE
Chief Complaint   Patient presents with     Port Draw     Port accessed, blood culture x1 drawn. VS taken.      Blood Draw     Labs & blood culture x1 drawn     VS taken- pt noted to be tachycardic in the 130s, elevated temp 100.0. Provider, Ester Echavarria, notified- added blood culture order.   Port accessed by RN with 20g 3/4in Power Port needle. Blood culture drawn, port flushed with NS and Heparin.    2nd blood culture and additional labs drawn from L wrist VPT.      Emily Davis RN

## 2017-08-03 NOTE — PLAN OF CARE
"Problem: Goal Outcome Summary  Goal: Goal Outcome Summary  Outcome: Therapy, unable to show any progress toward functional goals  BP 96/57  Pulse 129  Temp 99.4  F (37.4  C) (Oral)  Resp 18  Ht 1.803 m (5' 11\")  Wt 72.8 kg (160 lb 6.4 oz)  SpO2 99%  BMI 22.37 kg/m2     Patient arrived to  at 1230. Patient alert and oriented, but lethargic. SIRS protocol triggered and lactic acid 4.2. Charge RN called rapid response. NaCl bolus started infusing via port. Denies pain. Denies nausea. Pre op checklist and first wipe completed. Patient transported to OR.       "

## 2017-08-03 NOTE — ANESTHESIA PREPROCEDURE EVALUATION
Anesthesia Evaluation     . Pt has had prior anesthetic. Type: General           ROS/MED HX    ENT/Pulmonary:  - neg pulmonary ROS     Neurologic:  - neg neurologic ROS     Cardiovascular:     (+) ----. : . . fainting (syncope). :. .       METS/Exercise Tolerance:     Hematologic:         Musculoskeletal:         GI/Hepatic:     (+) liver disease, Other GI/Hepatic Pancreatic Cancer      Renal/Genitourinary:         Endo:     (+) thyroid problem (Graves' disease) .      Psychiatric:         Infectious Disease:         Malignancy:         Other:    (+) No chance of pregnancy no H/O Chronic Pain,                   Physical Exam  Normal systems: pulmonary and dental    Airway   Mallampati: II  TM distance: >3 FB  Neck ROM: full    Dental     Cardiovascular   Rhythm and rate: regular and abnormal    PE comment: Tachycardic and diaphoretic    Pulmonary                         Anesthesia Plan      History & Physical Review  History and physical reviewed and following examination; no interval change.    ASA Status:  3 .    NPO Status:  > 8 hours and > 2 hours    Plan for General, ETT and RSI with Intravenous induction. Maintenance will be Balanced.    PONV prophylaxis:  Ondansetron (or other 5HT-3) and Dexamethasone or Solumedrol  Additional equipment: 2nd IV and Videolaryngoscope      Postoperative Care  Postoperative pain management:  IV analgesics.      Consents  Anesthetic plan, risks, benefits and alternatives discussed with:  Patient.  Use of blood products discussed: No .   .        57F with pancreatic cancer and biliary obstruction here for ERCP.  RRT earlier today for SIRS; receiving zosyn.    Tachycardic, lethargic and diaphoretic on exam.    ASA 3.  Plan:  GETA, RSI.    Oneyda Ray MD  Staff Anesthesiologist  Pager [phone]

## 2017-08-03 NOTE — PROGRESS NOTES
[facility], [place]    Sepsis Evaluation Progress Note    Date of Service: 08/03/2017    I was called to see Shirin WallislaJaylyn due to abnormal vital signs triggering the Sepsis SIRS screening alert. She is suspected to have an infection.     Physical Exam    Vital Signs:  Temp: 99.4  F (37.4  C) Temp src: Oral BP: 96/57 Pulse: 129 Heart Rate: 118 Resp: 18 SpO2: 99 % O2 Device: None (Room air)      Lab:  Lactic Acid   Date Value Ref Range Status   08/03/2017 4.2 (HH) 0.7 - 2.1 mmol/L Final     Comment:     Critical Value called to and read back by  LESLYE BARILLAS RN 7C ON 8/3/2017 AT 1335 BY ANM         The patient is at baseline mental status.    The rest of their physical exam is significant for abdominal discomfort, fatigue, fever/chills/sweats.    Assessment and Plan    The SIRS and exam findings are likely due to   sepsis.     ID: The patient is currently on the following antibiotics:  Anti-infectives (Future)    Start     Dose/Rate Route Frequency Ordered Stop    08/03/17 1600  piperacillin-tazobactam (ZOSYN) 3.375 g vial to attach to  mL bag      3.375 g  over 1 Hours Intravenous EVERY 6 HOURS 08/03/17 1255          Current antibiotic coverage is appropriate for source of infection.    Fluid: Fluid bolus ordered.    Lab: Repeat lactic acid ordered for 2 hours from now.      Disposition: The patient will remain on the current unit. We will continue to monitor this patient closely.  TEE Newsome CNP

## 2017-08-03 NOTE — PROGRESS NOTES
Shirin Meyer is a 57 year old woman with metastatic pancreatic cancer, currently on treatment with Folfirinox after progression on Gemcitabine/Abraxane. She was in for chemotherapy earlier this week, but infusion was held due to rising LFTs. She is here to follow-up on that.    Oncology HPI:    She was diagnosed with pancreatic cancer in the spring of 2016 after presenting with a 2 to 3-month history of abdominal pain and weight loss. She initiated on treatment with gemcitabine and Abraxane on 05/02/2016 and continued on that until December 2016 when she was found to have metastatic disease involving the liver. She was then initiated on FOLFIRINOX on 12/20/16. Her first cycle was complicated by fatigue, nausea/vomiting. Antiemetics were adjusted with cycle 2. In January 2017, she was found to have a GI bleed secondary to a bleeding duodenal ulcer and infiltrating mass in the duodenum. The ulcer was injected and clipped. She was found to be H. Pylori positive and was initiated on therapy with PPI, carafate, amoxicillin and clarithromycin. She had biliary obstruction in April 2017 and underwent ERCP and  biliary stent placement  Dr. Braden.     Interval history:  Shirin is feeling poorly. Feels much weaker in the past few days. Having temps to 100 at home. Eating and drinking poorly. Notes upper abdominal pain that started a week ago. Is relieved when she takes morphine. Urine is dark. Had a BM yesterday that was pale in color. Had some straining with the BM and was short of breath after this. No short of breath now. No cough or congestion. No chest pain or palpitations. No edema, calf pain or new rash.    Past Medical History:   Diagnosis Date     Biliary stricture, s/p stenting in April 2017      Graves disease      Lesion of liver      Malignant neoplasm of head of pancreas (H) 4/12/2016     Pancreatic disease      Pancreatic mass    hx of duodencal ulcer?h. Pylori + completed abx therapy      Current  Outpatient Prescriptions   Medication Sig Dispense Refill     LORazepam (ATIVAN) 0.5 MG tablet Take 1 tablet (0.5 mg) by mouth every 4 hours as needed (Anxiety, Nausea/Vomiting or Sleep) 30 tablet 0     prochlorperazine (COMPAZINE) 10 MG tablet Take 1 tablet (10 mg) by mouth every 6 hours as needed (Nausea/Vomiting) 30 tablet 2     oxyCODONE (ROXICODONE) 5 MG IR tablet Take 1 tablet (5 mg) by mouth every 4 hours as needed for moderate to severe pain 100 tablet 0     morphine (MS CONTIN) 15 MG 12 hr tablet Take 1 tablet (15 mg) by mouth every 12 hours 60 tablet 0     dronabinol (MARINOL) 5 MG capsule Take 1 capsule (5 mg) by mouth 3 times daily (before meals) 90 capsule 0     pantoprazole (PROTONIX) 20 MG EC tablet Take 1 tablet (20 mg) by mouth 2 times daily 60 tablet 3     dexamethasone (DECADRON) 4 MG tablet Take 1 tablet (4 mg) by mouth daily (with breakfast) 3 tablet 0     loratadine (CLARITIN) 10 MG tablet Take 1 tablet (10 mg) by mouth daily Reported on 5/5/2017 30 tablet 3     ondansetron (ZOFRAN-ODT) 8 MG ODT tab Take 1 tablet (8 mg) by mouth every 8 hours as needed for nausea 60 tablet 4     Nutritional Supplements (ENSURE CLEAR) LIQD Take 1 Bottle by mouth 3 times daily (Patient taking differently: Take 5 Bottles by mouth 3 times daily ) 90 Bottle 6     potassium chloride SA (POTASSIUM CHLORIDE) 20 MEQ CR tablet Take 1 tablet (20 mEq) by mouth daily 60 tablet 1     diltiazem 2% in PLO cream, FV COMPOUNDED, 2% GEL Apply topically daily and as needed to external hemorrhoids 30 g 0     docusate sodium (COLACE) 100 MG capsule Take 1 capsule (100 mg) by mouth daily 100 capsule 0     amylase-lipase-protease (CREON) 77534 UNITS CPEP Take 2 capsules (72,000 Units) by mouth 3 times daily (with meals) 180 capsule 1     polyethylene glycol (MIRALAX/GLYCOLAX) powder Take 17 g (1 capful) by mouth daily (Patient taking differently: Take 1 capful by mouth daily as needed ) 119 g 11     lidocaine-prilocaine (EMLA) cream  Apply topically as needed for other (Use 30-60 minutes prior to port access) 30 g 0     Exam: appears ill, withdrawn. Blood pressure 126/80, pulse 137, temperature 101.6  F (38.7  C), resp. rate 18, weight 71.8 kg (158 lb 3.2 oz), SpO2 98 %.    Oropharynx is dry. Moderate icterus noted. Neck supple and without adenopathy. Lungs: poor inspiratory effort, no crackles. Heart: tachycardic, regular, no murmur. Abdomen: soft, mildly tender in the mid-upper abd. Extremities: warm, no edema. Is oriented, but slow to respond.    Labs:Results for NATALIIA IBARRA (MRN [id number]) as of 8/3/2017 10:52   Ref. Range 8/3/2017 10:04   Sodium Latest Ref Range: 133 - 144 mmol/L 138   Potassium Latest Ref Range: 3.4 - 5.3 mmol/L 3.3 (L)   Chloride Latest Ref Range: 94 - 109 mmol/L 102   Carbon Dioxide Latest Ref Range: 20 - 32 mmol/L 21   Urea Nitrogen Latest Ref Range: 7 - 30 mg/dL 5 (L)   Creatinine Latest Ref Range: 0.52 - 1.04 mg/dL 0.51 (L)   GFR Estimate Latest Ref Range: >60 mL/min/1.7m2 >90...   GFR Estimate If Black Latest Ref Range: >60 mL/min/1.7m2 >90...   Calcium Latest Ref Range: 8.5 - 10.1 mg/dL 9.3   Anion Gap Latest Ref Range: 3 - 14 mmol/L 14   Albumin Latest Ref Range: 3.4 - 5.0 g/dL 2.4 (L)   Protein Total Latest Ref Range: 6.8 - 8.8 g/dL 8.1   Bilirubin Total Latest Ref Range: 0.2 - 1.3 mg/dL 4.8 (H)   Alkaline Phosphatase Latest Ref Range: 40 - 150 U/L 886 (H)   ALT Latest Ref Range: 0 - 50 U/L 67 (H)   AST Latest Ref Range: 0 - 45 U/L 106 (H)   Glucose Latest Ref Range: 70 - 99 mg/dL 108 (H)   WBC Latest Ref Range: 4.0 - 11.0 10e9/L 15.4 (H)   Hemoglobin Latest Ref Range: 11.7 - 15.7 g/dL 9.8 (L)   Hematocrit Latest Ref Range: 35.0 - 47.0 % 33.6 (L)   Platelet Count Latest Ref Range: 150 - 450 10e9/L 177   RBC Count Latest Ref Range: 3.8 - 5.2 10e12/L 4.36   MCV Latest Ref Range: 78 - 100 fl 77 (L)   MCH Latest Ref Range: 26.5 - 33.0 pg 22.5 (L)   MCHC Latest Ref Range: 31.5 - 36.5 g/dL 29.2 (L)    RDW Latest Ref Range: 10.0 - 15.0 % 22.9 (H)   Diff Method Unknown Automated Method   % Neutrophils Latest Units: % 93.8   % Lymphocytes Latest Units: % 2.9   % Monocytes Latest Units: % 2.0   % Eosinophils Latest Units: % 0.1   % Basophils Latest Units: % 0.3   % Immature Granulocytes Latest Units: % 0.9   Nucleated RBCs Latest Ref Range: 0 /100 0   Absolute Neutrophil Latest Ref Range: 1.6 - 8.3 10e9/L 14.4 (H)   Absolute Lymphocytes Latest Ref Range: 0.8 - 5.3 10e9/L 0.4 (L)   Absolute Monocytes Latest Ref Range: 0.0 - 1.3 10e9/L 0.3   Absolute Eosinophils Latest Ref Range: 0.0 - 0.7 10e9/L 0.0   Absolute Basophils Latest Ref Range: 0.0 - 0.2 10e9/L 0.0   Abs Immature Granulocytes Latest Ref Range: 0 - 0.4 10e9/L 0.1   Absolute Nucleated RBC Unknown 0.0   BLOOD CULTURE Unknown Rpt       Impression/plan:    1. Cholangitis/sepsis  -hx of biliary obstuction, s/p ERCP with stenting in April 2017  -febrile, tachy, but BP stable. Given 1 liter NS bolus and zosyn 3.375 mg in infusion today  -will admit for management including GI consult for stenting. Would discuss with GI whether additional imaging is needed given MRI abdomen a few weeks ago    2. Metastatic pancreatic cancer  -had stable disease on FOLFIRINOX. Hold chemo until cholangitis resolves    3. Cancer pain:  -had been stable on MS Contin 15 mg every 12 hours. Increased abdominal pain now is likely related to cholangitis    4. FEN:   Hypokalemia: chronic, likely r/t poor nutrition  -on oral K 20 meq daily, will need IV replacement today    Anorexia: taking dexamethasone 4 mg daily, marinol 5 mg tid    5. Hx of duodenal ulcer, h. Pylori +, s/p ABX therapy  -continue protonix 20 mg bid

## 2017-08-03 NOTE — IP AVS SNAPSHOT
MRN:[id number]                      After Visit Summary   8/3/2017    Shirin BelldeMafe    MRN: [id number]           Thank you!     Thank you for choosing [facility] for your care. Our goal is always to provide you with excellent care. Hearing back from our patients is one way we can continue to improve our services. Please take a few minutes to complete the written survey that you may receive in the mail after you visit with us. Thank you!        Patient Information     Date Of Birth          1960        Designated Caregiver       Most Recent Value    Caregiver    Will someone help with your care after discharge? yes    Name of designated caregiver Michael Vega    Phone number of caregiver [phone]    Caregiver address [place]      About your hospital stay     You were admitted on:  August 3, 2017 You last received care in the:  [address] [facility]    You were discharged on:  August 5, 2017        Reason for your hospital stay       Cholangitis with bacteremia Klebsiella and Strep angiosus                  Who to Call     For medical emergencies, please call 911.  For non-urgent questions about your medical care, please call your primary care provider or clinic, None  For questions related to your surgery, please call your surgery clinic        Attending Provider     Provider Specialty    Barb Tatum MD Hematology & Oncology       Primary Care Provider    [facility]      After Care Instructions     Activity       Your activity upon discharge: activity as tolerated            Diet       Follow this diet upon discharge: Orders Placed This Encounter      Regular Diet Adult                  Your next 10 appointments already scheduled     Aug 11, 2017  9:15 AM [facility]   [facility] Draw with  [facility] DRAW   [facility] ([facility])    [address]  2nd [address]  [place] [address] [phone]             Aug 11, 2017 10:00 AM CDT   Infusion 360 with UC ONCOLOGY INFUSION, UC 28 ATC   [facility] ([facility])    [address]  [address] [facility] [address]   [phone]            Aug 11, 2017 10:20 AM CDT   (Arrive by 10:05 AM)   Return Visit with TEE Olivas CNP   [facility] ([facility])    [address] [address]   [phone]            Aug 25, 2017  9:30 AM CDT   [facility] Draw with UC [facility] LAB DRAW   [facility] ([facility])    [address]  [address] [facility] [address]   [phone]            Aug 25, 2017 10:00 AM CDT   Infusion 360 with UC ONCOLOGY INFUSION, UC 12 ATC   [facility] ([facility])    [address] [address]   [phone]            Sep 08, 2017  9:30 AM CDT   [facility] Draw with UC [facility] DRAW   [facility] ([facility])    [address] [address]   [phone]            Sep 08, 2017 10:00 AM CDT   Infusion 360 with UC ONCOLOGY INFUSION, UC 12 ATC   [facility] ([facility])    [address] [place] [address]   [phone]              Future tests that were ordered for you     Blood culture       port            Blood culture       peripheral                  Additional Information     If you use hormonal birth control (such as the pill, patch, ring or implants): You'll need a second form of birth control for 7 days (condoms, a diaphragm or contraceptive foam). While in the hospital, you received a medicine called Bridion. Your normal birth control will not work as well for a week after taking this medicine.          Pending Results     Date and Time Order  "Name Status Description    8/5/2017 0745 ABO/Rh type and screen In process     8/5/2017 0100 Blood culture In process     8/5/2017 0100 Blood culture In process     8/3/2017 0956 BLOOD CULTURE Preliminary     8/3/2017 0956 BLOOD CULTURE Preliminary             Statement of Approval     Ordered          08/05/17 1247  I have reviewed and agree with all the recommendations and orders detailed in this document.  EFFECTIVE NOW     Approved and electronically signed by:  Antonino Goldstein MD             Admission Information     Date & Time Provider Department Dept. Phone    8/3/2017 Barb Tatum MD [address] [facility] [phone]      Your Vitals Were     Blood Pressure Pulse Temperature Respirations Height Weight    132/82 (BP Location: Right arm) 80 97.7  F (36.5  C) (Oral) 16 1.803 m (5' 11\") 73.3 kg (161 lb 11.2 oz)    Pulse Oximetry BMI (Body Mass Index)                100% 22.55 kg/m2          "[employer] Information     [employer] gives you secure access to your electronic health record. If you see a primary care provider, you can also send messages to your care team and make appointments. If you have questions, please call your primary care clinic.  If you do not have a primary care provider, please call [phone] and they will assist you.        Care EveryWhere ID     This is your Care EveryWhere ID. This could be used by other organizations to access your [facility] medical records  [id number]        Equal Access to Services     JAI CORDERO AH: Hadii isak Spence, waaxda luqadaha, qaybta kaalmada adeegyada, waxelsa thaddeus lora. So [facility] [phone].    ATENCIÓN: Si habla español, tiene a bernal disposición servicios gratuitos de asistencia lingüística. Llame al [phone].    We comply with applicable federal civil rights laws and [place] laws. We do not discriminate on the basis of race, color, national origin, age, disability sex, sexual orientation or gender " identity.               Review of your medicines      START taking        Dose / Directions    amoxicillin-clavulanate 875-125 MG per tablet   Commonly known as:  AUGMENTIN   Used for:  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)        Dose:  1 tablet   Take 1 tablet by mouth 2 times daily   Quantity:  28 tablet   Refills:  0       levofloxacin 500 MG tablet   Commonly known as:  LEVAQUIN   Used for:  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)        Dose:  500 mg   Take 1 tablet (500 mg) by mouth daily   Quantity:  14 tablet   Refills:  0         CONTINUE these medicines which may have CHANGED, or have new prescriptions. If we are uncertain of the size of tablets/capsules you have at home, strength may be listed as something that might have changed.        Dose / Directions    ENSURE CLEAR Liqd   This may have changed:  how much to take   Used for:  Malignant neoplasm of head of pancreas (H)        Dose:  1 Bottle   Take 1 Bottle by mouth 3 times daily   Quantity:  90 Bottle   Refills:  6       polyethylene glycol powder   Commonly known as:  MIRALAX/GLYCOLAX   This may have changed:    - when to take this  - reasons to take this   Used for:  Malignant neoplasm of head of pancreas (H)        Dose:  1 capful   Take 17 g (1 capful) by mouth daily   Quantity:  119 g   Refills:  11         CONTINUE these medicines which have NOT CHANGED        Dose / Directions    amylase-lipase-protease 71806 UNITS Cpep   Commonly known as:  CREON   Indication:  Pancreatic Insufficiency   Used for:  Malignant neoplasm of head of pancreas (H)        Dose:  2 capsule   Take 2 capsules (72,000 Units) by mouth 3 times daily (with meals)   Quantity:  180 capsule   Refills:  1       dexamethasone 4 MG tablet   Commonly known as:  DECADRON   Used for:  Need for prophylactic measure, Malignant neoplasm of head of pancreas (H)        Dose:  4 mg   Take 1 tablet (4 mg) by mouth daily (with breakfast)   Quantity:  3 tablet    Refills:  0       diltiazem 2% in PLO cream (FV COMPOUNDED) 2% Gel   Used for:  External hemorrhoids        Apply topically daily and as needed to external hemorrhoids   Quantity:  30 g   Refills:  0       docusate sodium 100 MG capsule   Commonly known as:  COLACE   Used for:  External hemorrhoids        Dose:  100 mg   Take 1 capsule (100 mg) by mouth daily   Quantity:  100 capsule   Refills:  0       dronabinol 5 MG capsule   Commonly known as:  MARINOL   Used for:  Anorexia        Dose:  5 mg   Take 1 capsule (5 mg) by mouth 3 times daily (before meals)   Quantity:  90 capsule   Refills:  0       lidocaine-prilocaine cream   Commonly known as:  EMLA   Used for:  Malignant neoplasm of head of pancreas (H)        Apply topically as needed for other (Use 30-60 minutes prior to port access)   Quantity:  30 g   Refills:  0       loratadine 10 MG tablet   Commonly known as:  CLARITIN   Used for:  Seasonal allergic rhinitis, unspecified allergic rhinitis trigger        Dose:  10 mg   Take 1 tablet (10 mg) by mouth daily Reported on 5/5/2017   Quantity:  30 tablet   Refills:  3       LORazepam 0.5 MG tablet   Commonly known as:  ATIVAN   Used for:  Malignant neoplasm of head of pancreas (H)        Dose:  0.5 mg   Take 1 tablet (0.5 mg) by mouth every 4 hours as needed (Anxiety, Nausea/Vomiting or Sleep)   Quantity:  30 tablet   Refills:  0       morphine 15 MG 12 hr tablet   Commonly known as:  MS CONTIN   Used for:  Malignant neoplasm of head of pancreas (H)        Dose:  15 mg   Take 1 tablet (15 mg) by mouth every 12 hours   Quantity:  60 tablet   Refills:  0       ondansetron 8 MG ODT tab   Commonly known as:  ZOFRAN-ODT   Used for:  Malignant neoplasm of head of pancreas (H)        Dose:  8 mg   Take 1 tablet (8 mg) by mouth every 8 hours as needed for nausea   Quantity:  60 tablet   Refills:  4       oxyCODONE 5 MG IR tablet   Commonly known as:  ROXICODONE   Used for:  Malignant neoplasm of head of pancreas (H)         Dose:  5 mg   Take 1 tablet (5 mg) by mouth every 4 hours as needed for moderate to severe pain   Quantity:  100 tablet   Refills:  0       pantoprazole 20 MG EC tablet   Commonly known as:  PROTONIX   Used for:  Malignant neoplasm of head of pancreas (H)        Dose:  20 mg   Take 1 tablet (20 mg) by mouth 2 times daily   Quantity:  60 tablet   Refills:  3       potassium chloride SA 20 MEQ CR tablet   Commonly known as:  potassium chloride   Used for:  Malignant neoplasm of head of pancreas (H)        Dose:  20 mEq   Take 1 tablet (20 mEq) by mouth daily   Quantity:  60 tablet   Refills:  1       prochlorperazine 10 MG tablet   Commonly known as:  COMPAZINE   Used for:  Malignant neoplasm of head of pancreas (H)        Dose:  10 mg   Take 1 tablet (10 mg) by mouth every 6 hours as needed (Nausea/Vomiting)   Quantity:  30 tablet   Refills:  2            Where to get your medicines      Some of these will need a paper prescription and others can be bought over the counter. Ask your nurse if you have questions.     Bring a paper prescription for each of these medications     amoxicillin-clavulanate 875-125 MG per tablet    levofloxacin 500 MG tablet                Protect others around you: Learn how to safely use, store and throw away your medicines at www.disposemymeds.org.             Medication List: This is a list of all your medications and when to take them. Check marks below indicate your daily home schedule. Keep this list as a reference.      Medications           Morning Afternoon Evening Bedtime As Needed    amoxicillin-clavulanate 875-125 MG per tablet   Commonly known as:  AUGMENTIN   Take 1 tablet by mouth 2 times daily                                amylase-lipase-protease 14228 UNITS Cpep   Commonly known as:  CREON   Take 2 capsules (72,000 Units) by mouth 3 times daily (with meals)   Last time this was given:  72,000 Units on 8/5/2017 10:47 AM                                dexamethasone 4  MG tablet   Commonly known as:  DECADRON   Take 1 tablet (4 mg) by mouth daily (with breakfast)   Last time this was given:  4 mg on 8/5/2017  8:35 AM                                diltiazem 2% in PLO cream (FV COMPOUNDED) 2% Gel   Apply topically daily and as needed to external hemorrhoids                                docusate sodium 100 MG capsule   Commonly known as:  COLACE   Take 1 capsule (100 mg) by mouth daily   Last time this was given:  100 mg on 8/5/2017  8:35 AM                                dronabinol 5 MG capsule   Commonly known as:  MARINOL   Take 1 capsule (5 mg) by mouth 3 times daily (before meals)   Last time this was given:  5 mg on 8/5/2017 11:47 AM                                ENSURE CLEAR Liqd   Take 1 Bottle by mouth 3 times daily                                levofloxacin 500 MG tablet   Commonly known as:  LEVAQUIN   Take 1 tablet (500 mg) by mouth daily                                lidocaine-prilocaine cream   Commonly known as:  EMLA   Apply topically as needed for other (Use 30-60 minutes prior to port access)                                loratadine 10 MG tablet   Commonly known as:  CLARITIN   Take 1 tablet (10 mg) by mouth daily Reported on 5/5/2017   Last time this was given:  10 mg on 8/5/2017  8:35 AM                                LORazepam 0.5 MG tablet   Commonly known as:  ATIVAN   Take 1 tablet (0.5 mg) by mouth every 4 hours as needed (Anxiety, Nausea/Vomiting or Sleep)   Last time this was given:  0.5 mg on 8/3/2017  8:46 PM                                morphine 15 MG 12 hr tablet   Commonly known as:  MS CONTIN   Take 1 tablet (15 mg) by mouth every 12 hours   Last time this was given:  15 mg on 8/5/2017  8:35 AM                                ondansetron 8 MG ODT tab   Commonly known as:  ZOFRAN-ODT   Take 1 tablet (8 mg) by mouth every 8 hours as needed for nausea   Last time this was given:  8 mg on 8/3/2017  8:39 PM                                oxyCODONE  5 MG IR tablet   Commonly known as:  ROXICODONE   Take 1 tablet (5 mg) by mouth every 4 hours as needed for moderate to severe pain   Last time this was given:  5 mg on 8/5/2017 11:47 AM                                pantoprazole 20 MG EC tablet   Commonly known as:  PROTONIX   Take 1 tablet (20 mg) by mouth 2 times daily   Last time this was given:  20 mg on 8/5/2017  8:35 AM                                polyethylene glycol powder   Commonly known as:  MIRALAX/GLYCOLAX   Take 17 g (1 capful) by mouth daily                                potassium chloride SA 20 MEQ CR tablet   Commonly known as:  potassium chloride   Take 1 tablet (20 mEq) by mouth daily                                prochlorperazine 10 MG tablet   Commonly known as:  COMPAZINE   Take 1 tablet (10 mg) by mouth every 6 hours as needed (Nausea/Vomiting)

## 2017-08-03 NOTE — H&P
[facility], [place]    History and Physical  Hematology / Oncology     Date of Admission:  8/3/2017    Assessment & Plan   Shirin Muller is a 57 year old woman with metastatic pancreatic cancer currently on treatment with Folfirinox. She has h/o biliary obstruction and stenting. She is admitted from clinic with fever, chills, general malaise, elevated LFTs, leukocytosis, and lactic acidosis all concerning for cholangitis/sepsis.     # Suspected cholangitis, sepsis.   Patient p/w fevers, chills, sweats, general malaise, elevated LFTs and hyperbilirubinemia, leukocytosis, and lactic acidosis. Mild central/low abdominal tenderness to palpation. No nausea/vomiting/diarrhea/constipation. Suspect cholangitis; likely occluded stent with stones, sludge, or possibly tumor ingrowth.   -GI consulted and will take pt for ERCP today.   -NPO.  -IV fluids.   -Monitor lactate.   -Received a dose of zosyn in clinic at ~1030. Will continue with zosyn 3375mg IV q6h.   -F/u BC sent in clinic.   -Antiemetics prn.   -Recheck CBC, CMP in AM.     # Metastatic pancreatic cancer. Has been on treatment with Folfirinox after progression on gemcitabine/abraxane. She was in clinic for chemo on Monday but held d/t rising LFTs. She returned to clinic today for f/u and was found to have above sx and lab abnormalities. Hold chemo until cholangitis resolves, then f/u in clinic to resume.     # Chronic cancer-related pain. Continue home opioid regimen.     # Anorexia. Resume dexamethasone and marinol tomorrow when able to eat/drink again.     # H/o duodenal ulcer, H.pylori +. S/p abx therapy. Continues on PPI.     FEN:   -NS at 100cc/hr  -PRN lyte replacement  -NPO for ERCP    Prophy/Misc:   -VTE: mechanical     Zena Sharif DNP, APRN, CNP  Hematology/Oncology  Pager: [phone]    Code Status   Full Code    Primary Care Physician   Primary hematologist/oncologist: Dr. Gonsales    Chief Complaint   Not  feeling well, fevers, abnormal labs    History of Present Illness   History obtained from chart review and discussed with patient.    Shirin Muller is a 57 year old woman with metastatic pancreatic cancer currently on treatment with Folfirinox. She has h/o biliary obstruction and stenting. She is admitted from clinic with fever, chills, general malaise, elevated LFTs, leukocytosis, and lactic acidosis all concerning for cholangitis/sepsis. Sas been on treatment with Folfirinox after progression on gemcitabine/abraxane. She was in clinic for chemo on Monday but held d/t rising LFTs. She returned to clinic today for f/u and was found to have above sx and lab abnormalities so she was admitted. She has chronic abdominal pain related to tumor. However, she started having a little more/different type pain, feeling fatigued and generally unwell, feverish, sweaty, and chilled with poor appetite in the past couple days. Denies headache, dizziness, shortness of breath, N/V/D/C. See today's clinic note for more detailed oncologic history.     Past Medical History    I have reviewed this patient's medical history and updated it with pertinent information if needed.   Past Medical History:   Diagnosis Date     Biliary stricture      Graves disease      Lesion of liver      Malignant neoplasm of head of pancreas (H) 2016     Pancreatic disease      Pancreatic mass        Past Surgical History   I have reviewed this patient's surgical history and updated it with pertinent information if needed.  Past Surgical History:   Procedure Laterality Date      SECTION       ENDOSCOPIC RETROGRADE CHOLANGIOPANCREATOGRAM N/A 2016    Procedure: COMBINED ENDOSCOPIC RETROGRADE CHOLANGIOPANCREATOGRAPHY, PLACE TUBE/STENT;  Surgeon: Arias Taveras MD;  Location: [facility] OR     ENDOSCOPIC RETROGRADE CHOLANGIOPANCREATOGRAM N/A 2017    Procedure: COMBINED ENDOSCOPIC RETROGRADE CHOLANGIOPANCREATOGRAPHY, PLACE  TUBE/STENT;  Endoscopic Retrograde Cholangiopancreatogram With biliary Stent placement, ballon sweep of bile duct for stone removal;  Surgeon: Tristin Braden MD;  Location: [facility] OR     ESOPHAGOSCOPY, GASTROSCOPY, DUODENOSCOPY (EGD), COMBINED N/A 4/13/2016    Procedure: COMBINED ENDOSCOPIC ULTRASOUND, ESOPHAGOSCOPY, GASTROSCOPY, DUODENOSCOPY (EGD), FINE NEEDLE ASPIRATE/BIOPSY;  Surgeon: Arias Taveras MD;  Location: [facility] OR     VASCULAR SURGERY      right chest       Prior to Admission Medications   Prior to Admission Medications   Prescriptions Last Dose Informant Patient Reported? Taking?   LORazepam (ATIVAN) 0.5 MG tablet   No No   Sig: Take 1 tablet (0.5 mg) by mouth every 4 hours as needed (Anxiety, Nausea/Vomiting or Sleep)   Nutritional Supplements (ENSURE CLEAR) LIQD   No No   Sig: Take 1 Bottle by mouth 3 times daily   Patient taking differently: Take 5 Bottles by mouth 3 times daily    amylase-lipase-protease (CREON) 55038 UNITS CPEP   No No   Sig: Take 2 capsules (72,000 Units) by mouth 3 times daily (with meals)   dexamethasone (DECADRON) 4 MG tablet   No No   Sig: Take 1 tablet (4 mg) by mouth daily (with breakfast)   diltiazem 2% in PLO cream, FV COMPOUNDED, 2% GEL   No No   Sig: Apply topically daily and as needed to external hemorrhoids   docusate sodium (COLACE) 100 MG capsule   No No   Sig: Take 1 capsule (100 mg) by mouth daily   dronabinol (MARINOL) 5 MG capsule   No No   Sig: Take 1 capsule (5 mg) by mouth 3 times daily (before meals)   lidocaine-prilocaine (EMLA) cream   No No   Sig: Apply topically as needed for other (Use 30-60 minutes prior to port access)   loratadine (CLARITIN) 10 MG tablet   No No   Sig: Take 1 tablet (10 mg) by mouth daily Reported on 5/5/2017   morphine (MS CONTIN) 15 MG 12 hr tablet   No No   Sig: Take 1 tablet (15 mg) by mouth every 12 hours   ondansetron (ZOFRAN-ODT) 8 MG ODT tab   No No   Sig: Take 1 tablet (8 mg) by mouth every 8 hours as needed for nausea  "  oxyCODONE (ROXICODONE) 5 MG IR tablet   No No   Sig: Take 1 tablet (5 mg) by mouth every 4 hours as needed for moderate to severe pain   pantoprazole (PROTONIX) 20 MG EC tablet   No No   Sig: Take 1 tablet (20 mg) by mouth 2 times daily   polyethylene glycol (MIRALAX/GLYCOLAX) powder   No No   Sig: Take 17 g (1 capful) by mouth daily   Patient taking differently: Take 1 capful by mouth daily as needed    potassium chloride SA (POTASSIUM CHLORIDE) 20 MEQ CR tablet   No No   Sig: Take 1 tablet (20 mEq) by mouth daily   prochlorperazine (COMPAZINE) 10 MG tablet   No No   Sig: Take 1 tablet (10 mg) by mouth every 6 hours as needed (Nausea/Vomiting)      Facility-Administered Medications: None       Allergies   Allergies   Allergen Reactions     Contrast Dye Nausea and Vomiting     Pt vomiting post IV contrast, Please try Visipaque for next CT scan. 6/24/16 sv       Social History   I have reviewed this patient's social history and updated it with pertinent information if needed. Shirin Muller  reports that she has never smoked. She has never used smokeless tobacco. She reports that she does not drink alcohol or use illicit drugs.    Family History   I have reviewed this patient's family history and updated it with pertinent information if needed.   Family History   Problem Relation Age of Onset     DIABETES Mother        Review of Systems   Negative other than as stated above in HPI.   Physical Exam   Temp: 99.4  F (37.4  C) Temp src: Oral BP: 96/57 Pulse: 129 Heart Rate: 118 Resp: 18 SpO2: 99 % O2 Device: None (Room air)    Vital Signs with Ranges  Temp:  [99.4  F (37.4  C)-102.7  F (39.3  C)] 99.4  F (37.4  C)  Pulse:  [129-140] 129  Heart Rate:  [118] 118  Resp:  [18] 18  BP: ()/(57-80) 96/57  SpO2:  [96 %-99 %] 99 %  160 lbs 6.4 oz    BP 96/57  Pulse 129  Temp 99.4  F (37.4  C) (Oral)  Resp 18  Ht 1.803 m (5' 11\")  Wt 72.8 kg (160 lb 6.4 oz)  SpO2 99%  BMI 22.37 kg/m2  Vitals:    " 08/03/17 1229   Weight: 72.8 kg (160 lb 6.4 oz)       Constitutional: 57 year old woman seen resting in bed. Appears tired, ill-feeling. Wakes to voice but is minimally interactive.   HEENT: NCAT. PERRL, +icteric sclera. MMM.   Respiratory: Non-labored breathing on RA. Lungs CTAB.   Cardiovascular: Mild tachycardia, regular rhythm. No murmur or rub.   GI: Normoactive bowel sounds. Abdomen soft, non-distended, mildly ttp in central/low abdomen.   Skin: Warm and dry.   Musculoskeletal: Extremities grossly normal, non-tender, no edema.   Neuro/Pscyh: Tired but alert, oriented, speech normal, no focal deficits. Flat affect.   Vascular Access: Chest PAC is CDI.     Data   CBC  Recent Labs  Lab 08/03/17  1004 07/31/17  0746   WBC 15.4* 10.0   RBC 4.36 4.07   HGB 9.8* 9.3*   HCT 33.6* 31.5*   MCV 77* 77*   MCH 22.5* 22.9*   MCHC 29.2* 29.5*   RDW 22.9* 21.9*    200     CMP  Recent Labs  Lab 08/03/17  1004 07/31/17  0746    140   POTASSIUM 3.3* 3.3*   CHLORIDE 102 104   CO2 21 27   ANIONGAP 14 9   * 122*   BUN 5* 8   CR 0.51* 0.56   GFRESTIMATED >90Non  GFR Calc >90Non  GFR Calc   GFRESTBLACK >90African American GFR Calc >90African American GFR Calc   FANNY 9.3 9.0   PROTTOTAL 8.1 7.6   ALBUMIN 2.4* 2.4*   BILITOTAL 4.8* 2.3*   ALKPHOS 886* 973*   * 86*   ALT 67* 57*     INR  Recent Labs  Lab 08/03/17  1323   INR 1.21*

## 2017-08-03 NOTE — PROVIDER NOTIFICATION
Notified NP at 1335 AM regarding lactic acid 4.2.      Spoke with: Zena Sharif CNP    Comments: Pt lactic acid 4.2, rapid response called, EHSAN asked to come to bedside.

## 2017-08-03 NOTE — BRIEF OP NOTE
[facility] Brief Operative Note    Pre-operative diagnosis: Cholangitis   Post-operative diagnosis * No post-op diagnosis entered *   Procedure: Procedure(s):  ENDOSCOPIC RETROGRADE CHOLANGIOPANCREATOGRAM, pus removal , stent placement to bile duct x1   - Wound Class: II-Clean Contaminated   Surgeon: Guru Dayna MD   Assistants(s):    Estimated blood loss: None    Specimens: None   Findings: Selective biliary cannulation  Completely occluded stent  Copious amount of pus, stone and sludge was removed  A 10 mm by 4 cm FCSEM wallflex stent was placed and was post dilated     Recommendations  - Will need to monitor clinical course  - To monitor LFTs, amylase and lipase in am  - Will need inpatient IV antibiotics and to monitor culture and sensitivity

## 2017-08-03 NOTE — ANESTHESIA POSTPROCEDURE EVALUATION
Patient: Shirin Muller    Procedure(s):  ENDOSCOPIC RETROGRADE CHOLANGIOPANCREATOGRAM, pus removal , stent placement to bile duct x1   - Wound Class: II-Clean Contaminated    Diagnosis:Pancreatic Cancer  Diagnosis Additional Information: No value filed.    Anesthesia Type:  General, ETT, RSI    Note:  Anesthesia Post Evaluation    Patient location during evaluation: PACU  Patient participation: Able to fully participate in evaluation  Level of consciousness: awake and alert  Pain management: adequate  Airway patency: patent  Cardiovascular status: acceptable  Respiratory status: acceptable  Hydration status: acceptable  PONV: none     Anesthetic complications: None          Last vitals:  Vitals:    08/03/17 1229 08/03/17 1343 08/03/17 1700   BP: 111/63 96/57 122/78   Pulse: 129     Resp: 18 18 20   Temp: 38.3  C (100.9  F) 37.4  C (99.4  F) 36.7  C (98.1  F)   SpO2:  99% 100%         Electronically Signed By: Gael Chopra MD  August 3, 2017  5:43 PM

## 2017-08-03 NOTE — OR NURSING
Call to TING Sharif to clarify Lactate order- she would like it drawn in PACU. Lab called to obtain the sample. Do not need to hold pt in PACU to await results. Pt is resting comfortably with VSS.

## 2017-08-03 NOTE — MR AVS SNAPSHOT
After Visit Summary   8/3/2017    Shirin Muller    MRN: [id number]           Patient Information     Date Of Birth          1960        Visit Information        Provider Department      8/3/2017 10:30 AM UC 17 ATC; UC ONCOLOGY INFUSION [facility]        Today's Diagnoses     Chemotherapy-induced neutropenia (H)    -  1    Malignant neoplasm of head of pancreas (H)           Follow-ups after your visit        Your next 10 appointments already scheduled     Aug 11, 2017  9:15 AM CDT   [facility] Draw with UC [facility] DRAW   [facility] Lab Draw ([facility])    [address] [address]   [phone]            Aug 11, 2017 10:00 AM CDT   Infusion 360 with UC ONCOLOGY INFUSION, UC 28 ATC   [facility] ([facility])    [address] [address]   [phone]            Aug 11, 2017 10:20 AM CDT   (Arrive by 10:05 AM)   Return Visit with TEE Olivas CNP   [facility] ([facility])    [address] [place] [address]   [phone]            Aug 25, 2017  9:30 AM CDT   [facility] Lab Draw with UC [facility] LAB DRAW   [facility] Draw ([facility])    [address] [address]   [phone]            Aug 25, 2017 10:00 AM CDT   Infusion 360 with UC ONCOLOGY INFUSION, UC 12 ATC   [facility] ([facility])    [address] [facility]  2nd [facility] [address]   [phone]            Sep 08, 2017  9:30 AM CDT   [facility] Lab Draw with UC [facility] LAB DRAW   [facility] Draw ([facility])    [address] [address]   [phone]            Sep 08, 2017 10:00 AM CDT    Infusion 360 with  ONCOLOGY INFUSION, UC 12 ATC   [facility] ([facility])    [address] [facility]  2nd Floor  [facility] [address] [phone]              Future tests that were ordered for you today     Open Standing Orders        Priority Remaining Interval Expires Ordered    Oxygen: Nasal cannula Routine 65437/90555 CONTINUOUS  8/3/2017    Blood culture Routine 100/100 CONDITIONAL (SPECIFY)  8/3/2017    Blood culture Routine 100/100 CONDITIONAL (SPECIFY)  8/3/2017    Weigh patient Routine 1/1 DAILY  8/3/2017            Who to contact     If you have questions or need follow up information about today's clinic visit or your schedule please contact [facility] directly at [phone].  Normal or non-critical lab and imaging results will be communicated to you by [employer], letter or phone within 4 business days after the clinic has received the results. If you do not hear from us within 7 days, please contact the clinic through [employer] or phone. If you have a critical or abnormal lab result, we will notify you by phone as soon as possible.  Submit refill requests through [employer] or call your pharmacy and they will forward the refill request to us. Please allow 3 business days for your refill to be completed.          Additional Information About Your Visit        [employer] Information     [employer] gives you secure access to your electronic health record. If you see a primary care provider, you can also send messages to your care team and make appointments. If you have questions, please call your primary care clinic.  If you do not have a primary care provider, please call [phone] and they will assist you.        Care EveryWhere ID     This is your Care EveryWhere ID. This could be used by other organizations to access your [place] medical records  [id number]         Blood Pressure from Last 3 Encounters:   08/03/17 111/63   08/03/17  126/80   08/03/17 94/58    Weight from Last 3 Encounters:   08/03/17 72.8 kg (160 lb 6.4 oz)   08/03/17 71.8 kg (158 lb 3.2 oz)   07/31/17 71.8 kg (158 lb 4.8 oz)              We Performed the Following     Cancer antigen 19-9     CBC with platelets differential     Comprehensive metabolic panel          Today's Medication Changes          These changes are accurate as of: 8/3/17 12:33 PM.  If you have any questions, ask your nurse or doctor.               These medicines have changed or have updated prescriptions.        Dose/Directions    ENSURE CLEAR Liqd   This may have changed:  how much to take   Used for:  Malignant neoplasm of head of pancreas (H)        Dose:  1 Bottle   Take 1 Bottle by mouth 3 times daily   Quantity:  90 Bottle   Refills:  6       polyethylene glycol powder   Commonly known as:  MIRALAX/GLYCOLAX   This may have changed:    - when to take this  - reasons to take this   Used for:  Malignant neoplasm of head of pancreas (H)        Dose:  1 capful   Take 17 g (1 capful) by mouth daily   Quantity:  119 g   Refills:  11                Primary Care Provider    [facility]       No address on file        Equal Access to Services     JAI CORDERO AH: Maxx Spence, johnson flower, luis e love. So [facility] [phone].    ATENCIÓN: Si habla español, tiene a bernal disposición servicios gratuitos de asistencia lingüística. [facility] [phone].    We comply with applicable federal civil rights laws and [place] laws. We do not discriminate on the basis of race, color, national origin, age, disability sex, sexual orientation or gender identity.            Thank you!     Thank you for choosing [facility]  for your care. Our goal is always to provide you with excellent care. Hearing back from our patients is one way we can continue to improve our services. Please take a few minutes to complete the  written survey that you may receive in the mail after your visit with us. Thank you!             Your Updated Medication List - Protect others around you: Learn how to safely use, store and throw away your medicines at www.disposemymeds.org.          This list is accurate as of: 8/3/17 12:33 PM.  Always use your most recent med list.                   Brand Name Dispense Instructions for use Diagnosis    amylase-lipase-protease 01323 UNITS Cpep    CREON    180 capsule    Take 2 capsules (72,000 Units) by mouth 3 times daily (with meals)    Malignant neoplasm of head of pancreas (H)       dexamethasone 4 MG tablet    DECADRON    3 tablet    Take 1 tablet (4 mg) by mouth daily (with breakfast)    Need for prophylactic measure, Malignant neoplasm of head of pancreas (H)       diltiazem 2% in PLO cream (FV COMPOUNDED) 2% Gel     30 g    Apply topically daily and as needed to external hemorrhoids    External hemorrhoids       docusate sodium 100 MG capsule    COLACE    100 capsule    Take 1 capsule (100 mg) by mouth daily    External hemorrhoids       dronabinol 5 MG capsule    MARINOL    90 capsule    Take 1 capsule (5 mg) by mouth 3 times daily (before meals)    Anorexia       ENSURE CLEAR Liqd     90 Bottle    Take 1 Bottle by mouth 3 times daily    Malignant neoplasm of head of pancreas (H)       lidocaine-prilocaine cream    EMLA    30 g    Apply topically as needed for other (Use 30-60 minutes prior to port access)    Malignant neoplasm of head of pancreas (H)       loratadine 10 MG tablet    CLARITIN    30 tablet    Take 1 tablet (10 mg) by mouth daily Reported on 5/5/2017    Seasonal allergic rhinitis, unspecified allergic rhinitis trigger       LORazepam 0.5 MG tablet    ATIVAN    30 tablet    Take 1 tablet (0.5 mg) by mouth every 4 hours as needed (Anxiety, Nausea/Vomiting or Sleep)    Malignant neoplasm of head of pancreas (H)       morphine 15 MG 12 hr tablet    MS CONTIN    60 tablet    Take 1 tablet (15 mg)  by mouth every 12 hours    Malignant neoplasm of head of pancreas (H)       ondansetron 8 MG ODT tab    ZOFRAN-ODT    60 tablet    Take 1 tablet (8 mg) by mouth every 8 hours as needed for nausea    Malignant neoplasm of head of pancreas (H)       oxyCODONE 5 MG IR tablet    ROXICODONE    100 tablet    Take 1 tablet (5 mg) by mouth every 4 hours as needed for moderate to severe pain    Malignant neoplasm of head of pancreas (H)       pantoprazole 20 MG EC tablet    PROTONIX    60 tablet    Take 1 tablet (20 mg) by mouth 2 times daily    Malignant neoplasm of head of pancreas (H)       polyethylene glycol powder    MIRALAX/GLYCOLAX    119 g    Take 17 g (1 capful) by mouth daily    Malignant neoplasm of head of pancreas (H)       potassium chloride SA 20 MEQ CR tablet    potassium chloride    60 tablet    Take 1 tablet (20 mEq) by mouth daily    Malignant neoplasm of head of pancreas (H)       prochlorperazine 10 MG tablet    COMPAZINE    30 tablet    Take 1 tablet (10 mg) by mouth every 6 hours as needed (Nausea/Vomiting)    Malignant neoplasm of head of pancreas (H)

## 2017-08-04 NOTE — PROGRESS NOTES
[facility], [place]   Antimicrobial Management Team (AMT) Gram-Negative Bacteremia Note              To: Heme/Onc  Unit: 7C  Allergies   Allergen Reactions     Contrast Dye Nausea and Vomiting     Pt vomiting post IV contrast, Please try Visipaque for next CT scan. 6/24/16 sv       Positive blood culture resulted from Verigene  Organism identified: K. pneumoniae  Resistance gene detected: None    Brief Summary: Shirin Muller is a 56yo woman with PMH significant for pancreatic cancer on Folfirinox (due 8/3 but on hold d/t abnl lfts), biliary obstruction and stenting (4/19/2017)who was admitted from clinic on 8/3/2017 for fever, chills, leukocytosis, and lactic acidosis c/f sepsis 2/2 cholangitis. She underwent an ERCP finding a completely occluded stent and underwent pus, stone, and sludge was removal and a stent was replacement. Central and peripheral blood culture collected on admission became positive for GNR and GPCs which verigene detected the GNR as K. Pneumoniae and the GPC as undetected. She was then started on piperacillin/tazobactam on 8/3 for IAI and vancomycin on 8/4 for GPC bacteremia.     Assessment:   Polymicrobial bacteremia (K pneumoniae and GPC species pending) 2/2 cholangitis. Given source control with the ERCP and antibiotic therapy she has deferversed clinically. Would have anticipated enterococcus as the most likely GPC, but was not detected by verigene. Low suspicion for a GPC organism requiring vancomycin, but reasonable to continue empirically. Would repeat set of blood culture to assess clearance of bacteremia.     Recommendations:  1. Obtain repeat set of blood cultures (central and peripheral)  2. Continue empiric vancomycin and piperacillin/tazobactam for now deescalating antibiotics pending culture data  3. Complete a 14 day course for first negative set of blood cultures.    Patient will be followed by Henna Bryant, Reina (please contact if further  questions)  AMT pager: [phone]    Henna Bryant, Reina  Antimicrobial Stewardship & Infectious Diseases Pharmacist  Office Ph: [phone]  Pager: [phone]      Vitals and other clinical features  Vitals       08/02 0700  -  08/03 0659 08/03 0700  -  08/04 0659 08/04 0700  -  08/04 1034   Most Recent    Temp ( F)   96.6 -  102.7      96.9     96.9 (36.1)    Pulse   88 -  140      90     90    Heart Rate   103 -  118       105    Resp   15 -  22      19     19    BP   94/58 -  (!) 139/91      135/82     135/82    SpO2 (%)   96 -  100      100     100          Current Antibiotics  Anti-infectives (Future)    Start     Dose/Rate Route Frequency Ordered Stop    08/04/17 1000  vancomycin (VANCOCIN) 1,250 mg in NaCl 0.9 % 250 mL intermittent infusion      1,250 mg Intravenous EVERY 12 HOURS 08/04/17 0950      08/03/17 1600  piperacillin-tazobactam (ZOSYN) 3.375 g vial to attach to  mL bag      3.375 g  over 1 Hours Intravenous EVERY 6 HOURS 08/03/17 1255          Labs  Recent Labs   Lab Test  08/04/17   0735  08/03/17   1004  07/31/17   0746  07/14/17   0854  07/10/17   1719  06/30/17   0757  06/16/17   0657   WBC  11.3*  15.4*  10.0  7.0  5.0  10.1  10.0   ANEU   --   14.4*  9.0*  5.8  3.2  8.2  7.8   ALYM   --   0.4*  0.2*  0.5*  0.7*  0.9  0.9   PATRICE   --   0.3  0.8  0.6  1.0  0.7  0.7   AEOS   --   0.0  0.1  0.0  0.0  0.0  0.0   HGB  7.7*  9.8*  9.3*  9.9*  9.6*  10.2*  9.9*   HCT  25.8*  33.6*  31.5*  33.1*  32.1*  34.5*  33.7*   MCV  75*  77*  77*  78  79  81  83   PLT  113*  177  200  130*  110*  148*  208       Estimated Creatinine Clearance: 116.8 mL/min (based on Cr of 0.6).  Recent Labs   Lab Test  08/04/17 0735 08/03/17 2124  08/03/17   1004  07/31/17   0746  07/14/17   0854  07/10/17   1719   CR  0.60  0.51*  0.51*  0.56  0.61  0.54       Recent Labs   Lab Test  08/04/17 0735 08/03/17 2124  08/03/17   1004  07/31/17   0746  07/14/17   0854  07/10/17   1719   BILITOTAL  2.4*  3.3*  4.8*   2.3*  1.1  0.4   ALKPHOS  580*  679*  886*  973*  834*  792*   ALBUMIN  1.6*  1.8*  2.4*  2.4*  2.5*  2.7*   AST  63*  72*  106*  86*  104*  63*   ALT  46  52*  67*  57*  81*  75*       Recent Labs   Lab Test  08/03/17   1750  08/03/17   1323   LACT  1.9  4.2*     Culture results with specimen source    Recent Labs  Lab 08/03/17  1004 08/03/17  0946   CULT Cultured on the 1st day of incubation: Gram negative rodsCultured on the 1st day of incubation: Gram positive cocciCritical Value/Significant Value, preliminary result only, called to and read back by Megan Dressler RN 7C @ 0716. 08/04/17Called to [facility], 8/4/17 @1027. [facility] [place]POSITIVE for KLEBSIELLA PNEUMONIAE by Verigene multiplex nucleic acidtest. Final identification and antimicrobial susceptibility testingwill be verified by standard methods.Specimen tested with Verigene multiplex, gram-negative blood culturenucleic acid test for the following targets: Acinetobacter sp.,Citrobacter sp., Enterobacter sp., Proteus sp., E. coli, K.pneumoniae/oxytoca, P. aeruginosa, and the following resistancemarkers: CTXM, KPC, NDM, VIM, IMP and OXA.NEGATIVE for the following: Staphylococcus spp., Staph aureus, Staphepidermidis, Staph lugdunensis, Streptococcus spp., Strep pneumoniae,Strep pyogenes, Strep agalactiae, Strep anginosus group, Enter ococcusfaecalis, Enterococcus faecium, and Listeria spp. by Verigenemultiplex nucleic acid test. Final identification and antimicrobialsusceptibility testing will be verified by standard methods.Critical Value/Significant Value called to and read back by Joel FENTON 7C @ 1012. 08/04/17* Cultured on the 1st day of incubation: Gram positive cocciCultured on the 1st day of incubation: Gram negative rodsCritical Value/Significant Value, preliminary result only, called to and read back by Maribel Kaur RN 7C @ 1008. 08/04/17*   SDES Blood Left Arm Blood Port       Urine Studies     Recent Labs   Lab Test   01/27/17   0025  01/24/17   1913  06/13/16   1725  04/12/16   1705   URINEPH  5.5  6.5  5.0  7.0   LEUKEST  Negative  Negative  Moderate*  Negative   WBCU  2   --   21*  2     Last check of C difficile  C Diff Toxin B PCR   Date Value Ref Range Status   01/24/2017  NEG Final    Negative  Negative: Clostridium difficile target DNA sequences NOT detected, presumed   negative for Clostridium difficile toxin B or the number of bacteria present   may be below the limit of detection for the test.   FDA approved assay performed using GEN[employer] GeneCallmyNamepert real-time PCR.   A negative result does not exclude actual disease due to Clostridium difficile   and may be due to improper collection, handling and storage of the specimen or   the number of organisms in the specimen is below the detection limit of the   assay.         Imaging:  Xr Surgery Baylee G/t 5 Min Fluoro W Stills    Result Date: 8/3/2017  This exam was marked as non-reportable because it will not be read by a radiologist or a [place] non-radiologist provider.

## 2017-08-04 NOTE — PROGRESS NOTES
"  GASTROENTEROLOGY PROGRESS NOTE    Date of Admission: 8/3/2017  Reason for Admission: cholangitis      Assessment:  57 year old female with a history of metastatic pancreatic cancer who was seen today in oncology clinic for chemotherapy but was found to be tachycardic and febrile - admitted for suspected cholangitis with elevated liver tests (Tbili 4.8, WBC 15K). S/p urgent ERCP 8/3 with copious amount of pus, stone and sludge which was removed and 10mm x 4cm FCSEM wallflex stent was placed and dilated. LFT improved today.    Blood cultures did return positive with GPC (ID pending) and GNR (Klebsiella Pneumoniae), on Zosyn + Vanco      Recommendations:  ADAT  Cont abx x 14 days total  Trend LFT  No specific GI follow up needed - please follow up with oncology team  Discussed with primary team    The inpatient gastroenterology service will sign off at this time. Please call or repost with questions or if status changes. Thank you for allowing us to participate in the care of this patient.      The patient was discussed and plan agreed upon with GI staff, Dr Mcintosh.      Cookie Crockett PA-C   Advanced Endoscopy/Pancreaticobiliary [facility]  [facility]  Pager *[phone]  _______________________________________________________________      Subjective: Patient seen and examined at 0930. Patient reports that she is feeling much better today. Denies abdominal pain chest pain, SOB, nausea or vomiting. Tolerating CLD this morning.    ROS:   4 pt ROS negative unless noted in subjective.     Objective:  Blood pressure 135/82, pulse 90, temperature 96.9  F (36.1  C), temperature source Oral, resp. rate 19, height 1.803 m (5' 11\"), weight 73.3 kg (161 lb 11.2 oz), SpO2 100 %.  Gen: NAD, comfortable, eating breakfast  HEENT: No icterus  Resp: Clear bilaterally  CV: RRR  Abd: Soft, NT, ND.   Ext: WWP  Skin: No jaundice      PROCEDURES:  8/4 - ERCP Dr. Mcintosh  -Selective biliary " cannulation  -Completely occluded stent  -Copious amount of pus, stone and sludge was removed  -A 10 mm by 4 cm FCSEM wallflex stent was placed and was post dilated     LABS:  BMP  Recent Labs  Lab 08/04/17 0735 08/03/17 2124 08/03/17  1004 07/31/17  0746    142 138 140   POTASSIUM 3.5 3.8 3.3* 3.3*   CHLORIDE 111* 109 102 104   FANNY 8.4* 7.7* 9.3 9.0   CO2 26 23 21 27   BUN 5* 5* 5* 8   CR 0.60 0.51* 0.51* 0.56   GLC 99 267* 108* 122*     CBC  Recent Labs  Lab 08/04/17 0735 08/03/17 1004 07/31/17  0746   WBC 11.3* 15.4* 10.0   RBC 3.43* 4.36 4.07   HGB 7.7* 9.8* 9.3*   HCT 25.8* 33.6* 31.5*   MCV 75* 77* 77*   MCH 22.4* 22.5* 22.9*   MCHC 29.8* 29.2* 29.5*   RDW 22.8* 22.9* 21.9*   * 177 200     INR  Recent Labs  Lab 08/03/17  1323   INR 1.21*     LFTs  Recent Labs  Lab 08/04/17 0735 08/03/17 2124 08/03/17 1004 07/31/17  0746   ALKPHOS 580* 679* 886* 973*   AST 63* 72* 106* 86*   ALT 46 52* 67* 57*   BILITOTAL 2.4* 3.3* 4.8* 2.3*   PROTTOTAL 6.1* 6.5* 8.1 7.6   ALBUMIN 1.6* 1.8* 2.4* 2.4*      PANC  Recent Labs  Lab 08/04/17 0735 08/03/17 2124   LIPASE  --  76   AMYLASE 24* 38         IMAGING:  MRI ABDOMEN 7/7/18      IMPRESSION:  1. Posttreatment changes of known pancreatic head mass extending to  the viry hepatis with involvement of the second portion of the  duodenum. There is also involvement of the main portal vein, which is  narrowed, although patent. There is likely involvement of the  gastroduodenal artery. Overall, mass size has mildly decreased  compared to 12/12/2016.  2. Decrease in previously seen masses within the liver compared to  12/12/2016, although there are some areas of focal early arterial  enhancement which are new since 4/14/2017. There is no abnormal signal  on any other sequences and this may represent vascular shunts.  However, new liver lesions cannot be excluded, recommend attention on  follow-up.  3. Unchanged metastatic gastrohepatic lymph nodes.  4. Moderate  intrahepatic and extrahepatic biliary dilatation, with  common bile duct stent. The degree of ductal dilation has not  substantially changed since 4/14/2017.  5. Collateral vessels at the viry hepatis, likely due to narrowing of  the main portal vein from the pancreatic mass.

## 2017-08-04 NOTE — PLAN OF CARE
Problem: Goal Outcome Summary  Goal: Goal Outcome Summary  Outcome: No Change  Vitals:     08/03/17 1915 08/03/17 1930 08/03/17 2000 08/03/17 2030   BP: (!) 139/91 125/85 123/90 136/82   BP Location: Left arm Left arm Left arm Left arm   Pulse: 104         Resp: 15 16 21 21   Temp:           TempSrc:           SpO2: 100% 98% 100% 100%   Weight:           Height:             Pt arrived from PACU at 1830. Pt A/O, VSS on RA. Capno WNL. Pain managed w/scheduled morphine. Denies nausea, tolerating CLD. Ambulate to bathroom x1 and voided 1000+ml mary/orange urine. Potassium replacement started in PACU, finished last bag tonight. Labs ordered for recheck. Continue to monitor and w/POC.

## 2017-08-04 NOTE — PHARMACY-VANCOMYCIN DOSING SERVICE
Pharmacy Vancomycin Initial Note  Date of Service 2017  Patient's  1960  57 year old, female    Indication: Bacteremia    Current estimated CrCl = Estimated Creatinine Clearance: 116.8 mL/min (based on Cr of 0.6).    Creatinine for last 3 days  8/3/2017: 10:04 AM Creatinine 0.51 mg/dL;  9:24 PM Creatinine 0.51 mg/dL  2017:  7:35 AM Creatinine 0.60 mg/dL    Recent Vancomycin Level(s) for last 3 days  No results found for requested labs within last 72 hours.      Vancomycin IV Administrations (past 72 hours)      No vancomycin orders with administrations in past 72 hours.                Nephrotoxins and other renal medications (Future)    Start     Dose/Rate Route Frequency Ordered Stop    17 1000  vancomycin (VANCOCIN) 1,250 mg in NaCl 0.9 % 250 mL intermittent infusion      1,250 mg Intravenous EVERY 12 HOURS 17 0950      17 1600  piperacillin-tazobactam (ZOSYN) 3.375 g vial to attach to  mL bag      3.375 g  over 1 Hours Intravenous EVERY 6 HOURS 17 1255            Contrast Orders - past 72 hours (72h ago through future)    Start     Dose/Rate Route Frequency Ordered Stop    17 1645  iopamidol (ISOVUE-250) solution  Status:  Discontinued        PRN 17 1705 17 1819                Plan:  1.  Start vancomycin  1250 mg IV q12h. (17mg/kg)  2.  Goal Trough Level: 15-20 mg/L   3.  Pharmacy will check trough levels as appropriate in 1-3 Days.    4. Serum creatinine levels will be ordered daily for the first week of therapy and at least twice weekly for subsequent weeks.    5. [place] method utilized to dose vancomycin therapy: Method 2     Eleni Tobar

## 2017-08-04 NOTE — PROVIDER NOTIFICATION
Notified NP at 1010, 1014 AM regarding critical results read back.      Spoke with: Zena Sharif CNP    Comments:   1010 NP notified that pt that pt port came back with positive blood cultures for gram negative rods and gram positive cocci.  1014 NP notified that gram negative rods were identified at klebsiella pneumoniae.

## 2017-08-04 NOTE — PLAN OF CARE
Problem: Goal Outcome Summary  Goal: Goal Outcome Summary  Outcome: Therapy, progress toward functional goals is gradual  VSS. Denies pain but on scheduled pain meds. Up in room with SBA/1 assist. Pt tolerated CL, now advanced to regular diet. IV abx given. Port not drawing blood, vascular access RN consulted, recommended CXR and TPA if it's in the right place.    Addendum 6:50 PM  TPA #2 placed when first TPA did not work (there was a sluggish pink tinged return after 2hrs). Continue to monitor port patency.

## 2017-08-04 NOTE — PROVIDER NOTIFICATION
08/03/17 2000   Post RRT Intervention Assessment   Post RRT Assessment Stable/Improved   Comments lactic recheck 1.9 post ERCP

## 2017-08-04 NOTE — PROGRESS NOTES
[facility], [place]    Hematology / Oncology Progress Note    Date of Admission: 8/3/2017  Hospital Day #: 1      Assessment & Plan   Shirin Muller is a 57 year old woman with metastatic pancreatic cancer currently on treatment with Folfirinox. She has h/o biliary obstruction and stenting. She was admitted from clinic with fever, chills, general malaise, elevated LFTs, leukocytosis, and lactic acidosis all concerning for cholangitis/sepsis.      # Cholangitis.  # Bloodstream infection with sepsis.    Patient p/w fevers, chills, sweats, general malaise, elevated LFTs and hyperbilirubinemia, leukocytosis, and lactic acidosis. Mild central/low abdominal tenderness to palpation. No nausea/vomiting/diarrhea/constipation. Feeling better today.  -GI consulted and did ERCP on 8/3 that demonstrated completely occluded stent with copious amount of pus, stone, and sludge that were removed. A 10mm x 4cm FCSEM wallflex stent was placed and was post dilated. Since then, pt has been afebrile and has improving bili/LFTs today.   -BC 8/3 +GNRs and GPCs. Continue zosyn. Add vancomycin for add'l GPC coverage. Daily BC x3.  Plan to discharge on IV abx to complete full 14-day course from first negative BC. (Has a port)  -Tolerating CLD. Advance to reg diet.   -IV fluids.   -Antiemetics prn.   -CBC, CMP daily.      # Metastatic pancreatic cancer. Has been on treatment with Folfirinox after progression on gemcitabine/abraxane. She was in clinic for chemo on Monday but held d/t rising LFTs. She returned to clinic today for f/u and was found to have above sx and lab abnormalities. Hold chemo until cholangitis resolves, then f/u in clinic to resume.   -Currently has f/u appt scheduled next Friday.      # Chronic cancer-related pain. Continue home opioid regimen.      # Anorexia. Resume home dexamethasone and marinol today.      # H/o duodenal ulcer, H.pylori +. S/p abx therapy. Continues on PPI.       FEN:   -NS at 100cc/hr  -PRN lyte replacement  -CLD, advance to reg diet as tolerated     Prophy/Misc:   -VTE: mechanical     Code status: FULL   Disposition: Anticipate d/c home tomorrow on IV abx if stable/improving and no further positive cx.    Zena Sharif DNP, APRN, CNP  Hematology/Oncology  Pager: [phone]    Interval History   Jose Antonio reports feeling better since ERCP yesterday. Denies fever, chills, sweats, HA, dizziness, SOB, abdominal pain, n/v/d/c, extremity swelling. She needed a little supplemental O2 last night but sats in high 90%s on RA this AM. She has mild cough since procedure. She is tolerated clear liquid diet. Reports dark, brownish urine. No other immediate concerns.     Physical Exam   Temp: 96.9  F (36.1  C) Temp src: Oral BP: 135/82 Pulse: 90 Heart Rate: 105 Resp: 19 SpO2: 100 % O2 Device: Nasal cannula Oxygen Delivery: 1.5 LPM  Vitals:    08/03/17 1229 08/04/17 0857   Weight: 72.8 kg (160 lb 6.4 oz) 73.3 kg (161 lb 11.2 oz)     Vital Signs with Ranges  Temp:  [96.6  F (35.9  C)-100.9  F (38.3  C)] 96.9  F (36.1  C)  Pulse:  [] 90  Heart Rate:  [103-118] 105  Resp:  [15-22] 19  BP: ()/(57-91) 135/82  SpO2:  [98 %-100 %] 100 %  I/O last 3 completed shifts:  In: 1423.33 [I.V.:1123.33; IV Piggyback:300]  Out: 1000 [Urine:1000]    Constitutional: 57 year old woman seen resting comfortably in bed in NAD. Appears to be feeling better today. Alert and appropriately interactive.   HEENT: NCAT. PERRL, mildly icteric sclera. MMM.   Respiratory: Non-labored breathing on RA. Lungs CTAB.   Cardiovascular: RRR. No murmur or rub.   GI: Normoactive bowel sounds. Abdomen soft, non-distended, non-tender to palpation.   Skin: Warm and dry.   Musculoskeletal: Extremities grossly normal, non-tender, no edema.   Neuro/Pscyh: Alert, oriented, speech normal, no focal deficits.   Vascular Access: Chest PAC site is [facility].     Medications   Current Facility-Administered Medications   Medication      vancomycin (VANCOCIN) 1,250 mg in NaCl 0.9 % 250 mL intermittent infusion     amylase-lipase-protease (CREON 36) 44673 UNITS per capsule 72,000 Units     dexamethasone (DECADRON) tablet 4 mg     docusate sodium (COLACE) capsule 100 mg     dronabinol (MARINOL) capsule 5 mg     loratadine (CLARITIN) tablet 10 mg     LORazepam (ATIVAN) tablet 0.5 mg     morphine (MS CONTIN) 12 hr tablet 15 mg     oxyCODONE (ROXICODONE) IR tablet 5 mg     pantoprazole (PROTONIX) EC tablet 20 mg     polyethylene glycol (MIRALAX/GLYCOLAX) powder 17 g     0.9% sodium chloride infusion     prochlorperazine (COMPAZINE) tablet 5-10 mg    Or     prochlorperazine (COMPAZINE) injection 5-10 mg     ondansetron (ZOFRAN) injection 8 mg    Or     ondansetron (ZOFRAN-ODT) ODT tab 8 mg     piperacillin-tazobactam (ZOSYN) 3.375 g vial to attach to  mL bag     potassium chloride SA (K-DUR/KLOR-CON M) CR tablet 20-40 mEq     potassium chloride (KLOR-CON) Packet 20-40 mEq     potassium chloride 10 mEq in 100 mL intermittent infusion     potassium chloride 10 mEq in 100 mL intermittent infusion with 10 mg lidocaine     potassium chloride 20 mEq in 50 mL intermittent infusion     magnesium sulfate 4 g in 100 mL sterile water (premade)     sodium phosphate 15 mmol in D5W intermittent infusion     sodium phosphate 20 mmol in D5W intermittent infusion     sodium phosphate 25 mmol in D5W intermittent infusion     naloxone (NARCAN) injection 0.1-0.4 mg     lidocaine 1 % 1 mL     lidocaine (LMX4) kit     lidocaine (LMX4) kit     lactated ringers infusion     lidocaine 1 % 1 mL     lidocaine (LMX4) kit     sodium chloride (PF) 0.9% PF flush 3 mL     sodium chloride (PF) 0.9% PF flush 3 mL     sodium chloride (PF) 0.9% PF flush 3 mL     May continue current IV fluid if patient has IV fluids infusing until discharge.     sodium chloride (PF) 0.9% PF flush 3 mL     naloxone (NARCAN) injection 0.1-0.4 mg     Facility-Administered Medications Ordered in Other  Encounters   Medication     heparin 100 UNIT/ML injection 5 mL       Data   CBC  Recent Labs  Lab 08/04/17  0735 08/03/17  1004 07/31/17  0746   WBC 11.3* 15.4* 10.0   RBC 3.43* 4.36 4.07   HGB 7.7* 9.8* 9.3*   HCT 25.8* 33.6* 31.5*   MCV 75* 77* 77*   MCH 22.4* 22.5* 22.9*   MCHC 29.8* 29.2* 29.5*   RDW 22.8* 22.9* 21.9*   * 177 200     CMP  Recent Labs  Lab 08/04/17  0735 08/03/17  2124 08/03/17  1004 07/31/17  0746    142 138 140   POTASSIUM 3.5 3.8 3.3* 3.3*   CHLORIDE 111* 109 102 104   CO2 26 23 21 27   ANIONGAP 7  --  14 9   GLC 99 267* 108* 122*   BUN 5* 5* 5* 8   CR 0.60 0.51* 0.51* 0.56   GFRESTIMATED >90Non  GFR Calc >90Non  GFR Calc >90Non  GFR Calc >90Non  GFR Calc   GFRESTBLACK >90African American GFR Calc >90African American GFR Calc >90African American GFR Calc >90African American GFR Calc   FANNY 8.4* 7.7* 9.3 9.0   PROTTOTAL 6.1* 6.5* 8.1 7.6   ALBUMIN 1.6* 1.8* 2.4* 2.4*   BILITOTAL 2.4* 3.3* 4.8* 2.3*   ALKPHOS 580* 679* 886* 973*   AST 63* 72* 106* 86*   ALT 46 52* 67* 57*     INR  Recent Labs  Lab 08/03/17  1323   INR 1.21*

## 2017-08-04 NOTE — PLAN OF CARE
"Problem: Goal Outcome Summary  Goal: Goal Outcome Summary  /72 (BP Location: Right arm)  Pulse 90  Temp 97.3  F (36.3  C) (Oral)  Resp 20  Ht 1.803 m (5' 11\")  Wt 72.8 kg (160 lb 6.4 oz)  SpO2 100%  BMI 22.37 kg/m2     A/Ox4. VSS on RA. Capno WNL. Pain managed w/scheduled morphine. No c/o N/V. Tolerating a clear liquid diet. NS infusing @ 100mL/hr via Port a cath between IV abx of Zosyn q6h. Up with Ax2 and walker.  Will Continue to monitor and follow POC.       "

## 2017-08-05 NOTE — PROGRESS NOTES
SPIRITUAL HEALTH SERVICES  SPIRITUAL ASSESSMENT Progress Note  [facility]   ON-CALL VISIT    REFERRAL SOURCE:  request for spiritual/emotional support.     Outcome:  Shirin is being discharged today.  She declined visit as she was sleeping and seemed tired.     PLAN: No further SHS are needed.     David Clancy  Chaplain Resident  Pager [phone]

## 2017-08-05 NOTE — PLAN OF CARE
Cared for pt from 1145 - 1500. Oxycodone given x1 for c/o back pain. Tolerating diet. Voiding large amounts infrequently. Hgb 7.5 this AM, 1 unit RBC's ordered. Blood transfusion started at 1430. VSS on RA. Denies any s/s of transfusion rxn thus far. Okay to discharge after blood transfusion per Dr. Goldstein. Went through discharge paperwork with patient. PIV removed. Pt will need port de-accessed and to  medications from discharge pharmacy. Continue to monitor and with POC.

## 2017-08-05 NOTE — PLAN OF CARE
Problem: Goal Outcome Summary  Goal: Goal Outcome Summary  Outcome: Therapy, progress toward functional goals is gradual  Pt slept majority of the shift. AVSS. Pain managed with scheduled oral morphine. Denies nausea. Rt Port infusing IVMF (NS 100mL/hr) and IV abx. RPIV saline locked. Tolerating regular diet. Up with SBA. Voids with good outputs. Baseline neuropathy in fingers and toes. Continue with plan.

## 2017-08-05 NOTE — DISCHARGE SUMMARY
[facility] Discharge Summary    Shirin Berg OdunladeMafe MRN# [id number]   Age: 57 year old YOB: 1960     Date of Admission:  8/3/2017  Date of Discharge::  8/5/2017  5:55 PM  Admitting Physician:  Barb Tatum MD  Discharge Physician:  Antonino Goldstein MD               Admission Diagnoses:   Cholangitis             Discharge Diagnosis:   Cholangitis S/p ERCP and stent replacement  Klebsiella pneumonia and Streptococcus Angiosus bacteremia            Procedures:   ERCP          Medications Prior to Admission:     Prescriptions Prior to Admission   Medication Sig Dispense Refill Last Dose     morphine (MS CONTIN) 15 MG 12 hr tablet Take 1 tablet (15 mg) by mouth every 12 hours 60 tablet 0 8/3/2017 at Unknown time     dronabinol (MARINOL) 5 MG capsule Take 1 capsule (5 mg) by mouth 3 times daily (before meals) 90 capsule 0 8/3/2017 at Unknown time     pantoprazole (PROTONIX) 20 MG EC tablet Take 1 tablet (20 mg) by mouth 2 times daily 60 tablet 3 8/3/2017 at Unknown time     dexamethasone (DECADRON) 4 MG tablet Take 1 tablet (4 mg) by mouth daily (with breakfast) 3 tablet 0 8/3/2017 at Unknown time     loratadine (CLARITIN) 10 MG tablet Take 1 tablet (10 mg) by mouth daily Reported on 5/5/2017 30 tablet 3 8/3/2017 at Unknown time     potassium chloride SA (POTASSIUM CHLORIDE) 20 MEQ CR tablet Take 1 tablet (20 mEq) by mouth daily 60 tablet 1 8/3/2017 at Unknown time     amylase-lipase-protease (CREON) 68853 UNITS CPEP Take 2 capsules (72,000 Units) by mouth 3 times daily (with meals) 180 capsule 1 8/3/2017 at Unknown time     LORazepam (ATIVAN) 0.5 MG tablet Take 1 tablet (0.5 mg) by mouth every 4 hours as needed (Anxiety, Nausea/Vomiting or Sleep) 30 tablet 0 8/3/2017     prochlorperazine (COMPAZINE) 10 MG tablet Take 1 tablet (10 mg) by mouth every 6 hours as needed (Nausea/Vomiting) 30 tablet 2 Taking     oxyCODONE (ROXICODONE) 5 MG IR tablet Take 1 tablet (5 mg) by mouth every 4 hours  as needed for moderate to severe pain 100 tablet 0 8/3/2017     ondansetron (ZOFRAN-ODT) 8 MG ODT tab Take 1 tablet (8 mg) by mouth every 8 hours as needed for nausea 60 tablet 4 Taking     Nutritional Supplements (ENSURE CLEAR) LIQD Take 1 Bottle by mouth 3 times daily (Patient taking differently: Take 5 Bottles by mouth 3 times daily ) 90 Bottle 6 Taking     diltiazem 2% in PLO cream, FV COMPOUNDED, 2% GEL Apply topically daily and as needed to external hemorrhoids 30 g 0 Taking     docusate sodium (COLACE) 100 MG capsule Take 1 capsule (100 mg) by mouth daily 100 capsule 0 Taking     polyethylene glycol (MIRALAX/GLYCOLAX) powder Take 17 g (1 capful) by mouth daily (Patient taking differently: Take 1 capful by mouth daily as needed ) 119 g 11 Taking     lidocaine-prilocaine (EMLA) cream Apply topically as needed for other (Use 30-60 minutes prior to port access) 30 g 0 Taking             Discharge Medications:     Current Discharge Medication List      START taking these medications    Details   levofloxacin (LEVAQUIN) 500 MG tablet Take 1 tablet (500 mg) by mouth daily  Qty: 14 tablet, Refills: 0    Associated Diagnoses: Cholangitis; 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)      amoxicillin-clavulanate (AUGMENTIN) 875-125 MG per tablet Take 1 tablet by mouth 2 times daily  Qty: 28 tablet, Refills: 0    Associated Diagnoses: Cholangitis; 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)         CONTINUE these medications which have NOT CHANGED    Details   morphine (MS CONTIN) 15 MG 12 hr tablet Take 1 tablet (15 mg) by mouth every 12 hours  Qty: 60 tablet, Refills: 0    Associated Diagnoses: Malignant neoplasm of head of pancreas (H)      dronabinol (MARINOL) 5 MG capsule Take 1 capsule (5 mg) by mouth 3 times daily (before meals)  Qty: 90 capsule, Refills: 0    Associated Diagnoses: Anorexia      pantoprazole (PROTONIX) 20 MG EC tablet Take 1 tablet (20 mg) by mouth 2 times daily  Qty: 60 tablet,  Refills: 3    Associated Diagnoses: Malignant neoplasm of head of pancreas (H)      dexamethasone (DECADRON) 4 MG tablet Take 1 tablet (4 mg) by mouth daily (with breakfast)  Qty: 3 tablet, Refills: 0    Associated Diagnoses: Need for prophylactic measure; Malignant neoplasm of head of pancreas (H)      loratadine (CLARITIN) 10 MG tablet Take 1 tablet (10 mg) by mouth daily Reported on 5/5/2017  Qty: 30 tablet, Refills: 3    Associated Diagnoses: Seasonal allergic rhinitis, unspecified allergic rhinitis trigger      potassium chloride SA (POTASSIUM CHLORIDE) 20 MEQ CR tablet Take 1 tablet (20 mEq) by mouth daily  Qty: 60 tablet, Refills: 1    Associated Diagnoses: Malignant neoplasm of head of pancreas (H)      amylase-lipase-protease (CREON) 16682 UNITS CPEP Take 2 capsules (72,000 Units) by mouth 3 times daily (with meals)  Qty: 180 capsule, Refills: 1    Associated Diagnoses: Malignant neoplasm of head of pancreas (H)      LORazepam (ATIVAN) 0.5 MG tablet Take 1 tablet (0.5 mg) by mouth every 4 hours as needed (Anxiety, Nausea/Vomiting or Sleep)  Qty: 30 tablet, Refills: 0    Associated Diagnoses: Malignant neoplasm of head of pancreas (H)      prochlorperazine (COMPAZINE) 10 MG tablet Take 1 tablet (10 mg) by mouth every 6 hours as needed (Nausea/Vomiting)  Qty: 30 tablet, Refills: 2    Associated Diagnoses: Malignant neoplasm of head of pancreas (H)      oxyCODONE (ROXICODONE) 5 MG IR tablet Take 1 tablet (5 mg) by mouth every 4 hours as needed for moderate to severe pain  Qty: 100 tablet, Refills: 0    Comments: Script to [facility]  Associated Diagnoses: Malignant neoplasm of head of pancreas (H)      ondansetron (ZOFRAN-ODT) 8 MG ODT tab Take 1 tablet (8 mg) by mouth every 8 hours as needed for nausea  Qty: 60 tablet, Refills: 4    Associated Diagnoses: Malignant neoplasm of head of pancreas (H)      Nutritional Supplements (ENSURE CLEAR) LIQD Take 1 Bottle by mouth 3 times daily  Qty: 90 Bottle, Refills:  6    Associated Diagnoses: Malignant neoplasm of head of pancreas (H)      diltiazem 2% in PLO cream, FV COMPOUNDED, 2% GEL Apply topically daily and as needed to external hemorrhoids  Qty: 30 g, Refills: 0    Associated Diagnoses: External hemorrhoids      docusate sodium (COLACE) 100 MG capsule Take 1 capsule (100 mg) by mouth daily  Qty: 100 capsule, Refills: 0    Associated Diagnoses: External hemorrhoids      polyethylene glycol (MIRALAX/GLYCOLAX) powder Take 17 g (1 capful) by mouth daily  Qty: 119 g, Refills: 11    Associated Diagnoses: Malignant neoplasm of head of pancreas (H)      lidocaine-prilocaine (EMLA) cream Apply topically as needed for other (Use 30-60 minutes prior to port access)  Qty: 30 g, Refills: 0    Associated Diagnoses: Malignant neoplasm of head of pancreas (H)                   Consultations:   Consultation during this admission received from gastroenterology          Brief History of Illness:     Shirin Muller is a 57 year old woman with metastatic pancreatic cancer currently on treatment with Folfirinox (after progression on gemcitabine/abraxane). She has h/o biliary obstruction and stenting. She is admitted from clinic with fever, chills, general malaise, elevated LFTs, leukocytosis, and lactic acidosis all concerning for cholangitis/sepsis. She was in clinic for chemo on Monday but held d/t rising LFTs. She returned to clinic Wednesday for f/u and was found to have above sx and lab abnormalities so she was admitted. She has chronic abdominal pain related to tumor. However, she started having a little more/different type pain, feeling fatigued and generally unwell, feverish, sweaty, and chilled with poor appetite in the past couple days. Denies headache, dizziness, shortness of breath, N/V/D/C. See today's clinic note for more detailed oncologic history.                Hospital Course:       # Cholangitis with sepsis  # Klebsiella pneumonia and Streptococcus Angiosus  bacteremia.    Patient p/w fevers, chills, sweats, general malaise, elevated LFTs and hyperbilirubinemia, leukocytosis, and lactic acidosis. Mild central/low abdominal tenderness to palpation. No nausea/vomiting/diarrhea/constipation.   -GI consulted and did ERCP on 8/3 that demonstrated completely occluded stent with copious amount of pus, stone, and sludge that were removed. A 10mm x 4cm FCSEM wallflex stent was placed and was post dilated. Since then, pt has been afebrile and has improving bili/LFTs today.   -BCx 8/3 grew Klebsiella and strep angiosus. She rcd initial empiric therapy with Vancomycin and Zosyn. Pt remained afebrile and WC improved along with LFT. She was DC on Levaquin (Klebsiellea) and Augmentin (Strep A) to finish a full 14-day course from first negative BC (8/4). Final sensitivities are pending, plan to change abx accordingly.        # Metastatic pancreatic cancer. Has been on treatment with Folfirinox after progression on gemcitabine/abraxane. She was in clinic for chemo on Monday but held d/t rising LFTs. She returned to clinic today for f/u and was found to have above sx and lab abnormalities. Hold chemo until cholangitis resolves, then f/u in clinic to resume.   -Currently has f/u appt scheduled next Friday.       # Chronic cancer-related pain. Continue home opioid regimen.       # Anorexia. Resume home dexamethasone and marinol today.       # H/o duodenal ulcer, H.pylori +. S/p abx therapy. Continues on PPI.       FEN:    reg diet as tolerated           Code status: FULL   Disposition: Home.          Discharge Instructions and Follow-Up:   Discharge diet: Regular   Discharge activity: Activity as tolerate   Discharge follow-up: As above           Discharge Disposition:   Discharged to home      Attestation:      Antonino Goldstein MD

## 2017-08-05 NOTE — PLAN OF CARE
Problem: Goal Outcome Summary  Goal: Goal Outcome Summary  Outcome: Adequate for Discharge Date Met:  08/05/17  Ambulates independently or with SBA, tolerating slow activity well. Pain well controlled, oxycodone given for pain x1.  PRBC infusion completed at 1700, tolerated well, no signs/sx of transfusion reaction.  [facility].  Wheelchair to [facility] pharmacy and door.  Home with son.

## 2017-08-05 NOTE — PLAN OF CARE
Problem: Goal Outcome Summary  Goal: Goal Outcome Summary  Outcome: Therapy, progress toward functional goals is gradual  VSS. Denies pain, but taking sheduled MS contin, no prn meds given. Up in hallways with SBA. IV abx given thru port, port with +blood return. Fair appetite. Good uop but denies BM-miralax given. Per MD may go home if +BM. Cares turned over to another RN at 1145.  P: monitor for BM and continue with POC.

## 2017-08-07 NOTE — PROGRESS NOTES
This is a recent snapshot of the patient's [facility] medical record.  For current drug dose and complete information and questions, call [phone] or In [facility] pool, fv home infusion ([id number])  CSN Number:  [id number]

## 2017-08-07 NOTE — PROGRESS NOTES
"[facility]  \"Hello, my name is Heather Casas , and I am calling from the [facility].  I want to check in and see how you are doing, after leaving the hospital.  You may also receive a call from your Care Coordinator (care team), but I want to make sure you don t have any urgent needs.  I have a couple questions to review with you:     Post-Discharge Outreach                                                    Shirin Muller is a 57 year old female     Aug 11, 2017  9:15 AM CDT   [facility] Lab Draw with [facility]IC LAB DRAW   [facility] ([facility])     [address] [address]   [phone]                 Aug 11, 2017 10:00 AM CDT   Infusion 360 with UC ONCOLOGY INFUSION, UC 28 ATC   [facility] ([facility])     [address] [place] [address]   [phone]                Aug 11, 2017 10:20 AM CDT   (Arrive by 10:05 AM)   Return Visit with TEE Olivas CNP   [facility] ([facility])     [address] [place] [address]   [phone]                Aug 25, 2017  9:30 AM CDT   [facility] Lab Draw with [facility] MASONIC LAB DRAW   [facility] Lab Draw ([facility])     [address] [address]   [phone]                Aug 25, 2017 10:00 AM CDT   Infusion 360 with UC ONCOLOGY INFUSION, UC 12 ATC   [facility] ([facility])     [address] [place] [address]   [phone]                Sep 08, 2017  9:30 AM CDT   [facility] Lab Draw with [facility] LAB DRAW   [facility])     [address] [address]   [phone]                Sep 08, " 2017 10:00 AM CDT   Infusion 360 with UC ONCOLOGY INFUSION, UC 12 ATC   [facility] ([facility])     [address]  [address]  [facility] [address] [phone]             Care Team:    Patient Care Team       Relationship Specialty Notifications Start End    Physicians, [facility] PCP - General Allergy  1/24/17     [facility]     [facility]     Comment:  Pt sees Dr. Larsen ([facility])    Phone: [phone] Fax: [phone]         [facility] [address]    Brian Arias, RN Nurse Coordinator Oncology Admissions 7/12/16     Phone: [phone] Pager: [phone]        Demond Gonsales MD MD Oncology Admissions 5/22/17     Phone: [phone] Fax: [phone]          [facility] [address] [address]            Transition of Care Review                                                      Did you have a surgery or procedure during your hospital visit? YES   If yes, do you have any of the following:     Signs of infection:  No:      Pain:  No         Wound/incision concerns? NO    Do you have all of your medications/refills?  Yes    Are you having any side effects or questions about your medication(s)? No    Do you have any new or worsening symptoms?  No    Do you have any future appointments scheduled?   YES             Plan                                                      Thanks for your time.  Your Care Coordinator may follow-up within the next couple days.  In the meantime if you have questions, concerns or problems call your care team.        Heather Casas

## 2017-08-11 NOTE — LETTER
8/11/2017       RE: Shirin Muller  [address] [address]     Dear Colleague,    Thank you for referring your patient, Shirin Muller, to the [facility]. Please see a copy of my visit note below.    Reason for visit: Shirin Muller is a 57 year old woman with metastatic pancreatic cancer,  on treatment with Folfirinox after progression on Gemcitabine/Abraxane. She is seen in hospital follow-up today.    Oncology HPI:    She was diagnosed with pancreatic cancer in the spring of 2016 after presenting with a 2 to 3-month history of abdominal pain and weight loss. She initiated on treatment with gemcitabine and Abraxane on 05/02/2016 and continued on that until December 2016 when she was found to have metastatic disease involving the liver. She was then initiated on FOLFIRINOX on 12/20/16.  In January 2017, she was found to have a GI bleed secondary to a bleeding duodenal ulcer and infiltrating mass in the duodenum. The ulcer was injected and clipped. She was found to be H. Pylori positive and was initiated on therapy with PPI, carafate, amoxicillin and clarithromycin. She had biliary obstruction in April 2017 and underwent ERCP and  biliary stent placement  Dr. Braden. She was admitted on 8/3/17 with sepsis/cholangitis. Blood cultures grew klebsiella penumonia and streptococcus angiosos. She had an ERCP on 8/3 demonstrating an occluded stent with pust, stone and sludge. The duct was dilated and a new stent placed. She was treated with IV Vanco and Zosyn, repeat BC were negative. She was discharged on levaquin and augmentin.    Interval history: Shirin is feeling much better. She doesn't remember much of what led to her illness and doesn't remember seeing me in clinic last week. No fevers since the ERCP. Urine is getting lighter. Is constipated. No BM x 5 days. Passing gas. No abdominal pain. No N/V. Energy is starting to improve. She is eating a  little better. No itching, rash or bruising. Noting increased neuropathy in the feet/hands. Extending up the legs at times. No difficulty with walking. Has some irritation at the nose and corners of the mouth. Using vaseline without improvement. Has a runny nose chronically. Coughs up clear phlegm a few times a day. No shortness of breath, chest pain. No dizziness or palpitations.     Current Outpatient Prescriptions   Medication Sig Dispense Refill     levofloxacin (LEVAQUIN) 500 MG tablet Take 1 tablet (500 mg) by mouth daily 14 tablet 0     amoxicillin-clavulanate (AUGMENTIN) 875-125 MG per tablet Take 1 tablet by mouth 2 times daily 28 tablet 0     potassium chloride SA (POTASSIUM CHLORIDE) 20 MEQ CR tablet Take 1 tablet (20 mEq) by mouth daily 60 tablet 1     LORazepam (ATIVAN) 0.5 MG tablet Take 1 tablet (0.5 mg) by mouth every 4 hours as needed (Anxiety, Nausea/Vomiting or Sleep) 30 tablet 0     prochlorperazine (COMPAZINE) 10 MG tablet Take 1 tablet (10 mg) by mouth every 6 hours as needed (Nausea/Vomiting) 30 tablet 2     oxyCODONE (ROXICODONE) 5 MG IR tablet Take 1 tablet (5 mg) by mouth every 4 hours as needed for moderate to severe pain 100 tablet 0     morphine (MS CONTIN) 15 MG 12 hr tablet Take 1 tablet (15 mg) by mouth every 12 hours 60 tablet 0     dronabinol (MARINOL) 5 MG capsule Take 1 capsule (5 mg) by mouth 3 times daily (before meals) 90 capsule 0     pantoprazole (PROTONIX) 20 MG EC tablet Take 1 tablet (20 mg) by mouth 2 times daily 60 tablet 3     dexamethasone (DECADRON) 4 MG tablet Take 1 tablet (4 mg) by mouth daily (with breakfast) 3 tablet 0     loratadine (CLARITIN) 10 MG tablet Take 1 tablet (10 mg) by mouth daily Reported on 5/5/2017 30 tablet 3     ondansetron (ZOFRAN-ODT) 8 MG ODT tab Take 1 tablet (8 mg) by mouth every 8 hours as needed for nausea 60 tablet 4     Nutritional Supplements (ENSURE CLEAR) LIQD Take 1 Bottle by mouth 3 times daily (Patient taking differently: Take 5  "Bottles by mouth 3 times daily ) 90 Bottle 6     diltiazem 2% in PLO cream, FV COMPOUNDED, 2% GEL Apply topically daily and as needed to external hemorrhoids 30 g 0     docusate sodium (COLACE) 100 MG capsule Take 1 capsule (100 mg) by mouth daily 100 capsule 0     amylase-lipase-protease (CREON) 21820 UNITS CPEP Take 2 capsules (72,000 Units) by mouth 3 times daily (with meals) 180 capsule 1     polyethylene glycol (MIRALAX/GLYCOLAX) powder Take 17 g (1 capful) by mouth daily (Patient taking differently: Take 1 capful by mouth daily as needed ) 119 g 11     lidocaine-prilocaine (EMLA) cream Apply topically as needed for other (Use 30-60 minutes prior to port access) 30 g 0          Allergies   Allergen Reactions     Contrast Dye Nausea and Vomiting     Pt vomiting post IV contrast, Please try Visipaque for next CT scan. 6/24/16 sv         Exam: alert, in NAD Blood pressure 108/73, pulse 86, temperature 98.8  F (37.1  C), temperature source Oral, resp. rate 16, height 1.803 m (5' 11\"), weight 75.2 kg (165 lb 12.8 oz), SpO2 99 %.  Wt Readings from Last 4 Encounters:   08/11/17 75.2 kg (165 lb 12.8 oz)   08/04/17 73.3 kg (161 lb 11.2 oz)   08/03/17 71.8 kg (158 lb 3.2 oz)   07/31/17 71.8 kg (158 lb 4.8 oz)     Oropharynx is moist, mild mucosal irritation noted at the mouth angles and nare. No icterus or conjunctival injection. Neck supple and without adenopathy. Lungs:CTA. Heart:RRR, no murmur or rub. Abdomen: soft, distended w/ active BS. Non tender. No masses palpable. Extremities: warm, no edema. Port in right chest intact.    Labs: Results for NATALIIA IBARRA (MRN [id number]) as of 8/11/2017 11:55   Ref. Range 8/11/2017 09:49   Sodium Latest Ref Range: 133 - 144 mmol/L 139   Potassium Latest Ref Range: 3.4 - 5.3 mmol/L 3.7   Chloride Latest Ref Range: 94 - 109 mmol/L 105   Carbon Dioxide Latest Ref Range: 20 - 32 mmol/L 27   Urea Nitrogen Latest Ref Range: 7 - 30 mg/dL 9   Creatinine Latest Ref " Range: 0.52 - 1.04 mg/dL 0.52   GFR Estimate Latest Ref Range: >60 mL/min/1.7m2 >90...   GFR Estimate If Black Latest Ref Range: >60 mL/min/1.7m2 >90...   Calcium Latest Ref Range: 8.5 - 10.1 mg/dL 8.8   Anion Gap Latest Ref Range: 3 - 14 mmol/L 7   Albumin Latest Ref Range: 3.4 - 5.0 g/dL 2.3 (L)   Protein Total Latest Ref Range: 6.8 - 8.8 g/dL 8.0   Bilirubin Total Latest Ref Range: 0.2 - 1.3 mg/dL 0.9   Alkaline Phosphatase Latest Ref Range: 40 - 150 U/L 673 (H)   ALT Latest Ref Range: 0 - 50 U/L 38   AST Latest Ref Range: 0 - 45 U/L 50 (H)   Glucose Latest Ref Range: 70 - 99 mg/dL 128 (H)   WBC Latest Ref Range: 4.0 - 11.0 10e9/L 6.6   Hemoglobin Latest Ref Range: 11.7 - 15.7 g/dL 10.0 (L)   Hematocrit Latest Ref Range: 35.0 - 47.0 % 34.0 (L)   Platelet Count Latest Ref Range: 150 - 450 10e9/L 159   RBC Count Latest Ref Range: 3.8 - 5.2 10e12/L 4.20   MCV Latest Ref Range: 78 - 100 fl 81   MCH Latest Ref Range: 26.5 - 33.0 pg 23.8 (L)   MCHC Latest Ref Range: 31.5 - 36.5 g/dL 29.4 (L)   RDW Latest Ref Range: 10.0 - 15.0 % 24.9 (H)   Diff Method Unknown Automated Method   % Neutrophils Latest Units: % 70.8   % Lymphocytes Latest Units: % 15.8   % Monocytes Latest Units: % 10.9   % Eosinophils Latest Units: % 0.5   % Basophils Latest Units: % 0.6   % Immature Granulocytes Latest Units: % 1.4   Nucleated RBCs Latest Ref Range: 0 /100 0   Absolute Neutrophil Latest Ref Range: 1.6 - 8.3 10e9/L 4.7   Absolute Lymphocytes Latest Ref Range: 0.8 - 5.3 10e9/L 1.1   Absolute Monocytes Latest Ref Range: 0.0 - 1.3 10e9/L 0.7   Absolute Eosinophils Latest Ref Range: 0.0 - 0.7 10e9/L 0.0   Absolute Basophils Latest Ref Range: 0.0 - 0.2 10e9/L 0.0   Abs Immature Granulocytes Latest Ref Range: 0 - 0.4 10e9/L 0.1   Absolute Nucleated RBC Unknown 0.0   BLOOD CULTURE Unknown Rpt     Blood culture and sensitivities reviewed. No growth after 8/3/17.     Impression/plan:   1. Cholangitis/sepsis w/ Klebsiella and strep bacteremia  -s/p  ERCP on 8/3  -improving. No fevers. Still has some LFT elevation, but bili normalized.  -will continue levaquin/augmentin through 8/15 to complete a 14 day course  -will monitor for s/s of outlet obstruction given the duodenal stenosis noted on the endoscopy. Could potentially need duodenal stenting in the future.    2. Metastatic pancreatic cancer  -had stable disease on FOLFIRINOX.   -will hold chemotherapy until next week to allow her to recover from her recent infections  -she has been tolerating better with scheduled antiemetics post infusion  -will see her again at that time. Will need to monitor progressive neuropathy closely. She may be close to needing to drop oxaliplatin    3. Cancer pain:  -had been stable on MS Contin 15 mg every 12 hours.   -refill given today    4. FEN:   -Anorexia: appetite improving, on dexamethasone 4 mg daily and marinol 5 mg tid   -Hypokalemia: chronic, K is stable today. Continue oral K replacement 20 meq daily     5. Hx of duodenal ulcer, h. Pylori +, s/p ABX therapy  -continue protonix 20 mg bid    6. Neuropathy: grade 2, due to prior treatment with abraxane and current treatment with oxaliplatin  -monitor closely as noted above.      Again, thank you for allowing me to participate in the care of your patient.      Sincerely,    TEE Villanueva CNP

## 2017-08-11 NOTE — NURSING NOTE
Chief Complaint   Patient presents with     Port Draw     accessed with power needle for labsa nd infusion, heaprin locked, vitals checked     bld cx sent from port only

## 2017-08-11 NOTE — NURSING NOTE
"Oncology Rooming Note    August 11, 2017 10:03 AM   Shirin Muller is a 57 year old female who presents for:    Chief Complaint   Patient presents with     Port Draw     accessed with power needle for labsa nd infusion, heaprin locked, vitals checked     Oncology Clinic Visit     Follow up-Pancreatic CA     Initial Vitals: /73  Pulse 86  Temp 98.8  F (37.1  C) (Oral)  Resp 16  Ht 1.803 m (5' 11\")  Wt 75.2 kg (165 lb 12.8 oz)  SpO2 99%  BMI 23.12 kg/m2 Estimated body mass index is 23.12 kg/(m^2) as calculated from the following:    Height as of this encounter: 1.803 m (5' 11\").    Weight as of this encounter: 75.2 kg (165 lb 12.8 oz). Body surface area is 1.94 meters squared.  No Pain (0) Comment: Data Unavailable   No LMP recorded. Patient is postmenopausal.  Allergies reviewed: Yes  Medications reviewed: Yes    Medications: MEDICATION REFILLS NEEDED TODAY. Provider was notified.  Pharmacy name entered into [facility]:    [facility] - [place], [place] - [address] [facility] [address]  [id number] - [place], [place] - [address] [place] [address]    Clinical concerns: No concerns Ester Echavarria was notified.    10 minutes for nursing intake (face to face time)     Jazzmine Vizcarra LPN              "

## 2017-08-11 NOTE — PROGRESS NOTES
Reason for visit: Shirin Meyer is a 57 year old woman with metastatic pancreatic cancer,  on treatment with Folfirinox after progression on Gemcitabine/Abraxane. She is seen in hospital follow-up today.    Oncology HPI:    She was diagnosed with pancreatic cancer in the spring of 2016 after presenting with a 2 to 3-month history of abdominal pain and weight loss. She initiated on treatment with gemcitabine and Abraxane on 05/02/2016 and continued on that until December 2016 when she was found to have metastatic disease involving the liver. She was then initiated on FOLFIRINOX on 12/20/16.  In January 2017, she was found to have a GI bleed secondary to a bleeding duodenal ulcer and infiltrating mass in the duodenum. The ulcer was injected and clipped. She was found to be H. Pylori positive and was initiated on therapy with PPI, carafate, amoxicillin and clarithromycin. She had biliary obstruction in April 2017 and underwent ERCP and  biliary stent placement  Dr. Braden. She was admitted on 8/3/17 with sepsis/cholangitis. Blood cultures grew klebsiella penumonia and streptococcus angiosos. She had an ERCP on 8/3 demonstrating an occluded stent with pust, stone and sludge. The duct was dilated and a new stent placed. She was treated with IV Vanco and Zosyn, repeat BC were negative. She was discharged on levaquin and augmentin.    Interval history: Shirin is feeling much better. She doesn't remember much of what led to her illness and doesn't remember seeing me in clinic last week. No fevers since the ERCP. Urine is getting lighter. Is constipated. No BM x 5 days. Passing gas. No abdominal pain. No N/V. Energy is starting to improve. She is eating a little better. No itching, rash or bruising. Noting increased neuropathy in the feet/hands. Extending up the legs at times. No difficulty with walking. Has some irritation at the nose and corners of the mouth. Using vaseline without improvement. Has a runny nose  chronically. Coughs up clear phlegm a few times a day. No shortness of breath, chest pain. No dizziness or palpitations.     Current Outpatient Prescriptions   Medication Sig Dispense Refill     levofloxacin (LEVAQUIN) 500 MG tablet Take 1 tablet (500 mg) by mouth daily 14 tablet 0     amoxicillin-clavulanate (AUGMENTIN) 875-125 MG per tablet Take 1 tablet by mouth 2 times daily 28 tablet 0     potassium chloride SA (POTASSIUM CHLORIDE) 20 MEQ CR tablet Take 1 tablet (20 mEq) by mouth daily 60 tablet 1     LORazepam (ATIVAN) 0.5 MG tablet Take 1 tablet (0.5 mg) by mouth every 4 hours as needed (Anxiety, Nausea/Vomiting or Sleep) 30 tablet 0     prochlorperazine (COMPAZINE) 10 MG tablet Take 1 tablet (10 mg) by mouth every 6 hours as needed (Nausea/Vomiting) 30 tablet 2     oxyCODONE (ROXICODONE) 5 MG IR tablet Take 1 tablet (5 mg) by mouth every 4 hours as needed for moderate to severe pain 100 tablet 0     morphine (MS CONTIN) 15 MG 12 hr tablet Take 1 tablet (15 mg) by mouth every 12 hours 60 tablet 0     dronabinol (MARINOL) 5 MG capsule Take 1 capsule (5 mg) by mouth 3 times daily (before meals) 90 capsule 0     pantoprazole (PROTONIX) 20 MG EC tablet Take 1 tablet (20 mg) by mouth 2 times daily 60 tablet 3     dexamethasone (DECADRON) 4 MG tablet Take 1 tablet (4 mg) by mouth daily (with breakfast) 3 tablet 0     loratadine (CLARITIN) 10 MG tablet Take 1 tablet (10 mg) by mouth daily Reported on 5/5/2017 30 tablet 3     ondansetron (ZOFRAN-ODT) 8 MG ODT tab Take 1 tablet (8 mg) by mouth every 8 hours as needed for nausea 60 tablet 4     Nutritional Supplements (ENSURE CLEAR) LIQD Take 1 Bottle by mouth 3 times daily (Patient taking differently: Take 5 Bottles by mouth 3 times daily ) 90 Bottle 6     diltiazem 2% in PLO cream, FV COMPOUNDED, 2% GEL Apply topically daily and as needed to external hemorrhoids 30 g 0     docusate sodium (COLACE) 100 MG capsule Take 1 capsule (100 mg) by mouth daily 100 capsule 0  "    amylase-lipase-protease (CREON) 18204 UNITS CPEP Take 2 capsules (72,000 Units) by mouth 3 times daily (with meals) 180 capsule 1     polyethylene glycol (MIRALAX/GLYCOLAX) powder Take 17 g (1 capful) by mouth daily (Patient taking differently: Take 1 capful by mouth daily as needed ) 119 g 11     lidocaine-prilocaine (EMLA) cream Apply topically as needed for other (Use 30-60 minutes prior to port access) 30 g 0          Allergies   Allergen Reactions     Contrast Dye Nausea and Vomiting     Pt vomiting post IV contrast, Please try Visipaque for next CT scan. 6/24/16 sv         Exam: alert, in NAD Blood pressure 108/73, pulse 86, temperature 98.8  F (37.1  C), temperature source Oral, resp. rate 16, height 1.803 m (5' 11\"), weight 75.2 kg (165 lb 12.8 oz), SpO2 99 %.  Wt Readings from Last 4 Encounters:   08/11/17 75.2 kg (165 lb 12.8 oz)   08/04/17 73.3 kg (161 lb 11.2 oz)   08/03/17 71.8 kg (158 lb 3.2 oz)   07/31/17 71.8 kg (158 lb 4.8 oz)     Oropharynx is moist, mild mucosal irritation noted at the mouth angles and nare. No icterus or conjunctival injection. Neck supple and without adenopathy. Lungs:CTA. Heart:RRR, no murmur or rub. Abdomen: soft, distended w/ active BS. Non tender. No masses palpable. Extremities: warm, no edema. Port in right chest intact.    Labs: Results for NATALIIA IBARRA (MRN [id number]) as of 8/11/2017 11:55   Ref. Range 8/11/2017 09:49   Sodium Latest Ref Range: 133 - 144 mmol/L 139   Potassium Latest Ref Range: 3.4 - 5.3 mmol/L 3.7   Chloride Latest Ref Range: 94 - 109 mmol/L 105   Carbon Dioxide Latest Ref Range: 20 - 32 mmol/L 27   Urea Nitrogen Latest Ref Range: 7 - 30 mg/dL 9   Creatinine Latest Ref Range: 0.52 - 1.04 mg/dL 0.52   GFR Estimate Latest Ref Range: >60 mL/min/1.7m2 >90...   GFR Estimate If Black Latest Ref Range: >60 mL/min/1.7m2 >90...   Calcium Latest Ref Range: 8.5 - 10.1 mg/dL 8.8   Anion Gap Latest Ref Range: 3 - 14 mmol/L 7   Albumin Latest " Ref Range: 3.4 - 5.0 g/dL 2.3 (L)   Protein Total Latest Ref Range: 6.8 - 8.8 g/dL 8.0   Bilirubin Total Latest Ref Range: 0.2 - 1.3 mg/dL 0.9   Alkaline Phosphatase Latest Ref Range: 40 - 150 U/L 673 (H)   ALT Latest Ref Range: 0 - 50 U/L 38   AST Latest Ref Range: 0 - 45 U/L 50 (H)   Glucose Latest Ref Range: 70 - 99 mg/dL 128 (H)   WBC Latest Ref Range: 4.0 - 11.0 10e9/L 6.6   Hemoglobin Latest Ref Range: 11.7 - 15.7 g/dL 10.0 (L)   Hematocrit Latest Ref Range: 35.0 - 47.0 % 34.0 (L)   Platelet Count Latest Ref Range: 150 - 450 10e9/L 159   RBC Count Latest Ref Range: 3.8 - 5.2 10e12/L 4.20   MCV Latest Ref Range: 78 - 100 fl 81   MCH Latest Ref Range: 26.5 - 33.0 pg 23.8 (L)   MCHC Latest Ref Range: 31.5 - 36.5 g/dL 29.4 (L)   RDW Latest Ref Range: 10.0 - 15.0 % 24.9 (H)   Diff Method Unknown Automated Method   % Neutrophils Latest Units: % 70.8   % Lymphocytes Latest Units: % 15.8   % Monocytes Latest Units: % 10.9   % Eosinophils Latest Units: % 0.5   % Basophils Latest Units: % 0.6   % Immature Granulocytes Latest Units: % 1.4   Nucleated RBCs Latest Ref Range: 0 /100 0   Absolute Neutrophil Latest Ref Range: 1.6 - 8.3 10e9/L 4.7   Absolute Lymphocytes Latest Ref Range: 0.8 - 5.3 10e9/L 1.1   Absolute Monocytes Latest Ref Range: 0.0 - 1.3 10e9/L 0.7   Absolute Eosinophils Latest Ref Range: 0.0 - 0.7 10e9/L 0.0   Absolute Basophils Latest Ref Range: 0.0 - 0.2 10e9/L 0.0   Abs Immature Granulocytes Latest Ref Range: 0 - 0.4 10e9/L 0.1   Absolute Nucleated RBC Unknown 0.0   BLOOD CULTURE Unknown Rpt     Blood culture and sensitivities reviewed. No growth after 8/3/17.     Impression/plan:   1. Cholangitis/sepsis w/ Klebsiella and strep bacteremia  -s/p ERCP on 8/3  -improving. No fevers. Still has some LFT elevation, but bili normalized.  -will continue levaquin/augmentin through 8/15 to complete a 14 day course  -will monitor for s/s of outlet obstruction given the duodenal stenosis noted on the endoscopy.  Could potentially need duodenal stenting in the future.    2. Metastatic pancreatic cancer  -had stable disease on FOLFIRINOX.   -will hold chemotherapy until next week to allow her to recover from her recent infections  -she has been tolerating better with scheduled antiemetics post infusion  -will see her again at that time. Will need to monitor progressive neuropathy closely. She may be close to needing to drop oxaliplatin    3. Cancer pain:  -had been stable on MS Contin 15 mg every 12 hours.   -refill given today    4. FEN:   -Anorexia: appetite improving, on dexamethasone 4 mg daily and marinol 5 mg tid   -Hypokalemia: chronic, K is stable today. Continue oral K replacement 20 meq daily     5. Hx of duodenal ulcer, h. Pylori +, s/p ABX therapy  -continue protonix 20 mg bid    6. Neuropathy: grade 2, due to prior treatment with abraxane and current treatment with oxaliplatin  -monitor closely as noted above.

## 2017-08-11 NOTE — MR AVS SNAPSHOT
After Visit Summary   8/11/2017    Shirin Muller    MRN: [id number]           Patient Information     Date Of Birth          1960        Visit Information        Provider Department      8/11/2017 10:00 AM UC 28 ATC; UC ONCOLOGY INFUSION [facility]        Today's Diagnoses     Cholangitis           Follow-ups after your visit        Your next 10 appointments already scheduled     Aug 18, 2017  6:30 AM CDT   [facility] Lab Draw with [facility] LAB DRAW   [facility] ([facility])    [address] [address]   [phone]            Aug 18, 2017  7:00 AM CDT   (Arrive by 6:45 AM)   Return Visit with TEE Olivas CNP   [facility] ([facility])    [address] [place] [address]   [phone]            Aug 18, 2017  8:00 AM CDT   Infusion 360 with UC ONCOLOGY INFUSION, UC 18 ATC   [facility] ([facility])    [address] [place] [id number]   [phone]            Sep 01, 2017  8:30 AM CDT   [facility] Lab Draw with UC MASONIC LAB DRAW   [facility] ([facility])    [address] [place] [address]   [phone]            Sep 01, 2017  9:00 AM CDT   Infusion 360 with UC ONCOLOGY INFUSION, UC 12 ATC   [facility] ([facility])    [address] [place] [address]   [phone]            Sep 15, 2017  9:00 AM CDT   [facility] Lab Draw with UC MASONIC LAB DRAW   [facility] ([facility])    [address] [place] [address]   [phone]            Sep 15, 2017  9:40 AM CDT   (Arrive by 9:25 AM)   CT CHEST ABDOMEN PELVIS W/O & W CONTRAST with UCCT2   [facility]  Imaging Center CT ([facility])    [address] [place] [address]  1st [facility] [address] [phone]           Please bring any scans or X-rays taken at other hospitals, if similar tests were done. Also bring a list of your medicines, including vitamins, minerals and over-the-counter drugs. It is safest to leave personal items at home.  Be sure to tell your doctor:   If you have any allergies.   If there s any chance you are pregnant.   If you are breastfeeding.   If you have any special needs.  You may have contrast for this exam. To prepare:   Do not eat or drink for 2 hours before your exam. If you need to take medicine, you may take it with small sips of water. (We may ask you to take liquid medicine as well.)   The day before your exam, drink extra fluids at least six 8-ounce glasses (unless your doctor tells you to restrict your fluids).  Patients over 70 or patients with diabetes or kidney problems:   If you haven t had a blood test (creatinine test) within the last 30 days, go to your clinic or Diagnostic Imaging Department for this test.  If you have diabetes:   If your kidney function is normal, continue taking your metformin (Avandamet, Glucophage, Glucovance, Metaglip) on the day of your exam.   If your kidney function is abnormal, wait 48 hours before restarting this medicine.  You will have oral contrast for this exam:   You will drink the contrast at home. Get this from your clinic or Diagnostic Imaging Department. Please follow the directions given.  Please wear loose clothing, such as a sweat suit or jogging clothes. Avoid snaps, zippers and other metal. We may ask you to undress and put on a hospital gown.  If you have any questions, please call the Imaging Department where you will have your exam.            Sep 15, 2017 10:00 AM CDT   Infusion 360 with UC ONCOLOGY INFUSION   [facility] ([facility])    [address] [place] [address]  Se  2nd Floor  [facility] [address] [phone]              Who to contact     If you have questions or need follow up information about today's clinic visit or your schedule please contact [facility] directly at [phone].  Normal or non-critical lab and imaging results will be communicated to you by MyChart, letter or phone within 4 business days after the clinic has received the results. If you do not hear from us within 7 days, please contact the clinic through [email or web address] or phone. If you have a critical or abnormal lab result, we will notify you by phone as soon as possible.  Submit refill requests through [employer] or call your pharmacy and they will forward the refill request to us. Please allow 3 business days for your refill to be completed.          Additional Information About Your Visit        [email or web address] Information     [employer] gives you secure access to your electronic health record. If you see a primary care provider, you can also send messages to your care team and make appointments. If you have questions, please call your primary care clinic.  If you do not have a primary care provider, please call [phone] and they will assist you.        Care EveryWhere ID     This is your Care EveryWhere ID. This could be used by other organizations to access your [facility] medical records  [id number]         Blood Pressure from Last 3 Encounters:   08/11/17 108/73   08/05/17 132/80   08/03/17 126/80    Weight from Last 3 Encounters:   08/11/17 75.2 kg (165 lb 12.8 oz)   08/04/17 73.3 kg (161 lb 11.2 oz)   08/03/17 71.8 kg (158 lb 3.2 oz)              We Performed the Following     Blood culture     Blood culture     CBC with platelets differential     Comprehensive metabolic panel          Today's Medication Changes          These changes are accurate as of: 8/11/17 11:57 AM.  If you have any questions, ask your nurse or doctor.               These medicines have changed or have  updated prescriptions.        Dose/Directions    ENSURE CLEAR Liqd   This may have changed:  how much to take   Used for:  Malignant neoplasm of head of pancreas (H)        Dose:  1 Bottle   Take 1 Bottle by mouth 3 times daily   Quantity:  90 Bottle   Refills:  6       polyethylene glycol powder   Commonly known as:  MIRALAX/GLYCOLAX   This may have changed:    - when to take this  - reasons to take this   Used for:  Malignant neoplasm of head of pancreas (H)        Dose:  1 capful   Take 17 g (1 capful) by mouth daily   Quantity:  119 g   Refills:  11            Where to get your medicines      These medications were sent to [facility] [place], [place] - [address] [facility] [address]  [address], [facility] [address]    Hours:  TRANSPLANT PHONE NUMBER [phone] Phone:  [phone]     loratadine 10 MG tablet    ondansetron 8 MG ODT tab    polyethylene glycol powder         Some of these will need a paper prescription and others can be bought over the counter.  Ask your nurse if you have questions.     Bring a paper prescription for each of these medications     LORazepam 0.5 MG tablet    morphine 15 MG 12 hr tablet                Primary Care Provider    [facility]       No address on file        Equal Access to Services     JAI CORDERO AH: Maxx Spence, wathanh flower, qaybta kaalmada karin, luis e lora. So [facility] [phone].    ATENCIÓN: Si habla español, tiene a bernal disposición servicios gratuitos de asistencia lingüística. Miguelame al [phone].    We comply with applicable federal civil rights laws and [place] laws. We do not discriminate on the basis of race, color, national origin, age, disability sex, sexual orientation or gender identity.            Thank you!     Thank you for choosing [facility]  for your care. Our goal is always to provide you with excellent care. Hearing  back from our patients is one way we can continue to improve our services. Please take a few minutes to complete the written survey that you may receive in the mail after your visit with us. Thank you!             Your Updated Medication List - Protect others around you: Learn how to safely use, store and throw away your medicines at www.disposemymeds.org.          This list is accurate as of: 8/11/17 11:57 AM.  Always use your most recent med list.                   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)       amylase-lipase-protease 30322 UNITS Cpep    CREON    180 capsule    Take 2 capsules (72,000 Units) by mouth 3 times daily (with meals)    Malignant neoplasm of head of pancreas (H)       dexamethasone 4 MG tablet    DECADRON    3 tablet    Take 1 tablet (4 mg) by mouth daily (with breakfast)    Need for prophylactic measure, Malignant neoplasm of head of pancreas (H)       diltiazem 2% in PLO cream (FV COMPOUNDED) 2% Gel     30 g    Apply topically daily and as needed to external hemorrhoids    External hemorrhoids       docusate sodium 100 MG capsule    COLACE    100 capsule    Take 1 capsule (100 mg) by mouth daily    External hemorrhoids       dronabinol 5 MG capsule    MARINOL    90 capsule    Take 1 capsule (5 mg) by mouth 3 times daily (before meals)    Anorexia       ENSURE CLEAR Liqd     90 Bottle    Take 1 Bottle by mouth 3 times daily    Malignant neoplasm of head of pancreas (H)       levofloxacin 500 MG tablet    LEVAQUIN    14 tablet    Take 1 tablet (500 mg) by mouth daily    Cholangitis, 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)       lidocaine-prilocaine cream    EMLA    30 g    Apply topically as needed for other (Use 30-60 minutes prior to port access)    Malignant neoplasm of head of pancreas  (H)       loratadine 10 MG tablet    CLARITIN    30 tablet    Take 1 tablet (10 mg) by mouth daily Reported on 5/5/2017    Chronic seasonal allergic rhinitis, unspecified trigger       LORazepam 0.5 MG tablet    ATIVAN    30 tablet    Take 1 tablet (0.5 mg) by mouth every 4 hours as needed (Anxiety, Nausea/Vomiting or Sleep)    Malignant neoplasm of head of pancreas (H)       morphine 15 MG 12 hr tablet    MS CONTIN    60 tablet    Take 1 tablet (15 mg) by mouth every 12 hours    Malignant neoplasm of head of pancreas (H)       ondansetron 8 MG ODT tab    ZOFRAN-ODT    60 tablet    Take 1 tablet (8 mg) by mouth every 8 hours as needed for nausea    Malignant neoplasm of head of pancreas (H)       oxyCODONE 5 MG IR tablet    ROXICODONE    100 tablet    Take 1 tablet (5 mg) by mouth every 4 hours as needed for moderate to severe pain    Malignant neoplasm of head of pancreas (H)       pantoprazole 20 MG EC tablet    PROTONIX    60 tablet    Take 1 tablet (20 mg) by mouth 2 times daily    Malignant neoplasm of head of pancreas (H)       polyethylene glycol powder    MIRALAX/GLYCOLAX    119 g    Take 17 g (1 capful) by mouth daily    Malignant neoplasm of head of pancreas (H)       potassium chloride SA 20 MEQ CR tablet    potassium chloride    60 tablet    Take 1 tablet (20 mEq) by mouth daily    Malignant neoplasm of head of pancreas (H)       prochlorperazine 10 MG tablet    COMPAZINE    30 tablet    Take 1 tablet (10 mg) by mouth every 6 hours as needed (Nausea/Vomiting)    Malignant neoplasm of head of pancreas (H)

## 2017-08-11 NOTE — MR AVS SNAPSHOT
After Visit Summary   8/11/2017    Shirin Muller    MRN: [id number]           Patient Information     Date Of Birth          1960        Visit Information        Provider Department      8/11/2017 10:20 AM Ester Echavarria APRN CNP [facility] [place]        Today's Diagnoses     Chronic seasonal allergic rhinitis, unspecified trigger    -  1    Malignant neoplasm of head of pancreas (H)           Follow-ups after your visit        Your next 10 appointments already scheduled     Aug 18, 2017  6:30 AM CDT   [facility] Lab Draw with UC [facility] LAB DRAW   [facility] ([facility])    [address] [facility] [address]  2nd [facility] [address]   [phone]            Aug 18, 2017  7:00 AM CDT   (Arrive by 6:45 AM)   Return Visit with TEE Olivas CNP   [facility] [place] ([facility])    [address] [address]   [phone]            Aug 18, 2017  8:00 AM CDT   Infusion 360 with UC ONCOLOGY INFUSION, UC 18 ATC   [facility] ([facility])    [address] [facility] [address]  [address]nd [facility] [address]   [phone]            Sep 01, 2017  8:30 AM CDT   Masonic Lab Draw with UC MASONIC LAB DRAW   [facility] Lab Draw ([facility])    [address] [facility]  [address]nd [facility] [address]   [phone]            Sep 01, 2017  9:00 AM CDT   Infusion 360 with UC ONCOLOGY INFUSION, UC 12 ATC   [facility] [place] ([facility])    [address] [address]   [phone]            Sep 15, 2017  9:15 AM CDT   M[facility]ic Lab Draw with UC MASONIC LAB DRAW   [facility] Lab Draw ([facility])    [address] [address]   [phone]            Sep 15, 2017  9:40 AM  CDT   (Arrive by 9:25 AM)   CT CHEST ABDOMEN PELVIS W/O & W CONTRAST with UCCT2   [facility] ([facility])    [address] [facility]  1st Floor  [facility] [address] [phone]           Please bring any scans or X-rays taken at other hospitals, if similar tests were done. Also bring a list of your medicines, including vitamins, minerals and over-the-counter drugs. It is safest to leave personal items at home.  Be sure to tell your doctor:   If you have any allergies.   If there s any chance you are pregnant.   If you are breastfeeding.   If you have any special needs.  You may have contrast for this exam. To prepare:   Do not eat or drink for 2 hours before your exam. If you need to take medicine, you may take it with small sips of water. (We may ask you to take liquid medicine as well.)   The day before your exam, drink extra fluids at least six 8-ounce glasses (unless your doctor tells you to restrict your fluids).  Patients over 70 or patients with diabetes or kidney problems:   If you haven t had a blood test (creatinine test) within the last 30 days, go to your clinic or Diagnostic Imaging Department for this test.  If you have diabetes:   If your kidney function is normal, continue taking your metformin (Avandamet, Glucophage, Glucovance, Metaglip) on the day of your exam.   If your kidney function is abnormal, wait 48 hours before restarting this medicine.  You will have oral contrast for this exam:   You will drink the contrast at home. Get this from your clinic or Diagnostic Imaging Department. Please follow the directions given.  Please wear loose clothing, such as a sweat suit or jogging clothes. Avoid snaps, zippers and other metal. We may ask you to undress and put on a hospital gown.  If you have any questions, please call the Imaging Department where you will have your exam.            Sep 18, 2017  9:45 AM CDT   (Arrive by 9:30 AM)   Return Visit with  "Demond Gonsales MD   [facility] ([facility])    [address] [facility]  2nd Floor  [facility] [address] [phone]              Who to contact     If you have questions or need follow up information about today's clinic visit or your schedule please contact [facility] directly at [phone].  Normal or non-critical lab and imaging results will be communicated to you by MyChart, letter or phone within 4 business days after the clinic has received the results. If you do not hear from us within 7 days, please contact the clinic through [employer] or phone. If you have a critical or abnormal lab result, we will notify you by phone as soon as possible.  Submit refill requests through [email or web address] or call your pharmacy and they will forward the refill request to us. Please allow 3 business days for your refill to be completed.          Additional Information About Your Visit        [employer] Information     [email or web address] gives you secure access to your electronic health record. If you see a primary care provider, you can also send messages to your care team and make appointments. If you have questions, please call your primary care clinic.  If you do not have a primary care provider, please call [phone] and they will assist you.        Care EveryWhere ID     This is your Care EveryWhere ID. This could be used by other organizations to access your [facility] medical records  [id number]        Your Vitals Were     Pulse Temperature Respirations Height Pulse Oximetry BMI (Body Mass Index)    86 98.8  F (37.1  C) (Oral) 16 1.803 m (5' 11\") 99% 23.12 kg/m2       Blood Pressure from Last 3 Encounters:   08/11/17 108/73   08/05/17 132/80   08/03/17 126/80    Weight from Last 3 Encounters:   08/11/17 75.2 kg (165 lb 12.8 oz)   08/04/17 73.3 kg (161 lb 11.2 oz)   08/03/17 71.8 kg (158 lb 3.2 oz)              Today, you had the following     No orders found for " display         Today's Medication Changes          These changes are accurate as of: 8/11/17  4:20 PM.  If you have any questions, ask your nurse or doctor.               These medicines have changed or have updated prescriptions.        Dose/Directions    ENSURE CLEAR Liqd   This may have changed:  how much to take   Used for:  Malignant neoplasm of head of pancreas (H)        Dose:  1 Bottle   Take 1 Bottle by mouth 3 times daily   Quantity:  90 Bottle   Refills:  6       polyethylene glycol powder   Commonly known as:  MIRALAX/GLYCOLAX   This may have changed:    - when to take this  - reasons to take this   Used for:  Malignant neoplasm of head of pancreas (H)        Dose:  1 capful   Take 17 g (1 capful) by mouth daily   Quantity:  119 g   Refills:  11            Where to get your medicines      These medications were sent to [place], [place] - [address] [place] [address] [address]  [address] [place] [address] [address] [place] [id number]    Hours:  TRANSPLANT PHONE NUMBER [phone] Phone:  [phone]     loratadine 10 MG tablet    ondansetron 8 MG ODT tab    polyethylene glycol powder         Some of these will need a paper prescription and others can be bought over the counter.  Ask your nurse if you have questions.     Bring a paper prescription for each of these medications     LORazepam 0.5 MG tablet    morphine 15 MG 12 hr tablet                Primary Care Provider    [facility]       No address on file        Equal Access to Services     JAI CORDERO AH: Hadii isak harding Sojim, waaxda luqadaha, qaybta kaalmada adeegyada, luis e lora. So [facility] [phone].    ATENCIÓN: Si habla español, tiene a bernal disposición servicios gratuitos de asistencia lingüística. Llame al [phone].    We comply with applicable federal civil rights laws and [place] laws. We do not discriminate on the basis of race, color, national origin, age,  disability sex, sexual orientation or gender identity.            Thank you!     Thank you for choosing [facility]  for your care. Our goal is always to provide you with excellent care. Hearing back from our patients is one way we can continue to improve our services. Please take a few minutes to complete the written survey that you may receive in the mail after your visit with us. Thank you!             Your Updated Medication List - Protect others around you: Learn how to safely use, store and throw away your medicines at www.disposemymeds.org.          This list is accurate as of: 8/11/17  4:20 PM.  Always use your most recent med list.                   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)       amylase-lipase-protease 26881 UNITS Cpep    CREON    180 capsule    Take 2 capsules (72,000 Units) by mouth 3 times daily (with meals)    Malignant neoplasm of head of pancreas (H)       dexamethasone 4 MG tablet    DECADRON    3 tablet    Take 1 tablet (4 mg) by mouth daily (with breakfast)    Need for prophylactic measure, Malignant neoplasm of head of pancreas (H)       diltiazem 2% in PLO cream (FV COMPOUNDED) 2% Gel     30 g    Apply topically daily and as needed to external hemorrhoids    External hemorrhoids       docusate sodium 100 MG capsule    COLACE    100 capsule    Take 1 capsule (100 mg) by mouth daily    External hemorrhoids       dronabinol 5 MG capsule    MARINOL    90 capsule    Take 1 capsule (5 mg) by mouth 3 times daily (before meals)    Anorexia       ENSURE CLEAR Liqd     90 Bottle    Take 1 Bottle by mouth 3 times daily    Malignant neoplasm of head of pancreas (H)       levofloxacin 500 MG tablet    LEVAQUIN    14 tablet    Take 1 tablet (500 mg) by mouth daily    Cholangitis, Biliary  obstruction due to malignant neoplasm (H)       lidocaine-prilocaine cream    EMLA    30 g    Apply topically as needed for other (Use 30-60 minutes prior to port access)    Malignant neoplasm of head of pancreas (H)       loratadine 10 MG tablet    CLARITIN    30 tablet    Take 1 tablet (10 mg) by mouth daily Reported on 5/5/2017    Chronic seasonal allergic rhinitis, unspecified trigger       LORazepam 0.5 MG tablet    ATIVAN    30 tablet    Take 1 tablet (0.5 mg) by mouth every 4 hours as needed (Anxiety, Nausea/Vomiting or Sleep)    Malignant neoplasm of head of pancreas (H)       morphine 15 MG 12 hr tablet    MS CONTIN    60 tablet    Take 1 tablet (15 mg) by mouth every 12 hours    Malignant neoplasm of head of pancreas (H)       ondansetron 8 MG ODT tab    ZOFRAN-ODT    60 tablet    Take 1 tablet (8 mg) by mouth every 8 hours as needed for nausea    Malignant neoplasm of head of pancreas (H)       oxyCODONE 5 MG IR tablet    ROXICODONE    100 tablet    Take 1 tablet (5 mg) by mouth every 4 hours as needed for moderate to severe pain    Malignant neoplasm of head of pancreas (H)       pantoprazole 20 MG EC tablet    PROTONIX    60 tablet    Take 1 tablet (20 mg) by mouth 2 times daily    Malignant neoplasm of head of pancreas (H)       polyethylene glycol powder    MIRALAX/GLYCOLAX    119 g    Take 17 g (1 capful) by mouth daily    Malignant neoplasm of head of pancreas (H)       potassium chloride SA 20 MEQ CR tablet    potassium chloride    60 tablet    Take 1 tablet (20 mEq) by mouth daily    Malignant neoplasm of head of pancreas (H)       prochlorperazine 10 MG tablet    COMPAZINE    30 tablet    Take 1 tablet (10 mg) by mouth every 6 hours as needed (Nausea/Vomiting)    Malignant neoplasm of head of pancreas (H)

## 2017-08-18 NOTE — PROGRESS NOTES
Infusion Nursing Note:  Pt presents today for C14 D1 Oxaliplatin/Leucovorin/Irinotecan/Fluorouracil bolus and home infusion.     present during visit today: Not Applicable.  Patient seen by Ester Echavarria DNP prior to infusion.    Lab Results   Component Value Date    HGB 9.4 08/18/2017     Lab Results   Component Value Date    WBC 6.5 08/18/2017      Lab Results   Component Value Date    ANEU 4.7 08/18/2017     Lab Results   Component Value Date     08/18/2017      Lab Results   Component Value Date     08/18/2017                   Lab Results   Component Value Date    POTASSIUM 3.9 08/18/2017           Lab Results   Component Value Date    MAG 2.0 04/03/2017            Lab Results   Component Value Date    CR 0.59 08/18/2017                   Lab Results   Component Value Date    FANNY 9.0 08/18/2017                Lab Results   Component Value Date    BILITOTAL 0.7 08/18/2017           Lab Results   Component Value Date    ALBUMIN 2.6 08/18/2017                    Lab Results   Component Value Date    ALT 50 08/18/2017           Lab Results   Component Value Date     08/18/2017     Results reviewed, labs MET treatment parameters, ok to proceed with treatment.    Note: N/A.  Proceed with treatment.    Intravenous Access:  Implanted Port.    (+) Blood return from port noted at beginning of infusions and before hooking up continuous infusion. Atropine given prior to Irinotecan and half-way through infusion per pt request. Tolerating infusions without incident. Pt noted to feel nauseated with 20 min left of Irinotecan. Ativan 0.5 mg given with relief.   Fluorouracil home infusion hooked up to port at 1330  at 5 ml/hr x46 hours. [facility] aware to disconnect pt's pump Sunday, 8/20, at 1130. Pump connection double checked with Susanna RN that clamps are taped open. Capillary element taped to chest. Air filter hole noted to not be blocked. Pt aware to keep the infusor out of light d/t light sensitivity  and avoiding extreme cold/hot temps to ensure pump's rate does not change. Pt aware of disconnect date/time.     Discharge Plan:   Prescription refills given for decadron.  Discharge instructions reviewed with: Patient.  Patient and/or family verbalized understanding of discharge instructions and all questions answered.  Copy of AVS reviewed with patient and/or family. Pt has IVFs appt Monday 8/21. Pt will return 9/1 for next infusion.

## 2017-08-18 NOTE — NURSING NOTE
"Chief Complaint   Patient presents with     Port Draw     Labs collected from port. vs taken. pt checked in for appt     Port accessed with 20g 3/4\" gripper needle, labs collected, line flushed with saline and heparin.  Vitals taken. Pt checked in for appointment(s).    Debbie De La Cruz RN  "

## 2017-08-18 NOTE — PROGRESS NOTES
Reason for Visit: f/u metastatic pancreatic cancer, recent biliary obstruction, post ERCP/stenting    Oncology HPI: She was diagnosed with pancreatic cancer in the spring of 2016 after presenting with a 2 to 3-month history of abdominal pain and weight loss. She initiated on treatment with gemcitabine and Abraxane on 05/02/2016 and continued on that until December 2016 when she was found to have metastatic disease involving the liver. She was then initiated on FOLFIRINOX on 12/20/16.  In January 2017, she was found to have a GI bleed secondary to a bleeding duodenal ulcer and infiltrating mass in the duodenum. The ulcer was injected and clipped. She was found to be H. Pylori positive and was initiated on therapy with PPI, carafate, amoxicillin and clarithromycin. She had biliary obstruction in April 2017 and underwent ERCP and  biliary stent placement  Dr. Braden. She was admitted on 8/3/17 with sepsis/cholangitis. Blood cultures grew klebsiella penumonia and streptococcus angiosos. She had an ERCP on 8/3 demonstrating an occluded stent with pust, stone and sludge. The duct was dilated and a new stent placed. She was treated with IV Vanco and Zosyn, repeat BC were negative. She was discharged on levaquin and augmentin.    Interval history: Shirin has had a really good week. Energy has improved. Her appetite has been great and she is enjoying eating again. Bowels regular. No N/V. Urine is light in color. She is drinking fluids without difficulty. Denies fevers/chills. No dizziness or weakness. No change to neuropathy that involves the hands/feet. No new mouth sores. No cough, sob, cp, palpitation. No bone aches/pains.    Current Outpatient Prescriptions   Medication Sig Dispense Refill     morphine (MS CONTIN) 15 MG 12 hr tablet Take 1 tablet (15 mg) by mouth every 12 hours 60 tablet 0     ondansetron (ZOFRAN-ODT) 8 MG ODT tab Take 1 tablet (8 mg) by mouth every 8 hours as needed for nausea 60 tablet 6      LORazepam (ATIVAN) 0.5 MG tablet Take 1 tablet (0.5 mg) by mouth every 4 hours as needed (Anxiety, Nausea/Vomiting or Sleep) 30 tablet 3     loratadine (CLARITIN) 10 MG tablet Take 1 tablet (10 mg) by mouth daily Reported on 5/5/2017 30 tablet 6     polyethylene glycol (MIRALAX/GLYCOLAX) powder Take 17 g (1 capful) by mouth daily 119 g 11     levofloxacin (LEVAQUIN) 500 MG tablet Take 1 tablet (500 mg) by mouth daily 14 tablet 0     amoxicillin-clavulanate (AUGMENTIN) 875-125 MG per tablet Take 1 tablet by mouth 2 times daily 28 tablet 0     potassium chloride SA (POTASSIUM CHLORIDE) 20 MEQ CR tablet Take 1 tablet (20 mEq) by mouth daily 60 tablet 1     prochlorperazine (COMPAZINE) 10 MG tablet Take 1 tablet (10 mg) by mouth every 6 hours as needed (Nausea/Vomiting) 30 tablet 2     oxyCODONE (ROXICODONE) 5 MG IR tablet Take 1 tablet (5 mg) by mouth every 4 hours as needed for moderate to severe pain 100 tablet 0     dronabinol (MARINOL) 5 MG capsule Take 1 capsule (5 mg) by mouth 3 times daily (before meals) 90 capsule 0     pantoprazole (PROTONIX) 20 MG EC tablet Take 1 tablet (20 mg) by mouth 2 times daily 60 tablet 3     dexamethasone (DECADRON) 4 MG tablet Take 1 tablet (4 mg) by mouth daily (with breakfast) 3 tablet 0     Nutritional Supplements (ENSURE CLEAR) LIQD Take 1 Bottle by mouth 3 times daily (Patient taking differently: Take 5 Bottles by mouth 3 times daily ) 90 Bottle 6     diltiazem 2% in PLO cream, FV COMPOUNDED, 2% GEL Apply topically daily and as needed to external hemorrhoids 30 g 0     docusate sodium (COLACE) 100 MG capsule Take 1 capsule (100 mg) by mouth daily 100 capsule 0     amylase-lipase-protease (CREON) 55949 UNITS CPEP Take 2 capsules (72,000 Units) by mouth 3 times daily (with meals) 180 capsule 1     lidocaine-prilocaine (EMLA) cream Apply topically as needed for other (Use 30-60 minutes prior to port access) 30 g 0          Allergies   Allergen Reactions     Contrast Dye Nausea and  "Vomiting     Pt vomiting post IV contrast, Please try Visipaque for next CT scan. 6/24/16 sv         Exam: alert, appears well Blood pressure 103/64, pulse 88, temperature 98.2  F (36.8  C), temperature source Oral, resp. rate 16, height 1.803 m (5' 10.98\"), weight 77.4 kg (170 lb 11.2 oz), SpO2 100 %.  Wt Readings from Last 4 Encounters:   08/18/17 77.4 kg (170 lb 11.2 oz)   08/11/17 75.2 kg (165 lb 12.8 oz)   08/04/17 73.3 kg (161 lb 11.2 oz)   08/03/17 71.8 kg (158 lb 3.2 oz)     Oropharynx is moist, no focal lesion. No icterus. Neck supple and without adenopathy. Lungs: CTA. Heart:RRR, no murmur or rub. Abdomen: soft,distended, positive ascites, nontender, BS active. No masses or organomegaly. Extremities: warm, no edema. Speech clear. CN wnl. Port in the right chest is intact and nontender.    Labs: Results for NATALIIA IBARRA (MRN [id number]) as of 8/18/2017 07:21   Ref. Range 8/18/2017 06:51   Sodium Latest Ref Range: 133 - 144 mmol/L 140   Potassium Latest Ref Range: 3.4 - 5.3 mmol/L 3.9   Chloride Latest Ref Range: 94 - 109 mmol/L 102   Carbon Dioxide Latest Ref Range: 20 - 32 mmol/L 29   Urea Nitrogen Latest Ref Range: 7 - 30 mg/dL 12   Creatinine Latest Ref Range: 0.52 - 1.04 mg/dL 0.59   GFR Estimate Latest Ref Range: >60 mL/min/1.7m2 >90   GFR Estimate If Black Latest Ref Range: >60 mL/min/1.7m2 >90   Calcium Latest Ref Range: 8.5 - 10.1 mg/dL 9.0   Anion Gap Latest Ref Range: 3 - 14 mmol/L 9   Albumin Latest Ref Range: 3.4 - 5.0 g/dL 2.6 (L)   Protein Total Latest Ref Range: 6.8 - 8.8 g/dL 7.9   Bilirubin Total Latest Ref Range: 0.2 - 1.3 mg/dL 0.7   Alkaline Phosphatase Latest Ref Range: 40 - 150 U/L 550 (H)   ALT Latest Ref Range: 0 - 50 U/L 50   AST Latest Ref Range: 0 - 45 U/L 101 (H)   Glucose Latest Ref Range: 70 - 99 mg/dL 200 (H)   WBC Latest Ref Range: 4.0 - 11.0 10e9/L 6.5   Hemoglobin Latest Ref Range: 11.7 - 15.7 g/dL 9.4 (L)   Hematocrit Latest Ref Range: 35.0 - 47.0 % 31.7 " (L)   Platelet Count Latest Ref Range: 150 - 450 10e9/L 110 (L)   RBC Count Latest Ref Range: 3.8 - 5.2 10e12/L 3.78 (L)   MCV Latest Ref Range: 78 - 100 fl 84   MCH Latest Ref Range: 26.5 - 33.0 pg 24.9 (L)   MCHC Latest Ref Range: 31.5 - 36.5 g/dL 29.7 (L)   RDW Latest Ref Range: 10.0 - 15.0 % 25.2 (H)   Diff Method Unknown Automated Method   % Neutrophils Latest Units: % 71.9   % Lymphocytes Latest Units: % 16.7   % Monocytes Latest Units: % 9.4   % Eosinophils Latest Units: % 1.1   % Basophils Latest Units: % 0.6   % Immature Granulocytes Latest Units: % 0.3   Nucleated RBCs Latest Ref Range: 0 /100 0   Absolute Neutrophil Latest Ref Range: 1.6 - 8.3 10e9/L 4.7   Absolute Lymphocytes Latest Ref Range: 0.8 - 5.3 10e9/L 1.1   Absolute Monocytes Latest Ref Range: 0.0 - 1.3 10e9/L 0.6   Absolute Eosinophils Latest Ref Range: 0.0 - 0.7 10e9/L 0.1   Absolute Basophils Latest Ref Range: 0.0 - 0.2 10e9/L 0.0   Abs Immature Granulocytes Latest Ref Range: 0 - 0.4 10e9/L 0.0   Absolute Nucleated RBC Unknown 0.0       Impression/plan:   1. Metastatic pancreatic cancer  -had stable disease on FOLFIRINOX  -has recovered well from recent cholangitis/bacteremia. OK to resume treatment today  -will have restaging in a month with Dr. Gonsales. Neuropathy is getting close to being dose limiting. Asked her to notify us if she has more difficulty with balance/walking.    2. Cholangitis/sepsis w/ Klebsiella and strep bacteremia  -s/p ERCP on 8/3  -afebrile, nearing completion of levaquin/augmentin.   - ALK phos and AST remain elevated, but no clinical s/s of infection. Bili has normalized. OK to proceed with chemo today  -monitor for s/s of outlet obstruction given the duodenal stenosis noted on the endoscopy. Could potentially need duodenal stenting in the future.    3. Cancer pain:  -had been stable on MS Contin 15 mg every 12 hours.     4. FEN:   -Anorexia: appetite much improved this week, remains on dexamethasone 4 mg daily and  marinol 5 mg tid   -Hypokalemia: chronic, K is stable today. Continue oral K replacement 20 meq daily     5. Hx of duodenal ulcer, h. Pylori +, s/p ABX therapy  -continue protonix 20 mg bid    6. Neuropathy: grade 2, due to prior treatment with abraxane and current treatment with oxaliplatin  -monitor closely as noted above.

## 2017-08-18 NOTE — MR AVS SNAPSHOT
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)        Secondary malignant neoplasm of liver (H)        Anorexia           Follow-ups after your visit        Your next 10 appointments already scheduled     Sep 01, 2017  8:30 AM CDT   Masonic Lab Draw with  MASONIC LAB DRAW   [facility] Masonic Lab Draw ([facility])    [address]  [address] [facility] [address]   [phone]            Sep 01, 2017  9:00 AM CDT   Infusion 360 with UC ONCOLOGY INFUSION, UC 12 ATC   [facility] ([facility])    [address] [address]   [phone]            Sep 15, 2017  9:15 AM CDT   Masonic Lab Draw with  MASONIC LAB DRAW   [facility] Masonic Lab Draw ([facility])    [address] [address]   [phone]            Sep 15, 2017  9:40 AM CDT   (Arrive by 9:25 AM)   CT CHEST ABDOMEN PELVIS W/O & W CONTRAST with UCCT2   [facility] [place] [facility] ([facility])    [address]  [address] [facility] [id number] [phone]           Please bring any scans or X-rays taken at other hospitals, if similar tests were done. Also bring a list of your medicines, including vitamins, minerals and over-the-counter drugs. It is safest to leave personal items at home.  Be sure to tell your doctor:   If you have any allergies.   If there s any chance you are pregnant.   If you are breastfeeding.   If you have any special needs.  You may  have contrast for this exam. To prepare:   Do not eat or drink for 2 hours before your exam. If you need to take medicine, you may take it with small sips of water. (We may ask you to take liquid medicine as well.)   The day before your exam, drink extra fluids at least six 8-ounce glasses (unless your doctor tells you to restrict your fluids).  Patients over 70 or patients with diabetes or kidney problems:   If you haven t had a blood test (creatinine test) within the last 30 days, go to your clinic or Diagnostic Imaging Department for this test.  If you have diabetes:   If your kidney function is normal, continue taking your metformin (Avandamet, Glucophage, Glucovance, Metaglip) on the day of your exam.   If your kidney function is abnormal, wait 48 hours before restarting this medicine.  You will have oral contrast for this exam:   You will drink the contrast at home. Get this from your clinic or Diagnostic Imaging Department. Please follow the directions given.  Please wear loose clothing, such as a sweat suit or jogging clothes. Avoid snaps, zippers and other metal. We may ask you to undress and put on a hospital gown.  If you have any questions, please call the Imaging Department where you will have your exam.            Sep 18, 2017  9:45 AM CDT   (Arrive by 9:30 AM)   Return Visit with Dmeond Gonsales MD   [facility] ([facility])    [address] [place] [address] [phone]            Sep 18, 2017 12:00 PM CDT   Infusion 360 with  ONCOLOGY INFUSION,  24 ATC   [facility] ([facility])    [address] [place] [address] [phone]              Who to contact     If you have questions or need follow up information about today's clinic visit or your schedule please contact [employer] [facility] directly at [phone].  Normal or  "non-critical lab and imaging results will be communicated to you by MyChart, letter or phone within 4 business days after the clinic has received the results. If you do not hear from us within 7 days, please contact the clinic through [employer] or phone. If you have a critical or abnormal lab result, we will notify you by phone as soon as possible.  Submit refill requests through [employer] or call your pharmacy and they will forward the refill request to us. Please allow 3 business days for your refill to be completed.          Additional Information About Your Visit        [employer] Information     [employer] gives you secure access to your electronic health record. If you see a primary care provider, you can also send messages to your care team and make appointments. If you have questions, please call your primary care clinic.  If you do not have a primary care provider, please call [phone] and they will assist you.        Care EveryWhere ID     This is your Care EveryWhere ID. This could be used by other organizations to access your [facility] medical records  [id number]        Your Vitals Were     Pulse Temperature Respirations Height Pulse Oximetry BMI (Body Mass Index)    88 98.2  F (36.8  C) (Oral) 16 1.803 m (5' 10.98\") 100% 23.82 kg/m2       Blood Pressure from Last 3 Encounters:   08/18/17 103/64   08/11/17 108/73   08/05/17 132/80    Weight from Last 3 Encounters:   08/18/17 77.4 kg (170 lb 11.2 oz)   08/11/17 75.2 kg (165 lb 12.8 oz)   08/04/17 73.3 kg (161 lb 11.2 oz)              Today, you had the following     No orders found for display         Today's Medication Changes          These changes are accurate as of: 8/18/17  8:15 AM.  If you have any questions, ask your nurse or doctor.               These medicines have changed or have updated prescriptions.        Dose/Directions    ENSURE CLEAR Liqd   This may have changed:  how much to take   Used for:  Malignant neoplasm of head of pancreas (H)        " Dose:  1 Bottle   Take 1 Bottle by mouth 3 times daily   Quantity:  90 Bottle   Refills:  6                Primary Care Provider    [facility]       No address on file        Equal Access to Services     JAI CORDERO : Hadii aad ku hadelvipernell Spence, shaolamied palmatyha, john saigejanneth frazier, luis e sims jonathankar escobar lacarinemarcelo loraRoshan So [facility] [phone].    ATENCIÓN: Si habla español, tiene a bernal disposición servicios gratuitos de asistencia lingüística. Llame al [phone].    We comply with applicable federal civil rights laws and [place] laws. We do not discriminate on the basis of race, color, national origin, age, disability sex, sexual orientation or gender identity.            Thank you!     Thank you for choosing [facility]  for your care. Our goal is always to provide you with excellent care. Hearing back from our patients is one way we can continue to improve our services. Please take a few minutes to complete the written survey that you may receive in the mail after your visit with us. Thank you!             Your Updated Medication List - Protect others around you: Learn how to safely use, store and throw away your medicines at www.disposemymeds.org.          This list is accurate as of: 8/18/17  8:15 AM.  Always use your most recent med list.                   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)       amylase-lipase-protease 21643 UNITS Cpep    CREON    180 capsule    Take 2 capsules (72,000 Units) by mouth 3 times daily (with meals)    Malignant neoplasm of head of pancreas (H)       dexamethasone 4 MG tablet    DECADRON    3 tablet    Take 1 tablet (4 mg) by mouth daily (with breakfast)    Need for prophylactic measure, Malignant neoplasm of head of pancreas (H)        diltiazem 2% in PLO cream (FV COMPOUNDED) 2% Gel     30 g    Apply topically daily and as needed to external hemorrhoids    External hemorrhoids       docusate sodium 100 MG capsule    COLACE    100 capsule    Take 1 capsule (100 mg) by mouth daily    External hemorrhoids       dronabinol 5 MG capsule    MARINOL    90 capsule    Take 1 capsule (5 mg) by mouth 3 times daily (before meals)    Anorexia       ENSURE CLEAR Liqd     90 Bottle    Take 1 Bottle by mouth 3 times daily    Malignant neoplasm of head of pancreas (H)       levofloxacin 500 MG tablet    LEVAQUIN    14 tablet    Take 1 tablet (500 mg) by mouth daily    Cholangitis, 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)       lidocaine-prilocaine cream    EMLA    30 g    Apply topically as needed for other (Use 30-60 minutes prior to port access)    Malignant neoplasm of head of pancreas (H)       loratadine 10 MG tablet    CLARITIN    30 tablet    Take 1 tablet (10 mg) by mouth daily Reported on 5/5/2017    Chronic seasonal allergic rhinitis, unspecified trigger       LORazepam 0.5 MG tablet    ATIVAN    30 tablet    Take 1 tablet (0.5 mg) by mouth every 4 hours as needed (Anxiety, Nausea/Vomiting or Sleep)    Malignant neoplasm of head of pancreas (H)       morphine 15 MG 12 hr tablet    MS CONTIN    60 tablet    Take 1 tablet (15 mg) by mouth every 12 hours    Malignant neoplasm of head of pancreas (H)       ondansetron 8 MG ODT tab    ZOFRAN-ODT    60 tablet    Take 1 tablet (8 mg) by mouth every 8 hours as needed for nausea    Malignant neoplasm of head of pancreas (H)       oxyCODONE 5 MG IR tablet    ROXICODONE    100 tablet    Take 1 tablet (5 mg) by mouth every 4 hours as needed for moderate to severe pain    Malignant neoplasm of head of pancreas (H)       pantoprazole 20 MG EC tablet    PROTONIX    60 tablet    Take 1 tablet (20 mg) by mouth 2 times daily    Malignant neoplasm of head of pancreas (H)       polyethylene glycol powder     MIRALAX/GLYCOLAX    119 g    Take 17 g (1 capful) by mouth daily    Malignant neoplasm of head of pancreas (H)       potassium chloride SA 20 MEQ CR tablet    potassium chloride    60 tablet    Take 1 tablet (20 mEq) by mouth daily    Malignant neoplasm of head of pancreas (H)       prochlorperazine 10 MG tablet    COMPAZINE    30 tablet    Take 1 tablet (10 mg) by mouth every 6 hours as needed (Nausea/Vomiting)    Malignant neoplasm of head of pancreas (H)

## 2017-08-18 NOTE — PATIENT INSTRUCTIONS
Contact Numbers  [facility] Main Line/After Hours: [phone]  [facility] Triage line: [phone]      Please call the triage or after hours line if you experience a temperature greater than or equal to 100.5, shaking chills, have uncontrolled nausea, vomiting and/or diarrhea, dizziness, shortness of breath, chest pain, bleeding, unexplained bruising, or if you have any other new/concerning symptoms, questions or concerns.      If you are having any concerning symptoms or wish to speak to a provider before your next infusion visit, please call to notify us so we can adequately serve you.     If you need a refill on a narcotic prescription or other medication, please call before your infusion appointment.           [facility] will be at your house Sunday, 8/20, at 11:30 am to disconnect your pump.          August 2017 Sunday Monday Tuesday Wednesday Thursday Friday Saturday             1     2     3     [facility] MASONIC LAB DRAW    8:45 AM   (15 min.)   [facility] LAB DRAW   [facility] Lab Draw     [facility] RETURN    9:15 AM   (50 min.)   Ester Echavarria APRN CNP   [facility] ONC INFUSION 360   10:30 AM   (360 min.)    ONCOLOGY INFUSION   [facility]     Admission   12:33 PM   Barb Tatum MD   [address] [facility]   (Discharge: 8/5/2017)     ENDOSCOPIC RETROGRADE CHOLANGIOPANCREATOGRAM    1:55 PM   Guru Jamia Mcintosh MD   [facility] OR     XR SURG SAUL >5 MIN FL W STILL    4:00 PM   (15 min.)   [id number]   [facility],  Radiology 4     XR CHEST PORT 1 VIEW    2:35 PM   (20 min.)   [id number]   [facility],  Radiology 5       6     7     8     9     10     11     [facility] LAB DRAW    9:15 AM   (15 min.)    MASONIC LAB DRAW   [facility] Lab Draw     [facility] ONC INFUSION 360   10:00 AM   (360 min.)    ONCOLOGY INFUSION   [facility] RETURN   10:05 AM   (50 min.)   Ester Echavarria APRN CNP   [facility]  Clinic 12       13     14     15     16     17     18     [facility] LAB DRAW    6:30 AM   (15 min.)    [facility]RAW   [facility]     [facility] RETURN    6:45 AM   (50 min.)   Ester Echavarria APRN CNP   [facility]     [facility] ONC INFUSION 360    8:00 AM   (360 min.)   [facility] ONCOLOGY INFUSION   [facility] 19       20     21     [facility] ONC INFUSION 120    1:30 PM   (120 min.)   [facility] ONCOLOGY INFUSION   [facility] 22     23     24     25     26       27     28     29     30     31 September 2017 Sunday Monday Tuesday Wednesday Thursday Friday Saturday                            1     [facility] LAB DRAW    8:30 AM   (15 min.)    MASONIC LAB DRAW   [facility] Masonic Lab Draw     [facility] ONC INFUSION 360    9:00 AM   (360 min.)   [facility] ONCOLOGY INFUSION   [facility] 2       3     4     5     6     7     8     9       10     11     12     13     14     15     [facility] LAB DRAW    9:15 AM   (15 min.)   [facility] LAB DRAW   [facility] Lab Draw     CT CHEST ABDOMEN PELVIS WWO    9:25 AM   (20 min.)   UCCT2   [facility] CT 16       17     18     [facility] RETURN    9:30 AM   (30 min.)   Demond Gonsales MD   [facility]     [facility] ONC INFUSION 360   12:00 PM   (360 min.)   [facility] ONCOLOGY INFUSION   [facility] 19     20     21     22     23       24     25     26     27     28     29     30                  Lab Results:  Recent Results (from the past 12 hour(s))   Comprehensive metabolic panel    Collection Time: 08/18/17  6:51 AM   Result Value Ref Range    Sodium 140 133 - 144 mmol/L    Potassium 3.9 3.4 - 5.3 mmol/L    Chloride 102 94 - 109 mmol/L    Carbon Dioxide 29 20 - 32 mmol/L    Anion Gap 9 3 - 14 mmol/L    Glucose 200 (H) 70 - 99 mg/dL    Urea Nitrogen 12 7 - 30 mg/dL    Creatinine 0.59 0.52 - 1.04 mg/dL    GFR Estimate >90 >60 mL/min/1.7m2    GFR  Estimate If Black >90 >60 mL/min/1.7m2    Calcium 9.0 8.5 - 10.1 mg/dL    Bilirubin Total 0.7 0.2 - 1.3 mg/dL    Albumin 2.6 (L) 3.4 - 5.0 g/dL    Protein Total 7.9 6.8 - 8.8 g/dL    Alkaline Phosphatase 550 (H) 40 - 150 U/L    ALT 50 0 - 50 U/L     (H) 0 - 45 U/L   CBC with platelets differential    Collection Time: 08/18/17  6:51 AM   Result Value Ref Range    WBC 6.5 4.0 - 11.0 10e9/L    RBC Count 3.78 (L) 3.8 - 5.2 10e12/L    Hemoglobin 9.4 (L) 11.7 - 15.7 g/dL    Hematocrit 31.7 (L) 35.0 - 47.0 %    MCV 84 78 - 100 fl    MCH 24.9 (L) 26.5 - 33.0 pg    MCHC 29.7 (L) 31.5 - 36.5 g/dL    RDW 25.2 (H) 10.0 - 15.0 %    Platelet Count 110 (L) 150 - 450 10e9/L    Diff Method Automated Method     % Neutrophils 71.9 %    % Lymphocytes 16.7 %    % Monocytes 9.4 %    % Eosinophils 1.1 %    % Basophils 0.6 %    % Immature Granulocytes 0.3 %    Nucleated RBCs 0 0 /100    Absolute Neutrophil 4.7 1.6 - 8.3 10e9/L    Absolute Lymphocytes 1.1 0.8 - 5.3 10e9/L    Absolute Monocytes 0.6 0.0 - 1.3 10e9/L    Absolute Eosinophils 0.1 0.0 - 0.7 10e9/L    Absolute Basophils 0.0 0.0 - 0.2 10e9/L    Abs Immature Granulocytes 0.0 0 - 0.4 10e9/L    Absolute Nucleated RBC 0.0

## 2017-08-18 NOTE — NURSING NOTE
"Oncology Rooming Note    August 18, 2017 7:05 AM   Shirin Muller is a 57 year old female who presents for:    Chief Complaint   Patient presents with     Port Draw     Labs collected from port. vs taken. pt checked in for appt     Oncology Clinic Visit     Pancreatic Ca F/U     Initial Vitals: /64 (BP Location: Right arm, Cuff Size: Adult Regular)  Pulse 88  Temp 98.2  F (36.8  C) (Oral)  Resp 16  Ht 1.803 m (5' 10.98\")  Wt 77.4 kg (170 lb 11.2 oz)  SpO2 100%  BMI 23.82 kg/m2 Estimated body mass index is 23.82 kg/(m^2) as calculated from the following:    Height as of this encounter: 1.803 m (5' 10.98\").    Weight as of this encounter: 77.4 kg (170 lb 11.2 oz). Body surface area is 1.97 meters squared.  No Pain (0) Comment: Data Unavailable   No LMP recorded. Patient is postmenopausal.  Allergies reviewed: Yes  Medications reviewed: Yes    Medications: Medication refills not needed today.  Pharmacy name entered into EPIC:    [facility] [place], [place] - [address] [facility] [phone]  [id number] - [place], [place] - [address] [place] [address]    Clinical concerns: None Ester Echavarria was NOT notified.    7 minutes for nursing intake (face to face time)     Mone Estrada LPN              "

## 2017-08-18 NOTE — LETTER
8/18/2017       RE: Shirin Muller  [address] [address]     Dear Colleague,    Thank you for referring your patient, Shirin Muller, to the [facility]. Please see a copy of my visit note below.    Reason for Visit: f/u metastatic pancreatic cancer, recent biliary obstruction, post ERCP/stenting    Oncology HPI: She was diagnosed with pancreatic cancer in the spring of 2016 after presenting with a 2 to 3-month history of abdominal pain and weight loss. She initiated on treatment with gemcitabine and Abraxane on 05/02/2016 and continued on that until December 2016 when she was found to have metastatic disease involving the liver. She was then initiated on FOLFIRINOX on 12/20/16.  In January 2017, she was found to have a GI bleed secondary to a bleeding duodenal ulcer and infiltrating mass in the duodenum. The ulcer was injected and clipped. She was found to be H. Pylori positive and was initiated on therapy with PPI, carafate, amoxicillin and clarithromycin. She had biliary obstruction in April 2017 and underwent ERCP and  biliary stent placement  Dr. Braden. She was admitted on 8/3/17 with sepsis/cholangitis. Blood cultures grew klebsiella penumonia and streptococcus angiosos. She had an ERCP on 8/3 demonstrating an occluded stent with pust, stone and sludge. The duct was dilated and a new stent placed. She was treated with IV Vanco and Zosyn, repeat BC were negative. She was discharged on levaquin and augmentin.    Interval history: Shirin has had a really good week. Energy has improved. Her appetite has been great and she is enjoying eating again. Bowels regular. No N/V. Urine is light in color. She is drinking fluids without difficulty. Denies fevers/chills. No dizziness or weakness. No change to neuropathy that involves the hands/feet. No new mouth sores. No cough, sob, cp, palpitation. No bone aches/pains.    Current Outpatient Prescriptions    Medication Sig Dispense Refill     morphine (MS CONTIN) 15 MG 12 hr tablet Take 1 tablet (15 mg) by mouth every 12 hours 60 tablet 0     ondansetron (ZOFRAN-ODT) 8 MG ODT tab Take 1 tablet (8 mg) by mouth every 8 hours as needed for nausea 60 tablet 6     LORazepam (ATIVAN) 0.5 MG tablet Take 1 tablet (0.5 mg) by mouth every 4 hours as needed (Anxiety, Nausea/Vomiting or Sleep) 30 tablet 3     loratadine (CLARITIN) 10 MG tablet Take 1 tablet (10 mg) by mouth daily Reported on 5/5/2017 30 tablet 6     polyethylene glycol (MIRALAX/GLYCOLAX) powder Take 17 g (1 capful) by mouth daily 119 g 11     levofloxacin (LEVAQUIN) 500 MG tablet Take 1 tablet (500 mg) by mouth daily 14 tablet 0     amoxicillin-clavulanate (AUGMENTIN) 875-125 MG per tablet Take 1 tablet by mouth 2 times daily 28 tablet 0     potassium chloride SA (POTASSIUM CHLORIDE) 20 MEQ CR tablet Take 1 tablet (20 mEq) by mouth daily 60 tablet 1     prochlorperazine (COMPAZINE) 10 MG tablet Take 1 tablet (10 mg) by mouth every 6 hours as needed (Nausea/Vomiting) 30 tablet 2     oxyCODONE (ROXICODONE) 5 MG IR tablet Take 1 tablet (5 mg) by mouth every 4 hours as needed for moderate to severe pain 100 tablet 0     dronabinol (MARINOL) 5 MG capsule Take 1 capsule (5 mg) by mouth 3 times daily (before meals) 90 capsule 0     pantoprazole (PROTONIX) 20 MG EC tablet Take 1 tablet (20 mg) by mouth 2 times daily 60 tablet 3     dexamethasone (DECADRON) 4 MG tablet Take 1 tablet (4 mg) by mouth daily (with breakfast) 3 tablet 0     Nutritional Supplements (ENSURE CLEAR) LIQD Take 1 Bottle by mouth 3 times daily (Patient taking differently: Take 5 Bottles by mouth 3 times daily ) 90 Bottle 6     diltiazem 2% in PLO cream, FV COMPOUNDED, 2% GEL Apply topically daily and as needed to external hemorrhoids 30 g 0     docusate sodium (COLACE) 100 MG capsule Take 1 capsule (100 mg) by mouth daily 100 capsule 0     amylase-lipase-protease (CREON) 28615 UNITS CPEP Take 2  "capsules (72,000 Units) by mouth 3 times daily (with meals) 180 capsule 1     lidocaine-prilocaine (EMLA) cream Apply topically as needed for other (Use 30-60 minutes prior to port access) 30 g 0          Allergies   Allergen Reactions     Contrast Dye Nausea and Vomiting     Pt vomiting post IV contrast, Please try Visipaque for next CT scan. 6/24/16 sv         Exam: alert, appears well Blood pressure 103/64, pulse 88, temperature 98.2  F (36.8  C), temperature source Oral, resp. rate 16, height 1.803 m (5' 10.98\"), weight 77.4 kg (170 lb 11.2 oz), SpO2 100 %.  Wt Readings from Last 4 Encounters:   08/18/17 77.4 kg (170 lb 11.2 oz)   08/11/17 75.2 kg (165 lb 12.8 oz)   08/04/17 73.3 kg (161 lb 11.2 oz)   08/03/17 71.8 kg (158 lb 3.2 oz)     Oropharynx is moist, no focal lesion. No icterus. Neck supple and without adenopathy. Lungs: CTA. Heart:RRR, no murmur or rub. Abdomen: soft,distended, positive ascites, nontender, BS active. No masses or organomegaly. Extremities: warm, no edema. Speech clear. CN wnl. Port in the right chest is intact and nontender.    Labs: Results for NATALIIA IBARRA (MRN [id number]) as of 8/18/2017 07:21   Ref. Range 8/18/2017 06:51   Sodium Latest Ref Range: 133 - 144 mmol/L 140   Potassium Latest Ref Range: 3.4 - 5.3 mmol/L 3.9   Chloride Latest Ref Range: 94 - 109 mmol/L 102   Carbon Dioxide Latest Ref Range: 20 - 32 mmol/L 29   Urea Nitrogen Latest Ref Range: 7 - 30 mg/dL 12   Creatinine Latest Ref Range: 0.52 - 1.04 mg/dL 0.59   GFR Estimate Latest Ref Range: >60 mL/min/1.7m2 >90   GFR Estimate If Black Latest Ref Range: >60 mL/min/1.7m2 >90   Calcium Latest Ref Range: 8.5 - 10.1 mg/dL 9.0   Anion Gap Latest Ref Range: 3 - 14 mmol/L 9   Albumin Latest Ref Range: 3.4 - 5.0 g/dL 2.6 (L)   Protein Total Latest Ref Range: 6.8 - 8.8 g/dL 7.9   Bilirubin Total Latest Ref Range: 0.2 - 1.3 mg/dL 0.7   Alkaline Phosphatase Latest Ref Range: 40 - 150 U/L 550 (H)   ALT Latest Ref " Range: 0 - 50 U/L 50   AST Latest Ref Range: 0 - 45 U/L 101 (H)   Glucose Latest Ref Range: 70 - 99 mg/dL 200 (H)   WBC Latest Ref Range: 4.0 - 11.0 10e9/L 6.5   Hemoglobin Latest Ref Range: 11.7 - 15.7 g/dL 9.4 (L)   Hematocrit Latest Ref Range: 35.0 - 47.0 % 31.7 (L)   Platelet Count Latest Ref Range: 150 - 450 10e9/L 110 (L)   RBC Count Latest Ref Range: 3.8 - 5.2 10e12/L 3.78 (L)   MCV Latest Ref Range: 78 - 100 fl 84   MCH Latest Ref Range: 26.5 - 33.0 pg 24.9 (L)   MCHC Latest Ref Range: 31.5 - 36.5 g/dL 29.7 (L)   RDW Latest Ref Range: 10.0 - 15.0 % 25.2 (H)   Diff Method Unknown Automated Method   % Neutrophils Latest Units: % 71.9   % Lymphocytes Latest Units: % 16.7   % Monocytes Latest Units: % 9.4   % Eosinophils Latest Units: % 1.1   % Basophils Latest Units: % 0.6   % Immature Granulocytes Latest Units: % 0.3   Nucleated RBCs Latest Ref Range: 0 /100 0   Absolute Neutrophil Latest Ref Range: 1.6 - 8.3 10e9/L 4.7   Absolute Lymphocytes Latest Ref Range: 0.8 - 5.3 10e9/L 1.1   Absolute Monocytes Latest Ref Range: 0.0 - 1.3 10e9/L 0.6   Absolute Eosinophils Latest Ref Range: 0.0 - 0.7 10e9/L 0.1   Absolute Basophils Latest Ref Range: 0.0 - 0.2 10e9/L 0.0   Abs Immature Granulocytes Latest Ref Range: 0 - 0.4 10e9/L 0.0   Absolute Nucleated RBC Unknown 0.0       Impression/plan:   1. Metastatic pancreatic cancer  -had stable disease on FOLFIRINOX  -has recovered well from recent cholangitis/bacteremia. OK to resume treatment today  -will have restaging in a month with Dr. Gonsales. Neuropathy is getting close to being dose limiting. Asked her to notify us if she has more difficulty with balance/walking.    2. Cholangitis/sepsis w/ Klebsiella and strep bacteremia  -s/p ERCP on 8/3  -afebrile, nearing completion of levaquin/augmentin.   - ALK phos and AST remain elevated, but no clinical s/s of infection. Bili has normalized. OK to proceed with chemo today  -monitor for s/s of outlet obstruction given the  duodenal stenosis noted on the endoscopy. Could potentially need duodenal stenting in the future.    3. Cancer pain:  -had been stable on MS Contin 15 mg every 12 hours.     4. FEN:   -Anorexia: appetite much improved this week, remains on dexamethasone 4 mg daily and marinol 5 mg tid   -Hypokalemia: chronic, K is stable today. Continue oral K replacement 20 meq daily     5. Hx of duodenal ulcer, h. Pylori +, s/p ABX therapy  -continue protonix 20 mg bid    6. Neuropathy: grade 2, due to prior treatment with abraxane and current treatment with oxaliplatin  -monitor closely as noted above.      Again, thank you for allowing me to participate in the care of your patient.      Sincerely,    TEE Villanueva CNP

## 2017-08-21 NOTE — PATIENT INSTRUCTIONS
Contact Numbers    [facility] Main Line: [phone]  [facility] Triage:  [phone]    Call triage with chills and/or temperature greater than or equal to 100.5, uncontrolled nausea/vomiting, diarrhea, constipation, dizziness, shortness of breath, chest pain, bleeding, unexplained bruising, or any new/concerning symptoms, questions/concerns.     If you are having any concerning symptoms or wish to speak to a provider before your next infusion visit, please call your care coordinator or triage to notify them so we can adequately serve you.       After Hours: [phone]    If after hours, weekends, or holidays, call main hospital  and ask for Oncology doctor on call.           August 2017 Sunday Monday Tuesday Wednesday Thursday Friday Saturday             1     2     3     [facility] LAB DRAW    8:45 AM   (15 min.)    [facility] DRAW   [facility] Lab Draw     [facility] RETURN    9:15 AM   (50 min.)   Ester Echavarria APRN CNP   [facility] ONC INFUSION 360   10:30 AM   (360 min.)    ONCOLOGY INFUSION   [facility]     Admission   12:33 PM   Barb Tatmu MD   [address] [facility]   (Discharge: 8/5/2017)     ENDOSCOPIC RETROGRADE CHOLANGIOPANCREATOGRAM    1:55 PM   Guru Jamia Mcintosh MD   [facility] OR     XR SURG SAUL >5 MIN FL W STILL    4:00 PM   (15 min.)   [email or web address]   [facility],  Radiology 4     XR CHEST PORT 1 VIEW    2:35 PM   (20 min.)   [id number]   [facility] [place],  Radiology 5       6     7     8     9     10     11     [facility] DRAW    9:15 AM   (15 min.)   [facility]     [facility] ONC INFUSION 360   10:00 AM   (360 min.)   [facility] ONCOLOGY INFUSION   [facility] RETURN   10:05 AM   (50 min.)   Ester Echavarria APRN CNP   [facility] 12       13     14     15     16     17     18     [facility] DRAW    6:30 AM   (15 min.)    [facility] DRAW   [facility]     [facility] RETURN    6:45 AM   (50 min.)   Ester Echavarria APRN CNP   [facility]     [facility] ONC INFUSION 360    8:00 AM   (360 min.)   [facility] ONCOLOGY INFUSION   [facility] 19       20     21     [facility] ONC INFUSION 120    1:30 PM   (120 min.)    ONCOLOGY INFUSION   [facility] 22     23     24     25     26       27     28     29     30     31 September 2017 Sunday Monday Tuesday Wednesday Thursday Friday Saturday                            1     [facility] DRAW    8:30 AM   (15 min.)    [facility]   [facility]     [facility]C INFUSION 360    9:00 AM   (360 min.)    ONCOLOGY INFUSION   [facility] 2       3     4     5     6     7     8     9       10     11     12     13     14     15     [facility] DRAW    9:15 AM   (15 min.)    [facility] DRAW   [facility] Lab Draw     CT CHEST ABDOMEN PELVIS WWO    9:25 AM   (20 min.)   [id number]   [facility] CT 16       17     18     [facility] RETURN    9:30 AM   (30 min.)   Demond Gonsales MD   [facility]   12:00 PM   (360 min.)    ONCOLOGY INFUSION   [facility] 19     20     21     22     23       24     25     26     27     28     29     30               No results found for this or any previous visit (from the past 24 hour(s)).

## 2017-08-21 NOTE — MR AVS SNAPSHOT
After Visit Summary   8/21/2017    Shirin Muller    MRN: [id number]           Patient Information     Date Of Birth          1960        Visit Information        Provider Department      8/21/2017 1:30 PM UC 13 ATC;  ONCOLOGY INFUSION [facility]        Today's Diagnoses     Chemotherapy-induced neutropenia (H)    -  1    Malignant neoplasm of head of pancreas (H)          Care Instructions    Contact Numbers    [facility] [place] [facility] Main Line: [phone]  [facility] Triage:  [phone]    Call triage with chills and/or temperature greater than or equal to 100.5, uncontrolled nausea/vomiting, diarrhea, constipation, dizziness, shortness of breath, chest pain, bleeding, unexplained bruising, or any new/concerning symptoms, questions/concerns.     If you are having any concerning symptoms or wish to speak to a provider before your next infusion visit, please call your care coordinator or triage to notify them so we can adequately serve you.       After Hours: [phone]    If after hours, weekends, or holidays, call main hospital  and ask for Oncology doctor on call.           August 2017 Sunday Monday Tuesday Wednesday Thursday Friday Saturday             1     2     3     [facility]    8[facility] AM   (15 min.)   [facility] LAB DRAW   [facility] Lab Draw     [facility] RETURN    9:15 AM   (50 min.)   Ester Echavarria APRN CNP   [facility] ONC INFUSION 360   10:30 AM   (360 min.)    ONCOLOGY INFUSION   [facility]     Admission   12:33 PM   Barb Tatum MD   [address] [facility]   (Discharge: 8/5/2017)     ENDOSCOPIC RETROGRADE CHOLANGIOPANCREATOGRAM    1:55 PM   Guru Jamia Mcintosh MD   [facility] OR     XR SURG SAUL >5 MIN FL W STILL    4:00 PM   (15 min.)   [id number]   [facility],  Radiology 4     XR CHEST PORT 1 VIEW    2:35 PM   (20 min.)   [id number]   [facility],  Radiology 5       6     7      8     9     10     11     [facility] DRAW    9:15 AM   (15 min.)   [facility] LAB DRAW   [facility] Lab Draw     [facility] ONC INFUSION 360   10:00 AM   (360 min.)   UC ONCOLOGY INFUSION   [facility]     [facility] RETURN   10:05 AM   (50 min.)   Ester Echavarria APRN CNP   [facility] 12       13     14     15     16     17     18     [facility] LAB DRAW    6:30 AM   (15 min.)   [facility]     [facility] RETURN    6:45 AM   (50 min.)   Ester Echavarria APRN CNP   [facility]     [facility] ONC INFUSION 360    8:00 AM   (360 min.)   UC ONCOLOGY INFUSION   [facility] 19       20     21     [facility] ONC INFUSION 120    1:30 PM   (120 min.)    ONCOLOGY INFUSION   [facility] 22     23     24     25     26       27     28     29     30     31 September 2017 Sunday Monday Tuesday Wednesday Thursday Friday Saturday                            1     [facility] LAB DRAW    8:30 AM   (15 min.)    [facility] LAB DRAW   [facility] Lab Draw     [facility] ONC INFUSION 360    9:00 AM   (360 min.)    ONCOLOGY INFUSION   [facility] 2       3     4     5     6     7     8     9       10     11     12     13     14     15     [facility] LAB DRAW    9:15 AM   (15 min.)    [facility] LAB DRAW   [facility] Lab Draw     CT CHEST ABDOMEN PELVIS WWO    9:25 AM   (20 min.)   UCCT2   [facility] CT 16       17     18     [facility] RETURN    9:30 AM   (30 min.)   Demond Gonsales MD   [facility]     [facility] ONC INFUSION 360   12:00 PM   (360 min.)    ONCOLOGY INFUSION   [facility] 19     20     21     22     23       24     25     26     27     28     29     30               No results found for this or any previous visit (from the past 24 hour(s)).              Follow-ups after your visit        Your next 10 appointments  already scheduled     Sep 01, 2017  8:30 AM CDT   [facility] Lab Draw with  [facility] LAB DRAW   [facility] Lab Draw ([facility])    [address]  [address]  [place] [address]   [phone]            Sep 01, 2017  9:00 AM CDT   Infusion 360 with  ONCOLOGY INFUSION, UC 12 ATC   [facility] ([facility])    [address]  [address]  [place] [address]   [phone]            Sep 15, 2017  9:15 AM CDT   [facility] Lab Draw with  [facility] LAB DRAW   [facility] Lab Draw ([facility])    [address]  [address] Floor  [place] [address]   [phone]            Sep 15, 2017  9:40 AM CDT   (Arrive by 9:25 AM)   CT CHEST ABDOMEN PELVIS W/O & W CONTRAST with UCCT2   [facility] CT ([facility])    [address]  [address] Floor  [place] [address]   [phone]           Please bring any scans or X-rays taken at other hospitals, if similar tests were done. Also bring a list of your medicines, including vitamins, minerals and over-the-counter drugs. It is safest to leave personal items at home.  Be sure to tell your doctor:   If you have any allergies.   If there s any chance you are pregnant.   If you are breastfeeding.   If you have any special needs.  You may have contrast for this exam. To prepare:   Do not eat or drink for 2 hours before your exam. If you need to take medicine, you may take it with small sips of water. (We may ask you to take liquid medicine as well.)   The day before your exam, drink extra fluids at least six 8-ounce glasses (unless your doctor tells you to restrict your fluids).  Patients over 70 or patients with diabetes or kidney problems:   If you haven t had a blood test (creatinine test) within the last 30 days, go to your clinic or Diagnostic Imaging Department for this test.  If you have diabetes:   If your  kidney function is normal, continue taking your metformin (Avandamet, Glucophage, Glucovance, Metaglip) on the day of your exam.   If your kidney function is abnormal, wait 48 hours before restarting this medicine.  You will have oral contrast for this exam:   You will drink the contrast at home. Get this from your clinic or Diagnostic Imaging Department. Please follow the directions given.  Please wear loose clothing, such as a sweat suit or jogging clothes. Avoid snaps, zippers and other metal. We may ask you to undress and put on a hospital gown.  If you have any questions, please call the Imaging Department where you will have your exam.            Sep 18, 2017  9:45 AM CDT   (Arrive by 9:30 AM)   Return Visit with Demond Gonsales MD   [facility] ([facility])    [address] [place] [address] [phone]            Sep 18, 2017 12:00 PM CDT   Infusion 360 with  ONCOLOGY INFUSION, UC 24 ATC   [facility] ([facility])    [address] [place] [address] [phone]              Who to contact     If you have questions or need follow up information about today's clinic visit or your schedule please contact [facility] directly at [phone].  Normal or non-critical lab and imaging results will be communicated to you by MyChart, letter or phone within 4 business days after the clinic has received the results. If you do not hear from us within 7 days, please contact the clinic through MyChart or phone. If you have a critical or abnormal lab result, we will notify you by phone as soon as possible.  Submit refill requests through [facility] or call your pharmacy and they will forward the refill request to us. Please allow 3 business days for your refill to be completed.          Additional Information About Your Visit        MyChart Information      [facility] gives you secure access to your electronic health record. If you see a primary care provider, you can also send messages to your care team and make appointments. If you have questions, please call your primary care clinic.  If you do not have a primary care provider, please call [phone] and they will assist you.        Care EveryWhere ID     This is your Care EveryWhere ID. This could be used by other organizations to access your [facility] medical records  [id number]        Your Vitals Were     Pulse Temperature Respirations Pulse Oximetry BMI (Body Mass Index)       85 98.2  F (36.8  C) (Oral) 18 100% 22.53 kg/m2        Blood Pressure from Last 3 Encounters:   08/21/17 127/79   08/18/17 103/64   08/18/17 114/75    Weight from Last 3 Encounters:   08/21/17 73.3 kg (161 lb 8 oz)   08/18/17 77.4 kg (170 lb 11.2 oz)   08/11/17 75.2 kg (165 lb 12.8 oz)              Today, you had the following     No orders found for display         Today's Medication Changes          These changes are accurate as of: 8/21/17  2:17 PM.  If you have any questions, ask your nurse or doctor.               These medicines have changed or have updated prescriptions.        Dose/Directions    ENSURE CLEAR Liqd   This may have changed:  how much to take   Used for:  Malignant neoplasm of head of pancreas (H)        Dose:  1 Bottle   Take 1 Bottle by mouth 3 times daily   Quantity:  90 Bottle   Refills:  6                Primary Care Provider    [facility]       No address on file        Equal Access to Services     JAI CORDERO AH: Maxx Spence, wajuanda ching, qaybrahel kaalluis e lao. So [facility] [phone].    ATENCIÓN: Si habla español, tiene a bernal disposición servicios gratuitos de asistencia lingüística. Llame al [phone].    We comply with applicable federal civil rights laws and [place] laws. We do not discriminate on the basis of race,  color, national origin, age, disability sex, sexual orientation or gender identity.            Thank you!     Thank you for choosing [facility]  for your care. Our goal is always to provide you with excellent care. Hearing back from our patients is one way we can continue to improve our services. Please take a few minutes to complete the written survey that you may receive in the mail after your visit with us. Thank you!             Your Updated Medication List - Protect others around you: Learn how to safely use, store and throw away your medicines at www.disposemymeds.org.          This list is accurate as of: 8/21/17  2:17 PM.  Always use your most recent med list.                   Brand Name Dispense Instructions for use Diagnosis    amylase-lipase-protease 29403 UNITS Cpep    CREON    180 capsule    Take 2 capsules (72,000 Units) by mouth 3 times daily (with meals)    Malignant neoplasm of head of pancreas (H)       * dexamethasone 4 MG tablet    DECADRON    3 tablet    Take 1 tablet (4 mg) by mouth daily (with breakfast)    Need for prophylactic measure, Malignant neoplasm of head of pancreas (H)       * dexamethasone 4 MG tablet    DECADRON    3 tablet    Take 1 tablet (4 mg) by mouth daily (with breakfast) for 3 days    Chemotherapy-induced neutropenia (H), Malignant neoplasm of head of pancreas (H)       diltiazem 2% in PLO cream (FV COMPOUNDED) 2% Gel     30 g    Apply topically daily and as needed to external hemorrhoids    External hemorrhoids       docusate sodium 100 MG capsule    COLACE    100 capsule    Take 1 capsule (100 mg) by mouth daily    External hemorrhoids       dronabinol 5 MG capsule    MARINOL    90 capsule    Take 1 capsule (5 mg) by mouth 3 times daily (before meals)    Anorexia       ENSURE CLEAR Liqd     90 Bottle    Take 1 Bottle by mouth 3 times daily    Malignant neoplasm of head of pancreas (H)       levofloxacin 500 MG tablet    LEVAQUIN    14 tablet    Take  1 tablet (500 mg) by mouth daily    Cholangitis, 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)       lidocaine-prilocaine cream    EMLA    30 g    Apply topically as needed for other (Use 30-60 minutes prior to port access)    Malignant neoplasm of head of pancreas (H)       loratadine 10 MG tablet    CLARITIN    30 tablet    Take 1 tablet (10 mg) by mouth daily Reported on 5/5/2017    Chronic seasonal allergic rhinitis, unspecified trigger       LORazepam 0.5 MG tablet    ATIVAN    30 tablet    Take 1 tablet (0.5 mg) by mouth every 4 hours as needed (Anxiety, Nausea/Vomiting or Sleep)    Malignant neoplasm of head of pancreas (H)       morphine 15 MG 12 hr tablet    MS CONTIN    60 tablet    Take 1 tablet (15 mg) by mouth every 12 hours    Malignant neoplasm of head of pancreas (H)       ondansetron 8 MG ODT tab    ZOFRAN-ODT    60 tablet    Take 1 tablet (8 mg) by mouth every 8 hours as needed for nausea    Malignant neoplasm of head of pancreas (H)       oxyCODONE 5 MG IR tablet    ROXICODONE    100 tablet    Take 1 tablet (5 mg) by mouth every 4 hours as needed for moderate to severe pain    Malignant neoplasm of head of pancreas (H)       pantoprazole 20 MG EC tablet    PROTONIX    60 tablet    Take 1 tablet (20 mg) by mouth 2 times daily    Malignant neoplasm of head of pancreas (H)       polyethylene glycol powder    MIRALAX/GLYCOLAX    119 g    Take 17 g (1 capful) by mouth daily    Malignant neoplasm of head of pancreas (H)       potassium chloride SA 20 MEQ CR tablet    potassium chloride    60 tablet    Take 1 tablet (20 mEq) by mouth daily    Malignant neoplasm of head of pancreas (H)       prochlorperazine 10 MG tablet    COMPAZINE    30 tablet    Take 1 tablet (10 mg) by mouth every 6 hours as needed (Nausea/Vomiting)    Malignant neoplasm of head of pancreas (H)       * Notice:  This list has 2 medication(s) that are the same as other medications prescribed for you. Read the directions carefully,  and ask your doctor or other care provider to review them with you.

## 2017-08-21 NOTE — PROGRESS NOTES
Infusion Nursing Note:  Shirin Muller presents today for IV fluids and neulsta.    Patient seen by provider today: No   present during visit today: Not Applicable.    Note: Patient denies any complaints today.    Intravenous Access:  Implanted Port.    Treatment Conditions:  Not Applicable.      Post Infusion Assessment:  Patient tolerated infusion without incident.  Patient tolerated injection without incident. Neulasta given SQ into Left abdomen  Blood return noted pre and post infusion.  Site patent and intact, free from redness, edema or discomfort.  No evidence of extravasations.  Access discontinued per protocol.    Discharge Plan:   Patient declined prescription refills.  Discharge instructions reviewed with: Patient.  Patient and/or family verbalized understanding of discharge instructions and all questions answered.  Copy of AVS reviewed with patient and/or family.  Patient will return 9/1 for next appointment.  Patient discharged in stable condition accompanied by: self and sister.  Departure Mode: Ambulatory.    Ashli Espinoza RN

## 2017-09-01 NOTE — PATIENT INSTRUCTIONS
Contact Numbers    [facility] [place]: [phone]  [facility] [place]:  [phone]    Call triage with chills and/or temperature greater than or equal to 100.5, uncontrolled nausea/vomiting, diarrhea, constipation, dizziness, shortness of breath, chest pain, bleeding, unexplained bruising, or any new/concerning symptoms, questions/concerns.     If you are having any concerning symptoms or wish to speak to a provider before your next infusion visit, please call your care coordinator or triage to notify them so we can adequately serve you.       After Hours: [phone]    If after hours, weekends, or holidays, call main hospital  and ask for Oncology doctor on call.           September 2017 Sunday Monday Tuesday Wednesday Thursday Friday Saturday                            1     [facility] LAB DRAW    8:30 AM   (15 min.)    [facility] LAB DRAW   [facility] Lab Draw     [facility] ONC INFUSION 360    9:00 AM   (360 min.)    ONCOLOGY INFUSION   [facility] 2       3     4     5     6     7     8     9       10     11     12     13     14     15     [facility] LAB DRAW    9:15 AM   (15 min.)    [facility] LAB DRAW   [facility] Lab Draw     CT CHEST ABDOMEN PELVIS WWO    9:25 AM   (20 min.)   UCCT2   [facility] CT 16       17     18     [facility] RETURN    9:30 AM   (30 min.)   Demond Gonsales MD   [facility]     [facility] ONC INFUSION 360   12:00 PM   (360 min.)    ONCOLOGY INFUSION   [facility] 19     20     21     22     23       24     25     26     27     28     29     30 October 2017 Sunday Monday Tuesday Wednesday Thursday Friday Saturday   1     2     3     4     5     6     7       8     9     10     11     12     13     14       15     16     17     18     19     20     21       22     23     24     25     26     27     28       29     30     31                                      Lab Results:  Recent Results (from  the past 12 hour(s))   CBC with platelets differential    Collection Time: 09/01/17  8:53 AM   Result Value Ref Range    WBC 5.7 4.0 - 11.0 10e9/L    RBC Count 3.81 3.8 - 5.2 10e12/L    Hemoglobin 9.8 (L) 11.7 - 15.7 g/dL    Hematocrit 31.5 (L) 35.0 - 47.0 %    MCV 83 78 - 100 fl    MCH 25.7 (L) 26.5 - 33.0 pg    MCHC 31.1 (L) 31.5 - 36.5 g/dL    RDW 22.9 (H) 10.0 - 15.0 %    Platelet Count 94 (L) 150 - 450 10e9/L    Diff Method Automated Method     % Neutrophils 76.8 %    % Lymphocytes 14.5 %    % Monocytes 6.7 %    % Eosinophils 0.9 %    % Basophils 0.4 %    % Immature Granulocytes 0.7 %    Nucleated RBCs 0 0 /100    Absolute Neutrophil 4.4 1.6 - 8.3 10e9/L    Absolute Lymphocytes 0.8 0.8 - 5.3 10e9/L    Absolute Monocytes 0.4 0.0 - 1.3 10e9/L    Absolute Eosinophils 0.1 0.0 - 0.7 10e9/L    Absolute Basophils 0.0 0.0 - 0.2 10e9/L    Abs Immature Granulocytes 0.0 0 - 0.4 10e9/L    Absolute Nucleated RBC 0.0    Comprehensive metabolic panel    Collection Time: 09/01/17  8:53 AM   Result Value Ref Range    Sodium 137 133 - 144 mmol/L    Potassium 3.4 3.4 - 5.3 mmol/L    Chloride 104 94 - 109 mmol/L    Carbon Dioxide 25 20 - 32 mmol/L    Anion Gap 9 3 - 14 mmol/L    Glucose 274 (H) 70 - 99 mg/dL    Urea Nitrogen 9 7 - 30 mg/dL    Creatinine 0.54 0.52 - 1.04 mg/dL    GFR Estimate >90 >60 mL/min/1.7m2    GFR Estimate If Black >90 >60 mL/min/1.7m2    Calcium 8.8 8.5 - 10.1 mg/dL    Bilirubin Total 0.5 0.2 - 1.3 mg/dL    Albumin 3.0 (L) 3.4 - 5.0 g/dL    Protein Total 7.9 6.8 - 8.8 g/dL    Alkaline Phosphatase 347 (H) 40 - 150 U/L    ALT 29 0 - 50 U/L    AST 27 0 - 45 U/L

## 2017-09-01 NOTE — MR AVS SNAPSHOT
After Visit Summary   9/1/2017    Shirin Muller    MRN: [id number]           Patient Information     Date Of Birth          1960        Visit Information        Provider Department      9/1/2017 9:00 AM UC 12 ATC; [facility] ONCOLOGY INFUSION [facility]        Today's Diagnoses     Chemotherapy-induced neutropenia (H)    -  1    Malignant neoplasm of head of pancreas (H)        External hemorrhoids          Care Instructions    Contact Numbers    [facility] [place]: [phone]  [facility] [place]:  [phone]    Call triage with chills and/or temperature greater than or equal to 100.5, uncontrolled nausea/vomiting, diarrhea, constipation, dizziness, shortness of breath, chest pain, bleeding, unexplained bruising, or any new/concerning symptoms, questions/concerns.     If you are having any concerning symptoms or wish to speak to a provider before your next infusion visit, please call your care coordinator or triage to notify them so we can adequately serve you.       After Hours: [phone]    If after hours, weekends, or holidays, call main hospital  and ask for Oncology doctor on call.           September 2017 Sunday Monday Tuesday Wednesday Thursday Friday Saturday                            1     [facility] DRAW    8:30 AM   (15 min.)    [facility] DRAW   [facility] Lab Draw     [facility] ONC INFUSION 360    9:00 AM   (360 min.)    ONCOLOGY INFUSION   [facility] 2       3     4     5     6     7     8     9       10     11     12     13     14     15     [facility] DRAW    9:15 AM   (15 min.)    [facility] LAB DRAW   [facility] Lab Draw     CT CHEST ABDOMEN PELVIS WWO    9:25 AM   (20 min.)   UCCT2   [facility] CT 16       17     18     [facility] RETURN    9:30 AM   (30 min.)   Demond Gonsales MD   [facility]     [facility] ONC INFUSION 360   12:00 PM   (360 min.)    ONCOLOGY INFUSION     [facility] 19 20 21 22     23       24     25     26     27     28     29 30 October 2017 Sunday Monday Tuesday Wednesday Thursday Friday Saturday   1     2     3     4     5     6     7       8     9     10     11     12     13     14       15     16     17     18     19     20     21       22     23     24     25     26     27     28       29     30     31                                      Lab Results:  Recent Results (from the past 12 hour(s))   CBC with platelets differential    Collection Time: 09/01/17  8:53 AM   Result Value Ref Range    WBC 5.7 4.0 - 11.0 10e9/L    RBC Count 3.81 3.8 - 5.2 10e12/L    Hemoglobin 9.8 (L) 11.7 - 15.7 g/dL    Hematocrit 31.5 (L) 35.0 - 47.0 %    MCV 83 78 - 100 fl    MCH 25.7 (L) 26.5 - 33.0 pg    MCHC 31.1 (L) 31.5 - 36.5 g/dL    RDW 22.9 (H) 10.0 - 15.0 %    Platelet Count 94 (L) 150 - 450 10e9/L    Diff Method Automated Method     % Neutrophils 76.8 %    % Lymphocytes 14.5 %    % Monocytes 6.7 %    % Eosinophils 0.9 %    % Basophils 0.4 %    % Immature Granulocytes 0.7 %    Nucleated RBCs 0 0 /100    Absolute Neutrophil 4.4 1.6 - 8.3 10e9/L    Absolute Lymphocytes 0.8 0.8 - 5.3 10e9/L    Absolute Monocytes 0.4 0.0 - 1.3 10e9/L    Absolute Eosinophils 0.1 0.0 - 0.7 10e9/L    Absolute Basophils 0.0 0.0 - 0.2 10e9/L    Abs Immature Granulocytes 0.0 0 - 0.4 10e9/L    Absolute Nucleated RBC 0.0    Comprehensive metabolic panel    Collection Time: 09/01/17  8:53 AM   Result Value Ref Range    Sodium 137 133 - 144 mmol/L    Potassium 3.4 3.4 - 5.3 mmol/L    Chloride 104 94 - 109 mmol/L    Carbon Dioxide 25 20 - 32 mmol/L    Anion Gap 9 3 - 14 mmol/L    Glucose 274 (H) 70 - 99 mg/dL    Urea Nitrogen 9 7 - 30 mg/dL    Creatinine 0.54 0.52 - 1.04 mg/dL    GFR Estimate >90 >60 mL/min/1.7m2    GFR Estimate If Black >90 >60 mL/min/1.7m2    Calcium 8.8 8.5 - 10.1 mg/dL    Bilirubin Total 0.5 0.2 - 1.3 mg/dL    Albumin 3.0 (L) 3.4 - 5.0  g/dL    Protein Total 7.9 6.8 - 8.8 g/dL    Alkaline Phosphatase 347 (H) 40 - 150 U/L    ALT 29 0 - 50 U/L    AST 27 0 - 45 U/L               Follow-ups after your visit        Your next 10 appointments already scheduled     Sep 15, 2017  9:15 AM CDT   [facility] Draw with  [facility] DRAW   [facility] Draw ([facility])    [address]  [address] [place] [id number] [phone]            Sep 15, 2017  9:40 AM CDT   (Arrive by 9:25 AM)   CT CHEST ABDOMEN PELVIS W/O & W CONTRAST with UCCT2   [facility] CT ([facility])    [address]  [address] [place] [id number] [phone]           Please bring any scans or X-rays taken at other hospitals, if similar tests were done. Also bring a list of your medicines, including vitamins, minerals and over-the-counter drugs. It is safest to leave personal items at home.  Be sure to tell your doctor:   If you have any allergies.   If there s any chance you are pregnant.   If you are breastfeeding.   If you have any special needs.  You may have contrast for this exam. To prepare:   Do not eat or drink for 2 hours before your exam. If you need to take medicine, you may take it with small sips of water. (We may ask you to take liquid medicine as well.)   The day before your exam, drink extra fluids at least six 8-ounce glasses (unless your doctor tells you to restrict your fluids).  Patients over 70 or patients with diabetes or kidney problems:   If you haven t had a blood test (creatinine test) within the last 30 days, go to your clinic or Diagnostic Imaging Department for this test.  If you have diabetes:   If your kidney function is normal, continue taking your metformin (Avandamet, Glucophage, Glucovance, Metaglip) on the day of your exam.   If your kidney function is abnormal, wait 48 hours before restarting this medicine.  You will have oral contrast for this exam:    You will drink the contrast at home. Get this from your clinic or Diagnostic Imaging Department. Please follow the directions given.  Please wear loose clothing, such as a sweat suit or jogging clothes. Avoid snaps, zippers and other metal. We may ask you to undress and put on a hospital gown.  If you have any questions, please call the Imaging Department where you will have your exam.            Sep 18, 2017  9:45 AM CDT   (Arrive by 9:30 AM)   Return Visit with Demond Gonsales MD   [facility] ([facility])    [address] [place] [id number] [phone]            Sep 18, 2017 12:00 PM CDT   Infusion 360 with  ONCOLOGY INFUSION, UC 24 ATC   [facility] ([facility])    [address] [place] [address] [phone]              Who to contact     If you have questions or need follow up information about today's clinic visit or your schedule please contact [facility] directly at [phone].  Normal or non-critical lab and imaging results will be communicated to you by MyChart, letter or phone within 4 business days after the clinic has received the results. If you do not hear from us within 7 days, please contact the clinic through [employer] or phone. If you have a critical or abnormal lab result, we will notify you by phone as soon as possible.  Submit refill requests through [employer] or call your pharmacy and they will forward the refill request to us. Please allow 3 business days for your refill to be completed.          Additional Information About Your Visit        MyChart Information     [employer] gives you secure access to your electronic health record. If you see a primary care provider, you can also send messages to your care team and make appointments. If you have questions, please call your primary care clinic.  If you do not have a  primary care provider, please call [phone] and they will assist you.        Care EveryWhere ID     This is your Care EveryWhere ID. This could be used by other organizations to access your [place] medical records  [id number]        Your Vitals Were     Pulse Temperature Pulse Oximetry BMI (Body Mass Index)          93 98.1  F (36.7  C) (Oral) 100% 23.27 kg/m2         Blood Pressure from Last 3 Encounters:   09/01/17 102/64   08/21/17 127/79   08/18/17 103/64    Weight from Last 3 Encounters:   09/01/17 75.7 kg (166 lb 12.8 oz)   08/21/17 73.3 kg (161 lb 8 oz)   08/18/17 77.4 kg (170 lb 11.2 oz)              We Performed the Following     Cancer antigen 19-9     CBC with platelets differential     Comprehensive metabolic panel          Today's Medication Changes          These changes are accurate as of: 9/1/17  3:10 PM.  If you have any questions, ask your nurse or doctor.               These medicines have changed or have updated prescriptions.        Dose/Directions    * dexamethasone 4 MG tablet   Commonly known as:  DECADRON   This may have changed:  Another medication with the same name was added. Make sure you understand how and when to take each.   Used for:  Need for prophylactic measure, Malignant neoplasm of head of pancreas (H)        Dose:  4 mg   Take 1 tablet (4 mg) by mouth daily (with breakfast)   Quantity:  3 tablet   Refills:  0       * dexamethasone 4 MG tablet   Commonly known as:  DECADRON   This may have changed:  You were already taking a medication with the same name, and this prescription was added. Make sure you understand how and when to take each.   Used for:  Chemotherapy-induced neutropenia (H), Malignant neoplasm of head of pancreas (H)        Dose:  4 mg   Take 1 tablet (4 mg) by mouth daily (with breakfast) for 3 days   Quantity:  3 tablet   Refills:  0       ENSURE CLEAR Liqd   This may have changed:  how much to take   Used for:  Malignant neoplasm of head of pancreas (H)         Dose:  1 Bottle   Take 1 Bottle by mouth 3 times daily   Quantity:  90 Bottle   Refills:  6       * Notice:  This list has 2 medication(s) that are the same as other medications prescribed for you. Read the directions carefully, and ask your doctor or other care provider to review them with you.         Where to get your medicines      These medications were sent to [facility] [place], [place] - [address]  [address], [place] [facility] [address]    Hours:  TRANSPLANT PHONE NUMBER [phone] Phone:  [phone]     dexamethasone 4 MG tablet    docusate sodium 100 MG capsule                Primary Care Provider    [facility]       No address on file        Equal Access to Services     JAI CORDERO : Maxx Spence, wathanh flower, john conwaymaolamide frazier, luis e lora. So [facility] [phone].    ATENCIÓN: Si habla español, tiene a bernal disposición servicios gratuitos de asistencia lingüística. Llame al [phone].    We comply with applicable federal civil rights laws and [place] laws. We do not discriminate on the basis of race, color, national origin, age, disability sex, sexual orientation or gender identity.            Thank you!     Thank you for choosing [facility]  for your care. Our goal is always to provide you with excellent care. Hearing back from our patients is one way we can continue to improve our services. Please take a few minutes to complete the written survey that you may receive in the mail after your visit with us. Thank you!             Your Updated Medication List - Protect others around you: Learn how to safely use, store and throw away your medicines at www.disposemymeds.org.          This list is accurate as of: 9/1/17  3:10 PM.  Always use your most recent med list.                   Brand Name Dispense Instructions for use Diagnosis     amylase-lipase-protease 28716 UNITS Cpep    CREON    180 capsule    Take 2 capsules (72,000 Units) by mouth 3 times daily (with meals)    Malignant neoplasm of head of pancreas (H)       * dexamethasone 4 MG tablet    DECADRON    3 tablet    Take 1 tablet (4 mg) by mouth daily (with breakfast)    Need for prophylactic measure, Malignant neoplasm of head of pancreas (H)       * dexamethasone 4 MG tablet    DECADRON    3 tablet    Take 1 tablet (4 mg) by mouth daily (with breakfast) for 3 days    Chemotherapy-induced neutropenia (H), Malignant neoplasm of head of pancreas (H)       diltiazem 2% in PLO cream (FV COMPOUNDED) 2% Gel     30 g    Apply topically daily and as needed to external hemorrhoids    External hemorrhoids       docusate sodium 100 MG capsule    COLACE    100 capsule    Take 1 capsule (100 mg) by mouth daily    External hemorrhoids       dronabinol 5 MG capsule    MARINOL    90 capsule    Take 1 capsule (5 mg) by mouth 3 times daily (before meals)    Anorexia       ENSURE CLEAR Liqd     90 Bottle    Take 1 Bottle by mouth 3 times daily    Malignant neoplasm of head of pancreas (H)       levofloxacin 500 MG tablet    LEVAQUIN    14 tablet    Take 1 tablet (500 mg) by mouth daily    Cholangitis, 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)       lidocaine-prilocaine cream    EMLA    30 g    Apply topically as needed for other (Use 30-60 minutes prior to port access)    Malignant neoplasm of head of pancreas (H)       loratadine 10 MG tablet    CLARITIN    30 tablet    Take 1 tablet (10 mg) by mouth daily Reported on 5/5/2017    Chronic seasonal allergic rhinitis, unspecified trigger       LORazepam 0.5 MG tablet    ATIVAN    30 tablet    Take 1 tablet (0.5 mg) by mouth every 4 hours as needed (Anxiety, Nausea/Vomiting or Sleep)    Malignant neoplasm of head of pancreas (H)       morphine 15 MG 12 hr tablet    MS CONTIN    60 tablet    Take 1 tablet (15 mg) by mouth every 12 hours    Malignant  neoplasm of head of pancreas (H)       ondansetron 8 MG ODT tab    ZOFRAN-ODT    60 tablet    Take 1 tablet (8 mg) by mouth every 8 hours as needed for nausea    Malignant neoplasm of head of pancreas (H)       oxyCODONE 5 MG IR tablet    ROXICODONE    100 tablet    Take 1 tablet (5 mg) by mouth every 4 hours as needed for moderate to severe pain    Malignant neoplasm of head of pancreas (H)       pantoprazole 20 MG EC tablet    PROTONIX    60 tablet    Take 1 tablet (20 mg) by mouth 2 times daily    Malignant neoplasm of head of pancreas (H)       polyethylene glycol powder    MIRALAX/GLYCOLAX    119 g    Take 17 g (1 capful) by mouth daily    Malignant neoplasm of head of pancreas (H)       potassium chloride SA 20 MEQ CR tablet    potassium chloride    60 tablet    Take 1 tablet (20 mEq) by mouth daily    Malignant neoplasm of head of pancreas (H)       prochlorperazine 10 MG tablet    COMPAZINE    30 tablet    Take 1 tablet (10 mg) by mouth every 6 hours as needed (Nausea/Vomiting)    Malignant neoplasm of head of pancreas (H)       * Notice:  This list has 2 medication(s) that are the same as other medications prescribed for you. Read the directions carefully, and ask your doctor or other care provider to review them with you.

## 2017-09-01 NOTE — NURSING NOTE
Chief Complaint   Patient presents with     Blood Draw     Vitals done by MA and labs drawn via Port by RN-hep. locked.     Yue Daniels, MA

## 2017-09-01 NOTE — NURSING NOTE
Labs drawn via port a cath via power needle.  Flushed with saline and heparin.  Covered with transparent dressing.  VS done.      Norma Mobley  RN

## 2017-09-01 NOTE — PROGRESS NOTES
Infusion Nursing Note:  Shirin Muller presents today for Cycle 15 Day 1 Oxaliplatin/Irinotecan/Leucovorin/Fluorouracil bolus and 46 hour pump connect.    Patient seen by provider today: No   present during visit today: Not Applicable.    Note: Denies new complaints today. Pt typically presents to clinic on day 4 of cycle for IVF and Neulasta. As Day 4 falls on a holiday this cycle, and the holiday is booked, writer discussed with patient her options for getting Neulasta and IVF. The patient preferred the option of getting her IVF today, and having [facility] apply on body neulasta on Kristofer 9/3/17 (Day 3 of cycle). Brian Arias RN notified and coordinated on body neulasta with [facility]. Writer educated patient on purpose of on body, as well as other important information such as: That the start time of the injection will be approximately 27 hours after application. Pt instructed when the dose delivery starts, it will take about 45 minutes to complete. Pt aware Neulasta Onpro On-Body should have green flashing light and to call triage or on-call MD if injector flashes red or appears to be leaking. Pt aware to keep Onpro On-Body Neualsta 4 inches away from electrical equipment and to avoid showering 4 hours prior to injection. Pt given written and verbal instruction.       Intravenous Access:  Implanted Port.    Treatment Conditions:  Lab Results   Component Value Date    HGB 9.8 09/01/2017     Lab Results   Component Value Date    WBC 5.7 09/01/2017      Lab Results   Component Value Date    ANEU 4.4 09/01/2017     Lab Results   Component Value Date    PLT 94 09/01/2017      Lab Results   Component Value Date     09/01/2017                   Lab Results   Component Value Date    POTASSIUM 3.4 09/01/2017           Lab Results   Component Value Date    MAG 2.0 04/03/2017            Lab Results   Component Value Date    CR 0.54 09/01/2017                   Lab Results   Component Value Date     FANNY 8.8 09/01/2017                Lab Results   Component Value Date    BILITOTAL 0.5 09/01/2017           Lab Results   Component Value Date    ALBUMIN 3.0 09/01/2017                    Lab Results   Component Value Date    ALT 29 09/01/2017           Lab Results   Component Value Date    AST 27 09/01/2017     Results reviewed, labs MET treatment parameters, ok to proceed with treatment.          Post Infusion Assessment:  Patient tolerated infusion without incident.    Note: Pt given 0.9NS bolus off of therapy plan per patient preference. Pt will call next week if she feels she needs additional IVF. Pt also given Atropine prior to Irinotecan and at 45 minute vita.     Blood return noted pre and post infusion.  Site patent and intact, free from redness, edema or discomfort.  No evidence of extravasations.  + BR checked every 2ccs while administering Fluorouracil. Tubing filter taped to pt's skin with the  per protocol. CSeries pump running @ 5.2ml/hour for 46 hours. Pump attached per protocol. [facility] aware to disconnect pt on 5/3/17@ 1300. [facility] also notified and aware to apply neulasta on body at pump disconnect. Connections checked with Shanika Ruth RN.      Discharge Plan:   Prescription refills given for Compazine, Potassium, Protonix, Colace.  Discharge instructions reviewed with: Patient and Family.  Patient and/or family verbalized understanding of discharge instructions and all questions answered.  Copy of AVS reviewed with patient and/or family.  Patient will return 9/15/17 for CT scan and 9/15/17 for Dr. Gonsales and infusion on 9/18/17.  Patient discharged in stable condition accompanied by: self and sister.  Departure Mode: Ambulatory.  Face to Face time: 10 minutes coordinating care.    Ariadne Fairchild RN

## 2017-09-15 NOTE — TELEPHONE ENCOUNTER
Prochlorperazine      Last Written Prescription Date: 7/31/17  Last Fill Quantity: 30,  # refills: 2   Last Office Visit with [facility], [facility] or [facility] prescribing provider: 8/18/17 with Ester OLIVEIRA CNP  Next office visit: 9/18/17 with Dr. Gonsales

## 2017-09-15 NOTE — TELEPHONE ENCOUNTER
Refill Request    Date of most recent appointment:  8/18/17 with Ester OLIVEIRA CNP  Next upcoming appointment:   9/18/17 with Dr. Gonsales    Medication requested:  MS Contin 15 mg every 12 hours  Quantity:  60  Last fill date:  8/11/17  Medication requested:  Oxycodone 5 mg every 4 hours prn  Quantity:  100  Last fill date:  7/24/17  Person requesting refill:  Patient walk-in  Notes:  Requested to have medications mailed to her from [facility] pharmacy    Prescribing provider(s):  Dr. Gonsales  Refill approved/denied:  Approved    Disposition of prescription:  Scripts tubed to [facility] pharmacy, spoke with daughter Michael who confirmed to mail to home address

## 2017-09-17 NOTE — PROGRESS NOTES
Reason for Visit: f/u metastatic pancreatic cancer, recent biliary obstruction, post ERCP/stenting    Oncology HPI: She was diagnosed with pancreatic cancer in the spring of 2016 after presenting with a 2 to 3-month history of abdominal pain and weight loss. She initiated on treatment with gemcitabine and Abraxane on 05/02/2016 and continued on that until December 2016 when she was found to have progressive metastatic disease involving the liver. She was then initiated on FOLFIRINOX on 12/20/16.  In January 2017, she was found to have a GI bleed secondary to a bleeding duodenal ulcer and infiltrating mass in the duodenum. The ulcer was injected and clipped. She was found to be H. Pylori positive and was initiated on therapy with PPI, carafate, amoxicillin and clarithromycin. She had biliary obstruction in April 2017 and underwent ERCP and  biliary stent placement  Dr. Braden. She was admitted on 8/3/17 with sepsis/cholangitis. Blood cultures grew klebsiella penumonia and streptococcus angiosos. She had an ERCP on 8/3 demonstrating an occluded stent with pus, stone and sludge. The duct was dilated and a new stent placed. She was treated with IV Vanco and Zosyn, repeat BC were negative. She was discharged on levaquin and augmentin.    Interval history: She has been eating okay. She has noted that she has been having minor vaginal bleeding.    Last month it was on 8/18 which lasted few days. She had another vaginal bleed 9/17/2017. It is moderate amount of bleed. She has been having fatigue. She's also been having issues with neuropathy. It is making her have difficulty with balance issues. She denies any chest pain, shortness of breath, cough, phlegm. She denies any abdominal pain. ECOG performance status is 2    Current Outpatient Prescriptions   Medication Sig Dispense Refill     prochlorperazine (COMPAZINE) 10 MG tablet TAKE ONE TABLET BY MOUTH EVERY 6 HOURS AS NEEDED FOR NAUSEA AND VOMITING 30 tablet 2      morphine (MS CONTIN) 15 MG 12 hr tablet Take 1 tablet (15 mg) by mouth every 12 hours 60 tablet 0     oxyCODONE (ROXICODONE) 5 MG IR tablet Take 1 tablet (5 mg) by mouth every 4 hours as needed for moderate to severe pain 100 tablet 0     docusate sodium (COLACE) 100 MG capsule Take 1 capsule (100 mg) by mouth daily 100 capsule 0     ondansetron (ZOFRAN-ODT) 8 MG ODT tab Take 1 tablet (8 mg) by mouth every 8 hours as needed for nausea 60 tablet 6     LORazepam (ATIVAN) 0.5 MG tablet Take 1 tablet (0.5 mg) by mouth every 4 hours as needed (Anxiety, Nausea/Vomiting or Sleep) 30 tablet 3     loratadine (CLARITIN) 10 MG tablet Take 1 tablet (10 mg) by mouth daily Reported on 5/5/2017 30 tablet 6     polyethylene glycol (MIRALAX/GLYCOLAX) powder Take 17 g (1 capful) by mouth daily 119 g 11     levofloxacin (LEVAQUIN) 500 MG tablet Take 1 tablet (500 mg) by mouth daily 14 tablet 0     potassium chloride SA (POTASSIUM CHLORIDE) 20 MEQ CR tablet Take 1 tablet (20 mEq) by mouth daily 60 tablet 1     dronabinol (MARINOL) 5 MG capsule Take 1 capsule (5 mg) by mouth 3 times daily (before meals) 90 capsule 0     pantoprazole (PROTONIX) 20 MG EC tablet Take 1 tablet (20 mg) by mouth 2 times daily 60 tablet 3     dexamethasone (DECADRON) 4 MG tablet Take 1 tablet (4 mg) by mouth daily (with breakfast) 3 tablet 0     Nutritional Supplements (ENSURE CLEAR) LIQD Take 1 Bottle by mouth 3 times daily (Patient taking differently: Take 5 Bottles by mouth 3 times daily ) 90 Bottle 6     diltiazem 2% in PLO cream, FV COMPOUNDED, 2% GEL Apply topically daily and as needed to external hemorrhoids 30 g 0     amylase-lipase-protease (CREON) 01393 UNITS CPEP Take 2 capsules (72,000 Units) by mouth 3 times daily (with meals) 180 capsule 1     lidocaine-prilocaine (EMLA) cream Apply topically as needed for other (Use 30-60 minutes prior to port access) 30 g 0          Allergies   Allergen Reactions     Contrast Dye Nausea and Vomiting     Pt  vomiting post IV contrast, Please try Visipaque for next CT scan. 16 sv         Exam: alert, appears well Blood pressure 112/70, pulse 105, temperature 98.5  F (36.9  C), temperature source Oral, resp. rate 18, weight 77.5 kg (170 lb 12.8 oz), SpO2 100 %.  Wt Readings from Last 4 Encounters:   17 77.5 kg (170 lb 12.8 oz)   17 75.7 kg (166 lb 12.8 oz)   17 73.3 kg (161 lb 8 oz)   17 77.4 kg (170 lb 11.2 oz)    on examination patient appears comfortable  HEENT: Mucous membranes moist, no icterus  Neck, supple no adenopathy  Lungs: Clear to auscultation bilaterally  Heart: Regular rate and rhythm, no murmur  Abdomen: Soft, distended, I'll sounds heard  Extremities: No edema  CNS: Grossly intact      Labs: WBC count 6.2, hemoglobin 9.6, platelets 99, creatinine 0.56, CA-19-9 1178  Imagin/15/2017:  IMPRESSION: In this patient with history of malignant neoplasm of the  pancreatic head, the current scan shows:  1. Ill- marginated soft tissue in the region of the pancreatic head  and viry hepatis involving the second part of duodenum, main portal  vein, portal venous confluence and hepatic artery as well as the IVC  with multiple venous collaterals. The mass is overall similar compared  with MRI dated 2017  2. Appropriately positioned biliary stent  with expected pneumobilia.  3. Unchanged indeterminate 5 mm nodule in the left lung lingula.  4. Mild ascites.  5. Focal hypodense lesion, adjacent to the gallbladder, indeterminate  and attention on follow-up suggested.  6. Unchanged metastatic gastrohepatic lymph nodes.     Impression/plan:   1. Metastatic pancreatic cancer  Noted to have minor increase in CA-19-9 but no other evidence of disease progression.. Imaging reviewed with patient. We will proceed with chemotherapy today. Given significant amount of neuropathy we will drop the oxaliplatin from further notes. We will also change to liposomal irinotecan.    Plan for follow up in 2  months with CT imaging and MRI liver.    2. Cholangitis/sepsis w/ Klebsiella and strep bacteremia  -s/p ERCP on 8/3  -Stable.    3. Significant neuropathy from therapy. Holding further oxaliplatin today.    4. Vaginal bleeding: Recommend monitoring amount of bleed. If worsening recommend to give call back. She might need OB/GYN evaluation if bleeding is significant, but in the face of her metastatic pancreas cancer we aren't going to pursue this too aggressively.    The patient seen and discussed with JUSTA Hope, MS.  Hematology/Oncology Fellow  [facility]        Attending note: I saw the patient in conjunction with the fellow and agree with the above.

## 2017-09-18 NOTE — LETTER
9/18/2017      RE: Shirin Berg OdunladeMafe  [address] [id number]       Reason for Visit: f/u metastatic pancreatic cancer, recent biliary obstruction, post ERCP/stenting    Oncology HPI: She was diagnosed with pancreatic cancer in the spring of 2016 after presenting with a 2 to 3-month history of abdominal pain and weight loss. She initiated on treatment with gemcitabine and Abraxane on 05/02/2016 and continued on that until December 2016 when she was found to have progressive metastatic disease involving the liver. She was then initiated on FOLFIRINOX on 12/20/16.  In January 2017, she was found to have a GI bleed secondary to a bleeding duodenal ulcer and infiltrating mass in the duodenum. The ulcer was injected and clipped. She was found to be H. Pylori positive and was initiated on therapy with PPI, carafate, amoxicillin and clarithromycin. She had biliary obstruction in April 2017 and underwent ERCP and  biliary stent placement  Dr. Braden. She was admitted on 8/3/17 with sepsis/cholangitis. Blood cultures grew klebsiella penumonia and streptococcus angiosos. She had an ERCP on 8/3 demonstrating an occluded stent with pus, stone and sludge. The duct was dilated and a new stent placed. She was treated with IV Vanco and Zosyn, repeat BC were negative. She was discharged on levaquin and augmentin.    Interval history: She has been eating okay. She has noted that she has been having minor vaginal bleeding.    Last month it was on 8/18 which lasted few days. She had another vaginal bleed 9/17/2017. It is moderate amount of bleed. She has been having fatigue. She's also been having issues with neuropathy. It is making her have difficulty with balance issues. She denies any chest pain, shortness of breath, cough, phlegm. She denies any abdominal pain. ECOG performance status is 2    Current Outpatient Prescriptions   Medication Sig Dispense Refill     prochlorperazine (COMPAZINE) 10 MG tablet  TAKE ONE TABLET BY MOUTH EVERY 6 HOURS AS NEEDED FOR NAUSEA AND VOMITING 30 tablet 2     morphine (MS CONTIN) 15 MG 12 hr tablet Take 1 tablet (15 mg) by mouth every 12 hours 60 tablet 0     oxyCODONE (ROXICODONE) 5 MG IR tablet Take 1 tablet (5 mg) by mouth every 4 hours as needed for moderate to severe pain 100 tablet 0     docusate sodium (COLACE) 100 MG capsule Take 1 capsule (100 mg) by mouth daily 100 capsule 0     ondansetron (ZOFRAN-ODT) 8 MG ODT tab Take 1 tablet (8 mg) by mouth every 8 hours as needed for nausea 60 tablet 6     LORazepam (ATIVAN) 0.5 MG tablet Take 1 tablet (0.5 mg) by mouth every 4 hours as needed (Anxiety, Nausea/Vomiting or Sleep) 30 tablet 3     loratadine (CLARITIN) 10 MG tablet Take 1 tablet (10 mg) by mouth daily Reported on 5/5/2017 30 tablet 6     polyethylene glycol (MIRALAX/GLYCOLAX) powder Take 17 g (1 capful) by mouth daily 119 g 11     levofloxacin (LEVAQUIN) 500 MG tablet Take 1 tablet (500 mg) by mouth daily 14 tablet 0     potassium chloride SA (POTASSIUM CHLORIDE) 20 MEQ CR tablet Take 1 tablet (20 mEq) by mouth daily 60 tablet 1     dronabinol (MARINOL) 5 MG capsule Take 1 capsule (5 mg) by mouth 3 times daily (before meals) 90 capsule 0     pantoprazole (PROTONIX) 20 MG EC tablet Take 1 tablet (20 mg) by mouth 2 times daily 60 tablet 3     dexamethasone (DECADRON) 4 MG tablet Take 1 tablet (4 mg) by mouth daily (with breakfast) 3 tablet 0     Nutritional Supplements (ENSURE CLEAR) LIQD Take 1 Bottle by mouth 3 times daily (Patient taking differently: Take 5 Bottles by mouth 3 times daily ) 90 Bottle 6     diltiazem 2% in PLO cream, FV COMPOUNDED, 2% GEL Apply topically daily and as needed to external hemorrhoids 30 g 0     amylase-lipase-protease (CREON) 04656 UNITS CPEP Take 2 capsules (72,000 Units) by mouth 3 times daily (with meals) 180 capsule 1     lidocaine-prilocaine (EMLA) cream Apply topically as needed for other (Use 30-60 minutes prior to port access) 30 g 0           Allergies   Allergen Reactions     Contrast Dye Nausea and Vomiting     Pt vomiting post IV contrast, Please try Visipaque for next CT scan. 16 sv         Exam: alert, appears well Blood pressure 112/70, pulse 105, temperature 98.5  F (36.9  C), temperature source Oral, resp. rate 18, weight 77.5 kg (170 lb 12.8 oz), SpO2 100 %.  Wt Readings from Last 4 Encounters:   17 77.5 kg (170 lb 12.8 oz)   17 75.7 kg (166 lb 12.8 oz)   17 73.3 kg (161 lb 8 oz)   17 77.4 kg (170 lb 11.2 oz)    on examination patient appears comfortable  HEENT: Mucous membranes moist, no icterus  Neck, supple no adenopathy  Lungs: Clear to auscultation bilaterally  Heart: Regular rate and rhythm, no murmur  Abdomen: Soft, distended, I'll sounds heard  Extremities: No edema  CNS: Grossly intact      Labs: WBC count 6.2, hemoglobin 9.6, platelets 99, creatinine 0.56, CA-19-9 1178  Imagin/15/2017:  IMPRESSION: In this patient with history of malignant neoplasm of the  pancreatic head, the current scan shows:  1. Ill- marginated soft tissue in the region of the pancreatic head  and viry hepatis involving the second part of duodenum, main portal  vein, portal venous confluence and hepatic artery as well as the IVC  with multiple venous collaterals. The mass is overall similar compared  with MRI dated 2017  2. Appropriately positioned biliary stent  with expected pneumobilia.  3. Unchanged indeterminate 5 mm nodule in the left lung lingula.  4. Mild ascites.  5. Focal hypodense lesion, adjacent to the gallbladder, indeterminate  and attention on follow-up suggested.  6. Unchanged metastatic gastrohepatic lymph nodes.     Impression/plan:   1. Metastatic pancreatic cancer  Noted to have minor increase in CA-19-9 but no other evidence of disease progression.. Imaging reviewed with patient. We will proceed with chemotherapy today. Given significant amount of neuropathy we will drop the oxaliplatin from  further notes. We will also change to liposomal irinotecan.    Plan for follow up in 2 months with CT imaging and MRI liver.    2. Cholangitis/sepsis w/ Klebsiella and strep bacteremia  -s/p ERCP on 8/3  -Stable.    3. Significant neuropathy from therapy. Holding further oxaliplatin today.    4. Vaginal bleeding: Recommend monitoring amount of bleed. If worsening recommend to give call back. She might need OB/GYN evaluation if bleeding is significant, but in the face of her metastatic pancreas cancer we aren't going to pursue this too aggressively.    The patient seen and discussed with JUSTA Hope, MS.  Hematology/Oncology Fellow  [facility]        Attending note: I saw the patient in conjunction with the fellow and agree with the above.      Demond Gonsales MD

## 2017-09-18 NOTE — NURSING NOTE
"Oncology Rooming Note    September 18, 2017 9:51 AM   Shirin Muller is a 57 year old female who presents for:    Chief Complaint   Patient presents with     Port Draw     Labs drawn via Power Port - line flushed and hep locked. VS taken. Patient was checked in for her next appointment.     Oncology Clinic Visit     Patient is here for Pancreatic ca f/u     Initial Vitals: /70 (BP Location: Right arm, Patient Position: Sitting, Cuff Size: Adult Regular)  Pulse 105  Temp 98.5  F (36.9  C) (Oral)  Resp 18  Wt 77.5 kg (170 lb 12.8 oz)  SpO2 100%  BMI 23.83 kg/m2 Estimated body mass index is 23.83 kg/(m^2) as calculated from the following:    Height as of 8/18/17: 1.803 m (5' 10.98\").    Weight as of this encounter: 77.5 kg (170 lb 12.8 oz). Body surface area is 1.97 meters squared.  No Pain (0) Comment: Data Unavailable   No LMP recorded. Patient is postmenopausal.  Allergies reviewed: Yes  Medications reviewed: Yes    Medications: Medication refills not needed today.  Pharmacy name entered into [facility]:    [facility] [place], [place] - [address] [facility] [phone]  [id number] - [place], [place] - [address] [place] [address]    Clinical concerns: Patient is not in any pain at this moment. Vitals taken in lab today     6 minutes for nursing intake (face to face time)     Roxanna Kaur LPN            "

## 2017-09-18 NOTE — LETTER
9/18/2017      RE: Shirin Berg OdunladeMafe  [address] [facility] [address]       Reason for Visit: f/u metastatic pancreatic cancer, recent biliary obstruction, post ERCP/stenting    Oncology HPI: She was diagnosed with pancreatic cancer in the spring of 2016 after presenting with a 2 to 3-month history of abdominal pain and weight loss. She initiated on treatment with gemcitabine and Abraxane on 05/02/2016 and continued on that until December 2016 when she was found to have progressive metastatic disease involving the liver. She was then initiated on FOLFIRINOX on 12/20/16.  In January 2017, she was found to have a GI bleed secondary to a bleeding duodenal ulcer and infiltrating mass in the duodenum. The ulcer was injected and clipped. She was found to be H. Pylori positive and was initiated on therapy with PPI, carafate, amoxicillin and clarithromycin. She had biliary obstruction in April 2017 and underwent ERCP and  biliary stent placement  Dr. Braden. She was admitted on 8/3/17 with sepsis/cholangitis. Blood cultures grew klebsiella penumonia and streptococcus angiosos. She had an ERCP on 8/3 demonstrating an occluded stent with pus, stone and sludge. The duct was dilated and a new stent placed. She was treated with IV Vanco and Zosyn, repeat BC were negative. She was discharged on levaquin and augmentin.    Interval history: She has been eating okay. She has noted that she has been having minor vaginal bleeding.    Last month it was on 8/18 which lasted few days. She had another vaginal bleed 9/17/2017. It is moderate amount of bleed. She has been having fatigue. She's also been having issues with neuropathy. It is making her have difficulty with balance issues. She denies any chest pain, shortness of breath, cough, phlegm. She denies any abdominal pain. ECOG performance status is 2    Current Outpatient Prescriptions   Medication Sig Dispense Refill     prochlorperazine (COMPAZINE) 10 MG tablet  TAKE ONE TABLET BY MOUTH EVERY 6 HOURS AS NEEDED FOR NAUSEA AND VOMITING 30 tablet 2     morphine (MS CONTIN) 15 MG 12 hr tablet Take 1 tablet (15 mg) by mouth every 12 hours 60 tablet 0     oxyCODONE (ROXICODONE) 5 MG IR tablet Take 1 tablet (5 mg) by mouth every 4 hours as needed for moderate to severe pain 100 tablet 0     docusate sodium (COLACE) 100 MG capsule Take 1 capsule (100 mg) by mouth daily 100 capsule 0     ondansetron (ZOFRAN-ODT) 8 MG ODT tab Take 1 tablet (8 mg) by mouth every 8 hours as needed for nausea 60 tablet 6     LORazepam (ATIVAN) 0.5 MG tablet Take 1 tablet (0.5 mg) by mouth every 4 hours as needed (Anxiety, Nausea/Vomiting or Sleep) 30 tablet 3     loratadine (CLARITIN) 10 MG tablet Take 1 tablet (10 mg) by mouth daily Reported on 5/5/2017 30 tablet 6     polyethylene glycol (MIRALAX/GLYCOLAX) powder Take 17 g (1 capful) by mouth daily 119 g 11     levofloxacin (LEVAQUIN) 500 MG tablet Take 1 tablet (500 mg) by mouth daily 14 tablet 0     potassium chloride SA (POTASSIUM CHLORIDE) 20 MEQ CR tablet Take 1 tablet (20 mEq) by mouth daily 60 tablet 1     dronabinol (MARINOL) 5 MG capsule Take 1 capsule (5 mg) by mouth 3 times daily (before meals) 90 capsule 0     pantoprazole (PROTONIX) 20 MG EC tablet Take 1 tablet (20 mg) by mouth 2 times daily 60 tablet 3     dexamethasone (DECADRON) 4 MG tablet Take 1 tablet (4 mg) by mouth daily (with breakfast) 3 tablet 0     Nutritional Supplements (ENSURE CLEAR) LIQD Take 1 Bottle by mouth 3 times daily (Patient taking differently: Take 5 Bottles by mouth 3 times daily ) 90 Bottle 6     diltiazem 2% in PLO cream, FV COMPOUNDED, 2% GEL Apply topically daily and as needed to external hemorrhoids 30 g 0     amylase-lipase-protease (CREON) 70808 UNITS CPEP Take 2 capsules (72,000 Units) by mouth 3 times daily (with meals) 180 capsule 1     lidocaine-prilocaine (EMLA) cream Apply topically as needed for other (Use 30-60 minutes prior to port access) 30 g 0           Allergies   Allergen Reactions     Contrast Dye Nausea and Vomiting     Pt vomiting post IV contrast, Please try Visipaque for next CT scan. 16 sv         Exam: alert, appears well Blood pressure 112/70, pulse 105, temperature 98.5  F (36.9  C), temperature source Oral, resp. rate 18, weight 77.5 kg (170 lb 12.8 oz), SpO2 100 %.  Wt Readings from Last 4 Encounters:   17 77.5 kg (170 lb 12.8 oz)   17 75.7 kg (166 lb 12.8 oz)   17 73.3 kg (161 lb 8 oz)   17 77.4 kg (170 lb 11.2 oz)    on examination patient appears comfortable  HEENT: Mucous membranes moist, no icterus  Neck, supple no adenopathy  Lungs: Clear to auscultation bilaterally  Heart: Regular rate and rhythm, no murmur  Abdomen: Soft, distended, I'll sounds heard  Extremities: No edema  CNS: Grossly intact      Labs: WBC count 6.2, hemoglobin 9.6, platelets 99, creatinine 0.56, CA-19-9 1178  Imagin/15/2017:  IMPRESSION: In this patient with history of malignant neoplasm of the  pancreatic head, the current scan shows:  1. Ill- marginated soft tissue in the region of the pancreatic head  and viry hepatis involving the second part of duodenum, main portal  vein, portal venous confluence and hepatic artery as well as the IVC  with multiple venous collaterals. The mass is overall similar compared  with MRI dated 2017  2. Appropriately positioned biliary stent  with expected pneumobilia.  3. Unchanged indeterminate 5 mm nodule in the left lung lingula.  4. Mild ascites.  5. Focal hypodense lesion, adjacent to the gallbladder, indeterminate  and attention on follow-up suggested.  6. Unchanged metastatic gastrohepatic lymph nodes.     Impression/plan:   1. Metastatic pancreatic cancer  Noted to have minor increase in CA-19-9 but no other evidence of disease progression.. Imaging reviewed with patient. We will proceed with chemotherapy today. Given significant amount of neuropathy we will drop the oxaliplatin from  further notes. We will also change to liposomal irinotecan.    Plan for follow up in 2 months with CT imaging and MRI liver.    2. Cholangitis/sepsis w/ Klebsiella and strep bacteremia  -s/p ERCP on 8/3  -Stable.    3. Significant neuropathy from therapy. Holding further oxaliplatin today.    4. Vaginal bleeding: Recommend monitoring amount of bleed. If worsening recommend to give call back. She might need OB/GYN evaluation if bleeding is significant, but in the face of her metastatic pancreas cancer we aren't going to pursue this too aggressively.    The patient seen and discussed with JUSTA Hope, MS.  Hematology/Oncology Fellow  [facility]        Attending note: I saw the patient in conjunction with the fellow and agree with the above.      Demond Gonsales MD

## 2017-09-18 NOTE — PROGRESS NOTES
Infusion Nursing Note:  Pt presents today for C1 D1 Irinotecan Liposome/Fluorouracil home infusion- NEW REG.   present during visit today: Not Applicable.  Patient seen by Dr. Gonsales prior to infusion.    Patient is receiving Irinotecan Liposome for the first time. New patient teaching done previously. Writer reinforced teaching in infusion. All medications and side effects reviewed with patient.     Lab Results   Component Value Date    HGB 9.6 09/18/2017     Lab Results   Component Value Date    WBC 6.2 09/18/2017      Lab Results   Component Value Date    ANEU 5.0 09/18/2017     Lab Results   Component Value Date    PLT 99 09/18/2017      Lab Results   Component Value Date     09/18/2017                   Lab Results   Component Value Date    POTASSIUM 3.5 09/18/2017           Lab Results   Component Value Date    MAG 2.0 04/03/2017            Lab Results   Component Value Date    CR 0.56 09/18/2017                   Lab Results   Component Value Date    FANNY 8.5 09/18/2017                Lab Results   Component Value Date    BILITOTAL 0.5 09/18/2017           Lab Results   Component Value Date    ALBUMIN 2.8 09/18/2017                    Lab Results   Component Value Date    ALT 30 09/18/2017           Lab Results   Component Value Date    AST 37 09/18/2017     Results reviewed, labs MET treatment parameters.    Note: Pt was questioning whether she should still get neulasta/IVFs with this regimen.  TORB   9/18/2017 @ 1151 Dr. Gonsales/Monster Quinn RN  --No neulasta/IVFs today since this reg is less likely to cause neutropenia  --Will add with next infusion if needed    Proceed with treatment.    Intravenous Access:  Implanted Port.    (+) Blood return from port noted at beginning of infusions and before hooking up continuous infusion. Tolerating infusions without incident.  Fluororuacil home infusion hooked up to port at 1405  at 5.2 ml/hr x46 hours. [facility] aware to disconnect pt's pump Wednesday,  9/20, at 1200. Pump connection double checked with Maye RN that clamps are taped open. Capillary element taped to chest. Air filter hole noted to not be blocked. Pt aware to keep the infusor out of light d/t light sensitivity and avoiding extreme cold/hot temps to ensure pump's rate does not change. Pt aware of disconnect date/time.       Discharge Plan:   Prescription refills given for clairitin, miralax, and potassium.  Discharge instructions reviewed with: Patient.  Patient and/or family verbalized understanding of discharge instructions and all questions answered.  Copy of AVS reviewed with patient and/or family.  Pt will return 10/2 for next infusion appt.      Patient instructed to call triage with questions/concerns or if they have chills and/or temperature greater than or equal to 100.5.  Triage number: [phone]. After hours, weekends, or holidays, call main hospital  at  [phone] and ask for Oncology doctor on call. Pt stated understanding of plan.

## 2017-09-18 NOTE — TELEPHONE ENCOUNTER
Spoke with Adilia at [facility] pharmacy. Pt already picked up refill, has 5 remaining refill left.

## 2017-09-18 NOTE — MR AVS SNAPSHOT
After Visit Summary   9/18/2017    Shirin Muller    MRN: [id number]           Patient Information     Date Of Birth          1960        Visit Information        Provider Department      9/18/2017 12:00 PM  24 ATC;  ONCOLOGY INFUSION [facility]        Today's Diagnoses     Chemotherapy-induced neutropenia (H)    -  1    Malignant neoplasm of head of pancreas (H)          Care Instructions    Contact Numbers  [facility] Main Line/After Hours: [phone]  [facility] Triage line: [phone]      Please call the triage or after hours line if you experience a temperature greater than or equal to 100.5, shaking chills, have uncontrolled nausea, vomiting and/or diarrhea, dizziness, shortness of breath, chest pain, bleeding, unexplained bruising, or if you have any other new/concerning symptoms, questions or concerns.      If you are having any concerning symptoms or wish to speak to a provider before your next infusion visit, please call to notify us so we can adequately serve you.     If you need a refill on a narcotic prescription or other medication, please call before your infusion appointment.     [facility] will be at your house Wednesday, 9/20, at 12:00 pm to disconnect your pump.          September 2017 Sunday Monday Tuesday Wednesday Thursday Friday Saturday                            1     [facility] LAB DRAW    8:30 AM   (15 min.)    [facility]   [facility]     [facility] ONC INFUSION 360    9:00 AM   (360 min.)    ONCOLOGY INFUSION   [facility] 2       3     4     5     6     7     8     9       10     11     12     13     14     15     [facility] D[facility]    9:15 AM   (15 min.)    [facility]   [facility]     CT CHEST ABDOMEN PELVIS WWO    9:25 AM   (20 min.)   UCCT2   [facility] CT 16       17     18     [facility] DRAW    9:15 AM   (15 min.)    [facility]     [facility]     [facility] RETURN    9:30 AM   (30 min.)   Demond Gonsales MD   [facility]     [facility] ONC INFUSION 360   12:00 PM   (360 min.)   [facility] ONCOLOGY INFUSION   [facility] 19     20     21     22     23       24     25     26     27     28     29     30                October 2017 Sunday Monday Tuesday Wednesday Thursday Friday Saturday   1     2     [facility] DRAW   12:00 PM   (15 min.)    MASONIC LAB DRAW   [facility] Masonic Lab Draw     [facility] ONC INFUSION 180   12:30 PM   (180 min.)   UC ONCOLOGY INFUSION   [facility] 3     4     5     6     7       8     9     10     11     12     13     14       15     16     [facility] LAB DRAW   12:00 PM   (15 min.)   [facility] LAB DRAW   [facility] Masonic Lab Draw     [facility] ONC INFUSION 180   12:30 PM   (180 min.)   [facility] ONCOLOGY INFUSION   [facility] 17     18     19     20     21       22     23     24     25     26     27     28       29     30     [facility] LAB DRAW   10:00 AM   (15 min.)   [facility] LAB DRAW   [facility] Mason[facility] Lab Draw     [facility] ONC INFUSION 180   10:30 AM   (180 min.)   [facility] ONCOLOGY INFUSION   [facility] 31                                      Lab Results:  Recent Results (from the past 12 hour(s))   Comprehensive metabolic panel    Collection Time: 09/18/17  9:44 AM   Result Value Ref Range    Sodium 138 133 - 144 mmol/L    Potassium 3.5 3.4 - 5.3 mmol/L    Chloride 102 94 - 109 mmol/L    Carbon Dioxide 27 20 - 32 mmol/L    Anion Gap 9 3 - 14 mmol/L    Glucose 361 (H) 70 - 99 mg/dL    Urea Nitrogen 8 7 - 30 mg/dL    Creatinine 0.56 0.52 - 1.04 mg/dL    GFR Estimate >90 >60 mL/min/1.7m2    GFR Estimate If Black >90 >60 mL/min/1.7m2    Calcium 8.5 8.5 - 10.1 mg/dL    Bilirubin Total 0.5 0.2 - 1.3 mg/dL    Albumin 2.8 (L) 3.4 - 5.0 g/dL    Protein Total 7.4 6.8 - 8.8 g/dL    Alkaline Phosphatase 288 (H) 40 - 150 U/L    ALT 30 0 - 50  U/L    AST 37 0 - 45 U/L   CBC with platelets differential    Collection Time: 09/18/17  9:44 AM   Result Value Ref Range    WBC 6.2 4.0 - 11.0 10e9/L    RBC Count 3.71 (L) 3.8 - 5.2 10e12/L    Hemoglobin 9.6 (L) 11.7 - 15.7 g/dL    Hematocrit 31.7 (L) 35.0 - 47.0 %    MCV 85 78 - 100 fl    MCH 25.9 (L) 26.5 - 33.0 pg    MCHC 30.3 (L) 31.5 - 36.5 g/dL    RDW 20.4 (H) 10.0 - 15.0 %    Platelet Count 99 (L) 150 - 450 10e9/L    Diff Method Automated Method     % Neutrophils 80.8 %    % Lymphocytes 11.8 %    % Monocytes 6.3 %    % Eosinophils 0.3 %    % Basophils 0.2 %    % Immature Granulocytes 0.6 %    Nucleated RBCs 0 0 /100    Absolute Neutrophil 5.0 1.6 - 8.3 10e9/L    Absolute Lymphocytes 0.7 (L) 0.8 - 5.3 10e9/L    Absolute Monocytes 0.4 0.0 - 1.3 10e9/L    Absolute Eosinophils 0.0 0.0 - 0.7 10e9/L    Absolute Basophils 0.0 0.0 - 0.2 10e9/L    Abs Immature Granulocytes 0.0 0 - 0.4 10e9/L    Absolute Nucleated RBC 0.0                Follow-ups after your visit        Your next 10 appointments already scheduled     Oct 02, 2017 12:00 PM CDT   [facility] Lab Draw with  [facility] LAB DRAW   [facility] Draw ([facility])    [address] [place] [address] [id number] [phone]            Oct 02, 2017 12:30 PM CDT   Infusion 180 with  ONCOLOGY INFUSION, UC 30 ATC   [facility] ([facility])    [address] [place] [address] [id number] [phone]            Oct 16, 2017 12:00 PM CDT   M[facility] Lab Draw with  MASONIC LAB DRAW   [facility] ([facility])    [address] [place] [phone]            Oct 16, 2017 12:30 PM CDT   Infusion 180 with UC ONCOLOGY INFUSION, UC 30 ATC   [facility] ([facility])    [address] [phone]             Oct 30, 2017 10:00 AM CDT   Masonic Lab Draw with UC [facility] LAB DRAW   [facility])    [address] [facility]  [address] [facility] [address]   [phone]            Oct 30, 2017 10:30 AM CDT   Infusion 180 with UC ONCOLOGY INFUSION, UC 22 ATC   [facility] ([facility])    [address] [address]   [phone]            Nov 13, 2017  8:45 AM CST   Masonic Lab Draw with UC MASONIC LAB DRAW   [facility] Masonic Lab Draw ([facility])    [address] [phone]              Future tests that were ordered for you today     Open Future Orders        Priority Expected Expires Ordered    MR Abdomen w/o & w Contrast Routine 11/20/2017 12/27/2017 9/18/2017    CT Chest w/o Contrast Routine 11/20/2017 12/27/2017 9/18/2017    Comprehensive metabolic panel Routine 11/20/2017 12/27/2017 9/18/2017    CBC with platelets differential Routine 11/20/2017 12/27/2017 9/18/2017    Cancer antigen 19-9 Routine 11/20/2017 12/27/2017 9/18/2017            Who to contact     If you have questions or need follow up information about today's clinic visit or your schedule please contact [facility] directly at [phone].  Normal or non-critical lab and imaging results will be communicated to you by MyChart, letter or phone within 4 business days after the clinic has received the results. If you do not hear from us within 7 days, please contact the clinic through [employer] or phone. If you have a critical or abnormal lab result, we will notify you by phone as soon as possible.  Submit refill requests through [employer] or call your pharmacy and they will forward the refill request to us. Please allow 3 business days for your refill to be completed.          Additional Information About Your Visit        [employer]  Information     [employer] gives you secure access to your electronic health record. If you see a primary care provider, you can also send messages to your care team and make appointments. If you have questions, please call your primary care clinic.  If you do not have a primary care provider, please call [phone] and they will assist you.        Care EveryWhere ID     This is your Care EveryWhere ID. This could be used by other organizations to access your [facility] medical records  [id number]         Blood Pressure from Last 3 Encounters:   09/18/17 112/70   09/01/17 102/64   08/21/17 127/79    Weight from Last 3 Encounters:   09/18/17 77.5 kg (170 lb 12.8 oz)   09/01/17 75.7 kg (166 lb 12.8 oz)   08/21/17 73.3 kg (161 lb 8 oz)              We Performed the Following     Cancer antigen 19-9     CBC with platelets differential     Comprehensive metabolic panel          Today's Medication Changes          These changes are accurate as of: 9/18/17  1:05 PM.  If you have any questions, ask your nurse or doctor.               These medicines have changed or have updated prescriptions.        Dose/Directions    ENSURE CLEAR Liqd   This may have changed:  how much to take   Used for:  Malignant neoplasm of head of pancreas (H)        Dose:  1 Bottle   Take 1 Bottle by mouth 3 times daily   Quantity:  90 Bottle   Refills:  6                Primary Care Provider    [facility]       No address on file        Equal Access to Services     JAI CORDERO : Maxx Spence, waaxda luqadaha, qaybta kaalmada karin, luis e lora. So [facility] [phone].    ATENCIÓN: Si habla español, tiene a bernal disposición servicios gratuitos de asistencia lingüística. Llame al [phone].    We comply with applicable federal civil rights laws and [place] laws. We do not discriminate on the basis of race, color, national origin, age, disability sex, sexual orientation or gender  identity.            Thank you!     Thank you for choosing [facility]  for your care. Our goal is always to provide you with excellent care. Hearing back from our patients is one way we can continue to improve our services. Please take a few minutes to complete the written survey that you may receive in the mail after your visit with us. Thank you!             Your Updated Medication List - Protect others around you: Learn how to safely use, store and throw away your medicines at www.disposemymeds.org.          This list is accurate as of: 9/18/17  1:05 PM.  Always use your most recent med list.                   Brand Name Dispense Instructions for use Diagnosis    amylase-lipase-protease 59043 UNITS Cpep    CREON    180 capsule    Take 2 capsules (72,000 Units) by mouth 3 times daily (with meals)    Malignant neoplasm of head of pancreas (H)       dexamethasone 4 MG tablet    DECADRON    3 tablet    Take 1 tablet (4 mg) by mouth daily (with breakfast)    Need for prophylactic measure, Malignant neoplasm of head of pancreas (H)       diltiazem 2% in PLO cream (FV COMPOUNDED) 2% Gel     30 g    Apply topically daily and as needed to external hemorrhoids    External hemorrhoids       docusate sodium 100 MG capsule    COLACE    100 capsule    Take 1 capsule (100 mg) by mouth daily    External hemorrhoids       dronabinol 5 MG capsule    MARINOL    90 capsule    Take 1 capsule (5 mg) by mouth 3 times daily (before meals)    Anorexia       ENSURE CLEAR Liqd     90 Bottle    Take 1 Bottle by mouth 3 times daily    Malignant neoplasm of head of pancreas (H)       levofloxacin 500 MG tablet    LEVAQUIN    14 tablet    Take 1 tablet (500 mg) by mouth daily    Cholangitis, 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)       lidocaine-prilocaine cream    EMLA    30 g    Apply topically as needed for other (Use 30-60 minutes prior to port access)    Malignant neoplasm of head of pancreas (H)        loratadine 10 MG tablet    CLARITIN    30 tablet    Take 1 tablet (10 mg) by mouth daily Reported on 5/5/2017    Chronic seasonal allergic rhinitis, unspecified trigger       LORazepam 0.5 MG tablet    ATIVAN    30 tablet    Take 1 tablet (0.5 mg) by mouth every 4 hours as needed (Anxiety, Nausea/Vomiting or Sleep)    Malignant neoplasm of head of pancreas (H)       morphine 15 MG 12 hr tablet    MS CONTIN    60 tablet    Take 1 tablet (15 mg) by mouth every 12 hours    Malignant neoplasm of head of pancreas (H)       ondansetron 8 MG ODT tab    ZOFRAN-ODT    60 tablet    Take 1 tablet (8 mg) by mouth every 8 hours as needed for nausea    Malignant neoplasm of head of pancreas (H)       oxyCODONE 5 MG IR tablet    ROXICODONE    100 tablet    Take 1 tablet (5 mg) by mouth every 4 hours as needed for moderate to severe pain    Malignant neoplasm of head of pancreas (H)       pantoprazole 20 MG EC tablet    PROTONIX    60 tablet    Take 1 tablet (20 mg) by mouth 2 times daily    Malignant neoplasm of head of pancreas (H)       polyethylene glycol powder    MIRALAX/GLYCOLAX    119 g    Take 17 g (1 capful) by mouth daily    Malignant neoplasm of head of pancreas (H)       potassium chloride SA 20 MEQ CR tablet    potassium chloride    60 tablet    Take 1 tablet (20 mEq) by mouth daily    Malignant neoplasm of head of pancreas (H)       prochlorperazine 10 MG tablet    COMPAZINE    30 tablet    TAKE ONE TABLET BY MOUTH EVERY 6 HOURS AS NEEDED FOR NAUSEA AND VOMITING    Malignant neoplasm of head of pancreas (H)

## 2017-09-18 NOTE — MR AVS SNAPSHOT
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      Your next 10 appointments already scheduled     Oct 02, 2017 12:00 PM CDT   [facility] Draw with [facility] LAB DRAW   [place] [facility] ([facility])    [address] [place] [address]   [phone]            Oct 02, 2017 12:30 PM CDT   Infusion 180 with UC ONCOLOGY INFUSION, UC 30 ATC   [facility] ([facility])    [address] [place] [address]   [phone]            Oct 16, 2017 12:00 PM CDT   [facility] Lab Draw with [facility] LAB DRAW   [place] [facility] ([facility])    [address] [place] [address]   [phone]            Oct 16, 2017 12:30 PM CDT   Infusion 180 with UC ONCOLOGY INFUSION, UC 30 ATC   [facility] ([facility])    [address] [place] [address]   [phone]            Oct 30, 2017 10:00 AM CDT   M[facility] Lab Draw with [facility]ONIC LAB DRAW   [place] [facility] ([facility])    [address] [place] [address]   [phone]            Oct 30, 2017 10:30 AM CDT    Infusion 180 with  ONCOLOGY INFUSION, UC 22 ATC   [facility] ([facility])    [address] [facility]  [address] [facility] [address] [phone]            Nov 13, 2017  8:45 AM [facility]   Masonic Lab Draw with  MASONIC LAB DRAW   [facility] ([facility])    [address] [address] [phone]              Who to contact     If you have questions or need follow up information about today's clinic visit or your schedule please contact [facility] directly at [phone].  Normal or non-critical lab and imaging results will be communicated to you by [employer], letter or phone within 4 business days after the clinic has received the results. If you do not hear from us within 7 days, please contact the clinic through [employer] or phone. If you have a critical or abnormal lab result, we will notify you by phone as soon as possible.  Submit refill requests through [email or web address] or call your pharmacy and they will forward the refill request to us. Please allow 3 business days for your refill to be completed.          Additional Information About Your Visit        [employer] Information     [email or web address] gives you secure access to your electronic health record. If you see a primary care provider, you can also send messages to your care team and make appointments. If you have questions, please call your primary care clinic.  If you do not have a primary care provider, please call [phone] and they will assist you.        Care EveryWhere ID     This is your Care EveryWhere ID. This could be used by other organizations to access your [place] medical records  [id number]        Your Vitals Were     Pulse Temperature Respirations Pulse Oximetry BMI (Body Mass Index)       105 98.5  F (36.9  C) (Oral) 18 100% 23.83 kg/m2        Blood Pressure from Last 3 Encounters:   09/18/17 112/70   09/01/17  102/64   08/21/17 127/79    Weight from Last 3 Encounters:   09/18/17 77.5 kg (170 lb 12.8 oz)   09/01/17 75.7 kg (166 lb 12.8 oz)   08/21/17 73.3 kg (161 lb 8 oz)                 Today's Medication Changes          These changes are accurate as of: 9/18/17 11:59 PM.  If you have any questions, ask your nurse or doctor.               These medicines have changed or have updated prescriptions.        Dose/Directions    ENSURE CLEAR Liqd   This may have changed:  how much to take   Used for:  Malignant neoplasm of head of pancreas (H)        Dose:  1 Bottle   Take 1 Bottle by mouth 3 times daily   Quantity:  90 Bottle   Refills:  6                Primary Care Provider    [facility]       No address on file        Equal Access to Services     JAI CORDERO : Maxx Spence, wathanh flower, qagretel kaalmaolamide frazier, luis e lora. So [facility] [phone].    ATENCIÓN: Si habla español, tiene a bernal disposición servicios gratuitos de asistencia lingüística. Llame al [phone].    We comply with applicable federal civil rights laws and [place] laws. We do not discriminate on the basis of race, color, national origin, age, disability sex, sexual orientation or gender identity.            Thank you!     Thank you for choosing [facility]  for your care. Our goal is always to provide you with excellent care. Hearing back from our patients is one way we can continue to improve our services. Please take a few minutes to complete the written survey that you may receive in the mail after your visit with us. Thank you!             Your Updated Medication List - Protect others around you: Learn how to safely use, store and throw away your medicines at www.disposemymeds.org.          This list is accurate as of: 9/18/17 11:59 PM.  Always use your most recent med list.                   Brand Name Dispense Instructions for use Diagnosis     amylase-lipase-protease 25761 UNITS Cpep    CREON    180 capsule    Take 2 capsules (72,000 Units) by mouth 3 times daily (with meals)    Malignant neoplasm of head of pancreas (H)       dexamethasone 4 MG tablet    DECADRON    3 tablet    Take 1 tablet (4 mg) by mouth daily (with breakfast)    Need for prophylactic measure, Malignant neoplasm of head of pancreas (H)       diltiazem 2% in PLO cream (FV COMPOUNDED) 2% Gel     30 g    Apply topically daily and as needed to external hemorrhoids    External hemorrhoids       docusate sodium 100 MG capsule    COLACE    100 capsule    Take 1 capsule (100 mg) by mouth daily    External hemorrhoids       dronabinol 5 MG capsule    MARINOL    90 capsule    Take 1 capsule (5 mg) by mouth 3 times daily (before meals)    Anorexia       ENSURE CLEAR Liqd     90 Bottle    Take 1 Bottle by mouth 3 times daily    Malignant neoplasm of head of pancreas (H)       levofloxacin 500 MG tablet    LEVAQUIN    14 tablet    Take 1 tablet (500 mg) by mouth daily    Cholangitis, 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)       lidocaine-prilocaine cream    EMLA    30 g    Apply topically as needed for other (Use 30-60 minutes prior to port access)    Malignant neoplasm of head of pancreas (H)       loratadine 10 MG tablet    CLARITIN    30 tablet    Take 1 tablet (10 mg) by mouth daily Reported on 5/5/2017    Chronic seasonal allergic rhinitis, unspecified trigger       LORazepam 0.5 MG tablet    ATIVAN    30 tablet    Take 1 tablet (0.5 mg) by mouth every 4 hours as needed (Anxiety, Nausea/Vomiting or Sleep)    Malignant neoplasm of head of pancreas (H)       morphine 15 MG 12 hr tablet    MS CONTIN    60 tablet    Take 1 tablet (15 mg) by mouth every 12 hours    Malignant neoplasm of head of pancreas (H)       ondansetron 8 MG ODT tab    ZOFRAN-ODT    60 tablet    Take 1 tablet (8 mg) by mouth every 8 hours as needed for nausea    Malignant neoplasm of head of pancreas (H)        oxyCODONE 5 MG IR tablet    ROXICODONE    100 tablet    Take 1 tablet (5 mg) by mouth every 4 hours as needed for moderate to severe pain    Malignant neoplasm of head of pancreas (H)       pantoprazole 20 MG EC tablet    PROTONIX    60 tablet    Take 1 tablet (20 mg) by mouth 2 times daily    Malignant neoplasm of head of pancreas (H)       polyethylene glycol powder    MIRALAX/GLYCOLAX    119 g    Take 17 g (1 capful) by mouth daily    Malignant neoplasm of head of pancreas (H)       potassium chloride SA 20 MEQ CR tablet    KLOR-CON    60 tablet    Take 1 tablet (20 mEq) by mouth daily    Malignant neoplasm of head of pancreas (H)       prochlorperazine 10 MG tablet    COMPAZINE    30 tablet    TAKE ONE TABLET BY MOUTH EVERY 6 HOURS AS NEEDED FOR NAUSEA AND VOMITING    Malignant neoplasm of head of pancreas (H)

## 2017-09-18 NOTE — PATIENT INSTRUCTIONS
Contact Numbers  [facility] Main Line/After Hours: [phone]  [facility] Triage line: [phone]      Please call the triage or after hours line if you experience a temperature greater than or equal to 100.5, shaking chills, have uncontrolled nausea, vomiting and/or diarrhea, dizziness, shortness of breath, chest pain, bleeding, unexplained bruising, or if you have any other new/concerning symptoms, questions or concerns.      If you are having any concerning symptoms or wish to speak to a provider before your next infusion visit, please call to notify us so we can adequately serve you.     If you need a refill on a narcotic prescription or other medication, please call before your infusion appointment.     [facility] will be at your house Wednesday, 9/20, at 12:00 pm to disconnect your pump.          September 2017 Sunday Monday Tuesday Wednesday Thursday Friday Saturday                            1     [facility] DRAW    8:30 AM   (15 min.)    MASONIC LAB DRAW   [facility] Masonic Lab Draw     P ONC INFUSION 360    9:00 AM   (360 min.)    ONCOLOGY INFUSION   [facility] 2       3     4     5     6     7     8     9       10     11     12     13     14     15     [facility] DRAW    9:15 AM   (15 min.)    MASONIC LAB DRAW   [facility] Masonic Lab Draw     CT CHEST ABDOMEN PELVIS WWO    9:25 AM   (20 min.)   UCCT2   [facility] CT 16       17     18     [facility] DRAW    9:15 AM   (15 min.)    [facility]     [facility] RETURN    9:30 AM   (30 min.)   Demond Gonsales MD   [facility]     [facility] ONC INFUSION 360   12:00 PM   (360 min.)    ONCOLOGY INFUSION   [facility] 19     20     21     22     23       24     25     26     27     28     29     30 October 2017 Sunday Monday Tuesday Wednesday Thursday Friday Saturday   1     2     [facility] DRAW   12:00 PM   (15  min.)    MASONIC LAB DRAW   [facility] Lab Draw     [facility] ONC INFUSION 180   12:30 PM   (180 min.)    ONCOLOGY INFUSION   [facility] 3     4     5     6     7       8     9     10     11     12     13     14       15     16     [facility] MASONIC LAB DRAW   12:00 PM   (15 min.)    MASONIC LAB DRAW   [facility] Lab Draw     [facility] ONC INFUSION 180   12:30 PM   (180 min.)    ONCOLOGY INFUSION   [facility] 17     18     19     20     21       22     23     24     25     26     27     28       29     30     [facility] MASONIC LAB DRAW   10:00 AM   (15 min.)    MASONIC LAB DRAW   [facility] Lab Draw     [facility] ONC INFUSION 180   10:30 AM   (180 min.)    ONCOLOGY INFUSION   [facility] 31                                      Lab Results:  Recent Results (from the past 12 hour(s))   Comprehensive metabolic panel    Collection Time: 09/18/17  9:44 AM   Result Value Ref Range    Sodium 138 133 - 144 mmol/L    Potassium 3.5 3.4 - 5.3 mmol/L    Chloride 102 94 - 109 mmol/L    Carbon Dioxide 27 20 - 32 mmol/L    Anion Gap 9 3 - 14 mmol/L    Glucose 361 (H) 70 - 99 mg/dL    Urea Nitrogen 8 7 - 30 mg/dL    Creatinine 0.56 0.52 - 1.04 mg/dL    GFR Estimate >90 >60 mL/min/1.7m2    GFR Estimate If Black >90 >60 mL/min/1.7m2    Calcium 8.5 8.5 - 10.1 mg/dL    Bilirubin Total 0.5 0.2 - 1.3 mg/dL    Albumin 2.8 (L) 3.4 - 5.0 g/dL    Protein Total 7.4 6.8 - 8.8 g/dL    Alkaline Phosphatase 288 (H) 40 - 150 U/L    ALT 30 0 - 50 U/L    AST 37 0 - 45 U/L   CBC with platelets differential    Collection Time: 09/18/17  9:44 AM   Result Value Ref Range    WBC 6.2 4.0 - 11.0 10e9/L    RBC Count 3.71 (L) 3.8 - 5.2 10e12/L    Hemoglobin 9.6 (L) 11.7 - 15.7 g/dL    Hematocrit 31.7 (L) 35.0 - 47.0 %    MCV 85 78 - 100 fl    MCH 25.9 (L) 26.5 - 33.0 pg    MCHC 30.3 (L) 31.5 - 36.5 g/dL    RDW 20.4 (H) 10.0 - 15.0 %    Platelet Count 99 (L) 150 - 450 10e9/L    Diff Method Automated  Method     % Neutrophils 80.8 %    % Lymphocytes 11.8 %    % Monocytes 6.3 %    % Eosinophils 0.3 %    % Basophils 0.2 %    % Immature Granulocytes 0.6 %    Nucleated RBCs 0 0 /100    Absolute Neutrophil 5.0 1.6 - 8.3 10e9/L    Absolute Lymphocytes 0.7 (L) 0.8 - 5.3 10e9/L    Absolute Monocytes 0.4 0.0 - 1.3 10e9/L    Absolute Eosinophils 0.0 0.0 - 0.7 10e9/L    Absolute Basophils 0.0 0.0 - 0.2 10e9/L    Abs Immature Granulocytes 0.0 0 - 0.4 10e9/L    Absolute Nucleated RBC 0.0

## 2017-10-02 NOTE — PROGRESS NOTES
Infusion Nursing Note:  Shirin Berg OdunladeMafe presents today for D1 C2 Irinotecan Liposome. Fluorouracil pump connect.    Patient seen by provider today: No    Intravenous Access:  Implanted Port.    Treatment Conditions:  Lab Results   Component Value Date    HGB 9.5 10/02/2017     Lab Results   Component Value Date    WBC 4.3 10/02/2017      Lab Results   Component Value Date    ANEU 3.2 10/02/2017     Lab Results   Component Value Date     10/02/2017      Lab Results   Component Value Date     10/02/2017                   Lab Results   Component Value Date    POTASSIUM 3.7 10/02/2017           Lab Results   Component Value Date    MAG 2.0 04/03/2017            Lab Results   Component Value Date    CR 0.54 10/02/2017                   Lab Results   Component Value Date    FANNY 8.7 10/02/2017                Lab Results   Component Value Date    BILITOTAL 0.6 10/02/2017           Lab Results   Component Value Date    ALBUMIN 3.0 10/02/2017                    Lab Results   Component Value Date    ALT 23 10/02/2017           Lab Results   Component Value Date    AST 23 10/02/2017     Results reviewed, labs MET treatment parameters, ok to proceed with treatment.    Note:  Mt presents to infusion for Irinotecan Liposome/5-FU. She reports pigmentation changes on the top of her tongue. States she started having this change at her last provider/infusion appt. She reports the change has increased. Denied any sores, mouth pain, bleeding; able to eat and drink without issue. Reports things taste more spicy. She is concerned about this since it has worsened since her last infusion. Patient instructed to call triage if it worsens, accompanied by sores, pain, bleeding, etc.    Patient also had 2 episodes of emesis the evening of and 1 day post chemo. Reports feeling better overall with this regimen.    Fluorouracil Cseries continuous infusion pump connected at 1610.  Positive blood return from port at  time of pump hook up. Connections open and taped; verified with Kamini Landa RN. Pump to infuse over 46 hours at 5.2 cc/hour. Pump will be disconnected on Wednesday 10/4/17 at 1400 by [facility]; verified date/time with ELICEO De Los Santos with [facility]. Patient aware of pump disconnect date and time.    Post Infusion Assessment:  Patient tolerated infusion without incident.  Blood return noted pre and post infusion.  Site patent and intact, free from redness, edema or discomfort.  No evidence of extravasations.    Discharge Plan:   Prescription refills given for compazine, miralax, and claritin.  Discharge instructions reviewed with: Patient and Family.  Patient and/or family verbalized understanding of discharge instructions and all questions answered.  Copy of AVS reviewed with patient and/or family.  Patient will return 10/16/17 for next appointment.  Patient discharged in stable condition accompanied by: self and family member.  Departure Mode: Ambulatory.    Ramila Loaiza RN

## 2017-10-02 NOTE — MR AVS SNAPSHOT
After Visit Summary   10/2/2017    Shirin Muller    MRN: [id number]           Patient Information     Date Of Birth          1960        Visit Information        Provider Department      10/2/2017 12:30 PM  30 ATC;  ONCOLOGY INFUSION [facility]        Today's Diagnoses     Chemotherapy-induced neutropenia (H)    -  1    Malignant neoplasm of head of pancreas (H)          Care Instructions    Contact Numbers  [facility] Nurse Triage: [phone]  After Hours Nurse Line:  [phone]    Please call the [facility] Triage line if you experience a temperature greater than or equal to 100.5, shaking chills, have uncontrolled nausea, vomiting and/or diarrhea, dizziness, shortness of breath, chest pain, bleeding, unexplained bruising, or if you have any other new/concerning symptoms, questions or concerns.     If it is after hours, weekends, or holidays, please call either the after hours nurse line listed above.    If you are having any concerning symptoms or wish to speak to a provider before your next infusion visit, please call your care coordinator or triage to notify them so we can adequately serve you.     If you need a refill on a narcotic prescription or other medication, please call triage before your infusion appointment.         October 2017 Sunday Monday Tuesday Wednesday Thursday Friday Saturday   1     2     [facility] DRAW   12:00 PM   (15 min.)    [facility] LAB DRAW   [facility] Lab Draw     P ONC INFUSION 180   12:30 PM   (180 min.)    ONCOLOGY INFUSION   [facility] 3     4     5     6     7       8     9     10     11     12     13     14       15     16     [facility] DRAW   12:00 PM   (15 min.)    [facility] LAB DRAW   [facility] Lab Draw     P ONC INFUSION 180   12:30 PM   (180 min.)    ONCOLOGY INFUSION   [facility] 17     18     19     20     21       22      23     24     25     26     27     28       29     30     [facility]   10:00 AM   (15 min.)    [facility] LAB DRAW   [facility] Lab Draw     [facility] ONC INFUSION 180   10:30 AM   (180 min.)   UC ONCOLOGY INFUSION   [facility] 31 November 2017 Sunday Monday Tuesday Wednesday Thursday Friday Saturday                  1     2     3     4       5     6     7     8     9     10     11       12     13     [facility] D[facility]    8:45 AM   (15 min.)    [facility] LAB DRAW   [facility] Lab Draw     CT CHEST WO    9:05 AM   (20 min.)   UCCT1   [facility] CT     [facility] ONC INFUSION 180   10:00 AM   (180 min.)    ONCOLOGY INFUSION   [facility] 14     15     16     17     18       19     20     [facility] RETURN    5:45 PM   (30 min.)   Demond Gonsales MD   [facility] 21     22     23     24     25       26     27     28     29     30                            Lab Results:  Recent Results (from the past 12 hour(s))   Comprehensive metabolic panel    Collection Time: 10/02/17 12:28 PM   Result Value Ref Range    Sodium 136 133 - 144 mmol/L    Potassium 3.7 3.4 - 5.3 mmol/L    Chloride 102 94 - 109 mmol/L    Carbon Dioxide 29 20 - 32 mmol/L    Anion Gap 6 3 - 14 mmol/L    Glucose 314 (H) 70 - 99 mg/dL    Urea Nitrogen 10 7 - 30 mg/dL    Creatinine 0.54 0.52 - 1.04 mg/dL    GFR Estimate >90 >60 mL/min/1.7m2    GFR Estimate If Black >90 >60 mL/min/1.7m2    Calcium 8.7 8.5 - 10.1 mg/dL    Bilirubin Total 0.6 0.2 - 1.3 mg/dL    Albumin 3.0 (L) 3.4 - 5.0 g/dL    Protein Total 7.4 6.8 - 8.8 g/dL    Alkaline Phosphatase 225 (H) 40 - 150 U/L    ALT 23 0 - 50 U/L    AST 23 0 - 45 U/L   CBC with platelets differential    Collection Time: 10/02/17 12:28 PM   Result Value Ref Range    WBC 4.3 4.0 - 11.0 10e9/L    RBC Count 3.56 (L) 3.8 - 5.2 10e12/L    Hemoglobin 9.5 (L) 11.7 - 15.7 g/dL    Hematocrit 30.6 (L) 35.0 -  47.0 %    MCV 86 78 - 100 fl    MCH 26.7 26.5 - 33.0 pg    MCHC 31.0 (L) 31.5 - 36.5 g/dL    RDW 20.0 (H) 10.0 - 15.0 %    Platelet Count 145 (L) 150 - 450 10e9/L    Diff Method Automated Method     % Neutrophils 74.3 %    % Lymphocytes 15.9 %    % Monocytes 8.3 %    % Eosinophils 0.5 %    % Basophils 0.5 %    % Immature Granulocytes 0.5 %    Nucleated RBCs 0 0 /100    Absolute Neutrophil 3.2 1.6 - 8.3 10e9/L    Absolute Lymphocytes 0.7 (L) 0.8 - 5.3 10e9/L    Absolute Monocytes 0.4 0.0 - 1.3 10e9/L    Absolute Eosinophils 0.0 0.0 - 0.7 10e9/L    Absolute Basophils 0.0 0.0 - 0.2 10e9/L    Abs Immature Granulocytes 0.0 0 - 0.4 10e9/L    Absolute Nucleated RBC 0.0                Follow-ups after your visit        Your next 10 appointments already scheduled     Oct 16, 2017 12:00 PM CDT   [facility] Draw with [facility] DRAW   [facility] ([facility])    [address] [place] [address]   [phone]            Oct 16, 2017 12:30 PM CDT   Infusion 180 with UC ONCOLOGY INFUSION, UC 30 ATC   [facility] ([facility])    [address] [place] [address]   [phone]            Oct 30, 2017 10:00 AM CDT   [facility] Draw with [facility] DRAW   [facility] Draw ([facility])    [address]  [address] [facility] [address]   [phone]            Oct 30, 2017 10:30 AM CDT   Infusion 180 with UC ONCOLOGY INFUSION, UC 22 ATC   [facility] ([facility])    [address] [place] [address]   [phone]            Nov 13, 2017  8:45 AM CST   [facility] Draw with UC [facility] DRAW   [facility] Draw ([facility])    [address] [place] [address]  2nd Floor  [facility] [address]   [phone]            Nov 13, 2017  9:20 AM CST    (Arrive by 9:05 AM)   CT CHEST W/O CONTRAST with UCCT1   [facility] [place] [facility] ([facility])    [address]  1st Floor  [facility] [address] [phone]           Please bring any scans or X-rays taken at other hospitals, if similar tests were done. Also bring a list of your medicines, including vitamins, minerals and over-the-counter drugs. It is safest to leave personal items at home.  Be sure to tell your doctor:   If you have any allergies.   If there s any chance you are pregnant.   If you are breastfeeding.   If you have any special needs.  You do not need to do anything special to prepare.  Please wear loose clothing, such as a sweat suit or jogging clothes. Avoid snaps, zippers and other metal. We may ask you to undress and put on a hospital gown.            Nov 13, 2017 10:00 AM CST   Infusion 180 with  ONCOLOGY INFUSION, UC 17 ATC   [facility] ([facility])    [address] [place] [address] [phone]              Who to contact     If you have questions or need follow up information about today's clinic visit or your schedule please contact [facility] directly at [phone].  Normal or non-critical lab and imaging results will be communicated to you by MyChart, letter or phone within 4 business days after the clinic has received the results. If you do not hear from us within 7 days, please contact the clinic through MyChart or phone. If you have a critical or abnormal lab result, we will notify you by phone as soon as possible.  Submit refill requests through [employer] or call your pharmacy and they will forward the refill request to us. Please allow 3 business days for your refill to be completed.          Additional Information About Your Visit        [employer] Information     [employer] gives you secure access to your electronic health record. If you see a primary  care provider, you can also send messages to your care team and make appointments. If you have questions, please call your primary care clinic.  If you do not have a primary care provider, please call [phone] and they will assist you.        Care EveryWhere ID     This is your Care EveryWhere ID. This could be used by other organizations to access your [facility] medical records  [id number]        Your Vitals Were     Pulse Temperature Pulse Oximetry BMI (Body Mass Index)          94 98.4  F (36.9  C) (Oral) 100% 23.99 kg/m2         Blood Pressure from Last 3 Encounters:   10/02/17 101/63   09/18/17 112/70   09/01/17 102/64    Weight from Last 3 Encounters:   10/02/17 78 kg (171 lb 14.4 oz)   09/18/17 77.5 kg (170 lb 12.8 oz)   09/01/17 75.7 kg (166 lb 12.8 oz)              We Performed the Following     CBC with platelets differential     Comprehensive metabolic panel          Today's Medication Changes          These changes are accurate as of: 10/2/17  4:17 PM.  If you have any questions, ask your nurse or doctor.               These medicines have changed or have updated prescriptions.        Dose/Directions    ENSURE CLEAR Liqd   This may have changed:  how much to take   Used for:  Malignant neoplasm of head of pancreas (H)        Dose:  1 Bottle   Take 1 Bottle by mouth 3 times daily   Quantity:  90 Bottle   Refills:  6                Primary Care Provider    [facility]       No address on file        Equal Access to Services     JAI CORDERO AH: Hadii isak Spence, johnson flower, qagretel kaalmaluis e garcia. So [facility] [phone].    ATENCIÓN: Si habla español, tiene a bernal disposición servicios gratuitos de asistencia lingüística. Llame al [phone].    We comply with applicable federal civil rights laws and [place] laws. We do not discriminate on the basis of race, color, national origin, age, disability, sex, sexual orientation,  or gender identity.            Thank you!     Thank you for choosing [facility]  for your care. Our goal is always to provide you with excellent care. Hearing back from our patients is one way we can continue to improve our services. Please take a few minutes to complete the written survey that you may receive in the mail after your visit with us. Thank you!             Your Updated Medication List - Protect others around you: Learn how to safely use, store and throw away your medicines at www.disposemymeds.org.          This list is accurate as of: 10/2/17  4:17 PM.  Always use your most recent med list.                   Brand Name Dispense Instructions for use Diagnosis    amylase-lipase-protease 43441 UNITS Cpep    CREON    180 capsule    Take 2 capsules (72,000 Units) by mouth 3 times daily (with meals)    Malignant neoplasm of head of pancreas (H)       diltiazem 2% in PLO cream (FV COMPOUNDED) 2% Gel     30 g    Apply topically daily and as needed to external hemorrhoids    External hemorrhoids       docusate sodium 100 MG capsule    COLACE    100 capsule    Take 1 capsule (100 mg) by mouth daily    External hemorrhoids       dronabinol 5 MG capsule    MARINOL    90 capsule    Take 1 capsule (5 mg) by mouth 3 times daily (before meals)    Anorexia       ENSURE CLEAR Liqd     90 Bottle    Take 1 Bottle by mouth 3 times daily    Malignant neoplasm of head of pancreas (H)       lidocaine-prilocaine cream    EMLA    30 g    Apply topically as needed for other (Use 30-60 minutes prior to port access)    Malignant neoplasm of head of pancreas (H)       loratadine 10 MG tablet    CLARITIN    30 tablet    Take 1 tablet (10 mg) by mouth daily Reported on 5/5/2017    Chronic seasonal allergic rhinitis, unspecified trigger       LORazepam 0.5 MG tablet    ATIVAN    30 tablet    Take 1 tablet (0.5 mg) by mouth every 4 hours as needed (Anxiety, Nausea/Vomiting or Sleep)    Malignant neoplasm of head of  pancreas (H)       morphine 15 MG 12 hr tablet    MS CONTIN    60 tablet    Take 1 tablet (15 mg) by mouth every 12 hours    Malignant neoplasm of head of pancreas (H)       ondansetron 8 MG ODT tab    ZOFRAN-ODT    60 tablet    Take 1 tablet (8 mg) by mouth every 8 hours as needed for nausea    Malignant neoplasm of head of pancreas (H)       oxyCODONE 5 MG IR tablet    ROXICODONE    100 tablet    Take 1 tablet (5 mg) by mouth every 4 hours as needed for moderate to severe pain    Malignant neoplasm of head of pancreas (H)       pantoprazole 20 MG EC tablet    PROTONIX    60 tablet    Take 1 tablet (20 mg) by mouth 2 times daily    Malignant neoplasm of head of pancreas (H)       polyethylene glycol powder    MIRALAX/GLYCOLAX    119 g    Take 17 g (1 capful) by mouth daily    Malignant neoplasm of head of pancreas (H)       potassium chloride SA 20 MEQ CR tablet    KLOR-CON    60 tablet    Take 1 tablet (20 mEq) by mouth daily    Malignant neoplasm of head of pancreas (H)       prochlorperazine 10 MG tablet    COMPAZINE    30 tablet    TAKE ONE TABLET BY MOUTH EVERY 6 HOURS AS NEEDED FOR NAUSEA AND VOMITING    Malignant neoplasm of head of pancreas (H)

## 2017-10-02 NOTE — NURSING NOTE
Chief Complaint   Patient presents with     Port Draw     labs drawn via port by RN     /63 (BP Location: Right arm, Patient Position: Chair, Cuff Size: Adult Regular)  Pulse 94  Temp 98.4  F (36.9  C) (Oral)  Wt 78 kg (171 lb 14.4 oz)  SpO2 100%  BMI 23.99 kg/m2    Vitals taken. Port accessed by RN. Labs collected and sent. Line flushed with NS & Heparin. Pt tolerated well. Pt checked in for next appointment.    Rosy Sotomayor RN

## 2017-10-02 NOTE — PATIENT INSTRUCTIONS
Contact Numbers  [facility] Nurse Triage: [phone]  After Hours Nurse Line:  [phone]    Please call the [facility] Triage line if you experience a temperature greater than or equal to 100.5, shaking chills, have uncontrolled nausea, vomiting and/or diarrhea, dizziness, shortness of breath, chest pain, bleeding, unexplained bruising, or if you have any other new/concerning symptoms, questions or concerns.     If it is after hours, weekends, or holidays, please call either the after hours nurse line listed above.    If you are having any concerning symptoms or wish to speak to a provider before your next infusion visit, please call your care coordinator or triage to notify them so we can adequately serve you.     If you need a refill on a narcotic prescription or other medication, please call triage before your infusion appointment.         October 2017 Sunday Monday Tuesday Wednesday Thursday Friday Saturday   1     2     [facility]   12:00 PM   (15 min.)    [facility] LAB DRAW   [facility] Lab Draw     [facility] ONC INFUSION 180   12:30 PM   (180 min.)    ONCOLOGY INFUSION   [facility] 3     4     5     6     7       8     9     10     11     12     13     14       15     16     [facility] DRAW   12:00 PM   (15 min.)    [facility] LAB DRAW   [facility] Lab Draw     [facility] ONC INFUSION 180   12:30 PM   (180 min.)    ONCOLOGY INFUSION   [facility] 17     18     19     20     21       22     23     24     25     26     27     28       29     30     [facility] DRAW   10:00 AM   (15 min.)    [facility] LAB DRAW   [facility] Lab Draw     [facility] ONC INFUSION 180   10:30 AM   (180 min.)    ONCOLOGY INFUSION   [facility] 31 November 2017 Sunday Monday Tuesday Wednesday Thursday Friday Saturday                  1     2     3     4       5     6     7     8     9     10     11        12     13     [facility] DRAW    8:45 AM   (15 min.)   [facility] LAB DRAW   [facility] Lab Draw     CT CHEST WO    9:05 AM   (20 min.)   UCCT1   [facility]     [facility] ONC INFUSION 180   10:00 AM   (180 min.)   UC ONCOLOGY INFUSION   [facility] 14     15     16     17     18       19     20     [facility] RETURN    5:45 PM   (30 min.)   Demond Gonsales MD   [facility] 21     22     23     24     25       26     27     28     29     30                            Lab Results:  Recent Results (from the past 12 hour(s))   Comprehensive metabolic panel    Collection Time: 10/02/17 12:28 PM   Result Value Ref Range    Sodium 136 133 - 144 mmol/L    Potassium 3.7 3.4 - 5.3 mmol/L    Chloride 102 94 - 109 mmol/L    Carbon Dioxide 29 20 - 32 mmol/L    Anion Gap 6 3 - 14 mmol/L    Glucose 314 (H) 70 - 99 mg/dL    Urea Nitrogen 10 7 - 30 mg/dL    Creatinine 0.54 0.52 - 1.04 mg/dL    GFR Estimate >90 >60 mL/min/1.7m2    GFR Estimate If Black >90 >60 mL/min/1.7m2    Calcium 8.7 8.5 - 10.1 mg/dL    Bilirubin Total 0.6 0.2 - 1.3 mg/dL    Albumin 3.0 (L) 3.4 - 5.0 g/dL    Protein Total 7.4 6.8 - 8.8 g/dL    Alkaline Phosphatase 225 (H) 40 - 150 U/L    ALT 23 0 - 50 U/L    AST 23 0 - 45 U/L   CBC with platelets differential    Collection Time: 10/02/17 12:28 PM   Result Value Ref Range    WBC 4.3 4.0 - 11.0 10e9/L    RBC Count 3.56 (L) 3.8 - 5.2 10e12/L    Hemoglobin 9.5 (L) 11.7 - 15.7 g/dL    Hematocrit 30.6 (L) 35.0 - 47.0 %    MCV 86 78 - 100 fl    MCH 26.7 26.5 - 33.0 pg    MCHC 31.0 (L) 31.5 - 36.5 g/dL    RDW 20.0 (H) 10.0 - 15.0 %    Platelet Count 145 (L) 150 - 450 10e9/L    Diff Method Automated Method     % Neutrophils 74.3 %    % Lymphocytes 15.9 %    % Monocytes 8.3 %    % Eosinophils 0.5 %    % Basophils 0.5 %    % Immature Granulocytes 0.5 %    Nucleated RBCs 0 0 /100    Absolute Neutrophil 3.2 1.6 - 8.3 10e9/L    Absolute Lymphocytes 0.7 (L) 0.8 - 5.3  10e9/L    Absolute Monocytes 0.4 0.0 - 1.3 10e9/L    Absolute Eosinophils 0.0 0.0 - 0.7 10e9/L    Absolute Basophils 0.0 0.0 - 0.2 10e9/L    Abs Immature Granulocytes 0.0 0 - 0.4 10e9/L    Absolute Nucleated RBC 0.0

## 2017-10-16 NOTE — MR AVS SNAPSHOT
After Visit Summary   10/16/2017    Shirin Muller    MRN: [id number]           Patient Information     Date Of Birth          1960        Visit Information        Provider Department      10/16/2017 12:30 PM UC 30 ATC;  ONCOLOGY INFUSION [facility]        Today's Diagnoses     Chemotherapy-induced neutropenia (H)    -  1    Malignant neoplasm of head of pancreas (H)        Anorexia        External hemorrhoids          Care Instructions    Contact Numbers    [facility] Main Line: [phone]  [facility] Triage:  [phone]    Call triage with chills and/or temperature greater than or equal to 100.5, uncontrolled nausea/vomiting, diarrhea, constipation, dizziness, shortness of breath, chest pain, bleeding, unexplained bruising, or any new/concerning symptoms, questions/concerns.     If you are having any concerning symptoms or wish to speak to a provider before your next infusion visit, please call your care coordinator or triage to notify them so we can adequately serve you.       After Hours: [phone]    If after hours, weekends, or holidays, call main hospital  and ask for Oncology doctor on call.         October 2017 Sunday Monday Tuesday Wednesday Thursday Friday Saturday   1     2     [facility]   12:00 PM   (15 min.)    [facility] LAB DRAW   [facility] Lab Draw     [facility] ONC INFUSION 180   12:30 PM   (180 min.)    ONCOLOGY INFUSION   [facility] 3     4     5     6     7       8     9     10     11     12     13     14       15     16     [facility] DRAW   12:00 PM   (15 min.)    [facility] LAB DRAW   [facility] Lab Draw     [facility] ONC INFUSION 180   12:30 PM   (180 min.)    ONCOLOGY INFUSION   [facility] 17     18     19     20     21       22     23     24     25     26     27     28       29     30     [facility] DRAW   10:00 AM   (15 min.)    [facility] LAB DRAW   [facility]  Lab Draw     [facility] ONC INFUSION 180   10:30 AM   (180 min.)    ONCOLOGY INFUSION   [facility] 31 November 2017 Sunday Monday Tuesday Wednesday Thursday Friday Saturday                  1     2     3     4       5     6     7     8     9     10     11       12     13     [facility] DRAW    8:45 AM   (15 min.)    [facility] LAB DRAW   [facility] Lab Draw     CT CHEST WO    9:05 AM   (20 min.)   UCCT1   [facility]     [facility] ONC INFUSION 180   10:00 AM   (180 min.)    ONCOLOGY INFUSION   [facility] 14     15     16     17     18       19     20     [facility] RETURN    5:45 PM   (30 min.)   Demond Gonsales MD   [facility] 21     22     23     24     25       26     27     28     29     30                            Lab Results:  Recent Results (from the past 12 hour(s))   Comprehensive metabolic panel    Collection Time: 10/16/17 12:19 PM   Result Value Ref Range    Sodium 138 133 - 144 mmol/L    Potassium 3.1 (L) 3.4 - 5.3 mmol/L    Chloride 104 94 - 109 mmol/L    Carbon Dioxide 26 20 - 32 mmol/L    Anion Gap 8 3 - 14 mmol/L    Glucose 231 (H) 70 - 99 mg/dL    Urea Nitrogen 10 7 - 30 mg/dL    Creatinine 0.56 0.52 - 1.04 mg/dL    GFR Estimate >90 >60 mL/min/1.7m2    GFR Estimate If Black >90 >60 mL/min/1.7m2    Calcium 8.9 8.5 - 10.1 mg/dL    Bilirubin Total 0.6 0.2 - 1.3 mg/dL    Albumin 3.2 (L) 3.4 - 5.0 g/dL    Protein Total 7.5 6.8 - 8.8 g/dL    Alkaline Phosphatase 196 (H) 40 - 150 U/L    ALT 21 0 - 50 U/L    AST 16 0 - 45 U/L   CBC with platelets differential    Collection Time: 10/16/17 12:19 PM   Result Value Ref Range    WBC 3.4 (L) 4.0 - 11.0 10e9/L    RBC Count 3.60 (L) 3.8 - 5.2 10e12/L    Hemoglobin 10.0 (L) 11.7 - 15.7 g/dL    Hematocrit 31.6 (L) 35.0 - 47.0 %    MCV 88 78 - 100 fl    MCH 27.8 26.5 - 33.0 pg    MCHC 31.6 31.5 - 36.5 g/dL    RDW 18.5 (H) 10.0 - 15.0 %    Platelet Count  98 (L) 150 - 450 10e9/L    Diff Method Automated Method     % Neutrophils 69.6 %    % Lymphocytes 21.2 %    % Monocytes 7.4 %    % Eosinophils 1.2 %    % Basophils 0.3 %    % Immature Granulocytes 0.3 %    Nucleated RBCs 0 0 /100    Absolute Neutrophil 2.4 1.6 - 8.3 10e9/L    Absolute Lymphocytes 0.7 (L) 0.8 - 5.3 10e9/L    Absolute Monocytes 0.3 0.0 - 1.3 10e9/L    Absolute Eosinophils 0.0 0.0 - 0.7 10e9/L    Absolute Basophils 0.0 0.0 - 0.2 10e9/L    Abs Immature Granulocytes 0.0 0 - 0.4 10e9/L    Absolute Nucleated RBC 0.0                Follow-ups after your visit        Your next 10 appointments already scheduled     Oct 30, 2017 10:00 AM CDT   [facility] Lab Draw with  [facility] LAB DRAW   [facility] Draw ([facility])    [address] [place] [address]  2nd [facility] [address]   [phone]            Oct 30, 2017 10:30 AM CDT   Infusion 180 with UC ONCOLOGY INFUSION, UC 22 ATC   [facility] ([facility])    [address] [place] [address]   [phone]            Nov 13, 2017  8:45 AM CST   [facility] Lab Draw with  [facility]IC LAB DRAW   [facility] Draw ([facility])    [address] [place] [address]   [phone]            Nov 13, 2017  9:20 AM CST   (Arrive by 9:05 AM)   CT CHEST W/O CONTRAST with UCCT1   [facility] ([facility])    [address] [place] [address]  [address] [facility] [id number] [phone]           Please bring any scans or X-rays taken at other hospitals, if similar tests were done. Also bring a list of your medicines, including vitamins, minerals and over-the-counter drugs. It is safest to leave personal items at home.  Be sure to tell your doctor:   If you have any allergies.   If there s any chance you are pregnant.   If you are breastfeeding.   If you have any special needs.  You do  not need to do anything special to prepare.  Please wear loose clothing, such as a sweat suit or jogging clothes. Avoid snaps, zippers and other metal. We may ask you to undress and put on a hospital gown.            Nov 13, 2017 10:00 AM CST   Infusion 180 with UC ONCOLOGY INFUSION, UC 17 ATC   [facility] ([facility])    [address] [place] [address] [phone]            Nov 20, 2017  6:00 PM CST   (Arrive by 5:45 PM)   Return Visit with Demond Gonsales MD   [facility] ([facility])    [address] [place] [address] [phone]              Who to contact     If you have questions or need follow up information about today's clinic visit or your schedule please contact [facility] directly at [phone].  Normal or non-critical lab and imaging results will be communicated to you by Medcurrentt, letter or phone within 4 business days after the clinic has received the results. If you do not hear from us within 7 days, please contact the clinic through Blaze or phone. If you have a critical or abnormal lab result, we will notify you by phone as soon as possible.  Submit refill requests through Blaze or call your pharmacy and they will forward the refill request to us. Please allow 3 business days for your refill to be completed.          Additional Information About Your Visit        Blaze Information     Blaze gives you secure access to your electronic health record. If you see a primary care provider, you can also send messages to your care team and make appointments. If you have questions, please call your primary care clinic.  If you do not have a primary care provider, please call [phone] and they will assist you.        Care EveryWhere ID     This is your Care EveryWhere ID. This could be used by other organizations to access  your [facility] medical records  [id number]        Your Vitals Were     Pulse Temperature Respirations Pulse Oximetry BMI (Body Mass Index)       88 98.4  F (36.9  C) (Oral) 16 100% 23.96 kg/m2        Blood Pressure from Last 3 Encounters:   10/16/17 110/66   10/02/17 101/63   09/18/17 112/70    Weight from Last 3 Encounters:   10/16/17 77.9 kg (171 lb 11.2 oz)   10/02/17 78 kg (171 lb 14.4 oz)   09/18/17 77.5 kg (170 lb 12.8 oz)              We Performed the Following     CBC with platelets differential     Comprehensive metabolic panel          Today's Medication Changes          These changes are accurate as of: 10/16/17  1:59 PM.  If you have any questions, ask your nurse or doctor.               These medicines have changed or have updated prescriptions.        Dose/Directions    ENSURE CLEAR Liqd   This may have changed:  how much to take   Used for:  Malignant neoplasm of head of pancreas (H)        Dose:  1 Bottle   Take 1 Bottle by mouth 3 times daily   Quantity:  90 Bottle   Refills:  6       prochlorperazine 10 MG tablet   Commonly known as:  COMPAZINE   This may have changed:  See the new instructions.   Used for:  Malignant neoplasm of head of pancreas (H)        TAKE ONE TABLET BY MOUTH EVERY 6 HOURS AS NEEDED FOR NAUSEA AND VOMITING   Quantity:  30 tablet   Refills:  2            Where to get your medicines      These medications were sent to [facility] [address] [address]    Hours:  TRANSPLANT PHONE NUMBER [phone] Phone:  [phone]     amylase-lipase-protease 60007 UNITS Cpep    docusate sodium 100 MG capsule    ondansetron 8 MG ODT tab    prochlorperazine 10 MG tablet         Some of these will need a paper prescription and others can be bought over the counter.  Ask your nurse if you have questions.     Bring a paper prescription for each of these medications     dronabinol 5 MG capsule     LORazepam 0.5 MG tablet    morphine 15 MG 12 hr tablet    oxyCODONE 5 MG IR tablet                Primary Care Provider    [facility]       No address on file        Equal Access to Services     JAI CORDERO : Maxx isak amato meaghan Spence, johnson louiesam, john frazier, luis e ibarramarcelo guido. So [facility] [phone].    ATENCIÓN: Si habla español, tiene a bernal disposición servicios gratuitos de asistencia lingüística. Llame al [phone].    We comply with applicable federal civil rights laws and [place] laws. We do not discriminate on the basis of race, color, national origin, age, disability, sex, sexual orientation, or gender identity.            Thank you!     Thank you for choosing [facility]  for your care. Our goal is always to provide you with excellent care. Hearing back from our patients is one way we can continue to improve our services. Please take a few minutes to complete the written survey that you may receive in the mail after your visit with us. Thank you!             Your Updated Medication List - Protect others around you: Learn how to safely use, store and throw away your medicines at www.disposemymeds.org.          This list is accurate as of: 10/16/17  1:59 PM.  Always use your most recent med list.                   Brand Name Dispense Instructions for use Diagnosis    amylase-lipase-protease 48997 UNITS Cpep    CREON    180 capsule    Take 2 capsules (72,000 Units) by mouth 3 times daily (with meals)    Malignant neoplasm of head of pancreas (H)       diltiazem 2% in PLO cream (FV COMPOUNDED) 2% Gel     30 g    Apply topically daily and as needed to external hemorrhoids    External hemorrhoids       docusate sodium 100 MG capsule    COLACE    100 capsule    Take 1 capsule (100 mg) by mouth daily    External hemorrhoids       dronabinol 5 MG capsule    MARINOL    90 capsule    Take 1 capsule (5 mg) by mouth 3 times daily  (before meals)    Anorexia       ENSURE CLEAR Liqd     90 Bottle    Take 1 Bottle by mouth 3 times daily    Malignant neoplasm of head of pancreas (H)       lidocaine-prilocaine cream    EMLA    30 g    Apply topically as needed for other (Use 30-60 minutes prior to port access)    Malignant neoplasm of head of pancreas (H)       loratadine 10 MG tablet    CLARITIN    30 tablet    Take 1 tablet (10 mg) by mouth daily Reported on 5/5/2017    Chronic seasonal allergic rhinitis, unspecified trigger       LORazepam 0.5 MG tablet    ATIVAN    30 tablet    Take 1 tablet (0.5 mg) by mouth every 4 hours as needed (Anxiety, Nausea/Vomiting or Sleep)    Malignant neoplasm of head of pancreas (H)       morphine 15 MG 12 hr tablet    MS CONTIN    60 tablet    Take 1 tablet (15 mg) by mouth every 12 hours    Malignant neoplasm of head of pancreas (H)       ondansetron 8 MG ODT tab    ZOFRAN-ODT    60 tablet    Take 1 tablet (8 mg) by mouth every 8 hours as needed for nausea    Malignant neoplasm of head of pancreas (H)       oxyCODONE 5 MG IR tablet    ROXICODONE    100 tablet    Take 1 tablet (5 mg) by mouth every 4 hours as needed for moderate to severe pain    Malignant neoplasm of head of pancreas (H)       pantoprazole 20 MG EC tablet    PROTONIX    60 tablet    Take 1 tablet (20 mg) by mouth 2 times daily    Malignant neoplasm of head of pancreas (H)       polyethylene glycol powder    MIRALAX/GLYCOLAX    119 g    Take 17 g (1 capful) by mouth daily    Malignant neoplasm of head of pancreas (H)       potassium chloride SA 20 MEQ CR tablet    KLOR-CON    60 tablet    Take 1 tablet (20 mEq) by mouth daily    Malignant neoplasm of head of pancreas (H)       prochlorperazine 10 MG tablet    COMPAZINE    30 tablet    TAKE ONE TABLET BY MOUTH EVERY 6 HOURS AS NEEDED FOR NAUSEA AND VOMITING    Malignant neoplasm of head of pancreas (H)

## 2017-10-16 NOTE — NURSING NOTE
"Chief Complaint   Patient presents with     Port Draw     Labs drawn from port by RN. Line flushed with saline and heparin. Vitals taken and pt checked in for appt     Port accessed with 20g 3/4\" power needle by RN, labs collected, line flushed with saline and heparin.  Vitals taken. Pt checked in for appointment(s).    Debbie De La Cruz RN  "

## 2017-10-16 NOTE — PATIENT INSTRUCTIONS
Contact Numbers    [facility] Main Line: [phone]  [facility] Triage:  [phone]    Call triage with chills and/or temperature greater than or equal to 100.5, uncontrolled nausea/vomiting, diarrhea, constipation, dizziness, shortness of breath, chest pain, bleeding, unexplained bruising, or any new/concerning symptoms, questions/concerns.     If you are having any concerning symptoms or wish to speak to a provider before your next infusion visit, please call your care coordinator or triage to notify them so we can adequately serve you.       After Hours: [phone]    If after hours, weekends, or holidays, call main hospital  and ask for Oncology doctor on call.         October 2017 Sunday Monday Tuesday Wednesday Thursday Friday Saturday   1     2     [facility] DRAW   12:00 PM   (15 min.)    [facility] LAB DRAW   [facility] Lab Draw     [facility] ONC INFUSION 180   12:30 PM   (180 min.)    ONCOLOGY INFUSION   [facility] 3     4     5     6     7       8     9     10     11     12     13     14       15     16     [facility] DRAW   12:00 PM   (15 min.)    [facility] LAB DRAW   [facility] Lab Draw     [facility] ONC INFUSION 180   12:30 PM   (180 min.)    ONCOLOGY INFUSION   [facility] 17     18     19     20     21       22     23     24     25     26     27     28       29     30     [facility] DRAW   10:00 AM   (15 min.)    [facility] LAB DRAW   [facility] Lab Draw     UMP ONC INFUSION 180   10:30 AM   (180 min.)    ONCOLOGY INFUSION   [facility] 31 November 2017 Sunday Monday Tuesday Wednesday Thursday Friday Saturday                  1     2     3     4       5     6     7     8     9     10     11       12     13     [facility] DRAW    8:45 AM   (15 min.)    [facility] LAB DRAW   [facility] Lab Draw     CT CHEST WO    9:05 AM   (20 min.)   UCCT1   [facility] CT      [facility] ONC INFUSION 180   10:00 AM   (180 min.)   UC ONCOLOGY INFUSION   [facility] 14     15     16     17     18       19     20     UMP RETURN    5:45 PM   (30 min.)   Demond Gonsales MD   [facility] 21     22     23     24     25       26     27     28     29     30                            Lab Results:  Recent Results (from the past 12 hour(s))   Comprehensive metabolic panel    Collection Time: 10/16/17 12:19 PM   Result Value Ref Range    Sodium 138 133 - 144 mmol/L    Potassium 3.1 (L) 3.4 - 5.3 mmol/L    Chloride 104 94 - 109 mmol/L    Carbon Dioxide 26 20 - 32 mmol/L    Anion Gap 8 3 - 14 mmol/L    Glucose 231 (H) 70 - 99 mg/dL    Urea Nitrogen 10 7 - 30 mg/dL    Creatinine 0.56 0.52 - 1.04 mg/dL    GFR Estimate >90 >60 mL/min/1.7m2    GFR Estimate If Black >90 >60 mL/min/1.7m2    Calcium 8.9 8.5 - 10.1 mg/dL    Bilirubin Total 0.6 0.2 - 1.3 mg/dL    Albumin 3.2 (L) 3.4 - 5.0 g/dL    Protein Total 7.5 6.8 - 8.8 g/dL    Alkaline Phosphatase 196 (H) 40 - 150 U/L    ALT 21 0 - 50 U/L    AST 16 0 - 45 U/L   CBC with platelets differential    Collection Time: 10/16/17 12:19 PM   Result Value Ref Range    WBC 3.4 (L) 4.0 - 11.0 10e9/L    RBC Count 3.60 (L) 3.8 - 5.2 10e12/L    Hemoglobin 10.0 (L) 11.7 - 15.7 g/dL    Hematocrit 31.6 (L) 35.0 - 47.0 %    MCV 88 78 - 100 fl    MCH 27.8 26.5 - 33.0 pg    MCHC 31.6 31.5 - 36.5 g/dL    RDW 18.5 (H) 10.0 - 15.0 %    Platelet Count 98 (L) 150 - 450 10e9/L    Diff Method Automated Method     % Neutrophils 69.6 %    % Lymphocytes 21.2 %    % Monocytes 7.4 %    % Eosinophils 1.2 %    % Basophils 0.3 %    % Immature Granulocytes 0.3 %    Nucleated RBCs 0 0 /100    Absolute Neutrophil 2.4 1.6 - 8.3 10e9/L    Absolute Lymphocytes 0.7 (L) 0.8 - 5.3 10e9/L    Absolute Monocytes 0.3 0.0 - 1.3 10e9/L    Absolute Eosinophils 0.0 0.0 - 0.7 10e9/L    Absolute Basophils 0.0 0.0 - 0.2 10e9/L    Abs Immature Granulocytes 0.0 0 - 0.4 10e9/L     Absolute Nucleated RBC 0.0

## 2017-10-16 NOTE — PROGRESS NOTES
Infusion Nursing Note:  Shirin Muller presents today for Cycle 3, day 1 Irinotecan liposome and Fluorouracil pump connection.    Patient seen by provider today: No    Note: Patient presents to clinic today feeling well with no questions.  Patient reports she needs refills of all of her medications except PO KCl.  Patient reports she is taking 20mEq KCl PO daily.  K=3.1 today.    TORB 10/16/2017 1305 Dr. Gonsales/RAMON Taylor RN:   -Okay to refill all medications; MD signed all scheduled medication refills  -Replace K today per protocol, does not need to adjust home PO dose    Offered patient PO or PIV K replacement today, however she declined and requested central line IV replacement.  Confirmed unknown compatibility of KCl with Irinotecan liposome, therefore needs to be transfused separately.  Patient verbalized understanding of this and still requested IV replacement even though adds two hours to appt time.    Intravenous Access:  Implanted Port.    Treatment Conditions:  Lab Results   Component Value Date    HGB 10.0 10/16/2017     Lab Results   Component Value Date    WBC 3.4 10/16/2017      Lab Results   Component Value Date    ANEU 2.4 10/16/2017     Lab Results   Component Value Date    PLT 98 10/16/2017      Lab Results   Component Value Date     10/16/2017                   Lab Results   Component Value Date    POTASSIUM 3.1 10/16/2017           Lab Results   Component Value Date    MAG 2.0 04/03/2017            Lab Results   Component Value Date    CR 0.56 10/16/2017                   Lab Results   Component Value Date    FANNY 8.9 10/16/2017                Lab Results   Component Value Date    BILITOTAL 0.6 10/16/2017           Lab Results   Component Value Date    ALBUMIN 3.2 10/16/2017                    Lab Results   Component Value Date    ALT 21 10/16/2017           Lab Results   Component Value Date    AST 16 10/16/2017     Results reviewed, labs MET treatment parameters, ok to  proceed with treatment.    Post Infusion Assessment:  Patient tolerated infusion without incident.  Blood return noted pre and post infusion.  Site patent and intact, free from redness, edema or discomfort.  No evidence of extravasations.    Fluorouracil pump connected per protocol.  Connections taped, temperature sensor taped to skin.  Pump to infuse at 5ml/hr for 46 hours.  Disconnect time of 1600 on 10/18/2017 called to [facility].  Spoke with Wyatt.    Discharge Plan:   Prescription refills given for Creon, Colace, Marinol, Claritin, MS Contin, Zofran, Oxycodone, Miralax, Protonix, Compazine and Ativan.  Discharge instructions reviewed with: Patient.  Patient and/or family verbalized understanding of discharge instructions and all questions answered.  Copy of AVS reviewed with patient and/or family.  Patient will return 10/30/2017 for next appointment.  Departure Mode: Ambulatory.  Face to Face time: 3 minutes.    Asmita Taylor RN

## 2017-10-25 NOTE — PROGRESS NOTES
This is a recent snapshot of the patient's [facility] medical record.  For current drug dose and complete information and questions, call [phone] or In Basket pool, [facility]v home infusion ([id number])  CSN Number:  [id number]

## 2017-10-30 NOTE — MR AVS SNAPSHOT
After Visit Summary   10/30/2017    Shirin Muller    MRN: [id number]           Patient Information     Date Of Birth          1960        Visit Information        Provider Department      10/30/2017 10:30 AM  22 ATC;  ONCOLOGY INFUSION [facility]        Today's Diagnoses     Chemotherapy-induced neutropenia (H)    -  1    Malignant neoplasm of head of pancreas (H)          Care Instructions    Contact Numbers  [facility] Nurse Triage: [phone]  After Hours Nurse Line:  [phone]    Please call the [facility] Triage line if you experience a temperature greater than or equal to 100.5, shaking chills, have uncontrolled nausea, vomiting and/or diarrhea, dizziness, shortness of breath, chest pain, bleeding, unexplained bruising, or if you have any other new/concerning symptoms, questions or concerns.     If it is after hours, weekends, or holidays, please call either the after hours nurse line listed above.    If you are having any concerning symptoms or wish to speak to a provider before your next infusion visit, please call your care coordinator or triage to notify them so we can adequately serve you.     If you need a refill on a narcotic prescription or other medication, please call triage before your infusion appointment.         October 2017 Sunday Monday Tuesday Wednesday Thursday Friday Saturday   1     2     [facility] LAB DRAW   12:00 PM   (15 min.)   [facility]   [facility] Lab Draw     [facility] ONC INFUSION 180   12:30 PM   (180 min.)    ONCOLOGY INFUSION   [facility] 3     4     5     6     7       8     9     10     11     12     13     14       15     16     [facility] LAB DRAW   12:00 PM   (15 min.)   [facility] D[facility]   [facility] Lab Draw     P ONC INFUSION 180   12:30 PM   (180 min.)    ONCOLOGY INFUSION   [facility] 17     18     19     20     21       22      23     24     25     26     27     28       29     30     [facility] D[facility]   10:00 AM   (15 min.)   [facility]   [facility]     [facility] ONC INFUSION 180   10:30 AM   (180 min.)   [facility] ONCOLOGY INFUSION   [facility] 31 November 2017 Sunday Monday Tuesday Wednesday Thursday Friday Saturday                  1     [facility] ONC INFUSION 120   11:00 AM   (120 min.)   UC ONCOLOGY INFUSION   [facility] 2     3     4       5     6     7     8     9     10     11       12     13     [facility] DRAW    8:45 AM   (15 min.)   [facility] DRAW   [facility] Lab Draw     CT CHEST WO    9:05 AM   (20 min.)   UCCT1   [facility] CT     [facility] ONC INFUSION 180   10:00 AM   (180 min.)    ONCOLOGY INFUSION   [facility] 14     15     16     17     18       19     20     [facility] RETURN    5:45 PM   (30 min.)   Demond Gonsales MD   [facility] 21     22     23     24     25       26     27     28     29     30                            Lab Results:  Recent Results (from the past 12 hour(s))   Comprehensive metabolic panel    Collection Time: 10/30/17 10:31 AM   Result Value Ref Range    Sodium 139 133 - 144 mmol/L    Potassium 2.8 (L) 3.4 - 5.3 mmol/L    Chloride 104 94 - 109 mmol/L    Carbon Dioxide 25 20 - 32 mmol/L    Anion Gap 10 3 - 14 mmol/L    Glucose 249 (H) 70 - 99 mg/dL    Urea Nitrogen 8 7 - 30 mg/dL    Creatinine 0.60 0.52 - 1.04 mg/dL    GFR Estimate >90 >60 mL/min/1.7m2    GFR Estimate If Black >90 >60 mL/min/1.7m2    Calcium 8.6 8.5 - 10.1 mg/dL    Bilirubin Total 0.4 0.2 - 1.3 mg/dL    Albumin 3.2 (L) 3.4 - 5.0 g/dL    Protein Total 7.5 6.8 - 8.8 g/dL    Alkaline Phosphatase 210 (H) 40 - 150 U/L    ALT 22 0 - 50 U/L    AST 25 0 - 45 U/L   CBC with platelets differential    Collection Time: 10/30/17 10:31 AM   Result Value Ref Range    WBC 2.3 (L) 4.0 - 11.0 10e9/L     RBC Count 3.41 (L) 3.8 - 5.2 10e12/L    Hemoglobin 9.5 (L) 11.7 - 15.7 g/dL    Hematocrit 31.0 (L) 35.0 - 47.0 %    MCV 91 78 - 100 fl    MCH 27.9 26.5 - 33.0 pg    MCHC 30.6 (L) 31.5 - 36.5 g/dL    RDW 17.6 (H) 10.0 - 15.0 %    Platelet Count 118 (L) 150 - 450 10e9/L    Diff Method Automated Method     % Neutrophils 65.6 %    % Lymphocytes 20.6 %    % Monocytes 11.2 %    % Eosinophils 1.3 %    % Basophils 0.9 %    % Immature Granulocytes 0.4 %    Nucleated RBCs 0 0 /100    Absolute Neutrophil 1.5 (L) 1.6 - 8.3 10e9/L    Absolute Lymphocytes 0.5 (L) 0.8 - 5.3 10e9/L    Absolute Monocytes 0.3 0.0 - 1.3 10e9/L    Absolute Eosinophils 0.0 0.0 - 0.7 10e9/L    Absolute Basophils 0.0 0.0 - 0.2 10e9/L    Abs Immature Granulocytes 0.0 0 - 0.4 10e9/L    Absolute Nucleated RBC 0.0                Follow-ups after your visit        Your next 10 appointments already scheduled     Nov 01, 2017 11:00 AM CDT   Infusion 120 with  ONCOLOGY INFUSION, UC 18 ATC   [facility] ([facility])    [address] [place] [id number] [phone]            Nov 13, 2017  8:45 AM CST   [facility] Draw with [facility] LAB DRAW   [facility] Lab Draw ([facility])    [address] [place] [id number] [phone]            Nov 13, 2017  9:20 AM CST   (Arrive by 9:05 AM)   CT CHEST W/O CONTRAST with UCCT1   [facility] [place] [facility] ([facility])    [address] [place] [id number] [phone]           Please bring any scans or X-rays taken at other hospitals, if similar tests were done. Also bring a list of your medicines, including vitamins, minerals and over-the-counter drugs. It is safest to leave personal items at home.  Be sure to tell your doctor:   If you have any allergies.   If there s any chance you are pregnant.   If you are breastfeeding.   If you  have any special needs.  You do not need to do anything special to prepare.  Please wear loose clothing, such as a sweat suit or jogging clothes. Avoid snaps, zippers and other metal. We may ask you to undress and put on a hospital gown.            Nov 13, 2017 10:00 AM CST   Infusion 180 with UC ONCOLOGY INFUSION, UC 17 ATC   [facility] ([facility])    [address] [place] [id number] [phone]            Nov 20, 2017  6:00 PM CST   (Arrive by 5:45 PM)   Return Visit with Demond Gonsales MD   [facility] ([facility])    [address] [place] [id number] [phone]              Who to contact     If you have questions or need follow up information about today's clinic visit or your schedule please contact [facility] directly at [phone].  Normal or non-critical lab and imaging results will be communicated to you by [employer], letter or phone within 4 business days after the clinic has received the results. If you do not hear from us within 7 days, please contact the clinic through [employer] or phone. If you have a critical or abnormal lab result, we will notify you by phone as soon as possible.  Submit refill requests through [employer] or call your pharmacy and they will forward the refill request to us. Please allow 3 business days for your refill to be completed.          Additional Information About Your Visit        [employer] Information     [employer] gives you secure access to your electronic health record. If you see a primary care provider, you can also send messages to your care team and make appointments. If you have questions, please call your primary care clinic.  If you do not have a primary care provider, please call [phone] and they will assist you.        Care EveryWhere ID     This is your Care EveryWhere ID. This could be used  by other organizations to access your [place] medical records  [id number]        Your Vitals Were     Pulse Temperature Respirations Pulse Oximetry BMI (Body Mass Index)       96 98.8  F (37.1  C) (Oral) 18 99% 23.94 kg/m2        Blood Pressure from Last 3 Encounters:   10/30/17 106/65   10/16/17 110/66   10/02/17 101/63    Weight from Last 3 Encounters:   10/30/17 77.8 kg (171 lb 9.6 oz)   10/16/17 77.9 kg (171 lb 11.2 oz)   10/02/17 78 kg (171 lb 14.4 oz)              We Performed the Following     CBC with platelets differential     Comprehensive metabolic panel     Comprehensive metabolic panel          Today's Medication Changes          These changes are accurate as of: 10/30/17 12:37 PM.  If you have any questions, ask your nurse or doctor.               These medicines have changed or have updated prescriptions.        Dose/Directions    ENSURE CLEAR Liqd   This may have changed:  how much to take   Used for:  Malignant neoplasm of head of pancreas (H)        Dose:  1 Bottle   Take 1 Bottle by mouth 3 times daily   Quantity:  90 Bottle   Refills:  6       potassium chloride SA 20 MEQ CR tablet   Commonly known as:  KLOR-CON   This may have changed:  how much to take   Used for:  Malignant neoplasm of head of pancreas (H)        Dose:  40 mEq   Take 2 tablets (40 mEq) by mouth daily   Quantity:  60 tablet   Refills:  3            Where to get your medicines      These medications were sent to [facility] [place], [place] - [address] [facility] [address] [facility] [address]    Hours:  TRANSPLANT PHONE NUMBER [phone] Phone:  [phone]     potassium chloride SA 20 MEQ CR tablet                Primary Care Provider    [facility]       No address on file        Equal Access to Services     JAI CORDERO AH: Maxx Spence, johnson flower, luis e love. So [facility]  [phone].    ATENCIÓN: Si dot jenkins, tiene a bernal disposición servicios gratuitos de asistencia lingüística. Vanessa hyde [phone].    We comply with applicable federal civil rights laws and [place] laws. We do not discriminate on the basis of race, color, national origin, age, disability, sex, sexual orientation, or gender identity.            Thank you!     Thank you for choosing [facility]  for your care. Our goal is always to provide you with excellent care. Hearing back from our patients is one way we can continue to improve our services. Please take a few minutes to complete the written survey that you may receive in the mail after your visit with us. Thank you!             Your Updated Medication List - Protect others around you: Learn how to safely use, store and throw away your medicines at www.disposemymeds.org.          This list is accurate as of: 10/30/17 12:37 PM.  Always use your most recent med list.                   Brand Name Dispense Instructions for use Diagnosis    amylase-lipase-protease 52069 UNITS Cpep    CREON    180 capsule    Take 2 capsules (72,000 Units) by mouth 3 times daily (with meals)    Malignant neoplasm of head of pancreas (H)       diltiazem 2% in PLO cream (FV COMPOUNDED) 2% Gel     30 g    Apply topically daily and as needed to external hemorrhoids    External hemorrhoids       docusate sodium 100 MG capsule    COLACE    100 capsule    Take 1 capsule (100 mg) by mouth daily    External hemorrhoids       dronabinol 5 MG capsule    MARINOL    90 capsule    Take 1 capsule (5 mg) by mouth 3 times daily (before meals)    Anorexia       ENSURE CLEAR Liqd     90 Bottle    Take 1 Bottle by mouth 3 times daily    Malignant neoplasm of head of pancreas (H)       lidocaine-prilocaine cream    EMLA    30 g    Apply topically as needed for other (Use 30-60 minutes prior to port access)    Malignant neoplasm of head of pancreas (H)       loratadine 10 MG tablet     CLARITIN    30 tablet    Take 1 tablet (10 mg) by mouth daily Reported on 5/5/2017    Chronic seasonal allergic rhinitis, unspecified trigger       LORazepam 0.5 MG tablet    ATIVAN    30 tablet    Take 1 tablet (0.5 mg) by mouth every 4 hours as needed (Anxiety, Nausea/Vomiting or Sleep)    Malignant neoplasm of head of pancreas (H)       morphine 15 MG 12 hr tablet    MS CONTIN    60 tablet    Take 1 tablet (15 mg) by mouth every 12 hours    Malignant neoplasm of head of pancreas (H)       ondansetron 8 MG ODT tab    ZOFRAN-ODT    60 tablet    Take 1 tablet (8 mg) by mouth every 8 hours as needed for nausea    Malignant neoplasm of head of pancreas (H)       oxyCODONE 5 MG IR tablet    ROXICODONE    100 tablet    Take 1 tablet (5 mg) by mouth every 4 hours as needed for moderate to severe pain    Malignant neoplasm of head of pancreas (H)       pantoprazole 20 MG EC tablet    PROTONIX    60 tablet    Take 1 tablet (20 mg) by mouth 2 times daily    Malignant neoplasm of head of pancreas (H)       polyethylene glycol powder    MIRALAX/GLYCOLAX    119 g    Take 17 g (1 capful) by mouth daily    Malignant neoplasm of head of pancreas (H)       potassium chloride SA 20 MEQ CR tablet    KLOR-CON    60 tablet    Take 2 tablets (40 mEq) by mouth daily    Malignant neoplasm of head of pancreas (H)       prochlorperazine 10 MG tablet    COMPAZINE    30 tablet    TAKE ONE TABLET BY MOUTH EVERY 6 HOURS AS NEEDED FOR NAUSEA AND VOMITING    Malignant neoplasm of head of pancreas (H)

## 2017-10-30 NOTE — PROGRESS NOTES
Infusion Nursing Note:  Shirin Muller presents today for D1 C4 Irinotecan Liposome, Fluorouracil pump connect.    Patient seen by provider today: No    Intravenous Access:  Implanted Port.    Treatment Conditions:  Lab Results   Component Value Date    HGB 9.5 10/30/2017     Lab Results   Component Value Date    WBC 2.3 10/30/2017      Lab Results   Component Value Date    ANEU 1.5 10/30/2017     Lab Results   Component Value Date     10/30/2017      Lab Results   Component Value Date     10/30/2017                   Lab Results   Component Value Date    POTASSIUM 2.8 10/30/2017           Lab Results   Component Value Date    MAG 2.0 04/03/2017            Lab Results   Component Value Date    CR 0.60 10/30/2017                   Lab Results   Component Value Date    FANNY 8.6 10/30/2017                Lab Results   Component Value Date    BILITOTAL 0.4 10/30/2017           Lab Results   Component Value Date    ALBUMIN 3.2 10/30/2017                    Lab Results   Component Value Date    ALT 22 10/30/2017           Lab Results   Component Value Date    AST 25 10/30/2017     Results reviewed, labs MET treatment parameters, ok to proceed with treatment.    Note: Patient presents to infusion for the above regimen. Reports feeling well overall and offers no new complaints. It was noted patient's K+ 2.8 today. Patient confirmed she is taking home dose of potassium daily. Per protocol, patient to receive 60 mEq of replacement. Per patient request, IV replacement only. Ester Echavarria NP notified of lab and request for IV replacement and change in home dose of potassium.    TORB: Ester Echavarria NP/Ramila Loaiza RN, 10/30/17 @ 1136: 1) Administer 60 mEq Potassium IV for K+ 2.8 per protocol today. 2) Have patient return later in the week for CMP and possible K+ replacement. 3) Change current home potassium dose to: Potassium chloride 40 mEq daily, 60 tablets, 3 refills.    Patient verbalized  understanding of plan. Patient requested to return to infusion on Wednesday, so her pump could be disconnected here. Able to accommodate request. Wednesday 11/1 infusion appointment to include: Draw CMP, possible K+ replacement, and pump disconnect.    Fluorouracil Cseries infusion pump connected at 1320.  Positive blood return from port at time of pump hook up.  Pump to infuse over 46 hours at 5.2 cc/hour. Connections open and taped; verified with Ashli Nunez RN. Pump will be disconnected on Wednesday 11/1 @ 1100 by [facility].  Patient and family member aware of pump disconnect date and time. Spoke with Sabine at [facility] about plan for patient to have pump disconnected at [facility].    Post Infusion Assessment:  Patient tolerated infusion without incident.  Blood return noted pre and post infusion.  Site patent and intact, free from redness, edema or discomfort.  No evidence of extravasations.    Discharge Plan:   Prescription refills given for potassium, ativan, and compazine.  Discharge instructions reviewed with: Patient and Family.  Patient and/or family verbalized understanding of discharge instructions and all questions answered.  Copy of AVS reviewed with patient and/or family.  Patient will return 11/1/17 for next appointment.  Patient discharged in stable condition accompanied by: self and family member.  Departure Mode: Ambulatory.  Face to face time: 15 minutes    Ramila Loaiza RN

## 2017-10-30 NOTE — PATIENT INSTRUCTIONS
Contact Numbers  [facility] Nurse Triage: [phone]  After Hours Nurse Line:  [phone]    Please call the [facility] Triage line if you experience a temperature greater than or equal to 100.5, shaking chills, have uncontrolled nausea, vomiting and/or diarrhea, dizziness, shortness of breath, chest pain, bleeding, unexplained bruising, or if you have any other new/concerning symptoms, questions or concerns.     If it is after hours, weekends, or holidays, please call either the after hours nurse line listed above.    If you are having any concerning symptoms or wish to speak to a provider before your next infusion visit, please call your care coordinator or triage to notify them so we can adequately serve you.     If you need a refill on a narcotic prescription or other medication, please call triage before your infusion appointment.         October 2017 Sunday Monday Tuesday Wednesday Thursday Friday Saturday   1     2     [facility] D[facility]   12:00 PM   (15 min.)    [facility] LAB DRAW   [facility] Lab Draw     [facility] ONC INFUSION 180   12:30 PM   (180 min.)    ONCOLOGY INFUSION   [facility] 3     4     5     6     7       8     9     10     11     12     13     14       15     16     [facility] DRAW   12:00 PM   (15 min.)    [facility] LAB DRAW   [facility] Lab Draw     [facility] ONC INFUSION 180   12:30 PM   (180 min.)    ONCOLOGY INFUSION   [facility] 17     18     19     20     21       22     23     24     25     26     27     28       29     30     [facility] DRAW   10:00 AM   (15 min.)    [facility] LAB DRAW   [facility] Lab Draw     [facility] ONC INFUSION 180   10:30 AM   (180 min.)    ONCOLOGY INFUSION   [facility] 31 November 2017 Sunday Monday Tuesday Wednesday Thursday Friday Saturday                  1     UMP ONC INFUSION 120   11:00 AM   (120 min.)    ONCOLOGY  INFUSION   [facility] 2     3     4       5     6     7     8     9     10     11       12     13     [facility] LAB DRAW    8:45 AM   (15 min.)   [facility] LAB DRAW   [facility] Lab Draw     CT CHEST WO    9:05 AM   (20 min.)   UCCT1   [facility]     [facility] ONC INFUSION 180   10:00 AM   (180 min.)    ONCOLOGY INFUSION   [facility] 14     15     16     17     18       19     20     [facility] RETURN    5:45 PM   (30 min.)   Demond Gonsales MD   [facility] 21     22     23     24     25       26     27     28     29     30                            Lab Results:  Recent Results (from the past 12 hour(s))   Comprehensive metabolic panel    Collection Time: 10/30/17 10:31 AM   Result Value Ref Range    Sodium 139 133 - 144 mmol/L    Potassium 2.8 (L) 3.4 - 5.3 mmol/L    Chloride 104 94 - 109 mmol/L    Carbon Dioxide 25 20 - 32 mmol/L    Anion Gap 10 3 - 14 mmol/L    Glucose 249 (H) 70 - 99 mg/dL    Urea Nitrogen 8 7 - 30 mg/dL    Creatinine 0.60 0.52 - 1.04 mg/dL    GFR Estimate >90 >60 mL/min/1.7m2    GFR Estimate If Black >90 >60 mL/min/1.7m2    Calcium 8.6 8.5 - 10.1 mg/dL    Bilirubin Total 0.4 0.2 - 1.3 mg/dL    Albumin 3.2 (L) 3.4 - 5.0 g/dL    Protein Total 7.5 6.8 - 8.8 g/dL    Alkaline Phosphatase 210 (H) 40 - 150 U/L    ALT 22 0 - 50 U/L    AST 25 0 - 45 U/L   CBC with platelets differential    Collection Time: 10/30/17 10:31 AM   Result Value Ref Range    WBC 2.3 (L) 4.0 - 11.0 10e9/L    RBC Count 3.41 (L) 3.8 - 5.2 10e12/L    Hemoglobin 9.5 (L) 11.7 - 15.7 g/dL    Hematocrit 31.0 (L) 35.0 - 47.0 %    MCV 91 78 - 100 fl    MCH 27.9 26.5 - 33.0 pg    MCHC 30.6 (L) 31.5 - 36.5 g/dL    RDW 17.6 (H) 10.0 - 15.0 %    Platelet Count 118 (L) 150 - 450 10e9/L    Diff Method Automated Method     % Neutrophils 65.6 %    % Lymphocytes 20.6 %    % Monocytes 11.2 %    % Eosinophils 1.3 %    % Basophils 0.9 %    % Immature Granulocytes 0.4  %    Nucleated RBCs 0 0 /100    Absolute Neutrophil 1.5 (L) 1.6 - 8.3 10e9/L    Absolute Lymphocytes 0.5 (L) 0.8 - 5.3 10e9/L    Absolute Monocytes 0.3 0.0 - 1.3 10e9/L    Absolute Eosinophils 0.0 0.0 - 0.7 10e9/L    Absolute Basophils 0.0 0.0 - 0.2 10e9/L    Abs Immature Granulocytes 0.0 0 - 0.4 10e9/L    Absolute Nucleated RBC 0.0

## 2017-10-30 NOTE — NURSING NOTE
Chief Complaint   Patient presents with     Port Draw     Labs drawn via port by RN. Line flushed and hep locked. VS taken.     Tata Felix RN

## 2017-11-01 NOTE — MR AVS SNAPSHOT
After Visit Summary   11/1/2017    Shirin Muller    MRN: [id number]           Patient Information     Date Of Birth          1960        Visit Information        Provider Department      11/1/2017 11:00 AM UC 18 ATC;  ONCOLOGY INFUSION [facility]        Today's Diagnoses     Malignant neoplasm of head of pancreas (H)    -  1      Care Instructions    Contact Numbers    [facility] Main Line: [phone]  [facility] Triage:  [phone]    Call triage with chills and/or temperature greater than or equal to 100.5, uncontrolled nausea/vomiting, diarrhea, constipation, dizziness, shortness of breath, chest pain, bleeding, unexplained bruising, or any new/concerning symptoms, questions/concerns.     If you are having any concerning symptoms or wish to speak to a provider before your next infusion visit, please call your care coordinator or triage to notify them so we can adequately serve you.       After Hours: [phone]    If after hours, weekends, or holidays, call main hospital  and ask for Oncology doctor on call.           November 2017 Sunday Monday Tuesday Wednesday Thursday Friday Saturday                  1     [facility] ONC INFUSION 120   11:00 AM   (120 min.)    ONCOLOGY INFUSION   [facility] 2     3     4       5     6     7     [facility]    9:45 AM   (15 min.)    [facility] DRAW   [facility] Lab Draw     [facility] ONC INFUSION 120   10:30 AM   (120 min.)    ONCOLOGY INFUSION   [facility] 8     9     10     11       12     13     [facility] D[facility]    8:45 AM   (15 min.)   [facility]   [facility] Lab Draw     CT CHEST WO    9:05 AM   (20 min.)   UCCT1   [facility] CT     [facility] ONC INFUSION 180   10:00 AM   (180 min.)    ONCOLOGY INFUSION   [facility] 14     15     16     17     18       19     20     [facility] RETURN    5:45 PM   (30 min.)   Demond Gonsales  MD Erich   [facility] 21 22     23     24     25       26     27     28     29 30 December 2017 Sunday Monday Tuesday Wednesday Thursday Friday Saturday                            1     2       3     4     5     6     7     8     9       10     11     12     13     14     15     16       17     18     19     20     21     22     23       24     25     26     27     28     29     30       31                                               Recent Results (from the past 24 hour(s))   Basic metabolic panel    Collection Time: 11/01/17 11:25 AM   Result Value Ref Range    Sodium 140 133 - 144 mmol/L    Potassium 3.3 (L) 3.4 - 5.3 mmol/L    Chloride 104 94 - 109 mmol/L    Carbon Dioxide 28 20 - 32 mmol/L    Anion Gap 8 3 - 14 mmol/L    Glucose 171 (H) 70 - 99 mg/dL    Urea Nitrogen 10 7 - 30 mg/dL    Creatinine 0.64 0.52 - 1.04 mg/dL    GFR Estimate >90 >60 mL/min/1.7m2    GFR Estimate If Black >90 >60 mL/min/1.7m2    Calcium 8.8 8.5 - 10.1 mg/dL               Follow-ups after your visit        Your next 10 appointments already scheduled     Nov 07, 2017  9:45 AM CST   [facility] with [facility]   [facility] ([facility])    [address] [address]   [phone]            Nov 07, 2017 10:30 AM CST   Infusion 120 with UC ONCOLOGY INFUSION, UC 30 ATC   [facility] ([facility])    [address] [address]   [phone]            Nov 13, 2017  8:45 AM CST   [facility] Draw with [facility] DRAW   [facility] Draw ([facility])    [address] [facility]  [address]nd [facility] [address]   [phone]            Nov 13, 2017  9:20 AM CST   (Arrive by 9:05 AM)   CT CHEST W/O CONTRAST with UCCT1   [facility] [place] [facility] ([facility])    [address] [id number] [phone]           Please bring any scans or X-rays taken at other hospitals, if similar tests were done. Also bring a list of your medicines, including vitamins, minerals and over-the-counter drugs. It is safest to leave personal items at home.  Be sure to tell your doctor:   If you have any allergies.   If there s any chance you are pregnant.   If you are breastfeeding.   If you have any special needs.  You do not need to do anything special to prepare.  Please wear loose clothing, such as a sweat suit or jogging clothes. Avoid snaps, zippers and other metal. We may ask you to undress and put on a hospital gown.            Nov 13, 2017 10:00 AM CST   Infusion 180 with UC ONCOLOGY INFUSION, UC 17 ATC   [facility] ([facility])    [address] [place] [id number] [phone]            Nov 20, 2017  6:00 PM CST   (Arrive by 5:45 PM)   Return Visit with Demond Gonsales MD   [facility] ([facility])    [address] [place] [id number] [phone]              Who to contact     If you have questions or need follow up information about today's clinic visit or your schedule please contact [facility] directly at [phone].  Normal or non-critical lab and imaging results will be communicated to you by MyChart, letter or phone within 4 business days after the clinic has received the results. If you do not hear from us within 7 days, please contact the clinic through MyChart or phone. If you have a critical or abnormal lab result, we will notify you by phone as soon as possible.  Submit refill requests through [employer] or call your pharmacy and they will forward the refill request to us. Please allow 3 business days for your refill to be completed.          Additional Information About Your  Visit        [employer] Information     "[employer]" gives you secure access to your electronic health record. If you see a primary care provider, you can also send messages to your care team and make appointments. If you have questions, please call your primary care clinic.  If you do not have a primary care provider, please call [phone] and they will assist you.        Care EveryWhere ID     This is your Care EveryWhere ID. This could be used by other organizations to access your [place] medical records  [id number]        Your Vitals Were     Pulse Temperature Respirations Pulse Oximetry BMI (Body Mass Index)       95 98.3  F (36.8  C) (Oral) 18 100% 23.75 kg/m2        Blood Pressure from Last 3 Encounters:   11/01/17 109/73   10/30/17 106/65   10/16/17 110/66    Weight from Last 3 Encounters:   11/01/17 77.2 kg (170 lb 3.2 oz)   10/30/17 77.8 kg (171 lb 9.6 oz)   10/16/17 77.9 kg (171 lb 11.2 oz)              We Performed the Following     Basic metabolic panel          Today's Medication Changes          These changes are accurate as of: 11/1/17  2:15 PM.  If you have any questions, ask your nurse or doctor.               These medicines have changed or have updated prescriptions.        Dose/Directions    ENSURE CLEAR Liqd   This may have changed:  how much to take   Used for:  Malignant neoplasm of head of pancreas (H)        Dose:  1 Bottle   Take 1 Bottle by mouth 3 times daily   Quantity:  90 Bottle   Refills:  6                Primary Care Provider    [facility]       No address on file        Equal Access to Services     JAI CORDERO AH: Hadii isak harding Sojim, waaxda luqadaha, qaybta kaalmada karin, waxelsa thaddeus lora. So [facility] [phone].    ATENCIÓN: Si habla español, tiene a bernal disposición servicios gratuitos de asistencia lingüística. Llame al [phone].    We comply with applicable federal civil rights laws and [place] laws. We do not  discriminate on the basis of race, color, national origin, age, disability, sex, sexual orientation, or gender identity.            Thank you!     Thank you for choosing [facility]  for your care. Our goal is always to provide you with excellent care. Hearing back from our patients is one way we can continue to improve our services. Please take a few minutes to complete the written survey that you may receive in the mail after your visit with us. Thank you!             Your Updated Medication List - Protect others around you: Learn how to safely use, store and throw away your medicines at www.disposemymeds.org.          This list is accurate as of: 11/1/17  2:15 PM.  Always use your most recent med list.                   Brand Name Dispense Instructions for use Diagnosis    amylase-lipase-protease 70449 UNITS Cpep    CREON    180 capsule    Take 2 capsules (72,000 Units) by mouth 3 times daily (with meals)    Malignant neoplasm of head of pancreas (H)       diltiazem 2% in PLO cream (FV COMPOUNDED) 2% Gel     30 g    Apply topically daily and as needed to external hemorrhoids    External hemorrhoids       docusate sodium 100 MG capsule    COLACE    100 capsule    Take 1 capsule (100 mg) by mouth daily    External hemorrhoids       dronabinol 5 MG capsule    MARINOL    90 capsule    Take 1 capsule (5 mg) by mouth 3 times daily (before meals)    Anorexia       ENSURE CLEAR Liqd     90 Bottle    Take 1 Bottle by mouth 3 times daily    Malignant neoplasm of head of pancreas (H)       lidocaine-prilocaine cream    EMLA    30 g    Apply topically as needed for other (Use 30-60 minutes prior to port access)    Malignant neoplasm of head of pancreas (H)       loratadine 10 MG tablet    CLARITIN    30 tablet    Take 1 tablet (10 mg) by mouth daily Reported on 5/5/2017    Chronic seasonal allergic rhinitis, unspecified trigger       LORazepam 0.5 MG tablet    ATIVAN    30 tablet    Take 1 tablet (0.5 mg)  by mouth every 4 hours as needed (Anxiety, Nausea/Vomiting or Sleep)    Malignant neoplasm of head of pancreas (H)       morphine 15 MG 12 hr tablet    MS CONTIN    60 tablet    Take 1 tablet (15 mg) by mouth every 12 hours    Malignant neoplasm of head of pancreas (H)       ondansetron 8 MG ODT tab    ZOFRAN-ODT    60 tablet    Take 1 tablet (8 mg) by mouth every 8 hours as needed for nausea    Malignant neoplasm of head of pancreas (H)       oxyCODONE 5 MG IR tablet    ROXICODONE    100 tablet    Take 1 tablet (5 mg) by mouth every 4 hours as needed for moderate to severe pain    Malignant neoplasm of head of pancreas (H)       pantoprazole 20 MG EC tablet    PROTONIX    60 tablet    Take 1 tablet (20 mg) by mouth 2 times daily    Malignant neoplasm of head of pancreas (H)       polyethylene glycol powder    MIRALAX/GLYCOLAX    119 g    Take 17 g (1 capful) by mouth daily    Malignant neoplasm of head of pancreas (H)       potassium chloride SA 20 MEQ CR tablet    KLOR-CON    60 tablet    Take 2 tablets (40 mEq) by mouth daily    Malignant neoplasm of head of pancreas (H)       prochlorperazine 10 MG tablet    COMPAZINE    30 tablet    TAKE ONE TABLET BY MOUTH EVERY 6 HOURS AS NEEDED FOR NAUSEA AND VOMITING    Malignant neoplasm of head of pancreas (H)

## 2017-11-01 NOTE — PROGRESS NOTES
Infusion Nursing Note:  Shirin Muller presents today for Fluorouracil pump disconnect, IV fluids and possible potassium replacement.    Patient seen by provider today: No    Treatment Conditions:  Lab Results   Component Value Date     11/01/2017                   Lab Results   Component Value Date    POTASSIUM 3.3 11/01/2017           Lab Results   Component Value Date    MAG 2.0 04/03/2017            Lab Results   Component Value Date    CR 0.64 11/01/2017                   Lab Results   Component Value Date    FANNY 8.8 11/01/2017                    Intravenous Access:  Implanted Port.    Note: Pt reports nausa/vomiting today.  Unable to take potassium PO at home today due to nausea. Pt received 1000 ml NS IV today for nausea/vomiting per standing order.   Potassium 3.3 today. Ester BUTLER-KELLY notified.    11/1/17 YUE BENSON/ Maye Greene RN:  Give Potassium Chloride 40 meq IV today for potassium 3.3  Pt should return next week (Tuesday) for lab recheck and possible potassium replacement.    Pt instructed to call with worsening nausea, questions/concerns.       Post Infusion Assessment:  Patient tolerated infusion without incident.  Blood return noted pre and post infusion.  Access discontinued per protocol.    Discharge Plan:   Patient declined prescription refills.  Discharge instructions reviewed with: Patient.  Patient and/or family verbalized understanding of discharge instructions and all questions answered.  Copy of AVS reviewed with patient and/or family.  Patient will return 11/13/17 for next appointment.  Patient discharged in stable condition accompanied by: self.  Departure Mode: Ambulatory.  Face to Face time: 5 min.    Maye Greene RN

## 2017-11-01 NOTE — PATIENT INSTRUCTIONS
Contact Numbers    [facility] Main Line: [phone]  [facility] Triage:  [phone]    Call triage with chills and/or temperature greater than or equal to 100.5, uncontrolled nausea/vomiting, diarrhea, constipation, dizziness, shortness of breath, chest pain, bleeding, unexplained bruising, or any new/concerning symptoms, questions/concerns.     If you are having any concerning symptoms or wish to speak to a provider before your next infusion visit, please call your care coordinator or triage to notify them so we can adequately serve you.       After Hours: [phone]    If after hours, weekends, or holidays, call main hospital  and ask for Oncology doctor on call.           November 2017 Sunday Monday Tuesday Wednesday Thursday Friday Saturday                  1     [facility] ONC INFUSION 120   11:00 AM   (120 min.)   UC ONCOLOGY INFUSION   [facility] 2     3     4       5     6     7     [facility] D[facility]    9:45 AM   (15 min.)    [facility] DRAW   [facility] Lab Draw     [facility] ONC INFUSION 120   10:30 AM   (120 min.)   UC ONCOLOGY INFUSION   [facility] 8     9     10     11       12     13     [facility] DRAW    8:45 AM   (15 min.)    [facility] DRAW   [facility] Lab Draw     CT CHEST WO    9:05 AM   (20 min.)   UCCT1   [facility] CT     [facility] ONC INFUSION 180   10:00 AM   (180 min.)    ONCOLOGY INFUSION   [facility] 14     15     16     17     18       19     20     [facility] RETURN    5:45 PM   (30 min.)   Demond Gonsales MD   [facility] 21     22     23     24     25       26     27     28     29     30 December 2017 Sunday Monday Tuesday Wednesday Thursday Friday Saturday                            1     2       3     4     5     6     7     8     9       10     11     12     13     14     15     16       17     18     19     20     21     22     23       24     25      26     27     28     29     30       31                                               Recent Results (from the past 24 hour(s))   Basic metabolic panel    Collection Time: 11/01/17 11:25 AM   Result Value Ref Range    Sodium 140 133 - 144 mmol/L    Potassium 3.3 (L) 3.4 - 5.3 mmol/L    Chloride 104 94 - 109 mmol/L    Carbon Dioxide 28 20 - 32 mmol/L    Anion Gap 8 3 - 14 mmol/L    Glucose 171 (H) 70 - 99 mg/dL    Urea Nitrogen 10 7 - 30 mg/dL    Creatinine 0.64 0.52 - 1.04 mg/dL    GFR Estimate >90 >60 mL/min/1.7m2    GFR Estimate If Black >90 >60 mL/min/1.7m2    Calcium 8.8 8.5 - 10.1 mg/dL

## 2017-11-07 NOTE — MR AVS SNAPSHOT
After Visit Summary   11/7/2017    Shirin Muller    MRN: [id number]           Patient Information     Date Of Birth          1960        Visit Information        Provider Department      11/7/2017 10:30 AM  30 ATC;  ONCOLOGY INFUSION [facility]        Today's Diagnoses     Malignant neoplasm of head of pancreas (H)    -  1      Care Instructions    Contact Numbers  [facility] Nurse Triage: [phone]  After Hours Nurse Line:  [phone]    Please call the [facility] Nurse Triage line or after hours number if you experience a temperature greater than or equal to 100.5, shaking chills, have uncontrolled nausea, vomiting and/or diarrhea, dizziness, shortness of breath, chest pain, bleeding, unexplained bruising, or if you have any other new/concerning symptoms, questions or concerns.     If you are having any concerning symptoms or wish to speak to a provider before your next infusion visit, please call your care coordinator or triage to notify them so we can adequately serve you.     If you need a refill on a narcotic prescription or other medication, please call triage before your infusion appointment.         November 2017 Sunday Monday Tuesday Wednesday Thursday Friday Saturday                  1     UMP ONC INFUSION 120   11:00 AM   (120 min.)    ONCOLOGY INFUSION   [facility] 2     3     4       5     6     7     P [facility] DRAW    9:45 AM   (15 min.)   [facility]RAW   [facility] Lab Draw     UMP ONC INFUSION 120   10:30 AM   (120 min.)    ONCOLOGY INFUSION   [facility] 8     9     UMP RETURN   10:05 AM   (50 min.)   [facility]ria [facility]N CNP   [facility]   10:15 AM   (15 min.)    [facility] DRAW   [facility] Lab Draw     UMP ONC INFUSION 120    1:00 PM   (120 min.)    ONCOLOGY INFUSION   [facility] 10      11       12     13     UMP [facility] DRAW    8:45 AM   (15 min.)    [facility] LAB DRAW   [facility] Lab Draw     CT CHEST WO    9:05 AM   (20 min.)   UCCT1   [facility] CT     UMP ONC INFUSION 180   10:00 AM   (180 min.)   UC ONCOLOGY INFUSION   [facility] 14     15     16     17     18       19     20     UMP RETURN    5:45 PM   (30 min.)   Demond Gonsales MD   [facility] 21     22     23     24     25       26     27     28     29 30 December 2017 Sunday Monday Tuesday Wednesday Thursday Friday Saturday                            1     2       3     4     5     6     7     8     9       10     11     12     13     14     15     16       17     18     19     20     21     22     23       24     25     26     27     28     29     30       31                                                Lab Results:  Recent Results (from the past 12 hour(s))   Basic metabolic panel    Collection Time: 11/07/17 10:39 AM   Result Value Ref Range    Sodium 136 133 - 144 mmol/L    Potassium 3.0 (L) 3.4 - 5.3 mmol/L    Chloride 102 94 - 109 mmol/L    Carbon Dioxide 23 20 - 32 mmol/L    Anion Gap 11 3 - 14 mmol/L    Glucose 296 (H) 70 - 99 mg/dL    Urea Nitrogen 7 7 - 30 mg/dL    Creatinine 0.61 0.52 - 1.04 mg/dL    GFR Estimate >90 >60 mL/min/1.7m2    GFR Estimate If Black >90 >60 mL/min/1.7m2    Calcium 8.7 8.5 - 10.1 mg/dL   Hepatic panel    Collection Time: 11/07/17 10:39 AM   Result Value Ref Range    Bilirubin Direct 0.1 0.0 - 0.2 mg/dL    Bilirubin Total 0.4 0.2 - 1.3 mg/dL    Albumin 3.4 3.4 - 5.0 g/dL    Protein Total 7.5 6.8 - 8.8 g/dL    Alkaline Phosphatase 204 (H) 40 - 150 U/L    ALT 19 0 - 50 U/L    AST 16 0 - 45 U/L   CBC with platelets differential    Collection Time: 11/07/17 12:00 PM   Result Value Ref Range    WBC 2.3 (L) 4.0 - 11.0 10e9/L    RBC Count 3.08 (L) 3.8 - 5.2 10e12/L    Hemoglobin 8.7 (L) 11.7 - 15.7 g/dL     Hematocrit 27.5 (L) 35.0 - 47.0 %    MCV 89 78 - 100 fl    MCH 28.2 26.5 - 33.0 pg    MCHC 31.6 31.5 - 36.5 g/dL    RDW 16.9 (H) 10.0 - 15.0 %    Platelet Count 94 (L) 150 - 450 10e9/L    Diff Method Automated Method     % Neutrophils 64.6 %    % Lymphocytes 23.1 %    % Monocytes 10.2 %    % Eosinophils 1.3 %    % Basophils 0.4 %    % Immature Granulocytes 0.4 %    Nucleated RBCs 0 0 /100    Absolute Neutrophil 1.5 (L) 1.6 - 8.3 10e9/L    Absolute Lymphocytes 0.5 (L) 0.8 - 5.3 10e9/L    Absolute Monocytes 0.2 0.0 - 1.3 10e9/L    Absolute Eosinophils 0.0 0.0 - 0.7 10e9/L    Absolute Basophils 0.0 0.0 - 0.2 10e9/L    Abs Immature Granulocytes 0.0 0 - 0.4 10e9/L    Absolute Nucleated RBC 0.0                Follow-ups after your visit        Your next 10 appointments already scheduled     Nov 09, 2017 10:15 AM CST   [facility] Draw with  [facility]   [facility])    [address] [place] [id number] [phone]            Nov 09, 2017 10:20 AM CST   (Arrive by 10:05 AM)   Return Visit with TEE Olivas CNP   [facility] ([facility])    [address] [place] [id number]   [phone]            Nov 09, 2017  1:00 PM CST   Infusion 120 with UC ONCOLOGY INFUSION, UC 24 ATC   [facility] ([facility])    [address] [place] [address]  2nd [facility] [address]   [phone]            Nov 13, 2017  8:45 AM CST   [facility] Lab Draw with UC [facility] LAB DRAW   [facility] Lab Draw ([facility])    [address] [place] [id number]   [phone]            Nov 13, 2017  9:20 AM CST   (Arrive by 9:05 AM)   CT CHEST W/O CONTRAST with UCCT1   [facility] CT ([facility])    [address] [facility]  1st Floor  [facility] [address]    [phone]           Please bring any scans or X-rays taken at other hospitals, if similar tests were done. Also bring a list of your medicines, including vitamins, minerals and over-the-counter drugs. It is safest to leave personal items at home.  Be sure to tell your doctor:   If you have any allergies.   If there s any chance you are pregnant.   If you are breastfeeding.   If you have any special needs.  You do not need to do anything special to prepare.  Please wear loose clothing, such as a sweat suit or jogging clothes. Avoid snaps, zippers and other metal. We may ask you to undress and put on a hospital gown.            Nov 13, 2017 10:00 AM CST   Infusion 180 with  ONCOLOGY INFUSION, UC 17 ATC   [facility])    [address] [place] [address] [phone]            Nov 20, 2017  6:00 PM CST   (Arrive by 5:45 PM)   Return Visit with Demond Gonsales MD   [facility])    [address] [place] [id number] [phone]              Who to contact     If you have questions or need follow up information about today's clinic visit or your schedule please contact [facility] directly at [phone].  Normal or non-critical lab and imaging results will be communicated to you by MyChart, letter or phone within 4 business days after the clinic has received the results. If you do not hear from us within 7 days, please contact the clinic through freeehart or phone. If you have a critical or abnormal lab result, we will notify you by phone as soon as possible.  Submit refill requests through TOBESOFT or call your pharmacy and they will forward the refill request to us. Please allow 3 business days for your refill to be completed.          Additional Information About Your Visit        [email or web address] Information     TOBESOFT gives you  secure access to your electronic health record. If you see a primary care provider, you can also send messages to your care team and make appointments. If you have questions, please call your primary care clinic.  If you do not have a primary care provider, please call [phone] and they will assist you.        Care EveryWhere ID     This is your Care EveryWhere ID. This could be used by other organizations to access your [place] medical records  [id number]        Your Vitals Were     Pulse Temperature Respirations Pulse Oximetry BMI (Body Mass Index)       102 98.8  F (37.1  C) (Oral) 18 100% 23.5 kg/m2        Blood Pressure from Last 3 Encounters:   11/07/17 118/75   11/01/17 109/73   10/30/17 106/65    Weight from Last 3 Encounters:   11/07/17 76.4 kg (168 lb 6.4 oz)   11/01/17 77.2 kg (170 lb 3.2 oz)   10/30/17 77.8 kg (171 lb 9.6 oz)              We Performed the Following     Basic metabolic panel     CBC with platelets differential     Hemoglobin A1c     Hepatic panel          Today's Medication Changes          These changes are accurate as of: 11/7/17  1:04 PM.  If you have any questions, ask your nurse or doctor.               These medicines have changed or have updated prescriptions.        Dose/Directions    ENSURE CLEAR Liqd   This may have changed:  how much to take   Used for:  Malignant neoplasm of head of pancreas (H)        Dose:  1 Bottle   Take 1 Bottle by mouth 3 times daily   Quantity:  90 Bottle   Refills:  6                Primary Care Provider    [facility]       No address on file        Equal Access to Services     JAI CORDERO AH: Maxx Spence, wajuanda ching, qaybta kaalmaluis e garcia. So [facility] [phone].    ATENCIÓN: Si habla español, tiene a bernal disposición servicios gratuitos de asistencia lingüística. Llame al [phone].    We comply with applicable federal civil rights laws and [place] laws. We  do not discriminate on the basis of race, color, national origin, age, disability, sex, sexual orientation, or gender identity.            Thank you!     Thank you for choosing [facility]  for your care. Our goal is always to provide you with excellent care. Hearing back from our patients is one way we can continue to improve our services. Please take a few minutes to complete the written survey that you may receive in the mail after your visit with us. Thank you!             Your Updated Medication List - Protect others around you: Learn how to safely use, store and throw away your medicines at www.disposemymeds.org.          This list is accurate as of: 11/7/17  1:04 PM.  Always use your most recent med list.                   Brand Name Dispense Instructions for use Diagnosis    amylase-lipase-protease 15172 UNITS Cpep    CREON    180 capsule    Take 2 capsules (72,000 Units) by mouth 3 times daily (with meals)    Malignant neoplasm of head of pancreas (H)       diltiazem 2% in PLO cream (FV COMPOUNDED) 2% Gel     30 g    Apply topically daily and as needed to external hemorrhoids    External hemorrhoids       docusate sodium 100 MG capsule    COLACE    100 capsule    Take 1 capsule (100 mg) by mouth daily    External hemorrhoids       dronabinol 5 MG capsule    MARINOL    90 capsule    Take 1 capsule (5 mg) by mouth 3 times daily (before meals)    Anorexia       ENSURE CLEAR Liqd     90 Bottle    Take 1 Bottle by mouth 3 times daily    Malignant neoplasm of head of pancreas (H)       lidocaine-prilocaine cream    EMLA    30 g    Apply topically as needed for other (Use 30-60 minutes prior to port access)    Malignant neoplasm of head of pancreas (H)       loratadine 10 MG tablet    CLARITIN    30 tablet    Take 1 tablet (10 mg) by mouth daily Reported on 5/5/2017    Chronic seasonal allergic rhinitis, unspecified trigger       LORazepam 0.5 MG tablet    ATIVAN    30 tablet    Take 1 tablet  (0.5 mg) by mouth every 4 hours as needed (Anxiety, Nausea/Vomiting or Sleep)    Malignant neoplasm of head of pancreas (H)       morphine 15 MG 12 hr tablet    MS CONTIN    60 tablet    Take 1 tablet (15 mg) by mouth every 12 hours    Malignant neoplasm of head of pancreas (H)       ondansetron 8 MG ODT tab    ZOFRAN-ODT    60 tablet    Take 1 tablet (8 mg) by mouth every 8 hours as needed for nausea    Malignant neoplasm of head of pancreas (H)       oxyCODONE IR 5 MG tablet    ROXICODONE    100 tablet    Take 1 tablet (5 mg) by mouth every 4 hours as needed for moderate to severe pain    Malignant neoplasm of head of pancreas (H)       pantoprazole 20 MG EC tablet    PROTONIX    60 tablet    Take 1 tablet (20 mg) by mouth 2 times daily    Malignant neoplasm of head of pancreas (H)       polyethylene glycol powder    MIRALAX/GLYCOLAX    119 g    Take 17 g (1 capful) by mouth daily    Malignant neoplasm of head of pancreas (H)       potassium chloride SA 20 MEQ CR tablet    KLOR-CON    60 tablet    Take 2 tablets (40 mEq) by mouth daily    Malignant neoplasm of head of pancreas (H)       prochlorperazine 10 MG tablet    COMPAZINE    30 tablet    TAKE ONE TABLET BY MOUTH EVERY 6 HOURS AS NEEDED FOR NAUSEA AND VOMITING    Malignant neoplasm of head of pancreas (H)

## 2017-11-07 NOTE — PROGRESS NOTES
Infusion Nursing Note:  Shirin Muller presents today for IVF, antiemetics, Potassium replacement.    Patient seen by provider today: No    Note: Pt arrived reporting not feeling well over the past week or so. States she has spent almost the whole day lying in bed and hasn't been able to eat much other than 5 ensures per day. Drinking 3 glasses of fluids daily.  States she has ongoing nausea but is taking her antiemetics around the clock. On average, has been having one bout of emesis daily. Reports last BM 2 days ago but feels she is constipated. Also has not been able to take her Potassium supplement due to the nausea. Denies any fevers/chills, SOB or new pain. Discussed concerns with Ester Echavarria DNP, who recommended:    =Replace Potassium per protocol IV (60 meq)  -Draw CBC, hepatic panel and Hgb A1c  -Give IV Aloxi and Emend for nausea  -Return Thursday 11/9 for labs, provider visit and possible infusion for IVF/electrolytes    The above interventions were all carried out. Pt aware to return on 11/9. Hgb A1c resulted high at 9.2. Referral put in for pt to see Endocrine.  Scheduling contacted endocrine, who said they would review pt's chart and get back to her in the next 48 hours.    Intravenous Access:  Implanted Port.    Treatment Conditions:  Lab Results   Component Value Date    HGB 8.7 11/07/2017     Lab Results   Component Value Date    WBC 2.3 11/07/2017      Lab Results   Component Value Date    ANEU 1.5 11/07/2017     Lab Results   Component Value Date    PLT 94 11/07/2017      Lab Results   Component Value Date     11/07/2017                   Lab Results   Component Value Date    POTASSIUM 3.0 11/07/2017           Lab Results   Component Value Date    MAG 2.0 04/03/2017            Lab Results   Component Value Date    CR 0.61 11/07/2017                   Lab Results   Component Value Date    FANNY 8.7 11/07/2017                Lab Results   Component Value Date    BILITOTAL 0.4  11/07/2017           Lab Results   Component Value Date    ALBUMIN 3.4 11/07/2017                    Lab Results   Component Value Date    ALT 19 11/07/2017           Lab Results   Component Value Date    AST 16 11/07/2017       Post Infusion Assessment:  Patient tolerated infusion without incident.  Blood return noted pre and post infusion.  Site patent and intact, free from redness, edema or discomfort.  No evidence of extravasations. Access discontinued per protocol.    Discharge Plan:   Copy of AVS reviewed with patient and/or family.  Patient will return 11/9 for next appointment.  Patient discharged in stable condition accompanied by: sister.  Departure Mode: Ambulatory.  Face to Face time: 8 minutes.    Ashli Nunez RN

## 2017-11-07 NOTE — PATIENT INSTRUCTIONS
Contact Numbers  [facility] Nurse Triage: [phone]  After Hours Nurse Line:  [phone]    Please call the [facility] Nurse Triage line or after hours number if you experience a temperature greater than or equal to 100.5, shaking chills, have uncontrolled nausea, vomiting and/or diarrhea, dizziness, shortness of breath, chest pain, bleeding, unexplained bruising, or if you have any other new/concerning symptoms, questions or concerns.     If you are having any concerning symptoms or wish to speak to a provider before your next infusion visit, please call your care coordinator or triage to notify them so we can adequately serve you.     If you need a refill on a narcotic prescription or other medication, please call triage before your infusion appointment.         November 2017 Sunday Monday Tuesday Wednesday Thursday Friday Saturday                  1     UMP ONC INFUSION 120   11:00 AM   (120 min.)    ONCOLOGY INFUSION   [facility] 2     3     4       5     6     7     [facility] DRAW    9:45 AM   (15 min.)   [facility] DRAW   [facility] Lab Draw     UMP ONC INFUSION 120   10:30 AM   (120 min.)    ONCOLOGY INFUSION   [facility] 8     9     UMP RETURN   10:05 AM   (50 min.)   Ester Echavarria, TEE CNP   [facility]     [facility] DRAW   10:15 AM   (15 min.)    [facility] LAB DRAW   [facility] Lab Draw     UMP ONC INFUSION 120    1:00 PM   (120 min.)    ONCOLOGY INFUSION   [facility] 10     11       12     13     [facility] DRAW    8:45 AM   (15 min.)   [facility] DRAW   [facility] Lab Draw     CT CHEST WO    9:05 AM   (20 min.)   UCCT1   [facility] CT     UMP ONC INFUSION 180   10:00 AM   (180 min.)    ONCOLOGY INFUSION   [facility] 14     15     16     17     18       19     20     UMP RETURN    5:45 PM   (30 min.)   Demond Gonsales,  MD [facility] 21 22     23     24     25       26     27     28 29 30 December 2017 Sunday Monday Tuesday Wednesday Thursday Friday Saturday                            1     2       3     4     5     6     7     8     9       10     11     12     13     14     15     16       17     18     19     20     21     22     23       24     25     26     27     28     29     30       31                                                Lab Results:  Recent Results (from the past 12 hour(s))   Basic metabolic panel    Collection Time: 11/07/17 10:39 AM   Result Value Ref Range    Sodium 136 133 - 144 mmol/L    Potassium 3.0 (L) 3.4 - 5.3 mmol/L    Chloride 102 94 - 109 mmol/L    Carbon Dioxide 23 20 - 32 mmol/L    Anion Gap 11 3 - 14 mmol/L    Glucose 296 (H) 70 - 99 mg/dL    Urea Nitrogen 7 7 - 30 mg/dL    Creatinine 0.61 0.52 - 1.04 mg/dL    GFR Estimate >90 >60 mL/min/1.7m2    GFR Estimate If Black >90 >60 mL/min/1.7m2    Calcium 8.7 8.5 - 10.1 mg/dL   Hepatic panel    Collection Time: 11/07/17 10:39 AM   Result Value Ref Range    Bilirubin Direct 0.1 0.0 - 0.2 mg/dL    Bilirubin Total 0.4 0.2 - 1.3 mg/dL    Albumin 3.4 3.4 - 5.0 g/dL    Protein Total 7.5 6.8 - 8.8 g/dL    Alkaline Phosphatase 204 (H) 40 - 150 U/L    ALT 19 0 - 50 U/L    AST 16 0 - 45 U/L   CBC with platelets differential    Collection Time: 11/07/17 12:00 PM   Result Value Ref Range    WBC 2.3 (L) 4.0 - 11.0 10e9/L    RBC Count 3.08 (L) 3.8 - 5.2 10e12/L    Hemoglobin 8.7 (L) 11.7 - 15.7 g/dL    Hematocrit 27.5 (L) 35.0 - 47.0 %    MCV 89 78 - 100 fl    MCH 28.2 26.5 - 33.0 pg    MCHC 31.6 31.5 - 36.5 g/dL    RDW 16.9 (H) 10.0 - 15.0 %    Platelet Count 94 (L) 150 - 450 10e9/L    Diff Method Automated Method     % Neutrophils 64.6 %    % Lymphocytes 23.1 %    % Monocytes 10.2 %    % Eosinophils 1.3 %    % Basophils 0.4 %    % Immature Granulocytes 0.4 %    Nucleated RBCs 0 0 /100    Absolute  Neutrophil 1.5 (L) 1.6 - 8.3 10e9/L    Absolute Lymphocytes 0.5 (L) 0.8 - 5.3 10e9/L    Absolute Monocytes 0.2 0.0 - 1.3 10e9/L    Absolute Eosinophils 0.0 0.0 - 0.7 10e9/L    Absolute Basophils 0.0 0.0 - 0.2 10e9/L    Abs Immature Granulocytes 0.0 0 - 0.4 10e9/L    Absolute Nucleated RBC 0.0

## 2017-11-07 NOTE — TELEPHONE ENCOUNTER
----- Message from Em Banks sent at 11/7/2017  2:23 PM CST -----  Regarding: Diabetes referral  Contact: [phone]  [facility] calling, they sent a referral over for patient, requesting her to be seen for diabetes. Referral on chart.     Thank you!  [place]    Call Center       Please DO NOT send this message and/or reply back to sender. Call Center Representatives DO NOT respond to messages.

## 2017-11-07 NOTE — TELEPHONE ENCOUNTER
To schedulers: Please schedule her for lst available diabetes    Faviola Granados MD  Endocrine triage

## 2017-11-09 NOTE — MR AVS SNAPSHOT
After Visit Summary   11/9/2017    Shirin Muller    MRN: [id number]           Patient Information     Date Of Birth          1960        Visit Information        Provider Department      11/9/2017 1:00 PM  24 ATC;  ONCOLOGY INFUSION [facility]        Today's Diagnoses     Malignant neoplasm of head of pancreas (H)    -  1      Care Instructions    Contact Numbers    [facility] [place] [facility] Main Line: [phone]  [facility] [place] [facility] Triage:  [phone]    Call triage with chills and/or temperature greater than or equal to 100.5, uncontrolled nausea/vomiting, diarrhea, constipation, dizziness, shortness of breath, chest pain, bleeding, unexplained bruising, or any new/concerning symptoms, questions/concerns.     If you are having any concerning symptoms or wish to speak to a provider before your next infusion visit, please call your care coordinator or triage to notify them so we can adequately serve you.       After Hours: [phone]    If after hours, weekends, or holidays, call main hospital  and ask for Oncology doctor on call.           November 2017 Sunday Monday Tuesday Wednesday Thursday Friday Saturday                  1     [facility] ONC INFUSION 120   11:00 AM   (120 min.)    ONCOLOGY INFUSION   [facility] 2     3     4       5     6     7     [facility] LAB DRAW    9:45 AM   (15 min.)    [facility] LAB DRAW   [facility] Lab Draw     [facility] ONC INFUSION 120   10:30 AM   (120 min.)    ONCOLOGY INFUSION   [facility] 8     9     [facility] RETURN   10:05 AM   (50 min.)   Ester Echavarria, APRN CNP   [facility]     [facility] LAB DRAW   10:15 AM   (15 min.)    [facility] LAB DRAW   [facility] Lab Draw     [facility] ONC INFUSION 120    1:00 PM   (120 min.)    ONCOLOGY INFUSION   [facility] 10     11       12     13     [facility] LAB DRAW    9:15 AM   (15 min.)    [facility] LAB DRAW   [facility]  [facility] Lab Draw     [facility] ONC INFUSION 180   10:00 AM   (180 min.)    ONCOLOGY INFUSION   [facility]     LAB    6:30 PM   (15 min.)    LAB   [facility] Lab     CT CHEST WO    6:45 PM   (20 min.)   UCCT1   [facility] CT     MR ABDOMEN WWO    7:30 PM   (45 min.)   MR1   [facility] MRI 14     15     16     [facility] LAB DRAW   12:00 PM   (15 min.)    [facility] LAB DRAW   [facility] Lab Draw     [facility] ONC INFUSION 180   12:30 PM   (180 min.)    ONCOLOGY INFUSION   [facility] 17     18       19     20     [facility] RETURN    5:45 PM   (30 min.)   Demond Gonsales MD   [facility] 21     22     23     24     25       26     27     28     29     30 December 2017 Sunday Monday Tuesday Wednesday Thursday Friday Saturday                            1     2       3     4     5     6     7     8     9       10     11     12     13     14     15     16       17     18     19     20     21     22     23       24     25     26     27     28     29     30       31                                               Recent Results (from the past 24 hour(s))   *CBC with platelets differential    Collection Time: 11/09/17 10:41 AM   Result Value Ref Range    WBC 3.0 (L) 4.0 - 11.0 10e9/L    RBC Count 3.19 (L) 3.8 - 5.2 10e12/L    Hemoglobin 8.9 (L) 11.7 - 15.7 g/dL    Hematocrit 29.1 (L) 35.0 - 47.0 %    MCV 91 78 - 100 fl    MCH 27.9 26.5 - 33.0 pg    MCHC 30.6 (L) 31.5 - 36.5 g/dL    RDW 17.1 (H) 10.0 - 15.0 %    Platelet Count 96 (L) 150 - 450 10e9/L    Diff Method Automated Method     % Neutrophils 80.1 %    % Lymphocytes 12.5 %    % Monocytes 6.1 %    % Eosinophils 0.7 %    % Basophils 0.3 %    % Immature Granulocytes 0.3 %    Nucleated RBCs 0 0 /100    Absolute Neutrophil 2.4 1.6 - 8.3 10e9/L    Absolute Lymphocytes 0.4 (L) 0.8 - 5.3 10e9/L    Absolute Monocytes 0.2 0.0 - 1.3 10e9/L    Absolute Eosinophils 0.0 0.0  - 0.7 10e9/L    Absolute Basophils 0.0 0.0 - 0.2 10e9/L    Abs Immature Granulocytes 0.0 0 - 0.4 10e9/L    Absolute Nucleated RBC 0.0    Comprehensive metabolic panel    Collection Time: 11/09/17 10:41 AM   Result Value Ref Range    Sodium 136 133 - 144 mmol/L    Potassium 2.8 (L) 3.4 - 5.3 mmol/L    Chloride 104 94 - 109 mmol/L    Carbon Dioxide 24 20 - 32 mmol/L    Anion Gap 8 3 - 14 mmol/L    Glucose 250 (H) 70 - 99 mg/dL    Urea Nitrogen 7 7 - 30 mg/dL    Creatinine 0.64 0.52 - 1.04 mg/dL    GFR Estimate >90 >60 mL/min/1.7m2    GFR Estimate If Black >90 >60 mL/min/1.7m2    Calcium 8.4 (L) 8.5 - 10.1 mg/dL    Bilirubin Total 0.5 0.2 - 1.3 mg/dL    Albumin 3.1 (L) 3.4 - 5.0 g/dL    Protein Total 7.2 6.8 - 8.8 g/dL    Alkaline Phosphatase 217 (H) 40 - 150 U/L    ALT 23 0 - 50 U/L    AST 20 0 - 45 U/L   TSH with free T4 reflex    Collection Time: 11/09/17 10:41 AM   Result Value Ref Range    TSH 2.56 0.40 - 4.00 mU/L                 Follow-ups after your visit        Your next 10 appointments already scheduled     Nov 13, 2017  9:15 AM CST   [facility] Lab Draw with [facility] LAB DRAW   [facility] ([facility])    [address] [place] [address]  [address] [facility] [address] [phone]            Nov 13, 2017 10:00 AM CST   Infusion 180 with UC ONCOLOGY INFUSION, UC 17 ATC   [facility] ([facility])    [address] [facility]  [address] [facility] [address] [phone]            Nov 13, 2017  6:30 PM CST   LAB with UC LAB   [facility] Lab [facility])    [address] [place] [address]  [address] [facility] [address] [phone]           Please do not eat 10-12 hours before your appointment if you are coming in fasting for labs on lipids, cholesterol, or glucose (sugar). This does not apply to pregnant women. Water, hot tea and black coffee (with nothing added) are okay. Do not drink other fluids, diet soda  or chew gum.            Nov 13, 2017  7:00 PM CST   (Arrive by 6:45 PM)   CT CHEST W/O CONTRAST with [address] [place] [address] CT ([facility])    [address] [place] [id number] [phone]           Please bring any scans or X-rays taken at other hospitals, if similar tests were done. Also bring a list of your medicines, including vitamins, minerals and over-the-counter drugs. It is safest to leave personal items at home.  Be sure to tell your doctor:   If you have any allergies.   If there s any chance you are pregnant.   If you are breastfeeding.   If you have any special needs.  You do not need to do anything special to prepare.  Please wear loose clothing, such as a sweat suit or jogging clothes. Avoid snaps, zippers and other metal. We may ask you to undress and put on a hospital gown.            Nov 13, 2017  7:45 PM CST   (Arrive by 7:30 PM)   MR ABDOMEN W/O & W CONTRAST with MR[address] [place] [address] MRI ([facility])    [address] [place] [id number] [phone]           Take your medicines as usual, unless your doctor tells you not to. Bring a list of your current medicines to your exam (including vitamins, minerals and over-the-counter drugs). Also bring the results of similar scans you may have had.    The day before your exam, drink extra fluids at least six 8-ounce glasses (unless your doctor tells you to restrict your fluids).   Have a blood test (creatinine test) within 30 days of your exam. Go to your clinic or Diagnostic Imaging Department for this test.   Do not eat or drink for 6 hours prior to exam.  The MRI machine uses a strong magnet. Please wear clothes without metal (snaps, zippers). A sweatsuit works well, or we may give you a hospital gown.  Please remove any body piercings and hair extensions before you arrive. You will also remove watches, jewelry, hairpins,  wallets, dentures, partial dental plates and hearing aids. You may wear contact lenses, and you may be able to wear your rings. We have a safe place to keep your personal items, but it is safer to leave them at home.   **IMPORTANT** THE INSTRUCTIONS BELOW ARE ONLY FOR THOSE PATIENTS WHO HAVE BEEN TOLD THEY WILL RECEIVE SEDATION OR GENERAL ANESTHESIA DURING THEIR MRI PROCEDURE:  IF YOU WILL RECEIVE SEDATION (take medicine to help you relax during your exam):   You must get the medicine from your doctor before you arrive. Bring the medicine to the exam. Do not take it at home.   Arrive one hour early. Bring someone who can take you home after the test. Your medicine will make you sleepy. After the exam, you may not drive, take a bus or take a taxi by yourself.   No eating 8 hours before your exam. You may have clear liquids up until 4 hours before your exam. (Clear liquids include water, clear tea, black coffee and fruit juice without pulp.)  IF YOU WILL RECEIVE ANESTHESIA (be asleep for your exam):   Arrive 1 1/2 hours early. Bring someone who can take you home after the test. You may not drive, take a bus or take a taxi by yourself.   No eating 8 hours before your exam. You may have clear liquids up until 4 hours before your exam. (Clear liquids include water, clear tea, black coffee and fruit juice without pulp.)  If you have any questions, please contact your Imaging Department exam site.            Nov 16, 2017 12:00 PM CST   [facility] Lab Draw with  [facility] LAB DRAW   [facility] Lab Draw ([facility])    [address] [place] [id number] [phone]            Nov 16, 2017 12:30 PM CST   Infusion 180 with  ONCOLOGY INFUSION, UC 30 ATC   [facility] ([facility])    [address] [address]   [phone]            Nov 20, 2017  6:00 PM CST   (Arrive by 5:45 PM)   Return Visit  with Demond Gonsales MD   [facility] ([facility])    [address] [facility]  2nd Floor  [facility] [address] [phone]              Future tests that were ordered for you today     Open Standing Orders        Priority Remaining Interval Expires Ordered    Comprehensive metabolic panel Routine 10/10 every visit 11/9/2018 11/9/2017    *CBC with platelets differential Routine 10/10 every visit 11/9/2018 11/9/2017          Open Future Orders        Priority Expected Expires Ordered    XR Video Swallow w Esophagram - Order with Speech Therapy Referral Routine 11/9/2017 11/9/2018 11/9/2017            Who to contact     If you have questions or need follow up information about today's clinic visit or your schedule please contact [facility] directly at [phone].  Normal or non-critical lab and imaging results will be communicated to you by [employer], letter or phone within 4 business days after the clinic has received the results. If you do not hear from us within 7 days, please contact the clinic through [employer] or phone. If you have a critical or abnormal lab result, we will notify you by phone as soon as possible.  Submit refill requests through [employer] or call your pharmacy and they will forward the refill request to us. Please allow 3 business days for your refill to be completed.          Additional Information About Your Visit        [employer] Information     [employer] gives you secure access to your electronic health record. If you see a primary care provider, you can also send messages to your care team and make appointments. If you have questions, please call your primary care clinic.  If you do not have a primary care provider, please call [phone] and they will assist you.        Care EveryWhere ID     This is your Care EveryWhere ID. This could be used by other organizations to access your [facility] medical records  [id number]          Blood Pressure from Last 3 Encounters:   11/09/17 97/60   11/07/17 118/75   11/01/17 109/73    Weight from Last 3 Encounters:   11/09/17 77.8 kg (171 lb 9.6 oz)   11/07/17 76.4 kg (168 lb 6.4 oz)   11/01/17 77.2 kg (170 lb 3.2 oz)              Today, you had the following     No orders found for display         Today's Medication Changes          These changes are accurate as of: 11/9/17  4:01 PM.  If you have any questions, ask your nurse or doctor.               These medicines have changed or have updated prescriptions.        Dose/Directions    ENSURE CLEAR Liqd   This may have changed:  how much to take   Used for:  Malignant neoplasm of head of pancreas (H)        Dose:  1 Bottle   Take 1 Bottle by mouth 3 times daily   Quantity:  90 Bottle   Refills:  6            Where to get your medicines      Some of these will need a paper prescription and others can be bought over the counter.  Ask your nurse if you have questions.     Bring a paper prescription for each of these medications     morphine 15 MG 12 hr tablet                Primary Care Provider    [facility]       No address on file        Equal Access to Services     JAI CORDERO : Maxx Spence, wathanh flower, qaluis e vasquez. So [facility] [phone].    ATENCIÓN: Si habla español, tiene a bernal disposición servicios gratuitos de asistencia lingüística. [facility] [phone].    We comply with applicable federal civil rights laws and [place] laws. We do not discriminate on the basis of race, color, national origin, age, disability, sex, sexual orientation, or gender identity.            Thank you!     Thank you for choosing [facility]  for your care. Our goal is always to provide you with excellent care. Hearing back from our patients is one way we can continue to improve our services. Please take a few minutes to complete the written survey  that you may receive in the mail after your visit with us. Thank you!             Your Updated Medication List - Protect others around you: Learn how to safely use, store and throw away your medicines at www.disposemymeds.org.          This list is accurate as of: 11/9/17  4:01 PM.  Always use your most recent med list.                   Brand Name Dispense Instructions for use Diagnosis    amylase-lipase-protease 49700 UNITS Cpep    CREON    180 capsule    Take 2 capsules (72,000 Units) by mouth 3 times daily (with meals)    Malignant neoplasm of head of pancreas (H)       diltiazem 2% in PLO cream (FV COMPOUNDED) 2% Gel     30 g    Apply topically daily and as needed to external hemorrhoids    External hemorrhoids       docusate sodium 100 MG capsule    COLACE    100 capsule    Take 1 capsule (100 mg) by mouth daily    External hemorrhoids       dronabinol 5 MG capsule    MARINOL    90 capsule    Take 1 capsule (5 mg) by mouth 3 times daily (before meals)    Anorexia       ENSURE CLEAR Liqd     90 Bottle    Take 1 Bottle by mouth 3 times daily    Malignant neoplasm of head of pancreas (H)       lidocaine-prilocaine cream    EMLA    30 g    Apply topically as needed for other (Use 30-60 minutes prior to port access)    Malignant neoplasm of head of pancreas (H)       loratadine 10 MG tablet    CLARITIN    30 tablet    Take 1 tablet (10 mg) by mouth daily Reported on 5/5/2017    Chronic seasonal allergic rhinitis, unspecified trigger       LORazepam 0.5 MG tablet    ATIVAN    30 tablet    Take 1 tablet (0.5 mg) by mouth every 4 hours as needed (Anxiety, Nausea/Vomiting or Sleep)    Malignant neoplasm of head of pancreas (H)       morphine 15 MG 12 hr tablet    MS CONTIN    60 tablet    Take 1 tablet (15 mg) by mouth every 12 hours    Malignant neoplasm of head of pancreas (H)       ondansetron 8 MG ODT tab    ZOFRAN-ODT    60 tablet    Take 1 tablet (8 mg) by mouth every 8 hours as needed for nausea    Malignant  neoplasm of head of pancreas (H)       oxyCODONE IR 5 MG tablet    ROXICODONE    100 tablet    Take 1 tablet (5 mg) by mouth every 4 hours as needed for moderate to severe pain    Malignant neoplasm of head of pancreas (H)       oxyCODONE-acetaminophen 5-325 MG per tablet    PERCOCET          pantoprazole 20 MG EC tablet    PROTONIX    60 tablet    Take 1 tablet (20 mg) by mouth 2 times daily    Malignant neoplasm of head of pancreas (H)       polyethylene glycol powder    MIRALAX/GLYCOLAX    119 g    Take 17 g (1 capful) by mouth daily    Malignant neoplasm of head of pancreas (H)       potassium chloride SA 20 MEQ CR tablet    KLOR-CON    60 tablet    Take 2 tablets (40 mEq) by mouth daily    Malignant neoplasm of head of pancreas (H)       prochlorperazine 10 MG tablet    COMPAZINE    30 tablet    TAKE ONE TABLET BY MOUTH EVERY 6 HOURS AS NEEDED FOR NAUSEA AND VOMITING    Malignant neoplasm of head of pancreas (H)

## 2017-11-09 NOTE — PROGRESS NOTES
Infusion Nursing Note:  Shirin Muller presents today for IVF and Potassium supplementation.    Patient seen by provider today: Yes: Stefan RESENDIZ    Note: Pt able to eat a bowl of soup in infusion. Tolerated well. K 2.8. 1 L NS with 60mEq KCL administered as ordered. No other complaints at this time.    TORB 11/9/17 1230: Stefan RESENDIZ/Yesenia Kaur RN: Give 1L NS and supplement Potassium per oncology Electrolyte replacement protocol.     Intravenous Access:  Implanted Port.    Treatment Conditions:  Lab Results   Component Value Date     11/09/2017                   Lab Results   Component Value Date    POTASSIUM 2.8 11/09/2017           Lab Results   Component Value Date    MAG 2.0 04/03/2017            Lab Results   Component Value Date    CR 0.64 11/09/2017                   Lab Results   Component Value Date    FANNY 8.4 11/09/2017                Lab Results   Component Value Date    BILITOTAL 0.5 11/09/2017           Lab Results   Component Value Date    ALBUMIN 3.1 11/09/2017                    Lab Results   Component Value Date    ALT 23 11/09/2017           Lab Results   Component Value Date    AST 20 11/09/2017       Post Infusion Assessment:  Patient tolerated infusion without incident.  Blood return noted pre and post infusion.  Access discontinued per protocol.    Discharge Plan:   Prescription refills given for Claritin, Miralax. Pt to  Morphine Rx on 11/13/17  Discharge instructions reviewed with: Patient and Family.  Patient and/or family verbalized understanding of discharge instructions and all questions answered.  Copy of AVS reviewed with patient and/or family.  Patient will return 11/13/17 for next infusion appointment.  Patient discharged in stable condition accompanied by: sister.  Face to Face time: 0.    JUAN CORREA, RN

## 2017-11-09 NOTE — NURSING NOTE
"Oncology Rooming Note    November 9, 2017 10:59 AM   Shirin Muller is a 57 year old female who presents for:    Chief Complaint   Patient presents with     Port Draw     Labs drawn from port by RN. Line flushed with saline and heparin. Vs taken and pt checked in for appt     Oncology Clinic Visit     Return: Pancreatic Cancer     Initial Vitals: BP 97/60 (BP Location: Right arm, Cuff Size: Adult Regular)  Pulse 98  Temp 98.8  F (37.1  C) (Oral)  Resp 16  Ht 1.778 m (5' 10\")  Wt 77.8 kg (171 lb 9.6 oz)  SpO2 98%  BMI 24.62 kg/m2 Estimated body mass index is 24.62 kg/(m^2) as calculated from the following:    Height as of this encounter: 1.778 m (5' 10\").    Weight as of this encounter: 77.8 kg (171 lb 9.6 oz). Body surface area is 1.96 meters squared.  No Pain (0) Comment: Data Unavailable   No LMP recorded. Patient is postmenopausal.  Allergies reviewed: Yes  Medications reviewed: Yes    Medications: Pt. Stated that she needs a refill of Morphine, Claritin, and Mirlax. I informed pt to remind the Provider/EHSAN about refills in case i dont touch bases with them, if the provider was in the exam room while i attend on rooming the next pt. Pt verbalized understandings.  KENDALL Reis      Pharmacy name entered into EPIC:    [place], [place] - [address] [place] [address] [phone]  [id number] - [place], [place] - [address] [place] [address]    Clinical concerns: Injectable Influenza Immunization Documentation    1.  Has the patient received the information for the injectable influenza vaccine? YES     2. Is the patient 6 months of age or older? YES     3. Does the patient have any of the following contraindications?         Severe allergy to eggs? No     Severe allergic reaction to previous influenza vaccines? No   Severe allergy to latex? No       History of Guillain-T[place] syndrome? No     Currently have a temperature greater than 100.4F? No        4.  Severely " "egg allergic patients should have flu vaccine eligibility assessed by an MD, RN, or pharmacist, and those who received flu vaccine should be observed for 15 min by an MD, RN, Pharmacist, Medical Technician, or member of clinic staff.\": NO    5. Latex-allergic patients should be given latex-free influenza vaccine No. Please reference the Vaccine latex table to determine if your clinic s product is latex-containing.    5 minutes for nursing intake (face to face time)     Felipe Luna MA              "

## 2017-11-09 NOTE — PROGRESS NOTES
Oncology/Hematology Visit Note  Nov 9, 2017       Reason for Visit: Follow up of metastatic pancreatic cancer    History of Present Illness: Shirin Muller is a 57 year old female with metastatic pancreatic cancer with known liver metastases. She is currently undergoing treatment with liposomal irinotecan and 5FU. Her oncologic history is as follows:    She was diagnosed with pancreatic cancer in the spring of 2016 after presenting with a 2 to 3-month history of abdominal pain and weight loss. She initiated on treatment with gemcitabine and Abraxane on 05/02/2016 and continued on that until December 2016 when she was found to have progressive metastatic disease involving the liver. She was then initiated on FOLFIRINOX on 12/20/16.  In January 2017, she was found to have a GI bleed secondary to a bleeding duodenal ulcer and infiltrating mass in the duodenum. The ulcer was injected and clipped. She was found to be H. Pylori positive and was initiated on therapy with PPI, carafate, amoxicillin and clarithromycin. She had biliary obstruction in April 2017 and underwent ERCP and  biliary stent placement  Dr. Braden. She was admitted on 8/3/17 with sepsis/cholangitis. Blood cultures grew klebsiella penumonia and streptococcus angiosos. She had an ERCP on 8/3 demonstrating an occluded stent with pus, stone and sludge. The duct was dilated and a new stent placed. She was treated with IV Vanco and Zosyn, repeat BC were negative. She was discharged on levaquin and augmentin. She also has been having intermittent vaginal bleeding with unknown etiology. She was restarted on FOLFIRINOX on 8/18/17. Due to neuropathy, she was switched to liposomal irinotecan and 5FU every 2 weeks on 9/18/17. She has received 4 cycles so far (last dose 10/30).      Pt reported not feeling well yesterday in infusion (fatigue, anorexia, nausea, constipated, unable to take potassium). As such we are seeing her for follow-up today.      Interval History:  Ms. Muller returns to clinic today with her sister. She states that she feeling better today. She states that she has been struggling with 1 week of nausea, vomiting, and anorexia following each chemo treatment. Her nausea is especially provoked by her potassium pills, which she subsequently vomits, and then this prevents her from wanting to eat the rest of the day. She states the nausea has been better yesterday and today, and she has not vomited since Tuesday. As such, she was able to take her potassium pills yesterday and today. She is still constipated and has not had a bowel movement for 5 days. She is taking Senna BID and Miralax. She is not concerned about this as she has not been eating much and has a history of constipation. She is not having any abdominal pain, bloating/distention, and she is still passing gas. Her daily eating habits consists of 5 ensure and occasional soup or noodles.    She does have a new complaint of progressive dysphagia. She states she is having more difficulty with solid foods and feels they get stuck in her upper esophagus. She denies troubles with liquids. She denies hoarseness or voice changes.    She is not having any pain at the moment. She is feeling fatigued and more weak, but better than a few days ago. Did ask about how she was coping with this and she states she feel OK and when asked was clear she still wanted to continue treatments.    Review of Systems:  Patient denies fevers, chills, night sweats, unexplained weight changes, headaches, dizziness, vision or hearing changes, new lumps or bumps, chest pain, shortness of breath, cough, abdominal pain, changes to bladder, swelling of extremities, bleeding issues, or rash.    Current Outpatient Prescriptions   Medication Sig Dispense Refill     potassium chloride SA (KLOR-CON) 20 MEQ CR tablet Take 2 tablets (40 mEq) by mouth daily 60 tablet 3     morphine (MS CONTIN) 15 MG 12 hr tablet Take 1  "tablet (15 mg) by mouth every 12 hours 60 tablet 0     oxyCODONE (ROXICODONE) 5 MG IR tablet Take 1 tablet (5 mg) by mouth every 4 hours as needed for moderate to severe pain 100 tablet 0     LORazepam (ATIVAN) 0.5 MG tablet Take 1 tablet (0.5 mg) by mouth every 4 hours as needed (Anxiety, Nausea/Vomiting or Sleep) 30 tablet 3     dronabinol (MARINOL) 5 MG capsule Take 1 capsule (5 mg) by mouth 3 times daily (before meals) 90 capsule 0     prochlorperazine (COMPAZINE) 10 MG tablet TAKE ONE TABLET BY MOUTH EVERY 6 HOURS AS NEEDED FOR NAUSEA AND VOMITING 30 tablet 2     docusate sodium (COLACE) 100 MG capsule Take 1 capsule (100 mg) by mouth daily 100 capsule 0     ondansetron (ZOFRAN-ODT) 8 MG ODT tab Take 1 tablet (8 mg) by mouth every 8 hours as needed for nausea 60 tablet 6     amylase-lipase-protease (CREON) 43575 UNITS CPEP Take 2 capsules (72,000 Units) by mouth 3 times daily (with meals) 180 capsule 1     loratadine (CLARITIN) 10 MG tablet Take 1 tablet (10 mg) by mouth daily Reported on 5/5/2017 30 tablet 6     polyethylene glycol (MIRALAX/GLYCOLAX) powder Take 17 g (1 capful) by mouth daily 119 g 11     pantoprazole (PROTONIX) 20 MG EC tablet Take 1 tablet (20 mg) by mouth 2 times daily 60 tablet 3     Nutritional Supplements (ENSURE CLEAR) LIQD Take 1 Bottle by mouth 3 times daily (Patient taking differently: Take 5 Bottles by mouth 3 times daily ) 90 Bottle 6     diltiazem 2% in PLO cream, FV COMPOUNDED, 2% GEL Apply topically daily and as needed to external hemorrhoids (Patient not taking: Reported on 11/7/2017) 30 g 0     lidocaine-prilocaine (EMLA) cream Apply topically as needed for other (Use 30-60 minutes prior to port access) 30 g 0       Physical Examination:  BP 97/60 (BP Location: Right arm, Cuff Size: Adult Regular)  Pulse 98  Temp 98.8  F (37.1  C) (Oral)  Resp 16  Ht 1.778 m (5' 10\")  Wt 77.8 kg (171 lb 9.6 oz)  SpO2 98%  BMI 24.62 kg/m2  Wt Readings from Last 10 Encounters:   11/07/17 " 76.4 kg (168 lb 6.4 oz)   11/01/17 77.2 kg (170 lb 3.2 oz)   10/30/17 77.8 kg (171 lb 9.6 oz)   10/16/17 77.9 kg (171 lb 11.2 oz)   10/02/17 78 kg (171 lb 14.4 oz)   09/18/17 77.5 kg (170 lb 12.8 oz)   09/01/17 75.7 kg (166 lb 12.8 oz)   08/21/17 73.3 kg (161 lb 8 oz)   08/18/17 77.4 kg (170 lb 11.2 oz)   08/11/17 75.2 kg (165 lb 12.8 oz)     Constitutional: Well-appearing female in no acute distress.  Eyes: EOMI, PERRL. No scleral icterus.  ENT: Oral mucosa is moist without lesions or thrush. Hyperpigmented changes throughout tongue. Thyroid is palpable with nodularity.  Lymphatic: Neck is supple without cervical or supraclavicular lymphadenopathy.  Cardiovascular: Regular rate and rhythm. No murmurs, gallops, or rubs. Mild bilateral non-pitting peripheral edema.  Respiratory: Clear to auscultation bilaterally. No wheezes or crackles.  Gastrointestinal: Bowel sounds present. Abdomen soft, non-tender, with mild ascites. No palpable hepatosplenomegaly or masses.   Neurologic: Cranial nerves II through XII are grossly intact.  Skin: No rashes, petechiae, or bruising noted on exposed skin.  Psych: Flat affect. Limited eye contact.  Laboratory Data:  Results for NATALIIA IBARRA (MRN [id number]) as of 11/9/2017 12:57   11/9/2017 10:41   Sodium 136   Potassium 2.8 (L)   Chloride 104   Carbon Dioxide 24   Urea Nitrogen 7   Creatinine 0.64   GFR Estimate >90   GFR Estimate If Black >90   Calcium 8.4 (L)   Anion Gap 8   Albumin 3.1 (L)   Protein Total 7.2   Bilirubin Total 0.5   Alkaline Phosphatase 217 (H)   ALT 23   AST 20   TSH 2.56   Glucose 250 (H)   WBC 3.0 (L)   Hemoglobin 8.9 (L)   Hematocrit 29.1 (L)   Platelet Count 96 (L)   RBC Count 3.19 (L)   MCV 91   MCH 27.9   MCHC 30.6 (L)   RDW 17.1 (H)   Diff Method Automated Method   % Neutrophils 80.1   % Lymphocytes 12.5   % Monocytes 6.1   % Eosinophils 0.7   % Basophils 0.3   % Immature Granulocytes 0.3   Nucleated RBCs 0   Absolute Neutrophil 2.4    Absolute Lymphocytes 0.4 (L)   Absolute Monocytes 0.2   Absolute Eosinophils 0.0   Absolute Basophils 0.0   Abs Immature Granulocytes 0.0   Absolute Nucleated RBC 0.0         Assessment and Plan:  1. Metastatic pancreatic adenocarcinoma, currently on liposomal irinotecan + 5FU  Due for cycle 5 11/13. Given continued issues with nausea/vomiting/anorexia, will cancel next weeks treatment. Additionally, she needs to have imaging and a discussion with Dr. Gonsales to determine if this is the right treatment plan.   -CT Chest w/o contrast and MRI abdomen next week  -F/u Dr. Gonsales 11/20  -Hold cycle 5 chemo    2. Chemotherapy induced-nausea/vomiting, improved  Has been taking scheduled Zofran and Compazine yet still struggles with CINV. She is not feeling nauseated today, but given her continued issues post chemo, may need to consider dose reduction or longer time in between treatments moving forward. Continue PRN Zofran and Compazine, and does have IV antiemetics in her therapy plan if needed.    3. FEN  Weight stable, though patient admits to not eating or drinking well the week following chemo. BP is slightly low today, concern for mild dehydration.   -1L IVF today  -Will schedule her for fluids next Mon and Thurs as well    4. Hypokalemia  Potassium is low again today to 2.8 (3.0 on 11/7) despite receiving IV potassium the other day. She is no longer vomiting and is now able to take her potassium pills, so this should continue to improve.  -Give 60 meq IV potassium today  -Recheck Mon and Thurs, replace as needed  -Continue potassium 20meq BID at home    5. Dysphagia, new  Patient admits to progressive dysphagia for the past few weeks. She mainly struggles with solid foods.  -Will order speech therapy and swallow study for next week  -Will also be able to evaluate on CT Chest next week    6. Constipation  Has not had a bowel movement in 5 days, though patient is not symptomatic from this and states this is normal for  her. Still passing gas and bowel sounds present on exam.  -Increase Senna to 2 tabs BID  -Continue Miralax    7. Cancer related pain  No complaints today. Continue MS contin 15mg q12h. Refill given today.    8. Diabetes, HgbA1C 9.2  Endocrine referral has been requested, scheduling is pending.    9. Thyroid nodule  Enlarged thyroid noted on today's exam with nodularity. Ordered TSH after visit, which was normal at 2.56. Consider US and FNA to further evaluate. Will need to discuss further at future visits.    10. Follow-Up  CT Chest and MRI Abdomen next week  Swallow Study next week  IVF and possible K+ on Monday and Thursday next week  Follow-up Dr. Gonsales 11/20    Stefan Chambers PA-C  [facility]  [address] [place] [address]  [phone]      The patient was seen in conjunction with Stefan Chambers PA-C who served as a scribe for today's visit. I have reviewed the note and agree with the above findings and plan. TW

## 2017-11-09 NOTE — PATIENT INSTRUCTIONS
Contact Numbers    [facility] [place] [phone]  [facility] [place]:  [phone]    Call triage with chills and/or temperature greater than or equal to 100.5, uncontrolled nausea/vomiting, diarrhea, constipation, dizziness, shortness of breath, chest pain, bleeding, unexplained bruising, or any new/concerning symptoms, questions/concerns.     If you are having any concerning symptoms or wish to speak to a provider before your next infusion visit, please call your care coordinator or triage to notify them so we can adequately serve you.       After Hours: [phone]    If after hours, weekends, or holidays, call main hospital  and ask for Oncology doctor on call.           November 2017 Sunday Monday Tuesday Wednesday Thursday Friday Saturday                  1     [facility] ONC INFUSION 120   11:00 AM   (120 min.)    ONCOLOGY INFUSION   [facility] 2     3     4       5     6     7     [facility] DRAW    9:45 AM   (15 min.)    [facility]     [facility] INFUSION 120   10:30 AM   (120 min.)    ONCOLOGY INFUSION   [facility] 8     9     [facility] RETURN   10:05 AM   (50 min.)   Ester Echavarria, TEE CNP   [facility]     [facility] DRAW   10:15 AM   (15 min.)    [facility]     [facility] INFUSION 120    1:00 PM   (120 min.)    ONCOLOGY INFUSION   [facility] 10     11       12     13     [facility] DRAW    9:15 AM   (15 min.)    [facility] DRAW   [facility] Draw     [facility] ONC INFUSION 180   10:00 AM   (180 min.)    ONCOLOGY INFUSION   [facility]     LAB    6:30 PM   (15 min.)    LAB   [facility] Lab     CT CHEST WO    6:45 PM   (20 min.)   [address] [place] [address] CT     MR ABDOMEN WWO    7:30 PM   (45 min.)   [address] [place] [address] MRI 14     15     16     [facility] D[facility]   12:00 PM   (15 min.)   [facility]   [facility] Draw     [facility] ONC INFUSION 180   12:30 PM   (180 min.)   [facility]FUSION   [facility] 17     18       19     20     [facility] RETURN    5:45 PM   (30 min.)   Demond Gonsales MD   [facility] 21     22     23     24     25       26     27     28     29     30 December 2017 Sunday Monday Tuesday Wednesday Thursday Friday Saturday                            1     2       3     4     5     6     7     8     9       10     11     12     13     14     15     16       17     18     19     20     21     22     23       24     25     26     27     28     29     30       31                                               Recent Results (from the past 24 hour(s))   *CBC with platelets differential    Collection Time: 11/09/17 10:41 AM   Result Value Ref Range    WBC 3.0 (L) 4.0 - 11.0 10e9/L    RBC Count 3.19 (L) 3.8 - 5.2 10e12/L    Hemoglobin 8.9 (L) 11.7 - 15.7 g/dL    Hematocrit 29.1 (L) 35.0 - 47.0 %    MCV 91 78 - 100 fl    MCH 27.9 26.5 - 33.0 pg    MCHC 30.6 (L) 31.5 - 36.5 g/dL    RDW 17.1 (H) 10.0 - 15.0 %    Platelet Count 96 (L) 150 - 450 10e9/L    Diff Method Automated Method     % Neutrophils 80.1 %    % Lymphocytes 12.5 %    % Monocytes 6.1 %    % Eosinophils 0.7 %    % Basophils 0.3 %    % Immature Granulocytes 0.3 %    Nucleated RBCs 0 0 /100    Absolute Neutrophil 2.4 1.6 - 8.3 10e9/L    Absolute Lymphocytes 0.4 (L) 0.8 - 5.3 10e9/L    Absolute Monocytes 0.2 0.0 - 1.3 10e9/L    Absolute Eosinophils 0.0 0.0 - 0.7 10e9/L    Absolute Basophils 0.0 0.0 - 0.2 10e9/L    Abs Immature Granulocytes 0.0 0 - 0.4 10e9/L    Absolute Nucleated RBC 0.0    Comprehensive metabolic panel    Collection Time: 11/09/17 10:41 AM   Result Value Ref Range    Sodium 136 133 - 144 mmol/L    Potassium 2.8 (L) 3.4 - 5.3 mmol/L    Chloride 104 94 - 109 mmol/L    Carbon Dioxide 24 20 - 32 mmol/L    Anion Gap 8 3 - 14 mmol/L    Glucose 250 (H)  70 - 99 mg/dL    Urea Nitrogen 7 7 - 30 mg/dL    Creatinine 0.64 0.52 - 1.04 mg/dL    GFR Estimate >90 >60 mL/min/1.7m2    GFR Estimate If Black >90 >60 mL/min/1.7m2    Calcium 8.4 (L) 8.5 - 10.1 mg/dL    Bilirubin Total 0.5 0.2 - 1.3 mg/dL    Albumin 3.1 (L) 3.4 - 5.0 g/dL    Protein Total 7.2 6.8 - 8.8 g/dL    Alkaline Phosphatase 217 (H) 40 - 150 U/L    ALT 23 0 - 50 U/L    AST 20 0 - 45 U/L   TSH with free T4 reflex    Collection Time: 11/09/17 10:41 AM   Result Value Ref Range    TSH 2.56 0.40 - 4.00 mU/L

## 2017-11-09 NOTE — MR AVS SNAPSHOT
After Visit Summary   11/9/2017    Shirin Muller    MRN: [id number]           Patient Information     Date Of Birth          1960        Visit Information        Provider Department      11/9/2017 10:20 AM Ester Echavarria APRN CNP M [facility]        Today's Diagnoses     Malignant neoplasm of head of pancreas (H)    -  1    Hypokalemia        Thyroid nodule        Dysphagia, unspecified type           Follow-ups after your visit        Additional Services     Speech Therapy Referral       *This order will print in the [facility] Central Scheduling Office*    [facility] provides Speech Therapy evaluation and treatment and many specialty services across the [facility].  If requesting a specialty program, please choose from the list below.    Call [phone] to schedule [facility] at all locations, with the exception of [facility], please call [phone].     Treatment: Evaluation & Treatment  Speech Treatment Diagnosis: Dysphagia  Special Instructions: evaluate new dysphagia, has metastatic pancreatic cancer  Special Programs: Clinical Swallow Study    Please be aware that coverage of these services is subject to the terms and limitations of your health insurance plan.  Call member services at your health plan with any benefit or coverage questions.      **Note to Provider** To refer patients to therapy outside of the location list, change the order class to External Referral in the order composer.            Speech Therapy Referral       *This order will print in the [facility] Central Scheduling Office*    [facility] provides Speech Therapy evaluation and treatment and many specialty services across the [facility].  If requesting a specialty program, please choose from the list below.    Call [phone] to schedule [facility] at all locations, with the exception of [facility], please call [phone].     Treatment: Evaluation & Treatment  Speech Treatment Diagnosis: Dysphagia  Special Instructions: NA  Special Programs: Video Swallow Study    Please be aware that coverage of these services is subject to the terms and limitations of your health insurance plan.  Call member services at your health plan with any benefit or coverage questions.      **Note to Provider** To refer patients to therapy outside of the location list, change the order class to External Referral in the order composer.                  Your next 10 appointments already scheduled     Nov 13, 2017  7:00 PM CST   (Arrive by 6:45 PM)   CT CHEST W/O CONTRAST with CT[address] [place] [address] CT ([facility])    [address] [address] [phone]           Please bring any scans or X-rays taken at other hospitals, if similar tests were done. Also bring a list of your medicines, including vitamins, minerals and over-the-counter drugs. It is safest to leave personal items at home.  Be sure to tell your doctor:   If you have any allergies.   If there s any chance you are pregnant.   If you are breastfeeding.   If you have any special needs.  You do not need to do anything special to prepare.  Please wear loose clothing, such as a sweat suit or jogging clothes. Avoid snaps, zippers and other metal. We may ask you to undress and put on a hospital gown.            Nov 13, 2017  7:45 PM CST   (Arrive by 7:30 PM)   MR ABDOMEN W/O & W CONTRAST with MR[address] [place] [address] MRI ([facility])    [address] [address] [phone]           Take your medicines as usual, unless your doctor tells you not to. Bring a list of your current medicines to your exam (including vitamins, minerals and over-the-counter drugs). Also  bring the results of similar scans you may have had.    The day before your exam, drink extra fluids at least six 8-ounce glasses (unless your doctor tells you to restrict your fluids).   Have a blood test (creatinine test) within 30 days of your exam. Go to your clinic or Diagnostic Imaging Department for this test.   Do not eat or drink for 6 hours prior to exam.  The MRI machine uses a strong magnet. Please wear clothes without metal (snaps, zippers). A sweatsuit works well, or we may give you a hospital gown.  Please remove any body piercings and hair extensions before you arrive. You will also remove watches, jewelry, hairpins, wallets, dentures, partial dental plates and hearing aids. You may wear contact lenses, and you may be able to wear your rings. We have a safe place to keep your personal items, but it is safer to leave them at home.   **IMPORTANT** THE INSTRUCTIONS BELOW ARE ONLY FOR THOSE PATIENTS WHO HAVE BEEN TOLD THEY WILL RECEIVE SEDATION OR GENERAL ANESTHESIA DURING THEIR MRI PROCEDURE:  IF YOU WILL RECEIVE SEDATION (take medicine to help you relax during your exam):   You must get the medicine from your doctor before you arrive. Bring the medicine to the exam. Do not take it at home.   Arrive one hour early. Bring someone who can take you home after the test. Your medicine will make you sleepy. After the exam, you may not drive, take a bus or take a taxi by yourself.   No eating 8 hours before your exam. You may have clear liquids up until 4 hours before your exam. (Clear liquids include water, clear tea, black coffee and fruit juice without pulp.)  IF YOU WILL RECEIVE ANESTHESIA (be asleep for your exam):   Arrive 1 1/2 hours early. Bring someone who can take you home after the test. You may not drive, take a bus or take a taxi by yourself.   No eating 8 hours before your exam. You may have clear liquids up until 4 hours before your exam. (Clear liquids include water, clear tea, black coffee and  fruit juice without pulp.)  If you have any questions, please contact your Imaging Department exam site.            Nov 15, 2017  2:00 PM CST   Video Swallow with YASH Hidalgo   [facility] ([facility])    [address] [place] [address]  4th Floor  [place] [id number] [phone]           Please check in for this visit in Imaging on the 1st floor.            Nov 15, 2017  2:00 PM CST   XR VIDEO SPEECH EVALUATION WITH ESOPHAGRAM with UCXR2, MARISABEL GIGU RAD   [facility] Xray ([facility])    [address] [place] [address]  [address] [facility] [address] [phone]           Please bring a list of your current medicines to your exam. (Include vitamins, minerals and over-the-counter medicines.) Leave your valuables at home.  Tell the doctor if there is a chance you could be pregnant.  Do not eat for 4 hours before the exam. Keep drinking clear liquids until 2 hours before the exam.  You may take pain medicine (with a sip of water) up to 4 hours before the exam.  Do not swallow any other medicines unless your doctor tells you to. Talk to your doctor to be sure it s safe to stop your medicines.  Please call the Imaging Department at your exam site with any questions.            Nov 16, 2017 12:00 PM CST   [facility] Lab Draw with  [facility] LAB DRAW   [facility] Lab Draw ([facility])    [address] [place] [address] [phone]            Nov 16, 2017 12:30 PM CST   Infusion 180 with  ONCOLOGY INFUSION, UC 30 ATC   [facility] ([facility])    [address] [place] [address] [phone]            Nov 20, 2017  6:00 PM CST   (Arrive by 5:45 PM)   Return Visit with Demond Gonsales MD   [facility] ([facility])    [address] [place] [id number]  "  [phone]            Nov 22, 2017  4:30 PM CST   (Arrive by 4:15 PM)   NEW DIABETES with Chloe Flowers MD   [facility] ([facility])    [address] [place] [address] [phone]              Who to contact     If you have questions or need follow up information about today's clinic visit or your schedule please contact [facility] directly at [phone].  Normal or non-critical lab and imaging results will be communicated to you by [employer], letter or phone within 4 business days after the clinic has received the results. If you do not hear from us within 7 days, please contact the clinic through [employer] or phone. If you have a critical or abnormal lab result, we will notify you by phone as soon as possible.  Submit refill requests through [employer] or call your pharmacy and they will forward the refill request to us. Please allow 3 business days for your refill to be completed.          Additional Information About Your Visit        RF nano Information     [employer] gives you secure access to your electronic health record. If you see a primary care provider, you can also send messages to your care team and make appointments. If you have questions, please call your primary care clinic.  If you do not have a primary care provider, please call [phone] and they will assist you.        Care EveryWhere ID     This is your Care EveryWhere ID. This could be used by other organizations to access your [facility] medical records  [id number]        Your Vitals Were     Pulse Temperature Respirations Height Pulse Oximetry BMI (Body Mass Index)    98 98.8  F (37.1  C) (Oral) 16 1.778 m (5' 10\") 98% 24.62 kg/m2       Blood Pressure from Last 3 Encounters:   11/13/17 104/65   11/09/17 97/60   11/07/17 118/75    Weight from Last 3 Encounters:   11/13/17 77.1 kg (170 lb)   11/09/17 77.8 kg (171 lb 9.6 oz)   11/07/17 76.4 kg (168 lb " 6.4 oz)              We Performed the Following     *CBC with platelets differential     Comprehensive metabolic panel     Speech Therapy Referral     Speech Therapy Referral     TSH with free T4 reflex          Today's Medication Changes          These changes are accurate as of: 11/9/17 11:59 PM.  If you have any questions, ask your nurse or doctor.               These medicines have changed or have updated prescriptions.        Dose/Directions    ENSURE CLEAR Liqd   This may have changed:  how much to take   Used for:  Malignant neoplasm of head of pancreas (H)        Dose:  1 Bottle   Take 1 Bottle by mouth 3 times daily   Quantity:  90 Bottle   Refills:  6            Where to get your medicines      Some of these will need a paper prescription and others can be bought over the counter.  Ask your nurse if you have questions.     Bring a paper prescription for each of these medications     morphine 15 MG 12 hr tablet                Primary Care Provider    [facility]       No address on file        Equal Access to Services     JAI CORDERO : Maxx Spence, johnson flower, john moultonaltc frazier, luis e lora. So [facility] [phone].    ATENCIÓN: Si habla español, tiene a bernal disposición servicios gratuitos de asistencia lingüística. Llame al [phone].    We comply with applicable federal civil rights laws and [place] laws. We do not discriminate on the basis of race, color, national origin, age, disability, sex, sexual orientation, or gender identity.            Thank you!     Thank you for choosing [facility]  for your care. Our goal is always to provide you with excellent care. Hearing back from our patients is one way we can continue to improve our services. Please take a few minutes to complete the written survey that you may receive in the mail after your visit with us. Thank you!             Your Updated Medication List  - Protect others around you: Learn how to safely use, store and throw away your medicines at www.disposemymeds.org.          This list is accurate as of: 11/9/17 11:59 PM.  Always use your most recent med list.                   Brand Name Dispense Instructions for use Diagnosis    amylase-lipase-protease 99159 UNITS Cpep    CREON    180 capsule    Take 2 capsules (72,000 Units) by mouth 3 times daily (with meals)    Malignant neoplasm of head of pancreas (H)       diltiazem 2% in PLO cream (FV COMPOUNDED) 2% Gel     30 g    Apply topically daily and as needed to external hemorrhoids    External hemorrhoids       docusate sodium 100 MG capsule    COLACE    100 capsule    Take 1 capsule (100 mg) by mouth daily    External hemorrhoids       dronabinol 5 MG capsule    MARINOL    90 capsule    Take 1 capsule (5 mg) by mouth 3 times daily (before meals)    Anorexia       ENSURE CLEAR Liqd     90 Bottle    Take 1 Bottle by mouth 3 times daily    Malignant neoplasm of head of pancreas (H)       lidocaine-prilocaine cream    EMLA    30 g    Apply topically as needed for other (Use 30-60 minutes prior to port access)    Malignant neoplasm of head of pancreas (H)       loratadine 10 MG tablet    CLARITIN    30 tablet    Take 1 tablet (10 mg) by mouth daily Reported on 5/5/2017    Chronic seasonal allergic rhinitis, unspecified trigger       LORazepam 0.5 MG tablet    ATIVAN    30 tablet    Take 1 tablet (0.5 mg) by mouth every 4 hours as needed (Anxiety, Nausea/Vomiting or Sleep)    Malignant neoplasm of head of pancreas (H)       morphine 15 MG 12 hr tablet    MS CONTIN    60 tablet    Take 1 tablet (15 mg) by mouth every 12 hours    Malignant neoplasm of head of pancreas (H)       ondansetron 8 MG ODT tab    ZOFRAN-ODT    60 tablet    Take 1 tablet (8 mg) by mouth every 8 hours as needed for nausea    Malignant neoplasm of head of pancreas (H)       oxyCODONE IR 5 MG tablet    ROXICODONE    100 tablet    Take 1 tablet (5  mg) by mouth every 4 hours as needed for moderate to severe pain    Malignant neoplasm of head of pancreas (H)       oxyCODONE-acetaminophen 5-325 MG per tablet    PERCOCET          pantoprazole 20 MG EC tablet    PROTONIX    60 tablet    Take 1 tablet (20 mg) by mouth 2 times daily    Malignant neoplasm of head of pancreas (H)       polyethylene glycol powder    MIRALAX/GLYCOLAX    119 g    Take 17 g (1 capful) by mouth daily    Malignant neoplasm of head of pancreas (H)       potassium chloride SA 20 MEQ CR tablet    KLOR-CON    60 tablet    Take 2 tablets (40 mEq) by mouth daily    Malignant neoplasm of head of pancreas (H)       prochlorperazine 10 MG tablet    COMPAZINE    30 tablet    TAKE ONE TABLET BY MOUTH EVERY 6 HOURS AS NEEDED FOR NAUSEA AND VOMITING    Malignant neoplasm of head of pancreas (H)

## 2017-11-09 NOTE — LETTER
11/9/2017       RE: Shirin Muller  [address] [facility] [address]     Dear Colleague,    Thank you for referring your patient, Shirin Muller, to the [facility]. Please see a copy of my visit note below.    Oncology/Hematology Visit Note  Nov 9, 2017       Reason for Visit: Follow up of metastatic pancreatic cancer    History of Present Illness: Shirin Muller is a 57 year old female with metastatic pancreatic cancer with known liver metastases. She is currently undergoing treatment with liposomal irinotecan and 5FU. Her oncologic history is as follows:    She was diagnosed with pancreatic cancer in the spring of 2016 after presenting with a 2 to 3-month history of abdominal pain and weight loss. She initiated on treatment with gemcitabine and Abraxane on 05/02/2016 and continued on that until December 2016 when she was found to have progressive metastatic disease involving the liver. She was then initiated on FOLFIRINOX on 12/20/16.  In January 2017, she was found to have a GI bleed secondary to a bleeding duodenal ulcer and infiltrating mass in the duodenum. The ulcer was injected and clipped. She was found to be H. Pylori positive and was initiated on therapy with PPI, carafate, amoxicillin and clarithromycin. She had biliary obstruction in April 2017 and underwent ERCP and  biliary stent placement  Dr. Braden. She was admitted on 8/3/17 with sepsis/cholangitis. Blood cultures grew klebsiella penumonia and streptococcus angiosos. She had an ERCP on 8/3 demonstrating an occluded stent with pus, stone and sludge. The duct was dilated and a new stent placed. She was treated with IV Vanco and Zosyn, repeat BC were negative. She was discharged on levaquin and augmentin. She also has been having intermittent vaginal bleeding with unknown etiology. She was restarted on FOLFIRINOX on 8/18/17. Due to neuropathy, she was switched to liposomal  irinotecan and 5FU every 2 weeks on 9/18/17. She has received 4 cycles so far (last dose 10/30).      Pt reported not feeling well yesterday in infusion (fatigue, anorexia, nausea, constipated, unable to take potassium). As such we are seeing her for follow-up today.     Interval History:  Ms. Muller returns to clinic today with her sister. She states that she feeling better today. She states that she has been struggling with 1 week of nausea, vomiting, and anorexia following each chemo treatment. Her nausea is especially provoked by her potassium pills, which she subsequently vomits, and then this prevents her from wanting to eat the rest of the day. She states the nausea has been better yesterday and today, and she has not vomited since Tuesday. As such, she was able to take her potassium pills yesterday and today. She is still constipated and has not had a bowel movement for 5 days. She is taking Senna BID and Miralax. She is not concerned about this as she has not been eating much and has a history of constipation. She is not having any abdominal pain, bloating/distention, and she is still passing gas. Her daily eating habits consists of 5 ensure and occasional soup or noodles.    She does have a new complaint of progressive dysphagia. She states she is having more difficulty with solid foods and feels they get stuck in her upper esophagus. She denies troubles with liquids. She denies hoarseness or voice changes.    She is not having any pain at the moment. She is feeling fatigued and more weak, but better than a few days ago. Did ask about how she was coping with this and she states she feel OK and when asked was clear she still wanted to continue treatments.    Review of Systems:  Patient denies fevers, chills, night sweats, unexplained weight changes, headaches, dizziness, vision or hearing changes, new lumps or bumps, chest pain, shortness of breath, cough, abdominal pain, changes to bladder, swelling  of extremities, bleeding issues, or rash.    Current Outpatient Prescriptions   Medication Sig Dispense Refill     potassium chloride SA (KLOR-CON) 20 MEQ CR tablet Take 2 tablets (40 mEq) by mouth daily 60 tablet 3     morphine (MS CONTIN) 15 MG 12 hr tablet Take 1 tablet (15 mg) by mouth every 12 hours 60 tablet 0     oxyCODONE (ROXICODONE) 5 MG IR tablet Take 1 tablet (5 mg) by mouth every 4 hours as needed for moderate to severe pain 100 tablet 0     LORazepam (ATIVAN) 0.5 MG tablet Take 1 tablet (0.5 mg) by mouth every 4 hours as needed (Anxiety, Nausea/Vomiting or Sleep) 30 tablet 3     dronabinol (MARINOL) 5 MG capsule Take 1 capsule (5 mg) by mouth 3 times daily (before meals) 90 capsule 0     prochlorperazine (COMPAZINE) 10 MG tablet TAKE ONE TABLET BY MOUTH EVERY 6 HOURS AS NEEDED FOR NAUSEA AND VOMITING 30 tablet 2     docusate sodium (COLACE) 100 MG capsule Take 1 capsule (100 mg) by mouth daily 100 capsule 0     ondansetron (ZOFRAN-ODT) 8 MG ODT tab Take 1 tablet (8 mg) by mouth every 8 hours as needed for nausea 60 tablet 6     amylase-lipase-protease (CREON) 05654 UNITS CPEP Take 2 capsules (72,000 Units) by mouth 3 times daily (with meals) 180 capsule 1     loratadine (CLARITIN) 10 MG tablet Take 1 tablet (10 mg) by mouth daily Reported on 5/5/2017 30 tablet 6     polyethylene glycol (MIRALAX/GLYCOLAX) powder Take 17 g (1 capful) by mouth daily 119 g 11     pantoprazole (PROTONIX) 20 MG EC tablet Take 1 tablet (20 mg) by mouth 2 times daily 60 tablet 3     Nutritional Supplements (ENSURE CLEAR) LIQD Take 1 Bottle by mouth 3 times daily (Patient taking differently: Take 5 Bottles by mouth 3 times daily ) 90 Bottle 6     diltiazem 2% in PLO cream, FV COMPOUNDED, 2% GEL Apply topically daily and as needed to external hemorrhoids (Patient not taking: Reported on 11/7/2017) 30 g 0     lidocaine-prilocaine (EMLA) cream Apply topically as needed for other (Use 30-60 minutes prior to port access) 30 g 0  "      Physical Examination:  BP 97/60 (BP Location: Right arm, Cuff Size: Adult Regular)  Pulse 98  Temp 98.8  F (37.1  C) (Oral)  Resp 16  Ht 1.778 m (5' 10\")  Wt 77.8 kg (171 lb 9.6 oz)  SpO2 98%  BMI 24.62 kg/m2  Wt Readings from Last 10 Encounters:   11/07/17 76.4 kg (168 lb 6.4 oz)   11/01/17 77.2 kg (170 lb 3.2 oz)   10/30/17 77.8 kg (171 lb 9.6 oz)   10/16/17 77.9 kg (171 lb 11.2 oz)   10/02/17 78 kg (171 lb 14.4 oz)   09/18/17 77.5 kg (170 lb 12.8 oz)   09/01/17 75.7 kg (166 lb 12.8 oz)   08/21/17 73.3 kg (161 lb 8 oz)   08/18/17 77.4 kg (170 lb 11.2 oz)   08/11/17 75.2 kg (165 lb 12.8 oz)     Constitutional: Well-appearing female in no acute distress.  Eyes: EOMI, PERRL. No scleral icterus.  ENT: Oral mucosa is moist without lesions or thrush. Hyperpigmented changes throughout tongue. Thyroid is palpable with nodularity.  Lymphatic: Neck is supple without cervical or supraclavicular lymphadenopathy.  Cardiovascular: Regular rate and rhythm. No murmurs, gallops, or rubs. Mild bilateral non-pitting peripheral edema.  Respiratory: Clear to auscultation bilaterally. No wheezes or crackles.  Gastrointestinal: Bowel sounds present. Abdomen soft, non-tender, with mild ascites. No palpable hepatosplenomegaly or masses.   Neurologic: Cranial nerves II through XII are grossly intact.  Skin: No rashes, petechiae, or bruising noted on exposed skin.  Psych: Flat affect. Limited eye contact.  Laboratory Data:  Results for NATALIIA IBARRA (MRN [id number]) as of 11/9/2017 12:57   11/9/2017 10:41   Sodium 136   Potassium 2.8 (L)   Chloride 104   Carbon Dioxide 24   Urea Nitrogen 7   Creatinine 0.64   GFR Estimate >90   GFR Estimate If Black >90   Calcium 8.4 (L)   Anion Gap 8   Albumin 3.1 (L)   Protein Total 7.2   Bilirubin Total 0.5   Alkaline Phosphatase 217 (H)   ALT 23   AST 20   TSH 2.56   Glucose 250 (H)   WBC 3.0 (L)   Hemoglobin 8.9 (L)   Hematocrit 29.1 (L)   Platelet Count 96 (L)   RBC Count " 3.19 (L)   MCV 91   MCH 27.9   MCHC 30.6 (L)   RDW 17.1 (H)   Diff Method Automated Method   % Neutrophils 80.1   % Lymphocytes 12.5   % Monocytes 6.1   % Eosinophils 0.7   % Basophils 0.3   % Immature Granulocytes 0.3   Nucleated RBCs 0   Absolute Neutrophil 2.4   Absolute Lymphocytes 0.4 (L)   Absolute Monocytes 0.2   Absolute Eosinophils 0.0   Absolute Basophils 0.0   Abs Immature Granulocytes 0.0   Absolute Nucleated RBC 0.0         Assessment and Plan:  1. Metastatic pancreatic adenocarcinoma, currently on liposomal irinotecan + 5FU  Due for cycle 5 11/13. Given continued issues with nausea/vomiting/anorexia, will cancel next weeks treatment. Additionally, she needs to have imaging and a discussion with Dr. Gonsales to determine if this is the right treatment plan.   -CT Chest w/o contrast and MRI abdomen next week  -F/u Dr. Gonsales 11/20  -Hold cycle 5 chemo    2. Chemotherapy induced-nausea/vomiting, improved  Has been taking scheduled Zofran and Compazine yet still struggles with CINV. She is not feeling nauseated today, but given her continued issues post chemo, may need to consider dose reduction or longer time in between treatments moving forward. Continue PRN Zofran and Compazine, and does have IV antiemetics in her therapy plan if needed.    3. FEN  Weight stable, though patient admits to not eating or drinking well the week following chemo. BP is slightly low today, concern for mild dehydration.   -1L IVF today  -Will schedule her for fluids next Mon and Thurs as well    4. Hypokalemia  Potassium is low again today to 2.8 (3.0 on 11/7) despite receiving IV potassium the other day. She is no longer vomiting and is now able to take her potassium pills, so this should continue to improve.  -Give 60 meq IV potassium today  -Recheck Mon and Thurs, replace as needed  -Continue potassium 20meq BID at home    5. Dysphagia, new  Patient admits to progressive dysphagia for the past few weeks. She mainly struggles  with solid foods.  -Will order speech therapy and swallow study for next week  -Will also be able to evaluate on CT Chest next week    6. Constipation  Has not had a bowel movement in 5 days, though patient is not symptomatic from this and states this is normal for her. Still passing gas and bowel sounds present on exam.  -Increase Senna to 2 tabs BID  -Continue Miralax    7. Cancer related pain  No complaints today. Continue MS contin 15mg q12h. Refill given today.    8. Diabetes, HgbA1C 9.2  Endocrine referral has been requested, scheduling is pending.    9. Thyroid nodule  Enlarged thyroid noted on today's exam with nodularity. Ordered TSH after visit, which was normal at 2.56. Consider US and FNA to further evaluate. Will need to discuss further at future visits.    10. Follow-Up  CT Chest and MRI Abdomen next week  Swallow Study next week  IVF and possible K+ on Monday and Thursday next week  Follow-up Dr. Gonsales 11/20    Stefan Chambers PA-C  [facility]  [address] [place] [address]  [phone]      The patient was seen in conjunction with Stefan Chambers PA-C who served as a scribe for today's visit. I have reviewed the note and agree with the above findings and plan. TW      Again, thank you for allowing me to participate in the care of your patient.      Sincerely,    TEE Villanueva CNP

## 2017-11-09 NOTE — NURSING NOTE
"Chief Complaint   Patient presents with     Port Draw     Labs drawn from port by RN. Line flushed with saline and heparin. Vs taken and pt checked in for appt     Port accessed with 20g 3/4\" gripper needle by RN, labs collected, line flushed with saline and heparin.  Vitals taken. Pt checked in for appointment(s).    Debbie De La Cruz RN  "

## 2017-11-13 NOTE — NURSING NOTE
"Chief Complaint   Patient presents with     Port Draw     Labs drawn from port by RN. Line flushed with saline and heparin. Vs taken and pt checked in for appt     Port accessed with 20g 3/4\" power needle by RN, labs collected, line flushed with saline and heparin.  Vitals taken. Pt checked in for appointment(s).    Debbie De La Cruz RN  "

## 2017-11-13 NOTE — PATIENT INSTRUCTIONS
You can use over the counter cold sore cream like Abreva. It can be found at any drug store. Continue taking Potassium as ordered.       Contact Numbers    [facility] [place] [phone]  [facility] [place]:  [phone]    Call triage with chills and/or temperature greater than or equal to 100.5, uncontrolled nausea/vomiting, diarrhea, constipation, dizziness, shortness of breath, chest pain, bleeding, unexplained bruising, or any new/concerning symptoms, questions/concerns.     If you are having any concerning symptoms or wish to speak to a provider before your next infusion visit, please call your care coordinator or triage to notify them so we can adequately serve you.       After Hours: [phone]    If after hours, weekends, or holidays, call main hospital  and ask for Oncology doctor on call.           November 2017 Sunday Monday Tuesday Wednesday Thursday Friday Saturday 1     [facility] ONC INFUSION 120   11:00 AM   (120 min.)    ONCOLOGY INFUSION   [facility] 2     3     4       5     6     7     [facility] DRAW    9:45 AM   (15 min.)    [facility] DRAW   [facility] Lab Draw     [facility] ONC INFUSION 120   10:30 AM   (120 min.)    ONCOLOGY INFUSION   [facility] 8     9     [facility] RETURN   10:05 AM   (50 min.)   Ester Echavarria APRN CNP   [facility]     [facility] DRAW   10:15 AM   (15 min.)    [facility] DRAW   [facility] Lab Draw     [facility] ON[facility] INFUSION 120    1:00 PM   (120 min.)    ONCOLOGY INFUSION   [facility] 10     11       12     13     [facility] DRAW    9:15 AM   (15 min.)   [facility]   [facility] Lab Draw     [facility] ONC INFUSION 180   10:00 AM   (180 min.)    ONCOLOGY INFUSION   [facility]     CT CHEST WO    6:45 PM   (20 min.)   [address] [place] [address] CT     MR ABDOMEN WWO    7:30 PM   (45 min.)   [address] [place] [address] MRI  14     15     XR VIDEO SPEECH W ESOPHAGRAM    2:00 PM   (60 min.)   [id number]   [facility] Xray     VIDEO SWALLOW STUDY    2:00 PM   (60 min.)   Malu Maurer SLP   [facility] Rehab 16     [facility] DRAW   12:00 PM   (15 min.)    [facility] DRAW   [facility] Lab Draw     [facility] ONC INFUSION 180   12:30 PM   (180 min.)    ONCOLOGY INFUSION   [facility] 17     18       19     20     [facility] RETURN    5:45 PM   (30 min.)   Demond Gonsales MD   [facility] 21     22     [facility] NEW DIABETES    4:15 PM   (60 min.)   Chloe Flowers MD   [facility] Endocrinology 23     24     25       26     27     28     29     30 December 2017 Sunday Monday Tuesday Wednesday Thursday Friday Saturday                            1     2       3     4     5     6     7     8     9       10     11     12     13     14     15     16       17     18     19     20     21     22     23       24     25     26     27     28     29     30       31                                               Recent Results (from the past 24 hour(s))   Comprehensive metabolic panel    Collection Time: 11/13/17 10:27 AM   Result Value Ref Range    Sodium 139 133 - 144 mmol/L    Potassium 2.7 (L) 3.4 - 5.3 mmol/L    Chloride 103 94 - 109 mmol/L    Carbon Dioxide 28 20 - 32 mmol/L    Anion Gap 8 3 - 14 mmol/L    Glucose 223 (H) 70 - 99 mg/dL    Urea Nitrogen 4 (L) 7 - 30 mg/dL    Creatinine 0.56 0.52 - 1.04 mg/dL    GFR Estimate >90 >60 mL/min/1.7m2    GFR Estimate If Black >90 >60 mL/min/1.7m2    Calcium 8.5 8.5 - 10.1 mg/dL    Bilirubin Total 0.4 0.2 - 1.3 mg/dL    Albumin 2.9 (L) 3.4 - 5.0 g/dL    Protein Total 7.2 6.8 - 8.8 g/dL    Alkaline Phosphatase 221 (H) 40 - 150 U/L    ALT 22 0 - 50 U/L    AST 24 0 - 45 U/L   *CBC with platelets differential    Collection Time: 11/13/17 10:27 AM   Result Value Ref Range    WBC 2.0 (L) 4.0 - 11.0 10e9/L    RBC Count 3.00 (L) 3.8 -  5.2 10e12/L    Hemoglobin 8.5 (L) 11.7 - 15.7 g/dL    Hematocrit 27.1 (L) 35.0 - 47.0 %    MCV 90 78 - 100 fl    MCH 28.3 26.5 - 33.0 pg    MCHC 31.4 (L) 31.5 - 36.5 g/dL    RDW 16.8 (H) 10.0 - 15.0 %    Platelet Count 96 (L) 150 - 450 10e9/L    Diff Method Automated Method     % Neutrophils 64.2 %    % Lymphocytes 19.2 %    % Monocytes 14.6 %    % Eosinophils 1.0 %    % Basophils 0.5 %    % Immature Granulocytes 0.5 %    Nucleated RBCs 0 0 /100    Absolute Neutrophil 1.3 (L) 1.6 - 8.3 10e9/L    Absolute Lymphocytes 0.4 (L) 0.8 - 5.3 10e9/L    Absolute Monocytes 0.3 0.0 - 1.3 10e9/L    Absolute Eosinophils 0.0 0.0 - 0.7 10e9/L    Absolute Basophils 0.0 0.0 - 0.2 10e9/L    Abs Immature Granulocytes 0.0 0 - 0.4 10e9/L    Absolute Nucleated RBC 0.0    Magnesium    Collection Time: 11/13/17 10:27 AM   Result Value Ref Range    Magnesium 1.5 (L) 1.6 - 2.3 mg/dL

## 2017-11-13 NOTE — PROGRESS NOTES
Infusion Nursing Note:  Shirin Muller presents today for IVF and Electrolyte supplementation.    Patient seen by provider today: No    Note: Pt states that nausea remains, using zofran and compazine. Not eating and drinking well d/t nausea, dysphagia, lack of appetite. Denies emesis. States that she is able to take at least one Potassium pill/day. BM 11/12/17 per pt. Using stool softeners as needed. Denies pain, fevers, chills. Endorses occasional dyspnea on exertion and fatigue. Cold sore x2 noted. Pt states she normally puts chapstick on her cold sores.   TORB: 11/13/17 1340: Stefan RESENDIZ/Yesenia Kaur RN: OK to use Abreva or over the counter cream. Call triage if cold sores worsen.      Pt educated to continue taking Potassium supplement as prescribed at home and to use Abreva cream if needed. Verbalized understanding.     1 L NS with K and Mag supplemented per Electrolyte replacement protocol.     Intravenous Access:  Implanted Port. Access heparin locked for CT/MRI scan today.     Treatment Conditions:  Lab Results   Component Value Date    HGB 8.5 11/13/2017     Lab Results   Component Value Date    WBC 2.0 11/13/2017      Lab Results   Component Value Date    ANEU 1.3 11/13/2017     Lab Results   Component Value Date    PLT 96 11/13/2017      Lab Results   Component Value Date     11/13/2017                   Lab Results   Component Value Date    POTASSIUM 2.7 11/13/2017           Lab Results   Component Value Date    MAG 2.0 04/03/2017            Lab Results   Component Value Date    CR 0.56 11/13/2017                   Lab Results   Component Value Date    FANNY 8.5 11/13/2017                Lab Results   Component Value Date    BILITOTAL 0.4 11/13/2017           Lab Results   Component Value Date    ALBUMIN 2.9 11/13/2017                    Lab Results   Component Value Date    ALT 22 11/13/2017           Lab Results   Component Value Date    AST 24 11/13/2017       Post  Infusion Assessment:  Patient tolerated infusion without incident.  Blood return noted pre and post infusion.  Site patent and intact, free from redness, edema or discomfort.    Discharge Plan:   Patient declined prescription refills.  Discharge instructions reviewed with: Patient and sister  Patient and family verbalized understanding of discharge instructions and all questions answered.  Copy of AVS reviewed with patient and/or family.  Patient will return 11/16/17 for next infusion appointment.  Patient discharged in stable condition accompanied by: sister.  Face to Face time: 5.    JUAN CORREA RN

## 2017-11-13 NOTE — MR AVS SNAPSHOT
After Visit Summary   11/13/2017    Shirin Muller    MRN: [id number]           Patient Information     Date Of Birth          1960        Visit Information        Provider Department      11/13/2017 10:00 AM MARISABEL 17 ATC;  ONCOLOGY INFUSION M [facility]        Today's Diagnoses     Hypokalemia    -  1    Malignant neoplasm of head of pancreas (H)          Care Instructions    You can use over the counter cold sore cream like Abreva. It can be found at any drug store. Continue taking Potassium as ordered.       Contact Numbers    [facility] Main Line: [phone]  [facility] Triage:  [phone]    Call triage with chills and/or temperature greater than or equal to 100.5, uncontrolled nausea/vomiting, diarrhea, constipation, dizziness, shortness of breath, chest pain, bleeding, unexplained bruising, or any new/concerning symptoms, questions/concerns.     If you are having any concerning symptoms or wish to speak to a provider before your next infusion visit, please call your care coordinator or triage to notify them so we can adequately serve you.       After Hours: [phone]    If after hours, weekends, or holidays, call main hospital  and ask for Oncology doctor on call.           November 2017 Sunday Monday Tuesday Wednesday Thursday Friday Saturday                  1     [facility] ONC INFUSION 120   11:00 AM   (120 min.)    ONCOLOGY INFUSION   M [facility] 2     3     4       5     6     7     [facility]    9:45 AM   (15 min.)   [facility]   M [facility] Lab Draw     [facility] ONC INFUSION 120   10:30 AM   (120 min.)    ONCOLOGY INFUSION   M [facility] 8     9     [facility] RETURN   10:05 AM   (50 min.)   Ester Echavarria APRN CNP   M [facility]     [facility] DRAW   10:15 AM   (15 min.)    [facility] DRAW   M [facility] Lab Draw     [facility] ONC INFUSION 120    1:00 PM   (120 min.)     ONCOLOGY INFUSION   [facility] 10     11       12     13     [facility]    9:15 AM   (15 min.)    [facility] DRAW   [facility] Lab Draw     [facility] ONC INFUSION 180   10:00 AM   (180 min.)    ONCOLOGY INFUSION   [facility]     CT CHEST WO    6:45 PM   (20 min.)   UCCT1   [facility] CT     MR ABDOMEN WWO    7:30 PM   (45 min.)   UCMR1   [facility] MRI 14     15     XR VIDEO SPEECH W ESOPHAGRAM    2:00 PM   (60 min.)   XR2   [facility] Xray     VIDEO SWALLOW STUDY    2:00 PM   (60 min.)   Malu Maurer, SLP   [facility] Rehab 16     [facility] DRAW   12:00 PM   (15 min.)    [facility] DRAW   [facility] Lab Draw     [facility] ONC INFUSION 180   12:30 PM   (180 min.)    ONCOLOGY INFUSION   [facility] 17     18       19     20     [facility] RETURN    5:45 PM   (30 min.)   Demond Gonsales MD   [facility] Cancer [facility] 21     22     [facility] NEW DIABETES    4:15 PM   (60 min.)   Chloe Flowers MD   [facility] Endocrinology 23     24     25       26     27     28     29     30 December 2017 Sunday Monday Tuesday Wednesday Thursday Friday Saturday                            1     2       3     4     5     6     7     8     9       10     11     12     13     14     15     16       17     18     19     20     21     22     23       24     25     26     27     28     29     30       31                                               Recent Results (from the past 24 hour(s))   Comprehensive metabolic panel    Collection Time: 11/13/17 10:27 AM   Result Value Ref Range    Sodium 139 133 - 144 mmol/L    Potassium 2.7 (L) 3.4 - 5.3 mmol/L    Chloride 103 94 - 109 mmol/L    Carbon Dioxide 28 20 - 32 mmol/L    Anion Gap 8 3 - 14 mmol/L    Glucose 223 (H) 70 - 99 mg/dL    Urea Nitrogen 4 (L) 7 - 30 mg/dL    Creatinine 0.56 0.52 - 1.04 mg/dL    GFR Estimate >90 >60  mL/min/1.7m2    GFR Estimate If Black >90 >60 mL/min/1.7m2    Calcium 8.5 8.5 - 10.1 mg/dL    Bilirubin Total 0.4 0.2 - 1.3 mg/dL    Albumin 2.9 (L) 3.4 - 5.0 g/dL    Protein Total 7.2 6.8 - 8.8 g/dL    Alkaline Phosphatase 221 (H) 40 - 150 U/L    ALT 22 0 - 50 U/L    AST 24 0 - 45 U/L   *CBC with platelets differential    Collection Time: 11/13/17 10:27 AM   Result Value Ref Range    WBC 2.0 (L) 4.0 - 11.0 10e9/L    RBC Count 3.00 (L) 3.8 - 5.2 10e12/L    Hemoglobin 8.5 (L) 11.7 - 15.7 g/dL    Hematocrit 27.1 (L) 35.0 - 47.0 %    MCV 90 78 - 100 fl    MCH 28.3 26.5 - 33.0 pg    MCHC 31.4 (L) 31.5 - 36.5 g/dL    RDW 16.8 (H) 10.0 - 15.0 %    Platelet Count 96 (L) 150 - 450 10e9/L    Diff Method Automated Method     % Neutrophils 64.2 %    % Lymphocytes 19.2 %    % Monocytes 14.6 %    % Eosinophils 1.0 %    % Basophils 0.5 %    % Immature Granulocytes 0.5 %    Nucleated RBCs 0 0 /100    Absolute Neutrophil 1.3 (L) 1.6 - 8.3 10e9/L    Absolute Lymphocytes 0.4 (L) 0.8 - 5.3 10e9/L    Absolute Monocytes 0.3 0.0 - 1.3 10e9/L    Absolute Eosinophils 0.0 0.0 - 0.7 10e9/L    Absolute Basophils 0.0 0.0 - 0.2 10e9/L    Abs Immature Granulocytes 0.0 0 - 0.4 10e9/L    Absolute Nucleated RBC 0.0    Magnesium    Collection Time: 11/13/17 10:27 AM   Result Value Ref Range    Magnesium 1.5 (L) 1.6 - 2.3 mg/dL                 Follow-ups after your visit        Your next 10 appointments already scheduled     Nov 13, 2017  7:00 PM CST   (Arrive by 6:45 PM)   CT CHEST W/O CONTRAST with CT1   [facility] CT ([facility])    [address]  1st [facility] [address] [phone]           Please bring any scans or X-rays taken at other hospitals, if similar tests were done. Also bring a list of your medicines, including vitamins, minerals and over-the-counter drugs. It is safest to leave personal items at home.  Be sure to tell your doctor:   If you have any allergies.   If there s  any chance you are pregnant.   If you are breastfeeding.   If you have any special needs.  You do not need to do anything special to prepare.  Please wear loose clothing, such as a sweat suit or jogging clothes. Avoid snaps, zippers and other metal. We may ask you to undress and put on a hospital gown.            Nov 13, 2017  7:45 PM CST   (Arrive by 7:30 PM)   MR ABDOMEN W/O & W CONTRAST with [facility] MRI ([facility])    [address] [address] [phone]           Take your medicines as usual, unless your doctor tells you not to. Bring a list of your current medicines to your exam (including vitamins, minerals and over-the-counter drugs). Also bring the results of similar scans you may have had.    The day before your exam, drink extra fluids at least six 8-ounce glasses (unless your doctor tells you to restrict your fluids).   Have a blood test (creatinine test) within 30 days of your exam. Go to your clinic or Diagnostic Imaging Department for this test.   Do not eat or drink for 6 hours prior to exam.  The MRI machine uses a strong magnet. Please wear clothes without metal (snaps, zippers). A sweatsuit works well, or we may give you a hospital gown.  Please remove any body piercings and hair extensions before you arrive. You will also remove watches, jewelry, hairpins, wallets, dentures, partial dental plates and hearing aids. You may wear contact lenses, and you may be able to wear your rings. We have a safe place to keep your personal items, but it is safer to leave them at home.   **IMPORTANT** THE INSTRUCTIONS BELOW ARE ONLY FOR THOSE PATIENTS WHO HAVE BEEN TOLD THEY WILL RECEIVE SEDATION OR GENERAL ANESTHESIA DURING THEIR MRI PROCEDURE:  IF YOU WILL RECEIVE SEDATION (take medicine to help you relax during your exam):   You must get the medicine from your doctor before you arrive. Bring the medicine to the exam. Do not  take it at home.   Arrive one hour early. Bring someone who can take you home after the test. Your medicine will make you sleepy. After the exam, you may not drive, take a bus or take a taxi by yourself.   No eating 8 hours before your exam. You may have clear liquids up until 4 hours before your exam. (Clear liquids include water, clear tea, black coffee and fruit juice without pulp.)  IF YOU WILL RECEIVE ANESTHESIA (be asleep for your exam):   Arrive 1 1/2 hours early. Bring someone who can take you home after the test. You may not drive, take a bus or take a taxi by yourself.   No eating 8 hours before your exam. You may have clear liquids up until 4 hours before your exam. (Clear liquids include water, clear tea, black coffee and fruit juice without pulp.)  If you have any questions, please contact your Imaging Department exam site.            Nov 15, 2017  2:00 PM CST   Video Swallow with YASH Hidalgo    Health Rehab ([facility])    [address] [place] [address]  4th [facility] [address] [phone]           Please check in for this visit in Imaging on the 1st floor.            Nov 15, 2017  2:00 PM CST   XR VIDEO SPEECH EVALUATION WITH ESOPHAGRAM with UCXR2, UC GIGU RAD   [facility] Xray ([facility])    [address] [place] [id number] [phone]           Please bring a list of your current medicines to your exam. (Include vitamins, minerals and over-the-counter medicines.) Leave your valuables at home.  Tell the doctor if there is a chance you could be pregnant.  Do not eat for 4 hours before the exam. Keep drinking clear liquids until 2 hours before the exam.  You may take pain medicine (with a sip of water) up to 4 hours before the exam.  Do not swallow any other medicines unless your doctor tells you to. Talk to your doctor to be sure it s safe to stop your medicines.  Please call the Imaging  Department at your exam site with any questions.            Nov 16, 2017 12:00 PM CST   [facility] Draw with [facility] DRAW   [facility] Lab Draw ([facility])    [address] [place] [address]  2nd [facility] [id number] [phone]            Nov 16, 2017 12:30 PM CST   Infusion 180 with UC ONCOLOGY INFUSION, UC 30 ATC   [facility] Cancer [facility] ([facility])    [address] [place] [id number] [phone]            Nov 20, 2017  6:00 PM CST   (Arrive by 5:45 PM)   Return Visit with Demond Gonsales MD   [facility] ([facility])    [address] [place] [id number] [phone]            Nov 22, 2017  4:30 PM CST   (Arrive by 4:15 PM)   NEW DIABETES with Chloe Flowers MD   [facility] Endocrinology ([facility])    [address] [place] [address]  3rd [facility] [id number] [phone]              Who to contact     If you have questions or need follow up information about today's clinic visit or your schedule please contact [facility]ER [facility] directly at [phone].  Normal or non-critical lab and imaging results will be communicated to you by TeraViewhart, letter or phone within 4 business days after the clinic has received the results. If you do not hear from us within 7 days, please contact the clinic through MyChart or phone. If you have a critical or abnormal lab result, we will notify you by phone as soon as possible.  Submit refill requests through Dogi or call your pharmacy and they will forward the refill request to us. Please allow 3 business days for your refill to be completed.          Additional Information About Your Visit        Dogi Information     Dogi gives you secure access to your electronic health record. If you see a primary care provider, you can also send messages to  your care team and make appointments. If you have questions, please call your primary care clinic.  If you do not have a primary care provider, please call [phone] and they will assist you.        Care EveryWhere ID     This is your Care EveryWhere ID. This could be used by other organizations to access your [place] medical records  [id number]        Your Vitals Were     Pulse Temperature Respirations Pulse Oximetry BMI (Body Mass Index)       94 98.4  F (36.9  C) (Oral) 16 100% 24.39 kg/m2        Blood Pressure from Last 3 Encounters:   11/13/17 104/65   11/09/17 97/60   11/07/17 118/75    Weight from Last 3 Encounters:   11/13/17 77.1 kg (170 lb)   11/09/17 77.8 kg (171 lb 9.6 oz)   11/07/17 76.4 kg (168 lb 6.4 oz)              We Performed the Following     *CBC with platelets differential     Comprehensive metabolic panel     Magnesium          Today's Medication Changes          These changes are accurate as of: 11/13/17  2:55 PM.  If you have any questions, ask your nurse or doctor.               These medicines have changed or have updated prescriptions.        Dose/Directions    ENSURE CLEAR Liqd   This may have changed:  how much to take   Used for:  Malignant neoplasm of head of pancreas (H)        Dose:  1 Bottle   Take 1 Bottle by mouth 3 times daily   Quantity:  90 Bottle   Refills:  6                Primary Care Provider    [facility]       No address on file        Equal Access to Services     JAI CORDERO AH: Maxx Spence, wathanh flower, qaybta kaalulis e lao. So [facility] [phone].    ATENCIÓN: Si habla español, tiene a bernal disposición servicios gratuitos de asistencia lingüística. Llame al [phone].    We comply with applicable federal civil rights laws and [place] laws. We do not discriminate on the basis of race, color, national origin, age, disability, sex, sexual orientation, or gender identity.             Thank you!     Thank you for choosing [facility]  for your care. Our goal is always to provide you with excellent care. Hearing back from our patients is one way we can continue to improve our services. Please take a few minutes to complete the written survey that you may receive in the mail after your visit with us. Thank you!             Your Updated Medication List - Protect others around you: Learn how to safely use, store and throw away your medicines at www.disposemymeds.org.          This list is accurate as of: 11/13/17  2:55 PM.  Always use your most recent med list.                   Brand Name Dispense Instructions for use Diagnosis    amylase-lipase-protease 65954 UNITS Cpep    CREON    180 capsule    Take 2 capsules (72,000 Units) by mouth 3 times daily (with meals)    Malignant neoplasm of head of pancreas (H)       diltiazem 2% in PLO cream (FV COMPOUNDED) 2% Gel     30 g    Apply topically daily and as needed to external hemorrhoids    External hemorrhoids       docusate sodium 100 MG capsule    COLACE    100 capsule    Take 1 capsule (100 mg) by mouth daily    External hemorrhoids       dronabinol 5 MG capsule    MARINOL    90 capsule    Take 1 capsule (5 mg) by mouth 3 times daily (before meals)    Anorexia       ENSURE CLEAR Liqd     90 Bottle    Take 1 Bottle by mouth 3 times daily    Malignant neoplasm of head of pancreas (H)       lidocaine-prilocaine cream    EMLA    30 g    Apply topically as needed for other (Use 30-60 minutes prior to port access)    Malignant neoplasm of head of pancreas (H)       loratadine 10 MG tablet    CLARITIN    30 tablet    Take 1 tablet (10 mg) by mouth daily Reported on 5/5/2017    Chronic seasonal allergic rhinitis, unspecified trigger       LORazepam 0.5 MG tablet    ATIVAN    30 tablet    Take 1 tablet (0.5 mg) by mouth every 4 hours as needed (Anxiety, Nausea/Vomiting or Sleep)    Malignant neoplasm of head of pancreas (H)        morphine 15 MG 12 hr tablet    MS CONTIN    60 tablet    Take 1 tablet (15 mg) by mouth every 12 hours    Malignant neoplasm of head of pancreas (H)       ondansetron 8 MG ODT tab    ZOFRAN-ODT    60 tablet    Take 1 tablet (8 mg) by mouth every 8 hours as needed for nausea    Malignant neoplasm of head of pancreas (H)       oxyCODONE IR 5 MG tablet    ROXICODONE    100 tablet    Take 1 tablet (5 mg) by mouth every 4 hours as needed for moderate to severe pain    Malignant neoplasm of head of pancreas (H)       oxyCODONE-acetaminophen 5-325 MG per tablet    PERCOCET          pantoprazole 20 MG EC tablet    PROTONIX    60 tablet    Take 1 tablet (20 mg) by mouth 2 times daily    Malignant neoplasm of head of pancreas (H)       polyethylene glycol powder    MIRALAX/GLYCOLAX    119 g    Take 17 g (1 capful) by mouth daily    Malignant neoplasm of head of pancreas (H)       potassium chloride SA 20 MEQ CR tablet    KLOR-CON    60 tablet    Take 2 tablets (40 mEq) by mouth daily    Malignant neoplasm of head of pancreas (H)       prochlorperazine 10 MG tablet    COMPAZINE    30 tablet    TAKE ONE TABLET BY MOUTH EVERY 6 HOURS AS NEEDED FOR NAUSEA AND VOMITING    Malignant neoplasm of head of pancreas (H)

## 2017-11-15 NOTE — MR AVS SNAPSHOT
After Visit Summary   11/15/2017    Shirin Muller    MRN: [id number]           Patient Information     Date Of Birth          1960        Visit Information        Provider Department      11/15/2017 2:00 PM Malu Maurer SLP [facility]        Today's Diagnoses     Dysphagia, unspecified type           Follow-ups after your visit        Your next 10 appointments already scheduled     Nov 16, 2017 12:00 PM CST   [facility] Draw with [facility] DRAW   [facility] Lab Draw ([facility])    [address] [place] [id number]   [phone]            Nov 16, 2017 12:30 PM CST   Infusion 180 with UC ONCOLOGY INFUSION, UC 30 ATC   [facility] ([facility])    [address] [place] [id number]   [phone]            Nov 20, 2017  6:00 PM CST   (Arrive by 5:45 PM)   Return Visit with Demond Gonsales MD   [facility] ([facility])    [address] [place] [id number] [phone]            Nov 22, 2017  4:30 PM CST   (Arrive by 4:15 PM)   NEW DIABETES with Chloe Flowers MD   [facility] ([facility])    [address] [place] [id number] [phone]              Who to contact     Please call your clinic at [phone] to:    Ask questions about your health    Make or cancel appointments    Discuss your medicines    Learn about your test results    Speak to your doctor   If you have compliments or concerns about an experience at your clinic, or if you wish to file a complaint, please contact [facility] Patient Relations at [phone] or email us at [email or web address]         Additional Information About Your Visit        MyChart Information     MyChart gives you secure  access to your electronic health record. If you see a primary care provider, you can also send messages to your care team and make appointments. If you have questions, please call your primary care clinic.  If you do not have a primary care provider, please call [phone] and they will assist you.      [employer] is an electronic gateway that provides easy, online access to your medical records. With [employer], you can request a clinic appointment, read your test results, renew a prescription or communicate with your care team.     To access your existing account, please contact your [facility] or call [phone] for assistance.        Care EveryWhere ID     This is your Care EveryWhere ID. This could be used by other organizations to access your [facility] medical records  [id number]         Blood Pressure from Last 3 Encounters:   11/13/17 104/65   11/09/17 97/60   11/07/17 118/75    Weight from Last 3 Encounters:   11/13/17 77.1 kg (170 lb)   11/09/17 77.8 kg (171 lb 9.6 oz)   11/07/17 76.4 kg (168 lb 6.4 oz)              Today, you had the following     No orders found for display         Today's Medication Changes          These changes are accurate as of: 11/15/17  2:42 PM.  If you have any questions, ask your nurse or doctor.               These medicines have changed or have updated prescriptions.        Dose/Directions    ENSURE CLEAR Liqd   This may have changed:  how much to take   Used for:  Malignant neoplasm of head of pancreas (H)        Dose:  1 Bottle   Take 1 Bottle by mouth 3 times daily   Quantity:  90 Bottle   Refills:  6                Primary Care Provider    [facility]       No address on file        Equal Access to Services     JAI CORDERO : Maxx Spence, johnson lukhushbu, qaluis e vasquez. So [facility] [phone].    ATENCIÓN: Si habla español, tiene a bernal disposición servicios  laurel de asistencia lingüística. Vanessa hyde [phone].    We comply with applicable federal civil rights laws and [place] laws. We do not discriminate on the basis of race, color, national origin, age, disability, sex, sexual orientation, or gender identity.            Thank you!     Thank you for choosing [facility]  for your care. Our goal is always to provide you with excellent care. Hearing back from our patients is one way we can continue to improve our services. Please take a few minutes to complete the written survey that you may receive in the mail after your visit with us. Thank you!             Your Updated Medication List - Protect others around you: Learn how to safely use, store and throw away your medicines at www.disposemymeds.org.          This list is accurate as of: 11/15/17  2:42 PM.  Always use your most recent med list.                   Brand Name Dispense Instructions for use Diagnosis    amylase-lipase-protease 84233 UNITS Cpep    CREON    180 capsule    Take 2 capsules (72,000 Units) by mouth 3 times daily (with meals)    Malignant neoplasm of head of pancreas (H)       diltiazem 2% in PLO cream (FV COMPOUNDED) 2% Gel     30 g    Apply topically daily and as needed to external hemorrhoids    External hemorrhoids       docusate sodium 100 MG capsule    COLACE    100 capsule    Take 1 capsule (100 mg) by mouth daily    External hemorrhoids       dronabinol 5 MG capsule    MARINOL    90 capsule    Take 1 capsule (5 mg) by mouth 3 times daily (before meals)    Anorexia       ENSURE CLEAR Liqd     90 Bottle    Take 1 Bottle by mouth 3 times daily    Malignant neoplasm of head of pancreas (H)       lidocaine-prilocaine cream    EMLA    30 g    Apply topically as needed for other (Use 30-60 minutes prior to port access)    Malignant neoplasm of head of pancreas (H)       loratadine 10 MG tablet    CLARITIN    30 tablet    Take 1 tablet (10 mg) by mouth daily Reported on 5/5/2017     Chronic seasonal allergic rhinitis, unspecified trigger       LORazepam 0.5 MG tablet    ATIVAN    30 tablet    Take 1 tablet (0.5 mg) by mouth every 4 hours as needed (Anxiety, Nausea/Vomiting or Sleep)    Malignant neoplasm of head of pancreas (H)       morphine 15 MG 12 hr tablet    MS CONTIN    60 tablet    Take 1 tablet (15 mg) by mouth every 12 hours    Malignant neoplasm of head of pancreas (H)       ondansetron 8 MG ODT tab    ZOFRAN-ODT    60 tablet    Take 1 tablet (8 mg) by mouth every 8 hours as needed for nausea    Malignant neoplasm of head of pancreas (H)       oxyCODONE IR 5 MG tablet    ROXICODONE    100 tablet    Take 1 tablet (5 mg) by mouth every 4 hours as needed for moderate to severe pain    Malignant neoplasm of head of pancreas (H)       oxyCODONE-acetaminophen 5-325 MG per tablet    PERCOCET          pantoprazole 20 MG EC tablet    PROTONIX    60 tablet    Take 1 tablet (20 mg) by mouth 2 times daily    Malignant neoplasm of head of pancreas (H)       polyethylene glycol powder    MIRALAX/GLYCOLAX    119 g    Take 17 g (1 capful) by mouth daily    Malignant neoplasm of head of pancreas (H)       potassium chloride SA 20 MEQ CR tablet    KLOR-CON    60 tablet    Take 2 tablets (40 mEq) by mouth daily    Malignant neoplasm of head of pancreas (H)       prochlorperazine 10 MG tablet    COMPAZINE    30 tablet    TAKE ONE TABLET BY MOUTH EVERY 6 HOURS AS NEEDED FOR NAUSEA AND VOMITING    Malignant neoplasm of head of pancreas (H)

## 2017-11-15 NOTE — PROGRESS NOTES
11/15/17 1400   General Information   Type Of Visit Initial   Start Of Care Date 11/15/17   Referring Physician Stefan Chambers PA-C   Orders Evaluate And Treat   Orders Comment Video Swallow Study   Medical Diagnosis Dysphagia   Onset Of Illness/injury Or Date Of Surgery 11/10/17   Precautions/limitations No Known Precautions/limitations   Hearing WFL   Pertinent History of Current Problem/OT: Additional Occupational Profile Info Ms Muller is a 58 y/o woman who is undergoing treatment for metastatic pancreatic cancer. She reports 4 week history of feeling like foods and pills are getting stuck or are hard to get down. She notes large sips of water stretch her throat and give her sore throat afterward. She also notes solid foods like potatoes cause her difficulty. No recent history of pneumonia and she wasn't very forthcoming with discription of her dysphagia.    Respiratory Status Room air   Prior Level Of Function Swallowing   Prior Level Of Function Comment Regular solids and thin liquids   Patient Role/employment History Other/comments  (Used to work as a )   General Observations Pt pleasant and cooperative.   Patient/family Goals Pt states desire to drink/eat more easily   Fall Risk Screen   Have you fallen 2 or more times in the past year? No   Have you fallen and had an injury in the past year? No   Is patient a fall risk? No   Clinical Swallow Evaluation   Oral Musculature generally intact   Structural Abnormalities none present   Dentition present and adequate   Mucosal Quality good   Mandibular Strength and Mobility intact   Oral Labial Strength and Mobility WFL   Lingual Strength and Mobility WFL   Velar Elevation intact   Buccal Strength and Mobility intact   Laryngeal Function Throat clear;Swallow;Voicing initiated   VFSS Eval: Radiology   Radiologist Resident   Views Taken left lateral;A/P   Physical Location of Procedure [facility]   VFSS Eval: Thin Liquid Texture Trial    Mode of Presentation, Thin Liquid cup;self-fed   Order of Presentation 1,2,6,7   Preparatory Phase WFL   Oral Phase, Thin Liquid WFL   Pharyngeal Phase, Thin Liquid WFL   Rosenbek's Penetration Aspiration Scale: Thin Liquid Trial Results 1 - no aspiration, contrast does not enter airway   Diagnostic Statement No asipration/penetration noted on thin liquid trials   VFSS Eval: Nectar Thick Liquid Texture Trial   Mode of Presentation, Nectar cup;self-fed   Order of Presentation 3   Preparatory Phase WFL   Oral Phase, Nectar WFL   Pharyngeal Phase, Sledge WFL   Rosenbek's Penetration Aspiration Scale: Nectar-Thick Liquid Trial Results 1 - no aspiration, contrast does not enter airway   Diagnostic Statement No aspiration/penetration noted on nectar thick liquids.    VFSS Eval: Puree Solid Texture Trial   Mode of Presentation, Puree spoon   Order of Presentation 4   Preparatory Phase WFL   Oral Phase, Puree WFL   Pharyngeal Phase, Puree WFL   Rosenbek's Penetration Aspiration Scale: Puree Food Trial Results 1 - no aspiration, contrast does not enter airway   Diagnostic Statement No aspiration/penetration noted on puree consistency.   VFSS Eval: Solid Food Texture Trial   Mode of Presentation, Solid self-fed   Order of Presentation 5   Preparatory Phase WFL   Oral Phase, Solid WFL   Pharyngeal Phase, Solid WFL   Rosenbek's Penetration Aspiration Scale: Solid Food Trial Results 1 - no aspiration, contrast does not enter airway   Diagnostic Statement No aspiration/penetration noted on solid trials.    Swallow Compensations   Swallow Compensations No compensations were used   Educational Assessment   Barriers to Learning No barriers   Esophageal Phase of Swallow   Esophageal comments Pt completed esophagram after VFSS this date. Please refer to radiologist report for further details.    Swallow Eval: Clinical Impressions   Skilled Criteria for Therapy Intervention No problems identified which require skilled intervention    Dysphagia Outcome Severity Scale (JOSE RAMON) Level 7 - JOSE RAMON   Diet texture recommendations Regular diet;Thin liquids   Recommended Feeding/Eating Techniques alternate between small bites and sips of food/liquid;maintain upright posture during/after eating for 30 mins;small sips/bites   Predicted Duration of Therapy Intervention (days/wks) Evaluation only   Anticipated Discharge Disposition home   Risks and Benefits of Treatment have been explained. Yes   Patient, family and/or staff in agreement with Plan of Care Yes   Clinical Impression Comments Ms Muller demonstrates safe functional oropharyngeal swallow at this time. No aspiration/penetration noted. Adequate ROM and strength of oral mechanism demonstrated. Timely swallow with only trach residue in the left pyriform sinus on thin liquid which is cleared with spontaneous swallow. Esophagram completed by Radiologist. Please refer to their report for further details. Recommend regular diet with thin liquids. Sit pt upright for po intake. Encourage small bites/sips and slow rate. No further SLP services indicated at this time. Thank you kindly for this referral.    Total Session Time   Total Session Time 30   Total Evaluation Time 30   SLP Medicare Only G-code   G-code Swallowing   Swallowing   Swallowing:  Current Status , Goal , Discharge -Kxbg Only-Modifier the same for all G-codes CH: 0% impairment   Swallowing: Current  & Discharge Modifier Rationale-Eval Only Modifier chosen based on the results of this evaluation and reference to JOAN guidelines.

## 2017-11-16 NOTE — MR AVS SNAPSHOT
After Visit Summary   11/16/2017    Shirin Muller    MRN: [id number]           Patient Information     Date Of Birth          1960        Visit Information        Provider Department      11/16/2017 12:30 PM UC 30 ATC; UC ONCOLOGY INFUSION [facility]        Today's Diagnoses     Hypokalemia    -  1    Malignant neoplasm of head of pancreas (H)          Care Instructions            November 2017 Sunday Monday Tuesday Wednesday Thursday Friday Saturday                  1     [facility] ONC INFUSION 120   11:00 AM   (120 min.)   UC ONCOLOGY INFUSION   [facility] 2     3     4       5     6     7     [facility] DRAW    9:45 AM   (15 min.)    [facility] DRAW   [facility] Lab Draw     UMP ONC INFUSION 120   10:30 AM   (120 min.)    ONCOLOGY INFUSION   [facility] 8     9     UMP RETURN   10:05 AM   (50 min.)   Ester Echavarria, TEE CNP   [facility]     [facility] DRAW   10:15 AM   (15 min.)    [facility]     [facility] ONC INFUSION 120    1:00 PM   (120 min.)    ONCOLOGY INFUSION   [facility] 10     11       12     13     [facility] DRAW    9:15 AM   (15 min.)    [facility] LAB DRAW   [facility] Lab Draw     UMP ONC INFUSION 180   10:00 AM   (180 min.)    ONCOLOGY INFUSION   [facility]     CT CHEST WO    6:45 PM   (20 min.)   UCCT1   [facility] CT     MR ABDOMEN WWO    7:30 PM   (45 min.)   UCMR1   [facility] MRI 14     15     XR VIDEO SPEECH W ESOPHAGRAM    2:00 PM   (60 min.)   XR2   [facility] Xray     VIDEO SWALLOW STUDY    2:00 PM   (60 min.)   Malu Maurer, SLP   [facility] 16     [facility] DRAW   12:00 PM   (15 min.)    [facility]     [facility] ONC INFUSION 180   12:30 PM   (180 min.)    ONCOLOGY INFUSION   [facility]  [facility] 17     18       19     20     LAB WITH HB CLINIC    5:30 PM   (15 min.)   UC LAB   [facility] Lab     [facility] RETURN    5:45 PM   (30 min.)   Demond Gonsales MD   [facility] 21 22     [facility] NEW DIABETES    4:15 PM   (60 min.)   Chloe Flowers MD   [facility]ology 23     24     25       26     27     28     29 30 December 2017 Sunday Monday Tuesday Wednesday Thursday Friday Saturday                            1     2       3     4     5     6     7     8     9       10     11     12     13     14     15     16       17     18     19     20     21     22     23       24     25     26     27     28     29     30       31                                                Lab Results:  Recent Results (from the past 12 hour(s))   Comprehensive metabolic panel    Collection Time: 11/16/17 12:21 PM   Result Value Ref Range    Sodium 139 133 - 144 mmol/L    Potassium 3.2 (L) 3.4 - 5.3 mmol/L    Chloride 103 94 - 109 mmol/L    Carbon Dioxide 27 20 - 32 mmol/L    Anion Gap 8 3 - 14 mmol/L    Glucose 235 (H) 70 - 99 mg/dL    Urea Nitrogen 8 7 - 30 mg/dL    Creatinine 0.58 0.52 - 1.04 mg/dL    GFR Estimate >90 >60 mL/min/1.7m2    GFR Estimate If Black >90 >60 mL/min/1.7m2    Calcium 8.2 (L) 8.5 - 10.1 mg/dL    Bilirubin Total 0.3 0.2 - 1.3 mg/dL    Albumin 3.1 (L) 3.4 - 5.0 g/dL    Protein Total 7.2 6.8 - 8.8 g/dL    Alkaline Phosphatase 230 (H) 40 - 150 U/L    ALT 21 0 - 50 U/L    AST 15 0 - 45 U/L   *CBC with platelets differential    Collection Time: 11/16/17 12:21 PM   Result Value Ref Range    WBC 2.2 (L) 4.0 - 11.0 10e9/L    RBC Count 3.13 (L) 3.8 - 5.2 10e12/L    Hemoglobin 8.8 (L) 11.7 - 15.7 g/dL    Hematocrit 28.9 (L) 35.0 - 47.0 %    MCV 92 78 - 100 fl    MCH 28.1 26.5 - 33.0 pg    MCHC 30.4 (L) 31.5 - 36.5 g/dL    RDW 17.2 (H) 10.0 - 15.0 %    Platelet Count 139 (L) 150 - 450 10e9/L    Diff Method Automated Method     %  Neutrophils 56.7 %    % Lymphocytes 27.9 %    % Monocytes 12.2 %    % Eosinophils 1.4 %    % Basophils 0.9 %    % Immature Granulocytes 0.9 %    Nucleated RBCs 0 0 /100    Absolute Neutrophil 1.3 (L) 1.6 - 8.3 10e9/L    Absolute Lymphocytes 0.6 (L) 0.8 - 5.3 10e9/L    Absolute Monocytes 0.3 0.0 - 1.3 10e9/L    Absolute Eosinophils 0.0 0.0 - 0.7 10e9/L    Absolute Basophils 0.0 0.0 - 0.2 10e9/L    Abs Immature Granulocytes 0.0 0 - 0.4 10e9/L    Absolute Nucleated RBC 0.0     Platelet Estimate Confirming automated cell count      Contact Numbers    [facility] Main Line: [phone]  [facility] Triage:  [phone]    Call triage with chills and/or temperature greater than or equal to 100.5, uncontrolled nausea/vomiting, diarrhea, constipation, dizziness, shortness of breath, chest pain, bleeding, unexplained bruising, or any new/concerning symptoms, questions/concerns.     If you are having any concerning symptoms or wish to speak to a provider before your next infusion visit, please call your care coordinator or triage to notify them so we can adequately serve you.       After Hours: [phone]    If after hours, weekends, or holidays, call main hospital  and ask for Oncology doctor on call.             Follow-ups after your visit        Your next 10 appointments already scheduled     Nov 20, 2017  5:30 PM CST   Lab with  LAB   [facility] ([facility])    [address] [place] [address]  1st [facility] [address] [phone]            Nov 20, 2017  6:00 PM CST   (Arrive by 5:45 PM)   Return Visit with Demond Gonsales MD   [facility] ([facility])    [address] [place] [address]  2nd [facility] [address] [phone]            Nov 22, 2017  4:30 PM CST   (Arrive by 4:15 PM)   NEW DIABETES with Chloe Flowers MD   [facility] Endocrinology ([facility])    [address] [place] [address]  3rd [facility]  [address] [phone]              Who to contact     If you have questions or need follow up information about today's clinic visit or your schedule please contact [facility] directly at [phone].  Normal or non-critical lab and imaging results will be communicated to you by AlloCurehart, letter or phone within 4 business days after the clinic has received the results. If you do not hear from us within 7 days, please contact the clinic through Zendrivet or phone. If you have a critical or abnormal lab result, we will notify you by phone as soon as possible.  Submit refill requests through DrinkWiser or call your pharmacy and they will forward the refill request to us. Please allow 3 business days for your refill to be completed.          Additional Information About Your Visit        DrinkWiser Information     DrinkWiser gives you secure access to your electronic health record. If you see a primary care provider, you can also send messages to your care team and make appointments. If you have questions, please call your primary care clinic.  If you do not have a primary care provider, please call [phone] and they will assist you.        Care EveryWhere ID     This is your Care EveryWhere ID. This could be used by other organizations to access your [facility] medical records  [id number]        Your Vitals Were     Pulse Temperature Respirations Pulse Oximetry BMI (Body Mass Index)       94 98.1  F (36.7  C) (Oral) 16 100% 25.38 kg/m2        Blood Pressure from Last 3 Encounters:   11/16/17 116/64   11/13/17 104/65   11/09/17 97/60    Weight from Last 3 Encounters:   11/16/17 80.2 kg (176 lb 14.4 oz)   11/13/17 77.1 kg (170 lb)   11/09/17 77.8 kg (171 lb 9.6 oz)              We Performed the Following     *CBC with platelets differential     Comprehensive metabolic panel          Today's Medication Changes          These changes are accurate as of: 11/16/17  3:39 PM.  If you have any questions, ask  your nurse or doctor.               These medicines have changed or have updated prescriptions.        Dose/Directions    ENSURE CLEAR Liqd   This may have changed:  how much to take   Used for:  Malignant neoplasm of head of pancreas (H)        Dose:  1 Bottle   Take 1 Bottle by mouth 3 times daily   Quantity:  90 Bottle   Refills:  6                Primary Care Provider    [facility]       No address on file        Equal Access to Services     JAI CORDERO : Hadii isak amato hadelvipernell Socatherineali, waaxda luqadaha, qaybta kaalmada karin, luis e lamarjanazeem asher . So [facility] [phone].    ATENCIÓN: Si habla español, tiene a bernal disposición servicios gratuitos de asistencia lingüística. Llame al [phone].    We comply with applicable federal civil rights laws and [place] laws. We do not discriminate on the basis of race, color, national origin, age, disability, sex, sexual orientation, or gender identity.            Thank you!     Thank you for choosing [facility]  for your care. Our goal is always to provide you with excellent care. Hearing back from our patients is one way we can continue to improve our services. Please take a few minutes to complete the written survey that you may receive in the mail after your visit with us. Thank you!             Your Updated Medication List - Protect others around you: Learn how to safely use, store and throw away your medicines at www.disposemymeds.org.          This list is accurate as of: 11/16/17  3:39 PM.  Always use your most recent med list.                   Brand Name Dispense Instructions for use Diagnosis    amylase-lipase-protease 09090 UNITS Cpep    CREON    180 capsule    Take 2 capsules (72,000 Units) by mouth 3 times daily (with meals)    Malignant neoplasm of head of pancreas (H)       diltiazem 2% in PLO cream (FV COMPOUNDED) 2% Gel     30 g    Apply topically daily and as needed to external hemorrhoids     External hemorrhoids       docusate sodium 100 MG capsule    COLACE    100 capsule    Take 1 capsule (100 mg) by mouth daily    External hemorrhoids       dronabinol 5 MG capsule    MARINOL    90 capsule    Take 1 capsule (5 mg) by mouth 3 times daily (before meals)    Anorexia       ENSURE CLEAR Liqd     90 Bottle    Take 1 Bottle by mouth 3 times daily    Malignant neoplasm of head of pancreas (H)       lidocaine-prilocaine cream    EMLA    30 g    Apply topically as needed for other (Use 30-60 minutes prior to port access)    Malignant neoplasm of head of pancreas (H)       loratadine 10 MG tablet    CLARITIN    30 tablet    Take 1 tablet (10 mg) by mouth daily Reported on 5/5/2017    Chronic seasonal allergic rhinitis, unspecified trigger       LORazepam 0.5 MG tablet    ATIVAN    30 tablet    Take 1 tablet (0.5 mg) by mouth every 4 hours as needed (Anxiety, Nausea/Vomiting or Sleep)    Malignant neoplasm of head of pancreas (H)       morphine 15 MG 12 hr tablet    MS CONTIN    60 tablet    Take 1 tablet (15 mg) by mouth every 12 hours    Malignant neoplasm of head of pancreas (H)       ondansetron 8 MG ODT tab    ZOFRAN-ODT    60 tablet    Take 1 tablet (8 mg) by mouth every 8 hours as needed for nausea    Malignant neoplasm of head of pancreas (H)       oxyCODONE IR 5 MG tablet    ROXICODONE    100 tablet    Take 1 tablet (5 mg) by mouth every 4 hours as needed for moderate to severe pain    Malignant neoplasm of head of pancreas (H)       oxyCODONE-acetaminophen 5-325 MG per tablet    PERCOCET          pantoprazole 20 MG EC tablet    PROTONIX    60 tablet    Take 1 tablet (20 mg) by mouth 2 times daily    Malignant neoplasm of head of pancreas (H)       polyethylene glycol powder    MIRALAX/GLYCOLAX    119 g    Take 17 g (1 capful) by mouth daily    Malignant neoplasm of head of pancreas (H)       potassium chloride SA 20 MEQ CR tablet    KLOR-CON    60 tablet    Take 2 tablets (40 mEq) by mouth daily     Malignant neoplasm of head of pancreas (H)       prochlorperazine 10 MG tablet    COMPAZINE    30 tablet    TAKE ONE TABLET BY MOUTH EVERY 6 HOURS AS NEEDED FOR NAUSEA AND VOMITING    Malignant neoplasm of head of pancreas (H)

## 2017-11-16 NOTE — PROGRESS NOTES
Infusion Nursing Note:  Shirin BelldeJazzmine presents today for IVF and Potassium replacement.    Patient seen by provider today: No   present during visit today: Not Applicable.    Note: Shirin has no complaints today and appears to be much more comfortable than earlier this week. She has no visible mouth sores present on lips or tongue. She states she is comfortable and denies nausea and vomiting. Electrolyte replacement order is present in patients therapy plan under Treatment Conditions. Potassium ordered per protocol.    Intravenous Access:  Implanted Port.    Treatment Conditions:  Lab Results   Component Value Date    HGB 8.8 11/16/2017     Lab Results   Component Value Date    WBC 2.2 11/16/2017      Lab Results   Component Value Date    ANEU 1.3 11/16/2017     Lab Results   Component Value Date     11/16/2017      Lab Results   Component Value Date     11/16/2017                   Lab Results   Component Value Date    POTASSIUM 3.2 11/16/2017           Lab Results   Component Value Date    MAG 1.5 11/13/2017            Lab Results   Component Value Date    CR 0.58 11/16/2017                   Lab Results   Component Value Date    FANNY 8.2 11/16/2017                Lab Results   Component Value Date    BILITOTAL 0.3 11/16/2017           Lab Results   Component Value Date    ALBUMIN 3.1 11/16/2017                    Lab Results   Component Value Date    ALT 21 11/16/2017           Lab Results   Component Value Date    AST 15 11/16/2017     Results reviewed, labs MET treatment parameters, ok to proceed with treatment.    Post Infusion Assessment:  Patient tolerated infusion without incident.  Blood return noted pre and post infusion.  Site patent and intact, free from redness, edema or discomfort.  No evidence of extravasations.  Access discontinued per protocol.    Discharge Plan:   Prescription refills given for Compazine.  Discharge instructions reviewed with: Patient and  Family.  Patient and/or family verbalized understanding of discharge instructions and all questions answered.  Copy of AVS reviewed with patient and/or family.  Patient will return 11/20/17 for next appointment.  Patient discharged in stable condition accompanied by: daughter.  Departure Mode: Ambulatory.    Kamini Gurrola RN

## 2017-11-16 NOTE — PATIENT INSTRUCTIONS
November 2017 Sunday Monday Tuesday Wednesday Thursday Friday Saturday                  1     UMP ONC INFUSION 120   11:00 AM   (120 min.)   UC ONCOLOGY INFUSION   [facility] 2     3     4       5     6     7     [facility] DRAW    9:45 AM   (15 min.)    [facility] LAB DRAW   [facility] Lab Draw     UMP ONC INFUSION 120   10:30 AM   (120 min.)    ONCOLOGY INFUSION   [facility] 8     9     UMP RETURN   10:05 AM   (50 min.)   Ester Echavarria APRN CNP   [facility]     [facility] DRAW   10:15 AM   (15 min.)    [facility] LAB DRAW   [facility] Lab Draw     UMP ONC INFUSION 120    1:00 PM   (120 min.)    ONCOLOGY INFUSION   [facility] 10     11       12     13     [facility] DRAW    9:15 AM   (15 min.)    [facility] LAB DRAW   [facility] Lab Draw     UMP ONC INFUSION 180   10:00 AM   (180 min.)    ONCOLOGY INFUSION   [facility]     CT CHEST WO    6:45 PM   (20 min.)   CT1   [facility] CT     MR ABDOMEN WWO    7:30 PM   (45 min.)   MR1   [facility] MRI 14     15     XR VIDEO SPEECH W ESOPHAGRAM    2:00 PM   (60 min.)   XR2   [facility] Xray     VIDEO SWALLOW STUDY    2:00 PM   (60 min.)   Malu Maurer, SLP   [facility] 16     [facility] DRAW   12:00 PM   (15 min.)    [facility] LAB DRAW   [facility] Draw     UMP ONC INFUSION 180   12:30 PM   (180 min.)   UC ONCOLOGY INFUSION   [facility] 17     18       19     20     LAB WITH HB CLINIC    5:30 PM   (15 min.)    LAB   [facility] Lab     UMP RETURN    5:45 PM   (30 min.)   Demond Gonsales MD   [facility] 21     22     UMP NEW DIABETES    4:15 PM   (60 min.)   Chloe Flowers MD   [facility] Endocrinology 23     24     25       26     27     28     29     30 December 2017 Sunday Monday  Tuesday Wednesday Thursday Friday Saturday                            1     2       3     4     5     6     7     8     9       10     11     12     13     14     15     16       17     18     19     20     21     22     23       24     25     26     27     28     29     30       31                                                Lab Results:  Recent Results (from the past 12 hour(s))   Comprehensive metabolic panel    Collection Time: 11/16/17 12:21 PM   Result Value Ref Range    Sodium 139 133 - 144 mmol/L    Potassium 3.2 (L) 3.4 - 5.3 mmol/L    Chloride 103 94 - 109 mmol/L    Carbon Dioxide 27 20 - 32 mmol/L    Anion Gap 8 3 - 14 mmol/L    Glucose 235 (H) 70 - 99 mg/dL    Urea Nitrogen 8 7 - 30 mg/dL    Creatinine 0.58 0.52 - 1.04 mg/dL    GFR Estimate >90 >60 mL/min/1.7m2    GFR Estimate If Black >90 >60 mL/min/1.7m2    Calcium 8.2 (L) 8.5 - 10.1 mg/dL    Bilirubin Total 0.3 0.2 - 1.3 mg/dL    Albumin 3.1 (L) 3.4 - 5.0 g/dL    Protein Total 7.2 6.8 - 8.8 g/dL    Alkaline Phosphatase 230 (H) 40 - 150 U/L    ALT 21 0 - 50 U/L    AST 15 0 - 45 U/L   *CBC with platelets differential    Collection Time: 11/16/17 12:21 PM   Result Value Ref Range    WBC 2.2 (L) 4.0 - 11.0 10e9/L    RBC Count 3.13 (L) 3.8 - 5.2 10e12/L    Hemoglobin 8.8 (L) 11.7 - 15.7 g/dL    Hematocrit 28.9 (L) 35.0 - 47.0 %    MCV 92 78 - 100 fl    MCH 28.1 26.5 - 33.0 pg    MCHC 30.4 (L) 31.5 - 36.5 g/dL    RDW 17.2 (H) 10.0 - 15.0 %    Platelet Count 139 (L) 150 - 450 10e9/L    Diff Method Automated Method     % Neutrophils 56.7 %    % Lymphocytes 27.9 %    % Monocytes 12.2 %    % Eosinophils 1.4 %    % Basophils 0.9 %    % Immature Granulocytes 0.9 %    Nucleated RBCs 0 0 /100    Absolute Neutrophil 1.3 (L) 1.6 - 8.3 10e9/L    Absolute Lymphocytes 0.6 (L) 0.8 - 5.3 10e9/L    Absolute Monocytes 0.3 0.0 - 1.3 10e9/L    Absolute Eosinophils 0.0 0.0 - 0.7 10e9/L    Absolute Basophils 0.0 0.0 - 0.2 10e9/L    Abs Immature Granulocytes 0.0 0 - 0.4  10e9/L    Absolute Nucleated RBC 0.0     Platelet Estimate Confirming automated cell count      Contact Numbers    [facility] Main Line: [phone]  [facility] Triage:  [phone]    Call triage with chills and/or temperature greater than or equal to 100.5, uncontrolled nausea/vomiting, diarrhea, constipation, dizziness, shortness of breath, chest pain, bleeding, unexplained bruising, or any new/concerning symptoms, questions/concerns.     If you are having any concerning symptoms or wish to speak to a provider before your next infusion visit, please call your care coordinator or triage to notify them so we can adequately serve you.       After Hours: [phone]    If after hours, weekends, or holidays, call main hospital  and ask for Oncology doctor on call.

## 2017-11-20 NOTE — LETTER
11/20/2017      RE: Shirin Berg [address]un[address]Mafe  [address] [facility] [address]       Shirin Muller here today in follow-up of metastatic pancreatic cancer.    She is currently on active treatment with 5-FU and liposomal irinotecan and is here for planned response assessment she's recently had folfirinox with the oxaliplatin dropped for neuropathy, and prior to that gemcitabine and Abraxane which was eventually stopped when she progressed. She's had a lot of difficulty through her course of therapy requiring frequent IV hydration, and blood count support. As always she tells me things are going well, but her friends tell me she continues to have significant difficulty she does seem to feel fairly good when she gets more than a week out from therapy. She has a lot of food aversion, and was complaining of some dysphasia. She had a recent swallowing study which showed no functional or structural abnormality with her swallowing. She feels like her weight is holding steady. She doesn't have significant pain. The remainder of the complete review of systems as reported by the patient is otherwise unremarkable.    On physical exam she appears reasonably well, with unremarkable vital signs. She was able to mount the exam table without difficulty.  She has no scleral icterus and no visible lesions in the oropharynx.  She has no palpable adenopathy in the neck or supra-clavicular spaces.  The lungs are clear to auscultation opposed to percussion.  Her heart rate and rhythm are regular without murmur or gallop.  Her abdomen is soft with mild epigastric tenderness to deep palpation without discretely palpable mass.  She has no peripheral edema and no tenderness to Size. She Has No Cyanosis or Clubbing Her Digits.  Her Speech Is Fluent and Her Cranial Nerves Are Grossly Intact.    I reviewed with the patient and her friends her CT scan and lab work. She has some small lung nodules which are unchanged, and her  ill-defined primary tumor is not any different. I don't see anything to suggest disease progression. Her lab work shows normal electrolytes renal function and liver enzymes except for a mildly elevated alkaline phosphatase. She has pancytopenia consistent with her chemotherapy her CA-19-9 level has crept up just a little bit but just over 1000.    Assessment/plan: Metastatic pancreatic cancer. She appears to have stable disease at present, and is tolerating the chemotherapy modestly well. We talked about some strategies to help with her eating and keeping up on her oral fluid intake. We will going to reduce the frequency of her chemotherapy to every 3 weeks to give her a little more time to recover between cycles. Will continue close follow-up for symptom management as needed. We'll have her back for another response assessment in 2 months.    Demond Gonsales MD

## 2017-11-20 NOTE — MR AVS SNAPSHOT
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      Your next 10 appointments already scheduled     Nov 22, 2017  4:30 PM CST   (Arrive by 4:15 PM)   NEW DIABETES with Chloe Flowers MD   [facility])    [address] [place] [id number]   [phone]            Nov 27, 2017  9:45 AM CST   [facility] Lab Draw with [facility] LAB DRAW   [facility] Draw ([facility])    [address] [place] [id number]   [phone]            Nov 27, 2017 10:30 AM CST   Infusion 180 with UC ONCOLOGY INFUSION, UC 21 ATC   [facility] ([facility])    [address] [place] [address]   [phone]            Dec 19, 2017 12:45 PM CST   [facility] Lab Draw with [facility] DRAW   [facility])    [address] [place] [address]   [phone]            Dec 19, 2017  1:10 PM CST   (Arrive by 12:55 PM)   Return Visit with TEE Olivas CNP   [facility] ([facility])    [address] [place] [address]   [phone]            Dec 19, 2017  2:00 PM CST   Infusion 180  with UC ONCOLOGY INFUSION, UC 11 ATC   [facility] ([facility])    [address] [facility]  [address] [facility] [id number] [phone]            Jan 08, 2018 12:00 PM CST   [facility] Lab Draw with [facility] LAB DRAW   [facility] Lab Draw ([facility])    [address] [address] [phone]            Jan 08, 2018 12:30 PM CST   Infusion 180 with UC ONCOLOGY INFUSION   [facility] ([facility])    [address] [address] [phone]              Future tests that were ordered for you today     Open Future Orders        Priority Expected Expires Ordered    MR Abdomen w/o & w Contrast Routine 1/29/2018 3/20/2018 11/20/2017    CT Chest w/o Contrast Routine 1/29/2018 3/20/2018 11/20/2017    Comprehensive metabolic panel Routine 1/29/2018 3/20/2018 11/20/2017    CBC with platelets differential Routine 1/29/2018 3/20/2018 11/20/2017    Cancer antigen 19-9 Routine 1/29/2018 3/20/2018 11/20/2017            Who to contact     If you have questions or need follow up information about today's clinic visit or your schedule please contact [facility] directly at [phone].  Normal or non-critical lab and imaging results will be communicated to you by MyChart, letter or phone within 4 business days after the clinic has received the results. If you do not hear from us within 7 days, please contact the clinic through [employer] or phone. If you have a critical or abnormal lab result, we will notify you by phone as soon as possible.  Submit refill requests through [employer] or call your pharmacy and they will forward the refill request to us. Please allow 3 business days for your refill to be completed.          Additional Information About Your Visit        StudyEdge[employer]cope Information     [employer] gives you secure access to your  "electronic health record. If you see a primary care provider, you can also send messages to your care team and make appointments. If you have questions, please call your primary care clinic.  If you do not have a primary care provider, please call [phone] and they will assist you.        Care EveryWhere ID     This is your Care EveryWhere ID. This could be used by other organizations to access your [facility] medical records  [id number]        Your Vitals Were     Pulse Temperature Respirations Height Pulse Oximetry BMI (Body Mass Index)    100 97  F (36.1  C) (Tympanic) 16 1.778 m (5' 10\") 99% 25.11 kg/m2       Blood Pressure from Last 3 Encounters:   11/20/17 112/73   11/16/17 116/64   11/13/17 104/65    Weight from Last 3 Encounters:   11/20/17 79.4 kg (175 lb)   11/16/17 80.2 kg (176 lb 14.4 oz)   11/13/17 77.1 kg (170 lb)                 Today's Medication Changes          These changes are accurate as of: 11/20/17 11:59 PM.  If you have any questions, ask your nurse or doctor.               These medicines have changed or have updated prescriptions.        Dose/Directions    ENSURE CLEAR Liqd   This may have changed:  how much to take   Used for:  Malignant neoplasm of head of pancreas (H)        Dose:  1 Bottle   Take 1 Bottle by mouth 3 times daily   Quantity:  90 Bottle   Refills:  6                Primary Care Provider    [facility]       No address on file        Equal Access to Services     JAI CORDERO AH: Hadii isak harding Sojim, waaxda luqadaha, qaybta kaalmada adeegyada, luis e lora. So [facility] [place] [facility] [phone].    ATENCIÓN: Si habla español, tiene a bernal disposición servicios gratuitos de asistencia lingüística. Llame al [phone].    We comply with applicable federal civil rights laws and [place] laws. We do not discriminate on the basis of race, color, national origin, age, disability, sex, sexual orientation, or gender identity.            Thank " you!     Thank you for choosing [facility]  for your care. Our goal is always to provide you with excellent care. Hearing back from our patients is one way we can continue to improve our services. Please take a few minutes to complete the written survey that you may receive in the mail after your visit with us. Thank you!             Your Updated Medication List - Protect others around you: Learn how to safely use, store and throw away your medicines at www.disposemymeds.org.          This list is accurate as of: 11/20/17 11:59 PM.  Always use your most recent med list.                   Brand Name Dispense Instructions for use Diagnosis    amylase-lipase-protease 23821 UNITS Cpep    CREON    180 capsule    Take 2 capsules (72,000 Units) by mouth 3 times daily (with meals)    Malignant neoplasm of head of pancreas (H)       diltiazem 2% in PLO cream (FV COMPOUNDED) 2% Gel     30 g    Apply topically daily and as needed to external hemorrhoids    External hemorrhoids       docusate sodium 100 MG capsule    COLACE    100 capsule    Take 1 capsule (100 mg) by mouth daily    External hemorrhoids       dronabinol 5 MG capsule    MARINOL    90 capsule    Take 1 capsule (5 mg) by mouth 3 times daily (before meals)    Anorexia       ENSURE CLEAR Liqd     90 Bottle    Take 1 Bottle by mouth 3 times daily    Malignant neoplasm of head of pancreas (H)       lidocaine-prilocaine cream    EMLA    30 g    Apply topically as needed for other (Use 30-60 minutes prior to port access)    Malignant neoplasm of head of pancreas (H)       loratadine 10 MG tablet    CLARITIN    30 tablet    Take 1 tablet (10 mg) by mouth daily Reported on 5/5/2017    Chronic seasonal allergic rhinitis, unspecified trigger       LORazepam 0.5 MG tablet    ATIVAN    30 tablet    Take 1 tablet (0.5 mg) by mouth every 4 hours as needed (Anxiety, Nausea/Vomiting or Sleep)    Malignant neoplasm of head of pancreas (H)       morphine 15 MG  12 hr tablet    MS CONTIN    60 tablet    Take 1 tablet (15 mg) by mouth every 12 hours    Malignant neoplasm of head of pancreas (H)       ondansetron 8 MG ODT tab    ZOFRAN-ODT    60 tablet    Take 1 tablet (8 mg) by mouth every 8 hours as needed for nausea    Malignant neoplasm of head of pancreas (H)       oxyCODONE IR 5 MG tablet    ROXICODONE    100 tablet    Take 1 tablet (5 mg) by mouth every 4 hours as needed for moderate to severe pain    Malignant neoplasm of head of pancreas (H)       oxyCODONE-acetaminophen 5-325 MG per tablet    PERCOCET          pantoprazole 20 MG EC tablet    PROTONIX    60 tablet    Take 1 tablet (20 mg) by mouth 2 times daily    Malignant neoplasm of head of pancreas (H)       polyethylene glycol powder    MIRALAX/GLYCOLAX    119 g    Take 17 g (1 capful) by mouth daily    Malignant neoplasm of head of pancreas (H)       potassium chloride SA 20 MEQ CR tablet    KLOR-CON    60 tablet    Take 2 tablets (40 mEq) by mouth daily    Malignant neoplasm of head of pancreas (H)       prochlorperazine 10 MG tablet    COMPAZINE    30 tablet    TAKE ONE TABLET BY MOUTH EVERY 6 HOURS AS NEEDED FOR NAUSEA AND VOMITING    Malignant neoplasm of head of pancreas (H)

## 2017-11-21 NOTE — NURSING NOTE
"Oncology Rooming Note    November 20, 2017 6:15 PM   Shirin Muller is a 57 year old female who presents for:    Chief Complaint   Patient presents with     Oncology Clinic Visit     Return visit related to Pancreatic Cancer     Initial Vitals: /73  Pulse 100  Temp 97  F (36.1  C) (Tympanic)  Resp 16  Ht 1.778 m (5' 10\")  Wt 79.4 kg (175 lb)  SpO2 99%  BMI 25.11 kg/m2 Estimated body mass index is 25.11 kg/(m^2) as calculated from the following:    Height as of this encounter: 1.778 m (5' 10\").    Weight as of this encounter: 79.4 kg (175 lb). Body surface area is 1.98 meters squared.  No Pain (0) Comment: Data Unavailable   No LMP recorded. Patient is postmenopausal.  Allergies reviewed: Yes  Medications reviewed: Yes    Medications: Medication refills not needed today.  Pharmacy name entered into [facility]:    [place], [place] - [address] [place] [address] [phone]  [id number] - [place], [place] - [address] [place] [address]    Clinical concerns: No new concerns. Provider was notified.    10 minutes for nursing intake (face to face time)     Mara Jc LPN            "

## 2017-11-21 NOTE — PROGRESS NOTES
Shirin Muller here today in follow-up of metastatic pancreatic cancer.    She is currently on active treatment with 5-FU and liposomal irinotecan and is here for planned response assessment. She's recently had folfirinox with the oxaliplatin dropped for neuropathy, and prior to that gemcitabine and Abraxane which was eventually stopped when she progressed. She's had a lot of difficulty through her course of therapy requiring frequent IV hydration, and blood count support. As always she tells me things are going well, but her friends tell me she continues to have significant difficulty. She does seem to feel fairly good when she gets more than a week out from therapy. She has a lot of food aversion, and was complaining of some dysphagia. She had a recent swallowing study which showed no functional or structural abnormality with her swallowing. She feels like her weight is holding steady. She doesn't have significant pain. The remainder of the complete review of systems as reported by the patient is otherwise unremarkable.    On physical exam she appears reasonably well, with unremarkable vital signs. She was able to mount the exam table without difficulty.  She has no scleral icterus and no visible lesions in the oropharynx.  She has no palpable adenopathy in the neck or supra-clavicular spaces.  The lungs are clear to auscultation opposed to percussion.  Her heart rate and rhythm are regular without murmur or gallop.  Her abdomen is soft with mild epigastric tenderness to deep palpation without discretely palpable mass.  She has no peripheral edema and no tenderness in her calfs or thighs. She has no cyanosis or clubbing her digits.  Her speech is fluent and her cranial nerves are grossly intact.    I reviewed with the patient and her friends her CT scan and lab work. She has some small lung nodules which are unchanged, and her ill-defined primary tumor is not any different. I don't see anything to suggest  disease progression. Her lab work shows normal electrolytes renal function and liver enzymes except for a mildly elevated alkaline phosphatase. She has pancytopenia consistent with her chemotherapy her CA-19-9 level has crept up just a little bit but just over 1000.    Assessment/plan: Metastatic pancreatic cancer. She appears to have stable disease at present, and is tolerating the chemotherapy modestly well. We talked about some strategies to help with her eating and keeping up on her oral fluid intake. We will going to reduce the frequency of her chemotherapy to every 3 weeks to give her a little more time to recover between cycles. Will continue close follow-up for symptom management as needed. We'll have her back for another response assessment in 2 months.

## 2017-11-22 NOTE — MR AVS SNAPSHOT
After Visit Summary   11/22/2017    Shirin Muller    MRN: [id number]           Patient Information     Date Of Birth          1960        Visit Information        Provider Department      11/22/2017 4:30 PM Chloe Flowers MD [facility] Endocrinology        Today's Diagnoses     Secondary diabetes mellitus (H)    -  1      Care Instructions    Please check your blood sugars twice daily, once when fasting and once 2 hours after eating.    To expedite your medication refill(s), please contact your pharmacy and have them fax a refill request to: [phone].  *Please allow 3 business days for routine medication refills.  *Please allow 5 business days for controlled substance medication refills.  --------------------  For scheduling appointments (including lab work), please request an appointment through [employer], or call: [phone].    For questions for your provider or the endocrine nurse, please send a [employer] message.  For after-hours urgent issues, please dial [phone], and ask to speak with the Endocrinologist On-Call.  --------------------  Please Note: If you are active on [employer], all future test results will be sent by [employer] message only and will no longer be sent by mail. You may also receive communication directly from your physician.            Follow-ups after your visit        Additional Services     DIABETES EDUCATOR REFERRAL       DIABETES SELF MANAGEMENT TRAINING (DSMT)      Your provider has referred you to Diabetes Education: [facility]: Diabetes Education - [facility] [phone] [email or web address]    A  will call you to make your appointment. If it has been more than 3 business days since your referral was placed, please call the above phone number to schedule.    Type of training and number of hours: New Diagnosis: Initial group DSMT -  10 hours.      Medicare covers: 10 hours of initial DSMT in 12 month period from the time of first visit, plus 2 hours of follow-up DSMT annually, and additional hours as requested for insulin training.    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 Because of her metastatic pancreatic cancer, it is likely she will be using only insulin since she will have more chemo, inpatient hospitalizations, poor PO intake, and CT scans w/ contrast. No insulin was started today because last blood sugar was 138.       MEDICAL NUTRITION THERAPY (MNT) for Diabetes    Medical Nutrition Therapy with a Registered Dietitian can be provided in coordination with Diabetes Self-Management Training to assist in achieving optimal diabetes management.    MNT Type and Hours: New diagnosis: Initial MNT - 3 hours                       Medicare will cover: 3 hours initial MNT in 12 month period after first visit, plus 2 hours of follow-up MNT annually    Please be aware that coverage of these services is subject to the terms and limitations of your health insurance plan.  Call member services at your health plan to determine Diabetes Self-Management Training (Codes  &amp; ) and Medical Nutrition Therapy (Codes 88036 & 34789) benefits and ask which blood glucose monitor brands are covered by your plan.  Please bring the following with you to your appointment:    (1)  List of  current medications   (2)  List of Blood Glucose Monitor brands that are covered by your insurance plan  (3)  Blood Glucose Monitor and log book  (4)   Food records for the 3 days prior to your visit    The Certified Diabetes Educator may make diabetes medication adjustments per the CDE Protocol and Collaborative Practice Agreement.                  Follow-up notes from your care team     Return in about 2 months (around 1/22/2018).      Your next 10 appointments already scheduled     Nov 27, 2017  9:45 AM CST   [facility] Lab Draw with [facility] LAB DRAW   [facility]    [address] [place] [address]   [phone]            Nov 27, 2017 10:30 AM CST   Infusion 180 with UC ONCOLOGY INFUSION, UC 21 ATC   [facility] ([facility]    [address] [place] [address]   [phone]            Dec 19, 2017 12:45 PM CST   [facility] Lab Draw with [facility]ON[facility] LAB DRAW   [facility] Lab Draw ([facility])    [address] [place] [address]   [phone]            Dec 19, 2017  1:10 PM CST   (Arrive by 12:55 PM)   Return Visit with TEE Olivas CNP   [facility]    [address] [place] [address]   [phone]            Dec 19, 2017  2:00 PM CST   Infusion 180 with UC ONCOLOGY INFUSION, UC 11 ATC   [facility]    [address] [place] [address]   [phone]            Jan 08, 2018 12:00 PM CST   [facility] Lab Draw with UC MASON[facility] LAB DRAW   [facility] ([facility])    [address] [place] [address]   [phone]            Jan 08, 2018 12:30 PM CST   Infusion 180 with UC ONCOLOGY  "INFUSION, UC 30 ATC   [facility] ([facility])    [address]  [address]nd [address]  [facility] [address] [phone]              Future tests that were ordered for you today     Open Future Orders        Priority Expected Expires Ordered    Hemoglobin A1c Routine 2/22/2018 11/22/2018 11/22/2017            Who to contact     Please call your clinic at [phone] to:    Ask questions about your health    Make or cancel appointments    Discuss your medicines    Learn about your test results    Speak to your doctor   If you have compliments or concerns about an experience at your clinic, or if you wish to file a complaint, please contact [facility] Patient Relations at [phone] or email us at [email or web address]         Additional Information About Your Visit        [employer] Information     [employer] gives you secure access to your electronic health record. If you see a primary care provider, you can also send messages to your care team and make appointments. If you have questions, please call your primary care clinic.  If you do not have a primary care provider, please call [phone] and they will assist you.      [employer] is an electronic gateway that provides easy, online access to your medical records. With [employer], you can request a clinic appointment, read your test results, renew a prescription or communicate with your care team.     To access your existing account, please contact your [facility] or call [phone] for assistance.        Care EveryWhere ID     This is your Care EveryWhere ID. This could be used by other organizations to access your [facility] medical records  [id number]        Your Vitals Were     Pulse Height BMI (Body Mass Index)             88 1.778 m (5' 10\") 24.82 kg/m2          Blood Pressure from Last 3 Encounters:   11/22/17 102/70   11/20/17 112/73   11/16/17 " 116/64    Weight from Last 3 Encounters:   11/22/17 78.5 kg (173 lb)   11/20/17 79.4 kg (175 lb)   11/16/17 80.2 kg (176 lb 14.4 oz)              We Performed the Following     DIABETES EDUCATOR REFERRAL     PATIENT HANDOUT          Today's Medication Changes          These changes are accurate as of: 11/22/17  5:08 PM.  If you have any questions, ask your nurse or doctor.               Start taking these medicines.        Dose/Directions    ONETOUCH PING METER REMOTE SUPPLIES Misc   Used for:  Secondary diabetes mellitus (H)   Started by:  Chloe Flowers MD        Check your sugars twice daily, once when fasting and once 2 hours after eating.   Quantity:  1 each   Refills:  3         These medicines have changed or have updated prescriptions.        Dose/Directions    ENSURE CLEAR Liqd   This may have changed:  how much to take   Used for:  Malignant neoplasm of head of pancreas (H)        Dose:  1 Bottle   Take 1 Bottle by mouth 3 times daily   Quantity:  90 Bottle   Refills:  6            Where to get your medicines      These medications were sent to [address] [place] [address] [address] [place] [address]    Hours:  TRANSPLANT PHONE NUMBER [phone] Phone:  [phone]     ONETOUCH PING METER REMOTE SUPPLIES Misc                Primary Care Provider    [facility]       No address on file        Equal Access to Services     JAI CORDERO AH: Hadii isak dawno Sojim, waaxda luqadaha, qaybta kaalmada adeegyada, luis e travis haycaryn lora. So [facility] [phone].    ATENCIÓN: Si habla español, tiene a bernal disposición servicios gratuitos de asistencia lingüística. Llame al [phone].    We comply with applicable federal civil rights laws and [place] laws. We do not discriminate on the basis of race, color, national origin, age, disability, sex, sexual orientation, or gender identity.             Thank you!     Thank you for choosing [facility] ENDOCRINOLOGY  for your care. Our goal is always to provide you with excellent care. Hearing back from our patients is one way we can continue to improve our services. Please take a few minutes to complete the written survey that you may receive in the mail after your visit with us. Thank you!             Your Updated Medication List - Protect others around you: Learn how to safely use, store and throw away your medicines at www.disposemymeds.org.          This list is accurate as of: 11/22/17  5:08 PM.  Always use your most recent med list.                   Brand Name Dispense Instructions for use Diagnosis    amylase-lipase-protease 73394 UNITS Cpep    CREON    180 capsule    Take 2 capsules (72,000 Units) by mouth 3 times daily (with meals)    Malignant neoplasm of head of pancreas (H)       diltiazem 2% in PLO cream (FV COMPOUNDED) 2% Gel     30 g    Apply topically daily and as needed to external hemorrhoids    External hemorrhoids       docusate sodium 100 MG capsule    COLACE    100 capsule    Take 1 capsule (100 mg) by mouth daily    External hemorrhoids       dronabinol 5 MG capsule    MARINOL    90 capsule    Take 1 capsule (5 mg) by mouth 3 times daily (before meals)    Anorexia       ENSURE CLEAR Liqd     90 Bottle    Take 1 Bottle by mouth 3 times daily    Malignant neoplasm of head of pancreas (H)       lidocaine-prilocaine cream    EMLA    30 g    Apply topically as needed for other (Use 30-60 minutes prior to port access)    Malignant neoplasm of head of pancreas (H)       loratadine 10 MG tablet    CLARITIN    30 tablet    Take 1 tablet (10 mg) by mouth daily Reported on 5/5/2017    Chronic seasonal allergic rhinitis, unspecified trigger       LORazepam 0.5 MG tablet    ATIVAN    30 tablet    Take 1 tablet (0.5 mg) by mouth every 4 hours as needed (Anxiety, Nausea/Vomiting or Sleep)    Malignant neoplasm of head of pancreas (H)       morphine 15 MG  12 hr tablet    MS CONTIN    60 tablet    Take 1 tablet (15 mg) by mouth every 12 hours    Malignant neoplasm of head of pancreas (H)       ondansetron 8 MG ODT tab    ZOFRAN-ODT    60 tablet    Take 1 tablet (8 mg) by mouth every 8 hours as needed for nausea    Malignant neoplasm of head of pancreas (H)       ONETOUCH PING METER REMOTE SUPPLIES Misc     1 each    Check your sugars twice daily, once when fasting and once 2 hours after eating.    Secondary diabetes mellitus (H)       oxyCODONE IR 5 MG tablet    ROXICODONE    100 tablet    Take 1 tablet (5 mg) by mouth every 4 hours as needed for moderate to severe pain    Malignant neoplasm of head of pancreas (H)       oxyCODONE-acetaminophen 5-325 MG per tablet    PERCOCET          pantoprazole 20 MG EC tablet    PROTONIX    60 tablet    Take 1 tablet (20 mg) by mouth 2 times daily    Malignant neoplasm of head of pancreas (H)       polyethylene glycol powder    MIRALAX/GLYCOLAX    119 g    Take 17 g (1 capful) by mouth daily    Malignant neoplasm of head of pancreas (H)       potassium chloride SA 20 MEQ CR tablet    KLOR-CON    60 tablet    Take 2 tablets (40 mEq) by mouth daily    Malignant neoplasm of head of pancreas (H)       prochlorperazine 10 MG tablet    COMPAZINE    30 tablet    TAKE ONE TABLET BY MOUTH EVERY 6 HOURS AS NEEDED FOR NAUSEA AND VOMITING    Malignant neoplasm of head of pancreas (H)

## 2017-11-22 NOTE — PROGRESS NOTES
Reason for visit/consult: diabetes in setting of metastatic pancreatic cancer    Referring: Jaime Gonsales & Stefan Malone PA-C    Primary care provider: Ester Echavarria    HPI:  Patient is a 50 yo female with metastatic pancreatic cancer to liver. She was recently diagnosed with diabetes and A1c was 9.2% on 17. Most recent blood sugar from 17 was 138. Other sugars from -17: 235, 223, 250, 296 during chemo. She is on a break from chemo now and is not checking her sugars because she does not have a meter yet, so there is no more blood sugar data available for review today. She is feeling ok, except for some fatigue and poor appetite. She does report numbness and tingling in her hands and feet but no pain. She has a relative with her today in clinic who helps to provide some of the history.  Last eye exam was in 2016.  There is mention of a 2 cm thyroid nodule in oncology note 17, TSH was 2.56, no imaging available. Patient had Graves' disease >20 years ago and is s/p HALL and has been euthyroid and is asymptomatic.    Past Medical/Surgical History:  Past Medical History:   Diagnosis Date     Biliary stricture      Graves disease      Lesion of liver      Malignant neoplasm of head of pancreas (H) 2016     Pancreatic disease      Pancreatic mass      Past Surgical History:   Procedure Laterality Date      SECTION       ENDOSCOPIC RETROGRADE CHOLANGIOPANCREATOGRAM N/A 2016    Procedure: COMBINED ENDOSCOPIC RETROGRADE CHOLANGIOPANCREATOGRAPHY, PLACE TUBE/STENT;  Surgeon: Arias Taveras MD;  Location:  OR     ENDOSCOPIC RETROGRADE CHOLANGIOPANCREATOGRAM N/A 2017    Procedure: COMBINED ENDOSCOPIC RETROGRADE CHOLANGIOPANCREATOGRAPHY, PLACE TUBE/STENT;  Endoscopic Retrograde Cholangiopancreatogram With biliary Stent placement, ballon sweep of bile duct for stone removal;  Surgeon: Tristin Braden MD;  Location: [facility] OR     ENDOSCOPIC RETROGRADE  CHOLANGIOPANCREATOGRAM N/A 8/3/2017    Procedure: COMBINED ENDOSCOPIC RETROGRADE CHOLANGIOPANCREATOGRAPHY, PLACE TUBE/STENT;  ENDOSCOPIC RETROGRADE CHOLANGIOPANCREATOGRAM, pus removal , stent placement to bile duct x1  ;  Surgeon: Guru Jamia Mcintosh MD;  Location: [facility] OR     ESOPHAGOSCOPY, GASTROSCOPY, DUODENOSCOPY (EGD), COMBINED N/A 4/13/2016    Procedure: COMBINED ENDOSCOPIC ULTRASOUND, ESOPHAGOSCOPY, GASTROSCOPY, DUODENOSCOPY (EGD), FINE NEEDLE ASPIRATE/BIOPSY;  Surgeon: Arias Taveras MD;  Location: [facility] OR     VASCULAR SURGERY      right chest       Allergies:  Allergies   Allergen Reactions     Contrast Dye Nausea and Vomiting     Pt vomiting post IV contrast, Please try Visipaque for next CT scan. 6/24/16 sv       Current Medications   Current Outpatient Prescriptions   Medication     oxyCODONE-acetaminophen (PERCOCET) 5-325 MG per tablet     morphine (MS CONTIN) 15 MG 12 hr tablet     potassium chloride SA (KLOR-CON) 20 MEQ CR tablet     oxyCODONE (ROXICODONE) 5 MG IR tablet     LORazepam (ATIVAN) 0.5 MG tablet     dronabinol (MARINOL) 5 MG capsule     prochlorperazine (COMPAZINE) 10 MG tablet     docusate sodium (COLACE) 100 MG capsule     ondansetron (ZOFRAN-ODT) 8 MG ODT tab     amylase-lipase-protease (CREON) 77650 UNITS CPEP     loratadine (CLARITIN) 10 MG tablet     polyethylene glycol (MIRALAX/GLYCOLAX) powder     pantoprazole (PROTONIX) 20 MG EC tablet     Nutritional Supplements (ENSURE CLEAR) LIQD     diltiazem 2% in PLO cream, FV COMPOUNDED, 2% GEL     lidocaine-prilocaine (EMLA) cream     No current facility-administered medications for this visit.        Family History:  Family History   Problem Relation Age of Onset     DIABETES Mother        Social History:  Social History   Substance Use Topics     Smoking status: Never Smoker     Smokeless tobacco: Never Used     Alcohol use No     ROS:  10 point negative except as mentioned in HPI    Exam  Blood pressure 102/70, pulse  "88, height 1.778 m (5' 10\"), weight 78.5 kg (173 lb).  Gen: well appearing, nad, pleasant and conversant  HEENT: anicteric, EOMI, no proptosis or lid lag, no goiter or LAD  Cardio: RRR, no m/r/g  Resp: CTAB  Ab: soft, NT/ND, +ascites  Ext: no swelling or edema  Feet: no deformities or ulcers, 2+ DP pulses, normal monofilament sensation  Neuro: A&Ox3, relaxation phase of reflexes normal, no tremor on outstretched arms    Labs/Imaging  Reviewed from [facility] and Care Everywhere.    Assessment and Plan  Diabetes, secondary to pancreatic cancer, now euglycemic. She is likely to become hyperglycemic when chemo and/or steroids are restarted. Therefore, diabetes supplies were ordered so she can begin monitoring her blood sugars and diabetes education referral was made. Check blood sugars twice daily, once when fasting and once 2 hours after eating for now.  Labs are already ordered by Dr. Gonsales, so no additional labs needed at this time. Annual eye exam due by end of 2017, other diabetes standards of care may not be relevant given cancer diagnosis. Recommend blood sugar goals <200 for improved quality of life and mainly to avoid dehydration.  Thyroid nodule, asymptomatic and euthyroid, in context of metastatic pancreatic cancer, no further management recommended at this time.    RTC 2 months, for diabetes management prn if hyperglycemia present    YESENIA RICHARD MD, MPH  Endocrinologist    45 min spent on evaluation, management, counseling, education, & motivational interviewing with greater than 50% of the total time was spent on counseling and coordinating care              "

## 2017-11-22 NOTE — PATIENT INSTRUCTIONS
Please check your blood sugars twice daily, once when fasting and once 2 hours after eating.    To expedite your medication refill(s), please contact your pharmacy and have them fax a refill request to: [phone].  *Please allow 3 business days for routine medication refills.  *Please allow 5 business days for controlled substance medication refills.  --------------------  For scheduling appointments (including lab work), please request an appointment through [employer], or call: [phone].    For questions for your provider or the endocrine nurse, please send a [employer] message.  For after-hours urgent issues, please dial [phone], and ask to speak with the Endocrinologist On-Call.  --------------------  Please Note: If you are active on [employer], all future test results will be sent by [employer]ort message only and will no longer be sent by mail. You may also receive communication directly from your physician.

## 2017-11-22 NOTE — LETTER
2017       RE: Shirin Muller  [address] [facility] [address]     Dear Colleague,    Thank you for referring your patient, Shirin Muller, to the [facility] ENDOCRINOLOGY at [facility]. Please see a copy of my visit note below.    Reason for visit/consult: diabetes in setting of metastatic pancreatic cancer    Referring: Jaime Gonsales & Stefan Malone PA-C    Primary care provider: Ester Echavarria    HPI:  Patient is a 52 yo female with metastatic pancreatic cancer to liver. She was recently diagnosed with diabetes and A1c was 9.2% on 17. Most recent blood sugar from 17 was 138. Other sugars from -17: 235, 223, 250, 296 during chemo. She is on a break from chemo now and is not checking her sugars because she does not have a meter yet, so there is no more blood sugar data available for review today. She is feeling ok, except for some fatigue and poor appetite. She does report numbness and tingling in her hands and feet but no pain. She has a relative with her today in clinic who helps to provide some of the history.  Last eye exam was in 2016.  There is mention of a 2 cm thyroid nodule in oncology note 17, TSH was 2.56, no imaging available. Patient had Graves' disease >20 years ago and is s/p HALL and has been euthyroid and is asymptomatic.    Past Medical/Surgical History:  Past Medical History:   Diagnosis Date     Biliary stricture      Graves disease      Lesion of liver      Malignant neoplasm of head of pancreas (H) 2016     Pancreatic disease      Pancreatic mass      Past Surgical History:   Procedure Laterality Date      SECTION       ENDOSCOPIC RETROGRADE CHOLANGIOPANCREATOGRAM N/A 2016    Procedure: COMBINED ENDOSCOPIC RETROGRADE CHOLANGIOPANCREATOGRAPHY, PLACE TUBE/STENT;  Surgeon: Arias Taveras MD;  Location:  OR     ENDOSCOPIC RETROGRADE CHOLANGIOPANCREATOGRAM N/A  4/19/2017    Procedure: COMBINED ENDOSCOPIC RETROGRADE CHOLANGIOPANCREATOGRAPHY, PLACE TUBE/STENT;  Endoscopic Retrograde Cholangiopancreatogram With biliary Stent placement, ballon sweep of bile duct for stone removal;  Surgeon: Tristin Braden MD;  Location: [facility] OR     ENDOSCOPIC RETROGRADE CHOLANGIOPANCREATOGRAM N/A 8/3/2017    Procedure: COMBINED ENDOSCOPIC RETROGRADE CHOLANGIOPANCREATOGRAPHY, PLACE TUBE/STENT;  ENDOSCOPIC RETROGRADE CHOLANGIOPANCREATOGRAM, pus removal , stent placement to bile duct x1  ;  Surgeon: Guru Jamia Mcintosh MD;  Location: [facility] OR     ESOPHAGOSCOPY, GASTROSCOPY, DUODENOSCOPY (EGD), COMBINED N/A 4/13/2016    Procedure: COMBINED ENDOSCOPIC ULTRASOUND, ESOPHAGOSCOPY, GASTROSCOPY, DUODENOSCOPY (EGD), FINE NEEDLE ASPIRATE/BIOPSY;  Surgeon: Arias Taveras MD;  Location: [facility] OR     VASCULAR SURGERY      right chest       Allergies:  Allergies   Allergen Reactions     Contrast Dye Nausea and Vomiting     Pt vomiting post IV contrast, Please try Visipaque for next CT scan. 6/24/16 sv       Current Medications   Current Outpatient Prescriptions   Medication     oxyCODONE-acetaminophen (PERCOCET) 5-325 MG per tablet     morphine (MS CONTIN) 15 MG 12 hr tablet     potassium chloride SA (KLOR-CON) 20 MEQ CR tablet     oxyCODONE (ROXICODONE) 5 MG IR tablet     LORazepam (ATIVAN) 0.5 MG tablet     dronabinol (MARINOL) 5 MG capsule     prochlorperazine (COMPAZINE) 10 MG tablet     docusate sodium (COLACE) 100 MG capsule     ondansetron (ZOFRAN-ODT) 8 MG ODT tab     amylase-lipase-protease (CREON) 37756 UNITS CPEP     loratadine (CLARITIN) 10 MG tablet     polyethylene glycol (MIRALAX/GLYCOLAX) powder     pantoprazole (PROTONIX) 20 MG EC tablet     Nutritional Supplements (ENSURE CLEAR) LIQD     diltiazem 2% in PLO cream, FV COMPOUNDED, 2% GEL     lidocaine-prilocaine (EMLA) cream     No current facility-administered medications for this visit.        Family History:  Family  "History   Problem Relation Age of Onset     DIABETES Mother        Social History:  Social History   Substance Use Topics     Smoking status: Never Smoker     Smokeless tobacco: Never Used     Alcohol use No     ROS:  10 point negative except as mentioned in HPI    Exam  Blood pressure 102/70, pulse 88, height 1.778 m (5' 10\"), weight 78.5 kg (173 lb).  Gen: well appearing, nad, pleasant and conversant  HEENT: anicteric, EOMI, no proptosis or lid lag, no goiter or LAD  Cardio: RRR, no m/r/g  Resp: CTAB  Ab: soft, NT/ND, +ascites  Ext: no swelling or edema  Feet: no deformities or ulcers, 2+ DP pulses, normal monofilament sensation  Neuro: A&Ox3, relaxation phase of reflexes normal, no tremor on outstretched arms    Labs/Imaging  Reviewed from [facility] and Care Everywhere.    Assessment and Plan  Diabetes, secondary to pancreatic cancer, now euglycemic. She is likely to become hyperglycemic when chemo and/or steroids are restarted. Therefore, diabetes supplies were ordered so she can begin monitoring her blood sugars and diabetes education referral was made. Check blood sugars twice daily, once when fasting and once 2 hours after eating for now.  Labs are already ordered by Dr. Gonsales, so no additional labs needed at this time. Annual eye exam due by end of 2017, other diabetes standards of care may not be relevant given cancer diagnosis. Recommend blood sugar goals <200 for improved quality of life and mainly to avoid dehydration.  Thyroid nodule, asymptomatic and euthyroid, in context of metastatic pancreatic cancer, no further management recommended at this time.    RTC 2 months, for diabetes management prn if hyperglycemia present    YESENIA RICHARD MD, MPH  Endocrinologist    45 min spent on evaluation, management, counseling, education, & motivational interviewing with greater than 50% of the total time was spent on counseling and coordinating care              "

## 2017-11-22 NOTE — NURSING NOTE
Chief Complaint   Patient presents with     Consult     NEW PATIENT- DIABETES     Marcia Timmons, [facility]  Endocrinology & Diabetes 3G

## 2017-11-27 NOTE — PATIENT INSTRUCTIONS
Contact Numbers    [facility] Main Line: [phone]  [facility] Triage:  [phone]    Call triage with chills and/or temperature greater than or equal to 100.5, uncontrolled nausea/vomiting, diarrhea, constipation, dizziness, shortness of breath, chest pain, bleeding, unexplained bruising, or any new/concerning symptoms, questions/concerns.     If you are having any concerning symptoms or wish to speak to a provider before your next infusion visit, please call your care coordinator or triage to notify them so we can adequately serve you.       After Hours: [phone]    If after hours, weekends, or holidays, call main hospital  and ask for Oncology doctor on call.             November 2017 Sunday Monday Tuesday Wednesday Thursday Friday Saturday                  1     [facility] ONC INFUSION 120   11:00 AM   (120 min.)    ONCOLOGY INFUSION   [facility] 2     3     4       5     6     7     [facility] DRAW    9:45 AM   (15 min.)    [facility] DRAW   [facility] Lab Draw     [facility] ONC INFUSION 120   10:30 AM   (120 min.)    ONCOLOGY INFUSION   [facility] 8     9     [facility] RETURN   10:05 AM   (50 min.)   Ester Echavarria, TEE CNP   [facility]     [facility] DRAW   10:15 AM   (15 min.)    [facility] DRAW   [facility] Lab Draw     [facility] ONC INFUSION 120    1:00 PM   (120 min.)    ONCOLOGY INFUSION   [facility] 10     11       12     13     [facility] DRAW    9:15 AM   (15 min.)    [facility] DRAW   [facility] Draw     [facility] ONC INFUSION 180   10:00 AM   (180 min.)    ONCOLOGY INFUSION   [facility]     CT CHEST WO    6:45 PM   (20 min.)   CT1   [facility] CT     MR ABDOMEN WWO    7:30 PM   (45 min.)   MR1   [facility] MRI 14     15     XR VIDEO SPEECH W ESOPHAGRAM    2:00 PM   (60 min.)   XR2   [facility] Xray     VIDEO SWALLOW STUDY     2:00 PM   (60 min.)   Malu Maurer, SLP   [facility] 16     [facility] DRAW   12:00 PM   (15 min.)    [facility] LAB DRAW   [facility] Draw     [facility] ONC INFUSION 180   12:30 PM   (180 min.)    ONCOLOGY INFUSION   [facility] 17     18       19     20     LAB WITH  CLINIC    5:30 PM   (15 min.)    LAB   [facility] Lab     [facility] RETURN    5:45 PM   (30 min.)   Demond Gonsales MD   [facility] 21     22     [facility] NEW DIABETES    4:15 PM   (60 min.)   Chloe Flowers MD   [facility] 23     24     25       26     27     [facility] DRAW    9:45 AM   (15 min.)    [facility]     [facility] ONC INFUSION 180   10:30 AM   (180 min.)    ONCOLOGY INFUSION   [facility] 28     29    Pump Disconnect ___________ 30 December 2017 Sunday Monday Tuesday Wednesday Thursday Friday Saturday                            1     2       3     4     5     6     7     8     9       10     11     12     13     14     15     16       17     18     19     [facility] DRAW   12:45 PM   (15 min.)    [facility] LAB DRAW   [facility] Lab Draw     [facility] RETURN   12:55 PM   (50 min.)   Ester Echavarria APRN CNP   [facility]     [facility] ONC INFUSION 180    2:00 PM   (180 min.)    ONCOLOGY INFUSION   [facility] 20     21     22     23       24     25     26     27     28     29     30       31                                               Recent Results (from the past 24 hour(s))   Comprehensive metabolic panel    Collection Time: 11/27/17 10:13 AM   Result Value Ref Range    Sodium 135 133 - 144 mmol/L    Potassium 3.9 3.4 - 5.3 mmol/L    Chloride 104 94 - 109 mmol/L    Carbon Dioxide 22 20 - 32 mmol/L    Anion Gap 9 3 - 14 mmol/L    Glucose 326 (H) 70 - 99 mg/dL    Urea Nitrogen 16 7 - 30 mg/dL    Creatinine 0.65 0.52 - 1.04 mg/dL    GFR  Estimate >90 >60 mL/min/1.7m2    GFR Estimate If Black >90 >60 mL/min/1.7m2    Calcium 9.2 8.5 - 10.1 mg/dL    Bilirubin Total 0.3 0.2 - 1.3 mg/dL    Albumin 3.4 3.4 - 5.0 g/dL    Protein Total 8.1 6.8 - 8.8 g/dL    Alkaline Phosphatase 256 (H) 40 - 150 U/L    ALT 41 0 - 50 U/L    AST 29 0 - 45 U/L   CBC with platelets differential    Collection Time: 11/27/17 10:13 AM   Result Value Ref Range    WBC 3.6 (L) 4.0 - 11.0 10e9/L    RBC Count 3.47 (L) 3.8 - 5.2 10e12/L    Hemoglobin 9.9 (L) 11.7 - 15.7 g/dL    Hematocrit 32.6 (L) 35.0 - 47.0 %    MCV 94 78 - 100 fl    MCH 28.5 26.5 - 33.0 pg    MCHC 30.4 (L) 31.5 - 36.5 g/dL    RDW 16.2 (H) 10.0 - 15.0 %    Platelet Count 129 (L) 150 - 450 10e9/L    Diff Method Automated Method     % Neutrophils 65.2 %    % Lymphocytes 22.1 %    % Monocytes 10.9 %    % Eosinophils 0.6 %    % Basophils 0.6 %    % Immature Granulocytes 0.6 %    Nucleated RBCs 0 0 /100    Absolute Neutrophil 2.3 1.6 - 8.3 10e9/L    Absolute Lymphocytes 0.8 0.8 - 5.3 10e9/L    Absolute Monocytes 0.4 0.0 - 1.3 10e9/L    Absolute Eosinophils 0.0 0.0 - 0.7 10e9/L    Absolute Basophils 0.0 0.0 - 0.2 10e9/L    Abs Immature Granulocytes 0.0 0 - 0.4 10e9/L    Absolute Nucleated RBC 0.0

## 2017-11-27 NOTE — PROGRESS NOTES
Infusion Nursing Note:  Shirin Muller presents today for Cycle 5 Liposomal Irinotecan and Fluorouracil Pump.    Patient seen and examined in clinic by Dr Dru HANNA on 11/20/17    Intravenous Access:  Implanted Port.    Treatment Conditions:  Lab Results   Component Value Date    HGB 9.9 11/27/2017     Lab Results   Component Value Date    WBC 3.6 11/27/2017      Lab Results   Component Value Date    ANEU 2.3 11/27/2017     Lab Results   Component Value Date     11/27/2017      Lab Results   Component Value Date     11/27/2017                   Lab Results   Component Value Date    POTASSIUM 3.9 11/27/2017           Lab Results   Component Value Date    MAG 1.5 11/13/2017            Lab Results   Component Value Date    CR 0.65 11/27/2017                   Lab Results   Component Value Date    FANNY 9.2 11/27/2017                Lab Results   Component Value Date    BILITOTAL 0.3 11/27/2017           Lab Results   Component Value Date    ALBUMIN 3.4 11/27/2017                    Lab Results   Component Value Date    ALT 41 11/27/2017           Lab Results   Component Value Date    AST 29 11/27/2017     Results reviewed, labs MET treatment parameters, ok to proceed with treatment.    Note:.     Fluorouracil continuous infuser connected at 1300.  Positive blood return from port.  Fluorouracil to infuse over 46 hours at 5.2cc/hour.  Kamini Landa RN verified pump set up and connection sites before pt discharged.   Infuser will be disconnected on 11/29 at 1100 by [facility].  [facility] is aware of disconnect date and time.      Pt noted that he appetite continues to be poor.  Per Dr Gonsales's note on 11/20 he reviewed strategies for pt to use to make sure her intake stays sufficient.  Pt recalls this conversation and verbalizes an understanding for this information.  She has no further questions today.  This RN encouraged pt to call triage if she was unable to eat or  drink sufficiently and pt verbalized an understanding for this information.      Post Infusion Assessment:  Patient tolerated infusion without incident.    Discharge Plan:   Prescription refills given for compazine.  Copy of AVS reviewed with patient and/or family.  Patient will return 12/19/17 for cycle 6.  Face to Face time: 0.    Triny Bradford RN

## 2017-11-27 NOTE — MR AVS SNAPSHOT
After Visit Summary   11/27/2017    Shirin Muller    MRN: [id number]           Patient Information     Date Of Birth          1960        Visit Information        Provider Department      11/27/2017 10:30 AM UC 21 ATC;  ONCOLOGY INFUSION [facility]        Today's Diagnoses     Chemotherapy-induced neutropenia (H)    -  1    Malignant neoplasm of head of pancreas (H)          Care Instructions    Contact Numbers    [facility] Main Line: [phone]  [facility] Triage:  [phone]    Call triage with chills and/or temperature greater than or equal to 100.5, uncontrolled nausea/vomiting, diarrhea, constipation, dizziness, shortness of breath, chest pain, bleeding, unexplained bruising, or any new/concerning symptoms, questions/concerns.     If you are having any concerning symptoms or wish to speak to a provider before your next infusion visit, please call your care coordinator or triage to notify them so we can adequately serve you.       After Hours: [phone]    If after hours, weekends, or holidays, call main hospital  and ask for Oncology doctor on call.             November 2017 Sunday Monday Tuesday Wednesday Thursday Friday Saturday                  1     [facility] ONC INFUSION 120   11:00 AM   (120 min.)    ONCOLOGY INFUSION   [facility] 2     3     4       5     6     7     [facility] LAB DRAW    9:45 AM   (15 min.)    [facility] LAB DRAW   [facility] Lab Draw     UMP ONC INFUSION 120   10:30 AM   (120 min.)    ONCOLOGY INFUSION   [facility] 8     9     UMP RETURN   10:05 AM   (50 min.)   Ester Echavarria APRN CNP   [facility]     [facility] LAB DRAW   10:15 AM   (15 min.)    [facility] LAB DRAW   [facility] Lab Draw     UMP ONC INFUSION 120    1:00 PM   (120 min.)    ONCOLOGY INFUSION   [facility] 10     11       12     13     [facility] LAB DRAW    9:15 AM    (15 min.)    [facility] LAB DRAW   [facility] Lab Draw     [facility] ONC INFUSION 180   10:00 AM   (180 min.)    ONCOLOGY INFUSION   [facility]     CT CHEST WO    6:45 PM   (20 min.)   CT1   [facility] CT     MR ABDOMEN WWO    7:30 PM   (45 min.)   MR1   [facility] MRI 14     15     XR VIDEO SPEECH W ESOPHAGRAM    2:00 PM   (60 min.)   XR2   [facility] Xray     VIDEO SWALLOW STUDY    2:00 PM   (60 min.)   Malu Maurer, SLP   [facility] 16     [facility] LAB DRAW   12:00 PM   (15 min.)    [facility] LAB DRAW   [facility] Lab Draw     UMP ONC INFUSION 180   12:30 PM   (180 min.)    ONCOLOGY INFUSION   [facility] 17     18       19     20     LAB WITH HB CLINIC    5:30 PM   (15 min.)    LAB   [facility] Lab     UMP RETURN    5:45 PM   (30 min.)   Demond Gonsales MD   [facility] 21     22     UMP NEW DIABETES    4:15 PM   (60 min.)   Chloe Flowers MD   [facility] Endocrinology 23     24     25       26     27     [facility] LAB DRAW    9:45 AM   (15 min.)    [facility] LAB DRAW   [facility] Lab Draw     [facility] ONC INFUSION 180   10:30 AM   (180 min.)    ONCOLOGY INFUSION   [facility] 28     29    Pump Disconnect ___________ 30 December 2017 Sunday Monday Tuesday Wednesday Thursday Friday Saturday                            1     2       3     4     5     6     7     8     9       10     11     12     13     14     15     16       17     18     19     [facility] DRAW   12:45 PM   (15 min.)    [facility] LAB DRAW   [facility] Lab Draw     [facility] RETURN   12:55 PM   (50 min.)   Ester Echavarria APRN CNP   [facility]     UMP ONC INFUSION 180    2:00 PM   (180 min.)    ONCOLOGY INFUSION   [facility] 20     21     22     23       24     25     26     27     28     29     30       31                                                Recent Results (from the past 24 hour(s))   Comprehensive metabolic panel    Collection Time: 11/27/17 10:13 AM   Result Value Ref Range    Sodium 135 133 - 144 mmol/L    Potassium 3.9 3.4 - 5.3 mmol/L    Chloride 104 94 - 109 mmol/L    Carbon Dioxide 22 20 - 32 mmol/L    Anion Gap 9 3 - 14 mmol/L    Glucose 326 (H) 70 - 99 mg/dL    Urea Nitrogen 16 7 - 30 mg/dL    Creatinine 0.65 0.52 - 1.04 mg/dL    GFR Estimate >90 >60 mL/min/1.7m2    GFR Estimate If Black >90 >60 mL/min/1.7m2    Calcium 9.2 8.5 - 10.1 mg/dL    Bilirubin Total 0.3 0.2 - 1.3 mg/dL    Albumin 3.4 3.4 - 5.0 g/dL    Protein Total 8.1 6.8 - 8.8 g/dL    Alkaline Phosphatase 256 (H) 40 - 150 U/L    ALT 41 0 - 50 U/L    AST 29 0 - 45 U/L   CBC with platelets differential    Collection Time: 11/27/17 10:13 AM   Result Value Ref Range    WBC 3.6 (L) 4.0 - 11.0 10e9/L    RBC Count 3.47 (L) 3.8 - 5.2 10e12/L    Hemoglobin 9.9 (L) 11.7 - 15.7 g/dL    Hematocrit 32.6 (L) 35.0 - 47.0 %    MCV 94 78 - 100 fl    MCH 28.5 26.5 - 33.0 pg    MCHC 30.4 (L) 31.5 - 36.5 g/dL    RDW 16.2 (H) 10.0 - 15.0 %    Platelet Count 129 (L) 150 - 450 10e9/L    Diff Method Automated Method     % Neutrophils 65.2 %    % Lymphocytes 22.1 %    % Monocytes 10.9 %    % Eosinophils 0.6 %    % Basophils 0.6 %    % Immature Granulocytes 0.6 %    Nucleated RBCs 0 0 /100    Absolute Neutrophil 2.3 1.6 - 8.3 10e9/L    Absolute Lymphocytes 0.8 0.8 - 5.3 10e9/L    Absolute Monocytes 0.4 0.0 - 1.3 10e9/L    Absolute Eosinophils 0.0 0.0 - 0.7 10e9/L    Absolute Basophils 0.0 0.0 - 0.2 10e9/L    Abs Immature Granulocytes 0.0 0 - 0.4 10e9/L    Absolute Nucleated RBC 0.0                  Follow-ups after your visit        Your next 10 appointments already scheduled     Dec 19, 2017 12:45 PM [facility]   [facility] Lab Draw with  MASON[facility] LAB DRAW   [facility] Lab Draw ([facility])    [address]  [address]   [phone]            Dec 19, 2017  1:10 PM CST   (Arrive by 12:55 PM)   Return Visit with TEE Olivas CNP   [facility] ([facility])    [address] [place] [address]   [phone]            Dec 19, 2017  2:00 PM CST   Infusion 180 with UC ONCOLOGY INFUSION, UC 11 ATC   [facility] ([facility])    [address] [place] [address]   [phone]            Jan 08, 2018 12:00 PM CST   Masonic Lab Draw with UC MASONIC LAB DRAW   [facility] Masonic Lab Draw ([facility])    [address] [place] [address]   [phone]            Jan 08, 2018 12:30 PM CST   Infusion 180 with UC ONCOLOGY INFUSION, UC 30 ATC   [facility] ([facility])    [address] [place] [address]   [phone]            Jan 19, 2018  8:45 AM CST   M[facility]ic Lab Draw with UC MASONIC LAB DRAW   [facility] Mason[facility] Lab Draw ([facility])    [address] [place] [address]   [phone]            Jan 19, 2018  9:20 AM CST   (Arrive by 9:05 AM)   CT CHEST W/O CONTRAST with UCCT2   [facility] ([facility])    [address] [place] [address]  1st [facility] [address]   [phone]           Please bring any scans or X-rays taken at other hospitals, if similar tests were done. Also bring a list of your medicines, including vitamins, minerals and over-the-counter drugs. It is safest to leave personal items at home.  Be sure to tell your doctor:   If you have any allergies.   If there s any chance you are pregnant.   If you are breastfeeding.   If you have any special needs.  You do not need to do anything special to prepare.  Please wear loose clothing, such as a sweat suit or  jogging clothes. Avoid snaps, zippers and other metal. We may ask you to undress and put on a hospital gown.            Jan 19, 2018 10:00 AM CST   (Arrive by 9:45 AM)   MR ABDOMEN W/O & W CONTRAST with [address] [facility] MRI ([facility])    [address] [id number] [phone]           Take your medicines as usual, unless your doctor tells you not to. Bring a list of your current medicines to your exam (including vitamins, minerals and over-the-counter drugs). Also bring the results of similar scans you may have had.    The day before your exam, drink extra fluids at least six 8-ounce glasses (unless your doctor tells you to restrict your fluids).   Have a blood test (creatinine test) within 30 days of your exam. Go to your clinic or Diagnostic Imaging Department for this test.   Do not eat or drink for 6 hours prior to exam.  The MRI machine uses a strong magnet. Please wear clothes without metal (snaps, zippers). A sweatsuit works well, or we may give you a hospital gown.  Please remove any body piercings and hair extensions before you arrive. You will also remove watches, jewelry, hairpins, wallets, dentures, partial dental plates and hearing aids. You may wear contact lenses, and you may be able to wear your rings. We have a safe place to keep your personal items, but it is safer to leave them at home.   **IMPORTANT** THE INSTRUCTIONS BELOW ARE ONLY FOR THOSE PATIENTS WHO HAVE BEEN TOLD THEY WILL RECEIVE SEDATION OR GENERAL ANESTHESIA DURING THEIR MRI PROCEDURE:  IF YOU WILL RECEIVE SEDATION (take medicine to help you relax during your exam):   You must get the medicine from your doctor before you arrive. Bring the medicine to the exam. Do not take it at home.   Arrive one hour early. Bring someone who can take you home after the test. Your medicine will make you sleepy. After the exam, you may not drive, take a bus or take a taxi by  yourself.   No eating 8 hours before your exam. You may have clear liquids up until 4 hours before your exam. (Clear liquids include water, clear tea, black coffee and fruit juice without pulp.)  IF YOU WILL RECEIVE ANESTHESIA (be asleep for your exam):   Arrive 1 1/2 hours early. Bring someone who can take you home after the test. You may not drive, take a bus or take a taxi by yourself.   No eating 8 hours before your exam. You may have clear liquids up until 4 hours before your exam. (Clear liquids include water, clear tea, black coffee and fruit juice without pulp.)  If you have any questions, please contact your Imaging Department exam site.              Who to contact     If you have questions or need follow up information about today's clinic visit or your schedule please contact [facility] directly at [phone].  Normal or non-critical lab and imaging results will be communicated to you by MyChart, letter or phone within 4 business days after the clinic has received the results. If you do not hear from us within 7 days, please contact the clinic through [employer] or phone. If you have a critical or abnormal lab result, we will notify you by phone as soon as possible.  Submit refill requests through [employer] or call your pharmacy and they will forward the refill request to us. Please allow 3 business days for your refill to be completed.          Additional Information About Your Visit        [employer] Information     [employer] gives you secure access to your electronic health record. If you see a primary care provider, you can also send messages to your care team and make appointments. If you have questions, please call your primary care clinic.  If you do not have a primary care provider, please call [phone] and they will assist you.        Care EveryWhere ID     This is your Care EveryWhere ID. This could be used by other organizations to access your [facility]  records  [id number]        Your Vitals Were     Pulse Temperature Respirations Pulse Oximetry BMI (Body Mass Index)       95 98.6  F (37  C) (Oral) 16 100% 24.59 kg/m2        Blood Pressure from Last 3 Encounters:   11/27/17 100/68   11/22/17 102/70   11/20/17 112/73    Weight from Last 3 Encounters:   11/27/17 77.7 kg (171 lb 6.4 oz)   11/22/17 78.5 kg (173 lb)   11/20/17 79.4 kg (175 lb)              We Performed the Following     CBC with platelets differential     Comprehensive metabolic panel          Today's Medication Changes          These changes are accurate as of: 11/27/17 11:33 AM.  If you have any questions, ask your nurse or doctor.               These medicines have changed or have updated prescriptions.        Dose/Directions    ENSURE CLEAR Liqd   This may have changed:  how much to take   Used for:  Malignant neoplasm of head of pancreas (H)        Dose:  1 Bottle   Take 1 Bottle by mouth 3 times daily   Quantity:  90 Bottle   Refills:  6                Primary Care Provider    [facility]       No address on file        Equal Access to Services     JAI CORDERO AH: Maxx Spence, wathanh flower, qaybrahel kaaltc frazier, luis e asher . So [facility] [phone].    ATENCIÓN: Si habla español, tiene a bernal disposición servicios gratuitos de asistencia lingüística. Llame al [phone].    We comply with applicable federal civil rights laws and [place] laws. We do not discriminate on the basis of race, color, national origin, age, disability, sex, sexual orientation, or gender identity.            Thank you!     Thank you for choosing [facility]  for your care. Our goal is always to provide you with excellent care. Hearing back from our patients is one way we can continue to improve our services. Please take a few minutes to complete the written survey that you may receive in the mail after your visit with us. Thank you!              Your Updated Medication List - Protect others around you: Learn how to safely use, store and throw away your medicines at www.disposemymeds.org.          This list is accurate as of: 11/27/17 11:33 AM.  Always use your most recent med list.                   Brand Name Dispense Instructions for use Diagnosis    amylase-lipase-protease 34767 UNITS Cpep    CREON    180 capsule    Take 2 capsules (72,000 Units) by mouth 3 times daily (with meals)    Malignant neoplasm of head of pancreas (H)       diltiazem 2% in PLO cream (FV COMPOUNDED) 2% Gel     30 g    Apply topically daily and as needed to external hemorrhoids    External hemorrhoids       docusate sodium 100 MG capsule    COLACE    100 capsule    Take 1 capsule (100 mg) by mouth daily    External hemorrhoids       dronabinol 5 MG capsule    MARINOL    90 capsule    Take 1 capsule (5 mg) by mouth 3 times daily (before meals)    Anorexia       ENSURE CLEAR Liqd     90 Bottle    Take 1 Bottle by mouth 3 times daily    Malignant neoplasm of head of pancreas (H)       lidocaine-prilocaine cream    EMLA    30 g    Apply topically as needed for other (Use 30-60 minutes prior to port access)    Malignant neoplasm of head of pancreas (H)       loratadine 10 MG tablet    CLARITIN    30 tablet    Take 1 tablet (10 mg) by mouth daily Reported on 5/5/2017    Chronic seasonal allergic rhinitis, unspecified trigger       LORazepam 0.5 MG tablet    ATIVAN    30 tablet    Take 1 tablet (0.5 mg) by mouth every 4 hours as needed (Anxiety, Nausea/Vomiting or Sleep)    Malignant neoplasm of head of pancreas (H)       morphine 15 MG 12 hr tablet    MS CONTIN    60 tablet    Take 1 tablet (15 mg) by mouth every 12 hours    Malignant neoplasm of head of pancreas (H)       ondansetron 8 MG ODT tab    ZOFRAN-ODT    60 tablet    Take 1 tablet (8 mg) by mouth every 8 hours as needed for nausea    Malignant neoplasm of head of pancreas (H)       ONETOUCH PING METER REMOTE SUPPLIES  Misc     1 each    Check your sugars twice daily, once when fasting and once 2 hours after eating.    Secondary diabetes mellitus (H)       oxyCODONE IR 5 MG tablet    ROXICODONE    100 tablet    Take 1 tablet (5 mg) by mouth every 4 hours as needed for moderate to severe pain    Malignant neoplasm of head of pancreas (H)       oxyCODONE-acetaminophen 5-325 MG per tablet    PERCOCET          pantoprazole 20 MG EC tablet    PROTONIX    60 tablet    Take 1 tablet (20 mg) by mouth 2 times daily    Malignant neoplasm of head of pancreas (H)       polyethylene glycol powder    MIRALAX/GLYCOLAX    119 g    Take 17 g (1 capful) by mouth daily    Malignant neoplasm of head of pancreas (H)       potassium chloride SA 20 MEQ CR tablet    KLOR-CON    60 tablet    Take 2 tablets (40 mEq) by mouth daily    Malignant neoplasm of head of pancreas (H)       prochlorperazine 10 MG tablet    COMPAZINE    30 tablet    TAKE ONE TABLET BY MOUTH EVERY 6 HOURS AS NEEDED FOR NAUSEA AND VOMITING    Malignant neoplasm of head of pancreas (H)

## 2017-11-28 NOTE — PROGRESS NOTES
This is a recent snapshot of the patient's [facility] medical record.  For current drug dose and complete information and questions, call [phone] or In Basket pool, fv home infusion ([id number])  CSN Number:  [id number]

## 2017-11-30 NOTE — PROGRESS NOTES
This is a recent snapshot of the patient's [facility] medical record.  For current drug dose and complete information and questions, call [phone] or In Basket pool, fv home infusion ([id number])  CSN Number:  [id number]

## 2017-12-06 NOTE — PROGRESS NOTES
"DIABETES EDUCATION NOTE:    Shirin Johnson presents today for education related to Type 2 diabetes    Patient is being treated with:  Treatment not initiated yet.    She is accompanied by friend, Jeni.    PATIENT CONCERNS RELATED TO DIABETES SELF MANAGEMENT:     Metastatic pancreatic cancer being treated with chemotherapy.  She has developed hyperglycemia and is referred by Dr. Flowers for initiation of Lantus insulin.        ASSESSMENT:  Current Diabetes Management per Patient:  Taking diabetes medications? no  Monitoring  Patient glucose self monitoring as follows: one time daily.   BG meter: Contour Next  meter  BG results: fasting glucose- ranges between 169 and 308     BG values are: Not in goal  Patient's most recent   Lab Results   Component Value Date    A1C 9.2 11/07/2017    is not meeting goal of <7.0    Todays Blood Glucose result was 258 on [facility] Contour meter.    Exercise: No regular exercise routine.    Nutrition:   Not assessed today.     Hypoglycemia:   Patient is at risk of hypoglycemia? Yes                       EDUCATION and INSTRUCTION PROVIDED AT THIS VISIT:      Explained some of the differences between insulins.  Explained the action and timing of Lantus insulin.  Demonstrated applying pen needle, dialing up the 2 unit \"airshot\", dialing up dose to be given, injecting insulin and instructed to hold in the skin for 10 seconds after pushing in dose.   Discussed safe and legal disposal of needles.   Discussed the storage and disposal of insulin pens, including \"good to\" date.   Discussed the recognition and treatment of hypoglycemia, including carrying meter and fast-acting form of carbohydrate at all times. Given examples of fast acting carbohydrate that would be appropriate for treatment of hypoglycemia.    Discussed plan for follow up. She is going to call Dr. Flowers in 3-4 days with report of blood glucose.    Discussed goals for BG's and probable need for adjustment of insulin.  "     Patient-stated goal written and given to Shirin Muller.  Verbalized and demonstrated understanding of instructions.     PLAN:  See patient instructions  AVS printed and given to patient    FOLLOW-UP:        Time spent with patient at today's visit was 60 minutes.      Any diabetes medication dose changes were made via the CDE Protocol and Collaborative Practice Agreement with [place] and  [facility].  A copy of this encounter was provided to patient's referring provider.

## 2017-12-06 NOTE — PATIENT INSTRUCTIONS
"INSTRUCTIONS FOR TAKING INSULIN:    1. Take Lantus - 10 units between 7:30pm and 8:30pm.     - Do a 2 unit \"air shot\" before each injection, be sure a stream of insulin comes out of the needle before you give your injection. After you inject, hold the needle under the skin to the count of 10 to be sure all of the insulin goes in.     - Rotate injection sites, keeping at least 1 inch apart from last injection site and 2 inches away from belly button or surgical scars.    2. Pen - Use a new pen needle for each injection. Always remove pen needle from the insulin pen after use and do not store insulin pens with the needle on the pen.     3. Store insulin you are not using in the refrigerator (do not freeze). Take new insulin out of the refrigerator a few hours prior to use to bring to room temperature.     4. Once opened Lantus can be kept at room temperature for 28 days after opened.. Do not use the opened insulin after this time has passed, even if there is still medicine inside.     5. Always carry your blood sugar meter and a sugar source like glucose tablets with you in case of a low blood sugar. Treat a low blood sugar (less than 70) with 15 grams of carbohydrate (1 carb choice). Wait 15 minutes, recheck blood sugar. If blood sugar is still below 70, repeat the treatment.    6. Wear Medical ID or carry a wallet card stating you have Diabetes.    7. Call Dr. Flowers at [phone]  or diabetes educator ([phone])  if you begin having low blood sugars or if you have questions or concerns.     8. Follow-up: Call Dr. Flowers at [phone] in 3-4 days with your blood glucoses .      "

## 2017-12-06 NOTE — MR AVS SNAPSHOT
"              After Visit Summary   12/6/2017    Shirin Muller    MRN: [id number]           Patient Information     Date Of Birth          1960        Visit Information        Provider Department      12/6/2017 1:30 PM Raven Marie RN [facility] Diabetes        Care Instructions    INSTRUCTIONS FOR TAKING INSULIN:    1. Take Lantus - 10 units between 7:30pm and 8:30pm.     - Do a 2 unit \"air shot\" before each injection, be sure a stream of insulin comes out of the needle before you give your injection. After you inject, hold the needle under the skin to the count of 10 to be sure all of the insulin goes in.     - Rotate injection sites, keeping at least 1 inch apart from last injection site and 2 inches away from belly button or surgical scars.    2. Pen - Use a new pen needle for each injection. Always remove pen needle from the insulin pen after use and do not store insulin pens with the needle on the pen.     3. Store insulin you are not using in the refrigerator (do not freeze). Take new insulin out of the refrigerator a few hours prior to use to bring to room temperature.     4. Once opened Lantus can be kept at room temperature for 28 days after opened.. Do not use the opened insulin after this time has passed, even if there is still medicine inside.     5. Always carry your blood sugar meter and a sugar source like glucose tablets with you in case of a low blood sugar. Treat a low blood sugar (less than 70) with 15 grams of carbohydrate (1 carb choice). Wait 15 minutes, recheck blood sugar. If blood sugar is still below 70, repeat the treatment.    6. Wear Medical ID or carry a wallet card stating you have Diabetes.    7. Call Dr. Flowers at [phone]  or diabetes educator ([email or web address]-[phone])  if you begin having low blood sugars or if you have questions or concerns.     8. Follow-up: Call Dr. Flowers at [phone] in 3-4 days with your blood glucoses .              Follow-ups " after your visit        Your next 10 appointments already scheduled     Dec 19, 2017 12:45 PM CST   Masonic Lab Draw with UC MASONIC LAB DRAW   [facility] Masonic Lab Draw ([facility])    [address] [place] [id number]   [phone]            Dec 19, 2017  1:10 PM CST   (Arrive by 12:55 PM)   Return Visit with TEE Olivas CNP   [facility] ([facility])    [address] [place] [id number]   [phone]            Dec 19, 2017  2:00 PM CST   Infusion 180 with UC ONCOLOGY INFUSION, UC 11 ATC   [facility] ([facility])    [address] [place] [address]   [phone]            Jan 08, 2018 12:00 PM CST   Masonic Lab Draw with UC MASONIC LAB DRAW   [facility] Masonic Lab Draw ([facility])    [address] [place] [address]   [phone]            Jan 08, 2018 12:30 PM CST   Infusion 180 with UC ONCOLOGY INFUSION, UC 30 ATC   [facility] ([facility])    [address] [place] [address]   [phone]            Jan 19, 2018  8:45 AM CST   Masonic Lab Draw with UC MASONIC LAB DRAW   [facility] Masonic Lab Draw ([facility])    [address] [place] [address]   [phone]            Jan 19, 2018  9:20 AM CST   (Arrive by 9:05 AM)   CT CHEST W/O CONTRAST with UCCT2   [facility] [place] [facility] ([facility])    [address] [place] [address]  [facility] [id number] [phone]           Please bring any scans or X-rays taken at other hospitals, if similar tests were done. Also bring a list of your medicines, including vitamins, minerals and over-the-counter drugs. It is safest to leave personal items at home.  Be sure to  tell your doctor:   If you have any allergies.   If there s any chance you are pregnant.   If you are breastfeeding.   If you have any special needs.  You do not need to do anything special to prepare.  Please wear loose clothing, such as a sweat suit or jogging clothes. Avoid snaps, zippers and other metal. We may ask you to undress and put on a hospital gown.            Jan 19, 2018 10:00 AM CST   (Arrive by 9:45 AM)   MR ABDOMEN W/O & W CONTRAST with [address] MRI ([facility])    [address] [address] [phone]           Take your medicines as usual, unless your doctor tells you not to. Bring a list of your current medicines to your exam (including vitamins, minerals and over-the-counter drugs). Also bring the results of similar scans you may have had.    The day before your exam, drink extra fluids at least six 8-ounce glasses (unless your doctor tells you to restrict your fluids).   Have a blood test (creatinine test) within 30 days of your exam. Go to your clinic or Diagnostic Imaging Department for this test.   Do not eat or drink for 6 hours prior to exam.  The MRI machine uses a strong magnet. Please wear clothes without metal (snaps, zippers). A sweatsuit works well, or we may give you a hospital gown.  Please remove any body piercings and hair extensions before you arrive. You will also remove watches, jewelry, hairpins, wallets, dentures, partial dental plates and hearing aids. You may wear contact lenses, and you may be able to wear your rings. We have a safe place to keep your personal items, but it is safer to leave them at home.   **IMPORTANT** THE INSTRUCTIONS BELOW ARE ONLY FOR THOSE PATIENTS WHO HAVE BEEN TOLD THEY WILL RECEIVE SEDATION OR GENERAL ANESTHESIA DURING THEIR MRI PROCEDURE:  IF YOU WILL RECEIVE SEDATION (take medicine to help you relax during your exam):   You must get the medicine from your doctor  before you arrive. Bring the medicine to the exam. Do not take it at home.   Arrive one hour early. Bring someone who can take you home after the test. Your medicine will make you sleepy. After the exam, you may not drive, take a bus or take a taxi by yourself.   No eating 8 hours before your exam. You may have clear liquids up until 4 hours before your exam. (Clear liquids include water, clear tea, black coffee and fruit juice without pulp.)  IF YOU WILL RECEIVE ANESTHESIA (be asleep for your exam):   Arrive 1 1/2 hours early. Bring someone who can take you home after the test. You may not drive, take a bus or take a taxi by yourself.   No eating 8 hours before your exam. You may have clear liquids up until 4 hours before your exam. (Clear liquids include water, clear tea, black coffee and fruit juice without pulp.)  If you have any questions, please contact your Imaging Department exam site.              Future tests that were ordered for you today     Open Future Orders        Priority Expected Expires Ordered    DIABETES EDUCATOR REFERRAL ASAP 12/6/2017 12/5/2018 12/5/2017            Who to contact     Please call your clinic at [phone] to:    Ask questions about your health    Make or cancel appointments    Discuss your medicines    Learn about your test results    Speak to your doctor   If you have compliments or concerns about an experience at your clinic, or if you wish to file a complaint, please contact [facility] Patient Relations at [phone] or email us at [email or web address]         Additional Information About Your Visit        MyChart Information     [employer] gives you secure access to your electronic health record. If you see a primary care provider, you can also send messages to your care team and make appointments. If you have questions, please call your primary care clinic.  If you do not have a primary care provider, please call [phone] and they  will assist you.      [employer] is an electronic gateway that provides easy, online access to your medical records. With [employer], you can request a clinic appointment, read your test results, renew a prescription or communicate with your care team.     To access your existing account, please contact your [facility] or call [phone] for assistance.        Care EveryWhere ID     This is your Care EveryWhere ID. This could be used by other organizations to access your [facility] medical records  [id number]         Blood Pressure from Last 3 Encounters:   11/27/17 100/68   11/22/17 102/70   11/20/17 112/73    Weight from Last 3 Encounters:   11/27/17 77.7 kg (171 lb 6.4 oz)   11/22/17 78.5 kg (173 lb)   11/20/17 79.4 kg (175 lb)              Today, you had the following     No orders found for display         Today's Medication Changes          These changes are accurate as of: 12/6/17  2:43 PM.  If you have any questions, ask your nurse or doctor.               These medicines have changed or have updated prescriptions.        Dose/Directions    ENSURE CLEAR Liqd   This may have changed:  how much to take   Used for:  Malignant neoplasm of head of pancreas (H)        Dose:  1 Bottle   Take 1 Bottle by mouth 3 times daily   Quantity:  90 Bottle   Refills:  6                Primary Care Provider    [facility]       No address on file        Equal Access to Services     JAI CORDERO AH: Hadii isak Spence, waaxda luqadaha, qaybta kaalmada adevalerio, luis e lora. So [facility] [phone].    ATENCIÓN: Si habla español, tiene a bernal disposición servicios gratuitos de asistencia lingüística. Llame al [phone].    We comply with applicable federal civil rights laws and [place] laws. We do not discriminate on the basis of race, color, national origin, age, disability, sex, sexual orientation, or gender identity.            Thank you!      Thank you for choosing [facility]  for your care. Our goal is always to provide you with excellent care. Hearing back from our patients is one way we can continue to improve our services. Please take a few minutes to complete the written survey that you may receive in the mail after your visit with us. Thank you!             Your Updated Medication List - Protect others around you: Learn how to safely use, store and throw away your medicines at www.disposemymeds.org.          This list is accurate as of: 12/6/17  2:43 PM.  Always use your most recent med list.                   Brand Name Dispense Instructions for use Diagnosis    amylase-lipase-protease 44667 UNITS Cpep    CREON    180 capsule    Take 2 capsules (72,000 Units) by mouth 3 times daily (with meals)    Malignant neoplasm of head of pancreas (H)       diltiazem 2% in PLO cream (FV COMPOUNDED) 2% Gel     30 g    Apply topically daily and as needed to external hemorrhoids    External hemorrhoids       docusate sodium 100 MG capsule    COLACE    100 capsule    Take 1 capsule (100 mg) by mouth daily    External hemorrhoids       dronabinol 5 MG capsule    MARINOL    90 capsule    Take 1 capsule (5 mg) by mouth 3 times daily (before meals)    Anorexia       ENSURE CLEAR Liqd     90 Bottle    Take 1 Bottle by mouth 3 times daily    Malignant neoplasm of head of pancreas (H)       lidocaine-prilocaine cream    EMLA    30 g    Apply topically as needed for other (Use 30-60 minutes prior to port access)    Malignant neoplasm of head of pancreas (H)       loratadine 10 MG tablet    CLARITIN    30 tablet    Take 1 tablet (10 mg) by mouth daily Reported on 5/5/2017    Chronic seasonal allergic rhinitis, unspecified trigger       LORazepam 0.5 MG tablet    ATIVAN    30 tablet    Take 1 tablet (0.5 mg) by mouth every 4 hours as needed (Anxiety, Nausea/Vomiting or Sleep)    Malignant neoplasm of head of pancreas (H)       morphine 15 MG 12 hr tablet    MS  CONTIN    60 tablet    Take 1 tablet (15 mg) by mouth every 12 hours    Malignant neoplasm of head of pancreas (H)       ondansetron 8 MG ODT tab    ZOFRAN-ODT    60 tablet    Take 1 tablet (8 mg) by mouth every 8 hours as needed for nausea    Malignant neoplasm of head of pancreas (H)       ONETOUCH PING METER REMOTE SUPPLIES Misc     1 each    Check your sugars twice daily, once when fasting and once 2 hours after eating.    Secondary diabetes mellitus (H)       oxyCODONE IR 5 MG tablet    ROXICODONE    100 tablet    Take 1 tablet (5 mg) by mouth every 4 hours as needed for moderate to severe pain    Malignant neoplasm of head of pancreas (H)       oxyCODONE-acetaminophen 5-325 MG per tablet    PERCOCET          pantoprazole 20 MG EC tablet    PROTONIX    60 tablet    Take 1 tablet (20 mg) by mouth 2 times daily    Malignant neoplasm of head of pancreas (H)       polyethylene glycol powder    MIRALAX/GLYCOLAX    119 g    Take 17 g (1 capful) by mouth daily    Malignant neoplasm of head of pancreas (H)       potassium chloride SA 20 MEQ CR tablet    KLOR-CON    60 tablet    Take 2 tablets (40 mEq) by mouth daily    Malignant neoplasm of head of pancreas (H)       prochlorperazine 10 MG tablet    COMPAZINE    30 tablet    TAKE ONE TABLET BY MOUTH EVERY 6 HOURS AS NEEDED FOR NAUSEA AND VOMITING    Malignant neoplasm of head of pancreas (H)

## 2017-12-19 NOTE — LETTER
12/19/2017       RE: Shirin Muller  [address] [address]     Dear Colleague,    Thank you for referring your patient, Shirin Muller, to the [facility]. Please see a copy of my visit note below.    Oncology/Hematology Visit Note  Dec 19, 2017       Reason for Visit: Follow up of metastatic pancreatic cancer, prior to C6 liposomal irinotecan and 5FU    History of Present Illness: Shirin Muller is a 57 year old female with metastatic pancreatic cancer with known liver metastases. She is currently undergoing treatment with liposomal irinotecan and 5FU. Her oncologic history is as follows:    She was diagnosed with pancreatic cancer in the spring of 2016 after presenting with a 2 to 3-month history of abdominal pain and weight loss. She initiated on treatment with gemcitabine and Abraxane on 05/02/2016 and continued on that until December 2016 when she was found to have progressive metastatic disease involving the liver. She was then initiated on FOLFIRINOX on 12/20/16.  In January 2017, she was found to have a GI bleed secondary to a bleeding duodenal ulcer and infiltrating mass in the duodenum. The ulcer was injected and clipped. She was found to be H. Pylori positive and was initiated on therapy with PPI, carafate, amoxicillin and clarithromycin. She had biliary obstruction in April 2017 and underwent ERCP and  biliary stent placement  Dr. Braden. She was admitted on 8/3/17 with sepsis/cholangitis. Blood cultures grew klebsiella penumonia and streptococcus angiosos. She had an ERCP on 8/3 demonstrating an occluded stent with pus, stone and sludge. The duct was dilated and a new stent placed. She was treated with IV Vanco and Zosyn, repeat BC were negative. She was discharged on levaquin and augmentin. She also has been having intermittent vaginal bleeding with unknown etiology. She was restarted on FOLFIRINOX on 8/18/17. Due to  neuropathy, she was switched to liposomal irinotecan and 5FU every 2 weeks on 9/18/17. She has received 5 cycles so far (last dose 11/27).        Interval History:  Ms. Muller returns to clinic today with her sister. She states that having the 3 week interval between chemo instead of 2 has been beneficial. She continues to have about 1 week of nausea, vomiting, and anorexia following each chemo treatment. She has been taking compazine and zofran for nausea, and states she vomited maybe one time the week after the last chemo infusion. Her appetite is low, partially due to altered taste which is improving. She eats about 50% of 3 meals/day, and about 3 ensure supplements per day. She states she normally has a BM every 4-5 days, and does not feel constipated. She has stable abdominal pain that wraps around waist to her back for which she takes MS Contin BID and oxycodone, about 2 pills per day. She denies any dysphagia. She has had no mucositis. She has chronic numbness in bilateral hands and feet that has been stable. She states she sometimes drops things from her hands, and that the numbness impairs her ability to perform some ADLs. Her numbness does affect her balance somewhat while walking, but she denies any falls.    She is not having any pain at the moment. She is feeling fatigued and more weak, but better than a few days ago. Did ask about how she was coping with this and she states she feel OK and when asked was clear she still wanted to continue treatments.    Review of Systems:  Patient denies fevers, chills, night sweats, unexplained weight changes, headaches, dizziness, vision or hearing changes, new lumps or bumps, chest pain, shortness of breath, cough, abdominal pain, changes to bladder, swelling of extremities, bleeding issues, or rash.    Current Outpatient Prescriptions   Medication Sig Dispense Refill     docusate sodium (DOK) 100 MG capsule Take 1 capsule (100 mg) by mouth daily 100 capsule 1      morphine (MS CONTIN) 15 MG 12 hr tablet Take 1 tablet (15 mg) by mouth every 12 hours 60 tablet 0     oxyCODONE IR (ROXICODONE) 5 MG tablet Take 1 tablet (5 mg) by mouth every 4 hours as needed for moderate to severe pain 100 tablet 0     prochlorperazine (COMPAZINE) 10 MG tablet TAKE ONE TABLET BY MOUTH EVERY 6 HOURS AS NEEDED FOR NAUSEA AND VOMITING 30 tablet 2     ondansetron (ZOFRAN-ODT) 8 MG ODT tab Take 1 tablet (8 mg) by mouth every 8 hours as needed for nausea 60 tablet 6     loratadine (CLARITIN) 10 MG tablet Take 1 tablet (10 mg) by mouth daily Reported on 5/5/2017 30 tablet 6     polyethylene glycol (MIRALAX/GLYCOLAX) powder Take 17 g (1 capful) by mouth daily 119 g 11     pantoprazole (PROTONIX) 20 MG EC tablet Take 1 tablet (20 mg) by mouth 2 times daily 60 tablet 3     Nutritional Supplements (ENSURE CLEAR) LIQD Take 1 Bottle by mouth 3 times daily 90 Bottle 6     dronabinol (MARINOL) 5 MG capsule Take 1 capsule (5 mg) by mouth 3 times daily (before meals) Taking once daily 90 capsule 0     insulin glargine (LANTUS SOLOSTAR) 100 UNIT/ML injection 10 units subcutaneously daily at 8pm. 15 mL 3     insulin pen needle (BD EMMANUEL U/F) 32G X 4 MM Use one daily or as directed. 100 each prn     blood glucose monitoring (ACCU-CHEK FASTCLIX) lancets Use to test blood sugar two times daily or as directed. 102 each 3     Blood Glucose Monitoring Suppl (ONETOUCH PING METER REMOTE) SUPPLIES MISC Check your sugars twice daily, once when fasting and once 2 hours after eating. 1 each 3     potassium chloride SA (KLOR-CON) 20 MEQ CR tablet Take 2 tablets (40 mEq) by mouth daily 60 tablet 3     LORazepam (ATIVAN) 0.5 MG tablet Take 1 tablet (0.5 mg) by mouth every 4 hours as needed (Anxiety, Nausea/Vomiting or Sleep) 30 tablet 3     amylase-lipase-protease (CREON) 62233 UNITS CPEP Take 2 capsules (72,000 Units) by mouth 3 times daily (with meals) 180 capsule 1     diltiazem 2% in PLO cream, FV COMPOUNDED, 2% GEL Apply  "topically daily and as needed to external hemorrhoids 30 g 0     lidocaine-prilocaine (EMLA) cream Apply topically as needed for other (Use 30-60 minutes prior to port access) 30 g 0       Physical Examination:  /63 (BP Location: Right arm, Patient Position: Sitting, Cuff Size: Adult Regular)  Pulse 78  Temp 98.3  F (36.8  C) (Oral)  Resp 18  Ht 1.778 m (5' 10\")  Wt 78.7 kg (173 lb 9.6 oz)  SpO2 99%  Breastfeeding? No  BMI 24.91 kg/m2  Wt Readings from Last 10 Encounters:   12/19/17 78.7 kg (173 lb 9.6 oz)   11/27/17 77.7 kg (171 lb 6.4 oz)   11/22/17 78.5 kg (173 lb)   11/20/17 79.4 kg (175 lb)   11/16/17 80.2 kg (176 lb 14.4 oz)   11/13/17 77.1 kg (170 lb)   11/09/17 77.8 kg (171 lb 9.6 oz)   11/07/17 76.4 kg (168 lb 6.4 oz)   11/01/17 77.2 kg (170 lb 3.2 oz)   10/30/17 77.8 kg (171 lb 9.6 oz)     Constitutional: Well-appearing female in no acute distress.  Eyes: EOMI, PERRL. No scleral icterus.  ENT: Oral mucosa is moist without lesions or thrush. Hyperpigmented changes throughout tongue. Thyroid is palpable with nodularity.  Lymphatic: Neck is supple without cervical or supraclavicular lymphadenopathy.  Cardiovascular: Regular rate and rhythm. No murmurs, gallops, or rubs. Mild bilateral non-pitting peripheral edema.  Respiratory: Clear to auscultation bilaterally. No wheezes or crackles.  Gastrointestinal: Bowel sounds present. Abdomen soft, non-tender, with mild ascites. No palpable hepatosplenomegaly or masses.   Neurologic: Cranial nerves II through XII are grossly intact.  Skin: Dry, exfoliating skin on heels and soles of feet. No rashes, petechiae, or bruising noted on exposed skin.  Psych: Flat affect. Limited eye contact.  Laboratory Data:  Results for NATALIIA IBARRA (MRN [id number]) as of 12/19/2017 16:23   12/19/2017 12:59   Sodium 138   Potassium 4.1   Chloride 104   Carbon Dioxide 29   Urea Nitrogen 13   Creatinine 0.71   GFR Estimate 84   GFR Estimate If Black >90 "   Calcium 9.0   Anion Gap 5   Albumin 3.4   Protein Total 8.1   Bilirubin Total 0.2   Alkaline Phosphatase 255 (H)   ALT 22   AST 23   Glucose 150 (H)   WBC 3.6 (L)   Hemoglobin 10.1 (L)   Hematocrit 32.7 (L)   Platelet Count 159   RBC Count 3.53 (L)   MCV 93   MCH 28.6   MCHC 30.9 (L)   RDW 15.1 (H)   Diff Method Automated Method   % Neutrophils 60.5   % Lymphocytes 26.2   % Monocytes 11.0   % Eosinophils 1.4   % Basophils 0.6   % Immature Granulocytes 0.3   Nucleated RBCs 0   Absolute Neutrophil 2.2   Absolute Lymphocytes 0.9   Absolute Monocytes 0.4   Absolute Eosinophils 0.1   Absolute Basophils 0.0   Abs Immature Granulocytes 0.0   Absolute Nucleated RBC 0.0       Assessment and Plan:  1. Metastatic pancreatic adenocarcinoma, currently on liposomal irinotecan + 5FU  Presents for cycle 6. Labs reviewed. Proceed with cycle 6  --Scheduled 1/8 with labs and infusion for C7. Will add provider visit with Ester Echavarria DNP.  Will have re-staging scans on 1/19--Chest CT, MR abdomen and labs. Visit with Dr. Gonsales on 1/22.    2. Chemotherapy induced-nausea/vomiting, improved  Has been taking scheduled Zofran and Compazine yet still struggles with CINV. Continue PRN Zofran and Compazine, and does have IV antiemetics in her therapy plan if needed.    3. FEN  Weight stable, though patient admits to not eating or drinking well the week following chemo.    4. Hypokalemia  --Potassium 4.1 today and has been WNL since 11/20  -Continue potassium 20meq BID at home    5. Peripheral neuropathy  --stable, grade 2. 2/2 chemotherapy. No pain, but persistent numbness in bilateral hands/feet that is limited some of her ADLs.    6. Constipation  -Continue Miralax and colace    7. Cancer related pain  No complaints today. Continue MS contin 15mg q12h, and oxycodone prn. Refill given today.    8. Diabetes, HgbA1C 9.2  --Followed by endocrine. On Lantus 10 units daily. Next appointment on 1/4/18    9. Thyroid nodule  Enlarged thyroid. TSH  on 11/9 normal at 2.56. History of Grave's disease >20 years ago and s/p HALL.  -- Per endocrinology note on 11/22, she is asymptomatic and euthyroid. In context of metastatic pancreatic cancer, no further management recommended at this time.      Barbie House PA-C  [facility] [address] [address]  [phone]    Addendum: reviewed antiemetic plan and will revise to add Aloxi and Emend with the next cycle.  The patient was seen in conjunction with Barbie House PA-C who served as a scribe for today's visit. I have reviewed the note and agree with the above findings and plan. TW    ROS      Again, thank you for allowing me to participate in the care of your patient.      Sincerely,    TEE Villanueva CNP

## 2017-12-19 NOTE — PROGRESS NOTES
Oncology/Hematology Visit Note  Dec 19, 2017       Reason for Visit: Follow up of metastatic pancreatic cancer, prior to C6 liposomal irinotecan and 5FU    History of Present Illness: Shirin Muller is a 57 year old female with metastatic pancreatic cancer with known liver metastases. She is currently undergoing treatment with liposomal irinotecan and 5FU. Her oncologic history is as follows:    She was diagnosed with pancreatic cancer in the spring of 2016 after presenting with a 2 to 3-month history of abdominal pain and weight loss. She initiated on treatment with gemcitabine and Abraxane on 05/02/2016 and continued on that until December 2016 when she was found to have progressive metastatic disease involving the liver. She was then initiated on FOLFIRINOX on 12/20/16.  In January 2017, she was found to have a GI bleed secondary to a bleeding duodenal ulcer and infiltrating mass in the duodenum. The ulcer was injected and clipped. She was found to be H. Pylori positive and was initiated on therapy with PPI, carafate, amoxicillin and clarithromycin. She had biliary obstruction in April 2017 and underwent ERCP and  biliary stent placement  Dr. Braden. She was admitted on 8/3/17 with sepsis/cholangitis. Blood cultures grew klebsiella penumonia and streptococcus angiosos. She had an ERCP on 8/3 demonstrating an occluded stent with pus, stone and sludge. The duct was dilated and a new stent placed. She was treated with IV Vanco and Zosyn, repeat BC were negative. She was discharged on levaquin and augmentin. She also has been having intermittent vaginal bleeding with unknown etiology. She was restarted on FOLFIRINOX on 8/18/17. Due to neuropathy, she was switched to liposomal irinotecan and 5FU every 2 weeks on 9/18/17. She has received 5 cycles so far (last dose 11/27).        Interval History:  Ms. Muller returns to clinic today with her sister. She states that having the 3 week interval  between chemo instead of 2 has been beneficial. She continues to have about 1 week of nausea, vomiting, and anorexia following each chemo treatment. She has been taking compazine and zofran for nausea, and states she vomited maybe one time the week after the last chemo infusion. Her appetite is low, partially due to altered taste which is improving. She eats about 50% of 3 meals/day, and about 3 ensure supplements per day. She states she normally has a BM every 4-5 days, and does not feel constipated. She has stable abdominal pain that wraps around waist to her back for which she takes MS Contin BID and oxycodone, about 2 pills per day. She denies any dysphagia. She has had no mucositis. She has chronic numbness in bilateral hands and feet that has been stable. She states she sometimes drops things from her hands, and that the numbness impairs her ability to perform some ADLs. Her numbness does affect her balance somewhat while walking, but she denies any falls.    She is not having any pain at the moment. She is feeling fatigued and more weak, but better than a few days ago. Did ask about how she was coping with this and she states she feel OK and when asked was clear she still wanted to continue treatments.    Review of Systems:  Patient denies fevers, chills, night sweats, unexplained weight changes, headaches, dizziness, vision or hearing changes, new lumps or bumps, chest pain, shortness of breath, cough, abdominal pain, changes to bladder, swelling of extremities, bleeding issues, or rash.    Current Outpatient Prescriptions   Medication Sig Dispense Refill     docusate sodium (DOK) 100 MG capsule Take 1 capsule (100 mg) by mouth daily 100 capsule 1     morphine (MS CONTIN) 15 MG 12 hr tablet Take 1 tablet (15 mg) by mouth every 12 hours 60 tablet 0     oxyCODONE IR (ROXICODONE) 5 MG tablet Take 1 tablet (5 mg) by mouth every 4 hours as needed for moderate to severe pain 100 tablet 0     prochlorperazine  (COMPAZINE) 10 MG tablet TAKE ONE TABLET BY MOUTH EVERY 6 HOURS AS NEEDED FOR NAUSEA AND VOMITING 30 tablet 2     ondansetron (ZOFRAN-ODT) 8 MG ODT tab Take 1 tablet (8 mg) by mouth every 8 hours as needed for nausea 60 tablet 6     loratadine (CLARITIN) 10 MG tablet Take 1 tablet (10 mg) by mouth daily Reported on 5/5/2017 30 tablet 6     polyethylene glycol (MIRALAX/GLYCOLAX) powder Take 17 g (1 capful) by mouth daily 119 g 11     pantoprazole (PROTONIX) 20 MG EC tablet Take 1 tablet (20 mg) by mouth 2 times daily 60 tablet 3     Nutritional Supplements (ENSURE CLEAR) LIQD Take 1 Bottle by mouth 3 times daily 90 Bottle 6     dronabinol (MARINOL) 5 MG capsule Take 1 capsule (5 mg) by mouth 3 times daily (before meals) Taking once daily 90 capsule 0     insulin glargine (LANTUS SOLOSTAR) 100 UNIT/ML injection 10 units subcutaneously daily at 8pm. 15 mL 3     insulin pen needle (BD EMMANUEL U/F) 32G X 4 MM Use one daily or as directed. 100 each prn     blood glucose monitoring (ACCU-CHEK FASTCLIX) lancets Use to test blood sugar two times daily or as directed. 102 each 3     Blood Glucose Monitoring Suppl (ONETOUCH PING METER REMOTE) SUPPLIES MISC Check your sugars twice daily, once when fasting and once 2 hours after eating. 1 each 3     potassium chloride SA (KLOR-CON) 20 MEQ CR tablet Take 2 tablets (40 mEq) by mouth daily 60 tablet 3     LORazepam (ATIVAN) 0.5 MG tablet Take 1 tablet (0.5 mg) by mouth every 4 hours as needed (Anxiety, Nausea/Vomiting or Sleep) 30 tablet 3     amylase-lipase-protease (CREON) 06581 UNITS CPEP Take 2 capsules (72,000 Units) by mouth 3 times daily (with meals) 180 capsule 1     diltiazem 2% in PLO cream, FV COMPOUNDED, 2% GEL Apply topically daily and as needed to external hemorrhoids 30 g 0     lidocaine-prilocaine (EMLA) cream Apply topically as needed for other (Use 30-60 minutes prior to port access) 30 g 0       Physical Examination:  /63 (BP Location: Right arm, Patient  "Position: Sitting, Cuff Size: Adult Regular)  Pulse 78  Temp 98.3  F (36.8  C) (Oral)  Resp 18  Ht 1.778 m (5' 10\")  Wt 78.7 kg (173 lb 9.6 oz)  SpO2 99%  Breastfeeding? No  BMI 24.91 kg/m2  Wt Readings from Last 10 Encounters:   12/19/17 78.7 kg (173 lb 9.6 oz)   11/27/17 77.7 kg (171 lb 6.4 oz)   11/22/17 78.5 kg (173 lb)   11/20/17 79.4 kg (175 lb)   11/16/17 80.2 kg (176 lb 14.4 oz)   11/13/17 77.1 kg (170 lb)   11/09/17 77.8 kg (171 lb 9.6 oz)   11/07/17 76.4 kg (168 lb 6.4 oz)   11/01/17 77.2 kg (170 lb 3.2 oz)   10/30/17 77.8 kg (171 lb 9.6 oz)     Constitutional: Well-appearing female in no acute distress.  Eyes: EOMI, PERRL. No scleral icterus.  ENT: Oral mucosa is moist without lesions or thrush. Hyperpigmented changes throughout tongue. Thyroid is palpable with nodularity.  Lymphatic: Neck is supple without cervical or supraclavicular lymphadenopathy.  Cardiovascular: Regular rate and rhythm. No murmurs, gallops, or rubs. Mild bilateral non-pitting peripheral edema.  Respiratory: Clear to auscultation bilaterally. No wheezes or crackles.  Gastrointestinal: Bowel sounds present. Abdomen soft, non-tender, with mild ascites. No palpable hepatosplenomegaly or masses.   Neurologic: Cranial nerves II through XII are grossly intact.  Skin: Dry, exfoliating skin on heels and soles of feet. No rashes, petechiae, or bruising noted on exposed skin.  Psych: Flat affect. Limited eye contact.  Laboratory Data:  Results for NATALIIA IBARRA (MRN [id number]) as of 12/19/2017 16:23   12/19/2017 12:59   Sodium 138   Potassium 4.1   Chloride 104   Carbon Dioxide 29   Urea Nitrogen 13   Creatinine 0.71   GFR Estimate 84   GFR Estimate If Black >90   Calcium 9.0   Anion Gap 5   Albumin 3.4   Protein Total 8.1   Bilirubin Total 0.2   Alkaline Phosphatase 255 (H)   ALT 22   AST 23   Glucose 150 (H)   WBC 3.6 (L)   Hemoglobin 10.1 (L)   Hematocrit 32.7 (L)   Platelet Count 159   RBC Count 3.53 (L)   MCV 93 "   MCH 28.6   MCHC 30.9 (L)   RDW 15.1 (H)   Diff Method Automated Method   % Neutrophils 60.5   % Lymphocytes 26.2   % Monocytes 11.0   % Eosinophils 1.4   % Basophils 0.6   % Immature Granulocytes 0.3   Nucleated RBCs 0   Absolute Neutrophil 2.2   Absolute Lymphocytes 0.9   Absolute Monocytes 0.4   Absolute Eosinophils 0.1   Absolute Basophils 0.0   Abs Immature Granulocytes 0.0   Absolute Nucleated RBC 0.0       Assessment and Plan:  1. Metastatic pancreatic adenocarcinoma, currently on liposomal irinotecan + 5FU  Presents for cycle 6. Labs reviewed. Proceed with cycle 6  --Scheduled 1/8 with labs and infusion for C7. Will add provider visit with Ester Echavarria DNP.  Will have re-staging scans on 1/19--Chest CT, MR abdomen and labs. Visit with Dr. Gonsales on 1/22.    2. Chemotherapy induced-nausea/vomiting, improved  Has been taking scheduled Zofran and Compazine yet still struggles with CINV. Continue PRN Zofran and Compazine, and does have IV antiemetics in her therapy plan if needed.    3. FEN  Weight stable, though patient admits to not eating or drinking well the week following chemo.    4. Hypokalemia  --Potassium 4.1 today and has been WNL since 11/20  -Continue potassium 20meq BID at home    5. Peripheral neuropathy  --stable, grade 2. 2/2 chemotherapy. No pain, but persistent numbness in bilateral hands/feet that is limited some of her ADLs.    6. Constipation  -Continue Miralax and colace    7. Cancer related pain  No complaints today. Continue MS contin 15mg q12h, and oxycodone prn. Refill given today.    8. Diabetes, HgbA1C 9.2  --Followed by endocrine. On Lantus 10 units daily. Next appointment on 1/4/18    9. Thyroid nodule  Enlarged thyroid. TSH on 11/9 normal at 2.56. History of Grave's disease >20 years ago and s/p HALL.  -- Per endocrinology note on 11/22, she is asymptomatic and euthyroid. In context of metastatic pancreatic cancer, no further management recommended at this time.      Barbie  Harsh US  [facility]  [address] [place] [address]  [phone]    Addendum: reviewed antiemetic plan and will revise to add Aloxi and Emend with the next cycle.  The patient was seen in conjunction with Barbie House PA-C who served as a scribe for today's visit. I have reviewed the note and agree with the above findings and plan. DENIZ KAISER

## 2017-12-19 NOTE — MR AVS SNAPSHOT
After Visit Summary   12/19/2017    Shirin Muller    MRN: [id number]           Patient Information     Date Of Birth          1960        Visit Information        Provider Department      12/19/2017 1:10 PM Ester Echavarria APRN CNP [facility]        Today's Diagnoses     External hemorrhoids        Malignant neoplasm of head of pancreas (H)        Chronic seasonal allergic rhinitis, unspecified trigger        Anorexia        Chemotherapy-induced neutropenia (H)           Follow-ups after your visit        Your next 10 appointments already scheduled     Jan 08, 2018 12:30 PM CST   Masonic Lab Draw with  MASONIC LAB DRAW   [facility] Masonic Lab Draw ([facility])    [address] [facility]  2nd [facility] [address]   [phone]            Jan 08, 2018  1:00 PM CST   (Arrive by 12:45 PM)   Return Visit with TEE Olivas CNP   [facility] ([facility])    [address] [place] [address]  2nd [facility] [address]   [phone]            Jan 08, 2018  1:30 PM CST   Infusion 180 with UC ONCOLOGY INFUSION, UC 30 ATC   [facility] ([facility])    [address] [place] [address]  2nd [facility] [address]   [phone]            Jan 19, 2018  8:45 AM CST   Masonic Lab Draw with UC MASONIC LAB DRAW   [facility]    [address] [facility]  2nd [facility] [address]   [phone]            Jan 19, 2018  9:20 AM CST   (Arrive by 9:05 AM)   CT CHEST W/O CONTRAST with UCCT2   [facility] ([facility])    [address] [facility]  1st [facility] [address]   [phone]           Please bring any scans or X-rays taken at other hospitals, if similar tests were done. Also bring a list of your medicines, including vitamins, minerals and  over-the-counter drugs. It is safest to leave personal items at home.  Be sure to tell your doctor:   If you have any allergies.   If there s any chance you are pregnant.   If you are breastfeeding.   If you have any special needs.  You do not need to do anything special to prepare.  Please wear loose clothing, such as a sweat suit or jogging clothes. Avoid snaps, zippers and other metal. We may ask you to undress and put on a hospital gown.            Jan 19, 2018 10:00 AM CST   (Arrive by 9:45 AM)   MR ABDOMEN W/O & W CONTRAST with [address] MRI ([facility] [place])    [address]  [facility] [address] [phone]           Take your medicines as usual, unless your doctor tells you not to. Bring a list of your current medicines to your exam (including vitamins, minerals and over-the-counter drugs). Also bring the results of similar scans you may have had.    The day before your exam, drink extra fluids at least six 8-ounce glasses (unless your doctor tells you to restrict your fluids).   Have a blood test (creatinine test) within 30 days of your exam. Go to your clinic or Diagnostic Imaging Department for this test.   Do not eat or drink for 6 hours prior to exam.  The MRI machine uses a strong magnet. Please wear clothes without metal (snaps, zippers). A sweatsuit works well, or we may give you a hospital gown.  Please remove any body piercings and hair extensions before you arrive. You will also remove watches, jewelry, hairpins, wallets, dentures, partial dental plates and hearing aids. You may wear contact lenses, and you may be able to wear your rings. We have a safe place to keep your personal items, but it is safer to leave them at home.   **IMPORTANT** THE INSTRUCTIONS BELOW ARE ONLY FOR THOSE PATIENTS WHO HAVE BEEN TOLD THEY WILL RECEIVE SEDATION OR GENERAL ANESTHESIA DURING THEIR MRI PROCEDURE:  IF YOU WILL RECEIVE SEDATION (take medicine  to help you relax during your exam):   You must get the medicine from your doctor before you arrive. Bring the medicine to the exam. Do not take it at home.   Arrive one hour early. Bring someone who can take you home after the test. Your medicine will make you sleepy. After the exam, you may not drive, take a bus or take a taxi by yourself.   No eating 8 hours before your exam. You may have clear liquids up until 4 hours before your exam. (Clear liquids include water, clear tea, black coffee and fruit juice without pulp.)  IF YOU WILL RECEIVE ANESTHESIA (be asleep for your exam):   Arrive 1 1/2 hours early. Bring someone who can take you home after the test. You may not drive, take a bus or take a taxi by yourself.   No eating 8 hours before your exam. You may have clear liquids up until 4 hours before your exam. (Clear liquids include water, clear tea, black coffee and fruit juice without pulp.)  If you have any questions, please contact your Imaging Department exam site.            Jan 22, 2018  5:00 PM CST   (Arrive by 4:45 PM)   Return Visit with Demond Gonsales MD   [facility] ([facility])    [address] [place] [address] [phone]            Jan 24, 2018  3:00 PM CST   (Arrive by 2:45 PM)   RETURN DIABETES with Chloe Flowers MD   [facility] Endocrinology ([facility])    [address] [place] [address]  3rd [facility] [id number] [phone]            Jan 29, 2018 12:00 PM CST   Masonic Lab Draw with  MASONIC LAB DRAW   [facility]    [address] [place] [address]  2nd [facility] [id number] [phone]              Who to contact     If you have questions or need follow up information about today's clinic visit or your schedule please contact [facility] directly at [phone].  Normal or non-critical lab and  "imaging results will be communicated to you by MyChart, letter or phone within 4 business days after the clinic has received the results. If you do not hear from us within 7 days, please contact the clinic through [employer] or phone. If you have a critical or abnormal lab result, we will notify you by phone as soon as possible.  Submit refill requests through [employer] or call your pharmacy and they will forward the refill request to us. Please allow 3 business days for your refill to be completed.          Additional Information About Your Visit        [employer] Information     [employer] gives you secure access to your electronic health record. If you see a primary care provider, you can also send messages to your care team and make appointments. If you have questions, please call your primary care clinic.  If you do not have a primary care provider, please call [phone] and they will assist you.        Care EveryWhere ID     This is your Care EveryWhere ID. This could be used by other organizations to access your [place] medical records  [id number]        Your Vitals Were     Pulse Temperature Respirations Height Pulse Oximetry Breastfeeding?    78 98.3  F (36.8  C) (Oral) 18 1.778 m (5' 10\") 99% No    BMI (Body Mass Index)                   24.91 kg/m2            Blood Pressure from Last 3 Encounters:   12/19/17 109/63   11/27/17 100/68   11/22/17 102/70    Weight from Last 3 Encounters:   12/19/17 78.7 kg (173 lb 9.6 oz)   11/27/17 77.7 kg (171 lb 6.4 oz)   11/22/17 78.5 kg (173 lb)              Today, you had the following     No orders found for display         Today's Medication Changes          These changes are accurate as of: 12/19/17 11:59 PM.  If you have any questions, ask your nurse or doctor.               These medicines have changed or have updated prescriptions.        Dose/Directions    docusate sodium 100 MG capsule   Commonly known as:  DOK   This may have changed:  See the new instructions. "   Used for:  External hemorrhoids   Changed by:  Ester Echavarria APRN CNP        Dose:  100 mg   Take 1 capsule (100 mg) by mouth daily   Quantity:  100 capsule   Refills:  1       dronabinol 5 MG capsule   Commonly known as:  MARINOL   This may have changed:  additional instructions   Used for:  Anorexia   Changed by:  Ester Echavarria APRN CNP        Dose:  5 mg   Take 1 capsule (5 mg) by mouth 3 times daily (before meals) Taking once daily   Quantity:  90 capsule   Refills:  0       ENSURE CLEAR Liqd   This may have changed:  how much to take   Used for:  Malignant neoplasm of head of pancreas (H)        Dose:  1 Bottle   Take 1 Bottle by mouth 3 times daily   Quantity:  90 Bottle   Refills:  6         Stop taking these medicines if you haven't already. Please contact your care team if you have questions.     oxyCODONE-acetaminophen 5-325 MG per tablet   Commonly known as:  PERCOCET   Stopped by:  Ester Echavarria APRN CNP                Where to get your medicines      These medications were sent to [facility] [address] [address]    Hours:  TRANSPLANT PHONE NUMBER [phone] Phone:  [phone]     docusate sodium 100 MG capsule    ENSURE CLEAR Liqd    loratadine 10 MG tablet    ondansetron 8 MG ODT tab    pantoprazole 20 MG EC tablet    polyethylene glycol powder    prochlorperazine 10 MG tablet         Some of these will need a paper prescription and others can be bought over the counter.  Ask your nurse if you have questions.     Bring a paper prescription for each of these medications     morphine 15 MG 12 hr tablet    oxyCODONE IR 5 MG tablet                Primary Care Provider    [facility]       No address on file        Equal Access to Services     JAI CORDERO AH: Maxx Spence, wathanh lukhushbu, qaluis e vasquez. So  [facility] [phone].    ATENCIÓN: Si jerricala alice, tiene a bernal disposición servicios gratuitos de asistencia lingüística. Vanessa hyde [phone].    We comply with applicable federal civil rights laws and [place] laws. We do not discriminate on the basis of race, color, national origin, age, disability, sex, sexual orientation, or gender identity.            Thank you!     Thank you for choosing [facility]  for your care. Our goal is always to provide you with excellent care. Hearing back from our patients is one way we can continue to improve our services. Please take a few minutes to complete the written survey that you may receive in the mail after your visit with us. Thank you!             Your Updated Medication List - Protect others around you: Learn how to safely use, store and throw away your medicines at www.disposemymeds.org.          This list is accurate as of: 12/19/17 11:59 PM.  Always use your most recent med list.                   Brand Name Dispense Instructions for use Diagnosis    amylase-lipase-protease 94356 UNITS Cpep    CREON    180 capsule    Take 2 capsules (72,000 Units) by mouth 3 times daily (with meals)    Malignant neoplasm of head of pancreas (H)       blood glucose monitoring lancets     102 each    Use to test blood sugar two times daily or as directed.    Diabetes mellitus, type 2 (H)       diltiazem 2% in PLO cream (FV COMPOUNDED) 2% Gel     30 g    Apply topically daily and as needed to external hemorrhoids    External hemorrhoids       docusate sodium 100 MG capsule    DOK    100 capsule    Take 1 capsule (100 mg) by mouth daily    External hemorrhoids       dronabinol 5 MG capsule    MARINOL    90 capsule    Take 1 capsule (5 mg) by mouth 3 times daily (before meals) Taking once daily    Anorexia       ENSURE CLEAR Liqd     90 Bottle    Take 1 Bottle by mouth 3 times daily    Malignant neoplasm of head of pancreas (H)       insulin glargine 100 UNIT/ML  injection    LANTUS SOLOSTAR    15 mL    10 units subcutaneously daily at 8pm.    Diabetes mellitus, type 2 (H)       insulin pen needle 32G X 4 MM    BD EMMANUEL U/F    100 each    Use one daily or as directed.    Diabetes mellitus, type 2 (H)       lidocaine-prilocaine cream    EMLA    30 g    Apply topically as needed for other (Use 30-60 minutes prior to port access)    Malignant neoplasm of head of pancreas (H)       loratadine 10 MG tablet    CLARITIN    30 tablet    Take 1 tablet (10 mg) by mouth daily Reported on 5/5/2017    Chronic seasonal allergic rhinitis, unspecified trigger       LORazepam 0.5 MG tablet    ATIVAN    30 tablet    Take 1 tablet (0.5 mg) by mouth every 4 hours as needed (Anxiety, Nausea/Vomiting or Sleep)    Malignant neoplasm of head of pancreas (H)       morphine 15 MG 12 hr tablet    MS CONTIN    60 tablet    Take 1 tablet (15 mg) by mouth every 12 hours    Malignant neoplasm of head of pancreas (H)       ondansetron 8 MG ODT tab    ZOFRAN-ODT    60 tablet    Take 1 tablet (8 mg) by mouth every 8 hours as needed for nausea    Malignant neoplasm of head of pancreas (H)       ONETOUCH PING METER REMOTE SUPPLIES Misc     1 each    Check your sugars twice daily, once when fasting and once 2 hours after eating.    Secondary diabetes mellitus (H)       oxyCODONE IR 5 MG tablet    ROXICODONE    100 tablet    Take 1 tablet (5 mg) by mouth every 4 hours as needed for moderate to severe pain    Malignant neoplasm of head of pancreas (H)       pantoprazole 20 MG EC tablet    PROTONIX    60 tablet    Take 1 tablet (20 mg) by mouth 2 times daily    Malignant neoplasm of head of pancreas (H)       polyethylene glycol powder    MIRALAX/GLYCOLAX    119 g    Take 17 g (1 capful) by mouth daily    Malignant neoplasm of head of pancreas (H)       potassium chloride SA 20 MEQ CR tablet    KLOR-CON    60 tablet    Take 2 tablets (40 mEq) by mouth daily    Malignant neoplasm of head of pancreas (H)        prochlorperazine 10 MG tablet    COMPAZINE    30 tablet    TAKE ONE TABLET BY MOUTH EVERY 6 HOURS AS NEEDED FOR NAUSEA AND VOMITING    Malignant neoplasm of head of pancreas (H)

## 2017-12-19 NOTE — PATIENT INSTRUCTIONS
Contact Numbers    [facility] Main Line: [phone]    Triage Nurse Line: [phone]      Please call the [facility] Nurse Triage line if you experience a temperature greater than or equal to 100.5, shaking chills, have uncontrolled nausea, vomiting and/or diarrhea, dizziness, shortness of breath, bleeding not relieved with pressure, or if you have any other questions or concerns.     If it is after hours, weekends, or holidays, please call the main hospital  at  [phone] and ask to speak to the adult Oncology doctor on call.     If you are having any concerning symptoms or wish to speak to a provider before your next infusion visit, please call your care coordinator or triage them so we can adequately serve you.      If you need to refill your narcotic prescription or other medication, please call triage before your infusion appointment.        December 2017 Sunday Monday Tuesday Wednesday Thursday Friday Saturday                            1     2       3     4     5     6     LONG    1:15 PM   (60 min.)   Raven Marie RN   M [facility] Diabetes 7     8     9       10     11     12     13     14     15     16       17     18     19     [facility] LAB DRAW   12:45 PM   (15 min.)    MASONIC LAB DRAW   [facility] Lab Draw     [facility] RETURN   12:55 PM   (50 min.)   Ester Echavarria APRN CNP   M [facility] ONC INFUSION 180    2:00 PM   (180 min.)   UC ONCOLOGY INFUSION   [facility] 20     21     22     23       24     25     26     27     28     29     30       31                                              January 2018 Sunday Monday Tuesday Wednesday Thursday Friday Saturday        1     2     3     4     5     6       7     8     [facility] LAB DRAW   12:30 PM   (15 min.)   [facility] LAB DRAW   [facility] Lab Draw     [facility] RETURN   12:45 PM   (50 min.)   Ester Echavarria APRN CNP   M [facility] ONC  INFUSION 180    1:30 PM   (180 min.)    ONCOLOGY INFUSION   [facility] 9     10     11     12     13       14     15     16     17     18     19     [facility] MASONIC LAB DRAW    8:45 AM   (15 min.)    MASONIC LAB DRAW   [facility] Lab Draw     CT CHEST WO    9:05 AM   (20 min.)   UCCT2   [facility] CT     MR ABDOMEN WWO    9:45 AM   (45 min.)   UCMR1   [facility] MRI 20       21     22     [facility] RETURN    4:45 PM   (30 min.)   Demond Gonsales MD   [facility] 23     24     [facility] RETURN DIABETES    2:45 PM   (30 min.)   Chloe Flowers MD   [facility] Endocrinology 25     26     27       28     29     [facility] MASONIC LAB DRAW   12:00 PM   (15 min.)    [facility] LAB DRAW   [facility]     [facility] ONC INFUSION 180   12:30 PM   (180 min.)    ONCOLOGY INFUSION   [facility] 30     31                                Recent Results (from the past 24 hour(s))   Comprehensive metabolic panel    Collection Time: 12/19/17 12:59 PM   Result Value Ref Range    Sodium 138 133 - 144 mmol/L    Potassium 4.1 3.4 - 5.3 mmol/L    Chloride 104 94 - 109 mmol/L    Carbon Dioxide 29 20 - 32 mmol/L    Anion Gap 5 3 - 14 mmol/L    Glucose 150 (H) 70 - 99 mg/dL    Urea Nitrogen 13 7 - 30 mg/dL    Creatinine 0.71 0.52 - 1.04 mg/dL    GFR Estimate 84 >60 mL/min/1.7m2    GFR Estimate If Black >90 >60 mL/min/1.7m2    Calcium 9.0 8.5 - 10.1 mg/dL    Bilirubin Total 0.2 0.2 - 1.3 mg/dL    Albumin 3.4 3.4 - 5.0 g/dL    Protein Total 8.1 6.8 - 8.8 g/dL    Alkaline Phosphatase 255 (H) 40 - 150 U/L    ALT 22 0 - 50 U/L    AST 23 0 - 45 U/L   CBC with platelets differential    Collection Time: 12/19/17 12:59 PM   Result Value Ref Range    WBC 3.6 (L) 4.0 - 11.0 10e9/L    RBC Count 3.53 (L) 3.8 - 5.2 10e12/L    Hemoglobin 10.1 (L) 11.7 - 15.7 g/dL    Hematocrit 32.7 (L) 35.0 - 47.0 %    MCV 93 78 - 100 fl    MCH 28.6 26.5 - 33.0 pg    MCHC 30.9 (L)  31.5 - 36.5 g/dL    RDW 15.1 (H) 10.0 - 15.0 %    Platelet Count 159 150 - 450 10e9/L    Diff Method Automated Method     % Neutrophils 60.5 %    % Lymphocytes 26.2 %    % Monocytes 11.0 %    % Eosinophils 1.4 %    % Basophils 0.6 %    % Immature Granulocytes 0.3 %    Nucleated RBCs 0 0 /100    Absolute Neutrophil 2.2 1.6 - 8.3 10e9/L    Absolute Lymphocytes 0.9 0.8 - 5.3 10e9/L    Absolute Monocytes 0.4 0.0 - 1.3 10e9/L    Absolute Eosinophils 0.1 0.0 - 0.7 10e9/L    Absolute Basophils 0.0 0.0 - 0.2 10e9/L    Abs Immature Granulocytes 0.0 0 - 0.4 10e9/L    Absolute Nucleated RBC 0.0

## 2017-12-19 NOTE — MR AVS SNAPSHOT
After Visit Summary   12/19/2017    Shirin Muller    MRN: [id number]           Patient Information     Date Of Birth          1960        Visit Information        Provider Department      12/19/2017 2:00 PM  11 ATC;  ONCOLOGY INFUSION [facility]        Today's Diagnoses     Chemotherapy-induced neutropenia (H)    -  1    Malignant neoplasm of head of pancreas (H)          Care Instructions    Contact Numbers    [facility] Main Line: [phone]    Triage Nurse Line: [phone]      Please call the [facility] Nurse Triage line if you experience a temperature greater than or equal to 100.5, shaking chills, have uncontrolled nausea, vomiting and/or diarrhea, dizziness, shortness of breath, bleeding not relieved with pressure, or if you have any other questions or concerns.     If it is after hours, weekends, or holidays, please call the main hospital  at  [phone] and ask to speak to the adult Oncology doctor on call.     If you are having any concerning symptoms or wish to speak to a provider before your next infusion visit, please call your care coordinator or triage them so we can adequately serve you.      If you need to refill your narcotic prescription or other medication, please call triage before your infusion appointment.        December 2017 Sunday Monday Tuesday Wednesday Thursday Friday Saturday                            1     2       3     4     5     6     LONG    1:15 PM   (60 min.)   Raven Marie RN M [facility] Diabetes 7     8     9       10     11     12     13     14     15     16       17     18     19     [facility] LAB DRAW   12:45 PM   (15 min.)   [facility] LAB DRAW   [facility] Lab Draw     [facility] RETURN   12:55 PM   (50 min.)   Ester Echavarria APRN CNP   M [facility] ONC INFUSION 180    2:00 PM   (180 min.)    ONCOLOGY INFUSION   [facility] 20      21     22     23       24     25     26     27     28     29     30       31                                              January 2018 Sunday Monday Tuesday Wednesday Thursday Friday Saturday        1     2     3     4     5     6       7     8     [facility] DRAW   12:30 PM   (15 min.)   [facility]   [facility] Lab Draw     [facility] RETURN   12:45 PM   (50 min.)   Ester Echavarria APRN CNP   [facility]     [facility] ONC INFUSION 180    1:30 PM   (180 min.)   [facility] ONCOLOGY INFUSION   [facility] 9     10     11     12     13       14     15     16     17     18     19     [facility] DRAW    8:45 AM   (15 min.)   [facility] DRAW   [facility] Lab Draw     CT CHEST WO    9:05 AM   (20 min.)   UCCT2   [facility] CT     MR ABDOMEN WWO    9:45 AM   (45 min.)   MR1   [facility] MRI 20       21     22     [facility] RETURN    4:45 PM   (30 min.)   Demond Gonsales MD   [facility] 23     24     [facility] RETURN DIABETES    2:45 PM   (30 min.)   Chloe Flowers MD   [facility] Endocrinology 25     26     27       28     29     [facility] DRAW   12:00 PM   (15 min.)   [facility]   [facility]     [facility] ONC INFUSION 180   12:30 PM   (180 min.)    ONCOLOGY INFUSION   [facility] 30     31                                Recent Results (from the past 24 hour(s))   Comprehensive metabolic panel    Collection Time: 12/19/17 12:59 PM   Result Value Ref Range    Sodium 138 133 - 144 mmol/L    Potassium 4.1 3.4 - 5.3 mmol/L    Chloride 104 94 - 109 mmol/L    Carbon Dioxide 29 20 - 32 mmol/L    Anion Gap 5 3 - 14 mmol/L    Glucose 150 (H) 70 - 99 mg/dL    Urea Nitrogen 13 7 - 30 mg/dL    Creatinine 0.71 0.52 - 1.04 mg/dL    GFR Estimate 84 >60 mL/min/1.7m2    GFR Estimate If Black >90 >60 mL/min/1.7m2    Calcium 9.0 8.5 - 10.1 mg/dL    Bilirubin Total 0.2 0.2 - 1.3 mg/dL    Albumin  3.4 3.4 - 5.0 g/dL    Protein Total 8.1 6.8 - 8.8 g/dL    Alkaline Phosphatase 255 (H) 40 - 150 U/L    ALT 22 0 - 50 U/L    AST 23 0 - 45 U/L   CBC with platelets differential    Collection Time: 12/19/17 12:59 PM   Result Value Ref Range    WBC 3.6 (L) 4.0 - 11.0 10e9/L    RBC Count 3.53 (L) 3.8 - 5.2 10e12/L    Hemoglobin 10.1 (L) 11.7 - 15.7 g/dL    Hematocrit 32.7 (L) 35.0 - 47.0 %    MCV 93 78 - 100 fl    MCH 28.6 26.5 - 33.0 pg    MCHC 30.9 (L) 31.5 - 36.5 g/dL    RDW 15.1 (H) 10.0 - 15.0 %    Platelet Count 159 150 - 450 10e9/L    Diff Method Automated Method     % Neutrophils 60.5 %    % Lymphocytes 26.2 %    % Monocytes 11.0 %    % Eosinophils 1.4 %    % Basophils 0.6 %    % Immature Granulocytes 0.3 %    Nucleated RBCs 0 0 /100    Absolute Neutrophil 2.2 1.6 - 8.3 10e9/L    Absolute Lymphocytes 0.9 0.8 - 5.3 10e9/L    Absolute Monocytes 0.4 0.0 - 1.3 10e9/L    Absolute Eosinophils 0.1 0.0 - 0.7 10e9/L    Absolute Basophils 0.0 0.0 - 0.2 10e9/L    Abs Immature Granulocytes 0.0 0 - 0.4 10e9/L    Absolute Nucleated RBC 0.0                  Follow-ups after your visit        Your next 10 appointments already scheduled     Jan 08, 2018 12:30 PM CST   [facility] Lab Draw with  [facility] LAB DRAW   [facility] Lab Draw ([facility])    [address]  [address]nd [facility] [address] [phone]            Jan 08, 2018  1:00 PM CST   (Arrive by 12:45 PM)   Return Visit with TEE Olivas CNP   [facility] ([facility])    [address] [facility] [address] [phone]            Jan 08, 2018  1:30 PM CST   Infusion 180 with  ONCOLOGY INFUSION, UC 30 ATC   [facility] ([facility])    [address] [address] [phone]            Jan 19, 2018  8:45 AM CST   [facility]ic Lab Draw with  MASONIC LAB DRAW   [facility]  Lab Draw ([facility])    [address]  [address] [facility] [address] [phone]            Jan 19, 2018  9:20 AM CST   (Arrive by 9:05 AM)   CT CHEST W/O CONTRAST with UCCT2   [facility] CT ([facility])    [address] [address] [phone]           Please bring any scans or X-rays taken at other hospitals, if similar tests were done. Also bring a list of your medicines, including vitamins, minerals and over-the-counter drugs. It is safest to leave personal items at home.  Be sure to tell your doctor:   If you have any allergies.   If there s any chance you are pregnant.   If you are breastfeeding.   If you have any special needs.  You do not need to do anything special to prepare.  Please wear loose clothing, such as a sweat suit or jogging clothes. Avoid snaps, zippers and other metal. We may ask you to undress and put on a hospital gown.            Jan 19, 2018 10:00 AM CST   (Arrive by 9:45 AM)   MR ABDOMEN W/O & W CONTRAST with UCMR1   [facility] MRI ([facility])    [address] [address] [phone]           Take your medicines as usual, unless your doctor tells you not to. Bring a list of your current medicines to your exam (including vitamins, minerals and over-the-counter drugs). Also bring the results of similar scans you may have had.    The day before your exam, drink extra fluids at least six 8-ounce glasses (unless your doctor tells you to restrict your fluids).   Have a blood test (creatinine test) within 30 days of your exam. Go to your clinic or Diagnostic Imaging Department for this test.   Do not eat or drink for 6 hours prior to exam.  The MRI machine uses a strong magnet. Please wear clothes without metal (snaps, zippers). A sweatsuit works well, or we may give you a hospital gown.  Please remove  any body piercings and hair extensions before you arrive. You will also remove watches, jewelry, hairpins, wallets, dentures, partial dental plates and hearing aids. You may wear contact lenses, and you may be able to wear your rings. We have a safe place to keep your personal items, but it is safer to leave them at home.   **IMPORTANT** THE INSTRUCTIONS BELOW ARE ONLY FOR THOSE PATIENTS WHO HAVE BEEN TOLD THEY WILL RECEIVE SEDATION OR GENERAL ANESTHESIA DURING THEIR MRI PROCEDURE:  IF YOU WILL RECEIVE SEDATION (take medicine to help you relax during your exam):   You must get the medicine from your doctor before you arrive. Bring the medicine to the exam. Do not take it at home.   Arrive one hour early. Bring someone who can take you home after the test. Your medicine will make you sleepy. After the exam, you may not drive, take a bus or take a taxi by yourself.   No eating 8 hours before your exam. You may have clear liquids up until 4 hours before your exam. (Clear liquids include water, clear tea, black coffee and fruit juice without pulp.)  IF YOU WILL RECEIVE ANESTHESIA (be asleep for your exam):   Arrive 1 1/2 hours early. Bring someone who can take you home after the test. You may not drive, take a bus or take a taxi by yourself.   No eating 8 hours before your exam. You may have clear liquids up until 4 hours before your exam. (Clear liquids include water, clear tea, black coffee and fruit juice without pulp.)  If you have any questions, please contact your Imaging Department exam site.            Jan 22, 2018  5:00 PM CST   (Arrive by 4:45 PM)   Return Visit with Demond Gonsales MD   [facility] ([facility])    [address] [place] [address] [phone]            Jan 24, 2018  3:00 PM CST   (Arrive by 2:45 PM)   RETURN DIABETES with Chloe Flowers MD   [facility] Endocrinology ([facility])    [facility]  [address]  3rd Floor  [facility] [id number] [phone]            Jan 29, 2018 12:00 PM [facility]   [facility] Lab Draw with  [facility] LAB DRAW   [facility] ([facility])    [address]  2nd Floor  [facility] [id number] [phone]              Who to contact     If you have questions or need follow up information about today's clinic visit or your schedule please contact [facility] directly at [phone].  Normal or non-critical lab and imaging results will be communicated to you by Gratcihart, letter or phone within 4 business days after the clinic has received the results. If you do not hear from us within 7 days, please contact the clinic through [employer]Maker CRMt or phone. If you have a critical or abnormal lab result, we will notify you by phone as soon as possible.  Submit refill requests through LawnStarter or call your pharmacy and they will forward the refill request to us. Please allow 3 business days for your refill to be completed.          Additional Information About Your Visit        GratciharInfraSearch Information     LawnStarter gives you secure access to your electronic health record. If you see a primary care provider, you can also send messages to your care team and make appointments. If you have questions, please call your primary care clinic.  If you do not have a primary care provider, please call [phone] and they will assist you.        Care EveryWhere ID     This is your Care EveryWhere ID. This could be used by other organizations to access your [facility] medical records  [id number]         Blood Pressure from Last 3 Encounters:   12/19/17 109/63   11/27/17 100/68   11/22/17 102/70    Weight from Last 3 Encounters:   12/19/17 78.7 kg (173 lb 9.6 oz)   11/27/17 77.7 kg (171 lb 6.4 oz)   11/22/17 78.5 kg (173 lb)              We Performed the Following     CBC with platelets differential     Comprehensive metabolic panel           Today's Medication Changes          These changes are accurate as of: 12/19/17  5:02 PM.  If you have any questions, ask your nurse or doctor.               These medicines have changed or have updated prescriptions.        Dose/Directions    docusate sodium 100 MG capsule   Commonly known as:  DOK   This may have changed:  See the new instructions.   Used for:  External hemorrhoids   Changed by:  Ester Echavarria APRN CNP        Dose:  100 mg   Take 1 capsule (100 mg) by mouth daily   Quantity:  100 capsule   Refills:  1       dronabinol 5 MG capsule   Commonly known as:  MARINOL   This may have changed:  additional instructions   Used for:  Anorexia   Changed by:  Ester Echavarria APRN CNP        Dose:  5 mg   Take 1 capsule (5 mg) by mouth 3 times daily (before meals) Taking once daily   Quantity:  90 capsule   Refills:  0       ENSURE CLEAR Liqd   This may have changed:  how much to take   Used for:  Malignant neoplasm of head of pancreas (H)        Dose:  1 Bottle   Take 1 Bottle by mouth 3 times daily   Quantity:  90 Bottle   Refills:  6         Stop taking these medicines if you haven't already. Please contact your care team if you have questions.     oxyCODONE-acetaminophen 5-325 MG per tablet   Commonly known as:  PERCOCET   Stopped by:  Ester Echavarria APRN CNP                Where to get your medicines      These medications were sent to [facility] [address] [address]    Hours:  TRANSPLANT PHONE NUMBER [phone] Phone:  [phone]     docusate sodium 100 MG capsule    ENSURE CLEAR Liqd    loratadine 10 MG tablet    ondansetron 8 MG ODT tab    pantoprazole 20 MG EC tablet    polyethylene glycol powder    prochlorperazine 10 MG tablet         Some of these will need a paper prescription and others can be bought over the counter.  Ask your nurse if you have questions.     Bring a paper prescription  for each of these medications     morphine 15 MG 12 hr tablet    oxyCODONE IR 5 MG tablet                Primary Care Provider    [facility]       No address on file        Equal Access to Services     JAI CORDERO : Hadii aad ku haddillon Spence, johnson sofíakhushbu, john frazier, luis e lora. So [facility] [phone].    ATENCIÓN: Si habla español, tiene a bernal disposición servicios gratuitos de asistencia lingüística. Llame al [phone].    We comply with applicable federal civil rights laws and [place] laws. We do not discriminate on the basis of race, color, national origin, age, disability, sex, sexual orientation, or gender identity.            Thank you!     Thank you for choosing [facility]  for your care. Our goal is always to provide you with excellent care. Hearing back from our patients is one way we can continue to improve our services. Please take a few minutes to complete the written survey that you may receive in the mail after your visit with us. Thank you!             Your Updated Medication List - Protect others around you: Learn how to safely use, store and throw away your medicines at www.disposemymeds.org.          This list is accurate as of: 12/19/17  5:02 PM.  Always use your most recent med list.                   Brand Name Dispense Instructions for use Diagnosis    amylase-lipase-protease 90085 UNITS Cpep    CREON    180 capsule    Take 2 capsules (72,000 Units) by mouth 3 times daily (with meals)    Malignant neoplasm of head of pancreas (H)       blood glucose monitoring lancets     102 each    Use to test blood sugar two times daily or as directed.    Diabetes mellitus, type 2 (H)       diltiazem 2% in PLO cream (FV COMPOUNDED) 2% Gel     30 g    Apply topically daily and as needed to external hemorrhoids    External hemorrhoids       docusate sodium 100 MG capsule    DOK    100 capsule    Take 1 capsule (100  mg) by mouth daily    External hemorrhoids       dronabinol 5 MG capsule    MARINOL    90 capsule    Take 1 capsule (5 mg) by mouth 3 times daily (before meals) Taking once daily    Anorexia       ENSURE CLEAR Liqd     90 Bottle    Take 1 Bottle by mouth 3 times daily    Malignant neoplasm of head of pancreas (H)       insulin glargine 100 UNIT/ML injection    LANTUS SOLOSTAR    15 mL    10 units subcutaneously daily at 8pm.    Diabetes mellitus, type 2 (H)       insulin pen needle 32G X 4 MM    BD EMMANUEL U/F    100 each    Use one daily or as directed.    Diabetes mellitus, type 2 (H)       lidocaine-prilocaine cream    EMLA    30 g    Apply topically as needed for other (Use 30-60 minutes prior to port access)    Malignant neoplasm of head of pancreas (H)       loratadine 10 MG tablet    CLARITIN    30 tablet    Take 1 tablet (10 mg) by mouth daily Reported on 5/5/2017    Chronic seasonal allergic rhinitis, unspecified trigger       LORazepam 0.5 MG tablet    ATIVAN    30 tablet    Take 1 tablet (0.5 mg) by mouth every 4 hours as needed (Anxiety, Nausea/Vomiting or Sleep)    Malignant neoplasm of head of pancreas (H)       morphine 15 MG 12 hr tablet    MS CONTIN    60 tablet    Take 1 tablet (15 mg) by mouth every 12 hours    Malignant neoplasm of head of pancreas (H)       ondansetron 8 MG ODT tab    ZOFRAN-ODT    60 tablet    Take 1 tablet (8 mg) by mouth every 8 hours as needed for nausea    Malignant neoplasm of head of pancreas (H)       ONETOUCH PING METER REMOTE SUPPLIES Misc     1 each    Check your sugars twice daily, once when fasting and once 2 hours after eating.    Secondary diabetes mellitus (H)       oxyCODONE IR 5 MG tablet    ROXICODONE    100 tablet    Take 1 tablet (5 mg) by mouth every 4 hours as needed for moderate to severe pain    Malignant neoplasm of head of pancreas (H)       pantoprazole 20 MG EC tablet    PROTONIX    60 tablet    Take 1 tablet (20 mg) by mouth 2 times daily    Malignant  neoplasm of head of pancreas (H)       polyethylene glycol powder    MIRALAX/GLYCOLAX    119 g    Take 17 g (1 capful) by mouth daily    Malignant neoplasm of head of pancreas (H)       potassium chloride SA 20 MEQ CR tablet    KLOR-CON    60 tablet    Take 2 tablets (40 mEq) by mouth daily    Malignant neoplasm of head of pancreas (H)       prochlorperazine 10 MG tablet    COMPAZINE    30 tablet    TAKE ONE TABLET BY MOUTH EVERY 6 HOURS AS NEEDED FOR NAUSEA AND VOMITING    Malignant neoplasm of head of pancreas (H)

## 2017-12-19 NOTE — NURSING NOTE
"Oncology Rooming Note    December 19, 2017 1:18 PM   Shirin Muller is a 57 year old female who presents for:    Chief Complaint   Patient presents with     Port Draw     Labs drawn via port by RN. Line flushed and hep locked. VS taken.     Oncology Clinic Visit     return patient visit for pre-infusion follow up related to pancreatic cancer      Initial Vitals: /63 (BP Location: Right arm, Patient Position: Sitting, Cuff Size: Adult Regular)  Pulse 78  Temp 98.3  F (36.8  C) (Oral)  Resp 18  Ht 1.778 m (5' 10\")  Wt 78.7 kg (173 lb 9.6 oz)  SpO2 99%  Breastfeeding? No  BMI 24.91 kg/m2 Estimated body mass index is 24.91 kg/(m^2) as calculated from the following:    Height as of this encounter: 1.778 m (5' 10\").    Weight as of this encounter: 78.7 kg (173 lb 9.6 oz). Body surface area is 1.97 meters squared.  No Pain (0) Comment: Data Unavailable   No LMP recorded. Patient is postmenopausal.  Allergies reviewed: Yes  Medications reviewed: Yes    Medications: MEDICATION REFILLS NEEDED TODAY. Provider was notified.  Pharmacy name entered into [facility]:    [facility] - [place], [place] - [id number]  [id number] - [place], [place] - [address] [place] [address]    Clinical concerns: NO CONCERNS emmanuel was notified.    6 minutes for nursing intake (face to face time)     Ozzie Martin CMA              "

## 2017-12-20 NOTE — PROGRESS NOTES
Infusion Nursing Note:  Shirin Muller presents today for C6D1 Irinotecan Liposomal, Fluorouracil pump connect.    Patient seen by provider today: Yes: Ester Echavarria NP    Note: N/A.    Intravenous Access:  Implanted Port.      Treatment Conditions:  Lab Results   Component Value Date    HGB 10.1 12/19/2017     Lab Results   Component Value Date    WBC 3.6 12/19/2017      Lab Results   Component Value Date    ANEU 2.2 12/19/2017     Lab Results   Component Value Date     12/19/2017      Lab Results   Component Value Date     12/19/2017                   Lab Results   Component Value Date    POTASSIUM 4.1 12/19/2017           Lab Results   Component Value Date    MAG 1.5 11/13/2017            Lab Results   Component Value Date    CR 0.71 12/19/2017                   Lab Results   Component Value Date    FANNY 9.0 12/19/2017                Lab Results   Component Value Date    BILITOTAL 0.2 12/19/2017           Lab Results   Component Value Date    ALBUMIN 3.4 12/19/2017                    Lab Results   Component Value Date    ALT 22 12/19/2017           Lab Results   Component Value Date    AST 23 12/19/2017     Results reviewed, labs MET treatment parameters, ok to proceed with treatment.          Post Infusion Assessment:  Patient tolerated infusion without incident.  Blood return noted pre and post infusion.  Site patent and intact, free from redness, edema or discomfort.  No evidence of extravasations.  Access discontinued per protocol.    Prior to discharge: Port is secured in place with tegaderm and flushed with 10cc NS with positive blood return noted.  Continuous home infusion pump connected.    All connectors secured in place and clamps taped open.    Patient instructed to call our clinic or [facility] with any questions or concerns at home.  Patient verbalized understanding.    Patient set up for pump disconnect at home with [facility] on 12/21/17 at 1500.   Patient and [facility] aware of disconnect time and date.        Discharge Plan:   Prescription refills given for Docusate, MS Contin, Oxycodoen, Compazine, Zofran, Claritin, Miralax, Protonix.  Discharge instructions reviewed with: Patient and Family.  Patient and/or family verbalized understanding of discharge instructions and all questions answered.  Copy of AVS reviewed with patient and/or family.  Patient will return 1/8/18 for next appointment.  Patient discharged in stable condition accompanied by: sister.  Departure Mode: Ambulatory.    Kamini Landa RN

## 2017-12-20 NOTE — PROGRESS NOTES
This is a recent snapshot of the patient's [facility] medical record.  For current drug dose and complete information and questions, call [phone] or In [facility] pool, [facility] home infusion ([id number])  CSN Number:  [id number]

## 2018-01-01 ENCOUNTER — TELEPHONE (OUTPATIENT)
Dept: ONCOLOGY | Facility: CLINIC | Age: 58
End: 2018-01-01

## 2018-01-01 ENCOUNTER — ANESTHESIA (OUTPATIENT)
Dept: SURGERY | Facility: CLINIC | Age: 58
DRG: 435 | End: 2018-01-01
Payer: COMMERCIAL

## 2018-01-01 ENCOUNTER — ONCOLOGY VISIT (OUTPATIENT)
Dept: ONCOLOGY | Facility: CLINIC | Age: 58
End: 2018-01-01
Attending: NURSE PRACTITIONER
Payer: COMMERCIAL

## 2018-01-01 ENCOUNTER — HOME INFUSION (PRE-WILLOW HOME INFUSION) (OUTPATIENT)
Dept: PHARMACY | Facility: CLINIC | Age: 58
End: 2018-01-01

## 2018-01-01 ENCOUNTER — INFUSION THERAPY VISIT (OUTPATIENT)
Dept: ONCOLOGY | Facility: CLINIC | Age: 58
End: 2018-01-01
Attending: INTERNAL MEDICINE
Payer: COMMERCIAL

## 2018-01-01 ENCOUNTER — MEDICAL CORRESPONDENCE (OUTPATIENT)
Dept: HEALTH INFORMATION MANAGEMENT | Facility: CLINIC | Age: 58
End: 2018-01-01

## 2018-01-01 ENCOUNTER — CARE COORDINATION (OUTPATIENT)
Dept: ONCOLOGY | Facility: CLINIC | Age: 58
End: 2018-01-01

## 2018-01-01 ENCOUNTER — APPOINTMENT (OUTPATIENT)
Dept: CT IMAGING | Facility: CLINIC | Age: 58
DRG: 380 | End: 2018-01-01
Attending: FAMILY MEDICINE
Payer: COMMERCIAL

## 2018-01-01 ENCOUNTER — HOSPITAL ENCOUNTER (OUTPATIENT)
Facility: CLINIC | Age: 58
Setting detail: SPECIMEN
Discharge: HOME | End: 2018-02-10
Admitting: NURSE PRACTITIONER
Payer: COMMERCIAL

## 2018-01-01 ENCOUNTER — SURGERY (OUTPATIENT)
Age: 58
End: 2018-01-01

## 2018-01-01 ENCOUNTER — APPOINTMENT (OUTPATIENT)
Dept: LAB | Facility: CLINIC | Age: 58
End: 2018-01-01
Attending: INTERNAL MEDICINE
Payer: COMMERCIAL

## 2018-01-01 ENCOUNTER — ANESTHESIA EVENT (OUTPATIENT)
Dept: SURGERY | Facility: CLINIC | Age: 58
DRG: 435 | End: 2018-01-01
Payer: COMMERCIAL

## 2018-01-01 ENCOUNTER — RADIANT APPOINTMENT (OUTPATIENT)
Dept: CT IMAGING | Facility: CLINIC | Age: 58
End: 2018-01-01
Attending: NURSE PRACTITIONER
Payer: COMMERCIAL

## 2018-01-01 ENCOUNTER — INFUSION THERAPY VISIT (OUTPATIENT)
Dept: TRANSPLANT | Facility: CLINIC | Age: 58
End: 2018-01-01
Attending: INTERNAL MEDICINE
Payer: COMMERCIAL

## 2018-01-01 ENCOUNTER — RADIANT APPOINTMENT (OUTPATIENT)
Dept: MRI IMAGING | Facility: CLINIC | Age: 58
End: 2018-01-01
Attending: INTERNAL MEDICINE
Payer: COMMERCIAL

## 2018-01-01 ENCOUNTER — APPOINTMENT (OUTPATIENT)
Dept: GENERAL RADIOLOGY | Facility: CLINIC | Age: 58
DRG: 435 | End: 2018-01-01
Attending: INTERNAL MEDICINE
Payer: COMMERCIAL

## 2018-01-01 ENCOUNTER — APPOINTMENT (OUTPATIENT)
Dept: GENERAL RADIOLOGY | Facility: CLINIC | Age: 58
DRG: 380 | End: 2018-01-01
Attending: INTERNAL MEDICINE
Payer: COMMERCIAL

## 2018-01-01 ENCOUNTER — ANESTHESIA (OUTPATIENT)
Dept: SURGERY | Facility: CLINIC | Age: 58
DRG: 380 | End: 2018-01-01
Payer: COMMERCIAL

## 2018-01-01 ENCOUNTER — HOSPITAL ENCOUNTER (INPATIENT)
Facility: CLINIC | Age: 58
LOS: 5 days | Discharge: HOME | DRG: 435 | End: 2018-02-17
Attending: FAMILY MEDICINE | Admitting: INTERNAL MEDICINE
Payer: COMMERCIAL

## 2018-01-01 ENCOUNTER — APPOINTMENT (OUTPATIENT)
Dept: GENERAL RADIOLOGY | Facility: CLINIC | Age: 58
DRG: 435 | End: 2018-01-01
Attending: PHYSICIAN ASSISTANT
Payer: COMMERCIAL

## 2018-01-01 ENCOUNTER — RADIANT APPOINTMENT (OUTPATIENT)
Dept: CT IMAGING | Facility: CLINIC | Age: 58
End: 2018-01-01
Attending: INTERNAL MEDICINE
Payer: COMMERCIAL

## 2018-01-01 ENCOUNTER — HOSPITAL ENCOUNTER (INPATIENT)
Facility: CLINIC | Age: 58
LOS: 2 days | Discharge: HOME | DRG: 380 | End: 2018-03-21
Attending: FAMILY MEDICINE | Admitting: INTERNAL MEDICINE
Payer: COMMERCIAL

## 2018-01-01 ENCOUNTER — APPOINTMENT (OUTPATIENT)
Dept: CT IMAGING | Facility: CLINIC | Age: 58
DRG: 435 | End: 2018-01-01
Attending: FAMILY MEDICINE
Payer: COMMERCIAL

## 2018-01-01 ENCOUNTER — RADIANT APPOINTMENT (OUTPATIENT)
Dept: GENERAL RADIOLOGY | Facility: CLINIC | Age: 58
End: 2018-01-01
Attending: NURSE PRACTITIONER
Payer: COMMERCIAL

## 2018-01-01 ENCOUNTER — ANESTHESIA EVENT (OUTPATIENT)
Dept: SURGERY | Facility: CLINIC | Age: 58
DRG: 380 | End: 2018-01-01
Payer: COMMERCIAL

## 2018-01-01 ENCOUNTER — HOSPITAL ENCOUNTER (OUTPATIENT)
Dept: GENERAL RADIOLOGY | Facility: CLINIC | Age: 58
Discharge: HOME | DRG: 435 | End: 2018-02-12
Attending: NURSE PRACTITIONER | Admitting: NURSE PRACTITIONER
Payer: COMMERCIAL

## 2018-01-01 ENCOUNTER — ONCOLOGY VISIT (OUTPATIENT)
Dept: ONCOLOGY | Facility: CLINIC | Age: 58
End: 2018-01-01
Attending: PHYSICIAN ASSISTANT
Payer: COMMERCIAL

## 2018-01-01 ENCOUNTER — RADIANT APPOINTMENT (OUTPATIENT)
Dept: MRI IMAGING | Facility: CLINIC | Age: 58
End: 2018-01-01
Attending: NURSE PRACTITIONER
Payer: COMMERCIAL

## 2018-01-01 ENCOUNTER — APPOINTMENT (OUTPATIENT)
Dept: OCCUPATIONAL THERAPY | Facility: CLINIC | Age: 58
DRG: 435 | End: 2018-01-01
Attending: PHYSICIAN ASSISTANT
Payer: COMMERCIAL

## 2018-01-01 VITALS
BODY MASS INDEX: 22.23 KG/M2 | RESPIRATION RATE: 18 BRPM | WEIGHT: 158.8 LBS | OXYGEN SATURATION: 99 % | TEMPERATURE: 98.9 F | HEART RATE: 107 BPM | DIASTOLIC BLOOD PRESSURE: 71 MMHG | SYSTOLIC BLOOD PRESSURE: 107 MMHG

## 2018-01-01 VITALS
BODY MASS INDEX: 22.33 KG/M2 | WEIGHT: 159.5 LBS | OXYGEN SATURATION: 100 % | RESPIRATION RATE: 16 BRPM | HEIGHT: 71 IN | SYSTOLIC BLOOD PRESSURE: 106 MMHG | DIASTOLIC BLOOD PRESSURE: 77 MMHG | TEMPERATURE: 97.1 F | HEART RATE: 104 BPM

## 2018-01-01 VITALS
BODY MASS INDEX: 22.3 KG/M2 | TEMPERATURE: 98.1 F | SYSTOLIC BLOOD PRESSURE: 104 MMHG | OXYGEN SATURATION: 100 % | HEART RATE: 105 BPM | DIASTOLIC BLOOD PRESSURE: 72 MMHG | WEIGHT: 159.3 LBS

## 2018-01-01 VITALS
HEIGHT: 71 IN | HEART RATE: 94 BPM | WEIGHT: 170.86 LBS | BODY MASS INDEX: 23.92 KG/M2 | RESPIRATION RATE: 18 BRPM | OXYGEN SATURATION: 100 % | TEMPERATURE: 98.4 F | DIASTOLIC BLOOD PRESSURE: 68 MMHG | SYSTOLIC BLOOD PRESSURE: 104 MMHG

## 2018-01-01 VITALS
DIASTOLIC BLOOD PRESSURE: 77 MMHG | HEIGHT: 71 IN | RESPIRATION RATE: 16 BRPM | OXYGEN SATURATION: 100 % | TEMPERATURE: 96.3 F | WEIGHT: 155 LBS | HEART RATE: 94 BPM | BODY MASS INDEX: 21.7 KG/M2 | SYSTOLIC BLOOD PRESSURE: 117 MMHG

## 2018-01-01 VITALS
BODY MASS INDEX: 20.78 KG/M2 | TEMPERATURE: 98.8 F | OXYGEN SATURATION: 97 % | RESPIRATION RATE: 18 BRPM | DIASTOLIC BLOOD PRESSURE: 62 MMHG | SYSTOLIC BLOOD PRESSURE: 96 MMHG | WEIGHT: 149 LBS | HEART RATE: 106 BPM

## 2018-01-01 VITALS
BODY MASS INDEX: 20.54 KG/M2 | SYSTOLIC BLOOD PRESSURE: 113 MMHG | OXYGEN SATURATION: 99 % | RESPIRATION RATE: 16 BRPM | TEMPERATURE: 97.5 F | DIASTOLIC BLOOD PRESSURE: 73 MMHG | HEART RATE: 96 BPM | WEIGHT: 147.27 LBS

## 2018-01-01 VITALS
BODY MASS INDEX: 22.62 KG/M2 | HEART RATE: 98 BPM | OXYGEN SATURATION: 100 % | SYSTOLIC BLOOD PRESSURE: 99 MMHG | WEIGHT: 162.2 LBS | TEMPERATURE: 98.5 F | DIASTOLIC BLOOD PRESSURE: 72 MMHG

## 2018-01-01 VITALS
RESPIRATION RATE: 18 BRPM | TEMPERATURE: 98.8 F | DIASTOLIC BLOOD PRESSURE: 64 MMHG | HEART RATE: 97 BPM | BODY MASS INDEX: 24.84 KG/M2 | SYSTOLIC BLOOD PRESSURE: 100 MMHG | WEIGHT: 173.1 LBS | OXYGEN SATURATION: 98 %

## 2018-01-01 VITALS
RESPIRATION RATE: 16 BRPM | BODY MASS INDEX: 21 KG/M2 | SYSTOLIC BLOOD PRESSURE: 113 MMHG | OXYGEN SATURATION: 100 % | HEIGHT: 71 IN | HEART RATE: 78 BPM | DIASTOLIC BLOOD PRESSURE: 74 MMHG | WEIGHT: 150 LBS | TEMPERATURE: 97.5 F

## 2018-01-01 VITALS
TEMPERATURE: 98.6 F | OXYGEN SATURATION: 98 % | HEART RATE: 96 BPM | BODY MASS INDEX: 21.85 KG/M2 | HEIGHT: 71 IN | RESPIRATION RATE: 18 BRPM | DIASTOLIC BLOOD PRESSURE: 65 MMHG | SYSTOLIC BLOOD PRESSURE: 109 MMHG | WEIGHT: 156.1 LBS

## 2018-01-01 VITALS
RESPIRATION RATE: 16 BRPM | DIASTOLIC BLOOD PRESSURE: 57 MMHG | HEART RATE: 100 BPM | HEIGHT: 71 IN | OXYGEN SATURATION: 99 % | SYSTOLIC BLOOD PRESSURE: 91 MMHG | BODY MASS INDEX: 20.78 KG/M2

## 2018-01-01 DIAGNOSIS — C25.0 MALIGNANT NEOPLASM OF HEAD OF PANCREAS (H): ICD-10-CM

## 2018-01-01 DIAGNOSIS — G43.A0 CYCLICAL VOMITING WITH NAUSEA, INTRACTABILITY OF VOMITING NOT SPECIFIED: ICD-10-CM

## 2018-01-01 DIAGNOSIS — K59.03 DRUG-INDUCED CONSTIPATION: Primary | ICD-10-CM

## 2018-01-01 DIAGNOSIS — K31.5 DUODENAL OBSTRUCTION: ICD-10-CM

## 2018-01-01 DIAGNOSIS — C78.7 SECONDARY MALIGNANT NEOPLASM OF LIVER (H): ICD-10-CM

## 2018-01-01 DIAGNOSIS — E86.0 DEHYDRATION: ICD-10-CM

## 2018-01-01 DIAGNOSIS — E87.6 HYPOKALEMIA: ICD-10-CM

## 2018-01-01 DIAGNOSIS — C25.0 MALIGNANT NEOPLASM OF HEAD OF PANCREAS (H): Primary | ICD-10-CM

## 2018-01-01 DIAGNOSIS — D70.1 CHEMOTHERAPY-INDUCED NEUTROPENIA (H): ICD-10-CM

## 2018-01-01 DIAGNOSIS — D70.1 CHEMOTHERAPY-INDUCED NEUTROPENIA (H): Primary | ICD-10-CM

## 2018-01-01 DIAGNOSIS — R53.83 OTHER FATIGUE: ICD-10-CM

## 2018-01-01 DIAGNOSIS — K59.03 DRUG-INDUCED CONSTIPATION: ICD-10-CM

## 2018-01-01 DIAGNOSIS — R11.15 INTRACTABLE CYCLICAL VOMITING WITH NAUSEA: ICD-10-CM

## 2018-01-01 DIAGNOSIS — M62.81 GENERALIZED MUSCLE WEAKNESS: ICD-10-CM

## 2018-01-01 DIAGNOSIS — R11.2 NAUSEA & VOMITING: ICD-10-CM

## 2018-01-01 DIAGNOSIS — B96.81 DUODENAL ULCER DUE TO HELICOBACTER PYLORI: ICD-10-CM

## 2018-01-01 DIAGNOSIS — E11.9 DIABETES MELLITUS, TYPE 2 (H): Primary | ICD-10-CM

## 2018-01-01 DIAGNOSIS — K26.9 DUODENAL ULCER DUE TO HELICOBACTER PYLORI: ICD-10-CM

## 2018-01-01 DIAGNOSIS — C25.0 CANCER OF HEAD OF PANCREAS (H): ICD-10-CM

## 2018-01-01 DIAGNOSIS — T45.1X5A CHEMOTHERAPY-INDUCED NEUTROPENIA (H): ICD-10-CM

## 2018-01-01 DIAGNOSIS — C80.1 BILIARY OBSTRUCTION DUE TO MALIGNANT NEOPLASM (H): ICD-10-CM

## 2018-01-01 DIAGNOSIS — K64.4 EXTERNAL HEMORRHOIDS: ICD-10-CM

## 2018-01-01 DIAGNOSIS — K31.1 GASTRIC OUTFLOW OBSTRUCTION: ICD-10-CM

## 2018-01-01 DIAGNOSIS — R63.0 ANOREXIA: ICD-10-CM

## 2018-01-01 DIAGNOSIS — K83.1 BILIARY OBSTRUCTION DUE TO MALIGNANT NEOPLASM (H): ICD-10-CM

## 2018-01-01 DIAGNOSIS — R11.2 NAUSEA AND VOMITING, INTRACTABILITY OF VOMITING NOT SPECIFIED, UNSPECIFIED VOMITING TYPE: ICD-10-CM

## 2018-01-01 DIAGNOSIS — T45.1X5A CHEMOTHERAPY-INDUCED NEUTROPENIA (H): Primary | ICD-10-CM

## 2018-01-01 DIAGNOSIS — R30.0 DYSURIA: ICD-10-CM

## 2018-01-01 DIAGNOSIS — R53.1 WEAKNESS GENERALIZED: ICD-10-CM

## 2018-01-01 DIAGNOSIS — R30.0 DYSURIA: Primary | ICD-10-CM

## 2018-01-01 DIAGNOSIS — R63.0 ANOREXIA: Primary | ICD-10-CM

## 2018-01-01 DIAGNOSIS — K59.01 SLOW TRANSIT CONSTIPATION: Primary | ICD-10-CM

## 2018-01-01 DIAGNOSIS — J30.2 CHRONIC SEASONAL ALLERGIC RHINITIS, UNSPECIFIED TRIGGER: ICD-10-CM

## 2018-01-01 DIAGNOSIS — E13.9 SECONDARY DIABETES MELLITUS (H): ICD-10-CM

## 2018-01-01 DIAGNOSIS — E87.6 HYPOKALEMIA: Primary | ICD-10-CM

## 2018-01-01 LAB
ALBUMIN SERPL-MCNC: 2.9 G/DL (ref 3.4–5)
ALBUMIN SERPL-MCNC: 3 G/DL (ref 3.4–5)
ALBUMIN SERPL-MCNC: 3 G/DL (ref 3.4–5)
ALBUMIN SERPL-MCNC: 3.1 G/DL (ref 3.4–5)
ALBUMIN SERPL-MCNC: 3.1 G/DL (ref 3.4–5)
ALBUMIN SERPL-MCNC: 3.2 G/DL (ref 3.4–5)
ALBUMIN SERPL-MCNC: 3.2 G/DL (ref 3.4–5)
ALBUMIN SERPL-MCNC: 3.3 G/DL (ref 3.4–5)
ALBUMIN SERPL-MCNC: 3.4 G/DL (ref 3.4–5)
ALBUMIN SERPL-MCNC: 3.4 G/DL (ref 3.4–5)
ALBUMIN SERPL-MCNC: 3.5 G/DL (ref 3.4–5)
ALBUMIN SERPL-MCNC: 3.5 G/DL (ref 3.4–5)
ALBUMIN SERPL-MCNC: 3.6 G/DL (ref 3.4–5)
ALBUMIN SERPL-MCNC: 3.7 G/DL (ref 3.4–5)
ALBUMIN UR-MCNC: 100 MG/DL
ALBUMIN UR-MCNC: 30 MG/DL
ALBUMIN UR-MCNC: 30 MG/DL
ALP SERPL-CCNC: 269 U/L (ref 40–150)
ALP SERPL-CCNC: 275 U/L (ref 40–150)
ALP SERPL-CCNC: 278 U/L (ref 40–150)
ALP SERPL-CCNC: 282 U/L (ref 40–150)
ALP SERPL-CCNC: 287 U/L (ref 40–150)
ALP SERPL-CCNC: 298 U/L (ref 40–150)
ALP SERPL-CCNC: 301 U/L (ref 40–150)
ALP SERPL-CCNC: 310 U/L (ref 40–150)
ALP SERPL-CCNC: 335 U/L (ref 40–150)
ALP SERPL-CCNC: 360 U/L (ref 40–150)
ALP SERPL-CCNC: 360 U/L (ref 40–150)
ALP SERPL-CCNC: 365 U/L (ref 40–150)
ALP SERPL-CCNC: 393 U/L (ref 40–150)
ALP SERPL-CCNC: 487 U/L (ref 40–150)
ALT SERPL W P-5'-P-CCNC: 16 U/L (ref 0–50)
ALT SERPL W P-5'-P-CCNC: 17 U/L (ref 0–50)
ALT SERPL W P-5'-P-CCNC: 19 U/L (ref 0–50)
ALT SERPL W P-5'-P-CCNC: 20 U/L (ref 0–50)
ALT SERPL W P-5'-P-CCNC: 21 U/L (ref 0–50)
ALT SERPL W P-5'-P-CCNC: 26 U/L (ref 0–50)
ALT SERPL W P-5'-P-CCNC: 28 U/L (ref 0–50)
ALT SERPL W P-5'-P-CCNC: 29 U/L (ref 0–50)
ALT SERPL W P-5'-P-CCNC: 32 U/L (ref 0–50)
ALT SERPL W P-5'-P-CCNC: 34 U/L (ref 0–50)
ALT SERPL W P-5'-P-CCNC: 35 U/L (ref 0–50)
ALT SERPL W P-5'-P-CCNC: 38 U/L (ref 0–50)
ANION GAP SERPL CALCULATED.3IONS-SCNC: 10 MMOL/L (ref 3–14)
ANION GAP SERPL CALCULATED.3IONS-SCNC: 12 MMOL/L (ref 3–14)
ANION GAP SERPL CALCULATED.3IONS-SCNC: 6 MMOL/L (ref 3–14)
ANION GAP SERPL CALCULATED.3IONS-SCNC: 7 MMOL/L (ref 3–14)
ANION GAP SERPL CALCULATED.3IONS-SCNC: 8 MMOL/L (ref 3–14)
APPEARANCE UR: ABNORMAL
APTT PPP: 32 SEC (ref 22–37)
APTT PPP: 36 SEC (ref 22–37)
AST SERPL W P-5'-P-CCNC: 22 U/L (ref 0–45)
AST SERPL W P-5'-P-CCNC: 23 U/L (ref 0–45)
AST SERPL W P-5'-P-CCNC: 24 U/L (ref 0–45)
AST SERPL W P-5'-P-CCNC: 27 U/L (ref 0–45)
AST SERPL W P-5'-P-CCNC: 28 U/L (ref 0–45)
AST SERPL W P-5'-P-CCNC: 29 U/L (ref 0–45)
AST SERPL W P-5'-P-CCNC: 29 U/L (ref 0–45)
AST SERPL W P-5'-P-CCNC: 30 U/L (ref 0–45)
AST SERPL W P-5'-P-CCNC: 31 U/L (ref 0–45)
AST SERPL W P-5'-P-CCNC: 34 U/L (ref 0–45)
AST SERPL W P-5'-P-CCNC: 38 U/L (ref 0–45)
AST SERPL W P-5'-P-CCNC: 39 U/L (ref 0–45)
AST SERPL W P-5'-P-CCNC: 43 U/L (ref 0–45)
AST SERPL W P-5'-P-CCNC: 54 U/L (ref 0–45)
BACTERIA SPEC CULT: NORMAL
BACTERIA SPEC CULT: NORMAL
BASOPHILS # BLD AUTO: 0 10E9/L (ref 0–0.2)
BASOPHILS NFR BLD AUTO: 0.2 %
BASOPHILS NFR BLD AUTO: 0.3 %
BASOPHILS NFR BLD AUTO: 0.4 %
BASOPHILS NFR BLD AUTO: 0.4 %
BASOPHILS NFR BLD AUTO: 0.5 %
BASOPHILS NFR BLD AUTO: 0.6 %
BASOPHILS NFR BLD AUTO: 0.6 %
BASOPHILS NFR BLD AUTO: 0.7 %
BASOPHILS NFR BLD AUTO: 1 %
BILIRUB SERPL-MCNC: 0.3 MG/DL (ref 0.2–1.3)
BILIRUB SERPL-MCNC: 0.4 MG/DL (ref 0.2–1.3)
BILIRUB SERPL-MCNC: 0.5 MG/DL (ref 0.2–1.3)
BILIRUB SERPL-MCNC: 0.6 MG/DL (ref 0.2–1.3)
BILIRUB SERPL-MCNC: 0.8 MG/DL (ref 0.2–1.3)
BILIRUB SERPL-MCNC: 0.9 MG/DL (ref 0.2–1.3)
BILIRUB UR QL STRIP: ABNORMAL
BILIRUB UR QL STRIP: NEGATIVE
BILIRUB UR QL STRIP: NEGATIVE
BUN SERPL-MCNC: 10 MG/DL (ref 7–30)
BUN SERPL-MCNC: 11 MG/DL (ref 7–30)
BUN SERPL-MCNC: 2 MG/DL (ref 7–30)
BUN SERPL-MCNC: 3 MG/DL (ref 7–30)
BUN SERPL-MCNC: 6 MG/DL (ref 7–30)
BUN SERPL-MCNC: 6 MG/DL (ref 7–30)
BUN SERPL-MCNC: 7 MG/DL (ref 7–30)
BUN SERPL-MCNC: 8 MG/DL (ref 7–30)
CALCIUM SERPL-MCNC: 8.4 MG/DL (ref 8.5–10.1)
CALCIUM SERPL-MCNC: 8.7 MG/DL (ref 8.5–10.1)
CALCIUM SERPL-MCNC: 8.9 MG/DL (ref 8.5–10.1)
CALCIUM SERPL-MCNC: 9 MG/DL (ref 8.5–10.1)
CALCIUM SERPL-MCNC: 9.1 MG/DL (ref 8.5–10.1)
CALCIUM SERPL-MCNC: 9.2 MG/DL (ref 8.5–10.1)
CALCIUM SERPL-MCNC: 9.8 MG/DL (ref 8.5–10.1)
CANCER AG19-9 SERPL-ACNC: ABNORMAL U/ML (ref 0–37)
CHLORIDE SERPL-SCNC: 101 MMOL/L (ref 94–109)
CHLORIDE SERPL-SCNC: 102 MMOL/L (ref 94–109)
CHLORIDE SERPL-SCNC: 103 MMOL/L (ref 94–109)
CHLORIDE SERPL-SCNC: 103 MMOL/L (ref 94–109)
CHLORIDE SERPL-SCNC: 105 MMOL/L (ref 94–109)
CHLORIDE SERPL-SCNC: 106 MMOL/L (ref 94–109)
CHLORIDE SERPL-SCNC: 106 MMOL/L (ref 94–109)
CHLORIDE SERPL-SCNC: 107 MMOL/L (ref 94–109)
CHLORIDE SERPL-SCNC: 109 MMOL/L (ref 94–109)
CHLORIDE SERPL-SCNC: 112 MMOL/L (ref 94–109)
CHLORIDE SERPL-SCNC: 92 MMOL/L (ref 94–109)
CHLORIDE SERPL-SCNC: 95 MMOL/L (ref 94–109)
CHLORIDE SERPL-SCNC: 96 MMOL/L (ref 94–109)
CHLORIDE SERPL-SCNC: 97 MMOL/L (ref 94–109)
CO2 SERPL-SCNC: 23 MMOL/L (ref 20–32)
CO2 SERPL-SCNC: 26 MMOL/L (ref 20–32)
CO2 SERPL-SCNC: 26 MMOL/L (ref 20–32)
CO2 SERPL-SCNC: 27 MMOL/L (ref 20–32)
CO2 SERPL-SCNC: 28 MMOL/L (ref 20–32)
CO2 SERPL-SCNC: 28 MMOL/L (ref 20–32)
CO2 SERPL-SCNC: 29 MMOL/L (ref 20–32)
CO2 SERPL-SCNC: 29 MMOL/L (ref 20–32)
CO2 SERPL-SCNC: 30 MMOL/L (ref 20–32)
CO2 SERPL-SCNC: 31 MMOL/L (ref 20–32)
CO2 SERPL-SCNC: 33 MMOL/L (ref 20–32)
CO2 SERPL-SCNC: 36 MMOL/L (ref 20–32)
CO2 SERPL-SCNC: 36 MMOL/L (ref 20–32)
CO2 SERPL-SCNC: 37 MMOL/L (ref 20–32)
COLOR UR AUTO: ABNORMAL
COLOR UR AUTO: YELLOW
COLOR UR AUTO: YELLOW
COPATH REPORT: NORMAL
COPATH REPORT: NORMAL
CREAT SERPL-MCNC: 0.52 MG/DL (ref 0.52–1.04)
CREAT SERPL-MCNC: 0.56 MG/DL (ref 0.52–1.04)
CREAT SERPL-MCNC: 0.58 MG/DL (ref 0.52–1.04)
CREAT SERPL-MCNC: 0.58 MG/DL (ref 0.52–1.04)
CREAT SERPL-MCNC: 0.61 MG/DL (ref 0.52–1.04)
CREAT SERPL-MCNC: 0.62 MG/DL (ref 0.52–1.04)
CREAT SERPL-MCNC: 0.63 MG/DL (ref 0.52–1.04)
CREAT SERPL-MCNC: 0.64 MG/DL (ref 0.52–1.04)
CREAT SERPL-MCNC: 0.65 MG/DL (ref 0.52–1.04)
CREAT SERPL-MCNC: 0.71 MG/DL (ref 0.52–1.04)
CREAT SERPL-MCNC: 0.71 MG/DL (ref 0.52–1.04)
CREAT SERPL-MCNC: 0.74 MG/DL (ref 0.52–1.04)
CREAT SERPL-MCNC: 0.74 MG/DL (ref 0.52–1.04)
CREAT SERPL-MCNC: 0.76 MG/DL (ref 0.52–1.04)
CREAT SERPL-MCNC: 0.83 MG/DL (ref 0.52–1.04)
CREAT SERPL-MCNC: 0.85 MG/DL (ref 0.52–1.04)
CREAT SERPL-MCNC: 0.9 MG/DL (ref 0.52–1.04)
DIFFERENTIAL METHOD BLD: ABNORMAL
EOSINOPHIL # BLD AUTO: 0 10E9/L (ref 0–0.7)
EOSINOPHIL # BLD AUTO: 0.1 10E9/L (ref 0–0.7)
EOSINOPHIL NFR BLD AUTO: 0.8 %
EOSINOPHIL NFR BLD AUTO: 0.8 %
EOSINOPHIL NFR BLD AUTO: 0.9 %
EOSINOPHIL NFR BLD AUTO: 1 %
EOSINOPHIL NFR BLD AUTO: 1.3 %
EOSINOPHIL NFR BLD AUTO: 1.4 %
EOSINOPHIL NFR BLD AUTO: 1.7 %
EOSINOPHIL NFR BLD AUTO: 1.7 %
EOSINOPHIL NFR BLD AUTO: 2 %
EOSINOPHIL NFR BLD AUTO: 2.1 %
EOSINOPHIL NFR BLD AUTO: 2.4 %
EOSINOPHIL NFR BLD AUTO: 2.6 %
EOSINOPHIL NFR BLD AUTO: 2.6 %
EOSINOPHIL NFR BLD AUTO: 3.2 %
ERCP: NORMAL
ERYTHROCYTE [DISTWIDTH] IN BLOOD BY AUTOMATED COUNT: 14.2 % (ref 10–15)
ERYTHROCYTE [DISTWIDTH] IN BLOOD BY AUTOMATED COUNT: 14.2 % (ref 10–15)
ERYTHROCYTE [DISTWIDTH] IN BLOOD BY AUTOMATED COUNT: 14.5 % (ref 10–15)
ERYTHROCYTE [DISTWIDTH] IN BLOOD BY AUTOMATED COUNT: 14.6 % (ref 10–15)
ERYTHROCYTE [DISTWIDTH] IN BLOOD BY AUTOMATED COUNT: 14.7 % (ref 10–15)
ERYTHROCYTE [DISTWIDTH] IN BLOOD BY AUTOMATED COUNT: 14.9 % (ref 10–15)
ERYTHROCYTE [DISTWIDTH] IN BLOOD BY AUTOMATED COUNT: 15 % (ref 10–15)
ERYTHROCYTE [DISTWIDTH] IN BLOOD BY AUTOMATED COUNT: 15 % (ref 10–15)
ERYTHROCYTE [DISTWIDTH] IN BLOOD BY AUTOMATED COUNT: 15.1 % (ref 10–15)
ERYTHROCYTE [DISTWIDTH] IN BLOOD BY AUTOMATED COUNT: 15.1 % (ref 10–15)
ERYTHROCYTE [DISTWIDTH] IN BLOOD BY AUTOMATED COUNT: 15.2 % (ref 10–15)
ERYTHROCYTE [DISTWIDTH] IN BLOOD BY AUTOMATED COUNT: 15.3 % (ref 10–15)
GFR SERPL CREATININE-BSD FRML MDRD: 64 ML/MIN/1.7M2
GFR SERPL CREATININE-BSD FRML MDRD: 68 ML/MIN/1.7M2
GFR SERPL CREATININE-BSD FRML MDRD: 70 ML/MIN/1.7M2
GFR SERPL CREATININE-BSD FRML MDRD: 78 ML/MIN/1.7M2
GFR SERPL CREATININE-BSD FRML MDRD: 80 ML/MIN/1.7M2
GFR SERPL CREATININE-BSD FRML MDRD: 80 ML/MIN/1.7M2
GFR SERPL CREATININE-BSD FRML MDRD: 84 ML/MIN/1.7M2
GFR SERPL CREATININE-BSD FRML MDRD: 85 ML/MIN/1.7M2
GFR SERPL CREATININE-BSD FRML MDRD: >90 ML/MIN/1.7M2
GLUCOSE BLDC GLUCOMTR-MCNC: 101 MG/DL (ref 70–99)
GLUCOSE BLDC GLUCOMTR-MCNC: 104 MG/DL (ref 70–99)
GLUCOSE BLDC GLUCOMTR-MCNC: 106 MG/DL (ref 70–99)
GLUCOSE BLDC GLUCOMTR-MCNC: 107 MG/DL (ref 70–99)
GLUCOSE BLDC GLUCOMTR-MCNC: 110 MG/DL (ref 70–99)
GLUCOSE BLDC GLUCOMTR-MCNC: 112 MG/DL (ref 70–99)
GLUCOSE BLDC GLUCOMTR-MCNC: 116 MG/DL (ref 70–99)
GLUCOSE BLDC GLUCOMTR-MCNC: 116 MG/DL (ref 70–99)
GLUCOSE BLDC GLUCOMTR-MCNC: 125 MG/DL (ref 70–99)
GLUCOSE BLDC GLUCOMTR-MCNC: 126 MG/DL (ref 70–99)
GLUCOSE BLDC GLUCOMTR-MCNC: 126 MG/DL (ref 70–99)
GLUCOSE BLDC GLUCOMTR-MCNC: 127 MG/DL (ref 70–99)
GLUCOSE BLDC GLUCOMTR-MCNC: 133 MG/DL (ref 70–99)
GLUCOSE BLDC GLUCOMTR-MCNC: 134 MG/DL (ref 70–99)
GLUCOSE BLDC GLUCOMTR-MCNC: 156 MG/DL (ref 70–99)
GLUCOSE BLDC GLUCOMTR-MCNC: 166 MG/DL (ref 70–99)
GLUCOSE BLDC GLUCOMTR-MCNC: 167 MG/DL (ref 70–99)
GLUCOSE BLDC GLUCOMTR-MCNC: 168 MG/DL (ref 70–99)
GLUCOSE BLDC GLUCOMTR-MCNC: 178 MG/DL (ref 70–99)
GLUCOSE BLDC GLUCOMTR-MCNC: 180 MG/DL (ref 70–99)
GLUCOSE BLDC GLUCOMTR-MCNC: 183 MG/DL (ref 70–99)
GLUCOSE BLDC GLUCOMTR-MCNC: 190 MG/DL (ref 70–99)
GLUCOSE BLDC GLUCOMTR-MCNC: 196 MG/DL (ref 70–99)
GLUCOSE BLDC GLUCOMTR-MCNC: 206 MG/DL (ref 70–99)
GLUCOSE BLDC GLUCOMTR-MCNC: 223 MG/DL (ref 70–99)
GLUCOSE BLDC GLUCOMTR-MCNC: 273 MG/DL (ref 70–99)
GLUCOSE BLDC GLUCOMTR-MCNC: 273 MG/DL (ref 70–99)
GLUCOSE BLDC GLUCOMTR-MCNC: 63 MG/DL (ref 70–99)
GLUCOSE BLDC GLUCOMTR-MCNC: 65 MG/DL (ref 70–99)
GLUCOSE BLDC GLUCOMTR-MCNC: 74 MG/DL (ref 70–99)
GLUCOSE BLDC GLUCOMTR-MCNC: 74 MG/DL (ref 70–99)
GLUCOSE BLDC GLUCOMTR-MCNC: 79 MG/DL (ref 70–99)
GLUCOSE BLDC GLUCOMTR-MCNC: 87 MG/DL (ref 70–99)
GLUCOSE BLDC GLUCOMTR-MCNC: 97 MG/DL (ref 70–99)
GLUCOSE SERPL-MCNC: 103 MG/DL (ref 70–99)
GLUCOSE SERPL-MCNC: 116 MG/DL (ref 70–99)
GLUCOSE SERPL-MCNC: 129 MG/DL (ref 70–99)
GLUCOSE SERPL-MCNC: 134 MG/DL (ref 70–99)
GLUCOSE SERPL-MCNC: 142 MG/DL (ref 70–99)
GLUCOSE SERPL-MCNC: 144 MG/DL (ref 70–99)
GLUCOSE SERPL-MCNC: 150 MG/DL (ref 70–99)
GLUCOSE SERPL-MCNC: 164 MG/DL (ref 70–99)
GLUCOSE SERPL-MCNC: 178 MG/DL (ref 70–99)
GLUCOSE SERPL-MCNC: 183 MG/DL (ref 70–99)
GLUCOSE SERPL-MCNC: 195 MG/DL (ref 70–99)
GLUCOSE SERPL-MCNC: 229 MG/DL (ref 70–99)
GLUCOSE SERPL-MCNC: 286 MG/DL (ref 70–99)
GLUCOSE SERPL-MCNC: 73 MG/DL (ref 70–99)
GLUCOSE SERPL-MCNC: 90 MG/DL (ref 70–99)
GLUCOSE SERPL-MCNC: 97 MG/DL (ref 70–99)
GLUCOSE SERPL-MCNC: 99 MG/DL (ref 70–99)
GLUCOSE UR STRIP-MCNC: NEGATIVE MG/DL
H PYLORI AG STL QL IA: NORMAL
HBA1C MFR BLD: 6 % (ref 4.3–6)
HBA1C MFR BLD: 6.7 % (ref 4.3–6)
HCT VFR BLD AUTO: 29.6 % (ref 35–47)
HCT VFR BLD AUTO: 30.3 % (ref 35–47)
HCT VFR BLD AUTO: 30.5 % (ref 35–47)
HCT VFR BLD AUTO: 31.4 % (ref 35–47)
HCT VFR BLD AUTO: 31.4 % (ref 35–47)
HCT VFR BLD AUTO: 31.6 % (ref 35–47)
HCT VFR BLD AUTO: 31.8 % (ref 35–47)
HCT VFR BLD AUTO: 32 % (ref 35–47)
HCT VFR BLD AUTO: 32.4 % (ref 35–47)
HCT VFR BLD AUTO: 32.4 % (ref 35–47)
HCT VFR BLD AUTO: 32.7 % (ref 35–47)
HCT VFR BLD AUTO: 33.2 % (ref 35–47)
HCT VFR BLD AUTO: 33.3 % (ref 35–47)
HCT VFR BLD AUTO: 36.5 % (ref 35–47)
HCT VFR BLD AUTO: 36.7 % (ref 35–47)
HCT VFR BLD AUTO: 37.2 % (ref 35–47)
HGB BLD-MCNC: 10.1 G/DL (ref 11.7–15.7)
HGB BLD-MCNC: 10.1 G/DL (ref 11.7–15.7)
HGB BLD-MCNC: 10.2 G/DL (ref 11.7–15.7)
HGB BLD-MCNC: 10.3 G/DL (ref 11.7–15.7)
HGB BLD-MCNC: 10.3 G/DL (ref 11.7–15.7)
HGB BLD-MCNC: 11.2 G/DL (ref 11.7–15.7)
HGB BLD-MCNC: 11.2 G/DL (ref 11.7–15.7)
HGB BLD-MCNC: 11.4 G/DL (ref 11.7–15.7)
HGB BLD-MCNC: 8.9 G/DL (ref 11.7–15.7)
HGB BLD-MCNC: 9 G/DL (ref 11.7–15.7)
HGB BLD-MCNC: 9.1 G/DL (ref 11.7–15.7)
HGB BLD-MCNC: 9.6 G/DL (ref 11.7–15.7)
HGB BLD-MCNC: 9.7 G/DL (ref 11.7–15.7)
HGB BLD-MCNC: 9.9 G/DL (ref 11.7–15.7)
HGB UR QL STRIP: NEGATIVE
IMM GRANULOCYTES # BLD: 0 10E9/L (ref 0–0.4)
IMM GRANULOCYTES NFR BLD: 0 %
IMM GRANULOCYTES NFR BLD: 0.2 %
IMM GRANULOCYTES NFR BLD: 0.3 %
IMM GRANULOCYTES NFR BLD: 0.5 %
INR PPP: 1.13 (ref 0.86–1.14)
INR PPP: 1.17 (ref 0.86–1.14)
INR PPP: 1.22 (ref 0.86–1.14)
INTERPRETATION ECG - MUSE: NORMAL
KETONES UR STRIP-MCNC: 40 MG/DL
KETONES UR STRIP-MCNC: >150 MG/DL
KETONES UR STRIP-MCNC: NEGATIVE MG/DL
LACTATE BLD-SCNC: 1.1 MMOL/L (ref 0.7–2)
LACTATE BLD-SCNC: 1.2 MMOL/L (ref 0.7–2)
LEUKOCYTE ESTERASE UR QL STRIP: ABNORMAL
LIPASE SERPL-CCNC: 34 U/L (ref 73–393)
LIPASE SERPL-CCNC: 44 U/L (ref 73–393)
LYMPHOCYTES # BLD AUTO: 0.5 10E9/L (ref 0.8–5.3)
LYMPHOCYTES # BLD AUTO: 0.6 10E9/L (ref 0.8–5.3)
LYMPHOCYTES # BLD AUTO: 0.7 10E9/L (ref 0.8–5.3)
LYMPHOCYTES # BLD AUTO: 0.7 10E9/L (ref 0.8–5.3)
LYMPHOCYTES # BLD AUTO: 0.8 10E9/L (ref 0.8–5.3)
LYMPHOCYTES # BLD AUTO: 0.9 10E9/L (ref 0.8–5.3)
LYMPHOCYTES # BLD AUTO: 1 10E9/L (ref 0.8–5.3)
LYMPHOCYTES # BLD AUTO: 1 10E9/L (ref 0.8–5.3)
LYMPHOCYTES # BLD AUTO: 1.1 10E9/L (ref 0.8–5.3)
LYMPHOCYTES # BLD AUTO: 1.3 10E9/L (ref 0.8–5.3)
LYMPHOCYTES NFR BLD AUTO: 14 %
LYMPHOCYTES NFR BLD AUTO: 15.3 %
LYMPHOCYTES NFR BLD AUTO: 17.4 %
LYMPHOCYTES NFR BLD AUTO: 19.1 %
LYMPHOCYTES NFR BLD AUTO: 20.9 %
LYMPHOCYTES NFR BLD AUTO: 21 %
LYMPHOCYTES NFR BLD AUTO: 22.1 %
LYMPHOCYTES NFR BLD AUTO: 22.2 %
LYMPHOCYTES NFR BLD AUTO: 23.5 %
LYMPHOCYTES NFR BLD AUTO: 24.9 %
LYMPHOCYTES NFR BLD AUTO: 25.4 %
LYMPHOCYTES NFR BLD AUTO: 25.5 %
LYMPHOCYTES NFR BLD AUTO: 28.8 %
LYMPHOCYTES NFR BLD AUTO: 29.2 %
Lab: NORMAL
MAGNESIUM SERPL-MCNC: 1.9 MG/DL (ref 1.6–2.3)
MAGNESIUM SERPL-MCNC: 2 MG/DL (ref 1.6–2.3)
MAGNESIUM SERPL-MCNC: 2.3 MG/DL (ref 1.6–2.3)
MAGNESIUM SERPL-MCNC: 2.4 MG/DL (ref 1.6–2.3)
MCH RBC QN AUTO: 26.5 PG (ref 26.5–33)
MCH RBC QN AUTO: 26.5 PG (ref 26.5–33)
MCH RBC QN AUTO: 26.7 PG (ref 26.5–33)
MCH RBC QN AUTO: 26.9 PG (ref 26.5–33)
MCH RBC QN AUTO: 27.1 PG (ref 26.5–33)
MCH RBC QN AUTO: 27.2 PG (ref 26.5–33)
MCH RBC QN AUTO: 27.3 PG (ref 26.5–33)
MCH RBC QN AUTO: 27.3 PG (ref 26.5–33)
MCH RBC QN AUTO: 27.4 PG (ref 26.5–33)
MCH RBC QN AUTO: 27.4 PG (ref 26.5–33)
MCH RBC QN AUTO: 27.6 PG (ref 26.5–33)
MCH RBC QN AUTO: 27.7 PG (ref 26.5–33)
MCH RBC QN AUTO: 28 PG (ref 26.5–33)
MCH RBC QN AUTO: 28.5 PG (ref 26.5–33)
MCH RBC QN AUTO: 28.9 PG (ref 26.5–33)
MCH RBC QN AUTO: 29.1 PG (ref 26.5–33)
MCHC RBC AUTO-ENTMCNC: 29.7 G/DL (ref 31.5–36.5)
MCHC RBC AUTO-ENTMCNC: 29.8 G/DL (ref 31.5–36.5)
MCHC RBC AUTO-ENTMCNC: 29.8 G/DL (ref 31.5–36.5)
MCHC RBC AUTO-ENTMCNC: 30.1 G/DL (ref 31.5–36.5)
MCHC RBC AUTO-ENTMCNC: 30.3 G/DL (ref 31.5–36.5)
MCHC RBC AUTO-ENTMCNC: 30.4 G/DL (ref 31.5–36.5)
MCHC RBC AUTO-ENTMCNC: 30.5 G/DL (ref 31.5–36.5)
MCHC RBC AUTO-ENTMCNC: 30.6 G/DL (ref 31.5–36.5)
MCHC RBC AUTO-ENTMCNC: 30.7 G/DL (ref 31.5–36.5)
MCHC RBC AUTO-ENTMCNC: 30.9 G/DL (ref 31.5–36.5)
MCHC RBC AUTO-ENTMCNC: 30.9 G/DL (ref 31.5–36.5)
MCHC RBC AUTO-ENTMCNC: 31.5 G/DL (ref 31.5–36.5)
MCHC RBC AUTO-ENTMCNC: 31.8 G/DL (ref 31.5–36.5)
MCHC RBC AUTO-ENTMCNC: 31.8 G/DL (ref 31.5–36.5)
MCV RBC AUTO: 86 FL (ref 78–100)
MCV RBC AUTO: 87 FL (ref 78–100)
MCV RBC AUTO: 88 FL (ref 78–100)
MCV RBC AUTO: 88 FL (ref 78–100)
MCV RBC AUTO: 89 FL (ref 78–100)
MCV RBC AUTO: 90 FL (ref 78–100)
MCV RBC AUTO: 91 FL (ref 78–100)
MCV RBC AUTO: 92 FL (ref 78–100)
MONOCYTES # BLD AUTO: 0.2 10E9/L (ref 0–1.3)
MONOCYTES # BLD AUTO: 0.3 10E9/L (ref 0–1.3)
MONOCYTES # BLD AUTO: 0.4 10E9/L (ref 0–1.3)
MONOCYTES # BLD AUTO: 0.5 10E9/L (ref 0–1.3)
MONOCYTES # BLD AUTO: 0.5 10E9/L (ref 0–1.3)
MONOCYTES NFR BLD AUTO: 10.2 %
MONOCYTES NFR BLD AUTO: 12.9 %
MONOCYTES NFR BLD AUTO: 14.9 %
MONOCYTES NFR BLD AUTO: 5.3 %
MONOCYTES NFR BLD AUTO: 7.3 %
MONOCYTES NFR BLD AUTO: 7.6 %
MONOCYTES NFR BLD AUTO: 7.9 %
MONOCYTES NFR BLD AUTO: 8.5 %
MONOCYTES NFR BLD AUTO: 8.7 %
MONOCYTES NFR BLD AUTO: 9 %
MONOCYTES NFR BLD AUTO: 9.7 %
MONOCYTES NFR BLD AUTO: 9.8 %
MONOCYTES NFR BLD AUTO: 9.8 %
MONOCYTES NFR BLD AUTO: 9.9 %
MUCOUS THREADS #/AREA URNS LPF: PRESENT /LPF
NEUTROPHILS # BLD AUTO: 1.1 10E9/L (ref 1.6–8.3)
NEUTROPHILS # BLD AUTO: 1.3 10E9/L (ref 1.6–8.3)
NEUTROPHILS # BLD AUTO: 1.7 10E9/L (ref 1.6–8.3)
NEUTROPHILS # BLD AUTO: 2.1 10E9/L (ref 1.6–8.3)
NEUTROPHILS # BLD AUTO: 2.3 10E9/L (ref 1.6–8.3)
NEUTROPHILS # BLD AUTO: 2.3 10E9/L (ref 1.6–8.3)
NEUTROPHILS # BLD AUTO: 2.4 10E9/L (ref 1.6–8.3)
NEUTROPHILS # BLD AUTO: 2.5 10E9/L (ref 1.6–8.3)
NEUTROPHILS # BLD AUTO: 2.6 10E9/L (ref 1.6–8.3)
NEUTROPHILS # BLD AUTO: 3.1 10E9/L (ref 1.6–8.3)
NEUTROPHILS # BLD AUTO: 3.2 10E9/L (ref 1.6–8.3)
NEUTROPHILS # BLD AUTO: 3.4 10E9/L (ref 1.6–8.3)
NEUTROPHILS # BLD AUTO: 3.8 10E9/L (ref 1.6–8.3)
NEUTROPHILS # BLD AUTO: 4 10E9/L (ref 1.6–8.3)
NEUTROPHILS NFR BLD AUTO: 54.4 %
NEUTROPHILS NFR BLD AUTO: 59.1 %
NEUTROPHILS NFR BLD AUTO: 59.7 %
NEUTROPHILS NFR BLD AUTO: 60.7 %
NEUTROPHILS NFR BLD AUTO: 61.8 %
NEUTROPHILS NFR BLD AUTO: 65.9 %
NEUTROPHILS NFR BLD AUTO: 65.9 %
NEUTROPHILS NFR BLD AUTO: 66.5 %
NEUTROPHILS NFR BLD AUTO: 67.1 %
NEUTROPHILS NFR BLD AUTO: 67.5 %
NEUTROPHILS NFR BLD AUTO: 71.9 %
NEUTROPHILS NFR BLD AUTO: 74.6 %
NEUTROPHILS NFR BLD AUTO: 74.7 %
NEUTROPHILS NFR BLD AUTO: 75.5 %
NITRATE UR QL: NEGATIVE
NRBC # BLD AUTO: 0 10*3/UL
NRBC BLD AUTO-RTO: 0 /100
PH UR STRIP: 6 PH (ref 5–7)
PH UR STRIP: 7 PH (ref 5–7)
PH UR STRIP: 8.5 PH (ref 5–7)
PHOSPHATE SERPL-MCNC: 2.9 MG/DL (ref 2.5–4.5)
PHOSPHATE SERPL-MCNC: 3.2 MG/DL (ref 2.5–4.5)
PHOSPHATE SERPL-MCNC: 3.3 MG/DL (ref 2.5–4.5)
PHOSPHATE SERPL-MCNC: 3.4 MG/DL (ref 2.5–4.5)
PLATELET # BLD AUTO: 103 10E9/L (ref 150–450)
PLATELET # BLD AUTO: 108 10E9/L (ref 150–450)
PLATELET # BLD AUTO: 108 10E9/L (ref 150–450)
PLATELET # BLD AUTO: 111 10E9/L (ref 150–450)
PLATELET # BLD AUTO: 133 10E9/L (ref 150–450)
PLATELET # BLD AUTO: 136 10E9/L (ref 150–450)
PLATELET # BLD AUTO: 149 10E9/L (ref 150–450)
PLATELET # BLD AUTO: 164 10E9/L (ref 150–450)
PLATELET # BLD AUTO: 166 10E9/L (ref 150–450)
PLATELET # BLD AUTO: 168 10E9/L (ref 150–450)
PLATELET # BLD AUTO: 196 10E9/L (ref 150–450)
PLATELET # BLD AUTO: 246 10E9/L (ref 150–450)
PLATELET # BLD AUTO: 89 10E9/L (ref 150–450)
PLATELET # BLD AUTO: 92 10E9/L (ref 150–450)
PLATELET # BLD AUTO: 96 10E9/L (ref 150–450)
PLATELET # BLD AUTO: 99 10E9/L (ref 150–450)
POTASSIUM SERPL-SCNC: 2.7 MMOL/L (ref 3.4–5.3)
POTASSIUM SERPL-SCNC: 2.7 MMOL/L (ref 3.4–5.3)
POTASSIUM SERPL-SCNC: 2.8 MMOL/L (ref 3.4–5.3)
POTASSIUM SERPL-SCNC: 2.9 MMOL/L (ref 3.4–5.3)
POTASSIUM SERPL-SCNC: 3.2 MMOL/L (ref 3.4–5.3)
POTASSIUM SERPL-SCNC: 3.2 MMOL/L (ref 3.4–5.3)
POTASSIUM SERPL-SCNC: 3.4 MMOL/L (ref 3.4–5.3)
POTASSIUM SERPL-SCNC: 3.5 MMOL/L (ref 3.4–5.3)
POTASSIUM SERPL-SCNC: 3.6 MMOL/L (ref 3.4–5.3)
POTASSIUM SERPL-SCNC: 3.7 MMOL/L (ref 3.4–5.3)
POTASSIUM SERPL-SCNC: 3.8 MMOL/L (ref 3.4–5.3)
POTASSIUM SERPL-SCNC: 4.3 MMOL/L (ref 3.4–5.3)
POTASSIUM SERPL-SCNC: 4.4 MMOL/L (ref 3.4–5.3)
PROT SERPL-MCNC: 7.2 G/DL (ref 6.8–8.8)
PROT SERPL-MCNC: 7.3 G/DL (ref 6.8–8.8)
PROT SERPL-MCNC: 7.6 G/DL (ref 6.8–8.8)
PROT SERPL-MCNC: 7.6 G/DL (ref 6.8–8.8)
PROT SERPL-MCNC: 8 G/DL (ref 6.8–8.8)
PROT SERPL-MCNC: 8.2 G/DL (ref 6.8–8.8)
PROT SERPL-MCNC: 8.3 G/DL (ref 6.8–8.8)
PROT SERPL-MCNC: 8.3 G/DL (ref 6.8–8.8)
PROT SERPL-MCNC: 8.7 G/DL (ref 6.8–8.8)
PROT SERPL-MCNC: 9.1 G/DL (ref 6.8–8.8)
PROT SERPL-MCNC: 9.2 G/DL (ref 6.8–8.8)
PROT SERPL-MCNC: 9.3 G/DL (ref 6.8–8.8)
RADIOLOGIST FLAGS: NORMAL
RADIOLOGIST FLAGS: NORMAL
RBC # BLD AUTO: 3.35 10E12/L (ref 3.8–5.2)
RBC # BLD AUTO: 3.36 10E12/L (ref 3.8–5.2)
RBC # BLD AUTO: 3.39 10E12/L (ref 3.8–5.2)
RBC # BLD AUTO: 3.51 10E12/L (ref 3.8–5.2)
RBC # BLD AUTO: 3.52 10E12/L (ref 3.8–5.2)
RBC # BLD AUTO: 3.54 10E12/L (ref 3.8–5.2)
RBC # BLD AUTO: 3.56 10E12/L (ref 3.8–5.2)
RBC # BLD AUTO: 3.6 10E12/L (ref 3.8–5.2)
RBC # BLD AUTO: 3.61 10E12/L (ref 3.8–5.2)
RBC # BLD AUTO: 3.64 10E12/L (ref 3.8–5.2)
RBC # BLD AUTO: 3.65 10E12/L (ref 3.8–5.2)
RBC # BLD AUTO: 3.68 10E12/L (ref 3.8–5.2)
RBC # BLD AUTO: 3.76 10E12/L (ref 3.8–5.2)
RBC # BLD AUTO: 4 10E12/L (ref 3.8–5.2)
RBC # BLD AUTO: 4.11 10E12/L (ref 3.8–5.2)
RBC # BLD AUTO: 4.18 10E12/L (ref 3.8–5.2)
RBC #/AREA URNS AUTO: 0 /HPF (ref 0–2)
RBC #/AREA URNS AUTO: 1 /HPF (ref 0–2)
RBC #/AREA URNS AUTO: 2 /HPF (ref 0–2)
SODIUM SERPL-SCNC: 134 MMOL/L (ref 133–144)
SODIUM SERPL-SCNC: 136 MMOL/L (ref 133–144)
SODIUM SERPL-SCNC: 136 MMOL/L (ref 133–144)
SODIUM SERPL-SCNC: 137 MMOL/L (ref 133–144)
SODIUM SERPL-SCNC: 137 MMOL/L (ref 133–144)
SODIUM SERPL-SCNC: 138 MMOL/L (ref 133–144)
SODIUM SERPL-SCNC: 138 MMOL/L (ref 133–144)
SODIUM SERPL-SCNC: 139 MMOL/L (ref 133–144)
SODIUM SERPL-SCNC: 140 MMOL/L (ref 133–144)
SODIUM SERPL-SCNC: 140 MMOL/L (ref 133–144)
SODIUM SERPL-SCNC: 142 MMOL/L (ref 133–144)
SODIUM SERPL-SCNC: 142 MMOL/L (ref 133–144)
SODIUM SERPL-SCNC: 143 MMOL/L (ref 133–144)
SODIUM SERPL-SCNC: 146 MMOL/L (ref 133–144)
SODIUM SERPL-SCNC: 146 MMOL/L (ref 133–144)
SOURCE: ABNORMAL
SP GR UR STRIP: 1.02 (ref 1–1.03)
SP GR UR STRIP: 1.02 (ref 1–1.03)
SP GR UR STRIP: 1.03 (ref 1–1.03)
SPECIMEN SOURCE: NORMAL
SQUAMOUS #/AREA URNS AUTO: 13 /HPF (ref 0–1)
SQUAMOUS #/AREA URNS AUTO: 5 /HPF (ref 0–1)
SQUAMOUS #/AREA URNS AUTO: 8 /HPF (ref 0–1)
TRANS CELLS #/AREA URNS HPF: <1 /HPF (ref 0–1)
TROPONIN I SERPL-MCNC: <0.015 UG/L (ref 0–0.04)
UPPER GI ENDOSCOPY: NORMAL
UROBILINOGEN UR STRIP-MCNC: 4 MG/DL (ref 0–2)
UROBILINOGEN UR STRIP-MCNC: 4 MG/DL (ref 0–2)
UROBILINOGEN UR STRIP-MCNC: 8 MG/DL (ref 0–2)
WBC # BLD AUTO: 2 10E9/L (ref 4–11)
WBC # BLD AUTO: 2.2 10E9/L (ref 4–11)
WBC # BLD AUTO: 3 10E9/L (ref 4–11)
WBC # BLD AUTO: 3.2 10E9/L (ref 4–11)
WBC # BLD AUTO: 3.4 10E9/L (ref 4–11)
WBC # BLD AUTO: 3.7 10E9/L (ref 4–11)
WBC # BLD AUTO: 3.9 10E9/L (ref 4–11)
WBC # BLD AUTO: 4 10E9/L (ref 4–11)
WBC # BLD AUTO: 4.2 10E9/L (ref 4–11)
WBC # BLD AUTO: 4.5 10E9/L (ref 4–11)
WBC # BLD AUTO: 4.8 10E9/L (ref 4–11)
WBC # BLD AUTO: 5.1 10E9/L (ref 4–11)
WBC # BLD AUTO: 5.1 10E9/L (ref 4–11)
WBC # BLD AUTO: 5.5 10E9/L (ref 4–11)
WBC #/AREA URNS AUTO: 13 /HPF (ref 0–2)
WBC #/AREA URNS AUTO: 3 /HPF (ref 0–5)
WBC #/AREA URNS AUTO: 7 /HPF (ref 0–2)

## 2018-01-01 PROCEDURE — 25000125 ZZHC RX 250: Performed by: NURSE ANESTHETIST, CERTIFIED REGISTERED

## 2018-01-01 PROCEDURE — 00000146 ZZHCL STATISTIC GLUCOSE BY METER IP

## 2018-01-01 PROCEDURE — 99223 1ST HOSP IP/OBS HIGH 75: CPT | Mod: AI | Performed by: INTERNAL MEDICINE

## 2018-01-01 PROCEDURE — 25000125 ZZHC RX 250: Performed by: INTERNAL MEDICINE

## 2018-01-01 PROCEDURE — 12000001 ZZH R&B MED SURG/OB UMMC

## 2018-01-01 PROCEDURE — 80053 COMPREHEN METABOLIC PANEL: CPT | Performed by: FAMILY MEDICINE

## 2018-01-01 PROCEDURE — 99232 SBSQ HOSP IP/OBS MODERATE 35: CPT | Performed by: INTERNAL MEDICINE

## 2018-01-01 PROCEDURE — C1769 GUIDE WIRE: HCPCS | Performed by: INTERNAL MEDICINE

## 2018-01-01 PROCEDURE — 96365 THER/PROPH/DIAG IV INF INIT: CPT

## 2018-01-01 PROCEDURE — 25800025 ZZH RX 258: Performed by: INTERNAL MEDICINE

## 2018-01-01 PROCEDURE — C9399 UNCLASSIFIED DRUGS OR BIOLOG: HCPCS | Performed by: NURSE ANESTHETIST, CERTIFIED REGISTERED

## 2018-01-01 PROCEDURE — 40000986 XR ABDOMEN PORT 1 VW

## 2018-01-01 PROCEDURE — 40000277 XR SURGERY CARM FLUORO LESS THAN 5 MIN W STILLS: Mod: TC

## 2018-01-01 PROCEDURE — 96367 TX/PROPH/DG ADDL SEQ IV INF: CPT | Performed by: FAMILY MEDICINE

## 2018-01-01 PROCEDURE — 85610 PROTHROMBIN TIME: CPT | Performed by: INTERNAL MEDICINE

## 2018-01-01 PROCEDURE — 20000002 ZZH R&B BMT INTERMEDIATE

## 2018-01-01 PROCEDURE — 25000132 ZZH RX MED GY IP 250 OP 250 PS 637: Performed by: INTERNAL MEDICINE

## 2018-01-01 PROCEDURE — 25000128 H RX IP 250 OP 636: Performed by: INTERNAL MEDICINE

## 2018-01-01 PROCEDURE — 85025 COMPLETE CBC W/AUTO DIFF WBC: CPT | Performed by: PHYSICIAN ASSISTANT

## 2018-01-01 PROCEDURE — 80053 COMPREHEN METABOLIC PANEL: CPT | Performed by: INTERNAL MEDICINE

## 2018-01-01 PROCEDURE — 87338 HPYLORI STOOL AG IA: CPT | Performed by: NURSE PRACTITIONER

## 2018-01-01 PROCEDURE — 25000128 H RX IP 250 OP 636: Performed by: ANESTHESIOLOGY

## 2018-01-01 PROCEDURE — 74245 XR UPPER GI W SMALL BOWEL FOLLOW THROUGH: CPT

## 2018-01-01 PROCEDURE — 25000132 ZZH RX MED GY IP 250 OP 250 PS 637: Performed by: PHYSICIAN ASSISTANT

## 2018-01-01 PROCEDURE — 99215 OFFICE O/P EST HI 40 MIN: CPT | Mod: ZP | Performed by: NURSE PRACTITIONER

## 2018-01-01 PROCEDURE — 85027 COMPLETE CBC AUTOMATED: CPT | Performed by: PHYSICIAN ASSISTANT

## 2018-01-01 PROCEDURE — 80053 COMPREHEN METABOLIC PANEL: CPT | Performed by: PHYSICIAN ASSISTANT

## 2018-01-01 PROCEDURE — 85610 PROTHROMBIN TIME: CPT | Performed by: FAMILY MEDICINE

## 2018-01-01 PROCEDURE — 25000128 H RX IP 250 OP 636: Mod: ZF | Performed by: NURSE PRACTITIONER

## 2018-01-01 PROCEDURE — 80048 BASIC METABOLIC PNL TOTAL CA: CPT | Performed by: PHYSICIAN ASSISTANT

## 2018-01-01 PROCEDURE — 25000128 H RX IP 250 OP 636: Performed by: FAMILY MEDICINE

## 2018-01-01 PROCEDURE — 99239 HOSP IP/OBS DSCHRG MGMT >30: CPT | Performed by: INTERNAL MEDICINE

## 2018-01-01 PROCEDURE — 74176 CT ABD & PELVIS W/O CONTRAST: CPT

## 2018-01-01 PROCEDURE — 36000057 ZZH SURGERY LEVEL 3 1ST 30 MIN - UMMC: Performed by: INTERNAL MEDICINE

## 2018-01-01 PROCEDURE — C1726 CATH, BAL DIL, NON-VASCULAR: HCPCS | Performed by: INTERNAL MEDICINE

## 2018-01-01 PROCEDURE — 96375 TX/PRO/DX INJ NEW DRUG ADDON: CPT | Performed by: FAMILY MEDICINE

## 2018-01-01 PROCEDURE — 36000059 ZZH SURGERY LEVEL 3 EA 15 ADDTL MIN UMMC: Performed by: INTERNAL MEDICINE

## 2018-01-01 PROCEDURE — 85025 COMPLETE CBC W/AUTO DIFF WBC: CPT | Performed by: INTERNAL MEDICINE

## 2018-01-01 PROCEDURE — 71000015 ZZH RECOVERY PHASE 1 LEVEL 2 EA ADDTL HR: Performed by: INTERNAL MEDICINE

## 2018-01-01 PROCEDURE — 97165 OT EVAL LOW COMPLEX 30 MIN: CPT | Mod: GO

## 2018-01-01 PROCEDURE — 85025 COMPLETE CBC W/AUTO DIFF WBC: CPT | Performed by: FAMILY MEDICINE

## 2018-01-01 PROCEDURE — 85025 COMPLETE CBC W/AUTO DIFF WBC: CPT | Performed by: NURSE PRACTITIONER

## 2018-01-01 PROCEDURE — 71000013 ZZH RECOVERY PHASE 1 LEVEL 1 EA ADDTL HR: Performed by: INTERNAL MEDICINE

## 2018-01-01 PROCEDURE — 37000009 ZZH ANESTHESIA TECHNICAL FEE, EACH ADDTL 15 MIN: Performed by: INTERNAL MEDICINE

## 2018-01-01 PROCEDURE — 96415 CHEMO IV INFUSION ADDL HR: CPT

## 2018-01-01 PROCEDURE — 99215 OFFICE O/P EST HI 40 MIN: CPT | Mod: ZP | Performed by: INTERNAL MEDICINE

## 2018-01-01 PROCEDURE — 99285 EMERGENCY DEPT VISIT HI MDM: CPT | Mod: 25 | Performed by: FAMILY MEDICINE

## 2018-01-01 PROCEDURE — 25000128 H RX IP 250 OP 636: Performed by: PHYSICIAN ASSISTANT

## 2018-01-01 PROCEDURE — 99214 OFFICE O/P EST MOD 30 MIN: CPT | Mod: ZP | Performed by: PHYSICIAN ASSISTANT

## 2018-01-01 PROCEDURE — 40000170 ZZH STATISTIC PRE-PROCEDURE ASSESSMENT II: Performed by: INTERNAL MEDICINE

## 2018-01-01 PROCEDURE — 96361 HYDRATE IV INFUSION ADD-ON: CPT

## 2018-01-01 PROCEDURE — 36000055 ZZH SURGERY LEVEL 2 W FLUORO 1ST 30 MIN - UMMC: Performed by: INTERNAL MEDICINE

## 2018-01-01 PROCEDURE — 00000159 ZZHCL STATISTIC H-SEND OUTS PREP: Performed by: INTERNAL MEDICINE

## 2018-01-01 PROCEDURE — 96366 THER/PROPH/DIAG IV INF ADDON: CPT

## 2018-01-01 PROCEDURE — 87086 URINE CULTURE/COLONY COUNT: CPT | Performed by: PHYSICIAN ASSISTANT

## 2018-01-01 PROCEDURE — G0463 HOSPITAL OUTPT CLINIC VISIT: HCPCS | Mod: ZF

## 2018-01-01 PROCEDURE — 84484 ASSAY OF TROPONIN QUANT: CPT | Performed by: FAMILY MEDICINE

## 2018-01-01 PROCEDURE — 74019 RADEX ABDOMEN 2 VIEWS: CPT

## 2018-01-01 PROCEDURE — 25000132 ZZH RX MED GY IP 250 OP 250 PS 637: Performed by: FAMILY MEDICINE

## 2018-01-01 PROCEDURE — 27210794 ZZH OR GENERAL SUPPLY STERILE: Performed by: INTERNAL MEDICINE

## 2018-01-01 PROCEDURE — 80048 BASIC METABOLIC PNL TOTAL CA: CPT | Performed by: INTERNAL MEDICINE

## 2018-01-01 PROCEDURE — 37000008 ZZH ANESTHESIA TECHNICAL FEE, 1ST 30 MIN: Performed by: INTERNAL MEDICINE

## 2018-01-01 PROCEDURE — 36592 COLLECT BLOOD FROM PICC: CPT | Performed by: INTERNAL MEDICINE

## 2018-01-01 PROCEDURE — 40000133 ZZH STATISTIC OT WARD VISIT

## 2018-01-01 PROCEDURE — C1876 STENT, NON-COA/NON-COV W/DEL: HCPCS | Performed by: INTERNAL MEDICINE

## 2018-01-01 PROCEDURE — 83605 ASSAY OF LACTIC ACID: CPT | Performed by: FAMILY MEDICINE

## 2018-01-01 PROCEDURE — 97530 THERAPEUTIC ACTIVITIES: CPT | Mod: GO

## 2018-01-01 PROCEDURE — 25000131 ZZH RX MED GY IP 250 OP 636 PS 637: Performed by: INTERNAL MEDICINE

## 2018-01-01 PROCEDURE — 36591 DRAW BLOOD OFF VENOUS DEVICE: CPT

## 2018-01-01 PROCEDURE — 25000128 H RX IP 250 OP 636: Mod: ZF | Performed by: PHYSICIAN ASSISTANT

## 2018-01-01 PROCEDURE — 88305 TISSUE EXAM BY PATHOLOGIST: CPT | Performed by: INTERNAL MEDICINE

## 2018-01-01 PROCEDURE — 80053 COMPREHEN METABOLIC PANEL: CPT | Performed by: NURSE PRACTITIONER

## 2018-01-01 PROCEDURE — G0463 HOSPITAL OUTPT CLINIC VISIT: HCPCS | Mod: 25

## 2018-01-01 PROCEDURE — 83690 ASSAY OF LIPASE: CPT | Performed by: FAMILY MEDICINE

## 2018-01-01 PROCEDURE — 25000132 ZZH RX MED GY IP 250 OP 250 PS 637: Mod: ZF | Performed by: INTERNAL MEDICINE

## 2018-01-01 PROCEDURE — 25000128 H RX IP 250 OP 636: Mod: ZF | Performed by: INTERNAL MEDICINE

## 2018-01-01 PROCEDURE — 84100 ASSAY OF PHOSPHORUS: CPT | Performed by: PHYSICIAN ASSISTANT

## 2018-01-01 PROCEDURE — 25000125 ZZHC RX 250: Performed by: FAMILY MEDICINE

## 2018-01-01 PROCEDURE — 81001 URINALYSIS AUTO W/SCOPE: CPT | Performed by: FAMILY MEDICINE

## 2018-01-01 PROCEDURE — 25000566 ZZH SEVOFLURANE, EA 15 MIN: Performed by: INTERNAL MEDICINE

## 2018-01-01 PROCEDURE — 96375 TX/PRO/DX INJ NEW DRUG ADDON: CPT

## 2018-01-01 PROCEDURE — 99214 OFFICE O/P EST MOD 30 MIN: CPT | Mod: ZP | Performed by: NURSE PRACTITIONER

## 2018-01-01 PROCEDURE — 83036 HEMOGLOBIN GLYCOSYLATED A1C: CPT | Performed by: PHYSICIAN ASSISTANT

## 2018-01-01 PROCEDURE — 71000014 ZZH RECOVERY PHASE 1 LEVEL 2 FIRST HR: Performed by: INTERNAL MEDICINE

## 2018-01-01 PROCEDURE — 84100 ASSAY OF PHOSPHORUS: CPT | Performed by: FAMILY MEDICINE

## 2018-01-01 PROCEDURE — 0D798DZ DILATION OF DUODENUM WITH INTRALUMINAL DEVICE, VIA NATURAL OR ARTIFICIAL OPENING ENDOSCOPIC: ICD-10-PCS | Performed by: INTERNAL MEDICINE

## 2018-01-01 PROCEDURE — 85027 COMPLETE CBC AUTOMATED: CPT | Performed by: INTERNAL MEDICINE

## 2018-01-01 PROCEDURE — 12000008 ZZH R&B INTERMEDIATE UMMC

## 2018-01-01 PROCEDURE — 96413 CHEMO IV INFUSION 1 HR: CPT

## 2018-01-01 PROCEDURE — 83735 ASSAY OF MAGNESIUM: CPT | Performed by: INTERNAL MEDICINE

## 2018-01-01 PROCEDURE — 99233 SBSQ HOSP IP/OBS HIGH 50: CPT | Performed by: INTERNAL MEDICINE

## 2018-01-01 PROCEDURE — 81001 URINALYSIS AUTO W/SCOPE: CPT | Performed by: NURSE PRACTITIONER

## 2018-01-01 PROCEDURE — 81445 SO NEO GSAP 5-50DNA/DNA&RNA: CPT | Performed by: INTERNAL MEDICINE

## 2018-01-01 PROCEDURE — 96361 HYDRATE IV INFUSION ADD-ON: CPT | Performed by: FAMILY MEDICINE

## 2018-01-01 PROCEDURE — 83735 ASSAY OF MAGNESIUM: CPT | Performed by: PHYSICIAN ASSISTANT

## 2018-01-01 PROCEDURE — 93005 ELECTROCARDIOGRAM TRACING: CPT | Performed by: FAMILY MEDICINE

## 2018-01-01 PROCEDURE — 81001 URINALYSIS AUTO W/SCOPE: CPT | Performed by: INTERNAL MEDICINE

## 2018-01-01 PROCEDURE — 96366 THER/PROPH/DIAG IV INF ADDON: CPT | Performed by: FAMILY MEDICINE

## 2018-01-01 PROCEDURE — 84100 ASSAY OF PHOSPHORUS: CPT | Performed by: INTERNAL MEDICINE

## 2018-01-01 PROCEDURE — 25000128 H RX IP 250 OP 636: Performed by: NURSE ANESTHETIST, CERTIFIED REGISTERED

## 2018-01-01 PROCEDURE — 25000128 H RX IP 250 OP 636

## 2018-01-01 PROCEDURE — 36000053 ZZH SURGERY LEVEL 2 EA 15 ADDTL MIN - UMMC: Performed by: INTERNAL MEDICINE

## 2018-01-01 PROCEDURE — 25500064 ZZH RX 255 OP 636: Performed by: INTERNAL MEDICINE

## 2018-01-01 PROCEDURE — 85730 THROMBOPLASTIN TIME PARTIAL: CPT | Performed by: FAMILY MEDICINE

## 2018-01-01 PROCEDURE — 25500045 ZZH RX 255: Performed by: STUDENT IN AN ORGANIZED HEALTH CARE EDUCATION/TRAINING PROGRAM

## 2018-01-01 PROCEDURE — 83735 ASSAY OF MAGNESIUM: CPT | Performed by: NURSE PRACTITIONER

## 2018-01-01 PROCEDURE — 83735 ASSAY OF MAGNESIUM: CPT | Performed by: FAMILY MEDICINE

## 2018-01-01 PROCEDURE — 99285 EMERGENCY DEPT VISIT HI MDM: CPT | Mod: Z6 | Performed by: FAMILY MEDICINE

## 2018-01-01 PROCEDURE — 83036 HEMOGLOBIN GLYCOSYLATED A1C: CPT | Performed by: INTERNAL MEDICINE

## 2018-01-01 PROCEDURE — 40000280 XR SURGERY CARM FLUORO GREATER THAN 5 MIN: Mod: TC

## 2018-01-01 PROCEDURE — 71000012 ZZH RECOVERY PHASE 1 LEVEL 1 FIRST HR: Performed by: INTERNAL MEDICINE

## 2018-01-01 PROCEDURE — 0DB98ZX EXCISION OF DUODENUM, VIA NATURAL OR ARTIFICIAL OPENING ENDOSCOPIC, DIAGNOSTIC: ICD-10-PCS | Performed by: INTERNAL MEDICINE

## 2018-01-01 PROCEDURE — 36592 COLLECT BLOOD FROM PICC: CPT | Performed by: PHYSICIAN ASSISTANT

## 2018-01-01 PROCEDURE — 25000131 ZZH RX MED GY IP 250 OP 636 PS 637: Performed by: PHYSICIAN ASSISTANT

## 2018-01-01 PROCEDURE — 96367 TX/PROPH/DG ADDL SEQ IV INF: CPT

## 2018-01-01 PROCEDURE — 86301 IMMUNOASSAY TUMOR CA 19-9: CPT | Performed by: INTERNAL MEDICINE

## 2018-01-01 PROCEDURE — 93010 ELECTROCARDIOGRAM REPORT: CPT | Mod: Z6 | Performed by: FAMILY MEDICINE

## 2018-01-01 PROCEDURE — 87086 URINE CULTURE/COLONY COUNT: CPT | Performed by: NURSE PRACTITIONER

## 2018-01-01 PROCEDURE — 96365 THER/PROPH/DIAG IV INF INIT: CPT | Performed by: FAMILY MEDICINE

## 2018-01-01 PROCEDURE — 96374 THER/PROPH/DIAG INJ IV PUSH: CPT | Performed by: FAMILY MEDICINE

## 2018-01-01 PROCEDURE — 96416 CHEMO PROLONG INFUSE W/PUMP: CPT

## 2018-01-01 DEVICE — IMPLANTABLE DEVICE: Type: IMPLANTABLE DEVICE | Site: DUODENUM | Status: FUNCTIONAL

## 2018-01-01 DEVICE — STENT DUODENAL EVOLUTION 10CM25X30MM EVO-25-30-10-C: Type: IMPLANTABLE DEVICE | Site: DUODENUM | Status: FUNCTIONAL

## 2018-01-01 RX ORDER — NALOXONE HYDROCHLORIDE 0.4 MG/ML
.1-.4 INJECTION, SOLUTION INTRAMUSCULAR; INTRAVENOUS; SUBCUTANEOUS
Status: DISCONTINUED | OUTPATIENT
Start: 2018-01-01 | End: 2018-01-01

## 2018-01-01 RX ORDER — LORATADINE 10 MG/1
10 TABLET ORAL DAILY
Status: DISCONTINUED | OUTPATIENT
Start: 2018-01-01 | End: 2018-01-01 | Stop reason: HOSPADM

## 2018-01-01 RX ORDER — PALONOSETRON 0.05 MG/ML
0.25 INJECTION, SOLUTION INTRAVENOUS ONCE
Status: CANCELLED
Start: 2018-01-01 | End: 2018-01-01

## 2018-01-01 RX ORDER — SODIUM CHLORIDE 9 MG/ML
INJECTION, SOLUTION INTRAVENOUS CONTINUOUS
Status: DISCONTINUED | OUTPATIENT
Start: 2018-01-01 | End: 2018-01-01

## 2018-01-01 RX ORDER — HEPARIN SODIUM (PORCINE) LOCK FLUSH IV SOLN 100 UNIT/ML 100 UNIT/ML
5 SOLUTION INTRAVENOUS EVERY 8 HOURS PRN
Status: DISCONTINUED | OUTPATIENT
Start: 2018-01-01 | End: 2018-01-01 | Stop reason: HOSPADM

## 2018-01-01 RX ORDER — MORPHINE SULFATE 30 MG/1
30 TABLET, FILM COATED, EXTENDED RELEASE ORAL EVERY 12 HOURS
Status: DISCONTINUED | OUTPATIENT
Start: 2018-01-01 | End: 2018-01-01 | Stop reason: HOSPADM

## 2018-01-01 RX ORDER — MEPERIDINE HYDROCHLORIDE 25 MG/ML
25 INJECTION INTRAMUSCULAR; INTRAVENOUS; SUBCUTANEOUS EVERY 30 MIN PRN
Status: CANCELLED | OUTPATIENT
Start: 2018-01-01

## 2018-01-01 RX ORDER — LORAZEPAM 0.5 MG/1
.5-1 TABLET ORAL EVERY 6 HOURS PRN
Status: DISCONTINUED | OUTPATIENT
Start: 2018-01-01 | End: 2018-01-01 | Stop reason: HOSPADM

## 2018-01-01 RX ORDER — HEPARIN SODIUM (PORCINE) LOCK FLUSH IV SOLN 100 UNIT/ML 100 UNIT/ML
5 SOLUTION INTRAVENOUS
Status: DISCONTINUED | OUTPATIENT
Start: 2018-01-01 | End: 2018-01-01 | Stop reason: HOSPADM

## 2018-01-01 RX ORDER — METHYLPHENIDATE HYDROCHLORIDE 5 MG/1
5 TABLET ORAL 2 TIMES DAILY
Status: DISCONTINUED | OUTPATIENT
Start: 2018-01-01 | End: 2018-01-01 | Stop reason: HOSPADM

## 2018-01-01 RX ORDER — POTASSIUM CHLORIDE 7.45 MG/ML
10 INJECTION INTRAVENOUS
Status: DISCONTINUED | OUTPATIENT
Start: 2018-01-01 | End: 2018-01-01 | Stop reason: HOSPADM

## 2018-01-01 RX ORDER — SODIUM CHLORIDE 9 MG/ML
1000 INJECTION, SOLUTION INTRAVENOUS CONTINUOUS PRN
Status: CANCELLED
Start: 2018-01-01

## 2018-01-01 RX ORDER — HEPARIN SODIUM (PORCINE) LOCK FLUSH IV SOLN 100 UNIT/ML 100 UNIT/ML
5 SOLUTION INTRAVENOUS EVERY 8 HOURS
Status: DISCONTINUED | OUTPATIENT
Start: 2018-01-01 | End: 2018-01-01 | Stop reason: HOSPADM

## 2018-01-01 RX ORDER — MAGNESIUM SULFATE HEPTAHYDRATE 40 MG/ML
4 INJECTION, SOLUTION INTRAVENOUS EVERY 4 HOURS PRN
Status: DISCONTINUED | OUTPATIENT
Start: 2018-01-01 | End: 2018-01-01 | Stop reason: HOSPADM

## 2018-01-01 RX ORDER — AMOXICILLIN 250 MG
2 CAPSULE ORAL 2 TIMES DAILY PRN
Qty: 100 TABLET | Refills: 0 | Status: ON HOLD | OUTPATIENT
Start: 2018-01-01 | End: 2018-01-01

## 2018-01-01 RX ORDER — FENTANYL CITRATE 50 UG/ML
INJECTION, SOLUTION INTRAMUSCULAR; INTRAVENOUS PRN
Status: DISCONTINUED | OUTPATIENT
Start: 2018-01-01 | End: 2018-01-01

## 2018-01-01 RX ORDER — ONDANSETRON 8 MG/1
8 TABLET, FILM COATED ORAL EVERY 8 HOURS PRN
Status: DISCONTINUED | OUTPATIENT
Start: 2018-01-01 | End: 2018-01-01 | Stop reason: HOSPADM

## 2018-01-01 RX ORDER — POLYETHYLENE GLYCOL 3350 17 G/17G
POWDER, FOR SOLUTION ORAL
Qty: 255 G | Refills: 5 | OUTPATIENT
Start: 2018-01-01

## 2018-01-01 RX ORDER — PROPOFOL 10 MG/ML
INJECTION, EMULSION INTRAVENOUS PRN
Status: DISCONTINUED | OUTPATIENT
Start: 2018-01-01 | End: 2018-01-01

## 2018-01-01 RX ORDER — SODIUM CHLORIDE, SODIUM LACTATE, POTASSIUM CHLORIDE, CALCIUM CHLORIDE 600; 310; 30; 20 MG/100ML; MG/100ML; MG/100ML; MG/100ML
INJECTION, SOLUTION INTRAVENOUS CONTINUOUS PRN
Status: DISCONTINUED | OUTPATIENT
Start: 2018-01-01 | End: 2018-01-01

## 2018-01-01 RX ORDER — MORPHINE SULFATE 15 MG/1
15 TABLET ORAL EVERY 4 HOURS PRN
Status: DISCONTINUED | OUTPATIENT
Start: 2018-01-01 | End: 2018-01-01 | Stop reason: HOSPADM

## 2018-01-01 RX ORDER — FLUMAZENIL 0.1 MG/ML
0.2 INJECTION, SOLUTION INTRAVENOUS
Status: ACTIVE | OUTPATIENT
Start: 2018-01-01 | End: 2018-01-01

## 2018-01-01 RX ORDER — ONDANSETRON 2 MG/ML
INJECTION INTRAMUSCULAR; INTRAVENOUS PRN
Status: DISCONTINUED | OUTPATIENT
Start: 2018-01-01 | End: 2018-01-01

## 2018-01-01 RX ORDER — GADOBUTROL 604.72 MG/ML
7.5 INJECTION INTRAVENOUS ONCE
Status: COMPLETED | OUTPATIENT
Start: 2018-01-01 | End: 2018-01-01

## 2018-01-01 RX ORDER — ONDANSETRON 4 MG/1
4 TABLET, ORALLY DISINTEGRATING ORAL EVERY 30 MIN PRN
Status: DISCONTINUED | OUTPATIENT
Start: 2018-01-01 | End: 2018-01-01 | Stop reason: HOSPADM

## 2018-01-01 RX ORDER — MORPHINE SULFATE 15 MG/1
15 TABLET, FILM COATED, EXTENDED RELEASE ORAL EVERY 12 HOURS SCHEDULED
Status: DISCONTINUED | OUTPATIENT
Start: 2018-01-01 | End: 2018-01-01 | Stop reason: HOSPADM

## 2018-01-01 RX ORDER — DOCUSATE SODIUM 100 MG/1
100 CAPSULE, LIQUID FILLED ORAL 2 TIMES DAILY
Status: DISCONTINUED | OUTPATIENT
Start: 2018-01-01 | End: 2018-01-01 | Stop reason: HOSPADM

## 2018-01-01 RX ORDER — NALOXONE HYDROCHLORIDE 0.4 MG/ML
.1-.4 INJECTION, SOLUTION INTRAMUSCULAR; INTRAVENOUS; SUBCUTANEOUS
Status: ACTIVE | OUTPATIENT
Start: 2018-01-01 | End: 2018-01-01

## 2018-01-01 RX ORDER — ACETAMINOPHEN 325 MG/1
650 TABLET ORAL EVERY 4 HOURS PRN
Status: DISCONTINUED | OUTPATIENT
Start: 2018-01-01 | End: 2018-01-01 | Stop reason: HOSPADM

## 2018-01-01 RX ORDER — MORPHINE SULFATE 2 MG/ML
2-4 INJECTION, SOLUTION INTRAMUSCULAR; INTRAVENOUS
Status: DISCONTINUED | OUTPATIENT
Start: 2018-01-01 | End: 2018-01-01

## 2018-01-01 RX ORDER — PROCHLORPERAZINE MALEATE 10 MG
TABLET ORAL
Qty: 60 TABLET | Refills: 3 | Status: SHIPPED | OUTPATIENT
Start: 2018-01-01

## 2018-01-01 RX ORDER — POLYETHYLENE GLYCOL 3350 17 G/17G
17 POWDER, FOR SOLUTION ORAL DAILY PRN
Status: DISCONTINUED | OUTPATIENT
Start: 2018-01-01 | End: 2018-01-01

## 2018-01-01 RX ORDER — LIDOCAINE 40 MG/G
CREAM TOPICAL
Status: DISCONTINUED | OUTPATIENT
Start: 2018-01-01 | End: 2018-01-01 | Stop reason: HOSPADM

## 2018-01-01 RX ORDER — POTASSIUM CHLORIDE 29.8 MG/ML
20 INJECTION INTRAVENOUS ONCE
Status: COMPLETED | OUTPATIENT
Start: 2018-01-01 | End: 2018-01-01

## 2018-01-01 RX ORDER — NALOXONE HYDROCHLORIDE 0.4 MG/ML
.1-.4 INJECTION, SOLUTION INTRAMUSCULAR; INTRAVENOUS; SUBCUTANEOUS
Status: DISCONTINUED | OUTPATIENT
Start: 2018-01-01 | End: 2018-01-01 | Stop reason: HOSPADM

## 2018-01-01 RX ORDER — POTASSIUM CHLORIDE 1.5 G/1.58G
20-40 POWDER, FOR SOLUTION ORAL
Status: DISCONTINUED | OUTPATIENT
Start: 2018-01-01 | End: 2018-01-01 | Stop reason: HOSPADM

## 2018-01-01 RX ORDER — AMOXICILLIN 250 MG
2 CAPSULE ORAL 2 TIMES DAILY PRN
Qty: 100 TABLET | Refills: 0 | Status: SHIPPED | OUTPATIENT
Start: 2018-01-01

## 2018-01-01 RX ORDER — MORPHINE SULFATE 2 MG/ML
INJECTION, SOLUTION INTRAMUSCULAR; INTRAVENOUS PRN
Status: DISCONTINUED | OUTPATIENT
Start: 2018-01-01 | End: 2018-01-01

## 2018-01-01 RX ORDER — BISACODYL 10 MG
10 SUPPOSITORY, RECTAL RECTAL DAILY PRN
Status: DISCONTINUED | OUTPATIENT
Start: 2018-01-01 | End: 2018-01-01 | Stop reason: HOSPADM

## 2018-01-01 RX ORDER — POLYETHYLENE GLYCOL 3350 17 G/17G
1 POWDER, FOR SOLUTION ORAL DAILY
Qty: 500 G | Refills: 11 | Status: SHIPPED | OUTPATIENT
Start: 2018-01-01

## 2018-01-01 RX ORDER — ESMOLOL HYDROCHLORIDE 10 MG/ML
INJECTION INTRAVENOUS PRN
Status: DISCONTINUED | OUTPATIENT
Start: 2018-01-01 | End: 2018-01-01

## 2018-01-01 RX ORDER — DEXTROSE MONOHYDRATE, SODIUM CHLORIDE, SODIUM LACTATE, POTASSIUM CHLORIDE, CALCIUM CHLORIDE 5; 600; 310; 179; 20 G/100ML; MG/100ML; MG/100ML; MG/100ML; MG/100ML
INJECTION, SOLUTION INTRAVENOUS CONTINUOUS
Status: DISCONTINUED | OUTPATIENT
Start: 2018-01-01 | End: 2018-01-01 | Stop reason: HOSPADM

## 2018-01-01 RX ORDER — ONDANSETRON 2 MG/ML
4 INJECTION INTRAMUSCULAR; INTRAVENOUS EVERY 6 HOURS PRN
Status: DISCONTINUED | OUTPATIENT
Start: 2018-01-01 | End: 2018-01-01 | Stop reason: HOSPADM

## 2018-01-01 RX ORDER — LIDOCAINE 40 MG/G
CREAM TOPICAL
Status: DISCONTINUED | OUTPATIENT
Start: 2018-01-01 | End: 2018-01-01

## 2018-01-01 RX ORDER — PROCHLORPERAZINE MALEATE 5 MG
10 TABLET ORAL EVERY 6 HOURS PRN
Status: DISCONTINUED | OUTPATIENT
Start: 2018-01-01 | End: 2018-01-01 | Stop reason: HOSPADM

## 2018-01-01 RX ORDER — GADOBUTROL 604.72 MG/ML
7.5 INJECTION INTRAVENOUS ONCE
Status: DISCONTINUED | OUTPATIENT
Start: 2018-01-01 | End: 2018-01-01

## 2018-01-01 RX ORDER — ALBUTEROL SULFATE 0.83 MG/ML
2.5 SOLUTION RESPIRATORY (INHALATION)
Status: CANCELLED | OUTPATIENT
Start: 2018-01-01

## 2018-01-01 RX ORDER — LIDOCAINE HYDROCHLORIDE 20 MG/ML
INJECTION, SOLUTION INFILTRATION; PERINEURAL PRN
Status: DISCONTINUED | OUTPATIENT
Start: 2018-01-01 | End: 2018-01-01

## 2018-01-01 RX ORDER — POLYETHYLENE GLYCOL 3350 17 G/17G
17 POWDER, FOR SOLUTION ORAL ONCE
Status: COMPLETED | OUTPATIENT
Start: 2018-01-01 | End: 2018-01-01

## 2018-01-01 RX ORDER — AMOXICILLIN 250 MG
2 CAPSULE ORAL 2 TIMES DAILY PRN
Status: DISCONTINUED | OUTPATIENT
Start: 2018-01-01 | End: 2018-01-01 | Stop reason: HOSPADM

## 2018-01-01 RX ORDER — IOPAMIDOL 510 MG/ML
INJECTION, SOLUTION INTRAVASCULAR PRN
Status: DISCONTINUED | OUTPATIENT
Start: 2018-01-01 | End: 2018-01-01 | Stop reason: HOSPADM

## 2018-01-01 RX ORDER — LORATADINE 10 MG/1
10 TABLET ORAL DAILY
Qty: 30 TABLET | Refills: 6 | Status: SHIPPED | OUTPATIENT
Start: 2018-01-01

## 2018-01-01 RX ORDER — LORAZEPAM 2 MG/ML
.5-1 INJECTION INTRAMUSCULAR EVERY 6 HOURS PRN
Status: DISCONTINUED | OUTPATIENT
Start: 2018-01-01 | End: 2018-01-01 | Stop reason: HOSPADM

## 2018-01-01 RX ORDER — POTASSIUM CHLORIDE 750 MG/1
20-40 TABLET, EXTENDED RELEASE ORAL
Status: DISCONTINUED | OUTPATIENT
Start: 2018-01-01 | End: 2018-01-01 | Stop reason: HOSPADM

## 2018-01-01 RX ORDER — DRONABINOL 5 MG/1
5 CAPSULE ORAL
Qty: 90 CAPSULE | Refills: 0 | Status: SHIPPED | OUTPATIENT
Start: 2018-01-01 | End: 2018-01-01

## 2018-01-01 RX ORDER — POTASSIUM CHLORIDE 1500 MG/1
20 TABLET, EXTENDED RELEASE ORAL 2 TIMES DAILY
COMMUNITY
Start: 2018-01-01

## 2018-01-01 RX ORDER — HEPARIN SODIUM (PORCINE) LOCK FLUSH IV SOLN 100 UNIT/ML 100 UNIT/ML
5 SOLUTION INTRAVENOUS ONCE
Status: COMPLETED | OUTPATIENT
Start: 2018-01-01 | End: 2018-01-01

## 2018-01-01 RX ORDER — POLYETHYLENE GLYCOL 3350 17 G/17G
17 POWDER, FOR SOLUTION ORAL DAILY
Status: DISCONTINUED | OUTPATIENT
Start: 2018-01-01 | End: 2018-01-01 | Stop reason: HOSPADM

## 2018-01-01 RX ORDER — FENTANYL CITRATE 50 UG/ML
25-50 INJECTION, SOLUTION INTRAMUSCULAR; INTRAVENOUS
Status: DISCONTINUED | OUTPATIENT
Start: 2018-01-01 | End: 2018-01-01 | Stop reason: HOSPADM

## 2018-01-01 RX ORDER — PANTOPRAZOLE SODIUM 20 MG/1
20 TABLET, DELAYED RELEASE ORAL
Status: DISCONTINUED | OUTPATIENT
Start: 2018-01-01 | End: 2018-01-01 | Stop reason: HOSPADM

## 2018-01-01 RX ORDER — METHYLPREDNISOLONE SODIUM SUCCINATE 125 MG/2ML
125 INJECTION, POWDER, LYOPHILIZED, FOR SOLUTION INTRAMUSCULAR; INTRAVENOUS
Status: CANCELLED
Start: 2018-01-01

## 2018-01-01 RX ORDER — LORAZEPAM 2 MG/ML
0.5 INJECTION INTRAMUSCULAR EVERY 4 HOURS PRN
Status: CANCELLED
Start: 2018-01-01

## 2018-01-01 RX ORDER — BISACODYL 10 MG
10 SUPPOSITORY, RECTAL RECTAL DAILY PRN
Qty: 30 SUPPOSITORY | Refills: 0 | COMMUNITY
Start: 2018-01-01 | End: 2018-01-01

## 2018-01-01 RX ORDER — POLYETHYLENE GLYCOL 3350 17 G/17G
17 POWDER, FOR SOLUTION ORAL DAILY PRN
Status: DISCONTINUED | OUTPATIENT
Start: 2018-01-01 | End: 2018-01-01 | Stop reason: HOSPADM

## 2018-01-01 RX ORDER — HEPARIN SODIUM (PORCINE) LOCK FLUSH IV SOLN 100 UNIT/ML 100 UNIT/ML
5 SOLUTION INTRAVENOUS EVERY 8 HOURS
Status: CANCELLED
Start: 2018-01-01

## 2018-01-01 RX ORDER — PROCHLORPERAZINE 25 MG
25 SUPPOSITORY, RECTAL RECTAL EVERY 12 HOURS PRN
Status: DISCONTINUED | OUTPATIENT
Start: 2018-01-01 | End: 2018-01-01 | Stop reason: HOSPADM

## 2018-01-01 RX ORDER — LORAZEPAM 0.5 MG/1
0.5 TABLET ORAL EVERY 4 HOURS PRN
Qty: 30 TABLET | Refills: 3 | Status: SHIPPED | OUTPATIENT
Start: 2018-01-01 | End: 2018-01-01

## 2018-01-01 RX ORDER — POTASSIUM CL/LIDO/0.9 % NACL 10MEQ/0.1L
10 INTRAVENOUS SOLUTION, PIGGYBACK (ML) INTRAVENOUS
Status: COMPLETED | OUTPATIENT
Start: 2018-01-01 | End: 2018-01-01

## 2018-01-01 RX ORDER — MORPHINE SULFATE 30 MG/1
30 TABLET, FILM COATED, EXTENDED RELEASE ORAL EVERY 12 HOURS
Qty: 60 TABLET | Refills: 0 | Status: SHIPPED | OUTPATIENT
Start: 2018-01-01 | End: 2018-01-01

## 2018-01-01 RX ORDER — SODIUM CHLORIDE, SODIUM LACTATE, POTASSIUM CHLORIDE, CALCIUM CHLORIDE 600; 310; 30; 20 MG/100ML; MG/100ML; MG/100ML; MG/100ML
INJECTION, SOLUTION INTRAVENOUS CONTINUOUS
Status: DISCONTINUED | OUTPATIENT
Start: 2018-01-01 | End: 2018-01-01 | Stop reason: HOSPADM

## 2018-01-01 RX ORDER — PROCHLORPERAZINE MALEATE 10 MG
TABLET ORAL
Qty: 30 TABLET | Refills: 2 | Status: SHIPPED | OUTPATIENT
Start: 2018-01-01 | End: 2018-01-01

## 2018-01-01 RX ORDER — METHYLPHENIDATE HYDROCHLORIDE 5 MG/1
5 TABLET ORAL 2 TIMES DAILY
Qty: 60 TABLET | Refills: 0 | Status: SHIPPED | OUTPATIENT
Start: 2018-01-01 | End: 2018-01-01

## 2018-01-01 RX ORDER — MORPHINE SULFATE 2 MG/ML
2-4 INJECTION, SOLUTION INTRAMUSCULAR; INTRAVENOUS EVERY 5 MIN PRN
Status: DISCONTINUED | OUTPATIENT
Start: 2018-01-01 | End: 2018-01-01 | Stop reason: HOSPADM

## 2018-01-01 RX ORDER — BISACODYL 10 MG
10 SUPPOSITORY, RECTAL RECTAL DAILY PRN
Qty: 30 SUPPOSITORY | Refills: 0 | Status: SHIPPED | OUTPATIENT
Start: 2018-01-01

## 2018-01-01 RX ORDER — ALBUTEROL SULFATE 90 UG/1
1-2 AEROSOL, METERED RESPIRATORY (INHALATION)
Status: CANCELLED
Start: 2018-01-01

## 2018-01-01 RX ORDER — NICOTINE POLACRILEX 4 MG
15-30 LOZENGE BUCCAL
Status: DISCONTINUED | OUTPATIENT
Start: 2018-01-01 | End: 2018-01-01 | Stop reason: HOSPADM

## 2018-01-01 RX ORDER — INDOMETHACIN 50 MG/1
100 SUPPOSITORY RECTAL
Status: DISCONTINUED | OUTPATIENT
Start: 2018-01-01 | End: 2018-01-01 | Stop reason: HOSPADM

## 2018-01-01 RX ORDER — OXYCODONE HYDROCHLORIDE 5 MG/1
5-10 TABLET ORAL EVERY 4 HOURS PRN
Status: DISCONTINUED | OUTPATIENT
Start: 2018-01-01 | End: 2018-01-01 | Stop reason: HOSPADM

## 2018-01-01 RX ORDER — PALONOSETRON 0.05 MG/ML
0.25 INJECTION, SOLUTION INTRAVENOUS ONCE
Status: COMPLETED | OUTPATIENT
Start: 2018-01-01 | End: 2018-01-01

## 2018-01-01 RX ORDER — DEXTROSE MONOHYDRATE 25 G/50ML
25-50 INJECTION, SOLUTION INTRAVENOUS
Status: DISCONTINUED | OUTPATIENT
Start: 2018-01-01 | End: 2018-01-01 | Stop reason: HOSPADM

## 2018-01-01 RX ORDER — LORAZEPAM 0.5 MG/1
0.5 TABLET ORAL EVERY 4 HOURS PRN
Qty: 30 TABLET | Refills: 3 | Status: SHIPPED | OUTPATIENT
Start: 2018-01-01

## 2018-01-01 RX ORDER — POTASSIUM CL/LIDO/0.9 % NACL 10MEQ/0.1L
10 INTRAVENOUS SOLUTION, PIGGYBACK (ML) INTRAVENOUS
Status: DISCONTINUED | OUTPATIENT
Start: 2018-01-01 | End: 2018-01-01 | Stop reason: HOSPADM

## 2018-01-01 RX ORDER — ONDANSETRON 8 MG/1
8 TABLET, ORALLY DISINTEGRATING ORAL EVERY 8 HOURS PRN
Status: DISCONTINUED | OUTPATIENT
Start: 2018-01-01 | End: 2018-01-01 | Stop reason: HOSPADM

## 2018-01-01 RX ORDER — DOCUSATE SODIUM 100 MG/1
100 CAPSULE, LIQUID FILLED ORAL DAILY
Qty: 100 CAPSULE | Refills: 1 | Status: SHIPPED | OUTPATIENT
Start: 2018-01-01

## 2018-01-01 RX ORDER — ONDANSETRON 2 MG/ML
4 INJECTION INTRAMUSCULAR; INTRAVENOUS EVERY 30 MIN PRN
Status: DISCONTINUED | OUTPATIENT
Start: 2018-01-01 | End: 2018-01-01 | Stop reason: HOSPADM

## 2018-01-01 RX ORDER — POTASSIUM CHLORIDE 29.8 MG/ML
20 INJECTION INTRAVENOUS
Status: DISCONTINUED | OUTPATIENT
Start: 2018-01-01 | End: 2018-01-01 | Stop reason: HOSPADM

## 2018-01-01 RX ORDER — OXYCODONE HYDROCHLORIDE 5 MG/1
5 TABLET ORAL ONCE
Status: COMPLETED | OUTPATIENT
Start: 2018-01-01 | End: 2018-01-01

## 2018-01-01 RX ORDER — HEPARIN SODIUM,PORCINE 10 UNIT/ML
5-10 VIAL (ML) INTRAVENOUS EVERY 24 HOURS
Status: DISCONTINUED | OUTPATIENT
Start: 2018-01-01 | End: 2018-01-01 | Stop reason: HOSPADM

## 2018-01-01 RX ORDER — ONDANSETRON 2 MG/ML
4 INJECTION INTRAMUSCULAR; INTRAVENOUS EVERY 30 MIN PRN
Status: DISCONTINUED | OUTPATIENT
Start: 2018-01-01 | End: 2018-01-01

## 2018-01-01 RX ORDER — MORPHINE SULFATE 30 MG/1
30 TABLET, FILM COATED, EXTENDED RELEASE ORAL EVERY 12 HOURS
Qty: 60 TABLET | Refills: 0 | Status: SHIPPED | OUTPATIENT
Start: 2018-01-01

## 2018-01-01 RX ORDER — EPINEPHRINE 1 MG/ML
0.3 INJECTION, SOLUTION, CONCENTRATE INTRAVENOUS EVERY 5 MIN PRN
Status: CANCELLED | OUTPATIENT
Start: 2018-01-01

## 2018-01-01 RX ORDER — DRONABINOL 2.5 MG/1
2.5 CAPSULE ORAL
Qty: 60 CAPSULE | Refills: 0 | Status: SHIPPED | OUTPATIENT
Start: 2018-01-01 | End: 2018-01-01

## 2018-01-01 RX ORDER — ACETAMINOPHEN 325 MG/1
650 TABLET ORAL ONCE
Status: COMPLETED | OUTPATIENT
Start: 2018-01-01 | End: 2018-01-01

## 2018-01-01 RX ORDER — DEXAMETHASONE SODIUM PHOSPHATE 4 MG/ML
INJECTION, SOLUTION INTRA-ARTICULAR; INTRALESIONAL; INTRAMUSCULAR; INTRAVENOUS; SOFT TISSUE PRN
Status: DISCONTINUED | OUTPATIENT
Start: 2018-01-01 | End: 2018-01-01

## 2018-01-01 RX ORDER — DIPHENHYDRAMINE HYDROCHLORIDE 50 MG/ML
50 INJECTION INTRAMUSCULAR; INTRAVENOUS
Status: CANCELLED
Start: 2018-01-01

## 2018-01-01 RX ORDER — KETOROLAC TROMETHAMINE 30 MG/ML
30 INJECTION, SOLUTION INTRAMUSCULAR; INTRAVENOUS ONCE
Status: DISCONTINUED | OUTPATIENT
Start: 2018-01-01 | End: 2018-01-01 | Stop reason: CLARIF

## 2018-01-01 RX ORDER — PROCHLORPERAZINE MALEATE 5 MG
5-10 TABLET ORAL EVERY 6 HOURS PRN
Status: DISCONTINUED | OUTPATIENT
Start: 2018-01-01 | End: 2018-01-01 | Stop reason: HOSPADM

## 2018-01-01 RX ORDER — MORPHINE SULFATE 15 MG/1
15 TABLET, FILM COATED, EXTENDED RELEASE ORAL EVERY 12 HOURS
Qty: 60 TABLET | Refills: 0 | Status: SHIPPED | OUTPATIENT
Start: 2018-01-01 | End: 2018-01-01

## 2018-01-01 RX ORDER — AMOXICILLIN 250 MG
1 CAPSULE ORAL 2 TIMES DAILY PRN
Status: DISCONTINUED | OUTPATIENT
Start: 2018-01-01 | End: 2018-01-01 | Stop reason: HOSPADM

## 2018-01-01 RX ORDER — OXYCODONE HYDROCHLORIDE 5 MG/1
5 TABLET ORAL EVERY 4 HOURS PRN
Status: DISCONTINUED | OUTPATIENT
Start: 2018-01-01 | End: 2018-01-01

## 2018-01-01 RX ORDER — ONDANSETRON 8 MG/1
8 TABLET, ORALLY DISINTEGRATING ORAL EVERY 8 HOURS PRN
Qty: 60 TABLET | Refills: 6 | Status: SHIPPED | OUTPATIENT
Start: 2018-01-01

## 2018-01-01 RX ORDER — DRONABINOL 5 MG/1
5 CAPSULE ORAL
Qty: 60 CAPSULE | Refills: 3 | Status: SHIPPED | OUTPATIENT
Start: 2018-01-01

## 2018-01-01 RX ORDER — ONDANSETRON 2 MG/ML
8 INJECTION INTRAMUSCULAR; INTRAVENOUS EVERY 8 HOURS PRN
Status: DISCONTINUED | OUTPATIENT
Start: 2018-01-01 | End: 2018-01-01 | Stop reason: HOSPADM

## 2018-01-01 RX ORDER — ONDANSETRON 2 MG/ML
8 INJECTION INTRAMUSCULAR; INTRAVENOUS ONCE
Status: CANCELLED
Start: 2018-01-01 | End: 2018-01-01

## 2018-01-01 RX ORDER — EPINEPHRINE 0.3 MG/.3ML
0.3 INJECTION SUBCUTANEOUS EVERY 5 MIN PRN
Status: CANCELLED | OUTPATIENT
Start: 2018-01-01

## 2018-01-01 RX ORDER — MORPHINE SULFATE 15 MG/1
15 TABLET, FILM COATED, EXTENDED RELEASE ORAL EVERY 12 HOURS
Status: DISCONTINUED | OUTPATIENT
Start: 2018-01-01 | End: 2018-01-01

## 2018-01-01 RX ORDER — DRONABINOL 5 MG/1
5 CAPSULE ORAL
Status: DISCONTINUED | OUTPATIENT
Start: 2018-01-01 | End: 2018-01-01 | Stop reason: HOSPADM

## 2018-01-01 RX ORDER — OXYCODONE HYDROCHLORIDE 5 MG/1
5 TABLET ORAL EVERY 4 HOURS PRN
Qty: 100 TABLET | Refills: 0 | Status: SHIPPED | OUTPATIENT
Start: 2018-01-01 | End: 2018-01-01

## 2018-01-01 RX ORDER — BISACODYL 10 MG
10 SUPPOSITORY, RECTAL RECTAL DAILY PRN
Qty: 30 SUPPOSITORY | Refills: 3 | Status: SHIPPED | OUTPATIENT
Start: 2018-01-01

## 2018-01-01 RX ORDER — ONDANSETRON 4 MG/1
4 TABLET, ORALLY DISINTEGRATING ORAL EVERY 6 HOURS PRN
Status: DISCONTINUED | OUTPATIENT
Start: 2018-01-01 | End: 2018-01-01 | Stop reason: HOSPADM

## 2018-01-01 RX ORDER — HEPARIN SODIUM,PORCINE 10 UNIT/ML
5-10 VIAL (ML) INTRAVENOUS
Status: DISCONTINUED | OUTPATIENT
Start: 2018-01-01 | End: 2018-01-01 | Stop reason: HOSPADM

## 2018-01-01 RX ORDER — POTASSIUM CHLORIDE 1500 MG/1
40 TABLET, EXTENDED RELEASE ORAL DAILY
Qty: 60 TABLET | Refills: 3 | Status: ON HOLD | OUTPATIENT
Start: 2018-01-01 | End: 2018-01-01

## 2018-01-01 RX ORDER — MORPHINE SULFATE 15 MG/1
15 TABLET ORAL EVERY 4 HOURS PRN
Qty: 100 TABLET | Refills: 0 | Status: SHIPPED | OUTPATIENT
Start: 2018-01-01

## 2018-01-01 RX ORDER — SODIUM CHLORIDE, SODIUM LACTATE, POTASSIUM CHLORIDE, CALCIUM CHLORIDE 600; 310; 30; 20 MG/100ML; MG/100ML; MG/100ML; MG/100ML
INJECTION, SOLUTION INTRAVENOUS
Status: COMPLETED
Start: 2018-01-01 | End: 2018-01-01

## 2018-01-01 RX ADMIN — PROPOFOL 30 MG: 10 INJECTION, EMULSION INTRAVENOUS at 14:11

## 2018-01-01 RX ADMIN — Medication 10 MEQ: at 17:52

## 2018-01-01 RX ADMIN — MORPHINE SULFATE 15 MG: 15 TABLET, EXTENDED RELEASE ORAL at 09:05

## 2018-01-01 RX ADMIN — GADOBUTROL 7.5 ML: 604.72 INJECTION INTRAVENOUS at 07:42

## 2018-01-01 RX ADMIN — POTASSIUM CHLORIDE: 149 INJECTION, SOLUTION, CONCENTRATE INTRAVENOUS at 10:34

## 2018-01-01 RX ADMIN — LORAZEPAM 0.5 MG: 2 INJECTION INTRAMUSCULAR; INTRAVENOUS at 20:33

## 2018-01-01 RX ADMIN — POTASSIUM CHLORIDE: 2 INJECTION, SOLUTION, CONCENTRATE INTRAVENOUS at 19:17

## 2018-01-01 RX ADMIN — FENTANYL CITRATE 50 MCG: 50 INJECTION, SOLUTION INTRAMUSCULAR; INTRAVENOUS at 12:07

## 2018-01-01 RX ADMIN — MORPHINE SULFATE 30 MG: 30 TABLET, EXTENDED RELEASE ORAL at 06:51

## 2018-01-01 RX ADMIN — PANTOPRAZOLE SODIUM 20 MG: 20 TABLET, DELAYED RELEASE ORAL at 15:08

## 2018-01-01 RX ADMIN — SODIUM CHLORIDE, PRESERVATIVE FREE 5 ML: 5 INJECTION INTRAVENOUS at 06:35

## 2018-01-01 RX ADMIN — MORPHINE SULFATE 15 MG: 15 TABLET, EXTENDED RELEASE ORAL at 19:52

## 2018-01-01 RX ADMIN — LORATADINE 10 MG: 10 TABLET ORAL at 09:46

## 2018-01-01 RX ADMIN — PROPOFOL 30 MG: 10 INJECTION, EMULSION INTRAVENOUS at 14:16

## 2018-01-01 RX ADMIN — PROCHLORPERAZINE EDISYLATE 10 MG: 5 INJECTION INTRAMUSCULAR; INTRAVENOUS at 09:06

## 2018-01-01 RX ADMIN — MORPHINE SULFATE 2 MG: 2 INJECTION, SOLUTION INTRAMUSCULAR; INTRAVENOUS at 12:50

## 2018-01-01 RX ADMIN — PANTOPRAZOLE SODIUM 20 MG: 20 TABLET, DELAYED RELEASE ORAL at 18:17

## 2018-01-01 RX ADMIN — ROCURONIUM BROMIDE 20 MG: 10 INJECTION INTRAVENOUS at 11:55

## 2018-01-01 RX ADMIN — PHENYLEPHRINE HYDROCHLORIDE 100 MCG: 10 INJECTION, SOLUTION INTRAMUSCULAR; INTRAVENOUS; SUBCUTANEOUS at 11:42

## 2018-01-01 RX ADMIN — Medication 100 MG: at 11:29

## 2018-01-01 RX ADMIN — SODIUM CHLORIDE, PRESERVATIVE FREE 5 ML: 5 INJECTION INTRAVENOUS at 13:16

## 2018-01-01 RX ADMIN — LORATADINE 10 MG: 10 TABLET ORAL at 09:05

## 2018-01-01 RX ADMIN — Medication 10 MEQ: at 15:20

## 2018-01-01 RX ADMIN — MORPHINE SULFATE 15 MG: 15 TABLET, EXTENDED RELEASE ORAL at 08:43

## 2018-01-01 RX ADMIN — PHENYLEPHRINE HYDROCHLORIDE 100 MCG: 10 INJECTION, SOLUTION INTRAMUSCULAR; INTRAVENOUS; SUBCUTANEOUS at 11:34

## 2018-01-01 RX ADMIN — DEXTROSE MONOHYDRATE, SODIUM CHLORIDE, SODIUM LACTATE, POTASSIUM CHLORIDE, CALCIUM CHLORIDE: 5; 600; 310; 179; 20 INJECTION, SOLUTION INTRAVENOUS at 01:26

## 2018-01-01 RX ADMIN — DEXAMETHASONE SODIUM PHOSPHATE: 10 INJECTION, SOLUTION INTRAMUSCULAR; INTRAVENOUS at 14:12

## 2018-01-01 RX ADMIN — SODIUM CHLORIDE 1000 ML: 9 INJECTION, SOLUTION INTRAVENOUS at 09:38

## 2018-01-01 RX ADMIN — PANCRELIPASE 72000 UNITS: 36000; 180000; 114000 CAPSULE, DELAYED RELEASE PELLETS ORAL at 19:27

## 2018-01-01 RX ADMIN — SODIUM CHLORIDE 1000 ML: 9 INJECTION, SOLUTION INTRAVENOUS at 16:07

## 2018-01-01 RX ADMIN — ESMOLOL HYDROCHLORIDE 10 MG: 10 INJECTION, SOLUTION INTRAVENOUS at 14:20

## 2018-01-01 RX ADMIN — INSULIN GLARGINE 5 UNITS: 100 INJECTION, SOLUTION SUBCUTANEOUS at 20:58

## 2018-01-01 RX ADMIN — SUGAMMADEX 200 MG: 100 INJECTION, SOLUTION INTRAVENOUS at 12:24

## 2018-01-01 RX ADMIN — MORPHINE SULFATE 0.8 MG: 2 INJECTION, SOLUTION INTRAMUSCULAR; INTRAVENOUS at 12:15

## 2018-01-01 RX ADMIN — INSULIN ASPART 2 UNITS: 100 INJECTION, SOLUTION INTRAVENOUS; SUBCUTANEOUS at 03:57

## 2018-01-01 RX ADMIN — POLYETHYLENE GLYCOL 3350 17 G: 17 POWDER, FOR SOLUTION ORAL at 10:21

## 2018-01-01 RX ADMIN — OXYCODONE HYDROCHLORIDE 5 MG: 5 TABLET ORAL at 20:02

## 2018-01-01 RX ADMIN — DEXTROSE MONOHYDRATE, SODIUM CHLORIDE, SODIUM LACTATE, POTASSIUM CHLORIDE, CALCIUM CHLORIDE: 5; 600; 310; 179; 20 INJECTION, SOLUTION INTRAVENOUS at 14:34

## 2018-01-01 RX ADMIN — SIMETHICONE 2 ML: 63.3; 3.7 SOLUTION/ DROPS ORAL at 14:14

## 2018-01-01 RX ADMIN — GLYCERIN 1 SUPPOSITORY: 2.1 SUPPOSITORY RECTAL at 16:28

## 2018-01-01 RX ADMIN — SODIUM CHLORIDE, PRESERVATIVE FREE 5 ML: 5 INJECTION INTRAVENOUS at 12:26

## 2018-01-01 RX ADMIN — PANTOPRAZOLE SODIUM 20 MG: 20 TABLET, DELAYED RELEASE ORAL at 16:28

## 2018-01-01 RX ADMIN — GLYCERIN 1 SUPPOSITORY: 2.1 SUPPOSITORY RECTAL at 19:29

## 2018-01-01 RX ADMIN — SODIUM CHLORIDE: 9 INJECTION, SOLUTION INTRAVENOUS at 21:12

## 2018-01-01 RX ADMIN — POTASSIUM CHLORIDE: 2 INJECTION, SOLUTION, CONCENTRATE INTRAVENOUS at 02:50

## 2018-01-01 RX ADMIN — MORPHINE SULFATE 15 MG: 15 TABLET ORAL at 12:12

## 2018-01-01 RX ADMIN — MORPHINE SULFATE 30 MG: 30 TABLET, EXTENDED RELEASE ORAL at 18:17

## 2018-01-01 RX ADMIN — INSULIN ASPART 2 UNITS: 100 INJECTION, SOLUTION INTRAVENOUS; SUBCUTANEOUS at 04:47

## 2018-01-01 RX ADMIN — PALONOSETRON HYDROCHLORIDE 0.25 MG: 0.25 INJECTION INTRAVENOUS at 14:04

## 2018-01-01 RX ADMIN — POTASSIUM CHLORIDE 20 MEQ: 400 INJECTION, SOLUTION INTRAVENOUS at 19:17

## 2018-01-01 RX ADMIN — PANTOPRAZOLE SODIUM 40 MG: 40 INJECTION, POWDER, FOR SOLUTION INTRAVENOUS at 19:17

## 2018-01-01 RX ADMIN — LORATADINE 10 MG: 10 TABLET ORAL at 08:07

## 2018-01-01 RX ADMIN — DRONABINOL 5 MG: 5 CAPSULE ORAL at 08:19

## 2018-01-01 RX ADMIN — METHYLPHENIDATE HYDROCHLORIDE 5 MG: 5 TABLET ORAL at 09:37

## 2018-01-01 RX ADMIN — MORPHINE SULFATE 15 MG: 15 TABLET, EXTENDED RELEASE ORAL at 20:53

## 2018-01-01 RX ADMIN — PANCRELIPASE 72000 UNITS: 36000; 180000; 114000 CAPSULE, DELAYED RELEASE PELLETS ORAL at 08:43

## 2018-01-01 RX ADMIN — SODIUM CHLORIDE, PRESERVATIVE FREE 5 ML: 5 INJECTION INTRAVENOUS at 16:56

## 2018-01-01 RX ADMIN — MORPHINE SULFATE: 5 INJECTION, SOLUTION INTRAVENOUS at 19:38

## 2018-01-01 RX ADMIN — ONDANSETRON 4 MG: 2 INJECTION INTRAMUSCULAR; INTRAVENOUS at 13:47

## 2018-01-01 RX ADMIN — PANCRELIPASE 72000 UNITS: 36000; 180000; 114000 CAPSULE, DELAYED RELEASE PELLETS ORAL at 12:01

## 2018-01-01 RX ADMIN — SUGAMMADEX 200 MG: 100 INJECTION, SOLUTION INTRAVENOUS at 14:39

## 2018-01-01 RX ADMIN — DEXTROSE MONOHYDRATE 25 ML: 500 INJECTION PARENTERAL at 20:42

## 2018-01-01 RX ADMIN — ALTEPLASE 2 MG: 2.2 INJECTION, POWDER, LYOPHILIZED, FOR SOLUTION INTRAVENOUS at 18:33

## 2018-01-01 RX ADMIN — ATROPINE SULFATE 0.4 MG: 0.4 INJECTION INTRAMUSCULAR; INTRAVENOUS; SUBCUTANEOUS at 14:39

## 2018-01-01 RX ADMIN — DRONABINOL 5 MG: 5 CAPSULE ORAL at 19:04

## 2018-01-01 RX ADMIN — FENTANYL CITRATE 50 MCG: 50 INJECTION, SOLUTION INTRAMUSCULAR; INTRAVENOUS at 14:10

## 2018-01-01 RX ADMIN — SODIUM CHLORIDE, POTASSIUM CHLORIDE, SODIUM LACTATE AND CALCIUM CHLORIDE: 600; 310; 30; 20 INJECTION, SOLUTION INTRAVENOUS at 11:15

## 2018-01-01 RX ADMIN — ONDANSETRON 4 MG: 2 INJECTION INTRAMUSCULAR; INTRAVENOUS at 14:35

## 2018-01-01 RX ADMIN — MORPHINE SULFATE 15 MG: 15 TABLET, EXTENDED RELEASE ORAL at 19:15

## 2018-01-01 RX ADMIN — Medication 10 MEQ: at 16:38

## 2018-01-01 RX ADMIN — POLYETHYLENE GLYCOL 3350 17 G: 17 POWDER, FOR SOLUTION ORAL at 09:05

## 2018-01-01 RX ADMIN — MORPHINE SULFATE 15 MG: 15 TABLET ORAL at 00:04

## 2018-01-01 RX ADMIN — POTASSIUM CHLORIDE 20 MEQ: 400 INJECTION, SOLUTION INTRAVENOUS at 23:35

## 2018-01-01 RX ADMIN — MORPHINE SULFATE 15 MG: 15 TABLET, EXTENDED RELEASE ORAL at 08:07

## 2018-01-01 RX ADMIN — LIDOCAINE HYDROCHLORIDE 100 MG: 20 INJECTION, SOLUTION INFILTRATION; PERINEURAL at 11:29

## 2018-01-01 RX ADMIN — DEXTROSE MONOHYDRATE, SODIUM CHLORIDE, SODIUM LACTATE, POTASSIUM CHLORIDE, CALCIUM CHLORIDE: 5; 600; 310; 179; 20 INJECTION, SOLUTION INTRAVENOUS at 00:04

## 2018-01-01 RX ADMIN — PROCHLORPERAZINE EDISYLATE 10 MG: 5 INJECTION INTRAMUSCULAR; INTRAVENOUS at 03:12

## 2018-01-01 RX ADMIN — ROCURONIUM BROMIDE 30 MG: 10 INJECTION INTRAVENOUS at 13:55

## 2018-01-01 RX ADMIN — SODIUM CHLORIDE, PRESERVATIVE FREE 5 ML: 5 INJECTION INTRAVENOUS at 12:30

## 2018-01-01 RX ADMIN — SODIUM CHLORIDE: 9 INJECTION, SOLUTION INTRAVENOUS at 16:13

## 2018-01-01 RX ADMIN — SODIUM CHLORIDE 1000 ML: 900 INJECTION, SOLUTION INTRAVENOUS at 17:26

## 2018-01-01 RX ADMIN — INSULIN ASPART 1 UNITS: 100 INJECTION, SOLUTION INTRAVENOUS; SUBCUTANEOUS at 00:22

## 2018-01-01 RX ADMIN — SODIUM CHLORIDE, POTASSIUM CHLORIDE, SODIUM LACTATE AND CALCIUM CHLORIDE 1000 ML: 600; 310; 30; 20 INJECTION, SOLUTION INTRAVENOUS at 11:17

## 2018-01-01 RX ADMIN — DEXAMETHASONE SODIUM PHOSPHATE 8 MG: 4 INJECTION, SOLUTION INTRA-ARTICULAR; INTRALESIONAL; INTRAMUSCULAR; INTRAVENOUS; SOFT TISSUE at 11:35

## 2018-01-01 RX ADMIN — POTASSIUM CHLORIDE 20 MEQ: 400 INJECTION, SOLUTION INTRAVENOUS at 09:25

## 2018-01-01 RX ADMIN — SODIUM CHLORIDE, PRESERVATIVE FREE 5 ML: 5 INJECTION INTRAVENOUS at 12:35

## 2018-01-01 RX ADMIN — ONDANSETRON 4 MG: 2 INJECTION INTRAMUSCULAR; INTRAVENOUS at 16:07

## 2018-01-01 RX ADMIN — MORPHINE SULFATE 15 MG: 15 TABLET, EXTENDED RELEASE ORAL at 20:35

## 2018-01-01 RX ADMIN — LORAZEPAM 0.5 MG: 2 INJECTION INTRAMUSCULAR; INTRAVENOUS at 15:30

## 2018-01-01 RX ADMIN — SENNOSIDES AND DOCUSATE SODIUM 2 TABLET: 8.6; 5 TABLET ORAL at 08:07

## 2018-01-01 RX ADMIN — IOPAMIDOL 40 ML: 510 INJECTION, SOLUTION INTRAVASCULAR at 14:14

## 2018-01-01 RX ADMIN — PANCRELIPASE 72000 UNITS: 36000; 180000; 114000 CAPSULE, DELAYED RELEASE PELLETS ORAL at 19:14

## 2018-01-01 RX ADMIN — POTASSIUM CHLORIDE: 2 INJECTION, SOLUTION, CONCENTRATE INTRAVENOUS at 17:55

## 2018-01-01 RX ADMIN — DEXTROSE MONOHYDRATE 25 ML: 500 INJECTION PARENTERAL at 03:07

## 2018-01-01 RX ADMIN — SODIUM CHLORIDE, PRESERVATIVE FREE 5 ML: 5 INJECTION INTRAVENOUS at 09:29

## 2018-01-01 RX ADMIN — IRINOTECAN HYDROCHLORIDE 129 MG: 4.3 INJECTION, POWDER, FOR SOLUTION INTRAVENOUS at 14:44

## 2018-01-01 RX ADMIN — ONDANSETRON 4 MG: 2 INJECTION INTRAMUSCULAR; INTRAVENOUS at 12:01

## 2018-01-01 RX ADMIN — DEXTROSE MONOHYDRATE 25 ML: 500 INJECTION PARENTERAL at 20:09

## 2018-01-01 RX ADMIN — POTASSIUM CHLORIDE: 149 INJECTION, SOLUTION, CONCENTRATE INTRAVENOUS at 14:00

## 2018-01-01 RX ADMIN — GADOBUTROL 7.5 ML: 604.72 INJECTION INTRAVENOUS at 09:37

## 2018-01-01 RX ADMIN — MIDAZOLAM 1 MG: 1 INJECTION INTRAMUSCULAR; INTRAVENOUS at 11:15

## 2018-01-01 RX ADMIN — ONDANSETRON 8 MG: 2 INJECTION INTRAMUSCULAR; INTRAVENOUS at 07:07

## 2018-01-01 RX ADMIN — MORPHINE SULFATE 0.8 MG: 2 INJECTION, SOLUTION INTRAMUSCULAR; INTRAVENOUS at 11:15

## 2018-01-01 RX ADMIN — SODIUM CHLORIDE 1000 ML: 9 INJECTION, SOLUTION INTRAVENOUS at 15:20

## 2018-01-01 RX ADMIN — DOCUSATE SODIUM 286 ML: 50 LIQUID ORAL at 20:59

## 2018-01-01 RX ADMIN — POTASSIUM CHLORIDE: 2 INJECTION, SOLUTION, CONCENTRATE INTRAVENOUS at 01:00

## 2018-01-01 RX ADMIN — BISACODYL 10 MG: 10 SUPPOSITORY RECTAL at 20:43

## 2018-01-01 RX ADMIN — PROPOFOL 120 MG: 10 INJECTION, EMULSION INTRAVENOUS at 13:35

## 2018-01-01 RX ADMIN — ACETAMINOPHEN 650 MG: 325 TABLET, FILM COATED ORAL at 11:05

## 2018-01-01 RX ADMIN — INSULIN ASPART 1 UNITS: 100 INJECTION, SOLUTION INTRAVENOUS; SUBCUTANEOUS at 12:43

## 2018-01-01 RX ADMIN — Medication 100 MG: at 13:35

## 2018-01-01 RX ADMIN — SODIUM CHLORIDE 1000 ML: 9 INJECTION, SOLUTION INTRAVENOUS at 09:26

## 2018-01-01 RX ADMIN — MORPHINE SULFATE 15 MG: 15 TABLET, EXTENDED RELEASE ORAL at 09:45

## 2018-01-01 RX ADMIN — PANCRELIPASE 72000 UNITS: 36000; 180000; 114000 CAPSULE, DELAYED RELEASE PELLETS ORAL at 19:04

## 2018-01-01 RX ADMIN — ESMOLOL HYDROCHLORIDE 10 MG: 10 INJECTION, SOLUTION INTRAVENOUS at 14:44

## 2018-01-01 RX ADMIN — PANCRELIPASE 72000 UNITS: 36000; 180000; 114000 CAPSULE, DELAYED RELEASE PELLETS ORAL at 08:07

## 2018-01-01 RX ADMIN — SODIUM CHLORIDE 1000 ML: 9 INJECTION, SOLUTION INTRAVENOUS at 12:01

## 2018-01-01 RX ADMIN — PANTOPRAZOLE SODIUM 20 MG: 20 TABLET, DELAYED RELEASE ORAL at 09:46

## 2018-01-01 RX ADMIN — IOPAMIDOL 30 ML: 510 INJECTION, SOLUTION INTRAVASCULAR at 12:03

## 2018-01-01 RX ADMIN — METHYLPHENIDATE HYDROCHLORIDE 5 MG: 5 TABLET ORAL at 15:08

## 2018-01-01 RX ADMIN — ATROPINE SULFATE 0.4 MG: 0.4 INJECTION INTRAMUSCULAR; INTRAVENOUS; SUBCUTANEOUS at 15:51

## 2018-01-01 RX ADMIN — PANTOPRAZOLE SODIUM 20 MG: 20 TABLET, DELAYED RELEASE ORAL at 08:19

## 2018-01-01 RX ADMIN — PANCRELIPASE 72000 UNITS: 36000; 180000; 114000 CAPSULE, DELAYED RELEASE PELLETS ORAL at 08:20

## 2018-01-01 RX ADMIN — OXYCODONE HYDROCHLORIDE 10 MG: 5 TABLET ORAL at 09:46

## 2018-01-01 RX ADMIN — ROCURONIUM BROMIDE 50 MG: 10 INJECTION INTRAVENOUS at 11:38

## 2018-01-01 RX ADMIN — FENTANYL CITRATE 50 MCG: 50 INJECTION, SOLUTION INTRAMUSCULAR; INTRAVENOUS at 13:35

## 2018-01-01 RX ADMIN — SIMETHICONE 2 ML: 63.3; 3.7 SOLUTION/ DROPS ORAL at 12:03

## 2018-01-01 RX ADMIN — PANTOPRAZOLE SODIUM 20 MG: 20 TABLET, DELAYED RELEASE ORAL at 16:13

## 2018-01-01 RX ADMIN — POTASSIUM CHLORIDE: 2 INJECTION, SOLUTION, CONCENTRATE INTRAVENOUS at 15:57

## 2018-01-01 RX ADMIN — SODIUM CHLORIDE, PRESERVATIVE FREE 5 ML: 5 INJECTION INTRAVENOUS at 17:35

## 2018-01-01 RX ADMIN — PANCRELIPASE 72000 UNITS: 36000; 180000; 114000 CAPSULE, DELAYED RELEASE PELLETS ORAL at 12:33

## 2018-01-01 RX ADMIN — LORAZEPAM 0.5 MG: 2 INJECTION INTRAMUSCULAR; INTRAVENOUS at 00:33

## 2018-01-01 RX ADMIN — MORPHINE SULFATE 15 MG: 15 TABLET, EXTENDED RELEASE ORAL at 09:07

## 2018-01-01 RX ADMIN — LIDOCAINE HYDROCHLORIDE 100 MG: 20 INJECTION, SOLUTION INFILTRATION; PERINEURAL at 13:35

## 2018-01-01 RX ADMIN — SODIUM CHLORIDE, PRESERVATIVE FREE 5 ML: 5 INJECTION INTRAVENOUS at 13:53

## 2018-01-01 RX ADMIN — POTASSIUM CHLORIDE: 2 INJECTION, SOLUTION, CONCENTRATE INTRAVENOUS at 23:43

## 2018-01-01 RX ADMIN — PANTOPRAZOLE SODIUM 20 MG: 20 TABLET, DELAYED RELEASE ORAL at 09:05

## 2018-01-01 RX ADMIN — DOCUSATE SODIUM 100 MG: 100 CAPSULE, LIQUID FILLED ORAL at 10:21

## 2018-01-01 RX ADMIN — LORATADINE 10 MG: 10 TABLET ORAL at 08:43

## 2018-01-01 RX ADMIN — INSULIN ASPART 1 UNITS: 100 INJECTION, SOLUTION INTRAVENOUS; SUBCUTANEOUS at 09:06

## 2018-01-01 RX ADMIN — INSULIN GLARGINE 5 UNITS: 100 INJECTION, SOLUTION SUBCUTANEOUS at 19:15

## 2018-01-01 RX ADMIN — SODIUM CHLORIDE, PRESERVATIVE FREE 5 ML: 5 INJECTION INTRAVENOUS at 11:11

## 2018-01-01 RX ADMIN — ONDANSETRON 4 MG: 2 INJECTION INTRAMUSCULAR; INTRAVENOUS at 11:59

## 2018-01-01 RX ADMIN — INSULIN ASPART 3 UNITS: 100 INJECTION, SOLUTION INTRAVENOUS; SUBCUTANEOUS at 00:04

## 2018-01-01 RX ADMIN — MORPHINE SULFATE 15 MG: 15 TABLET ORAL at 18:17

## 2018-01-01 RX ADMIN — INSULIN ASPART 2 UNITS: 100 INJECTION, SOLUTION INTRAVENOUS; SUBCUTANEOUS at 08:21

## 2018-01-01 RX ADMIN — POLYETHYLENE GLYCOL 3350 17 G: 17 POWDER, FOR SOLUTION ORAL at 08:06

## 2018-01-01 RX ADMIN — BISACODYL 10 MG: 10 SUPPOSITORY RECTAL at 12:02

## 2018-01-01 RX ADMIN — PANTOPRAZOLE SODIUM 40 MG: 40 INJECTION, POWDER, FOR SOLUTION INTRAVENOUS at 12:01

## 2018-01-01 RX ADMIN — ACETAMINOPHEN 650 MG: 325 TABLET, FILM COATED ORAL at 20:35

## 2018-01-01 RX ADMIN — SENNOSIDES AND DOCUSATE SODIUM 1 TABLET: 8.6; 5 TABLET ORAL at 20:35

## 2018-01-01 RX ADMIN — PANTOPRAZOLE SODIUM 40 MG: 40 INJECTION, POWDER, FOR SOLUTION INTRAVENOUS at 08:37

## 2018-01-01 RX ADMIN — POTASSIUM CHLORIDE 20 MEQ: 400 INJECTION, SOLUTION INTRAVENOUS at 00:39

## 2018-01-01 RX ADMIN — FENTANYL CITRATE 50 MCG: 50 INJECTION, SOLUTION INTRAMUSCULAR; INTRAVENOUS at 11:29

## 2018-01-01 RX ADMIN — Medication 10 MEQ: at 00:22

## 2018-01-01 RX ADMIN — POTASSIUM CHLORIDE 20 MEQ: 400 INJECTION, SOLUTION INTRAVENOUS at 22:43

## 2018-01-01 RX ADMIN — SODIUM CHLORIDE, PRESERVATIVE FREE 5 ML: 5 INJECTION INTRAVENOUS at 11:54

## 2018-01-01 RX ADMIN — ANTACID/ANTIFLATULENT 4 G: 380; 550; 10; 10 GRANULE, EFFERVESCENT ORAL at 09:36

## 2018-01-01 RX ADMIN — SODIUM CHLORIDE, PRESERVATIVE FREE 5 ML: 5 INJECTION INTRAVENOUS at 10:26

## 2018-01-01 RX ADMIN — SODIUM CHLORIDE 1000 ML: 9 INJECTION, SOLUTION INTRAVENOUS at 12:19

## 2018-01-01 RX ADMIN — FENTANYL CITRATE 50 MCG: 50 INJECTION, SOLUTION INTRAMUSCULAR; INTRAVENOUS at 14:16

## 2018-01-01 RX ADMIN — LORATADINE 10 MG: 10 TABLET ORAL at 09:07

## 2018-01-01 RX ADMIN — SODIUM CHLORIDE 250 ML: 9 INJECTION, SOLUTION INTRAVENOUS at 14:03

## 2018-01-01 RX ADMIN — PANTOPRAZOLE SODIUM 20 MG: 20 TABLET, DELAYED RELEASE ORAL at 08:07

## 2018-01-01 RX ADMIN — PANTOPRAZOLE SODIUM 20 MG: 20 TABLET, DELAYED RELEASE ORAL at 09:07

## 2018-01-01 RX ADMIN — SODIUM CHLORIDE, POTASSIUM CHLORIDE, SODIUM LACTATE AND CALCIUM CHLORIDE: 600; 310; 30; 20 INJECTION, SOLUTION INTRAVENOUS at 13:25

## 2018-01-01 RX ADMIN — PANTOPRAZOLE SODIUM 20 MG: 20 TABLET, DELAYED RELEASE ORAL at 08:43

## 2018-01-01 RX ADMIN — SODIUM CHLORIDE, PRESERVATIVE FREE 5 ML: 5 INJECTION INTRAVENOUS at 10:04

## 2018-01-01 RX ADMIN — PROCHLORPERAZINE EDISYLATE 10 MG: 5 INJECTION INTRAMUSCULAR; INTRAVENOUS at 18:16

## 2018-01-01 RX ADMIN — PHENYLEPHRINE HYDROCHLORIDE 100 MCG: 10 INJECTION, SOLUTION INTRAMUSCULAR; INTRAVENOUS; SUBCUTANEOUS at 11:37

## 2018-01-01 RX ADMIN — POTASSIUM CHLORIDE: 2 INJECTION, SOLUTION, CONCENTRATE INTRAVENOUS at 09:07

## 2018-01-01 RX ADMIN — ESMOLOL HYDROCHLORIDE 10 MG: 10 INJECTION, SOLUTION INTRAVENOUS at 14:27

## 2018-01-01 RX ADMIN — FENTANYL CITRATE 50 MCG: 50 INJECTION, SOLUTION INTRAMUSCULAR; INTRAVENOUS at 14:07

## 2018-01-01 RX ADMIN — OXYCODONE HYDROCHLORIDE 10 MG: 5 TABLET ORAL at 01:32

## 2018-01-01 RX ADMIN — OXYCODONE HYDROCHLORIDE 5 MG: 5 TABLET ORAL at 20:35

## 2018-01-01 RX ADMIN — POLYETHYLENE GLYCOL 3350 17 G: 17 POWDER, FOR SOLUTION ORAL at 20:36

## 2018-01-01 RX ADMIN — DEXTROSE MONOHYDRATE, SODIUM CHLORIDE, SODIUM LACTATE, POTASSIUM CHLORIDE, CALCIUM CHLORIDE: 5; 600; 310; 179; 20 INJECTION, SOLUTION INTRAVENOUS at 09:40

## 2018-01-01 RX ADMIN — PANTOPRAZOLE SODIUM 20 MG: 20 TABLET, DELAYED RELEASE ORAL at 16:53

## 2018-01-01 RX ADMIN — MORPHINE SULFATE 15 MG: 15 TABLET, EXTENDED RELEASE ORAL at 20:58

## 2018-01-01 RX ADMIN — OXYCODONE HYDROCHLORIDE 10 MG: 5 TABLET ORAL at 05:35

## 2018-01-01 RX ADMIN — PROPOFOL 150 MG: 10 INJECTION, EMULSION INTRAVENOUS at 11:29

## 2018-01-01 RX ADMIN — INSULIN ASPART 1 UNITS: 100 INJECTION, SOLUTION INTRAVENOUS; SUBCUTANEOUS at 20:36

## 2018-01-01 ASSESSMENT — PAIN SCALES - GENERAL
PAINLEVEL: NO PAIN (0)
PAINLEVEL: NO PAIN (0)
PAINLEVEL: EXTREME PAIN (8)
PAINLEVEL: NO PAIN (0)
PAINLEVEL: NO PAIN (0)
PAINLEVEL: MODERATE PAIN (5)
PAINLEVEL: NO PAIN (0)
PAINLEVEL: MODERATE PAIN (4)
PAINLEVEL: NO PAIN (0)
PAINLEVEL: NO PAIN (0)

## 2018-01-01 ASSESSMENT — ENCOUNTER SYMPTOMS
CONSTIPATION: 1
NECK STIFFNESS: 0
VOMITING: 1
TROUBLE SWALLOWING: 1
WEAKNESS: 1
HEADACHES: 0
DYSPHORIC MOOD: 1
ACTIVITY CHANGE: 1
DIFFICULTY URINATING: 0
FATIGUE: 1
ABDOMINAL PAIN: 0
SINUS PRESSURE: 0
NECK STIFFNESS: 0
UNEXPECTED WEIGHT CHANGE: 1
COLOR CHANGE: 0
NAUSEA: 1
SHORTNESS OF BREATH: 0
DIARRHEA: 0
APPETITE CHANGE: 1
TROUBLE SWALLOWING: 0
HEADACHES: 0
LIGHT-HEADEDNESS: 1
SEIZURES: 0
DYSURIA: 0
ACTIVITY CHANGE: 1
VOMITING: 1
COLOR CHANGE: 0
FATIGUE: 1
ABDOMINAL PAIN: 0
BACK PAIN: 0
CONSTIPATION: 0
LIGHT-HEADEDNESS: 1
EYE REDNESS: 0
DIARRHEA: 0
DECREASED CONCENTRATION: 1
NAUSEA: 1
EYE REDNESS: 0
DECREASED CONCENTRATION: 1
VOICE CHANGE: 0
SHORTNESS OF BREATH: 0
APPETITE CHANGE: 1
FEVER: 0
CONFUSION: 0
CONFUSION: 0
CHEST TIGHTNESS: 0
DIFFICULTY URINATING: 0
FEVER: 0
COUGH: 0
ARTHRALGIAS: 0
BRUISES/BLEEDS EASILY: 0
WEAKNESS: 1
COUGH: 0
DYSPHORIC MOOD: 1
ARTHRALGIAS: 0
CHEST TIGHTNESS: 0

## 2018-01-01 ASSESSMENT — ACTIVITIES OF DAILY LIVING (ADL)
COMMUNICATION: 0 - UNDERSTANDS/COMMUNICATES WITHOUT DIFFICULTY
AMBULATION: 0 - INDEPENDENT
RETIRED_EATING: 0-->INDEPENDENT
DRESS: 2-->ASSISTIVE PERSON
FALL_HISTORY_WITHIN_LAST_SIX_MONTHS: NO
TRANSFERRING: 0 - INDEPENDENT
BATHING: 2-->ASSISTIVE PERSON
RETIRED_COMMUNICATION: 0-->UNDERSTANDS/COMMUNICATES WITHOUT DIFFICULTY
COGNITION: 0 - NO COGNITION ISSUES REPORTED
DRESS: OTHER (SEE COMMENTS)
TRANSFERRING: 0-->INDEPENDENT
TOILETING: 0-->INDEPENDENT
SWALLOWING: OTHER (SEE COMMENTS)
BATHING: OTHER (SEE COMMENTS)
TOILETING: 0 - INDEPENDENT
SWALLOWING: 0-->SWALLOWS FOODS/LIQUIDS WITHOUT DIFFICULTY
PREVIOUS_RESPONSIBILITIES: MEDICATION MANAGEMENT;FINANCES;MEAL PREP
EATING: OTHER (SEE COMMENTS)
AMBULATION: 0-->INDEPENDENT

## 2018-01-01 ASSESSMENT — PAIN DESCRIPTION - DESCRIPTORS
DESCRIPTORS: ACHING
DESCRIPTORS: CONSTANT
DESCRIPTORS: ACHING
DESCRIPTORS: CONSTANT
DESCRIPTORS: CRAMPING
DESCRIPTORS: ACHING;CONSTANT

## 2018-01-08 NOTE — MR AVS SNAPSHOT
After Visit Summary   1/8/2018    Shirin Muller    MRN: [id number]           Patient Information     Date Of Birth          1960        Visit Information        Provider Department      1/8/2018 1:00 PM Ester Echavarria APRN CNP [facility]        Today's Diagnoses     Malignant neoplasm of head of pancreas (H)        Chronic seasonal allergic rhinitis, unspecified trigger        Chemotherapy-induced neutropenia (H)           Follow-ups after your visit        Your next 10 appointments already scheduled     Jan 19, 2018  8:45 AM CST   [facility] Draw with [facility] DRAW   [facility] ([facility])    [address]  [address]  [facility] [id number] [phone]            Jan 19, 2018  9:20 AM CST   (Arrive by 9:05 AM)   CT CHEST W/O CONTRAST with CT2   [facility] CT ([facility])    [address] [address] [phone]           Please bring any scans or X-rays taken at other hospitals, if similar tests were done. Also bring a list of your medicines, including vitamins, minerals and over-the-counter drugs. It is safest to leave personal items at home.  Be sure to tell your doctor:   If you have any allergies.   If there s any chance you are pregnant.   If you are breastfeeding.   If you have any special needs.  You do not need to do anything special to prepare.  Please wear loose clothing, such as a sweat suit or jogging clothes. Avoid snaps, zippers and other metal. We may ask you to undress and put on a hospital gown.            Jan 19, 2018 10:00 AM CST   (Arrive by 9:45 AM)   MR ABDOMEN W/O & W CONTRAST with MR[address] [place] [address] MRI ([facility])    [address]  1st Floor  [facility] [id number] [phone]           Take your medicines as usual, unless your doctor tells you not  to. Bring a list of your current medicines to your exam (including vitamins, minerals and over-the-counter drugs). Also bring the results of similar scans you may have had.    The day before your exam, drink extra fluids at least six 8-ounce glasses (unless your doctor tells you to restrict your fluids).   Have a blood test (creatinine test) within 30 days of your exam. Go to your clinic or Diagnostic Imaging Department for this test.   Do not eat or drink for 6 hours prior to exam.  The MRI machine uses a strong magnet. Please wear clothes without metal (snaps, zippers). A sweatsuit works well, or we may give you a hospital gown.  Please remove any body piercings and hair extensions before you arrive. You will also remove watches, jewelry, hairpins, wallets, dentures, partial dental plates and hearing aids. You may wear contact lenses, and you may be able to wear your rings. We have a safe place to keep your personal items, but it is safer to leave them at home.   **IMPORTANT** THE INSTRUCTIONS BELOW ARE ONLY FOR THOSE PATIENTS WHO HAVE BEEN TOLD THEY WILL RECEIVE SEDATION OR GENERAL ANESTHESIA DURING THEIR MRI PROCEDURE:  IF YOU WILL RECEIVE SEDATION (take medicine to help you relax during your exam):   You must get the medicine from your doctor before you arrive. Bring the medicine to the exam. Do not take it at home.   Arrive one hour early. Bring someone who can take you home after the test. Your medicine will make you sleepy. After the exam, you may not drive, take a bus or take a taxi by yourself.   No eating 8 hours before your exam. You may have clear liquids up until 4 hours before your exam. (Clear liquids include water, clear tea, black coffee and fruit juice without pulp.)  IF YOU WILL RECEIVE ANESTHESIA (be asleep for your exam):   Arrive 1 1/2 hours early. Bring someone who can take you home after the test. You may not drive, take a bus or take a taxi by yourself.   No eating 8 hours before your exam.  You may have clear liquids up until 4 hours before your exam. (Clear liquids include water, clear tea, black coffee and fruit juice without pulp.)  If you have any questions, please contact your Imaging Department exam site.            Jan 22, 2018  5:00 PM CST   (Arrive by 4:45 PM)   Return Visit with Demond Gonsales MD   [facility] ([facility])    [address]  [address]  [facility] [id number] [phone]            Jan 24, 2018  3:00 PM CST   (Arrive by 2:45 PM)   RETURN DIABETES with Chloe Flowers MD   [facility] ([facility])    [address]  3rd Floor  [facility] [id number] [phone]            Jan 29, 2018 12:00 PM CST   [facility] Lab Draw with  [facility] DRAW   [facility] ([facility])    [address]  [address]  [facility] [id number] [phone]            Jan 29, 2018 12:30 PM CST   Infusion 180 with  ONCOLOGY INFUSION, UC 30 ATC   [facility] ([facility])    [address]  [address]  [facility] [id number] [phone]              Who to contact     If you have questions or need follow up information about today's clinic visit or your schedule please contact [facility] directly at [phone].  Normal or non-critical lab and imaging results will be communicated to you by MyChart, letter or phone within 4 business days after the clinic has received the results. If you do not hear from us within 7 days, please contact the clinic through MyChart or phone. If you have a critical or abnormal lab result, we will notify you by phone as soon as possible.  Submit refill requests through [employer] or call your pharmacy and they will forward the refill request to us. Please allow 3 business days for your refill to be completed.          Additional Information  About Your Visit        [employer] Information     [employer] gives you secure access to your electronic health record. If you see a primary care provider, you can also send messages to your care team and make appointments. If you have questions, please call your primary care clinic.  If you do not have a primary care provider, please call [phone] and they will assist you.        Care EveryWhere ID     This is your Care EveryWhere ID. This could be used by other organizations to access your [facility] medical records  [id number]        Your Vitals Were     Pulse Temperature Respirations Pulse Oximetry BMI (Body Mass Index)       97 98.8  F (37.1  C) (Oral) 18 98% 24.84 kg/m2        Blood Pressure from Last 3 Encounters:   01/08/18 100/64   12/19/17 109/63   11/27/17 100/68    Weight from Last 3 Encounters:   01/08/18 78.5 kg (173 lb 1.6 oz)   12/19/17 78.7 kg (173 lb 9.6 oz)   11/27/17 77.7 kg (171 lb 6.4 oz)              Today, you had the following     No orders found for display         Where to get your medicines      These medications were sent to [facility] [address] [address]    Hours:  TRANSPLANT PHONE NUMBER [phone] Phone:  [phone]     loratadine 10 MG tablet    polyethylene glycol powder    potassium chloride SA 20 MEQ CR tablet         Some of these will need a paper prescription and others can be bought over the counter.  Ask your nurse if you have questions.     Bring a paper prescription for each of these medications     LORazepam 0.5 MG tablet    morphine 15 MG 12 hr tablet          Primary Care Provider    [facility]       No address on file        Equal Access to Services     JAI CORDERO : Maxx Spence, johnson flower, qarosalinota luis e dennis. So [facility] [phone].    ATENCIÓN: Si milagro tate bernal  disposición servicios gratuitos de asistencia lingüística. Vanessa hyde [phone].    We comply with applicable federal civil rights laws and [place] laws. We do not discriminate on the basis of race, color, national origin, age, disability, sex, sexual orientation, or gender identity.            Thank you!     Thank you for choosing [facility]  for your care. Our goal is always to provide you with excellent care. Hearing back from our patients is one way we can continue to improve our services. Please take a few minutes to complete the written survey that you may receive in the mail after your visit with us. Thank you!             Your Updated Medication List - Protect others around you: Learn how to safely use, store and throw away your medicines at www.disposemymeds.org.          This list is accurate as of: 1/8/18 11:59 PM.  Always use your most recent med list.                   Brand Name Dispense Instructions for use Diagnosis    amylase-lipase-protease 06904 UNITS Cpep    CREON    180 capsule    Take 2 capsules (72,000 Units) by mouth 3 times daily (with meals)    Malignant neoplasm of head of pancreas (H)       blood glucose monitoring lancets     102 each    Use to test blood sugar two times daily or as directed.    Diabetes mellitus, type 2 (H)       diltiazem 2% in PLO cream (FV COMPOUNDED) 2% Gel     30 g    Apply topically daily and as needed to external hemorrhoids    External hemorrhoids       docusate sodium 100 MG capsule    DOK    100 capsule    Take 1 capsule (100 mg) by mouth daily    External hemorrhoids       dronabinol 5 MG capsule    MARINOL    90 capsule    Take 1 capsule (5 mg) by mouth 3 times daily (before meals) Taking once daily    Anorexia       ENSURE CLEAR Liqd     90 Bottle    Take 1 Bottle by mouth 3 times daily    Malignant neoplasm of head of pancreas (H)       insulin glargine 100 UNIT/ML injection    LANTUS SOLOSTAR    15 mL    10 units subcutaneously  daily at 8pm.    Diabetes mellitus, type 2 (H)       insulin pen needle 32G X 4 MM    BD EMMANUEL U/F    100 each    Use one daily or as directed.    Diabetes mellitus, type 2 (H)       lidocaine-prilocaine cream    EMLA    30 g    Apply topically as needed for other (Use 30-60 minutes prior to port access)    Malignant neoplasm of head of pancreas (H)       loratadine 10 MG tablet    CLARITIN    30 tablet    Take 1 tablet (10 mg) by mouth daily Reported on 5/5/2017    Chronic seasonal allergic rhinitis, unspecified trigger       LORazepam 0.5 MG tablet    ATIVAN    30 tablet    Take 1 tablet (0.5 mg) by mouth every 4 hours as needed (Anxiety, Nausea/Vomiting or Sleep)    Malignant neoplasm of head of pancreas (H)       morphine 15 MG 12 hr tablet    MS CONTIN    60 tablet    Take 1 tablet (15 mg) by mouth every 12 hours    Malignant neoplasm of head of pancreas (H)       ondansetron 8 MG ODT tab    ZOFRAN-ODT    60 tablet    Take 1 tablet (8 mg) by mouth every 8 hours as needed for nausea    Malignant neoplasm of head of pancreas (H)       ONETOUCH PING METER REMOTE SUPPLIES Misc     1 each    Check your sugars twice daily, once when fasting and once 2 hours after eating.    Secondary diabetes mellitus (H)       oxyCODONE IR 5 MG tablet    ROXICODONE    100 tablet    Take 1 tablet (5 mg) by mouth every 4 hours as needed for moderate to severe pain    Malignant neoplasm of head of pancreas (H)       pantoprazole 20 MG EC tablet    PROTONIX    60 tablet    Take 1 tablet (20 mg) by mouth 2 times daily    Malignant neoplasm of head of pancreas (H)       polyethylene glycol powder    MIRALAX/GLYCOLAX    500 g    Take 17 g (1 capful) by mouth daily    Malignant neoplasm of head of pancreas (H)       potassium chloride SA 20 MEQ CR tablet    KLOR-CON    60 tablet    Take 2 tablets (40 mEq) by mouth daily    Malignant neoplasm of head of pancreas (H)       prochlorperazine 10 MG tablet    COMPAZINE    30 tablet    TAKE ONE  TABLET BY MOUTH EVERY 6 HOURS AS NEEDED FOR NAUSEA AND VOMITING    Malignant neoplasm of head of pancreas (H)

## 2018-01-08 NOTE — LETTER
1/8/2018      RE: Shirin Muller  [address] [facility] [address]       Oncology/Hematology Visit Note  Jan 8, 2018       Reason for Visit: Follow up of metastatic pancreatic cancer, prior to C6 liposomal irinotecan and 5FU    History of Present Illness: Shirin Muller is a 57 year old female with metastatic pancreatic cancer with known liver metastases. She is currently undergoing treatment with liposomal irinotecan and 5FU. Her oncologic history is as follows:    She was diagnosed with pancreatic cancer in the spring of 2016 after presenting with a 2 to 3-month history of abdominal pain and weight loss. She initiated on treatment with gemcitabine and Abraxane on 05/02/2016 and continued on that until December 2016 when she was found to have progressive metastatic disease involving the liver. She was then initiated on FOLFIRINOX on 12/20/16.  In January 2017, she was found to have a GI bleed secondary to a bleeding duodenal ulcer and infiltrating mass in the duodenum. The ulcer was injected and clipped. She was found to be H. Pylori positive and was initiated on therapy with PPI, carafate, amoxicillin and clarithromycin. She had biliary obstruction in April 2017 and underwent ERCP and  biliary stent placement  Dr. Braden. She was admitted on 8/3/17 with sepsis/cholangitis. Blood cultures grew klebsiella penumonia and streptococcus angiosos. She had an ERCP on 8/3 demonstrating an occluded stent with pus, stone and sludge. The duct was dilated and a new stent placed. She was treated with IV Vanco and Zosyn, repeat BC were negative. She was discharged on levaquin and augmentin. She also has been having intermittent vaginal bleeding with unknown etiology. She was restarted on FOLFIRINOX on 8/18/17. Due to neuropathy, she was switched to liposomal irinotecan and 5FU every 2 weeks on 9/18/17. She has received 6 cycles so far (last dose 12/19).        Interval History:  Ms.  Renzo returns to clinic today with her sister. She continues to have about 1 week of nausea, vomiting, and anorexia following each chemo treatment. She has been taking compazine and zofran for nausea. She states she tried not to vomit, but doesn't want to try to recall how often she did because this is unpleasant for her. Her appetite continues to be low. She eats about 50% of 3 meals/day, and about 3 ensure supplements per day. She is taking miralax about 3 times/week for constipation, and having a BM every 4-5 days. She has stable abdominal pain that wraps around waist to her back for which she takes MS Contin BID and oxycodone, about 2 pills per day. She denies any dysphagia. She has had no mucositis. She has chronic numbness in bilateral hands and feet that has been stable. She states she sometimes drops things from her hands, and that the numbness impairs her ability to perform some ADLs. Her numbness does affect her balance somewhat while walking, but she denies any falls. Denies any pain with neuropathy.    She is not having any pain at the moment. She states overall she feels a little stronger, but she is still resting most of the day. She has mild dyspnea when climbing stairs in her home, but this is stable. Denies chest pain.    Review of Systems:  Patient denies fevers, chills, headaches, dizziness, vision or hearing changes, new lumps or bumps, cough, dysuria, edema, bleeding issues, or rash.    Current Outpatient Prescriptions   Medication Sig Dispense Refill     docusate sodium (DOK) 100 MG capsule Take 1 capsule (100 mg) by mouth daily 100 capsule 1     morphine (MS CONTIN) 15 MG 12 hr tablet Take 1 tablet (15 mg) by mouth every 12 hours 60 tablet 0     oxyCODONE IR (ROXICODONE) 5 MG tablet Take 1 tablet (5 mg) by mouth every 4 hours as needed for moderate to severe pain 100 tablet 0     prochlorperazine (COMPAZINE) 10 MG tablet TAKE ONE TABLET BY MOUTH EVERY 6 HOURS AS NEEDED FOR NAUSEA AND  VOMITING 30 tablet 2     ondansetron (ZOFRAN-ODT) 8 MG ODT tab Take 1 tablet (8 mg) by mouth every 8 hours as needed for nausea 60 tablet 6     loratadine (CLARITIN) 10 MG tablet Take 1 tablet (10 mg) by mouth daily Reported on 5/5/2017 30 tablet 6     polyethylene glycol (MIRALAX/GLYCOLAX) powder Take 17 g (1 capful) by mouth daily 119 g 11     pantoprazole (PROTONIX) 20 MG EC tablet Take 1 tablet (20 mg) by mouth 2 times daily 60 tablet 3     Nutritional Supplements (ENSURE CLEAR) LIQD Take 1 Bottle by mouth 3 times daily 90 Bottle 6     dronabinol (MARINOL) 5 MG capsule Take 1 capsule (5 mg) by mouth 3 times daily (before meals) Taking once daily 90 capsule 0     insulin glargine (LANTUS SOLOSTAR) 100 UNIT/ML injection 10 units subcutaneously daily at 8pm. 15 mL 3     insulin pen needle (BD EMMANUEL U/F) 32G X 4 MM Use one daily or as directed. 100 each prn     blood glucose monitoring (ACCU-CHEK FASTCLIX) lancets Use to test blood sugar two times daily or as directed. 102 each 3     Blood Glucose Monitoring Suppl (ONETOUCH PING METER REMOTE) SUPPLIES MISC Check your sugars twice daily, once when fasting and once 2 hours after eating. 1 each 3     potassium chloride SA (KLOR-CON) 20 MEQ CR tablet Take 2 tablets (40 mEq) by mouth daily 60 tablet 3     LORazepam (ATIVAN) 0.5 MG tablet Take 1 tablet (0.5 mg) by mouth every 4 hours as needed (Anxiety, Nausea/Vomiting or Sleep) 30 tablet 3     amylase-lipase-protease (CREON) 00580 UNITS CPEP Take 2 capsules (72,000 Units) by mouth 3 times daily (with meals) 180 capsule 1     diltiazem 2% in PLO cream, FV COMPOUNDED, 2% GEL Apply topically daily and as needed to external hemorrhoids 30 g 0     lidocaine-prilocaine (EMLA) cream Apply topically as needed for other (Use 30-60 minutes prior to port access) 30 g 0       Physical Examination:  /64 (BP Location: Right arm, Patient Position: Sitting, Cuff Size: Adult Regular)  Pulse 97  Temp 98.8  F (37.1  C) (Oral)  Resp  18  Wt 78.5 kg (173 lb 1.6 oz)  SpO2 98%  BMI 24.84 kg/m2  Wt Readings from Last 10 Encounters:   01/08/18 78.5 kg (173 lb 1.6 oz)   12/19/17 78.7 kg (173 lb 9.6 oz)   11/27/17 77.7 kg (171 lb 6.4 oz)   11/22/17 78.5 kg (173 lb)   11/20/17 79.4 kg (175 lb)   11/16/17 80.2 kg (176 lb 14.4 oz)   11/13/17 77.1 kg (170 lb)   11/09/17 77.8 kg (171 lb 9.6 oz)   11/07/17 76.4 kg (168 lb 6.4 oz)   11/01/17 77.2 kg (170 lb 3.2 oz)     Constitutional: Well-appearing female in no acute distress.  Eyes: EOMI, PERRL. No scleral icterus.  ENT: Oral mucosa is moist without lesions or thrush. Hyperpigmented changes throughout tongue. Thyroid exam deferred today  Lymphatic: Neck is supple without cervical or supraclavicular lymphadenopathy.  Cardiovascular: Regular rate and rhythm. No murmurs, gallops, or rubs. No edema in BLEs  Respiratory: Clear to auscultation bilaterally. No wheezes or crackles.  Gastrointestinal: Bowel sounds present. Abdomen soft, non-tender, with mild ascites. No palpable hepatosplenomegaly or masses.   Neurologic: Cranial nerves II through XII are grossly intact.  Skin: No rashes, petechiae, or bruising noted on exposed skin.  Psych: Flat affect. Limited eye contact.  Laboratory Data:    Results for NATALIIA IBARRA (MRN [id number]) as of 1/8/2018 13:31   1/8/2018 12:32   Sodium 136   Potassium 3.6   Chloride 103   Carbon Dioxide 27   Urea Nitrogen 10   Creatinine 0.71   GFR Estimate 84   GFR Estimate If Black >90   Calcium 8.7   Anion Gap 6   Albumin 3.4   Protein Total 8.3   Bilirubin Total 0.6   Alkaline Phosphatase 282 (H)   ALT 35   AST 43   Glucose 144 (H)   WBC 2.2 (L)   Hemoglobin 10.2 (L)   Hematocrit 32.4 (L)   Platelet Count 108 (L)   RBC Count 3.51 (L)   MCV 92   MCH 29.1   MCHC 31.5   RDW 14.6   Diff Method Automated Method   % Neutrophils 59.7   % Lymphocytes 23.5   % Monocytes 14.9   % Eosinophils 1.4   % Basophils 0.5   % Immature Granulocytes 0.0   Nucleated RBCs 0    Absolute Neutrophil 1.3 (L)   Absolute Lymphocytes 0.5 (L)   Absolute Monocytes 0.3   Absolute Eosinophils 0.0   Absolute Basophils 0.0   Abs Immature Granulocytes 0.0   Absolute Nucleated RBC 0.0       Assessment and Plan:  1. Metastatic pancreatic adenocarcinoma, currently on liposomal irinotecan + 5FU  Presents for cycle 7. Labs reviewed. ANC 1.3 and borderline, but no signs/symptoms of infection. Otherwise tolerating reasonably well. Better with the 3 week cycle than 2 week cycles. Will proceed with cycle 7  --Will have re-staging scans on 1/19--Chest CT, MR abdomen and labs. Visit with Dr. Gonsales on 1/22. Labs, infusion for C8 on 1/29/18.    2. Chemotherapy induced-nausea/vomiting, improved  Has been taking scheduled Zofran and Compazine yet still struggles with CINV. Continue PRN Zofran, Compazine, and ativan  --Will add aloxi and Emend to Dex for pre-medication to this cycle and future cycles as Shirin felt this was beneficial.    3. FEN  Weight stable, though patient admits to not eating or drinking well the week following chemo.    4. Hypokalemia  --Potassium 3.6 today and has been WNL since 11/20  -Continue potassium 20meq BID at home    5. Peripheral neuropathy  --stable, grade 2. 2/2 chemotherapy. No pain, but persistent numbness in bilateral hands/feet that is limited some of her ADLs.    6. Constipation  -she is taking miralax prn. Encouraged miralax daily    7. Cancer related pain  No complaints today. Continue MS contin 15mg q12h, and oxycodone prn. Refill MS Contin given today.    8. Diabetes, HgbA1C 9.2  --Followed by endocrine. On Lantus 10 units daily. BS 99-140s at home per patient.    9. Thyroid nodule  Enlarged thyroid. TSH on 11/9 normal at 2.56. History of Grave's disease >20 years ago and s/p HALL.  -- Per endocrinology note on 11/22, she is asymptomatic and euthyroid. In context of metastatic pancreatic cancer, no further management recommended at this time.      Barbie House  MAGEN  [facility] [address] [address]  [phone]    ROS    The patient was seen in conjunction with Barbie House PA-C. I have reviewed the note and agree with the above findings and plan. TEE Navarrete CNP

## 2018-01-08 NOTE — PATIENT INSTRUCTIONS
Contact Numbers    [facility] [place], [place] Main Line: [phone]  [facility] [place] [place] Triage:  [phone]    Call triage with chills and/or temperature greater than or equal to 100.5, uncontrolled nausea/vomiting, diarrhea, constipation, dizziness, shortness of breath, chest pain, bleeding, unexplained bruising, or any new/concerning symptoms, questions/concerns.     If you are having any concerning symptoms or wish to speak to a provider before your next infusion visit, please call your care coordinator or triage to notify them so we can adequately serve you.       After Hours: [phone]    If after hours, weekends, or holidays, call main hospital  and ask for Oncology doctor on call.         January 2018 Sunday Monday Tuesday Wednesday Thursday Friday Saturday        1     2     3     4     5     6       7     8     [facility]   12:30 PM   (15 min.)    [facility]   [facility] Lab Draw     [facility] RETURN   12:45 PM   (50 min.)   Ester Echavarria, TEE CNP   [facility]     [facility] ONC INFUSION 180    1:30 PM   (180 min.)    ONCOLOGY INFUSION   [facility] 9     10     11     12     13       14     15     16     17     18     19     [facility]    8:45 AM   (15 min.)   [facility]   [facility] Lab Draw     CT CHEST WO    9:05 AM   (20 min.)   UCCT2   [facility] CT     MR ABDOMEN WWO    9:45 AM   (45 min.)   UCMR1   [facility] MRI 20       21     22     [facility] RETURN    4:45 PM   (30 min.)   Demond Gonsales MD   [facility] Cancer [facility] 23     24     [facility] RETURN DIABETES    2:45 PM   (30 min.)   Chloe Flowers MD   [facility] Endocrinology 25     26     27       28     29     [facility] D[facility]   12:00 PM   (15 min.)   [facility] DRAW   [facility] Lab Draw     [facility] ONC INFUSION 180   12:30 PM   (180 min.)    ONCOLOGY INFUSION   [facility] 30     31                                     Recent Results (from the past 24 hour(s))   Comprehensive metabolic panel    Collection Time: 01/08/18 12:32 PM   Result Value Ref Range    Sodium 136 133 - 144 mmol/L    Potassium 3.6 3.4 - 5.3 mmol/L    Chloride 103 94 - 109 mmol/L    Carbon Dioxide 27 20 - 32 mmol/L    Anion Gap 6 3 - 14 mmol/L    Glucose 144 (H) 70 - 99 mg/dL    Urea Nitrogen 10 7 - 30 mg/dL    Creatinine 0.71 0.52 - 1.04 mg/dL    GFR Estimate 84 >60 mL/min/1.7m2    GFR Estimate If Black >90 >60 mL/min/1.7m2    Calcium 8.7 8.5 - 10.1 mg/dL    Bilirubin Total 0.6 0.2 - 1.3 mg/dL    Albumin 3.4 3.4 - 5.0 g/dL    Protein Total 8.3 6.8 - 8.8 g/dL    Alkaline Phosphatase 282 (H) 40 - 150 U/L    ALT 35 0 - 50 U/L    AST 43 0 - 45 U/L   CBC with platelets differential    Collection Time: 01/08/18 12:32 PM   Result Value Ref Range    WBC 2.2 (L) 4.0 - 11.0 10e9/L    RBC Count 3.51 (L) 3.8 - 5.2 10e12/L    Hemoglobin 10.2 (L) 11.7 - 15.7 g/dL    Hematocrit 32.4 (L) 35.0 - 47.0 %    MCV 92 78 - 100 fl    MCH 29.1 26.5 - 33.0 pg    MCHC 31.5 31.5 - 36.5 g/dL    RDW 14.6 10.0 - 15.0 %    Platelet Count 108 (L) 150 - 450 10e9/L    Diff Method Automated Method     % Neutrophils 59.7 %    % Lymphocytes 23.5 %    % Monocytes 14.9 %    % Eosinophils 1.4 %    % Basophils 0.5 %    % Immature Granulocytes 0.0 %    Nucleated RBCs 0 0 /100    Absolute Neutrophil 1.3 (L) 1.6 - 8.3 10e9/L    Absolute Lymphocytes 0.5 (L) 0.8 - 5.3 10e9/L    Absolute Monocytes 0.3 0.0 - 1.3 10e9/L    Absolute Eosinophils 0.0 0.0 - 0.7 10e9/L    Absolute Basophils 0.0 0.0 - 0.2 10e9/L    Abs Immature Granulocytes 0.0 0 - 0.4 10e9/L    Absolute Nucleated RBC 0.0        What else can I do to increase my appetite and prevent further weight loss?    Try to eat at least five or six small meals a day. Use small dinner plates so you do not feel overwhelmed by the amount of food.    Stop drinking fluids 30 to 60 minutes before each meal. Fluids will fill you up.    Limit fluids during meals.  "Drink what you need to help you swallow.    Keep snacks within easy reach. This makes you more likely to \"graze\" throughout the day.    Try to eat something at bedtime.    Choose high-calorie, high-protein foods like:    Eggs and meats    Nut butters    Cheese and crackers    Puddings and custards    Cream soups    Cereals    Ice cream and yogurt    Granola bars, snack chips, dried fruits, nuts and seeds (unless you have mouth sores, a dry mouth or a sore throat).    Read nutrition labels. Make sure that what you eat is high in calories (at least 200 calories per serving).    Use plenty of butter, margarine, sour cream, salad dressing and sandwich spread.    Sprinkle powdered milk into soups, scrambled eggs, mashed potatoes, yogurt, milk shakes, cooked cereals, cottage cheese and custards. This will give you more protein.    If you don't feel like eating, try to drink high-calorie liquids like whole milk, milk shakes, juices and cream soups.    Try nutrition drinks, such as Boost, [employer] Instant Breakfast or Ensure. You can buy these at the drug store.    If you take pain medicine, take it 30 to 60 minutes before eating.    Weigh yourself and record your weight every day.    Stay as active as you can. Activity may increase your appetite.    Try to make eating more enjoyable. Set the table with nice dishes. Play your favorite music, watch television or eat with family and friends.    If it is okay with your care team, have a small glass of wine or beer with your meals. This may increase your appetite.  For informational purposes only. Not to replace the advice of your health care provider.  Copyright   2006 [employer]. All rights reserved. [employer] 522793 - REV 12/15.    "

## 2018-01-08 NOTE — PROGRESS NOTES
Oncology/Hematology Visit Note  Jan 8, 2018       Reason for Visit: Follow up of metastatic pancreatic cancer, prior to C6 liposomal irinotecan and 5FU    History of Present Illness: Shirin Muller is a 57 year old female with metastatic pancreatic cancer with known liver metastases. She is currently undergoing treatment with liposomal irinotecan and 5FU. Her oncologic history is as follows:    She was diagnosed with pancreatic cancer in the spring of 2016 after presenting with a 2 to 3-month history of abdominal pain and weight loss. She initiated on treatment with gemcitabine and Abraxane on 05/02/2016 and continued on that until December 2016 when she was found to have progressive metastatic disease involving the liver. She was then initiated on FOLFIRINOX on 12/20/16.  In January 2017, she was found to have a GI bleed secondary to a bleeding duodenal ulcer and infiltrating mass in the duodenum. The ulcer was injected and clipped. She was found to be H. Pylori positive and was initiated on therapy with PPI, carafate, amoxicillin and clarithromycin. She had biliary obstruction in April 2017 and underwent ERCP and  biliary stent placement  Dr. Braden. She was admitted on 8/3/17 with sepsis/cholangitis. Blood cultures grew klebsiella penumonia and streptococcus angiosos. She had an ERCP on 8/3 demonstrating an occluded stent with pus, stone and sludge. The duct was dilated and a new stent placed. She was treated with IV Vanco and Zosyn, repeat BC were negative. She was discharged on levaquin and augmentin. She also has been having intermittent vaginal bleeding with unknown etiology. She was restarted on FOLFIRINOX on 8/18/17. Due to neuropathy, she was switched to liposomal irinotecan and 5FU every 2 weeks on 9/18/17. She has received 6 cycles so far (last dose 12/19).        Interval History:  Ms. Muller returns to clinic today with her sister. She continues to have about 1 week of nausea,  vomiting, and anorexia following each chemo treatment. She has been taking compazine and zofran for nausea. She states she tried not to vomit, but doesn't want to try to recall how often she did because this is unpleasant for her. Her appetite continues to be low. She eats about 50% of 3 meals/day, and about 3 ensure supplements per day. She is taking miralax about 3 times/week for constipation, and having a BM every 4-5 days. She has stable abdominal pain that wraps around waist to her back for which she takes MS Contin BID and oxycodone, about 2 pills per day. She denies any dysphagia. She has had no mucositis. She has chronic numbness in bilateral hands and feet that has been stable. She states she sometimes drops things from her hands, and that the numbness impairs her ability to perform some ADLs. Her numbness does affect her balance somewhat while walking, but she denies any falls. Denies any pain with neuropathy.    She is not having any pain at the moment. She states overall she feels a little stronger, but she is still resting most of the day. She has mild dyspnea when climbing stairs in her home, but this is stable. Denies chest pain.    Review of Systems:  Patient denies fevers, chills, headaches, dizziness, vision or hearing changes, new lumps or bumps, cough, dysuria, edema, bleeding issues, or rash.    Current Outpatient Prescriptions   Medication Sig Dispense Refill     docusate sodium (DOK) 100 MG capsule Take 1 capsule (100 mg) by mouth daily 100 capsule 1     morphine (MS CONTIN) 15 MG 12 hr tablet Take 1 tablet (15 mg) by mouth every 12 hours 60 tablet 0     oxyCODONE IR (ROXICODONE) 5 MG tablet Take 1 tablet (5 mg) by mouth every 4 hours as needed for moderate to severe pain 100 tablet 0     prochlorperazine (COMPAZINE) 10 MG tablet TAKE ONE TABLET BY MOUTH EVERY 6 HOURS AS NEEDED FOR NAUSEA AND VOMITING 30 tablet 2     ondansetron (ZOFRAN-ODT) 8 MG ODT tab Take 1 tablet (8 mg) by mouth every 8  hours as needed for nausea 60 tablet 6     loratadine (CLARITIN) 10 MG tablet Take 1 tablet (10 mg) by mouth daily Reported on 5/5/2017 30 tablet 6     polyethylene glycol (MIRALAX/GLYCOLAX) powder Take 17 g (1 capful) by mouth daily 119 g 11     pantoprazole (PROTONIX) 20 MG EC tablet Take 1 tablet (20 mg) by mouth 2 times daily 60 tablet 3     Nutritional Supplements (ENSURE CLEAR) LIQD Take 1 Bottle by mouth 3 times daily 90 Bottle 6     dronabinol (MARINOL) 5 MG capsule Take 1 capsule (5 mg) by mouth 3 times daily (before meals) Taking once daily 90 capsule 0     insulin glargine (LANTUS SOLOSTAR) 100 UNIT/ML injection 10 units subcutaneously daily at 8pm. 15 mL 3     insulin pen needle (BD EMMANUEL U/F) 32G X 4 MM Use one daily or as directed. 100 each prn     blood glucose monitoring (ACCU-CHEK FASTCLIX) lancets Use to test blood sugar two times daily or as directed. 102 each 3     Blood Glucose Monitoring Suppl (ONETOUCH PING METER REMOTE) SUPPLIES MISC Check your sugars twice daily, once when fasting and once 2 hours after eating. 1 each 3     potassium chloride SA (KLOR-CON) 20 MEQ CR tablet Take 2 tablets (40 mEq) by mouth daily 60 tablet 3     LORazepam (ATIVAN) 0.5 MG tablet Take 1 tablet (0.5 mg) by mouth every 4 hours as needed (Anxiety, Nausea/Vomiting or Sleep) 30 tablet 3     amylase-lipase-protease (CREON) 10602 UNITS CPEP Take 2 capsules (72,000 Units) by mouth 3 times daily (with meals) 180 capsule 1     diltiazem 2% in PLO cream, FV COMPOUNDED, 2% GEL Apply topically daily and as needed to external hemorrhoids 30 g 0     lidocaine-prilocaine (EMLA) cream Apply topically as needed for other (Use 30-60 minutes prior to port access) 30 g 0       Physical Examination:  /64 (BP Location: Right arm, Patient Position: Sitting, Cuff Size: Adult Regular)  Pulse 97  Temp 98.8  F (37.1  C) (Oral)  Resp 18  Wt 78.5 kg (173 lb 1.6 oz)  SpO2 98%  BMI 24.84 kg/m2  Wt Readings from Last 10 Encounters:    01/08/18 78.5 kg (173 lb 1.6 oz)   12/19/17 78.7 kg (173 lb 9.6 oz)   11/27/17 77.7 kg (171 lb 6.4 oz)   11/22/17 78.5 kg (173 lb)   11/20/17 79.4 kg (175 lb)   11/16/17 80.2 kg (176 lb 14.4 oz)   11/13/17 77.1 kg (170 lb)   11/09/17 77.8 kg (171 lb 9.6 oz)   11/07/17 76.4 kg (168 lb 6.4 oz)   11/01/17 77.2 kg (170 lb 3.2 oz)     Constitutional: Well-appearing female in no acute distress.  Eyes: EOMI, PERRL. No scleral icterus.  ENT: Oral mucosa is moist without lesions or thrush. Hyperpigmented changes throughout tongue. Thyroid exam deferred today  Lymphatic: Neck is supple without cervical or supraclavicular lymphadenopathy.  Cardiovascular: Regular rate and rhythm. No murmurs, gallops, or rubs. No edema in BLEs  Respiratory: Clear to auscultation bilaterally. No wheezes or crackles.  Gastrointestinal: Bowel sounds present. Abdomen soft, non-tender, with mild ascites. No palpable hepatosplenomegaly or masses.   Neurologic: Cranial nerves II through XII are grossly intact.  Skin: No rashes, petechiae, or bruising noted on exposed skin.  Psych: Flat affect. Limited eye contact.  Laboratory Data:    Results for NATALIIA IBARRA (MRN [id number]) as of 1/8/2018 13:31   1/8/2018 12:32   Sodium 136   Potassium 3.6   Chloride 103   Carbon Dioxide 27   Urea Nitrogen 10   Creatinine 0.71   GFR Estimate 84   GFR Estimate If Black >90   Calcium 8.7   Anion Gap 6   Albumin 3.4   Protein Total 8.3   Bilirubin Total 0.6   Alkaline Phosphatase 282 (H)   ALT 35   AST 43   Glucose 144 (H)   WBC 2.2 (L)   Hemoglobin 10.2 (L)   Hematocrit 32.4 (L)   Platelet Count 108 (L)   RBC Count 3.51 (L)   MCV 92   MCH 29.1   MCHC 31.5   RDW 14.6   Diff Method Automated Method   % Neutrophils 59.7   % Lymphocytes 23.5   % Monocytes 14.9   % Eosinophils 1.4   % Basophils 0.5   % Immature Granulocytes 0.0   Nucleated RBCs 0   Absolute Neutrophil 1.3 (L)   Absolute Lymphocytes 0.5 (L)   Absolute Monocytes 0.3   Absolute Eosinophils  0.0   Absolute Basophils 0.0   Abs Immature Granulocytes 0.0   Absolute Nucleated RBC 0.0       Assessment and Plan:  1. Metastatic pancreatic adenocarcinoma, currently on liposomal irinotecan + 5FU  Presents for cycle 7. Labs reviewed. ANC 1.3 and borderline, but no signs/symptoms of infection. Otherwise tolerating reasonably well. Better with the 3 week cycle than 2 week cycles. Will proceed with cycle 7  --Will have re-staging scans on 1/19--Chest CT, MR abdomen and labs. Visit with Dr. Gonsales on 1/22. Labs, infusion for C8 on 1/29/18.    2. Chemotherapy induced-nausea/vomiting, improved  Has been taking scheduled Zofran and Compazine yet still struggles with CINV. Continue PRN Zofran, Compazine, and ativan  --Will add aloxi and Emend to Dex for pre-medication to this cycle and future cycles as Shirin felt this was beneficial.    3. FEN  Weight stable, though patient admits to not eating or drinking well the week following chemo.    4. Hypokalemia  --Potassium 3.6 today and has been WNL since 11/20  -Continue potassium 20meq BID at home    5. Peripheral neuropathy  --stable, grade 2. 2/2 chemotherapy. No pain, but persistent numbness in bilateral hands/feet that is limited some of her ADLs.    6. Constipation  -she is taking miralax prn. Encouraged miralax daily    7. Cancer related pain  No complaints today. Continue MS contin 15mg q12h, and oxycodone prn. Refill MS Contin given today.    8. Diabetes, HgbA1C 9.2  --Followed by endocrine. On Lantus 10 units daily. BS 99-140s at home per patient.    9. Thyroid nodule  Enlarged thyroid. TSH on 11/9 normal at 2.56. History of Grave's disease >20 years ago and s/p HALL.  -- Per endocrinology note on 11/22, she is asymptomatic and euthyroid. In context of metastatic pancreatic cancer, no further management recommended at this time.      Barbie House PA-C  [facility]  [address], [place] [id number] [phone]    ROS    The patient was seen  in conjunction with ANABEL Munson have reviewed the note and agree with the above findings and plan. TW

## 2018-01-08 NOTE — MR AVS SNAPSHOT
After Visit Summary   1/8/2018    Shirin Muller    MRN: [id number]           Patient Information     Date Of Birth          1960        Visit Information        Provider Department      1/8/2018 1:30 PM  30 ATC;  ONCOLOGY INFUSION [facility]        Today's Diagnoses     Chemotherapy-induced neutropenia (H)    -  1    Malignant neoplasm of head of pancreas (H)          Care Instructions    Contact Numbers    [facility] [place]: [phone]  [facility] T[place]:  [phone]    Call triage with chills and/or temperature greater than or equal to 100.5, uncontrolled nausea/vomiting, diarrhea, constipation, dizziness, shortness of breath, chest pain, bleeding, unexplained bruising, or any new/concerning symptoms, questions/concerns.     If you are having any concerning symptoms or wish to speak to a provider before your next infusion visit, please call your care coordinator or triage to notify them so we can adequately serve you.       After Hours: [phone]    If after hours, weekends, or holidays, call main hospital  and ask for Oncology doctor on call.         January 2018 Sunday Monday Tuesday Wednesday Thursday Friday Saturday        1     2     3     4     5     6       7     8     [facility] DRAW   12:30 PM   (15 min.)    [facility] LAB DRAW   [facility] Lab Draw     [facility] RETURN   12:45 PM   (50 min.)   Ester Echavarria, TEE CNP   [facility]     [facility] ONC INFUSION 180    1:30 PM   (180 min.)    ONCOLOGY INFUSION   [facility] 9     10     11     12     13       14     15     16     17     18     19     [facility] DRAW    8:45 AM   (15 min.)    [facility] LAB DRAW   [facility] Lab Draw     CT CHEST WO    9:05 AM   (20 min.)   UCCT2   [facility]     MR ABDOMEN WWO    9:45 AM   (45 min.)   UCMR1   [facility] MRI 20       21     22     [facility] RETURN    4:45 PM   (30 min.)    Demond Gonsales MD   [facility] 23     24     [facility] RETURN DIABETES    2:45 PM   (30 min.)   Chloe Flowers MD   [facility] Endocrinology 25     26     27       28     29     [facility] LAB DRAW   12:00 PM   (15 min.)    [facility] LAB DRAW   [facility] Lab Draw     [facility] ONC INFUSION 180   12:30 PM   (180 min.)    ONCOLOGY INFUSION   [facility] 30     31                                    Recent Results (from the past 24 hour(s))   Comprehensive metabolic panel    Collection Time: 01/08/18 12:32 PM   Result Value Ref Range    Sodium 136 133 - 144 mmol/L    Potassium 3.6 3.4 - 5.3 mmol/L    Chloride 103 94 - 109 mmol/L    Carbon Dioxide 27 20 - 32 mmol/L    Anion Gap 6 3 - 14 mmol/L    Glucose 144 (H) 70 - 99 mg/dL    Urea Nitrogen 10 7 - 30 mg/dL    Creatinine 0.71 0.52 - 1.04 mg/dL    GFR Estimate 84 >60 mL/min/1.7m2    GFR Estimate If Black >90 >60 mL/min/1.7m2    Calcium 8.7 8.5 - 10.1 mg/dL    Bilirubin Total 0.6 0.2 - 1.3 mg/dL    Albumin 3.4 3.4 - 5.0 g/dL    Protein Total 8.3 6.8 - 8.8 g/dL    Alkaline Phosphatase 282 (H) 40 - 150 U/L    ALT 35 0 - 50 U/L    AST 43 0 - 45 U/L   CBC with platelets differential    Collection Time: 01/08/18 12:32 PM   Result Value Ref Range    WBC 2.2 (L) 4.0 - 11.0 10e9/L    RBC Count 3.51 (L) 3.8 - 5.2 10e12/L    Hemoglobin 10.2 (L) 11.7 - 15.7 g/dL    Hematocrit 32.4 (L) 35.0 - 47.0 %    MCV 92 78 - 100 fl    MCH 29.1 26.5 - 33.0 pg    MCHC 31.5 31.5 - 36.5 g/dL    RDW 14.6 10.0 - 15.0 %    Platelet Count 108 (L) 150 - 450 10e9/L    Diff Method Automated Method     % Neutrophils 59.7 %    % Lymphocytes 23.5 %    % Monocytes 14.9 %    % Eosinophils 1.4 %    % Basophils 0.5 %    % Immature Granulocytes 0.0 %    Nucleated RBCs 0 0 /100    Absolute Neutrophil 1.3 (L) 1.6 - 8.3 10e9/L    Absolute Lymphocytes 0.5 (L) 0.8 - 5.3 10e9/L    Absolute Monocytes 0.3 0.0 - 1.3 10e9/L    Absolute Eosinophils 0.0 0.0 - 0.7 10e9/L     "Absolute Basophils 0.0 0.0 - 0.2 10e9/L    Abs Immature Granulocytes 0.0 0 - 0.4 10e9/L    Absolute Nucleated RBC 0.0        What else can I do to increase my appetite and prevent further weight loss?    Try to eat at least five or six small meals a day. Use small dinner plates so you do not feel overwhelmed by the amount of food.    Stop drinking fluids 30 to 60 minutes before each meal. Fluids will fill you up.    Limit fluids during meals. Drink what you need to help you swallow.    Keep snacks within easy reach. This makes you more likely to \"graze\" throughout the day.    Try to eat something at bedtime.    Choose high-calorie, high-protein foods like:    Eggs and meats    Nut butters    Cheese and crackers    Puddings and custards    Cream soups    Cereals    Ice cream and yogurt    Granola bars, snack chips, dried fruits, nuts and seeds (unless you have mouth sores, a dry mouth or a sore throat).    Read nutrition labels. Make sure that what you eat is high in calories (at least 200 calories per serving).    Use plenty of butter, margarine, sour cream, salad dressing and sandwich spread.    Sprinkle powdered milk into soups, scrambled eggs, mashed potatoes, yogurt, milk shakes, cooked cereals, cottage cheese and custards. This will give you more protein.    If you don't feel like eating, try to drink high-calorie liquids like whole milk, milk shakes, juices and cream soups.    Try nutrition drinks, such as Boost, [place] Instant Breakfast or Ensure. You can buy these at the drug store.    If you take pain medicine, take it 30 to 60 minutes before eating.    Weigh yourself and record your weight every day.    Stay as active as you can. Activity may increase your appetite.    Try to make eating more enjoyable. Set the table with nice dishes. Play your favorite music, watch television or eat with family and friends.    If it is okay with your care team, have a small glass of wine or beer with your meals. This " may increase your appetite.  For informational purposes only. Not to replace the advice of your health care provider.  Copyright   2006 [employer] [facility]. All rights reserved. [employer] 271561 - REV 12/15.            Follow-ups after your visit        Your next 10 appointments already scheduled     Jan 19, 2018  8:45 AM CST   [facility] Lab Draw with  MASONIC LAB DRAW   [facility] Lab Draw ([facility])    [address]  [address]  [facility] [id number] [phone]            Jan 19, 2018  9:20 AM CST   (Arrive by 9:05 AM)   CT CHEST W/O CONTRAST with UCCT2   [facility] CT ([facility])    [address] [id number] [phone]           Please bring any scans or X-rays taken at other hospitals, if similar tests were done. Also bring a list of your medicines, including vitamins, minerals and over-the-counter drugs. It is safest to leave personal items at home.  Be sure to tell your doctor:   If you have any allergies.   If there s any chance you are pregnant.   If you are breastfeeding.   If you have any special needs.  You do not need to do anything special to prepare.  Please wear loose clothing, such as a sweat suit or jogging clothes. Avoid snaps, zippers and other metal. We may ask you to undress and put on a hospital gown.            Jan 19, 2018 10:00 AM CST   (Arrive by 9:45 AM)   MR ABDOMEN W/O & W CONTRAST with UCMR1   [facility] MRI ([facility])    [address] [id number] [phone]           Take your medicines as usual, unless your doctor tells you not to. Bring a list of your current medicines to your exam (including vitamins, minerals and over-the-counter drugs). Also bring the results of similar scans you may have had.    The day before your exam, drink extra fluids at least six 8-ounce glasses (unless  your doctor tells you to restrict your fluids).   Have a blood test (creatinine test) within 30 days of your exam. Go to your clinic or Diagnostic Imaging Department for this test.   Do not eat or drink for 6 hours prior to exam.  The MRI machine uses a strong magnet. Please wear clothes without metal (snaps, zippers). A sweatsuit works well, or we may give you a hospital gown.  Please remove any body piercings and hair extensions before you arrive. You will also remove watches, jewelry, hairpins, wallets, dentures, partial dental plates and hearing aids. You may wear contact lenses, and you may be able to wear your rings. We have a safe place to keep your personal items, but it is safer to leave them at home.   **IMPORTANT** THE INSTRUCTIONS BELOW ARE ONLY FOR THOSE PATIENTS WHO HAVE BEEN TOLD THEY WILL RECEIVE SEDATION OR GENERAL ANESTHESIA DURING THEIR MRI PROCEDURE:  IF YOU WILL RECEIVE SEDATION (take medicine to help you relax during your exam):   You must get the medicine from your doctor before you arrive. Bring the medicine to the exam. Do not take it at home.   Arrive one hour early. Bring someone who can take you home after the test. Your medicine will make you sleepy. After the exam, you may not drive, take a bus or take a taxi by yourself.   No eating 8 hours before your exam. You may have clear liquids up until 4 hours before your exam. (Clear liquids include water, clear tea, black coffee and fruit juice without pulp.)  IF YOU WILL RECEIVE ANESTHESIA (be asleep for your exam):   Arrive 1 1/2 hours early. Bring someone who can take you home after the test. You may not drive, take a bus or take a taxi by yourself.   No eating 8 hours before your exam. You may have clear liquids up until 4 hours before your exam. (Clear liquids include water, clear tea, black coffee and fruit juice without pulp.)  If you have any questions, please contact your Imaging Department exam site.            Jan 22, 2018  5:00 PM  CST   (Arrive by 4:45 PM)   Return Visit with Demond Gonsales MD   [facility] ([facility])    [address]  [address]  [place] [id number] [phone]            Jan 24, 2018  3:00 PM CST   (Arrive by 2:45 PM)   RETURN DIABETES with Chloe Flowers MD   [facility] Endocrinology ([facility])    [address]  3rd Floor  [place] [id number] [phone]            Jan 29, 2018 12:00 PM CST   [facility]ic Lab Draw with UC [facility] LAB DRAW   [facility])    [address]  [address]  [place] [id number] [phone]            Jan 29, 2018 12:30 PM CST   Infusion 180 with UC ONCOLOGY INFUSION, UC 30 ATC   [facility] ([facility])    [address]  [address]  [place] [id number] [phone]              Who to contact     If you have questions or need follow up information about today's clinic visit or your schedule please contact [facility] directly at [phone].  Normal or non-critical lab and imaging results will be communicated to you by [employer], letter or phone within 4 business days after the clinic has received the results. If you do not hear from us within 7 days, please contact the clinic through [employer] or phone. If you have a critical or abnormal lab result, we will notify you by phone as soon as possible.  Submit refill requests through [employer] or call your pharmacy and they will forward the refill request to us. Please allow 3 business days for your refill to be completed.          Additional Information About Your Visit        [employer] Information     [employer] gives you secure access to your electronic health record. If you see a primary care provider, you can also send messages to your care team and make appointments. If you have questions, please call your  primary care clinic.  If you do not have a primary care provider, please call [phone] and they will assist you.        Care EveryWhere ID     This is your Care EveryWhere ID. This could be used by other organizations to access your [facility] medical records  [id number]         Blood Pressure from Last 3 Encounters:   01/08/18 100/64   12/19/17 109/63   11/27/17 100/68    Weight from Last 3 Encounters:   01/08/18 78.5 kg (173 lb 1.6 oz)   12/19/17 78.7 kg (173 lb 9.6 oz)   11/27/17 77.7 kg (171 lb 6.4 oz)              We Performed the Following     CBC with platelets differential     Comprehensive metabolic panel          Where to get your medicines      These medications were sent to [facility] [place], [place] - [address] [place] [address] [address] [place] [id number]    Hours:  TRANSPLANT PHONE NUMBER [phone] Phone:  [phone]     loratadine 10 MG tablet    polyethylene glycol powder    potassium chloride SA 20 MEQ CR tablet         Some of these will need a paper prescription and others can be bought over the counter.  Ask your nurse if you have questions.     Bring a paper prescription for each of these medications     LORazepam 0.5 MG tablet    morphine 15 MG 12 hr tablet          Primary Care Provider    [facility]       No address on file        Equal Access to Services     JAI CORDERO AH: Maxx Spence, waaxda luqadaha, qaybta kaalmada adeegyada, luis e lora. So [facility] [phone].    ATENCIÓN: Si habla español, tiene a bernal disposición servicios gratuitos de asistencia lingüística. Llame al [phone].    We comply with applicable federal civil rights laws and [place] laws. We do not discriminate on the basis of race, color, national origin, age, disability, sex, sexual orientation, or gender identity.            Thank you!     Thank you for choosing [facility]  for  your care. Our goal is always to provide you with excellent care. Hearing back from our patients is one way we can continue to improve our services. Please take a few minutes to complete the written survey that you may receive in the mail after your visit with us. Thank you!             Your Updated Medication List - Protect others around you: Learn how to safely use, store and throw away your medicines at www.disposemymeds.org.          This list is accurate as of: 1/8/18  4:31 PM.  Always use your most recent med list.                   Brand Name Dispense Instructions for use Diagnosis    amylase-lipase-protease 35911 UNITS Cpep    CREON    180 capsule    Take 2 capsules (72,000 Units) by mouth 3 times daily (with meals)    Malignant neoplasm of head of pancreas (H)       blood glucose monitoring lancets     102 each    Use to test blood sugar two times daily or as directed.    Diabetes mellitus, type 2 (H)       diltiazem 2% in PLO cream (FV COMPOUNDED) 2% Gel     30 g    Apply topically daily and as needed to external hemorrhoids    External hemorrhoids       docusate sodium 100 MG capsule    DOK    100 capsule    Take 1 capsule (100 mg) by mouth daily    External hemorrhoids       dronabinol 5 MG capsule    MARINOL    90 capsule    Take 1 capsule (5 mg) by mouth 3 times daily (before meals) Taking once daily    Anorexia       ENSURE CLEAR Liqd     90 Bottle    Take 1 Bottle by mouth 3 times daily    Malignant neoplasm of head of pancreas (H)       insulin glargine 100 UNIT/ML injection    LANTUS SOLOSTAR    15 mL    10 units subcutaneously daily at 8pm.    Diabetes mellitus, type 2 (H)       insulin pen needle 32G X 4 MM    BD EMMANUEL U/F    100 each    Use one daily or as directed.    Diabetes mellitus, type 2 (H)       lidocaine-prilocaine cream    EMLA    30 g    Apply topically as needed for other (Use 30-60 minutes prior to port access)    Malignant neoplasm of head of pancreas (H)       loratadine 10 MG  tablet    CLARITIN    30 tablet    Take 1 tablet (10 mg) by mouth daily Reported on 5/5/2017    Chronic seasonal allergic rhinitis, unspecified trigger       LORazepam 0.5 MG tablet    ATIVAN    30 tablet    Take 1 tablet (0.5 mg) by mouth every 4 hours as needed (Anxiety, Nausea/Vomiting or Sleep)    Malignant neoplasm of head of pancreas (H)       morphine 15 MG 12 hr tablet    MS CONTIN    60 tablet    Take 1 tablet (15 mg) by mouth every 12 hours    Malignant neoplasm of head of pancreas (H)       ondansetron 8 MG ODT tab    ZOFRAN-ODT    60 tablet    Take 1 tablet (8 mg) by mouth every 8 hours as needed for nausea    Malignant neoplasm of head of pancreas (H)       ONETOUCH PING METER REMOTE SUPPLIES Misc     1 each    Check your sugars twice daily, once when fasting and once 2 hours after eating.    Secondary diabetes mellitus (H)       oxyCODONE IR 5 MG tablet    ROXICODONE    100 tablet    Take 1 tablet (5 mg) by mouth every 4 hours as needed for moderate to severe pain    Malignant neoplasm of head of pancreas (H)       pantoprazole 20 MG EC tablet    PROTONIX    60 tablet    Take 1 tablet (20 mg) by mouth 2 times daily    Malignant neoplasm of head of pancreas (H)       polyethylene glycol powder    MIRALAX/GLYCOLAX    500 g    Take 17 g (1 capful) by mouth daily    Malignant neoplasm of head of pancreas (H)       potassium chloride SA 20 MEQ CR tablet    KLOR-CON    60 tablet    Take 2 tablets (40 mEq) by mouth daily    Malignant neoplasm of head of pancreas (H)       prochlorperazine 10 MG tablet    COMPAZINE    30 tablet    TAKE ONE TABLET BY MOUTH EVERY 6 HOURS AS NEEDED FOR NAUSEA AND VOMITING    Malignant neoplasm of head of pancreas (H)

## 2018-01-08 NOTE — PROGRESS NOTES
Infusion Nursing Note:  Shirin Muller presents today for Cycle 7 Day 1 Liposomal Irinotecan, Fluorouracil pump connection.    Patient seen by provider today: Yes: Ester Echavarria NP    Note: Patient presents to infusion with no new complaints. Atropine given x2, prior to and during Liposomal Irinotecan infusion for abdominal cramping. Patient able to tolerate remainder of Irinotecan infusion. Treatment parameter order placed by Ester Echavarria NP-Ok to give with ANC 1.3 on 1/8/18 only.     Intravenous Access:  Implanted Port.    Treatment Conditions:  Lab Results   Component Value Date    HGB 10.2 01/08/2018     Lab Results   Component Value Date    WBC 2.2 01/08/2018      Lab Results   Component Value Date    ANEU 1.3 01/08/2018     Lab Results   Component Value Date     01/08/2018      Lab Results   Component Value Date     01/08/2018                   Lab Results   Component Value Date    POTASSIUM 3.6 01/08/2018           Lab Results   Component Value Date    MAG 1.5 11/13/2017            Lab Results   Component Value Date    CR 0.71 01/08/2018                   Lab Results   Component Value Date    FANNY 8.7 01/08/2018                Lab Results   Component Value Date    BILITOTAL 0.6 01/08/2018           Lab Results   Component Value Date    ALBUMIN 3.4 01/08/2018                    Lab Results   Component Value Date    ALT 35 01/08/2018           Lab Results   Component Value Date    AST 43 01/08/2018     Results reviewed, labs DID NOT MEET treatment parameters, see above.      Post Infusion Assessment:  Patient tolerated infusion without incident.  Blood return noted pre and post infusion.  Site patent and intact, free from redness, edema or discomfort.  No evidence of extravasations.    Prior to discharge: Port is secured in place with tegaderm and flushed with 10cc NS with positive blood return noted.  Continuous home infusion Dosi-Fuser pump connected.    All connectors secured in  place and clamps taped open. Connections and pump double checked with Columba Arreguin RN.   Patient instructed to call our clinic or [facility] with any questions or concerns at home.  Patient verbalized understanding.    Patient set up for pump disconnect at home with [facility] on 1/10/18 at 1445 confirmed by Sabine.        Discharge Plan:   Prescription refills given for Ativan, Claritin, Potassium Chloride, Miralax. Per pharmacy, too soon for MS Contin refill.  Discharge instructions reviewed with: Patient.  Patient verbalized understanding of discharge instructions and all questions answered.  Copy of AVS reviewed with patient.  Patient will return 1/19/18 for next appointment.  Patient discharged in stable condition accompanied by: friend.  Face to Face time: 0.    JUAN CORREA RN

## 2018-01-08 NOTE — LETTER
1/8/2018       RE: Shirin Muller  [address] [facility] [address]     Dear Colleague,    Thank you for referring your patient, Shirin Muller, to the [facility]. Please see a copy of my visit note below.    Oncology/Hematology Visit Note  Jan 8, 2018       Reason for Visit: Follow up of metastatic pancreatic cancer, prior to C6 liposomal irinotecan and 5FU    History of Present Illness: Shirin Muller is a 57 year old female with metastatic pancreatic cancer with known liver metastases. She is currently undergoing treatment with liposomal irinotecan and 5FU. Her oncologic history is as follows:    She was diagnosed with pancreatic cancer in the spring of 2016 after presenting with a 2 to 3-month history of abdominal pain and weight loss. She initiated on treatment with gemcitabine and Abraxane on 05/02/2016 and continued on that until December 2016 when she was found to have progressive metastatic disease involving the liver. She was then initiated on FOLFIRINOX on 12/20/16.  In January 2017, she was found to have a GI bleed secondary to a bleeding duodenal ulcer and infiltrating mass in the duodenum. The ulcer was injected and clipped. She was found to be H. Pylori positive and was initiated on therapy with PPI, carafate, amoxicillin and clarithromycin. She had biliary obstruction in April 2017 and underwent ERCP and  biliary stent placement  Dr. Braden. She was admitted on 8/3/17 with sepsis/cholangitis. Blood cultures grew klebsiella penumonia and streptococcus angiosos. She had an ERCP on 8/3 demonstrating an occluded stent with pus, stone and sludge. The duct was dilated and a new stent placed. She was treated with IV Vanco and Zosyn, repeat BC were negative. She was discharged on levaquin and augmentin. She also has been having intermittent vaginal bleeding with unknown etiology. She was restarted on FOLFIRINOX on 8/18/17. Due to  neuropathy, she was switched to liposomal irinotecan and 5FU every 2 weeks on 9/18/17. She has received 6 cycles so far (last dose 12/19).        Interval History:  Ms. Muller returns to clinic today with her sister. She continues to have about 1 week of nausea, vomiting, and anorexia following each chemo treatment. She has been taking compazine and zofran for nausea. She states she tried not to vomit, but doesn't want to try to recall how often she did because this is unpleasant for her. Her appetite continues to be low. She eats about 50% of 3 meals/day, and about 3 ensure supplements per day. She is taking miralax about 3 times/week for constipation, and having a BM every 4-5 days. She has stable abdominal pain that wraps around waist to her back for which she takes MS Contin BID and oxycodone, about 2 pills per day. She denies any dysphagia. She has had no mucositis. She has chronic numbness in bilateral hands and feet that has been stable. She states she sometimes drops things from her hands, and that the numbness impairs her ability to perform some ADLs. Her numbness does affect her balance somewhat while walking, but she denies any falls. Denies any pain with neuropathy.    She is not having any pain at the moment. She states overall she feels a little stronger, but she is still resting most of the day. She has mild dyspnea when climbing stairs in her home, but this is stable. Denies chest pain.    Review of Systems:  Patient denies fevers, chills, headaches, dizziness, vision or hearing changes, new lumps or bumps, cough, dysuria, edema, bleeding issues, or rash.    Current Outpatient Prescriptions   Medication Sig Dispense Refill     docusate sodium (DOK) 100 MG capsule Take 1 capsule (100 mg) by mouth daily 100 capsule 1     morphine (MS CONTIN) 15 MG 12 hr tablet Take 1 tablet (15 mg) by mouth every 12 hours 60 tablet 0     oxyCODONE IR (ROXICODONE) 5 MG tablet Take 1 tablet (5 mg) by mouth every 4  hours as needed for moderate to severe pain 100 tablet 0     prochlorperazine (COMPAZINE) 10 MG tablet TAKE ONE TABLET BY MOUTH EVERY 6 HOURS AS NEEDED FOR NAUSEA AND VOMITING 30 tablet 2     ondansetron (ZOFRAN-ODT) 8 MG ODT tab Take 1 tablet (8 mg) by mouth every 8 hours as needed for nausea 60 tablet 6     loratadine (CLARITIN) 10 MG tablet Take 1 tablet (10 mg) by mouth daily Reported on 5/5/2017 30 tablet 6     polyethylene glycol (MIRALAX/GLYCOLAX) powder Take 17 g (1 capful) by mouth daily 119 g 11     pantoprazole (PROTONIX) 20 MG EC tablet Take 1 tablet (20 mg) by mouth 2 times daily 60 tablet 3     Nutritional Supplements (ENSURE CLEAR) LIQD Take 1 Bottle by mouth 3 times daily 90 Bottle 6     dronabinol (MARINOL) 5 MG capsule Take 1 capsule (5 mg) by mouth 3 times daily (before meals) Taking once daily 90 capsule 0     insulin glargine (LANTUS SOLOSTAR) 100 UNIT/ML injection 10 units subcutaneously daily at 8pm. 15 mL 3     insulin pen needle (BD EMMANUEL U/F) 32G X 4 MM Use one daily or as directed. 100 each prn     blood glucose monitoring (ACCU-CHEK FASTCLIX) lancets Use to test blood sugar two times daily or as directed. 102 each 3     Blood Glucose Monitoring Suppl (ONETOUCH PING METER REMOTE) SUPPLIES MISC Check your sugars twice daily, once when fasting and once 2 hours after eating. 1 each 3     potassium chloride SA (KLOR-CON) 20 MEQ CR tablet Take 2 tablets (40 mEq) by mouth daily 60 tablet 3     LORazepam (ATIVAN) 0.5 MG tablet Take 1 tablet (0.5 mg) by mouth every 4 hours as needed (Anxiety, Nausea/Vomiting or Sleep) 30 tablet 3     amylase-lipase-protease (CREON) 54940 UNITS CPEP Take 2 capsules (72,000 Units) by mouth 3 times daily (with meals) 180 capsule 1     diltiazem 2% in PLO cream, FV COMPOUNDED, 2% GEL Apply topically daily and as needed to external hemorrhoids 30 g 0     lidocaine-prilocaine (EMLA) cream Apply topically as needed for other (Use 30-60 minutes prior to port access) 30 g 0        Physical Examination:  /64 (BP Location: Right arm, Patient Position: Sitting, Cuff Size: Adult Regular)  Pulse 97  Temp 98.8  F (37.1  C) (Oral)  Resp 18  Wt 78.5 kg (173 lb 1.6 oz)  SpO2 98%  BMI 24.84 kg/m2  Wt Readings from Last 10 Encounters:   01/08/18 78.5 kg (173 lb 1.6 oz)   12/19/17 78.7 kg (173 lb 9.6 oz)   11/27/17 77.7 kg (171 lb 6.4 oz)   11/22/17 78.5 kg (173 lb)   11/20/17 79.4 kg (175 lb)   11/16/17 80.2 kg (176 lb 14.4 oz)   11/13/17 77.1 kg (170 lb)   11/09/17 77.8 kg (171 lb 9.6 oz)   11/07/17 76.4 kg (168 lb 6.4 oz)   11/01/17 77.2 kg (170 lb 3.2 oz)     Constitutional: Well-appearing female in no acute distress.  Eyes: EOMI, PERRL. No scleral icterus.  ENT: Oral mucosa is moist without lesions or thrush. Hyperpigmented changes throughout tongue. Thyroid exam deferred today  Lymphatic: Neck is supple without cervical or supraclavicular lymphadenopathy.  Cardiovascular: Regular rate and rhythm. No murmurs, gallops, or rubs. No edema in BLEs  Respiratory: Clear to auscultation bilaterally. No wheezes or crackles.  Gastrointestinal: Bowel sounds present. Abdomen soft, non-tender, with mild ascites. No palpable hepatosplenomegaly or masses.   Neurologic: Cranial nerves II through XII are grossly intact.  Skin: No rashes, petechiae, or bruising noted on exposed skin.  Psych: Flat affect. Limited eye contact.  Laboratory Data:    Results for NATALIIA IBARRA (MRN [id number]) as of 1/8/2018 13:31   1/8/2018 12:32   Sodium 136   Potassium 3.6   Chloride 103   Carbon Dioxide 27   Urea Nitrogen 10   Creatinine 0.71   GFR Estimate 84   GFR Estimate If Black >90   Calcium 8.7   Anion Gap 6   Albumin 3.4   Protein Total 8.3   Bilirubin Total 0.6   Alkaline Phosphatase 282 (H)   ALT 35   AST 43   Glucose 144 (H)   WBC 2.2 (L)   Hemoglobin 10.2 (L)   Hematocrit 32.4 (L)   Platelet Count 108 (L)   RBC Count 3.51 (L)   MCV 92   MCH 29.1   MCHC 31.5   RDW 14.6   Diff Method  Automated Method   % Neutrophils 59.7   % Lymphocytes 23.5   % Monocytes 14.9   % Eosinophils 1.4   % Basophils 0.5   % Immature Granulocytes 0.0   Nucleated RBCs 0   Absolute Neutrophil 1.3 (L)   Absolute Lymphocytes 0.5 (L)   Absolute Monocytes 0.3   Absolute Eosinophils 0.0   Absolute Basophils 0.0   Abs Immature Granulocytes 0.0   Absolute Nucleated RBC 0.0       Assessment and Plan:  1. Metastatic pancreatic adenocarcinoma, currently on liposomal irinotecan + 5FU  Presents for cycle 7. Labs reviewed. ANC 1.3 and borderline, but no signs/symptoms of infection. Otherwise tolerating reasonably well. Better with the 3 week cycle than 2 week cycles. Will proceed with cycle 7  --Will have re-staging scans on 1/19--Chest CT, MR abdomen and labs. Visit with Dr. Gonsales on 1/22. Labs, infusion for C8 on 1/29/18.    2. Chemotherapy induced-nausea/vomiting, improved  Has been taking scheduled Zofran and Compazine yet still struggles with CINV. Continue PRN Zofran, Compazine, and ativan  --Will add aloxi and Emend to Dex for pre-medication to this cycle and future cycles as Shirin felt this was beneficial.    3. FEN  Weight stable, though patient admits to not eating or drinking well the week following chemo.    4. Hypokalemia  --Potassium 3.6 today and has been WNL since 11/20  -Continue potassium 20meq BID at home    5. Peripheral neuropathy  --stable, grade 2. 2/2 chemotherapy. No pain, but persistent numbness in bilateral hands/feet that is limited some of her ADLs.    6. Constipation  -she is taking miralax prn. Encouraged miralax daily    7. Cancer related pain  No complaints today. Continue MS contin 15mg q12h, and oxycodone prn. Refill MS Contin given today.    8. Diabetes, HgbA1C 9.2  --Followed by endocrine. On Lantus 10 units daily. BS 99-140s at home per patient.    9. Thyroid nodule  Enlarged thyroid. TSH on 11/9 normal at 2.56. History of Grave's disease >20 years ago and s/p HALL.  -- Per endocrinology note  on 11/22, she is asymptomatic and euthyroid. In context of metastatic pancreatic cancer, no further management recommended at this time.      Barbie House PA-C  [facility] [address] [address]  [phone]    ROS    The patient was seen in conjunction with Barbie House PA-C. I have reviewed the note and agree with the above findings and plan. TW      Again, thank you for allowing me to participate in the care of your patient.      Sincerely,    TEE Villanueva CNP

## 2018-01-08 NOTE — NURSING NOTE
"Oncology Rooming Note    January 8, 2018 12:46 PM   Shirin Muller is a 57 year old female who presents for:    Chief Complaint   Patient presents with     Port Draw     Labs drawn via port by RN. Line flushed and hep locked. VS taken.     Initial Vitals: /64 (BP Location: Right arm, Patient Position: Sitting, Cuff Size: Adult Regular)  Pulse 97  Temp 98.8  F (37.1  C) (Oral)  Resp 18  Wt 78.5 kg (173 lb 1.6 oz)  SpO2 98%  BMI 24.84 kg/m2 Estimated body mass index is 24.84 kg/(m^2) as calculated from the following:    Height as of 12/19/17: 1.778 m (5' 10\").    Weight as of this encounter: 78.5 kg (173 lb 1.6 oz). Body surface area is 1.97 meters squared.  No Pain (0) Comment: Data Unavailable   No LMP recorded. Patient is postmenopausal.  Allergies reviewed: Yes  Medications reviewed: Yes    Medications: Medication refills not needed today.  Pharmacy name entered into [facility]:    [place], [place] - [address] [place] [address] [phone]  [id number] - [place], [place] - [address] [place] [address]    Clinical concerns: Patient states there are no new concerns to discuss with provider.  Ester Echavarria was not notified.       6 minutes for nursing intake (face to face time)     Yohana Berg CMA              "

## 2018-01-09 NOTE — PROGRESS NOTES
This is a recent snapshot of the patient's [facility] medical record.  For current drug dose and complete information and questions, call [phone] or In Basket pool, fv home infusion ([id number])  CSN Number:  [id number]

## 2018-01-11 NOTE — PROGRESS NOTES
This is a recent snapshot of the patient's [facility] medical record.  For current drug dose and complete information and questions, call [phone] or In Basket pool, fv home infusion ([id number])  CSN Number:  [id number]

## 2018-01-19 NOTE — DISCHARGE INSTRUCTIONS
MRI Contrast Discharge Instructions    The IV contrast you received today will pass out of your body in your  urine. This will happen in the next 24 hours. You will not feel this process.  Your urine will not change color.    Drink at least 4 extra glasses of water or juice today (unless your doctor  has restricted your fluids). This reduces the stress on your kidneys.  You may take your regular medicines.    If you are on dialysis: It is best to have dialysis today.    If you have a reaction: Most reactions happen right away. If you have  any new symptoms after leaving the hospital (such as hives or swelling),  call your hospital at the correct number below. Or call your family doctor.  If you have breathing distress or wheezing, call 911.    Special instructions: ***    I have read and understand the above information.    Signature:______________________________________ Date:___________    Staff:__________________________________________ Date:___________     Time:__________    [facility] Departments:    ___[place]: [phone]  ___[facility]: [phone]  ___[place]: [phone] ___[facility]: [phone]  ___[facility]: [phone]  ___[facility]: [phone]  ___[phone]  ___[facility]: [phone]  ___[phone]

## 2018-01-19 NOTE — NURSING NOTE
Chief Complaint   Patient presents with     Port Draw     Labs drawn from port (accessed in CT) by RN. Line flushed with saline and heparin.     Labs collected from port (accessed in CT) by RN, line flushed with saline and heparin.    Debbie De La Cruz RN

## 2018-01-24 NOTE — PROGRESS NOTES
Infusion Nursing Note:  Shirin Muller presents today for Port Draw.    Patient seen by provider today: Yes: Dr Gonsales    Treatment Conditions:  Not Applicable.    Intravenous Access:  Implanted Port.  Access dc'd at time of discharge.      Note:   Pt saw Dr Gonsales today, labs drawn per MD order.  + Blood return from PORT.  D/C in care of self.  Pt will return as determined at checkout.       Alena Maya RN

## 2018-01-24 NOTE — Clinical Note
1/24/2018       RE: Shirin Muller  [address] [facility] [address]     Dear Colleague,    Thank you for referring your patient, Shirin Muller, to the [facility]. Please see a copy of my visit note below.    Ms. Muller here today in follow-up of metastatic pancreatic cancer.    She is here today for a planned response assessment while on 5-FU and liposomal irinotecan. She has previously had folfirinox which she did not tolerate, and gemcitabine plus Abraxane which should work well for quite a while but which she eventually became resistant. At our last evaluation she had stable to improved disease on her current regimen but with a mild elevation of her tumor marker. Over the last couple months she's continued to have difficulty with the treatment was significant nausea and anorexia and the days after her chemotherapy but is been eating well enough to maintain her weight. She tells me she is still fairly active and not to limited much she is able to do. I would note though that she is here by herself today and historically she under place her level of illness and her friends who would normally be with her can sometimes give a very different picture. She is not having any significant pain. She is not having any fevers chills or sweats. She continues to make regular use of MiraLAX to have bowel movements a couple times per week. She has not had any blood in her stools. The remainder of a 10 point review of systems otherwise negative.    On exam she appears well and her vital signs as noted in the chart are unremarkable. Her weight today is stable.  She has no scleral icterus and no visible lesion the oropharynx.  She has no palpable adenopathy in the neck or supraclavicular spaces.  Her lungs are clear to auscultation a dose to percussion.  Heart rate and rhythm are regular.  Her abdomen is soft and nontender without palpable mass organomegaly, and no  demonstrable ascites.  She has no peripheral edema and no tenderness in her calves or thighs.  Her speech is fluent, her cranial nerves are grossly intact, and she is in unremarkable gait and station.    Reviewed with her her lab work and CT scan and MR scan. Her blood counts are mildly depressed from last week. Electrolytes and renal function are normal. Her albumin is newly depressed at 3.2 and her liver enzymes are nearly normal with a mildly elevated alkaline phosphatase of 365. Her bilirubin is normal. Still pending a repeat blood counts from today and a CA-19-9 level. On her CT scan her ill-defined primary tumor encasing her abdominal vasculature is stable to perhaps slightly worse. She has a number of new small lesions in her liver which almost certainly represent metastasis. I reviewed this at length with radiology who could make 100% definitive assortment but were fairly sure these were not abscesses.    Assessment/plan: Progressive metastatic pancreatic cancer. I'm waiting for repeat lab work to make sure her white count is not markedly elevated which might make me more suspicious of abscesses and that her tumor marker is rising as I would expect if she is truly got disease progression. At this point there really are not any other standard of care options for her. I reviewed with her that the standard approach would be enrollment in hospice care with ongoing symptom management, but no cancer directed therapy.     If she still wanted to pursue active treatment which she stated strongly that she does, she might be a candidate for experimental therapy. I think she would be eligible for our bifidobacterium study if radiology feels that he would be able to biopsy the new liver lesion (this is a study requirement) she might also be a candidate for one of her immunotherapy studies with the only one that I think has an open slot requires direct injection of the study agent genetically modified virus into 1 lesion  and then to have at least another measurable lesion to monitor for response. I don't think she'll meet those imaging criteria currently. She expressed interest in the bifidobacterium study. I reviewed with her at length the nature of clinical trials and the uncertainties they entail. I explained this is a phase I study in which she would know what she was getting, but we have little data to be able to give her a sense of the absolute risk or benefit. I reviewed with her the expected fevers and chills during the infusion of the bacteria in the risk of more significant reactions. We talked some about the other aspects of the study which would entail an oral precursor drug, and IV infusions of maltose.    At the end of this long discussion she expressed a good understanding. I will need to review her films with interventional radiology to be sure her tumor would be amenable to biopsy down the road. And I will have her screened for eligibility for the trial. I suggested we have another visit with her friends presents to help her with her thinking about what she wants to do.    Again, thank you for allowing me to participate in the care of your patient.      Sincerely,    Demond Gonsales MD

## 2018-01-24 NOTE — MR AVS SNAPSHOT
After Visit Summary   1/24/2018    Shirin Muller    MRN: [id number]           Patient Information     Date Of Birth          1960        Visit Information        Provider Department      1/24/2018 5:30 PM [facility] 20 ATC; [facility]        Today's Diagnoses     Malignant neoplasm of head of pancreas (H)           Follow-ups after your visit        Who to contact     If you have questions or need follow up information about today's clinic visit or your schedule please contact [facility] directly at [phone].  Normal or non-critical lab and imaging results will be communicated to you by [employer], letter or phone within 4 business days after the clinic has received the results. If you do not hear from us within 7 days, please contact the clinic through [employer] or phone. If you have a critical or abnormal lab result, we will notify you by phone as soon as possible.  Submit refill requests through [employer] or call your pharmacy and they will forward the refill request to us. Please allow 3 business days for your refill to be completed.          Additional Information About Your Visit        MyChart Information     [employer] gives you secure access to your electronic health record. If you see a primary care provider, you can also send messages to your care team and make appointments. If you have questions, please call your primary care clinic.  If you do not have a primary care provider, please call [phone] and they will assist you.        Care EveryWhere ID     This is your Care EveryWhere ID. This could be used by other organizations to access your [facility] medical records  [id number]         Blood Pressure from Last 3 Encounters:   01/24/18 104/68   01/08/18 100/64   12/19/17 109/63    Weight from Last 3 Encounters:   01/24/18 77.5 kg (170 lb 13.7 oz)   01/08/18 78.5 kg (173 lb 1.6 oz)   12/19/17 78.7 kg (173 lb 9.6 oz)               We Performed the Following     Cancer antigen 19-9     CBC with platelets differential     Comprehensive metabolic panel     INR          Where to get your medicines      These medications were sent to [place], [place] - [address]  [address], [facility] [address]    Hours:  TRANSPLANT PHONE NUMBER [phone] Phone:  [phone]     docusate sodium 100 MG capsule    prochlorperazine 10 MG tablet         Some of these will need a paper prescription and others can be bought over the counter.  Ask your nurse if you have questions.     Bring a paper prescription for each of these medications     dronabinol 5 MG capsule    LORazepam 0.5 MG tablet          Primary Care Provider    [facility]       No address on file        Equal Access to Services     JAI CORDERO : Maxx Spence, johnson flower, qagretel kaalmaolamide frazier, luis e lora. So [facility] [phone].    ATENCIÓN: Si habla español, tiene a bernal disposición servicios gratuitos de asistencia lingüística. Llame al [phone].    We comply with applicable federal civil rights laws and [place] laws. We do not discriminate on the basis of race, color, national origin, age, disability, sex, sexual orientation, or gender identity.            Thank you!     Thank you for choosing [facility]  for your care. Our goal is always to provide you with excellent care. Hearing back from our patients is one way we can continue to improve our services. Please take a few minutes to complete the written survey that you may receive in the mail after your visit with us. Thank you!             Your Updated Medication List - Protect others around you: Learn how to safely use, store and throw away your medicines at www.disposemymeds.org.          This list is accurate as of 1/24/18  5:53 PM.  Always use your most recent med  list.                   Brand Name Dispense Instructions for use Diagnosis    amylase-lipase-protease 25657 UNITS Cpep    CREON    180 capsule    Take 2 capsules (72,000 Units) by mouth 3 times daily (with meals)    Malignant neoplasm of head of pancreas (H)       blood glucose monitoring lancets     102 each    Use to test blood sugar two times daily or as directed.    Diabetes mellitus, type 2 (H)       diltiazem 2% in PLO cream (FV COMPOUNDED) 2% Gel     30 g    Apply topically daily and as needed to external hemorrhoids    External hemorrhoids       docusate sodium 100 MG capsule    DOK    100 capsule    Take 1 capsule (100 mg) by mouth daily    External hemorrhoids       dronabinol 5 MG capsule    MARINOL    90 capsule    Take 1 capsule (5 mg) by mouth 3 times daily (before meals) Taking once daily    Anorexia       ENSURE CLEAR Liqd     90 Bottle    Take 1 Bottle by mouth 3 times daily    Malignant neoplasm of head of pancreas (H)       insulin glargine 100 UNIT/ML injection    LANTUS SOLOSTAR    15 mL    10 units subcutaneously daily at 8pm.    Diabetes mellitus, type 2 (H)       insulin pen needle 32G X 4 MM    BD EMMANUEL U/F    100 each    Use one daily or as directed.    Diabetes mellitus, type 2 (H)       lidocaine-prilocaine cream    EMLA    30 g    Apply topically as needed for other (Use 30-60 minutes prior to port access)    Malignant neoplasm of head of pancreas (H)       loratadine 10 MG tablet    CLARITIN    30 tablet    Take 1 tablet (10 mg) by mouth daily Reported on 5/5/2017    Chronic seasonal allergic rhinitis, unspecified trigger       LORazepam 0.5 MG tablet    ATIVAN    30 tablet    Take 1 tablet (0.5 mg) by mouth every 4 hours as needed (Anxiety, Nausea/Vomiting or Sleep)    Malignant neoplasm of head of pancreas (H)       morphine 15 MG 12 hr tablet    MS CONTIN    60 tablet    Take 1 tablet (15 mg) by mouth every 12 hours    Malignant neoplasm of head of pancreas (H)       ondansetron 8 MG  ODT tab    ZOFRAN-ODT    60 tablet    Take 1 tablet (8 mg) by mouth every 8 hours as needed for nausea    Malignant neoplasm of head of pancreas (H)       ONETOUCH PING METER REMOTE SUPPLIES Misc     1 each    Check your sugars twice daily, once when fasting and once 2 hours after eating.    Secondary diabetes mellitus (H)       oxyCODONE IR 5 MG tablet    ROXICODONE    100 tablet    Take 1 tablet (5 mg) by mouth every 4 hours as needed for moderate to severe pain    Malignant neoplasm of head of pancreas (H)       pantoprazole 20 MG EC tablet    PROTONIX    60 tablet    Take 1 tablet (20 mg) by mouth 2 times daily    Malignant neoplasm of head of pancreas (H)       polyethylene glycol powder    MIRALAX/GLYCOLAX    500 g    Take 17 g (1 capful) by mouth daily    Malignant neoplasm of head of pancreas (H)       potassium chloride SA 20 MEQ CR tablet    KLOR-CON    60 tablet    Take 2 tablets (40 mEq) by mouth daily    Malignant neoplasm of head of pancreas (H)       prochlorperazine 10 MG tablet    COMPAZINE    30 tablet    TAKE ONE TABLET BY MOUTH EVERY 6 HOURS AS NEEDED FOR NAUSEA AND VOMITING    Malignant neoplasm of head of pancreas (H)

## 2018-01-24 NOTE — NURSING NOTE
"Oncology Rooming Note    January 24, 2018 4:39 PM   Shirin Muller is a 57 year old female who presents for:    Chief Complaint   Patient presents with     Oncology Clinic Visit     New Consultation for Pancreatic Ca      Initial Vitals: /68  Pulse 94  Temp 98.4  F (36.9  C) (Oral)  Resp 18  Ht 1.8 m (5' 10.87\")  Wt 77.5 kg (170 lb 13.7 oz)  SpO2 100%  BMI 23.92 kg/m2 Estimated body mass index is 23.92 kg/(m^2) as calculated from the following:    Height as of this encounter: 1.8 m (5' 10.87\").    Weight as of this encounter: 77.5 kg (170 lb 13.7 oz). Body surface area is 1.97 meters squared.  No Pain (0) Comment: Data Unavailable   No LMP recorded. Patient is postmenopausal.  Allergies reviewed: Yes  Medications reviewed: Yes    Medications: MEDICATION REFILLS NEEDED TODAY. Provider was notified.  Pharmacy name entered into [facility]:    [facility] [place], [place] - [address]  [id number] - [place], [place] - [address] [place] [address]    Clinical concerns: Refills needed , Ativan, Compazine , Docusate , Dronabinol Dru was notified.    8 minutes for nursing intake (face to face time)     Stephany Dukes MA              "

## 2018-01-24 NOTE — LETTER
1/24/2018      RE: Shirin Muller  [address] [facility] [address]       Ms. Muller is here today in follow-up of metastatic pancreatic cancer.    She is here today for a planned response assessment while on 5-FU and liposomal irinotecan. She has previously had folfirinox which she did not tolerate, and gemcitabine plus Abraxane which worked well for quite a while but to which she eventually became resistant. At our last evaluation she had stable to improved disease on her current regimen, but with a mild elevation of her tumor marker. Over the last couple months she's continued to have difficulty with the treatment. She has significant nausea and anorexia in the days after her chemotherapy but has been eating well enough to maintain her weight. She tells me she is still fairly active and not limited much in what she is able to do. I would note though that she is here by herself today and historically she underplays her level of illness and her friends who would normally be with her can sometimes give a very different picture. She is not having any significant pain. She is not having any fevers chills or sweats. She continues to make regular use of MiraLAX to have bowel movements a couple times per week. She has not had any blood in her stools. The remainder of a 10 point review of systems otherwise negative.    On exam she appears well and her vital signs as noted in the chart are unremarkable. Her weight today is stable.  She has no scleral icterus and no visible lesion the oropharynx.  She has no palpable adenopathy in the neck or supraclavicular spaces.  Her lungs are clear to auscultation a dose to percussion.  Heart rate and rhythm are regular.  Her abdomen is soft and nontender without palpable mass organomegaly, and no demonstrable ascites.  She has no peripheral edema and no tenderness in her calves or thighs.  Her speech is fluent, her cranial nerves are grossly intact, and she  is in unremarkable gait and station.    Reviewed with her her lab work and CT scan and MR scan. Her blood counts are mildly depressed from last week. Electrolytes and renal function are normal. Her albumin is newly depressed at 3.2 and her liver enzymes are nearly normal with a mildly elevated alkaline phosphatase of 365. Her bilirubin is normal. Still pending are repeat blood counts from today and a CA-19-9 level. On her CT scan her ill-defined primary tumor encasing her abdominal vasculature is stable to perhaps slightly worse. She has a number of new small lesions in her liver which almost certainly represent metastasis-the largest adjacent to the gallbladder is about 1.5cm. I reviewed this at length with radiology who could make 100% definitive assessment but was fairly sure these were not abscesses.    Assessment/plan: Progressive metastatic pancreatic cancer. I'm waiting for repeat lab work to make sure her white count is not markedly elevated which might make me more suspicious of abscesses and that her tumor marker is rising as I would expect if she is truly has disease progression. At this point there really are not any other standard of care options for her. I reviewed with her that the standard approach would be enrollment in hospice care with ongoing symptom management, but no cancer directed therapy.     If she still wanted to pursue active treatment, which she stated strongly that she does, she might be a candidate for experimental therapy. I think she would be eligible for our bifidobacterium study if radiology feels that he would be able to biopsy the new liver lesion (this is a study requirement) She might also be a candidate for one of her immunotherapy studies with the only one that I think has an open slot requires direct injection of the study agent genetically modified virus into 1 lesion and then to have at least another measurable lesion to monitor for response. I don't think she'll meet  those imaging criteria currently. She expressed interest in the bifidobacterium study. I reviewed with her at length the nature of clinical trials and the uncertainties they entail. I explained this is a phase I study in which she would know what she was getting, but we have little data to be able to give her a sense of the absolute risk or benefit. I reviewed with her the expected fevers and chills during the infusion of the bacteria in the risk of more significant reactions. We talked some about the other aspects of the study which would entail an oral precursor drug, and IV infusions of maltose.    At the end of this long discussion she expressed a good understanding. I will need to review her films with interventional radiology to be sure her tumor would be amenable to biopsy down the road. And I will have her screened for eligibility for the trial. I suggested we have another visit with her friends presents to help her with her thinking about what she wants to do.    Demond Gonsales MD

## 2018-01-24 NOTE — MR AVS SNAPSHOT
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      Your next 10 appointments already scheduled     Feb 14, 2018  4:30 PM CST   (Arrive by 4:15 PM)   RETURN DIABETES with hCloe Flowers MD   [facility] ([facility])    [address] [place] [id number] [phone]              Who to contact     If you have questions or need follow up information about today's clinic visit or your schedule please contact [facility] directly at [phone].  Normal or non-critical lab and imaging results will be communicated to you by MyChart, letter or phone within 4 business days after the clinic has received the results. If you do not hear from us within 7 days, please contact the clinic through MyChart or phone. If you have a critical or abnormal lab result, we will notify you by phone as soon as possible.  Submit refill requests through [employer] or call your pharmacy and they will forward the refill request to us. Please allow 3 business days for your refill to be completed.          Additional Information About Your Visit        MyChart Information     [employer] gives you secure access to your electronic health record. If you see a primary care provider, you can also send messages to your care team and make appointments. If you have questions, please call your primary care  "clinic.  If you do not have a primary care provider, please call [phone] and they will assist you.        Care EveryWhere ID     This is your Care EveryWhere ID. This could be used by other organizations to access your [place] [facility] medical records  [id number]        Your Vitals Were     Pulse Temperature Respirations Height Pulse Oximetry BMI (Body Mass Index)    94 98.4  F (36.9  C) (Oral) 18 1.8 m (5' 10.87\") 100% 23.92 kg/m2       Blood Pressure from Last 3 Encounters:   01/24/18 104/68   01/08/18 100/64   12/19/17 109/63    Weight from Last 3 Encounters:   01/24/18 77.5 kg (170 lb 13.7 oz)   01/08/18 78.5 kg (173 lb 1.6 oz)   12/19/17 78.7 kg (173 lb 9.6 oz)                 Where to get your medicines      These medications were sent to [address] [address]    Hours:  TRANSPLANT PHONE NUMBER [phone] Phone:  [phone]     docusate sodium 100 MG capsule    prochlorperazine 10 MG tablet         Some of these will need a paper prescription and others can be bought over the counter.  Ask your nurse if you have questions.     Bring a paper prescription for each of these medications     dronabinol 5 MG capsule    LORazepam 0.5 MG tablet          Primary Care Provider    [facility]       No address on file        Equal Access to Services     JAI CORDERO AH: Hadii aad ku hadasho Sojim, waaxda luqadaha, qaybta kaalmada adekaryaolamide, luis e lora. So [facility] [phone].    ATENCIÓN: Si habla español, tiene a bernal disposición servicios gratuitos de asistencia lingüística. Llame al [phone].    We comply with applicable federal civil rights laws and [place] laws. We do not discriminate on the basis of race, color, national origin, age, disability, sex, sexual orientation, or gender identity.            Thank you!     Thank you for choosing [facility]  for your care. Our goal is always to provide you with excellent care. Hearing back from our patients is one way we can continue to improve our services. Please take a few minutes to complete the written survey that you may receive in the mail after your visit with us. Thank you!             Your Updated Medication List - Protect others around you: Learn how to safely use, store and throw away your medicines at www.disposemymeds.org.          This list is accurate as of 1/24/18 11:59 PM.  Always use your most recent med list.                   Brand Name Dispense Instructions for use Diagnosis    amylase-lipase-protease 04758 UNITS Cpep    CREON    180 capsule    Take 2 capsules (72,000 Units) by mouth 3 times daily (with meals)    Malignant neoplasm of head of pancreas (H)       blood glucose monitoring lancets     102 each    Use to test blood sugar two times daily or as directed.    Diabetes mellitus, type 2 (H)       diltiazem 2% in PLO cream (FV COMPOUNDED) 2% Gel     30 g    Apply topically daily and as needed to external hemorrhoids    External hemorrhoids       docusate sodium 100 MG capsule    DOK    100 capsule    Take 1 capsule (100 mg) by mouth daily    External hemorrhoids       dronabinol 5 MG capsule    MARINOL    90 capsule    Take 1 capsule (5 mg) by mouth 3 times daily (before meals) Taking once daily    Anorexia       ENSURE CLEAR Liqd     90 Bottle    Take 1 Bottle by mouth 3 times daily    Malignant neoplasm of head of pancreas (H)       insulin glargine 100 UNIT/ML injection    LANTUS SOLOSTAR    15 mL    10 units subcutaneously daily at 8pm.    Diabetes mellitus, type 2 (H)       insulin pen needle 32G X 4 MM    BD EMMANUEL U/F    100 each    Use one daily or as directed.    Diabetes mellitus, type 2 (H)       lidocaine-prilocaine cream    EMLA    30 g    Apply topically as needed for other (Use 30-60 minutes prior to port access)    Malignant neoplasm of head of pancreas (H)        loratadine 10 MG tablet    CLARITIN    30 tablet    Take 1 tablet (10 mg) by mouth daily Reported on 5/5/2017    Chronic seasonal allergic rhinitis, unspecified trigger       LORazepam 0.5 MG tablet    ATIVAN    30 tablet    Take 1 tablet (0.5 mg) by mouth every 4 hours as needed (Anxiety, Nausea/Vomiting or Sleep)    Malignant neoplasm of head of pancreas (H)       morphine 15 MG 12 hr tablet    MS CONTIN    60 tablet    Take 1 tablet (15 mg) by mouth every 12 hours    Malignant neoplasm of head of pancreas (H)       ondansetron 8 MG ODT tab    ZOFRAN-ODT    60 tablet    Take 1 tablet (8 mg) by mouth every 8 hours as needed for nausea    Malignant neoplasm of head of pancreas (H)       ONETOUCH PING METER REMOTE SUPPLIES Misc     1 each    Check your sugars twice daily, once when fasting and once 2 hours after eating.    Secondary diabetes mellitus (H)       oxyCODONE IR 5 MG tablet    ROXICODONE    100 tablet    Take 1 tablet (5 mg) by mouth every 4 hours as needed for moderate to severe pain    Malignant neoplasm of head of pancreas (H)       pantoprazole 20 MG EC tablet    PROTONIX    60 tablet    Take 1 tablet (20 mg) by mouth 2 times daily    Malignant neoplasm of head of pancreas (H)       polyethylene glycol powder    MIRALAX/GLYCOLAX    500 g    Take 17 g (1 capful) by mouth daily    Malignant neoplasm of head of pancreas (H)       potassium chloride SA 20 MEQ CR tablet    KLOR-CON    60 tablet    Take 2 tablets (40 mEq) by mouth daily    Malignant neoplasm of head of pancreas (H)       prochlorperazine 10 MG tablet    COMPAZINE    30 tablet    TAKE ONE TABLET BY MOUTH EVERY 6 HOURS AS NEEDED FOR NAUSEA AND VOMITING    Malignant neoplasm of head of pancreas (H)

## 2018-01-25 NOTE — PROGRESS NOTES
Ms. Meyer is here today in follow-up of metastatic pancreatic cancer.    She is here today for a planned response assessment while on 5-FU and liposomal irinotecan. She has previously had folfirinox which she did not tolerate, and gemcitabine plus Abraxane which worked well for quite a while but to which she eventually became resistant. At our last evaluation she had stable to improved disease on her current regimen, but with a mild elevation of her tumor marker. Over the last couple months she's continued to have difficulty with the treatment. She has significant nausea and anorexia in the days after her chemotherapy but has been eating well enough to maintain her weight. She tells me she is still fairly active and not limited much in what she is able to do. I would note though that she is here by herself today and historically she underplays her level of illness and her friends who would normally be with her can sometimes give a very different picture. She is not having any significant pain. She is not having any fevers chills or sweats. She continues to make regular use of MiraLAX to have bowel movements a couple times per week. She has not had any blood in her stools. The remainder of a 10 point review of systems otherwise negative.    On exam she appears well and her vital signs as noted in the chart are unremarkable. Her weight today is stable.  She has no scleral icterus and no visible lesion the oropharynx.  She has no palpable adenopathy in the neck or supraclavicular spaces.  Her lungs are clear to auscultation a dose to percussion.  Heart rate and rhythm are regular.  Her abdomen is soft and nontender without palpable mass organomegaly, and no demonstrable ascites.  She has no peripheral edema and no tenderness in her calves or thighs.  Her speech is fluent, her cranial nerves are grossly intact, and she is in unremarkable gait and station.    Reviewed with her her lab work and CT scan and MR scan.  Her blood counts are mildly depressed from last week. Electrolytes and renal function are normal. Her albumin is newly depressed at 3.2 and her liver enzymes are nearly normal with a mildly elevated alkaline phosphatase of 365. Her bilirubin is normal. Still pending are repeat blood counts from today and a CA-19-9 level. On her CT scan her ill-defined primary tumor encasing her abdominal vasculature is stable to perhaps slightly worse. She has a number of new small lesions in her liver which almost certainly represent metastasis-the largest adjacent to the gallbladder is about 1.5cm. I reviewed this at length with radiology who could make 100% definitive assessment but was fairly sure these were not abscesses.    Assessment/plan: Progressive metastatic pancreatic cancer. I'm waiting for repeat lab work to make sure her white count is not markedly elevated which might make me more suspicious of abscesses and that her tumor marker is rising as I would expect if she is truly has disease progression. At this point there really are not any other standard of care options for her. I reviewed with her that the standard approach would be enrollment in hospice care with ongoing symptom management, but no cancer directed therapy.     If she still wanted to pursue active treatment, which she stated strongly that she does, she might be a candidate for experimental therapy. I think she would be eligible for our bifidobacterium study if radiology feels that he would be able to biopsy the new liver lesion (this is a study requirement) She might also be a candidate for one of her immunotherapy studies with the only one that I think has an open slot requires direct injection of the study agent genetically modified virus into 1 lesion and then to have at least another measurable lesion to monitor for response. I don't think she'll meet those imaging criteria currently. She expressed interest in the bifidobacterium study. I reviewed  with her at length the nature of clinical trials and the uncertainties they entail. I explained this is a phase I study in which she would know what she was getting, but we have little data to be able to give her a sense of the absolute risk or benefit. I reviewed with her the expected fevers and chills during the infusion of the bacteria in the risk of more significant reactions. We talked some about the other aspects of the study which would entail an oral precursor drug, and IV infusions of maltose.    At the end of this long discussion she expressed a good understanding. I will need to review her films with interventional radiology to be sure her tumor would be amenable to biopsy down the road. And I will have her screened for eligibility for the trial. I suggested we have another visit with her friends presents to help her with her thinking about what she wants to do.

## 2018-01-30 NOTE — PROGRESS NOTES
This is a recent snapshot of the patient's [facility] medical record.  For current drug dose and complete information and questions, call [phone] or In Basket pool, [facility] ([id number])  CSN Number:  [id number]

## 2018-02-09 NOTE — PROGRESS NOTES
Reason for Visit: seen on an add-on basis for evaluation of nausea/vomiting    Oncology HPI: Shirin Muller is a 57 year old woman with metastatic pancreatic cancer involving the liver. She is currently off of active treatment as she was found to have progression on prior treatment with gemcitabine and abraxane, FOLFIRINOX, and most recently liposomal irinotecan and 5FU. Her cancer treatment has been complicated by GI bleeding from an ulcer and infiltrating mass in the duodenum (January 2017). She was found to be positive for H. Pyolor and initiated on therapy with PPI, carafate, amoxicillin and clarithromycin.  In August 2017 she had biliar obstruction with complications of sepsis/cholangitis. BC grew klebsiella penumonia and streptococcus angiosos. She was treated with biliary stenting and antibiotics.      Interval history: Shirin has been noting increased vomiting. Unable to keep even small amounts of food down. Has noted increased reflux with meals, feels full and bloated. Vomits every night. No hemetemsis. No melena. Has been constipated. Using suppositories to have small stools. Notes that stools are lighter in color. Uses compazine and zofran, each twice daily, but no improvement in nausea. Feels weak and tired. Has dizziness today. Urinates frequently. No dysuria. No fevers/chills. No change to abdominal pain. Finds that morphine controls the pain well. No headaches, vision changes, focal weakness. Has chronic neuropathy that is unchanged.    Current Outpatient Prescriptions   Medication Sig Dispense Refill     ALVARADO CONTOUR NEXT test strip USE TO CHECK BLOOD SUGAR TWICE DAILY ( ONCE FASTING AND ONCE 2 HOURS AFTER EATING ) 100 each 11     order for DME Equipment being ordered: Toilet seat riser with handles 1 Units 0     prochlorperazine (COMPAZINE) 10 MG tablet TAKE ONE TABLET BY MOUTH EVERY 6 HOURS AS NEEDED FOR NAUSEA AND VOMITING 30 tablet 2     LORazepam (ATIVAN) 0.5 MG tablet Take 1 tablet (0.5  mg) by mouth every 4 hours as needed (Anxiety, Nausea/Vomiting or Sleep) 30 tablet 3     dronabinol (MARINOL) 5 MG capsule Take 1 capsule (5 mg) by mouth 3 times daily (before meals) Taking once daily 90 capsule 0     docusate sodium (DOK) 100 MG capsule Take 1 capsule (100 mg) by mouth daily 100 capsule 1     morphine (MS CONTIN) 15 MG 12 hr tablet Take 1 tablet (15 mg) by mouth every 12 hours 60 tablet 0     loratadine (CLARITIN) 10 MG tablet Take 1 tablet (10 mg) by mouth daily Reported on 5/5/2017 30 tablet 6     potassium chloride SA (KLOR-CON) 20 MEQ CR tablet Take 2 tablets (40 mEq) by mouth daily 60 tablet 3     polyethylene glycol (MIRALAX/GLYCOLAX) powder Take 17 g (1 capful) by mouth daily 500 g 11     oxyCODONE IR (ROXICODONE) 5 MG tablet Take 1 tablet (5 mg) by mouth every 4 hours as needed for moderate to severe pain 100 tablet 0     ondansetron (ZOFRAN-ODT) 8 MG ODT tab Take 1 tablet (8 mg) by mouth every 8 hours as needed for nausea 60 tablet 6     pantoprazole (PROTONIX) 20 MG EC tablet Take 1 tablet (20 mg) by mouth 2 times daily 60 tablet 3     Nutritional Supplements (ENSURE CLEAR) LIQD Take 1 Bottle by mouth 3 times daily 90 Bottle 6     insulin glargine (LANTUS SOLOSTAR) 100 UNIT/ML injection 10 units subcutaneously daily at 8pm. 15 mL 3     insulin pen needle (BD EMMANUEL U/F) 32G X 4 MM Use one daily or as directed. 100 each prn     blood glucose monitoring (ACCU-CHEK FASTCLIX) lancets Use to test blood sugar two times daily or as directed. 102 each 3     Blood Glucose Monitoring Suppl (ONETOUCH PING METER REMOTE) SUPPLIES MISC Check your sugars twice daily, once when fasting and once 2 hours after eating. 1 each 3     amylase-lipase-protease (CREON) 15265 UNITS CPEP Take 2 capsules (72,000 Units) by mouth 3 times daily (with meals) 180 capsule 1     diltiazem 2% in PLO cream, FV COMPOUNDED, 2% GEL Apply topically daily and as needed to external hemorrhoids 30 g 0     lidocaine-prilocaine (EMLA)  cream Apply topically as needed for other (Use 30-60 minutes prior to port access) 30 g 0          Allergies   Allergen Reactions     Contrast Dye Nausea and Vomiting     Pt vomiting post IV contrast, Please try Visipaque for next CT scan. 6/24/16 sv     Diagnostic X-Ray Materials Nausea and Vomiting     Pt vomiting post IV contrast, Please try Visipaque for next CT scan. 6/24/16 sv         Exam: alert,appears tearful. Blood pressure 104/72, pulse 105, temperature 98.1  F (36.7  C), temperature source Oral, weight 72.3 kg (159 lb 4.8 oz), SpO2 100 %, not currently breastfeeding.  Wt Readings from Last 4 Encounters:   02/09/18 72.3 kg (159 lb 4.8 oz)   01/24/18 77.5 kg (170 lb 13.7 oz)   01/08/18 78.5 kg (173 lb 1.6 oz)   12/19/17 78.7 kg (173 lb 9.6 oz)     Oropharynx is moist and without lesion. No icterus. Neck supple and without adenopathy. Lungs:CTA. Heart:RRR, no murmur or rub. Abdomen: soft, nontender, BS active. Extremities: warm, no edema.    Labs: Results for NATALIIA IBARRA (MRN [id number]) as of 2/9/2018 12:51   Ref. Range 2/9/2018 11:54   Sodium Latest Ref Range: 133 - 144 mmol/L 134   Potassium Latest Ref Range: 3.4 - 5.3 mmol/L 2.7 (L)   Chloride Latest Ref Range: 94 - 109 mmol/L 92 (L)   Carbon Dioxide Latest Ref Range: 20 - 32 mmol/L 36 (H)   Urea Nitrogen Latest Ref Range: 7 - 30 mg/dL 10   Creatinine Latest Ref Range: 0.52 - 1.04 mg/dL 0.90   GFR Estimate Latest Ref Range: >60 mL/min/1.7m2 64   GFR Estimate If Black Latest Ref Range: >60 mL/min/1.7m2 78   Calcium Latest Ref Range: 8.5 - 10.1 mg/dL 9.2   Anion Gap Latest Ref Range: 3 - 14 mmol/L 6   Magnesium Latest Ref Range: 1.6 - 2.3 mg/dL 2.4 (H)   Albumin Latest Ref Range: 3.4 - 5.0 g/dL 3.5   Protein Total Latest Ref Range: 6.8 - 8.8 g/dL 9.3 (H)   Bilirubin Total Latest Ref Range: 0.2 - 1.3 mg/dL 0.5   Alkaline Phosphatase Latest Ref Range: 40 - 150 U/L 393 (H)   ALT Latest Ref Range: 0 - 50 U/L 34   AST Latest Ref Range: 0 -  45 U/L 39   Glucose Latest Ref Range: 70 - 99 mg/dL 286 (H)   WBC Latest Ref Range: 4.0 - 11.0 10e9/L 4.8   Hemoglobin Latest Ref Range: 11.7 - 15.7 g/dL 11.4 (L)   Hematocrit Latest Ref Range: 35.0 - 47.0 % 37.2   Platelet Count Latest Ref Range: 150 - 450 10e9/L 246   RBC Count Latest Ref Range: 3.8 - 5.2 10e12/L 4.18   MCV Latest Ref Range: 78 - 100 fl 89   MCH Latest Ref Range: 26.5 - 33.0 pg 27.3   MCHC Latest Ref Range: 31.5 - 36.5 g/dL 30.6 (L)   RDW Latest Ref Range: 10.0 - 15.0 % 14.5   Diff Method Unknown Automated Method   % Neutrophils Latest Units: % 67.5   % Lymphocytes Latest Units: % 20.9   % Monocytes Latest Units: % 10.2   % Eosinophils Latest Units: % 0.8   % Basophils Latest Units: % 0.6   % Immature Granulocytes Latest Units: % 0.0   Nucleated RBCs Latest Ref Range: 0 /100 0   Absolute Neutrophil Latest Ref Range: 1.6 - 8.3 10e9/L 3.2   Absolute Lymphocytes Latest Ref Range: 0.8 - 5.3 10e9/L 1.0   Absolute Monocytes Latest Ref Range: 0.0 - 1.3 10e9/L 0.5   Absolute Eosinophils Latest Ref Range: 0.0 - 0.7 10e9/L 0.0   Absolute Basophils Latest Ref Range: 0.0 - 0.2 10e9/L 0.0   Abs Immature Granulocytes Latest Ref Range: 0 - 0.4 10e9/L 0.0   Absolute Nucleated RBC Unknown 0.0       Imaging: Exam: Abdominal x-ray, 2 views, 2/9/2018.     COMPARISON: CT exam 9/15/2017.     HISTORY: Pancreatic cancer, increased vomiting and distention.  Evaluate for obstruction.     FINDINGS: Upright and supine views of the abdomen were obtained.  Nonobstructive bowel gas pattern. Moderate colonic stool burden  particularly in the ascending colon. No pneumatosis. No portal venous  gas. No free air beneath the diaphragm. Lung bases are clear. Common  bile duct stent correlating with CT 9/15/2017.         IMPRESSION: Nonobstructive bowel gas pattern with moderate colonic  stool burden particularly in the ascending colon.     SPENCER F TRIXIE, MD    Impression/plan:   1. Cyclical vomiting with nausea and reflux.  -have  concerns for duodenal obstruction given location of her tumor and symptoms of feeling full, increased reflux and vomiting at night.  -reviewed last MRI showing narrowing at the distal portion of the duodenum with distention of the proximal duodenum. Discussed with GI fellow, will obtain upper GI series with SBFT. If there is narrowing noted, will proceed with stenting.  -has a hx of duodenal ulcers, H pylori +, on pantoprazole 20 mg daily, asked her to increase to 20 mg bid, will retest H pylori    2. Constipation-not sure oral agents are going to be much help if she has chronic vomiting and possible obstruction of duodenum  -instructed to try a fleets enema today. OK to continue suppositories daily as needed.    3. Metastatic pancreatic cancer  -progressed on standard therapies, currently off of active treatment  -reviewed with Dr. Gonsales, study options that he discussed aren't available for her. Will be looking for other options as she stated today that she remains interested in treating the cancer  -consider biopsy to test for molecular mutations if she will have stenting  -could also consider Guardant 360 if biopsy is not feasible    4. FEN: weight loss of 11 lb in the last 3 week, in part related to volume depletion, but also related to decreased nutrition from vomiting  -will focus on working up the possible duodenal obstruction quickly and support with IV hydration and lyte replacement over the weekend and into next week  -hypokalemia is chronic, worsened as she is unable to keep oral K down. Replaced per protocol in IV today

## 2018-02-09 NOTE — MR AVS SNAPSHOT
After Visit Summary   2/9/2018    Shirin Muller    MRN: [id number]           Patient Information     Date Of Birth          1960        Visit Information        Provider Department      2/9/2018 12:00 PM UC 14 ATC;  ONCOLOGY INFUSION [facility]        Today's Diagnoses     Malignant neoplasm of head of pancreas (H)    -  1    Cyclical vomiting with nausea, intractability of vomiting not specified        Dysuria          Care Instructions    Contact Numbers    [facility] Main Line: [phone]  [facility] Triage:  [phone]    Call triage with chills and/or temperature greater than or equal to 100.5, uncontrolled nausea/vomiting, diarrhea, constipation, dizziness, shortness of breath, chest pain, bleeding, unexplained bruising, or any new/concerning symptoms, questions/concerns.     If you are having any concerning symptoms or wish to speak to a provider before your next infusion visit, please call your care coordinator or triage to notify them so we can adequately serve you.       After Hours: [phone]    If after hours, weekends, or holidays, call main hospital  and ask for Oncology doctor on call.         February 2018 Sunday Monday Tuesday Wednesday Thursday Friday Saturday                       1     2     3       4     5     6     7     8     9     [facility] DRAW   11:15 AM   (15 min.)    [facility] DRAW   [facility] Lab Draw     [facility] RETURN   11:55 AM   (50 min.)   Ester Echavarria, TEE CNP   [facility]     [facility] ONC INFUSION 120   12:00 PM   (120 min.)    ONCOLOGY INFUSION   [facility]     XR ABDOMEN 2 VIEWS    1:00 PM   (15 min.)   UCXR1   [facility] Xray 10       11     [facility] DRAW    9:00 AM   (15 min.)    [facility]   [facility]     P ONC INFUSION 120    9:30 AM   (120 min.)    ONCOLOGY INFUSION   [facility] 12     XR SMALL  BOWEL W AIR CONTRAST    8:00 AM   (90 min.)   UUXR4   [facility] [place],  Radiology 13     [facility] DRAW    1:30 PM   (15 min.)    [facility] LAB DRAW   [facility] Lab Draw     [facility] ONC INFUSION 120    2:00 PM   (120 min.)   UC ONCOLOGY INFUSION   [facility] RETURN    3:15 PM   (50 min.)   Ester Echavarria APRN CNP   [facility] Cancer [facility] 14     [facility] RETURN DIABETES    4:15 PM   (30 min.)   Chloe Flowers MD   [facility] Endocrinology 15     16     17       18     19     20     21     22     23     24       25     26     27     28                               March 2018 Sunday Monday Tuesday Wednesday Thursday Friday Saturday                       1     2     3       4     5     6     7     8     9     10       11     12     13     14     15     16     17       18     19     20     21     22     23     24       25     26     27     28     29     30     31                 Recent Results (from the past 24 hour(s))   CBC with platelets differential    Collection Time: 02/09/18 11:54 AM   Result Value Ref Range    WBC 4.8 4.0 - 11.0 10e9/L    RBC Count 4.18 3.8 - 5.2 10e12/L    Hemoglobin 11.4 (L) 11.7 - 15.7 g/dL    Hematocrit 37.2 35.0 - 47.0 %    MCV 89 78 - 100 fl    MCH 27.3 26.5 - 33.0 pg    MCHC 30.6 (L) 31.5 - 36.5 g/dL    RDW 14.5 10.0 - 15.0 %    Platelet Count 246 150 - 450 10e9/L    Diff Method Automated Method     % Neutrophils 67.5 %    % Lymphocytes 20.9 %    % Monocytes 10.2 %    % Eosinophils 0.8 %    % Basophils 0.6 %    % Immature Granulocytes 0.0 %    Nucleated RBCs 0 0 /100    Absolute Neutrophil 3.2 1.6 - 8.3 10e9/L    Absolute Lymphocytes 1.0 0.8 - 5.3 10e9/L    Absolute Monocytes 0.5 0.0 - 1.3 10e9/L    Absolute Eosinophils 0.0 0.0 - 0.7 10e9/L    Absolute Basophils 0.0 0.0 - 0.2 10e9/L    Abs Immature Granulocytes 0.0 0 - 0.4 10e9/L    Absolute Nucleated RBC 0.0    Comprehensive metabolic panel    Collection Time: 02/09/18 11:54 AM   Result  Value Ref Range    Sodium 134 133 - 144 mmol/L    Potassium 2.7 (L) 3.4 - 5.3 mmol/L    Chloride 92 (L) 94 - 109 mmol/L    Carbon Dioxide 36 (H) 20 - 32 mmol/L    Anion Gap 6 3 - 14 mmol/L    Glucose 286 (H) 70 - 99 mg/dL    Urea Nitrogen 10 7 - 30 mg/dL    Creatinine 0.90 0.52 - 1.04 mg/dL    GFR Estimate 64 >60 mL/min/1.7m2    GFR Estimate If Black 78 >60 mL/min/1.7m2    Calcium 9.2 8.5 - 10.1 mg/dL    Bilirubin Total 0.5 0.2 - 1.3 mg/dL    Albumin 3.5 3.4 - 5.0 g/dL    Protein Total 9.3 (H) 6.8 - 8.8 g/dL    Alkaline Phosphatase 393 (H) 40 - 150 U/L    ALT 34 0 - 50 U/L    AST 39 0 - 45 U/L   Magnesium    Collection Time: 02/09/18 11:54 AM   Result Value Ref Range    Magnesium 2.4 (H) 1.6 - 2.3 mg/dL   Routine UA with micro reflex to culture    Collection Time: 02/09/18  1:05 PM   Result Value Ref Range    Color Urine Tomasa     Appearance Urine Slightly Cloudy     Glucose Urine Negative NEG^Negative mg/dL    Bilirubin Urine Negative NEG^Negative    Ketones Urine Negative NEG^Negative mg/dL    Specific Gravity Urine 1.026 1.003 - 1.035    Blood Urine Negative NEG^Negative    pH Urine 6.0 5.0 - 7.0 pH    Protein Albumin Urine 30 (A) NEG^Negative mg/dL    Urobilinogen mg/dL 4.0 (H) 0.0 - 2.0 mg/dL    Nitrite Urine Negative NEG^Negative    Leukocyte Esterase Urine Small (A) NEG^Negative    Source Midstream Urine     WBC Urine 13 (H) 0 - 2 /HPF    RBC Urine 1 0 - 2 /HPF    Squamous Epithelial /HPF Urine 13 (H) 0 - 1 /HPF    Mucous Urine Present (A) NEG^Negative /LPF   Urine Culture Aerobic Bacterial    Collection Time: 02/09/18  1:05 PM   Result Value Ref Range    Specimen Description Midstream Urine     Special Requests Specimen received in preservative     Culture Micro PENDING                    Follow-ups after your visit        Your next 10 appointments already scheduled     Feb 11, 2018  9:00 AM [facility]   [facility] Lab Draw with  [facility] LAB DRAW   [facility] Lab Draw ([facility])     [address]  [address]  [facility] [address]   [phone]            Feb 11, 2018  9:30 AM CST   Infusion 120 with UC ONCOLOGY INFUSION, UC 14 ATC   [facility] ([facility])    [address] [place] [address]  [address]  [facility] [address]   [phone]            Feb 12, 2018  8:00 AM CST   XR SMALL BOWEL WITH AIR CONTRAST with UUXR4   [facility] [place],  Radiology ([facility])    [address] [address] [phone]           Please bring a list of your current medicines to your exam. (Include vitamins, minerals and over-the-counter medicines.) Leave your valuables at home.  Tell the doctor if there is a chance you could be pregnant.  If you had a barium enema within two days of the exam, you will need to take 3 bisacodyl (Dulcolax) tablets the day before your exam.  Do not eat, chew gum, or smoke for 8 hours before your exam. Keep drinking clear liquids until 2 hours before the exam. Clear liquids include water, clear juice, black coffee or clear tea without milk, Gatorade, or clear soda.  Take your usual medicines unless your doctor tells you not to. Take them with small sips of water.  Please call the Imaging Department at your exam site with any questions.            Feb 13, 2018  1:30 PM CST   [facility] Lab Draw with  [facility] LAB DRAW   [facility] Lab Draw ([facility])    [address] [place] [address]  [address]  [facility] [address]   [phone]            Feb 13, 2018  2:00 PM CST   Infusion 120 with UC ONCOLOGY INFUSION, UC 26 ATC   [facility] ([facility])    [address] [place] [address]  [address]  [facility] [address]   [phone]            Feb 13, 2018  3:30 PM CST   (Arrive by 3:15 PM)   Return Visit with TEE Olivas CNP   [facility] ([facility])     [address]  [address]  [facility] [id number] [phone]            Feb 14, 2018  4:30 PM CST   (Arrive by 4:15 PM)   RETURN DIABETES with Chloe Flowers MD   [facility] Endocrinology ([facility])    [address]  [address] Floor  [facility] [id number] [phone]              Future tests that were ordered for you today     Open Future Orders        Priority Expected Expires Ordered    XR Small Bowel w Air contrast Routine 2/9/2018 2/9/2019 2/9/2018            Who to contact     If you have questions or need follow up information about today's clinic visit or your schedule please contact [facility] directly at [phone].  Normal or non-critical lab and imaging results will be communicated to you by Vauntehart, letter or phone within 4 business days after the clinic has received the results. If you do not hear from us within 7 days, please contact the clinic through Vaunte[employer] or phone. If you have a critical or abnormal lab result, we will notify you by phone as soon as possible.  Submit refill requests through Phokki or call your pharmacy and they will forward the refill request to us. Please allow 3 business days for your refill to be completed.          Additional Information About Your Visit        Phokki Information     Phokki gives you secure access to your electronic health record. If you see a primary care provider, you can also send messages to your care team and make appointments. If you have questions, please call your primary care clinic.  If you do not have a primary care provider, please call [phone] and they will assist you.        Care EveryWhere ID     This is your Care EveryWhere ID. This could be used by other organizations to access your [facility] medical records  [id number]         Blood Pressure from Last 3 Encounters:   02/09/18 104/72   01/24/18 104/68   01/08/18 100/64    Weight from Last 3 Encounters:    02/09/18 72.3 kg (159 lb 4.8 oz)   01/24/18 77.5 kg (170 lb 13.7 oz)   01/08/18 78.5 kg (173 lb 1.6 oz)              We Performed the Following     Routine UA with micro reflex to culture     Urine Culture Aerobic Bacterial        Primary Care Provider    [facility]       No address on file        Equal Access to Services     MARLINDEBBIE GIL : Hadii isak ku hadelvio Soomaali, waaxda luqadaha, qaybta kaalmada zaycherylolamide, luis e travis hananemarcelo lamarjanazeem asher . So [facility] [phone].    ATENCIÓN: Si habla español, tiene a bernal disposición servicios gratuitos de asistencia lingüística. Llame al [phone].    We comply with applicable federal civil rights laws and [place] laws. We do not discriminate on the basis of race, color, national origin, age, disability, sex, sexual orientation, or gender identity.            Thank you!     Thank you for choosing [facility]  for your care. Our goal is always to provide you with excellent care. Hearing back from our patients is one way we can continue to improve our services. Please take a few minutes to complete the written survey that you may receive in the mail after your visit with us. Thank you!             Your Updated Medication List - Protect others around you: Learn how to safely use, store and throw away your medicines at www.disposemymeds.org.          This list is accurate as of 2/9/18  4:25 PM.  Always use your most recent med list.                   Brand Name Dispense Instructions for use Diagnosis    amylase-lipase-protease 49552 UNITS Cpep    CREON    180 capsule    Take 2 capsules (72,000 Units) by mouth 3 times daily (with meals)    Malignant neoplasm of head of pancreas (H)       ALVARADO CONTOUR NEXT test strip   Generic drug:  blood glucose monitoring     100 each    USE TO CHECK BLOOD SUGAR TWICE DAILY ( ONCE FASTING AND ONCE 2 HOURS AFTER EATING )    Diabetes mellitus, type 2 (H)       blood glucose monitoring lancets      102 each    Use to test blood sugar two times daily or as directed.    Diabetes mellitus, type 2 (H)       diltiazem 2% in PLO cream (FV COMPOUNDED) 2% Gel     30 g    Apply topically daily and as needed to external hemorrhoids    External hemorrhoids       docusate sodium 100 MG capsule    DOK    100 capsule    Take 1 capsule (100 mg) by mouth daily    External hemorrhoids       dronabinol 5 MG capsule    MARINOL    90 capsule    Take 1 capsule (5 mg) by mouth 3 times daily (before meals) Taking once daily    Anorexia       ENSURE CLEAR Liqd     90 Bottle    Take 1 Bottle by mouth 3 times daily    Malignant neoplasm of head of pancreas (H)       insulin glargine 100 UNIT/ML injection    LANTUS SOLOSTAR    15 mL    10 units subcutaneously daily at 8pm.    Diabetes mellitus, type 2 (H)       insulin pen needle 32G X 4 MM    BD EMMANUEL U/F    100 each    Use one daily or as directed.    Diabetes mellitus, type 2 (H)       lidocaine-prilocaine cream    EMLA    30 g    Apply topically as needed for other (Use 30-60 minutes prior to port access)    Malignant neoplasm of head of pancreas (H)       loratadine 10 MG tablet    CLARITIN    30 tablet    Take 1 tablet (10 mg) by mouth daily Reported on 5/5/2017    Chronic seasonal allergic rhinitis, unspecified trigger       LORazepam 0.5 MG tablet    ATIVAN    30 tablet    Take 1 tablet (0.5 mg) by mouth every 4 hours as needed (Anxiety, Nausea/Vomiting or Sleep)    Malignant neoplasm of head of pancreas (H)       morphine 15 MG 12 hr tablet    MS CONTIN    60 tablet    Take 1 tablet (15 mg) by mouth every 12 hours    Malignant neoplasm of head of pancreas (H)       ondansetron 8 MG ODT tab    ZOFRAN-ODT    60 tablet    Take 1 tablet (8 mg) by mouth every 8 hours as needed for nausea    Malignant neoplasm of head of pancreas (H)       ONETOUCH PING METER REMOTE SUPPLIES Misc     1 each    Check your sugars twice daily, once when fasting and once 2 hours after eating.    Secondary  diabetes mellitus (H)       order for DME     1 Units    Equipment being ordered: Toilet seat riser with handles    Malignant neoplasm of head of pancreas (H), Generalized muscle weakness       oxyCODONE IR 5 MG tablet    ROXICODONE    100 tablet    Take 1 tablet (5 mg) by mouth every 4 hours as needed for moderate to severe pain    Malignant neoplasm of head of pancreas (H)       pantoprazole 20 MG EC tablet    PROTONIX    60 tablet    Take 1 tablet (20 mg) by mouth 2 times daily    Malignant neoplasm of head of pancreas (H)       polyethylene glycol powder    MIRALAX/GLYCOLAX    500 g    Take 17 g (1 capful) by mouth daily    Malignant neoplasm of head of pancreas (H)       potassium chloride SA 20 MEQ CR tablet    KLOR-CON    60 tablet    Take 2 tablets (40 mEq) by mouth daily    Malignant neoplasm of head of pancreas (H)       prochlorperazine 10 MG tablet    COMPAZINE    30 tablet    TAKE ONE TABLET BY MOUTH EVERY 6 HOURS AS NEEDED FOR NAUSEA AND VOMITING    Malignant neoplasm of head of pancreas (H)

## 2018-02-09 NOTE — MR AVS SNAPSHOT
After Visit Summary   2/9/2018    Shirin Muller    MRN: [id number]           Patient Information     Date Of Birth          1960        Visit Information        Provider Department      2/9/2018 12:10 PM Ester Echavarria APRN CNP [facility]        Today's Diagnoses     Dysuria    -  1    Malignant neoplasm of head of pancreas (H)        Intractable cyclical vomiting with nausea        Duodenal ulcer due to Helicobacter pylori           Follow-ups after your visit        Your next 10 appointments already scheduled     Feb 11, 2018  9:00 AM CST   [facility] Lab Draw with  [facility] LAB DRAW   [facility] Lab Draw ([facility])    [address]  [address]  [facility] [address]   [phone]            Feb 11, 2018  9:30 AM CST   Infusion 120 with UC ONCOLOGY INFUSION, UC 14 ATC   [facility] ([facility]    [address]  [address]  [facility] [address]   [phone]            Feb 12, 2018  8:00 AM CST   XR SMALL BOWEL WITH AIR CONTRAST with UUXR4   [facility] [place],  Radiology ([facility] [place] [place])    [address] [address] [phone]           Please bring a list of your current medicines to your exam. (Include vitamins, minerals and over-the-counter medicines.) Leave your valuables at home.  Tell the doctor if there is a chance you could be pregnant.  If you had a barium enema within two days of the exam, you will need to take 3 bisacodyl (Dulcolax) tablets the day before your exam.  Do not eat, chew gum, or smoke for 8 hours before your exam. Keep drinking clear liquids until 2 hours before the exam. Clear liquids include water, clear juice, black coffee or clear tea without milk, Gatorade, or clear soda.  Take your usual medicines unless your doctor tells you not to. Take them with small sips of  water.  Please call the Imaging Department at your exam site with any questions.            Feb 13, 2018  1:30 PM CST   [facility] Lab Draw with  [facility] LAB DRAW   [facility] Lab Draw ([facility])    [address]  [address]  [facility] [id number] [phone]            Feb 13, 2018  2:00 PM CST   Infusion 120 with UC ONCOLOGY INFUSION, UC 26 ATC   [facility] ([facility])    [address]  [address]  [facility] [id number] [phone]            Feb 13, 2018  3:30 PM CST   (Arrive by 3:15 PM)   Return Visit with TEE Olivas CNP   [facility] ([facility])    [address] [place] [address]  [address]  [facility] [id number] [phone]            Feb 14, 2018  4:30 PM CST   (Arrive by 4:15 PM)   RETURN DIABETES with Chloe Flowers MD   [facility] ([facility])    [address] [place] [address]  3rd Floor  [facility] [id number] [phone]              Future tests that were ordered for you today     Open Future Orders        Priority Expected Expires Ordered    H Pylori antigen stool Routine  3/11/2018 2/9/2018    XR Small Bowel w Air contrast Routine 2/9/2018 2/9/2019 2/9/2018            Who to contact     If you have questions or need follow up information about today's clinic visit or your schedule please contact [facility] directly at [phone].  Normal or non-critical lab and imaging results will be communicated to you by MyChart, letter or phone within 4 business days after the clinic has received the results. If you do not hear from us within 7 days, please contact the clinic through MyChart or phone. If you have a critical or abnormal lab result, we will notify you by phone as soon as possible.  Submit refill requests through [employer] or call your pharmacy and they will forward the refill request to  us. Please allow 3 business days for your refill to be completed.          Additional Information About Your Visit        MyChart Information     [employer] gives you secure access to your electronic health record. If you see a primary care provider, you can also send messages to your care team and make appointments. If you have questions, please call your primary care clinic.  If you do not have a primary care provider, please call [phone] and they will assist you.        Care EveryWhere ID     This is your Care EveryWhere ID. This could be used by other organizations to access your [facility] medical records  [id number]        Your Vitals Were     Pulse Temperature Pulse Oximetry BMI (Body Mass Index)          105 98.1  F (36.7  C) (Oral) 100% 22.3 kg/m2         Blood Pressure from Last 3 Encounters:   02/09/18 104/72   01/24/18 104/68   01/08/18 100/64    Weight from Last 3 Encounters:   02/09/18 72.3 kg (159 lb 4.8 oz)   01/24/18 77.5 kg (170 lb 13.7 oz)   01/08/18 78.5 kg (173 lb 1.6 oz)              We Performed the Following     CBC with platelets differential     Comprehensive metabolic panel     Magnesium        Primary Care Provider    [facility]       No address on file        Equal Access to Services     JAI CORDERO : Maxx Spence, johnson flower, qarosalinota kaaltc frazier, luis e lora. So [facility] [phone].    ATENCIÓN: Si habla español, tiene a bernal disposición servicios gratuitos de asistencia lingüística. Llame al [phone].    We comply with applicable federal civil rights laws and [place] laws. We do not discriminate on the basis of race, color, national origin, age, disability, sex, sexual orientation, or gender identity.            Thank you!     Thank you for choosing [facility]  for your care. Our goal is always to provide you with excellent care. Hearing back from our patients is one way we can continue  to improve our services. Please take a few minutes to complete the written survey that you may receive in the mail after your visit with us. Thank you!             Your Updated Medication List - Protect others around you: Learn how to safely use, store and throw away your medicines at www.disposemymeds.org.          This list is accurate as of 2/9/18  5:03 PM.  Always use your most recent med list.                   Brand Name Dispense Instructions for use Diagnosis    amylase-lipase-protease 58751 UNITS Cpep    CREON    180 capsule    Take 2 capsules (72,000 Units) by mouth 3 times daily (with meals)    Malignant neoplasm of head of pancreas (H)       ALVARADO CONTOUR NEXT test strip   Generic drug:  blood glucose monitoring     100 each    USE TO CHECK BLOOD SUGAR TWICE DAILY ( ONCE FASTING AND ONCE 2 HOURS AFTER EATING )    Diabetes mellitus, type 2 (H)       blood glucose monitoring lancets     102 each    Use to test blood sugar two times daily or as directed.    Diabetes mellitus, type 2 (H)       diltiazem 2% in PLO cream (FV COMPOUNDED) 2% Gel     30 g    Apply topically daily and as needed to external hemorrhoids    External hemorrhoids       docusate sodium 100 MG capsule    DOK    100 capsule    Take 1 capsule (100 mg) by mouth daily    External hemorrhoids       dronabinol 5 MG capsule    MARINOL    90 capsule    Take 1 capsule (5 mg) by mouth 3 times daily (before meals) Taking once daily    Anorexia       ENSURE CLEAR Liqd     90 Bottle    Take 1 Bottle by mouth 3 times daily    Malignant neoplasm of head of pancreas (H)       insulin glargine 100 UNIT/ML injection    LANTUS SOLOSTAR    15 mL    10 units subcutaneously daily at 8pm.    Diabetes mellitus, type 2 (H)       insulin pen needle 32G X 4 MM    BD EMMANUEL U/F    100 each    Use one daily or as directed.    Diabetes mellitus, type 2 (H)       lidocaine-prilocaine cream    EMLA    30 g    Apply topically as needed for other (Use 30-60 minutes prior to  port access)    Malignant neoplasm of head of pancreas (H)       loratadine 10 MG tablet    CLARITIN    30 tablet    Take 1 tablet (10 mg) by mouth daily Reported on 5/5/2017    Chronic seasonal allergic rhinitis, unspecified trigger       LORazepam 0.5 MG tablet    ATIVAN    30 tablet    Take 1 tablet (0.5 mg) by mouth every 4 hours as needed (Anxiety, Nausea/Vomiting or Sleep)    Malignant neoplasm of head of pancreas (H)       morphine 15 MG 12 hr tablet    MS CONTIN    60 tablet    Take 1 tablet (15 mg) by mouth every 12 hours    Malignant neoplasm of head of pancreas (H)       ondansetron 8 MG ODT tab    ZOFRAN-ODT    60 tablet    Take 1 tablet (8 mg) by mouth every 8 hours as needed for nausea    Malignant neoplasm of head of pancreas (H)       ONETOUCH PING METER REMOTE SUPPLIES Misc     1 each    Check your sugars twice daily, once when fasting and once 2 hours after eating.    Secondary diabetes mellitus (H)       order for DME     1 Units    Equipment being ordered: Toilet seat riser with handles    Malignant neoplasm of head of pancreas (H), Generalized muscle weakness       oxyCODONE IR 5 MG tablet    ROXICODONE    100 tablet    Take 1 tablet (5 mg) by mouth every 4 hours as needed for moderate to severe pain    Malignant neoplasm of head of pancreas (H)       pantoprazole 20 MG EC tablet    PROTONIX    60 tablet    Take 1 tablet (20 mg) by mouth 2 times daily    Malignant neoplasm of head of pancreas (H)       polyethylene glycol powder    MIRALAX/GLYCOLAX    500 g    Take 17 g (1 capful) by mouth daily    Malignant neoplasm of head of pancreas (H)       potassium chloride SA 20 MEQ CR tablet    KLOR-CON    60 tablet    Take 2 tablets (40 mEq) by mouth daily    Malignant neoplasm of head of pancreas (H)       prochlorperazine 10 MG tablet    COMPAZINE    30 tablet    TAKE ONE TABLET BY MOUTH EVERY 6 HOURS AS NEEDED FOR NAUSEA AND VOMITING    Malignant neoplasm of head of pancreas (H)

## 2018-02-09 NOTE — TELEPHONE ENCOUNTER
"Pt called in to triage reporting lightheadedness, fatigue, nausea and vomiting once daily x1 week. Stated she feels \"sick\", is able to eat and drink, urinating several times a day. Has been taking compazine 3 times a day and lorazepam at night only as it makes her sleepy. Denied any fevers, chills, diarrhea, abd pain, sob or cold symptoms. Asking to see PA in clinic today. Paged Ester OLIVEIRA CNP for same day add-on.    Per Ester OLIVEIRA CNP, add pt on for labs CBC, CMP, Mg and IVF. She will see in Infusion. Per charge Ashli, labs at 11 am, IVF at 12 pm, 12:10 pm for Ester to see in Infusion. Pt informed of times.  "

## 2018-02-09 NOTE — PATIENT INSTRUCTIONS
Contact Numbers    [facility] Main Line: [phone]  [facility] Triage:  [phone]    Call triage with chills and/or temperature greater than or equal to 100.5, uncontrolled nausea/vomiting, diarrhea, constipation, dizziness, shortness of breath, chest pain, bleeding, unexplained bruising, or any new/concerning symptoms, questions/concerns.     If you are having any concerning symptoms or wish to speak to a provider before your next infusion visit, please call your care coordinator or triage to notify them so we can adequately serve you.       After Hours: [phone]    If after hours, weekends, or holidays, call main hospital  and ask for Oncology doctor on call.         February 2018 Sunday Monday Tuesday Wednesday Thursday Friday Saturday                       1     2     3       4     5     6     7     8     9     P [facility] LAB DRAW   11:15 AM   (15 min.)    [facility] LAB DRAW   [facility] Draw     [facility] RETURN   11:55 AM   (50 min.)   Ester Echavarria APRN CNP   [facility]   12:00 PM   (120 min.)    ONCOLOGY INFUSION   [facility]     XR ABDOMEN 2 VIEWS    1:00 PM   (15 min.)   UCXR1   [facility] Xray 10       11     P [facility] LAB DRAW    9:00 AM   (15 min.)    [facility] LAB DRAW   [facility]     [facility] ONC INFUSION 120    9:30 AM   (120 min.)    ONCOLOGY INFUSION   [facility] 12     XR SMALL BOWEL W AIR CONTRAST    8:00 AM   (90 min.)   UUXR4   [facility] [place],  Radiology 13     P [facility] LAB DRAW    1:30 PM   (15 min.)    [facility] LAB DRAW   [facility] Draw     [facility] ONC INFUSION 120    2:00 PM   (120 min.)    ONCOLOGY INFUSION   [facility]     [facility] RETURN    3:15 PM   (50 min.)   Ester Echavarria APRN CNP   [facility] 14     [facility] RETURN DIABETES    4:15 PM   (30 min.)   Chloe Flowers MD   [facility] 15     16      17       18     19     20     21     22     23     24       25     26     27 28 March 2018 Sunday Monday Tuesday Wednesday Thursday Friday Saturday                       1     2     3       4     5     6     7     8     9     10       11     12     13     14     15     16     17       18     19     20     21     22     23     24       25     26     27     28     29     30     31                 Recent Results (from the past 24 hour(s))   CBC with platelets differential    Collection Time: 02/09/18 11:54 AM   Result Value Ref Range    WBC 4.8 4.0 - 11.0 10e9/L    RBC Count 4.18 3.8 - 5.2 10e12/L    Hemoglobin 11.4 (L) 11.7 - 15.7 g/dL    Hematocrit 37.2 35.0 - 47.0 %    MCV 89 78 - 100 fl    MCH 27.3 26.5 - 33.0 pg    MCHC 30.6 (L) 31.5 - 36.5 g/dL    RDW 14.5 10.0 - 15.0 %    Platelet Count 246 150 - 450 10e9/L    Diff Method Automated Method     % Neutrophils 67.5 %    % Lymphocytes 20.9 %    % Monocytes 10.2 %    % Eosinophils 0.8 %    % Basophils 0.6 %    % Immature Granulocytes 0.0 %    Nucleated RBCs 0 0 /100    Absolute Neutrophil 3.2 1.6 - 8.3 10e9/L    Absolute Lymphocytes 1.0 0.8 - 5.3 10e9/L    Absolute Monocytes 0.5 0.0 - 1.3 10e9/L    Absolute Eosinophils 0.0 0.0 - 0.7 10e9/L    Absolute Basophils 0.0 0.0 - 0.2 10e9/L    Abs Immature Granulocytes 0.0 0 - 0.4 10e9/L    Absolute Nucleated RBC 0.0    Comprehensive metabolic panel    Collection Time: 02/09/18 11:54 AM   Result Value Ref Range    Sodium 134 133 - 144 mmol/L    Potassium 2.7 (L) 3.4 - 5.3 mmol/L    Chloride 92 (L) 94 - 109 mmol/L    Carbon Dioxide 36 (H) 20 - 32 mmol/L    Anion Gap 6 3 - 14 mmol/L    Glucose 286 (H) 70 - 99 mg/dL    Urea Nitrogen 10 7 - 30 mg/dL    Creatinine 0.90 0.52 - 1.04 mg/dL    GFR Estimate 64 >60 mL/min/1.7m2    GFR Estimate If Black 78 >60 mL/min/1.7m2    Calcium 9.2 8.5 - 10.1 mg/dL    Bilirubin Total 0.5 0.2 - 1.3 mg/dL    Albumin 3.5 3.4 - 5.0 g/dL    Protein Total 9.3 (H) 6.8 -  8.8 g/dL    Alkaline Phosphatase 393 (H) 40 - 150 U/L    ALT 34 0 - 50 U/L    AST 39 0 - 45 U/L   Magnesium    Collection Time: 02/09/18 11:54 AM   Result Value Ref Range    Magnesium 2.4 (H) 1.6 - 2.3 mg/dL   Routine UA with micro reflex to culture    Collection Time: 02/09/18  1:05 PM   Result Value Ref Range    Color Urine Tomasa     Appearance Urine Slightly Cloudy     Glucose Urine Negative NEG^Negative mg/dL    Bilirubin Urine Negative NEG^Negative    Ketones Urine Negative NEG^Negative mg/dL    Specific Gravity Urine 1.026 1.003 - 1.035    Blood Urine Negative NEG^Negative    pH Urine 6.0 5.0 - 7.0 pH    Protein Albumin Urine 30 (A) NEG^Negative mg/dL    Urobilinogen mg/dL 4.0 (H) 0.0 - 2.0 mg/dL    Nitrite Urine Negative NEG^Negative    Leukocyte Esterase Urine Small (A) NEG^Negative    Source Midstream Urine     WBC Urine 13 (H) 0 - 2 /HPF    RBC Urine 1 0 - 2 /HPF    Squamous Epithelial /HPF Urine 13 (H) 0 - 1 /HPF    Mucous Urine Present (A) NEG^Negative /LPF   Urine Culture Aerobic Bacterial    Collection Time: 02/09/18  1:05 PM   Result Value Ref Range    Specimen Description Midstream Urine     Special Requests Specimen received in preservative     Culture Micro PENDING

## 2018-02-09 NOTE — PROGRESS NOTES
Infusion Nursing Note:  Shirin Muller presents today for IVF + potassium replacement.    Patient seen by provider today: Yes: Ester Echavarria NP   present during visit today: Not Applicable.    Intravenous Access:  Implanted Port.    Treatment Conditions:  Lab Results   Component Value Date     02/09/2018                   Lab Results   Component Value Date    POTASSIUM 2.7 02/09/2018           Lab Results   Component Value Date    MAG 2.4 02/09/2018            Lab Results   Component Value Date    CR 0.90 02/09/2018                   Lab Results   Component Value Date    FANNY 9.2 02/09/2018                Lab Results   Component Value Date    BILITOTAL 0.5 02/09/2018           Lab Results   Component Value Date    ALBUMIN 3.5 02/09/2018                    Lab Results   Component Value Date    ALT 34 02/09/2018           Lab Results   Component Value Date    AST 39 02/09/2018     Post Infusion Assessment:  Patient tolerated infusion without incident.  Blood return noted pre and post infusion.  Site patent and intact, free from redness, edema or discomfort.  No evidence of extravasations.  Access discontinued per protocol.    Discharge Plan:   Patient declined prescription. Received fleet enema OTC and will  stool collection kit on her way out today.   Copy of AVS reviewed with patient and/or family. Patient will return 2/11/18 for next appointment.  Patient discharged in stable condition accompanied by: son and daughter.  Departure Mode: Wheelchair.    Alexandria Thomas RN

## 2018-02-09 NOTE — LETTER
2/9/2018        RE: Shirin Muller  [address] [facility] [address]     Dear Colleague,    Thank you for referring your patient, Shirin Muller, to the [facility]. Please see a copy of my visit note below.    Reason for Visit: seen on an add-on basis for evaluation of nausea/vomiting    Oncology HPI: Shirin Muller is a 57 year old woman with metastatic pancreatic cancer involving the liver. She is currently off of active treatment as she was found to have progression on prior treatment with gemcitabine and abraxane, FOLFIRINOX, and most recently liposomal irinotecan and 5FU. Her cancer treatment has been complicated by GI bleeding from an ulcer and infiltrating mass in the duodenum (January 2017). She was found to be positive for H. Pyolor and initiated on therapy with PPI, carafate, amoxicillin and clarithromycin.  In August 2017 she had biliar obstruction with complications of sepsis/cholangitis. BC grew klebsiella penumonia and streptococcus angiosos. She was treated with biliary stenting and antibiotics.      Interval history: Shirin has been noting increased vomiting. Unable to keep even small amounts of food down. Has noted increased reflux with meals, feels full and bloated. Vomits every night. No hemetemsis. No melena. Has been constipated. Using suppositories to have small stools. Notes that stools are lighter in color. Uses compazine and zofran, each twice daily, but no improvement in nausea. Feels weak and tired. Has dizziness today. Urinates frequently. No dysuria. No fevers/chills. No change to abdominal pain. Finds that morphine controls the pain well. No headaches, vision changes, focal weakness. Has chronic neuropathy that is unchanged.    Current Outpatient Prescriptions   Medication Sig Dispense Refill     ALVARADO CONTOUR NEXT test strip USE TO CHECK BLOOD SUGAR TWICE DAILY ( ONCE FASTING AND ONCE 2 HOURS AFTER EATING ) 100 each 11      order for DME Equipment being ordered: Toilet seat riser with handles 1 Units 0     prochlorperazine (COMPAZINE) 10 MG tablet TAKE ONE TABLET BY MOUTH EVERY 6 HOURS AS NEEDED FOR NAUSEA AND VOMITING 30 tablet 2     LORazepam (ATIVAN) 0.5 MG tablet Take 1 tablet (0.5 mg) by mouth every 4 hours as needed (Anxiety, Nausea/Vomiting or Sleep) 30 tablet 3     dronabinol (MARINOL) 5 MG capsule Take 1 capsule (5 mg) by mouth 3 times daily (before meals) Taking once daily 90 capsule 0     docusate sodium (DOK) 100 MG capsule Take 1 capsule (100 mg) by mouth daily 100 capsule 1     morphine (MS CONTIN) 15 MG 12 hr tablet Take 1 tablet (15 mg) by mouth every 12 hours 60 tablet 0     loratadine (CLARITIN) 10 MG tablet Take 1 tablet (10 mg) by mouth daily Reported on 5/5/2017 30 tablet 6     potassium chloride SA (KLOR-CON) 20 MEQ CR tablet Take 2 tablets (40 mEq) by mouth daily 60 tablet 3     polyethylene glycol (MIRALAX/GLYCOLAX) powder Take 17 g (1 capful) by mouth daily 500 g 11     oxyCODONE IR (ROXICODONE) 5 MG tablet Take 1 tablet (5 mg) by mouth every 4 hours as needed for moderate to severe pain 100 tablet 0     ondansetron (ZOFRAN-ODT) 8 MG ODT tab Take 1 tablet (8 mg) by mouth every 8 hours as needed for nausea 60 tablet 6     pantoprazole (PROTONIX) 20 MG EC tablet Take 1 tablet (20 mg) by mouth 2 times daily 60 tablet 3     Nutritional Supplements (ENSURE CLEAR) LIQD Take 1 Bottle by mouth 3 times daily 90 Bottle 6     insulin glargine (LANTUS SOLOSTAR) 100 UNIT/ML injection 10 units subcutaneously daily at 8pm. 15 mL 3     insulin pen needle (BD EMMANUEL U/F) 32G X 4 MM Use one daily or as directed. 100 each prn     blood glucose monitoring (ACCU-CHEK FASTCLIX) lancets Use to test blood sugar two times daily or as directed. 102 each 3     Blood Glucose Monitoring Suppl (ONETOUCH PING METER REMOTE) SUPPLIES MISC Check your sugars twice daily, once when fasting and once 2 hours after eating. 1 each 3      amylase-lipase-protease (CREON) 99130 UNITS CPEP Take 2 capsules (72,000 Units) by mouth 3 times daily (with meals) 180 capsule 1     diltiazem 2% in PLO cream, FV COMPOUNDED, 2% GEL Apply topically daily and as needed to external hemorrhoids 30 g 0     lidocaine-prilocaine (EMLA) cream Apply topically as needed for other (Use 30-60 minutes prior to port access) 30 g 0          Allergies   Allergen Reactions     Contrast Dye Nausea and Vomiting     Pt vomiting post IV contrast, Please try Visipaque for next CT scan. 6/24/16 sv     Diagnostic X-Ray Materials Nausea and Vomiting     Pt vomiting post IV contrast, Please try Visipaque for next CT scan. 6/24/16 sv         Exam: alert,appears tearful. Blood pressure 104/72, pulse 105, temperature 98.1  F (36.7  C), temperature source Oral, weight 72.3 kg (159 lb 4.8 oz), SpO2 100 %, not currently breastfeeding.  Wt Readings from Last 4 Encounters:   02/09/18 72.3 kg (159 lb 4.8 oz)   01/24/18 77.5 kg (170 lb 13.7 oz)   01/08/18 78.5 kg (173 lb 1.6 oz)   12/19/17 78.7 kg (173 lb 9.6 oz)     Oropharynx is moist and without lesion. No icterus. Neck supple and without adenopathy. Lungs:CTA. Heart:RRR, no murmur or rub. Abdomen: soft, nontender, BS active. Extremities: warm, no edema.    Labs: Results for NATALIIA IBARRA (MRN [id number]) as of 2/9/2018 12:51   Ref. Range 2/9/2018 11:54   Sodium Latest Ref Range: 133 - 144 mmol/L 134   Potassium Latest Ref Range: 3.4 - 5.3 mmol/L 2.7 (L)   Chloride Latest Ref Range: 94 - 109 mmol/L 92 (L)   Carbon Dioxide Latest Ref Range: 20 - 32 mmol/L 36 (H)   Urea Nitrogen Latest Ref Range: 7 - 30 mg/dL 10   Creatinine Latest Ref Range: 0.52 - 1.04 mg/dL 0.90   GFR Estimate Latest Ref Range: >60 mL/min/1.7m2 64   GFR Estimate If Black Latest Ref Range: >60 mL/min/1.7m2 78   Calcium Latest Ref Range: 8.5 - 10.1 mg/dL 9.2   Anion Gap Latest Ref Range: 3 - 14 mmol/L 6   Magnesium Latest Ref Range: 1.6 - 2.3 mg/dL 2.4 (H)    Albumin Latest Ref Range: 3.4 - 5.0 g/dL 3.5   Protein Total Latest Ref Range: 6.8 - 8.8 g/dL 9.3 (H)   Bilirubin Total Latest Ref Range: 0.2 - 1.3 mg/dL 0.5   Alkaline Phosphatase Latest Ref Range: 40 - 150 U/L 393 (H)   ALT Latest Ref Range: 0 - 50 U/L 34   AST Latest Ref Range: 0 - 45 U/L 39   Glucose Latest Ref Range: 70 - 99 mg/dL 286 (H)   WBC Latest Ref Range: 4.0 - 11.0 10e9/L 4.8   Hemoglobin Latest Ref Range: 11.7 - 15.7 g/dL 11.4 (L)   Hematocrit Latest Ref Range: 35.0 - 47.0 % 37.2   Platelet Count Latest Ref Range: 150 - 450 10e9/L 246   RBC Count Latest Ref Range: 3.8 - 5.2 10e12/L 4.18   MCV Latest Ref Range: 78 - 100 fl 89   MCH Latest Ref Range: 26.5 - 33.0 pg 27.3   MCHC Latest Ref Range: 31.5 - 36.5 g/dL 30.6 (L)   RDW Latest Ref Range: 10.0 - 15.0 % 14.5   Diff Method Unknown Automated Method   % Neutrophils Latest Units: % 67.5   % Lymphocytes Latest Units: % 20.9   % Monocytes Latest Units: % 10.2   % Eosinophils Latest Units: % 0.8   % Basophils Latest Units: % 0.6   % Immature Granulocytes Latest Units: % 0.0   Nucleated RBCs Latest Ref Range: 0 /100 0   Absolute Neutrophil Latest Ref Range: 1.6 - 8.3 10e9/L 3.2   Absolute Lymphocytes Latest Ref Range: 0.8 - 5.3 10e9/L 1.0   Absolute Monocytes Latest Ref Range: 0.0 - 1.3 10e9/L 0.5   Absolute Eosinophils Latest Ref Range: 0.0 - 0.7 10e9/L 0.0   Absolute Basophils Latest Ref Range: 0.0 - 0.2 10e9/L 0.0   Abs Immature Granulocytes Latest Ref Range: 0 - 0.4 10e9/L 0.0   Absolute Nucleated RBC Unknown 0.0       Imaging: Exam: Abdominal x-ray, 2 views, 2/9/2018.     COMPARISON: CT exam 9/15/2017.     HISTORY: Pancreatic cancer, increased vomiting and distention.  Evaluate for obstruction.     FINDINGS: Upright and supine views of the abdomen were obtained.  Nonobstructive bowel gas pattern. Moderate colonic stool burden  particularly in the ascending colon. No pneumatosis. No portal venous  gas. No free air beneath the diaphragm. Lung bases are  clear. Common  bile duct stent correlating with CT 9/15/2017.         IMPRESSION: Nonobstructive bowel gas pattern with moderate colonic  stool burden particularly in the ascending colon.     GASTON MARCUM MD    Impression/plan:   1. Cyclical vomiting with nausea and reflux.  -have concerns for duodenal obstruction given location of her tumor and symptoms of feeling full, increased reflux and vomiting at night.  -reviewed last MRI showing narrowing at the distal portion of the duodenum with distention of the proximal duodenum. Discussed with GI fellow, will obtain upper GI series with SBFT. If there is narrowing noted, will proceed with stenting.  -has a hx of duodenal ulcers, H pylori +, on pantoprazole 20 mg daily, asked her to increase to 20 mg bid, will retest H pylori    2. Constipation-not sure oral agents are going to be much help if she has chronic vomiting and possible obstruction of duodenum  -instructed to try a fleets enema today. OK to continue suppositories daily as needed.    3. Metastatic pancreatic cancer  -progressed on standard therapies, currently off of active treatment  -reviewed with Dr. Gonsales, study options that he discussed aren't available for her. Will be looking for other options as she stated today that she remains interested in treating the cancer  -consider biopsy to test for molecular mutations if she will have stenting  -could also consider Guardant 360 if biopsy is not feasible    4. FEN: weight loss of 11 lb in the last 3 week, in part related to volume depletion, but also related to decreased nutrition from vomiting  -will focus on working up the possible duodenal obstruction quickly and support with IV hydration and lyte replacement over the weekend and into next week  -hypokalemia is chronic, worsened as she is unable to keep oral K down. Replaced per protocol in IV today          Again, thank you for allowing me to participate in the care of your patient.       Sincerely,    TEE Villanueva CNP

## 2018-02-11 NOTE — PATIENT INSTRUCTIONS
[facility] Main Line: [phone]    Call triage nurse with chills and/or temperature greater than or equal to 100.4, uncontrolled nausea/vomiting, diarrhea, constipation, dizziness, shortness of breath, chest pain, bleeding, unexplained bruising, or any new/concerning symptoms, questions/concerns.   If you are having any concerning symptoms or wish to speak to a provider before your next infusion visit, please call your care coordinator or triage to notify them so we can adequately serve you.   Triage Nurse Line: [phone]    If after hours, weekends, or holidays, call main hospital  and ask for Oncology doctor on call @ [phone]               February 2018 Sunday Monday Tuesday Wednesday Thursday Friday Saturday                       1     2     3       4     5     6     7     8     9     [facility] DRAW   11:15 AM   (15 min.)    [facility] DRAW   [facility] Lab Draw     [facility] RETURN   11:55 AM   (50 min.)   Ester Echavarria APRN CNP   [facility]   12:00 PM   (120 min.)    ONCOLOGY INFUSION   [facility]     XR ABDOMEN 2 VIEWS    1:00 PM   (15 min.)   [id number]   [facility] Xray 10       11     [facility] DRAW    9:00 AM   (15 min.)    [facility] DRAW   [facility] Lab Draw     P ONC INFUSION 120    9:30 AM   (120 min.)    ONCOLOGY INFUSION   [facility] 12     XR SMALL BOWEL W AIR CONTRAST    8:00 AM   (90 min.)   [id number]   [facility] [place],  Radiology 13     [facility] DRAW    1:30 PM   (15 min.)    [facility] DRAW   [facility] Lab Draw     P ONC INFUSION 120    2:00 PM   (120 min.)    ONCOLOGY INFUSION   [facility]     [facility] RETURN    3:15 PM   (50 min.)   Ester Echavarria APRN CNP   [facility] 14     [facility] RETURN DIABETES    4:15 PM   (30 min.)   Chloe Flowers MD   [facility] Endocrinology 15     16      17       18     19     20     21     22     23     24       25     26     27     28 March 2018 Sunday Monday Tuesday Wednesday Thursday Friday Saturday                       1     2     3       4     5     6     7     8     9     10       11     12     13     14     15     16     17       18     19     20     21     22     23     24       25     26     27     28     29     30     31                  Lab Results:  Recent Results (from the past 12 hour(s))   Comprehensive metabolic panel    Collection Time: 02/11/18  9:33 AM   Result Value Ref Range    Sodium 137 133 - 144 mmol/L    Potassium 2.7 (L) 3.4 - 5.3 mmol/L    Chloride 95 94 - 109 mmol/L    Carbon Dioxide 36 (H) 20 - 32 mmol/L    Anion Gap 7 3 - 14 mmol/L    Glucose 195 (H) 70 - 99 mg/dL    Urea Nitrogen 8 7 - 30 mg/dL    Creatinine 0.85 0.52 - 1.04 mg/dL    GFR Estimate 68 >60 mL/min/1.7m2    GFR Estimate If Black 82 >60 mL/min/1.7m2    Calcium 9.1 8.5 - 10.1 mg/dL    Bilirubin Total 0.6 0.2 - 1.3 mg/dL    Albumin 3.7 3.4 - 5.0 g/dL    Protein Total 9.1 (H) 6.8 - 8.8 g/dL    Alkaline Phosphatase 360 (H) 40 - 150 U/L    ALT 29 0 - 50 U/L    AST 29 0 - 45 U/L

## 2018-02-11 NOTE — NURSING NOTE
Chief Complaint   Patient presents with     Port Draw     labs drawn via port by RN     /71 (BP Location: Right arm, Patient Position: Chair, Cuff Size: Adult Regular)  Pulse 107  Temp 98.9  F (37.2  C) (Oral)  Resp 18  Wt 72 kg (158 lb 12.8 oz)  SpO2 99%  BMI 22.23 kg/m2    Vitals taken. Port accessed by RN. Labs collected and sent. Line flushed with NS & Heparin. Pt tolerated well. Pt checked in for next appointment.    Rosy Sotomayor RN

## 2018-02-11 NOTE — PROGRESS NOTES
Infusion Nursing Note:  Shirin Berg OdunladeMafe presents today for IVF, IV Potassium replacement.    Patient seen by provider today: No   present during visit today: Not Applicable.    Note: Reports low back pain this AM which she rates at a 4/10.  She has not taken anything orally but has tried heat which helps for a while.  Denies any change in bowel or bladder, or weakness in legs since onset of this back pain.  She will see Ester Echavarria DNP on Tuesday.  Given hot pack this AM for comfort.  Is having a hard time keeping her oral potassium down.    1045 - Another hot pack applied to low back for comfort. Rates pain at a 5/10 now.  Shirin has asked for Tylenol.  On-Call MD paged.    YUE Tyler RN / Dr. Davalos @ [phone]  Tylenol 650mg po x1    Low back pain down to a 2/10 at 1215    Intravenous Access:  Implanted Port.    Treatment Conditions:  Lab Results   Component Value Date     02/11/2018                   Lab Results   Component Value Date    POTASSIUM 2.7 02/11/2018           Lab Results   Component Value Date    MAG 2.4 02/09/2018            Lab Results   Component Value Date    CR 0.85 02/11/2018                   Lab Results   Component Value Date    FANNY 9.1 02/11/2018                Lab Results   Component Value Date    BILITOTAL 0.6 02/11/2018           Lab Results   Component Value Date    ALBUMIN 3.7 02/11/2018                    Lab Results   Component Value Date    ALT 29 02/11/2018           Lab Results   Component Value Date    AST 29 02/11/2018           Post Infusion Assessment:  Patient tolerated infusion without incident.  Blood return noted pre and post infusion.  Site patent and intact, free from redness, edema or discomfort.  No evidence of extravasations.  Access discontinued per protocol.    Discharge Plan:   Patient declined prescription refills.  AVS to patient via [employer].  Patient will return Tuesday for next appointment.   To have small bowel w/air  contrast xray tomorrow, she has the instructions for this and knows where to go.  Patient discharged in stable condition accompanied by: daughter.  Departure Mode: Ambulatory.  Face to Face time: 5 minutes.    Alyce Tyler RN

## 2018-02-11 NOTE — MR AVS SNAPSHOT
After Visit Summary   2/11/2018    Shirin Muller    MRN: [id number]           Patient Information     Date Of Birth          1960        Visit Information        Provider Department      2/11/2018 9:30 AM  14 ATC;  ONCOLOGY INFUSION [facility]        Today's Diagnoses     Hypokalemia    -  1    Malignant neoplasm of head of pancreas (H)          Care [facility] Main Line: [phone]    Call triage nurse with chills and/or temperature greater than or equal to 100.4, uncontrolled nausea/vomiting, diarrhea, constipation, dizziness, shortness of breath, chest pain, bleeding, unexplained bruising, or any new/concerning symptoms, questions/concerns.   If you are having any concerning symptoms or wish to speak to a provider before your next infusion visit, please call your care coordinator or triage to notify them so we can adequately serve you.   Triage Nurse Line: [phone]    If after hours, weekends, or holidays, call main hospital  and ask for Oncology doctor on call @ [phone]               February 2018 Sunday Monday Tuesday Wednesday Thursday Friday Saturday                       1     2     3       4     5     6     7     8     9     [facility] LAB DRAW   11:15 AM   (15 min.)    [facility] LAB DRAW   [facility] Lab Draw     P RETURN   11:55 AM   (50 min.)   Ester Echavarria, TEE CNP   [facility] ONC INFUSION 120   12:00 PM   (120 min.)    ONCOLOGY INFUSION   [facility]     XR ABDOMEN 2 VIEWS    1:00 PM   (15 min.)   UCXR1   [facility] Xray 10       11     P [facility] LAB DRAW    9:00 AM   (15 min.)    [facility] LAB DRAW   [facility] Lab Draw     [facility] ONC INFUSION 120    9:30 AM   (120 min.)    ONCOLOGY INFUSION   [facility] 12     XR SMALL BOWEL W AIR CONTRAST    8:00 AM   (90 min.)   UUXR4   [facility],   Radiology 13     [facility] DRAW    1:30 PM   (15 min.)    [facility] LAB DRAW   [facility] Lab Draw     [facility] ONC INFUSION 120    2:00 PM   (120 min.)   UC ONCOLOGY INFUSION   [facility] ER     [facility] RETURN    3:15 PM   (50 min.)   Ester Echavarria APRN CNP   [facility] Cancer [facility] 14     [facility] RETURN DIABETES    4:15 PM   (30 min.)   Chloe Flowers MD   [facility] Endocrinology 15     16     17       18     19     20     21     22     23     24       25     26     27     28 March 2018 Sunday Monday Tuesday Wednesday Thursday Friday Saturday                       1     2     3       4     5     6     7     8     9     10       11     12     13     14     15     16     17       18     19     20     21     22     23     24       25     26     27     28     29     30     31                  Lab Results:  Recent Results (from the past 12 hour(s))   Comprehensive metabolic panel    Collection Time: 02/11/18  9:33 AM   Result Value Ref Range    Sodium 137 133 - 144 mmol/L    Potassium 2.7 (L) 3.4 - 5.3 mmol/L    Chloride 95 94 - 109 mmol/L    Carbon Dioxide 36 (H) 20 - 32 mmol/L    Anion Gap 7 3 - 14 mmol/L    Glucose 195 (H) 70 - 99 mg/dL    Urea Nitrogen 8 7 - 30 mg/dL    Creatinine 0.85 0.52 - 1.04 mg/dL    GFR Estimate 68 >60 mL/min/1.7m2    GFR Estimate If Black 82 >60 mL/min/1.7m2    Calcium 9.1 8.5 - 10.1 mg/dL    Bilirubin Total 0.6 0.2 - 1.3 mg/dL    Albumin 3.7 3.4 - 5.0 g/dL    Protein Total 9.1 (H) 6.8 - 8.8 g/dL    Alkaline Phosphatase 360 (H) 40 - 150 U/L    ALT 29 0 - 50 U/L    AST 29 0 - 45 U/L             Follow-ups after your visit        Your next 10 appointments already scheduled     Feb 12, 2018  8:00 AM CST   XR SMALL BOWEL WITH AIR CONTRAST with UUXR4   [facility], [place],  Radiology ([facility] [place])    [address] [address] [phone]           Please bring a  list of your current medicines to your exam. (Include vitamins, minerals and over-the-counter medicines.) Leave your valuables at home.  Tell the doctor if there is a chance you could be pregnant.  If you had a barium enema within two days of the exam, you will need to take 3 bisacodyl (Dulcolax) tablets the day before your exam.  Do not eat, chew gum, or smoke for 8 hours before your exam. Keep drinking clear liquids until 2 hours before the exam. Clear liquids include water, clear juice, black coffee or clear tea without milk, Gatorade, or clear soda.  Take your usual medicines unless your doctor tells you not to. Take them with small sips of water.  Please call the Imaging Department at your exam site with any questions.            Feb 13, 2018  1:30 PM CST   [facility] Draw with UC [facility] DRAW   [facility] Lab Draw ([facility])    [address]  [address]  [place] [id number] [phone]            Feb 13, 2018  2:00 PM CST   Infusion 120 with  ONCOLOGY INFUSION, UC 26 ATC   [facility] Cancer [facility] ([facility])    [address]  [address]  [place] [id number] [phone]            Feb 13, 2018  3:30 PM CST   (Arrive by 3:15 PM)   Return Visit with TEE Olivas CNP   [facility] Cancer [facility] ([facility])    [address]  [address]  [place] [id number] [phone]            Feb 14, 2018  4:30 PM CST   (Arrive by 4:15 PM)   RETURN DIABETES with Chloe Flowers MD   [facility] Endocr[facility]ology ([facility])    [address]  3rd Floor  [place] [id number] [phone]              Who to contact     If you have questions or need follow up information about today's clinic visit or your schedule please contact [facility] CANCER [facility] directly at [phone].  Normal or non-critical lab and imaging results will be  communicated to you by Inf[employer], letter or phone within 4 business days after the clinic has received the results. If you do not hear from us within 7 days, please contact the clinic through [employer] or phone. If you have a critical or abnormal lab result, we will notify you by phone as soon as possible.  Submit refill requests through [employer] or call your pharmacy and they will forward the refill request to us. Please allow 3 business days for your refill to be completed.          Additional Information About Your Visit        [employer] Information     [employer] gives you secure access to your electronic health record. If you see a primary care provider, you can also send messages to your care team and make appointments. If you have questions, please call your primary care clinic.  If you do not have a primary care provider, please call [phone] and they will assist you.        Care EveryWhere ID     This is your Care EveryWhere ID. This could be used by other organizations to access your [place] medical records  [id number]        Your Vitals Were     Pulse Temperature Respirations Pulse Oximetry BMI (Body Mass Index)       107 98.9  F (37.2  C) (Oral) 18 99% 22.23 kg/m2        Blood Pressure from Last 3 Encounters:   02/11/18 107/71   02/09/18 104/72   01/24/18 104/68    Weight from Last 3 Encounters:   02/11/18 72 kg (158 lb 12.8 oz)   02/09/18 72.3 kg (159 lb 4.8 oz)   01/24/18 77.5 kg (170 lb 13.7 oz)              We Performed the Following     Comprehensive metabolic panel        Primary Care Provider    [facility]       No address on file        Equal Access to Services     JAI CORDERO : Hadii isak harding Sojim, waaxda luqadaha, qaybta kaalmada karin, luis e lora. So [facility] [phone].    ATENCIÓN: Si habla español, tiene a bernal disposición servicios gratuitos de asistencia lingüística. Llame al [phone].    We comply with applicable federal civil  rights laws and [place] laws. We do not discriminate on the basis of race, color, national origin, age, disability, sex, sexual orientation, or gender identity.            Thank you!     Thank you for choosing [facility] CLINIC  for your care. Our goal is always to provide you with excellent care. Hearing back from our patients is one way we can continue to improve our services. Please take a few minutes to complete the written survey that you may receive in the mail after your visit with us. Thank you!             Your Updated Medication List - Protect others around you: Learn how to safely use, store and throw away your medicines at www.disposemymeds.org.          This list is accurate as of 2/11/18  1:58 PM.  Always use your most recent med list.                   Brand Name Dispense Instructions for use Diagnosis    amylase-lipase-protease 77185 UNITS Cpep    CREON    180 capsule    Take 2 capsules (72,000 Units) by mouth 3 times daily (with meals)    Malignant neoplasm of head of pancreas (H)       ALVARADO CONTOUR NEXT test strip   Generic drug:  blood glucose monitoring     100 each    USE TO CHECK BLOOD SUGAR TWICE DAILY ( ONCE FASTING AND ONCE 2 HOURS AFTER EATING )    Diabetes mellitus, type 2 (H)       blood glucose monitoring lancets     102 each    Use to test blood sugar two times daily or as directed.    Diabetes mellitus, type 2 (H)       diltiazem 2% in PLO cream (FV COMPOUNDED) 2% Gel     30 g    Apply topically daily and as needed to external hemorrhoids    External hemorrhoids       docusate sodium 100 MG capsule    DOK    100 capsule    Take 1 capsule (100 mg) by mouth daily    External hemorrhoids       dronabinol 5 MG capsule    MARINOL    90 capsule    Take 1 capsule (5 mg) by mouth 3 times daily (before meals) Taking once daily    Anorexia       ENSURE CLEAR Liqd     90 Bottle    Take 1 Bottle by mouth 3 times daily    Malignant neoplasm of head of pancreas (H)       insulin  glargine 100 UNIT/ML injection    LANTUS SOLOSTAR    15 mL    10 units subcutaneously daily at 8pm.    Diabetes mellitus, type 2 (H)       insulin pen needle 32G X 4 MM    BD EMMANUEL U/F    100 each    Use one daily or as directed.    Diabetes mellitus, type 2 (H)       lidocaine-prilocaine cream    EMLA    30 g    Apply topically as needed for other (Use 30-60 minutes prior to port access)    Malignant neoplasm of head of pancreas (H)       loratadine 10 MG tablet    CLARITIN    30 tablet    Take 1 tablet (10 mg) by mouth daily Reported on 5/5/2017    Chronic seasonal allergic rhinitis, unspecified trigger       LORazepam 0.5 MG tablet    ATIVAN    30 tablet    Take 1 tablet (0.5 mg) by mouth every 4 hours as needed (Anxiety, Nausea/Vomiting or Sleep)    Malignant neoplasm of head of pancreas (H)       morphine 15 MG 12 hr tablet    MS CONTIN    60 tablet    Take 1 tablet (15 mg) by mouth every 12 hours    Malignant neoplasm of head of pancreas (H)       ondansetron 8 MG ODT tab    ZOFRAN-ODT    60 tablet    Take 1 tablet (8 mg) by mouth every 8 hours as needed for nausea    Malignant neoplasm of head of pancreas (H)       ONETOUCH PING METER REMOTE SUPPLIES Misc     1 each    Check your sugars twice daily, once when fasting and once 2 hours after eating.    Secondary diabetes mellitus (H)       order for DME     1 Units    Equipment being ordered: Toilet seat riser with handles    Malignant neoplasm of head of pancreas (H), Generalized muscle weakness       oxyCODONE IR 5 MG tablet    ROXICODONE    100 tablet    Take 1 tablet (5 mg) by mouth every 4 hours as needed for moderate to severe pain    Malignant neoplasm of head of pancreas (H)       pantoprazole 20 MG EC tablet    PROTONIX    60 tablet    Take 1 tablet (20 mg) by mouth 2 times daily    Malignant neoplasm of head of pancreas (H)       polyethylene glycol powder    MIRALAX/GLYCOLAX    500 g    Take 17 g (1 capful) by mouth daily    Malignant neoplasm of head  of pancreas (H)       potassium chloride SA 20 MEQ CR tablet    KLOR-CON    60 tablet    Take 2 tablets (40 mEq) by mouth daily    Malignant neoplasm of head of pancreas (H)       prochlorperazine 10 MG tablet    COMPAZINE    30 tablet    TAKE ONE TABLET BY MOUTH EVERY 6 HOURS AS NEEDED FOR NAUSEA AND VOMITING    Malignant neoplasm of head of pancreas (H)

## 2018-02-12 PROBLEM — K31.5 DUODENAL OBSTRUCTION: Status: ACTIVE | Noted: 2018-01-01

## 2018-02-12 NOTE — IP AVS SNAPSHOT
MRN:[id number]                      After Visit Summary   2/12/2018    Shirin Muller    MRN: [id number]           Thank you!     Thank you for choosing [facility] for your care. Our goal is always to provide you with excellent care. Hearing back from our patients is one way we can continue to improve our services. Please take a few minutes to complete the written survey that you may receive in the mail after you visit with us. Thank you!        Patient Information     Date Of Birth          1960        Designated Caregiver       Most Recent Value    Caregiver    Will someone help with your care after discharge? yes    Name of designated caregiver Michael Vega -     Phone number of caregiver     Caregiver address [address] [address]      About your hospital stay     You were admitted on:  February 12, 2018 You last received care in the:  [address] [facility]    You were discharged on:  February 17, 2018        Reason for your hospital stay       You were hospitalized for a duodenal obstruction and had a stent placed.                  Who to Call     For medical emergencies, please call 911.  For non-urgent questions about your medical care, please call your primary care provider or clinic, None  For questions related to your surgery, please call your surgery clinic        Attending Provider     Provider Specialty    Phillip Barragan MD Emergency Medicine    [facility]       Primary Care Provider    [facility]       When to contact your care team       Please call the [facility] [phone] for fever (temp >100.5), chills, uncontrolled nausea, vomiting, constipation, or diarrhea, unrelieved pain, bleeding not relieved with pressure, dizziness, chest pain, shortness of breath, loss of consciousness, and any new or concerning symptoms.                   After Care Instructions     Activity       Your activity upon discharge: activity as tolerated            Diet       Follow this diet upon discharge:      Mechanical/Dental Soft Diet  Encourage Boost or Ensure shakes at least 3-4 times per day            Monitor and record       Calorie intake per day                  Follow-up Appointments     Adult [facility] Follow-up and recommended labs and tests       Follow up as scheduled next week with labs prior.    Appointments on [place] and/or [facility] (with [facility] or [facility] provider or service). Call [phone] if you haven't heard regarding these appointments within 7 days of discharge.                  Your next 10 appointments already scheduled     Feb 21, 2018 11:45 AM CST   [facility]  [facility]   [facility] ([facility])    [address] [place] [address]  [address]  [facility] [address] [phone]            Feb 21, 2018 12:10 PM CST   (Arrive by 11:55 AM)   Return Visit with Debora Morrow PA-C   [facility] ([facility])    [address] [place] [address]  [address]  [facility] [address] [phone]              Future tests that were ordered for you     CBC with platelets differential       Last Lab Result: Hemoglobin (g/dL)       Date                     Value                 02/17/2018               9.9 (L)          ----------            Comprehensive metabolic panel           Magnesium           Phosphorus                 Additional Information     If you use hormonal birth control (such as the pill, patch, ring or implants): You'll need a second form of birth control for 7 days (condoms, a diaphragm or contraceptive foam). While in the hospital, you received a medicine called Bridion. Your normal birth control will not work as well for a week after taking this medicine.          Pending Results     Date and Time Order Name Status  "Description    2/13/2018 1507 Next Generation Sequencing Oncology: Extended Solid Tumor Panel In process             Statement of Approval     Ordered          02/17/18 1136  I have reviewed and agree with all the recommendations and orders detailed in this document.  EFFECTIVE NOW     Approved and electronically signed by:  Shila Michel PA-C             Admission Information     Date & Time Provider Department Dept. Phone    2/12/2018 Malu Moran MD [address] [facility] [phone]      Your Vitals Were     Blood Pressure Pulse Temperature Respirations Height Weight    117/77 (BP Location: Left arm) 94 96.3  F (35.7  C) (Axillary) 16 1.803 m (5' 11\") 70.3 kg (155 lb)    Pulse Oximetry BMI (Body Mass Index)                100% 21.62 kg/m2          MyChart Information     [employer] gives you secure access to your electronic health record. If you see a primary care provider, you can also send messages to your care team and make appointments. If you have questions, please call your primary care clinic.  If you do not have a primary care provider, please call [phone] and they will assist you.        Care EveryWhere ID     This is your Care EveryWhere ID. This could be used by other organizations to access your [facility] medical records  [id number]        Equal Access to Services     JAI CORDERO : Maxx dawno Sojim, waaxda luqadaha, qaybta kaalmada adeegyada, luis e lora. So [facility] [phone].    ATENCIÓN: Si habla español, tiene a bernal disposición servicios gratuitos de asistencia lingüística. Llame al [phone].    We comply with applicable federal civil rights laws and [place] laws. We do not discriminate on the basis of race, color, national origin, age, disability, sex, sexual orientation, or gender identity.               Review of your medicines      START taking        Dose / Directions    bisacodyl 10 MG Suppository   Commonly known " as:  DULCOLAX   Used for:  Drug-induced constipation        Dose:  10 mg   Place 1 suppository (10 mg) rectally daily as needed for constipation   Quantity:  30 suppository   Refills:  0         CONTINUE these medicines which have NOT CHANGED        Dose / Directions    amylase-lipase-protease 58845 UNITS Cpep   Commonly known as:  CREON   Indication:  Pancreatic Insufficiency   Used for:  Malignant neoplasm of head of pancreas (H)        Dose:  2 capsule   Take 2 capsules (72,000 Units) by mouth 3 times daily (with meals)   Quantity:  180 capsule   Refills:  1       ALVARADO CONTOUR NEXT test strip   Used for:  Diabetes mellitus, type 2 (H)   Generic drug:  blood glucose monitoring        USE TO CHECK BLOOD SUGAR TWICE DAILY ( ONCE FASTING AND ONCE 2 HOURS AFTER EATING )   Quantity:  100 each   Refills:  11       blood glucose monitoring lancets   Used for:  Diabetes mellitus, type 2 (H)        Use to test blood sugar two times daily or as directed.   Quantity:  102 each   Refills:  3       diltiazem 2% in PLO cream (FV COMPOUNDED) 2% Gel   Used for:  External hemorrhoids        Apply topically daily and as needed to external hemorrhoids   Quantity:  30 g   Refills:  0       docusate sodium 100 MG capsule   Commonly known as:  DOK   Used for:  External hemorrhoids        Dose:  100 mg   Take 1 capsule (100 mg) by mouth daily   Quantity:  100 capsule   Refills:  1       dronabinol 5 MG capsule   Commonly known as:  MARINOL   Used for:  Anorexia        Dose:  5 mg   Take 1 capsule (5 mg) by mouth 3 times daily (before meals) Taking once daily   Quantity:  90 capsule   Refills:  0       ENSURE CLEAR Liqd   Used for:  Malignant neoplasm of head of pancreas (H)        Dose:  1 Bottle   Take 1 Bottle by mouth 3 times daily   Quantity:  90 Bottle   Refills:  6       insulin glargine 100 UNIT/ML injection   Commonly known as:  LANTUS SOLOSTAR   Used for:  Diabetes mellitus, type 2 (H)        10 units subcutaneously daily at  8pm.   Quantity:  15 mL   Refills:  3       insulin pen needle 32G X 4 MM   Commonly known as:  BD EMMANUEL U/F   Used for:  Diabetes mellitus, type 2 (H)        Use one daily or as directed.   Quantity:  100 each   Refills:  prn       lidocaine-prilocaine cream   Commonly known as:  EMLA   Used for:  Malignant neoplasm of head of pancreas (H)        Apply topically as needed for other (Use 30-60 minutes prior to port access)   Quantity:  30 g   Refills:  0       loratadine 10 MG tablet   Commonly known as:  CLARITIN   Used for:  Chronic seasonal allergic rhinitis, unspecified trigger        Dose:  10 mg   Take 1 tablet (10 mg) by mouth daily Reported on 5/5/2017   Quantity:  30 tablet   Refills:  6       LORazepam 0.5 MG tablet   Commonly known as:  ATIVAN   Used for:  Malignant neoplasm of head of pancreas (H)        Dose:  0.5 mg   Take 1 tablet (0.5 mg) by mouth every 4 hours as needed (Anxiety, Nausea/Vomiting or Sleep)   Quantity:  30 tablet   Refills:  3       morphine 15 MG 12 hr tablet   Commonly known as:  MS CONTIN   Used for:  Malignant neoplasm of head of pancreas (H)        Dose:  15 mg   Take 1 tablet (15 mg) by mouth every 12 hours   Quantity:  60 tablet   Refills:  0       ondansetron 8 MG ODT tab   Commonly known as:  ZOFRAN-ODT   Used for:  Malignant neoplasm of head of pancreas (H)        Dose:  8 mg   Take 1 tablet (8 mg) by mouth every 8 hours as needed for nausea   Quantity:  60 tablet   Refills:  6       ONETOUCH PING METER REMOTE SUPPLIES Misc   Used for:  Secondary diabetes mellitus (H)        Check your sugars twice daily, once when fasting and once 2 hours after eating.   Quantity:  1 each   Refills:  3       order for DME   Used for:  Malignant neoplasm of head of pancreas (H), Generalized muscle weakness        Equipment being ordered: Toilet seat riser with handles   Quantity:  1 Units   Refills:  0       oxyCODONE IR 5 MG tablet   Commonly known as:  ROXICODONE   Used for:  Malignant  neoplasm of head of pancreas (H)        Dose:  5 mg   Take 1 tablet (5 mg) by mouth every 4 hours as needed for moderate to severe pain   Quantity:  100 tablet   Refills:  0       pantoprazole 20 MG EC tablet   Commonly known as:  PROTONIX   Used for:  Malignant neoplasm of head of pancreas (H)        Dose:  20 mg   Take 1 tablet (20 mg) by mouth 2 times daily   Quantity:  60 tablet   Refills:  3       polyethylene glycol powder   Commonly known as:  MIRALAX/GLYCOLAX   Used for:  Malignant neoplasm of head of pancreas (H)        Dose:  1 capful   Take 17 g (1 capful) by mouth daily   Quantity:  500 g   Refills:  11       potassium chloride SA 20 MEQ CR tablet   Commonly known as:  KLOR-CON   Used for:  Malignant neoplasm of head of pancreas (H)        Dose:  40 mEq   Take 2 tablets (40 mEq) by mouth daily   Quantity:  60 tablet   Refills:  3       prochlorperazine 10 MG tablet   Commonly known as:  COMPAZINE   Used for:  Malignant neoplasm of head of pancreas (H)        TAKE ONE TABLET BY MOUTH EVERY 6 HOURS AS NEEDED FOR NAUSEA AND VOMITING   Quantity:  30 tablet   Refills:  2            Where to get your medicines      Some of these will need a paper prescription and others can be bought over the counter. Ask your nurse if you have questions.     You don't need a prescription for these medications     bisacodyl 10 MG Suppository                Protect others around you: Learn how to safely use, store and throw away your medicines at www.disposemymeds.org.             Medication List: This is a list of all your medications and when to take them. Check marks below indicate your daily home schedule. Keep this list as a reference.      Medications           Morning Afternoon Evening Bedtime As Needed    amylase-lipase-protease 13817 UNITS Cpep   Commonly known as:  CREON   Take 2 capsules (72,000 Units) by mouth 3 times daily (with meals)   Last time this was given:  72,000 Units on 2/17/2018  8:43 AM                                 ALVARADO CONTOUR NEXT test strip   USE TO CHECK BLOOD SUGAR TWICE DAILY ( ONCE FASTING AND ONCE 2 HOURS AFTER EATING )   Generic drug:  blood glucose monitoring                                bisacodyl 10 MG Suppository   Commonly known as:  DULCOLAX   Place 1 suppository (10 mg) rectally daily as needed for constipation   Last time this was given:  10 mg on 2/14/2018  8:43 PM                                blood glucose monitoring lancets   Use to test blood sugar two times daily or as directed.                                diltiazem 2% in PLO cream (FV COMPOUNDED) 2% Gel   Apply topically daily and as needed to external hemorrhoids                                docusate sodium 100 MG capsule   Commonly known as:  DOK   Take 1 capsule (100 mg) by mouth daily                                dronabinol 5 MG capsule   Commonly known as:  MARINOL   Take 1 capsule (5 mg) by mouth 3 times daily (before meals) Taking once daily                                ENSURE CLEAR Liqd   Take 1 Bottle by mouth 3 times daily                                insulin glargine 100 UNIT/ML injection   Commonly known as:  LANTUS SOLOSTAR   10 units subcutaneously daily at 8pm.   Last time this was given:  5 Units on 2/16/2018  8:58 PM                                insulin pen needle 32G X 4 MM   Commonly known as:  BD EMMANUEL U/F   Use one daily or as directed.                                lidocaine-prilocaine cream   Commonly known as:  EMLA   Apply topically as needed for other (Use 30-60 minutes prior to port access)                                loratadine 10 MG tablet   Commonly known as:  CLARITIN   Take 1 tablet (10 mg) by mouth daily Reported on 5/5/2017   Last time this was given:  10 mg on 2/17/2018  8:43 AM                                LORazepam 0.5 MG tablet   Commonly known as:  ATIVAN   Take 1 tablet (0.5 mg) by mouth every 4 hours as needed (Anxiety, Nausea/Vomiting or Sleep)                                 morphine 15 MG 12 hr tablet   Commonly known as:  MS CONTIN   Take 1 tablet (15 mg) by mouth every 12 hours   Last time this was given:  15 mg on 2/17/2018  8:43 AM                                ondansetron 8 MG ODT tab   Commonly known as:  ZOFRAN-ODT   Take 1 tablet (8 mg) by mouth every 8 hours as needed for nausea                                ONETOUCH PING METER REMOTE SUPPLIES Misc   Check your sugars twice daily, once when fasting and once 2 hours after eating.                                order for DME   Equipment being ordered: Toilet seat riser with handles                                oxyCODONE IR 5 MG tablet   Commonly known as:  ROXICODONE   Take 1 tablet (5 mg) by mouth every 4 hours as needed for moderate to severe pain   Last time this was given:  10 mg on 2/17/2018  1:32 AM                                pantoprazole 20 MG EC tablet   Commonly known as:  PROTONIX   Take 1 tablet (20 mg) by mouth 2 times daily   Last time this was given:  20 mg on 2/17/2018  8:43 AM                                polyethylene glycol powder   Commonly known as:  MIRALAX/GLYCOLAX   Take 17 g (1 capful) by mouth daily                                potassium chloride SA 20 MEQ CR tablet   Commonly known as:  KLOR-CON   Take 2 tablets (40 mEq) by mouth daily                                prochlorperazine 10 MG tablet   Commonly known as:  COMPAZINE   TAKE ONE TABLET BY MOUTH EVERY 6 HOURS AS NEEDED FOR NAUSEA AND VOMITING

## 2018-02-12 NOTE — IP AVS SNAPSHOT
[address] [address]    Phone:  [phone]    Fax:  [phone]                                       After Visit Summary   2/12/2018    Shirin Muller    MRN: [id number]           After Visit Summary Signature Page     I have received my discharge instructions, and my questions have been answered. I have discussed any challenges I see with this plan with the nurse or doctor.    ..........................................................................................................................................  Patient/Patient Representative Signature      ..........................................................................................................................................  Patient Representative Print Name and Relationship to Patient    ..................................................               ................................................  Date                                            Time    ..........................................................................................................................................  Reviewed by Signature/Title    ...................................................              ..............................................  Date                                                            Time

## 2018-02-12 NOTE — TELEPHONE ENCOUNTER
Pt called back and asked to speak with care team about weakness, nausea and vomiting. Writer asked her if 911 came to her house and she stated yes. Asked if they offered to take her to the hospital and she said they did but she declined, telling them she will have someone drive her to [facility] instead. Writer encouraged her to still come to the ED for assessment and pt stated she will come now.

## 2018-02-12 NOTE — TELEPHONE ENCOUNTER
Pt called in to triage but did not respond to writer's greeting on how to help her. Writer could hear pt breathing with gasping and moaning. Sounded like pt had dropped the phone then attempted to pick it up again but still did not talk. Writer pulled up pt by her callback number and confirmed it was her on the phone. Writer asked if pt needed assistance and she said yes. Asked if wanted writer to call 911 for her and she said yes, confirmed her address with her. She again did not answer when writer asked if she was home alone or did she fall. Call was disconnected. Writer called 911 dispatch at [facility] and was transferred to [place] [address],  information given to dispatcher who stated they will have someone out to pt's home right away for a safety check.

## 2018-02-12 NOTE — ED PROVIDER NOTES
History     Chief Complaint   Patient presents with     Vomiting     HPI  Shirin Muller is a 57 year old female with a history of  metastatic pancreatic cancer with known liver metastases who presents to the ED due to nausea and vomiting. She states that the nausea and vomiting is something that occurs quite often but in the last week is has been worsening. She has been going to the infusion clinic and received fluids but has not felt relief. Patient reports that she stopped chemo in January. Patient denies bowel movements and diarrhea. She states that she has had a increase in flatus that has caused her to have an increase in belching.  Patient does not complaining of any significant abdominal pain.  Denies hematemesis.  No chest pain shortness of breath.    Current Facility-Administered Medications   Medication     lidocaine 1 % 1 mL     lidocaine (LMX4) kit     sodium chloride (PF) 0.9% PF flush 3 mL     sodium chloride (PF) 0.9% PF flush 3 mL     diltiazem 2% in PLO cream (FV COMPOUNDED)     [START ON 2/13/2018] loratadine (CLARITIN) tablet 10 mg     [START ON 2/13/2018] pantoprazole (PROTONIX) EC tablet 20 mg     NaCl 0.9 % 1,000 mL with potassium chloride 40 mEq/L infusion     prochlorperazine (COMPAZINE) tablet 5-10 mg    Or     prochlorperazine (COMPAZINE) injection 5-10 mg     ondansetron (ZOFRAN) injection 8 mg    Or     ondansetron (ZOFRAN-ODT) ODT tab 8 mg    Or     ondansetron (ZOFRAN) tablet 8 mg     LORazepam (ATIVAN) tablet 0.5-1 mg    Or     LORazepam (ATIVAN) injection 0.5-1 mg     acetaminophen (TYLENOL) tablet 650 mg     potassium chloride SA (K-DUR/KLOR-CON M) CR tablet 20-40 mEq     potassium chloride (KLOR-CON) Packet 20-40 mEq     potassium chloride 10 mEq in 100 mL sterile water intermittent infusion (premix)     potassium chloride 10 mEq in 100 mL intermittent infusion with 10 mg lidocaine     potassium chloride 20 mEq in 50 mL intermittent infusion     magnesium sulfate 4  g in 100 mL sterile water (premade)     potassium phosphate 15 mmol in D5W 250 mL intermittent infusion     potassium phosphate 20 mmol in D5W 500 mL intermittent infusion     potassium phosphate 20 mmol in D5W 250 mL intermittent infusion     potassium phosphate 25 mmol in D5W 500 mL intermittent infusion     glucose 40 % gel 15-30 g    Or     dextrose 50 % injection 25-50 mL    Or     glucagon injection 1 mg     insulin aspart (NovoLOG) inj (RAPID ACTING)     morphine (MS CONTIN) 12 hr tablet 15 mg     oxyCODONE IR (ROXICODONE) tablet 5-10 mg     naloxone (NARCAN) injection 0.1-0.4 mg     Past Medical History:   Diagnosis Date     Biliary stricture      Graves disease      Lesion of liver      Malignant neoplasm of head of pancreas (H) 2016     Pancreatic disease      Pancreatic mass        Past Surgical History:   Procedure Laterality Date      SECTION       ENDOSCOPIC RETROGRADE CHOLANGIOPANCREATOGRAM N/A 2016    Procedure: COMBINED ENDOSCOPIC RETROGRADE CHOLANGIOPANCREATOGRAPHY, PLACE TUBE/STENT;  Surgeon: Arias Taveras MD;  Location: [facility] OR     ENDOSCOPIC RETROGRADE CHOLANGIOPANCREATOGRAM N/A 2017    Procedure: COMBINED ENDOSCOPIC RETROGRADE CHOLANGIOPANCREATOGRAPHY, PLACE TUBE/STENT;  Endoscopic Retrograde Cholangiopancreatogram With biliary Stent placement, ballon sweep of bile duct for stone removal;  Surgeon: Tristin Braden MD;  Location: [facility] OR     ENDOSCOPIC RETROGRADE CHOLANGIOPANCREATOGRAM N/A 8/3/2017    Procedure: COMBINED ENDOSCOPIC RETROGRADE CHOLANGIOPANCREATOGRAPHY, PLACE TUBE/STENT;  ENDOSCOPIC RETROGRADE CHOLANGIOPANCREATOGRAM, pus removal , stent placement to bile duct x1  ;  Surgeon: Guru Jamia Mcintosh MD;  Location: [facility] OR     ESOPHAGOSCOPY, GASTROSCOPY, DUODENOSCOPY (EGD), COMBINED N/A 2016    Procedure: COMBINED ENDOSCOPIC ULTRASOUND, ESOPHAGOSCOPY, GASTROSCOPY, DUODENOSCOPY (EGD), FINE NEEDLE ASPIRATE/BIOPSY;  Surgeon: Darcy  "Arias Pettit MD;  Location: [facility] OR     VASCULAR SURGERY      right chest       Family History   Problem Relation Age of Onset     DIABETES Mother        Social History   Substance Use Topics     Smoking status: Never Smoker     Smokeless tobacco: Never Used     Alcohol use No     Allergies   Allergen Reactions     Contrast Dye Nausea and Vomiting     Pt vomiting post IV contrast, Please try Visipaque for next CT scan. 6/24/16 sv     Diagnostic X-Ray Materials Nausea and Vomiting     Pt vomiting post IV contrast, Please try Visipaque for next CT scan. 6/24/16 sv         I have reviewed the Medications, Allergies, Past Medical and Surgical History, and Social History in the Epic system.    Review of Systems   Constitutional: Positive for activity change, appetite change and fatigue. Negative for fever.   HENT: Positive for trouble swallowing. Negative for congestion and voice change.    Eyes: Negative for redness and visual disturbance.   Respiratory: Negative for cough, chest tightness and shortness of breath.    Cardiovascular: Negative for chest pain.   Gastrointestinal: Positive for constipation, nausea and vomiting. Negative for abdominal pain and diarrhea.        Increase in flatus   Genitourinary: Negative for difficulty urinating and urgency.   Musculoskeletal: Negative for arthralgias, back pain, gait problem and neck stiffness.   Skin: Negative for color change and rash.   Allergic/Immunologic: Positive for immunocompromised state.   Neurological: Positive for weakness and light-headedness. Negative for syncope and headaches.   Hematological: Does not bruise/bleed easily.   Psychiatric/Behavioral: Positive for decreased concentration and dysphoric mood. Negative for confusion.   All other systems reviewed and are negative.      Physical Exam   BP: 115/62  Pulse: 100  Temp: 98.4  F (36.9  C)  Resp: 18  Height: 180.3 cm (5' 11\")  Weight: 72.1 kg (159 lb)  SpO2: 99 %      Physical Exam   Constitutional: She is " oriented to person, place, and time. She appears well-developed and well-nourished. She appears distressed.   Patient with family.  Patient lying soft-spoken appears dehydrated just appears weak but not writhing in pain.   HENT:   Head: Normocephalic and atraumatic.   Dry mucous membranes noted   Eyes: EOM are normal. Pupils are equal, round, and reactive to light. No scleral icterus.   Neck: Normal range of motion. Neck supple.   Cardiovascular: Regular rhythm.    Pulmonary/Chest: No stridor. No respiratory distress.   Abdominal: She exhibits no distension. There is no tenderness. There is no rebound.   Abdomen is not markedly distended all.  Appears to be soft I cannot appreciate any rebound or guarding at this point   Musculoskeletal: She exhibits no edema, tenderness or deformity.   Neurological: She is alert and oriented to person, place, and time. She has normal reflexes. No cranial nerve deficit. Coordination normal.   Skin: Skin is warm and dry. No rash noted. She is not diaphoretic. No erythema. No pallor.   Psychiatric:   Flat affect noted on examination here in the emergency department   Nursing note and vitals reviewed.      ED Course   3:50 PM  The patient was seen and examined by Dr. Barragan in Room 8.     ED Course   Patient evaluated the ER.  Records reviewed in [facility] patient's recent oncology visits for pancreatic cancer.  Patient Port-A-Cath was accessed.  Patient received 2 L normal saline bolus in the ER Zofran IV for nausea.  Troponin was negative EKG without ischemic changes below.  Lactic acid 1.2 lipase 44 ALT 26 AST 31 bilirubin total 0.6.  Glucose 116.  Creatinine 0.83.  Potassium noted to be 2.8.  INR 1.17 white blood cell count 5.1 hemoglobin 11.8 platelets noted to be 196.  Urinalysis 7 white cells to red cells.    The ER patient stable was taking some ice chips orally.  Discussed with oncology fellow they reviewed labs.  Patient to be admitted to the oncology service.  Patient did have a  CT scan done without contrast as she is unable take oral and cannot have IV contrast because of some IV contrast allergy.  Findings noted reveal obstructing lesion in the duodenal area.  Suspected narrowing in the second portion of the duodenum through the pancreatic head mass.    Discussed findings with family.  Patient aware at this point patient be admitted to oncology with a will correct her hypokalemia and further evaluate and assess her duodenal obstruction.    Procedures             EKG Interpretation:      Interpreted by Phillip Barragan  Time reviewed: 1626  Symptoms at time of EKG: nausea and vomiting   Rhythm: normal sinus   Rate: normal  Axis: normal  Ectopy: none  Conduction: normal  ST Segments/ T Waves: No hyperacute ST-T wave changes  Q Waves: none  Comparison to prior: No old EKG available    Clinical Impression: nsr without ischemic changes          Critical Care time:  none             Labs Ordered and Resulted from Time of ED Arrival Up to the Time of Departure from the ED   CBC WITH PLATELETS DIFFERENTIAL - Abnormal; Notable for the following:        Result Value    Hemoglobin 11.2 (*)     MCHC 30.7 (*)     RDW 15.1 (*)     All other components within normal limits   INR - Abnormal; Notable for the following:     INR 1.17 (*)     All other components within normal limits   COMPREHENSIVE METABOLIC PANEL - Abnormal; Notable for the following:     Potassium 2.8 (*)     Carbon Dioxide 37 (*)     Glucose 116 (*)     Protein Total 9.2 (*)     Alkaline Phosphatase 360 (*)     All other components within normal limits   LIPASE - Abnormal; Notable for the following:     Lipase 44 (*)     All other components within normal limits   PARTIAL THROMBOPLASTIN TIME   LACTIC ACID WHOLE BLOOD   TROPONIN I   PULSE OXIMETRY NURSING   PERIPHERAL IV CATHETER     Results for orders placed or performed during the hospital encounter of 02/12/18   CT Abdomen Pelvis w/o Contrast   Result Value Ref Range    Radiologist  flags Right adnexal cystic mass     Narrative    EXAMINATION: CT ABDOMEN PELVIS W/O CONTRAST, 2/12/2018 6:45 PM    TECHNIQUE:  Helical CT images from the lung bases through the  symphysis pubis were obtained without contrast.  Coronal and sagittal  reformatted images were generated at a workstation for further  assessment.    COMPARISON: CT 9/15/2017    HISTORY: vomiting;     FINDINGS:  Limited evaluation give the lack of intravenous contrast and very  dense contrast from upper GI performed earlier same date.     Lines and tubes: None.    Lung bases: No consolidation or pleural effusion. Mild subsegmental  bibasilar atelectasis.    Abdomen:  Poor demonstration of known ill-defined soft tissue mass at the  pancreatic head. There is gastric distention with dense oral contrast  and moderate distention of the proximal portion the duodenum. The  dense oral contrast from recent upper GI creates intense streak  artifact obscuring the upper abdomen. There is decompression of the  second and third portion the duodenum, after passing by the mass.  Small amount of intraluminal contrast is seen in the small bowel  distal to the duodenum. Stable moderate fat stranding adjacent to the  SMA and SMV. No infrarenal aortic aneurysm. Stable enlarged gastric  hepatic lymph nodes. A biliary stent is present. Pneumobilia.  Heterogeneity of the liver parenchyma, especially at the dome.  Gallbladder is of normal size. Normal noncontrast appearance of the  spleen and kidneys. No definite adrenal nodules, although the right  adrenal gland appears mildly nodular. Urinary bladder is partially  distended.  A 5 cm multilobulated cystic lesion has developed along  the right adnexa.    Bones and soft tissues: No suspicious osseous lesions.      Impression    IMPRESSION:   1. Suspected narrowing of the second portion of the duodenum as it  passes in the region of the known pancreatic head mass. There is  moderate distention of the stomach and  proximal duodenum, with a small  amount of contrast passing through the duodenum. Together, findings  suggest partial obstruction.  2. Poor assessment of pancreatic malignancy given lack of intravenous  contrast and artifact.  3. Liver heterogeneity of uncertain significance. Consider abdominal  ultrasound.  4. Multilobulated cystic lesion in the right adnexa, likely ovarian.  Recommend pelvic ultrasound.    [Consider Follow Up: Right adnexal cystic mass]    This report will be copied to the [facility] to ensure a  provider acknowledges the finding.     I have personally reviewed the examination and initial interpretation  and I agree with the findings.    JANE CARROLL MD   CBC with platelets differential   Result Value Ref Range    WBC 5.1 4.0 - 11.0 10e9/L    RBC Count 4.00 3.8 - 5.2 10e12/L    Hemoglobin 11.2 (L) 11.7 - 15.7 g/dL    Hematocrit 36.5 35.0 - 47.0 %    MCV 91 78 - 100 fl    MCH 28.0 26.5 - 33.0 pg    MCHC 30.7 (L) 31.5 - 36.5 g/dL    RDW 15.1 (H) 10.0 - 15.0 %    Platelet Count 196 150 - 450 10e9/L    Diff Method Automated Method     % Neutrophils 65.9 %    % Lymphocytes 24.9 %    % Monocytes 7.6 %    % Eosinophils 1.0 %    % Basophils 0.4 %    % Immature Granulocytes 0.2 %    Nucleated RBCs 0 0 /100    Absolute Neutrophil 3.4 1.6 - 8.3 10e9/L    Absolute Lymphocytes 1.3 0.8 - 5.3 10e9/L    Absolute Monocytes 0.4 0.0 - 1.3 10e9/L    Absolute Eosinophils 0.1 0.0 - 0.7 10e9/L    Absolute Basophils 0.0 0.0 - 0.2 10e9/L    Abs Immature Granulocytes 0.0 0 - 0.4 10e9/L    Absolute Nucleated RBC 0.0    Partial thromboplastin time   Result Value Ref Range    PTT 32 22 - 37 sec   INR   Result Value Ref Range    INR 1.17 (H) 0.86 - 1.14   Comprehensive metabolic panel   Result Value Ref Range    Sodium 142 133 - 144 mmol/L    Potassium 2.8 (L) 3.4 - 5.3 mmol/L    Chloride 97 94 - 109 mmol/L    Carbon Dioxide 37 (H) 20 - 32 mmol/L    Anion Gap 8 3 - 14 mmol/L    Glucose 116 (H) 70 - 99  mg/dL    Urea Nitrogen 10 7 - 30 mg/dL    Creatinine 0.83 0.52 - 1.04 mg/dL    GFR Estimate 70 >60 mL/min/1.7m2    GFR Estimate If Black 85 >60 mL/min/1.7m2    Calcium 9.8 8.5 - 10.1 mg/dL    Bilirubin Total 0.6 0.2 - 1.3 mg/dL    Albumin 3.6 3.4 - 5.0 g/dL    Protein Total 9.2 (H) 6.8 - 8.8 g/dL    Alkaline Phosphatase 360 (H) 40 - 150 U/L    ALT 26 0 - 50 U/L    AST 31 0 - 45 U/L   Lipase   Result Value Ref Range    Lipase 44 (L) 73 - 393 U/L   Lactic acid whole blood   Result Value Ref Range    Lactic Acid 1.2 0.7 - 2.0 mmol/L   UA with Microscopic reflex to Culture   Result Value Ref Range    Color Urine Yellow     Appearance Urine Slightly Cloudy     Glucose Urine Negative NEG^Negative mg/dL    Bilirubin Urine Negative NEG^Negative    Ketones Urine 40 (A) NEG^Negative mg/dL    Specific Gravity Urine 1.020 1.003 - 1.035    Blood Urine Negative NEG^Negative    pH Urine 8.5 (H) 5.0 - 7.0 pH    Protein Albumin Urine 100 (A) NEG^Negative mg/dL    Urobilinogen mg/dL 8.0 (H) 0.0 - 2.0 mg/dL    Nitrite Urine Negative NEG^Negative    Leukocyte Esterase Urine Small (A) NEG^Negative    Source Midstream Urine     WBC Urine 7 (H) 0 - 2 /HPF    RBC Urine 2 0 - 2 /HPF    Squamous Epithelial /HPF Urine 8 (H) 0 - 1 /HPF    Transitional Epi <1 0 - 1 /HPF    Mucous Urine Present (A) NEG^Negative /LPF   Troponin I   Result Value Ref Range    Troponin I ES <0.015 0.000 - 0.045 ug/L   Glucose by meter   Result Value Ref Range    Glucose 106 (H) 70 - 99 mg/dL   EKG 12-lead, tracing only   Result Value Ref Range    Interpretation ECG Click View Image link to view waveform and result               Assessments & Plan (with Medical Decision Making)  57-year-old female with known pancreatic cancer presents now with 1 week of nausea vomiting can keep anything down.  Continues to have vomiting after eating and fluids.  Patient feeling weak.  Has been getting IV infusions in the clinic also.  Presented after swallow study was done earlier  today there is concerns about obstruction.  Patient evaluated in the ER IV fluids given.  Zofran IV also.  Patient somewhat improved.  Patient with mild hypokalemia 2.8.  No EKG changes noted.  CT scan done of the abdomen and pelvis reveal suspected narrowing of the second portion of the duodenum as it passes through the region of the pancreatic head mass.  Oncology to correct patient's hypokalemia also.           I have reviewed the nursing notes.    I have reviewed the findings, diagnosis, plan and need for follow up with the patient.    Current Discharge Medication List          Final diagnoses:   Gastric outflow obstruction   Cancer of head of pancreas (H)   Duodenal obstruction   Hypokalemia   Dehydration   I, Charisma Coleman, am serving as a trained medical scribe to document services personally performed by Phillip Barragan MD, based on the provider's statements to me.   I, Phillip Barragan MD, was physically present and have reviewed and verified the accuracy of this note documented by Charisma Coleman.      2/12/2018   [facility] EMERGENCY DEPARTMENT      This note was created at least in part by the use of dragon voice dictation system. Inadvertent typographical errors may still exist.  Phillip Barragan MD.         Phillip Barragan MD  02/12/18 [id number]

## 2018-02-12 NOTE — ED NOTES
"Triage Assessment & Note:    /62  Pulse 100  Temp 98.4  F (36.9  C) (Oral)  Resp 18  Ht 1.803 m (5' 11\")  Wt 72.1 kg (159 lb)  SpO2 99%  BMI 22.18 kg/m2    Patient presents with: c/o vomiting x 4 days, and generalized weakness.     Home Treatments/Remedies: None    Febrile / Afebrile? Afebrile    Duration of C/o: 4 days    Donis Kumar  February 12, 2018      "

## 2018-02-12 NOTE — H&P
[facility], [place]    Hematology / Oncology  Admission History & Physical     Date of Admission:  02/12/18  Date of Service: 02/12/18  Primary Hematologist/Oncologist: Dr. Gonsales    Assessment & Plan   Shirin Muller is a 57 year old female with history of metastatic pancreatic cancer (currently off active treatment).  She presents with worsening nausea and vomiting, with duodenal obstruction suggested by upper GI series with SBFT.  Admitted to the oncology service for further cares.    Nausea and vomiting, suspected duodenal obstruction.  Several day history of worsening nausea and vomiting, now also associated with constipation, bloating and belching.  Upper GI series with small bowel follow-through was obtained today.  She was not able to tolerate much oral contrast, but what was done of the study did suggest that there was no significant transit of oral contrast beyond the stomach after nearly 1.5 hours.  Likely related to tumor in this setting.  Labs and vitals are reassuring.    NPO for now, with IV fluids.    CT abdomen/pelvis is pending.    If nausea/vomiting worsens, discussed plan to insert NG tube for decompression.  Will defer for now as she has not vomited since arrival and feels the nausea is tolerable.    Antiemetics as needed.    GI consulted for consideration of stenting, or PEG placement for palliation if unable to stent.    Hypokalemia.  Potassium 2.8 on admission labs.  Suspect related to poor oral intake and vomiting.    Will give potassium in IV fluids, and replete the rest via sliding scale electrolyte replacement protocol.    Metastatic pancreatic cancer.  She follows with Dr. Gonsales and is currently off active therapy after noted to have progression on multiple regimens.  Recent MRI showed narrowing of the distal portion of the duodenum with distention of the proximal duodenum, though the pancreatic mass was overall similar in size to the previous  exam.    Continue home MS Contin and oxycodone for pain control.    Acetaminophen PRN for mild pain as well.    Pancreatic enzyme supplements while not eating.    Insulin-dependent type 2 diabetes.    Hold home insulin glargine 10 units daily for now.    Glucose checks Q4H while NPO, with medium intensity sliding scale correction.    Hypoglycemia protocol ordered.    FEN: NPO for now, NS with 40 mEq KCl at 125 ml/hr, replete lytes PRN  Prophylaxis: mechanical (pending possible procedure)  Code: FULL  Disposition: Admitted to the oncology service for evaluation of nausea and vomiting, suspected duodenal obstruction.  Likely discharge to home in the next several days pending GI consultation.    Deana Echavarria MD/PhD  Heme/Onc/Transplant Hospitalist    Chief Complaint   Worsening nausea and vomiting, with constipation, bloating and belching.  - History obtained from the patient and through chart review.    History of Present Illness   Shirin Muller is a 57 year old female with history of metastatic pancreatic cancer (currently off active treatment).  She presented to clinic for follow-up a couple of days ago and reported worsening nausea with vomiting.  Given the location of her tumor there was concern for duodenal obstruction and an upper GI series with small bowel follow-through was ordered.  This study was performed today, and she presented to the ED for further evaluation of worsening nausea and vomiting.  She had a hard time tolerating the oral contrast, but what was done of the study did suggest no significant transit of oral contrast beyond the stomach after nearly 1.5 hours.  Over the last week she has noted worsening nausea and vomiting, as well as some constipation, bloating and increased belching.  No fevers or chills.  Emesis is nonbloody and nonbilious.  Last BM was about 4 days ago.  Abdominal pain is aching and cramping, diffusely throughout abdomen.  She also has some midline back  pain, which is new this evening.    Upon arrival in the ED, she was afebrile, HR 100s, /62.  Laboratory workup revealed hypokalemia (potassium 2.8), and otherwise normal renal function and CBC near the patient's baseline.  Lactic acid was normal at 1.2.  She was given IV fluids and antiemetics.  CT abdomen/pelvis was ordered and she was admitted to the oncology service for further cares.    Heme/Onc History (adapted from most recent clinic note dated 2018):  Shirin Muller is a 57 year old woman with metastatic pancreatic cancer involving the liver. She is currently off of active treatment as she was found to have progression on prior treatment with gemcitabine and abraxane, FOLFIRINOX, and most recently liposomal irinotecan and 5FU. Her cancer treatment has been complicated by GI bleeding from an ulcer and infiltrating mass in the duodenum (2017). She was found to be positive for H. Pyolor and initiated on therapy with PPI, carafate, amoxicillin and clarithromycin.  In 2017 she had biliar obstruction with complications of sepsis/cholangitis. BC grew klebsiella penumonia and streptococcus angiosos. She was treated with biliary stenting and antibiotics.     Past Medical History    I have reviewed this patient's medical history and updated it with pertinent information if needed.   Past Medical History:   Diagnosis Date     Biliary stricture      Graves disease      Lesion of liver      Malignant neoplasm of head of pancreas (H) 2016     Pancreatic disease      Pancreatic mass        Past Surgical History   I have reviewed this patient's surgical history and updated it with pertinent information if needed.  Past Surgical History:   Procedure Laterality Date      SECTION       ENDOSCOPIC RETROGRADE CHOLANGIOPANCREATOGRAM N/A 2016    Procedure: COMBINED ENDOSCOPIC RETROGRADE CHOLANGIOPANCREATOGRAPHY, PLACE TUBE/STENT;  Surgeon: Arias Taveras MD;  Location:  OR      ENDOSCOPIC RETROGRADE CHOLANGIOPANCREATOGRAM N/A 4/19/2017    Procedure: COMBINED ENDOSCOPIC RETROGRADE CHOLANGIOPANCREATOGRAPHY, PLACE TUBE/STENT;  Endoscopic Retrograde Cholangiopancreatogram With biliary Stent placement, ballon sweep of bile duct for stone removal;  Surgeon: Tristin Braden MD;  Location: [facility] OR     ENDOSCOPIC RETROGRADE CHOLANGIOPANCREATOGRAM N/A 8/3/2017    Procedure: COMBINED ENDOSCOPIC RETROGRADE CHOLANGIOPANCREATOGRAPHY, PLACE TUBE/STENT;  ENDOSCOPIC RETROGRADE CHOLANGIOPANCREATOGRAM, pus removal , stent placement to bile duct x1  ;  Surgeon: Guru Jamia Mcintosh MD;  Location: [facility] OR     ESOPHAGOSCOPY, GASTROSCOPY, DUODENOSCOPY (EGD), COMBINED N/A 4/13/2016    Procedure: COMBINED ENDOSCOPIC ULTRASOUND, ESOPHAGOSCOPY, GASTROSCOPY, DUODENOSCOPY (EGD), FINE NEEDLE ASPIRATE/BIOPSY;  Surgeon: Arias Taveras MD;  Location: [facility] OR     VASCULAR SURGERY      right chest       Prior to Admission Medications   Prior to Admission Medications   Prescriptions Last Dose Informant Patient Reported? Taking?   ALVARADO CONTOUR NEXT test strip   No No   Sig: USE TO CHECK BLOOD SUGAR TWICE DAILY ( ONCE FASTING AND ONCE 2 HOURS AFTER EATING )   Blood Glucose Monitoring Suppl (ONETOUCH PING METER REMOTE) SUPPLIES MISC   No No   Sig: Check your sugars twice daily, once when fasting and once 2 hours after eating.   LORazepam (ATIVAN) 0.5 MG tablet   No No   Sig: Take 1 tablet (0.5 mg) by mouth every 4 hours as needed (Anxiety, Nausea/Vomiting or Sleep)   Nutritional Supplements (ENSURE CLEAR) LIQD   No No   Sig: Take 1 Bottle by mouth 3 times daily   amylase-lipase-protease (CREON) 97871 UNITS CPEP   No No   Sig: Take 2 capsules (72,000 Units) by mouth 3 times daily (with meals)   blood glucose monitoring (ACCU-CHEK FASTCLIX) lancets   No No   Sig: Use to test blood sugar two times daily or as directed.   diltiazem 2% in PLO cream, FV COMPOUNDED, 2% GEL   No No   Sig: Apply topically  daily and as needed to external hemorrhoids   docusate sodium (DOK) 100 MG capsule   No No   Sig: Take 1 capsule (100 mg) by mouth daily   dronabinol (MARINOL) 5 MG capsule   No No   Sig: Take 1 capsule (5 mg) by mouth 3 times daily (before meals) Taking once daily   insulin glargine (LANTUS SOLOSTAR) 100 UNIT/ML injection   No No   Sig: 10 units subcutaneously daily at 8pm.   insulin pen needle (BD EMMANUEL U/F) 32G X 4 MM   No No   Sig: Use one daily or as directed.   lidocaine-prilocaine (EMLA) cream   No No   Sig: Apply topically as needed for other (Use 30-60 minutes prior to port access)   loratadine (CLARITIN) 10 MG tablet   No No   Sig: Take 1 tablet (10 mg) by mouth daily Reported on 5/5/2017   morphine (MS CONTIN) 15 MG 12 hr tablet   No No   Sig: Take 1 tablet (15 mg) by mouth every 12 hours   ondansetron (ZOFRAN-ODT) 8 MG ODT tab   No No   Sig: Take 1 tablet (8 mg) by mouth every 8 hours as needed for nausea   order for DME   No No   Sig: Equipment being ordered: Toilet seat riser with handles   oxyCODONE IR (ROXICODONE) 5 MG tablet   No No   Sig: Take 1 tablet (5 mg) by mouth every 4 hours as needed for moderate to severe pain   pantoprazole (PROTONIX) 20 MG EC tablet   No No   Sig: Take 1 tablet (20 mg) by mouth 2 times daily   polyethylene glycol (MIRALAX/GLYCOLAX) powder   No No   Sig: Take 17 g (1 capful) by mouth daily   potassium chloride SA (KLOR-CON) 20 MEQ CR tablet   No No   Sig: Take 2 tablets (40 mEq) by mouth daily   prochlorperazine (COMPAZINE) 10 MG tablet   No No   Sig: TAKE ONE TABLET BY MOUTH EVERY 6 HOURS AS NEEDED FOR NAUSEA AND VOMITING      Facility-Administered Medications: None     Allergies   Allergies   Allergen Reactions     Contrast Dye Nausea and Vomiting     Pt vomiting post IV contrast, Please try Visipaque for next CT scan. 6/24/16 sv     Diagnostic X-Ray Materials Nausea and Vomiting     Pt vomiting post IV contrast, Please try Visipaque for next CT scan. 6/24/16 sv        Social History   Social History     Social History     Marital status:      Spouse name: N/A     Number of children: N/A     Years of education: N/A     Occupational History     Not on file.     Social History Main Topics     Smoking status: Never Smoker     Smokeless tobacco: Never Used     Alcohol use No     Drug use: No     Sexual activity: Not on file     Other Topics Concern     Not on file     Social History Narrative       Family History   I have reviewed this patient's family history and updated it with pertinent information if needed.   Family History   Problem Relation Age of Onset     DIABETES Mother        Review of Systems   A comprehensive ROS was performed with the patient and was found to be negative or non-contributory with the exception of that noted in the HPI above.    Physical Exam   Temp:  [98.4  F (36.9  C)-98.9  F (37.2  C)] 98.9  F (37.2  C)  Pulse:  [] 78  Resp:  [16-18] 18  BP: (101-125)/(59-79) 125/79  SpO2:  [98 %-100 %] 98 %  CONSTITUTIONAL: Alert and interactive. Lying in bed in no acute distress.  HEENT: NC/AT, PERRLA, EOMI, anicteric sclera, oropharynx is pink and moist without erythema/exudates/lesions/thrush.  NECK: Supple, full ROM.  CARDIOVASCULAR: RRR. Normal S1/S2. No murmurs. 2+ equal and bilateral radial and pedal pulses.   RESPIRATORY: CTAB. No wheezes appreciated. Normal respiratory effort on ambient air.  GASTROINTESTINAL: Soft and non-distended. Mildly tender to palpation diffusely throughout. Bowel sounds are hypoactive.  MUSCULOSKELETAL: No joint swelling or tenderness. Tenderness along the T-spine muscles, no spinous process tenderness.  EXTREMITIES: No lower extremity edema.   SKIN: Warm and intact. No concerning lesions or rashes on exposed skin surfaces. No jaundice.  NEUROLOGIC: Alert and fully oriented, appropriately responsive during interview.   VASCULAR ACCESS: Port in right upper chest, C/D/I, non-tender.    Data   I have personally  reviewed the following labs/imaging:  Results for orders placed or performed during the hospital encounter of 02/12/18 (from the past 24 hour(s))   CBC with platelets differential   Result Value Ref Range    WBC 5.1 4.0 - 11.0 10e9/L    RBC Count 4.00 3.8 - 5.2 10e12/L    Hemoglobin 11.2 (L) 11.7 - 15.7 g/dL    Hematocrit 36.5 35.0 - 47.0 %    MCV 91 78 - 100 fl    MCH 28.0 26.5 - 33.0 pg    MCHC 30.7 (L) 31.5 - 36.5 g/dL    RDW 15.1 (H) 10.0 - 15.0 %    Platelet Count 196 150 - 450 10e9/L    Diff Method Automated Method     % Neutrophils 65.9 %    % Lymphocytes 24.9 %    % Monocytes 7.6 %    % Eosinophils 1.0 %    % Basophils 0.4 %    % Immature Granulocytes 0.2 %    Nucleated RBCs 0 0 /100    Absolute Neutrophil 3.4 1.6 - 8.3 10e9/L    Absolute Lymphocytes 1.3 0.8 - 5.3 10e9/L    Absolute Monocytes 0.4 0.0 - 1.3 10e9/L    Absolute Eosinophils 0.1 0.0 - 0.7 10e9/L    Absolute Basophils 0.0 0.0 - 0.2 10e9/L    Abs Immature Granulocytes 0.0 0 - 0.4 10e9/L    Absolute Nucleated RBC 0.0    Partial thromboplastin time   Result Value Ref Range    PTT 32 22 - 37 sec   INR   Result Value Ref Range    INR 1.17 (H) 0.86 - 1.14   Comprehensive metabolic panel   Result Value Ref Range    Sodium 142 133 - 144 mmol/L    Potassium 2.8 (L) 3.4 - 5.3 mmol/L    Chloride 97 94 - 109 mmol/L    Carbon Dioxide 37 (H) 20 - 32 mmol/L    Anion Gap 8 3 - 14 mmol/L    Glucose 116 (H) 70 - 99 mg/dL    Urea Nitrogen 10 7 - 30 mg/dL    Creatinine 0.83 0.52 - 1.04 mg/dL    GFR Estimate 70 >60 mL/min/1.7m2    GFR Estimate If Black 85 >60 mL/min/1.7m2    Calcium 9.8 8.5 - 10.1 mg/dL    Bilirubin Total 0.6 0.2 - 1.3 mg/dL    Albumin 3.6 3.4 - 5.0 g/dL    Protein Total 9.2 (H) 6.8 - 8.8 g/dL    Alkaline Phosphatase 360 (H) 40 - 150 U/L    ALT 26 0 - 50 U/L    AST 31 0 - 45 U/L   Lipase   Result Value Ref Range    Lipase 44 (L) 73 - 393 U/L   Lactic acid whole blood   Result Value Ref Range    Lactic Acid 1.2 0.7 - 2.0 mmol/L   Troponin I   Result  Value Ref Range    Troponin I ES <0.015 0.000 - 0.045 ug/L   EKG 12-lead, tracing only   Result Value Ref Range    Interpretation ECG Click View Image link to view waveform and result

## 2018-02-13 NOTE — PROGRESS NOTES
CLINICAL NUTRITION SERVICES - ASSESSMENT NOTE     Nutrition Prescription    RECOMMENDATIONS FOR MDs/PROVIDERS TO ORDER:  1.  Diet adv per providers within 2 - 3 days pending GI status vs initiate nutrition support.    2.  Aggressive replacement of electrolytes as pt is at risk for refeeding syndrome.      Malnutrition Status:    Severe malnutrition in the context of acute on chronic illness    Recommendations already ordered by Registered Dietitian (RD):  None at this time.    Future/Additional Recommendations:  1.  If when diet is resumed, order boost breeze (all flavors) with meals. Resume home Creon Rx when diet is adv to Full Liquids.     2.  If determine able to use GI tract, but needs a FT to supply nutrition d/t GI tract obstruction, recommend initiate the following when appropriate access is achieved: Start Peptamen 1.5 @ 10 ml/hr and adv by 10 ml q 8 hrs as tolerated and only if K/Mg WNL, Phos >1.9 to goal of 60 ml/hr (1440 ml/day) to provide 2160 kcals (30 kcal/kg/day), 98 g PRO (1.4 g/kg/day), 1109 ml free H2O, 81 g Fat (70% from MCTs), 271 g CHO and no Fiber daily.  --Start water flushes of 30-60 ml q 4 hrs to keep FT patent.  --Start liquid multivitamin (Certavite 15 ml/day).  --Possible that pt may require PERT w/ TF per team discretion.    3.  If unable to use GI tract and determine central parenteral nutrition (CPN) is appropriate, recommend begin the following:   --Use dosing weight 72 kg.   --Begin CPN, goal volume 1080 ml/day with initial 100g Dex daily (340 kcal, GIR 1), 110g AA daily (440 kcal), and 250 ml 20% IV lipids daily.  Micro/Rx: Standard per PharmD  --ONLY once pt receives ~100% of initial continuous PN volume with K+/Mg++/Phos WNL, advance PN dex by 50 g every 1-2 days (pending lytes/Glu and Pharm D/RD discretion) to initial goal of 275g Dex (935 kcal) to increase provisions to 1875 kcals (26 kcal/kg/day), 1.5 g PRO/kg/day, GIR 2.7 with 27% kcals from Fat.         REASON FOR  "ASSESSMENT  Shirin Muller is a/an 57 year old female assessed by the dietitian for Admission Nutrition Risk Screen for unintentional loss of 10# or more in the past two months    NUTRITION HISTORY  Jose Antonio reports that her intake has been poor for several days which is in relation to her pancreatic cancer causing duodenal obstruction and delayed gastric emptying. Per Jose Antonio, has been following a regular diet at home and supplementing with \"ensure lite\" (i.e. ensure clear). She notes that her typical daily intake equates to ~1 meal per day. She stated liquids (ex: ensure, soups, water, etc) have been staying down until the afternoon when she will throw up the majority of her intake. Per pt, taking Creon w/ meals TID as prescribed (Noted Rx: 3 capsules Creon 36 TID w/ meals = 1000u lipase/kg/meal based on 72 kg dosing wt). Notes that she has been w/o bowel movement for at least 5 days. In addition, pt was admitted last year and started on CPN 1/27/18 during this admission. Per pt, was not discharged home on this therapy because eating improved.    CURRENT NUTRITION ORDERS  Diet: NPO  Intake/Tolerance: No oral intake yet because pt currently receiving a GI w/u. Pt is on NaC+KCl@125 ml/hr for fluid support. Per admission imaging (2/12/18), pt noted to have delayed gastric emptying and distention of the stomach/proximal duodenum, suggesting partial obstruction.     LABS  Labs reviewed  Ketones +40 upon admission and may be indicative of prolonged poor oral/protein intake    MEDICATIONS  Medications reviewed    ANTHROPOMETRICS  Height: 180.3 cm (5' 11\")  Most Recent Weight: 71.5 kg (157 lb 10.1 oz)    IBW: 70.5 kg  BMI: Normal BMI  Weight History: down 16# (9%) in ~1 month.  Wt Readings from Last 15 Encounters:   02/13/18 71.5 kg (157 lb 10.1 oz)   02/11/18 72 kg (158 lb 12.8 oz)   02/09/18 72.3 kg (159 lb 4.8 oz)   01/24/18 77.5 kg (170 lb 13.7 oz)   01/08/18 78.5 kg (173 lb 1.6 oz)   12/19/17 78.7 kg (173 lb " 9.6 oz)   11/27/17 77.7 kg (171 lb 6.4 oz)   11/22/17 78.5 kg (173 lb)   11/20/17 79.4 kg (175 lb)   11/16/17 80.2 kg (176 lb 14.4 oz)   11/13/17 77.1 kg (170 lb)   11/09/17 77.8 kg (171 lb 9.6 oz)   11/07/17 76.4 kg (168 lb 6.4 oz)   11/01/17 77.2 kg (170 lb 3.2 oz)   10/30/17 77.8 kg (171 lb 9.6 oz)       Dosing Weight: 72 kg (actual) - lowest wt this admission of 71.5 kg (2/12/18)    ASSESSED NUTRITION NEEDS  Estimated Energy Needs: 9858-8652 kcals/day (25 - 30 kcals/kg)  Justification: Maintenance  Estimated Protein Needs:  grams protein/day (1.2 - 1.5 grams of pro/kg)  Justification: Increased needs w/ cancer treatment, repletion  Estimated Fluid Needs: 1 mL/kcal  Justification: Maintenance    PHYSICAL FINDINGS  See malnutrition section below.    MALNUTRITION  % Intake: </= 50% for >/= 5 days (severe)  % Weight Loss: > 5% in 1 month (severe)  Subcutaneous Fat Loss: None observed  Muscle Loss: Thoracic region (clavicle, acromium bone): Moderate (unable to observe/assess remainder as covered with a sheet)  Fluid Accumulation/Edema: None noted    Malnutrition Diagnosis: Severe malnutrition in the context of acute on chronic illness    NUTRITION DIAGNOSIS  Inadequate protein-energy intake related to GI sx (n/v) associated with bowel obstruction/delayted gastric emptying d/t pancreatic cancer and as evidenced by 16# wt loss (9% of body weight) in ~1 month, 40+ Ketones in urine upon admission, and suspect oral intake has provided <50% of needs for >5 days (at least).       INTERVENTIONS  Implementation   Nutrition Education: RD role where appropriate.     Goals  Diet adv v nutrition support within 2-3 days.     Monitoring/Evaluation  Progress toward goals will be monitored and evaluated per protocol.    Wilmer Sal RD, LD  5C/BMT Dietitian  Pager: [phone]

## 2018-02-13 NOTE — PROGRESS NOTES
Patient admitted to:  5-064  Admitted from:ED  Arrived by:melyssa at 2200  Reason for admission: nausea vomiting unable to keep food down per pt report. 12lb wt loss over the last month... GI tract evaluation  suggests bowel obstruction...possibly related to tumor mass.   Patient accompanied by:transport staff-no family or friend  Belongings:in room didn ot want to lock up--clothing purse cell-phone  Teaching: to call for help when needs to get up-- Fall Precautions--bed alarm. NPO -- choices for pain management-

## 2018-02-13 NOTE — PHARMACY-ADMISSION MEDICATION HISTORY
Admission medication history interview status for the 2/12/2018 admission is complete. See Epic admission navigator for allergy information, pharmacy, prior to admission medications and immunization status.     Medication history interview sources:   -Patient was a fair historian and familiar with medications but unable to recall some doses.     Changes made to PTA medication list (reason)  Added:   -N/A  Deleted:   -N/A  Changed:   -N/A    Additional medication history information (including reliability of information, actions taken by pharmacist):  -Per patient, Miralax use causes emesis.   -Patient verified Lantus dose of 10 units daily.  -Patient verified no known drug allergies.  -Patient received an influenza vaccination on 11-9-17.       Prior to Admission medications    Medication Sig Last Dose Taking? Auth Provider   prochlorperazine (COMPAZINE) 10 MG tablet TAKE ONE TABLET BY MOUTH EVERY 6 HOURS AS NEEDED FOR NAUSEA AND VOMITING 2/12/2018 at Unknown time Yes Demond Gonsales MD   LORazepam (ATIVAN) 0.5 MG tablet Take 1 tablet (0.5 mg) by mouth every 4 hours as needed (Anxiety, Nausea/Vomiting or Sleep) 2/11/2018 at PM Yes Demond Gonsales MD   dronabinol (MARINOL) 5 MG capsule Take 1 capsule (5 mg) by mouth 3 times daily (before meals) Taking once daily 2/12/2018 at AM Yes Demond Gonsales MD   docusate sodium (DOK) 100 MG capsule Take 1 capsule (100 mg) by mouth daily 2/12/2018 at AM Yes Demond Gonsales MD   morphine (MS CONTIN) 15 MG 12 hr tablet Take 1 tablet (15 mg) by mouth every 12 hours 2/12/2018 at AM Yes Barbie House PA   loratadine (CLARITIN) 10 MG tablet Take 1 tablet (10 mg) by mouth daily Reported on 5/5/2017 2/12/2018 at AM Yes Barbie House PA   potassium chloride SA (KLOR-CON) 20 MEQ CR tablet Take 2 tablets (40 mEq) by mouth daily 2/11/2018 at Unknown time Yes Barbie House PA   polyethylene glycol (MIRALAX/GLYCOLAX) powder Take 17 g (1 capful)  by mouth daily 2/9/2018 at Unknown Time Yes Barbie House PA   oxyCODONE IR (ROXICODONE) 5 MG tablet Take 1 tablet (5 mg) by mouth every 4 hours as needed for moderate to severe pain 2/12/2018 at AM Yes Barbie House PA   ondansetron (ZOFRAN-ODT) 8 MG ODT tab Take 1 tablet (8 mg) by mouth every 8 hours as needed for nausea 2/11/2018 at AM Yes Barbie House PA   pantoprazole (PROTONIX) 20 MG EC tablet Take 1 tablet (20 mg) by mouth 2 times daily 2/12/2018 at AM Yes Barbie House PA   Nutritional Supplements (ENSURE CLEAR) LIQD Take 1 Bottle by mouth 3 times daily 2/12/2018 at AM Yes Barbie House PA   insulin glargine (LANTUS SOLOSTAR) 100 UNIT/ML injection 10 units subcutaneously daily at 8pm. 2/11/2018 at 2000 Yes Chloe Flowers MD   amylase-lipase-protease (CREON) 82163 UNITS CPEP Take 2 capsules (72,000 Units) by mouth 3 times daily (with meals) 2/12/2018 at AM Yes Demond Gonsales MD   diltiazem 2% in PLO cream, FV COMPOUNDED, 2% GEL Apply topically daily and as needed to external hemorrhoids PRN Yes Zena Sharif APRN CNP   lidocaine-prilocaine (EMLA) cream Apply topically as needed for other (Use 30-60 minutes prior to port access) PRN Yes Ester Echavarria, TEE CNP   ALVARADO CONTOUR NEXT test strip USE TO CHECK BLOOD SUGAR TWICE DAILY ( ONCE FASTING AND ONCE 2 HOURS AFTER EATING ) Unknown at Unknown time  Chloe Flowers MD   order for DME Equipment being ordered: Toilet seat riser with handles Unknown at Unknown time  Demond Gonsales MD   insulin pen needle (BD EMMANUEL U/F) 32G X 4 MM Use one daily or as directed. Unknown at Unknown time  Chloe Flowers MD   blood glucose monitoring (ACCU-CHEK FASTCLIX) lancets Use to test blood sugar two times daily or as directed. Unknown at Unknown time  Chloe Flowers MD   Blood Glucose Monitoring Suppl (ONETOUCH PING METER REMOTE) SUPPLIES MISC Check your sugars twice daily, once when fasting and once 2  hours after eating. Unknown at Unknown time  Chloe Flowers MD         Medication history completed by: Sandra Kumar, Pharmacy Intern

## 2018-02-13 NOTE — PROGRESS NOTES
[facility], [place]  Hematology / Oncology Progress Note     Assessment & Plan   Shirin Muller is a 57 year old female with history of metastatic pancreatic cancer (currently off active treatment).  She presents with worsening nausea and vomiting, with duodenal obstruction suggested by upper GI series with SBFT.  Admitted to the oncology service for further cares.     #Nausea and vomiting, suspected duodenal obstruction.  Several day history of worsening nausea and vomiting, now also associated with constipation, bloating and belching.  Upper GI series with small bowel follow-through was obtained on 2/12.  She was not able to tolerate much oral contrast, but what was done of the study did suggest that there was no significant transit of oral contrast beyond the stomach after nearly 1.5 hours. CT abdomen pelvis with suspected narrowing of second portion of duodenum as it passes in region of known pancreatic head mass. Moderate distension of stomach and proximal duodenum.  -GI team consulted for possible placement of stenting vs placement of palliative GJ tube; appreciate their input and recommendations. Per Dr. Gonsales, needs tissue biopsy if visible tumor seen and accessible during procedure to allow for additional molecular testing for clinical trial consideration.  -NPO with IV fluids until evaluated by GI team  -If N/V worsens despite NPO status, will need to insert NG tube. Patient aware  -PRN antiemetics available     #Hypokalemia.  Potassium 2.8 on admission labs.  Suspect related to poor oral intake and vomiting.  -Continue IVF with potassium and replace PRN per protocol     #Metastatic pancreatic cancer.  #Cancer-related pain.  She follows with Dr. Gonsales and is currently off active therapy after noted to have progression on multiple regimens.  Recent MRI showed narrowing of the distal portion of the duodenum with distention of the proximal duodenum, though the  pancreatic mass was overall similar in size to the previous exam. D/w Dr. Gonsales this AM. Needs repeat tissue biopsy for consideration for clinical trial, see above for further detail.  -Continue home MS-Contin 15mg BID  -Oxycodone 5-10mg every 4 hours PRN, Tylenol PRN breakthrough pain     #Insulin-dependent type 2 diabetes.  -Holding home Lantus (10u per day) with NPO status  -Follow glucoses every 4h while NPO with medium SSI  -Hypoglycemia protocol    #Constipation.  -Given duodenal obstruction, will give suppository today. Consider enema if no result.     #Severe malnutrition in the context of acute-on-chronic illness  -Will order supplements when patient able to eat  -Resume home Creon when eating  -Consider enteral vs parenteral nutrition pending GI procedure and ability to use GI tract for feeding, as well as goals of care    #Pain Assessment:   Current Pain Score 2/13/2018 2/13/2018 2/13/2018   Patient currently in pain? denies denies denies   Pain score (0-10) - - -   Pain location - - -   Pain descriptors - - -   -Shirin is experiencing pain due to metastatic pancreatic cancer. Pain management was discussed and the plan was created in a collaborative fashion.  Shirin's response to the current recommendations: compliant  -Please see the plan for pain management as documented above    FEN: NPO for now, NS with 40 mEq KCl at 125 ml/hr, replete lytes PRN  Prophylaxis: mechanical (pending possible procedure)  Code: FULL  Disposition: Anticipate d/c in the next 2-3 days pending GI procedure and symptom control.    Patient and plan of care discussed with staff attending, Dr. Moran.     Shila Michel PA-C  Hematology/Oncology  [phone]    Interval History   Jose Antonio reports continued nausea this morning. She did not vomit overnight. She reports continued cancer-related pain, but has been able to control with MS-Contin and oxycodone. She reports no BM for at least 5 days. She is passing a small amount of gas.  She denies chest pain, SOB, cough. No urinary symptoms.    Physical Exam   Temp: 98.5  F (36.9  C) Temp src: Oral BP: 128/77 Pulse: 78 Heart Rate: 90 Resp: 16 SpO2: 100 % O2 Device: None (Room air)    Vitals:    02/12/18 1514 02/12/18 2200 02/13/18 0834   Weight: 72.1 kg (159 lb) 71.5 kg (157 lb 10.1 oz) 71.5 kg (157 lb 10.1 oz)     Vital Signs with Ranges  Temp:  [98.4  F (36.9  C)-98.9  F (37.2  C)] 98.5  F (36.9  C)  Pulse:  [] 78  Heart Rate:  [81-90] 90  Resp:  [16-18] 16  BP: (101-128)/(59-81) 128/77  SpO2:  [98 %-100 %] 100 %  I/O last 3 completed shifts:  In: 1075 [I.V.:1075]  Out: -     Constitutional: Pleasant adult female seen resting in bed. No acute distress. Appears tired.  Eyes: Lids and lashes normal, pupils equal, round and reactive to light, extra ocular muscles intact, sclera clear, conjunctiva normal.  ENT: Normocephalic, without obvious abnormality, atraumatic.  Respiratory: No increased work of breathing, good air exchange, clear to auscultation bilaterally, no crackles or wheezing.  Cardiovascular: Regular rate and rhythm, normal S1 and S2, and no murmur noted.  GI: + bowel sounds, soft, non-tender, non-distended.  Musculoskeletal: Trace pretibial edema B/L.  Neurologic: No focal deficits.  Neuropsychiatric: Calm, normal eye contact, alert, normal affect, oriented to self, place, time and situation, memory for past and recent events intact and thought process normal.    Medications     IV infusion builder WITH additives 125 mL/hr at 02/13/18 0907       glycerin (adult)  1 suppository Rectal Once     sodium chloride (PF)  3 mL Intracatheter Q8H     loratadine  10 mg Oral Daily     pantoprazole  20 mg Oral BID     insulin aspart  1-6 Units Subcutaneous Q4H     morphine  15 mg Oral Q12H DIVINE       Data   Results for orders placed or performed during the hospital encounter of 02/12/18 (from the past 24 hour(s))   CBC with platelets differential   Result Value Ref Range    WBC 5.1 4.0 - 11.0  10e9/L    RBC Count 4.00 3.8 - 5.2 10e12/L    Hemoglobin 11.2 (L) 11.7 - 15.7 g/dL    Hematocrit 36.5 35.0 - 47.0 %    MCV 91 78 - 100 fl    MCH 28.0 26.5 - 33.0 pg    MCHC 30.7 (L) 31.5 - 36.5 g/dL    RDW 15.1 (H) 10.0 - 15.0 %    Platelet Count 196 150 - 450 10e9/L    Diff Method Automated Method     % Neutrophils 65.9 %    % Lymphocytes 24.9 %    % Monocytes 7.6 %    % Eosinophils 1.0 %    % Basophils 0.4 %    % Immature Granulocytes 0.2 %    Nucleated RBCs 0 0 /100    Absolute Neutrophil 3.4 1.6 - 8.3 10e9/L    Absolute Lymphocytes 1.3 0.8 - 5.3 10e9/L    Absolute Monocytes 0.4 0.0 - 1.3 10e9/L    Absolute Eosinophils 0.1 0.0 - 0.7 10e9/L    Absolute Basophils 0.0 0.0 - 0.2 10e9/L    Abs Immature Granulocytes 0.0 0 - 0.4 10e9/L    Absolute Nucleated RBC 0.0    Partial thromboplastin time   Result Value Ref Range    PTT 32 22 - 37 sec   INR   Result Value Ref Range    INR 1.17 (H) 0.86 - 1.14   Comprehensive metabolic panel   Result Value Ref Range    Sodium 142 133 - 144 mmol/L    Potassium 2.8 (L) 3.4 - 5.3 mmol/L    Chloride 97 94 - 109 mmol/L    Carbon Dioxide 37 (H) 20 - 32 mmol/L    Anion Gap 8 3 - 14 mmol/L    Glucose 116 (H) 70 - 99 mg/dL    Urea Nitrogen 10 7 - 30 mg/dL    Creatinine 0.83 0.52 - 1.04 mg/dL    GFR Estimate 70 >60 mL/min/1.7m2    GFR Estimate If Black 85 >60 mL/min/1.7m2    Calcium 9.8 8.5 - 10.1 mg/dL    Bilirubin Total 0.6 0.2 - 1.3 mg/dL    Albumin 3.6 3.4 - 5.0 g/dL    Protein Total 9.2 (H) 6.8 - 8.8 g/dL    Alkaline Phosphatase 360 (H) 40 - 150 U/L    ALT 26 0 - 50 U/L    AST 31 0 - 45 U/L   Lipase   Result Value Ref Range    Lipase 44 (L) 73 - 393 U/L   Lactic acid whole blood   Result Value Ref Range    Lactic Acid 1.2 0.7 - 2.0 mmol/L   Troponin I   Result Value Ref Range    Troponin I ES <0.015 0.000 - 0.045 ug/L   Phosphorus   Result Value Ref Range    Phosphorus 3.3 2.5 - 4.5 mg/dL   EKG 12-lead, tracing only   Result Value Ref Range    Interpretation ECG Click View Image link  to view waveform and result    CT Abdomen Pelvis w/o Contrast   Result Value Ref Range    Radiologist flags Right adnexal cystic mass     Narrative    EXAMINATION: CT ABDOMEN PELVIS W/O CONTRAST, 2/12/2018 6:45 PM    TECHNIQUE:  Helical CT images from the lung bases through the  symphysis pubis were obtained without contrast.  Coronal and sagittal  reformatted images were generated at a workstation for further  assessment.    COMPARISON: CT 9/15/2017    HISTORY: vomiting;     FINDINGS:  Limited evaluation give the lack of intravenous contrast and very  dense contrast from upper GI performed earlier same date.     Lines and tubes: None.    Lung bases: No consolidation or pleural effusion. Mild subsegmental  bibasilar atelectasis.    Abdomen:  Poor demonstration of known ill-defined soft tissue mass at the  pancreatic head. There is gastric distention with dense oral contrast  and moderate distention of the proximal portion the duodenum. The  dense oral contrast from recent upper GI creates intense streak  artifact obscuring the upper abdomen. There is decompression of the  second and third portion the duodenum, after passing by the mass.  Small amount of intraluminal contrast is seen in the small bowel  distal to the duodenum. Stable moderate fat stranding adjacent to the  SMA and SMV. No infrarenal aortic aneurysm. Stable enlarged gastric  hepatic lymph nodes. A biliary stent is present. Pneumobilia.  Heterogeneity of the liver parenchyma, especially at the dome.  Gallbladder is of normal size. Normal noncontrast appearance of the  spleen and kidneys. No definite adrenal nodules, although the right  adrenal gland appears mildly nodular. Urinary bladder is partially  distended.  A 5 cm multilobulated cystic lesion has developed along  the right adnexa.    Bones and soft tissues: No suspicious osseous lesions.      Impression    IMPRESSION:   1. Suspected narrowing of the second portion of the duodenum as it  passes  in the region of the known pancreatic head mass. There is  moderate distention of the stomach and proximal duodenum, with a small  amount of contrast passing through the duodenum. Together, findings  suggest partial obstruction.  2. Poor assessment of pancreatic malignancy given lack of intravenous  contrast and artifact.  3. Liver heterogeneity of uncertain significance. Consider abdominal  ultrasound.  4. Multilobulated cystic lesion in the right adnexa, likely ovarian.  Recommend pelvic ultrasound.    [Consider Follow Up: Right adnexal cystic mass]    This report will be copied to the [facility] to ensure a  provider acknowledges the finding.     I have personally reviewed the examination and initial interpretation  and I agree with the findings.    JANE CARROLL MD   Glucose by meter   Result Value Ref Range    Glucose 106 (H) 70 - 99 mg/dL   UA with Microscopic reflex to Culture   Result Value Ref Range    Color Urine Yellow     Appearance Urine Slightly Cloudy     Glucose Urine Negative NEG^Negative mg/dL    Bilirubin Urine Negative NEG^Negative    Ketones Urine 40 (A) NEG^Negative mg/dL    Specific Gravity Urine 1.020 1.003 - 1.035    Blood Urine Negative NEG^Negative    pH Urine 8.5 (H) 5.0 - 7.0 pH    Protein Albumin Urine 100 (A) NEG^Negative mg/dL    Urobilinogen mg/dL 8.0 (H) 0.0 - 2.0 mg/dL    Nitrite Urine Negative NEG^Negative    Leukocyte Esterase Urine Small (A) NEG^Negative    Source Midstream Urine     WBC Urine 7 (H) 0 - 2 /HPF    RBC Urine 2 0 - 2 /HPF    Squamous Epithelial /HPF Urine 8 (H) 0 - 1 /HPF    Transitional Epi <1 0 - 1 /HPF    Mucous Urine Present (A) NEG^Negative /LPF   Basic metabolic panel   Result Value Ref Range    Sodium 146 (H) 133 - 144 mmol/L    Potassium 3.6 3.4 - 5.3 mmol/L    Chloride 106 94 - 109 mmol/L    Carbon Dioxide 33 (H) 20 - 32 mmol/L    Anion Gap 8 3 - 14 mmol/L    Glucose 97 70 - 99 mg/dL    Urea Nitrogen 8 7 - 30 mg/dL    Creatinine 0.76  0.52 - 1.04 mg/dL    GFR Estimate 78 >60 mL/min/1.7m2    GFR Estimate If Black >90 >60 mL/min/1.7m2    Calcium 9.1 8.5 - 10.1 mg/dL   CBC with platelets   Result Value Ref Range    WBC 3.7 (L) 4.0 - 11.0 10e9/L    RBC Count 3.52 (L) 3.8 - 5.2 10e12/L    Hemoglobin 9.7 (L) 11.7 - 15.7 g/dL    Hematocrit 32.0 (L) 35.0 - 47.0 %    MCV 91 78 - 100 fl    MCH 27.6 26.5 - 33.0 pg    MCHC 30.3 (L) 31.5 - 36.5 g/dL    RDW 15.3 (H) 10.0 - 15.0 %    Platelet Count 133 (L) 150 - 450 10e9/L   Magnesium   Result Value Ref Range    Magnesium 2.3 1.6 - 2.3 mg/dL   Phosphorus   Result Value Ref Range    Phosphorus 3.2 2.5 - 4.5 mg/dL

## 2018-02-13 NOTE — PROGRESS NOTES
"SPIRITUAL HEALTH SERVICES  SPIRITUAL ASSESSMENT Progress Note  [facility] ([place]) 5C    REFERRAL SOURCE: Hospital  Request    Ms. Muller was quiet and answered questions with yes or no. When asked what she understood about her diagnosis, she replied she wanted to speak with a  sri \"I need to be making a will.\" When asked if she wanted to talk more about this, she replied \"no.\"    PLAN: SW consult order was placed to address patient's questions about a will. SHS will remain available if additional visits are needed.     Kylie Iqbal  Oncology   Pager [phone]    "

## 2018-02-13 NOTE — ED NOTES
[facility], [place]   ED Nurse to Floor Handoff     Shirin Muller is a 57 year old female who speaks English and lives with family members,  in a home  They arrived in the ED by car from home    ED Chief Complaint: Vomiting    ED Dx;   Final diagnoses:   Gastric outflow obstruction         Needed?: no      Allergies:   Allergies   Allergen Reactions     Contrast Dye Nausea and Vomiting     Pt vomiting post IV contrast, Please try Visipaque for next CT scan. 6/24/16 sv     Diagnostic X-Ray Materials Nausea and Vomiting     Pt vomiting post IV contrast, Please try Visipaque for next CT scan. 6/24/16 sv   .  Past Medical Hx:   Past Medical History:   Diagnosis Date     Biliary stricture      Graves disease      Lesion of liver      Malignant neoplasm of head of pancreas (H) 4/12/2016     Pancreatic disease      Pancreatic mass       Baseline Mental status: WDL  Current Mental Status changes: at basesline    Infection: No  Sepsis suspected: No  Isolation type: No active isolations     Activity level - Baseline/Home:  Stand with Assist  Activity Level - Current:   Stand with Assist    Bariatric equipment needed?: No    In the ED these meds were given:   Medications   lidocaine 1 % 1 mL (not administered)   lidocaine (LMX4) kit (not administered)   sodium chloride (PF) 0.9% PF flush 3 mL (not administered)   sodium chloride (PF) 0.9% PF flush 3 mL (not administered)   0.9% sodium chloride BOLUS (0 mLs Intravenous Stopped 2/12/18 1818)     Followed by   0.9% sodium chloride BOLUS (0 mLs Intravenous Stopped 2/12/18 1818)     Followed by   0.9% sodium chloride infusion ( Intravenous New Bag 2/12/18 2112)   ondansetron (ZOFRAN) injection 4 mg (4 mg Intravenous Given 2/12/18 1607)   iohexol (OMNIPAQUE) solution 25 mL (25 mLs Oral Not Given 2/12/18 1929)   morphine (MS CONTIN) 12 hr tablet 15 mg (15 mg Oral Given 2/12/18 1952)   oxyCODONE IR (ROXICODONE) tablet 5 mg (5 mg  Oral Given 2/12/18 2002)       Drips running?  Yes    Home pump or pre-existing LDA's present? No    Labs results:   Labs Ordered and Resulted from Time of ED Arrival Up to the Time of Departure from the ED   CBC WITH PLATELETS DIFFERENTIAL - Abnormal; Notable for the following:        Result Value    Hemoglobin 11.2 (*)     MCHC 30.7 (*)     RDW 15.1 (*)     All other components within normal limits   INR - Abnormal; Notable for the following:     INR 1.17 (*)     All other components within normal limits   COMPREHENSIVE METABOLIC PANEL - Abnormal; Notable for the following:     Potassium 2.8 (*)     Carbon Dioxide 37 (*)     Glucose 116 (*)     Protein Total 9.2 (*)     Alkaline Phosphatase 360 (*)     All other components within normal limits   LIPASE - Abnormal; Notable for the following:     Lipase 44 (*)     All other components within normal limits   PARTIAL THROMBOPLASTIN TIME   LACTIC ACID WHOLE BLOOD   TROPONIN I   ROUTINE UA WITH MICROSCOPIC REFLEX TO CULTURE   PULSE OXIMETRY NURSING   PERIPHERAL IV CATHETER       Imaging Studies:   Recent Results (from the past 24 hour(s))   XR Upper GI w Small Bowel Follow Through    Narrative    Study: XR UPPER GI W SMALL BOWEL FOLLOW THROUGH 2/12/2018 11:00 AM    Comparison: MR images of the abdomen from 1/19/2018.  CT 9/15/2017.    History: Metastatic pancreatic cancer, suspect duodenal obstruction.  Discussed with GI who requests and upper GI series with small bowel  follow through    Fluoroscopy time: 4.3 minutes.    Technique: Barium small bowel follow through.    Findings:   On the  radiograph demonstrates nonobstructive bowel gas pattern.   Biliary stent.  Moderate colonic stool burden.  Technically  challenging examination secondary to patient's inability to tolerate  oral contrast.  Initial examination in upright position terminated as  patient experienced episode nausea/vomiting.       She requested to continue with examination despite nausea.   "Additional  examination performed with table in horizontal position.  Patient  tolerated small sips of thin barium by straw.  Views of the esophagus  with small aliquots of contrast is unremarkable.  No hiatal hernia or  apparent filling defect during real-time.  No definite  gastroesophageal reflux.  Of note, moderate amount of probable food  particulate matter noted on exam.    Series of overhead radiographs demonstrate trace forward transit of  oral contrast beyond stomach.  Little to no progression of oral  contrast at 100 minute radiograph, nearly 1.5 hours after initial  administration of contrast.  Given limited progression of oral  contrast and patient's inability to tolerate oral contrast, the  examination was terminated at this point.  This was discussed with the  patient.      Impression    Impression:   1.  Technically challenging examination secondary to patient's nausea  and inability to tolerate oral contrast.  2.  No significant transit of oral contrast beyond stomach after  nearly 1.5 hours.  3.  Moderate colonic stool.    I have personally reviewed the examination and initial interpretation  and I agree with the findings.    ESSIE TURK MD   CT Abdomen Pelvis w/o Contrast    Narrative    Preliminary     Impression    impression:  1. Severe narrowing of the second portion of the duodenum as it passes  through the known pancreatic head mass. There is moderate distention  of the stomach and proximal duodenum, with a small amount of contrast  passing through the duodenum. Together, findings indicate a partial  obstruction-like picture.  2. Grossly stable appearance of the pancreatic head mass with  peripancreatic metastatic lymph nodes.  3. Fat stranding adjacent to the SMA and SMV, likely indicating  vascular invasion of the central vessels by the mass.       Recent vital signs:   /63  Pulse 90  Temp 98.4  F (36.9  C) (Oral)  Resp 16  Ht 1.803 m (5' 11\")  Wt 72.1 kg (159 lb)  SpO2 100%  " BMI 22.18 kg/m2    Cardiac Rhythm: Normal Sinus  Pt needs tele? No  Skin/wound Issues: None    Code Status: Full Code    Pain control: good    Nausea control: poor    Abnormal labs/tests/findings requiring intervention: low potassium    Family present during ED course? No   Family Comments/Social Situation comments: none    Tasks needing completion: None    Flakito Holland, RN  [facility]-- [phone] [place] [facility]  [phone] [facility]

## 2018-02-13 NOTE — PLAN OF CARE
Problem: Patient Care Overview  Goal: Plan of Care/Patient Progress Review  Outcome: No Change   02/13/18 0652   OTHER   Plan Of Care Reviewed With patient   Plan of Care Review   Progress unable to show any progress toward functional goals       Problem: Nausea/Vomiting (Adult)  Goal: Identify Related Risk Factors and Signs and Symptoms  Related risk factors and signs and symptoms are identified upon initiation of Human Response Clinical Practice Guideline (CPG).  Outcome: No Change   02/13/18 0652   Nausea/Vomiting   Related Risk Factors (Nausea/Vomiting) gastrointestinal dysfunction  (cancer)   Signs and Symptoms (Nausea/Vomiting) abdominal discomfort/pain;electrolyte changes       Comments: VSS and WNL. Up independently. Denies pain. Received prn ativan, Zofran, and compazine each x1. nausea seems to be well controlled with prn medications, no emesis overnight. Pt received a total of 60K replaced off of last pms labs. Level this AM was WDL. NS with 40K also running @ 125/hr. 2 bags in pt med bin. q4h BGL have been WNL. Na slightly increased this morning. No replacments needed.

## 2018-02-13 NOTE — CONSULTS
GASTROENTEROLOGY CONSULTATION      Date of Admission:  2/12/2018          ASSESSMENT AND RECOMMENDATIONS:   Assessment:  Shirin Muller is a 57 year old female with a history of metastatic pancreatic cancer diagnosed in 2016 currently off of chemotherapy 2/2 progression of standard therapy with course complicated by recurrent biliary malignant strictures and cholangitis s/p fully covered metal stent placement (10 mm x 4 cm Wallflex) (8/2017 last ERCP) and noted duodenal narrowing 2/2 malignant invasion/extrinsic compression who is present with nausea, vomiting and abdominal pain with UGI series/small bowel follow through and CT A/P revealing duodenal stenosis, minimal contrast passage through stenosis and dilated proximal duodenum/stomach concerning for obstruction. Previous plan was for duodenal stent once patient became symptomatic.      Recommendations:   - Place NG tube to low intermittent suction for obstruction   - Will plan EGD with duodenal stent placement (if able) tomorrow in OR   - During same procedure will attempt to obtain malignant tissue with forceps during EGD vs utilizing EUS to obtain malignant tissue sample per request of primary oncologist   - GI will continue to follow   - Recommendations relayed to primary service (Dr. Echavarria)    Gastroenterology outpatient follow up recommendations: Pending clinical course.     Thank you for involving us in this patient's care. Please do not hesitate to contact the GI service with any questions or concerns.     Pt care plan discussed with Dr. Zamudio, GI staff physician.    Hannah Castillo MD  Gastroenterology Fellow  -------------------------------------------------------------------------------------------------------------------          Chief Complaint:   We were asked by ASTRID Estrada of oncology to evaluate this patient with duodenal stenosis.     History is obtained from the patient and the medical record.          History of  Present Illness:   Shirin Muller is a 57 year old female with a history of metastatic pancreatic cancer diagnosed in 2016 currently off of chemotherapy 2/2 progression of standard therapy with course complicated by recurrent biliary malignant strictures and cholangitis s/p fully covered metal stent placement (10 mm x 4 cm Wallflex) (2017 last ERCP) and noted duodenal narrowing 2/2 malignant invasion/extrinsic compression who is present with nausea, vomiting and abdominal pain with UGI series/small bowel follow through and CT A/P revealing duodenal stenosis, minimal contrast passage through stenosis and dilated proximal duodenum/stomach concerning for obstruction. Previous plan was for duodenal stent once patient became symptomatic.     Patient reports inability to keep food down x 1 week. Notes she will eat during the day and becomes so uncomfortable at night prior to sleeping that she forces herself to vomit. She has been vomiting at least once per day. She has mid abdominal pain, 10/10 severity, non-radiating and worse with eating over the past one week as well. She denies fevers, chills, chest pain, shortness of breath. She has decreased passage of rectal gas and increased burping. She has not had a bowel movement in five days. No melena or hematochezia. Her bowel habits were regular prior to her acute issues.             Past Medical History:   Reviewed and edited as appropriate  Past Medical History:   Diagnosis Date     Biliary stricture      Graves disease      Lesion of liver      Malignant neoplasm of head of pancreas (H) 2016     Pancreatic disease      Pancreatic mass             Past Surgical History:   Reviewed and edited as appropriate   Past Surgical History:   Procedure Laterality Date      SECTION       ENDOSCOPIC RETROGRADE CHOLANGIOPANCREATOGRAM N/A 2016    Procedure: COMBINED ENDOSCOPIC RETROGRADE CHOLANGIOPANCREATOGRAPHY, PLACE TUBE/STENT;  Surgeon: Arias Taveras  MD Wilver;  Location: [facility] OR     ENDOSCOPIC RETROGRADE CHOLANGIOPANCREATOGRAM N/A 4/19/2017    Procedure: COMBINED ENDOSCOPIC RETROGRADE CHOLANGIOPANCREATOGRAPHY, PLACE TUBE/STENT;  Endoscopic Retrograde Cholangiopancreatogram With biliary Stent placement, ballon sweep of bile duct for stone removal;  Surgeon: Tristin Braden MD;  Location: [facility] OR     ENDOSCOPIC RETROGRADE CHOLANGIOPANCREATOGRAM N/A 8/3/2017    Procedure: COMBINED ENDOSCOPIC RETROGRADE CHOLANGIOPANCREATOGRAPHY, PLACE TUBE/STENT;  ENDOSCOPIC RETROGRADE CHOLANGIOPANCREATOGRAM, pus removal , stent placement to bile duct x1  ;  Surgeon: Guru Jamia Mcintosh MD;  Location: [facility] OR     ESOPHAGOSCOPY, GASTROSCOPY, DUODENOSCOPY (EGD), COMBINED N/A 4/13/2016    Procedure: COMBINED ENDOSCOPIC ULTRASOUND, ESOPHAGOSCOPY, GASTROSCOPY, DUODENOSCOPY (EGD), FINE NEEDLE ASPIRATE/BIOPSY;  Surgeon: Arias Taveras MD;  Location: [facility] OR     VASCULAR SURGERY      right chest            Previous Endoscopy:   No results found for this or any previous visit.         Social History:   Reviewed and edited as appropriate  Social History     Social History     Marital status:      Spouse name: N/A     Number of children: N/A     Years of education: N/A     Occupational History     Not on file.     Social History Main Topics     Smoking status: Never Smoker     Smokeless tobacco: Never Used     Alcohol use No     Drug use: No     Sexual activity: Not on file     Other Topics Concern     Not on file     Social History Narrative            Family History:   Reviewed and edited as appropriate  Family History   Problem Relation Age of Onset     DIABETES Mother      No known history of colorectal cancer, liver disease, or inflammatory bowel disease.       Allergies:   Reviewed and edited as appropriate     Allergies   Allergen Reactions     Contrast Dye Nausea and Vomiting     Pt vomiting post IV contrast, Please try Visipaque for next CT scan. 6/24/16  sv     Diagnostic X-Ray Materials Nausea and Vomiting     Pt vomiting post IV contrast, Please try Visipaque for next CT scan. 6/24/16 sv            Medications:     Prescriptions Prior to Admission   Medication Sig Dispense Refill Last Dose     prochlorperazine (COMPAZINE) 10 MG tablet TAKE ONE TABLET BY MOUTH EVERY 6 HOURS AS NEEDED FOR NAUSEA AND VOMITING 30 tablet 2 2/12/2018 at Unknown time     LORazepam (ATIVAN) 0.5 MG tablet Take 1 tablet (0.5 mg) by mouth every 4 hours as needed (Anxiety, Nausea/Vomiting or Sleep) 30 tablet 3 2/11/2018 at PM     dronabinol (MARINOL) 5 MG capsule Take 1 capsule (5 mg) by mouth 3 times daily (before meals) Taking once daily 90 capsule 0 2/12/2018 at AM     docusate sodium (DOK) 100 MG capsule Take 1 capsule (100 mg) by mouth daily 100 capsule 1 2/12/2018 at AM     morphine (MS CONTIN) 15 MG 12 hr tablet Take 1 tablet (15 mg) by mouth every 12 hours 60 tablet 0 2/12/2018 at AM     loratadine (CLARITIN) 10 MG tablet Take 1 tablet (10 mg) by mouth daily Reported on 5/5/2017 30 tablet 6 2/12/2018 at AM     potassium chloride SA (KLOR-CON) 20 MEQ CR tablet Take 2 tablets (40 mEq) by mouth daily 60 tablet 3 2/11/2018 at Unknown time     polyethylene glycol (MIRALAX/GLYCOLAX) powder Take 17 g (1 capful) by mouth daily 500 g 11 2/9/2018 at Unknown Time     oxyCODONE IR (ROXICODONE) 5 MG tablet Take 1 tablet (5 mg) by mouth every 4 hours as needed for moderate to severe pain 100 tablet 0 2/12/2018 at AM     ondansetron (ZOFRAN-ODT) 8 MG ODT tab Take 1 tablet (8 mg) by mouth every 8 hours as needed for nausea 60 tablet 6 2/11/2018 at AM     pantoprazole (PROTONIX) 20 MG EC tablet Take 1 tablet (20 mg) by mouth 2 times daily 60 tablet 3 2/12/2018 at AM     Nutritional Supplements (ENSURE CLEAR) LIQD Take 1 Bottle by mouth 3 times daily 90 Bottle 6 2/12/2018 at AM     insulin glargine (LANTUS SOLOSTAR) 100 UNIT/ML injection 10 units subcutaneously daily at 8pm. 15 mL 3 2/11/2018 at 2000  "    amylase-lipase-protease (CREON) 29189 UNITS CPEP Take 2 capsules (72,000 Units) by mouth 3 times daily (with meals) 180 capsule 1 2/12/2018 at AM     diltiazem 2% in PLO cream, FV COMPOUNDED, 2% GEL Apply topically daily and as needed to external hemorrhoids 30 g 0 PRN     lidocaine-prilocaine (EMLA) cream Apply topically as needed for other (Use 30-60 minutes prior to port access) 30 g 0 PRN     ALVARADO CONTOUR NEXT test strip USE TO CHECK BLOOD SUGAR TWICE DAILY ( ONCE FASTING AND ONCE 2 HOURS AFTER EATING ) 100 each 11 Unknown at Unknown time     order for DME Equipment being ordered: Toilet seat riser with handles 1 Units 0 Unknown at Unknown time     insulin pen needle (BD EMMANUEL U/F) 32G X 4 MM Use one daily or as directed. 100 each prn Unknown at Unknown time     blood glucose monitoring (ACCU-CHEK FASTCLIX) lancets Use to test blood sugar two times daily or as directed. 102 each 3 Unknown at Unknown time     Blood Glucose Monitoring Suppl (ONETOUCH PING METER REMOTE) SUPPLIES MISC Check your sugars twice daily, once when fasting and once 2 hours after eating. 1 each 3 Unknown at Unknown time             Review of Systems:   A complete review of systems was performed and is negative except as noted in the HPI           Physical Exam:   /77 (BP Location: Left arm)  Pulse 78  Temp 98.5  F (36.9  C) (Oral)  Resp 16  Ht 1.803 m (5' 11\")  Wt 71.5 kg (157 lb 10.1 oz)  SpO2 100%  BMI 21.98 kg/m2  Wt:   Wt Readings from Last 2 Encounters:   02/13/18 71.5 kg (157 lb 10.1 oz)   02/11/18 72 kg (158 lb 12.8 oz)      Constitutional: cooperative, pleasant, not dyspneic/diaphoretic, no acute distress  Eyes: Sclera anicteric  Ears/nose/mouth/throat: Normal oropharynx without ulcers or exudate, mucus membranes moist, hearing intact  Neck: supple, thyroid normal size  CV: No edema  Respiratory: Unlabored breathing  Lymph: No axillary, submandibular, supraclavicular lymphadenopathy  Abd: + succussion splash,  " Nondistended, +bs, no hepatosplenomegaly, nontender, no peritoneal signs  Skin: warm, perfused, no jaundice  Neuro: AAO x 3  Psych: Normal affect  MSK: No gross deformities         Data:   Labs and imaging below were independently reviewed and interpreted    BMP  Recent Labs  Lab 02/13/18  1125 02/13/18  0322 02/12/18  1553 02/11/18  0933   * 146* 142 137   POTASSIUM 4.1 3.6 2.8* 2.7*   CHLORIDE 111* 106 97 95   FANNY 8.6 9.1 9.8 9.1   CO2 30 33* 37* 36*   BUN 8 8 10 8   CR 0.72 0.76 0.83 0.85   GLC 83 97 116* 195*     CBC  Recent Labs  Lab 02/13/18  1125 02/13/18  0322 02/12/18  1553 02/09/18  1154   WBC 4.7 3.7* 5.1 4.8   RBC 3.49* 3.52* 4.00 4.18   HGB 9.5* 9.7* 11.2* 11.4*   HCT 31.9* 32.0* 36.5 37.2   MCV 91 91 91 89   MCH 27.2 27.6 28.0 27.3   MCHC 29.8* 30.3* 30.7* 30.6*   RDW 15.3* 15.3* 15.1* 14.5   * 133* 196 246     INR  Recent Labs  Lab 02/12/18  1553   INR 1.17*     LFTs  Recent Labs  Lab 02/13/18  1125 02/12/18  1553 02/11/18  0933 02/09/18  1154   ALKPHOS 298* 360* 360* 393*   AST 27 31 29 39   ALT 19 26 29 34   BILITOTAL 0.5 0.6 0.6 0.5   PROTTOTAL 7.6 9.2* 9.1* 9.3*   ALBUMIN 3.2* 3.6 3.7 3.5      PANC  Recent Labs  Lab 02/12/18  1553   LIPASE 44*       Imaging:  CT Abdomen/Pelvis 2/12/18  1. Suspected narrowing of the second portion of the duodenum as it  passes in the region of the known pancreatic head mass. There is  moderate distention of the stomach and proximal duodenum, with a small  amount of contrast passing through the duodenum. Together, findings  suggest partial obstruction.  2. Poor assessment of pancreatic malignancy given lack of intravenous  contrast and artifact.  3. Liver heterogeneity of uncertain significance. Consider abdominal  ultrasound.  4. Multilobulated cystic lesion in the right adnexa, likely ovarian.  Recommend pelvic ultrasound.    XR Upper GI with Small Bowel Follow Through  1.  Technically challenging examination secondary to patient's nausea  and  inability to tolerate oral contrast.  2.  No significant transit of oral contrast beyond stomach after  nearly 1.5 hours.  3.  Moderate colonic stool.

## 2018-02-13 NOTE — PLAN OF CARE
Problem: Patient Care Overview  Goal: Plan of Care/Patient Progress Review  AVSS. Pt alert and oriented. Using key light appropriately. Denies pain. intermittent nausea noted.  Compazine given x 1. Remains NPO. Awaiting GI consult. Abdomen distended. No bm since 2/9. Pt passing flatus. BS 88 and 83. Up adl jose. Glycerin supp. ordered. No replacements needed this shift. Continue with poc.

## 2018-02-14 NOTE — ANESTHESIA PREPROCEDURE EVALUATION
Anesthesia Evaluation     .             ROS/MED HX    ENT/Pulmonary:       Neurologic:       Cardiovascular:         METS/Exercise Tolerance:     Hematologic:         Musculoskeletal:         GI/Hepatic: Comment: Pancreatic cancer causing duodenal obstruction requiring a stent placement    (+) liver disease,       Renal/Genitourinary:         Endo:     (+) thyroid problem .      Psychiatric:         Infectious Disease:         Malignancy:         Other:                                    Anesthesia Plan      History & Physical Review  History and physical reviewed and following examination; no interval change.    ASA Status:  3 .        Plan for General, ETT and RSI with Intravenous and Propofol induction. Maintenance will be Balanced.    PONV prophylaxis:  Ondansetron (or other 5HT-3) and Dexamethasone or Solumedrol  Additional equipment: Videolaryngoscope      Postoperative Care  Postoperative pain management:  Multi-modal analgesia.      Consents  Anesthetic plan, risks, benefits and alternatives discussed with:  Patient.  Use of blood products discussed: Yes.   Consented to blood products.  .        ANESTHESIA PREOP EVALUATION    NPO Status: NPO for Medical/Clinical Reasons Except for: Meds, Ice Chips    Procedure: EGD     HPI: Shirin Muller is a 57 year old female with a history of metastatic pancreatic cancer diagnosed in 2016 currently off of chemotherapy 2/2 progression of standard therapy with course complicated by recurrent biliary malignant strictures and cholangitis s/p fully covered metal stent placement (10 mm x 4 cm Wallflex) (8/2017 last ERCP) and noted duodenal narrowing 2/2 malignant invasion/extrinsic compression who is present with nausea, vomiting and abdominal pain with UGI series/small bowel follow through and CT A/P revealing duodenal stenosis, minimal contrast passage through stenosis and dilated proximal duodenum/stomach concerning for obstruction.      PMHx/PSHx/ROS:  PAST MEDICAL  HISTORY:   Past Medical History:   Diagnosis Date     Biliary stricture      Graves disease      Lesion of liver      Malignant neoplasm of head of pancreas (H) 2016     Pancreatic disease      Pancreatic mass        PAST SURGICAL HISTORY:   Past Surgical History:   Procedure Laterality Date      SECTION       ENDOSCOPIC RETROGRADE CHOLANGIOPANCREATOGRAM N/A 2016    Procedure: COMBINED ENDOSCOPIC RETROGRADE CHOLANGIOPANCREATOGRAPHY, PLACE TUBE/STENT;  Surgeon: Arias Taveras MD;  Location: [facility] OR     ENDOSCOPIC RETROGRADE CHOLANGIOPANCREATOGRAM N/A 2017    Procedure: COMBINED ENDOSCOPIC RETROGRADE CHOLANGIOPANCREATOGRAPHY, PLACE TUBE/STENT;  Endoscopic Retrograde Cholangiopancreatogram With biliary Stent placement, ballon sweep of bile duct for stone removal;  Surgeon: Tristin Braden MD;  Location: [facility] OR     ENDOSCOPIC RETROGRADE CHOLANGIOPANCREATOGRAM N/A 8/3/2017    Procedure: COMBINED ENDOSCOPIC RETROGRADE CHOLANGIOPANCREATOGRAPHY, PLACE TUBE/STENT;  ENDOSCOPIC RETROGRADE CHOLANGIOPANCREATOGRAM, pus removal , stent placement to bile duct x1  ;  Surgeon: Guru Jamia Mcintosh MD;  Location: [facility] OR     ESOPHAGOSCOPY, GASTROSCOPY, DUODENOSCOPY (EGD), COMBINED N/A 2016    Procedure: COMBINED ENDOSCOPIC ULTRASOUND, ESOPHAGOSCOPY, GASTROSCOPY, DUODENOSCOPY (EGD), FINE NEEDLE ASPIRATE/BIOPSY;  Surgeon: Arias Taveras MD;  Location: [facility] OR     VASCULAR SURGERY      right chest       FAMILY HISTORY:   Family History   Problem Relation Age of Onset     DIABETES Mother      Allergies:   Allergies   Allergen Reactions     Contrast Dye Nausea and Vomiting     Pt vomiting post IV contrast, Please try Visipaque for next CT scan. 16 sv     Diagnostic X-Ray Materials Nausea and Vomiting     Pt vomiting post IV contrast, Please try Visipaque for next CT scan. 16 sv       Meds:   Prescriptions Prior to Admission   Medication Sig Dispense Refill Last Dose      prochlorperazine (COMPAZINE) 10 MG tablet TAKE ONE TABLET BY MOUTH EVERY 6 HOURS AS NEEDED FOR NAUSEA AND VOMITING 30 tablet 2 2/12/2018 at Unknown time     LORazepam (ATIVAN) 0.5 MG tablet Take 1 tablet (0.5 mg) by mouth every 4 hours as needed (Anxiety, Nausea/Vomiting or Sleep) 30 tablet 3 2/11/2018 at PM     dronabinol (MARINOL) 5 MG capsule Take 1 capsule (5 mg) by mouth 3 times daily (before meals) Taking once daily 90 capsule 0 2/12/2018 at AM     docusate sodium (DOK) 100 MG capsule Take 1 capsule (100 mg) by mouth daily 100 capsule 1 2/12/2018 at AM     morphine (MS CONTIN) 15 MG 12 hr tablet Take 1 tablet (15 mg) by mouth every 12 hours 60 tablet 0 2/12/2018 at AM     loratadine (CLARITIN) 10 MG tablet Take 1 tablet (10 mg) by mouth daily Reported on 5/5/2017 30 tablet 6 2/12/2018 at AM     potassium chloride SA (KLOR-CON) 20 MEQ CR tablet Take 2 tablets (40 mEq) by mouth daily 60 tablet 3 2/11/2018 at Unknown time     polyethylene glycol (MIRALAX/GLYCOLAX) powder Take 17 g (1 capful) by mouth daily 500 g 11 2/9/2018 at Unknown Time     oxyCODONE IR (ROXICODONE) 5 MG tablet Take 1 tablet (5 mg) by mouth every 4 hours as needed for moderate to severe pain 100 tablet 0 2/12/2018 at AM     ondansetron (ZOFRAN-ODT) 8 MG ODT tab Take 1 tablet (8 mg) by mouth every 8 hours as needed for nausea 60 tablet 6 2/11/2018 at AM     pantoprazole (PROTONIX) 20 MG EC tablet Take 1 tablet (20 mg) by mouth 2 times daily 60 tablet 3 2/12/2018 at AM     Nutritional Supplements (ENSURE CLEAR) LIQD Take 1 Bottle by mouth 3 times daily 90 Bottle 6 2/12/2018 at AM     insulin glargine (LANTUS SOLOSTAR) 100 UNIT/ML injection 10 units subcutaneously daily at 8pm. 15 mL 3 2/11/2018 at 2000     amylase-lipase-protease (CREON) 17261 UNITS CPEP Take 2 capsules (72,000 Units) by mouth 3 times daily (with meals) 180 capsule 1 2/12/2018 at AM     diltiazem 2% in PLO cream, FV COMPOUNDED, 2% GEL Apply topically daily and as needed to  external hemorrhoids 30 g 0 PRN     lidocaine-prilocaine (EMLA) cream Apply topically as needed for other (Use 30-60 minutes prior to port access) 30 g 0 PRN     ALVARADO CONTOUR NEXT test strip USE TO CHECK BLOOD SUGAR TWICE DAILY ( ONCE FASTING AND ONCE 2 HOURS AFTER EATING ) 100 each 11 Unknown at Unknown time     order for DME Equipment being ordered: Toilet seat riser with handles 1 Units 0 Unknown at Unknown time     insulin pen needle (BD EMMANUEL U/F) 32G X 4 MM Use one daily or as directed. 100 each prn Unknown at Unknown time     blood glucose monitoring (ACCU-CHEK FASTCLIX) lancets Use to test blood sugar two times daily or as directed. 102 each 3 Unknown at Unknown time     Blood Glucose Monitoring Suppl (ONETOUCH PING METER REMOTE) SUPPLIES MISC Check your sugars twice daily, once when fasting and once 2 hours after eating. 1 each 3 Unknown at Unknown time       No current outpatient prescriptions on file.         BMP:  Lab Results   Component Value Date     02/14/2018      Lab Results   Component Value Date    POTASSIUM 4.4 02/14/2018     Lab Results   Component Value Date    CHLORIDE 112 02/14/2018     Lab Results   Component Value Date    FANNY 8.9 02/14/2018     Lab Results   Component Value Date    CO2 27 02/14/2018     Lab Results   Component Value Date    BUN 6 02/14/2018     Lab Results   Component Value Date    CR 0.74 02/14/2018     Lab Results   Component Value Date    GLC 73 02/14/2018        CBC:  Lab Results   Component Value Date    WBC 3.0 02/14/2018     Lab Results   Component Value Date    HGB 9.1 02/14/2018     Lab Results   Component Value Date    HCT 30.5 02/14/2018     Lab Results   Component Value Date    PLT 99 02/14/2018        Coags/Type and Screen  Lab Results   Component Value Date    INR 1.17 02/12/2018     No results found for: PT  Type and Screen:                  .

## 2018-02-14 NOTE — ANESTHESIA CARE TRANSFER NOTE
Patient: Shirin Berg OdunNy    Procedure(s):  COMBINED ESOPHAGOSCOPY, GASTROSCOPY, DUODENOSCOPY  - Wound Class: II-Clean Contaminated  endoscopic retrograde cholioangiography with biopsies, duodenal stent placement and dilatation of duodenal stent - Wound Class: II-Clean Contaminated    Diagnosis: Pancreatic cancer  Diagnosis Additional Information: No value filed.    Anesthesia Type:   General, ETT, RSI     Note:  Airway :Face Mask  Patient transferred to:PACU  Comments: Pt extubated in the OR without incident or complications. Pt VSS upon arrival to the PACU. Pt has no c/o pain/N/V. Pt care report given to receiving RN.       Vitals: (Last set prior to Anesthesia Care Transfer)    CRNA VITALS  2/14/2018 1419 - 2/14/2018 1453      2/14/2018             Pulse: 86    SpO2: 100 %    Resp Rate (observed): (!)  1                Electronically Signed By: TEE White CRNA  February 14, 2018  2:53 PM

## 2018-02-14 NOTE — PROGRESS NOTES
[facility], [place]  Hematology / Oncology Progress Note     Assessment & Plan   Shirin Muller is a 57 year old female with history of metastatic pancreatic cancer (currently off active treatment).  She presents with worsening nausea and vomiting, with duodenal obstruction suggested by upper GI series with SBFT.  Admitted to the oncology service for further cares.     #Nausea and vomiting, suspected duodenal obstruction.  Several day history of worsening nausea and vomiting, now also associated with constipation, bloating and belching.  Upper GI series with small bowel follow-through was obtained on 2/12.  She was not able to tolerate much oral contrast, but what was done of the study did suggest that there was no significant transit of oral contrast beyond the stomach after nearly 1.5 hours. CT abdomen pelvis with suspected narrowing of second portion of duodenum as it passes in region of known pancreatic head mass. Moderate distension of stomach and proximal duodenum.  -GI team consulted for possible placement of stenting vs placement of palliative GJ tube; appreciate their input and recommendations. Per Dr. Gonsales, needs tissue biopsy if visible tumor seen and accessible during procedure to allow for additional molecular testing for clinical trial consideration. Had NG placed overnight per GI recs and taken for EGD today with attempt at stenting and tissue sampling; if unsuccessful, would need to place GJ tube.  -PRN antiemetics available     #Hypokalemia.  Potassium 2.8 on admission labs.  Suspect related to poor oral intake and vomiting.  -Continue IVF with potassium and replace PRN per protocol     #Metastatic pancreatic cancer.  #Cancer-related pain.  She follows with Dr. Gonsales and is currently off active therapy after noted to have progression on multiple regimens.  Recent MRI showed narrowing of the distal portion of the duodenum with distention of the proximal  "duodenum, though the pancreatic mass was overall similar in size to the previous exam. D/w Dr. Gonsales this AM. Needs repeat tissue biopsy for consideration for clinical trial, see above for further detail.  -Continue home MS-Contin 15mg BID  -Oxycodone 5-10mg every 4 hours PRN, Tylenol PRN breakthrough pain     #Insulin-dependent type 2 diabetes.  -Holding home Lantus (10u per day) with NPO status  -Follow glucoses every 4h while NPO with medium SSI  -Hypoglycemia protocol    #Constipation.  -S/p suppository without result. Will consider enema tomorrow pending procedural success today.     #Severe malnutrition in the context of acute-on-chronic illness  -Will order supplements when patient able to eat  -Resume home Creon when eating  -Consider enteral vs parenteral nutrition pending GI procedure and ability to use GI tract for feeding, as well as goals of care --> could place GJ and feed thru J, and use G-tube to vent PRN    #Pain Assessment:   Current Pain Score 2/14/2018 2/14/2018 2/14/2018   Patient currently in pain? denies denies denies   Pain score (0-10) - - -   Pain location - - -   Pain descriptors - - -   -Shirin is experiencing pain due to metastatic pancreatic cancer. Pain management was discussed and the plan was created in a collaborative fashion.  Shirin's response to the current recommendations: compliant  -Please see the plan for pain management as documented above    FEN: NPO for now, NS with 40 mEq KCl at 125 ml/hr, replete lytes PRN  Prophylaxis: mechanical (pending possible procedure)  Code: FULL  Disposition: Anticipate d/c in the next 2-3 days pending GI procedure and symptom control.    Patient and plan of care discussed with staff attending, Dr. Montoya.     Shila Michel PA-C  Hematology/Oncology  [phone]    Interval History   Jose Antonio seen with her daughter at bedside. She states pain and nausea control are \"so-so.\" Doing OK with NG tube. No BM after suppository yesterday. Passing small " amount of gas. Denies urinary symptoms, but per RN notes went several hours without urinating yesterday. She is passing a small amount of gas. She denies chest pain, SOB, cough.    Physical Exam   Temp: 97.3  F (36.3  C) Temp src: Tympanic BP: (!) 128/91 Pulse: 82 Heart Rate: 76 Resp: 18 SpO2: 100 % O2 Device: Nasal cannula Oxygen Delivery: 2 LPM  Vitals:    02/12/18 2200 02/13/18 0834 02/14/18 0837   Weight: 71.5 kg (157 lb 10.1 oz) 71.5 kg (157 lb 10.1 oz) 71.2 kg (156 lb 15.5 oz)     Vital Signs with Ranges  Temp:  [96.4  F (35.8  C)-98.8  F (37.1  C)] 97.3  F (36.3  C)  Pulse:  [82] 82  Heart Rate:  [76-88] 76  Resp:  [12-18] 18  BP: (114-147)/(68-91) 128/91  SpO2:  [96 %-100 %] 100 %  I/O last 3 completed shifts:  In: 2525 [I.V.:2525]  Out: 2650 [Urine:700; Emesis/NG output:1950]    Constitutional: Pleasant adult female seen resting in bed. No acute distress. Appears tired.  Eyes: Lids and lashes normal, pupils equal, round and reactive to light, extra ocular muscles intact, sclera clear, conjunctiva normal.  ENT: Normocephalic, without obvious abnormality, atraumatic.  Respiratory: No increased work of breathing, good air exchange, clear to auscultation bilaterally, no crackles or wheezing.  Cardiovascular: Regular rate and rhythm, normal S1 and S2, and no murmur noted.  GI: + bowel sounds (quiet), soft, non-tender, non-distended.  Musculoskeletal: Trace pretibial edema B/L.  Neurologic: No focal deficits.  Neuropsychiatric: Calm, normal eye contact, alert, normal affect, oriented to self, place, time and situation, memory for past and recent events intact and thought process normal.    Medications     IV infusion builder WITH additives 125 mL/hr at 02/14/18 1557     lactated ringers Stopped (02/14/18 1558)       [Auto Hold] sodium chloride (PF)  3 mL Intracatheter Q8H     [Auto Hold] loratadine  10 mg Oral Daily     [Auto Hold] pantoprazole  20 mg Oral BID     [Auto Hold] insulin aspart  1-6 Units  Subcutaneous Q4H     [Auto Hold] morphine  15 mg Oral Q12H DIVINE       Data   Results for orders placed or performed during the hospital encounter of 02/12/18 (from the past 24 hour(s))   XR Abdomen Port 1 View    Narrative    EXAM: XR ABDOMEN PORT 1 VW  2/13/2018 6:33 PM      HISTORY: verify NG tube placement;     COMPARISON: CT 2/12/2018    FINDINGS: Single portable view of the abdomen was evaluated. Dense  material in the stomach administered via enteric tube which is  opacified. Sidehole projects over the stomach, tip is obscured.. Right  IJ Port-A-Cath projects over the low SVC. Metallic biliary stent.  Trace pneumobilia. Oral contrast present within the stomach.  Nonobstructive bowel gas pattern. Lung bases are clear. Cardiac  silhouette is normal.       Impression    IMPRESSION:   1. Gastric tube sidehole projects over the stomach, tip is obscured.  2. Nonobstructive bowel gas pattern.    I have personally reviewed the examination and initial interpretation  and I agree with the findings.    XIOMARA SRIVASTAVA MD   Glucose by meter   Result Value Ref Range    Glucose 74 70 - 99 mg/dL   CBC with platelets differential   Result Value Ref Range    WBC 3.0 (L) 4.0 - 11.0 10e9/L    RBC Count 3.35 (L) 3.8 - 5.2 10e12/L    Hemoglobin 9.1 (L) 11.7 - 15.7 g/dL    Hematocrit 30.5 (L) 35.0 - 47.0 %    MCV 91 78 - 100 fl    MCH 27.2 26.5 - 33.0 pg    MCHC 29.8 (L) 31.5 - 36.5 g/dL    RDW 15.2 (H) 10.0 - 15.0 %    Platelet Count 99 (L) 150 - 450 10e9/L    Diff Method Automated Method     % Neutrophils 59.1 %    % Lymphocytes 28.8 %    % Monocytes 9.8 %    % Eosinophils 1.7 %    % Basophils 0.3 %    % Immature Granulocytes 0.3 %    Nucleated RBCs 0 0 /100    Absolute Neutrophil 1.7 1.6 - 8.3 10e9/L    Absolute Lymphocytes 0.9 0.8 - 5.3 10e9/L    Absolute Monocytes 0.3 0.0 - 1.3 10e9/L    Absolute Eosinophils 0.1 0.0 - 0.7 10e9/L    Absolute Basophils 0.0 0.0 - 0.2 10e9/L    Abs Immature Granulocytes 0.0 0 - 0.4 10e9/L     Absolute Nucleated RBC 0.0    Comprehensive metabolic panel   Result Value Ref Range    Sodium 146 (H) 133 - 144 mmol/L    Potassium 4.4 3.4 - 5.3 mmol/L    Chloride 112 (H) 94 - 109 mmol/L    Carbon Dioxide 27 20 - 32 mmol/L    Anion Gap 7 3 - 14 mmol/L    Glucose 73 70 - 99 mg/dL    Urea Nitrogen 6 (L) 7 - 30 mg/dL    Creatinine 0.74 0.52 - 1.04 mg/dL    GFR Estimate 80 >60 mL/min/1.7m2    GFR Estimate If Black >90 >60 mL/min/1.7m2    Calcium 8.9 8.5 - 10.1 mg/dL    Bilirubin Total 0.8 0.2 - 1.3 mg/dL    Albumin 3.1 (L) 3.4 - 5.0 g/dL    Protein Total 7.3 6.8 - 8.8 g/dL    Alkaline Phosphatase 269 (H) 40 - 150 U/L    ALT 17 0 - 50 U/L    AST 23 0 - 45 U/L   Glucose by meter   Result Value Ref Range    Glucose 65 (L) 70 - 99 mg/dL   Glucose by meter   Result Value Ref Range    Glucose 166 (H) 70 - 99 mg/dL   Glucose by meter   Result Value Ref Range    Glucose 116 (H) 70 - 99 mg/dL   Glucose by meter   Result Value Ref Range    Glucose 134 (H) 70 - 99 mg/dL   Glucose by meter   Result Value Ref Range    Glucose 126 (H) 70 - 99 mg/dL   ENDOSCOPIC RETROGRADE CHOLANGIOPANCREATOGRAPHY   Result Value Ref Range    ERCP       [address] [place] [address] [phone]     Endoscopy Department  _______________________________________________________________________________  Patient Name: Shirin Meyer    Procedure Date: 2/14/2018 1:30 PM  MRN: [id number]                       Account Number: [id number]  YOB: 1960              Admit Type: Inpatient  Age: 57                                Gender: Female  Note Status: Finalized                Attending MD: Tristin Braden MD  Total Sedation Time:                    _______________________________________________________________________________     Procedure:           ERCP  Indications:         Bile duct stricture; duodenal stricture  Providers:           Tristin Braden MD  Patient Profile:     Ms  Betsy is a 58yo woman with mestatic pancreatic                        cancer complicated by biliary obstruction requiring                        stent in stent metal stent placement who present s with                        evidence of progressive duodenal stenosis. She now                        proceeds to ERCP to clear the in situ stents followed by                        upper endoscopy for placement of a duodenal stent as                        appropriate.  Referring MD:        Demond Gonsales MD  Requesting Provider: Barb Tatum MD  Medicines:           General Anesthesia, Indomethacin not given due to                        contraindication  Complications:       No immediate complications.  _______________________________________________________________________________  Procedure:           Pre-Anesthesia Assessment:                       - Prior to the procedure, a History and Physical was                        performed, and patient medications and allergies were                        reviewed. The patient is competent. The risks and                        benefits of the procedure and the sedation options and                        risks were discussed with  the patient. All questions                        were answered and informed consent was obtained. Patient                        identification and proposed procedure were verified by                        the nurse in the pre-procedure area. Mental Status                        Examination: alert and oriented. Airway Examination:                        Mallampati Class II (the uvula but not tonsillar pillars                        visualized). Respiratory Examination: clear to                        auscultation. CV Examination: normal. ASA Grade                        Assessment: III - A patient with severe systemic                        disease. After reviewing the risks and benefits, the                        patient was deemed  in satisfactory condition to undergo                        the procedure. The anesthesia plan was to use general                        anesthesia. Immediately prior to administration of                        medications, the patient was re-assessed fo r adequacy to                        receive sedatives. The heart rate, respiratory rate,                        oxygen saturations, blood pressure, adequacy of                        pulmonary ventilation, and response to care were                        monitored throughout the procedure. The physical status                        of the patient was re-assessed after the procedure.                        After obtaining informed consent, the scope was passed                        under direct vision. Throughout the procedure, the                        patient's blood pressure, pulse, and oxygen saturations                        were monitored continuously. The duodenoscope and 2T                        gastroscope was introduced through the mouth, and used                        to inject contrast into and used to inject contrast into                        the bile duct. The ERCP was accomplished without                        difficulty. The patient tolerated the procedure well.                                                                                    Findings:       The existing NG tube was removed. A  film demonstrated two stent in        stent metal stents in biliary orientation as well as a large amount of        old contrast in the colon. A 180 duodenoscope was passed however the        bulb and proximal second poriton were tight with evidence of direct        tumor invasion not allowing further passage. Per hematology request,        tissue was collected for Next Generation Sequencing. was normal. The        duodenoscope was exchanged for a dual channel gastroscope. The esophagus        was unremarkable as was the stomach on forward views.  "Again the bulb        narrowed and would not allow passage of the gastroscope. A long 0.025\"        Visiglide wire was then passed across the stenosis and knuckled in the        fourth portion. Over this wire a 12mm occlusion balloon was passed and        contast injected. This delineated the stricture as  approximately 3cm        long with dital extent just upstream of the ampulla. Contrast readily        refluexed into the common duct within the stents and filled an        unremarkable bifurcation. The contrast rapidly drained completely. The        duodenal stenosis was estimated as 5mm inner patency. Over the wire a        [id number] David enteral stent was positioned and deployed with distal end        just around the bend to the third portion and proximal end in the        stomach. The duodenal stenosis remained tight and the was dilated over        the wire to 16m5mm.                                                                                   Impression:          - Ultratight stenosis of the bulb and second portion                        possibly from direct tumor invasion; these tissues were                        sampled per Hematology's request                       - Well positioned, patent biliary stents                       - Successful deployment of a [id number] uncovered  enteral                        stent from antrum to third portion, with subseuqent                        dilation at the region of the stenosis                       - In situ NG tube was removed and not replaced  Recommendation:      - Standard inpatient general anesthesia recovery with                        return to the floor when approrpiate                       - With no acute issues in the next 2h, thin liquids may                        begin today and fulls tomorrow with slow progression to                        a stent appropriate diet as tolerated                       - It is possible these stents may again become " occluded                        with tissue ingrowth or debris at which time our team                        should be contacted                       - No plans for interval endoscopy at this juncture                       - The findings and recommendations were discussed with                        the patient and their family                                                                                      electronically signed by KATERINA Braden  _____________________  Tristin Braden MD  2/14/2018 2:51:40 PM  I was physically present for the entire viewing portion of the exam.  __________________________  Signature of teaching physician  [id number] Braden MD  Number of Addenda: 0    Note Initiated On: 2/14/2018 1:30 PM  Scope In:  Scope Out:     XR Surgery SAUL Fluoro L/T 5 Min w Stills    Narrative    This exam was marked as non-reportable because it will not be read by a   radiologist or a [place] non-radiologist provider.             Glucose by meter   Result Value Ref Range    Glucose 112 (H) 70 - 99 mg/dL

## 2018-02-14 NOTE — PLAN OF CARE
Problem: Patient Care Overview  Goal: Plan of Care/Patient Progress Review  Outcome: No Change   02/14/18 0606   OTHER   Plan Of Care Reviewed With patient   Plan of Care Review   Progress progress toward functional goals as expected       Problem: Nausea/Vomiting (Adult)  Goal: Identify Related Risk Factors and Signs and Symptoms  Related risk factors and signs and symptoms are identified upon initiation of Human Response Clinical Practice Guideline (CPG).   Outcome: No Change   02/14/18 0606   Nausea/Vomiting   Related Risk Factors (Nausea/Vomiting) female gender;gastric irritation;gastrointestinal dysfunction   Signs and Symptoms (Nausea/Vomiting) abdominal discomfort/pain;food aversion;report of queasy sensation;weakness;electrolyte changes       Comments: VSS and WNL. BGL low early in the shift. Moonlighter updated and added d5 to iv fluids. Started at 0300. At that time pt had BGL was under 70. 25 of d50 given and BGL has been at an acceptable level since that time. NG tube to LIMS. no output. Clamped x1 for 1 hour to allow pt to absorb PRN oxycodone given for back pain. nausea has been at a tolerable level overnight and no PRN antiemetics were needed. Plan is to have stent placement today.  Pt npo except ice chips and meds. No BM since 2/9. Sup given last evening with no output. No void until 0600 this morning. PVR was 0. No replacements needed this morning.

## 2018-02-14 NOTE — PLAN OF CARE
Problem: Patient Care Overview  Goal: Plan of Care/Patient Progress Review  Outcome: No Change   02/13/18 2246   OTHER   Plan Of Care Reviewed With patient   Plan of Care Review   Progress no change     Afebrile, VSS. One episode of emesis at 1530, 700cc pink tinged output. NG tube placed. Pt had another episode of emesis during placement of NG, 850cc output. PRN compazine and ativan used x1 with good relief of symptoms. NPO. Endoscopy with stent placement schedule for tomorrow, time unknown. Glycerin supp given as ordered, no results. Pt had no urinary output this shift, denies need to urinate. Bladder scan shows 307cc. Will continue to monitor, following plan of care.    Problem: Nausea/Vomiting (Adult)  Goal: Identify Related Risk Factors and Signs and Symptoms  Related risk factors and signs and symptoms are identified upon initiation of Human Response Clinical Practice Guideline (CPG).   Outcome: No Change   02/13/18 2246   Nausea/Vomiting   Related Risk Factors (Nausea/Vomiting) gastrointestinal dysfunction   Signs and Symptoms (Nausea/Vomiting) abdominal discomfort/pain;dry mucous membranes;report of emesis;report of queasy sensation;retching/gagging;weakness

## 2018-02-14 NOTE — PLAN OF CARE
Problem: Patient Care Overview  Goal: Plan of Care/Patient Progress Review  Outcome: No Change  VSS. Abdominal pain managed with MS Contin scheduled and prn Oxycodone. Patient tolerated having NG clamped 1 hour after PO medications. Reports nausea has improved with NG in place. NPO. BG checks stable, no insulin given. D5 NS +40K paused when sent to pre-op. Pre-op Eliseo wipes completed. Report given to pre-op RN.   Off unit at 1200.

## 2018-02-15 NOTE — ANESTHESIA POSTPROCEDURE EVALUATION
Patient: Shirin Muller    Procedure(s):  COMBINED ESOPHAGOSCOPY, GASTROSCOPY, DUODENOSCOPY  - Wound Class: II-Clean Contaminated  endoscopic retrograde cholioangiography with biopsies, duodenal stent placement and dilatation of duodenal stent - Wound Class: II-Clean Contaminated    Diagnosis:Pancreatic cancer  Diagnosis Additional Information: No value filed.    Anesthesia Type:  General, ETT, RSI    Note:  Anesthesia Post Evaluation    Patient location during evaluation: PACU  Patient participation: Able to fully participate in evaluation  Level of consciousness: awake and alert  Pain management: adequate  Airway patency: patent  Cardiovascular status: acceptable  Respiratory status: acceptable  Hydration status: acceptable  PONV: none     Anesthetic complications: None          Last vitals:  Vitals:    02/14/18 1600 02/14/18 1646 02/14/18 1844   BP: (!) 128/97 128/81 143/87   Pulse:      Resp:  24 20   Temp: 36.4  C (97.5  F) 37.2  C (98.9  F) 36.5  C (97.7  F)   SpO2: 100% 100% 100%         Electronically Signed By: Gracie Turner MD  February 14, 2018  7:58 PM

## 2018-02-15 NOTE — PROGRESS NOTES
[facility], [place]  Hematology / Oncology Progress Note     Assessment & Plan   Shirin Muller is a 57 year old female with history of metastatic pancreatic cancer (currently off active treatment).  She presents with worsening nausea and vomiting, with duodenal obstruction suggested by upper GI series with SBFT.  Admitted to the oncology service for further cares, now s/p duodenal stenting.     #Nausea and vomiting, secondary to duodenal obstruction.  Several day history of worsening nausea and vomiting, now also associated with constipation, bloating and belching.  Upper GI series with small bowel follow-through was obtained on 2/12.  She was not able to tolerate much oral contrast, but what was done of the study did suggest that there was no significant transit of oral contrast beyond the stomach after nearly 1.5 hours. CT abdomen pelvis with suspected narrowing of second portion of duodenum as it passes in region of known pancreatic head mass. Moderate distension of stomach and proximal duodenum.  -GI team consulted for possible placement of stenting vs placement of palliative GJ tube; appreciate their input and recommendations. Per Dr. Gonsales, needs tissue biopsy if visible tumor seen and accessible during procedure to allow for additional molecular testing for clinical trial consideration. S/p stenting of duodenal obstruction by GI team on 2/14. Patient tolerating full liquid diet thus far. Will continue to follow PO intake and symptom control. Overall improved. No antiemetics needed for >24h.  -PRN antiemetics available     #Hypokalemia.  Potassium 2.8 on admission labs.  Suspect related to poor oral intake and vomiting.  -Significantly improved. Will remove K+ from fluids and continue to monitor.     #Metastatic pancreatic cancer.  #Cancer-related pain.  She follows with Dr. Gonsales and is currently off active therapy after noted to have progression on multiple  regimens.  Recent MRI showed narrowing of the distal portion of the duodenum with distention of the proximal duodenum, though the pancreatic mass was overall similar in size to the previous exam. D/w Dr. Gonsales. Needs repeat tissue biopsy for consideration for clinical trial, see above for further detail.  -Continue home MS-Contin 15mg BID  -Oxycodone 5-10mg every 4 hours PRN, Tylenol PRN breakthrough pain     #Insulin-dependent type 2 diabetes.  -Add back 1/2 home dose of Lantus (5U at 2000)  -Glucoses with meals now that she is tolerating PO intake with medium SSI ordered  -Hypoglycemia protocol    #Constipation.  -Small, hard BM overnight s/p multiple bowel meds. Continue with bowel regimen and monitor output.     #Severe malnutrition in the context of acute-on-chronic illness  -Supplements as recommended by RD, consider calorie counts  -Home Creon resumed  -Plan to advance diet per GI recs    #Pain Assessment:   Current Pain Score 2/15/2018 2/15/2018 2/15/2018   Patient currently in pain? denies denies denies   Pain score (0-10) - - -   Pain location - - -   Pain descriptors - - -   -Shirin is experiencing pain due to metastatic pancreatic cancer. Pain management was discussed and the plan was created in a collaborative fashion.  Shirin's response to the current recommendations: compliant  -Please see the plan for pain management as documented above    FEN: NS at 50ml/hr, lyte replacement PRN per protocol, Full liquid diet  Prophylaxis: mechanical, will touch base with GI tomorrow to ensure OK to give Lovenox after stent placement.  Code: FULL  Disposition: Anticipate d/c in the next 2-3 days pending GI procedure and symptom control. PT/OT consults today for assistance with discharge planning.    Patient and plan of care discussed with staff attending, Dr. Montoya.     Shila Michel PA-C  Hematology/Oncology  [phone]    Interval History   Better today. Nausea, vomiting controlled. Pain  improved.    Physical Exam   Temp: 97  F (36.1  C) Temp src: Axillary BP: 123/78   Heart Rate: 83 Resp: 16 SpO2: 100 % O2 Device: None (Room air) Oxygen Delivery: 1 LPM  Vitals:    02/13/18 0834 02/14/18 0837 02/15/18 0820   Weight: 71.5 kg (157 lb 10.1 oz) 71.2 kg (156 lb 15.5 oz) 71 kg (156 lb 8.4 oz)     Vital Signs with Ranges  Temp:  [97  F (36.1  C)-98.9  F (37.2  C)] 97  F (36.1  C)  Heart Rate:  [70-93] 83  Resp:  [16-24] 16  BP: (122-143)/(69-87) 123/78  SpO2:  [99 %-100 %] 100 %  I/O last 3 completed shifts:  In: 2609.17 [P.O.:480; I.V.:2129.17]  Out: 2500 [Urine:2500]    Constitutional: Pleasant adult female seen resting, no acute distress.  Eyes: Lids and lashes normal, pupils equal, round and reactive to light, extra ocular muscles intact, sclera clear, conjunctiva normal.  ENT: Normocephalic, without obvious abnormality, atraumatic.  Respiratory: No increased work of breathing, good air exchange, clear to auscultation bilaterally, no crackles or wheezing.  Cardiovascular: Regular rate and rhythm, normal S1 and S2, and no murmur noted.  GI: + bowel sounds, soft, non-tender, non-distended.  Neuropsychiatric: Calm, normal eye contact, alert, normal affect, oriented to self, place, time and situation, memory for past and recent events intact and thought process normal.    Medications     NaCl 50 mL/hr at 02/15/18 1613     - MEDICATION INSTRUCTIONS -         insulin glargine  5 Units Subcutaneous At Bedtime     insulin aspart  1-7 Units Subcutaneous TID AC     insulin aspart  1-5 Units Subcutaneous At Bedtime     amylase-lipase-protease  2 capsule Oral TID w/meals     sodium chloride (PF)  3 mL Intracatheter Q8H     loratadine  10 mg Oral Daily     pantoprazole  20 mg Oral BID     morphine  15 mg Oral Q12H DIVINE       Data   Results for orders placed or performed during the hospital encounter of 02/12/18 (from the past 24 hour(s))   Glucose by meter   Result Value Ref Range    Glucose 178 (H) 70 - 99 mg/dL    Glucose by meter   Result Value Ref Range    Glucose 183 (H) 70 - 99 mg/dL   CBC with platelets differential   Result Value Ref Range    WBC 3.9 (L) 4.0 - 11.0 10e9/L    RBC Count 3.68 (L) 3.8 - 5.2 10e12/L    Hemoglobin 9.9 (L) 11.7 - 15.7 g/dL    Hematocrit 33.2 (L) 35.0 - 47.0 %    MCV 90 78 - 100 fl    MCH 26.9 26.5 - 33.0 pg    MCHC 29.8 (L) 31.5 - 36.5 g/dL    RDW 15.0 10.0 - 15.0 %    Platelet Count 103 (L) 150 - 450 10e9/L    Diff Method Automated Method     % Neutrophils 66.5 %    % Lymphocytes 22.1 %    % Monocytes 8.5 %    % Eosinophils 2.1 %    % Basophils 0.5 %    % Immature Granulocytes 0.3 %    Nucleated RBCs 0 0 /100    Absolute Neutrophil 2.6 1.6 - 8.3 10e9/L    Absolute Lymphocytes 0.9 0.8 - 5.3 10e9/L    Absolute Monocytes 0.3 0.0 - 1.3 10e9/L    Absolute Eosinophils 0.1 0.0 - 0.7 10e9/L    Absolute Basophils 0.0 0.0 - 0.2 10e9/L    Abs Immature Granulocytes 0.0 0 - 0.4 10e9/L    Absolute Nucleated RBC 0.0    Comprehensive metabolic panel   Result Value Ref Range    Sodium 142 133 - 144 mmol/L    Potassium 4.3 3.4 - 5.3 mmol/L    Chloride 109 94 - 109 mmol/L    Carbon Dioxide 26 20 - 32 mmol/L    Anion Gap 6 3 - 14 mmol/L    Glucose 229 (H) 70 - 99 mg/dL    Urea Nitrogen 3 (L) 7 - 30 mg/dL    Creatinine 0.65 0.52 - 1.04 mg/dL    GFR Estimate >90 >60 mL/min/1.7m2    GFR Estimate If Black >90 >60 mL/min/1.7m2    Calcium 8.4 (L) 8.5 - 10.1 mg/dL    Bilirubin Total 0.5 0.2 - 1.3 mg/dL    Albumin 3.1 (L) 3.4 - 5.0 g/dL    Protein Total 7.6 6.8 - 8.8 g/dL    Alkaline Phosphatase 278 (H) 40 - 150 U/L    ALT 16 0 - 50 U/L    AST 27 0 - 45 U/L   Glucose by meter   Result Value Ref Range    Glucose 223 (H) 70 - 99 mg/dL   Glucose by meter   Result Value Ref Range    Glucose 180 (H) 70 - 99 mg/dL   Glucose by meter   Result Value Ref Range    Glucose 168 (H) 70 - 99 mg/dL

## 2018-02-15 NOTE — PROGRESS NOTES
CLINICAL NUTRITION SERVICES    NEW FINDINGS   -Diet: Clear Liquids (started PM 2/14/18) - to advance as tolerated.    Implementation  Medical food supplement therapy -ordered boost breeze (clear liquid supplement), all flavors w/ meals prn if/when pt is ready to trial.    See RD note 2/13/18 for all other recommendations where appropriate.     Wilmer Sal RD, LD  5C/BMT Dietitian  Pager: [phone]

## 2018-02-15 NOTE — PLAN OF CARE
Problem: Patient Care Overview  Goal: Plan of Care/Patient Progress Review  Outcome: No Change   02/15/18 0549   OTHER   Plan Of Care Reviewed With patient   Plan of Care Review   Progress progress towards functional goals is fair       Problem: Nausea/Vomiting (Adult)  Goal: Identify Related Risk Factors and Signs and Symptoms  Related risk factors and signs and symptoms are identified upon initiation of Human Response Clinical Practice Guideline (CPG).   Outcome: No Change   02/15/18 0549   Nausea/Vomiting   Related Risk Factors (Nausea/Vomiting) female gender;gastrointestinal dysfunction   Signs and Symptoms (Nausea/Vomiting) electrolyte changes;weakness       Comments: VSS and WNL. Sating well on RA. Denies pain and nausea throughout the night. Tolerating water well. Ate one ice pop and did ok with it, not not likely ready to advance diet yet. After multiple laxatives administered on evenings, pt did have a small dry very hard BM with yellow/clear mucous op. Up independently to bathroom. q4h BGLs.   at 0000 and 223 this morning, slsc given. ICa was low this morning but no replacement protocol in place. Will pass on to days. No other replacements needed this morning.

## 2018-02-15 NOTE — PLAN OF CARE
Problem: Patient Care Overview  Goal: Plan of Care/Patient Progress Review  PT 5C: PT order for stat evaluation and treatment acknowledged. Reviewed pt status with OT following OT evaluation. Pt is ambulating independently without assistive devices and with good balance. Able to ascend/descend stairs independently. No PT needs are identified. OT will complete discharge planning and PT will defer. Order will be completed.

## 2018-02-15 NOTE — PROGRESS NOTES
"/84 (BP Location: Left arm)  Pulse 82  Temp 98.6  F (37  C) (Axillary)  Resp 20  Ht 1.803 m (5' 11\")  Wt 71.2 kg (156 lb 15.5 oz)  SpO2 100%  BMI 21.89 kg/m2   5934-9791:  Capnography and oxygen discontinued. SaO2 100% IPI 8-10. A&Ox4. New orders for miralax, senokot, and dulcolax suppository. Pt given all three. C/o back pain. Family brought pt a heating pad.  Pt did not tolerate orange jello very well and did not like orange fruit ice cup. Later in shift, pt able to tolerate water and juice. Pt given PRN Tylenol and 5 mg oxycodone. Scheduled MS Contin also given. ; 1 unit Novolog given. Continue POC.  "

## 2018-02-15 NOTE — PROGRESS NOTES
02/15/18 1500   Quick Adds   Type of Visit Initial Occupational Therapy Evaluation   Living Environment   Lives With child(renny), adult   Living Arrangements house   Home Accessibility stairs to enter home;stairs within home;bed and bath are not on the first floor   Number of Stairs to Enter Home 3   Number of Stairs Within Home 6   Transportation Available car;family or friend will provide   Living Environment Comment Pt lives in a house with her adult children, reports there are 3 stairs to enter and 6 stairs within home to access her bedroom.    Self-Care   Dominant Hand right   Usual Activity Tolerance moderate   Current Activity Tolerance moderate   Regular Exercise no   Equipment Currently Used at Home shower chair;raised toilet   Activity/Exercise/Self-Care Comment Pt reports using AE for increased independence with ADLs.    Functional Level Prior   Ambulation 0-->independent   Transferring 0-->independent   Toileting 1-->assistive equipment   Bathing 1-->assistive equipment   Dressing 0-->independent   Eating 0-->independent   Communication 0-->understands/communicates without difficulty   Swallowing 0-->swallows foods/liquids without difficulty   Cognition 0 - no cognition issues reported   Fall history within last six months no   Which of the above functional risks had a recent onset or change? none   Prior Functional Level Comment Pt states she is mod-I in all functional mobility and ADLs, uses shower chair for bathing and raised toilet seat, does not use walker or cane for ambulation. Pt has 6 hours of PCA services per week to assist with heavier household tasks.    General Information   Onset of Illness/Injury or Date of Surgery - Date 02/12/18   Referring Physician Shila Michel PA-C   Patient/Family Goals Statement return home    Additional Occupational Profile Info/Pertinent History of Current Problem Shirin Muller is a 57 year old female with history of metastatic pancreatic  cancer (currently off active treatment).  She presents with worsening nausea and vomiting, with duodenal obstruction suggested by upper GI series with SBFT.  Admitted to the oncology service for further cares.   Precautions/Limitations other (see comments)  (neutropenic precautions)   Weight-Bearing Status - LUE full weight-bearing   Weight-Bearing Status - RUE full weight-bearing   Weight-Bearing Status - LLE full weight-bearing   Weight-Bearing Status - RLE full weight-bearing   General Info Comments Activity: up ad jose    Cognitive Status Examination   Orientation orientation to person, place and time   Level of Consciousness alert   Able to Follow Commands WNL/WFL   Personal Safety (Cognitive) WNL/WFL   Memory intact   Attention No deficits were identified   Cognitive Comment No concerns noted    Visual Perception   Visual Perception No deficits were identified   Sensory Examination   Sensory Comments Pt reports numbness in fingers and toes at baseline, likely 2/2 chemo.    Pain Assessment   Patient Currently in Pain No   Integumentary/Edema   Integumentary/Edema no deficits were identifed   Posture   Posture not impaired   Range of Motion (ROM)   ROM Comment BUEs WFL    Strength   Strength Comments BUEs WFL    Hand Strength   Hand Strength Comments Bilateral  strength WFL    Coordination   Upper Extremity Coordination No deficits were identified   Gross Motor Coordination No deficits identified   Fine Motor Coordination Not tested    Mobility   Bed Mobility Comments independent   Transfer Skill: Bed to Chair/Chair to Bed   Level of Sanders: Bed to Chair independent   Transfer Skill: Sit to Stand   Level of Sanders: Sit/Stand independent   Transfer Skill: Toilet Transfer   Level of Sanders: Toilet stand-by assist   Tub/Shower Transfer   Tub/Shower Transfer Comments SBA    Balance   Balance Comments No LOB noted with any ambulation or OOB activity this session.    Instrumental Activities of  "Daily Living (IADL)   Previous Responsibilities medication management;finances;meal prep   IADL Comments Pt states her children and PCA assist with heavier IADLs at home, pt does simple meal prep and manages her own meds, does not drive at baseline. Reports her children are able to provide transportation.    Activities of Daily Living Analysis   Impairments Contributing to Impaired Activities of Daily Living strength decreased  (fatigue)   General Therapy Interventions   Planned Therapy Interventions ADL retraining;IADL retraining   Clinical Impression   Criteria for Skilled Therapeutic Interventions Met yes, treatment indicated   OT Diagnosis Decreased IADL-I    Influenced by the following impairments general deconditioning and fatigue    Assessment of Occupational Performance 1-3 Performance Deficits   Identified Performance Deficits home management, community mobility    Clinical Decision Making (Complexity) Low complexity   Therapy Frequency other (see comments)  (1x eval and treat)   Predicted Duration of Therapy Intervention (days/wks) 2/15/2018   Anticipated Discharge Disposition Home with Assist   Risks and Benefits of Treatment have been explained. Yes   Patient, Family & other staff in agreement with plan of care Yes   Clinical Impression Comments Pt presents to OT today with general deconditioning and fatigue, both leading to decreased IADL-I. Pt to benefit from skilled OT services to addres the following problem list.    [facility] AM-PAC  \"6 Clicks\" Daily Activity Inpatient Short Form   1. Putting on and taking off regular lower body clothing? 4 - None   2. Bathing (including washing, rinsing, drying)? 3 - A Little   3. Toileting, which includes using toilet, bedpan or urinal? 4 - None   4. Putting on and taking off regular upper body clothing? 4 - None   5. Taking care of personal grooming such as brushing teeth? 4 - None   6. Eating meals? 4 - None   Daily Activity Raw Score (Score out of " 24.Lower scores equate to lower levels of function) 23   Total Evaluation Time   Total Evaluation Time (Minutes) 3

## 2018-02-15 NOTE — PLAN OF CARE
"Problem: Patient Care Overview  Goal: Plan of Care/Patient Progress Review  Outcome: No Change  /81 (BP Location: Left arm)  Pulse 82  Temp 98.9  F (37.2  C) (Axillary)  Resp 24  Ht 1.803 m (5' 11\")  Wt 71.2 kg (156 lb 15.5 oz)  SpO2 100%  BMI 21.89 kg/m2  3922-2791:  Pt with metastatic pancreatic cancer causing duodenal obstruction requiring stent placement. Pt returned from PACU at approximately 1630 after leaving unit at 1200 for EGD. AVSS on 2L O2 via NC. Denies abdominal pain and nausea. NG was removed during the case and per Srikanth PACU RN it was not to be replaced despite active order. Post procedure signed and held orders released; diet order clear liquids. Started with ice chips. Orange jello and fruit ice ordered for dinner. BG this afternoon was 112, next check at 2000. MIVF is D5 NS 40 mEq KCl infusing in PORT-A-CATH at 125 ml/hr. SBA to ambulate to BR. Pt voided 1000 ml at 1900. Constipated since last BM 2/9. Provider ([id number]) paged for bowel regimen and suppository. Family at bedside.  Continue to monitor and follow POC.        "

## 2018-02-15 NOTE — PLAN OF CARE
Problem: Patient Care Overview  Goal: Plan of Care/Patient Progress Review   02/15/18 1431   OTHER   Plan Of Care Reviewed With patient   Plan of Care Review   Progress progress toward functional goals is gradual     0700 - 1530: AVSS. No c/o pain, N/V. Given miralax x1 - no BM today. Advanced diet to full liquid. Ate 25% jello, 100% cream of potato soup. Tolerating well - no N/V/abd discomfort. Up independently in room. Encouraged PO intake. Up with therapy today, showered. Blood sugars q4 - 180,168 - corrective insulin given. D5NS+40KCL infusing into port at 125ml/hr. Will continue to monitor and with POC.     Problem: Nausea/Vomiting (Adult)  Goal: Identify Related Risk Factors and Signs and Symptoms  Related risk factors and signs and symptoms are identified upon initiation of Human Response Clinical Practice Guideline (CPG).    02/15/18 1431   Nausea/Vomiting   Related Risk Factors (Nausea/Vomiting) constipation;female gender;gastric irritation;gastrointestinal dysfunction   Signs and Symptoms (Nausea/Vomiting) food aversion;weakness

## 2018-02-15 NOTE — PLAN OF CARE
Problem: Patient Care Overview  Goal: Plan of Care/Patient Progress Review  Discharge Planner OT   Patient plan for discharge: home   Current status:  Eval completed and tx initiated. Pt performed all mobility with SBA to independent this session. Pt SBA for bed mobility and sit<>stand transfer, ambulated into bathroom with no AE and performed toilet and shower transfer without difficulty. Pt then ambulated from room down to rehab gym, successfully ascended/descended 6 stairs with SBA and appropriate use of railings. No LOB noted with any OOB activity today. Pt ambulated as above back to room (225 feet total).   Barriers to return to prior living situation: medical status  Recommendations for discharge: home with assist from family   Rationale for recommendations: Assist as needed for heavier IADLs pending lab values.        Entered by: Aysha Stein 02/15/2018 3:53 PM     Occupational Therapy Discharge Summary    Reason for therapy discharge:    All goals and outcomes met, no further needs identified.    Progress towards therapy goal(s). See goals on Care Plan in [facility] electronic health record for goal details.  Goals met    Therapy recommendation(s):    No further therapy is recommended.

## 2018-02-16 NOTE — PLAN OF CARE
Problem: Patient Care Overview  Goal: Plan of Care/Patient Progress Review  Outcome: Improving  AVSS, afebrile. Denies pain/nausea. No BM this shift. Tolerating full liquid diet. Ate 25% of vanilla ice cream. Up independently in room. Encouraged PO intake. BG check with meals and at bedtime. NS infusing into port at 50ml/hr. Continue to monitor and with POC.

## 2018-02-16 NOTE — PLAN OF CARE
Problem: Patient Care Overview  Goal: Plan of Care/Patient Progress Review  Outcome: Improving   02/15/18 1947   OTHER   Plan Of Care Reviewed With patient   Plan of Care Review   Progress improving       Problem: Nausea/Vomiting (Adult)  Goal: Identify Related Risk Factors and Signs and Symptoms  Related risk factors and signs and symptoms are identified upon initiation of Human Response Clinical Practice Guideline (CPG).    02/15/18 2030   Nausea/Vomiting   Related Risk Factors (Nausea/Vomiting) constipation;gastrointestinal dysfunction   Signs and Symptoms (Nausea/Vomiting) weakness       Comments: Afebrile. VSS. No c/o pain or nausea overnight. Voiding adequately. No BM this shift. Tolerating full liquid diet. NS @ 50 mL/hr. No replacements needed this AM. Up independently in room. Continue with POC.

## 2018-02-16 NOTE — PLAN OF CARE
Problem: Patient Care Overview  Goal: Plan of Care/Patient Progress Review  AVSS. Pt up ad jose. Voiding spont. No bm since 2/14. Faint bowels. Abd x-ray done, pending results. Prn miralax and senna given. Suppository available, yet PA wanted results of x-ray prior. Pt tolerating fulls. Diet advanced to mechanical soft this afternoon. Denies nausea and pain. Continue with poc.

## 2018-02-16 NOTE — PROGRESS NOTES
[facility] [place]  Hematology / Oncology Progress Note     Assessment & Plan   Shirin Muller is a 57 year old female with history of metastatic pancreatic cancer (currently off active treatment).  She presents with worsening nausea and vomiting, with duodenal obstruction suggested by upper GI series with SBFT.  Admitted to the oncology service for further cares, now s/p duodenal stenting.     #Nausea and vomiting, secondary to duodenal obstruction.  Several day history of worsening nausea and vomiting, now also associated with constipation, bloating and belching.  Upper GI series with small bowel follow-through was obtained on 2/12.  She was not able to tolerate much oral contrast, but what was done of the study did suggest that there was no significant transit of oral contrast beyond the stomach after nearly 1.5 hours. CT abdomen pelvis with suspected narrowing of second portion of duodenum as it passes in region of known pancreatic head mass. Moderate distension of stomach and proximal duodenum.  -GI team consulted for possible placement of stenting vs placement of palliative GJ tube; appreciate their input and recommendations. Per Dr. Gonsales, needs tissue biopsy if visible tumor seen and accessible during procedure to allow for additional molecular testing for clinical trial consideration. S/p stenting of duodenal obstruction by GI team on 2/14. Symptoms now under good control. Advanced diet to softs today. If tolerating this tomorrow, anticipate d/c home with planned f/u as outpatient next week.  -PRN antiemetics available     #Hypokalemia.  Potassium 2.8 on admission labs.  Suspect related to poor oral intake and vomiting. --> RESOLVED.     #Metastatic pancreatic cancer.  #Cancer-related pain.  She follows with Dr. Gonsales and is currently off active therapy after noted to have progression on multiple regimens.  Recent MRI showed narrowing of the distal portion of  the duodenum with distention of the proximal duodenum, though the pancreatic mass was overall similar in size to the previous exam. D/w Dr. Gonsales. Needs repeat tissue biopsy for consideration for clinical trial, see above for further detail.  -Continue home MS-Contin 15mg BID  -Oxycodone 5-10mg every 4 hours PRN, Tylenol PRN breakthrough pain     #Insulin-dependent type 2 diabetes.  -On 1/2 home dose of Lantus (5U at 2000) while advancing diet  -Glucoses with meals now that she is tolerating PO intake with medium SSI ordered  -Hypoglycemia protocol    #Constipation.  -Minimal BM since admission. Per patient report, no BM for at least 5 days PTA. Reports passing small amounts of gas. Abd x-ray with lots of stool, no signs of obstruction to my eye. Radiologist read pending. Will give pink lady enema and continue to monitor overnight.     #Severe malnutrition in the context of acute-on-chronic illness  -Supplements as recommended by RD, encouraged to drink Boost and other supplemental shakes to support nutrition status  -Home Creon resumed  -Diet advanced per above.    #Pain Assessment:   Current Pain Score 2/16/2018 2/16/2018 2/15/2018   Patient currently in pain? denies yes denies   Pain score (0-10) - - -   Pain location - Abdomen -   Pain descriptors - Cramping -   -Shirin is experiencing pain due to metastatic pancreatic cancer. Pain management was discussed and the plan was created in a collaborative fashion.  Shirin's response to the current recommendations: compliant  -Please see the plan for pain management as documented above    FEN: No MIVF, lyte replacement PRN per protocol, soft diet  Prophylaxis: mechanical  Code: FULL  Disposition: Anticipate d/c home tomorrow if symptoms well-controlled and tolerating diet.    Patient and plan of care discussed with staff attending, Dr. Montoya.     ASTRID Estrada-C  Hematology/Oncology  [phone]    Interval History      Jose Antonio states nausea is gone. No vomiting.  Tolerating full liquid diet without issues. Hesitant to try Boost/shakes. Still constipated. Having some lower abdominal crampy pain today. Reports gas feels like it has decreased since admission. Urinating normally.   Physical Exam   Temp: 98.3  F (36.8  C) Temp src: Axillary BP: 120/84   Heart Rate: 102 Resp: 16 SpO2: 100 % O2 Device: None (Room air)    Vitals:    02/14/18 0837 02/15/18 0820 02/16/18 0808   Weight: 71.2 kg (156 lb 15.5 oz) 71 kg (156 lb 8.4 oz) 70.9 kg (156 lb 4.9 oz)     Vital Signs with Ranges  Temp:  [97  F (36.1  C)-98.5  F (36.9  C)] 98.3  F (36.8  C)  Heart Rate:  [] 102  Resp:  [16-18] 16  BP: (116-131)/(76-84) 120/84  SpO2:  [97 %-100 %] 100 %  I/O last 3 completed shifts:  In: 1978.34 [P.O.:840; I.V.:1138.34]  Out: 1800 [Urine:1800]    Constitutional: Pleasant adult female seen sitting up in bed, no acute distress.  Eyes: Lids and lashes normal, pupils equal, round and reactive to light, extra ocular muscles intact, sclera clear, conjunctiva normal.  ENT: Normocephalic, without obvious abnormality, atraumatic.  Respiratory: No increased work of breathing, good air exchange, clear to auscultation bilaterally, no crackles or wheezing.  Cardiovascular: Regular rate and rhythm, normal S1 and S2, and no murmur noted.  GI: + bowel sounds, soft, non-distended. Tender to palpation at LLQ.  Neuropsychiatric: Calm, normal eye contact, alert, normal affect, oriented to self, place, time and situation, memory for past and recent events intact and thought process normal.    Medications     - MEDICATION INSTRUCTIONS -         pink lady enema  286 mL Rectal Once     insulin glargine  5 Units Subcutaneous At Bedtime     insulin aspart  1-7 Units Subcutaneous TID AC     insulin aspart  1-5 Units Subcutaneous At Bedtime     amylase-lipase-protease  2 capsule Oral TID w/meals     sodium chloride (PF)  3 mL Intracatheter Q8H     loratadine  10 mg Oral Daily     pantoprazole  20 mg Oral BID     morphine   15 mg Oral Q12H [facility]       Data   Results for orders placed or performed during the hospital encounter of 02/12/18 (from the past 24 hour(s))   Glucose by meter   Result Value Ref Range    Glucose 190 (H) 70 - 99 mg/dL   Glucose by meter   Result Value Ref Range    Glucose 116 (H) 70 - 99 mg/dL   CBC with platelets differential   Result Value Ref Range    WBC 3.4 (L) 4.0 - 11.0 10e9/L    RBC Count 3.65 (L) 3.8 - 5.2 10e12/L    Hemoglobin 9.9 (L) 11.7 - 15.7 g/dL    Hematocrit 31.4 (L) 35.0 - 47.0 %    MCV 86 78 - 100 fl    MCH 27.1 26.5 - 33.0 pg    MCHC 31.5 31.5 - 36.5 g/dL    RDW 14.9 10.0 - 15.0 %    Platelet Count 96 (L) 150 - 450 10e9/L    Diff Method Automated Method     % Neutrophils 65.9 %    % Lymphocytes 22.2 %    % Monocytes 9.0 %    % Eosinophils 2.6 %    % Basophils 0.3 %    % Immature Granulocytes 0.0 %    Nucleated RBCs 0 0 /100    Absolute Neutrophil 2.3 1.6 - 8.3 10e9/L    Absolute Lymphocytes 0.8 0.8 - 5.3 10e9/L    Absolute Monocytes 0.3 0.0 - 1.3 10e9/L    Absolute Eosinophils 0.1 0.0 - 0.7 10e9/L    Absolute Basophils 0.0 0.0 - 0.2 10e9/L    Abs Immature Granulocytes 0.0 0 - 0.4 10e9/L    Absolute Nucleated RBC 0.0    Comprehensive metabolic panel   Result Value Ref Range    Sodium 139 133 - 144 mmol/L    Potassium 3.7 3.4 - 5.3 mmol/L    Chloride 105 94 - 109 mmol/L    Carbon Dioxide 28 20 - 32 mmol/L    Anion Gap 6 3 - 14 mmol/L    Glucose 103 (H) 70 - 99 mg/dL    Urea Nitrogen 2 (L) 7 - 30 mg/dL    Creatinine 0.64 0.52 - 1.04 mg/dL    GFR Estimate >90 >60 mL/min/1.7m2    GFR Estimate If Black >90 >60 mL/min/1.7m2    Calcium 8.7 8.5 - 10.1 mg/dL    Bilirubin Total 0.4 0.2 - 1.3 mg/dL    Albumin 2.9 (L) 3.4 - 5.0 g/dL    Protein Total 7.6 6.8 - 8.8 g/dL    Alkaline Phosphatase 287 (H) 40 - 150 U/L    ALT 19 0 - 50 U/L    AST 24 0 - 45 U/L   Glucose by meter   Result Value Ref Range    Glucose 107 (H) 70 - 99 mg/dL   Glucose by meter   Result Value Ref Range    Glucose 167 (H) 70 - 99 mg/dL

## 2018-02-17 NOTE — PLAN OF CARE
Problem: Patient Care Overview  Goal: Plan of Care/Patient Progress Review  Outcome: No Change   02/17/18 0518   OTHER   Plan Of Care Reviewed With patient   Plan of Care Review   Progress no change     Goal: Individualization & Mutuality  Outcome: No Change  Patient is A & O x 3. Afebrile, vital signs stable and lung sounds diminished on the base. Port cath patent and good blood return. Patient continues to c/o lower back and abdominal pain. Patient received oxycodone 10 mg po x 1 with some relief. Patient received pink lady enema and had 1000 ml stool, soft, green with relief. Blood glucose were checked with no coverage needed. Continue with plan of care and notify MD for status changes.     Problem: Nausea/Vomiting (Adult)  Goal: Identify Related Risk Factors and Signs and Symptoms  Related risk factors and signs and symptoms are identified upon initiation of [employer] Clinical Practice Guideline (CPG).   Outcome: No Change   02/17/18 0518   Nausea/Vomiting   Related Risk Factors (Nausea/Vomiting) constipation   Signs and Symptoms (Nausea/Vomiting) abdominal discomfort/pain

## 2018-02-17 NOTE — DISCHARGE SUMMARY
[facility], [place]  Discharge Summary  Hematology / Oncology    Date of Admission:  2/12/2018  Date of Discharge:  2/17/2018  Discharging Provider: Shila Michel PA-C/Omero Montoya MD    Discharge Diagnoses      Gastric outflow obstruction  Cancer of head of pancreas (H)  Duodenal obstruction  Hypokalemia  Dehydration  Malignant neoplasm of head of pancreas (H)  Secondary malignant neoplasm of liver (H)  Drug-induced constipation    History of Present Illness   Shirin Muller is a 57 year old female with history of metastatic pancreatic cancer (currently off active treatment). She was admitted with worsening nausea and vomiting, with duodenal obstruction suggested by upper GI series with SBFT.  Admitted to the oncology service for further cares, now s/p duodenal stenting by GI team. Tumor was sampled by GI team as per request of Dr. Gonsales and was sent for NGS to assist with clinical trial placement. Patient tolerated advancement of diet to softs. Nausea was controlled on discharge. She had a BM successfully on 2/16 after having an enema. On day of discharge, she reported feeling well. She was ready to go home. She was instructed to keep track of her caloric intake and bring it with her to her f/u appt next week. She will see provider next week with labs prior.    Hospital Course   Shirin Muller was admitted on 2/12/2018.  The following problems were addressed during her hospitalization:      #Nausea and vomiting, secondary to duodenal obstruction.  Several day history of worsening nausea and vomiting, now also associated with constipation, bloating and belching.  Upper GI series with small bowel follow-through was obtained on 2/12.  She was not able to tolerate much oral contrast, but what was done of the study did suggest that there was no significant transit of oral contrast beyond the stomach after nearly 1.5 hours. CT abdomen pelvis with suspected  narrowing of second portion of duodenum as it passes in region of known pancreatic head mass. Moderate distension of stomach and proximal duodenum.  -GI team consulted for possible placement of stenting vs placement of palliative GJ tube; appreciate their input and recommendations. Per Dr. Gonsales, needs tissue biopsy if visible tumor seen and accessible during procedure to allow for additional molecular testing for clinical trial consideration. S/p stenting of duodenal obstruction by GI team on 2/14. Symptoms now under good control. Advanced diet to softs, tolerating well.  -PRN antiemetics available      #Hypokalemia.  Potassium 2.8 on admission labs.  Suspect related to poor oral intake and vomiting. --> RESOLVED.      #Metastatic pancreatic cancer.  #Cancer-related pain.  She follows with Dr. Gonsales and is currently off active therapy after noted to have progression on multiple regimens.  Recent MRI showed narrowing of the distal portion of the duodenum with distention of the proximal duodenum, though the pancreatic mass was overall similar in size to the previous exam. D/w Dr. Gonsales. Needs repeat tissue biopsy for consideration for clinical trial, see above for further detail.  -Continue home MS-Contin 15mg BID  -Oxycodone 5-10mg every 4 hours PRN, Tylenol PRN breakthrough pain      #Insulin-dependent type 2 diabetes.  -On 1/2 home dose of Lantus (5U at 2000) while advancing diet  -Glucoses with meals now that she is tolerating PO intake with medium SSI ordered  -Hypoglycemia protocol     #Constipation.  -Minimal BM since admission. Per patient report, no BM for at least 5 days PTA. Reports passing small amounts of gas. Abd x-ray negative for obstruction. RESOLVED s/p pink lady edema.      #Severe malnutrition in the context of acute-on-chronic illness  -Supplements as recommended by RD, encouraged to drink Boost and other supplemental shakes to support nutrition status  -Home Creon resumed  -Diet advanced to  softs per above.    #Discharge Pain Plan:   - During her hospitalization, Shirin experienced pain due to known metastatic pancreatic cancer.  The pain plan for discharge was discussed with Shirin and the plan was created in a collaborative fashion.    - Chronic/continued opioids: MS-Contin, continued PRN oxycodone    Patient and plan of care discussed with staff attending, Dr. Montoya.      Shila Michel PA-C  Hematology/Oncology  [phone]    Significant Results and Procedures   Placement of duodenal stent by GI team on 2/14/18    Pending Results   These results will be followed up by Dr. Gonsales.  Unresulted Labs Ordered in the Past 30 Days of this Admission     Date and Time Order Name Status Description    2/13/2018 1507 Next Generation Sequencing Oncology: Extended Solid Tumor Panel In process           Code Status   Full Code    Primary Care Physician   [facility]    Discharge Disposition   Discharged to home  Condition at discharge: Stable    Consultations This Hospital Stay   MEDICATION HISTORY IP PHARMACY CONSULT  GI LUMINAL ADULT IP CONSULT  GI PANCREATICOBILIARY ADULT IP CONSULT  SOCIAL WORK IP CONSULT  PHYSICAL THERAPY ADULT IP CONSULT  OCCUPATIONAL THERAPY ADULT IP CONSULT    Discharge Orders     CBC with platelets differential   Last Lab Result: Hemoglobin (g/dL)      Date                     Value                02/17/2018               9.9 (L)          ----------     Comprehensive metabolic panel     Magnesium     Phosphorus     Reason for your hospital stay   You were hospitalized for a duodenal obstruction and had a stent placed.     Adult [facility] Follow-up and recommended labs and tests   Follow up as scheduled next week with labs prior.    Appointments on [place] and/or [facility] (with [facility] or [facility] provider or service). Call [phone] if you haven't heard regarding these appointments within 7 days of discharge.     Activity   Your activity upon discharge:  activity as tolerated     Monitor and record   Calorie intake per day     When to contact your care team   Please call the [facility] [phone] for fever (temp >100.5), chills, uncontrolled nausea, vomiting, constipation, or diarrhea, unrelieved pain, bleeding not relieved with pressure, dizziness, chest pain, shortness of breath, loss of consciousness, and any new or concerning symptoms.     Full Code     Diet   Follow this diet upon discharge:     Mechanical/Dental Soft Diet  Encourage Boost or Ensure shakes at least 3-4 times per day       Discharge Medications   Current Discharge Medication List      START taking these medications    Details   bisacodyl (DULCOLAX) 10 MG Suppository Place 1 suppository (10 mg) rectally daily as needed for constipation  Qty: 30 suppository, Refills: 0    Associated Diagnoses: Drug-induced constipation         CONTINUE these medications which have NOT CHANGED    Details   prochlorperazine (COMPAZINE) 10 MG tablet TAKE ONE TABLET BY MOUTH EVERY 6 HOURS AS NEEDED FOR NAUSEA AND VOMITING  Qty: 30 tablet, Refills: 2    Associated Diagnoses: Malignant neoplasm of head of pancreas (H)      LORazepam (ATIVAN) 0.5 MG tablet Take 1 tablet (0.5 mg) by mouth every 4 hours as needed (Anxiety, Nausea/Vomiting or Sleep)  Qty: 30 tablet, Refills: 3    Associated Diagnoses: Malignant neoplasm of head of pancreas (H)      dronabinol (MARINOL) 5 MG capsule Take 1 capsule (5 mg) by mouth 3 times daily (before meals) Taking once daily  Qty: 90 capsule, Refills: 0    Associated Diagnoses: Anorexia      docusate sodium (DOK) 100 MG capsule Take 1 capsule (100 mg) by mouth daily  Qty: 100 capsule, Refills: 1    Associated Diagnoses: External hemorrhoids      morphine (MS CONTIN) 15 MG 12 hr tablet Take 1 tablet (15 mg) by mouth every 12 hours  Qty: 60 tablet, Refills: 0    Associated Diagnoses: Malignant neoplasm of head of pancreas (H)      loratadine  (CLARITIN) 10 MG tablet Take 1 tablet (10 mg) by mouth daily Reported on 5/5/2017  Qty: 30 tablet, Refills: 6    Associated Diagnoses: Chronic seasonal allergic rhinitis, unspecified trigger      potassium chloride SA (KLOR-CON) 20 MEQ CR tablet Take 2 tablets (40 mEq) by mouth daily  Qty: 60 tablet, Refills: 3    Associated Diagnoses: Malignant neoplasm of head of pancreas (H)      polyethylene glycol (MIRALAX/GLYCOLAX) powder Take 17 g (1 capful) by mouth daily  Qty: 500 g, Refills: 11    Associated Diagnoses: Malignant neoplasm of head of pancreas (H)      oxyCODONE IR (ROXICODONE) 5 MG tablet Take 1 tablet (5 mg) by mouth every 4 hours as needed for moderate to severe pain  Qty: 100 tablet, Refills: 0    Associated Diagnoses: Malignant neoplasm of head of pancreas (H)      ondansetron (ZOFRAN-ODT) 8 MG ODT tab Take 1 tablet (8 mg) by mouth every 8 hours as needed for nausea  Qty: 60 tablet, Refills: 6    Associated Diagnoses: Malignant neoplasm of head of pancreas (H)      pantoprazole (PROTONIX) 20 MG EC tablet Take 1 tablet (20 mg) by mouth 2 times daily  Qty: 60 tablet, Refills: 3    Associated Diagnoses: Malignant neoplasm of head of pancreas (H)      Nutritional Supplements (ENSURE CLEAR) LIQD Take 1 Bottle by mouth 3 times daily  Qty: 90 Bottle, Refills: 6    Associated Diagnoses: Malignant neoplasm of head of pancreas (H)      insulin glargine (LANTUS SOLOSTAR) 100 UNIT/ML injection 10 units subcutaneously daily at 8pm.  Qty: 15 mL, Refills: 3    Associated Diagnoses: Diabetes mellitus, type 2 (H)      amylase-lipase-protease (CREON) 05660 UNITS CPEP Take 2 capsules (72,000 Units) by mouth 3 times daily (with meals)  Qty: 180 capsule, Refills: 1    Associated Diagnoses: Malignant neoplasm of head of pancreas (H)      diltiazem 2% in PLO cream, FV COMPOUNDED, 2% GEL Apply topically daily and as needed to external hemorrhoids  Qty: 30 g, Refills: 0    Associated Diagnoses: External hemorrhoids       lidocaine-prilocaine (EMLA) cream Apply topically as needed for other (Use 30-60 minutes prior to port access)  Qty: 30 g, Refills: 0    Associated Diagnoses: Malignant neoplasm of head of pancreas (H)      ALVARADO CONTOUR NEXT test strip USE TO CHECK BLOOD SUGAR TWICE DAILY ( ONCE FASTING AND ONCE 2 HOURS AFTER EATING )  Qty: 100 each, Refills: 11    Associated Diagnoses: Diabetes mellitus, type 2 (H)      order for DME Equipment being ordered: Toilet seat riser with handles  Qty: 1 Units, Refills: 0    Associated Diagnoses: Malignant neoplasm of head of pancreas (H); Generalized muscle weakness      insulin pen needle (BD EMMANUEL U/F) 32G X 4 MM Use one daily or as directed.  Qty: 100 each, Refills: prn    Associated Diagnoses: Diabetes mellitus, type 2 (H)      blood glucose monitoring (ACCU-CHEK FASTCLIX) lancets Use to test blood sugar two times daily or as directed.  Qty: 102 each, Refills: 3    Associated Diagnoses: Diabetes mellitus, type 2 (H)      Blood Glucose Monitoring Suppl (ONETOUCH PING METER REMOTE) SUPPLIES MISC Check your sugars twice daily, once when fasting and once 2 hours after eating.  Qty: 1 each, Refills: 3    Associated Diagnoses: Secondary diabetes mellitus (H)           Allergies   Allergies   Allergen Reactions     Contrast Dye Nausea and Vomiting     Pt vomiting post IV contrast, Please try Visipaque for next CT scan. 6/24/16 sv     Diagnostic X-Ray Materials Nausea and Vomiting     Pt vomiting post IV contrast, Please try Visipaque for next CT scan. 6/24/16 sv     Data   Most Recent 3 CBC's:  Recent Labs   Lab Test  02/17/18   0255  02/16/18   0422  02/15/18   0347   WBC  3.4*  3.4*  3.9*   HGB  9.9*  9.9*  9.9*   MCV  87  86  90   PLT  111*  96*  103*      Most Recent 3 BMP's:  Recent Labs   Lab Test  02/17/18   0255  02/16/18   0422  02/15/18   0347   NA  140  139  142   POTASSIUM  3.4  3.7  4.3   CHLORIDE  107  105  109   CO2  26  28  26   BUN  2*  2*  3*   CR  0.61  0.64  0.65    ANIONGAP  7  6  6   FANNY  8.4*  8.7  8.4*   GLC  164*  103*  229*     Most Recent 2 LFT's:  Recent Labs   Lab Test  02/17/18   0255  02/16/18   0422   AST  22  24   ALT  16  19   ALKPHOS  275*  287*   BILITOTAL  0.3  0.4     Most Recent INR's and Anticoagulation Dosing History:  Anticoagulation Dose History     Recent Dosing and Labs Latest Ref Rng & Units 1/24/2017 1/27/2017 1/28/2017 4/19/2017 8/3/2017 1/24/2018 2/12/2018    INR 0.86 - 1.14 1.37(H) 1.38(H) 1.35(H) 1.05 1.21(H) 1.13 1.17(H)    INR Point of Care 0.86 - 1.14 - - - - - - -        Most Recent 3 Troponin's:  Recent Labs   Lab Test  02/12/18   1553  01/24/17   1002   TROPI  <0.015  <0.015  The 99th percentile for upper reference range is 0.045 ug/L.  Troponin values in   the range of 0.045 - 0.120 ug/L may be associated with risks of adverse   clinical events.       Most Recent Cholesterol Panel:  Recent Labs   Lab Test  01/28/17   0324   TRIG  112     Most Recent 6 Bacteria Isolates From Any Culture (See EPIC Reports for Culture Details):  Recent Labs   Lab Test  02/12/18   2226  02/09/18   1305  08/11/17   0949  08/11/17   0715  08/05/17   0755  08/05/17   0745   CULT  50,000 to 100,000 colonies/mL  mixed urogenital christina  Susceptibility testing not routinely done    50,000 to 100,000 colonies/mL  mixed urogenital christina    No growth  Canceled, Test credited Cancelled by lab - Specimen never received.  No growth  No growth     Most Recent TSH, T4 and A1c Labs:  Recent Labs   Lab Test  11/09/17   1041  11/07/17   1200   TSH  2.56   --    A1C   --   9.2*     Results for orders placed or performed during the hospital encounter of 02/12/18   CT Abdomen Pelvis w/o Contrast     Value    Radiologist flags Right adnexal cystic mass    Narrative    EXAMINATION: CT ABDOMEN PELVIS W/O CONTRAST, 2/12/2018 6:45 PM    TECHNIQUE:  Helical CT images from the lung bases through the  symphysis pubis were obtained without contrast.  Coronal and sagittal  reformatted  images were generated at a workstation for further  assessment.    COMPARISON: CT 9/15/2017    HISTORY: vomiting;     FINDINGS:  Limited evaluation give the lack of intravenous contrast and very  dense contrast from upper GI performed earlier same date.     Lines and tubes: None.    Lung bases: No consolidation or pleural effusion. Mild subsegmental  bibasilar atelectasis.    Abdomen:  Poor demonstration of known ill-defined soft tissue mass at the  pancreatic head. There is gastric distention with dense oral contrast  and moderate distention of the proximal portion the duodenum. The  dense oral contrast from recent upper GI creates intense streak  artifact obscuring the upper abdomen. There is decompression of the  second and third portion the duodenum, after passing by the mass.  Small amount of intraluminal contrast is seen in the small bowel  distal to the duodenum. Stable moderate fat stranding adjacent to the  SMA and SMV. No infrarenal aortic aneurysm. Stable enlarged gastric  hepatic lymph nodes. A biliary stent is present. Pneumobilia.  Heterogeneity of the liver parenchyma, especially at the dome.  Gallbladder is of normal size. Normal noncontrast appearance of the  spleen and kidneys. No definite adrenal nodules, although the right  adrenal gland appears mildly nodular. Urinary bladder is partially  distended.  A 5 cm multilobulated cystic lesion has developed along  the right adnexa.    Bones and soft tissues: No suspicious osseous lesions.      Impression    IMPRESSION:   1. Suspected narrowing of the second portion of the duodenum as it  passes in the region of the known pancreatic head mass. There is  moderate distention of the stomach and proximal duodenum, with a small  amount of contrast passing through the duodenum. Together, findings  suggest partial obstruction.  2. Poor assessment of pancreatic malignancy given lack of intravenous  contrast and artifact.  3. Liver heterogeneity of uncertain  significance. Consider abdominal  ultrasound.  4. Multilobulated cystic lesion in the right adnexa, likely ovarian.  Recommend pelvic ultrasound.    [Consider Follow Up: Right adnexal cystic mass]    This report will be copied to the [facility] to ensure a  provider acknowledges the finding.     I have personally reviewed the examination and initial interpretation  and I agree with the findings.    JANE CARROLL MD   XR Abdomen Port 1 View    Narrative    EXAM: XR ABDOMEN PORT 1 VW  2/13/2018 6:33 PM      HISTORY: verify NG tube placement;     COMPARISON: CT 2/12/2018    FINDINGS: Single portable view of the abdomen was evaluated. Dense  material in the stomach administered via enteric tube which is  opacified. Sidehole projects over the stomach, tip is obscured.. Right  IJ Port-A-Cath projects over the low SVC. Metallic biliary stent.  Trace pneumobilia. Oral contrast present within the stomach.  Nonobstructive bowel gas pattern. Lung bases are clear. Cardiac  silhouette is normal.       Impression    IMPRESSION:   1. Gastric tube sidehole projects over the stomach, tip is obscured.  2. Nonobstructive bowel gas pattern.    I have personally reviewed the examination and initial interpretation  and I agree with the findings.    XIOMARA SRIVASTAVA MD   XR Surgery SAUL Fluoro L/T 5 Min w Stills    Narrative    This exam was marked as non-reportable because it will not be read by a   radiologist or a [place] non-radiologist provider.             XR Abdomen 2 Views    Narrative    EXAM: XR ABDOMEN 2 VW  2/16/2018 12:20 PM      HISTORY: Obstipation, abdominal pain;     COMPARISON: CT 2/12/2018. ERCP images 2/14/2018    FINDINGS: Upright and supine radiographs of the abdomen. Enteral stent  projects over the gastric antrum and third portion of the duodenum,  stable in appearance. Stable biliary stent. Right Port-A-Cath tip  projects over the cavoatrial junction.     Decreased gaseous distention of the  stomach. Nonobstructive bowel gas  pattern. Intraluminal contrast is in the large bowel. Lung bases are  clear. No free air.      Impression    IMPRESSION:   1. New enteral stent projects over the gastric antrum and third  portion the duodenum, stable compared with ERCP images dated  2/14/2018.  2. Nonobstructive bowel gas pattern.    I have personally reviewed the examination and initial interpretation  and I agree with the findings.    ROSANNE PURCELL

## 2018-02-17 NOTE — PLAN OF CARE
Problem: Nausea/Vomiting (Adult)  Goal: Symptom Relief  Patient will demonstrate the desired outcomes by discharge/transition of care.   Outcome: Adequate for Discharge Date Met: 02/17/18 02/17/18 1315   Nausea/Vomiting (Adult)   Symptom Relief achieves outcome   Patient with no complaints today, VSS. Nice result from pink lady enema last evening. Patient eager to return home. Patient and sister stated understanding of discharge medications and instructions. Personal belongings packed by family. Patient escorted down to lobby via wheel chair for sister to  and bring to her place.

## 2018-02-19 NOTE — PROGRESS NOTES
Reason for Outgoing Call:   Post Hospital Follow-up   Patient Questions/Concerns:   Patient states she is doing well since returning home from hospital. States she is eating and drinking well. Denies pain, nausea and vomiting.   Nursing Action/Patient Instruction:  Reviewed discharge medications and follow up appointment. Patient states she has no questions, concerns or needs at this time. Encouraged patient to call clinic with fever, increase in pain, n/v, or diarrhea.   Patient Response/Evaluation:   Patient voiced understanding of all instructions.

## 2018-02-21 NOTE — NURSING NOTE
Chief Complaint   Patient presents with     Port Draw     labs drawn via port by RN     BP 99/72 (BP Location: Right arm, Patient Position: Chair, Cuff Size: Adult Large)  Pulse 98  Temp 98.5  F (36.9  C) (Oral)  Wt 73.6 kg (162 lb 3.2 oz)  SpO2 100%  BMI 22.62 kg/m2    Vitals taken. Port accessed by RN. Labs collected and sent. Line flushed with NS & Heparin. Pt tolerated well. Pt checked in for next appointment.    Rosy Sotomayor RN

## 2018-02-21 NOTE — NURSING NOTE
"Oncology Rooming Note    February 21, 2018 12:43 PM   Shirin Muller is a 57 year old female who presents for:    Chief Complaint   Patient presents with     Port Draw     labs drawn via port by RN     Oncology Clinic Visit     Pancreatic CA     Initial Vitals: BP 99/72 (BP Location: Right arm, Patient Position: Chair, Cuff Size: Adult Large)  Pulse 98  Temp 98.5  F (36.9  C) (Oral)  Wt 73.6 kg (162 lb 3.2 oz)  SpO2 100%  BMI 22.62 kg/m2 Estimated body mass index is 22.62 kg/(m^2) as calculated from the following:    Height as of 2/12/18: 1.803 m (5' 11\").    Weight as of this encounter: 73.6 kg (162 lb 3.2 oz). Body surface area is 1.92 meters squared.  Extreme Pain (8) Comment: Center low back   No LMP recorded. Patient is postmenopausal.  Allergies reviewed: Yes  Medications reviewed: Yes    Medications: Medication refills not needed today.  Pharmacy name entered into [facility]:    [facility] [place], [place] - [address] [facility] [phone]  [id number] - [place], [place] - [address] [place] [address]    Clinical concerns: Would like refills of pain medication Debora Morrow was notified.    8 minutes for nursing intake (face to face time)     Yohana Berg CMA              "

## 2018-02-21 NOTE — PROGRESS NOTES
Reason for Visit: hospital follow-up duodenal obstruction secondary to tumor s/p stenting     Oncology HPI: Shirin Muller is a 57 year old woman with metastatic pancreatic cancer involving the liver. She is currently off of active treatment as she was found to have progression on prior treatment with gemcitabine and abraxane, FOLFIRINOX, and most recently liposomal irinotecan and 5FU. Her cancer treatment has been complicated by GI bleeding from an ulcer and infiltrating mass in the duodenum (January 2017). She was found to be positive for H. Pyolor and initiated on therapy with PPI, carafate, amoxicillin and clarithromycin.  In August 2017 she had biliary obstruction with complications of sepsis/cholangitis. BC grew klebsiella penumonia and streptococcus angiosos. She was treated with biliary stenting and antibiotics. She is currently off active treatment due to lack of meaningful therapies and no current clinical trial options.     She was admitted 2/12-2/17 via ED with n/v and pain and found to have duodenal obstruction via upper GI series. She had duodenal stenting done by GI as well as biopsy for NGS. She tolerated advancement of diet well and had no further vomiting. She presents in hospital follow-up today.     Interval history: Shirin has been feeling better. She has had no further nausea or vomiting. She notes continued diffuse abdominal pain and low back pain. It's hard to differentiate when this pain started, but she states this has been at least a couple of weeks. She has been consistently taking MS Contin BID and oxycodone 5 mg every 4-6 hours. She also has not had a BM since d/c. She is passing gas. She has been using Miralax once daily and docusate 2 tabs daily. She is eating and drinking and has gained some weight. No longer feeling as weak. She denies any fevers/chills. Urinating well. Needs multiple refills today on narcotics and other meds. Wonders the overall plan. ROS otherwise negative.      Current Outpatient Prescriptions   Medication Sig Dispense Refill     bisacodyl (DULCOLAX) 10 MG Suppository Place 1 suppository (10 mg) rectally daily as needed for constipation 30 suppository 0     ALVARADO CONTOUR NEXT test strip USE TO CHECK BLOOD SUGAR TWICE DAILY ( ONCE FASTING AND ONCE 2 HOURS AFTER EATING ) 100 each 11     order for DME Equipment being ordered: Toilet seat riser with handles 1 Units 0     prochlorperazine (COMPAZINE) 10 MG tablet TAKE ONE TABLET BY MOUTH EVERY 6 HOURS AS NEEDED FOR NAUSEA AND VOMITING 30 tablet 2     LORazepam (ATIVAN) 0.5 MG tablet Take 1 tablet (0.5 mg) by mouth every 4 hours as needed (Anxiety, Nausea/Vomiting or Sleep) 30 tablet 3     dronabinol (MARINOL) 5 MG capsule Take 1 capsule (5 mg) by mouth 3 times daily (before meals) Taking once daily 90 capsule 0     docusate sodium (DOK) 100 MG capsule Take 1 capsule (100 mg) by mouth daily 100 capsule 1     morphine (MS CONTIN) 15 MG 12 hr tablet Take 1 tablet (15 mg) by mouth every 12 hours 60 tablet 0     loratadine (CLARITIN) 10 MG tablet Take 1 tablet (10 mg) by mouth daily Reported on 5/5/2017 30 tablet 6     potassium chloride SA (KLOR-CON) 20 MEQ CR tablet Take 2 tablets (40 mEq) by mouth daily 60 tablet 3     polyethylene glycol (MIRALAX/GLYCOLAX) powder Take 17 g (1 capful) by mouth daily 500 g 11     oxyCODONE IR (ROXICODONE) 5 MG tablet Take 1 tablet (5 mg) by mouth every 4 hours as needed for moderate to severe pain 100 tablet 0     ondansetron (ZOFRAN-ODT) 8 MG ODT tab Take 1 tablet (8 mg) by mouth every 8 hours as needed for nausea 60 tablet 6     pantoprazole (PROTONIX) 20 MG EC tablet Take 1 tablet (20 mg) by mouth 2 times daily 60 tablet 3     Nutritional Supplements (ENSURE CLEAR) LIQD Take 1 Bottle by mouth 3 times daily 90 Bottle 6     insulin glargine (LANTUS SOLOSTAR) 100 UNIT/ML injection 10 units subcutaneously daily at 8pm. 15 mL 3     insulin pen needle (BD EMMANUEL U/F) 32G X 4 MM Use one daily or as  directed. 100 each prn     blood glucose monitoring (ACCU-CHEK FASTCLIX) lancets Use to test blood sugar two times daily or as directed. 102 each 3     Blood Glucose Monitoring Suppl (ONETOUCH PING METER REMOTE) SUPPLIES MISC Check your sugars twice daily, once when fasting and once 2 hours after eating. 1 each 3     amylase-lipase-protease (CREON) 34273 UNITS CPEP Take 2 capsules (72,000 Units) by mouth 3 times daily (with meals) 180 capsule 1     diltiazem 2% in PLO cream, FV COMPOUNDED, 2% GEL Apply topically daily and as needed to external hemorrhoids 30 g 0     lidocaine-prilocaine (EMLA) cream Apply topically as needed for other (Use 30-60 minutes prior to port access) 30 g 0          Allergies   Allergen Reactions     Contrast Dye Nausea and Vomiting     Pt vomiting post IV contrast, Please try Visipaque for next CT scan. 6/24/16 sv     Diagnostic X-Ray Materials Nausea and Vomiting     Pt vomiting post IV contrast, Please try Visipaque for next CT scan. 6/24/16 sv     PHYSICAL EXAM:  BP 99/72 (BP Location: Right arm, Patient Position: Chair, Cuff Size: Adult Large)  Pulse 98  Temp 98.5  F (36.9  C) (Oral)  Wt 73.6 kg (162 lb 3.2 oz)  SpO2 100%  BMI 22.62 kg/m2  General: Alert, oriented, pleasant, NAD  HEENT: Normocephalic, atraumatic, PERRLA, EOMI. Moist mucus membranes, no lesions, or thrush  Lungs: CTA bilaterally, normal work of breathing  Cardiac: RRR, S1, S2, no murmurs  Abdomen: Soft, nontender, nondistended but bloated. +BS. No focal mass or organomegaly.   Neuro: CNII-XII grossly intact  Extremities: No pedal edema    Labs:    2/21/2018 12:35   Sodium 137   Potassium 3.8   Chloride 103   Carbon Dioxide 28   Urea Nitrogen 6 (L)   Creatinine 0.52   GFR Estimate >90   GFR Estimate If Black >90   Calcium 9.2   Anion Gap 6   Magnesium 2.0   Phosphorus 2.9   Albumin 3.2 (L)   Protein Total 8.0   Bilirubin Total 0.3   Alkaline Phosphatase 298 (H)   ALT 16   AST 29   Hemoglobin A1C 6.7 (H)   Glucose 178  (H)   WBC 4.0   Hemoglobin 10.3 (L)   Hematocrit 33.3 (L)   Platelet Count 166   RBC Count 3.76 (L)   MCV 89   MCH 27.4   MCHC 30.9 (L)   RDW 14.9   Diff Method Automated Method   % Neutrophils 60.7   % Lymphocytes 25.5   % Monocytes 9.7   % Eosinophils 3.2   % Basophils 0.7   % Immature Granulocytes 0.2   Nucleated RBCs 0   Absolute Neutrophil 2.5   Absolute Lymphocytes 1.0   Absolute Monocytes 0.4   Absolute Eosinophils 0.1   Absolute Basophils 0.0   Abs Immature Granulocytes 0.0   Absolute Nucleated RBC 0.0       Impression/plan:   1. Duodenal obstruction s/p stenting presenting with n/v: N/v now resolved. She is having acute on chronic abdominal pain but this seems to be related to constipation, see below.     2. Constipation: Narcotic induced + slow motility. Continue miralax once daily. Start Senna 2 tabs BID. Call in a few days if still not resolved. She declined any further suppositories though these work.     3. Metastatic pancreatic cancer  -progressed on standard therapies, currently off of active treatment  -Biopsy done with stenting to test NGS  -Will message Dr. Gonsales re follow-up timeline.      4. FEN: Weight up about 5 pounds. Eating better. Hypokalemia resolved with resolution of n/v. Albumin and protein have improved.     5. Acute on chronic abdominal pain: Simethicone and heating pad for gas pains. Continue MS Contin 15 mg BID and oxycodone 5 mg every 4 hours prn for her chronic pain from disease. These were refilled today.     Debora Morrow PA-C    [facility]  [address] [place] [id number] [phone]

## 2018-02-21 NOTE — MR AVS SNAPSHOT
After Visit Summary   2/21/2018    Shirin Muller    MRN: [id number]           Patient Information     Date Of Birth          1960        Visit Information        Provider Department      2/21/2018 12:10 PM Debora Morrow PA-C [facility]        Today's Diagnoses     Hypokalemia        Malignant neoplasm of head of pancreas (H)        Cancer of head of pancreas (H)        Dehydration        Secondary diabetes mellitus (H)        Drug-induced constipation           Follow-ups after your visit        Who to contact     If you have questions or need follow up information about today's clinic visit or your schedule please contact [facility] directly at [phone].  Normal or non-critical lab and imaging results will be communicated to you by "[employer]hart, letter or phone within 4 business days after the clinic has received the results. If you do not hear from us within 7 days, please contact the clinic through "[employer]hart or phone. If you have a critical or abnormal lab result, we will notify you by phone as soon as possible.  Submit refill requests through [employer] or call your pharmacy and they will forward the refill request to us. Please allow 3 business days for your refill to be completed.          Additional Information About Your Visit        MyChart Information     [employer] gives you secure access to your electronic health record. If you see a primary care provider, you can also send messages to your care team and make appointments. If you have questions, please call your primary care clinic.  If you do not have a primary care provider, please call [phone] and they will assist you.        Care EveryWhere ID     This is your Care EveryWhere ID. This could be used by other organizations to access your [facility] medical records  [id number]        Your Vitals Were     Pulse Temperature Pulse Oximetry BMI (Body Mass Index)          98 98.5   F (36.9  C) (Oral) 100% 22.62 kg/m2         Blood Pressure from Last 3 Encounters:   02/21/18 99/72   02/17/18 117/77   02/11/18 107/71    Weight from Last 3 Encounters:   02/21/18 73.6 kg (162 lb 3.2 oz)   02/17/18 70.3 kg (155 lb)   02/11/18 72 kg (158 lb 12.8 oz)              We Performed the Following     *CBC with platelets differential     Comprehensive metabolic panel     Hemoglobin A1c     Magnesium     Phosphorus          Today's Medication Changes          These changes are accurate as of 2/21/18  5:04 PM.  If you have any questions, ask your nurse or doctor.               Start taking these medicines.        Dose/Directions    senna-docusate 8.6-50 MG per tablet   Commonly known as:  SENNA S   Used for:  Drug-induced constipation   Started by:  Debora Morrow PA-C        Dose:  2 tablet   Take 2 tablets by mouth 2 times daily as needed for constipation   Quantity:  100 tablet   Refills:  0            Where to get your medicines      These medications were sent to [facility] [place] [id number] [facility] [address] [address]    Hours:  TRANSPLANT PHONE NUMBER [phone] Phone:  [phone]     bisacodyl 10 MG Suppository    senna-docusate 8.6-50 MG per tablet         Some of these will need a paper prescription and others can be bought over the counter.  Ask your nurse if you have questions.     Bring a paper prescription for each of these medications     morphine 15 MG 12 hr tablet    oxyCODONE IR 5 MG tablet                Primary Care Provider    [facility]       No address on file        Equal Access to Services     JAI CORDERO AH: Hadmarika Spence, waaxda luqadaha, qaybta kaalmada adevalerio, luis e lora. So [facility] [phone].    ATENCIÓN: Si habla español, tiene a bernal disposición servicios gratuitos de asistencia lingüística. Llame al [phone].    We comply  with applicable federal civil rights laws and [place] laws. We do not discriminate on the basis of race, color, national origin, age, disability, sex, sexual orientation, or gender identity.            Thank you!     Thank you for choosing [facility]  for your care. Our goal is always to provide you with excellent care. Hearing back from our patients is one way we can continue to improve our services. Please take a few minutes to complete the written survey that you may receive in the mail after your visit with us. Thank you!             Your Updated Medication List - Protect others around you: Learn how to safely use, store and throw away your medicines at www.disposemymeds.org.          This list is accurate as of 2/21/18  5:04 PM.  Always use your most recent med list.                   Brand Name Dispense Instructions for use Diagnosis    amylase-lipase-protease 39795 UNITS Cpep    CREON    180 capsule    Take 2 capsules (72,000 Units) by mouth 3 times daily (with meals)    Malignant neoplasm of head of pancreas (H)       ALVARADO CONTOUR NEXT test strip   Generic drug:  blood glucose monitoring     100 each    USE TO CHECK BLOOD SUGAR TWICE DAILY ( ONCE FASTING AND ONCE 2 HOURS AFTER EATING )    Diabetes mellitus, type 2 (H)       bisacodyl 10 MG Suppository    DULCOLAX    30 suppository    Place 1 suppository (10 mg) rectally daily as needed for constipation    Drug-induced constipation       blood glucose monitoring lancets     102 each    Use to test blood sugar two times daily or as directed.    Diabetes mellitus, type 2 (H)       diltiazem 2% in PLO cream (FV COMPOUNDED) 2% Gel     30 g    Apply topically daily and as needed to external hemorrhoids    External hemorrhoids       docusate sodium 100 MG capsule    DOK    100 capsule    Take 1 capsule (100 mg) by mouth daily    External hemorrhoids       dronabinol 5 MG capsule    MARINOL    90 capsule    Take 1 capsule (5 mg) by mouth 3  times daily (before meals) Taking once daily    Anorexia       ENSURE CLEAR Liqd     90 Bottle    Take 1 Bottle by mouth 3 times daily    Malignant neoplasm of head of pancreas (H)       insulin glargine 100 UNIT/ML injection    LANTUS SOLOSTAR    15 mL    10 units subcutaneously daily at 8pm.    Diabetes mellitus, type 2 (H)       insulin pen needle 32G X 4 MM    BD EMMANUEL U/F    100 each    Use one daily or as directed.    Diabetes mellitus, type 2 (H)       lidocaine-prilocaine cream    EMLA    30 g    Apply topically as needed for other (Use 30-60 minutes prior to port access)    Malignant neoplasm of head of pancreas (H)       loratadine 10 MG tablet    CLARITIN    30 tablet    Take 1 tablet (10 mg) by mouth daily Reported on 5/5/2017    Chronic seasonal allergic rhinitis, unspecified trigger       LORazepam 0.5 MG tablet    ATIVAN    30 tablet    Take 1 tablet (0.5 mg) by mouth every 4 hours as needed (Anxiety, Nausea/Vomiting or Sleep)    Malignant neoplasm of head of pancreas (H)       morphine 15 MG 12 hr tablet    MS CONTIN    60 tablet    Take 1 tablet (15 mg) by mouth every 12 hours    Malignant neoplasm of head of pancreas (H)       ondansetron 8 MG ODT tab    ZOFRAN-ODT    60 tablet    Take 1 tablet (8 mg) by mouth every 8 hours as needed for nausea    Malignant neoplasm of head of pancreas (H)       ONETOUCH PING METER REMOTE SUPPLIES Misc     1 each    Check your sugars twice daily, once when fasting and once 2 hours after eating.    Secondary diabetes mellitus (H)       order for DME     1 Units    Equipment being ordered: Toilet seat riser with handles    Malignant neoplasm of head of pancreas (H), Generalized muscle weakness       oxyCODONE IR 5 MG tablet    ROXICODONE    100 tablet    Take 1 tablet (5 mg) by mouth every 4 hours as needed for moderate to severe pain    Malignant neoplasm of head of pancreas (H)       pantoprazole 20 MG EC tablet    PROTONIX    60 tablet    Take 1 tablet (20 mg) by  mouth 2 times daily    Malignant neoplasm of head of pancreas (H)       polyethylene glycol powder    MIRALAX/GLYCOLAX    500 g    Take 17 g (1 capful) by mouth daily    Malignant neoplasm of head of pancreas (H)       potassium chloride SA 20 MEQ CR tablet    KLOR-CON    60 tablet    Take 2 tablets (40 mEq) by mouth daily    Malignant neoplasm of head of pancreas (H)       prochlorperazine 10 MG tablet    COMPAZINE    30 tablet    TAKE ONE TABLET BY MOUTH EVERY 6 HOURS AS NEEDED FOR NAUSEA AND VOMITING    Malignant neoplasm of head of pancreas (H)       senna-docusate 8.6-50 MG per tablet    SENNA S    100 tablet    Take 2 tablets by mouth 2 times daily as needed for constipation    Drug-induced constipation

## 2018-02-26 NOTE — NURSING NOTE
"Oncology Rooming Note    February 26, 2018 5:09 PM   Shirin Muller is a 57 year old female who presents for:    Chief Complaint   Patient presents with     Oncology Clinic Visit     F/U Pancreatic ca     Initial Vitals: /77 (BP Location: Left arm, Patient Position: Sitting, Cuff Size: Adult Regular)  Pulse 104  Temp 97.1  F (36.2  C) (Oral)  Resp 16  Ht 1.803 m (5' 10.98\")  Wt 72.3 kg (159 lb 8 oz)  SpO2 100%  BMI 22.26 kg/m2 Estimated body mass index is 22.26 kg/(m^2) as calculated from the following:    Height as of this encounter: 1.803 m (5' 10.98\").    Weight as of this encounter: 72.3 kg (159 lb 8 oz). Body surface area is 1.9 meters squared.  Moderate Pain (5) Comment: Back Pain   No LMP recorded. Patient is postmenopausal.  Allergies reviewed: Yes  Medications reviewed: Yes    Medications: MEDICATION REFILLS NEEDED TODAY. Provider was notified.  Pharmacy name entered into [facility]:    [facility] [place], [place] - [address] [place] [address] [phone]  [id number] - [place], [place] - [address] [place] [address]    Clinical concerns: Yes, Patient is having Low back pain(5) on the pain scale. Dr Gonsales was notified.    10 minutes for nursing intake (face to face time)     DANIEL BROWN LPN            "

## 2018-02-26 NOTE — MR AVS SNAPSHOT
After Visit Summary   2/26/2018    Shirin Muller    MRN: [id number]           Patient Information     Date Of Birth          1960        Visit Information        Provider Department      2/26/2018 5:30 PM Demond Gonsales MD [facility]        Today's Diagnoses     Malignant neoplasm of head of pancreas (H)    -  1       Follow-ups after your visit        Follow-up notes from your care team     Return in about 2 weeks (around 3/12/2018) for NP Visit with  labs.      Your next 10 appointments already scheduled     Mar 13, 2018 10:45 AM CDT   [facility] Lab Draw with  [facility] DRAW   [facility] Lab Draw ([facility])    [address] [place] [address]  [address]  [facility] [address] [phone]            Mar 13, 2018 11:10 AM CDT   (Arrive by 10:55 AM)   Return Visit with TEE Olivas CNP   [facility] ([facility])    [address] [place] [address]  [address]  [facility] [address] [phone]              Who to contact     If you have questions or need follow up information about today's clinic visit or your schedule please contact [facility] directly at [phone].  Normal or non-critical lab and imaging results will be communicated to you by MyChart, letter or phone within 4 business days after the clinic has received the results. If you do not hear from us within 7 days, please contact the clinic through MyChart or phone. If you have a critical or abnormal lab result, we will notify you by phone as soon as possible.  Submit refill requests through [employer] or call your pharmacy and they will forward the refill request to us. Please allow 3 business days for your refill to be completed.          Additional Information About Your Visit        [employer] Information     [employer] gives you secure access to your electronic health record. If you see a primary care  "provider, you can also send messages to your care team and make appointments. If you have questions, please call your primary care clinic.  If you do not have a primary care provider, please call [phone] and they will assist you.        Care EveryWhere ID     This is your Care EveryWhere ID. This could be used by other organizations to access your [place] medical records  [id number]        Your Vitals Were     Pulse Temperature Respirations Height Pulse Oximetry BMI (Body Mass Index)    104 97.1  F (36.2  C) (Oral) 16 1.803 m (5' 10.98\") 100% 22.26 kg/m2       Blood Pressure from Last 3 Encounters:   02/26/18 106/77   02/21/18 99/72   02/17/18 117/77    Weight from Last 3 Encounters:   02/26/18 72.3 kg (159 lb 8 oz)   02/21/18 73.6 kg (162 lb 3.2 oz)   02/17/18 70.3 kg (155 lb)              Today, you had the following     No orders found for display         Today's Medication Changes          These changes are accurate as of 2/26/18 11:59 PM.  If you have any questions, ask your nurse or doctor.               These medicines have changed or have updated prescriptions.        Dose/Directions    * dronabinol 5 MG capsule   Commonly known as:  MARINOL   This may have changed:  Another medication with the same name was added. Make sure you understand how and when to take each.   Used for:  Anorexia   Changed by:  Demond Gonsales MD        Dose:  5 mg   Take 1 capsule (5 mg) by mouth 3 times daily (before meals) Taking once daily   Quantity:  90 capsule   Refills:  0       * dronabinol 2.5 MG capsule   Commonly known as:  MARINOL   This may have changed:  You were already taking a medication with the same name, and this prescription was added. Make sure you understand how and when to take each.   Used for:  Malignant neoplasm of head of pancreas (H)   Changed by:  Demond Gonsales MD        Dose:  2.5 mg   Take 1 capsule (2.5 mg) by mouth 2 times daily (before meals)   Quantity:  60 capsule "   Refills:  0       * morphine 30 MG 12 hr tablet   Commonly known as:  MS CONTIN   This may have changed:    - medication strength  - how much to take   Used for:  Malignant neoplasm of head of pancreas (H)   Changed by:  Demond Gonsales MD        Dose:  30 mg   Take 1 tablet (30 mg) by mouth every 12 hours   Quantity:  60 tablet   Refills:  0       * morphine 15 MG IR tablet   Commonly known as:  MSIR   This may have changed:  You were already taking a medication with the same name, and this prescription was added. Make sure you understand how and when to take each.   Used for:  Malignant neoplasm of head of pancreas (H)   Changed by:  Demond Gonsales MD        Dose:  15 mg   Take 1 tablet (15 mg) by mouth every 4 hours as needed for moderate to severe pain   Quantity:  100 tablet   Refills:  0       * Notice:  This list has 4 medication(s) that are the same as other medications prescribed for you. Read the directions carefully, and ask your doctor or other care provider to review them with you.         Where to get your medicines      These medications were sent to [address] [address]    Hours:  TRANSPLANT PHONE NUMBER [phone] Phone:  [phone]     ondansetron 8 MG ODT tab         Some of these will need a paper prescription and others can be bought over the counter.  Ask your nurse if you have questions.     Bring a paper prescription for each of these medications     dronabinol 2.5 MG capsule    morphine 15 MG IR tablet    morphine 30 MG 12 hr tablet                Primary Care Provider    [facility]       No address on file        Equal Access to Services     [facility]: Maxx harding Sojim, waaxda luqadaha, qaybta kaalmada adeegyada, waxay thaddeus lora. So [facility] [phone].    ATENCIÓN: Si milagro tate  disposición servicios gratuitos de asistencia lingüística. Vanessa hyde [phone].    We comply with applicable federal civil rights laws and [place] laws. We do not discriminate on the basis of race, color, national origin, age, disability, sex, sexual orientation, or gender identity.            Thank you!     Thank you for choosing [facility]  for your care. Our goal is always to provide you with excellent care. Hearing back from our patients is one way we can continue to improve our services. Please take a few minutes to complete the written survey that you may receive in the mail after your visit with us. Thank you!             Your Updated Medication List - Protect others around you: Learn how to safely use, store and throw away your medicines at www.disposemymeds.org.          This list is accurate as of 2/26/18 11:59 PM.  Always use your most recent med list.                   Brand Name Dispense Instructions for use Diagnosis    amylase-lipase-protease 35978 UNITS Cpep    CREON    180 capsule    Take 2 capsules (72,000 Units) by mouth 3 times daily (with meals)    Malignant neoplasm of head of pancreas (H)       ALVARADO CONTOUR NEXT test strip   Generic drug:  blood glucose monitoring     100 each    USE TO CHECK BLOOD SUGAR TWICE DAILY ( ONCE FASTING AND ONCE 2 HOURS AFTER EATING )    Diabetes mellitus, type 2 (H)       bisacodyl 10 MG Suppository    DULCOLAX    30 suppository    Place 1 suppository (10 mg) rectally daily as needed for constipation    Drug-induced constipation       blood glucose monitoring lancets     102 each    Use to test blood sugar two times daily or as directed.    Diabetes mellitus, type 2 (H)       diltiazem 2% in PLO cream (FV COMPOUNDED) 2% Gel     30 g    Apply topically daily and as needed to external hemorrhoids    External hemorrhoids       docusate sodium 100 MG capsule    DOK    100 capsule    Take 1 capsule (100 mg) by mouth daily    External hemorrhoids        * dronabinol 5 MG capsule    MARINOL    90 capsule    Take 1 capsule (5 mg) by mouth 3 times daily (before meals) Taking once daily    Anorexia       * dronabinol 2.5 MG capsule    MARINOL    60 capsule    Take 1 capsule (2.5 mg) by mouth 2 times daily (before meals)    Malignant neoplasm of head of pancreas (H)       ENSURE CLEAR Liqd     90 Bottle    Take 1 Bottle by mouth 3 times daily    Malignant neoplasm of head of pancreas (H)       insulin glargine 100 UNIT/ML injection    LANTUS SOLOSTAR    15 mL    10 units subcutaneously daily at 8pm.    Diabetes mellitus, type 2 (H)       insulin pen needle 32G X 4 MM    BD EMMANUEL U/F    100 each    Use one daily or as directed.    Diabetes mellitus, type 2 (H)       lidocaine-prilocaine cream    EMLA    30 g    Apply topically as needed for other (Use 30-60 minutes prior to port access)    Malignant neoplasm of head of pancreas (H)       loratadine 10 MG tablet    CLARITIN    30 tablet    Take 1 tablet (10 mg) by mouth daily Reported on 5/5/2017    Chronic seasonal allergic rhinitis, unspecified trigger       LORazepam 0.5 MG tablet    ATIVAN    30 tablet    Take 1 tablet (0.5 mg) by mouth every 4 hours as needed (Anxiety, Nausea/Vomiting or Sleep)    Malignant neoplasm of head of pancreas (H)       * morphine 30 MG 12 hr tablet    MS CONTIN    60 tablet    Take 1 tablet (30 mg) by mouth every 12 hours    Malignant neoplasm of head of pancreas (H)       * morphine 15 MG IR tablet    MSIR    100 tablet    Take 1 tablet (15 mg) by mouth every 4 hours as needed for moderate to severe pain    Malignant neoplasm of head of pancreas (H)       ondansetron 8 MG ODT tab    ZOFRAN-ODT    60 tablet    Take 1 tablet (8 mg) by mouth every 8 hours as needed for nausea    Malignant neoplasm of head of pancreas (H)       ONETOUCH PING METER REMOTE SUPPLIES Misc     1 each    Check your sugars twice daily, once when fasting and once 2 hours after eating.    Secondary diabetes  mellitus (H)       order for DME     1 Units    Equipment being ordered: Toilet seat riser with handles    Malignant neoplasm of head of pancreas (H), Generalized muscle weakness       oxyCODONE IR 5 MG tablet    ROXICODONE    100 tablet    Take 1 tablet (5 mg) by mouth every 4 hours as needed for moderate to severe pain    Malignant neoplasm of head of pancreas (H)       pantoprazole 20 MG EC tablet    PROTONIX    60 tablet    Take 1 tablet (20 mg) by mouth 2 times daily    Malignant neoplasm of head of pancreas (H)       polyethylene glycol powder    MIRALAX/GLYCOLAX    500 g    Take 17 g (1 capful) by mouth daily    Malignant neoplasm of head of pancreas (H)       potassium chloride SA 20 MEQ CR tablet    KLOR-CON    60 tablet    Take 2 tablets (40 mEq) by mouth daily    Malignant neoplasm of head of pancreas (H)       prochlorperazine 10 MG tablet    COMPAZINE    30 tablet    TAKE ONE TABLET BY MOUTH EVERY 6 HOURS AS NEEDED FOR NAUSEA AND VOMITING    Malignant neoplasm of head of pancreas (H)       senna-docusate 8.6-50 MG per tablet    SENNA S    100 tablet    Take 2 tablets by mouth 2 times daily as needed for constipation    Drug-induced constipation       * Notice:  This list has 4 medication(s) that are the same as other medications prescribed for you. Read the directions carefully, and ask your doctor or other care provider to review them with you.

## 2018-02-26 NOTE — Clinical Note
2/26/2018       RE: Shirin Muller  [address] [facility] [address]     Dear Colleague,    Thank you for referring your patient, Shirin Muller, to the [facility]. Please see a copy of my visit note below.    No notes on file    Again, thank you for allowing me to participate in the care of your patient.      Sincerely,    Demond Gonsales MD

## 2018-03-10 NOTE — PROGRESS NOTES
Shirin Muller is a 57 year old woman with metastatic pancreatic cancer involving the liver. She is currently off of active treatment as she was found to have progression on prior treatment with gemcitabine and abraxane, FOLFIRINOX, and most recently liposomal irinotecan and 5FU. Her cancer treatment has been complicated by GI bleeding from an ulcer and infiltrating mass in the duodenum (January 2017). She was admitted 2/12-2/17 via ED with n/v and pain and found to have duodenal obstruction and had duodenal stenting done by GI as well as biopsy for NGS. She tolerated advancement of diet well and had no further vomiting. She is currently off active treatment due to lack of meaningful therapies and no current clinical trial options.    She has had minimal bowel movements since discharge-about every 3-4 days. She is passing gas. She has been using Miralax once daily and docusate 2 tabs daily. She is eating and drinking and has gained some weight.  She denies any fevers/chills. Spends much of the day in the easy chair, but able to manage ADLs and is becoming more active. ROS otherwise negative.     Appears well but cachectic. Vitals noted and unremarkable. See fellows note for further details.    I reviewed last weeks labs and recent CT scan with the patient and her daughter. Her duodenal biopsy contained no tumor, so we got no results form the NGS analysis.    A/P: Progressive metastatic pancreatic cancer. I think the duodenal obstruction represents tumor progression, though we don't have clear evidence of progression elsewhere yet. Historically, she has had a lot of peritumor soft tissue with difficulty getting tumor tissue on biopsies so I am unsure how productive further attempts at getting tissue for NGS will be. She very much wants to pursue more therapy. As her performance status improves as she gets out from her obstruction, she will likely be eligible from that perspective. I discussed the  bifidobacterium study again and she would only be eligible if she develops more discrete liver mets that could be biopsied. She might qualify for the [place] NK study (she has numerous 1st degreerelatives,) and we might be able to get enough of her original biopsy material to test for NTRK.    In the near term, we still need to improve her general condition and discussed at length ongoing nutrition support, management of her chronic constipation, her back pain, the contribution of opioids to constipation and abdominal pain and how to manage that better. I recruited her daughter who was here today to help with good reporting of symptoms and management so we can help optimize that. We will see her back in the next couple weeks and make further decisions about re-imaging her tumor and study eligibility if she improves. If her performance status is declining we discussed that it would be appropriate to change the focus of our efforts, but they weren't ready to discuss end-of-life yet.    I spent >40 minutes with the patient, in addition to the fellows time, all on the above discussion.

## 2018-03-13 NOTE — PROGRESS NOTES
Reason for Visit: seen in f/u of metastatic pancreatic cancer and for supportive care    Oncology HPI: Shirin Muller is a 57 year old woman with metastatic pancreatic cancer involving the liver. She is currently off of active treatment as she was found to have progression on prior treatment with gemcitabine and abraxane, FOLFIRINOX, and most recently liposomal irinotecan and 5FU. Her cancer treatment has been complicated by GI bleeding from an ulcer and infiltrating mass in the duodenum (January 2017). She was found to be positive for H. Pyolor and initiated on therapy with PPI, carafate, amoxicillin and clarithromycin.  In August 2017 she had biliar obstruction with complications of sepsis/cholangitis. BC grew klebsiella penumonia and streptococcus angiosos. She was treated with biliary stenting and antibiotics. Most recently had duodenal obstruction and underwent stenting.    Interval history: Shirin is here with her son today. She notes that she has had improvement in her nausea/vomiting since duodenal stenting. Has occasional nausea, controlled with compazine, but no vomiting. Her appetite is decreased and she is eating less. She reports sleeping a couple hours a day,but her son notes that he thinks she is sleeping about 6 hours a day in addition to sleeping at night. Energy is low. When awake, spends her time sitting and watching television. Would like to do more and eat more, but lacks desire. Denies depression or anxiety. She remains hopeful for some treatment that could be given to her. Bowels are regular. No pale stools. Urination wnl. No icterus. No fevers/chills. No headaches, vision changes. No difficulty with speech or swallowing. No reflux. No change to peripheral neuropathy. Abdominal pain is well controlled with MS Contin and occasional MSIR.    Current Outpatient Prescriptions   Medication Sig Dispense Refill     morphine (MS CONTIN) 30 MG 12 hr tablet Take 1 tablet (30 mg) by mouth every 12  hours 60 tablet 0     morphine (MSIR) 15 MG IR tablet Take 1 tablet (15 mg) by mouth every 4 hours as needed for moderate to severe pain 100 tablet 0     dronabinol (MARINOL) 2.5 MG capsule Take 1 capsule (2.5 mg) by mouth 2 times daily (before meals) 60 capsule 0     ondansetron (ZOFRAN-ODT) 8 MG ODT tab Take 1 tablet (8 mg) by mouth every 8 hours as needed for nausea 60 tablet 6     bisacodyl (DULCOLAX) 10 MG Suppository Place 1 suppository (10 mg) rectally daily as needed for constipation 30 suppository 0     senna-docusate (SENNA S) 8.6-50 MG per tablet Take 2 tablets by mouth 2 times daily as needed for constipation 100 tablet 0     oxyCODONE IR (ROXICODONE) 5 MG tablet Take 1 tablet (5 mg) by mouth every 4 hours as needed for moderate to severe pain 100 tablet 0     ALVARADO CONTOUR NEXT test strip USE TO CHECK BLOOD SUGAR TWICE DAILY ( ONCE FASTING AND ONCE 2 HOURS AFTER EATING ) 100 each 11     order for DME Equipment being ordered: Toilet seat riser with handles 1 Units 0     prochlorperazine (COMPAZINE) 10 MG tablet TAKE ONE TABLET BY MOUTH EVERY 6 HOURS AS NEEDED FOR NAUSEA AND VOMITING 30 tablet 2     LORazepam (ATIVAN) 0.5 MG tablet Take 1 tablet (0.5 mg) by mouth every 4 hours as needed (Anxiety, Nausea/Vomiting or Sleep) 30 tablet 3     dronabinol (MARINOL) 5 MG capsule Take 1 capsule (5 mg) by mouth 3 times daily (before meals) Taking once daily 90 capsule 0     docusate sodium (DOK) 100 MG capsule Take 1 capsule (100 mg) by mouth daily 100 capsule 1     loratadine (CLARITIN) 10 MG tablet Take 1 tablet (10 mg) by mouth daily Reported on 5/5/2017 30 tablet 6     potassium chloride SA (KLOR-CON) 20 MEQ CR tablet Take 2 tablets (40 mEq) by mouth daily 60 tablet 3     polyethylene glycol (MIRALAX/GLYCOLAX) powder Take 17 g (1 capful) by mouth daily 500 g 11     pantoprazole (PROTONIX) 20 MG EC tablet Take 1 tablet (20 mg) by mouth 2 times daily 60 tablet 3     Nutritional Supplements (ENSURE CLEAR) LIQD  "Take 1 Bottle by mouth 3 times daily 90 Bottle 6     insulin glargine (LANTUS SOLOSTAR) 100 UNIT/ML injection 10 units subcutaneously daily at 8pm. 15 mL 3     insulin pen needle (BD EMMANUEL U/F) 32G X 4 MM Use one daily or as directed. 100 each prn     blood glucose monitoring (ACCU-CHEK FASTCLIX) lancets Use to test blood sugar two times daily or as directed. 102 each 3     Blood Glucose Monitoring Suppl (ONETOUCH PING METER REMOTE) SUPPLIES MISC Check your sugars twice daily, once when fasting and once 2 hours after eating. 1 each 3     amylase-lipase-protease (CREON) 11437 UNITS CPEP Take 2 capsules (72,000 Units) by mouth 3 times daily (with meals) 180 capsule 1     diltiazem 2% in PLO cream, FV COMPOUNDED, 2% GEL Apply topically daily and as needed to external hemorrhoids 30 g 0     lidocaine-prilocaine (EMLA) cream Apply topically as needed for other (Use 30-60 minutes prior to port access) 30 g 0          Allergies   Allergen Reactions     Contrast Dye Nausea and Vomiting     Pt vomiting post IV contrast, Please try Visipaque for next CT scan. 6/24/16 sv     Diagnostic X-Ray Materials Nausea and Vomiting     Pt vomiting post IV contrast, Please try Visipaque for next CT scan. 6/24/16 sv         Exam: alert, appears flat, tearful at times. Does engage with me and makes eye contact.  Blood pressure 109/65, pulse 96, temperature 98.6  F (37  C), temperature source Oral, resp. rate 18, height 1.803 m (5' 10.98\"), weight 70.8 kg (156 lb 1.6 oz), SpO2 98 %, not currently breastfeeding.  Wt Readings from Last 4 Encounters:   03/13/18 70.8 kg (156 lb 1.6 oz)   02/26/18 72.3 kg (159 lb 8 oz)   02/21/18 73.6 kg (162 lb 3.2 oz)   02/17/18 70.3 kg (155 lb)     Oropharynx is moist and without focal lesion. No icterus. Neck supple and without adenopathy. Lungs: CTA. Heart:RRR, no murmur or rub. Abdomen: nontender, firm in the mid-epigastrum otherwise soft. Extremities: warm, no edema.    Labs:   Results for ODUNLADEMAFE, " NATALIIA MASON (MRN [id number]) as of 3/13/2018 12:23   Ref. Range 3/13/2018 11:16   Sodium Latest Ref Range: 133 - 144 mmol/L 140   Potassium Latest Ref Range: 3.4 - 5.3 mmol/L 3.2 (L)   Chloride Latest Ref Range: 94 - 109 mmol/L 105   Carbon Dioxide Latest Ref Range: 20 - 32 mmol/L 30   Urea Nitrogen Latest Ref Range: 7 - 30 mg/dL 7   Creatinine Latest Ref Range: 0.52 - 1.04 mg/dL 0.58   GFR Estimate Latest Ref Range: >60 mL/min/1.7m2 >90   GFR Estimate If Black Latest Ref Range: >60 mL/min/1.7m2 >90   Calcium Latest Ref Range: 8.5 - 10.1 mg/dL 9.0   Anion Gap Latest Ref Range: 3 - 14 mmol/L 6   Albumin Latest Ref Range: 3.4 - 5.0 g/dL 3.3 (L)   Protein Total Latest Ref Range: 6.8 - 8.8 g/dL 8.0   Bilirubin Total Latest Ref Range: 0.2 - 1.3 mg/dL 0.5   Alkaline Phosphatase Latest Ref Range: 40 - 150 U/L 301 (H)   ALT Latest Ref Range: 0 - 50 U/L 20   AST Latest Ref Range: 0 - 45 U/L 38   Glucose Latest Ref Range: 70 - 99 mg/dL 142 (H)   WBC Latest Ref Range: 4.0 - 11.0 10e9/L 4.2   Hemoglobin Latest Ref Range: 11.7 - 15.7 g/dL 10.1 (L)   Hematocrit Latest Ref Range: 35.0 - 47.0 % 32.7 (L)   Platelet Count Latest Ref Range: 150 - 450 10e9/L 108 (L)   RBC Count Latest Ref Range: 3.8 - 5.2 10e12/L 3.64 (L)   MCV Latest Ref Range: 78 - 100 fl 90   MCH Latest Ref Range: 26.5 - 33.0 pg 27.7   MCHC Latest Ref Range: 31.5 - 36.5 g/dL 30.9 (L)   RDW Latest Ref Range: 10.0 - 15.0 % 14.6   Diff Method Unknown Automated Method   % Neutrophils Latest Units: % 74.6   % Lymphocytes Latest Units: % 15.3   % Monocytes Latest Units: % 7.9   % Eosinophils Latest Units: % 1.7   % Basophils Latest Units: % 0.5   % Immature Granulocytes Latest Units: % 0.0   Nucleated RBCs Latest Ref Range: 0 /100 0   Absolute Neutrophil Latest Ref Range: 1.6 - 8.3 10e9/L 3.1   Absolute Lymphocytes Latest Ref Range: 0.8 - 5.3 10e9/L 0.6 (L)   Absolute Monocytes Latest Ref Range: 0.0 - 1.3 10e9/L 0.3   Absolute Eosinophils Latest Ref Range: 0.0 - 0.7  10e9/L 0.1   Absolute Basophils Latest Ref Range: 0.0 - 0.2 10e9/L 0.0   Abs Immature Granulocytes Latest Ref Range: 0 - 0.4 10e9/L 0.0   Absolute Nucleated RBC Unknown 0.0           Impression/plan:   1. Metastatic pancreatic cancer, currently on no active treatment after progression on standard therapies.  -she remains hopeful that she would qualify for a clinical trial in the future. I reviewed that she would need to have improved strength and nutrition even if a clinical trial opened  -she would like to continue working toward this and is not ready for hospice  -will adjust marinol and add ritalin (see below)  -will schedule f/u with Dr. Gonsales in 3-4 weeks with a CT scan to re-assess her disease status and study eligibility     2. Hx of duodenal obstruction and biliary obstruction, s/p stenting  -no s/s of obstruction at this time. Will continue to monitor    3. Anorexia-related to underlying malignancy, possibly depression as well, but she denies this  -already on marinol 2.5 mg, will increase to 5 mg bid to see if appetite can increase. Discussed scheduling ensure, tracking food as her desire is to eat more.  -reviewed that it was common to have decreased appetite as cancer progresses  -could consider dexamethasone as well, if no response    4. Chronic fatigue-related to malignancy, decreased nutrition, possibly chronic opioids  -discussed use of ritalin, she would like to give that a try.  -initiated ritalin 5 mg bid, take the 2nd daily dose no later than early afternoon    5. Mood-appears depressed/tearful, but feels her mood is good. Denies need for antidepressant. Her son did not offer any concerns when I discussed this today.    6. Hypokalemia-chronic, better since off of chemotherapy  -on kdur 40 meq po daily, should take an extra 20 meq po today, then resume 40 meq po daily

## 2018-03-13 NOTE — MR AVS SNAPSHOT
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)           Follow-ups after your visit        Your next 10 appointments already scheduled     Mar 30, 2018  6:30 AM CDT   [facility] Draw with  [employer] LAB DRAW   [facility] ([facility])    [address]  [address]  [place] [id number] [phone]            Mar 30, 2018  7:00 AM CDT   (Arrive by 6:45 AM)   MR ABDOMEN & PELVIS W/O & W CONTRAST with [address] [facility] ([facility])    [address]  1st Floor  [place] [id number] [phone]           Take your medicines as usual, unless your doctor tells you not to. Bring a list of your current medicines to your exam (including vitamins, minerals and over-the-counter drugs). Also bring the results of similar scans you may have had.    You may or may not receive IV contrast for this exam pending the discretion of the Radiologist.   Do not eat or drink for 6 hours prior to exam.  The MRI machine uses a strong magnet. Please wear clothes without metal (snaps, zippers). A sweatsuit works well, or we may give you a hospital gown.  Please remove any body piercings and hair extensions before you arrive. You will also remove watches, jewelry, hairpins, wallets, dentures, partial dental plates and hearing aids. You may wear contact lenses, and you may be able to wear your rings. We have a safe place to keep  your personal items, but it is safer to leave them at home.  **IMPORTANT** THE INSTRUCTIONS BELOW ARE ONLY FOR THOSE PATIENTS WHO HAVE BEEN PRESCRIBED SEDATION OR GENERAL ANESTHESIA DURING THEIR MRI PROCEDURE:  IF YOUR DOCTOR PRESCRIBED ORAL SEDATION (take medicine to help you relax during your exam):   You must get the medicine from your doctor (oral medication) before you arrive. Bring the medicine to the exam. Do not take it at home. You ll be told when to take it upon arriving for your exam.   Arrive one hour early. Bring someone who can take you home after the test. Your medicine will make you sleepy. After the exam, you may not drive, take a bus or take a taxi by yourself.  IF YOUR DOCTOR PRESCRIBED IV SEDATION:   Arrive one hour early. Bring someone who can take you home after the test. Your medicine will make you sleepy. After the exam, you may not drive, take a bus or take a taxi by yourself.   No eating 6 hours before your exam. You may have clear liquids up until 4 hours before your exam. (Clear liquids include water, clear tea, black coffee and fruit juice without pulp.)  IF YOUR DOCTOR PRESCRIBED ANESTHESIA (be asleep for your exam):   Arrive 1 1/2 hours early. Bring someone who can take you home after the test. You may not drive, take a bus or take a taxi by yourself.   No eating 8 hours before your exam. You may have clear liquids up until 4 hours before your exam. (Clear liquids include water, clear tea, black coffee and fruit juice without pulp.)   You will spend four to five hours in the recovery room.  If you have any questions, please contact your Imaging Department exam site.            Mar 30, 2018  8:00 AM CDT   (Arrive by 7:45 AM)   CT CHEST W/O CONTRAST with UCCT1   [facility] ([facility])    [address]  1st Floor  [place] [address] [phone]           Please bring any scans or X-rays taken at other hospitals, if similar tests  were done. Also bring a list of your medicines, including vitamins, minerals and over-the-counter drugs. It is safest to leave personal items at home.  Be sure to tell your doctor:   If you have any allergies.   If there s any chance you are pregnant.   If you are breastfeeding.  You do not need to do anything special to prepare for this exam.  Please wear loose clothing, such as a sweat suit or jogging clothes. Avoid snaps, zippers and other metal. We may ask you to undress and put on a hospital gown.            Apr 02, 2018  5:30 PM CDT   (Arrive by 5:15 PM)   Return Visit with Demond Gonsales MD   [facility] ([facility])    [address]  [address]  [place] [facility] [address] [phone]              Who to contact     If you have questions or need follow up information about today's clinic visit or your schedule please contact [facility] directly at [phone].  Normal or non-critical lab and imaging results will be communicated to you by "[employer]"hart, letter or phone within 4 business days after the clinic has received the results. If you do not hear from us within 7 days, please contact the clinic through [employer] or phone. If you have a critical or abnormal lab result, we will notify you by phone as soon as possible.  Submit refill requests through [employer] or call your pharmacy and they will forward the refill request to us. Please allow 3 business days for your refill to be completed.          Additional Information About Your Visit        [employer] Information     [employer] gives you secure access to your electronic health record. If you see a primary care provider, you can also send messages to your care team and make appointments. If you have questions, please call your primary care clinic.  If you do not have a primary care provider, please call [phone] and they will assist you.        Care EveryWhere ID     This is your Care  "EveryWhere ID. This could be used by other organizations to access your [place] medical records  [id number]        Your Vitals Were     Pulse Temperature Respirations Height Pulse Oximetry BMI (Body Mass Index)    96 98.6  F (37  C) (Oral) 18 1.803 m (5' 10.98\") 98% 21.78 kg/m2       Blood Pressure from Last 3 Encounters:   03/13/18 109/65   02/26/18 106/77   02/21/18 99/72    Weight from Last 3 Encounters:   03/13/18 70.8 kg (156 lb 1.6 oz)   02/26/18 72.3 kg (159 lb 8 oz)   02/21/18 73.6 kg (162 lb 3.2 oz)              We Performed the Following     *CBC with platelets differential     Comprehensive metabolic panel          Today's Medication Changes          These changes are accurate as of 3/13/18 11:59 PM.  If you have any questions, ask your nurse or doctor.               Start taking these medicines.        Dose/Directions    methylphenidate 5 MG tablet   Commonly known as:  RITALIN   Used for:  Malignant neoplasm of head of pancreas (H)   Started by:  Ester Echavarria APRN CNP        Dose:  5 mg   Take 1 tablet (5 mg) by mouth 2 times daily   Quantity:  60 tablet   Refills:  0         These medicines have changed or have updated prescriptions.        Dose/Directions    * dronabinol 5 MG capsule   Commonly known as:  MARINOL   This may have changed:  Another medication with the same name was changed. Make sure you understand how and when to take each.   Used for:  Anorexia   Changed by:  Ester Echavarria APRN CNP        Dose:  5 mg   Take 1 capsule (5 mg) by mouth 3 times daily (before meals) Taking once daily   Quantity:  90 capsule   Refills:  0       * dronabinol 5 MG capsule   Commonly known as:  MARINOL   This may have changed:    - medication strength  - how much to take   Used for:  Malignant neoplasm of head of pancreas (H), Anorexia   Changed by:  Ester Echavarria APRN CNP        Dose:  5 mg   Take 1 capsule (5 mg) by mouth 2 times daily (before meals)   Quantity:  60 capsule   Refills:  3       * " Notice:  This list has 2 medication(s) that are the same as other medications prescribed for you. Read the directions carefully, and ask your doctor or other care provider to review them with you.      Stop taking these medicines if you haven't already. Please contact your care team if you have questions.     oxyCODONE IR 5 MG tablet   Commonly known as:  ROXICODONE   Stopped by:  Ester Echavarria APRN CNP                Where to get your medicines      These medications were sent to [place], [place] - [address]  [address], [facility] [address]    Hours:  TRANSPLANT PHONE NUMBER [phone] Phone:  [phone]     prochlorperazine 10 MG tablet         Some of these will need a paper prescription and others can be bought over the counter.  Ask your nurse if you have questions.     Bring a paper prescription for each of these medications     dronabinol 5 MG capsule    methylphenidate 5 MG tablet                Primary Care Provider    [facility]       No address on file        Equal Access to Services     JAI CORDERO : Hadii isak ku hadasho Soomaali, waaxda luqadaha, qaybta kaalmada adeegyada, waxay idiin haycaryn asher . So [facility] [phone].    ATENCIÓN: Si habla español, tiene a bernal disposición servicios gratuitos de asistencia lingüística. Llame al [phone].    We comply with applicable federal civil rights laws and [place] laws. We do not discriminate on the basis of race, color, national origin, age, disability, sex, sexual orientation, or gender identity.            Thank you!     Thank you for choosing [facility]  for your care. Our goal is always to provide you with excellent care. Hearing back from our patients is one way we can continue to improve our services. Please take a few minutes to complete the written survey that you may receive in the mail after your visit with us. Thank  you!             Your Updated Medication List - Protect others around you: Learn how to safely use, store and throw away your medicines at www.disposemymeds.org.          This list is accurate as of 3/13/18 11:59 PM.  Always use your most recent med list.                   Brand Name Dispense Instructions for use Diagnosis    amylase-lipase-protease 41013 UNITS Cpep    CREON    180 capsule    Take 2 capsules (72,000 Units) by mouth 3 times daily (with meals)    Malignant neoplasm of head of pancreas (H)       ALVARADO CONTOUR NEXT test strip   Generic drug:  blood glucose monitoring     100 each    USE TO CHECK BLOOD SUGAR TWICE DAILY ( ONCE FASTING AND ONCE 2 HOURS AFTER EATING )    Diabetes mellitus, type 2 (H)       bisacodyl 10 MG Suppository    DULCOLAX    30 suppository    Place 1 suppository (10 mg) rectally daily as needed for constipation    Drug-induced constipation       blood glucose monitoring lancets     102 each    Use to test blood sugar two times daily or as directed.    Diabetes mellitus, type 2 (H)       diltiazem 2% in PLO cream (FV COMPOUNDED) 2% Gel     30 g    Apply topically daily and as needed to external hemorrhoids    External hemorrhoids       docusate sodium 100 MG capsule    DOK    100 capsule    Take 1 capsule (100 mg) by mouth daily    External hemorrhoids       * dronabinol 5 MG capsule    MARINOL    90 capsule    Take 1 capsule (5 mg) by mouth 3 times daily (before meals) Taking once daily    Anorexia       * dronabinol 5 MG capsule    MARINOL    60 capsule    Take 1 capsule (5 mg) by mouth 2 times daily (before meals)    Malignant neoplasm of head of pancreas (H), Anorexia       ENSURE CLEAR Liqd     90 Bottle    Take 1 Bottle by mouth 3 times daily    Malignant neoplasm of head of pancreas (H)       insulin glargine 100 UNIT/ML injection    LANTUS SOLOSTAR    15 mL    10 units subcutaneously daily at 8pm.    Diabetes mellitus, type 2 (H)       insulin pen needle 32G X 4 MM    BD EMMANUEL  U/F    100 each    Use one daily or as directed.    Diabetes mellitus, type 2 (H)       lidocaine-prilocaine cream    EMLA    30 g    Apply topically as needed for other (Use 30-60 minutes prior to port access)    Malignant neoplasm of head of pancreas (H)       loratadine 10 MG tablet    CLARITIN    30 tablet    Take 1 tablet (10 mg) by mouth daily Reported on 5/5/2017    Chronic seasonal allergic rhinitis, unspecified trigger       LORazepam 0.5 MG tablet    ATIVAN    30 tablet    Take 1 tablet (0.5 mg) by mouth every 4 hours as needed (Anxiety, Nausea/Vomiting or Sleep)    Malignant neoplasm of head of pancreas (H)       methylphenidate 5 MG tablet    RITALIN    60 tablet    Take 1 tablet (5 mg) by mouth 2 times daily    Malignant neoplasm of head of pancreas (H)       * morphine 30 MG 12 hr tablet    MS CONTIN    60 tablet    Take 1 tablet (30 mg) by mouth every 12 hours    Malignant neoplasm of head of pancreas (H)       * morphine 15 MG IR tablet    MSIR    100 tablet    Take 1 tablet (15 mg) by mouth every 4 hours as needed for moderate to severe pain    Malignant neoplasm of head of pancreas (H)       ondansetron 8 MG ODT tab    ZOFRAN-ODT    60 tablet    Take 1 tablet (8 mg) by mouth every 8 hours as needed for nausea    Malignant neoplasm of head of pancreas (H)       ONETOUCH PING METER REMOTE SUPPLIES Misc     1 each    Check your sugars twice daily, once when fasting and once 2 hours after eating.    Secondary diabetes mellitus (H)       order for DME     1 Units    Equipment being ordered: Toilet seat riser with handles    Malignant neoplasm of head of pancreas (H), Generalized muscle weakness       pantoprazole 20 MG EC tablet    PROTONIX    60 tablet    Take 1 tablet (20 mg) by mouth 2 times daily    Malignant neoplasm of head of pancreas (H)       polyethylene glycol powder    MIRALAX/GLYCOLAX    500 g    Take 17 g (1 capful) by mouth daily    Malignant neoplasm of head of pancreas (H)        potassium chloride SA 20 MEQ CR tablet    KLOR-CON    60 tablet    Take 2 tablets (40 mEq) by mouth daily    Malignant neoplasm of head of pancreas (H)       prochlorperazine 10 MG tablet    COMPAZINE    60 tablet    TAKE ONE TABLET BY MOUTH EVERY 6 HOURS AS NEEDED FOR NAUSEA AND VOMITING    Malignant neoplasm of head of pancreas (H)       senna-docusate 8.6-50 MG per tablet    SENNA S    100 tablet    Take 2 tablets by mouth 2 times daily as needed for constipation    Drug-induced constipation       * Notice:  This list has 4 medication(s) that are the same as other medications prescribed for you. Read the directions carefully, and ask your doctor or other care provider to review them with you.

## 2018-03-13 NOTE — NURSING NOTE
"Oncology Rooming Note    March 13, 2018 11:27 AM   Shirin Muller is a 57 year old female who presents for:    Chief Complaint   Patient presents with     Port Draw     Labs drawn via port by RN. Line flushed and hep locked, de-accessed. VS taken.     Oncology Clinic Visit     Return visit related to Pancreatic Cancer     Initial Vitals: /65 (BP Location: Right arm, Patient Position: Sitting, Cuff Size: Adult Regular)  Pulse 96  Temp 98.6  F (37  C) (Oral)  Resp 18  Ht 1.803 m (5' 10.98\")  Wt 70.8 kg (156 lb 1.6 oz)  SpO2 98%  BMI 21.78 kg/m2 Estimated body mass index is 21.78 kg/(m^2) as calculated from the following:    Height as of this encounter: 1.803 m (5' 10.98\").    Weight as of this encounter: 70.8 kg (156 lb 1.6 oz). Body surface area is 1.88 meters squared.  No Pain (0) Comment: Data Unavailable   No LMP recorded. Patient is postmenopausal.  Allergies reviewed: Yes  Medications reviewed: Yes    Medications: MEDICATION REFILLS NEEDED TODAY. Provider was notified.  Pharmacy name entered into [facility]:    [facility] - [place], [place] - [phone]  [id number] - [place], [place] - [address] [place] [address]    Clinical concerns: Refills needed. Provider was notified.    10 minutes for nursing intake (face to face time)     Mara Jc LPN            "

## 2018-03-13 NOTE — NURSING NOTE
Chief Complaint   Patient presents with     Port Draw     Labs drawn via port by RN. Line flushed and hep locked, de-accessed. VS taken.     Tata Felix RN

## 2018-03-13 NOTE — LETTER
3/13/2018       RE: Shirin Muller  [address] [facility] [address]     Dear Colleague,    Thank you for referring your patient, Shirin Muller, to the [facility]. Please see a copy of my visit note below.    Reason for Visit: seen in f/u of metastatic pancreatic cancer and for supportive care    Oncology HPI: Shirin Muller is a 57 year old woman with metastatic pancreatic cancer involving the liver. She is currently off of active treatment as she was found to have progression on prior treatment with gemcitabine and abraxane, FOLFIRINOX, and most recently liposomal irinotecan and 5FU. Her cancer treatment has been complicated by GI bleeding from an ulcer and infiltrating mass in the duodenum (January 2017). She was found to be positive for H. Pyolor and initiated on therapy with PPI, carafate, amoxicillin and clarithromycin.  In August 2017 she had biliar obstruction with complications of sepsis/cholangitis. BC grew klebsiella penumonia and streptococcus angiosos. She was treated with biliary stenting and antibiotics. Most recently had duodenal obstruction and underwent stenting.    Interval history: Shirin is here with her son today. She notes that she has had improvement in her nausea/vomiting since duodenal stenting. Has occasional nausea, controlled with compazine, but no vomiting. Her appetite is decreased and she is eating less. She reports sleeping a couple hours a day,but her son notes that he thinks she is sleeping about 6 hours a day in addition to sleeping at night. Energy is low. When awake, spends her time sitting and watching television. Would like to do more and eat more, but lacks desire. Denies depression or anxiety. She remains hopeful for some treatment that could be given to her. Bowels are regular. No pale stools. Urination wnl. No icterus. No fevers/chills. No headaches, vision changes. No difficulty with speech or swallowing. No  reflux. No change to peripheral neuropathy. Abdominal pain is well controlled with MS Contin and occasional MSIR.    Current Outpatient Prescriptions   Medication Sig Dispense Refill     morphine (MS CONTIN) 30 MG 12 hr tablet Take 1 tablet (30 mg) by mouth every 12 hours 60 tablet 0     morphine (MSIR) 15 MG IR tablet Take 1 tablet (15 mg) by mouth every 4 hours as needed for moderate to severe pain 100 tablet 0     dronabinol (MARINOL) 2.5 MG capsule Take 1 capsule (2.5 mg) by mouth 2 times daily (before meals) 60 capsule 0     ondansetron (ZOFRAN-ODT) 8 MG ODT tab Take 1 tablet (8 mg) by mouth every 8 hours as needed for nausea 60 tablet 6     bisacodyl (DULCOLAX) 10 MG Suppository Place 1 suppository (10 mg) rectally daily as needed for constipation 30 suppository 0     senna-docusate (SENNA S) 8.6-50 MG per tablet Take 2 tablets by mouth 2 times daily as needed for constipation 100 tablet 0     oxyCODONE IR (ROXICODONE) 5 MG tablet Take 1 tablet (5 mg) by mouth every 4 hours as needed for moderate to severe pain 100 tablet 0     ALVARADO CONTOUR NEXT test strip USE TO CHECK BLOOD SUGAR TWICE DAILY ( ONCE FASTING AND ONCE 2 HOURS AFTER EATING ) 100 each 11     order for DME Equipment being ordered: Toilet seat riser with handles 1 Units 0     prochlorperazine (COMPAZINE) 10 MG tablet TAKE ONE TABLET BY MOUTH EVERY 6 HOURS AS NEEDED FOR NAUSEA AND VOMITING 30 tablet 2     LORazepam (ATIVAN) 0.5 MG tablet Take 1 tablet (0.5 mg) by mouth every 4 hours as needed (Anxiety, Nausea/Vomiting or Sleep) 30 tablet 3     dronabinol (MARINOL) 5 MG capsule Take 1 capsule (5 mg) by mouth 3 times daily (before meals) Taking once daily 90 capsule 0     docusate sodium (DOK) 100 MG capsule Take 1 capsule (100 mg) by mouth daily 100 capsule 1     loratadine (CLARITIN) 10 MG tablet Take 1 tablet (10 mg) by mouth daily Reported on 5/5/2017 30 tablet 6     potassium chloride SA (KLOR-CON) 20 MEQ CR tablet Take 2 tablets (40 mEq) by  "mouth daily 60 tablet 3     polyethylene glycol (MIRALAX/GLYCOLAX) powder Take 17 g (1 capful) by mouth daily 500 g 11     pantoprazole (PROTONIX) 20 MG EC tablet Take 1 tablet (20 mg) by mouth 2 times daily 60 tablet 3     Nutritional Supplements (ENSURE CLEAR) LIQD Take 1 Bottle by mouth 3 times daily 90 Bottle 6     insulin glargine (LANTUS SOLOSTAR) 100 UNIT/ML injection 10 units subcutaneously daily at 8pm. 15 mL 3     insulin pen needle (BD EMMANUEL U/F) 32G X 4 MM Use one daily or as directed. 100 each prn     blood glucose monitoring (ACCU-CHEK FASTCLIX) lancets Use to test blood sugar two times daily or as directed. 102 each 3     Blood Glucose Monitoring Suppl (ONETOUCH PING METER REMOTE) SUPPLIES MISC Check your sugars twice daily, once when fasting and once 2 hours after eating. 1 each 3     amylase-lipase-protease (CREON) 82796 UNITS CPEP Take 2 capsules (72,000 Units) by mouth 3 times daily (with meals) 180 capsule 1     diltiazem 2% in PLO cream, FV COMPOUNDED, 2% GEL Apply topically daily and as needed to external hemorrhoids 30 g 0     lidocaine-prilocaine (EMLA) cream Apply topically as needed for other (Use 30-60 minutes prior to port access) 30 g 0          Allergies   Allergen Reactions     Contrast Dye Nausea and Vomiting     Pt vomiting post IV contrast, Please try Visipaque for next CT scan. 6/24/16 sv     Diagnostic X-Ray Materials Nausea and Vomiting     Pt vomiting post IV contrast, Please try Visipaque for next CT scan. 6/24/16 sv         Exam: alert, appears flat, tearful at times. Does engage with me and makes eye contact.  Blood pressure 109/65, pulse 96, temperature 98.6  F (37  C), temperature source Oral, resp. rate 18, height 1.803 m (5' 10.98\"), weight 70.8 kg (156 lb 1.6 oz), SpO2 98 %, not currently breastfeeding.  Wt Readings from Last 4 Encounters:   03/13/18 70.8 kg (156 lb 1.6 oz)   02/26/18 72.3 kg (159 lb 8 oz)   02/21/18 73.6 kg (162 lb 3.2 oz)   02/17/18 70.3 kg (155 lb) "     Oropharynx is moist and without focal lesion. No icterus. Neck supple and without adenopathy. Lungs: CTA. Heart:RRR, no murmur or rub. Abdomen: nontender, firm in the mid-epigastrum otherwise soft. Extremities: warm, no edema.    Labs:   Results for NATALIIA IBARRA (MRN [id number]) as of 3/13/2018 12:23   Ref. Range 3/13/2018 11:16   Sodium Latest Ref Range: 133 - 144 mmol/L 140   Potassium Latest Ref Range: 3.4 - 5.3 mmol/L 3.2 (L)   Chloride Latest Ref Range: 94 - 109 mmol/L 105   Carbon Dioxide Latest Ref Range: 20 - 32 mmol/L 30   Urea Nitrogen Latest Ref Range: 7 - 30 mg/dL 7   Creatinine Latest Ref Range: 0.52 - 1.04 mg/dL 0.58   GFR Estimate Latest Ref Range: >60 mL/min/1.7m2 >90   GFR Estimate If Black Latest Ref Range: >60 mL/min/1.7m2 >90   Calcium Latest Ref Range: 8.5 - 10.1 mg/dL 9.0   Anion Gap Latest Ref Range: 3 - 14 mmol/L 6   Albumin Latest Ref Range: 3.4 - 5.0 g/dL 3.3 (L)   Protein Total Latest Ref Range: 6.8 - 8.8 g/dL 8.0   Bilirubin Total Latest Ref Range: 0.2 - 1.3 mg/dL 0.5   Alkaline Phosphatase Latest Ref Range: 40 - 150 U/L 301 (H)   ALT Latest Ref Range: 0 - 50 U/L 20   AST Latest Ref Range: 0 - 45 U/L 38   Glucose Latest Ref Range: 70 - 99 mg/dL 142 (H)   WBC Latest Ref Range: 4.0 - 11.0 10e9/L 4.2   Hemoglobin Latest Ref Range: 11.7 - 15.7 g/dL 10.1 (L)   Hematocrit Latest Ref Range: 35.0 - 47.0 % 32.7 (L)   Platelet Count Latest Ref Range: 150 - 450 10e9/L 108 (L)   RBC Count Latest Ref Range: 3.8 - 5.2 10e12/L 3.64 (L)   MCV Latest Ref Range: 78 - 100 fl 90   MCH Latest Ref Range: 26.5 - 33.0 pg 27.7   MCHC Latest Ref Range: 31.5 - 36.5 g/dL 30.9 (L)   RDW Latest Ref Range: 10.0 - 15.0 % 14.6   Diff Method Unknown Automated Method   % Neutrophils Latest Units: % 74.6   % Lymphocytes Latest Units: % 15.3   % Monocytes Latest Units: % 7.9   % Eosinophils Latest Units: % 1.7   % Basophils Latest Units: % 0.5   % Immature Granulocytes Latest Units: % 0.0   Nucleated  RBCs Latest Ref Range: 0 /100 0   Absolute Neutrophil Latest Ref Range: 1.6 - 8.3 10e9/L 3.1   Absolute Lymphocytes Latest Ref Range: 0.8 - 5.3 10e9/L 0.6 (L)   Absolute Monocytes Latest Ref Range: 0.0 - 1.3 10e9/L 0.3   Absolute Eosinophils Latest Ref Range: 0.0 - 0.7 10e9/L 0.1   Absolute Basophils Latest Ref Range: 0.0 - 0.2 10e9/L 0.0   Abs Immature Granulocytes Latest Ref Range: 0 - 0.4 10e9/L 0.0   Absolute Nucleated RBC Unknown 0.0           Impression/plan:   1. Metastatic pancreatic cancer, currently on no active treatment after progression on standard therapies.  -she remains hopeful that she would qualify for a clinical trial in the future. I reviewed that she would need to have improved strength and nutrition even if a clinical trial opened  -she would like to continue working toward this and is not ready for hospice  -will adjust marinol and add ritalin (see below)  -will schedule f/u with Dr. Gonsales in 3-4 weeks with a CT scan to re-assess her disease status and study eligibility     2. Hx of duodenal obstruction and biliary obstruction, s/p stenting  -no s/s of obstruction at this time. Will continue to monitor    3. Anorexia-related to underlying malignancy, possibly depression as well, but she denies this  -already on marinol 2.5 mg, will increase to 5 mg bid to see if appetite can increase. Discussed scheduling ensure, tracking food as her desire is to eat more.  -reviewed that it was common to have decreased appetite as cancer progresses  -could consider dexamethasone as well, if no response    4. Chronic fatigue-related to malignancy, decreased nutrition, possibly chronic opioids  -discussed use of ritalin, she would like to give that a try.  -initiated ritalin 5 mg bid, take the 2nd daily dose no later than early afternoon    5. Mood-appears depressed/tearful, but feels her mood is good. Denies need for antidepressant. Her son did not offer any concerns when I discussed this today.    6.  Hypokalemia-chronic, better since off of chemotherapy  -on kdur 40 meq po daily, should take an extra 20 meq po today, then resume 40 meq po daily      Again, thank you for allowing me to participate in the care of your patient.      Sincerely,    TEE Villanueva CNP

## 2018-03-19 PROBLEM — C25.9 PANCREATIC CANCER (H): Status: ACTIVE | Noted: 2018-01-01

## 2018-03-19 NOTE — IP AVS SNAPSHOT
MRN:[id number]                      After Visit Summary   3/19/2018    Shirin Muller    MRN: [id number]           Thank you!     Thank you for choosing [facility] for your care. Our goal is always to provide you with excellent care. Hearing back from our patients is one way we can continue to improve our services. Please take a few minutes to complete the written survey that you may receive in the mail after you visit with us. Thank you!        Patient Information     Date Of Birth          1960        About your hospital stay     You were admitted on:  March 19, 2018 You last received care in the:  [address] [facility]    You were discharged on:  March 21, 2018        Reason for your hospital stay       You were here because of nausea and vomiting that was being caused by a narrowing of part of your intestines. Another stent was placed to help open up the area.                  Who to Call     For medical emergencies, please call 911.  For non-urgent questions about your medical care, please call your primary care provider or clinic, None  For questions related to your surgery, please call your surgery clinic        Attending Provider     Provider Specialty    Phillip Barragan MD Emergency Medicine    Lindsay, Omero Causey MD Oncology       Primary Care Provider    [facility]       When to contact your care team       Please call the [facility] at [phone] if your nausea and vomiting returns, or if you develop temperature above 100.4, shortness of breath, chest pain, headaches, vision changes, bleeding, diarrhea, or pain.                  After Care Instructions     Activity       Your activity upon discharge: activity as tolerated            Diet       Follow this diet upon discharge: Regular diet. Chew food very well, try to avoid eating uncooked leafy green vegetables as they could interfere with the  stents you have.                  Follow-up Appointments     Follow Up and recommended labs and tests       Scheduled appointments are listed below.                  Your next 10 appointments already scheduled     Mar 30, 2018  6:30 AM CDT   [facility] with  [facility]   [facility] ([facility])    [address]  [address]  [facility] [address]   [phone]            Mar 30, 2018  7:00 AM CDT   (Arrive by 6:45 AM)   MR ABDOMEN & PELVIS W/O & W CONTRAST with [address] [facility] ([facility])    [address]  1st Floor  [facility] [address] [phone]           Take your medicines as usual, unless your doctor tells you not to. Bring a list of your current medicines to your exam (including vitamins, minerals and over-the-counter drugs). Also bring the results of similar scans you may have had.    You may or may not receive IV contrast for this exam pending the discretion of the Radiologist.   Do not eat or drink for 6 hours prior to exam.  The MRI machine uses a strong magnet. Please wear clothes without metal (snaps, zippers). A sweatsuit works well, or we may give you a hospital gown.  Please remove any body piercings and hair extensions before you arrive. You will also remove watches, jewelry, hairpins, wallets, dentures, partial dental plates and hearing aids. You may wear contact lenses, and you may be able to wear your rings. We have a safe place to keep your personal items, but it is safer to leave them at home.  **IMPORTANT** THE INSTRUCTIONS BELOW ARE ONLY FOR THOSE PATIENTS WHO HAVE BEEN PRESCRIBED SEDATION OR GENERAL ANESTHESIA DURING THEIR MRI PROCEDURE:  IF YOUR DOCTOR PRESCRIBED ORAL SEDATION (take medicine to help you relax during your exam):   You must get the medicine from your doctor (oral medication) before you arrive. Bring the medicine to the exam. Do not take it at home.  You ll be told when to take it upon arriving for your exam.   Arrive one hour early. Bring someone who can take you home after the test. Your medicine will make you sleepy. After the exam, you may not drive, take a bus or take a taxi by yourself.  IF YOUR DOCTOR PRESCRIBED IV SEDATION:   Arrive one hour early. Bring someone who can take you home after the test. Your medicine will make you sleepy. After the exam, you may not drive, take a bus or take a taxi by yourself.   No eating 6 hours before your exam. You may have clear liquids up until 4 hours before your exam. (Clear liquids include water, clear tea, black coffee and fruit juice without pulp.)  IF YOUR DOCTOR PRESCRIBED ANESTHESIA (be asleep for your exam):   Arrive 1 1/2 hours early. Bring someone who can take you home after the test. You may not drive, take a bus or take a taxi by yourself.   No eating 8 hours before your exam. You may have clear liquids up until 4 hours before your exam. (Clear liquids include water, clear tea, black coffee and fruit juice without pulp.)   You will spend four to five hours in the recovery room.  If you have any questions, please contact your Imaging Department exam site.            Mar 30, 2018  8:00 AM CDT   (Arrive by 7:45 AM)   CT CHEST W/O CONTRAST with UCCT1   [facility] ([facility])    [address] [address] [phone]           Please bring any scans or X-rays taken at other hospitals, if similar tests were done. Also bring a list of your medicines, including vitamins, minerals and over-the-counter drugs. It is safest to leave personal items at home.  Be sure to tell your doctor:   If you have any allergies.   If there s any chance you are pregnant.   If you are breastfeeding.  You do not need to do anything special to prepare for this exam.  Please wear loose clothing, such as a sweat suit or jogging clothes. Avoid snaps, zippers and  "other metal. We may ask you to undress and put on a hospital gown.            Apr 02, 2018  5:30 PM CDT   (Arrive by 5:15 PM)   Return Visit with Demond Gonsales MD   [facility] ([facility])    [address]  [address]  [facility] [address] [phone]              Additional Information     If you use hormonal birth control (such as the pill, patch, ring or implants): You'll need a second form of birth control for 7 days (condoms, a diaphragm or contraceptive foam). While in the hospital, you received a medicine called Bridion. Your normal birth control will not work as well for a week after taking this medicine.          Pending Results     No orders found from 3/17/2018 to 3/20/2018.            Statement of Approval     Ordered          03/21/18 0954  I have reviewed and agree with all the recommendations and orders detailed in this document.  EFFECTIVE NOW     Approved and electronically signed by:  Audelia Elliott PA-C             Admission Information     Date & Time Provider Department Dept. Phone    3/19/2018 Omero Montoya MD [address] [facility] [place] [phone]      Your Vitals Were     Blood Pressure Pulse Temperature Respirations Height Weight    123/76 (BP Location: Right arm) 78 98.2  F (36.8  C) (Oral) 14 1.803 m (5' 11\") 68 kg (150 lb)    Pulse Oximetry BMI (Body Mass Index)                99% 20.92 kg/m2          MyChart Information     [employer] gives you secure access to your electronic health record. If you see a primary care provider, you can also send messages to your care team and make appointments. If you have questions, please call your primary care clinic.  If you do not have a primary care provider, please call [phone] and they will assist you.        Care EveryWhere ID     This is your Care EveryWhere ID. This could be used by other organizations to access your [facility] medical records  [id number]      "   Equal Access to Services     [facility] : Hadii aad ku hadelvipernell Spence, waaxda luqadaha, qaybta kaalmaluis e garcia. So [facility] [phone].    ATENCIÓN: Si habla español, tiene a bernal disposición servicios gratuitos de asistencia lingüística. Llame al [phone].    We comply with applicable federal civil rights laws and [place] laws. We do not discriminate on the basis of race, color, national origin, age, disability, sex, sexual orientation, or gender identity.               Review of your medicines      CONTINUE these medicines which have NOT CHANGED        Dose / Directions    amylase-lipase-protease 84310 UNITS Cpep   Commonly known as:  CREON   Indication:  Pancreatic Insufficiency        Dose:  2 capsule   Take 2 capsules (72,000 Units) by mouth 3 times daily (with meals)   Quantity:  180 capsule   Refills:  1       ALVAR[employer]O CONTOUR NEXT test strip   Used for:  Diabetes mellitus, type 2 (H)   Generic drug:  blood glucose monitoring        USE TO CHECK BLOOD SUGAR TWICE DAILY ( ONCE FASTING AND ONCE 2 HOURS AFTER EATING )   Quantity:  100 each   Refills:  11       bisacodyl 10 MG Suppository   Commonly known as:  DULCOLAX   Used for:  Drug-induced constipation        Dose:  10 mg   Place 1 suppository (10 mg) rectally daily as needed for constipation   Quantity:  30 suppository   Refills:  0       blood glucose monitoring lancets   Used for:  Diabetes mellitus, type 2 (H)        Use to test blood sugar two times daily or as directed.   Quantity:  102 each   Refills:  3       diltiazem 2% in PLO cream (FV COMPOUNDED) 2% Gel   Used for:  External hemorrhoids        Apply topically daily and as needed to external hemorrhoids   Quantity:  30 g   Refills:  0       docusate sodium 100 MG capsule   Commonly known as:  DOK   Used for:  External hemorrhoids        Dose:  100 mg   Take 1 capsule (100 mg) by mouth daily   Quantity:  100 capsule   Refills:  1       dronabinol 5  MG capsule   Commonly known as:  MARINOL   Used for:  Anorexia        Dose:  5 mg   Take 1 capsule (5 mg) by mouth 2 times daily (before meals)   Quantity:  60 capsule   Refills:  3       ENSURE CLEAR Liqd        Dose:  1 Bottle   Take 1 Bottle by mouth 3 times daily   Quantity:  90 Bottle   Refills:  6       insulin glargine 100 UNIT/ML injection   Commonly known as:  LANTUS SOLOSTAR   Used for:  Diabetes mellitus, type 2 (H)        10 units subcutaneously daily at 8pm.   Quantity:  15 mL   Refills:  3       insulin pen needle 32G X 4 MM   Commonly known as:  BD EMMANUEL U/F   Used for:  Diabetes mellitus, type 2 (H)        Use one daily or as directed.   Quantity:  100 each   Refills:  prn       KLOR-CON 20 MEQ CR tablet   Generic drug:  potassium chloride SA        Dose:  20 mEq   Take 1 tablet (20 mEq) by mouth 2 times daily   Refills:  0       lidocaine-prilocaine cream   Commonly known as:  EMLA        Apply topically as needed for other (Use 30-60 minutes prior to port access)   Quantity:  30 g   Refills:  0       loratadine 10 MG tablet   Commonly known as:  CLARITIN   Used for:  Chronic seasonal allergic rhinitis, unspecified trigger        Dose:  10 mg   Take 1 tablet (10 mg) by mouth daily Reported on 5/5/2017   Quantity:  30 tablet   Refills:  6       LORazepam 0.5 MG tablet   Commonly known as:  ATIVAN        Dose:  0.5 mg   Take 1 tablet (0.5 mg) by mouth every 4 hours as needed (Anxiety, Nausea/Vomiting or Sleep)   Quantity:  30 tablet   Refills:  3       METHYLPHENIDATE HCL PO        Dose:  5 mg   Take 5 mg by mouth 2 times daily   Refills:  0       * morphine 30 MG 12 hr tablet   Commonly known as:  MS CONTIN        Dose:  30 mg   Take 1 tablet (30 mg) by mouth every 12 hours   Quantity:  60 tablet   Refills:  0       * morphine 15 MG IR tablet   Commonly known as:  MSIR        Dose:  15 mg   Take 1 tablet (15 mg) by mouth every 4 hours as needed for moderate to severe pain   Quantity:  100 tablet    Refills:  0       ondansetron 8 MG ODT tab   Commonly known as:  ZOFRAN-ODT        Dose:  8 mg   Take 1 tablet (8 mg) by mouth every 8 hours as needed for nausea   Quantity:  60 tablet   Refills:  6       ONETOUCH PING METER REMOTE SUPPLIES Misc   Used for:  Secondary diabetes mellitus (H)        Check your sugars twice daily, once when fasting and once 2 hours after eating.   Quantity:  1 each   Refills:  3       order for DME   Used for:  Generalized muscle weakness        Equipment being ordered: Toilet seat riser with handles   Quantity:  1 Units   Refills:  0       pantoprazole 20 MG EC tablet   Commonly known as:  PROTONIX        Dose:  20 mg   Take 1 tablet (20 mg) by mouth 2 times daily   Quantity:  60 tablet   Refills:  3       polyethylene glycol powder   Commonly known as:  MIRALAX/GLYCOLAX        Dose:  1 capful   Take 17 g (1 capful) by mouth daily   Quantity:  500 g   Refills:  11       prochlorperazine 10 MG tablet   Commonly known as:  COMPAZINE        TAKE ONE TABLET BY MOUTH EVERY 6 HOURS AS NEEDED FOR NAUSEA AND VOMITING   Quantity:  60 tablet   Refills:  3       senna-docusate 8.6-50 MG per tablet   Commonly known as:  SENNA S   Used for:  Drug-induced constipation        Dose:  2 tablet   Take 2 tablets by mouth 2 times daily as needed for constipation   Quantity:  100 tablet   Refills:  0       * Notice:  This list has 2 medication(s) that are the same as other medications prescribed for you. Read the directions carefully, and ask your doctor or other care provider to review them with you.         Where to get your medicines      These medications were sent to [address] [address]    Hours:  TRANSPLANT PHONE NUMBER [phone] Phone:  [phone]     senna-docusate 8.6-50 MG per tablet                Protect others around you: Learn how to safely use, store and throw away your  medicines at www.disposemymeds.org.             Medication List: This is a list of all your medications and when to take them. Check marks below indicate your daily home schedule. Keep this list as a reference.      Medications           Morning Afternoon Evening Bedtime As Needed    amylase-lipase-protease 52767 UNITS Cpep   Commonly known as:  CREON   Take 2 capsules (72,000 Units) by mouth 3 times daily (with meals)   Last time this was given:  72,000 Units on 3/21/2018  8:20 AM                                [employer] CONTOUR NEXT test strip   USE TO CHECK BLOOD SUGAR TWICE DAILY ( ONCE FASTING AND ONCE 2 HOURS AFTER EATING )   Generic drug:  blood glucose monitoring                                bisacodyl 10 MG Suppository   Commonly known as:  DULCOLAX   Place 1 suppository (10 mg) rectally daily as needed for constipation                                blood glucose monitoring lancets   Use to test blood sugar two times daily or as directed.                                diltiazem 2% in PLO cream (FV COMPOUNDED) 2% Gel   Apply topically daily and as needed to external hemorrhoids                                docusate sodium 100 MG capsule   Commonly known as:  DOK   Take 1 capsule (100 mg) by mouth daily                                dronabinol 5 MG capsule   Commonly known as:  MARINOL   Take 1 capsule (5 mg) by mouth 2 times daily (before meals)   Last time this was given:  5 mg on 3/21/2018  8:19 AM                                ENSURE CLEAR Liqd   Take 1 Bottle by mouth 3 times daily                                insulin glargine 100 UNIT/ML injection   Commonly known as:  LANTUS SOLOSTAR   10 units subcutaneously daily at 8pm.                                insulin pen needle 32G X 4 MM   Commonly known as:  BD EMMANUEL U/F   Use one daily or as directed.                                KLOR-CON 20 MEQ CR tablet   Take 1 tablet (20 mEq) by mouth 2 times daily   Generic drug:  potassium chloride SA                                 lidocaine-prilocaine cream   Commonly known as:  EMLA   Apply topically as needed for other (Use 30-60 minutes prior to port access)                                loratadine 10 MG tablet   Commonly known as:  CLARITIN   Take 1 tablet (10 mg) by mouth daily Reported on 5/5/2017                                LORazepam 0.5 MG tablet   Commonly known as:  ATIVAN   Take 1 tablet (0.5 mg) by mouth every 4 hours as needed (Anxiety, Nausea/Vomiting or Sleep)                                METHYLPHENIDATE HCL PO   Take 5 mg by mouth 2 times daily   Last time this was given:  5 mg on 3/21/2018  9:37 AM                                * morphine 30 MG 12 hr tablet   Commonly known as:  MS CONTIN   Take 1 tablet (30 mg) by mouth every 12 hours   Last time this was given:  30 mg on 3/21/2018  6:51 AM                                * morphine 15 MG IR tablet   Commonly known as:  MSIR   Take 1 tablet (15 mg) by mouth every 4 hours as needed for moderate to severe pain   Last time this was given:  15 mg on 3/21/2018 12:04 AM                                ondansetron 8 MG ODT tab   Commonly known as:  ZOFRAN-ODT   Take 1 tablet (8 mg) by mouth every 8 hours as needed for nausea                                ONETOUCH PING METER REMOTE SUPPLIES Misc   Check your sugars twice daily, once when fasting and once 2 hours after eating.                                order for DME   Equipment being ordered: Toilet seat riser with handles                                pantoprazole 20 MG EC tablet   Commonly known as:  PROTONIX   Take 1 tablet (20 mg) by mouth 2 times daily   Last time this was given:  20 mg on 3/21/2018  8:19 AM                                polyethylene glycol powder   Commonly known as:  MIRALAX/GLYCOLAX   Take 17 g (1 capful) by mouth daily                                prochlorperazine 10 MG tablet   Commonly known as:  COMPAZINE   TAKE ONE TABLET BY MOUTH EVERY 6 HOURS AS NEEDED FOR NAUSEA  AND VOMITING                                senna-docusate 8.6-50 MG per tablet   Commonly known as:  SENNA S   Take 2 tablets by mouth 2 times daily as needed for constipation                                * Notice:  This list has 2 medication(s) that are the same as other medications prescribed for you. Read the directions carefully, and ask your doctor or other care provider to review them with you.

## 2018-03-19 NOTE — TELEPHONE ENCOUNTER
Pt calling  triage reporting feeling very weak, fatigue, no energy, nauseated, vomits multiple times daily, no appetite, feels dizzy and lightheaded. Pt seen in clinic last week with similar symptoms. She is taking her Marinol.  Pt states she feels much worse than last week at her visit.  No fevers, shortness of breath, chest pain, cold symptoms. Pt no longer on treatment. Hospice has been discussed but pt not ready.  No availability in clinic today for pt to be seen. Pt advised to go to ED. She voiced good understanding. Rell called. Will update care team.

## 2018-03-19 NOTE — ED NOTES
[facility], [place]   ED Nurse to Floor Handoff     Shirin Muller is a 57 year old female who speaks English and lives with family members,  in a home  They arrived in the ED by car from home    ED Chief Complaint: Nausea & Vomiting    ED Dx;   Final diagnoses:   Nausea & vomiting         Needed?: No    Allergies:   Allergies   Allergen Reactions     Contrast Dye Nausea and Vomiting     Pt vomiting post IV contrast, Please try Visipaque for next CT scan. 6/24/16 sv     Diagnostic X-Ray Materials Nausea and Vomiting     Pt vomiting post IV contrast, Please try Visipaque for next CT scan. 6/24/16 sv   .  Past Medical Hx:   Past Medical History:   Diagnosis Date     Biliary stricture      Graves disease      Lesion of liver      Malignant neoplasm of head of pancreas (H) 4/12/2016     Pancreatic disease      Pancreatic mass       Baseline Mental status: WDL  Current Mental Status changes: {Current Mental Status Changes:[id number]    Infection present or suspected this encounter: no  Sepsis suspected: No  Isolation type: No active isolations     Activity level - Baseline/Home:  Independent  Activity Level - Current:   Stand with Assist    Bariatric equipment needed?: No    In the ED these meds were given:   Medications   lidocaine 1 % 1 mL (not administered)   lidocaine (LMX4) kit (not administered)   sodium chloride (PF) 0.9% PF flush 3 mL (not administered)   sodium chloride (PF) 0.9% PF flush 3 mL (not administered)   0.9% sodium chloride BOLUS (not administered)     Followed by   0.9% sodium chloride BOLUS (0 mLs Intravenous Stopped 3/19/18 1525)     Followed by   sodium chloride 0.9% infusion (not administered)   ondansetron (ZOFRAN) injection 4 mg (4 mg Intravenous Given 3/19/18 1201)   morphine (PF) injection 2-4 mg (2 mg Intravenous Given 3/19/18 1250)   potassium chloride 10 mEq in 100 mL intermittent infusion with 10 mg lidocaine (10 mEq Intravenous New Bag  3/19/18 1520)   pantoprazole (PROTONIX) 40 mg IV push injection (40 mg Intravenous Given 3/19/18 1201)   0.9% sodium chloride BOLUS (1,000 mLs Intravenous New Bag 3/19/18 1520)       Drips running?  Yes, K running    Home pump  No    Current LDAs  Port A Cath 05/25/16 Right Chest wall (Active)   Access Date 03/19/18 3/19/2018 11:37 AM   Access Attempts 1 3/19/2018 11:37 AM   Gauge/Length Power noncoring 90 degree bend;20 gauge;3/4 inch 3/19/2018 11:37 AM   Site Assessment WDL 3/19/2018 11:37 AM   Line Status Blood return noted 3/19/2018 11:37 AM   Dressing Intervention Transparent 3/19/2018 11:37 AM   Number of days:663       Labs results:   Labs Ordered and Resulted from Time of ED Arrival Up to the Time of Departure from the ED   CBC WITH PLATELETS DIFFERENTIAL - Abnormal; Notable for the following:        Result Value    Hemoglobin 11.2 (*)     MCHC 30.5 (*)     RDW 15.1 (*)     All other components within normal limits   INR - Abnormal; Notable for the following:     INR 1.22 (*)     All other components within normal limits   COMPREHENSIVE METABOLIC PANEL - Abnormal; Notable for the following:     Potassium 2.9 (*)     Alkaline Phosphatase 310 (*)     All other components within normal limits   LIPASE - Abnormal; Notable for the following:     Lipase 34 (*)     All other components within normal limits   GLUCOSE MONITOR NURSING POCT   PARTIAL THROMBOPLASTIN TIME   MAGNESIUM   PHOSPHORUS   LACTIC ACID WHOLE BLOOD   GLUCOSE BY METER   ROUTINE UA WITH MICROSCOPIC REFLEX TO CULTURE       Imaging Studies:   Recent Results (from the past 24 hour(s))   CT Abdomen Pelvis w/o Contrast    Impression    IMPRESSION:   1.  Duodenal stent is patent, though narrowed at the second portion of  the duodenum, with interval decrease in gastric distention from  2/12/2018.   2.  Soft tissue density within the intrahepatic biliary duct,  concerning for malignancy. Unchanged intrahepatic biliary dilation.  3.  Increased size of right  adnexal mass, recommend pelvic ultrasound.  4.  Grossly unchanged ill-defined pancreatic head mass.          Recent vital signs:   BP 94/56  Pulse 110  Temp 98.4  F (36.9  C) (Oral)  Resp 18  Wt 66.6 kg (146 lb 14.4 oz)  SpO2 99%  BMI 20.5 kg/m2    Cardiac Rhythm: Normal Sinus  Pt needs tele? No  Skin/wound Issues: None    Code Status: Full Code    Pain control: good    Nausea control: good    Abnormal labs/tests/findings requiring intervention: see epic    Family present during ED course? Yes   Family Comments/Social Situation comments: n/a    Tasks needing completion: n/a    Maribel Schulz RN  [facility]  [phone] [place] ED  [phone] [facility] ED

## 2018-03-19 NOTE — PHARMACY-ADMISSION MEDICATION HISTORY
Admission medication history interview status for the 3/19/2018 admission is complete. See Epic admission navigator for allergy information, pharmacy, prior to admission medications and immunization status.     Medication history interview sources:  Patient    Changes made to PTA medication list (reason)  Added: None  Deleted: duplicate dronabinol (had deleted ritalin, but put back because it was a new prescription from 3/13, she did not think she was taking this, but may not have started it yet)  Changed: KCL 40mEq daily-> 20 BID    Additional medication history information (including reliability of information, actions taken by pharmacist):  - Patient seemed somnolent or flat affect, but knew most of the medications when prompted  - Patient did not think she was taking ritalin, but per chart review, looks like it was just started last week, left on the med list in case she is supposed to start but hasn't  - 11/9/17 flu shot    Prior to Admission medications    Medication Sig Last Dose Taking? Auth Provider   dronabinol (MARINOL) 5 MG capsule Take 1 capsule (5 mg) by mouth 2 times daily (before meals) 3/19/2018 at AM Yes Ester Echavarria APRN CNP   prochlorperazine (COMPAZINE) 10 MG tablet TAKE ONE TABLET BY MOUTH EVERY 6 HOURS AS NEEDED FOR NAUSEA AND VOMITING 3/19/2018 at Unknown time Yes Ester Echavarria APRN CNP   morphine (MS CONTIN) 30 MG 12 hr tablet Take 1 tablet (30 mg) by mouth every 12 hours 3/19/2018 at Unknown time Yes Demond Gonsales MD   morphine (MSIR) 15 MG IR tablet Take 1 tablet (15 mg) by mouth every 4 hours as needed for moderate to severe pain 3/19/2018 at Unknown time Yes Demond Gonsales MD   ondansetron (ZOFRAN-ODT) 8 MG ODT tab Take 1 tablet (8 mg) by mouth every 8 hours as needed for nausea Past Week at Unknown time Yes Demond Gonsales MD   bisacodyl (DULCOLAX) 10 MG Suppository Place 1 suppository (10 mg) rectally daily as needed for constipation  Yes Odalys  Debora Garcia PA-C   senna-docusate (SENNA S) 8.6-50 MG per tablet Take 2 tablets by mouth 2 times daily as needed for constipation 3/19/2018 at Unknown time Yes Debora Morrow PA-C   LORazepam (ATIVAN) 0.5 MG tablet Take 1 tablet (0.5 mg) by mouth every 4 hours as needed (Anxiety, Nausea/Vomiting or Sleep) 3/19/2018 at AM Yes Demond Gonsales MD   docusate sodium (DOK) 100 MG capsule Take 1 capsule (100 mg) by mouth daily 3/18/2018 at Unknown time Yes Demond Gonsales MD   loratadine (CLARITIN) 10 MG tablet Take 1 tablet (10 mg) by mouth daily Reported on 5/5/2017 3/18/2018 at Unknown time Yes Barbie House PA   potassium chloride SA (KLOR-CON) 20 MEQ CR tablet Take 2 tablets (40 mEq) by mouth daily  Patient taking differently: Take 20 mEq by mouth 2 times daily  3/18/2018 at Unknown time Yes Barbie House PA   polyethylene glycol (MIRALAX/GLYCOLAX) powder Take 17 g (1 capful) by mouth daily 3/18/2018 at Unknown time Yes Barbie House PA   pantoprazole (PROTONIX) 20 MG EC tablet Take 1 tablet (20 mg) by mouth 2 times daily 3/19/2018 at Unknown time Yes Barbie House PA   insulin glargine (LANTUS SOLOSTAR) 100 UNIT/ML injection 10 units subcutaneously daily at 8pm. 3/18/2018 at PM Yes Chloe Flowers MD   amylase-lipase-protease (CREON) 45312 UNITS CPEP Take 2 capsules (72,000 Units) by mouth 3 times daily (with meals) 3/18/2018 at PM Yes Demond Gonsales MD   diltiazem 2% in PLO cream, FV COMPOUNDED, 2% GEL Apply topically daily and as needed to external hemorrhoids  Yes Zena Rich APRN CNP   lidocaine-prilocaine (EMLA) cream Apply topically as needed for other (Use 30-60 minutes prior to port access)  Yes Ester Echavarria APRN CNP   METHYLPHENIDATE HCL PO Take 5 mg by mouth 2 times daily   Unknown, Entered By History   ALVARADO CONTOUR NEXT test strip USE TO CHECK BLOOD SUGAR TWICE DAILY ( ONCE FASTING AND ONCE 2 HOURS AFTER EATING )   Chloe Flowers  MD Sally   order for DME Equipment being ordered: Toilet seat riser with handles   Demond Gonsales MD   Nutritional Supplements (ENSURE CLEAR) LIQD Take 1 Bottle by mouth 3 times daily   Barbie House PA   insulin pen needle (BD EMMANUEL U/F) 32G X 4 MM Use one daily or as directed.   Chloe Flowers MD   blood glucose monitoring (ACCU-CHEK FASTCLIX) lancets Use to test blood sugar two times daily or as directed.   Chloe Flowers MD   Blood Glucose Monitoring Suppl (ONETOUCH PING METER REMOTE) SUPPLIES MISC Check your sugars twice daily, once when fasting and once 2 hours after eating.   Chloe Flowers MD     Medication history completed by: Elaine Potts, PharmD Resident

## 2018-03-19 NOTE — H&P
[facility] History and Physical    Shirin Muller MRN# [id number]   Age: 57 year old YOB: 1960     Date of Admission:  3/19/2018    Home clinic:   Primary care provider: [facility]          Assessment and Plan:    57 year old female with metastatic pancreatic cancer with h/o duodenal narrowing and s/p duodenal stent, presenting with nausea, vomiting and inability to tolerate any solids or liquids for past 3 days.     Nausea, vomiting- no abdominal pain, and benign exam. Underwent CT abdomen in ED, showed narrowing of stent in second portion of duodenal with gastric distention.   D/w GI, advised place NG, NPO, IVF and will assess for possible endoscopy in AM.  She is on several meds that will be held.  Will use morphine PCA for pain control to convert from her oral regimen while NPO.  Change protonix to IV.  Resume other oral meds when able.     DM - took lantus 10 units last night.   Will hold lantus. Use sliding scale insulin as needed.     Onc - metastatic pancreatic cancer, progression on prior treatments, not on any active treatment currently. Awaiting potential clinical trials.     FEN - Low potassium.   Will use LR with 10meq KCL.  Replace K,  Add-on mag and phos.    Incidental R adnexal cystic mass, f/u with US at outpatient.     Mechanical VTE prophylaxis for possible procedure in am.   Full code.          Chief Complaint:   Nausea, vomiting.     History is obtained from the patient and electronic health record    Per ED, reviewed and agree-Shirin Muller is a 57 year old female with a history of history of metastatic pancreatic cancer (currently off active treatment) who presents for evaluation of vomiting. The patient was recently admitted here last month 2/12-2/17 for nausea and vomiting, found to have duodenal obstruction, for which she underwent duodenal stenting.  The patient presents with her daughter today, and they report that the  patient felt better 2-3 days after her recent discharge, but soon after she redeveloped inability to tolerate oral intake. She describes that she vomits any food or liquids she attempts to consume, consequently has not kept much down at all in the past week. Daughter notes that the patient has been appearing quite thin and she has felt thin. The patient denies any abdominal pain and she states that she is passing gas and stool as normal. The patient denies nausea presently, states she only feels symptomatic when attempting toe at. The patient did last see her oncology team last week and at that time increased Marinol from 2.5 mg to 5 mg BID for her anorexia, potential consideration of addition of dexamethasone in the future. The patient does have a port in place.             Cancer Treatment History:   Shirin Muller is a 57 year old woman with metastatic pancreatic cancer involving the liver. She is currently off of active treatment as she was found to have progression on prior treatment with gemcitabine and abraxane, FOLFIRINOX, and most recently liposomal irinotecan and 5FU. Her cancer treatment has been complicated by GI bleeding from an ulcer and infiltrating mass in the duodenum (2017). She was found to be positive for H. Pyolor and initiated on therapy with PPI, carafate, amoxicillin and clarithromycin.  In 2017 she had biliar obstruction with complications of sepsis/cholangitis. BC grew klebsiella penumonia and streptococcus angiosos. She was treated with biliary stenting and antibiotics. Most recently had duodenal obstruction and underwent stenting.         Past Medical History:     Past Medical History:   Diagnosis Date     Biliary stricture      Graves disease      Lesion of liver      Malignant neoplasm of head of pancreas (H) 2016     Pancreatic disease      Pancreatic mass             Past Surgical History:      Past Surgical History:   Procedure Laterality Date       SECTION       ENDOSCOPIC RETROGRADE CHOLANGIOPANCREATOGRAM N/A 4/13/2016    Procedure: COMBINED ENDOSCOPIC RETROGRADE CHOLANGIOPANCREATOGRAPHY, PLACE TUBE/STENT;  Surgeon: Arias Taveras MD;  Location: [facility] OR     ENDOSCOPIC RETROGRADE CHOLANGIOPANCREATOGRAM N/A 4/19/2017    Procedure: COMBINED ENDOSCOPIC RETROGRADE CHOLANGIOPANCREATOGRAPHY, PLACE TUBE/STENT;  Endoscopic Retrograde Cholangiopancreatogram With biliary Stent placement, ballon sweep of bile duct for stone removal;  Surgeon: Tristin Braden MD;  Location: [facility] OR     ENDOSCOPIC RETROGRADE CHOLANGIOPANCREATOGRAM N/A 8/3/2017    Procedure: COMBINED ENDOSCOPIC RETROGRADE CHOLANGIOPANCREATOGRAPHY, PLACE TUBE/STENT;  ENDOSCOPIC RETROGRADE CHOLANGIOPANCREATOGRAM, pus removal , stent placement to bile duct x1  ;  Surgeon: Guru Jamia Mcintosh MD;  Location: [facility] OR     ENDOSCOPIC RETROGRADE CHOLANGIOPANCREATOGRAM N/A 2/14/2018    Procedure: COMBINED ENDOSCOPIC RETROGRADE CHOLANGIOPANCREATOGRAPHY, PLACE TUBE/STENT;  endoscopic retrograde cholioangiography with biopsies, duodenal stent placement and dilatation of duodenal stent;  Surgeon: Tristin Braden MD;  Location: [facility] OR     ESOPHAGOSCOPY, GASTROSCOPY, DUODENOSCOPY (EGD), COMBINED N/A 4/13/2016    Procedure: COMBINED ENDOSCOPIC ULTRASOUND, ESOPHAGOSCOPY, GASTROSCOPY, DUODENOSCOPY (EGD), FINE NEEDLE ASPIRATE/BIOPSY;  Surgeon: Arias Taveras MD;  Location: [facility] OR     ESOPHAGOSCOPY, GASTROSCOPY, DUODENOSCOPY (EGD), COMBINED N/A 2/14/2018    Procedure: COMBINED ESOPHAGOSCOPY, GASTROSCOPY, DUODENOSCOPY (EGD);  COMBINED ESOPHAGOSCOPY, GASTROSCOPY, DUODENOSCOPY ;  Surgeon: Tristin Braden MD;  Location:  OR     VASCULAR SURGERY      right chest            Social History:     Social History   Substance Use Topics     Smoking status: Never Smoker     Smokeless tobacco: Never Used     Alcohol use No            Family History:     Family History   Problem Relation Age of  Onset     DIABETES Mother      Family history          Immunizations:     Immunization History   Administered Date(s) Administered     Influenza Vaccine IM 3yrs+ 4 Valent IIV4 04/14/2016, 01/29/2017, 11/09/2017            Allergies:     Allergies   Allergen Reactions     Contrast Dye Nausea and Vomiting     Pt vomiting post IV contrast, Please try Visipaque for next CT scan. 6/24/16 sv     Diagnostic X-Ray Materials Nausea and Vomiting     Pt vomiting post IV contrast, Please try Visipaque for next CT scan. 6/24/16 sv            Medications:       No current facility-administered medications on file prior to encounter.   Current Outpatient Prescriptions on File Prior to Encounter:  dronabinol (MARINOL) 5 MG capsule Take 1 capsule (5 mg) by mouth 2 times daily (before meals)   methylphenidate (RITALIN) 5 MG tablet Take 1 tablet (5 mg) by mouth 2 times daily   prochlorperazine (COMPAZINE) 10 MG tablet TAKE ONE TABLET BY MOUTH EVERY 6 HOURS AS NEEDED FOR NAUSEA AND VOMITING   morphine (MS CONTIN) 30 MG 12 hr tablet Take 1 tablet (30 mg) by mouth every 12 hours   morphine (MSIR) 15 MG IR tablet Take 1 tablet (15 mg) by mouth every 4 hours as needed for moderate to severe pain   ondansetron (ZOFRAN-ODT) 8 MG ODT tab Take 1 tablet (8 mg) by mouth every 8 hours as needed for nausea   bisacodyl (DULCOLAX) 10 MG Suppository Place 1 suppository (10 mg) rectally daily as needed for constipation   senna-docusate (SENNA S) 8.6-50 MG per tablet Take 2 tablets by mouth 2 times daily as needed for constipation   ALVARADO CONTOUR NEXT test strip USE TO CHECK BLOOD SUGAR TWICE DAILY ( ONCE FASTING AND ONCE 2 HOURS AFTER EATING )   order for DME Equipment being ordered: Toilet seat riser with handles   LORazepam (ATIVAN) 0.5 MG tablet Take 1 tablet (0.5 mg) by mouth every 4 hours as needed (Anxiety, Nausea/Vomiting or Sleep)   dronabinol (MARINOL) 5 MG capsule Take 1 capsule (5 mg) by mouth 3 times daily (before meals) Taking once daily    docusate sodium (DOK) 100 MG capsule Take 1 capsule (100 mg) by mouth daily   loratadine (CLARITIN) 10 MG tablet Take 1 tablet (10 mg) by mouth daily Reported on 5/5/2017   potassium chloride SA (KLOR-CON) 20 MEQ CR tablet Take 2 tablets (40 mEq) by mouth daily   polyethylene glycol (MIRALAX/GLYCOLAX) powder Take 17 g (1 capful) by mouth daily   pantoprazole (PROTONIX) 20 MG EC tablet Take 1 tablet (20 mg) by mouth 2 times daily   Nutritional Supplements (ENSURE CLEAR) LIQD Take 1 Bottle by mouth 3 times daily   insulin glargine (LANTUS SOLOSTAR) 100 UNIT/ML injection 10 units subcutaneously daily at 8pm.   insulin pen needle (BD EMMANUEL U/F) 32G X 4 MM Use one daily or as directed.   blood glucose monitoring (ACCU-CHEK FASTCLIX) lancets Use to test blood sugar two times daily or as directed.   Blood Glucose Monitoring Suppl (ONETOUCH PING METER REMOTE) SUPPLIES MISC Check your sugars twice daily, once when fasting and once 2 hours after eating.   amylase-lipase-protease (CREON) 11842 UNITS CPEP Take 2 capsules (72,000 Units) by mouth 3 times daily (with meals)   diltiazem 2% in PLO cream, FV COMPOUNDED, 2% GEL Apply topically daily and as needed to external hemorrhoids   lidocaine-prilocaine (EMLA) cream Apply topically as needed for other (Use 30-60 minutes prior to port access)              Review of Systems:   The Review of Systems is negative other than noted in the HPI         Physical Exam:   Temp: 98.4  F (36.9  C) Temp src: Oral BP: 94/56 Pulse: 110   Resp: 18 SpO2: 99 %      Constitutional: Awake, alert, cooperative, no apparent distress, and appears stated age.  Eyes: Lids and lashes normal, pupils equal, round and reactive to light, extra ocular muscles intact, sclera clear, conjunctiva normal.  ENT: Normocephalic, without obvious abnormality, atraumatic, no erythema or thrush.  Neck: Supple, symmetrical, trachea midline, no adenopathy.  Lungs: No increased work of breathing, good air exchange, clear to  auscultation bilaterally, no crackles or wheezing.  Cardiovascular: Regular rate and rhythm, normal S1 and S2, no S3 or S4, and no murmur noted.  Chest / Breast: clear port site.  Abdomen: normal bowel sounds, soft, non-distended, non-tender, no masses palpated, no hepatosplenomegally.  Genitourinary: deferred.  Musculoskeletal: No redness, warmth, or swelling of the joints.  No edema.   Neurologic: Awake, alert, oriented to name, place and time.  No gross focal deficits.   Skin: No rashes, erythema, pallor, petechia or purpura.         Data:     Lab Results   Component Value Date    WBC 5.5 03/19/2018    WBC 4.2 03/13/2018    WBC 4.0 02/21/2018    HGB 11.2 (L) 03/19/2018    HGB 10.1 (L) 03/13/2018    HGB 10.3 (L) 02/21/2018    HCT 36.7 03/19/2018    HCT 32.7 (L) 03/13/2018    HCT 33.3 (L) 02/21/2018     03/19/2018     (L) 03/13/2018     02/21/2018     03/19/2018     03/13/2018     02/21/2018    POTASSIUM 2.9 (L) 03/19/2018    POTASSIUM 3.2 (L) 03/13/2018    POTASSIUM 3.8 02/21/2018    CHLORIDE 96 03/19/2018    CHLORIDE 105 03/13/2018    CHLORIDE 103 02/21/2018    CO2 31 03/19/2018    CO2 30 03/13/2018    CO2 28 02/21/2018    BUN 10 03/19/2018    BUN 7 03/13/2018    BUN 6 (L) 02/21/2018    CR 0.63 03/19/2018    CR 0.58 03/13/2018    CR 0.52 02/21/2018    GLC 90 03/19/2018     (H) 03/13/2018     (H) 02/21/2018    TROPI <0.015 02/12/2018    TROPI  01/24/2017     <0.015  The 99th percentile for upper reference range is 0.045 ug/L.  Troponin values in   the range of 0.045 - 0.120 ug/L may be associated with risks of adverse   clinical events.      AST 34 03/19/2018    AST 38 03/13/2018    AST 29 02/21/2018    ALT 21 03/19/2018    ALT 20 03/13/2018    ALT 16 02/21/2018    ALKPHOS 310 (H) 03/19/2018    ALKPHOS 301 (H) 03/13/2018    ALKPHOS 298 (H) 02/21/2018    BILITOTAL 0.9 03/19/2018    BILITOTAL 0.5 03/13/2018    BILITOTAL 0.3 02/21/2018    INR 1.22 (H) 03/19/2018     INR 1.17 (H) 02/12/2018    INR 1.13 01/24/2018     -  -     Attestation:  -    Mao Alvares MD

## 2018-03-19 NOTE — ED NOTES
Bed: IN01  Expected date:   Expected time: 10:00 AM  Means of arrival:   Comments:  MRN: [id number]  GaryShirin Doverjyotsna   57yo F coming from [facility] with N/V, generalized weakness and dizziness for a week. Hx. End stage pancreatic cancer, no longer on treatment. Not ready for hospice care. Need eval.

## 2018-03-19 NOTE — IP AVS SNAPSHOT
[address] [address]    Phone:  [phone]                                       After Visit Summary   3/19/2018    Shirin Muller    MRN: [id number]           After Visit Summary Signature Page     I have received my discharge instructions, and my questions have been answered. I have discussed any challenges I see with this plan with the nurse or doctor.    ..........................................................................................................................................  Patient/Patient Representative Signature      ..........................................................................................................................................  Patient Representative Print Name and Relationship to Patient    ..................................................               ................................................  Date                                            Time    ..........................................................................................................................................  Reviewed by Signature/Title    ...................................................              ..............................................  Date                                                            Time

## 2018-03-19 NOTE — ED PROVIDER NOTES
History     Chief Complaint   Patient presents with     Nausea & Vomiting     HPI  Shirin Muller is a 57 year old female with a history of history of metastatic pancreatic cancer (currently off active treatment) who presents for evaluation of vomiting. The patient was recently admitted here last month 2/12-2/17 for nausea and vomiting, found to have duodenal obstruction, for which she underwent duodenal stenting.  The patient presents with her daughter today, and they report that the patient felt better 2-3 days after her recent discharge, but soon after she redeveloped inability to tolerate oral intake. She describes that she vomits any food or liquids she attempts to consume, consequently has not kept much down at all in the past week. Daughter notes that the patient has been appearing quite thin and she has felt thin. The patient denies any abdominal pain and she states that she is passing gas and stool as normal. The patient denies nausea presently, states she only feels symptomatic when attempting toe at. The patient did last see her oncology team last week and at that time increased Marinol from 2.5 mg to 5 mg BID for her anorexia, potential consideration of addition of dexamethasone in the future. The patient does have a port in place.     Current Facility-Administered Medications   Medication     0.9% sodium chloride BOLUS     potassium chloride 10 mEq in 100 mL intermittent infusion with 10 mg lidocaine     pantoprazole (PROTONIX) 40 mg IV push injection     naloxone (NARCAN) injection 0.1-0.4 mg     lidocaine 1 % 1 mL     lidocaine (LMX4) kit     Medication Instruction     morphine  PCA 5 mg/mL OPIOID TOLERANT HIGH CONC     ondansetron (ZOFRAN-ODT) ODT tab 4 mg    Or     ondansetron (ZOFRAN) injection 4 mg     prochlorperazine (COMPAZINE) injection 10 mg    Or     prochlorperazine (COMPAZINE) tablet 10 mg    Or     prochlorperazine (COMPAZINE) Suppository 25 mg     sodium chloride (PF)  0.9% PF flush 10-20 mL     heparin lock flush 10 UNIT/ML injection 5-10 mL     heparin lock flush 10 UNIT/ML injection 5-10 mL     heparin 100 UNIT/ML injection 5 mL     sodium chloride (PF) 0.9% PF flush 10-20 mL     lactated ringers 1,000 mL with potassium chloride 10 mEq/L infusion     LORazepam (ATIVAN) tablet 0.5-1 mg    Or     LORazepam (ATIVAN) injection 0.5-1 mg     potassium chloride SA (K-DUR/KLOR-CON M) CR tablet 20-40 mEq     potassium chloride (KLOR-CON) Packet 20-40 mEq     potassium chloride 10 mEq in 100 mL sterile water intermittent infusion (premix)     potassium chloride 10 mEq in 100 mL intermittent infusion with 10 mg lidocaine     potassium chloride 20 mEq in 50 mL intermittent infusion     magnesium sulfate 4 g in 100 mL sterile water (premade)     potassium phosphate 15 mmol in sodium chloride 0.9 % 250 mL intermittent infusion     potassium phosphate 20 mmol in sodium chloride 0.9 % 500 mL intermittent infusion     potassium phosphate 20 mmol in sodium chloride 0.9 % 250 mL intermittent infusion     potassium phosphate 25 mmol in sodium chloride 0.9 % 500 mL intermittent infusion     glucose 40 % gel 15-30 g    Or     dextrose 50 % injection 25-50 mL    Or     glucagon injection 1 mg     insulin aspart (NovoLOG) inj (RAPID ACTING)     Past Medical History:   Diagnosis Date     Biliary stricture      Graves disease      Lesion of liver      Malignant neoplasm of head of pancreas (H) 2016     Pancreatic disease      Pancreatic mass        Past Surgical History:   Procedure Laterality Date      SECTION       ENDOSCOPIC RETROGRADE CHOLANGIOPANCREATOGRAM N/A 2016    Procedure: COMBINED ENDOSCOPIC RETROGRADE CHOLANGIOPANCREATOGRAPHY, PLACE TUBE/STENT;  Surgeon: Arias Taveras MD;  Location:  OR     ENDOSCOPIC RETROGRADE CHOLANGIOPANCREATOGRAM N/A 2017    Procedure: COMBINED ENDOSCOPIC RETROGRADE CHOLANGIOPANCREATOGRAPHY, PLACE TUBE/STENT;  Endoscopic Retrograde  Cholangiopancreatogram With biliary Stent placement, ballon sweep of bile duct for stone removal;  Surgeon: Tristin Braden MD;  Location: [facility] OR     ENDOSCOPIC RETROGRADE CHOLANGIOPANCREATOGRAM N/A 8/3/2017    Procedure: COMBINED ENDOSCOPIC RETROGRADE CHOLANGIOPANCREATOGRAPHY, PLACE TUBE/STENT;  ENDOSCOPIC RETROGRADE CHOLANGIOPANCREATOGRAM, pus removal , stent placement to bile duct x1  ;  Surgeon: Guru Jamia Mcintosh MD;  Location: [facility] OR     ENDOSCOPIC RETROGRADE CHOLANGIOPANCREATOGRAM N/A 2/14/2018    Procedure: COMBINED ENDOSCOPIC RETROGRADE CHOLANGIOPANCREATOGRAPHY, PLACE TUBE/STENT;  endoscopic retrograde cholioangiography with biopsies, duodenal stent placement and dilatation of duodenal stent;  Surgeon: Tristin Braden MD;  Location: [facility] OR     ESOPHAGOSCOPY, GASTROSCOPY, DUODENOSCOPY (EGD), COMBINED N/A 4/13/2016    Procedure: COMBINED ENDOSCOPIC ULTRASOUND, ESOPHAGOSCOPY, GASTROSCOPY, DUODENOSCOPY (EGD), FINE NEEDLE ASPIRATE/BIOPSY;  Surgeon: Arias Taveras MD;  Location: [facility] OR     ESOPHAGOSCOPY, GASTROSCOPY, DUODENOSCOPY (EGD), COMBINED N/A 2/14/2018    Procedure: COMBINED ESOPHAGOSCOPY, GASTROSCOPY, DUODENOSCOPY (EGD);  COMBINED ESOPHAGOSCOPY, GASTROSCOPY, DUODENOSCOPY ;  Surgeon: Tristin Braden MD;  Location: [facility] OR     VASCULAR SURGERY      right chest       Family History   Problem Relation Age of Onset     DIABETES Mother        Social History   Substance Use Topics     Smoking status: Never Smoker     Smokeless tobacco: Never Used     Alcohol use No     Allergies   Allergen Reactions     Contrast Dye Nausea and Vomiting     Pt vomiting post IV contrast, Please try Visipaque for next CT scan. 6/24/16 sv     Diagnostic X-Ray Materials Nausea and Vomiting     Pt vomiting post IV contrast, Please try Visipaque for next CT scan. 6/24/16 sv       I have reviewed the Medications, Allergies, Past Medical and Surgical History, and Social History in the Epic  "system.    Review of Systems   Constitutional: Positive for activity change (decreased due to lack of energy), appetite change (decreased), fatigue and unexpected weight change (secondary to decreased PO). Negative for fever.   HENT: Negative for congestion, sinus pressure and trouble swallowing.    Eyes: Negative for redness and visual disturbance.   Respiratory: Negative for cough, chest tightness and shortness of breath.    Cardiovascular: Negative for chest pain and leg swelling.   Gastrointestinal: Positive for nausea and vomiting. Negative for abdominal pain, constipation and diarrhea.   Genitourinary: Negative for difficulty urinating and dysuria.   Musculoskeletal: Negative for arthralgias, gait problem and neck stiffness.   Skin: Negative for color change.   Neurological: Positive for weakness (generalized) and light-headedness. Negative for seizures, syncope and headaches.   Psychiatric/Behavioral: Positive for decreased concentration and dysphoric mood. Negative for confusion.   All other systems reviewed and are negative.      Physical Exam   BP: 104/72  Pulse: 110  Temp: 98.4  F (36.9  C)  Resp: 18  Height: 180.3 cm (5' 11\")  Weight: 66.6 kg (146 lb 14.4 oz)  SpO2: 100 %      Physical Exam   Constitutional: She is oriented to person, place, and time. She appears well-developed and well-nourished. She appears distressed.   Patient moderate distress here in the ER.  Appears somewhat emaciated weak but alert and oriented not confused not encephalopathic.   HENT:   Head: Normocephalic and atraumatic.   Dry mucous membranes noted.   Eyes: EOM are normal. Pupils are equal, round, and reactive to light. No scleral icterus.   Neck: Normal range of motion. Neck supple. No JVD present.   Cardiovascular: Regular rhythm.    Sinus tachycardia   Pulmonary/Chest: No stridor. No respiratory distress. She has no wheezes.   Abdominal: She exhibits no distension. There is no tenderness. There is no rebound and no " guarding.   Patient's abdomen is nontender is soft noted.  No rebound or guarding noted.   Musculoskeletal: She exhibits no edema, tenderness or deformity.   Neurological: She is alert and oriented to person, place, and time. She has normal reflexes. No cranial nerve deficit. Coordination normal.   No focal findings   Skin: Skin is warm and dry. No rash noted. She is not diaphoretic. No erythema. No pallor.   Psychiatric:   Flattened but pleasant otherwise appropriate   Nursing note and vitals reviewed.      ED Course     ED Course     Procedures        As noted I did see the patient a month ago where she was admitted reviewing epic records.  Patient GI had done a luminal stent from her stomach into her duodenum as she had a large stricture obstruction secondary to the pancreatic mass.  Patient's port was accessed.  IV fluids ×2 L given here in the ER.  Nausea for vomiting as needed.  Patient laboratory testing reviewed.  Patient's lactic acid 1.1.  Lipase 34.  Potassium noted be 2.9.  Glucose 90.  White count 5.5 hemoglobin 1.2.  Platelets are 164.    Patient ER discussed with GI who stated CT scan would be appropriate this point to reassess looking for any sign of obstruction.  Findings of the CT scan showed duodenal stent is patent there is some decreased gastric distention since February 12 of this year also.  No evidence of bowel obstruction.    The ER patient discussed with her along with family member agree admitted to the hospital be appropriate this point as she has had a week of continued nausea vomiting poor intake with weakness fatigability etc.  Discussed with the oncology fellow who agree with plan.  Continuous IV fluids given also IV potassium replacement was ordered as patient having hypokalemia potassium 2.9.  Patient to be admitted to oncology service for further evaluation and review of CT scan findings etc.    Critical Care time:  none             Labs Ordered and Resulted from Time of ED Arrival  Up to the Time of Departure from the ED   CBC WITH PLATELETS DIFFERENTIAL - Abnormal; Notable for the following:        Result Value    Hemoglobin 11.2 (*)     MCHC 30.5 (*)     RDW 15.1 (*)     All other components within normal limits   INR - Abnormal; Notable for the following:     INR 1.22 (*)     All other components within normal limits   COMPREHENSIVE METABOLIC PANEL - Abnormal; Notable for the following:     Potassium 2.9 (*)     Alkaline Phosphatase 310 (*)     All other components within normal limits   LIPASE - Abnormal; Notable for the following:     Lipase 34 (*)     All other components within normal limits   GLUCOSE MONITOR NURSING POCT   PARTIAL THROMBOPLASTIN TIME   MAGNESIUM   PHOSPHORUS   LACTIC ACID WHOLE BLOOD   GLUCOSE BY METER   ROUTINE UA WITH MICROSCOPIC REFLEX TO CULTURE     Results for orders placed or performed during the hospital encounter of 03/19/18   CT Abdomen Pelvis w/o Contrast   Result Value Ref Range    Radiologist flags Right adnexal cystic mass     Narrative    EXAMINATION: CT ABDOMEN PELVIS W/O CONTRAST, 3/19/2018 12:59 PM    TECHNIQUE:  Helical CT images from the lung bases through the  symphysis pubis were obtained without contrast.  Coronal reformatted  images were generated at a workstation for further assessment.    COMPARISON: 2/12/2018, 9/15/2017, 2/14/2018    HISTORY: pancreatic mass with duodenal obstruction with enteral stent  placed, now with N?V concern for increase upper obstruction.;     FINDINGS:    Abdomen and pelvis:   Liver: Soft tissue density near the central intrahepatic biliary ducts  just proximal to the biliary stent (series 5 image 86), concerning for  malignancy. Persistent additional heterogeneous attenuation of the  liver is nonspecific.  Unchanged intrahepatic biliary dilation and  pneumobilia.  Gallbladder: Biliary stent is present. No gallstones. No evidence of  acute cholecystitis.  Spleen: Normal size.  Pancreas: Poorly defined soft tissue mass  at the pancreatic head, no  significant change given technique and correlates with known  pancreatic head mass. Unchanged pancreatic duct dilation.  Adrenal glands: No adrenal nodules.  Kidneys: No kidney masses. No hydronephrosis or obstructing renal  stones.  Bladder / Pelvic organs: Increase in size of now 8.1 x 6.5 cm  multilobulated cystic mass in the right adnexa, previously measured up  to 5 cm 2/12/2018.  Bowel: No bowel wall thickening. Duodenal stent from the first portion  through the second portion of the duodenum. Stent is patent, though is  narrowed in the mid stent. Decrease in dilation of the stomach.  No CT  evidence of bowel obstruction.  Lymph nodes: Unchanged diffuse mesenteric prominent and  peripancreatic/gastrohepatic lymph nodes.  Prominent left pelvic lymph  nodes (series 5, image 357).  Fluid: Trace free fluid in the pelvis.  Vessels: No infrarenal aortic aneurysm. Unchanged fat stranding around  the SMV and IMV. Please refer to previous contrast-enhanced  examination on 9/15/2017 for vascular involvement.    Lung bases: No consolidation or pleural effusion. Atelectasis in the  posterior lower lobes.    Bones and soft tissues: No suspicious osseous lesions.      Impression    IMPRESSION:   1.  Duodenal stent is patent, though narrowed at second portion of  duodenum.  Decreased gastric distension since 2/12/2018. No CT  evidence of bowel obstruction.  2. Poor evaluation of known pancreatic malignancy given lack of  intravenous contrast.  Central hepatic soft tissue density is  concerning for malignancy; persistent additional heterogeneous  attenuation of the liver is nonspecific. Unchanged biliary stent,  intrahepatic biliary dilation and small expected pneumobilia.  3.  Increased size of right adnexal cystic mass since 2/12/2018,  consider pelvic ultrasound for further evaluation.  Small free fluid  in the pelvis.  4.  Grossly unchanged suspicious lymph nodes in the abdomen.    [Consider  Follow Up: Right adnexal cystic mass]    This report will be copied to the [facility] to ensure a  provider acknowledges the finding.          I have personally reviewed the examination and initial interpretation  and I agree with the findings.    ESSIE TURK MD   CBC with platelets differential   Result Value Ref Range    WBC 5.5 4.0 - 11.0 10e9/L    RBC Count 4.11 3.8 - 5.2 10e12/L    Hemoglobin 11.2 (L) 11.7 - 15.7 g/dL    Hematocrit 36.7 35.0 - 47.0 %    MCV 89 78 - 100 fl    MCH 27.3 26.5 - 33.0 pg    MCHC 30.5 (L) 31.5 - 36.5 g/dL    RDW 15.1 (H) 10.0 - 15.0 %    Platelet Count 164 150 - 450 10e9/L    Diff Method Automated Method     % Neutrophils 71.9 %    % Lymphocytes 19.1 %    % Monocytes 7.3 %    % Eosinophils 1.3 %    % Basophils 0.2 %    % Immature Granulocytes 0.2 %    Nucleated RBCs 0 0 /100    Absolute Neutrophil 4.0 1.6 - 8.3 10e9/L    Absolute Lymphocytes 1.1 0.8 - 5.3 10e9/L    Absolute Monocytes 0.4 0.0 - 1.3 10e9/L    Absolute Eosinophils 0.1 0.0 - 0.7 10e9/L    Absolute Basophils 0.0 0.0 - 0.2 10e9/L    Abs Immature Granulocytes 0.0 0 - 0.4 10e9/L    Absolute Nucleated RBC 0.0    INR   Result Value Ref Range    INR 1.22 (H) 0.86 - 1.14   Partial thromboplastin time   Result Value Ref Range    PTT 36 22 - 37 sec   Comprehensive metabolic panel   Result Value Ref Range    Sodium 139 133 - 144 mmol/L    Potassium 2.9 (L) 3.4 - 5.3 mmol/L    Chloride 96 94 - 109 mmol/L    Carbon Dioxide 31 20 - 32 mmol/L    Anion Gap 12 3 - 14 mmol/L    Glucose 90 70 - 99 mg/dL    Urea Nitrogen 10 7 - 30 mg/dL    Creatinine 0.63 0.52 - 1.04 mg/dL    GFR Estimate >90 >60 mL/min/1.7m2    GFR Estimate If Black >90 >60 mL/min/1.7m2    Calcium 9.1 8.5 - 10.1 mg/dL    Bilirubin Total 0.9 0.2 - 1.3 mg/dL    Albumin 3.5 3.4 - 5.0 g/dL    Protein Total 8.7 6.8 - 8.8 g/dL    Alkaline Phosphatase 310 (H) 40 - 150 U/L    ALT 21 0 - 50 U/L    AST 34 0 - 45 U/L   Magnesium   Result Value Ref Range    Magnesium 1.9  1.6 - 2.3 mg/dL   Phosphorus   Result Value Ref Range    Phosphorus 3.4 2.5 - 4.5 mg/dL   Lipase   Result Value Ref Range    Lipase 34 (L) 73 - 393 U/L   Lactic acid whole blood   Result Value Ref Range    Lactic Acid 1.1 0.7 - 2.0 mmol/L   Glucose by meter   Result Value Ref Range    Glucose 97 70 - 99 mg/dL       Consults  Oncology: Responded (03/19/18 1088)    Assessments & Plan (with Medical Decision Making)  57-year-old female with pancreatic cancer presents now with recurrent 1 week of nausea vomiting weakness fatigue.  Patient was seen a month ago by myself in the ER she was admitted at that point she had obstruction of her upper duodenum that was stented per GI.  She was evaluated here today again with 1 week of nausea vomiting similar symptoms her abdomen is nonsurgical her CT scan today did not show obstruction at this point.  Unclear etiology of her symptoms patient has hypokalemia given IV potassium here in the ER.  CT scan also showed some increasing right adnexal cystic mass which I did contact oncology and they will do a follow-up ultrasound also is recommended.  As noted patient admitted oncology family and patient agrees.           I have reviewed the nursing notes.    I have reviewed the findings, diagnosis, plan and need for follow up with the patient.    Current Discharge Medication List          Final diagnoses:   Nausea & vomiting   Malignant neoplasm of head of pancreas (H)   Hypokalemia   Dehydration   Weakness generalized     I, Yovany Martin, am serving as a trained medical scribe to document services personally performed by Phillip Barragan MD, based on the provider's statements to me.      IPhillip MD, was physically present and have reviewed and verified the accuracy of this note documented by Yovany Martin.    3/19/2018   [facility] EMERGENCY DEPARTMENT    This note was created at least in part by the use of dragon voice dictation system. Inadvertent typographical errors may  still exist.  Phillip Barragan MD.         Phillip Barragan MD  03/19/18 1826

## 2018-03-20 NOTE — PROGRESS NOTES
[facility], [place]    Progress Note  Hematology / Oncology     Date of Admission:  3/19/2018  Hospital Day #: 1   Date of Service (when I saw the patient): 03/20/2018    Assessment & Plan   Shirin Muller is a 57 year old female with history of metastatic pancreatic cancer (currently off active treatment) with recent h/o duodenal obstruction s/p stenting who presents with worsening nausea and vomiting and inability to tolerate food for the past 3 days.      #Nausea and vomiting, secondary to duodenal obstruction.  #h/o duodenal obstruction s/p stenting.  Several day history of worsening nausea and vomiting. CT AP on admission concerning for narrowed duodenal stent in the second portion of the duodenum.  -S/p stenting of recurrent duodenal stenosis with tissue ingrowth and luminal narrowing by GI team on 3/20.   -Resume oral home medications  -PRN antiemetics available      #Hypokalemia. RESOLVED.  Potassium 2.9 on admission labs.  Suspect related to poor oral intake and vomiting.   -PTA takes K+ 40mEq daily, will continue to hold as K+ in IVF      #Metastatic pancreatic cancer.  #Cancer-related pain.  She follows with Dr. Gonsales and is currently off active therapy after noted to have progression on multiple regimens. Patient desires to enroll in trial if available.  -Continue home MS-Contin 15mg BID  -Oxycodone 5-10mg every 4 hours PRN, Tylenol PRN breakthrough pain      #Insulin-dependent type 2 diabetes.  -MSSI, hold home Lantus with decreased intake     #Severe malnutrition in the context of chronic illness  #Chronic fatigue  -Home Creon, marinol resumed  -Resume Ritalin starting 3/21. This is a new medication that was started in clinic on 3/13.  -Diet advanced per above.    # Pain Assessment.  Current Pain Score 3/20/2018 3/20/2018 3/20/2018   Patient currently in pain? yes denies denies   Pain score (0-10) - - -   Pain location Abdomen - -   Pain descriptors Aching -  -   - Shirin has baseline cancer related pain. Plan to resume home meds this afternoon.  - Please see the plan for pain management as documented above    FEN:  -Start full liquid diet this afternoon  -Continue IVF for now  -replace lytes prn    Prophy/Misc:  -GI: continue home pantoprazole    Code Status: FULL  Disposition: Anticipate d/c tomorrow.    This plan of care has been discussed with the staff physician, Dr. Montoya.    Audelia Elliott PA-C  Hematology/Oncology  Pager: [phone]    Interval History   Jose Antonio is seen after return from GI procedure. She is still pretty tired. Denies pain or nausea at present. Discussed plan to resume home medications and start full liquid diet tonight.     Vital Signs with Ranges  Temp:  [97  F (36.1  C)-98.3  F (36.8  C)] 97.2  F (36.2  C)  Pulse:  [84-91] 89  Heart Rate:  [78-91] 80  Resp:  [14-20] 16  BP: (104-136)/(60-85) 136/80  SpO2:  [98 %-100 %] 99 %    I/O last 3 completed shifts:  In: 1845 [I.V.:1845]  Out: 990 [Urine:490; Emesis/NG output:500]    Vitals:    03/19/18 1106 03/19/18 1730   Weight: 66.6 kg (146 lb 14.4 oz) 68 kg (150 lb)       Physical Exam   Constitutional: Pleasant and cooperative, but tired appearing female seen lying in bed. Awake, alert, NAD.  HEENT: NC/AT, sclera clear, conjunctiva normal  Respiratory: No increased work of breathing, CTAB, no crackles or wheezing.  Cardiovascular: RRR, no murmur noted. No peripheral edema.  GI: Normal bowel sounds, soft, non-distended and non-tender.  Skin: Warm, dry, well-perfused. No bruising, bleeding, rashes, or lesions on limited exam.  Neurologic: A&O. Answers questions appropriately. Moves all extremities spontaneously.  Neuropsychiatric: Calm, appropriate affect    Medications     dextrose 5% lactated ringers + KCl 20 mEq 100 mL/hr at 03/20/18 1434     - MEDICATION INSTRUCTIONS -           amylase-lipase-protease  2 capsule Oral TID w/meals     dronabinol  5 mg Oral BID AC     morphine  30 mg Oral  Q12H     pantoprazole  20 mg Oral BID     sodium chloride 0.9%  1,000 mL Intravenous Once     heparin lock flush  5-10 mL Intracatheter Q24H     heparin  5 mL Intracatheter Q28 Days     insulin aspart  1-6 Units Subcutaneous Q4H       Antiinfectives  Anti-infectives     None          Data   CBC   Recent Labs  Lab 03/20/18  0430 03/19/18  1136   WBC 3.4* 5.5   RBC 3.39* 4.11   HGB 9.0* 11.2*   HCT 30.3* 36.7   MCV 89 89   MCH 26.5 27.3   MCHC 29.7* 30.5*   RDW 15.0 15.1*   PLT 89* 164       CMP   Recent Labs  Lab 03/20/18  0430 03/19/18  1136    139   POTASSIUM 3.8 2.9*   CHLORIDE 106 96   CO2 29 31   ANIONGAP 8 12   GLC 99 90   BUN 7 10   CR 0.62 0.63   GFRESTIMATED >90 >90   GFRESTBLACK >90 >90   FANNY 8.7 9.1   MAG  --  1.9   PHOS  --  3.4   PROTTOTAL  --  8.7   ALBUMIN  --  3.5   BILITOTAL  --  0.9   ALKPHOS  --  310*   AST  --  34   ALT  --  21       LFTs   Recent Labs  Lab 03/19/18  1136   PROTTOTAL 8.7   ALBUMIN 3.5   BILITOTAL 0.9   ALKPHOS 310*   AST 34   ALT 21       Coagulation Studies   Recent Labs  Lab 03/19/18  1136   INR 1.22*   PTT 36

## 2018-03-20 NOTE — PLAN OF CARE
Problem: Patient Care Overview  Goal: Plan of Care/Patient Progress Review  A/Ox4, VSS on RA. Pain controlled with PCA Morphine, 0.8mg continuous with 1mg Q10min. Denies nausea, no emesis. Voiding and saving, total of 150 output during shift. Bladder scan done prior to voiding was 215ml. BG at midnight was 74, 2 AM 79, and 4 AM 87, no insulin coverage was given. K+ recheck was 3.8. Port has IVMFs infusing 100ml/hr. NPO with ice chips. NG to LIS with 150 clear/pinkish fluid out. Will continue to monitor and follow POC.

## 2018-03-20 NOTE — ANESTHESIA POSTPROCEDURE EVALUATION
Patient: Shirin Muller    Procedure(s):  Upper Endoscopy with duodenal stent placement and duodenal dilation - Wound Class: II-Clean Contaminated    Diagnosis:Pancreatic Cancer  Diagnosis Additional Information: No value filed.    Anesthesia Type:  General, ETT, RSI    Note:  Anesthesia Post Evaluation    Patient location during evaluation: PACU  Patient participation: Able to fully participate in evaluation  Level of consciousness: awake and alert  Pain management: adequate  Airway patency: patent  Cardiovascular status: acceptable  Respiratory status: acceptable  Hydration status: acceptable  PONV: none     Anesthetic complications: None          Last vitals:  Vitals:    03/20/18 0648 03/20/18 1020 03/20/18 1234   BP: 108/65     Pulse: 89     Resp: 16 16    Temp: 36.7  C (98  F)  36.1  C (97  F)   SpO2: 98% 100%          Electronically Signed By: Mario Perez MD  March 20, 2018  12:54 PM

## 2018-03-20 NOTE — ANESTHESIA PREPROCEDURE EVALUATION
Anesthesia Evaluation     . Pt has had prior anesthetic.     No history of anesthetic complications          ROS/MED HX    ENT/Pulmonary:       Neurologic:       Cardiovascular:         METS/Exercise Tolerance:  >4 METS   Hematologic:     (+) Anemia, -      Musculoskeletal:         GI/Hepatic:         Renal/Genitourinary:         Endo:     (+) thyroid problem (Graves) Other Endocrine Disorder Hyperglycemia related to panceatic cancer.      Psychiatric:         Infectious Disease:         Malignancy:   (+) Malignancy History of GI  GI CA  Active status post Chemo, Metastatic pancreatic cancer to liver. Obstructive s/p chemo and duodenal stent which needs revision        Other:    (+) H/O Chronic Pain,H/O chronic opiod use ,                  Procedure: Procedure(s):  Upper Endoscopy, Stent Revisiom - Wound Class: II-Clean Contaminated    HPI: Shirin Muller is a 57 year old female with metastatic pancreatic cancer to liver which is obstructive s/p chemo and duodenal stent which needs revision due to nausea and vomiting. She is scheduled for above procedure.    PMHx/PSHx:  Past Medical History:   Diagnosis Date     Biliary stricture      Graves disease      Lesion of liver      Malignant neoplasm of head of pancreas (H) 2016     Pancreatic disease      Pancreatic mass        Past Surgical History:   Procedure Laterality Date      SECTION       ENDOSCOPIC RETROGRADE CHOLANGIOPANCREATOGRAM N/A 2016    Procedure: COMBINED ENDOSCOPIC RETROGRADE CHOLANGIOPANCREATOGRAPHY, PLACE TUBE/STENT;  Surgeon: Arias Taveras MD;  Location: [facility] OR     ENDOSCOPIC RETROGRADE CHOLANGIOPANCREATOGRAM N/A 2017    Procedure: COMBINED ENDOSCOPIC RETROGRADE CHOLANGIOPANCREATOGRAPHY, PLACE TUBE/STENT;  Endoscopic Retrograde Cholangiopancreatogram With biliary Stent placement, ballon sweep of bile duct for stone removal;  Surgeon: Tristin Braden MD;  Location: [facility] OR     ENDOSCOPIC RETROGRADE  CHOLANGIOPANCREATOGRAM N/A 8/3/2017    Procedure: COMBINED ENDOSCOPIC RETROGRADE CHOLANGIOPANCREATOGRAPHY, PLACE TUBE/STENT;  ENDOSCOPIC RETROGRADE CHOLANGIOPANCREATOGRAM, pus removal , stent placement to bile duct x1  ;  Surgeon: Guru Jamia Mcintosh MD;  Location: [facility] OR     ENDOSCOPIC RETROGRADE CHOLANGIOPANCREATOGRAM N/A 2/14/2018    Procedure: COMBINED ENDOSCOPIC RETROGRADE CHOLANGIOPANCREATOGRAPHY, PLACE TUBE/STENT;  endoscopic retrograde cholioangiography with biopsies, duodenal stent placement and dilatation of duodenal stent;  Surgeon: Tristin Braden MD;  Location: [facility] OR     ESOPHAGOSCOPY, GASTROSCOPY, DUODENOSCOPY (EGD), COMBINED N/A 4/13/2016    Procedure: COMBINED ENDOSCOPIC ULTRASOUND, ESOPHAGOSCOPY, GASTROSCOPY, DUODENOSCOPY (EGD), FINE NEEDLE ASPIRATE/BIOPSY;  Surgeon: Arias Taveras MD;  Location: [facility]     ESOPHAGOSCOPY, GASTROSCOPY, DUODENOSCOPY (EGD), COMBINED N/A 2/14/2018    Procedure: COMBINED ESOPHAGOSCOPY, GASTROSCOPY, DUODENOSCOPY (EGD);  COMBINED ESOPHAGOSCOPY, GASTROSCOPY, DUODENOSCOPY ;  Surgeon: Tristin Braden MD;  Location: [facility] OR     GYN SURGERY       VASCULAR SURGERY      right chest         No current facility-administered medications on file prior to encounter.   Current Outpatient Prescriptions on File Prior to Encounter:  dronabinol (MARINOL) 5 MG capsule Take 1 capsule (5 mg) by mouth 2 times daily (before meals)   prochlorperazine (COMPAZINE) 10 MG tablet TAKE ONE TABLET BY MOUTH EVERY 6 HOURS AS NEEDED FOR NAUSEA AND VOMITING   morphine (MS CONTIN) 30 MG 12 hr tablet Take 1 tablet (30 mg) by mouth every 12 hours   morphine (MSIR) 15 MG IR tablet Take 1 tablet (15 mg) by mouth every 4 hours as needed for moderate to severe pain   ondansetron (ZOFRAN-ODT) 8 MG ODT tab Take 1 tablet (8 mg) by mouth every 8 hours as needed for nausea   bisacodyl (DULCOLAX) 10 MG Suppository Place 1 suppository (10 mg) rectally daily as needed for constipation    senna-docusate (SENNA S) 8.6-50 MG per tablet Take 2 tablets by mouth 2 times daily as needed for constipation   LORazepam (ATIVAN) 0.5 MG tablet Take 1 tablet (0.5 mg) by mouth every 4 hours as needed (Anxiety, Nausea/Vomiting or Sleep)   docusate sodium (DOK) 100 MG capsule Take 1 capsule (100 mg) by mouth daily   loratadine (CLARITIN) 10 MG tablet Take 1 tablet (10 mg) by mouth daily Reported on 5/5/2017   potassium chloride SA (KLOR-CON) 20 MEQ CR tablet Take 2 tablets (40 mEq) by mouth daily (Patient taking differently: Take 20 mEq by mouth 2 times daily )   polyethylene glycol (MIRALAX/GLYCOLAX) powder Take 17 g (1 capful) by mouth daily   pantoprazole (PROTONIX) 20 MG EC tablet Take 1 tablet (20 mg) by mouth 2 times daily   insulin glargine (LANTUS SOLOSTAR) 100 UNIT/ML injection 10 units subcutaneously daily at 8pm.   amylase-lipase-protease (CREON) 43517 UNITS CPEP Take 2 capsules (72,000 Units) by mouth 3 times daily (with meals)   diltiazem 2% in PLO cream, FV COMPOUNDED, 2% GEL Apply topically daily and as needed to external hemorrhoids   lidocaine-prilocaine (EMLA) cream Apply topically as needed for other (Use 30-60 minutes prior to port access)   ALVARADO CONTOUR NEXT test strip USE TO CHECK BLOOD SUGAR TWICE DAILY ( ONCE FASTING AND ONCE 2 HOURS AFTER EATING )   order for DME Equipment being ordered: Toilet seat riser with handles   Nutritional Supplements (ENSURE CLEAR) LIQD Take 1 Bottle by mouth 3 times daily   insulin pen needle (BD EMMANUEL U/F) 32G X 4 MM Use one daily or as directed.   blood glucose monitoring (ACCU-CHEK FASTCLIX) lancets Use to test blood sugar two times daily or as directed.   Blood Glucose Monitoring Suppl (ONETOUCH PING METER REMOTE) SUPPLIES MISC Check your sugars twice daily, once when fasting and once 2 hours after eating.       Social Hx:   Social History   Substance Use Topics     Smoking status: Never Smoker     Smokeless tobacco: Never Used     Alcohol use No        Allergies:   Allergies   Allergen Reactions     Contrast Dye Nausea and Vomiting     Pt vomiting post IV contrast, Please try Visipaque for next CT scan. 6/24/16 sv     Diagnostic X-Ray Materials Nausea and Vomiting     Pt vomiting post IV contrast, Please try Visipaque for next CT scan. 6/24/16 sv         NPO Status: Per ASA Guidelines    Labs:    Blood Bank:  Lab Results   Component Value Date    ABO O 08/05/2017    RH  Pos 08/05/2017    AS Neg 08/05/2017     BMP:  Recent Labs   Lab Test  03/20/18   0430   NA  143   POTASSIUM  3.8   CHLORIDE  106   CO2  29   BUN  7   CR  0.62   GLC  99   FANNY  8.7     CBC:   Recent Labs   Lab Test  03/20/18   0430   WBC  3.4*   RBC  3.39*   HGB  9.0*   HCT  30.3*   MCV  89   MCH  26.5   MCHC  29.7*   RDW  15.0   PLT  89*     Coags:  Recent Labs   Lab Test  03/19/18   1136   01/27/17   0800   INR  1.22*   < >  1.38*   PTT  36   < >  33   FIBR   --    --   274    < > = values in this interval not displayed.         Physical Exam  Normal systems: cardiovascular and dental    Airway   Mallampati: III  TM distance: >3 FB  Neck ROM: limited  Comment: Limited mouth opening.    Dental     Cardiovascular       Pulmonary    breath sounds clear to auscultation                    Anesthesia Plan      History & Physical Review  History and physical reviewed and following examination; no interval change.    ASA Status:  3 .    NPO Status:  > 8 hours    Plan for General, ETT and RSI with Intravenous and Propofol induction. Maintenance will be Balanced.    PONV prophylaxis:  Ondansetron (or other 5HT-3) and Dexamethasone or Solumedrol  Additional equipment: Videolaryngoscope      Postoperative Care  Postoperative pain management:  IV analgesics.      Consents  Anesthetic plan, risks, benefits and alternatives discussed with:  Patient..                  History and physical assessed; Patient examined.   Risks and alternatives presented and discussed. Patient and family agree. All questions  answered.      Mario Perez MD  Staff Anesthesiologist  *[phone]

## 2018-03-20 NOTE — BRIEF OP NOTE
Upper GI Endoscopy 03/20/2018 10:25 AM [address] [place] [address] [phone]     Endoscopy Department   _______________________________________________________________________________   Patient Name: Shirin Meyer    Procedure Date: 3/20/2018 10:25 AM   MRN: [id number]                       Account Number: [id number]   YOB: 1960              Admit Type: Inpatient   Age: 57                               Room: OR   Gender: Female                        Note Status: Finalized   Attending MD: Tristin Braden MD   Total Sedation Time:   _______________________________________________________________________________       Procedure:           Upper GI endoscopy   Indications:         For palliative stenting of stenosing neoplasm of the                        duodenum, Suspected gastric outlet obstruction   Providers:           Tristin Braden MD, Sebastián Abdullahi MD   Patient Profile:     Ms Meyer is a 56yo woman with metastatic                        pancreatic cancer complicated by biliary and duodenal                        obstruction for which she has two metal biliary stents                        and one metal duodenal stent. She was admitted with                        evidence of recurrent gastric outlet obstruction and                        imaging suggests tumor ingrowth within the duodenal                        stent. Her liver studies are unremarkable. She now                        proceeds to management of the suspect outlet obstruction                        by upper endoscopy.   Referring MD:        Demond Gonsales MD   Medicines:           General Anesthesia   Complications:       No immediate complications.   _______________________________________________________________________________   Procedure:           Pre-Anesthesia Assessment:                        - Prior to the procedure, a History and Physical was                         performed, and patient medications and allergies were                        reviewed. The patient is competent. The risks and                        benefits of the procedure and the sedation options and                        risks were discussed with the patient. All questions                        were answered and informed consent was obtained. Patient                        identification and proposed procedure were verified by                        the nurse in the pre-procedure area. Mental Status                        Examination: alert and oriented. Airway Examination:                        Mallampati Class II (the uvula but not tonsillar pillars                        visualized). Respiratory Examination: clear to                        auscultation. CV Examination: systolic murmur. ASA Grade                        Assessment: III - A patient with severe systemic                        disease. After reviewing the risks and benefits, the                        patient was deemed in satisfactory condition to undergo                        the procedure. The anesthesia plan was to use general                        anesthesia. Immediately prior to administration of                        medications, the patient was re-assessed for adequacy to                        receive sedatives. The heart rate, respiratory rate,                        oxygen saturations, blood pressure, adequacy of                        pulmonary ventilation, and response to care were                        monitored throughout the procedure. The physical status                        of the patient was re-assessed after the procedure.                        After obtaining informed consent, the endoscope was                        passed under direct vision. Throughout the procedure,                        the patient's blood pressure, pulse, and oxygen                        saturations were monitored  "continuously. The 1T                        gastroscope was introduced through the mouth, and                        advanced to the duodenal bulb. The upper GI endoscopy                        was accomplished without difficulty. The patient                        tolerated the procedure well.                                                                                     Findings:        The in situ NG tube was removed and not replaced. A  film of the        abdomen demonstrated the fully biliary stents (stent in stent) as well        as a fully expanded duodenal stent. A 1T gastroscope was passed        demonstrating an unremarkable esophagus. The stomach was without mucosal        defect and was empty. The proximal end of the in situ duodenal stent was        appropriately across the pylorus and completely expanded. The proximal        end was entered and there was clear evidence of tumor ingrowth with        luminal narrowing. A long 0.025\" Visiglide wire wa passed through the        existing duodenal stent, avoiding the tines of the biliary stent using a        12mm occlusion ballon. Contrast was then injected with the balloon        inflated to futher demonstrate the recurrent duodenal stenosis which        seemed limited to D1/2 transition upstream of the biliary stents. We        then targeted this area by placing a 30-25-60 Evolution stent across the        transition point, leaving the distal end upstream of the biliary stents        and the proximal end upstream of the proxima end of the in situ stent.        Over the wire this stent was postdilated to 16.5mm using an Elation        balloon.                                                                                     Impression:          - NG tube removed and not replaced                        - Recurrent duodenal stenosis with tissue ingrowth and                        luminal narrowing at the D1/2 transition managed by                 "        placement of a 30-25-60mm Evolution stent within the                        well positioned, though failing duodenal stent                        - Biliary stents appeared well positioned and expanded                        and were not manipulated given the normal liver studies   Recommendation:      - Standard inpatient general anesthesia recovery with                        return to floor when appropriate                        - Nil per os for another 2-4h; without issues beging                        full liquids and advance to a well chewed low roughage                        diet therafter as tolerated                        - No plans for intevral endoscopic proceudres (as                        needed/indicated)                        - The findings and recommendations were discussed with                        the patient                                                                                       electronically signed by KATERINA Braden

## 2018-03-20 NOTE — OR NURSING
Report received from Chloe FENTON.  Will leave wig on 7C.  Pain seemingly controlled with PCA.  Alert and oriented.

## 2018-03-20 NOTE — PROVIDER NOTIFICATION
Notified Resident at 0020 AM regarding lab results.      Spoke with: [phone] Samimian    Orders were obtained.    Comments: Blood sugar dropping low- currently 74.  D5 added to maintenance IV fluids.

## 2018-03-20 NOTE — PLAN OF CARE
Problem: Patient Care Overview  Goal: Plan of Care/Patient Progress Review  AVSS. Pt resting comfortably. Pain managed with morphine pca, 0.8mg continuous with 1 mg bumps every 10 min. Pt at EGD most of shift. Duodenal stent placed. Pt tolerated well. PA here to assess pt. Would like diet to be advanced to fulls, no earlier then 1600. Pt aware. Up ad jose. Voiding spont. NG removed. Pt denies nausea. Continue with poc.

## 2018-03-20 NOTE — CONSULTS
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 CT showed patent stent but narrowed in the mid stent.     Procedures:   2/12/2018: ERCP for bile duct stricture and duodenal stricture: A ultratight stricutre about 3 cm long from bulb to 2nd portion of duodenum possible from direct tumor invasion; A 25 x100 mm uncovered David enteral stent from antrum to 3rd portion, with subsequent dilation.     8/3/2017: ERCP for ascending cholangitis: occluded uncovered stent; A 10 mm by 4 cm Wallflex fully-covered self-expanding metal stent paced through the previous uncovered stent with subsequent dilation    4/19/2017: ERCP for bile duct stricture: near complete malignant occlusion of biliary metal stent, s/p a 10x60 mm David UCSEM stent within the existing stent.     1/24/2017: EGD for melena: one oozing duodenal ulcer s/p clips. Infiltrating friable mass in duodenum     4/12/2016: EUS for suspected pancreas mass: duodenal stricture. 41 mm by 33 mm pancreatic head mass with mets to liver.     EGD 3/20 found recurrent duodenal stenosis with tissue in growth and luminal narrowing at the D1/D2, a 30-25-60mm Evolution stent placed wtihin the existing duodenal stent. Patient able to tolerate PO liquid afterwards/     Recommendations  - keep NPO 2-4 hours after procedure, if doing well can start full liquids diet. Advance slowly as tolerats to a well chewed low roughage diet   - No plans for interval endoscopic procedures.     GI will continue to follow, please page us if any questions.     Gastroenterology follow up recommendations: no outpatient GI follow up  needed    Thank you for involving us in this patient's care. Please do not hesitate to contact the GI service with any questions or concerns.     Pt care plan discussed with Dr. Braden, GI staff physician.    Bernabe Rollins  -------------------------------------------------------------------------------------------------------------------          Chief Complaint:   We were asked by Dr. Montoya of internal medicine to evaluate this patient with suspected duodenal obstruction     History is obtained from the patient and the medical record.          History of Present Illness:   Shirin Muller is a 57 year old female with a history of metastatic pancreatic cancer complicated by biliary obstruction requiring stent in stent metal stent placement, duodenal stenosis s/p uncovered David enteral stent placement 18, who presents with nausea/vomiting, concerning for progression for obstruction. Patient reports 3 days h/o nausea/vomiting and PO intolerance. Able to tolerate soft diet prior to this. Reports no BM for at least 3 days. On admission, CT showed patent stent but narrowed in the mid stent.             Past Medical History:   Reviewed and edited as appropriate  Past Medical History:   Diagnosis Date     Biliary stricture      Graves disease      Lesion of liver      Malignant neoplasm of head of pancreas (H) 2016     Pancreatic disease      Pancreatic mass             Past Surgical History:   Reviewed and edited as appropriate   Past Surgical History:   Procedure Laterality Date      SECTION       ENDOSCOPIC RETROGRADE CHOLANGIOPANCREATOGRAM N/A 2016    Procedure: COMBINED ENDOSCOPIC RETROGRADE CHOLANGIOPANCREATOGRAPHY, PLACE TUBE/STENT;  Surgeon: Arias Taveras MD;  Location: [facility] OR     ENDOSCOPIC RETROGRADE CHOLANGIOPANCREATOGRAM N/A 2017    Procedure: COMBINED ENDOSCOPIC RETROGRADE CHOLANGIOPANCREATOGRAPHY, PLACE TUBE/STENT;  Endoscopic Retrograde Cholangiopancreatogram  With biliary Stent placement, ballon sweep of bile duct for stone removal;  Surgeon: Tristin Braden MD;  Location: [facility] OR     ENDOSCOPIC RETROGRADE CHOLANGIOPANCREATOGRAM N/A 8/3/2017    Procedure: COMBINED ENDOSCOPIC RETROGRADE CHOLANGIOPANCREATOGRAPHY, PLACE TUBE/STENT;  ENDOSCOPIC RETROGRADE CHOLANGIOPANCREATOGRAM, pus removal , stent placement to bile duct x1  ;  Surgeon: Guru Jamia Mcintosh MD;  Location: [facility] OR     ENDOSCOPIC RETROGRADE CHOLANGIOPANCREATOGRAM N/A 2/14/2018    Procedure: COMBINED ENDOSCOPIC RETROGRADE CHOLANGIOPANCREATOGRAPHY, PLACE TUBE/STENT;  endoscopic retrograde cholioangiography with biopsies, duodenal stent placement and dilatation of duodenal stent;  Surgeon: Tristin Braden MD;  Location: [facility] OR     ESOPHAGOSCOPY, GASTROSCOPY, DUODENOSCOPY (EGD), COMBINED N/A 4/13/2016    Procedure: COMBINED ENDOSCOPIC ULTRASOUND, ESOPHAGOSCOPY, GASTROSCOPY, DUODENOSCOPY (EGD), FINE NEEDLE ASPIRATE/BIOPSY;  Surgeon: Arias Taveras MD;  Location: [facility]     ESOPHAGOSCOPY, GASTROSCOPY, DUODENOSCOPY (EGD), COMBINED N/A 2/14/2018    Procedure: COMBINED ESOPHAGOSCOPY, GASTROSCOPY, DUODENOSCOPY (EGD);  COMBINED ESOPHAGOSCOPY, GASTROSCOPY, DUODENOSCOPY ;  Surgeon: Tristin Braden MD;  Location: [facility] OR     VASCULAR SURGERY      right chest            Social History:     Social History Main Topics     Smoking status: Never Smoker     Smokeless tobacco: Never Used     Alcohol use No     Drug use: No     Sexual activity: Not on file            Family History:     Family History   Problem Relation Age of Onset     DIABETES Mother      No known history of gastrointestinal/liver disease or  gastrointestinal malignancies       Allergies:   Reviewed and edited as appropriate     Allergies   Allergen Reactions     Contrast Dye Nausea and Vomiting     Pt vomiting post IV contrast, Please try Visipaque for next CT scan. 6/24/16 sv     Diagnostic X-Ray Materials Nausea and Vomiting      Pt vomiting post IV contrast, Please try Visipaque for next CT scan. 6/24/16 sv            Medications:     Current Facility-Administered Medications   Medication     dextrose 5% in lactated ringers + KCl 20 mEq/L infusion     0.9% sodium chloride BOLUS     pantoprazole (PROTONIX) 40 mg IV push injection     naloxone (NARCAN) injection 0.1-0.4 mg     lidocaine 1 % 1 mL     lidocaine (LMX4) kit     Medication Instruction     morphine  PCA 5 mg/mL OPIOID TOLERANT HIGH CONC     ondansetron (ZOFRAN-ODT) ODT tab 4 mg    Or     ondansetron (ZOFRAN) injection 4 mg     prochlorperazine (COMPAZINE) injection 10 mg    Or     prochlorperazine (COMPAZINE) tablet 10 mg    Or     prochlorperazine (COMPAZINE) Suppository 25 mg     sodium chloride (PF) 0.9% PF flush 10-20 mL     heparin lock flush 10 UNIT/ML injection 5-10 mL     heparin lock flush 10 UNIT/ML injection 5-10 mL     heparin 100 UNIT/ML injection 5 mL     sodium chloride (PF) 0.9% PF flush 10-20 mL     LORazepam (ATIVAN) tablet 0.5-1 mg    Or     LORazepam (ATIVAN) injection 0.5-1 mg     potassium chloride SA (K-DUR/KLOR-CON M) CR tablet 20-40 mEq     potassium chloride (KLOR-CON) Packet 20-40 mEq     potassium chloride 10 mEq in 100 mL sterile water intermittent infusion (premix)     potassium chloride 10 mEq in 100 mL intermittent infusion with 10 mg lidocaine     potassium chloride 20 mEq in 50 mL intermittent infusion     magnesium sulfate 4 g in 100 mL sterile water (premade)     potassium phosphate 15 mmol in sodium chloride 0.9 % 250 mL intermittent infusion     potassium phosphate 20 mmol in sodium chloride 0.9 % 500 mL intermittent infusion     potassium phosphate 20 mmol in sodium chloride 0.9 % 250 mL intermittent infusion     potassium phosphate 25 mmol in sodium chloride 0.9 % 500 mL intermittent infusion     glucose 40 % gel 15-30 g    Or     dextrose 50 % injection 25-50 mL    Or     glucagon injection 1 mg     insulin aspart (NovoLOG) inj (RAPID  "ACTING)             Review of Systems:   A complete review of systems was performed and is negative except as noted in the HPI           Physical Exam:   /65 (BP Location: Left arm)  Pulse 89  Temp 98  F (36.7  C) (Oral)  Resp 16  Ht 5' 11\" (1.803 m)  Wt 150 lb (68 kg)  SpO2 98%  BMI 20.92 kg/m2  Wt:   Wt Readings from Last 2 Encounters:   03/19/18 150 lb (68 kg)   03/13/18 156 lb 1.6 oz (70.8 kg)      Constitutional: cooperative, pleasant, not dyspneic/diaphoretic, no acute distress  Eyes: Sclera anicteric/injected  Ears/nose/mouth/throat: Normal oropharynx without ulcers or exudate, mucus membranes moist, hearing intact  Neck: supple, thyroid normal size  CV: No edema  Respiratory: Unlabored breathing  Lymph: No axillary, submandibular, supraclavicular or inguinal lymphadenopathy  Abd: Nondistended, +bs, no hepatosplenomegaly, nontender, no peritoneal signs  Skin: warm, perfused, no jaundice  Neuro: AAO x 3, No asterixis  Psych: Normal affect  MSK: No gross deformities         Data:   Labs and imaging below were independently reviewed and interpreted    BMP  Recent Labs  Lab 03/20/18  0430 03/19/18  1136 03/13/18  1116    139 140   POTASSIUM 3.8 2.9* 3.2*   CHLORIDE 106 96 105   FANNY 8.7 9.1 9.0   CO2 29 31 30   BUN 7 10 7   CR 0.62 0.63 0.58   GLC 99 90 142*     CBC  Recent Labs  Lab 03/20/18  0430 03/19/18  1136 03/13/18  1116   WBC 3.4* 5.5 4.2   RBC 3.39* 4.11 3.64*   HGB 9.0* 11.2* 10.1*   HCT 30.3* 36.7 32.7*   MCV 89 89 90   MCH 26.5 27.3 27.7   MCHC 29.7* 30.5* 30.9*   RDW 15.0 15.1* 14.6   PLT 89* 164 108*     INR  Recent Labs  Lab 03/19/18  1136   INR 1.22*     LFTs  Recent Labs  Lab 03/19/18  1136 03/13/18  1116   ALKPHOS 310* 301*   AST 34 38   ALT 21 20   BILITOTAL 0.9 0.5   PROTTOTAL 8.7 8.0   ALBUMIN 3.5 3.3*      PANC  Recent Labs  Lab 03/19/18  1136   LIPASE 34*       Imaging:  CT a/p with IV contrast 3/19/18:   IMPRESSION:   1.  Duodenal stent is patent, though narrowed at " second portion of  duodenum.  Decreased gastric distension since 2/12/2018. No CT  evidence of bowel obstruction.  2. Poor evaluation of known pancreatic malignancy given lack of  intravenous contrast.  Central hepatic soft tissue density is  concerning for malignancy; persistent additional heterogeneous  attenuation of the liver is nonspecific. Unchanged biliary stent,  intrahepatic biliary dilation and small expected pneumobilia.  3.  Increased size of right adnexal cystic mass since 2/12/2018,  consider pelvic ultrasound for further evaluation.  Small free fluid  in the pelvis.  4.  Grossly unchanged suspicious lymph nodes in the abdomen.

## 2018-03-20 NOTE — ANESTHESIA CARE TRANSFER NOTE
Patient: Shirin Berg OdunladeMapatricia    Procedure(s):  Upper Endoscopy with duodenal stent placement and duodenal dilation - Wound Class: II-Clean Contaminated    Diagnosis: Pancreatic Cancer  Diagnosis Additional Information: No value filed.    Anesthesia Type:   General, ETT, RSI     Note:  Airway :Nasal Cannula  Patient transferred to:PACU  Comments: Pt extubated in the OR without incident or complications. Pt VSS upon arrival to the PACU. Pt has no c/o pain/N/V. Pt care report given to receiving RN> Handoff Report: Identifed the Patient, Identified the Reponsible Provider, Reviewed the pertinent medical history, Discussed the surgical course, Reviewed Intra-OP anesthesia mangement and issues during anesthesia, Set expectations for post-procedure period and Allowed opportunity for questions and acknowledgement of understanding      Vitals: (Last set prior to Anesthesia Care Transfer)    CRNA VITALS  3/20/2018 1201 - 3/20/2018 1301      3/20/2018             NIBP: 134/85    Pulse: 93    NIBP Mean: 111    Ht Rate: 93    Temp: 36.1  C (97  F)    SpO2: 100 %    EKG: Sinus rhythm                Electronically Signed By: TEE White CRNA  March 20, 2018  2:43 PM

## 2018-03-20 NOTE — PLAN OF CARE
Problem: Patient Care Overview  Goal: Plan of Care/Patient Progress Review  Outcome: No Change  Pt arrived from ED @1730. Pt reports negligible pain. Morphine PCA in place, continuous 0.8/hr with bumps 1mg q10min. Reported intermittent nausea. NG placed, to LIS. Output per NG pink-tinged, appearance of some clots in chipister, MD aware. BG q4hrs; D50% given x1 for BG 63 with good result. UA sent to lab. Up SBA. Continue to monitor.

## 2018-03-21 NOTE — PLAN OF CARE
"Problem: Patient Care Overview  Goal: Individualization & Mutuality  PORT de-accessed in preparation for her to go home this afternoon. Dulcolax supp and po bowel meds given: No BM. She states she is voiding spont. Minimal pain meds (back pain)  given this shift. Sliding scale Insulin 2 units this am. However, the  noon BG FS not done as pt declined since she was eating her brunch. Slow and steady intake of regular meal. Ms Brady c/o  \" no appetite\" and denies nausea.  She has been OOB in the room. Bedside report: Yes, but I think my ride home will be here about 3 pm\".       "

## 2018-03-21 NOTE — PROGRESS NOTES
Brief GI note:     Chart reviewed and patient seen. She is eating egg omelet this morning, tolerating well. No complaints.       Patient is a 57 year old female with a history of metastatic pancreatic cancer complicated by biliary obstruction requiring stent in stent metal stent placement, duodenal stenosis s/p uncovered David enteral stent placement 2/12/18, who presents with nausea/vomiting, concerning for progression for obstruction. CT showed patent stent but narrowed in the mid stent. EGD 3/20 found recurrent duodenal stenosis with tissue in growth and luminal narrowing at the D1/D2, a 30-25-60mm Evolution stent placed wtihin the existing duodenal stent.       Recommend:   - Advance diet as tolerates to a well chewed low roughage diet (low fiber diet) to avoid stent occlusion.   - No plans for interval endoscopic procedures.     GI will sign off at this time, please page if any questions.     Gastroenterology Inpatient Sign Off Note    Inpatient GI consults service will sign off. No further recommendations at this time. If primary team has addition questions, please page consult fellow listed in Laura.    Current GI Consult Staff: Dr. Zamudio    Follow up recommendations:   No outpatient GI clinic follow-up indicated. Follow-up with primary care provider at timing determined by discharge physician.    If outpatient GI follow-up is felt indicated by primary care, they may coordinate this by placing new GI referral and contacting; General GI clinic - ([phone]); Advanced Endoscopy clinic (Esophageal and Pancreas Biliary Clinics) - ([phone]); IBD clinic - ([phone]); Hepatology Clinic ([facility]) - ([phone])       Bernabe Rollins  GI Fellow  [id number]

## 2018-03-21 NOTE — PLAN OF CARE
Problem: Patient Care Overview  Goal: Plan of Care/Patient Progress Review  Outcome: Improving  VSS. Pain well controled c MS Contin and MSIR. Patient tolerating Full liquid diet. Denies nausea. MIV @ 100 ml/hr via port. . Patient eating @ 2000. Unable to get accurate BG. Will recheck 2 hrs p @ 2200. Voiding adequate amounts. Ambulated halls c SBA. Continue c POC.

## 2018-03-21 NOTE — PLAN OF CARE
Problem: Patient Care Overview  Goal: Plan of Care/Patient Progress Review  Outcome: Improving  AVSS. Pt reported back pain, given 15 mg of Morphine and MS contin with relief. Denies nausea, on a full liquid diet. BG checks q4hrs with sliding scale coverage. Not flatus or stool yet. Last BM was 3/18. Voiding spontaneously. Up with SBA. Continue to monitor food toleration, nausea and bowel function.

## 2018-03-21 NOTE — DISCHARGE SUMMARY
[facility], [place]    Discharge Summary  Hematology / Oncology    Date of Admission:  3/19/2018  Date of Discharge:  03/21/2018  Discharging Provider: Omero Montoya  Date of Service (when I saw the patient): 03/21/2018    Discharge Diagnoses   Nausea and vomiting, secondary to duodenal obstruction.  h/o duodenal obstruction s/p stenting. Duodenal obstruction due to tumor growth  Hypokalemia -- resolved  Metastatic pancreatic cancer.  Cancer-related pain.  Insulin-dependent type 2 diabetes.  Severe malnutrition in the context of chronic illness  Chronic fatigue    History of Present Illness   ### Adopted from H&P ###    Per ED, reviewed and agree-Shirin Muller is a 57 year old female with a history of history of metastatic pancreatic cancer (currently off active treatment) who presents for evaluation of vomiting. The patient was recently admitted here last month 2/12-2/17 for nausea and vomiting, found to have duodenal obstruction, for which she underwent duodenal stenting.  The patient presents with her daughter today, and they report that the patient felt better 2-3 days after her recent discharge, but soon after she redeveloped inability to tolerate oral intake. She describes that she vomits any food or liquids she attempts to consume, consequently has not kept much down at all in the past week. Daughter notes that the patient has been appearing quite thin and she has felt thin. The patient denies any abdominal pain and she states that she is passing gas and stool as normal. The patient denies nausea presently, states she only feels symptomatic when attempting toe at. The patient did last see her oncology team last week and at that time increased Marinol from 2.5 mg to 5 mg BID for her anorexia, potential consideration of addition of dexamethasone in the future. The patient does have a port in place.    Hospital Course   Shirin Muller is a 57  year old female with history of metastatic pancreatic cancer (currently off active treatment) with recent h/o duodenal obstruction s/p stenting who was admitted on 3/19/2018 with worsening nausea and vomiting and inability to tolerate food for the past 3 days. The following problems were addressed during her hospitalization:      #Nausea and vomiting, secondary to duodenal obstruction.  #h/o duodenal obstruction s/p stenting.  Duodenal obstruction due to tumor growth.  Several day history of worsening nausea and vomiting. CT AP on admission concerning for narrowed duodenal stent in the second portion of the duodenum.  -S/p stenting of recurrent duodenal stenosis with tissue ingrowth and luminal narrowing by GI team on 3/20.   -Resume oral home medications  -PRN antiemetics available, none needed overnight post-porcedure      #Hypokalemia. RESOLVED.  Potassium 2.9 on admission labs. Suspect related to poor oral intake and vomiting.   -Resume PTA K+, reports she has been taking as 20mEq BID      #Metastatic pancreatic cancer.  #Cancer-related pain.  She follows with Dr. Gonsales and is currently off active therapy after noted to have progression on multiple regimens. Patient desires to enroll in trial if available.  -Continue home MS-Contin 15mg BID  -Oxycodone 5-10mg every 4 hours PRN, Tylenol PRN breakthrough pain      #Insulin-dependent type 2 diabetes.  -On MSSI while admitted due to NPO and then decrease intake following procedure. Now resuming reguylar diet on discharge, will also resume home Lantus and home SSI.      #Severe malnutrition in the context of chronic illness  #Chronic fatigue  -Home Creon, marinol resumed  -Continue Ritalin, this is a new medication that was prescribed in clinic on 3/13.  -Tolerated Full liquids well overnight, resume regular diet on discharge. Recommended chewing food well and low-roughage per GI recommendations.     #Discharge Pain Plan.  - Shirin has baseline cancer related pain.  Resume home pain meds as outlined above. No scripts for pain meds sent on discharge.  - Please see the plan for pain management as documented above    Disposition/Follow-up: Discharged to home today. Has previously scheduled clinic appointment with Dr. Gonsales on 4/2 (with imaging prior). No there f/u requested.    Audelia Elliott PA-C  Hematology/Oncology  Pager: [phone]    Code Status   Full Code    Primary Care Physician   [facility]    Vital Signs with Ranges  Temp:  [97.1  F (36.2  C)-98.2  F (36.8  C)] 97.5  F (36.4  C)  Pulse:  [78-85] 78  Heart Rate:  [80-94] 94  Resp:  [14-16] 16  BP: (113-136)/(74-80) 113/74  SpO2:  [99 %-100 %] 100 %  150 lbs 0 oz    Physical Exam   Constitutional: Pleasant and cooperative female seen lying in bed. Awake, alert, no apparent distress.  HEENT: NC/AT, EOMI, sclera clear, conjunctiva normal  Respiratory: No increased work of breathing, CTAB, no crackles or wheezing  Cardiovascular: RRR, normal S1 and S2, no murmur noted and no edema  GI: Normal bowel sounds, soft, non-distended and non-tender  Skin: No bruising, bleeding, rashes, or lesions   Neurologic:  Answers questions appropriately. Moves all extremities spontaneously.  Neuropsychiatric: Calm, appropriate affect    Discharge Disposition   Discharged to home  Condition at discharge: Good    Consultations This Hospital Stay   MEDICATION HISTORY IP PHARMACY CONSULT  GI PANCREATICOBILIARY ADULT IP CONSULT    Discharge Orders     Reason for your hospital stay   You were here because of nausea and vomiting that was being caused by a narrowing of part of your intestines. Another stent was placed to help open up the area.     Follow Up and recommended labs and tests   Scheduled appointments are listed below.     Activity   Your activity upon discharge: activity as tolerated     When to contact your care team   Please call the [facility] at [phone] if  your nausea and vomiting returns, or if you develop temperature above 100.4, shortness of breath, chest pain, headaches, vision changes, bleeding, diarrhea, or pain.     Full Code     Diet   Follow this diet upon discharge: Try to eat a low fiber diet. Chew food very well, try to avoid eating uncooked leafy green vegetables as they could interfere with the stents you have.       Discharge Medications   Current Discharge Medication List      CONTINUE these medications which have CHANGED    Details   potassium chloride SA (KLOR-CON) 20 MEQ CR tablet Take 1 tablet (20 mEq) by mouth 2 times daily    Associated Diagnoses: Malignant neoplasm of head of pancreas (H)         CONTINUE these medications which have NOT CHANGED    Details   dronabinol (MARINOL) 5 MG capsule Take 1 capsule (5 mg) by mouth 2 times daily (before meals)  Qty: 60 capsule, Refills: 3    Associated Diagnoses: Malignant neoplasm of head of pancreas (H); Anorexia      prochlorperazine (COMPAZINE) 10 MG tablet TAKE ONE TABLET BY MOUTH EVERY 6 HOURS AS NEEDED FOR NAUSEA AND VOMITING  Qty: 60 tablet, Refills: 3    Associated Diagnoses: Malignant neoplasm of head of pancreas (H)      morphine (MS CONTIN) 30 MG 12 hr tablet Take 1 tablet (30 mg) by mouth every 12 hours  Qty: 60 tablet, Refills: 0    Associated Diagnoses: Malignant neoplasm of head of pancreas (H)      morphine (MSIR) 15 MG IR tablet Take 1 tablet (15 mg) by mouth every 4 hours as needed for moderate to severe pain  Qty: 100 tablet, Refills: 0    Associated Diagnoses: Malignant neoplasm of head of pancreas (H)      ondansetron (ZOFRAN-ODT) 8 MG ODT tab Take 1 tablet (8 mg) by mouth every 8 hours as needed for nausea  Qty: 60 tablet, Refills: 6    Associated Diagnoses: Malignant neoplasm of head of pancreas (H)      bisacodyl (DULCOLAX) 10 MG Suppository Place 1 suppository (10 mg) rectally daily as needed for constipation  Qty: 30 suppository, Refills: 0    Associated Diagnoses: Drug-induced  constipation      LORazepam (ATIVAN) 0.5 MG tablet Take 1 tablet (0.5 mg) by mouth every 4 hours as needed (Anxiety, Nausea/Vomiting or Sleep)  Qty: 30 tablet, Refills: 3    Associated Diagnoses: Malignant neoplasm of head of pancreas (H)      docusate sodium (DOK) 100 MG capsule Take 1 capsule (100 mg) by mouth daily  Qty: 100 capsule, Refills: 1    Associated Diagnoses: External hemorrhoids      loratadine (CLARITIN) 10 MG tablet Take 1 tablet (10 mg) by mouth daily Reported on 5/5/2017  Qty: 30 tablet, Refills: 6    Associated Diagnoses: Chronic seasonal allergic rhinitis, unspecified trigger      polyethylene glycol (MIRALAX/GLYCOLAX) powder Take 17 g (1 capful) by mouth daily  Qty: 500 g, Refills: 11    Associated Diagnoses: Malignant neoplasm of head of pancreas (H)      pantoprazole (PROTONIX) 20 MG EC tablet Take 1 tablet (20 mg) by mouth 2 times daily  Qty: 60 tablet, Refills: 3    Associated Diagnoses: Malignant neoplasm of head of pancreas (H)      insulin glargine (LANTUS SOLOSTAR) 100 UNIT/ML injection 10 units subcutaneously daily at 8pm.  Qty: 15 mL, Refills: 3    Associated Diagnoses: Diabetes mellitus, type 2 (H)      amylase-lipase-protease (CREON) 07250 UNITS CPEP Take 2 capsules (72,000 Units) by mouth 3 times daily (with meals)  Qty: 180 capsule, Refills: 1    Associated Diagnoses: Malignant neoplasm of head of pancreas (H)      diltiazem 2% in PLO cream, FV COMPOUNDED, 2% GEL Apply topically daily and as needed to external hemorrhoids  Qty: 30 g, Refills: 0    Associated Diagnoses: External hemorrhoids      lidocaine-prilocaine (EMLA) cream Apply topically as needed for other (Use 30-60 minutes prior to port access)  Qty: 30 g, Refills: 0    Associated Diagnoses: Malignant neoplasm of head of pancreas (H)      senna-docusate (SENNA S) 8.6-50 MG per tablet Take 2 tablets by mouth 2 times daily as needed for constipation  Qty: 100 tablet, Refills: 0    Associated Diagnoses: Drug-induced  constipation      METHYLPHENIDATE HCL PO Take 5 mg by mouth 2 times daily      ALVARADO CONTOUR NEXT test strip USE TO CHECK BLOOD SUGAR TWICE DAILY ( ONCE FASTING AND ONCE 2 HOURS AFTER EATING )  Qty: 100 each, Refills: 11    Associated Diagnoses: Diabetes mellitus, type 2 (H)      order for DME Equipment being ordered: Toilet seat riser with handles  Qty: 1 Units, Refills: 0    Associated Diagnoses: Malignant neoplasm of head of pancreas (H); Generalized muscle weakness      Nutritional Supplements (ENSURE CLEAR) LIQD Take 1 Bottle by mouth 3 times daily  Qty: 90 Bottle, Refills: 6    Associated Diagnoses: Malignant neoplasm of head of pancreas (H)      insulin pen needle (BD EMMANUEL U/F) 32G X 4 MM Use one daily or as directed.  Qty: 100 each, Refills: prn    Associated Diagnoses: Diabetes mellitus, type 2 (H)      blood glucose monitoring (ACCU-CHEK FASTCLIX) lancets Use to test blood sugar two times daily or as directed.  Qty: 102 each, Refills: 3    Associated Diagnoses: Diabetes mellitus, type 2 (H)      Blood Glucose Monitoring Suppl (ONETOUCH PING METER REMOTE) SUPPLIES MISC Check your sugars twice daily, once when fasting and once 2 hours after eating.  Qty: 1 each, Refills: 3    Associated Diagnoses: Secondary diabetes mellitus (H)           Allergies   Allergies   Allergen Reactions     Contrast Dye Nausea and Vomiting     Pt vomiting post IV contrast, Please try Visipaque for next CT scan. 6/24/16 sv     Diagnostic X-Ray Materials Nausea and Vomiting     Pt vomiting post IV contrast, Please try Visipaque for next CT scan. 6/24/16 sv       Data   Most Recent 3 CBC's:  Recent Labs   Lab Test  03/21/18   0808  03/20/18   0430  03/19/18   1136   WBC  4.5  3.4*  5.5   HGB  8.9*  9.0*  11.2*   MCV  88  89  89   PLT  92*  89*  164      Most Recent 3 BMP's:  Recent Labs   Lab Test  03/21/18   0808  03/20/18   0430  03/19/18   1136   NA  138  143  139   POTASSIUM  3.8  3.8  2.9*   CHLORIDE  102  106  96   CO2  29  29   31   BUN  2*  7  10   CR  0.56  0.62  0.63   ANIONGAP  6  8  12   FANNY  8.4*  8.7  9.1   GLC  183*  99  90     Most Recent 2 LFT's:  Recent Labs   Lab Test  03/19/18   1136  03/13/18   1116   AST  34  38   ALT  21  20   ALKPHOS  310*  301*   BILITOTAL  0.9  0.5     Most Recent INR's and Anticoagulation Dosing History:  Anticoagulation Dose History     Recent Dosing and Labs Latest Ref Rng & Units 1/27/2017 1/28/2017 4/19/2017 8/3/2017 1/24/2018 2/12/2018 3/19/2018    INR 0.86 - 1.14 1.38(H) 1.35(H) 1.05 1.21(H) 1.13 1.17(H) 1.22(H)    INR Point of Care 0.86 - 1.14 - - - - - - -        Most Recent 3 Troponin's:  Recent Labs   Lab Test  02/12/18   1553  01/24/17   1002   TROPI  <0.015  <0.015  The 99th percentile for upper reference range is 0.045 ug/L.  Troponin values in   the range of 0.045 - 0.120 ug/L may be associated with risks of adverse   clinical events.       Most Recent Cholesterol Panel:  Recent Labs   Lab Test  01/28/17   0324   TRIG  112     Most Recent 6 Bacteria Isolates From Any Culture (See EPIC Reports for Culture Details):  Recent Labs   Lab Test  02/12/18   2226  02/09/18   1305  08/11/17   0949  08/11/17   0715  08/05/17   0755  08/05/17   0745   CULT  50,000 to 100,000 colonies/mL  mixed urogenital christina  Susceptibility testing not routinely done    50,000 to 100,000 colonies/mL  mixed urogenital christina    No growth  Canceled, Test credited Cancelled by lab - Specimen never received.  No growth  No growth     Most Recent TSH, T4 and A1c Labs:  Recent Labs   Lab Test  03/19/18   2201   11/09/17   1041   TSH   --    --   2.56   A1C  6.0   < >   --     < > = values in this interval not displayed.     I have independently seen and examined this patient and my assessment is in agreement with the above note.  I have reviewed all tests and past medical history and my evaluation agrees with the findings in the note.    Patient feels well.  Eating normal diet.  No pain.    Stable for discharge.    I spent  over 35 minutes coordinating this patient's discharge.

## 2018-03-21 NOTE — PROGRESS NOTES
[facility] [place]    Discharge Summary  Hematology / Oncology    Date of Admission:  3/19/2018  Date of Discharge:  03/21/2018  Discharging Provider: Audelia Elliott  Date of Service (when I saw the patient): 03/21/2018    Discharge Diagnoses   Nausea and vomiting, secondary to duodenal obstruction.  h/o duodenal obstruction s/p stenting.  Hypokalemia -- resolved  Metastatic pancreatic cancer.  Cancer-related pain.  Insulin-dependent type 2 diabetes.  Severe malnutrition in the context of chronic illness  Chronic fatigue    History of Present Illness   ### Adopted from H&P ###    Per ED, reviewed and agree-Shirin Muller is a 57 year old female with a history of history of metastatic pancreatic cancer (currently off active treatment) who presents for evaluation of vomiting. The patient was recently admitted here last month 2/12-2/17 for nausea and vomiting, found to have duodenal obstruction, for which she underwent duodenal stenting.  The patient presents with her daughter today, and they report that the patient felt better 2-3 days after her recent discharge, but soon after she redeveloped inability to tolerate oral intake. She describes that she vomits any food or liquids she attempts to consume, consequently has not kept much down at all in the past week. Daughter notes that the patient has been appearing quite thin and she has felt thin. The patient denies any abdominal pain and she states that she is passing gas and stool as normal. The patient denies nausea presently, states she only feels symptomatic when attempting toe at. The patient did last see her oncology team last week and at that time increased Marinol from 2.5 mg to 5 mg BID for her anorexia, potential consideration of addition of dexamethasone in the future. The patient does have a port in place.    Hospital Course   Shirin Muller is a 57 year old female with history of metastatic  pancreatic cancer (currently off active treatment) with recent h/o duodenal obstruction s/p stenting who was admitted on 3/19/2018 with worsening nausea and vomiting and inability to tolerate food for the past 3 days. The following problems were addressed during her hospitalization:      #Nausea and vomiting, secondary to duodenal obstruction.  #h/o duodenal obstruction s/p stenting.  Several day history of worsening nausea and vomiting. CT AP on admission concerning for narrowed duodenal stent in the second portion of the duodenum.  -S/p stenting of recurrent duodenal stenosis with tissue ingrowth and luminal narrowing by GI team on 3/20.   -Resume oral home medications  -PRN antiemetics available, none needed overnight post-porcedure      #Hypokalemia. RESOLVED.  Potassium 2.9 on admission labs. Suspect related to poor oral intake and vomiting.   -Resume PTA K+, reports she has been taking as 20mEq BID      #Metastatic pancreatic cancer.  #Cancer-related pain.  She follows with Dr. Gonsales and is currently off active therapy after noted to have progression on multiple regimens. Patient desires to enroll in trial if available.  -Continue home MS-Contin 15mg BID  -Oxycodone 5-10mg every 4 hours PRN, Tylenol PRN breakthrough pain      #Insulin-dependent type 2 diabetes.  -On MSSI while admitted due to NPO and then decrease intake following procedure. Now resuming reguylar diet on discharge, will also resume home Lantus and home SSI.      #Severe malnutrition in the context of chronic illness  #Chronic fatigue  -Home Creon, marinol resumed  -Continue Ritalin, this is a new medication that was prescribed in clinic on 3/13.  -Tolerated Full liquids well overnight, resume regular diet on discharge. Recommended chewing food well and low-roughage per GI recommendations.     #Discharge Pain Plan.  - Shirin has baseline cancer related pain. Resume home pain meds as outlined above. No scripts for pain meds sent on  discharge.  - Please see the plan for pain management as documented above    Disposition/Follow-up: Discharged to home today. Has previously scheduled clinic appointment with Dr. Gonsales on 4/2 (with imaging prior). No there f/u requested.    Audelia Elliott PA-C  Hematology/Oncology  Pager: [phone]    Code Status   Full Code    Primary Care Physician   [facility]    Vital Signs with Ranges  Temp:  [97.1  F (36.2  C)-98.2  F (36.8  C)] 97.5  F (36.4  C)  Pulse:  [78-85] 78  Heart Rate:  [78-94] 94  Resp:  [14-20] 16  BP: (113-136)/(74-82) 113/74  SpO2:  [99 %-100 %] 100 %  150 lbs 0 oz    Physical Exam   Constitutional: Pleasant and cooperative female seen lying in bed. Awake, alert, no apparent distress.  HEENT: NC/AT, EOMI, sclera clear, conjunctiva normal  Respiratory: No increased work of breathing, CTAB, no crackles or wheezing  Cardiovascular: RRR, normal S1 and S2, no murmur noted and no edema  GI: Normal bowel sounds, soft, non-distended and non-tender  Skin: No bruising, bleeding, rashes, or lesions   Neurologic:  Answers questions appropriately. Moves all extremities spontaneously.  Neuropsychiatric: Calm, appropriate affect    Discharge Disposition   Discharged to home  Condition at discharge: Good    Consultations This Hospital Stay   MEDICATION HISTORY IP PHARMACY CONSULT  GI PANCREATICOBILIARY ADULT IP CONSULT    Discharge Orders     Reason for your hospital stay   You were here because of nausea and vomiting that was being caused by a narrowing of part of your intestines. Another stent was placed to help open up the area.     Follow Up and recommended labs and tests   Scheduled appointments are listed below.     Activity   Your activity upon discharge: activity as tolerated     When to contact your care team   Please call the [facility] at [phone] if your nausea and vomiting returns, or if you develop temperature above  100.4, shortness of breath, chest pain, headaches, vision changes, bleeding, diarrhea, or pain.     Full Code     Diet   Follow this diet upon discharge: Regular diet. Chew food very well, try to avoid eating uncooked leafy green vegetables as they could interfere with the stents you have.       Discharge Medications   Current Discharge Medication List      CONTINUE these medications which have CHANGED    Details   potassium chloride SA (KLOR-CON) 20 MEQ CR tablet Take 1 tablet (20 mEq) by mouth 2 times daily    Associated Diagnoses: Malignant neoplasm of head of pancreas (H)         CONTINUE these medications which have NOT CHANGED    Details   dronabinol (MARINOL) 5 MG capsule Take 1 capsule (5 mg) by mouth 2 times daily (before meals)  Qty: 60 capsule, Refills: 3    Associated Diagnoses: Malignant neoplasm of head of pancreas (H); Anorexia      prochlorperazine (COMPAZINE) 10 MG tablet TAKE ONE TABLET BY MOUTH EVERY 6 HOURS AS NEEDED FOR NAUSEA AND VOMITING  Qty: 60 tablet, Refills: 3    Associated Diagnoses: Malignant neoplasm of head of pancreas (H)      morphine (MS CONTIN) 30 MG 12 hr tablet Take 1 tablet (30 mg) by mouth every 12 hours  Qty: 60 tablet, Refills: 0    Associated Diagnoses: Malignant neoplasm of head of pancreas (H)      morphine (MSIR) 15 MG IR tablet Take 1 tablet (15 mg) by mouth every 4 hours as needed for moderate to severe pain  Qty: 100 tablet, Refills: 0    Associated Diagnoses: Malignant neoplasm of head of pancreas (H)      ondansetron (ZOFRAN-ODT) 8 MG ODT tab Take 1 tablet (8 mg) by mouth every 8 hours as needed for nausea  Qty: 60 tablet, Refills: 6    Associated Diagnoses: Malignant neoplasm of head of pancreas (H)      bisacodyl (DULCOLAX) 10 MG Suppository Place 1 suppository (10 mg) rectally daily as needed for constipation  Qty: 30 suppository, Refills: 0    Associated Diagnoses: Drug-induced constipation      LORazepam (ATIVAN) 0.5 MG tablet Take 1 tablet (0.5 mg) by mouth  every 4 hours as needed (Anxiety, Nausea/Vomiting or Sleep)  Qty: 30 tablet, Refills: 3    Associated Diagnoses: Malignant neoplasm of head of pancreas (H)      docusate sodium (DOK) 100 MG capsule Take 1 capsule (100 mg) by mouth daily  Qty: 100 capsule, Refills: 1    Associated Diagnoses: External hemorrhoids      loratadine (CLARITIN) 10 MG tablet Take 1 tablet (10 mg) by mouth daily Reported on 5/5/2017  Qty: 30 tablet, Refills: 6    Associated Diagnoses: Chronic seasonal allergic rhinitis, unspecified trigger      polyethylene glycol (MIRALAX/GLYCOLAX) powder Take 17 g (1 capful) by mouth daily  Qty: 500 g, Refills: 11    Associated Diagnoses: Malignant neoplasm of head of pancreas (H)      pantoprazole (PROTONIX) 20 MG EC tablet Take 1 tablet (20 mg) by mouth 2 times daily  Qty: 60 tablet, Refills: 3    Associated Diagnoses: Malignant neoplasm of head of pancreas (H)      insulin glargine (LANTUS SOLOSTAR) 100 UNIT/ML injection 10 units subcutaneously daily at 8pm.  Qty: 15 mL, Refills: 3    Associated Diagnoses: Diabetes mellitus, type 2 (H)      amylase-lipase-protease (CREON) 24031 UNITS CPEP Take 2 capsules (72,000 Units) by mouth 3 times daily (with meals)  Qty: 180 capsule, Refills: 1    Associated Diagnoses: Malignant neoplasm of head of pancreas (H)      diltiazem 2% in PLO cream, FV COMPOUNDED, 2% GEL Apply topically daily and as needed to external hemorrhoids  Qty: 30 g, Refills: 0    Associated Diagnoses: External hemorrhoids      lidocaine-prilocaine (EMLA) cream Apply topically as needed for other (Use 30-60 minutes prior to port access)  Qty: 30 g, Refills: 0    Associated Diagnoses: Malignant neoplasm of head of pancreas (H)      senna-docusate (SENNA S) 8.6-50 MG per tablet Take 2 tablets by mouth 2 times daily as needed for constipation  Qty: 100 tablet, Refills: 0    Associated Diagnoses: Drug-induced constipation      METHYLPHENIDATE HCL PO Take 5 mg by mouth 2 times daily      ALVARADO CONTOUR  NEXT test strip USE TO CHECK BLOOD SUGAR TWICE DAILY ( ONCE FASTING AND ONCE 2 HOURS AFTER EATING )  Qty: 100 each, Refills: 11    Associated Diagnoses: Diabetes mellitus, type 2 (H)      order for DME Equipment being ordered: Toilet seat riser with handles  Qty: 1 Units, Refills: 0    Associated Diagnoses: Malignant neoplasm of head of pancreas (H); Generalized muscle weakness      Nutritional Supplements (ENSURE CLEAR) LIQD Take 1 Bottle by mouth 3 times daily  Qty: 90 Bottle, Refills: 6    Associated Diagnoses: Malignant neoplasm of head of pancreas (H)      insulin pen needle (BD EMMANUEL U/F) 32G X 4 MM Use one daily or as directed.  Qty: 100 each, Refills: prn    Associated Diagnoses: Diabetes mellitus, type 2 (H)      blood glucose monitoring (ACCU-CHEK FASTCLIX) lancets Use to test blood sugar two times daily or as directed.  Qty: 102 each, Refills: 3    Associated Diagnoses: Diabetes mellitus, type 2 (H)      Blood Glucose Monitoring Suppl (ONETOUCH PING METER REMOTE) SUPPLIES MISC Check your sugars twice daily, once when fasting and once 2 hours after eating.  Qty: 1 each, Refills: 3    Associated Diagnoses: Secondary diabetes mellitus (H)           Allergies   Allergies   Allergen Reactions     Contrast Dye Nausea and Vomiting     Pt vomiting post IV contrast, Please try Visipaque for next CT scan. 6/24/16 sv     Diagnostic X-Ray Materials Nausea and Vomiting     Pt vomiting post IV contrast, Please try Visipaque for next CT scan. 6/24/16 sv       Data   Most Recent 3 CBC's:  Recent Labs   Lab Test  03/21/18   0808  03/20/18   0430  03/19/18   1136   WBC  4.5  3.4*  5.5   HGB  8.9*  9.0*  11.2*   MCV  88  89  89   PLT  92*  89*  164      Most Recent 3 BMP's:  Recent Labs   Lab Test  03/21/18   0808  03/20/18   0430  03/19/18   1136   NA  138  143  139   POTASSIUM  3.8  3.8  2.9*   CHLORIDE  102  106  96   CO2  29  29  31   BUN  2*  7  10   CR  0.56  0.62  0.63   ANIONGAP  6  8  12   FANNY  8.4*  8.7  9.1   GLC   183*  99  90     Most Recent 2 LFT's:  Recent Labs   Lab Test  03/19/18   1136  03/13/18   1116   AST  34  38   ALT  21  20   ALKPHOS  310*  301*   BILITOTAL  0.9  0.5     Most Recent INR's and Anticoagulation Dosing History:  Anticoagulation Dose History     Recent Dosing and Labs Latest Ref Rng & Units 1/27/2017 1/28/2017 4/19/2017 8/3/2017 1/24/2018 2/12/2018 3/19/2018    INR 0.86 - 1.14 1.38(H) 1.35(H) 1.05 1.21(H) 1.13 1.17(H) 1.22(H)    INR Point of Care 0.86 - 1.14 - - - - - - -        Most Recent 3 Troponin's:  Recent Labs   Lab Test  02/12/18   1553  01/24/17   1002   TROPI  <0.015  <0.015  The 99th percentile for upper reference range is 0.045 ug/L.  Troponin values in   the range of 0.045 - 0.120 ug/L may be associated with risks of adverse   clinical events.       Most Recent Cholesterol Panel:  Recent Labs   Lab Test  01/28/17   0324   TRIG  112     Most Recent 6 Bacteria Isolates From Any Culture (See EPIC Reports for Culture Details):  Recent Labs   Lab Test  02/12/18   2226  02/09/18   1305  08/11/17   0949  08/11/17   0715  08/05/17   0755  08/05/17   0745   CULT  50,000 to 100,000 colonies/mL  mixed urogenital christina  Susceptibility testing not routinely done    50,000 to 100,000 colonies/mL  mixed urogenital christina    No growth  Canceled, Test credited Cancelled by lab - Specimen never received.  No growth  No growth     Most Recent TSH, T4 and A1c Labs:  Recent Labs   Lab Test  03/19/18   2201   11/09/17   1041   TSH   --    --   2.56   A1C  6.0   < >   --     < > = values in this interval not displayed.

## 2018-03-21 NOTE — TELEPHONE ENCOUNTER
FMLA form completed for daughter Michael. faxed to [employer] [phone]. Copy mailed to Michael via patient's address.

## 2018-03-21 NOTE — PLAN OF CARE
Problem: Patient Care Overview  Goal: Individualization & Mutuality  Regular diet ordered on her discharge instructions and patient wants to eat before she leaves the hospital therefore computer order: advanced to Regular tray.

## 2018-03-21 NOTE — DOWNTIME EVENT NOTE
The EMR was down for 2.5 hours on 3/21/2018.    Kamini Alfonso was responsible for completing the paper charting during this time period.     The following information was re-entered into the system by Kamini Alfonso: nothing    The following information will remain in the paper chart: nothing    Kamini Alfonso  3/21/2018

## 2018-03-22 NOTE — PLAN OF CARE
Problem: Patient Care Overview  Goal: Plan of Care/Patient Progress Review  Outcome: Adequate for Discharge Date Met: 03/21/18  Denies pain, denies nausea. Slow intake with regular diet. No BM. Up ad jose in room. Discharge materials reviewed with previous nurse. Pt denies questions and expresses understanding of all discharge information. Port de-accessed. Pt transported off unit accompanied by staff. Pt d/c to home.

## 2018-03-29 NOTE — TELEPHONE ENCOUNTER
Michael called and stated [facility] paperwork has not been received by [employer]. Forms re-faxed to [employer] at [phone], right-fax confirmed as received. Also emailed to Michael as requested.

## 2018-03-30 NOTE — NURSING NOTE
"Oncology Rooming Note    March 30, 2018 8:43 AM   Shirin Muller is a 57 year old female who presents for:    Chief Complaint   Patient presents with     Oncology Clinic Visit     Return: Pancreatic Cancer     Initial Vitals: BP 91/57  Pulse 100  Resp 16  Ht 1.803 m (5' 11\")  SpO2 99%  BMI 20.78 kg/m2 Estimated body mass index is 20.78 kg/(m^2) as calculated from the following:    Height as of this encounter: 1.803 m (5' 11\").    Weight as of an earlier encounter on 3/30/18: 67.6 kg (149 lb). Body surface area is 1.84 meters squared.  Data Unavailable Comment: Data Unavailable   No LMP recorded. Patient is postmenopausal.  Allergies reviewed: Yes  Medications reviewed: No, Pt. Stated that there was no change to her med list    Medications: Medication refills not needed today.  Pharmacy name entered into EPIC:    [facility] [place], [place] - [address] [place] [address] [phone]  [id number] - [place], [place] - [address] [place] [address]    Clinical concerns: No New Concerns    5 minutes for nursing intake (face to face time)     KENDALL Reis      "

## 2018-03-30 NOTE — PROGRESS NOTES
"Reason for Visit: seen on an add-on basis for evaluation of weakness    Oncology HPI:  Shirin Muller is a 57 year old woman with metastatic pancreatic cancer involving the liver. She is currently off of active treatment as she was found to have progression on prior treatment with gemcitabine and abraxane, FOLFIRINOX, and most recently liposomal irinotecan and 5FU. Her cancer treatment has been complicated by GI bleeding from an ulcer and infiltrating mass in the duodenum (January 2017). She was found to be positive for H. Pyolor and initiated on therapy with PPI, carafate, amoxicillin and clarithromycin.  In August 2017 she had biliar obstruction with complications of sepsis/cholangitis. BC grew klebsiella penumonia and streptococcus angiosos. She was treated with biliary stenting and antibiotics. Recently has had complications of duodenal obstruction, requiring stenting x 2, most recently during a hospitalization on 3/19/18. She has not wanted to pursue hospice care as she remains hopeful for some treatment that might be offered.    Interval history: Shirin presented to clinic for labs and CT imaging prior to a scheduled appointment with Dr. Gonsales on Monday. She told the nurse she has been very weak and wanted to be seen.     Shirin is seen after her CT/MRI imaging today. She feels dehydrated. Weak when standing. Is drinking fluids, but not sure how much. Urine is dark in color, but she feels the volume is wnl. Having constipated, last BM 4 days ago. Taking Docusate sodium, senna, but stopped taking miralax as it was \"stressing me out\". No abdominal pain. No reflux. No fevers/chills. No cough, sob, cp, palpitation. Spending the majority of the day laying down and resting, Denies sleeping, although her family at the last visit felt she was sleeping much of the day. Has noted no improvement in her appetite or energy.    Current Outpatient Prescriptions   Medication Sig Dispense Refill     potassium chloride " SA (KLOR-CON) 20 MEQ CR tablet Take 1 tablet (20 mEq) by mouth 2 times daily       senna-docusate (SENNA S) 8.6-50 MG per tablet Take 2 tablets by mouth 2 times daily as needed for constipation 100 tablet 0     METHYLPHENIDATE HCL PO Take 5 mg by mouth 2 times daily       dronabinol (MARINOL) 5 MG capsule Take 1 capsule (5 mg) by mouth 2 times daily (before meals) 60 capsule 3     prochlorperazine (COMPAZINE) 10 MG tablet TAKE ONE TABLET BY MOUTH EVERY 6 HOURS AS NEEDED FOR NAUSEA AND VOMITING 60 tablet 3     morphine (MS CONTIN) 30 MG 12 hr tablet Take 1 tablet (30 mg) by mouth every 12 hours 60 tablet 0     morphine (MSIR) 15 MG IR tablet Take 1 tablet (15 mg) by mouth every 4 hours as needed for moderate to severe pain 100 tablet 0     ondansetron (ZOFRAN-ODT) 8 MG ODT tab Take 1 tablet (8 mg) by mouth every 8 hours as needed for nausea 60 tablet 6     bisacodyl (DULCOLAX) 10 MG Suppository Place 1 suppository (10 mg) rectally daily as needed for constipation 30 suppository 0     ALVARADO CONTOUR NEXT test strip USE TO CHECK BLOOD SUGAR TWICE DAILY ( ONCE FASTING AND ONCE 2 HOURS AFTER EATING ) 100 each 11     order for DME Equipment being ordered: Toilet seat riser with handles 1 Units 0     LORazepam (ATIVAN) 0.5 MG tablet Take 1 tablet (0.5 mg) by mouth every 4 hours as needed (Anxiety, Nausea/Vomiting or Sleep) 30 tablet 3     docusate sodium (DOK) 100 MG capsule Take 1 capsule (100 mg) by mouth daily 100 capsule 1     loratadine (CLARITIN) 10 MG tablet Take 1 tablet (10 mg) by mouth daily Reported on 5/5/2017 30 tablet 6     polyethylene glycol (MIRALAX/GLYCOLAX) powder Take 17 g (1 capful) by mouth daily 500 g 11     pantoprazole (PROTONIX) 20 MG EC tablet Take 1 tablet (20 mg) by mouth 2 times daily 60 tablet 3     Nutritional Supplements (ENSURE CLEAR) LIQD Take 1 Bottle by mouth 3 times daily 90 Bottle 6     insulin glargine (LANTUS SOLOSTAR) 100 UNIT/ML injection 10 units subcutaneously daily at 8pm. 15 mL  "3     insulin pen needle (BD EMMANUEL U/F) 32G X 4 MM Use one daily or as directed. 100 each prn     blood glucose monitoring (ACCU-CHEK FASTCLIX) lancets Use to test blood sugar two times daily or as directed. 102 each 3     Blood Glucose Monitoring Suppl (ONETOUCH PING METER REMOTE) SUPPLIES MISC Check your sugars twice daily, once when fasting and once 2 hours after eating. 1 each 3     amylase-lipase-protease (CREON) 37426 UNITS CPEP Take 2 capsules (72,000 Units) by mouth 3 times daily (with meals) 180 capsule 1     diltiazem 2% in PLO cream, FV COMPOUNDED, 2% GEL Apply topically daily and as needed to external hemorrhoids 30 g 0     lidocaine-prilocaine (EMLA) cream Apply topically as needed for other (Use 30-60 minutes prior to port access) 30 g 0          Allergies   Allergen Reactions     Contrast Dye Nausea and Vomiting     Pt vomiting post IV contrast, Please try Visipaque for next CT scan. 6/24/16 sv     Diagnostic X-Ray Materials Nausea and Vomiting     Pt vomiting post IV contrast, Please try Visipaque for next CT scan. 6/24/16 sv         Exam: alert, appears chronically ill, weak. Blood pressure 91/57, pulse 100, resp. rate 16, height 1.803 m (5' 11\"), SpO2 99 %, not currently breastfeeding.  Wt Readings from Last 4 Encounters:   03/30/18 67.6 kg (149 lb)   03/19/18 68 kg (150 lb)   03/13/18 70.8 kg (156 lb 1.6 oz)   02/26/18 72.3 kg (159 lb 8 oz)     Oropharynx is moist and without lesion. Oropharynx is moist, no focal lesion. No icterus. Lungs:CTA. Heart: regular rhythm, tachy. Abdomen: distended, nontender, BS active. No extremity swelling.    Labs: Results for NATALIIA IBARRA (MRN [id number]) as of 3/30/2018 08:02   Ref. Range 3/30/2018 06:47   Sodium Latest Ref Range: 133 - 144 mmol/L 136   Potassium Latest Ref Range: 3.4 - 5.3 mmol/L 3.2 (L)   Chloride Latest Ref Range: 94 - 109 mmol/L 101   Carbon Dioxide Latest Ref Range: 20 - 32 mmol/L 27   Urea Nitrogen Latest Ref Range: 7 - 30 " mg/dL 7   Creatinine Latest Ref Range: 0.52 - 1.04 mg/dL 0.58   GFR Estimate Latest Ref Range: >60 mL/min/1.7m2 >90   GFR Estimate If Black Latest Ref Range: >60 mL/min/1.7m2 >90   Calcium Latest Ref Range: 8.5 - 10.1 mg/dL 8.7   Anion Gap Latest Ref Range: 3 - 14 mmol/L 8   Albumin Latest Ref Range: 3.4 - 5.0 g/dL 3.0 (L)   Protein Total Latest Ref Range: 6.8 - 8.8 g/dL 8.2   Bilirubin Total Latest Ref Range: 0.2 - 1.3 mg/dL 0.6   Alkaline Phosphatase Latest Ref Range: 40 - 150 U/L 487 (H)   ALT Latest Ref Range: 0 - 50 U/L 32   AST Latest Ref Range: 0 - 45 U/L 54 (H)   Glucose Latest Ref Range: 70 - 99 mg/dL 150 (H)   WBC Latest Ref Range: 4.0 - 11.0 10e9/L 5.1   Hemoglobin Latest Ref Range: 11.7 - 15.7 g/dL 9.6 (L)   Hematocrit Latest Ref Range: 35.0 - 47.0 % 31.6 (L)   Platelet Count Latest Ref Range: 150 - 450 10e9/L 136 (L)   RBC Count Latest Ref Range: 3.8 - 5.2 10e12/L 3.60 (L)   MCV Latest Ref Range: 78 - 100 fl 88   MCH Latest Ref Range: 26.5 - 33.0 pg 26.7   MCHC Latest Ref Range: 31.5 - 36.5 g/dL 30.4 (L)   RDW Latest Ref Range: 10.0 - 15.0 % 14.2   Diff Method Unknown Automated Method   % Neutrophils Latest Units: % 74.7   % Lymphocytes Latest Units: % 14.0   % Monocytes Latest Units: % 9.9   % Eosinophils Latest Units: % 0.8   % Basophils Latest Units: % 0.4   % Immature Granulocytes Latest Units: % 0.2   Nucleated RBCs Latest Ref Range: 0 /100 0   Absolute Neutrophil Latest Ref Range: 1.6 - 8.3 10e9/L 3.8   Absolute Lymphocytes Latest Ref Range: 0.8 - 5.3 10e9/L 0.7 (L)   Absolute Monocytes Latest Ref Range: 0.0 - 1.3 10e9/L 0.5   Absolute Eosinophils Latest Ref Range: 0.0 - 0.7 10e9/L 0.0   Absolute Basophils Latest Ref Range: 0.0 - 0.2 10e9/L 0.0   Abs Immature Granulocytes Latest Ref Range: 0 - 0.4 10e9/L 0.0   Absolute Nucleated RBC Unknown 0.0       Imaging: pending    Impression/plan:   1. Metastatic pancreatic cancer, currently off of active treatment. Has an continued decline in energy,  strength over the past few months. Currently N/V and pain are well controlled. No clinical s/s of obstruction. Having mild constipation.  -reviewed that her symptoms are expected with progressive cancer. This is what her body will naturally do. Discussed that I'm not expecting Dr. Gonsales to present any new studies for which she would qualify.   -Reviewed end of life goals. She is starting to come around to the idea of stopping treatment. She was willing to pursue a hospice consultation at home to review services. She would like to continue with the plan to meet dr. Gonsales on Monday to review scans and make further plans.  -she was alone today, but will have family with her on Monday  -did not address code status or POLST, but will need to review at future visits    2. Duodenal obstruction-s/p stenting x 2, most recently on 3/19/18, no vomiting currently  -focusing diet on low residue foods or liquids    3. Hx of biliary obstruction-s/p biliary stenting  -no s/s of obstruction. LFTs with rising ALk phose and mildly elevated AST, but bili wnl.    4. FEN:  -chronic hypokalemia, related in part to malnutrition, unable to keep wnl with oral supplements  -I'm not too worried about aggressive replacement in the palliative setting  -feels improved with IVF, so will give 1 L NS today with 20 meq KCL    5. Anorexia: on marinol 5 mg bid, no significant improvement  -will hold on adding dexamethasone as she does have a hx of GI bleeding from the duodenal mass

## 2018-03-30 NOTE — NURSING NOTE
Chief Complaint   Patient presents with     Port Draw     Labs drawn via port by RN, line flushed and hep locked, VS taken     Pt requested to see Ester OLIVEIRA CNP today for weakness. Paged Ester, message sent to schedulers to add to AM schedule. Pt advised to check back in on 2nd floor for clinic visit after MRI and CT.    Shalini Cuadra RN

## 2018-03-30 NOTE — MR AVS SNAPSHOT
After Visit Summary   3/30/2018    Shirin Muller    MRN: [id number]           Patient Information     Date Of Birth          1960        Visit Information        Provider Department      3/30/2018 9:00 AM UC 4 ATC; UC BMT INFUSION [facility]        Today's Diagnoses     Malignant neoplasm of head of pancreas (H)    -  1          [facility] [place]  [address] [place] [id number]  Phone: [phone]  Clinic Hours:   Monday-Thursday:7am to 7pm   Friday: 7am to 5pm   Weekends and holidays:    8am to noon (in general)  If your fever is 100.5  or greater,   call the clinic.  After hours call the   hospital at [phone] or   [phone]. Ask for the BMT   fellow on-call            Follow-ups after your visit        Your next 10 appointments already scheduled     Apr 02, 2018  5:30 PM CDT   (Arrive by 5:15 PM)   Return Visit with Demond Gonsales MD   [facility] ([facility])    [address] [place] [address]  [address] [place] [id number] [phone]              Who to contact     If you have questions or need follow up information about today's clinic visit or your schedule please contact [facility]ANS[facility] directly at [phone].  Normal or non-critical lab and imaging results will be communicated to you by [employer], letter or phone within 4 business days after the clinic has received the results. If you do not hear from us within 7 days, please contact the clinic through [employer] or phone. If you have a critical or abnormal lab result, we will notify you by phone as soon as possible.  Submit refill requests through [employer] or call your pharmacy and they will forward the refill request to us. Please allow 3 business days for your refill to be completed.          Additional Information About Your Visit        [employer] Information     [employer] gives you secure  access to your electronic health record. If you see a primary care provider, you can also send messages to your care team and make appointments. If you have questions, please call your primary care clinic.  If you do not have a primary care provider, please call [phone] and they will assist you.        Care EveryWhere ID     This is your Care EveryWhere ID. This could be used by other organizations to access your [facility] medical records  [id number]         Blood Pressure from Last 3 Encounters:   03/30/18 91/57   03/30/18 96/62   03/21/18 113/74    Weight from Last 3 Encounters:   03/30/18 67.6 kg (149 lb)   03/19/18 68 kg (150 lb)   03/13/18 70.8 kg (156 lb 1.6 oz)              Today, you had the following     No orders found for display         Today's Medication Changes          These changes are accurate as of 3/30/18 10:22 AM.  If you have any questions, ask your nurse or doctor.               These medicines have changed or have updated prescriptions.        Dose/Directions    * bisacodyl 10 MG Suppository   Commonly known as:  DULCOLAX   This may have changed:  Another medication with the same name was added. Make sure you understand how and when to take each.   Used for:  Drug-induced constipation   Changed by:  Ester Echavarria APRN CNP        Dose:  10 mg   Place 1 suppository (10 mg) rectally daily as needed for constipation   Quantity:  30 suppository   Refills:  0       * bisacodyl 10 MG Suppository   Commonly known as:  DULCOLAX   This may have changed:  You were already taking a medication with the same name, and this prescription was added. Make sure you understand how and when to take each.   Used for:  Malignant neoplasm of head of pancreas (H), Slow transit constipation   Changed by:  Ester Echavarria APRN CNP        Dose:  10 mg   Place 1 suppository (10 mg) rectally daily as needed for constipation   Quantity:  30 suppository   Refills:  3       * Notice:  This list has 2 medication(s)  that are the same as other medications prescribed for you. Read the directions carefully, and ask your doctor or other care provider to review them with you.         Where to get your medicines      These medications were sent to [place], [place] - [address] [facility] [address]  [address] [facility] [address], [facility] [address]    Hours:  TRANSPLANT PHONE NUMBER [phone] Phone:  [phone]     bisacodyl 10 MG Suppository         Some of these will need a paper prescription and others can be bought over the counter.  Ask your nurse if you have questions.     Bring a paper prescription for each of these medications     morphine 30 MG 12 hr tablet                Recent Review Flowsheet Data     BMT Recent Results Latest Ref Rng & Units 3/20/2018 3/20/2018 3/20/2018 3/21/2018 3/21/2018 3/21/2018 3/30/2018    WBC 4.0 - 11.0 10e9/L - - - - - 4.5 5.1    Hemoglobin 11.7 - 15.7 g/dL - - - - - 8.9(L) 9.6(L)    Platelet Count 150 - 450 10e9/L - - - - - 92(L) 136(L)    Neutrophils (Absolute) 1.6 - 8.3 10e9/L - - - - - - 3.8    INR 0.86 - 1.14 - - - - - - -    Sodium 133 - 144 mmol/L - - - - - 138 136    Potassium 3.4 - 5.3 mmol/L - - - - - 3.8 3.2(L)    Chloride 94 - 109 mmol/L - - - - - 102 101    Glucose 70 - 99 mg/dL 126(H) 273(H) 273(H) 206(H) 196(H) 183(H) 150(H)    Urea Nitrogen 7 - 30 mg/dL - - - - - 2(L) 7    Creatinine 0.52 - 1.04 mg/dL - - - - - 0.56 0.58    Calcium (Total) 8.5 - 10.1 mg/dL - - - - - 8.4(L) 8.7    Protein (Total) 6.8 - 8.8 g/dL - - - - - - 8.2    Albumin 3.4 - 5.0 g/dL - - - - - - 3.0(L)    Bilirubin (Direct) 0.0 - 0.2 mg/dL - - - - - - -    Alkaline Phosphatase 40 - 150 U/L - - - - - - 487(H)    AST 0 - 45 U/L - - - - - - 54(H)    ALT 0 - 50 U/L - - - - - - 32    MCV 78 - 100 fl - - - - - 88 88               Primary Care Provider    [facility]       No address on file        Equal Access to Services     JAI CORDERO AH: Maxx harding  Syedali, wajuanda luqadaha, qaybta kajanneth frazier, luis e ibarramarcelo guido. So [facility] [phone].    ATENCIÓN: Si dot jenkins, tiene a bernal disposición servicios gratuitos de asistencia lingüística. Vanessa al [phone].    We comply with applicable federal civil rights laws and [place] laws. We do not discriminate on the basis of race, color, national origin, age, disability, sex, sexual orientation, or gender identity.            Thank you!     Thank you for choosing [facility]LO[facility]ARR[facility]ANS[facility]  for your care. Our goal is always to provide you with excellent care. Hearing back from our patients is one way we can continue to improve our services. Please take a few minutes to complete the written survey that you may receive in the mail after your visit with us. Thank you!             Your Updated Medication List - Protect others around you: Learn how to safely use, store and throw away your medicines at www.disposemymeds.org.          This list is accurate as of 3/30/18 10:22 AM.  Always use your most recent med list.                   Brand Name Dispense Instructions for use Diagnosis    amylase-lipase-protease 50589 UNITS Cpep    CREON    180 capsule    Take 2 capsules (72,000 Units) by mouth 3 times daily (with meals)    Malignant neoplasm of head of pancreas (H)       ALVARADO CONTOUR NEXT test strip   Generic drug:  blood glucose monitoring     100 each    USE TO CHECK BLOOD SUGAR TWICE DAILY ( ONCE FASTING AND ONCE 2 HOURS AFTER EATING )    Diabetes mellitus, type 2 (H)       * bisacodyl 10 MG Suppository    DULCOLAX    30 suppository    Place 1 suppository (10 mg) rectally daily as needed for constipation    Drug-induced constipation       * bisacodyl 10 MG Suppository    DULCOLAX    30 suppository    Place 1 suppository (10 mg) rectally daily as needed for constipation    Malignant neoplasm of head of pancreas (H), Slow transit constipation       blood glucose monitoring lancets      102 each    Use to test blood sugar two times daily or as directed.    Diabetes mellitus, type 2 (H)       diltiazem 2% in PLO cream (FV COMPOUNDED) 2% Gel     30 g    Apply topically daily and as needed to external hemorrhoids    External hemorrhoids       docusate sodium 100 MG capsule    DOK    100 capsule    Take 1 capsule (100 mg) by mouth daily    External hemorrhoids       dronabinol 5 MG capsule    MARINOL    60 capsule    Take 1 capsule (5 mg) by mouth 2 times daily (before meals)    Malignant neoplasm of head of pancreas (H), Anorexia       ENSURE CLEAR Liqd     90 Bottle    Take 1 Bottle by mouth 3 times daily    Malignant neoplasm of head of pancreas (H)       insulin glargine 100 UNIT/ML injection    LANTUS SOLOSTAR    15 mL    10 units subcutaneously daily at 8pm.    Diabetes mellitus, type 2 (H)       insulin pen needle 32G X 4 MM    BD EMMANUEL U/F    100 each    Use one daily or as directed.    Diabetes mellitus, type 2 (H)       KLOR-CON 20 MEQ CR tablet   Generic drug:  potassium chloride SA      Take 1 tablet (20 mEq) by mouth 2 times daily    Malignant neoplasm of head of pancreas (H)       lidocaine-prilocaine cream    EMLA    30 g    Apply topically as needed for other (Use 30-60 minutes prior to port access)    Malignant neoplasm of head of pancreas (H)       loratadine 10 MG tablet    CLARITIN    30 tablet    Take 1 tablet (10 mg) by mouth daily Reported on 5/5/2017    Chronic seasonal allergic rhinitis, unspecified trigger       LORazepam 0.5 MG tablet    ATIVAN    30 tablet    Take 1 tablet (0.5 mg) by mouth every 4 hours as needed (Anxiety, Nausea/Vomiting or Sleep)    Malignant neoplasm of head of pancreas (H)       METHYLPHENIDATE HCL PO      Take 5 mg by mouth 2 times daily        * morphine 15 MG IR tablet    MSIR    100 tablet    Take 1 tablet (15 mg) by mouth every 4 hours as needed for moderate to severe pain    Malignant neoplasm of head of pancreas (H)       * morphine 30 MG 12  hr tablet    MS CONTIN    60 tablet    Take 1 tablet (30 mg) by mouth every 12 hours    Malignant neoplasm of head of pancreas (H)       ondansetron 8 MG ODT tab    ZOFRAN-ODT    60 tablet    Take 1 tablet (8 mg) by mouth every 8 hours as needed for nausea    Malignant neoplasm of head of pancreas (H)       ONETOUCH PING METER REMOTE SUPPLIES Misc     1 each    Check your sugars twice daily, once when fasting and once 2 hours after eating.    Secondary diabetes mellitus (H)       order for DME     1 Units    Equipment being ordered: Toilet seat riser with handles    Malignant neoplasm of head of pancreas (H), Generalized muscle weakness       pantoprazole 20 MG EC tablet    PROTONIX    60 tablet    Take 1 tablet (20 mg) by mouth 2 times daily    Malignant neoplasm of head of pancreas (H)       polyethylene glycol powder    MIRALAX/GLYCOLAX    500 g    Take 17 g (1 capful) by mouth daily    Malignant neoplasm of head of pancreas (H)       prochlorperazine 10 MG tablet    COMPAZINE    60 tablet    TAKE ONE TABLET BY MOUTH EVERY 6 HOURS AS NEEDED FOR NAUSEA AND VOMITING    Malignant neoplasm of head of pancreas (H)       senna-docusate 8.6-50 MG per tablet    SENNA S    100 tablet    Take 2 tablets by mouth 2 times daily as needed for constipation    Drug-induced constipation       * Notice:  This list has 4 medication(s) that are the same as other medications prescribed for you. Read the directions carefully, and ask your doctor or other care provider to review them with you.

## 2018-03-30 NOTE — LETTER
"3/30/2018       RE: Shirin Muller  [address] [facility] [address]     Dear Colleague,    Thank you for referring your patient, Shirin Muller, to the [facility]. Please see a copy of my visit note below.    Reason for Visit: seen on an add-on basis for evaluation of weakness    Oncology HPI:  Shirin Muller is a 57 year old woman with metastatic pancreatic cancer involving the liver. She is currently off of active treatment as she was found to have progression on prior treatment with gemcitabine and abraxane, FOLFIRINOX, and most recently liposomal irinotecan and 5FU. Her cancer treatment has been complicated by GI bleeding from an ulcer and infiltrating mass in the duodenum (January 2017). She was found to be positive for H. Pyolor and initiated on therapy with PPI, carafate, amoxicillin and clarithromycin.  In August 2017 she had biliar obstruction with complications of sepsis/cholangitis. BC grew klebsiella penumonia and streptococcus angiosos. She was treated with biliary stenting and antibiotics. Recently has had complications of duodenal obstruction, requiring stenting x 2, most recently during a hospitalization on 3/19/18. She has not wanted to pursue hospice care as she remains hopeful for some treatment that might be offered.    Interval history: Shirin presented to clinic for labs and CT imaging prior to a scheduled appointment with Dr. Gonsales on Monday. She told the nurse she has been very weak and wanted to be seen.     Shirin is seen after her CT/MRI imaging today. She feels dehydrated. Weak when standing. Is drinking fluids, but not sure how much. Urine is dark in color, but she feels the volume is wnl. Having constipated, last BM 4 days ago. Taking Docusate sodium, senna, but stopped taking miralax as it was \"stressing me out\". No abdominal pain. No reflux. No fevers/chills. No cough, sob, cp, palpitation. Spending the majority of the " day laying down and resting, Denies sleeping, although her family at the last visit felt she was sleeping much of the day. Has noted no improvement in her appetite or energy.    Current Outpatient Prescriptions   Medication Sig Dispense Refill     potassium chloride SA (KLOR-CON) 20 MEQ CR tablet Take 1 tablet (20 mEq) by mouth 2 times daily       senna-docusate (SENNA S) 8.6-50 MG per tablet Take 2 tablets by mouth 2 times daily as needed for constipation 100 tablet 0     METHYLPHENIDATE HCL PO Take 5 mg by mouth 2 times daily       dronabinol (MARINOL) 5 MG capsule Take 1 capsule (5 mg) by mouth 2 times daily (before meals) 60 capsule 3     prochlorperazine (COMPAZINE) 10 MG tablet TAKE ONE TABLET BY MOUTH EVERY 6 HOURS AS NEEDED FOR NAUSEA AND VOMITING 60 tablet 3     morphine (MS CONTIN) 30 MG 12 hr tablet Take 1 tablet (30 mg) by mouth every 12 hours 60 tablet 0     morphine (MSIR) 15 MG IR tablet Take 1 tablet (15 mg) by mouth every 4 hours as needed for moderate to severe pain 100 tablet 0     ondansetron (ZOFRAN-ODT) 8 MG ODT tab Take 1 tablet (8 mg) by mouth every 8 hours as needed for nausea 60 tablet 6     bisacodyl (DULCOLAX) 10 MG Suppository Place 1 suppository (10 mg) rectally daily as needed for constipation 30 suppository 0     ALVARADO CONTOUR NEXT test strip USE TO CHECK BLOOD SUGAR TWICE DAILY ( ONCE FASTING AND ONCE 2 HOURS AFTER EATING ) 100 each 11     order for DME Equipment being ordered: Toilet seat riser with handles 1 Units 0     LORazepam (ATIVAN) 0.5 MG tablet Take 1 tablet (0.5 mg) by mouth every 4 hours as needed (Anxiety, Nausea/Vomiting or Sleep) 30 tablet 3     docusate sodium (DOK) 100 MG capsule Take 1 capsule (100 mg) by mouth daily 100 capsule 1     loratadine (CLARITIN) 10 MG tablet Take 1 tablet (10 mg) by mouth daily Reported on 5/5/2017 30 tablet 6     polyethylene glycol (MIRALAX/GLYCOLAX) powder Take 17 g (1 capful) by mouth daily 500 g 11     pantoprazole (PROTONIX) 20 MG  "EC tablet Take 1 tablet (20 mg) by mouth 2 times daily 60 tablet 3     Nutritional Supplements (ENSURE CLEAR) LIQD Take 1 Bottle by mouth 3 times daily 90 Bottle 6     insulin glargine (LANTUS SOLOSTAR) 100 UNIT/ML injection 10 units subcutaneously daily at 8pm. 15 mL 3     insulin pen needle (BD EMMANUEL U/F) 32G X 4 MM Use one daily or as directed. 100 each prn     blood glucose monitoring (ACCU-CHEK FASTCLIX) lancets Use to test blood sugar two times daily or as directed. 102 each 3     Blood Glucose Monitoring Suppl (ONETOUCH PING METER REMOTE) SUPPLIES MISC Check your sugars twice daily, once when fasting and once 2 hours after eating. 1 each 3     amylase-lipase-protease (CREON) 63769 UNITS CPEP Take 2 capsules (72,000 Units) by mouth 3 times daily (with meals) 180 capsule 1     diltiazem 2% in PLO cream, FV COMPOUNDED, 2% GEL Apply topically daily and as needed to external hemorrhoids 30 g 0     lidocaine-prilocaine (EMLA) cream Apply topically as needed for other (Use 30-60 minutes prior to port access) 30 g 0          Allergies   Allergen Reactions     Contrast Dye Nausea and Vomiting     Pt vomiting post IV contrast, Please try Visipaque for next CT scan. 6/24/16 sv     Diagnostic X-Ray Materials Nausea and Vomiting     Pt vomiting post IV contrast, Please try Visipaque for next CT scan. 6/24/16 sv         Exam: alert, appears chronically ill, weak. Blood pressure 91/57, pulse 100, resp. rate 16, height 1.803 m (5' 11\"), SpO2 99 %, not currently breastfeeding.  Wt Readings from Last 4 Encounters:   03/30/18 67.6 kg (149 lb)   03/19/18 68 kg (150 lb)   03/13/18 70.8 kg (156 lb 1.6 oz)   02/26/18 72.3 kg (159 lb 8 oz)     Oropharynx is moist and without lesion. Oropharynx is moist, no focal lesion. No icterus. Lungs:CTA. Heart: regular rhythm, tachy. Abdomen: distended, nontender, BS active. No extremity swelling.    Labs: Results for NATALIIA IBARRA ISABELLA (MRN [id number]) as of 3/30/2018 08:02   Ref. " Range 3/30/2018 06:47   Sodium Latest Ref Range: 133 - 144 mmol/L 136   Potassium Latest Ref Range: 3.4 - 5.3 mmol/L 3.2 (L)   Chloride Latest Ref Range: 94 - 109 mmol/L 101   Carbon Dioxide Latest Ref Range: 20 - 32 mmol/L 27   Urea Nitrogen Latest Ref Range: 7 - 30 mg/dL 7   Creatinine Latest Ref Range: 0.52 - 1.04 mg/dL 0.58   GFR Estimate Latest Ref Range: >60 mL/min/1.7m2 >90   GFR Estimate If Black Latest Ref Range: >60 mL/min/1.7m2 >90   Calcium Latest Ref Range: 8.5 - 10.1 mg/dL 8.7   Anion Gap Latest Ref Range: 3 - 14 mmol/L 8   Albumin Latest Ref Range: 3.4 - 5.0 g/dL 3.0 (L)   Protein Total Latest Ref Range: 6.8 - 8.8 g/dL 8.2   Bilirubin Total Latest Ref Range: 0.2 - 1.3 mg/dL 0.6   Alkaline Phosphatase Latest Ref Range: 40 - 150 U/L 487 (H)   ALT Latest Ref Range: 0 - 50 U/L 32   AST Latest Ref Range: 0 - 45 U/L 54 (H)   Glucose Latest Ref Range: 70 - 99 mg/dL 150 (H)   WBC Latest Ref Range: 4.0 - 11.0 10e9/L 5.1   Hemoglobin Latest Ref Range: 11.7 - 15.7 g/dL 9.6 (L)   Hematocrit Latest Ref Range: 35.0 - 47.0 % 31.6 (L)   Platelet Count Latest Ref Range: 150 - 450 10e9/L 136 (L)   RBC Count Latest Ref Range: 3.8 - 5.2 10e12/L 3.60 (L)   MCV Latest Ref Range: 78 - 100 fl 88   MCH Latest Ref Range: 26.5 - 33.0 pg 26.7   MCHC Latest Ref Range: 31.5 - 36.5 g/dL 30.4 (L)   RDW Latest Ref Range: 10.0 - 15.0 % 14.2   Diff Method Unknown Automated Method   % Neutrophils Latest Units: % 74.7   % Lymphocytes Latest Units: % 14.0   % Monocytes Latest Units: % 9.9   % Eosinophils Latest Units: % 0.8   % Basophils Latest Units: % 0.4   % Immature Granulocytes Latest Units: % 0.2   Nucleated RBCs Latest Ref Range: 0 /100 0   Absolute Neutrophil Latest Ref Range: 1.6 - 8.3 10e9/L 3.8   Absolute Lymphocytes Latest Ref Range: 0.8 - 5.3 10e9/L 0.7 (L)   Absolute Monocytes Latest Ref Range: 0.0 - 1.3 10e9/L 0.5   Absolute Eosinophils Latest Ref Range: 0.0 - 0.7 10e9/L 0.0   Absolute Basophils Latest Ref Range: 0.0 - 0.2  10e9/L 0.0   Abs Immature Granulocytes Latest Ref Range: 0 - 0.4 10e9/L 0.0   Absolute Nucleated RBC Unknown 0.0       Imaging: pending    Impression/plan:   1. Metastatic pancreatic cancer, currently off of active treatment. Has an continued decline in energy, strength over the past few months. Currently N/V and pain are well controlled. No clinical s/s of obstruction. Having mild constipation.  -reviewed that her symptoms are expected with progressive cancer. This is what her body will naturally do. Discussed that I'm not expecting Dr. Gonsales to present any new studies for which she would qualify.   -Reviewed end of life goals. She is starting to come around to the idea of stopping treatment. She was willing to pursue a hospice consultation at home to review services. She would like to continue with the plan to meet dr. Gonsales on Monday to review scans and make further plans.  -she was alone today, but will have family with her on Monday  -did not address code status or POLST, but will need to review at future visits    2. Duodenal obstruction-s/p stenting x 2, most recently on 3/19/18, no vomiting currently  -focusing diet on low residue foods or liquids    3. Hx of biliary obstruction-s/p biliary stenting  -no s/s of obstruction. LFTs with rising ALk phose and mildly elevated AST, but bili wnl.    4. FEN:  -chronic hypokalemia, related in part to malnutrition, unable to keep wnl with oral supplements  -I'm not too worried about aggressive replacement in the palliative setting  -feels improved with IVF, so will give 1 L NS today with 20 meq KCL    5. Anorexia: on marinol 5 mg bid, no significant improvement  -will hold on adding dexamethasone as she does have a hx of GI bleeding from the duodenal mass      Again, thank you for allowing me to participate in the care of your patient.      Sincerely,    TEE Villanueva CNP

## 2018-03-30 NOTE — PROGRESS NOTES
Infusion Nursing Note:  Shirin Muller presents today for add-on infusion.    Patient seen by provider today: Yes: Ester Echavarria   present during visit today: Not Applicable.    Note: Labs were monitored.    Intravenous Access:  Implanted Port.    Treatment Conditions:  Per Provider order, Patient received 1 liter IV fluids and 20 mEq IV potassium.      Post Infusion Assessment:  Patient tolerated infusion without incident.    Discharge Plan:   Patient discharged in stable condition accompanied by: self.    LES FARLEY RN

## 2018-03-30 NOTE — MR AVS SNAPSHOT
After Visit Summary   3/30/2018    Shirin Muller    MRN: [id number]           Patient Information     Date Of Birth          1960        Visit Information        Provider Department      3/30/2018 8:40 AM Ester Echavarria APRN CNP [facility]        Today's Diagnoses     Slow transit constipation    -  1    Malignant neoplasm of head of pancreas (H)           Follow-ups after your visit        Additional Services     HOSPICE REFERRAL       **Order classes of: [facility], [facility] and [facility] will route to the Home Care and Hospice Referral Pool.  Home Care or Hospice will then contact the patient to schedule their appointment.**    Hospice eligibility overview: prognosis of 6 months or less, end stage disease, patient goals are comfort only, patient is not seeking curative treatment.    If you do not hear from Hospice, or you would like to call to schedule, please call the referring place of service that your provider has listed below.  ______________________________________________________________________    Your provider has referred you to: [facility]: [facility] [phone]   [email or web address]    Anticipated Start Date for Services: tbd after consultation with the patient  PLEASE Schedule Hospice consult for 24 - 48 hours.  Please call if there is need for a variance to this timeline.    REASON FOR REFERRAL: Hospice Diagnosis: metastatic pancreatic cancer    ADDITIONAL SERVICES NEEDED:     OTHER PERTINENT INFORMATION: Patient was last seen by provider on 3/30/17 for pancreatic cancer.  Factors that indicate prognosis of less than 6 months:  Weight loss: related to cancer, on marinol  Functional decline: increased fatigue, weakness  End stage disease: progressive pancreatic cancer  Intractable symptoms: hx of vomiting related to duodenal obstruction, s/p stenting, biliary obstruction s/p  stenting  Pain is well managed with MS Contin  -intermittent constipation    Current Outpatient Prescriptions:  morphine (MS CONTIN) 30 MG 12 hr tablet, Take 1 tablet (30 mg) by mouth every 12 hours, Disp: 60 tablet, Rfl: 0  bisacodyl (DULCOLAX) 10 MG Suppository, Place 1 suppository (10 mg) rectally daily as needed for constipation, Disp: 30 suppository, Rfl: 3  potassium chloride SA (KLOR-CON) 20 MEQ CR tablet, Take 1 tablet (20 mEq) by mouth 2 times daily, Disp: , Rfl:   senna-docusate (SENNA S) 8.6-50 MG per tablet, Take 2 tablets by mouth 2 times daily as needed for constipation, Disp: 100 tablet, Rfl: 0  METHYLPHENIDATE HCL PO, Take 5 mg by mouth 2 times daily, Disp: , Rfl:   dronabinol (MARINOL) 5 MG capsule, Take 1 capsule (5 mg) by mouth 2 times daily (before meals), Disp: 60 capsule, Rfl: 3  prochlorperazine (COMPAZINE) 10 MG tablet, TAKE ONE TABLET BY MOUTH EVERY 6 HOURS AS NEEDED FOR NAUSEA AND VOMITING, Disp: 60 tablet, Rfl: 3  morphine (MSIR) 15 MG IR tablet, Take 1 tablet (15 mg) by mouth every 4 hours as needed for moderate to severe pain, Disp: 100 tablet, Rfl: 0  ondansetron (ZOFRAN-ODT) 8 MG ODT tab, Take 1 tablet (8 mg) by mouth every 8 hours as needed for nausea, Disp: 60 tablet, Rfl: 6  bisacodyl (DULCOLAX) 10 MG Suppository, Place 1 suppository (10 mg) rectally daily as needed for constipation, Disp: 30 suppository, Rfl: 0  ALVARADO CONTOUR NEXT test strip, USE TO CHECK BLOOD SUGAR TWICE DAILY ( ONCE FASTING AND ONCE 2 HOURS AFTER EATING ), Disp: 100 each, Rfl: 11  order for DME, Equipment being ordered: Toilet seat riser with handles, Disp: 1 Units, Rfl: 0  LORazepam (ATIVAN) 0.5 MG tablet, Take 1 tablet (0.5 mg) by mouth every 4 hours as needed (Anxiety, Nausea/Vomiting or Sleep), Disp: 30 tablet, Rfl: 3  docusate sodium (DOK) 100 MG capsule, Take 1 capsule (100 mg) by mouth daily, Disp: 100 capsule, Rfl: 1  loratadine (CLARITIN) 10 MG tablet, Take 1 tablet (10 mg) by mouth daily Reported on  5/5/2017, Disp: 30 tablet, Rfl: 6  polyethylene glycol (MIRALAX/GLYCOLAX) powder, Take 17 g (1 capful) by mouth daily, Disp: 500 g, Rfl: 11  pantoprazole (PROTONIX) 20 MG EC tablet, Take 1 tablet (20 mg) by mouth 2 times daily, Disp: 60 tablet, Rfl: 3  Nutritional Supplements (ENSURE CLEAR) LIQD, Take 1 Bottle by mouth 3 times daily, Disp: 90 Bottle, Rfl: 6  insulin glargine (LANTUS SOLOSTAR) 100 UNIT/ML injection, 10 units subcutaneously daily at 8pm., Disp: 15 mL, Rfl: 3  insulin pen needle (BD EMMANUEL U/F) 32G X 4 MM, Use one daily or as directed., Disp: 100 each, Rfl: prn  blood glucose monitoring (ACCU-CHEK FASTCLIX) lancets, Use to test blood sugar two times daily or as directed., Disp: 102 each, Rfl: 3  Blood Glucose Monitoring Suppl (ONETOUCH PING METER REMOTE) SUPPLIES MISC, Check your sugars twice daily, once when fasting and once 2 hours after eating., Disp: 1 each, Rfl: 3  amylase-lipase-protease (CREON) 74937 UNITS CPEP, Take 2 capsules (72,000 Units) by mouth 3 times daily (with meals), Disp: 180 capsule, Rfl: 1  diltiazem 2% in PLO cream, FV COMPOUNDED, 2% GEL, Apply topically daily and as needed to external hemorrhoids, Disp: 30 g, Rfl: 0  lidocaine-prilocaine (EMLA) cream, Apply topically as needed for other (Use 30-60 minutes prior to port access), Disp: 30 g, Rfl: 0      Patient Active Problem List:     Malignant neoplasm of head of pancreas (H)     Toxic diffuse goiter     Anemia, chronic disease     Chemotherapy-induced neutropenia (H)     Syncope     GI bleed     Secondary malignant neoplasm of liver (H)     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)     Cholangitis     Duodenal obstruction     Pancreatic cancer (H)    This patient is under my care, and I have initiated the establishment of the plan of care. This patient will be followed by a physician who will periodically review the plan of care.    Physician/Provider to provide follow up care: [facility], [facility]    Responsible  MARGOT certified Physician at time of discharge: Ester Echavarria    Please be aware that coverage of these services is subject to the terms and limitations of your health insurance plan.  Call member services at your health plan with any benefit or coverage questions.                  Your next 10 appointments already scheduled     Apr 02, 2018  5:30 PM CDT   (Arrive by 5:15 PM)   Return Visit with Demond Gonsales MD   [facility] ([facility])    [address]  [address]  [place] [address] [phone]              Who to contact     If you have questions or need follow up information about today's clinic visit or your schedule please contact [facility] directly at [phone].  Normal or non-critical lab and imaging results will be communicated to you by MyChart, letter or phone within 4 business days after the clinic has received the results. If you do not hear from us within 7 days, please contact the clinic through MyChart or phone. If you have a critical or abnormal lab result, we will notify you by phone as soon as possible.  Submit refill requests through [employer] or call your pharmacy and they will forward the refill request to us. Please allow 3 business days for your refill to be completed.          Additional Information About Your Visit        [employer] Information     [employer] gives you secure access to your electronic health record. If you see a primary care provider, you can also send messages to your care team and make appointments. If you have questions, please call your primary care clinic.  If you do not have a primary care provider, please call [phone] and they will assist you.        Care EveryWhere ID     This is your Care EveryWhere ID. This could be used by other organizations to access your [place] medical records  [id number]        Your Vitals Were     Pulse Respirations Height Pulse Oximetry BMI (Body  "Mass Index)       100 16 1.803 m (5' 11\") 99% 20.78 kg/m2        Blood Pressure from Last 3 Encounters:   03/30/18 91/57   03/30/18 96/62   03/21/18 113/74    Weight from Last 3 Encounters:   03/30/18 67.6 kg (149 lb)   03/19/18 68 kg (150 lb)   03/13/18 70.8 kg (156 lb 1.6 oz)              We Performed the Following     HOSPICE REFERRAL          Today's Medication Changes          These changes are accurate as of 3/30/18  9:58 AM.  If you have any questions, ask your nurse or doctor.               These medicines have changed or have updated prescriptions.        Dose/Directions    * bisacodyl 10 MG Suppository   Commonly known as:  DULCOLAX   This may have changed:  Another medication with the same name was added. Make sure you understand how and when to take each.   Used for:  Drug-induced constipation   Changed by:  Ester Echavarria APRN CNP        Dose:  10 mg   Place 1 suppository (10 mg) rectally daily as needed for constipation   Quantity:  30 suppository   Refills:  0       * bisacodyl 10 MG Suppository   Commonly known as:  DULCOLAX   This may have changed:  You were already taking a medication with the same name, and this prescription was added. Make sure you understand how and when to take each.   Used for:  Malignant neoplasm of head of pancreas (H), Slow transit constipation   Changed by:  Ester Echavarria APRN CNP        Dose:  10 mg   Place 1 suppository (10 mg) rectally daily as needed for constipation   Quantity:  30 suppository   Refills:  3       * Notice:  This list has 2 medication(s) that are the same as other medications prescribed for you. Read the directions carefully, and ask your doctor or other care provider to review them with you.         Where to get your medicines      These medications were sent to [place], [place] - [address] [facility] [address]  [address] [place] [address] [address] [address]    Hours:  TRANSPLANT PHONE NUMBER [phone] Phone: "  [phone]     bisacodyl 10 MG Suppository         Some of these will need a paper prescription and others can be bought over the counter.  Ask your nurse if you have questions.     Bring a paper prescription for each of these medications     morphine 30 MG 12 hr tablet                Primary Care Provider    [facility]       No address on file        Equal Access to Services     JAI CORDERO : Hadii aad ku hadelvio Socatherineali, waaxda luqadaha, qaybta kaalmada karin, luis e lamarjanazeem lora. So [facility] [phone].    ATENCIÓN: Si habla español, tiene a bernal disposición servicios gratuitos de asistencia lingüística. [facility] [phone].    We comply with applicable federal civil rights laws and [place] laws. We do not discriminate on the basis of race, color, national origin, age, disability, sex, sexual orientation, or gender identity.            Thank you!     Thank you for choosing [facility]  for your care. Our goal is always to provide you with excellent care. Hearing back from our patients is one way we can continue to improve our services. Please take a few minutes to complete the written survey that you may receive in the mail after your visit with us. Thank you!             Your Updated Medication List - Protect others around you: Learn how to safely use, store and throw away your medicines at www.disposemymeds.org.          This list is accurate as of 3/30/18  9:58 AM.  Always use your most recent med list.                   Brand Name Dispense Instructions for use Diagnosis    amylase-lipase-protease 45803 UNITS Cpep    CREON    180 capsule    Take 2 capsules (72,000 Units) by mouth 3 times daily (with meals)    Malignant neoplasm of head of pancreas (H)       ALVARADO CONTOUR NEXT test strip   Generic drug:  blood glucose monitoring     100 each    USE TO CHECK BLOOD SUGAR TWICE DAILY ( ONCE FASTING AND ONCE 2 HOURS AFTER EATING )    Diabetes  mellitus, type 2 (H)       * bisacodyl 10 MG Suppository    DULCOLAX    30 suppository    Place 1 suppository (10 mg) rectally daily as needed for constipation    Drug-induced constipation       * bisacodyl 10 MG Suppository    DULCOLAX    30 suppository    Place 1 suppository (10 mg) rectally daily as needed for constipation    Malignant neoplasm of head of pancreas (H), Slow transit constipation       blood glucose monitoring lancets     102 each    Use to test blood sugar two times daily or as directed.    Diabetes mellitus, type 2 (H)       diltiazem 2% in PLO cream (FV COMPOUNDED) 2% Gel     30 g    Apply topically daily and as needed to external hemorrhoids    External hemorrhoids       docusate sodium 100 MG capsule    DOK    100 capsule    Take 1 capsule (100 mg) by mouth daily    External hemorrhoids       dronabinol 5 MG capsule    MARINOL    60 capsule    Take 1 capsule (5 mg) by mouth 2 times daily (before meals)    Malignant neoplasm of head of pancreas (H), Anorexia       ENSURE CLEAR Liqd     90 Bottle    Take 1 Bottle by mouth 3 times daily    Malignant neoplasm of head of pancreas (H)       insulin glargine 100 UNIT/ML injection    LANTUS SOLOSTAR    15 mL    10 units subcutaneously daily at 8pm.    Diabetes mellitus, type 2 (H)       insulin pen needle 32G X 4 MM    BD EMMANUEL U/F    100 each    Use one daily or as directed.    Diabetes mellitus, type 2 (H)       KLOR-CON 20 MEQ CR tablet   Generic drug:  potassium chloride SA      Take 1 tablet (20 mEq) by mouth 2 times daily    Malignant neoplasm of head of pancreas (H)       lidocaine-prilocaine cream    EMLA    30 g    Apply topically as needed for other (Use 30-60 minutes prior to port access)    Malignant neoplasm of head of pancreas (H)       loratadine 10 MG tablet    CLARITIN    30 tablet    Take 1 tablet (10 mg) by mouth daily Reported on 5/5/2017    Chronic seasonal allergic rhinitis, unspecified trigger       LORazepam 0.5 MG tablet     ATIVAN    30 tablet    Take 1 tablet (0.5 mg) by mouth every 4 hours as needed (Anxiety, Nausea/Vomiting or Sleep)    Malignant neoplasm of head of pancreas (H)       METHYLPHENIDATE HCL PO      Take 5 mg by mouth 2 times daily        * morphine 15 MG IR tablet    MSIR    100 tablet    Take 1 tablet (15 mg) by mouth every 4 hours as needed for moderate to severe pain    Malignant neoplasm of head of pancreas (H)       * morphine 30 MG 12 hr tablet    MS CONTIN    60 tablet    Take 1 tablet (30 mg) by mouth every 12 hours    Malignant neoplasm of head of pancreas (H)       ondansetron 8 MG ODT tab    ZOFRAN-ODT    60 tablet    Take 1 tablet (8 mg) by mouth every 8 hours as needed for nausea    Malignant neoplasm of head of pancreas (H)       ONETOUCH PING METER REMOTE SUPPLIES Misc     1 each    Check your sugars twice daily, once when fasting and once 2 hours after eating.    Secondary diabetes mellitus (H)       order for DME     1 Units    Equipment being ordered: Toilet seat riser with handles    Malignant neoplasm of head of pancreas (H), Generalized muscle weakness       pantoprazole 20 MG EC tablet    PROTONIX    60 tablet    Take 1 tablet (20 mg) by mouth 2 times daily    Malignant neoplasm of head of pancreas (H)       polyethylene glycol powder    MIRALAX/GLYCOLAX    500 g    Take 17 g (1 capful) by mouth daily    Malignant neoplasm of head of pancreas (H)       prochlorperazine 10 MG tablet    COMPAZINE    60 tablet    TAKE ONE TABLET BY MOUTH EVERY 6 HOURS AS NEEDED FOR NAUSEA AND VOMITING    Malignant neoplasm of head of pancreas (H)       senna-docusate 8.6-50 MG per tablet    SENNA S    100 tablet    Take 2 tablets by mouth 2 times daily as needed for constipation    Drug-induced constipation       * Notice:  This list has 4 medication(s) that are the same as other medications prescribed for you. Read the directions carefully, and ask your doctor or other care provider to review them with you.

## 2018-03-30 NOTE — DISCHARGE INSTRUCTIONS
MRI Contrast Discharge Instructions    The IV contrast you received today will pass out of your body in your  urine. This will happen in the next 24 hours. You will not feel this process.  Your urine will not change color.    Drink at least 4 extra glasses of water or juice today (unless your doctor  has restricted your fluids). This reduces the stress on your kidneys.  You may take your regular medicines.    If you are on dialysis: It is best to have dialysis today.    If you have a reaction: Most reactions happen right away. If you have  any new symptoms after leaving the hospital (such as hives or swelling),  call your hospital at the correct number below. Or call your family doctor.  If you have breathing distress or wheezing, call 911.    Special instructions: ***    I have read and understand the above information.    Signature:______________________________________ Date:___________    Staff:__________________________________________ Date:___________     Time:__________    [facility] Departments:    ___[place]: [phone]  ___[facility]: [phone]  ___[place]: [phone] ___[facility]: [phone]  ___[facility]: [phone]  ___[facility]: [phone]  ___[phone]  ___[facility]: [phone]  ___[phone]

## 2018-03-30 NOTE — PROGRESS NOTES
Call placed to  Hospice. Spoke with intake team and informed of hospice referral. RN will be contacting patient to set up appointment for intake. My contact information was provided should they have any further questions.

## 2018-04-02 NOTE — NURSING NOTE
"Oncology Rooming Note    April 2, 2018 5:44 PM   Shirin Muller is a 57 year old female who presents for:    Chief Complaint   Patient presents with     Oncology Clinic Visit     Return for Pancreatic  Ca , scan results      Initial Vitals: /73  Pulse 96  Temp 97.5  F (36.4  C) (Oral)  Resp 16  Wt 66.8 kg (147 lb 4.3 oz)  SpO2 99%  BMI 20.54 kg/m2 Estimated body mass index is 20.54 kg/(m^2) as calculated from the following:    Height as of 3/30/18: 1.803 m (5' 11\").    Weight as of this encounter: 66.8 kg (147 lb 4.3 oz). Body surface area is 1.83 meters squared.  No Pain (0) Comment: Data Unavailable   No LMP recorded. Patient is postmenopausal.  Allergies reviewed: Yes  Medications reviewed: Yes    Medications: Medication refills not needed today.  Pharmacy name entered into [facility]:    [place], [place] - [address] [place] [address] [phone]  [id number] - [place], [place] - [address] [place] [address]    Clinical concerns: Results  Dru was notified.    7 minutes for nursing intake (face to face time)     Stephany Dukes MA              "

## 2018-04-02 NOTE — LETTER
4/2/2018       RE: Shirin Muller  [address] [facility] [address]     Dear Colleague,    Thank you for referring your patient, Shirin Muller, to the [facility]. Please see a copy of my visit note below.    Shirin Muller is here today in follow-up of advanced metastatic pancreatic cancer.    She has widely metastatic disease and is now failed all standard lines of therapy.  We have been discussing the possibility of enrollment in a phase 1 trial but her overall performance status is too poor and has been declining quickly.  She returns today with the son to discuss end-of-life planning.  At present her primary complaint is related to ongoing issues with constipation.  She feels she is doing reasonably well with her pain control.  She is not eating much these days.  She does not believe she is having any fevers.     On exam she is appearing more cachectic but otherwise reasonably comfortable.  I did not otherwise examine her today.    I reviewed with her her new CT scan which confirms marked progression of her disease.     Assessment/plan: Advanced metastatic pancreatic cancer, now with a performance status of 3 at best.      I spent all of her greater than 40 minutes together today reviewing her current situation.  We discussed the lack of any meaningful options for treating her cancer.  I recommended that we focus on managing her symptoms as best were able and explained that that would be best accomplished with enrollment in home hospice program.  We went over her goals of care and what we could and could not accomplish.  She was agreeable to being DNR/DNI if any catastrophe occurred.  She wanted to still be able to get supplemental fluids if needed.  We talked about increasing her laxatives to help maintain her bowel function as that seems to be her primary symptom right now.  We talked about the support that hospice care would provide patches for her  but for the rest of the family.  She wanted to know how much time she had left and we discussed the uncertainties involved but I told her I thought it was probably just a couple of months certainly not more than 6 months.    At the end of this very long discussion she expressed a good understanding of her circumstance and was agreeable to the concept of hospice.  However, she was leaving the room she did ask me if I thought there was still a chance she may be curative her disease, so I suspect there is still going to be some ongoing needs for helping her except her circumstance.  We will follow-up with her in clinic on a as needed basis and try and manage everything going forward to the home hospice program.    Again, thank you for allowing me to participate in the care of your patient.      Sincerely,    Demond Gonsales MD

## 2018-04-02 NOTE — MR AVS SNAPSHOT
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      Who to contact     If you have questions or need follow up information about today's clinic visit or your schedule please contact [facility] directly at [phone].  Normal or non-critical lab and imaging results will be communicated to you by MyChart, letter or phone within 4 business days after the clinic has received the results. If you do not hear from us within 7 days, please contact the clinic through [employer] or phone. If you have a critical or abnormal lab result, we will notify you by phone as soon as possible.  Submit refill requests through [employer] or call your pharmacy and they will forward the refill request to us. Please allow 3 business days for your refill to be completed.          Additional Information About Your Visit        MyChart Information     [employer] gives you secure access to your electronic health record. If you see a primary care provider, you can also send messages to your care team and make appointments. If you have questions, please call your primary care clinic.  If you do not have a primary care provider, please call [phone] and they will assist you.        Care EveryWhere ID     This is your Care EveryWhere ID. This could be used by other organizations to access your [facility] medical records  [id number]        Your Vitals Were     Pulse Temperature  Respirations Pulse Oximetry BMI (Body Mass Index)       96 97.5  F (36.4  C) (Oral) 16 99% 20.54 kg/m2        Blood Pressure from Last 3 Encounters:   04/02/18 113/73   03/30/18 91/57   03/30/18 96/62    Weight from Last 3 Encounters:   04/02/18 66.8 kg (147 lb 4.3 oz)   03/30/18 67.6 kg (149 lb)   03/19/18 68 kg (150 lb)              Today, you had the following     No orders found for display       Primary Care Provider    [facility]       No address on file        Equal Access to Services     [facility] : Hadmarika Spence, johnson flower, john frazier, luis e asher . So [facility] [phone].    ATENCIÓN: Si habla español, tiene a bernal disposición servicios gratuitos de asistencia lingüística. Llame al [phone].    We comply with applicable federal civil rights laws and [place] laws. We do not discriminate on the basis of race, color, national origin, age, disability, sex, sexual orientation, or gender identity.            Thank you!     Thank you for choosing [facility]  for your care. Our goal is always to provide you with excellent care. Hearing back from our patients is one way we can continue to improve our services. Please take a few minutes to complete the written survey that you may receive in the mail after your visit with us. Thank you!             Your Updated Medication List - Protect others around you: Learn how to safely use, store and throw away your medicines at www.disposemymeds.org.          This list is accurate as of 4/2/18 11:59 PM.  Always use your most recent med list.                   Brand Name Dispense Instructions for use Diagnosis    amylase-lipase-protease 91617 UNITS Cpep    CREON    180 capsule    Take 2 capsules (72,000 Units) by mouth 3 times daily (with meals)    Malignant neoplasm of head of pancreas (H)       ALVARADO CONTOUR NEXT test strip   Generic drug:  blood glucose monitoring      100 each    USE TO CHECK BLOOD SUGAR TWICE DAILY ( ONCE FASTING AND ONCE 2 HOURS AFTER EATING )    Diabetes mellitus, type 2 (H)       * bisacodyl 10 MG Suppository    DULCOLAX    30 suppository    Place 1 suppository (10 mg) rectally daily as needed for constipation    Drug-induced constipation       * bisacodyl 10 MG Suppository    DULCOLAX    30 suppository    Place 1 suppository (10 mg) rectally daily as needed for constipation    Malignant neoplasm of head of pancreas (H), Slow transit constipation       blood glucose monitoring lancets     102 each    Use to test blood sugar two times daily or as directed.    Diabetes mellitus, type 2 (H)       diltiazem 2% in PLO cream (FV COMPOUNDED) 2% Gel     30 g    Apply topically daily and as needed to external hemorrhoids    External hemorrhoids       docusate sodium 100 MG capsule    DOK    100 capsule    Take 1 capsule (100 mg) by mouth daily    External hemorrhoids       dronabinol 5 MG capsule    MARINOL    60 capsule    Take 1 capsule (5 mg) by mouth 2 times daily (before meals)    Malignant neoplasm of head of pancreas (H), Anorexia       ENSURE CLEAR Liqd     90 Bottle    Take 1 Bottle by mouth 3 times daily    Malignant neoplasm of head of pancreas (H)       insulin glargine 100 UNIT/ML injection    LANTUS SOLOSTAR    15 mL    10 units subcutaneously daily at 8pm.    Diabetes mellitus, type 2 (H)       insulin pen needle 32G X 4 MM    BD EMMANUEL U/F    100 each    Use one daily or as directed.    Diabetes mellitus, type 2 (H)       KLOR-CON 20 MEQ CR tablet   Generic drug:  potassium chloride SA      Take 1 tablet (20 mEq) by mouth 2 times daily    Malignant neoplasm of head of pancreas (H)       lidocaine-prilocaine cream    EMLA    30 g    Apply topically as needed for other (Use 30-60 minutes prior to port access)    Malignant neoplasm of head of pancreas (H)       loratadine 10 MG tablet    CLARITIN    30 tablet    Take 1 tablet (10 mg) by mouth daily Reported  on 5/5/2017    Chronic seasonal allergic rhinitis, unspecified trigger       LORazepam 0.5 MG tablet    ATIVAN    30 tablet    Take 1 tablet (0.5 mg) by mouth every 4 hours as needed (Anxiety, Nausea/Vomiting or Sleep)    Malignant neoplasm of head of pancreas (H)       METHYLPHENIDATE HCL PO      Take 5 mg by mouth 2 times daily        * morphine 15 MG IR tablet    MSIR    100 tablet    Take 1 tablet (15 mg) by mouth every 4 hours as needed for moderate to severe pain    Malignant neoplasm of head of pancreas (H)       * morphine 30 MG 12 hr tablet    MS CONTIN    60 tablet    Take 1 tablet (30 mg) by mouth every 12 hours    Malignant neoplasm of head of pancreas (H)       ondansetron 8 MG ODT tab    ZOFRAN-ODT    60 tablet    Take 1 tablet (8 mg) by mouth every 8 hours as needed for nausea    Malignant neoplasm of head of pancreas (H)       ONETOUCH PING METER REMOTE SUPPLIES Misc     1 each    Check your sugars twice daily, once when fasting and once 2 hours after eating.    Secondary diabetes mellitus (H)       order for DME     1 Units    Equipment being ordered: Toilet seat riser with handles    Malignant neoplasm of head of pancreas (H), Generalized muscle weakness       pantoprazole 20 MG EC tablet    PROTONIX    60 tablet    Take 1 tablet (20 mg) by mouth 2 times daily    Malignant neoplasm of head of pancreas (H)       polyethylene glycol powder    MIRALAX/GLYCOLAX    500 g    Take 17 g (1 capful) by mouth daily    Malignant neoplasm of head of pancreas (H)       prochlorperazine 10 MG tablet    COMPAZINE    60 tablet    TAKE ONE TABLET BY MOUTH EVERY 6 HOURS AS NEEDED FOR NAUSEA AND VOMITING    Malignant neoplasm of head of pancreas (H)       senna-docusate 8.6-50 MG per tablet    SENNA S    100 tablet    Take 2 tablets by mouth 2 times daily as needed for constipation    Drug-induced constipation       * Notice:  This list has 4 medication(s) that are the same as other medications prescribed for you.  Read the directions carefully, and ask your doctor or other care provider to review them with you.

## 2018-04-03 NOTE — PROGRESS NOTES
Shirin Meyer is here today in follow-up of advanced metastatic pancreatic cancer.    She has widely metastatic disease and is now failed all standard lines of therapy.  We have been discussing the possibility of enrollment in a phase 1 trial but her overall performance status is too poor and has been declining quickly.  She returns today with the son to discuss end-of-life planning.  At present her primary complaint is related to ongoing issues with constipation.  She feels she is doing reasonably well with her pain control.  She is not eating much these days.  She does not believe she is having any fevers.     On exam she is appearing more cachectic but otherwise reasonably comfortable.  I did not otherwise examine her today.    I reviewed with her her new CT scan which confirms marked progression of her disease.     Assessment/plan: Advanced metastatic pancreatic cancer, now with a performance status of 3 at best.      I spent all of her greater than 40 minutes together today reviewing her current situation.  We discussed the lack of any meaningful options for treating her cancer.  I recommended that we focus on managing her symptoms as best were able and explained that that would be best accomplished with enrollment in home hospice program.  We went over her goals of care and what we could and could not accomplish.  She was agreeable to being DNR/DNI if any catastrophe occurred.  She wanted to still be able to get supplemental fluids if needed.  We talked about increasing her laxatives to help maintain her bowel function as that seems to be her primary symptom right now.  We talked about the support that hospice care would provide patches for her but for the rest of the family.  She wanted to know how much time she had left and we discussed the uncertainties involved but I told her I thought it was probably just a couple of months certainly not more than 6 months.    At the end of this very long  discussion she expressed a good understanding of her circumstance and was agreeable to the concept of hospice.  However, she was leaving the room she did ask me if I thought there was still a chance she may be curative her disease, so I suspect there is still going to be some ongoing needs for helping her except her circumstance.  We will follow-up with her in clinic on a as needed basis and try and manage everything going forward to the home hospice program.

## 2019-01-22 NOTE — ANESTHESIA POSTPROCEDURE EVALUATION
----- Message from Salazar Campbell MD sent at 1/22/2019  8:00 AM CST -----  Regarding: RE: Changed  med   Sent to [facility]    SR  ----- Message -----  From: Rhianna Sherwood MA  Sent: 1/22/2019   7:40 AM  To: Salazar Campbell MD  Subject: RE: Changed  med                                 This pt does not use [facility] only local   ----- Message -----  From: Salazar Campbell MD  Sent: 1/21/2019   8:37 PM  To: Rhianna Sherwood MA  Subject: Changed  med                                     Notify Pt that I have changed simvastatin to atorvastatin in view of DDI with Amlodipine.New rx sent to [facility]      ----- Message -----  From: Rhianna Sherwood MA  Sent: 1/21/2019   8:47 AM  To: Salazar Campbell MD           Patient: Shirin Muller    Procedure(s):  ENDOSCOPIC RETROGRADE CHOLANGIOPANCREATOGRAM, pus removal , stent placement to bile duct x1   - Wound Class: II-Clean Contaminated    Diagnosis:Pancreatic Cancer  Diagnosis Additional Information: No value filed.    Anesthesia Type:  General, ETT, RSI    Note:  Anesthesia Post Evaluation    Patient location during evaluation: PACU  Patient participation: Able to fully participate in evaluation  Level of consciousness: responsive to verbal stimuli and responsive to light touch  Pain management: adequate  Airway patency: patent  Cardiovascular status: acceptable  Respiratory status: acceptable  Hydration status: acceptable  PONV: none     Anesthetic complications: None          Last vitals:  Vitals:    08/03/17 1229 08/03/17 1343 08/03/17 1700   BP: 111/63 96/57 122/78   Pulse: 129     Resp: 18 18 20   Temp: 38.3  C (100.9  F) 37.4  C (99.4  F) 36.7  C (98.1  F)   SpO2:  99% 100%         Electronically Signed By: Oneyda Ray MD  August 3, 2017  5:24 PM

## 2019-03-19 NOTE — NURSING NOTE
Chief Complaint   Patient presents with     Port Draw     Labs drawn from Right Chest Port-a-Cath and line flushed with Saline and Heparin.      Kylie Ambrosio RN   
General Sunscreen Counseling: I recommended a broad spectrum sunscreen with a SPF of 30 or higher.  I explained that SPF 30 sunscreens block approximately 97 percent of the sun's harmful rays.  Sunscreens should be applied at least 15 minutes prior to expected sun exposure and then every 2 hours after that as long as sun exposure continues. If swimming or exercising sunscreen should be reapplied every 45 minutes to an hour after getting wet or sweating.  One ounce, or the equivalent of a shot glass full of sunscreen, is adequate to protect the skin not covered by a bathing suit. I also recommended a lip balm with a sunscreen as well. Sun protective clothing can be used in lieu of sunscreen but must be worn the entire time you are exposed to the sun's rays.
Detail Level: Generalized

## 2020-07-29 NOTE — PROGRESS NOTES
Date of Service: 07/29/2020    INITIAL PSYCHIATRIC FOLLOWUP EVALUATION OF A VIDEO SESSION    IDENTIFYING DATA:  The patient is a 19-year-old man who is single and lives with his family.  He lives with his biological mother, his mother's boyfriend, and his 3 siblings.  He has a 1-year-old sister, a 3-month old brother and 14-year-old brother.  The patient has a daughter who is 1-year-old, who is in foster care.  The patient is currently unemployed.    CHIEF COMPLAINT:  Paranoid ideation, irritability, anger, severe insomnia, racing thoughts.    HISTORY OF PRESENT ILLNESS:  The patient is a 19-year-old man with a recent psychiatric history of bipolar disorder, type 1, mixed episode, latest episode mixed episode, who has been referred from [facility] for further psychiatric treatment.  The patient has been prescribed Depakote and Zyprexa for his psychiatric illness, but he has not been taking for the last 2 months his medication.  He is refusing to take his medication without any particular reason.    The patient has been treated for mood swings and behavior problems, and autistic spectrum illness at a younger age at [facility] [place].  The patient started to have more severe behavior problem and anger episodes in the last few years.  The patient has been so angry that police had been involved one time to calm him down.  When he drinks alcohol, his anger takes over and he does not know what he is doing.  He is throwing things, he becomes violent, and police needs to be involved.  He has been told by his mother that if he continues to have severe anger outbursts, he will be kicked out of her house.    The patient is aggravated by his girlfriend, who has the same problems, anger problems, and there is no contact order at this time.  Since his girlfriend and himself are not able to take care of their younger child, the child has been placed in foster care.    During the interview, the patient appears to  This is a recent snapshot of the patient's [facility] medical record.  For current drug dose and complete information and questions, call [phone] or [facility], [facility] ([id number])  CSN Number:  [id number]       joke around about what is happening in his life.  The patient reports that he does not like his girlfriend, but he likes lots of girls and usually he prefers \"white girls\" because they smell good.  The patient reports that he is aware that sometimes the anger takes over, but he does not know why he is so angry.  The mother reports that he does not sleep for nights in a row at times.  The patient reports that when he has some sleep, it is not more than 5-6 hours a night.  He tends to be very irritable most of the time with racing thoughts and consistent paranoid ideation.  He cannot interact properly with other people, the mother says, because he thinks people are against him, they are plotting against him.  He becomes violent when he thinks people are criticizing him or they are against him.  The mother reports that he does not talk to himself, but it appeared in the past that he is responding to internal stimuli.  The situation with his mood swings and behavior has been getting worse for the last 3 years.  The patient has been treated in the past with Citalopram for depression at a younger age and he has been doing well with Citalopram, was able to graduate eventually high school.  He has not been treated with mood stabilizers until recently when he has been admitted to [facility].  He took the medication for a while after being discharged this year from [facility], but now he is not taking the medication for the last 2 months.    The patient denies having any current sadness, hopelessness or suicidal ideation.  The patient reports that he has good days and he was smiling most of the time during the session.  The patient denies having any suicidal thoughts or homicidal thoughts.  The patient denies having any current auditory hallucinations.  The patient is endorsing paranoid ideation.  He feels that people are against him.  The patient went to 4 family members' houses and all of  them kicked him out because his behavior has been out of control, the mother said.  The patient is pacing back and forth in the house, being highly agitated at times.  The mother is exhausted and she is afraid of the safety of the younger children in the house due to his anger.    At Father's Day, police had been involved, he was very angry.He denies having any auditory hallucinations or visual hallucinations.    MENTAL STATUS EXAMINATION:  The patient has been cooperative without abnormal movements.  His speech has been goal oriented, normal rate and normal tone.  Thought process logical.  The patient made an appropriate joke sometimes during the session, denying his illness at times.  His mood has been described as euthymic.  His affect has been full range, inappropriate at times.  He denies any suicidal or homicidal thoughts.  He denied any visual hallucinations or auditory hallucinations.  He is endorsing paranoid ideation.  No delusions have been noticed.  His short-term memory appears to be vague.  Concentration is limited.  His insight of illness is limited.  His judgment based on compliance with treatment is poor.  His intelligence appears to be average based on verbal skills.    PAST PSYCHIATRIC HISTORY:  The patient has been treated for depression in [facility] during younger age.  He has had one admission to [facility] and diagnosed with bipolar disorder.    MEDICAL HISTORY:  None.    ALLERGIES TO MEDICATIONS:  None.    SUBSTANCE ABUSE HISTORY:  The patient is abusing alcohol on and off.    ABUSE HISTORY:  The patient denies being physically or sexually molested.    FAMILY PSYCHIATRIC HISTORY:  There is a significant family history of bipolar disorder and schizophrenia on the father's side of the family.  The father is ill like himself but not treated, the mother says, and there are a few aunts on paternal side of the family who have been diagnosed with bipolar and  schizophrenia.    FAMILY HISTORY:  His parents lived together until 3 years ago and the mother could not put up with his father's behavior being untreated and behaving the same way that her son is behaving.  The mother is trying to be protective of him, but she made threats to kick him out of the house due to his violent behavior.  He gets along well with his mother and his mother's boyfriend.    SOCIAL HISTORY:  The patient graduated high school but he has not been able to hold a job.  He has bizarre behaviors while he is working.  Usually he is fired first day after starting work.  One time he was screaming so loud to the employer that nobody could stop him for 20 minutes or so, he has been so aggressive verbally and threatening behavior.  He has paranoid thoughts when he is working with people.  He thinks people are against him, plotting against him.  He has difficulties functioning at home and he is not able to hold a job.    INVENTORY OF STRENGTHS:  He has a supportive family.  He is friendly.    DIAGNOSES:  AXIS I:  Bipolar disorder, latest episode mixed with psychotic features.  AXIS II:  Deferred.  AXIS III:  None.  AXIS IV:  Severe.  AXIS V:  Global assessment of functioning of 45.    TREATMENT PLAN:  1.  Crisis plan has been explained.  2.  The patient needs to sign a release of information for the mother to be involved in his care and follow compliance with treatment.  Apply for Social Security Disability; the patient is not able to hold a job.  The patient wants to take only one medication, Olanzapine has been chosen in order to treat his psychosis as a mood stabilizer at the dose of 5 mg in the morning and 15 mg at night.  The patient is aware of possible increased risk of tardive dyskinesia, metabolic syndrome with Zyprexa.  The patient declines a higher level of care such as partial hospital or day treatment.  Return in 3-4 weeks.He agrees with Zyprexa.      Dictated By: Naga Ferrell MD  Signing  Provider: MD SIMON Addison/kimberly ([id number])  DD: 07/29/2020 10:01:06 TD: 07/29/2020 13:19:05    Copy Sent To:

## 2021-01-15 NOTE — MR AVS SNAPSHOT
After Visit Summary   4/21/2017    Shirin Muller    MRN: [id number]           Patient Information     Date Of Birth          1960        Visit Information        Provider Department      4/21/2017 12:00 PM Cathie Amaral PA-C Formerly [facility]        Today's Diagnoses     Rash and nonspecific skin eruption    -  1       Follow-ups after your visit        Your next 10 appointments already scheduled     May 02, 2017  7:30 AM CDT   [facility] Lab Draw with [facility] LAB DRAW   [facility] Mason[facility] Lab Draw ([facility])    [address] [place] [address]   [phone]            May 02, 2017  8:00 AM CDT   Infusion 360 with [facility] ONCOLOGY INFUSION, UC 14 ATC   [facility] ([facility])    [address] [place] [address]   [phone]            May 02, 2017  8:00 AM CDT   (Arrive by 7:45 AM)   Return Visit with TEE Olivas [facility] ([facility])    [address] [place] [address]   [phone]            May 16, 2017  7:30 AM CDT   M[facility] Lab Draw with [facility] LAB DRAW   [facility] Masonic Lab Draw ([facility])    [address] [place] [address]   [phone]            May 16, 2017  8:00 AM CDT   (Arrive by 7:45 AM)   Return Visit with TEE Olivas [facility] ([facility])    [address] [place] [address]   [phone]            May 16, 2017  9:00 AM CDT   Infusion 360 with UC ONCOLOGY INFUSION, UC 23 ATC   [facility] ([facility])    [address] [place] [address]   [phone]            May 30, 2017  6:30 AM CDT   [facility] Lab Draw with [email or web address]  DRAW   [facility] Draw ([facility])    [address]  [address] [facility] [address] [phone]            May 30, 2017  7:00 AM CDT   Infusion 360 with [facility] ONCOLOGY INFUSION   [facility] ([facility])    [address]  [address] [facility] [id number] [phone]              Who to contact     If you have questions or need follow up information about today's clinic visit or your schedule please contact [employer] [facility] directly at [phone].  Normal or non-critical lab and imaging results will be communicated to you by [email or web address], letter or phone within 4 business days after the clinic has received the results. If you do not hear from us within 7 days, please contact the clinic through [employer] or phone. If you have a critical or abnormal lab result, we will notify you by phone as soon as possible.  Submit refill requests through [employer] or call your pharmacy and they will forward the refill request to us. Please allow 3 business days for your refill to be completed.          Additional Information About Your Visit        [email or web address] Information     [employer] gives you secure access to your electronic health record. If you see a primary care provider, you can also send messages to your care team and make appointments. If you have questions, please call your primary care clinic.  If you do not have a primary care provider, please call [phone] and they will assist you.        Care EveryWhere ID     This is your Care EveryWhere ID. This could be used by other organizations to access your [facility] medical records  [id number]         Blood Pressure from Last 3 Encounters:   04/21/17 108/67   04/19/17 124/81   04/18/17 126/77    Weight from Last 3 Encounters:   04/21/17 71.4 kg (157 lb 6.4 oz)   04/19/17 68.9 kg (151 lb 14.4 oz)   04/17/17 69.9 kg (154 lb)              Today, you had the following      No orders found for display         Today's Medication Changes          These changes are accurate as of: 4/21/17  1:08 PM.  If you have any questions, ask your nurse or doctor.               These medicines have changed or have updated prescriptions.        Dose/Directions    * dexamethasone 4 MG tablet   Commonly known as:  DECADRON   This may have changed:  Another medication with the same name was added. Make sure you understand how and when to take each.   Used for:  Need for prophylactic measure, Malignant neoplasm of head of pancreas (H)        Dose:  4 mg   Take 1 tablet (4 mg) by mouth daily (with breakfast) for 3 days   Quantity:  3 tablet   Refills:  0       * dexamethasone 4 MG tablet   Commonly known as:  DECADRON   This may have changed:  You were already taking a medication with the same name, and this prescription was added. Make sure you understand how and when to take each.   Used for:  Need for prophylactic measure, Malignant neoplasm of head of pancreas (H)        Dose:  4 mg   Take 1 tablet (4 mg) by mouth daily (with breakfast) for 3 days   Quantity:  3 tablet   Refills:  0       polyethylene glycol powder   Commonly known as:  MIRALAX/GLYCOLAX   This may have changed:    - when to take this  - reasons to take this   Used for:  Malignant neoplasm of head of pancreas (H)        Dose:  1 capful   Take 17 g (1 capful) by mouth daily   Quantity:  119 g   Refills:  11       * Notice:  This list has 2 medication(s) that are the same as other medications prescribed for you. Read the directions carefully, and ask your doctor or other care provider to review them with you.         Where to get your medicines      These medications were sent to [address] [address]    Hours:  TRANSPLANT PHONE NUMBER [phone] Phone:  [phone]     dexamethasone 4 MG tablet                Primary Care  Provider    [facility]       No address on file        Thank you!     Thank you for choosing [facility]  for your care. Our goal is always to provide you with excellent care. Hearing back from our patients is one way we can continue to improve our services. Please take a few minutes to complete the written survey that you may receive in the mail after your visit with us. Thank you!             Your Updated Medication List - Protect others around you: Learn how to safely use, store and throw away your medicines at www.disposemymeds.org.          This list is accurate as of: 4/21/17  1:08 PM.  Always use your most recent med list.                   Brand Name Dispense Instructions for use    amylase-lipase-protease 75546 UNITS Cpep    CREON    180 capsule    Take 2 capsules (72,000 Units) by mouth 3 times daily (with meals)       ciprofloxacin 500 MG tablet    CIPRO    10 tablet    Take 1 tablet (500 mg) by mouth 2 times daily for 5 days       * dexamethasone 4 MG tablet    DECADRON    3 tablet    Take 1 tablet (4 mg) by mouth daily (with breakfast) for 3 days       * dexamethasone 4 MG tablet    DECADRON    3 tablet    Take 1 tablet (4 mg) by mouth daily (with breakfast) for 3 days       diltiazem 2% in PLO cream (FV COMPOUNDED) 2% Gel     30 g    Apply topically daily and as needed to external hemorrhoids       docusate sodium 100 MG capsule    COLACE    100 capsule    Take 1 capsule (100 mg) by mouth daily       dronabinol 5 MG capsule    MARINOL    60 capsule    Take 1 capsule (5 mg) by mouth 2 times daily (before meals)       ENSURE CLEAR Liqd     90 Bottle    Take 1 Bottle by mouth 3 times daily       lidocaine-prilocaine cream    EMLA    30 g    Apply topically as needed for other (Use 30-60 minutes prior to port access)       loratadine 10 MG tablet    CLARITIN     Take 10 mg by mouth daily       LORazepam 0.5 MG tablet    ATIVAN    30 tablet    Take 1 tablet (0.5 mg) by  mouth every 4 hours as needed (Anxiety, Nausea/Vomiting or Sleep)       morphine 15 MG 12 hr tablet    MS CONTIN    60 tablet    Take 1 tablet (15 mg) by mouth every 12 hours       ondansetron 8 MG ODT tab    ZOFRAN-ODT    60 tablet    Take 1 tablet (8 mg) by mouth every 8 hours as needed for nausea       oxyCODONE 5 MG IR tablet    ROXICODONE    100 tablet    Take 1 tablet (5 mg) by mouth every 4 hours as needed for moderate to severe pain       polyethylene glycol powder    MIRALAX/GLYCOLAX    119 g    Take 17 g (1 capful) by mouth daily       potassium chloride SA 20 MEQ CR tablet    potassium chloride    60 tablet    Take 1 tablet (20 mEq) by mouth daily       prochlorperazine 10 MG tablet    COMPAZINE    30 tablet    Take 1 tablet (10 mg) by mouth every 6 hours as needed (Nausea/Vomiting)       * Notice:  This list has 2 medication(s) that are the same as other medications prescribed for you. Read the directions carefully, and ask your doctor or other care provider to review them with you.       Detail Level: Detailed Depth Of Biopsy: dermis Was A Bandage Applied: Yes Size Of Lesion In Cm: 0.4 X Size Of Lesion In Cm: 0 Biopsy Type: H and E Biopsy Method: 15 blade Anesthesia Type: 1% lidocaine without epinephrine Anesthesia Volume In Cc (Will Not Render If 0): 1 Hemostasis: Electrocautery Wound Care: Aquaphor dressing with hypoallergenic tape Destruction After The Procedure: No Type Of Destruction Used: Curettage Cryotherapy Text: The wound bed was treated with cryotherapy after the biopsy was performed. Electrodesiccation Text: The wound bed was treated with electrodesiccation after the biopsy was performed. Electrodesiccation And Curettage Text: The wound bed was treated with electrodesiccation and curettage after the biopsy was performed. Silver Nitrate Text: The wound bed was treated with silver nitrate after the biopsy was performed. Lab: [facility] Lab Facility: [address] Consent: Written consent was obtained and risks were reviewed including but not limited to scarring, infection, bleeding, scabbing, incomplete removal, nerve damage and allergy to anesthesia. Post-Care Instructions: I reviewed with the patient in detail post-care instructions. Patient is to keep the biopsy site dry overnight, and then apply vaseline and cover daily until healed. Notification Instructions: Patient will be notified of biopsy results within one week. However, patient instructed to call the office if not contacted within two weeks. Billing Type: [employer] Information: Selecting Yes will display possible errors in your note based on the variables you have selected. This validation is only offered as a suggestion for you. PLEASE NOTE THAT THE VALIDATION TEXT WILL BE REMOVED WHEN YOU FINALIZE YOUR NOTE. IF YOU WANT TO FAX A PRELIMINARY NOTE YOU WILL NEED TO TOGGLE THIS TO 'NO' IF YOU DO NOT WANT IT IN YOUR FAXED NOTE.

## 2023-03-12 NOTE — TELEPHONE ENCOUNTER
Pt got put on clinic schedule this morning after calling in after hours number reporting she felt weak and fell. Called and spoke to daughter. Pt fell (unwitnessed) after she used the restroom early this morning. Daughter reports pt fainted. She heard the fall and found  finding pt laying on her stomach. Didn't hit head. She lost consciousness for at least one minute.  Provider feels this is more appropriate for ER. This was explained to daughter who agreed to bring pt in to ER. Report was called.   Alert-The patient is alert, awake and responds to voice. The patient is oriented to time, place, and person. The triage nurse is able to obtain subjective information.

## 2023-05-24 NOTE — PATIENT INSTRUCTIONS
Contact Numbers    [facility] Main Line: [phone]  [facility] Triage:  [phone]    Call triage with chills and/or temperature greater than or equal to 100.5, uncontrolled nausea/vomiting, diarrhea, constipation, dizziness, shortness of breath, chest pain, bleeding, unexplained bruising, or any new/concerning symptoms, questions/concerns.     If you are having any concerning symptoms or wish to speak to a provider before your next infusion visit, please call your care coordinator or triage to notify them so we can adequately serve you.       After Hours: [phone]    If after hours, weekends, or holidays, call main hospital  and ask for Oncology doctor on call.         January 2017 Sunday Monday Tuesday Wednesday Thursday Friday Saturday   1     2     [facility] LAB DRAW    8:45 AM   (15 min.)   [facility]     [facility] RETURN    9:20 AM   (50 min.)   Ester Echavarria APRN CNP   [facility]     [facility] ONC INFUSION 360   10:30 AM   (360 min.)   [facility] ONCOLOGY INFUSION   [facility] 3     4     5     [facility] LAB DRAW    3:00 PM   (15 min.)   [facility]   [facility]     [facility] ONC INFUSION 240    3:30 PM   (240 min.)    ONCOLOGY INFUSION   [facility] 6     7       8     9     10     11     12     13     14       15     16     17     [facility] LAB DRAW    9:00 AM   (15 min.)   [facility] DRAW   [facility] Masonic Lab Draw     [facility] ONC INFUSION 360    9:30 AM   (360 min.)   [facility] ONCOLOGY INFUSION   [facility]     [facility] RETURN   10:30 AM   (50 min.)   Ester Echavarria APRN CNP   [facility] 18     19     20     21     [facility] LAB DRAW   11:30 AM   (15 min.)   [facility] DRAW   [facility] Masonic Lab Draw     [facility] ONC INFUSION 120   12:00 PM   (120 min.)    ONCOLOGY INFUSION   [facility]   22     23     24     25     26     27     28      Tell patient I will put a new order in for a different iron but she may have to go more frequently     29     30     [facility] MASONIC LAB DRAW   11:00 AM   (15 min.)    MASONIC LAB DRAW   [facility] Lab Draw     [facility] RETURN   11:30 AM   (50 min.)   Ester Echavarria APRN CNP   [facility] ONC INFUSION 360   11:30 AM   (360 min.)   [facility] ONCOLOGY INFUSION   [facility] 31 February 2017 Sunday Monday Tuesday Wednesday Thursday Friday Saturday                  1     2     3     4       5     6     7     8     9     10     MR ABDOMEN WWO   10:45 AM   (45 min.)   [address] [place] [address] MRI     CT CHEST WO   11:40 AM   (20 min.)   [address] [place] [address] CT 11       12     13     [facility] MASONIC LAB DRAW    9:45 AM   (15 min.)   [facility] LAB DRAW   [facility] Lab Draw     [facility] RETURN   10:15 AM   (30 min.)   Demond Gonsales MD   [facility] ONC INFUSION 360   11:30 AM   (360 min.)    ONCOLOGY INFUSION   [facility] 14     15     16     17     18       19     20     21     22     23     24     25       26     27     28                                      Lab Results:  Recent Results (from the past 12 hour(s))   CBC with platelets differential    Collection Time: 01/17/17  9:52 AM   Result Value Ref Range    WBC 9.1 4.0 - 11.0 10e9/L    RBC Count 3.46 (L) 3.8 - 5.2 10e12/L    Hemoglobin 8.5 (L) 11.7 - 15.7 g/dL    Hematocrit 27.6 (L) 35.0 - 47.0 %    MCV 80 78 - 100 fl    MCH 24.6 (L) 26.5 - 33.0 pg    MCHC 30.8 (L) 31.5 - 36.5 g/dL    RDW 20.2 (H) 10.0 - 15.0 %    Platelet Count 123 (L) 150 - 450 10e9/L    Diff Method Manual Differential     % Neutrophils 89.4 %    % Lymphocytes 7.1 %    % Monocytes 3.5 %    % Eosinophils 0.0 %    % Basophils 0.0 %    Absolute Neutrophil 8.1 1.6 - 8.3 10e9/L    Absolute Lymphocytes 0.6 (L) 0.8 - 5.3 10e9/L    Absolute Monocytes 0.3 0.0 - 1.3 10e9/L    Absolute Eosinophils 0.0 0.0 - 0.7 10e9/L    Absolute Basophils 0.0 0.0 - 0.2  10e9/L    Anisocytosis Moderate     Poikilocytosis Moderate     Polychromasia Slight     Ovalocytes Moderate     Elliptocytes Slight     [place] Cells Slight    Comprehensive metabolic panel    Collection Time: 01/17/17  9:52 AM   Result Value Ref Range    Sodium 140 133 - 144 mmol/L    Potassium 3.0 (L) 3.4 - 5.3 mmol/L    Chloride 104 94 - 109 mmol/L    Carbon Dioxide 29 20 - 32 mmol/L    Anion Gap 7 3 - 14 mmol/L    Glucose 91 70 - 99 mg/dL    Urea Nitrogen 11 7 - 30 mg/dL    Creatinine 0.64 0.52 - 1.04 mg/dL    GFR Estimate >90  Non  GFR Calc   >60 mL/min/1.7m2    GFR Estimate If Black >90   GFR Calc   >60 mL/min/1.7m2    Calcium 8.5 8.5 - 10.1 mg/dL    Bilirubin Total 0.3 0.2 - 1.3 mg/dL    Albumin 2.9 (L) 3.4 - 5.0 g/dL    Protein Total 7.1 6.8 - 8.8 g/dL    Alkaline Phosphatase 300 (H) 40 - 150 U/L    ALT 22 0 - 50 U/L    AST 28 0 - 45 U/L

## (undated) DEVICE — SOL WATER IRRIG 1000ML BOTTLE 2F7114

## (undated) DEVICE — ENDO TUBING CO2 SMARTCAP STERILE DISP 100145CO2EXT

## (undated) DEVICE — BIOPSY VALVE BIOSHIELD 00711135

## (undated) DEVICE — ENDO CATH BALLOON EXTRACTOR PRO RX 09-12MM 4700

## (undated) DEVICE — ENDO DEVICE LOCKING AND BIOPSY CAP M00545261

## (undated) DEVICE — KIT ENDO FIRST STEP DISINFECTANT 200ML W/POUCH EP-4

## (undated) DEVICE — ENDO FORCEP ENDOJAW BIOPSY 2.8MMX160CM FB-220K

## (undated) DEVICE — ENDO BITE BLOCK ADULT OMNI-BLOC

## (undated) DEVICE — SUCTION MANIFOLD DORNOCH ULTRA CART UL-CL500

## (undated) DEVICE — PACK ENDOSCOPY GI CUSTOM UMMC

## (undated) DEVICE — TAPE DURAPORE 3" SILK 1538-3

## (undated) DEVICE — ENDO CONNECTOR ENDOGATOR AUX WATER JET FOR OLYMPUS SCOPE

## (undated) DEVICE — ENDO CAP AND TUBING STERILE FOR ENDOGATOR  100130

## (undated) DEVICE — PAD CHUX UNDERPAD 23X24" 7136

## (undated) DEVICE — INFLATION DEVICE BIG 60 ENDO-AN6012

## (undated) DEVICE — CATH BALLOON ELATION ESOPH/PYLORIC 15-16.5-18MMX180CM EPB15

## (undated) DEVICE — TAPE CLOTH 3" CARDINAL 3TRCL03

## (undated) DEVICE — ENDO CATH BALLOON DILATION HURRICANE 06MMX4X180CM M00545920

## (undated) DEVICE — WIRE GUIDE 0.025"X270CM STR VISIGLIDE G-240-2527S

## (undated) DEVICE — KIT CONNECTOR FOR OLYMPUS ENDOSCOPES DEFENDO 100310

## (undated) DEVICE — WIRE GUIDE 0.025"X450CM ANG VISIGLIDE G-240-2545A

## (undated) DEVICE — CATH RETRIEVAL BALLOON EXTRACTOR PRO RX-S INJ ABOVE 9-12MM

## (undated) DEVICE — WIRE GUIDE 0.025"X270CM ANG VISIGLIDE G-240-2527A

## (undated) DEVICE — ENDO FUSION OMNI-TOME G31903

## (undated) RX ORDER — PROPOFOL 10 MG/ML
INJECTION, EMULSION INTRAVENOUS
Status: DISPENSED
Start: 2017-01-01

## (undated) RX ORDER — FENTANYL CITRATE 50 UG/ML
INJECTION, SOLUTION INTRAMUSCULAR; INTRAVENOUS
Status: DISPENSED
Start: 2017-01-25

## (undated) RX ORDER — INDOMETHACIN 50 MG/1
SUPPOSITORY RECTAL
Status: DISPENSED
Start: 2017-04-19

## (undated) RX ORDER — FENTANYL CITRATE 50 UG/ML
INJECTION, SOLUTION INTRAMUSCULAR; INTRAVENOUS
Status: DISPENSED
Start: 2017-01-01

## (undated) RX ORDER — FENTANYL CITRATE 50 UG/ML
INJECTION, SOLUTION INTRAMUSCULAR; INTRAVENOUS
Status: DISPENSED
Start: 2018-01-01

## (undated) RX ORDER — LIDOCAINE HYDROCHLORIDE 20 MG/ML
INJECTION, SOLUTION EPIDURAL; INFILTRATION; INTRACAUDAL; PERINEURAL
Status: DISPENSED
Start: 2017-01-01

## (undated) RX ORDER — GLYCOPYRROLATE 0.2 MG/ML
INJECTION, SOLUTION INTRAMUSCULAR; INTRAVENOUS
Status: DISPENSED
Start: 2017-01-01

## (undated) RX ORDER — SIMETHICONE 20 MG/.3ML
EMULSION ORAL
Status: DISPENSED
Start: 2018-01-01

## (undated) RX ORDER — ESMOLOL HYDROCHLORIDE 10 MG/ML
INJECTION INTRAVENOUS
Status: DISPENSED
Start: 2018-01-01

## (undated) RX ORDER — EPHEDRINE SULFATE 50 MG/ML
INJECTION, SOLUTION INTRAMUSCULAR; INTRAVENOUS; SUBCUTANEOUS
Status: DISPENSED
Start: 2017-01-01

## (undated) RX ORDER — ONDANSETRON 2 MG/ML
INJECTION INTRAMUSCULAR; INTRAVENOUS
Status: DISPENSED
Start: 2018-01-01

## (undated) RX ORDER — ONDANSETRON 2 MG/ML
INJECTION INTRAMUSCULAR; INTRAVENOUS
Status: DISPENSED
Start: 2017-01-01

## (undated) RX ORDER — PHENYLEPHRINE HCL IN 0.9% NACL 1 MG/10 ML
SYRINGE (ML) INTRAVENOUS
Status: DISPENSED
Start: 2017-01-01

## (undated) RX ORDER — HEPARIN SODIUM (PORCINE) LOCK FLUSH IV SOLN 100 UNIT/ML 100 UNIT/ML
SOLUTION INTRAVENOUS
Status: DISPENSED
Start: 2017-04-19

## (undated) RX ORDER — DIPHENHYDRAMINE HYDROCHLORIDE 50 MG/ML
INJECTION INTRAMUSCULAR; INTRAVENOUS
Status: DISPENSED
Start: 2017-01-25

## (undated) RX ORDER — PROPOFOL 10 MG/ML
INJECTION, EMULSION INTRAVENOUS
Status: DISPENSED
Start: 2018-01-01

## (undated) RX ORDER — SIMETHICONE 40MG/0.6ML
SUSPENSION, DROPS(FINAL DOSAGE FORM)(ML) ORAL
Status: DISPENSED
Start: 2017-01-25

## (undated) RX ORDER — ESMOLOL HYDROCHLORIDE 10 MG/ML
INJECTION INTRAVENOUS
Status: DISPENSED
Start: 2017-01-01

## (undated) RX ORDER — LIDOCAINE HYDROCHLORIDE 20 MG/ML
INJECTION, SOLUTION EPIDURAL; INFILTRATION; INTRACAUDAL; PERINEURAL
Status: DISPENSED
Start: 2018-01-01

## (undated) RX ORDER — IOPAMIDOL 510 MG/ML
INJECTION, SOLUTION INTRAVASCULAR
Status: DISPENSED
Start: 2018-01-01